# Patient Record
Sex: MALE | Race: WHITE | NOT HISPANIC OR LATINO | URBAN - METROPOLITAN AREA
[De-identification: names, ages, dates, MRNs, and addresses within clinical notes are randomized per-mention and may not be internally consistent; named-entity substitution may affect disease eponyms.]

---

## 2020-02-04 ENCOUNTER — INPATIENT (INPATIENT)
Facility: HOSPITAL | Age: 70
LOS: 6 days | Discharge: HOME | DRG: 445 | End: 2020-02-11
Attending: HOSPITALIST | Admitting: SURGERY
Payer: MEDICARE

## 2020-02-04 VITALS
SYSTOLIC BLOOD PRESSURE: 90 MMHG | DIASTOLIC BLOOD PRESSURE: 56 MMHG | HEART RATE: 84 BPM | OXYGEN SATURATION: 92 % | WEIGHT: 182.1 LBS | RESPIRATION RATE: 20 BRPM | TEMPERATURE: 98 F

## 2020-02-04 DIAGNOSIS — Z85.828 PERSONAL HISTORY OF OTHER MALIGNANT NEOPLASM OF SKIN: Chronic | ICD-10-CM

## 2020-02-04 LAB
ALBUMIN SERPL ELPH-MCNC: 3.4 G/DL — SIGNIFICANT CHANGE UP (ref 3.3–5)
ALP SERPL-CCNC: 82 U/L — SIGNIFICANT CHANGE UP (ref 40–120)
ALT FLD-CCNC: 31 U/L — SIGNIFICANT CHANGE UP (ref 10–45)
ANION GAP SERPL CALC-SCNC: 12 MMOL/L — SIGNIFICANT CHANGE UP (ref 5–17)
APPEARANCE UR: CLEAR — SIGNIFICANT CHANGE UP
APTT BLD: 60.2 SEC — HIGH (ref 27.5–36.3)
AST SERPL-CCNC: 42 U/L — HIGH (ref 10–40)
BACTERIA # UR AUTO: NEGATIVE — SIGNIFICANT CHANGE UP
BASE EXCESS BLDV CALC-SCNC: 5.4 MMOL/L — HIGH (ref -2–2)
BASOPHILS # BLD AUTO: 0.02 K/UL — SIGNIFICANT CHANGE UP (ref 0–0.2)
BASOPHILS NFR BLD AUTO: 0.3 % — SIGNIFICANT CHANGE UP (ref 0–2)
BILIRUB SERPL-MCNC: 1.1 MG/DL — SIGNIFICANT CHANGE UP (ref 0.2–1.2)
BILIRUB UR-MCNC: NEGATIVE — SIGNIFICANT CHANGE UP
BUN SERPL-MCNC: 46 MG/DL — HIGH (ref 7–23)
CA-I SERPL-SCNC: 1.09 MMOL/L — LOW (ref 1.12–1.3)
CALCIUM SERPL-MCNC: 9.1 MG/DL — SIGNIFICANT CHANGE UP (ref 8.4–10.5)
CHLORIDE BLDV-SCNC: 98 MMOL/L — SIGNIFICANT CHANGE UP (ref 96–108)
CHLORIDE SERPL-SCNC: 94 MMOL/L — LOW (ref 96–108)
CO2 BLDV-SCNC: 31 MMOL/L — HIGH (ref 22–30)
CO2 SERPL-SCNC: 28 MMOL/L — SIGNIFICANT CHANGE UP (ref 22–31)
COLOR SPEC: YELLOW — SIGNIFICANT CHANGE UP
CREAT SERPL-MCNC: 1.79 MG/DL — HIGH (ref 0.5–1.3)
DIFF PNL FLD: ABNORMAL
EOSINOPHIL # BLD AUTO: 0.07 K/UL — SIGNIFICANT CHANGE UP (ref 0–0.5)
EOSINOPHIL NFR BLD AUTO: 1 % — SIGNIFICANT CHANGE UP (ref 0–6)
EPI CELLS # UR: 0 /HPF — SIGNIFICANT CHANGE UP
GAS PNL BLDV: 131 MMOL/L — LOW (ref 135–145)
GAS PNL BLDV: SIGNIFICANT CHANGE UP
GAS PNL BLDV: SIGNIFICANT CHANGE UP
GLUCOSE BLDV-MCNC: 120 MG/DL — HIGH (ref 70–99)
GLUCOSE SERPL-MCNC: 119 MG/DL — HIGH (ref 70–99)
GLUCOSE UR QL: NEGATIVE — SIGNIFICANT CHANGE UP
HCO3 BLDV-SCNC: 30 MMOL/L — HIGH (ref 21–29)
HCT VFR BLD CALC: 21.8 % — LOW (ref 39–50)
HCT VFR BLDA CALC: 37 % — LOW (ref 39–50)
HGB BLD CALC-MCNC: 12.2 G/DL — LOW (ref 13–17)
HGB BLD-MCNC: 11.8 G/DL — LOW (ref 13–17)
HYALINE CASTS # UR AUTO: 2 /LPF — SIGNIFICANT CHANGE UP (ref 0–2)
IMM GRANULOCYTES NFR BLD AUTO: 0.4 % — SIGNIFICANT CHANGE UP (ref 0–1.5)
INR BLD: 1.49 RATIO — HIGH (ref 0.88–1.16)
KETONES UR-MCNC: NEGATIVE — SIGNIFICANT CHANGE UP
LACTATE BLDV-MCNC: 1.3 MMOL/L — SIGNIFICANT CHANGE UP (ref 0.7–2)
LEUKOCYTE ESTERASE UR-ACNC: NEGATIVE — SIGNIFICANT CHANGE UP
LIDOCAIN IGE QN: 42 U/L — SIGNIFICANT CHANGE UP (ref 7–60)
LYMPHOCYTES # BLD AUTO: 0.39 K/UL — LOW (ref 1–3.3)
LYMPHOCYTES # BLD AUTO: 5.8 % — LOW (ref 13–44)
MCHC RBC-ENTMCNC: 48.6 PG — HIGH (ref 27–34)
MCHC RBC-ENTMCNC: 54.1 GM/DL — HIGH (ref 32–36)
MCV RBC AUTO: 89.7 FL — SIGNIFICANT CHANGE UP (ref 80–100)
MONOCYTES # BLD AUTO: 0.53 K/UL — SIGNIFICANT CHANGE UP (ref 0–0.9)
MONOCYTES NFR BLD AUTO: 7.9 % — SIGNIFICANT CHANGE UP (ref 2–14)
NEUTROPHILS # BLD AUTO: 5.7 K/UL — SIGNIFICANT CHANGE UP (ref 1.8–7.4)
NEUTROPHILS NFR BLD AUTO: 84.6 % — HIGH (ref 43–77)
NITRITE UR-MCNC: NEGATIVE — SIGNIFICANT CHANGE UP
NRBC # BLD: 0 /100 WBCS — SIGNIFICANT CHANGE UP (ref 0–0)
PCO2 BLDV: 44 MMHG — SIGNIFICANT CHANGE UP (ref 35–50)
PH BLDV: 7.44 — SIGNIFICANT CHANGE UP (ref 7.35–7.45)
PH UR: 6 — SIGNIFICANT CHANGE UP (ref 5–8)
PLATELET # BLD AUTO: 65 K/UL — LOW (ref 150–400)
PO2 BLDV: 30 MMHG — SIGNIFICANT CHANGE UP (ref 25–45)
POTASSIUM BLDV-SCNC: 3.1 MMOL/L — LOW (ref 3.5–5.3)
POTASSIUM SERPL-MCNC: 3.4 MMOL/L — LOW (ref 3.5–5.3)
POTASSIUM SERPL-SCNC: 3.4 MMOL/L — LOW (ref 3.5–5.3)
PROT SERPL-MCNC: 6.8 G/DL — SIGNIFICANT CHANGE UP (ref 6–8.3)
PROT UR-MCNC: ABNORMAL
PROTHROM AB SERPL-ACNC: 17.4 SEC — HIGH (ref 10–12.9)
RBC # BLD: 2.43 M/UL — LOW (ref 4.2–5.8)
RBC # FLD: 14.6 % — HIGH (ref 10.3–14.5)
RBC CASTS # UR COMP ASSIST: 3 /HPF — SIGNIFICANT CHANGE UP (ref 0–4)
SAO2 % BLDV: 54 % — LOW (ref 67–88)
SODIUM SERPL-SCNC: 134 MMOL/L — LOW (ref 135–145)
SP GR SPEC: 1.01 — SIGNIFICANT CHANGE UP (ref 1.01–1.02)
UROBILINOGEN FLD QL: NEGATIVE — SIGNIFICANT CHANGE UP
WBC # BLD: 6.74 K/UL — SIGNIFICANT CHANGE UP (ref 3.8–10.5)
WBC # FLD AUTO: 6.74 K/UL — SIGNIFICANT CHANGE UP (ref 3.8–10.5)
WBC UR QL: 2 /HPF — SIGNIFICANT CHANGE UP (ref 0–5)

## 2020-02-04 PROCEDURE — 74176 CT ABD & PELVIS W/O CONTRAST: CPT | Mod: 26

## 2020-02-04 PROCEDURE — 76705 ECHO EXAM OF ABDOMEN: CPT | Mod: 26,RT

## 2020-02-04 PROCEDURE — 99285 EMERGENCY DEPT VISIT HI MDM: CPT | Mod: GC

## 2020-02-04 RX ORDER — ACETAMINOPHEN 500 MG
975 TABLET ORAL ONCE
Refills: 0 | Status: COMPLETED | OUTPATIENT
Start: 2020-02-04 | End: 2020-02-04

## 2020-02-04 RX ORDER — PIPERACILLIN AND TAZOBACTAM 4; .5 G/20ML; G/20ML
3.38 INJECTION, POWDER, LYOPHILIZED, FOR SOLUTION INTRAVENOUS ONCE
Refills: 0 | Status: COMPLETED | OUTPATIENT
Start: 2020-02-04 | End: 2020-02-04

## 2020-02-04 RX ORDER — SODIUM CHLORIDE 9 MG/ML
1000 INJECTION, SOLUTION INTRAVENOUS ONCE
Refills: 0 | Status: COMPLETED | OUTPATIENT
Start: 2020-02-04 | End: 2020-02-04

## 2020-02-04 RX ORDER — FAMOTIDINE 10 MG/ML
20 INJECTION INTRAVENOUS ONCE
Refills: 0 | Status: COMPLETED | OUTPATIENT
Start: 2020-02-04 | End: 2020-02-04

## 2020-02-04 RX ADMIN — PIPERACILLIN AND TAZOBACTAM 200 GRAM(S): 4; .5 INJECTION, POWDER, LYOPHILIZED, FOR SOLUTION INTRAVENOUS at 20:35

## 2020-02-04 RX ADMIN — SODIUM CHLORIDE 1000 MILLILITER(S): 9 INJECTION, SOLUTION INTRAVENOUS at 20:33

## 2020-02-04 RX ADMIN — FAMOTIDINE 20 MILLIGRAM(S): 10 INJECTION INTRAVENOUS at 20:35

## 2020-02-04 RX ADMIN — Medication 975 MILLIGRAM(S): at 20:35

## 2020-02-04 NOTE — ED ADULT NURSE NOTE - OBJECTIVE STATEMENT
69 year old male presents to the ED ambulatory through waiting room with wife complaining of RUQ abdominal pain, sent in s/p, outpatient ultrasound earlier today revealed acute cholecystitis.  PMH HTN, anxiety, recent MOHs surgery 1 week ago (L nostril), Pt. has had fever (Tmax 103 yesterday as per wife at bedside), nausea, vomiting, and loose stools since Friday. He has 3/10 RUQ pain, worse with movement and inspiration. On assessment, patient is febrile to 101.7 rectally, face appears flushed. Abdomen is soft, nondistended. Patient is not currently nauseous. Denies headache, CP, SOB, dysuria, hematuria, blood in stool or Last meal this AM.

## 2020-02-04 NOTE — ED ADULT NURSE NOTE - NSIMPLEMENTINTERV_GEN_ALL_ED
Implemented All Universal Safety Interventions:  [place] to call system. Call bell, personal items and telephone within reach. Instruct patient to call for assistance. Room bathroom lighting operational. Non-slip footwear when patient is off stretcher. Physically safe environment: no spills, clutter or unnecessary equipment. Stretcher in lowest position, wheels locked, appropriate side rails in place.

## 2020-02-04 NOTE — ED PROVIDER NOTE - CLINICAL SUMMARY MEDICAL DECISION MAKING FREE TEXT BOX
70 yo M with HTN, anxiety, and recent MOHs surgery presents to ED wit abdominal pain and outpt. ultrasound positive for acute cholecystitis. Hypotensive on arrival to ED, however resolving. + Carney's sign on exam. Plan: repeat abd U/S, labs, analgesia, Abx, surgery consult

## 2020-02-04 NOTE — CONSULT NOTE ADULT - SUBJECTIVE AND OBJECTIVE BOX
GENERAL SURGERY CONSULT NOTE    HPI:  69M with PMHx HTN, anxiety, presents to the ED with 3-4 days of fever, vomiting, and poor PO intake. His symptoms started with nausea and projectile vomiting on Friday associated with fevers as high as 102. Per the patient's wife who is at the bedside, he complained of lower abdominal pain when his symptoms started but this abdominal pain has since resolved. He endorses feeling week from being dehydrated and has felt unsteady on his feet. He denies diarrhea but states that lately his stools have been thinner than usual. His primary care doctor sent him for an outpatient RUQ ultrasound that was suspicious for acute cholecystitis and advised him to present to the ED. Of note, he had a Moh's procedure about 10 days ago after which he was prescribed Keflex for 7 days. He completed the course.   Patient notes that he has a history of thrombocytopenia and is followed closely by Dr. Spike Melendez who has recommended conservative monitoring. The patient also has been told that he has had an elevated creatinine in the past but he is unsure the baseline number. He denies any chest pain, shortness of breath, dysuria or sick contacts.     In the ED, he is HD normal. He is febrile without leukocytosis. He received Zosyn and 1L IV crystalloid bolus.    PMHx: Anxiety  Hypertension, unspecified type    PSHx: H/O Mohs micrographic surgery for skin cancer    Home meds: atenolol 50mg bid, Olmesartan 40mgd daily, diazepam 5mg daily PRN, vitamin D  Social Hx: Lives at home with his wife, nonsmoker  Family Hx: No pertinent past family history     Physical Exam  T(C): 38.7  HR: 82 (82 - 84)  BP: 127/74 (90/56 - 127/74)  RR: 18 (18 - 20)  SpO2: 97% (92% - 97%)  Tmax: T(C): , Max: 38.7 (02-04-20 @ 19:45)    General: well developed, well nourished, NAD  Neuro: alert and oriented x4  Respiratory: nonlabored breathing on RA  CVS: regular rate and rhythm  Abdomen: soft, nontender, negative Murpheys, nondistended  Extremities: no edema, sensation and movement grossly intact  Skin: warm, dry, appropriate color    Labs:                        11.8   7.49  )-----------( 65       ( 04 Feb 2020 20:32 )             33.8     PT/INR - ( 04 Feb 2020 20:32 )   PT: 17.4 sec;   INR: 1.49 ratio         PTT - ( 04 Feb 2020 20:32 )  PTT:60.2 sec  02-04    134<L>  |  94<L>  |  46<H>  ----------------------------<  119<H>  3.4<L>   |  28  |  1.79<H>    Ca    9.1      04 Feb 2020 20:32    TPro  6.8  /  Alb  3.4  /  TBili  1.1  /  DBili  x   /  AST  42<H>  /  ALT  31  /  AlkPhos  82  02-04      Imaging and other studies:  < from: US Abdomen Upper Quadrant Right (02.04.20 @ 21:17) >    FINDINGS:    Liver: Within normal limits.    Bile ducts: Normal caliber. Common bile duct measures 7 mm.     Gallbladder: Massively distended with stones and sludge. Mild wall thickening. Positive sonographic Carney sign.   Pancreas: Visualized portions are within normal limits.    Right kidney: 11.4 cm. No hydronephrosis. Right renal cyst measuring up to 4.7 x 4.1 x 3.2 cm.    Ascites: None.    IVC: Visualized portions are within normal limits.    Miscellaneous: Right pleural effusion is partially visualized.    IMPRESSION:     Distended gallbladder with stones, mild wall thickening and positive sonographic Carney sign. Findings consistent with acute cholecystitis.    Right pleural effusion.

## 2020-02-04 NOTE — ED PROVIDER NOTE - OBJECTIVE STATEMENT
70 yo M with PMHx HTN, anxiety, and recent MOHs surgery presents to ED with abdominal pain after outpatient ultrasound earlier today revealed acute cholecystitis. Pt. has had fever (Tmax 103.5), nausea, vomiting, and loose stools since Friday. He has very mild RUQ pain, worse with inspiration. Also complains of weakness with 2 falls -- no LOC, no head trauma. Went to [facility] today and were advised to get abdominal ultrasound, results consistent with acute cholecystitis. Pt. also endorses a few days of dark urine, now resolved. Denies headache, CP, SOB, difficulty urinating. Last meal this AM.

## 2020-02-04 NOTE — H&P ADULT - NSHPPHYSICALEXAM_GEN_ALL_CORE
Physical Exam  T(C): 38.7  HR: 82 (82 - 84)  BP: 127/74 (90/56 - 127/74)  RR: 18 (18 - 20)  SpO2: 97% (92% - 97%)  Tmax: T(C): , Max: 38.7 (02-04-20 @ 19:45)    General: well developed, well nourished, NAD  Neuro: alert and oriented x4  Respiratory: nonlabored breathing on RA  CVS: regular rate and rhythm  Abdomen: soft, nontender, negative Murpheys, nondistended  Extremities: no edema, sensation and movement grossly intact  Skin: warm, dry, appropriate color

## 2020-02-04 NOTE — H&P ADULT - ATTENDING COMMENTS
seen and examined 02-05-20 @ 2315    soft / NT / ND  no surgical scars on abdomen  no jaundice    WBC = 7  plats - clumped    INR = 1.5  aptt = 60    Cr = 1.8  LFTs - WNL    U/A - proteinuria, moderate blood    U/S abd - cholelithiasis in a distended gallbladder with mild wall thickening and positive sonographic Carney's sign. CBD = 7 mm.    CT abd/pelvis w/o contrast - cholelithiasis in a distended gallbladder with fat stranding between gallbladder and hepatic flexure (sagittal 100 - 112)      cholelithiasis with acute cholecystitis  -HIDA to confirm diagnosis given benign exam    elevated coags  -send mixing study  -consult hematology    platelet clumping  -repeat phlebotomy in citrate Vacutainer seen and examined 02-05-20 @ 2315    soft / NT / ND  no surgical scars on abdomen  no jaundice    WBC = 7  plats - clumped    INR = 1.5  aptt = 60    Cr = 1.8  LFTs - WNL    U/A - proteinuria, moderate blood    U/S abd - cholelithiasis in a distended gallbladder with mild wall thickening and positive sonographic Carney's sign. CBD = 7 mm. right pleural effusion.    CT abd/pelvis w/o contrast - cholelithiasis in a distended gallbladder with fat stranding between gallbladder and hepatic flexure (sagittal 100 - 112), small right pleural effusion.      cholelithiasis with acute cholecystitis  -HIDA to confirm diagnosis given benign exam    elevated coags  -send mixing study  -consult hematology    platelet clumping  -repeat phlebotomy in citrate Vacutainer

## 2020-02-04 NOTE — H&P ADULT - HISTORY OF PRESENT ILLNESS
GENERAL SURGERY CONSULT NOTE    HPI:  69M with PMHx HTN, anxiety, presents to the ED with 3-4 days of fever, vomiting, and poor PO intake. His symptoms started with nausea and projectile vomiting on Friday associated with fevers as high as 102. Per the patient's wife who is at the bedside, he complained of lower abdominal pain when his symptoms started but this abdominal pain has since resolved. He endorses feeling week from being dehydrated and has felt unsteady on his feet. He denies diarrhea but states that lately his stools have been thinner than usual. His primary care doctor sent him for an outpatient RUQ ultrasound that was suspicious for acute cholecystitis and advised him to present to the ED. Of note, he had a Moh's procedure about 10 days ago after which he was prescribed Keflex for 7 days. He completed the course.   Patient notes that he has a history of thrombocytopenia and is followed closely by Dr. Spike Melendez who has recommended conservative monitoring. The patient also has been told that he has had an elevated creatinine in the past but he is unsure the baseline number. He denies any chest pain, shortness of breath, dysuria or sick contacts.     In the ED, he is HD normal. He is febrile without leukocytosis. He received Zosyn and 1L IV crystalloid bolus.    PMHx: Anxiety  Hypertension, unspecified type    PSHx: H/O Mohs micrographic surgery for skin cancer    Home meds: atenolol 50mg bid, Olmesartan 40mgd daily, diazepam 5mg daily PRN, vitamin D  Social Hx: Lives at home with his wife, nonsmoker  Family Hx: No pertinent past family history

## 2020-02-04 NOTE — ED PROVIDER NOTE - ATTENDING CONTRIBUTION TO CARE
I performed a history and physical exam of the patient and discussed their management with the resident/ACP. I reviewed the resident/ACP's note and agree with the documented findings and plan of care.   69M w/ PMHx as above, states that for 3-4 days now he has had abdominal pain; states that when it first started he felt very nauseous and vomited 2-3 times, went to his PMD and was scheduled for an outpatient US. Patient states since that time he has not been eating much secondary to a lack of appetite. States that he has also been having fevers, Tmax 103.5 today, no vomiting since the initial times but he hasn't been eating. Patient states that his PMD called him after the US and told him to come to the ED. Patient's US report shows: "Solitray mobile gallstone, gallbladder sludge. There is gallbladder wall thickening and small amount of fluid surrounding the gallbladder." Also reports that he has been feeling weak and had 2 minor falls with no LOC and no head trauma, remembers everything. Denies any subsequent pain from those falls.    PHYSICAL EXAMINATION:  VITALS REVIEWED.  VS normal  GENERALIZED APPEARANCE:  Comfortable, no acute distress, lying in bed  SKIN:  Warm, dry, no cyanosis  HEAD:  No obvious scalp lesions  EYES:  Conjunctiva pink, no icterus  ENMT:  Mucus membranes moist, no stridor  NECK:  Supple, non-tender  CHEST AND RESPIRATORY:  Clear to auscultation B/L, good air entry B/L, equal chest expansion  HEART AND CARDIOVASCULAR:  Regular rate, no obvious murmur  ABDOMEN AND GI:  Soft, +RUQ tenderness, equivocal Carney's sign, non-distended.  No rebound, no guarding  EXTREMITIES:  No deformity, edema, or calf tenderness  NEURO: AAOx3, gross motor and sensory intact  PSYCH: Normal affect     Impression:  R/o acute cholecystitis but given fever and physical exam findings likely that; r/o electrolyte abnormalities; labs, imaging, antibiotics, pain management PRN, Surgery consult PRN, re-eval, will likely require admission. I performed a history and physical exam of the patient and discussed their management with the resident/ACP. I reviewed the resident/ACP's note and agree with the documented findings and plan of care.   69M w/ PMHx as above, states that for 3-4 days now he has had abdominal pain; states that when it first started he felt very nauseous and vomited 2-3 times, went to his PMD and was scheduled for an outpatient US. Patient states since that time he has not been eating much secondary to a lack of appetite. States that he has also been having fevers, Tmax 103.5 today, no vomiting since the initial times but he hasn't been eating. Patient states that his PMD called him after the US and told him to come to the ED. Patient's US report shows: "Solitray mobile gallstone, gallbladder sludge. There is gallbladder wall thickening and small amount of fluid surrounding the gallbladder." Also reports that he has been feeling weak and had 2 minor falls with no LOC and no head trauma, remembers everything. Denies any subsequent pain from those falls.    PHYSICAL EXAMINATION:  VITALS REVIEWED.  VS normal  GENERALIZED APPEARANCE:  Comfortable, no acute distress, lying in bed  SKIN:  Warm, dry, no cyanosis  HEAD:  No obvious scalp lesions  EYES:  Conjunctiva pink, no icterus  ENMT:  Mucus membranes moist, no stridor  NECK:  Supple, non-tender  CHEST AND RESPIRATORY:  Clear to auscultation B/L, good air entry B/L, equal chest expansion  HEART AND CARDIOVASCULAR:  Regular rate, no obvious murmur  ABDOMEN AND GI:  Soft, +RUQ tenderness, equivocal Carney's sign, non-distended.  No rebound, no guarding  EXTREMITIES:  No deformity, edema, or calf tenderness  NEURO: AAOx3, gross motor and sensory intact  PSYCH: Normal affect     Impression:  R/o acute cholecystitis but given fever and physical exam findings likely that; r/o electrolyte abnormalities; labs, imaging, antibiotics, pain management PRN, Surgery consult PRN, re-eval, will likely require admission to surgery

## 2020-02-04 NOTE — H&P ADULT - ASSESSMENT
ASSESSMENT  69M with PMHx HTN, anxiety, thrombocytopenia of unknown etiology, presents to the ED with 3-4 days of fever, vomiting, and poor PO intake. He is HD normal. He is febrile without leukocytosis.  US showing gallstones and sludge and GB wall thickening but he is nontender on exam.    PLAN  - Admit to [facility] under Dr. Jacobo  - NPO IVF  - Cont Zosyn  - f/u final CT read  - Likely HIDA in the morning   - Heme consult in the morning - Dr Melendez  - Patient seen and examined with and plan reviewed with Dr. Jacobo     Acute Care Surgery  [id number]

## 2020-02-04 NOTE — CONSULT NOTE ADULT - ASSESSMENT
ASSESSMENT  69M with PMHx HTN, anxiety, thrombocytopenia of unknown etiology, presents to the ED with 3-4 days of fever, vomiting, and poor PO intake. He is HD normal. He is febrile without leukocytosis.  US showing gallstones and sludge and GB wall thickening but he is nontender on exam.    PLAN  - The patient's clinical picture is not entirely consistent with acute cholecystitis  - Recommend obtaining a CT of the abdomen and pelvis to rule out any other source of fevers and vomiting  - Patient seen and examined with and plan reviewed with Dr. Jacobo     Acute Care Surgery  [id number]

## 2020-02-04 NOTE — ED PROVIDER NOTE - PROGRESS NOTE DETAILS
pt was seen and examined by me, per patient and wife at bedside reports fevers over the last 3 days, had outpatient US done which showed concern for acute errol, repeat bedside US with acute errol, given zosyn, IVF, platelets low and creatinine elevated to 1.7, pt said this is normal for him and follows with a hematologist but unknown reason why- takes meds for htn, anxiety, overall HD stable with ttp RUQ, surgery aware of patient, awaiting final recs  Margaret Perea, PGY-3 EM

## 2020-02-04 NOTE — ED PROVIDER NOTE - CARE PLAN
Patient denies any complaints related to anticoagulation therapy at this time. Patient reports no change in medication, diet or health. Reinforced with patient to call clinic with any medication changes as this can impact INR. Reinforced signs and symptoms bleeding/clotting with patient.  Patient aware to seek medical care if signs and symptoms develop. Advised that if patient falls and/or hits their head, they should seek medical attention. Verbalizes understanding. Clinic number provided [phone]    Dosing instructions given to patient verbally over the phone. Advised to call the clinic with any questions or concerns. Patient verbalizes understanding. Clinic number provided.    Anticoagulation Summary  As of 3/8/2018    INR goal:   2.0-3.0   TTR:   64.4 % (3.6 mo)   Today's INR:   1.7!   Maintenance plan:   1 mg (2 mg x 0.5) on W; 2 mg (2 mg x 1) all other days   Weekly total:   13 mg   Plan last modified:   Kiah Landaverde RN (3/8/2018)   Next INR check:   3/21/2018   Target end date:   Indefinite    Indications    Paroxysmal atrial fibrillation (CMS/HCC) [I48.0]  Acute CVA (cerebrovascular accident) (CMS/HCC) (Resolved) [I63.9]             Anticoagulation Episode Summary     INR check location:   Home Draw    Preferred lab:       Send INR reminders to:   [facility] (OPEN ENROLLMENT) [facility] CARD/EP    Comments:   New STAC ordered 11/9/17; 2mg tabs; [employer]; TALK IN TABS; patient gets confused easily      Anticoagulation Care Providers     Provider Role Specialty Phone number    Lewis Gannon NP Referring Nurse Practitioner - Adult Health [phone]    Dennis Talbert MD Responsible Internal Medicine- Interventional Cardiology [phone]             Principal Discharge DX:	Acute cholecystitis

## 2020-02-05 DIAGNOSIS — K81.0 ACUTE CHOLECYSTITIS: ICD-10-CM

## 2020-02-05 DIAGNOSIS — D59.1 OTHER AUTOIMMUNE HEMOLYTIC ANEMIAS: ICD-10-CM

## 2020-02-05 DIAGNOSIS — D69.3 IMMUNE THROMBOCYTOPENIC PURPURA: ICD-10-CM

## 2020-02-05 DIAGNOSIS — N17.9 ACUTE KIDNEY FAILURE, UNSPECIFIED: ICD-10-CM

## 2020-02-05 DIAGNOSIS — D68.4 ACQUIRED COAGULATION FACTOR DEFICIENCY: ICD-10-CM

## 2020-02-05 DIAGNOSIS — N18.2 CHRONIC KIDNEY DISEASE, STAGE 2 (MILD): ICD-10-CM

## 2020-02-05 DIAGNOSIS — E53.8 DEFICIENCY OF OTHER SPECIFIED B GROUP VITAMINS: ICD-10-CM

## 2020-02-05 DIAGNOSIS — D68.9 COAGULATION DEFECT, UNSPECIFIED: ICD-10-CM

## 2020-02-05 LAB
ALBUMIN SERPL ELPH-MCNC: 3.3 G/DL — SIGNIFICANT CHANGE UP (ref 3.3–5)
ALP SERPL-CCNC: 88 U/L — SIGNIFICANT CHANGE UP (ref 40–120)
ALT FLD-CCNC: 26 U/L — SIGNIFICANT CHANGE UP (ref 10–45)
ANION GAP SERPL CALC-SCNC: 13 MMOL/L — SIGNIFICANT CHANGE UP (ref 5–17)
APTT 50/50 2HOUR INCUB: 63.7 SEC — HIGH (ref 27.5–37.4)
APTT 50/50 MIX COMMENT: SIGNIFICANT CHANGE UP
APTT BLD: 55.9 SEC — HIGH (ref 27.5–37.4)
APTT BLD: 60.6 SEC — HIGH (ref 27.5–36.3)
APTT BLD: 64.4 SEC — HIGH (ref 27.5–36.3)
AST SERPL-CCNC: 33 U/L — SIGNIFICANT CHANGE UP (ref 10–40)
BILIRUB SERPL-MCNC: 1.2 MG/DL — SIGNIFICANT CHANGE UP (ref 0.2–1.2)
BLD GP AB SCN SERPL QL: POSITIVE — SIGNIFICANT CHANGE UP
BUN SERPL-MCNC: 42 MG/DL — HIGH (ref 7–23)
CALCIUM SERPL-MCNC: 9.1 MG/DL — SIGNIFICANT CHANGE UP (ref 8.4–10.5)
CHLORIDE SERPL-SCNC: 98 MMOL/L — SIGNIFICANT CHANGE UP (ref 96–108)
CO2 SERPL-SCNC: 29 MMOL/L — SIGNIFICANT CHANGE UP (ref 22–31)
CREAT SERPL-MCNC: 1.58 MG/DL — HIGH (ref 0.5–1.3)
FIBRINOGEN PPP-MCNC: 836 MG/DL — HIGH (ref 350–510)
FIBRINOGEN PPP-MCNC: 848 MG/DL — SIGNIFICANT CHANGE UP (ref 350–510)
GLUCOSE SERPL-MCNC: 111 MG/DL — HIGH (ref 70–99)
HCT VFR BLD CALC: 31.5 % — LOW (ref 39–50)
HCV AB S/CO SERPL IA: 0.32 S/CO — SIGNIFICANT CHANGE UP (ref 0–0.99)
HCV AB SERPL-IMP: SIGNIFICANT CHANGE UP
HGB BLD-MCNC: 11 G/DL — LOW (ref 13–17)
INR BLD: 1.41 RATIO — HIGH (ref 0.88–1.16)
INR BLD: 1.41 RATIO — HIGH (ref 0.88–1.16)
MAGNESIUM SERPL-MCNC: 2 MG/DL — SIGNIFICANT CHANGE UP (ref 1.6–2.6)
MCHC RBC-ENTMCNC: 30.6 PG — SIGNIFICANT CHANGE UP (ref 27–34)
MCHC RBC-ENTMCNC: 34.9 GM/DL — SIGNIFICANT CHANGE UP (ref 32–36)
MCV RBC AUTO: 87.7 FL — SIGNIFICANT CHANGE UP (ref 80–100)
NRBC # BLD: 0 /100 WBCS — SIGNIFICANT CHANGE UP (ref 0–0)
PAT CTL 2H: 67.7 SEC — HIGH (ref 27.5–37.4)
PHOSPHATE SERPL-MCNC: 2.9 MG/DL — SIGNIFICANT CHANGE UP (ref 2.5–4.5)
PLATELET # BLD AUTO: 72 K/UL — LOW (ref 150–400)
PLATELET # BLD AUTO: SIGNIFICANT CHANGE UP K/UL (ref 150–400)
POTASSIUM SERPL-MCNC: 3.4 MMOL/L — LOW (ref 3.5–5.3)
POTASSIUM SERPL-SCNC: 3.4 MMOL/L — LOW (ref 3.5–5.3)
PROT SERPL-MCNC: 6.4 G/DL — SIGNIFICANT CHANGE UP (ref 6–8.3)
PROTHROM AB SERPL-ACNC: 16.3 SEC — HIGH (ref 10–12.9)
PROTHROM AB SERPL-ACNC: 16.4 SEC — HIGH (ref 10–12.9)
PT 100%: 16.3 SEC — HIGH (ref 10–12.9)
PT 50/50: 13.2 SEC — SIGNIFICANT CHANGE UP (ref 9.7–15.2)
RAPID RVP RESULT: SIGNIFICANT CHANGE UP
RBC # BLD: 3.59 M/UL — LOW (ref 4.2–5.8)
RBC # FLD: 13.5 % — SIGNIFICANT CHANGE UP (ref 10.3–14.5)
RH IG SCN BLD-IMP: POSITIVE — SIGNIFICANT CHANGE UP
RH IG SCN BLD-IMP: POSITIVE — SIGNIFICANT CHANGE UP
SODIUM SERPL-SCNC: 140 MMOL/L — SIGNIFICANT CHANGE UP (ref 135–145)
THROMBIN TIME: 22 SEC — SIGNIFICANT CHANGE UP (ref 16–25)
THROMBIN TIME: 22.7 SEC — SIGNIFICANT CHANGE UP (ref 16–25)
WBC # BLD: 6.01 K/UL — SIGNIFICANT CHANGE UP (ref 3.8–10.5)
WBC # FLD AUTO: 6.01 K/UL — SIGNIFICANT CHANGE UP (ref 3.8–10.5)

## 2020-02-05 PROCEDURE — 78227 HEPATOBIL SYST IMAGE W/DRUG: CPT | Mod: 26

## 2020-02-05 PROCEDURE — 86077 PHYS BLOOD BANK SERV XMATCH: CPT

## 2020-02-05 RX ORDER — FLUVOXAMINE MALEATE 25 MG/1
100 TABLET ORAL
Refills: 0 | Status: DISCONTINUED | OUTPATIENT
Start: 2020-02-05 | End: 2020-02-11

## 2020-02-05 RX ORDER — ATENOLOL 25 MG/1
50 TABLET ORAL
Refills: 0 | Status: DISCONTINUED | OUTPATIENT
Start: 2020-02-05 | End: 2020-02-11

## 2020-02-05 RX ORDER — DIAZEPAM 5 MG
2.5 TABLET ORAL DAILY
Refills: 0 | Status: DISCONTINUED | OUTPATIENT
Start: 2020-02-05 | End: 2020-02-09

## 2020-02-05 RX ORDER — SODIUM CHLORIDE 9 MG/ML
1000 INJECTION, SOLUTION INTRAVENOUS
Refills: 0 | Status: DISCONTINUED | OUTPATIENT
Start: 2020-02-05 | End: 2020-02-05

## 2020-02-05 RX ORDER — DIAZEPAM 5 MG
5 TABLET ORAL
Qty: 0 | Refills: 0 | DISCHARGE

## 2020-02-05 RX ORDER — FOLIC ACID 0.8 MG
5 TABLET ORAL ONCE
Refills: 0 | Status: COMPLETED | OUTPATIENT
Start: 2020-02-05 | End: 2020-02-05

## 2020-02-05 RX ORDER — PHYTONADIONE (VIT K1) 5 MG
5 TABLET ORAL ONCE
Refills: 0 | Status: COMPLETED | OUTPATIENT
Start: 2020-02-05 | End: 2020-02-05

## 2020-02-05 RX ORDER — PIPERACILLIN AND TAZOBACTAM 4; .5 G/20ML; G/20ML
3.38 INJECTION, POWDER, LYOPHILIZED, FOR SOLUTION INTRAVENOUS EVERY 8 HOURS
Refills: 0 | Status: DISCONTINUED | OUTPATIENT
Start: 2020-02-05 | End: 2020-02-05

## 2020-02-05 RX ORDER — INFLUENZA VIRUS VACCINE 15; 15; 15; 15 UG/.5ML; UG/.5ML; UG/.5ML; UG/.5ML
0.5 SUSPENSION INTRAMUSCULAR ONCE
Refills: 0 | Status: DISCONTINUED | OUTPATIENT
Start: 2020-02-05 | End: 2020-02-11

## 2020-02-05 RX ORDER — MORPHINE SULFATE 50 MG/1
2 CAPSULE, EXTENDED RELEASE ORAL ONCE
Refills: 0 | Status: DISCONTINUED | OUTPATIENT
Start: 2020-02-05 | End: 2020-02-05

## 2020-02-05 RX ORDER — FLUVOXAMINE MALEATE 25 MG/1
200 TABLET ORAL ONCE
Refills: 0 | Status: COMPLETED | OUTPATIENT
Start: 2020-02-05 | End: 2020-02-05

## 2020-02-05 RX ORDER — FOLIC ACID 0.8 MG
1 TABLET ORAL DAILY
Refills: 0 | Status: DISCONTINUED | OUTPATIENT
Start: 2020-02-05 | End: 2020-02-11

## 2020-02-05 RX ORDER — PREGABALIN 225 MG/1
1000 CAPSULE ORAL DAILY
Refills: 0 | Status: COMPLETED | OUTPATIENT
Start: 2020-02-05 | End: 2020-02-10

## 2020-02-05 RX ORDER — POTASSIUM CHLORIDE 20 MEQ
20 PACKET (EA) ORAL ONCE
Refills: 0 | Status: COMPLETED | OUTPATIENT
Start: 2020-02-05 | End: 2020-02-05

## 2020-02-05 RX ADMIN — ATENOLOL 50 MILLIGRAM(S): 25 TABLET ORAL at 06:54

## 2020-02-05 RX ADMIN — Medication 5 MILLIGRAM(S): at 14:33

## 2020-02-05 RX ADMIN — Medication 20 MILLIEQUIVALENT(S): at 14:34

## 2020-02-05 RX ADMIN — Medication 2.5 MILLIGRAM(S): at 21:55

## 2020-02-05 RX ADMIN — FLUVOXAMINE MALEATE 100 MILLIGRAM(S): 25 TABLET ORAL at 17:54

## 2020-02-05 RX ADMIN — SODIUM CHLORIDE 100 MILLILITER(S): 9 INJECTION, SOLUTION INTRAVENOUS at 03:58

## 2020-02-05 RX ADMIN — PREGABALIN 1000 MICROGRAM(S): 225 CAPSULE ORAL at 14:33

## 2020-02-05 RX ADMIN — ATENOLOL 50 MILLIGRAM(S): 25 TABLET ORAL at 17:54

## 2020-02-05 RX ADMIN — PIPERACILLIN AND TAZOBACTAM 25 GRAM(S): 4; .5 INJECTION, POWDER, LYOPHILIZED, FOR SOLUTION INTRAVENOUS at 06:54

## 2020-02-05 NOTE — CONSULT NOTE ADULT - ASSESSMENT
INCOMPLETE NOTE.  Documentation in Progress  PT CHART REVIEWED, PT Unavailable for exam:  IN HIDA SCAN since AM, Per radiology to be in machine for next 3hrs  FULL/ADDITIONAL RECOMMENDATIONS TO FOLLOW   ***************************************************************    [ASSESSMENT and  PLAN]  68yo M with hx of ITP, baseline plts 100-120. On observation. Last Plt 120 in our office 12/19/20 outpt.   Admitted with fever, possible cholecystitis.   CBC with plt clumping.   Plt on blue top estimated 72    Abn coag panel, elevated PT and PTT likely due to incomplete filling of sample from ER.   non-contrast CT scan AP and Abd US showed no evidence of liver disease.   Ddx includes anticoagulant.   Doubt DIC, as WBC normal.   Pt at baseline WBC of ~6    DONNA with Cr 1.79.   Baseline Cr 1.2-1.4, mild CKD-2    hx low normal B12 levels.     RECOMMENDATIONS  Follow CBC  Plts adequate at current time.  No intervention recommended     Repeat Pt, PTT,   Check Fibrinogen  Await Blue top (citrate-tube) Plt count    B12 injections 1000mcg while hospitalized to replete levels, to minimize risk for further throbocytopenia from this cause.     Treat acute cholecystitis.   Mgmt per surgery.   Abx as warranted.   Diet as per surgery team.     IVF for DONNA. Follow Cr.     DVT Prophylaxis SCD, if plan for possible surgery.   Adequate for SQ heparin if high risk for DVT, eg minimal ambulation.   Final decision on AC, dependent on surgical plans.     Thank you for consulting us.     FULL/ADDITIONAL RECOMMENDATIONS TO FOLLOW [ASSESSMENT and  PLAN]  68yo M with hx of ITP, baseline plts 100-120. On observation. Last Plt 120 in our office 12/19/20 outpt.   Admitted with fever, possible cholecystitis. Sx since Sat 02/01/20  CBC with plt clumping.   Plt on blue top estimated 72. Close to pt baseline.   Possible mild decreased due to acute illness.     Abn coag panel, suspect factor inhibitor  elevated PT and PTT likely due to incomplete filling of sample from ER.   non-contrast CT scan AP and Abd US showed no evidence of liver disease.   Ddx includes anticoagulant. However with no hx of anticoagulant use.   Doubt DIC, as WBC normal. Fibrinogen adequate.   Pt at baseline WBC of ~6  Repeat PT and PTT similarly abnormal to prior test.   Mixing study c/w inhibitor to PTT, and possible weak inhibitor to PT.   Most common inhibitor would include acquired FVIII  [Factor VIII] inhibitor or LAC [Lupus Anticoagulant]  No BRANT on exam    KRANTHI+, with anit-C3, Cold Agglutin  Mild anemia. Hgb 11, but Hgb may be lower in setting on volume contraction, with DONNA.       DONNA with Cr 1.79.   Baseline Cr 1.2-1.4, mild CKD-2    hx low normal B12 levels.       RECOMMENDATIONS  Thrombocytopenia/ITP  ·	B12 injections 1000mcg while hospitalized to replete levels, to minimize risk for further throbocytopenia from this cause.   ·	Follow CBC, no intervention recommended for chronic thrombocytopenia.   ·	Plts adequate at current time.    Needs additional workup for factor inhibitor:  ·	Await FVIII levels, FIX levels, FX levels, FVII levels, FII levels  ·	Check LAC, MICHELLE.     KRANTHI+, mild anemia  ·	Check Tbili, Dbili  ·	Check haptoglogin, LDH.   ·	Check retic.   ·	S/p Folic acid IM.  ·	Start PO Folic acid daily.     Need eventual CT scan CAP with IV contrast, neck, Chest, Abd, Pelvis to eval for lymphadenopathy.   ·	Defer for now as DONNA with CKD.     Treatment of  acute cholecystitis.   ·	Mgmt per surgery.   ·	Abx as warranted.   ·	Diet as per surgery team.     Determinate of whether fever due to infectious causes, eg acute cholecystis or other source, eg lymphoproliferative disorder. .     IVF for DONNA.   Follow Cr.     DVT Prophylaxis SCD, if plan for possible surgery.   Adequate for SQ heparin if high risk for DVT, eg minimal ambulation.   Final decision on AC, dependent on surgical plans.     Thank you for consulting us.

## 2020-02-05 NOTE — CONSULT NOTE ADULT - ASSESSMENT
69 M with PMHx HTN, anxiety, presents to the ED with 3-4 days of fever, vomiting, lower abdominal pain and poor PO intake. His symptoms started with nausea and projectile vomiting on Friday associated with fevers as high as 102F after eating BBQ the day before.    Saw his PCP Dr. Calderon at [facility], did outpatient RUQ ultrasound that was suspicious for acute cholecystitis and advised him to present to the ED.     # Abdominal pain: r/o acute cholecystitis vs. food poisoning   - US RUQ and CT abd suggested acute cholecystitis. CBD normal, but distended gallbladder  - HIDA scan is negative however.   - symptoms has since resolved. No current plan for surgical intervention  - resumed regular diet diet per surgical team, eval appreciated     # Fever 102-103F  - unclear source  - GI findings as above  - no sick contact, no recent travel.  - will check RVP to r/o flu/viral, etc to complete work up  - recent UC strep throat neg per the wife   - UA is negative. CT abd lower lung cut, no PNA.   - monitor for fever curves and GI symptoms     # Coagulopathy with thrombocytopenia  - known history of thrombocytopenia  - being followed by Spike Gaines (hematology), last plts 126 on 12/19/19.  - no easy bleeding reported. recent Tyra surgery of the nose with some bleeding which has stopped spontaneously  - continue to monitor plts, continue to trend PT/INR in am, fibronogen per heme/onc team  - heme/onc eval appreciated.   (recently completed Keflex, though no reaction reported)  - holding Asa 81 mg for now (taking for general preventative reason)  - if numbers stable, can follow up with Dr. Aldridge outpt    # Nose basal cell Ca  - s/p recent Tyra's surgery  - completed 7 days of Keflex, no prior allergy   - outpt follow up with dermatology     # Anxiety  - c/w home meds. on PRN Valium 2.5-5 mg PRN qdaily  - c/w Fluvoxamine 100 mg BID     # HTN  - c/w Atenolol 50 mg BID    # Arthritis  - received PRN steroid injections in the past  - no recent issues     DVT ppx  venodynes     Thank you for the courtesy of this consult.  Medicine will follow.     - Dr. Willson ([facility])  - [phone] 69 M with PMHx HTN, anxiety, presents to the ED with 3-4 days of fever, vomiting, lower abdominal pain and poor PO intake. His symptoms started with nausea and projectile vomiting on Friday associated with fevers as high as 102F after eating BBQ the day before.    Saw his PCP Dr. Calderon at [facility], did outpatient RUQ ultrasound that was suspicious for acute cholecystitis and advised him to present to the ED.     # Abdominal pain: r/o acute cholecystitis vs. food poisoning   - US RUQ and CT abd suggested acute cholecystitis. CBD normal, but distended gallbladder  - HIDA scan is negative however.   - symptoms has since resolved. No current plan for surgical intervention  - resumed regular diet diet per surgical team, eval appreciated     # Fever 102-103F  - unclear source  - GI findings as above  - no sick contact, no recent travel.  - UA is negative. CT abd lower lung cut, no PNA  - will check RVP to r/o flu/viral, etc to complete work up  - recent UC strep throat neg per the wife   - monitor for fever curves and GI symptoms     # Coagulopathy with thrombocytopenia  - known history of thrombocytopenia  - being followed by Spike Gaines (hematology), last plts 126 on 12/19/19  - no easy bleeding reported. recent Tyra surgery of the nose with some bleeding which has stopped spontaneously  - continue to monitor plts, continue to trend PT/INR in am, fibronogen per heme/onc team  - heme/onc eval appreciated.   (recently completed Keflex, though no reaction reported)  - holding Asa 81 mg for now (taking for general preventative reason)  - if numbers stable, can follow up with Dr. Aldridge outpt    # Acute on chronic CKD (baseline Cr is 1.3 in 2019)   - Cr 1.79 on admission, likely pre-renal  - s/p IVF, Cr slowly improving  - continue to trend  - no post obstructive symptoms    # Nose basal cell Ca  - s/p recent Tyra's surgery  - completed 7 days of Keflex, no prior allergy   - outpt follow up with dermatology     # Anxiety  - c/w home meds. on PRN Valium 2.5-5 mg PRN qdaily  - c/w Fluvoxamine 100 mg BID     # HTN  - c/w Atenolol 50 mg BID    # Arthritis  - received PRN steroid injections in the past  - no recent issues 	    DVT ppx  venodynes     Thank you for the courtesy of this consult.  Medicine will follow.     - Dr. Willson ([facility])  - [phone] 69 M with PMHx HTN, anxiety, presents to the ED with 3-4 days of fever, vomiting, lower abdominal pain and poor PO intake. His symptoms started with nausea and projectile vomiting on Friday associated with fevers as high as 102F after eating BBQ the day before.    Saw his PCP Dr. Calderon at [facility], did outpatient RUQ ultrasound that was suspicious for acute cholecystitis and advised him to present to the ED.     # Abdominal pain: r/o acute cholecystitis vs. food poisoning   - US RUQ and CT abd suggested acute cholecystitis. CBD normal, but distended gallbladder  - HIDA scan is negative however.   - symptoms has since resolved. No current plan for surgical intervention  - resumed regular diet diet per surgical team, eval appreciated     # Fever 102-103F  - unclear source  - GI findings as above  - no sick contact, no recent travel.  - UA is negative. CT abd lower lung cut, no PNA  - will check RVP to r/o flu/viral, etc to complete work up  - recent UC strep throat neg per the wife   - monitor for fever curves and GI symptoms   - f/u blood cultures (sent) 	    # Coagulopathy with thrombocytopenia  - known history of thrombocytopenia  - being followed by Spike Gaines (hematology), last plts 126 on 12/19/19  - no easy bleeding reported. recent Tyra surgery of the nose with some bleeding which has stopped spontaneously  - continue to monitor plts, continue to trend PT/INR in am, fibronogen per heme/onc team  - heme/onc eval appreciated.   (recently completed Keflex, though no reaction reported)  - holding Asa 81 mg for now (taking for general preventative reason)  - if numbers stable, can follow up with Dr. Aldridge outpt    # Acute on chronic CKD (baseline Cr is 1.3 in 2019)   - Cr 1.79 on admission, likely pre-renal  - s/p IVF, Cr slowly improving  - continue to trend  - no post obstructive symptoms    # Nose basal cell Ca  - s/p recent Tyra's surgery  - completed 7 days of Keflex, no prior allergy   - outpt follow up with dermatology     # Anxiety  - c/w home meds. on PRN Valium 2.5-5 mg PRN qdaily  - c/w Fluvoxamine 100 mg BID     # HTN  - c/w Atenolol 50 mg BID    # Arthritis  - received PRN steroid injections in the past  - no recent issues 	    DVT ppx  venodynes     Thank you for the courtesy of this consult.  Medicine will follow.     - Dr. Willson ([facility])  - [phone]

## 2020-02-05 NOTE — PROGRESS NOTE ADULT - ASSESSMENT
69M with PMHx HTN, anxiety, thrombocytopenia of unknown etiology, presents to the ED with 3-4 days of fever, vomiting, and poor PO intake. He is HD normal. He is febrile without leukocytosis.  US showing gallstones and sludge and GB wall thickening but he is nontender on exam.    PLAN    - NPO IVF  - Cont Zosyn  - HIDA this AM  - Appreciate Heme recs  - Preop for OR      Acute Care Surgery  [id number] 69M with PMHx HTN, anxiety, thrombocytopenia of unknown etiology, presents to the ED with 3-4 days of fever, vomiting, and poor PO intake. He is HD normal. He is febrile without leukocytosis.  US showing gallstones and sludge and GB wall thickening but he is nontender on exam. CT showing acute cholecystitis.    PLAN    - NPO IVF  - Cont Zosyn  - HIDA this AM  - Appreciate [facility] recs  - Preop for OR      Acute Care Surgery  [id number]

## 2020-02-05 NOTE — PROGRESS NOTE ADULT - SUBJECTIVE AND OBJECTIVE BOX
Surgery Progress Note     Subjective/24hour Events:   Patient seen and examined. No acute events overnight. Pain controlled. Denies n/v.     Vital Signs:  Vital Signs Last 24 Hrs  T(C): 37.2 (05 Feb 2020 05:30), Max: 38.7 (04 Feb 2020 19:45)  T(F): 99 (05 Feb 2020 05:30), Max: 101.7 (04 Feb 2020 19:45)  HR: 80 (05 Feb 2020 05:30) (63 - 84)  BP: 129/70 (05 Feb 2020 05:30) (90/56 - 134/74)  BP(mean): --  RR: 16 (05 Feb 2020 05:30) (16 - 20)  SpO2: 94% (05 Feb 2020 05:30) (92% - 97%)    CAPILLARY BLOOD GLUCOSE          I&O's Detail    04 Feb 2020 07:01  -  05 Feb 2020 07:00  --------------------------------------------------------  IN:    IV PiggyBack: 100 mL    lactated ringers.: 600 mL  Total IN: 700 mL    OUT:    Voided: 400 mL  Total OUT: 400 mL    Total NET: 300 mL          MEDICATIONS  (STANDING):  ATENolol  Tablet 50 milliGRAM(s) Oral two times a day  fluvoxaMINE 200 milliGRAM(s) Oral once  influenza   Vaccine 0.5 milliLiter(s) IntraMuscular once  lactated ringers. 1000 milliLiter(s) (100 mL/Hr) IV Continuous <Continuous>  piperacillin/tazobactam IVPB.. 3.375 Gram(s) IV Intermittent every 8 hours    MEDICATIONS  (PRN):      Physical Exam:  Gen: NAD.  Lungs: Non labored breathing.   Ab: Soft, nontender, nondistended.   Ext: Moves all 4 spontaneously.     Labs:    02-05    140  |  98  |  42<H>  ----------------------------<  111<H>  3.4<L>   |  29  |  1.58<H>    Ca    9.1      05 Feb 2020 07:30  Phos  2.9     02-05  Mg     2.0     02-05    TPro  6.4  /  Alb  3.3  /  TBili  1.2  /  DBili  x   /  AST  33  /  ALT  26  /  AlkPhos  88  02-05    LIVER FUNCTIONS - ( 05 Feb 2020 07:30 )  Alb: 3.3 g/dL / Pro: 6.4 g/dL / ALK PHOS: 88 U/L / ALT: 26 U/L / AST: 33 U/L / GGT: x                                 x      x     )-----------( 72       ( 05 Feb 2020 07:32 )             x        PT/INR - ( 05 Feb 2020 07:30 )   PT: 16.4 sec;   INR: 1.41 ratio         PTT - ( 05 Feb 2020 07:30 )  PTT:60.6 sec    Imaging:

## 2020-02-05 NOTE — CONSULT NOTE ADULT - SUBJECTIVE AND OBJECTIVE BOX
INCOMPLETE NOTE.  Documentation in Progress  PT CHART REVIEWED, PT Unavailable for exam:  IN HIDA SCAN since AM, Per radiology to be in machine for next 3hrs  FULL/ADDITIONAL RECOMMENDATIONS TO FOLLOW   ***************************************************************      Patient is a 69y old  Male who presents with a chief complaint of       HPI:  69M with PMHx HTN, anxiety, presents to the ED with 3-4 days of fever, vomiting, and poor PO intake. His symptoms started with nausea and projectile vomiting on Friday associated with fevers as high as 102. Per the patient's wife who is at the bedside, he complained of lower abdominal pain when his symptoms started but this abdominal pain has since resolved. He endorses feeling week from being dehydrated and has felt unsteady on his feet. He denies diarrhea but states that lately his stools have been thinner than usual. His primary care doctor sent him for an outpatient RUQ ultrasound that was suspicious for acute cholecystitis and advised him to present to the ED. Of note, he had a Moh's procedure about 10 days ago after which he was prescribed Keflex for 7 days. He completed the course.   Patient notes that he has a history of thrombocytopenia and is followed closely by Dr. Spike Melendez who has recommended conservative monitoring. The patient also has been told that he has had an elevated creatinine in the past but he is unsure the baseline number. He denies any chest pain, shortness of breath, dysuria or sick contacts.     In the ED, he is HD normal. He is febrile without leukocytosis. He received Zosyn and 1L IV crystalloid bolus.    Heme asked to see for thrombocytopenia.   Hx per chart, and discussion with surgical MITCHELL Marino.   Pt at time of chart review 9M< in HIDA scan. Unavailable for physical exam.   Pt known to our office, followed by Dr Aldridge since .   PMD Dr Rusty Calderon.   Plt low due to presumed ITP, never required tx.   Baseline plts typically ~100-120.   No known hx of cirrhosis or liver disease. No hx coagulopathy but no office labs to corroborate normal coag panel.   On ASA outpt, not known to be on anticoagulants.   On depakote for unclear reasons, prior, but stopped in 2019  hx of high Glu  PMHx:   ·	Anxiety  ·	Hypertension, unspecified type  ·	Tonsillectomy at 10yo  ·	Mild CKD Cr baseline 1.2-1.4  ·	Elevated Glu  ·	Osteoarthritis,   PSHx: H/O Mohs micrographic surgery for skin cancer  Home meds:  atenolol 50mg bid, Olmesartan 40mgd daily, diazepam 5mg daily PRN, vitamin D  Social Hx: Lives at home with his wife, nonsmoker  Family Hx: No pertinent past family history (2020 23:45)      PAST MEDICAL & SURGICAL HISTORY:  Anxiety  Hypertension, unspecified type  H/O Mohs micrographic surgery for skin cancer      HEALTH ISSUES - PROBLEM Dx:  Acute cholecystitis (K81.0)  Anxiety (F41.9)  Hypertension, unspecified type (I10)  H/O Mohs micrographic surgery for skin cancer (Z85.828)      FAMILY HISTORY:  as above      [SOCIAL HISTORY: ]     smoking:  non-smoker     EtOH:  denies     illicit drugs:  denies     occupation:  retired     marital status:       Other:       [ALLERGIES/INTOLERANCES:]  Allergies      No Known Allergies  Intolerances          [MEDICATIONS]  MEDICATIONS  (STANDING):  ATENolol  Tablet 50 milliGRAM(s) Oral two times a day  fluvoxaMINE 200 milliGRAM(s) Oral once  influenza   Vaccine 0.5 milliLiter(s) IntraMuscular once  lactated ringers. 1000 milliLiter(s) (100 mL/Hr) IV Continuous <Continuous>  piperacillin/tazobactam IVPB.. 3.375 Gram(s) IV Intermittent every 8 hours    MEDICATIONS  (PRN):        [REVIEW OF SYSTEMS: ] per chart  CONSTITUTIONAL: normal, +fever, no shakes, no chills   EYES: No eye pain, no visual disturbances, no discharge  ENMT:  no discharge  NECK: No pain, no stiffness  BREASTS: No pain, no masses, no nipple discharge  RESPIRATORY: No cough, no wheezing, no chills, no hemoptysis; No shortness of breath  CARDIOVASCULAR: No chest pain, no palpitations, no dizziness, no leg swelling  GASTROINTESTINAL: +abdominal pain, +epigastric pain. No nausea, no vomiting, no hematemesis; No diarrhea , no constipation. No melena, no hematochezia.  GENITOURINARY: No dysuria, no frequency, no hematuria, no incontinence  NEUROLOGICAL: No headaches, no memory loss, no loss of strength, no numbness, no tremors  SKIN: No itching, no burning, no rashes, no lesions   LYMPH NODES: No enlarged glands  ENDOCRINE: No heat or cold intolerance; No hair loss  MUSCULOSKELETAL: No joint pain or swelling; No muscle, no back, no extremity pain  PSYCHIATRIC: No depression, no anxiety, no mood swings, no difficulty sleeping  HEME/LYMPH: No easy bruising, no bleeding gums      [VITALS SIGNS 24hrs]  Vital Signs Last 24 Hrs  T(C): 37.2 (2020 05:30), Max: 38.7 (2020 19:45)  T(F): 99 (2020 05:30), Max: 101.7 (2020 19:45)  HR: 80 (2020 05:30) (63 - 84)  BP: 129/70 (2020 05:30) (90/56 - 134/74)  BP(mean): --  RR: 16 (2020 05:30) (16 - 20)  SpO2: 94% (2020 05:30) (92% - 97%)      [PHYSICAL EXAM]  PENDING PT return from SCAN      [LABS:]                        x      x     )-----------( 72       ( 2020 07:32 )             x        CBC Full  -  ( 2020 07:32 )  WBC Count : x  RBC Count : x  Hemoglobin : x  Hematocrit : x  Platelet Count - Automated : 72 K/uL  Mean Cell Volume : x  Mean Cell Hemoglobin : x  Mean Cell Hemoglobin Concentration : x  Auto Neutrophil # : x  Auto Lymphocyte # : x  Auto Monocyte # : x  Auto Eosinophil # : x  Auto Basophil # : x  Auto Neutrophil % : x  Auto Lymphocyte % : x  Auto Monocyte % : x  Auto Eosinophil % : x  Auto Basophil % : x    02-05    140  |  98  |  42<H>  ----------------------------<  111<H>  3.4<L>   |  29  |  1.58<H>    Ca    9.1      2020 07:30  Phos  2.9     02-05  Mg     2.0     02-05    TPro  6.4  /  Alb  3.3  /  TBili  1.2  /  DBili  x   /  AST  33  /  ALT  26  /  AlkPhos  88  02-    PT/INR - ( 2020 07:30 )   PT: 16.4 sec;   INR: 1.41 ratio         PTT - ( 2020 07:30 )  PTT:60.6 sec  LIVER FUNCTIONS - ( 2020 07:30 )  Alb: 3.3 g/dL / Pro: 6.4 g/dL / ALK PHOS: 88 U/L / ALT: 26 U/L / AST: 33 U/L / GGT: x               Urinalysis Basic - ( 2020 22:53 )    Color: Yellow / Appearance: Clear / S.012 / pH: x  Gluc: x / Ketone: Negative  / Bili: Negative / Urobili: Negative   Blood: x / Protein: 30 mg/dL / Nitrite: Negative   Leuk Esterase: Negative / RBC: 3 /hpf / WBC 2 /HPF   Sq Epi: x / Non Sq Epi: 0 /hpf / Bacteria: Negative        WBC  TREND (5 Days)  WBC Count: 7.49 K/uL ( @ 20:32)    HGB  TREND (5 Days)  Hemoglobin: 11.8 g/dL ( @ 20:32)    HCT  TREND (5 Days)  Hematocrit: 33.8 % ( @ 20:32)    PLT  TREND (5 Days)  Platelet Count - Automated: 72 K/uL ( @ 07:32)  Platelet Count - Automated: Clumped ( @ 20:32)      [RADIOLOGY & ADDITIONAL STUDIES:]    < from: CT Abdomen and Pelvis No Cont (20 @ 23:52) >  EXAM:  CT ABDOMEN AND PELVIS                        PROCEDURE DATE:  2020    INTERPRETATION:  CLINICAL INFORMATION: Abdominal pain and fever.  COMPARISON: None.  PROCEDURE:   CT of the Abdomen and Pelvis was performed without intravenous contrast.   Intravenous contrast: None.  Oral contrast: None.  Sagittal and coronal reformats were performed.  FINDINGS:  ·	LOWER CHEST: Trace right pleural effusion with adjacent atelectasis.  ·	LIVER: Within normal limits.  ·	BILE DUCTS:Normal caliber.  ·	GALLBLADDER: Distended with gallstones.  Pericholecystic fat stranding.  ·	SPLEEN: Splenomegaly.  ·	PANCREAS: Within normal limits.  ·	ADRENALS: Within normal limits.  ·	KIDNEYS/URETERS: Right renal cyst. No hydronephrosis or renal calculus.  ·	BLADDER: Within normal limits.  ·	REPRODUCTIVE ORGANS: Prostate is enlarged, measuring 5.4 x 4.1 x 5.4 cm.  ·	BOWEL: No bowel obstruction. Appendix is normal.  Diverticulosis.  ·	PERITONEUM: No ascites.  ·	VESSELS: Atherosclerotic changes.  ·	RETROPERITONEUM/LYMPH NODES: No lymphadenopathy.    ·	ABDOMINAL WALL: Within normal limits.  ·	BONES: Within normal limits.  IMPRESSION:   ·	Acute cholecystitis.  No biliary dilatation.  No focal collection.  ·	Normal appendix.  ·	Diverticulosis.  ·	Trace right pleural effusion.  DEEPTHI LEVY M.D., RADIOLOGY RESIDENT This document has been electronically signed.  DEEPAK DELUCA M.D, ATTENDING RADIOLOGIST This document has been electronically signed. 2020  1:28AM  < end of copied text >          < from: US Abdomen Upper Quadrant Right (20 @ 21:17) >  EXAM:  US ABDOMEN RT UPR QUADRANT                        PROCEDURE DATE:  2020    INTERPRETATION:  CLINICAL INFORMATION: Right upper quadrant pain. Outside ultrasound study demonstrated acute cholecystitis.  COMPARISON: None available.  TECHNIQUE: Sonography of the right upper quadrant.   FINDINGS:  ·	Liver: Within normal limits.  ·	Bile ducts: Normal caliber. Common bile duct measures 7 mm.   ·	Gallbladder: Massively distended with stones and sludge. Mild wall thickening. Positive sonographic Carney sign.   ·	Pancreas: Visualized portions are within normal limits.  ·	Right kidney: 11.4 cm. No hydronephrosis. Right renal cyst measuring up to 4.7 x 4.1 x 3.2 cm.  ·	Ascites: None.  ·	IVC: Visualized portions are within normal limits.  ·	Miscellaneous: Right pleural effusion is partially visualized.  IMPRESSION:   ·	Distended gallbladder with stones, mild wall thickening and positive sonographic Carney sign. Findings consistent with acute cholecystitis.  ·	Right pleural effusion.  ·	Findings were discussed with Dr. Perea by Dr. Levy on 2020 at 9:24 PM with readback.  DEEPTHI LEVY M.D., RADIOLOGY RESIDENT This document has been electronically signed.  DEEPAK DELUCA M.D, ATTENDING RADIOLOGIST This document has been electronically signed. 2020  9:31PM  < end of copied text > INCOMPLETE NOTE.  Documentation in Progress  PT CHART REVIEWED, PT Unavailable for exam:  IN HIDA SCAN since AM, Per radiology to be in machine for next 3hrs  FULL/ADDITIONAL RECOMMENDATIONS TO FOLLOW   ***************************************************************      Patient is a 69y old  Male who presents with a chief complaint of       HPI:  69M with PMHx HTN, anxiety, presents to the ED with 3-4 days of fever, vomiting, and poor PO intake. His symptoms started with nausea and projectile vomiting on Friday associated with fevers as high as 102. Per the patient's wife who is at the bedside, he complained of lower abdominal pain when his symptoms started but this abdominal pain has since resolved. He endorses feeling week from being dehydrated and has felt unsteady on his feet. He denies diarrhea but states that lately his stools have been thinner than usual. His primary care doctor sent him for an outpatient RUQ ultrasound that was suspicious for acute cholecystitis and advised him to present to the ED. Of note, he had a Moh's procedure about 10 days ago after which he was prescribed Keflex for 7 days. He completed the course.   Patient notes that he has a history of thrombocytopenia and is followed closely by Dr. Spike Melendez who has recommended conservative monitoring. The patient also has been told that he has had an elevated creatinine in the past but he is unsure the baseline number. He denies any chest pain, shortness of breath, dysuria or sick contacts.     In the ED, he is HD normal. He is febrile without leukocytosis. He received Zosyn and 1L IV crystalloid bolus.    Heme asked to see for thrombocytopenia.   Hx per chart, and discussion with surgical MITCHELL Marino.   Pt at time of chart review 9M< in HIDA scan. Unavailable for physical exam.   Pt known to our office, followed by Dr Aldridge since .   PMD Dr Rusty Calderon.   Plt low due to presumed ITP, never required tx.   Baseline plts typically ~100-120.   No known hx of cirrhosis or liver disease. No hx coagulopathy but no office labs to corroborate normal coag panel.   On ASA outpt, not known to be on anticoagulants.   On depakote for unclear reasons, prior, but stopped in 2019  hx of high Glu  PMHx:   ·	Anxiety  ·	Hypertension, unspecified type  ·	Tonsillectomy at 12yo  ·	Mild CKD Cr baseline 1.2-1.4  ·	Elevated Glu  ·	Osteoarthritis,   PSHx: H/O Mohs micrographic surgery for skin cancer  Home meds:  atenolol 50mg bid, Olmesartan 40mgd daily, diazepam 5mg daily PRN, vitamin D  Social Hx: Lives at home with his wife, nonsmoker  Family Hx: No pertinent past family history (2020 23:45)      PAST MEDICAL & SURGICAL HISTORY:  Anxiety  Hypertension, unspecified type  H/O Mohs micrographic surgery for skin cancer      HEALTH ISSUES - PROBLEM Dx:  Acute cholecystitis (K81.0)  Anxiety (F41.9)  Hypertension, unspecified type (I10)  H/O Mohs micrographic surgery for skin cancer (Z85.828)      FAMILY HISTORY:  as above      [SOCIAL HISTORY: ]     smoking:  non-smoker     EtOH:  denies     illicit drugs:  denies     occupation:  retired     marital status:       Other:       [ALLERGIES/INTOLERANCES:]  Allergies      No Known Allergies  Intolerances          [MEDICATIONS]  MEDICATIONS  (STANDING):  ATENolol  Tablet 50 milliGRAM(s) Oral two times a day  fluvoxaMINE 200 milliGRAM(s) Oral once  influenza   Vaccine 0.5 milliLiter(s) IntraMuscular once  lactated ringers. 1000 milliLiter(s) (100 mL/Hr) IV Continuous <Continuous>  piperacillin/tazobactam IVPB.. 3.375 Gram(s) IV Intermittent every 8 hours    MEDICATIONS  (PRN):        [REVIEW OF SYSTEMS: ] per chart  CONSTITUTIONAL: normal, +fever, no shakes, no chills   EYES: No eye pain, no visual disturbances, no discharge  ENMT:  no discharge  NECK: No pain, no stiffness  BREASTS: No pain, no masses, no nipple discharge  RESPIRATORY: No cough, no wheezing, no chills, no hemoptysis; No shortness of breath  CARDIOVASCULAR: No chest pain, no palpitations, no dizziness, no leg swelling  GASTROINTESTINAL: +abdominal pain, +epigastric pain. No nausea, no vomiting, no hematemesis; No diarrhea , no constipation. No melena, no hematochezia.  GENITOURINARY: No dysuria, no frequency, no hematuria, no incontinence  NEUROLOGICAL: No headaches, no memory loss, no loss of strength, no numbness, no tremors  SKIN: No itching, no burning, no rashes, no lesions   LYMPH NODES: No enlarged glands  ENDOCRINE: No heat or cold intolerance; No hair loss  MUSCULOSKELETAL: No joint pain or swelling; No muscle, no back, no extremity pain  PSYCHIATRIC: No depression, no anxiety, no mood swings, no difficulty sleeping  HEME/LYMPH: No easy bruising, no bleeding gums      [VITALS SIGNS 24hrs]  Vital Signs Last 24 Hrs  T(C): 37.2 (2020 05:30), Max: 38.7 (2020 19:45)  T(F): 99 (2020 05:30), Max: 101.7 (2020 19:45)  HR: 80 (2020 05:30) (63 - 84)  BP: 129/70 (2020 05:30) (90/56 - 134/74)  BP(mean): --  RR: 16 (2020 05:30) (16 - 20)  SpO2: 94% (2020 05:30) (92% - 97%)      [PHYSICAL EXAM]  PENDING PT return from SCAN      [LABS:]                        x      x     )-----------( 72       ( 2020 07:32 )             x        CBC Full  -  ( 2020 07:32 )  WBC Count : x  RBC Count : x  Hemoglobin : x  Hematocrit : x  Platelet Count - Automated : 72 K/uL  Mean Cell Volume : x  Mean Cell Hemoglobin : x  Mean Cell Hemoglobin Concentration : x  Auto Neutrophil # : x  Auto Lymphocyte # : x  Auto Monocyte # : x  Auto Eosinophil # : x  Auto Basophil # : x  Auto Neutrophil % : x  Auto Lymphocyte % : x  Auto Monocyte % : x  Auto Eosinophil % : x  Auto Basophil % : x    02-05    140  |  98  |  42<H>  ----------------------------<  111<H>  3.4<L>   |  29  |  1.58<H>    Ca    9.1      2020 07:30  Phos  2.9     02-05  Mg     2.0     02-05    TPro  6.4  /  Alb  3.3  /  TBili  1.2  /  DBili  x   /  AST  33  /  ALT  26  /  AlkPhos  88  02-    PT/INR - ( 2020 07:30 )   PT: 16.4 sec;   INR: 1.41 ratio         PTT - ( 2020 07:30 )  PTT:60.6 sec  LIVER FUNCTIONS - ( 2020 07:30 )  Alb: 3.3 g/dL / Pro: 6.4 g/dL / ALK PHOS: 88 U/L / ALT: 26 U/L / AST: 33 U/L / GGT: x               Urinalysis Basic - ( 2020 22:53 )    Color: Yellow / Appearance: Clear / S.012 / pH: x  Gluc: x / Ketone: Negative  / Bili: Negative / Urobili: Negative   Blood: x / Protein: 30 mg/dL / Nitrite: Negative   Leuk Esterase: Negative / RBC: 3 /hpf / WBC 2 /HPF   Sq Epi: x / Non Sq Epi: 0 /hpf / Bacteria: Negative        WBC  TREND (5 Days)  WBC Count: 7.49 K/uL ( @ 20:32)    HGB  TREND (5 Days)  Hemoglobin: 11.8 g/dL ( @ 20:32)    HCT  TREND (5 Days)  Hematocrit: 33.8 % ( @ 20:32)    PLT  TREND (5 Days)  Platelet Count - Automated: 72 K/uL ( @ 07:32)  Platelet Count - Automated: Clumped ( @ 20:32)      [RADIOLOGY & ADDITIONAL STUDIES:]    < from: CT Abdomen and Pelvis No Cont (20 @ 23:52) >  EXAM:  CT ABDOMEN AND PELVIS                        PROCEDURE DATE:  2020    INTERPRETATION:  CLINICAL INFORMATION: Abdominal pain and fever.  COMPARISON: None.  PROCEDURE:   CT of the Abdomen and Pelvis was performed without intravenous contrast.   Intravenous contrast: None.  Oral contrast: None.  Sagittal and coronal reformats were performed.  FINDINGS:  ·	LOWER CHEST: Trace right pleural effusion with adjacent atelectasis.  ·	LIVER: Within normal limits.  ·	BILE DUCTS:Normal caliber.  ·	GALLBLADDER: Distended with gallstones.  Pericholecystic fat stranding.  ·	SPLEEN: Splenomegaly.  ·	PANCREAS: Within normal limits.  ·	ADRENALS: Within normal limits.  ·	KIDNEYS/URETERS: Right renal cyst. No hydronephrosis or renal calculus.  ·	BLADDER: Within normal limits.  ·	REPRODUCTIVE ORGANS: Prostate is enlarged, measuring 5.4 x 4.1 x 5.4 cm.  ·	BOWEL: No bowel obstruction. Appendix is normal.  Diverticulosis.  ·	PERITONEUM: No ascites.  ·	VESSELS: Atherosclerotic changes.  ·	RETROPERITONEUM/LYMPH NODES: No lymphadenopathy.    ·	ABDOMINAL WALL: Within normal limits.  ·	BONES: Within normal limits.  IMPRESSION:   ·	Acute cholecystitis.  No biliary dilatation.  No focal collection.  ·	Normal appendix.  ·	Diverticulosis.  ·	Trace right pleural effusion.  DEEPTHI LEVY M.D., RADIOLOGY RESIDENT This document has been electronically signed.  DEEPAK DELUCA M.D, ATTENDING RADIOLOGIST This document has been electronically signed. 2020  1:28AM  < end of copied text >          < from: US Abdomen Upper Quadrant Right (20 @ 21:17) >  EXAM:  US ABDOMEN RT UPR QUADRANT                        PROCEDURE DATE:  2020    INTERPRETATION:  CLINICAL INFORMATION: Right upper quadrant pain. Outside ultrasound study demonstrated acute cholecystitis.  COMPARISON: None available.  TECHNIQUE: Sonography of the right upper quadrant.   FINDINGS:  ·	Liver: Within normal limits.  ·	Bile ducts: Normal caliber. Common bile duct measures 7 mm.   ·	Gallbladder: Massively distended with stones and sludge. Mild wall thickening. Positive sonographic Carney sign.   ·	Pancreas: Visualized portions are within normal limits.  ·	Right kidney: 11.4 cm. No hydronephrosis. Right renal cyst measuring up to 4.7 x 4.1 x 3.2 cm.  ·	Ascites: None.  ·	IVC: Visualized portions are within normal limits.  ·	Miscellaneous: Right pleural effusion is partially visualized.  IMPRESSION:   ·	Distended gallbladder with stones, mild wall thickening and positive sonographic Carney sign. Findings consistent with acute cholecystitis.  ·	Right pleural effusion.  ·	Findings were discussed with Dr. Perea by Dr. Levy on 2020 at 9:24 PM with readback.  DEEPTHI LEVY M.D., RADIOLOGY RESIDENT This document has been electronically signed.  DEEPAK DELUCA M.D, ATTENDING RADIOLOGIST This document has been electronically signed. 2020  9:31PM  < end of copied text > Patient is a 69y old  Male who presents with a chief complaint of       HPI:  69M with PMHx HTN, anxiety, presents to the ED with 3-4 days of fever, vomiting, and poor PO intake. His symptoms started with nausea and projectile vomiting on Friday associated with fevers as high as 102. Per the patient's wife who is at the bedside, he complained of lower abdominal pain when his symptoms started but this abdominal pain has since resolved. He endorses feeling week from being dehydrated and has felt unsteady on his feet. He denies diarrhea but states that lately his stools have been thinner than usual. His primary care doctor sent him for an outpatient RUQ ultrasound that was suspicious for acute cholecystitis and advised him to present to the ED. Of note, he had a Moh's procedure about 10 days ago after which he was prescribed Keflex for 7 days. He completed the course.   Patient notes that he has a history of thrombocytopenia and is followed closely by Dr. Spike Melendez who has recommended conservative monitoring. The patient also has been told that he has had an elevated creatinine in the past but he is unsure the baseline number. He denies any chest pain, shortness of breath, dysuria or sick contacts.     In the ED, he is HD normal. He is febrile without leukocytosis. He received Zosyn and 1L IV crystalloid bolus.    Heme asked to see for thrombocytopenia.   Hx per chart, and discussion with surgical MITCHELL Marino.   Pt at time of chart review 9AM in HIDA scan. Unavailable for physical exam.   Pt known to our office, followed by Dr Aldridge since .   PMD Dr Rusty Calderon.   Plt low due to presumed ITP, never required tx.   Baseline plts typically ~100-120.   No known hx of cirrhosis or liver disease. No hx coagulopathy but no office labs to corroborate normal coag panel.   On ASA outpt, not known to be on anticoagulants.   On depakote for unclear reasons, prior, but stopped in 2019  hx of high Glu    PT CHART REVIEWED, PT Unavailable for exam:  IN HIDA SCAN since AM, Per radiology to be in machine for next 3hrs    Additional hx:   pt seen in PM.   pt denies hx of transfusion. No blood thinners.         PMHx:   ·	Anxiety  ·	Hypertension, unspecified type  ·	Tonsillectomy at 10yo  ·	Mild CKD Cr baseline 1.2-1.4  ·	Elevated Glu  ·	Osteoarthritis,   PSHx: H/O Mohs micrographic surgery for skin cancer  Home meds:  atenolol 50mg bid, Olmesartan 40mgd daily, diazepam 5mg daily PRN, vitamin D  Social Hx: Lives at home with his wife, nonsmoker  Family Hx: No pertinent past family history (2020 23:45)      PAST MEDICAL & SURGICAL HISTORY:  Anxiety  Hypertension, unspecified type  H/O Mohs micrographic surgery for skin cancer      HEALTH ISSUES - PROBLEM Dx:  Acute cholecystitis (K81.0)  Anxiety (F41.9)  Hypertension, unspecified type (I10)  H/O Mohs micrographic surgery for skin cancer (Z85.828)      FAMILY HISTORY:  as above      [SOCIAL HISTORY: ]     smoking:  non-smoker     EtOH:  denies     illicit drugs:  denies     occupation:  retired     marital status:       Other:       [ALLERGIES/INTOLERANCES:]  Allergies      No Known Allergies  Intolerances          [MEDICATIONS]  MEDICATIONS  (STANDING):  ATENolol  Tablet 50 milliGRAM(s) Oral two times a day  fluvoxaMINE 200 milliGRAM(s) Oral once  influenza   Vaccine 0.5 milliLiter(s) IntraMuscular once  lactated ringers. 1000 milliLiter(s) (100 mL/Hr) IV Continuous <Continuous>  piperacillin/tazobactam IVPB.. 3.375 Gram(s) IV Intermittent every 8 hours    MEDICATIONS  (PRN):        [REVIEW OF SYSTEMS: ] per chart  CONSTITUTIONAL: normal, +fever since Sat, no shakes, no chills   EYES: No eye pain, no visual disturbances, no discharge  ENMT:  no discharge  NECK: No pain, no stiffness  BREASTS: No pain, no masses, no nipple discharge  RESPIRATORY: No cough, no wheezing, no chills, no hemoptysis; No shortness of breath  CARDIOVASCULAR: No chest pain, no palpitations, no dizziness, no leg swelling  GASTROINTESTINAL: +abdominal pain, +epigastric pain. No nausea, no vomiting, no hematemesis; No diarrhea , no constipation. No melena, no hematochezia.  GENITOURINARY: No dysuria, no frequency, no hematuria, no incontinence  NEUROLOGICAL: No headaches, no memory loss, no loss of strength, no numbness, no tremors  SKIN: No itching, no burning, no rashes, no lesions   LYMPH NODES: No enlarged glands  ENDOCRINE: No heat or cold intolerance; No hair loss  MUSCULOSKELETAL: No joint pain or swelling; No muscle, no back, no extremity pain  PSYCHIATRIC: No depression, no anxiety, no mood swings, no difficulty sleeping  HEME/LYMPH: No easy bruising, no bleeding gums      [VITALS SIGNS 24hrs]  Vital Signs Last 24 Hrs  T(C): 37.2 (2020 05:30), Max: 38.7 (2020 19:45)  T(F): 99 (2020 05:30), Max: 101.7 (2020 19:45)  HR: 80 (2020 05:30) (63 - 84)  BP: 129/70 (2020 05:30) (90/56 - 134/74)  BP(mean): --  RR: 16 (2020 05:30) (16 - 20)  SpO2: 94% (2020 05:30) (92% - 97%)      [PHYSICAL EXAM]  WN WD NAD  anicteric  S1S2 RRR, no MRG  CTAB  soft abdn, NT, ND, no HSM appreciated  trace LE edema,   no significant BRANT palpated in SC, axilalry, ing regions. small shottly LN in neck.   Aox3.  Scattered seb keratosis on back, alopecia. L nares scar.         [LABS:]                        x      x     )-----------( 72       ( 2020 07:32 )             x        CBC Full  -  ( 2020 07:32 )  WBC Count : x  RBC Count : x  Hemoglobin : x  Hematocrit : x  Platelet Count - Automated : 72 K/uL  Mean Cell Volume : x  Mean Cell Hemoglobin : x  Mean Cell Hemoglobin Concentration : x  Auto Neutrophil # : x  Auto Lymphocyte # : x  Auto Monocyte # : x  Auto Eosinophil # : x  Auto Basophil # : x  Auto Neutrophil % : x  Auto Lymphocyte % : x  Auto Monocyte % : x  Auto Eosinophil % : x  Auto Basophil % : x    02-05    140  |  98  |  42<H>  ----------------------------<  111<H>  3.4<L>   |  29  |  1.58<H>    Ca    9.1      2020 07:30  Phos  2.9     02-05  Mg     2.0     02-05    TPro  6.4  /  Alb  3.3  /  TBili  1.2  /  DBili  x   /  AST  33  /  ALT  26  /  AlkPhos  88  -    PT/INR - ( 2020 07:30 )   PT: 16.4 sec;   INR: 1.41 ratio         PTT - ( 2020 07:30 )  PTT:60.6 sec  LIVER FUNCTIONS - ( 2020 07:30 )  Alb: 3.3 g/dL / Pro: 6.4 g/dL / ALK PHOS: 88 U/L / ALT: 26 U/L / AST: 33 U/L / GGT: x               Urinalysis Basic - ( 2020 22:53 )    Color: Yellow / Appearance: Clear / S.012 / pH: x  Gluc: x / Ketone: Negative  / Bili: Negative / Urobili: Negative   Blood: x / Protein: 30 mg/dL / Nitrite: Negative   Leuk Esterase: Negative / RBC: 3 /hpf / WBC 2 /HPF   Sq Epi: x / Non Sq Epi: 0 /hpf / Bacteria: Negative        WBC  TREND (5 Days)  WBC Count: 7.49 K/uL ( @ 20:32)    HGB  TREND (5 Days)  Hemoglobin: 11.8 g/dL ( @ 20:32)    HCT  TREND (5 Days)  Hematocrit: 33.8 % ( @ 20:32)    PLT  TREND (5 Days)  Platelet Count - Automated: 72 K/uL ( @ 07:32)  Platelet Count - Automated: Clumped ( @ 20:32)    20 8PM LABS  Prothrombin Time and INR, Plasma in AM (20 @ 20:32)    Prothrombin Time, Plasma: 17.4 sec    INR: 1.49 ratio  Activated Partial Thromboplastin Time in AM (20 @ 20:32)    Activated Partial Thromboplastin Time: 60.2sec      20 7AM LABS  Mixing Study - PT/APTT (20 @ 07:30)    INR: 1.41 ratio    Mixing Study - PT/APTT (20 @ 07:30)    Diluted Thrombin Time: 22.7 sec    Prothrombin Time, Plasma: 16.4 ratio    Activated Partial Thromboplastin Time in AM (20 @ 07:30)    Activated Partial Thromboplastin Time: 60.6 sec        20 1PM LABS  Mixing Study - PT/APTT (20 @ 13:33)    PAT CTL 2H: 67.7 sec    %: 16.3 sec    PT 50/50: 13.2 sec    Diluted Thrombin Time: 22.0 sec    APTT 100%: 64.4 sec    APTT 50/50: 55.9 sec    APTT 50/50 2Hour Incub: 63.7 sec    APTT 50/50 Mix Comment: See Note: Note- The presence of anticoag inhibitor drugs may cause false positive.    Prothrombin Time, Plasma: 16.3 sec     INR: 1.41 ratio          [RADIOLOGY & ADDITIONAL STUDIES:]    < from: CT Abdomen and Pelvis No Cont (20 @ 23:52) >  EXAM:  CT ABDOMEN AND PELVIS                        PROCEDURE DATE:  2020    INTERPRETATION:  CLINICAL INFORMATION: Abdominal pain and fever.  COMPARISON: None.  PROCEDURE:   CT of the Abdomen and Pelvis was performed without intravenous contrast.   Intravenous contrast: None.  Oral contrast: None.  Sagittal and coronal reformats were performed.  FINDINGS:  ·	LOWER CHEST: Trace right pleural effusion with adjacent atelectasis.  ·	LIVER: Within normal limits.  ·	BILE DUCTS:Normal caliber.  ·	GALLBLADDER: Distended with gallstones.  Pericholecystic fat stranding.  ·	SPLEEN: Splenomegaly.  ·	PANCREAS: Within normal limits.  ·	ADRENALS: Within normal limits.  ·	KIDNEYS/URETERS: Right renal cyst. No hydronephrosis or renal calculus.  ·	BLADDER: Within normal limits.  ·	REPRODUCTIVE ORGANS: Prostate is enlarged, measuring 5.4 x 4.1 x 5.4 cm.  ·	BOWEL: No bowel obstruction. Appendix is normal.  Diverticulosis.  ·	PERITONEUM: No ascites.  ·	VESSELS: Atherosclerotic changes.  ·	RETROPERITONEUM/LYMPH NODES: No lymphadenopathy.    ·	ABDOMINAL WALL: Within normal limits.  ·	BONES: Within normal limits.  IMPRESSION:   ·	Acute cholecystitis.  No biliary dilatation.  No focal collection.  ·	Normal appendix.  ·	Diverticulosis.  ·	Trace right pleural effusion.  DEEPTHI LEVY M.D., RADIOLOGY RESIDENT This document has been electronically signed.  DEEPAK DELUCA M.D, ATTENDING RADIOLOGIST Thi< from: NM Hepatobiliary Imaging w/ RX (20 @ 10:05) >  s document has been electronically signed. 2020  1:28AM  < end of copied text >          < from: US Abdomen Upper Quadrant Right (20 @ 21:17) >  EXAM:  US ABDOMEN RT UPR QUADRANT                        PROCEDURE DATE:  2020    INTERPRETATION:  CLINICAL INFORMATION: Right upper quadrant pain. Outside ultrasound study demonstrated acute cholecystitis.  COMPARISON: None available.  TECHNIQUE: Sonography of the right upper quadrant.   FINDINGS:  ·	Liver: Within normal limits.  ·	Bile ducts: Normal caliber. Common bile duct measures 7 mm.   ·	Gallbladder: Massively distended with stones and sludge. Mild wall thickening. Positive sonographic Carney sign.   ·	Pancreas: Visualized portions are within normal limits.  ·	Right kidney: 11.4 cm. No hydronephrosis. Right renal cyst measuring up to 4.7 x 4.1 x 3.2 cm.  ·	Ascites: None.  ·	IVC: Visualized portions are within normal limits.  ·	Miscellaneous: Right pleural effusion is partially visualized.  IMPRESSION:   ·	Distended gallbladder with stones, mild wall thickening and positive sonographic Carney sign. Findings consistent with acute cholecystitis.  ·	Right pleural effusion.  ·	Findings were discussed with Dr. Perea by Dr. Levy on 2020 at 9:24 PM with readback.  DEEPTHI LEVY M.D., RADIOLOGY RESIDENT This document has been electronically signed.  DEEPAK DELUCA M.D, ATTENDING RADIOLOGIST This document has been electronically signed. 2020  9:31PM  < end of copied text >          < from: NM Hepatobiliary Imaging w/ RX (20 @ 10:05) >  EXAM:  NM HEPATOBILIARY IMG W RX                        PROCEDURE DATE:  2020    INTERPRETATION:  CLINICAL STATEMENT: 69-year-old male with fever and abdominal discomfort.  RADIOPHARMACEUTICAL: 3.1 mCi and 2.9 mCi Tc-99m-Mebrofenin, I.V.; 2 doses  TECHNIQUE:  Dynamic images of the anterior abdomen were obtained for 1 hour following injection of radiotracer. Morphine 4 mg I.V. and a second dose of radiotracer were administered at 1 hour. Dynamic imaging was continued for 1 hour followed by static images of the abdomen in the right lateral and right anterior oblique views immediately thereafter.    FINDINGS: There is prompt, homogeneous uptake of radiotracer by the hepatocytes. Activity is first seen in the bowel at 15minutes. The gallbladder is not visualized in the first hour of imaging, but was seen 10 minutes after the administration of morphine. There is good clearance of activity from the liver at the end of the study.    IMPRESSION: Normal morphine-augmented hepatobiliary scan.  No evidence of acute cholecystitis.    SANDI VERA M.D., NUCLEAR MEDICINE ATTENDING  This document has been electronically signed. 2020 10:44AM  < end of copied text >

## 2020-02-05 NOTE — CONSULT NOTE ADULT - SUBJECTIVE AND OBJECTIVE BOX
Patient is a 69y old  Male who presents with a chief complaint of cholecystitis (2020 09:06)    HPI:  69 M with PMHx HTN, anxiety, presents to the ED with 3-4 days of fever, vomiting, and poor PO intake. His symptoms started with nausea and projectile vomiting on Friday associated with fevers as high as 102. Per the patient's wife who is at the bedside, he complained of lower abdominal pain when his symptoms started but this abdominal pain has since resolved. He endorses feeling week from being dehydrated and has felt unsteady on his feet. He denies diarrhea but states that lately his stools have been thinner than usual. His primary care doctor sent him for an outpatient RUQ ultrasound that was suspicious for acute cholecystitis and advised him to present to the ED. Of note, he had a Moh's procedure about 10 days ago after which he was prescribed Keflex for 7 days. He completed the course.   Patient notes that he has a history of thrombocytopenia and is followed closely by Dr. Spike Melendez who has recommended conservative monitoring. The patient also has been told that he has had an elevated creatinine in the past but he is unsure the baseline number. He denies any chest pain, shortness of breath, dysuria or sick contacts.     In the ED, he is HD normal. He is febrile without leukocytosis. He received Zosyn and 1L IV crystalloid bolus.    Pt seen and examined at bedside. Medicine being consulted for follow up given coags abnormalities and co-management of medical problems.   No overnight event. Feeling better. no cp, no sob, no n/v/d. no abd pain at this time.  report fever at home  as high as 103F but no fever here.  no sick contact, no recent travel.      PMHx: Anxiety  Hypertension, unspecified type    PSHx: H/O Mohs micrographic surgery for skin cancer    Home meds: atenolol 50mg bid, Olmesartan 40mgd daily, diazepam 5mg daily PRN, vitamin D  Social Hx: Lives at home with his wife, nonsmoker  Family Hx: No pertinent past family history (2020 23:45)      PAST MEDICAL & SURGICAL HISTORY:  Anxiety  Hypertension, unspecified type  H/O Mohs micrographic surgery for skin cancer      FAMILY HISTORY: noncontributary, no family hx of plt disorders      REVIEW OF SYSEMS:  General: no weakness, +fever, no chills, no weight loss/gain  Skin/Breast: no rash, no jaundice  Ophthalmologic: no vision changes, no dry eyes   Respiratory and Thorax: no cough, no wheezing, no hemoptysis, no dyspnea  Cardiovascular: no chest pain, no shortness of breath, no orthopnea  Gastrointestinal: +vomiting, no diarrhea, + abdominal pain, no dysphagia   Genitourinary: no dysuria, no frequency, no nocturia, no hematuria  Musculoskeletal: no trauma, no sprain/strain, no myalgias, no arthralgias, no fracture  Neurological: no HA, no dizziness, no weakness, no numbness  Psychiatric: no depression, no SI/HI  Hematology/Lymphatics: no easy bruising  Endocrine: no heat or cold intolerance. no weight gain or loss  Allergic/Immunologic: no allergy or recent reaction       Vital Signs Last 24 Hrs  T(C): 36.7 (2020 11:24), Max: 38.7 (2020 19:45)  T(F): 98 (2020 11:24), Max: 101.7 (2020 19:45)  HR: 74 (2020 11:24) (63 - 84)  BP: 141/71 (2020 11:24) (90/56 - 141/71)  BP(mean): --  RR: 18 (2020 11:24) (16 - 20)  SpO2: 95% (2020 11:24) (92% - 97%)  I&O's Summary    2020 07:  -  2020 07:00  --------------------------------------------------------  IN: 700 mL / OUT: 400 mL / NET: 300 mL    2020 07:  -  2020 13:59  --------------------------------------------------------  IN: 0 mL / OUT: 0 mL / NET: 0 mL        PHYSICAL EXAM:  GENERAL: NAD, Comfortable, lying in bed on room air  FACE: healed left sided recent Tyra's surgery, no bleeding/no infection   HEAD:  Atraumatic, Normocephalic  EYES: EOMI, PERRLA, conjunctiva and sclera clear  NECK: Supple, No JVD  CHEST/LUNG: Clear to auscultation bilaterally; No wheeze  HEART: Regular rate and rhythm; No murmurs, rubs, or gallops  ABDOMEN: Soft, Nontender, Nondistended; Bowel sounds present  Neuro: AAOx3, no focal deficit, 5/5 b/l extremities  EXTREMITIES:  2+ Peripheral Pulses, No clubbing, cyanosis, or edema  SKIN: No rashes or lesions      LABS:                        x      x     )-----------( 72       ( 2020 07:32 )             x        02-05    140  |  98  |  42<H>  ----------------------------<  111<H>  3.4<L>   |  29  |  1.58<H>    Ca    9.1      2020 07:30  Phos  2.9     02-05  Mg     2.0     02-05    TPro  6.4  /  Alb  3.3  /  TBili  1.2  /  DBili  x   /  AST  33  /  ALT  26  /  AlkPhos  88  02-05    PT/INR - ( 2020 07:30 )   PT: 16.4 sec;   INR: 1.41 ratio         PTT - ( 2020 07:30 )  PTT:60.6 sec  CAPILLARY BLOOD GLUCOSE            Urinalysis Basic - ( 2020 22:53 )    Color: Yellow / Appearance: Clear / S.012 / pH: x  Gluc: x / Ketone: Negative  / Bili: Negative / Urobili: Negative   Blood: x / Protein: 30 mg/dL / Nitrite: Negative   Leuk Esterase: Negative / RBC: 3 /hpf / WBC 2 /HPF   Sq Epi: x / Non Sq Epi: 0 /hpf / Bacteria: Negative        RADIOLOGY & ADDITIONAL TESTS:    Imaging Personally Reviewed:  [x] YES  [ ] NO    Consultant(s) Notes Reviewed:  [x] YES  [ ] NO      MEDICATIONS  (STANDING):  ATENolol  Tablet 50 milliGRAM(s) Oral two times a day  cyanocobalamin Injectable 1000 MICROGram(s) IntraMuscular daily  folic acid Injectable 5 milliGRAM(s) SubCutaneous once  influenza   Vaccine 0.5 milliLiter(s) IntraMuscular once  phytonadione   Solution 5 milliGRAM(s) Oral once  potassium chloride    Tablet ER 20 milliEquivalent(s) Oral once    MEDICATIONS  (PRN):      Care Discussed with Consultants/Other Providers [x] YES  [ ] NO    HEALTH ISSUES - PROBLEM Dx:  B12 deficiency: B12 deficiency  CKD (chronic kidney disease) stage 2, GFR 60-89 ml/min: CKD (chronic kidney disease) stage 2, GFR 60-89 ml/min  DONNA (acute kidney injury): DONNA (acute kidney injury)  Coagulopathy: Coagulopathy  Immune thrombocytopenia: Immune thrombocytopenia  Acute cholecystitis: Acute cholecystitis

## 2020-02-06 DIAGNOSIS — D69.6 THROMBOCYTOPENIA, UNSPECIFIED: ICD-10-CM

## 2020-02-06 DIAGNOSIS — R50.9 FEVER, UNSPECIFIED: ICD-10-CM

## 2020-02-06 DIAGNOSIS — I10 ESSENTIAL (PRIMARY) HYPERTENSION: ICD-10-CM

## 2020-02-06 LAB
ALBUMIN SERPL ELPH-MCNC: 3.5 G/DL — SIGNIFICANT CHANGE UP (ref 3.3–5)
ALP SERPL-CCNC: 223 U/L — HIGH (ref 40–120)
ALT FLD-CCNC: 59 U/L — HIGH (ref 10–45)
ANION GAP SERPL CALC-SCNC: 13 MMOL/L — SIGNIFICANT CHANGE UP (ref 5–17)
AST SERPL-CCNC: 66 U/L — HIGH (ref 10–40)
BILIRUB DIRECT SERPL-MCNC: 0.5 MG/DL — HIGH (ref 0–0.2)
BILIRUB INDIRECT FLD-MCNC: 0.6 MG/DL — SIGNIFICANT CHANGE UP (ref 0.2–1)
BILIRUB SERPL-MCNC: 1 MG/DL — SIGNIFICANT CHANGE UP (ref 0.2–1.2)
BILIRUB SERPL-MCNC: 1.1 MG/DL — SIGNIFICANT CHANGE UP (ref 0.2–1.2)
BUN SERPL-MCNC: 31 MG/DL — HIGH (ref 7–23)
CALCIUM SERPL-MCNC: 9.1 MG/DL — SIGNIFICANT CHANGE UP (ref 8.4–10.5)
CHLORIDE SERPL-SCNC: 100 MMOL/L — SIGNIFICANT CHANGE UP (ref 96–108)
CO2 SERPL-SCNC: 28 MMOL/L — SIGNIFICANT CHANGE UP (ref 22–31)
CREAT SERPL-MCNC: 1.38 MG/DL — HIGH (ref 0.5–1.3)
CULTURE RESULTS: SIGNIFICANT CHANGE UP
DRVVT SCREEN TO CONFIRM RATIO: ABNORMAL
FACT II INHIB PPP-ACNC: 71 % — LOW (ref 80–135)
FACT IX PPP CHRO-ACNC: SIGNIFICANT CHANGE UP (ref 80–165)
FACT VII ACT/NOR PPP: 53 % — SIGNIFICANT CHANGE UP (ref 50–165)
FACT VIII ACT/NOR PPP: SIGNIFICANT CHANGE UP (ref 60–125)
FACT X ACT/NOR PPP: 69 % — LOW (ref 70–170)
FACT XII ACT/NOR PPP: SIGNIFICANT CHANGE UP (ref 70–145)
FOLATE SERPL-MCNC: 18.7 NG/ML — SIGNIFICANT CHANGE UP
GLUCOSE SERPL-MCNC: 170 MG/DL — HIGH (ref 70–99)
HCT VFR BLD CALC: 31.5 % — LOW (ref 39–50)
HCT VFR BLD CALC: 33.7 % — LOW (ref 39–50)
HGB BLD-MCNC: 11.5 G/DL — LOW (ref 13–17)
LA NT DPL PPP QL: 104 SEC — SIGNIFICANT CHANGE UP
LDH SERPL L TO P-CCNC: 296 U/L — HIGH (ref 50–242)
MCHC RBC-ENTMCNC: 29.3 PG — SIGNIFICANT CHANGE UP (ref 27–34)
MCHC RBC-ENTMCNC: 34.1 GM/DL — SIGNIFICANT CHANGE UP (ref 32–36)
MCV RBC AUTO: 86 FL — SIGNIFICANT CHANGE UP (ref 80–100)
NORMALIZED SCT PPP-RTO: 3.14 RATIO — HIGH (ref 0–1.16)
NORMALIZED SCT PPP-RTO: ABNORMAL
NRBC # BLD: 0 /100 WBCS — SIGNIFICANT CHANGE UP (ref 0–0)
PLATELET # BLD AUTO: 84 K/UL — LOW (ref 150–400)
POTASSIUM SERPL-MCNC: 3.8 MMOL/L — SIGNIFICANT CHANGE UP (ref 3.5–5.3)
POTASSIUM SERPL-SCNC: 3.8 MMOL/L — SIGNIFICANT CHANGE UP (ref 3.5–5.3)
PROT SERPL-MCNC: 6.4 G/DL — SIGNIFICANT CHANGE UP (ref 6–8.3)
RBC # BLD: 3.92 M/UL — LOW (ref 4.2–5.8)
RBC # FLD: 13.8 % — SIGNIFICANT CHANGE UP (ref 10.3–14.5)
SODIUM SERPL-SCNC: 141 MMOL/L — SIGNIFICANT CHANGE UP (ref 135–145)
SPECIMEN SOURCE: SIGNIFICANT CHANGE UP
VIT B12 SERPL-MCNC: >2000 PG/ML — HIGH (ref 232–1245)
WBC # BLD: 6.2 K/UL — SIGNIFICANT CHANGE UP (ref 3.8–10.5)
WBC # FLD AUTO: 6.2 K/UL — SIGNIFICANT CHANGE UP (ref 3.8–10.5)

## 2020-02-06 RX ORDER — LOSARTAN POTASSIUM 100 MG/1
100 TABLET, FILM COATED ORAL DAILY
Refills: 0 | Status: DISCONTINUED | OUTPATIENT
Start: 2020-02-06 | End: 2020-02-11

## 2020-02-06 RX ADMIN — Medication 2.5 MILLIGRAM(S): at 17:49

## 2020-02-06 RX ADMIN — Medication 2.5 MILLIGRAM(S): at 23:26

## 2020-02-06 RX ADMIN — ATENOLOL 50 MILLIGRAM(S): 25 TABLET ORAL at 17:49

## 2020-02-06 RX ADMIN — LOSARTAN POTASSIUM 100 MILLIGRAM(S): 100 TABLET, FILM COATED ORAL at 22:09

## 2020-02-06 RX ADMIN — ATENOLOL 50 MILLIGRAM(S): 25 TABLET ORAL at 05:58

## 2020-02-06 RX ADMIN — FLUVOXAMINE MALEATE 100 MILLIGRAM(S): 25 TABLET ORAL at 17:49

## 2020-02-06 RX ADMIN — FLUVOXAMINE MALEATE 100 MILLIGRAM(S): 25 TABLET ORAL at 05:58

## 2020-02-06 RX ADMIN — PREGABALIN 1000 MICROGRAM(S): 225 CAPSULE ORAL at 13:16

## 2020-02-06 RX ADMIN — Medication 1 MILLIGRAM(S): at 13:17

## 2020-02-06 NOTE — PROGRESS NOTE ADULT - SUBJECTIVE AND OBJECTIVE BOX
Patient is a 69y old  Male who presents with a chief complaint of ruq pain (2020 11:37)      SUBJECTIVE / OVERNIGHT EVENTS:  Feeling better today.  No overnight event.  No complaints.  Chest pain free. no SOB, no N/V/D.  Denied HA/dizziness, abdominal pain.   fever improved.  platelets improved.       Vital Signs Last 24 Hrs  T(C): 36.9 (2020 17:27), Max: 37.2 (2020 21:16)  T(F): 98.4 (2020 17:27), Max: 98.9 (2020 21:16)  HR: 68 (2020 17:27) (68 - 80)  BP: 175/79 (2020 17:27) (155/68 - 175/79)  BP(mean): --  RR: 18 (2020 17:27) (18 - 18)  SpO2: 94% (2020 17:27) (91% - 96%)  I&O's Summary    2020 07:01  -  2020 07:00  --------------------------------------------------------  IN: 660 mL / OUT: 0 mL / NET: 660 mL    2020 07:01  -  2020 19:16  --------------------------------------------------------  IN: 480 mL / OUT: 0 mL / NET: 480 mL      PHYSICAL EXAM:  GENERAL: NAD, Comfortable, lying in bed on room air  FACE: healed left sided recent Tyra's surgery, no bleeding/no infection   HEAD:  Atraumatic, Normocephalic  EYES: EOMI, PERRLA, conjunctiva and sclera clear  NECK: Supple, No JVD  CHEST/LUNG: Clear to auscultation bilaterally; No wheeze  HEART: Regular rate and rhythm; No murmurs, rubs, or gallops  ABDOMEN: Soft, Nontender, Nondistended; Bowel sounds present  Neuro: AAOx3, no focal deficit, 5/5 b/l extremities  EXTREMITIES:  2+ Peripheral Pulses, No clubbing, cyanosis, or edema  SKIN: No rashes or lesions      LABS:                        x      x     )-----------( x        ( 2020 09:44 )             31.5     02-06    141  |  100  |  31<H>  ----------------------------<  170<H>  3.8   |  28  |  1.38<H>    Ca    9.1      2020 06:41  Phos  2.9     02-05  Mg     2.0     02-05    TPro  6.4  /  Alb  3.5  /  TBili  1.1  /  DBili  0.5<H>  /  AST  66<H>  /  ALT  59<H>  /  AlkPhos  223<H>  02-06    PT/INR - ( 2020 13:33 )   PT: 16.3 sec;   INR: 1.41 ratio         PTT - ( 2020 07:30 )  PTT:60.6 sec  CAPILLARY BLOOD GLUCOSE            Urinalysis Basic - ( 2020 22:53 )    Color: Yellow / Appearance: Clear / S.012 / pH: x  Gluc: x / Ketone: Negative  / Bili: Negative / Urobili: Negative   Blood: x / Protein: 30 mg/dL / Nitrite: Negative   Leuk Esterase: Negative / RBC: 3 /hpf / WBC 2 /HPF   Sq Epi: x / Non Sq Epi: 0 /hpf / Bacteria: Negative        RADIOLOGY & ADDITIONAL TESTS:    Imaging Personally Reviewed:  [x] YES  [ ] NO    Consultant(s) Notes Reviewed:  [x] YES  [ ] NO      MEDICATIONS  (STANDING):  ATENolol  Tablet 50 milliGRAM(s) Oral two times a day  cyanocobalamin Injectable 1000 MICROGram(s) IntraMuscular daily  fluvoxaMINE 100 milliGRAM(s) Oral two times a day  folic acid 1 milliGRAM(s) Oral daily  influenza   Vaccine 0.5 milliLiter(s) IntraMuscular once  losartan 100 milliGRAM(s) Oral daily    MEDICATIONS  (PRN):  diazepam    Tablet 2.5 milliGRAM(s) Oral daily PRN anxiety      Care Discussed with Consultants/Other Providers [x] YES  [ ] NO    HEALTH ISSUES - PROBLEM Dx:  Hypertension, unspecified type: Hypertension, unspecified type  Thrombocytopenia: Thrombocytopenia  Fever: Fever  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

## 2020-02-06 NOTE — PROGRESS NOTE ADULT - ASSESSMENT
69M with PMHx HTN, anxiety, thrombocytopenia (confirmed to have history of ITP), presents to the ED with 3-4 days of fever, vomiting, and poor PO intake. US and CT showed inflammation around gallbladder but HIDA was negative. Patient was again febrile to 101 yesterday pm but still without leukocytosis     - Gallbladder unlikely to be the etiology of the symptom and source of fever; no surgical intervention indicated   - Hx of ITP, currently seen by hem/oncology for further work up of coagulopathy 69M with PMHx HTN, anxiety, thrombocytopenia (confirmed to have history of ITP), presents to the ED with 3-4 days of fever, vomiting, and poor PO intake. US and CT showed inflammation around gallbladder but HIDA was negative. Patient was again febrile to 101 yesterday pm but still without leukocytosis     - Gallbladder unlikely to be the etiology of the symptom and source of fever; no surgical intervention indicated   - Hx of ITP, currently seen by hem/oncology for further work up of coagulopathy   - surgery will sign off, please call back with further questions to [phone]

## 2020-02-06 NOTE — PROGRESS NOTE ADULT - SUBJECTIVE AND OBJECTIVE BOX
Surgery ACS Team Daily Progress Note    SUBJECTIVE: Patient seen and examined during the morning round, no over night event, still endorsing some right upper quadrant discomfort at the costal margin. He is tolerating diet without worsening of his comfort    OBJECTIVE:   Vital Signs Last 24 Hrs  T(C): 36.4 (06 Feb 2020 09:18), Max: 38.3 (05 Feb 2020 14:16)  T(F): 97.6 (06 Feb 2020 09:18), Max: 101 (05 Feb 2020 14:16)  HR: 69 (06 Feb 2020 09:18) (69 - 85)  BP: 155/68 (06 Feb 2020 09:18) (141/71 - 165/80)  BP(mean): --  RR: 18 (06 Feb 2020 09:18) (18 - 18)  SpO2: 95% (06 Feb 2020 09:18) (91% - 98%)    PHYSICAL EXAM:  Constitutional: resting in bed with no acute distress  Respiratory:  unlabored breathing  Gastrointestinal: Abdomen soft, non distended, mildly tender to palpation over right costal margin    Extremities:  No edema, no calf tenderness    RADIOLOGY & ADDITIONAL STUDIES:   EXAM:  NM HEPATOBILIARY IMG W RX                        FINDINGS: There is prompt, homogeneous uptake of radiotracer by the hepatocytes. Activity is first seen in the bowel at 15minutes. The gallbladder is not visualized in the first hour of imaging, but was seen 10 minutes after the administration of morphine. There is good clearance of activity from the liver at the end of the study.  IMPRESSION: Normal morphine-augmented hepatobiliary scan.

## 2020-02-06 NOTE — PROGRESS NOTE ADULT - SUBJECTIVE AND OBJECTIVE BOX
Interval History:  no new complaints. no vomiting. afebrile since 2 PM yesterday.  wife present at bedside.  has been on at least 2 abx in last month keflex and ?sulfa  Chart reviewed and events noted;   Overnight events:    MEDICATIONS  (STANDING):  ATENolol  Tablet 50 milliGRAM(s) Oral two times a day  cyanocobalamin Injectable 1000 MICROGram(s) IntraMuscular daily  fluvoxaMINE 100 milliGRAM(s) Oral two times a day  folic acid 1 milliGRAM(s) Oral daily  influenza   Vaccine 0.5 milliLiter(s) IntraMuscular once    MEDICATIONS  (PRN):  diazepam    Tablet 2.5 milliGRAM(s) Oral daily PRN anxiety      Vital Signs Last 24 Hrs  T(C): 36.4 (06 Feb 2020 09:18), Max: 38.3 (05 Feb 2020 14:16)  T(F): 97.6 (06 Feb 2020 09:18), Max: 101 (05 Feb 2020 14:16)  HR: 69 (06 Feb 2020 09:18) (69 - 85)  BP: 155/68 (06 Feb 2020 09:18) (141/71 - 165/80)  BP(mean): --  RR: 18 (06 Feb 2020 09:18) (18 - 18)  SpO2: 95% (06 Feb 2020 09:18) (91% - 98%)    PHYSICAL EXAM  General: adult in NAD  HEENT: clear oropharynx, anicteric sclera, pink conjunctivae  Neck: supple  CV: normal S1S2 with no murmur rubs or gallops  Lungs: clear to auscultation, no wheezes, no rhales  Abdomen: soft non-tender non-distended, no hepato/splenomegaly  Ext: no clubbing cyanosis or edema  Skin: no rashes and no petichiae  Neuro: alert and oriented X3 no focal deficits      LABS:  CBC Full  -  ( 06 Feb 2020 06:41 )  WBC Count : 6.20 K/uL  RBC Count : 3.92 M/uL  Hemoglobin : 11.5 g/dL  Hematocrit : 33.7 %  Platelet Count - Automated : 84 K/uL  Mean Cell Volume : 86.0 fl  Mean Cell Hemoglobin : 29.3 pg  Mean Cell Hemoglobin Concentration : 34.1 gm/dL  Auto Neutrophil # : x  Auto Lymphocyte # : x  Auto Monocyte # : x  Auto Eosinophil # : x  Auto Basophil # : x  Auto Neutrophil % : x  Auto Lymphocyte % : x  Auto Monocyte % : x  Auto Eosinophil % : x  Auto Basophil % : x    02-06    141  |  100  |  31<H>  ----------------------------<  170<H>  3.8   |  28  |  1.38<H>    Ca    9.1      06 Feb 2020 06:41  Phos  2.9     02-05  Mg     2.0     02-05    TPro  6.4  /  Alb  3.5  /  TBili  1.1  /  DBili  0.5<H>  /  AST  66<H>  /  ALT  59<H>  /  AlkPhos  223<H>  02-06    PT/INR - ( 05 Feb 2020 13:33 )   PT: 16.3 sec;   INR: 1.41 ratio         PTT - ( 05 Feb 2020 07:30 )  PTT:60.6 sec    fe studies      WBC trend  6.20 K/uL (02-06-20 @ 06:41)  6.01 K/uL (02-05-20 @ 07:30)  7.49 K/uL (02-04-20 @ 20:32)      Hgb trend  11.5 g/dL (02-06-20 @ 06:41)  11.0 g/dL (02-05-20 @ 07:30)  11.8 g/dL (02-04-20 @ 20:32)      plt trend  84 K/uL (02-06-20 @ 06:41)  72 K/uL (02-05-20 @ 07:32)  clumped K/uL (02-05-20 @ 07:30)  Clumped (02-04-20 @ 20:32)        RADIOLOGY & ADDITIONAL STUDIES:

## 2020-02-06 NOTE — PROGRESS NOTE ADULT - ASSESSMENT
[ASSESSMENT and  PLAN]  70yo M with hx of ITP, baseline plts 100-120. On observation. Last Plt 120 in our office 12/19/20 outpt.   Admitted with fever, possible cholecystitis. Sx since Sat 02/01/20  CBC with plt clumping.   Plt on blue top estimated 72. Close to pt baseline.   Possible mild decreased due to acute illness.     Abn coag panel, suspect factor inhibitor  elevated PT and PTT likely due to incomplete filling of sample from ER.   non-contrast CT scan AP and Abd US showed no evidence of liver disease.   Ddx includes anticoagulant. However with no hx of anticoagulant use.   Doubt DIC, as WBC normal. Fibrinogen adequate.   Pt at baseline WBC of ~6  Repeat PT and PTT similarly abnormal to prior test.   Mixing study c/w inhibitor to PTT, and possible weak inhibitor to PT.   Most common inhibitor would include acquired FVIII  [Factor VIII] inhibitor or LAC [Lupus Anticoagulant]  No BRANT on exam    KRANTHI+, with anit-C3, Cold Agglutin  Mild anemia. Hgb 11, but Hgb may be lower in setting on volume contraction, with DONNA.       DONNA with Cr 1.79.   Baseline Cr 1.2-1.4, mild CKD-2    hx low normal B12 levels.     no evidence of hemolysis with minimally elevated LDH. stable hgb and minimally elevated direct bilirubin (nl indirect bilirubin)    possible drug effect with prior abx but will need to rule out additional underlying disease. if renal function adequate would recommend CT chest/abdomen/pelvis with IV dye to evaluate for adenopathy      RECOMMENDATIONS  Thrombocytopenia/ITP  ·	B12 injections 1000mcg while hospitalized to replete levels, to minimize risk for further throbocytopenia from this cause.   ·	Follow CBC, no intervention recommended for chronic thrombocytopenia.   ·	Plts adequate at current time.    Needs additional workup for factor inhibitor:  ·	Await FVIII levels, FIX levels, FX levels, FVII levels, FII levels  ·	Check LAC, MICHELLE.     KRANTHI+, mild anemia  ·	Check Tbili, Dbili  ·	Check haptoglogin, LDH.   ·	Check retic.   ·	S/p Folic acid IM.  ·	Start PO Folic acid daily.     Need eventual CT scan CAP with IV contrast, neck, Chest, Abd, Pelvis to eval for lymphadenopathy.   ·	Defer for now as DONNA with CKD.     Treatment of  acute cholecystitis. hida is negative    Determinate of whether fever due to infectious causes, eg acute cholecystis or other source, eg lymphoproliferative disorder. . possible drug reaction    IVF for DONNA.   Follow Cr.     DVT Prophylaxis SCD, if plan for possible surgery.   Adequate for SQ heparin if high risk for DVT, eg minimal ambulation.   Final decision on AC, dependent on surgical plans.     repeat PT/PTT

## 2020-02-06 NOTE — CONSULT NOTE ADULT - PROBLEM SELECTOR RECOMMENDATION 2
? infectious etiology  f/u blood cx  no pyuria  no s/s of pna  no diarrhea  exam without focal findings to indicate infection as etiology of fever

## 2020-02-06 NOTE — CONSULT NOTE ADULT - ASSESSMENT
69m with ITP, HTN, ?CKD admitted with fever ruq pain and vomiting  r/o acute cholecystitis  coagulopathy  transaminitis

## 2020-02-06 NOTE — PROGRESS NOTE ADULT - ASSESSMENT
69 M with PMHx HTN, anxiety, presents to the ED with 3-4 days of fever, vomiting, lower abdominal pain and poor PO intake. His symptoms started with nausea and projectile vomiting on Friday associated with fevers as high as 102F after eating BBQ the day before.    Saw his PCP Dr. Calderon at [facility], did outpatient RUQ ultrasound that was suspicious for acute cholecystitis and advised him to present to the ED.     # Abdominal pain: r/o acute cholecystitis vs. food poisoning   - US RUQ and CT abd suggested acute cholecystitis. CBD normal, though distended gallbladder  - HIDA scan is negative however. symptoms has since resolved. No current plan for surgical intervention  - restarted regular diet and tolerating    # Fever 102-103F (recent tmax 101F)  - unclear source  - GI findings as above  - no sick contact, no recent travel.  - UA is negative. CT abd lower lung cut, no PNA  - RVP is also negative  - recent UC strep throat neg per the wife   - monitor for fever curves and GI symptoms   - blood cultures and urine cultures all negative  - ?hematological etiology?    # Coagulopathy with thrombocytopenia  - known history of thrombocytopenia  - being followed by pSike Gaines (hematology), last plts 126 on 12/19/19  - no easy bleeding reported. recent Tyra surgery of the nose with some bleeding which has stopped spontaneously  - continue to monitor plts, continue to trend PT/INR in am, fibronogen per heme/onc team and inhibitor/ab work up.  - heme/onc eval appreciated.   (recently completed Keflex, though no reaction reported, also recently on Bactrim prior, ? drug induced.)  - holding Asa 81 mg for now (taking for general preventative reason)  - Dr. Aldridge on the case    # Acute on chronic CKD (baseline Cr is 1.3 in 2019)   - Cr 1.79 on admission, likely pre-renal  - s/p IVF, Cr slowly improving  - continue to trend  - no post obstructive symptoms    # Nose basal cell Ca  - s/p recent Tyra's surgery  - completed 7 days of Keflex, no prior allergy   - outpt follow up with dermatology     # Anxiety  - c/w home meds. on PRN Valium 2.5-5 mg PRN qdaily  - c/w Fluvoxamine 100 mg BID     # HTN  - c/w Atenolol 50 mg BID  - Cr is now back to baseline. BP running high. will restart home ARB Benicar  (therapeutic exchanged Losartan 100 mg qdaily)  - hold HCTZ    # Arthritis  - received PRN steroid injections in the past  - no recent issues 	    DVT ppx  venodynes     - Dr. Willson ([facility])  - [phone]

## 2020-02-06 NOTE — CONSULT NOTE ADULT - SUBJECTIVE AND OBJECTIVE BOX
[facility], Division of Infectious Diseases  SARIKA Briones A. Lee  [phone]  JUSTICE, JANICE  69y, Male  [id number]      HPI:  69M with PMHx HTN, anxiety, ckd, ITP, presents to the ED with 3-4 days of fever, vomiting, and poor PO intake.   His symptoms started with nausea and projectile vomiting on Friday associated with fevers as high as 102. P  No diarrhea, no cough, no headache, no sore throat, no sick contacts, no dyspnea  He endorses feeling week from being dehydrated and has felt unsteady on his feet. H  . His primary care doctor sent him for an outpatient RUQ ultrasound that was suspicious for acute cholecystitis and advised him to present to the ED.   Of note, he had a Moh's procedure about 10 days ago after which he was prescribed Keflex for 7 days. He completed the course.   about 1 month ago , pt had bactrim for sinus infection.  no recent travel, had a root canal done about 1 month ago  pt very agitated wants to go home.                    PMH/PSH--  Anxiety  Hypertension, unspecified type  H/O Mohs micrographic surgery for skin cancer      Allergies-- NKDA      Medications--  Antibiotics:   Immunologic: influenza   Vaccine 0.5 milliLiter(s) IntraMuscular once    Other: ATENolol  Tablet  cyanocobalamin Injectable  diazepam    Tablet PRN  fluvoxaMINE  folic acid      Social History--  EtOH: denies ***  Tobacco: denies ***  Drug Use: denies ***    Family/Marital History--  NC      Remainder not relevant to clinical concern.    Travel/Environmental/Occupational History:  retired worked in [employer]    Review of Systems:  A >=10-point review of systems was obtained.     Pertinent positives and negatives--  Constitutional: + fevers. No Chills. No Rigors.   Eyes: no blurry vision  ENMT: no sore throat  Cardiovascular: No chest pain. No palpitations.  Respiratory: No shortness of breath. No cough.  Gastrointestinal: No nausea + vomiting. No diarrhea or constipation.   Genitourinary: no dysuria  Musculoskeletal: No arthritis  Skin: no rash  Neurologic: no headache  Psychiatric: + anxiety  Review of systems otherwise negative except as previously noted.    Physical Exam--  Vital Signs: T(F): 97.6 (02-06-20 @ 09:18), Max: 101 (02-05-20 @ 14:16)  HR: 69 (02-06-20 @ 09:18)  BP: 155/68 (02-06-20 @ 09:18)  RR: 18 (02-06-20 @ 09:18)  SpO2: 95% (02-06-20 @ 09:18)  Wt(kg): --  General: Nontoxic-appearing Male in no acute distress.  HEENT: AT/NCI. + left nare wound clean. Dentition fair.  Neck: Not rigid. No sense of mass.  Nodes: None palpable.  Lungs: Clear bilaterally without rales, wheezing or rhonchi  Heart: Regular rate and rhythm. No Murmur.   Abdomen: Bowel sounds present and normoactive. Soft. Nondistended. Nontender. No sense of mass. No organomegaly.  Back: No spinal tenderness. No costovertebral angle tenderness.   Extremities: No cyanosis or clubbing. No edema.   Skin: Warm. Dry. Good turgor. No rash. No vasculitic stigmata.  Psychiatric: anxious          Laboratory & Imaging Data--  CBC                        11.5   6.20  )-----------( 84       ( 06 Feb 2020 06:41 )             33.7       Chemistries  02-06    141  |  100  |  31<H>  ----------------------------<  170<H>  3.8   |  28  |  1.38<H>    Ca    9.1      06 Feb 2020 06:41  Phos  2.9     02-05  Mg     2.0     02-05    TPro  6.4  /  Alb  3.5  /  TBili  1.1  /  DBili  0.5<H>  /  AST  66<H>  /  ALT  59<H>  /  AlkPhos  223<H>  02-06      Culture Data    Culture - Urine (collected 05 Feb 2020 01:06)  Source: .Urine Clean Catch (Midstream)  Final Report (06 Feb 2020 00:58):    <10,000 CFU/mL Normal Urogenital Dory    Culture - Blood (collected 04 Feb 2020 22:58)  Source: .Blood Blood  Preliminary Report (05 Feb 2020 23:02):    No growth to date.    Culture - Blood (collected 04 Feb 2020 22:58)  Source: .Blood Blood  Preliminary Report (05 Feb 2020 23:02):    No growth to date.    Urinalysis + Microscopic Examination (02.04.20 @ 22:53)    pH Urine: 6.0    Leukocyte Esterase Concentration: Negative    Nitrite: Negative    Ketone - Urine: Negative    Bilirubin: Negative    Color: Yellow    Glucose Qualitative, Urine: Negative    Blood, Urine: Moderate    Urine Appearance: Clear    Urobilinogen: Negative    Specific Gravity: 1.012    Protein, Urine: 30 mg/dL    Red Blood Cell - Urine: 3 /hpf    White Blood Cell - Urine: 2 /HPF    Epithelial Cells: 0 /hpf    Hyaline Casts: 2 /lpf    Bacteria: Negative    < from: NM Hepatobiliary Imaging w/ RX (02.05.20 @ 10:05) >    EXAM:  NM HEPATOBILIARY IMG W RX                                PROCEDURE DATE:  02/05/2020          INTERPRETATION:  CLINICAL STATEMENT: 69-year-old male with fever and abdominal discomfort.    RADIOPHARMACEUTICAL: 3.1 mCi and 2.9 mCi Tc-99m-Mebrofenin, I.V.; 2 doses    TECHNIQUE:  Dynamic images of the anterior abdomen were obtained for 1 hour following injection of radiotracer. Morphine 4 mg I.V. and a second dose of radiotracer were administered at 1 hour. Dynamic imaging was continued for 1 hour followed by static images of the abdomen in the right lateral and right anterior oblique views immediately thereafter.    FINDINGS: There is prompt, homogeneous uptake of radiotracer by the hepatocytes. Activity is first seen in the bowel at 15minutes. The gallbladder is not visualized in the first hour of imaging, but was seen 10 minutes after the administration of morphine. There is good clearance of activity from the liver at the end of the study.    IMPRESSION: Normal morphine-augmented hepatobiliary scan.    No evidence of acute cholecystitis.      < end of copied text >  < from: NM Hepatobiliary Imaging w/ RX (02.05.20 @ 10:05) >    EXAM:  NM HEPATOBILIARY IMG W RX                                PROCEDURE DATE:  02/05/2020          INTERPRETATION:  CLINICAL STATEMENT: 69-year-old male with fever and abdominal discomfort.    RADIOPHARMACEUTICAL: 3.1 mCi and 2.9 mCi Tc-99m-Mebrofenin, I.V.; 2 doses    TECHNIQUE:  Dynamic images of the anterior abdomen were obtained for 1 hour following injection of radiotracer. Morphine 4 mg I.V. and a second dose of radiotracer were administered at 1 hour. Dynamic imaging was continued for 1 hour followed by static images of the abdomen in the right lateral and right anterior oblique views immediately thereafter.    FINDINGS: There is prompt, homogeneous uptake of radiotracer by the hepatocytes. Activity is first seen in the bowel at 15minutes. The gallbladder is not visualized in the first hour of imaging, but was seen 10 minutes after the administration of morphine. There is good clearance of activity from the liver at the end of the study.    IMPRESSION: Normal morphine-augmented hepatobiliary scan.    No evidence of acute cholecystitis.      < end of copied text >        < from: CT Abdomen and Pelvis No Cont (02.04.20 @ 23:52) >    LOWER CHEST: Trace right pleural effusion with adjacent atelectasis.    LIVER: Within normal limits.  BILE DUCTS:Normal caliber.  GALLBLADDER: Distended with gallstones.  Pericholecystic fat stranding.  SPLEEN: Splenomegaly.  PANCREAS: Within normal limits.  ADRENALS: Within normal limits.  KIDNEYS/URETERS: Right renal cyst. No hydronephrosis or renal calculus.    BLADDER: Within normal limits.  REPRODUCTIVE ORGANS: Prostate is enlarged, measuring 5.4 x 4.1 x 5.4 cm.    BOWEL: No bowel obstruction. Appendix is normal.  Diverticulosis.  PERITONEUM: No ascites.  VESSELS: Atherosclerotic changes.  RETROPERITONEUM/LYMPH NODES: No lymphadenopathy.    ABDOMINAL WALL: Within normal limits.  BONES: Within normal limits.    IMPRESSION:     Acute cholecystitis.  No biliary dilatation.  No focal collection.  Normal appendix.  Diverticulosis.  Trace right pleural effusion.              < end of copied text >      < from: US Abdomen Upper Quadrant Right (02.04.20 @ 21:17) >  FINDINGS:    Liver: Within normal limits.    Bile ducts: Normal caliber. Common bile duct measures 7 mm.     Gallbladder: Massively distended with stones and sludge. Mild wall thickening. Positive sonographic Carney sign.   Pancreas: Visualized portions are within normal limits.    Right kidney: 11.4 cm. No hydronephrosis. Right renal cyst measuring up to 4.7 x 4.1 x 3.2 cm.    Ascites: None.    IVC: Visualized portions are within normal limits.    Miscellaneous: Right pleural effusion is partially visualized.    IMPRESSION:     Distended gallbladder with stones, mild wall thickening and positive sonographic Carney sign. Findings consistent with acute cholecystitis.    Right pleural effusion.    < end of copied text >

## 2020-02-07 LAB
ALBUMIN SERPL ELPH-MCNC: 3 G/DL — LOW (ref 3.3–5)
ALP SERPL-CCNC: 192 U/L — HIGH (ref 40–120)
ALT FLD-CCNC: 70 U/L — HIGH (ref 10–45)
ANION GAP SERPL CALC-SCNC: 14 MMOL/L — SIGNIFICANT CHANGE UP (ref 5–17)
APPEARANCE UR: CLEAR — SIGNIFICANT CHANGE UP
APTT BLD: 64.3 SEC — HIGH (ref 27.5–36.3)
AST SERPL-CCNC: 68 U/L — HIGH (ref 10–40)
BILIRUB SERPL-MCNC: 0.8 MG/DL — SIGNIFICANT CHANGE UP (ref 0.2–1.2)
BILIRUB UR-MCNC: NEGATIVE — SIGNIFICANT CHANGE UP
BUN SERPL-MCNC: 23 MG/DL — SIGNIFICANT CHANGE UP (ref 7–23)
CALCIUM SERPL-MCNC: 8.9 MG/DL — SIGNIFICANT CHANGE UP (ref 8.4–10.5)
CHLORIDE SERPL-SCNC: 102 MMOL/L — SIGNIFICANT CHANGE UP (ref 96–108)
CO2 SERPL-SCNC: 22 MMOL/L — SIGNIFICANT CHANGE UP (ref 22–31)
COLOR SPEC: SIGNIFICANT CHANGE UP
CREAT SERPL-MCNC: 1.22 MG/DL — SIGNIFICANT CHANGE UP (ref 0.5–1.3)
DIFF PNL FLD: ABNORMAL
FOLATE RBC-MCNC: 3092 NG/ML — HIGH (ref 499–1504)
GLUCOSE SERPL-MCNC: 141 MG/DL — HIGH (ref 70–99)
GLUCOSE UR QL: NEGATIVE — SIGNIFICANT CHANGE UP
HAPTOGLOB SERPL-MCNC: 102 MG/DL — SIGNIFICANT CHANGE UP (ref 34–200)
HCT VFR BLD CALC: 34.4 % — LOW (ref 39–50)
HGB BLD-MCNC: 11.8 G/DL — LOW (ref 13–17)
INR BLD: 1.38 RATIO — HIGH (ref 0.88–1.16)
KETONES UR-MCNC: NEGATIVE — SIGNIFICANT CHANGE UP
LEUKOCYTE ESTERASE UR-ACNC: NEGATIVE — SIGNIFICANT CHANGE UP
MCHC RBC-ENTMCNC: 28.7 PG — SIGNIFICANT CHANGE UP (ref 27–34)
MCHC RBC-ENTMCNC: 34.3 GM/DL — SIGNIFICANT CHANGE UP (ref 32–36)
MCV RBC AUTO: 83.7 FL — SIGNIFICANT CHANGE UP (ref 80–100)
NITRITE UR-MCNC: NEGATIVE — SIGNIFICANT CHANGE UP
NRBC # BLD: 0 /100 WBCS — SIGNIFICANT CHANGE UP (ref 0–0)
PH UR: 6 — SIGNIFICANT CHANGE UP (ref 5–8)
PLATELET # BLD AUTO: 104 K/UL — LOW (ref 150–400)
POTASSIUM SERPL-MCNC: 4.1 MMOL/L — SIGNIFICANT CHANGE UP (ref 3.5–5.3)
POTASSIUM SERPL-SCNC: 4.1 MMOL/L — SIGNIFICANT CHANGE UP (ref 3.5–5.3)
PROT SERPL-MCNC: 6.1 G/DL — SIGNIFICANT CHANGE UP (ref 6–8.3)
PROT UR-MCNC: ABNORMAL
PROTHROM AB SERPL-ACNC: 16 SEC — HIGH (ref 10–12.9)
RBC # BLD: 4.11 M/UL — LOW (ref 4.2–5.8)
RBC # FLD: 13.2 % — SIGNIFICANT CHANGE UP (ref 10.3–14.5)
SODIUM SERPL-SCNC: 138 MMOL/L — SIGNIFICANT CHANGE UP (ref 135–145)
SP GR SPEC: 1.01 — SIGNIFICANT CHANGE UP (ref 1.01–1.02)
UROBILINOGEN FLD QL: NEGATIVE — SIGNIFICANT CHANGE UP
WBC # BLD: 8.22 K/UL — SIGNIFICANT CHANGE UP (ref 3.8–10.5)
WBC # FLD AUTO: 8.22 K/UL — SIGNIFICANT CHANGE UP (ref 3.8–10.5)

## 2020-02-07 PROCEDURE — 93970 EXTREMITY STUDY: CPT | Mod: 26

## 2020-02-07 PROCEDURE — 71045 X-RAY EXAM CHEST 1 VIEW: CPT | Mod: 26

## 2020-02-07 RX ORDER — PHYTONADIONE (VIT K1) 5 MG
5 TABLET ORAL ONCE
Refills: 0 | Status: COMPLETED | OUTPATIENT
Start: 2020-02-07 | End: 2020-02-07

## 2020-02-07 RX ORDER — HYDROCHLOROTHIAZIDE 25 MG
12.5 TABLET ORAL DAILY
Refills: 0 | Status: DISCONTINUED | OUTPATIENT
Start: 2020-02-07 | End: 2020-02-09

## 2020-02-07 RX ORDER — ENOXAPARIN SODIUM 100 MG/ML
40 INJECTION SUBCUTANEOUS DAILY
Refills: 0 | Status: DISCONTINUED | OUTPATIENT
Start: 2020-02-07 | End: 2020-02-11

## 2020-02-07 RX ORDER — ACETAMINOPHEN 500 MG
1000 TABLET ORAL ONCE
Refills: 0 | Status: COMPLETED | OUTPATIENT
Start: 2020-02-07 | End: 2020-02-07

## 2020-02-07 RX ORDER — AMLODIPINE BESYLATE 2.5 MG/1
5 TABLET ORAL DAILY
Refills: 0 | Status: DISCONTINUED | OUTPATIENT
Start: 2020-02-07 | End: 2020-02-11

## 2020-02-07 RX ADMIN — Medication 12.5 MILLIGRAM(S): at 13:12

## 2020-02-07 RX ADMIN — Medication 1 MILLIGRAM(S): at 13:10

## 2020-02-07 RX ADMIN — ATENOLOL 50 MILLIGRAM(S): 25 TABLET ORAL at 18:37

## 2020-02-07 RX ADMIN — PREGABALIN 1000 MICROGRAM(S): 225 CAPSULE ORAL at 13:10

## 2020-02-07 RX ADMIN — ENOXAPARIN SODIUM 40 MILLIGRAM(S): 100 INJECTION SUBCUTANEOUS at 22:00

## 2020-02-07 RX ADMIN — Medication 5 MILLIGRAM(S): at 10:34

## 2020-02-07 RX ADMIN — Medication 100 MILLIGRAM(S): at 22:00

## 2020-02-07 RX ADMIN — AMLODIPINE BESYLATE 5 MILLIGRAM(S): 2.5 TABLET ORAL at 21:17

## 2020-02-07 RX ADMIN — Medication 100 MILLIGRAM(S): at 13:09

## 2020-02-07 RX ADMIN — FLUVOXAMINE MALEATE 100 MILLIGRAM(S): 25 TABLET ORAL at 18:37

## 2020-02-07 RX ADMIN — FLUVOXAMINE MALEATE 100 MILLIGRAM(S): 25 TABLET ORAL at 06:46

## 2020-02-07 RX ADMIN — ATENOLOL 50 MILLIGRAM(S): 25 TABLET ORAL at 06:46

## 2020-02-07 RX ADMIN — LOSARTAN POTASSIUM 100 MILLIGRAM(S): 100 TABLET, FILM COATED ORAL at 06:46

## 2020-02-07 RX ADMIN — Medication 5 MILLIGRAM(S): at 22:01

## 2020-02-07 RX ADMIN — Medication 400 MILLIGRAM(S): at 22:01

## 2020-02-07 RX ADMIN — Medication 2.5 MILLIGRAM(S): at 10:33

## 2020-02-07 NOTE — PROGRESS NOTE ADULT - SUBJECTIVE AND OBJECTIVE BOX
[facility], Division of Infectious Diseases  SARIKA Briones A. Lee  [phone]    Name: JANICE RENDON  Age: 69y  Gender: Male  MRN: [id number]    Interval History--  Notes reviewed. Complains of postnasal drip. Seems generally annoyed though without any other specific medical complaints.     Past Medical History--  Anxiety  Hypertension, unspecified type  H/O Mohs micrographic surgery for skin cancer      For details regarding the patient's social history, family history, and other miscellaneous elements, please refer the initial infectious diseases consultation and/or the admitting history and physical examination for this admission.    Allergies  No Known Allergies    Intolerances        Medications--  Antibiotics:    Immunologic:  influenza   Vaccine 0.5 milliLiter(s) IntraMuscular once    Other:  ATENolol  Tablet  cyanocobalamin Injectable  diazepam    Tablet PRN  fluvoxaMINE  folic acid  guaiFENesin   Syrup  (Sugar-Free) PRN  hydrochlorothiazide  losartan      Review of Systems--  A 10-point review of systems was obtained.   Review of systems otherwise negative except as previously noted.    Physical Examination--  Vital Signs: T(F): 98 (02-07-20 @ 08:40), Max: 98.8 (02-07-20 @ 06:19)  HR: 72 (02-07-20 @ 08:40)  BP: 183/58 (02-07-20 @ 08:40)  RR: 18 (02-07-20 @ 08:40)  SpO2: 94% (02-07-20 @ 08:40)  Wt(kg): --  General: Nontoxic-appearing Male in no acute distress.  HEENT: AT/NC. Anicteric. Conjunctiva pink and moist. Oropharynx clear.   Neck: Not rigid. No sense of mass.  Nodes: None palpable.  Lungs: Clear bilaterally without rales, wheezing or rhonchi  Heart: Regular rate and rhythm. No Murmur. No rub. No gallop. No palpable thrill.  Abdomen: Bowel sounds present and normoactive. Soft. Nondistended. Nontender. No sense of mass. No organomegaly.  Extremities: No cyanosis or clubbing. Trace LE edema.   Skin: Warm. Dry. Good turgor. No rash. No vasculitic stigmata.  Psychiatric: Appropriate affect and mood for situation.         Laboratory Studies--  CBC                        11.8   8.22  )-----------( 104      ( 07 Feb 2020 09:40 )             34.4       Chemistries  02-07    138  |  102  |  23  ----------------------------<  141<H>  4.1   |  22  |  1.22    Ca    8.9      07 Feb 2020 07:15    TPro  6.1  /  Alb  3.0<L>  /  TBili  0.8  /  DBili  x   /  AST  68<H>  /  ALT  70<H>  /  AlkPhos  192<H>  02-07      Culture Data    Culture - Urine (collected 05 Feb 2020 01:06)  Source: .Urine Clean Catch (Midstream)  Final Report (06 Feb 2020 00:58):    <10,000 CFU/mL Normal Urogenital Dory    Culture - Blood (collected 04 Feb 2020 22:58)  Source: .Blood Blood  Preliminary Report (05 Feb 2020 23:02):    No growth to date.    Culture - Blood (collected 04 Feb 2020 22:58)  Source: .Blood Blood  Preliminary Report (05 Feb 2020 23:02):    No growth to date.

## 2020-02-07 NOTE — PROGRESS NOTE ADULT - ASSESSMENT
[ASSESSMENT and  PLAN]  68yo M with hx of ITP, baseline plts 100-120. On observation. Last Plt 120 in our office 12/19/20 outpt.   Admitted with fever, possible cholecystitis. Sx since Sat 02/01/20.  CBC with plt clumping.   Plt on blue top estimated 72. Close to pt baseline.   Possible mild decreased due to acute illness.     Abn coag panel, suspect factor inhibitor. +LAC  Repeat PT and PTT similarly abnormal to prior test.   Mixing study c/w inhibitor to PTT, and possible weak inhibitor to PT.   non-contrast CT scan AP and Abd US showed no evidence of liver disease.   Ddx includes anticoagulant and concurrnet VitK def. However with no hx of anticoagulant use.   s/p VitK 5mg on 02/05/20 with mild improvement in PT/INR.   Doubt DIC, as WBC normal. Fibrinogen adequate. Pt at baseline WBC of ~6.  Testing with LAC [Lupus Anticoagulant].  FVIII, FIX, adequate.   FII FVII and FX low normal. Would dose again; PT mild impromved.   No BRANT on exam.      KRANTHI+, with anti-C3, Cold Agglutin.  Mild anemia. Hgb 11, but Hgb may be lower in setting on volume contraction, with DONNA.    unimpressive. Not likely significant hemolysis  Stable hgb and minimally elevated direct bilirubin (normal indirect bilirubin)      DONNA with Cr 1.79. ==> 1.22  Baseline Cr 1.2-1.4, mild CKD-2    hx low normal B12 levels.       possible drug effect with prior abx but will need to rule out additional underlying disease. if renal function adequate would recommend CT chest/abdomen/pelvis with IV dye to evaluate for adenopathy or thrombosis if continued abd pain and or fever without cause      RECOMMENDATIONS  Thrombocytopenia/ITP  ·	B12 injections 1000mcg while hospitalized to replete levels, to minimize risk for further throbocytopenia from this cause.   ·	Follow CBC, no intervention recommended for chronic thrombocytopenia.   ·	Plts adequate at current time.    Coaguloapthy +LAC, borderline FIX levels 46%  ·	repeat VitK dose  ·	repeat PT/PTT, determine if INR and PT improves.  ·	Consider LMWH for DVT prophylaxis.     KRANTHI+, mild anemia  ·	Await haptoglogin, LDH.   ·	Await retic.   ·	S/p Folic acid IM.  ·	Continue  PO Folic acid daily.     Recommend eventual CTA scan CAP with IV contrast, neck, Chest, Abd, Pelvis to eval for lymphadenopathy and other causes.   ·	Defer for now recent DONNA with mild chronic CKD.   ·	Check LE duplex for DVT  ·	if continue abd pain, consider Duplex portal veins and mesneteric vain duplex.     Abd Pain/ Cholecystitis  Treatment of  acute cholecystitis per medicine and surgery  HIDA is negative. No immediate plan for OR per surgery.   Further imagng as above    Determinate of whether fever due to infectious causes, eg acute cholecystis or other source, eg lymphoproliferative disorder.   Possible drug reaction for some lab abn, but unlikely for constellation of sx.     IVF for DONNA.   Follow Cr.     DVT Prophylaxis SCD, if plan for possible surgery.   Adequate for SQ heparin or LMWH as high risk for DVT, +LAC, minimal ambulation.   Final decision on AC, dependent on surgical plans. [ASSESSMENT and  PLAN]  70yo M with hx of ITP, baseline plts 100-120. On observation. Last Plt 120 in our office 12/19/20 outpt.   Admitted with fever, possible cholecystitis. Sx since Sat 02/01/20.  CBC with plt clumping.   Plt on blue top estimated 72. Close to pt baseline.   Possible mild decreased due to acute illness.     Abn coag panel, suspect factor inhibitor. +LAC  Repeat PT and PTT similarly abnormal to prior test.   Mixing study c/w inhibitor to PTT, and possible weak inhibitor to PT.   non-contrast CT scan AP and Abd US showed no evidence of liver disease.   Ddx includes anticoagulant and concurrnet VitK def. However with no hx of anticoagulant use.   s/p VitK 5mg on 02/05/20 with mild improvement in PT/INR.   Doubt DIC, as WBC normal. Fibrinogen adequate. Pt at baseline WBC of ~6.  Testing with LAC [Lupus Anticoagulant].  FVIII, FIX, adequate.   FII FVII and FX low normal. Would dose again; PT mild impromved.   No BRANT on exam.      KRANTHI+, with anti-C3, Cold Agglutin.  Mild anemia. Hgb 11, but Hgb may be lower in setting on volume contraction, with DONNA.    unimpressive. Not likely significant hemolysis  Stable hgb and minimally elevated direct bilirubin (normal indirect bilirubin)      DONNA with Cr 1.79. ==> 1.22  Baseline Cr 1.2-1.4, mild CKD-2    hx low normal B12 levels.       possible drug effect with prior abx but will need to rule out additional underlying disease. if renal function adequate would recommend CT chest/abdomen/pelvis with IV dye to evaluate for adenopathy or thrombosis if continued abd pain and or fever without cause      RECOMMENDATIONS  Thrombocytopenia/ITP  ·	B12 injections 1000mcg while hospitalized to replete levels, to minimize risk for further throbocytopenia from this cause.   ·	Follow CBC, no intervention recommended for chronic thrombocytopenia.   ·	Plts adequate at current time.    Coaguloapthy +LAC, borderline FIX levels 46%  ·	repeat VitK dose  ·	repeat PT/PTT, determine if INR and PT improves.  ·	Outpt eval for further testing for anticardiolipin and other APS abs.   ·	Consider LMWH for DVT prophylaxis.     KRANTHI+, mild anemia  ·	Await haptoglogin, unimpressive LDH.   ·	Await retic.   ·	S/p Folic acid IM.  ·	Continue  PO Folic acid daily.     Recommend eventual CTA scan CAP with IV contrast, neck, Chest, Abd, Pelvis to eval for lymphadenopathy and other causes.   ·	Defer for now recent DONNA with mild chronic CKD.   ·	Check LE duplex for DVT, for screening; low clinical suspicion  ·	if recurrent abd pain, consider Duplex portal veins and mesneteric vein duplex. Asymptomatic/better currently.     Abd Pain/ Cholecystitis  Treatment of  acute cholecystitis per medicine and surgery  HIDA is negative. No immediate plan for OR per surgery.   Further imaging as above    Determinate of whether fever due to infectious causes, eg acute cholecystis or other source, eg lymphoproliferative disorder.   Possible drug reaction for some lab abn, but unlikely for constellation of sx.     IVF for DONNA.   Follow Cr.     DVT Prophylaxis SCD, if plan for possible surgery.   Adequate for SQ heparin or LMWH as high risk for DVT, +LAC, minimal ambulation.   Final decision on AC, dependent on surgical plans.     Else if stable. DC planning.   Follow with Dinora Aldridge in office 2-3wk post DC  [phone]

## 2020-02-07 NOTE — PROGRESS NOTE ADULT - SUBJECTIVE AND OBJECTIVE BOX
[INTERVAL HX: ]  Patient seen and examined;  Chart reviewed and events noted;     Patient is a 69y Male with a known history of :  Acute cholecystitis (K81.0) [Active]  Anxiety (F41.9) [Active]  Hypertension, unspecified type (I10) [Active]  H/O Mohs micrographic surgery for skin cancer (Z85.828) [Active]      HPI:  69M with PMHx HTN, anxiety, presents to the ED with 3-4 days of fever, vomiting, and poor PO intake. His symptoms started with nausea and projectile vomiting on Friday associated with fevers as high as 102. Per the patient's wife who is at the bedside, he complained of lower abdominal pain when his symptoms started but this abdominal pain has since resolved. He endorses feeling week from being dehydrated and has felt unsteady on his feet. He denies diarrhea but states that lately his stools have been thinner than usual. His primary care doctor sent him for an outpatient RUQ ultrasound that was suspicious for acute cholecystitis and advised him to present to the ED. Of note, he had a Moh's procedure about 10 days ago after which he was prescribed Keflex for 7 days. He completed the course.   Patient notes that he has a history of thrombocytopenia and is followed closely by Dr. Spike Melendez who has recommended conservative monitoring. The patient also has been told that he has had an elevated creatinine in the past but he is unsure the baseline number. He denies any chest pain, shortness of breath, dysuria or sick contacts.     In the ED, he is HD normal. He is febrile without leukocytosis. He received Zosyn and 1L IV crystalloid bolus.    PMHx: Anxiety  Hypertension, unspecified type    PSHx: H/O Mohs micrographic surgery for skin cancer    Home meds: atenolol 50mg bid, Olmesartan 40mgd daily, diazepam 5mg daily PRN, vitamin D  Social Hx: Lives at home with his wife, nonsmoker  Family Hx: No pertinent past family history (04 Feb 2020 23:45)            MEDICATIONS  (STANDING):  ATENolol  Tablet 50 milliGRAM(s) Oral two times a day  cyanocobalamin Injectable 1000 MICROGram(s) IntraMuscular daily  fluvoxaMINE 100 milliGRAM(s) Oral two times a day  folic acid 1 milliGRAM(s) Oral daily  influenza   Vaccine 0.5 milliLiter(s) IntraMuscular once  losartan 100 milliGRAM(s) Oral daily  phytonadione   Solution 5 milliGRAM(s) Oral once    MEDICATIONS  (PRN):  diazepam    Tablet 2.5 milliGRAM(s) Oral daily PRN anxiety      Vital Signs Last 24 Hrs  T(C): 37.1 (07 Feb 2020 06:19), Max: 37.1 (07 Feb 2020 06:19)  T(F): 98.8 (07 Feb 2020 06:19), Max: 98.8 (07 Feb 2020 06:19)  HR: 72 (07 Feb 2020 06:19) (68 - 73)  BP: 179/82 (07 Feb 2020 06:55) (155/68 - 196/85)  BP(mean): --  RR: 18 (07 Feb 2020 06:19) (18 - 18)  SpO2: 94% (07 Feb 2020 06:19) (94% - 96%)      [PHYSICAL EXAM]  General: adult in NAD,  WN,  WD.  HEENT: clear oropharynx, anicteric sclera, pink conjunctivae.  Neck: supple, no masses.  CV: normal S1S2, no murmur, no rubs, no gallops.  Lungs: clear to auscultation, no wheezes, no rales, no rhonchi.  Abdomen: soft, non-tender, non-distended, no hepatosplenomegaly, normal BS, no guarding.  Ext: no clubbing, no cyanosis, trace edema.  Skin: no rashes,  no petechiae, no venous stasis changes.  Neuro: alert and oriented X3  , no focal motor deficits.  LN: no SC BRANT.      [LABS:]                        x      x     )-----------( x        ( 06 Feb 2020 09:44 )             31.5     02-07    138  |  102  |  23  ----------------------------<  141<H>  4.1   |  22  |  1.22    Ca    8.9      07 Feb 2020 07:15    TPro  6.1  /  Alb  3.0<L>  /  TBili  0.8  /  DBili  x   /  AST  68<H>  /  ALT  70<H>  /  AlkPhos  192<H>  02-07  PT/INR - ( 07 Feb 2020 07:22 )   PT: 16.0 sec;   INR: 1.38 ratio    PTT - ( 07 Feb 2020 07:22 )  PTT:64.3 sec      Factor VIII Assay (02.05.20 @ 17:51)    Factor VIII Assay: See Note: Factor level affected by interfering substance such as lupus inhibitor; activity appears increased.  Highest activity apparent with dilution is 168 %.  The presence of direct thrombin inhibitors (argatroban, refludan) may falsely decrease activity.    Factor IX Assay (02.05.20 @ 17:51)    Factor IX Assay: See Note: Factor level affected by interfering substance such as lupus inhibitor; activity appears normal.  Highest activity apparent with dilution is 111  %.  The presence of direct thrombin inhibitors (argatroban, refludan) may falsely decrease activity.    Factor XI Assay (02.05.20 @ 17:51)    Factor XI Assay: See Note: Factor level affected by interfering substance such as lupus inhibitor; activity appears decreased.  Highest activity apparent with dilution is 46  %.  The presence of direct thrombin inhibitors (argatroban, refludan) may falsely decrease activity.      Factor II Assay (02.05.20 @ 17:51)    Factor II Assay: 71: The presence of argatroban may falsely decrease activity. %    Factor VII Assay (02.05.20 @ 17:51)    Factor VII Assay: 53: The presence of argatroban may falsely decrease activity. %    Factor X Assay (02.05.20 @ 17:51)    Factor X Assay: 69: The presence of argatroban may falsely decrease activity. %      Lactate Dehydrogenase, Serum (02.06.20 @ 06:41)    Lactate Dehydrogenase, Serum: 296 U/L          [RADIOLOGY STUDIES:] [INTERVAL HX: ]  Patient seen and examined;  Chart reviewed and events noted;   Denies abd pain, Denies SOB. Mild LE edema, no change.   States Dr Troy, Rheum, had previous dx pt with presence of antiphospholipid but without known thrombosis    Patient is a 69y Male with a known history of :  Acute cholecystitis (K81.0) [Active]  Anxiety (F41.9) [Active]  Hypertension, unspecified type (I10) [Active]  H/O Mohs micrographic surgery for skin cancer (Z85.828) [Active]      HPI:  69M with PMHx HTN, anxiety, presents to the ED with 3-4 days of fever, vomiting, and poor PO intake. His symptoms started with nausea and projectile vomiting on Friday associated with fevers as high as 102. Per the patient's wife who is at the bedside, he complained of lower abdominal pain when his symptoms started but this abdominal pain has since resolved. He endorses feeling week from being dehydrated and has felt unsteady on his feet. He denies diarrhea but states that lately his stools have been thinner than usual. His primary care doctor sent him for an outpatient RUQ ultrasound that was suspicious for acute cholecystitis and advised him to present to the ED. Of note, he had a Moh's procedure about 10 days ago after which he was prescribed Keflex for 7 days. He completed the course.   Patient notes that he has a history of thrombocytopenia and is followed closely by Dr. Spike Melendez who has recommended conservative monitoring. The patient also has been told that he has had an elevated creatinine in the past but he is unsure the baseline number. He denies any chest pain, shortness of breath, dysuria or sick contacts.     In the ED, he is HD normal. He is febrile without leukocytosis. He received Zosyn and 1L IV crystalloid bolus.    PMHx: Anxiety  Hypertension, unspecified type    PSHx: H/O Mohs micrographic surgery for skin cancer    Home meds: atenolol 50mg bid, Olmesartan 40mgd daily, diazepam 5mg daily PRN, vitamin D  Social Hx: Lives at home with his wife, nonsmoker  Family Hx: No pertinent past family history (04 Feb 2020 23:45)            MEDICATIONS  (STANDING):  ATENolol  Tablet 50 milliGRAM(s) Oral two times a day  cyanocobalamin Injectable 1000 MICROGram(s) IntraMuscular daily  fluvoxaMINE 100 milliGRAM(s) Oral two times a day  folic acid 1 milliGRAM(s) Oral daily  influenza   Vaccine 0.5 milliLiter(s) IntraMuscular once  losartan 100 milliGRAM(s) Oral daily  phytonadione   Solution 5 milliGRAM(s) Oral once    MEDICATIONS  (PRN):  diazepam    Tablet 2.5 milliGRAM(s) Oral daily PRN anxiety      Vital Signs Last 24 Hrs  T(C): 37.1 (07 Feb 2020 06:19), Max: 37.1 (07 Feb 2020 06:19)  T(F): 98.8 (07 Feb 2020 06:19), Max: 98.8 (07 Feb 2020 06:19)  HR: 72 (07 Feb 2020 06:19) (68 - 73)  BP: 179/82 (07 Feb 2020 06:55) (155/68 - 196/85)  BP(mean): --  RR: 18 (07 Feb 2020 06:19) (18 - 18)  SpO2: 94% (07 Feb 2020 06:19) (94% - 96%)      [PHYSICAL EXAM]  General: adult in NAD,  WN,  WD.  HEENT: clear oropharynx, anicteric sclera, pink conjunctivae.  Neck: supple, no masses.  CV: normal S1S2, no murmur, no rubs, no gallops.  Lungs: clear to auscultation, no wheezes, no rales, no rhonchi.  Abdomen: soft, non-tender, non-distended, no hepatosplenomegaly, normal BS, no guarding.  Ext: no clubbing, no cyanosis, trace edema.  Skin: no rashes,  no petechiae, no venous stasis changes.  Neuro: alert and oriented X3  , no focal motor deficits.  LN: no SC BRANT.      [LABS:]                        x      x     )-----------( x        ( 06 Feb 2020 09:44 )             31.5     02-07    138  |  102  |  23  ----------------------------<  141<H>  4.1   |  22  |  1.22    Ca    8.9      07 Feb 2020 07:15    TPro  6.1  /  Alb  3.0<L>  /  TBili  0.8  /  DBili  x   /  AST  68<H>  /  ALT  70<H>  /  AlkPhos  192<H>  02-07  PT/INR - ( 07 Feb 2020 07:22 )   PT: 16.0 sec;   INR: 1.38 ratio    PTT - ( 07 Feb 2020 07:22 )  PTT:64.3 sec      Factor VIII Assay (02.05.20 @ 17:51)    Factor VIII Assay: See Note: Factor level affected by interfering substance such as lupus inhibitor; activity appears increased.  Highest activity apparent with dilution is 168 %.  The presence of direct thrombin inhibitors (argatroban, refludan) may falsely decrease activity.    Factor IX Assay (02.05.20 @ 17:51)    Factor IX Assay: See Note: Factor level affected by interfering substance such as lupus inhibitor; activity appears normal.  Highest activity apparent with dilution is 111  %.  The presence of direct thrombin inhibitors (argatroban, refludan) may falsely decrease activity.    Factor XI Assay (02.05.20 @ 17:51)    Factor XI Assay: See Note: Factor level affected by interfering substance such as lupus inhibitor; activity appears decreased.  Highest activity apparent with dilution is 46  %.  The presence of direct thrombin inhibitors (argatroban, refludan) may falsely decrease activity.      Factor II Assay (02.05.20 @ 17:51)    Factor II Assay: 71: The presence of argatroban may falsely decrease activity. %    Factor VII Assay (02.05.20 @ 17:51)    Factor VII Assay: 53: The presence of argatroban may falsely decrease activity. %    Factor X Assay (02.05.20 @ 17:51)    Factor X Assay: 69: The presence of argatroban may falsely decrease activity. %      Lactate Dehydrogenase, Serum (02.06.20 @ 06:41)    Lactate Dehydrogenase, Serum: 296 U/L          [RADIOLOGY STUDIES:]

## 2020-02-07 NOTE — PROGRESS NOTE ADULT - SUBJECTIVE AND OBJECTIVE BOX
Patient is a 69y old  Male who presents with a chief complaint of fever (07 Feb 2020 08:07)      SUBJECTIVE / OVERNIGHT EVENTS:  BP high  home HCTZ 12.5 mg added.   (at home also on Perindopril 4 mg (ACEI) on top of her ARB).  will not restart given recent recovery of Cr   will add norvasc 5 mg as needed.  offered to switched Atenolol but refused. states he has been on it for 30+ years.  no cp, no sob, no n/v/d. no abdominal pain.  no headache, no dizziness.   +mild dry cough  the wife at bedside.         Vital Signs Last 24 Hrs  T(C): 37.2 (07 Feb 2020 17:10), Max: 37.2 (07 Feb 2020 17:10)  T(F): 99 (07 Feb 2020 17:10), Max: 99 (07 Feb 2020 17:10)  HR: 79 (07 Feb 2020 17:10) (72 - 79)  BP: 171/84 (07 Feb 2020 17:10) (171/84 - 197/85)  BP(mean): --  RR: 18 (07 Feb 2020 17:10) (18 - 18)  SpO2: 95% (07 Feb 2020 17:10) (92% - 96%)  I&O's Summary    06 Feb 2020 07:01  -  07 Feb 2020 07:00  --------------------------------------------------------  IN: 900 mL / OUT: 0 mL / NET: 900 mL    07 Feb 2020 07:01  -  07 Feb 2020 19:05  --------------------------------------------------------  IN: 560 mL / OUT: 250 mL / NET: 310 mL      PHYSICAL EXAM:  GENERAL: NAD, Comfortable, lying in bed on room air  FACE: healed left sided recent Tyra's surgery, no bleeding/no infection   HEAD:  Atraumatic, Normocephalic  EYES: EOMI, PERRLA, conjunctiva and sclera clear  NECK: Supple, No JVD  CHEST/LUNG: Clear to auscultation bilaterally; No wheeze  HEART: Regular rate and rhythm; No murmurs, rubs, or gallops  ABDOMEN: Soft, Nontender, Nondistended; Bowel sounds present  Neuro: AAOx3, no focal deficit, 5/5 b/l extremities  EXTREMITIES:  2+ Peripheral Pulses, No clubbing, cyanosis, or edema  SKIN: No rashes or lesions    LABS:                        11.8   8.22  )-----------( 104      ( 07 Feb 2020 09:40 )             34.4     02-07    138  |  102  |  23  ----------------------------<  141<H>  4.1   |  22  |  1.22    Ca    8.9      07 Feb 2020 07:15    TPro  6.1  /  Alb  3.0<L>  /  TBili  0.8  /  DBili  x   /  AST  68<H>  /  ALT  70<H>  /  AlkPhos  192<H>  02-07    PT/INR - ( 07 Feb 2020 07:22 )   PT: 16.0 sec;   INR: 1.38 ratio         PTT - ( 07 Feb 2020 07:22 )  PTT:64.3 sec  CAPILLARY BLOOD GLUCOSE                RADIOLOGY & ADDITIONAL TESTS:    Imaging Personally Reviewed:  [x] YES  [ ] NO    Consultant(s) Notes Reviewed:  [x] YES  [ ] NO      MEDICATIONS  (STANDING):  amLODIPine   Tablet 5 milliGRAM(s) Oral daily  ATENolol  Tablet 50 milliGRAM(s) Oral two times a day  cyanocobalamin Injectable 1000 MICROGram(s) IntraMuscular daily  enoxaparin Injectable 40 milliGRAM(s) SubCutaneous daily  fluvoxaMINE 100 milliGRAM(s) Oral two times a day  folic acid 1 milliGRAM(s) Oral daily  hydrochlorothiazide 12.5 milliGRAM(s) Oral daily  influenza   Vaccine 0.5 milliLiter(s) IntraMuscular once  losartan 100 milliGRAM(s) Oral daily  phytonadione   Solution 5 milliGRAM(s) Oral once    MEDICATIONS  (PRN):  diazepam    Tablet 2.5 milliGRAM(s) Oral daily PRN anxiety  guaiFENesin   Syrup  (Sugar-Free) 100 milliGRAM(s) Oral every 6 hours PRN Cough      Care Discussed with Consultants/Other Providers [x] YES  [ ] NO    HEALTH ISSUES - PROBLEM Dx:  Hypertension, unspecified type: Hypertension, unspecified type  Thrombocytopenia: Thrombocytopenia  Fever: Fever  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

## 2020-02-07 NOTE — PROGRESS NOTE ADULT - ASSESSMENT
69 M with PMHx HTN, anxiety, presents to the ED with 3-4 days of fever, vomiting, lower abdominal pain and poor PO intake. His symptoms started with nausea and projectile vomiting on Friday associated with fevers as high as 102F after eating BBQ the day before.    Saw his PCP Dr. Calderon at [facility], did outpatient RUQ ultrasound that was suspicious for acute cholecystitis and advised him to present to the ED.     # Abdominal pain: r/o acute cholecystitis vs. food poisoning   - US RUQ and CT abd suggested acute cholecystitis. CBD normal, though distended gallbladder  - HIDA scan is negative however. symptoms has since resolved. No current plan for surgical intervention  - restarted regular diet and tolerating    # Fever 102-103F (recent tmax 101F)  - unclear source  - GI findings as above vs. hematological  - no sick contact, no recent travel.  - UA is negative. CT abd lower lung cut, no PNA  - RVP is also negative  - recent UC strep throat neg per the wife   - monitor for fever curves and GI symptoms   - blood cultures and urine cultures all negative  - ?hematological etiology?    # Coagulopathy with thrombocytopenia  - known history of thrombocytopenia  - being followed by Spike Gaines (hematology), last plts 126 on 12/19/19  - no easy bleeding reported. recent Tyra surgery of the nose with some bleeding which has stopped spontaneously  - continue to monitor plts, continue to trend PT/INR in am, fibronogen per heme/onc team and inhibitor/ab work up.  - heme/onc eval appreciated.   (recently completed Keflex, though no reaction reported, also recently on Bactrim prior, ? drug induced.)  - holding Asa 81 mg for now (taking for general preventative reason)  - Dr. Aldridge on the case  - also ? LA+, start Lovenox 40 mg sq for DVT ppx per heme/onc.    # Acute on chronic CKD (baseline Cr is 1.3 in 2019)   - Cr 1.79 on admission, likely pre-renal  - s/p IVF, Cr slowly improving  - continue to trend  - no post obstructive symptoms  - resume home HCTZ/ARB    # Nose basal cell Ca  - s/p recent Tyra's surgery  - completed 7 days of Keflex, no prior allergy   - outpt follow up with dermatology     # Anxiety  - c/w home meds. on PRN Valium 2.5-5 mg PRN qdaily  - c/w Fluvoxamine 100 mg BID     # HTN  - c/w Atenolol 50 mg BID  - Cr is now back to baseline. BP running high. will restart home ARB Benicar  (therapeutic exchanged Losartan 100 mg qdaily)  - BP still running high  - home HCTZ 12.5 mg added.   (at home also on Perindopril 4 mg (ACEI) on top of her ARB).  - will not restart given recent recovery of Cr   - will add norvasc 5 mg as needed.  - offered to switched Atenolol to other betablocker with more alpha blockade, but refused. states he has been on it for 30+ years.    # Arthritis  - received PRN steroid injections in the past  - no recent issues 	    DVT ppx  venodynes     - Dr. Willson ([facility])  - [phone]

## 2020-02-08 LAB
ALBUMIN SERPL ELPH-MCNC: 3.5 G/DL — SIGNIFICANT CHANGE UP (ref 3.3–5)
ALP SERPL-CCNC: 209 U/L — HIGH (ref 40–120)
ALT FLD-CCNC: 118 U/L — HIGH (ref 10–45)
ANION GAP SERPL CALC-SCNC: 13 MMOL/L — SIGNIFICANT CHANGE UP (ref 5–17)
APTT BLD: 77 SEC — HIGH (ref 27.5–36.3)
AST SERPL-CCNC: 109 U/L — HIGH (ref 10–40)
BILIRUB SERPL-MCNC: 1.1 MG/DL — SIGNIFICANT CHANGE UP (ref 0.2–1.2)
BUN SERPL-MCNC: 19 MG/DL — SIGNIFICANT CHANGE UP (ref 7–23)
CALCIUM SERPL-MCNC: 9.1 MG/DL — SIGNIFICANT CHANGE UP (ref 8.4–10.5)
CHLORIDE SERPL-SCNC: 97 MMOL/L — SIGNIFICANT CHANGE UP (ref 96–108)
CO2 SERPL-SCNC: 25 MMOL/L — SIGNIFICANT CHANGE UP (ref 22–31)
CREAT SERPL-MCNC: 1.27 MG/DL — SIGNIFICANT CHANGE UP (ref 0.5–1.3)
GLUCOSE SERPL-MCNC: 140 MG/DL — HIGH (ref 70–99)
HCT VFR BLD CALC: 33.5 % — LOW (ref 39–50)
HCT VFR BLD CALC: 35.4 % — LOW (ref 39–50)
HGB BLD-MCNC: 11.1 G/DL — LOW (ref 13–17)
HGB BLD-MCNC: 11.8 G/DL — LOW (ref 13–17)
INR BLD: 1.5 RATIO — HIGH (ref 0.88–1.16)
MCHC RBC-ENTMCNC: 28.4 PG — SIGNIFICANT CHANGE UP (ref 27–34)
MCHC RBC-ENTMCNC: 28.4 PG — SIGNIFICANT CHANGE UP (ref 27–34)
MCHC RBC-ENTMCNC: 33.1 GM/DL — SIGNIFICANT CHANGE UP (ref 32–36)
MCHC RBC-ENTMCNC: 33.3 GM/DL — SIGNIFICANT CHANGE UP (ref 32–36)
MCV RBC AUTO: 85.1 FL — SIGNIFICANT CHANGE UP (ref 80–100)
MCV RBC AUTO: 85.7 FL — SIGNIFICANT CHANGE UP (ref 80–100)
NRBC # BLD: 0 /100 WBCS — SIGNIFICANT CHANGE UP (ref 0–0)
NRBC # BLD: 0 /100 WBCS — SIGNIFICANT CHANGE UP (ref 0–0)
PLATELET # BLD AUTO: 79 K/UL — LOW (ref 150–400)
PLATELET # BLD AUTO: 94 K/UL — LOW (ref 150–400)
POTASSIUM SERPL-MCNC: 3.8 MMOL/L — SIGNIFICANT CHANGE UP (ref 3.5–5.3)
POTASSIUM SERPL-SCNC: 3.8 MMOL/L — SIGNIFICANT CHANGE UP (ref 3.5–5.3)
PROT SERPL-MCNC: 6.5 G/DL — SIGNIFICANT CHANGE UP (ref 6–8.3)
PROTHROM AB SERPL-ACNC: 17.3 SEC — HIGH (ref 10–12.9)
RBC # BLD: 3.91 M/UL — LOW (ref 4.2–5.8)
RBC # BLD: 4.16 M/UL — LOW (ref 4.2–5.8)
RBC # FLD: 13.4 % — SIGNIFICANT CHANGE UP (ref 10.3–14.5)
RBC # FLD: 13.7 % — SIGNIFICANT CHANGE UP (ref 10.3–14.5)
SODIUM SERPL-SCNC: 135 MMOL/L — SIGNIFICANT CHANGE UP (ref 135–145)
WBC # BLD: 6.5 K/UL — SIGNIFICANT CHANGE UP (ref 3.8–10.5)
WBC # BLD: 7.7 K/UL — SIGNIFICANT CHANGE UP (ref 3.8–10.5)
WBC # FLD AUTO: 6.5 K/UL — SIGNIFICANT CHANGE UP (ref 3.8–10.5)
WBC # FLD AUTO: 7.7 K/UL — SIGNIFICANT CHANGE UP (ref 3.8–10.5)

## 2020-02-08 PROCEDURE — 70450 CT HEAD/BRAIN W/O DYE: CPT | Mod: 26

## 2020-02-08 PROCEDURE — 71260 CT THORAX DX C+: CPT | Mod: 26

## 2020-02-08 PROCEDURE — 74177 CT ABD & PELVIS W/CONTRAST: CPT | Mod: 26

## 2020-02-08 RX ORDER — ACETAMINOPHEN 500 MG
1000 TABLET ORAL ONCE
Refills: 0 | Status: COMPLETED | OUTPATIENT
Start: 2020-02-08 | End: 2020-02-08

## 2020-02-08 RX ORDER — ACETAMINOPHEN 500 MG
650 TABLET ORAL EVERY 6 HOURS
Refills: 0 | Status: DISCONTINUED | OUTPATIENT
Start: 2020-02-08 | End: 2020-02-11

## 2020-02-08 RX ORDER — SODIUM CHLORIDE 9 MG/ML
1000 INJECTION INTRAMUSCULAR; INTRAVENOUS; SUBCUTANEOUS
Refills: 0 | Status: DISCONTINUED | OUTPATIENT
Start: 2020-02-08 | End: 2020-02-08

## 2020-02-08 RX ADMIN — Medication 2.5 MILLIGRAM(S): at 18:25

## 2020-02-08 RX ADMIN — Medication 400 MILLIGRAM(S): at 06:04

## 2020-02-08 RX ADMIN — LOSARTAN POTASSIUM 100 MILLIGRAM(S): 100 TABLET, FILM COATED ORAL at 06:05

## 2020-02-08 RX ADMIN — FLUVOXAMINE MALEATE 100 MILLIGRAM(S): 25 TABLET ORAL at 18:25

## 2020-02-08 RX ADMIN — Medication 650 MILLIGRAM(S): at 18:25

## 2020-02-08 RX ADMIN — SODIUM CHLORIDE 30 MILLILITER(S): 9 INJECTION INTRAMUSCULAR; INTRAVENOUS; SUBCUTANEOUS at 06:05

## 2020-02-08 RX ADMIN — ATENOLOL 50 MILLIGRAM(S): 25 TABLET ORAL at 18:25

## 2020-02-08 RX ADMIN — ATENOLOL 50 MILLIGRAM(S): 25 TABLET ORAL at 06:05

## 2020-02-08 RX ADMIN — PREGABALIN 1000 MICROGRAM(S): 225 CAPSULE ORAL at 14:56

## 2020-02-08 RX ADMIN — Medication 1 MILLIGRAM(S): at 14:55

## 2020-02-08 RX ADMIN — AMLODIPINE BESYLATE 5 MILLIGRAM(S): 2.5 TABLET ORAL at 22:16

## 2020-02-08 RX ADMIN — FLUVOXAMINE MALEATE 100 MILLIGRAM(S): 25 TABLET ORAL at 06:05

## 2020-02-08 RX ADMIN — Medication 100 MILLIGRAM(S): at 22:16

## 2020-02-08 RX ADMIN — ENOXAPARIN SODIUM 40 MILLIGRAM(S): 100 INJECTION SUBCUTANEOUS at 14:56

## 2020-02-08 RX ADMIN — Medication 12.5 MILLIGRAM(S): at 06:05

## 2020-02-08 NOTE — PROGRESS NOTE ADULT - SUBJECTIVE AND OBJECTIVE BOX
Patient is a 69y old  Male who presents with a chief complaint of chronic thromobocytopenia  APLA/Lupus AC (2020 10:26)      SUBJECTIVE / OVERNIGHT EVENTS:  febrile Tmax 103F  no cp, no sob, no n/v/d. no abdominal pain.  no headache, no dizziness.   mild cough  source unclear  will recheck RVP  check CT chest/abd/plevic with IV contrast to r/o lymphoma (Cr now baseline)        Vital Signs Last 24 Hrs  T(C): 38.1 (2020 17:34), Max: 39.4 (2020 05:39)  T(F): 100.5 (2020 17:34), Max: 103 (2020 05:39)  HR: 94 (2020 17:34) (80 - 107)  BP: 189/80 (2020 17:34) (130/62 - 195/87)  BP(mean): --  RR: 17 (2020 17:34) (17 - 22)  SpO2: 94% (2020 17:34) (89% - 96%)  I&O's Summary    2020 07:01  -  2020 07:00  --------------------------------------------------------  IN: 1260 mL / OUT: 550 mL / NET: 710 mL    2020 07:01  -  2020 19:29  --------------------------------------------------------  IN: 895 mL / OUT: 450 mL / NET: 445 mL        PHYSICAL EXAM:  GENERAL: NAD, Comfortable, lying in bed on room air, able to walk with walker with assistance  FACE: healed left sided recent Tyra's surgery, no bleeding/no infection   HEAD:  Atraumatic, Normocephalic  EYES: EOMI, PERRLA, conjunctiva and sclera clear  NECK: Supple, No JVD  CHEST/LUNG: Clear to auscultation bilaterally; No wheeze  HEART: Regular rate and rhythm; No murmurs, rubs, or gallops  ABDOMEN: Soft, Nontender, Nondistended; Bowel sounds present  Neuro: AAOx3, no focal deficit, 5/5 b/l extremities  EXTREMITIES:  2+ Peripheral Pulses, No clubbing, cyanosis, or edema  SKIN: No rashes or lesions          LABS:                        11.1   6.50  )-----------( 79       ( 2020 07:31 )             33.5     02-    135  |  97  |  19  ----------------------------<  140<H>  3.8   |  25  |  1.27    Ca    9.1      2020 07:31    TPro  6.5  /  Alb  3.5  /  TBili  1.1  /  DBili  x   /  AST  109<H>  /  ALT  118<H>  /  AlkPhos  209<H>  02-08    PT/INR - ( 2020 07:31 )   PT: 17.3 sec;   INR: 1.50 ratio         PTT - ( 2020 07:31 )  PTT:77.0 sec  CAPILLARY BLOOD GLUCOSE            Urinalysis Basic - ( 2020 22:44 )    Color: Light Yellow / Appearance: Clear / S.010 / pH: x  Gluc: x / Ketone: Negative  / Bili: Negative / Urobili: Negative   Blood: x / Protein: 30 mg/dL / Nitrite: Negative   Leuk Esterase: Negative / RBC: 3 /hpf / WBC 0 /HPF   Sq Epi: x / Non Sq Epi: 0 /hpf / Bacteria: Negative        RADIOLOGY & ADDITIONAL TESTS:    Imaging Personally Reviewed:  [x] YES  [ ] NO    Consultant(s) Notes Reviewed:  [x] YES  [ ] NO      MEDICATIONS  (STANDING):  amLODIPine   Tablet 5 milliGRAM(s) Oral daily  ATENolol  Tablet 50 milliGRAM(s) Oral two times a day  cyanocobalamin Injectable 1000 MICROGram(s) IntraMuscular daily  enoxaparin Injectable 40 milliGRAM(s) SubCutaneous daily  fluvoxaMINE 100 milliGRAM(s) Oral two times a day  folic acid 1 milliGRAM(s) Oral daily  hydrochlorothiazide 12.5 milliGRAM(s) Oral daily  influenza   Vaccine 0.5 milliLiter(s) IntraMuscular once  losartan 100 milliGRAM(s) Oral daily  sodium chloride 0.9%. 1000 milliLiter(s) (30 mL/Hr) IV Continuous <Continuous>    MEDICATIONS  (PRN):  acetaminophen   Tablet .. 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

## 2020-02-08 NOTE — PROGRESS NOTE ADULT - SUBJECTIVE AND OBJECTIVE BOX
Patient seen and examined;  Chart reviewed and events noted;   Antibiotics were stopped and overnight patient with temp 103; per wife has been having runny nose and cough ?viral syndrome      MEDICATIONS  (STANDING):  amLODIPine   Tablet 5 milliGRAM(s) Oral daily  ATENolol  Tablet 50 milliGRAM(s) Oral two times a day  cyanocobalamin Injectable 1000 MICROGram(s) IntraMuscular daily  enoxaparin Injectable 40 milliGRAM(s) SubCutaneous daily  fluvoxaMINE 100 milliGRAM(s) Oral two times a day  folic acid 1 milliGRAM(s) Oral daily  hydrochlorothiazide 12.5 milliGRAM(s) Oral daily  influenza   Vaccine 0.5 milliLiter(s) IntraMuscular once  losartan 100 milliGRAM(s) Oral daily  sodium chloride 0.9%. 1000 milliLiter(s) (30 mL/Hr) IV Continuous <Continuous>    MEDICATIONS  (PRN):  diazepam    Tablet 2.5 milliGRAM(s) Oral daily PRN anxiety  guaiFENesin   Syrup  (Sugar-Free) 100 milliGRAM(s) Oral every 6 hours PRN Cough      Vital Signs Last 24 Hrs  T(C): 37.8 (08 Feb 2020 06:50), Max: 39.4 (08 Feb 2020 05:39)  T(F): 100 (08 Feb 2020 06:50), Max: 103 (08 Feb 2020 05:39)  HR: 96 (08 Feb 2020 06:50) (79 - 107)  BP: 154/73 (08 Feb 2020 06:50) (152/75 - 197/85)  RR: 18 (08 Feb 2020 06:50) (18 - 22)  SpO2: 93% (08 Feb 2020 06:50) (89% - 96%)    PHYSICAL EXAM  General: adult male; resting comfortably  HEENT: clear oropharynx, anicteric sclera, pink conjunctivae  Neck: supple  CV: normal S1S2 with no murmur rubs or gallops  Lungs: reduced breath sounds at bases (suspect due to poor effort)  Abdomen: soft non-tender non-distended, no hepato/splenomegaly  Ext: no clubbing cyanosis or edema  Skin: no rashes and no petichiae  Neuro: alert and oriented X3 no focal deficits      LABS:                        11.1   6.50  )-----------( 79       ( 08 Feb 2020 07:31 )             33.5     02-08    135  |  97  |  19  ----------------------------<  140<H>  3.8   |  25  |  1.27    Ca    9.1      08 Feb 2020 07:31    TPro  6.5  /  Alb  3.5  /  TBili  1.1  /  DBili  x   /  AST  109<H>  /  ALT  118<H>  /  AlkPhos  209<H>  02-08    PT/INR - ( 08 Feb 2020 07:31 )   PT: 17.3 sec;   INR: 1.50 ratio     PTT - ( 08 Feb 2020 07:31 )  PTT:77.0 sec    Lactate Dehydrogenase, Serum: 296 U/L (02.06.20 @ 06:41)  + direct rei; (C3+, IgG-)  Haptoglobin, Serum: 102 mg/dL (02.06.20 @ 14:49)    Factor II, VII, X slightly depressed but not significant  Factor VII, IX, XI affected by presence of Lupus AC and Antiphospholipid antibodies    LE dopplers negative for thromboses      RADIOLOGY STUDIES:  CXR 2/7/20 - negative for pulmonary disease

## 2020-02-08 NOTE — PROGRESS NOTE ADULT - ASSESSMENT
69 M with PMHx HTN, anxiety, presents to the ED with 3-4 days of fever, vomiting, lower abdominal pain and poor PO intake. His symptoms started with nausea and projectile vomiting on Friday associated with fevers as high as 102F after eating BBQ the day before.    Saw his PCP Dr. Calderon at [facility], did outpatient RUQ ultrasound that was suspicious for acute cholecystitis and advised him to present to the ED.     # Abdominal pain: r/o acute cholecystitis vs. food poisoning   - US RUQ and CT abd suggested acute cholecystitis. CBD normal, though distended gallbladder  - HIDA scan is negative however. symptoms has since resolved. No current plan for surgical intervention  - restarted regular diet and tolerating    # Fever 102-103F, febrile again today  - unclear source  - GI findings as above vs. unlikely hematological per heme  - no sick contact, no recent travel.  - UA is negative. CT abd lower lung cut, no PNA  - RVP is also negative on admission  - recent UC strep throat neg per the wife   - monitor for fever curves and GI symptoms   - blood cultures and urine cultures all negative  - oncological etiology?  - check CT chest/abd/plevic with IV contrast to r/o lymphoma (Cr now baseline)  - will recheck RVP  - check CT head to r/o central cause    # Coagulopathy with thrombocytopenia  - known history of thrombocytopenia  - also with elevated coags with known factor inhibitor; documented + Lupus AC and reported history of antiphosopholipid antibody   (hence Lovenox SQ qdaily for DVT ppx here)   - being followed by Spike Gaines (hematology), last plts 126 on 12/19/19  - no easy bleeding reported. recent Tyra surgery of the nose with some bleeding which has stopped spontaneously  - continue to monitor plts, continue to trend PT/INR in am, fibronogen per heme/onc team and inhibitor/ab work up.  - heme/onc eval appreciated.   (recently completed Keflex, though no reaction reported, also recently on Bactrim prior, ? drug induced.)  - holding Asa 81 mg for now (taking for general preventative reason)  - Dr. Aldridge on the case  - also ? LA+, start Lovenox 40 mg sq for DVT ppx per heme/onc.    # Acute on chronic CKD (baseline Cr is 1.3 in 2019)   - Cr 1.79 on admission, likely pre-renal  - s/p IVF, Cr back to normal  - continue to trend  - no post obstructive symptoms  - resume home HCTZ/ARB for better BP control    # Nose basal cell Ca  - s/p recent Tyra's surgery  - completed 7 days of Keflex, no prior allergy   - outpt follow up with dermatology     # Anxiety  - c/w home meds. on PRN Valium 2.5-5 mg PRN qdaily  - c/w Fluvoxamine 100 mg BID     # HTN  - c/w Atenolol 50 mg BID  - Cr is now back to baseline. BP running high. Restarted home ARB Benicar  (therapeutic exchanged Losartan 100 mg qdaily)  - BP still running high. home HCTZ 12.5 mg added.   (at home also on Perindopril 4 mg (ACEI) on top of her ARB, but will not continue given recent DONNA).  - added norvasc 5 mg as needed.  - offered to switched Atenolol to other betablocker with more alpha blockade, but refused. states he has been on it for 30+ years.    # Arthritis  - received PRN steroid injections in the past  - no recent issues 	    DVT ppx  venodynes

## 2020-02-08 NOTE — PROGRESS NOTE ADULT - ASSESSMENT
[ASSESSMENT and  PLAN]  68yo M with hx of ITP, baseline plts 100-120. On observation. Last Plt 120 in our office 12/19/20 outpt; admitted on 2/5/20 with fever and initially thought to have cholecystitis; was on antibiotics and noted to have negative HIDA scan with resolving symptoms until 2/7/20 overnight when after discontinuation of antibiotics he developed fever up to 103.     - incidentally also with elevated coags with known factor inhibitor; documented + Lupus AC and reported history of antiphosopholipid antibody with his previous rheumatologist (Dr. Troy who is now retired)  - Areli C3+ but no other evidence of hemolysis with equivocal LDH and normal haptoglobin  - would continue folic acid for now    - would continue B12 supplementation for now  - renal function improved and stabilized  - when clinically stable as outpatient would consider CT C/A/P to evaluate for systemic lymphadenopathy to rule out underlying low grade lymphoma (which could explain mild thrombocytopenia as well)  - no evidence of LE DVTs; okay to continue prophylaxis with Lovenox while inpatient  - infectious work-up (urine culture/repeat blood cultures pending)  - Follow with Frances Aldridge ([phone])  in office 2-3wk post DC

## 2020-02-09 DIAGNOSIS — J10.1 INFLUENZA DUE TO OTHER IDENTIFIED INFLUENZA VIRUS WITH OTHER RESPIRATORY MANIFESTATIONS: ICD-10-CM

## 2020-02-09 LAB
ALBUMIN SERPL ELPH-MCNC: 3.3 G/DL — SIGNIFICANT CHANGE UP (ref 3.3–5)
ALP SERPL-CCNC: 186 U/L — HIGH (ref 40–120)
ALT FLD-CCNC: 106 U/L — HIGH (ref 10–45)
ANION GAP SERPL CALC-SCNC: 12 MMOL/L — SIGNIFICANT CHANGE UP (ref 5–17)
APTT BLD: 71.7 SEC — HIGH (ref 27.5–36.3)
AST SERPL-CCNC: 78 U/L — HIGH (ref 10–40)
BILIRUB SERPL-MCNC: 0.8 MG/DL — SIGNIFICANT CHANGE UP (ref 0.2–1.2)
BUN SERPL-MCNC: 23 MG/DL — SIGNIFICANT CHANGE UP (ref 7–23)
CALCIUM SERPL-MCNC: 9.1 MG/DL — SIGNIFICANT CHANGE UP (ref 8.4–10.5)
CHLORIDE SERPL-SCNC: 97 MMOL/L — SIGNIFICANT CHANGE UP (ref 96–108)
CO2 SERPL-SCNC: 27 MMOL/L — SIGNIFICANT CHANGE UP (ref 22–31)
CREAT SERPL-MCNC: 1.39 MG/DL — HIGH (ref 0.5–1.3)
CULTURE RESULTS: NO GROWTH — SIGNIFICANT CHANGE UP
CULTURE RESULTS: SIGNIFICANT CHANGE UP
CULTURE RESULTS: SIGNIFICANT CHANGE UP
FLUAV H3 RNA SPEC QL NAA+PROBE: DETECTED
GLUCOSE SERPL-MCNC: 110 MG/DL — HIGH (ref 70–99)
HCT VFR BLD CALC: 33.7 % — LOW (ref 39–50)
HGB BLD-MCNC: 11 G/DL — LOW (ref 13–17)
INR BLD: 1.42 RATIO — HIGH (ref 0.88–1.16)
MCHC RBC-ENTMCNC: 28.1 PG — SIGNIFICANT CHANGE UP (ref 27–34)
MCHC RBC-ENTMCNC: 32.6 GM/DL — SIGNIFICANT CHANGE UP (ref 32–36)
MCV RBC AUTO: 86.2 FL — SIGNIFICANT CHANGE UP (ref 80–100)
NRBC # BLD: 0 /100 WBCS — SIGNIFICANT CHANGE UP (ref 0–0)
PLATELET # BLD AUTO: 78 K/UL — LOW (ref 150–400)
POTASSIUM SERPL-MCNC: 4 MMOL/L — SIGNIFICANT CHANGE UP (ref 3.5–5.3)
POTASSIUM SERPL-SCNC: 4 MMOL/L — SIGNIFICANT CHANGE UP (ref 3.5–5.3)
PROT SERPL-MCNC: 6.5 G/DL — SIGNIFICANT CHANGE UP (ref 6–8.3)
PROTHROM AB SERPL-ACNC: 16.5 SEC — HIGH (ref 10–12.9)
RAPID RVP RESULT: DETECTED
RBC # BLD: 3.91 M/UL — LOW (ref 4.2–5.8)
RBC # FLD: 13.8 % — SIGNIFICANT CHANGE UP (ref 10.3–14.5)
SODIUM SERPL-SCNC: 136 MMOL/L — SIGNIFICANT CHANGE UP (ref 135–145)
SPECIMEN SOURCE: SIGNIFICANT CHANGE UP
WBC # BLD: 4.02 K/UL — SIGNIFICANT CHANGE UP (ref 3.8–10.5)
WBC # FLD AUTO: 4.02 K/UL — SIGNIFICANT CHANGE UP (ref 3.8–10.5)

## 2020-02-09 RX ORDER — DIAZEPAM 5 MG
2.5 TABLET ORAL
Refills: 0 | Status: DISCONTINUED | OUTPATIENT
Start: 2020-02-09 | End: 2020-02-11

## 2020-02-09 RX ORDER — SODIUM CHLORIDE 9 MG/ML
1000 INJECTION INTRAMUSCULAR; INTRAVENOUS; SUBCUTANEOUS
Refills: 0 | Status: DISCONTINUED | OUTPATIENT
Start: 2020-02-09 | End: 2020-02-11

## 2020-02-09 RX ADMIN — Medication 1 MILLIGRAM(S): at 12:25

## 2020-02-09 RX ADMIN — PREGABALIN 1000 MICROGRAM(S): 225 CAPSULE ORAL at 12:26

## 2020-02-09 RX ADMIN — LOSARTAN POTASSIUM 100 MILLIGRAM(S): 100 TABLET, FILM COATED ORAL at 06:13

## 2020-02-09 RX ADMIN — ATENOLOL 50 MILLIGRAM(S): 25 TABLET ORAL at 06:14

## 2020-02-09 RX ADMIN — Medication 75 MILLIGRAM(S): at 06:13

## 2020-02-09 RX ADMIN — Medication 75 MILLIGRAM(S): at 17:25

## 2020-02-09 RX ADMIN — ATENOLOL 50 MILLIGRAM(S): 25 TABLET ORAL at 17:25

## 2020-02-09 RX ADMIN — Medication 2.5 MILLIGRAM(S): at 21:05

## 2020-02-09 RX ADMIN — AMLODIPINE BESYLATE 5 MILLIGRAM(S): 2.5 TABLET ORAL at 21:04

## 2020-02-09 RX ADMIN — Medication 12.5 MILLIGRAM(S): at 06:14

## 2020-02-09 RX ADMIN — FLUVOXAMINE MALEATE 100 MILLIGRAM(S): 25 TABLET ORAL at 17:25

## 2020-02-09 RX ADMIN — Medication 100 MILLIGRAM(S): at 06:11

## 2020-02-09 RX ADMIN — SODIUM CHLORIDE 75 MILLILITER(S): 9 INJECTION INTRAMUSCULAR; INTRAVENOUS; SUBCUTANEOUS at 09:19

## 2020-02-09 RX ADMIN — Medication 650 MILLIGRAM(S): at 06:10

## 2020-02-09 RX ADMIN — FLUVOXAMINE MALEATE 100 MILLIGRAM(S): 25 TABLET ORAL at 06:12

## 2020-02-09 RX ADMIN — ENOXAPARIN SODIUM 40 MILLIGRAM(S): 100 INJECTION SUBCUTANEOUS at 12:25

## 2020-02-09 RX ADMIN — SODIUM CHLORIDE 75 MILLILITER(S): 9 INJECTION INTRAMUSCULAR; INTRAVENOUS; SUBCUTANEOUS at 21:05

## 2020-02-09 NOTE — PROGRESS NOTE ADULT - ASSESSMENT
69m with ITP, HTN, ?CKD admitted with fever ruq pain and vomiting initially  Abnormal LFT and imaging but HIDA negative  Subsequently defervesced, then fever again  Now RVP+ for influenza A, initial test here negative (and per patient OPD swab too)  Nosocomially acquired influenza seems likley diagnosis for his recurrent fever.  Patient appears depressed his perspective is quite negative

## 2020-02-09 NOTE — PROGRESS NOTE ADULT - PROBLEM SELECTOR PLAN 1
5-day course of tamiflu  Defer other antimicrobials  droplet precautions per protocol  trend fever curve  f/u cx data, otherwise unrevealing

## 2020-02-09 NOTE — PROGRESS NOTE ADULT - SUBJECTIVE AND OBJECTIVE BOX
Patient seen and examined;  Chart reviewed and events noted; tested + for influenza A and + RSV  fever continues but feeling better  CT c/a/p done    MEDICATIONS  (STANDING):  amLODIPine   Tablet 5 milliGRAM(s) Oral daily  ATENolol  Tablet 50 milliGRAM(s) Oral two times a day  cyanocobalamin Injectable 1000 MICROGram(s) IntraMuscular daily  enoxaparin Injectable 40 milliGRAM(s) SubCutaneous daily  fluvoxaMINE 100 milliGRAM(s) Oral two times a day  folic acid 1 milliGRAM(s) Oral daily  losartan 100 milliGRAM(s) Oral daily  oseltamivir 75 milliGRAM(s) Oral two times a day  sodium chloride 0.9%. 1000 milliLiter(s) (75 mL/Hr) IV Continuous <Continuous>    MEDICATIONS  (PRN):  acetaminophen   Tablet .. 650 milliGRAM(s) Oral every 6 hours PRN Temp greater or equal to 38C (100.4F)  diazepam    Tablet 2.5 milliGRAM(s) Oral two times a day PRN anxiety  guaiFENesin   Syrup  (Sugar-Free) 100 milliGRAM(s) Oral every 6 hours PRN Cough      Vital Signs Last 24 Hrs  T(C): 36.8 (09 Feb 2020 08:18), Max: 38.8 (09 Feb 2020 05:55)  T(F): 98.3 (09 Feb 2020 08:18), Max: 101.8 (09 Feb 2020 05:55)  HR: 94 (09 Feb 2020 08:18) (70 - 94)  BP: 114/63 (09 Feb 2020 08:18) (114/63 - 189/80)  RR: 18 (09 Feb 2020 08:18) (17 - 18)  SpO2: 98% (09 Feb 2020 08:18) (91% - 98%)    PHYSICAL EXAM  General: adult in NAD  HEENT: clear oropharynx, anicteric sclera, pink conjunctivae  Neck: supple  CV: normal S1S2 with no murmur rubs or gallops  Lungs: clear to auscultation, no wheezes, no rhales  Abdomen: soft non-tender non-distended, no hepato/splenomegaly  Ext: no clubbing cyanosis or edema  Skin: no rashes and no petichiae  Neuro: alert and oriented X3 no focal deficits      LABS:                        11.1   6.50  )-----------( 79       ( 08 Feb 2020 07:31 )             33.5     02-09    136  |  97  |  23  ----------------------------<  110<H>  4.0   |  27  |  1.39<H>    Ca    9.1      09 Feb 2020 07:20    TPro  6.5  /  Alb  3.3  /  TBili  0.8  /  DBili  x   /  AST  78<H>  /  ALT  106<H>  /  AlkPhos  186<H>  02-09    PT/INR - ( 08 Feb 2020 07:31 )   PT: 17.3 sec;   INR: 1.50 ratio    PTT - ( 08 Feb 2020 07:31 )  PTT:77.0 sec

## 2020-02-09 NOTE — CONSULT NOTE ADULT - ASSESSMENT
69M with PMHx HTN, anxiety, presents to the ED with 3-4 days of fever, vomiting, and poor PO intake. His symptoms started with nausea and projectile vomiting on Friday associated with fevers as high as 102. Admitted with flu. His renal function has been fluctuating.    A/P:  Acute on CKD:  Baseline Scr ~1.3  Scr peaked to 1.79  DONNA is pre-renal  Renal function improved after hydration and discontinuing ACEI/ARB/HCTZ  BP was uncontrolled so he was started back on ARB  Scr slightly increased today  Monitor renal function at present  Continue IVF as planned for a day  UA has minimal protein which could be function with some hematuria  Hematuria is persistent, will need renal/bladder US  Planned for possible CT chest/abd/pelvis with contrast  If renal function renal stable or gets better tomorrow can be planned with NS 75cc/hr 6 hrs before and after the contrast    HTN:  Controlled  Continue current meds    Proteinuria/hematuria:  Plan as above 69M with PMHx HTN, anxiety, presents to the ED with 3-4 days of fever, vomiting, and poor PO intake. His symptoms started with nausea and projectile vomiting on Friday associated with fevers as high as 102. Admitted with flu. His renal function has been fluctuating.    A/P:  Acute on CKD:  Baseline Scr ~1.3  Scr peaked to 1.79  DONNA is pre-renal  Renal function improved after hydration and discontinuing ACEI/ARB/HCTZ  BP was uncontrolled so he was started back on ARB  Scr slightly increased today  Monitor renal function at present  Continue IVF as planned for a day  UA has minimal protein which could be function with some hematuria  Hematuria is persistent, will need renal/bladder US  check CK level in AM, as dipstick positive but minimal microscopic RBC  Planned for possible CT chest/abd/pelvis with contrast  If renal function renal stable or gets better tomorrow can be planned with NS 75cc/hr 6 hrs before and after the contrast    HTN:  Controlled  Continue current meds    Proteinuria/hematuria:  Plan as above

## 2020-02-09 NOTE — PROGRESS NOTE ADULT - ASSESSMENT
[ASSESSMENT and  PLAN]  70yo M with hx of ITP, baseline plts 100-120. On observation. Last Plt 120 in our office 12/19/20 outpt; admitted on 2/5/20 with fever and initially thought to have cholecystitis; was on antibiotics and noted to have negative HIDA scan with resolving symptoms until 2/7/20 overnight when after discontinuation of antibiotics he developed fever up to 103 and 2/7/20 and found to test + for Influenza A (started on Tamiflu) and + for RSV    - would expect drop in platelet count with viral infections  - incidentally also with elevated coags with known factor inhibitor; documented + Lupus AC and reported history of antiphosopholipid antibody with his previous rheumatologist (Dr. Troy who is now retired)  - Areli C3+ but no other evidence of hemolysis with equivocal LDH and normal haptoglobin  - would continue folic acid for now    - would continue B12 supplementation for now  - renal function improved and stabilized  -  CT C/A/P done to evaluate for systemic lymphadenopathy to rule out underlying low grade lymphoma - results pending  - no evidence of LE DVTs; okay to continue prophylaxis with Lovenox while inpatient  - Follow with Frances Aldridge ([phone])  in office 2-3wk post DC

## 2020-02-09 NOTE — PROGRESS NOTE ADULT - ASSESSMENT
69 M with PMHx HTN, anxiety, presents to the ED with 3-4 days of fever, vomiting, lower abdominal pain and poor PO intake. His symptoms started with nausea and projectile vomiting on Friday associated with fevers as high as 102F after eating BBQ the day before.    Saw his PCP Dr. Calderon at [facility], did outpatient RUQ ultrasound that was suspicious for acute cholecystitis and advised him to present to the ED.     # Abdominal pain: r/o acute cholecystitis vs. food poisoning   - US RUQ and CT abd suggested acute cholecystitis. CBD normal, though distended gallbladder  - HIDA scan is negative however. symptoms has since resolved. No current plan for surgical intervention  - restarted regular diet and tolerating  - no abd pain.    # Fever 102-103F - unclear source initially but now noted flu+  - unlikely due to GI findings as above, unlikely hematological per heme  - no sick contact, no recent travel.  - UA is negative. CT abd lower lung cut, no PNA  - RVP is negative on admission, but now positive  - recent UC strep throat neg per the wife   - blood cultures and urine cultures all negative  - CT chest/abd/plevic with IV contrast to r/o lymphoma (when Cr was baseline)  - CT head negative for central cause  - RVP +for flu, likely the etiology of fevers    # Coagulopathy with thrombocytopenia  - known history of thrombocytopenia  - also with elevated coags with known factor inhibitor; documented + Lupus AC and reported history of antiphosopholipid antibody   (hence Lovenox SQ qdaily for DVT ppx here)   - being followed by Spike Gaines (hematology), last plts 126 on 12/19/19  - no easy bleeding reported. recent Tyra surgery of the nose with some bleeding which has stopped spontaneously  - continue to monitor plts, continue to trend PT/INR in am, fibronogen per heme/onc team and inhibitor/ab work up.  - heme/onc eval appreciated.   (recently completed Keflex, though no reaction reported, also recently on Bactrim prior, ? drug induced.)  - holding Asa 81 mg for now (taking for general preventative reason)  - Dr. Aldridge on the case    # Acute on chronic CKD (baseline Cr is 1.3 in 2019)   - Cr 1.79 on admission, likely pre-renal  - s/p IVF, Cr back to normal  - pt on both ACEI/ARB and HCTZ at home.  - continue to trend  - no post obstructive symptoms  - will continue Losartan, but will hold ACEI and HCTZ given Cr ~1.4  (renal eval for optimization)  -c/w Norvasc for BP control     # HTN  - c/w Atenolol 50 mg BID  - c/w therapeutic exchanged Losartan 100 mg qdaily  - hold HCTZ and ACEI (at home also on Perindopril 4 mg (ACEI) on top of her ARB, but will not continue given recent DONNA)  - c/w norvasc 5 mg as needed.  - offered to switched Atenolol to other betablocker with more alpha blockade, but refused. states he has been on it for 30+ years.    # Nose basal cell Ca  - s/p recent Tyra's surgery  - completed 7 days of Keflex, no prior allergy   - outpt follow up with dermatology     # Anxiety  - c/w home meds. on PRN Valium 2.5-5 mg PRN qdaily  - c/w Fluvoxamine 100 mg BID     # Arthritis  - received PRN steroid injections in the past  - no recent issues 	    DVT ppx  venodynes

## 2020-02-09 NOTE — PROGRESS NOTE ADULT - SUBJECTIVE AND OBJECTIVE BOX
Patient is a 69y old  Male who presents with a chief complaint of fever (2020 08:46)      SUBJECTIVE / OVERNIGHT EVENTS:  flu+  tmax 101F  feels tired  no cp, no sob, no n/v/d. no abdominal pain.  no headache, no dizziness.   occasional cough  runny nose      Vital Signs Last 24 Hrs  T(C): 36.4 (2020 13:25), Max: 38.8 (2020 05:55)  T(F): 97.5 (2020 13:25), Max: 101.8 (2020 05:55)  HR: 66 (2020 13:25) (66 - 94)  BP: 137/71 (2020 13:25) (114/63 - 189/80)  BP(mean): --  RR: 18 (2020 13:25) (17 - 18)  SpO2: 98% (2020 13:25) (91% - 98%)  I&O's Summary    2020 07:01  -  2020 07:00  --------------------------------------------------------  IN: 1455 mL / OUT: 2270 mL / NET: -815 mL    2020 07:01  -  2020 13:28  --------------------------------------------------------  IN: 300 mL / OUT: 350 mL / NET: -50 mL      PHYSICAL EXAM:  GENERAL: NAD, Comfortable, lying in bed on room air  FACE: healed left sided recent Tyra's surgery, no bleeding/no infection   HEAD:  Atraumatic, Normocephalic  EYES: EOMI, PERRLA, conjunctiva and sclera clear  NECK: Supple, No JVD  CHEST/LUNG: Clear to auscultation bilaterally; No wheeze  HEART: Regular rate and rhythm; No murmurs, rubs, or gallops  ABDOMEN: Soft, Nontender, Nondistended; Bowel sounds present  Neuro: AAOx3, no focal deficit, 5/5 b/l extremities  EXTREMITIES:  2+ Peripheral Pulses, No clubbing, cyanosis, or edema  SKIN: No rashes or lesions      LABS:                        11.0   4.02  )-----------( 78       ( 2020 07:20 )             33.7         136  |  97  |  23  ----------------------------<  110<H>  4.0   |  27  |  1.39<H>    Ca    9.1      2020 07:20    TPro  6.5  /  Alb  3.3  /  TBili  0.8  /  DBili  x   /  AST  78<H>  /  ALT  106<H>  /  AlkPhos  186<H>  02    PT/INR - ( 2020 07:20 )   PT: 16.5 sec;   INR: 1.42 ratio         PTT - ( 2020 07:20 )  PTT:71.7 sec  CAPILLARY BLOOD GLUCOSE            Urinalysis Basic - ( 2020 22:44 )    Color: Light Yellow / Appearance: Clear / S.010 / pH: x  Gluc: x / Ketone: Negative  / Bili: Negative / Urobili: Negative   Blood: x / Protein: 30 mg/dL / Nitrite: Negative   Leuk Esterase: Negative / RBC: 3 /hpf / WBC 0 /HPF   Sq Epi: x / Non Sq Epi: 0 /hpf / Bacteria: Negative        RADIOLOGY & ADDITIONAL TESTS:    Imaging Personally Reviewed:  [x] YES  [ ] NO    Consultant(s) Notes Reviewed:  [x] YES  [ ] NO      MEDICATIONS  (STANDING):  amLODIPine   Tablet 5 milliGRAM(s) Oral daily  ATENolol  Tablet 50 milliGRAM(s) Oral two times a day  cyanocobalamin Injectable 1000 MICROGram(s) IntraMuscular daily  enoxaparin Injectable 40 milliGRAM(s) SubCutaneous daily  fluvoxaMINE 100 milliGRAM(s) Oral two times a day  folic acid 1 milliGRAM(s) Oral daily  influenza   Vaccine 0.5 milliLiter(s) IntraMuscular once  losartan 100 milliGRAM(s) Oral daily  oseltamivir 75 milliGRAM(s) Oral two times a day  sodium chloride 0.9%. 1000 milliLiter(s) (75 mL/Hr) IV Continuous <Continuous>    MEDICATIONS  (PRN):  acetaminophen   Tablet .. 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

## 2020-02-09 NOTE — CONSULT NOTE ADULT - SUBJECTIVE AND OBJECTIVE BOX
Dr. Haskins  Office [phone]  Cell [phone]  Misti MEDRANO  Cell [phone]    HPI:  69M with PMHx HTN, anxiety, presents to the ED with 3-4 days of fever, vomiting, and poor PO intake. His symptoms started with nausea and projectile vomiting on Friday associated with fevers as high as 102. Admitted with flu. The patient also has been told that he has had an elevated creatinine in the past but he is unsure the baseline number. His renal function is fluctuating since admission. He denies any chest pain, shortness of breath, dysuria or sick contacts.       Allergies:  No Known Allergies      PAST MEDICAL & SURGICAL HISTORY:  Anxiety  Hypertension, unspecified type  H/O Mohs micrographic surgery for skin cancer      Home Medications Reviewed    Hospital Medications:   MEDICATIONS  (STANDING):  amLODIPine   Tablet 5 milliGRAM(s) Oral daily  ATENolol  Tablet 50 milliGRAM(s) Oral two times a day  cyanocobalamin Injectable 1000 MICROGram(s) IntraMuscular daily  enoxaparin Injectable 40 milliGRAM(s) SubCutaneous daily  fluvoxaMINE 100 milliGRAM(s) Oral two times a day  folic acid 1 milliGRAM(s) Oral daily  influenza   Vaccine 0.5 milliLiter(s) IntraMuscular once  losartan 100 milliGRAM(s) Oral daily  oseltamivir 75 milliGRAM(s) Oral two times a day  sodium chloride 0.9%. 1000 milliLiter(s) (75 mL/Hr) IV Continuous <Continuous>      SOCIAL HISTORY:  Denies ETOh, Smoking,     FAMILY HISTORY: not significant      REVIEW OF SYSTEMS:  CONSTITUTIONAL: as per HPI  EYES/ENT: No visual changes;  No vertigo or throat pain   NECK: No pain or stiffness  RESPIRATORY: No cough, wheezing, hemoptysis; No shortness of breath  CARDIOVASCULAR: No chest pain or palpitations.  GASTROINTESTINAL: as per HPI  GENITOURINARY: No dysuria, frequency, foamy urine, urinary urgency, incontinence or hematuria  NEUROLOGICAL: No numbness or weakness  SKIN: No itching, burning, rashes, or lesions   VASCULAR: No bilateral lower extremity edema.   All other review of systems is negative unless indicated above.    VITALS:  T(F): 97.5 (20 @ 13:25), Max: 101.8 (20 @ 05:55)  HR: 66 (20 @ 13:25)  BP: 137/71 (20 @ 13:25)  RR: 18 (20 @ 13:25)  SpO2: 98% (20 @ 13:25)  Wt(kg): --     @ 07:01  -   @ 07:00  --------------------------------------------------------  IN: 1455 mL / OUT: 2270 mL / NET: -815 mL     07:01  -   @ 15:05  --------------------------------------------------------  IN: 300 mL / OUT: 650 mL / NET: -350 mL          PHYSICAL EXAM:  Constitutional: NAD  HEENT: anicteric sclera, oropharynx clear, MMM  Neck: No JVD  Respiratory: CTAB, no wheezes, rales or rhonchi  Cardiovascular: S1, S2, RRR  Gastrointestinal: BS+, soft, NT/ND  Extremities: No cyanosis or clubbing. No peripheral edema  Neurological: A/O x 3, no focal deficits  Psychiatric: Normal mood, normal affect  : No CVA tenderness. No be.   Skin: No rashes       LABS:      136  |  97  |  23  ----------------------------<  110<H>  4.0   |  27  |  1.39<H>    Ca    9.1      2020 07:20    TPro  6.5  /  Alb  3.3  /  TBili  0.8  /  DBili      /  AST  78<H>  /  ALT  106<H>  /  AlkPhos  186<H>      Creatinine Trend: 1.39 <--, 1.27 <--, 1.22 <--, 1.38 <--, 1.58 <--, 1.79 <--                        11.0   4.02  )-----------( 78       ( 2020 07:20 )             33.7     Urine Studies:  Urinalysis Basic - ( 2020 22:44 )    Color: Light Yellow / Appearance: Clear / S.010 / pH:   Gluc:  / Ketone: Negative  / Bili: Negative / Urobili: Negative   Blood:  / Protein: 30 mg/dL / Nitrite: Negative   Leuk Esterase: Negative / RBC: 3 /hpf / WBC 0 /HPF   Sq Epi:  / Non Sq Epi: 0 /hpf / Bacteria: Negative          RADIOLOGY & ADDITIONAL STUDIES:

## 2020-02-09 NOTE — PROGRESS NOTE ADULT - SUBJECTIVE AND OBJECTIVE BOX
[facility], Division of Infectious Diseases  SARIKA Briones A. Lee  [phone]    Name: JANICE RENDON  Age: 69y  Gender: Male  MRN: [id number]    Interval History--  Notes reviewed. Dr. Willson reached out to me yesterday ~345pm that patient had fever to 103F.  I recommended repeating RVP given new high grade fever and prior unrevealing workup.  RVP + for influenza A (apparently NP called but neither myself not Dr. Willson were made aware).  Patient c/w worse cough than usual, fatigue, malaise.  Depressed mood and outlook.     Past Medical History--  Anxiety  Hypertension, unspecified type  H/O Mohs micrographic surgery for skin cancer      For details regarding the patient's social history, family history, and other miscellaneous elements, please refer the initial infectious diseases consultation and/or the admitting history and physical examination for this admission.    Allergies    No Known Allergies    Intolerances        Medications--  Antibiotics:  oseltamivir 75 milliGRAM(s) Oral two times a day    Immunologic:  influenza   Vaccine 0.5 milliLiter(s) IntraMuscular once    Other:  acetaminophen   Tablet .. PRN  amLODIPine   Tablet  ATENolol  Tablet  cyanocobalamin Injectable  diazepam    Tablet PRN  enoxaparin Injectable  fluvoxaMINE  folic acid  guaiFENesin   Syrup  (Sugar-Free) PRN  losartan  sodium chloride 0.9%.      Review of Systems--  A 10-point review of systems was obtained.   Review of systems otherwise negative except as previously noted.    Physical Examination--  Vital Signs: T(F): 98.3 (02-09-20 @ 08:18), Max: 101.8 (02-09-20 @ 05:55)  HR: 94 (02-09-20 @ 08:18)  BP: 114/63 (02-09-20 @ 08:18)  RR: 18 (02-09-20 @ 08:18)  SpO2: 98% (02-09-20 @ 08:18)  Wt(kg): --          Laboratory Studies--  CBC                        11.0   4.02  )-----------( 78       ( 09 Feb 2020 07:20 )             33.7       Chemistries  02-09    136  |  97  |  23  ----------------------------<  110<H>  4.0   |  27  |  1.39<H>    Ca    9.1      09 Feb 2020 07:20    TPro  6.5  /  Alb  3.3  /  TBili  0.8  /  DBili  x   /  AST  78<H>  /  ALT  106<H>  /  AlkPhos  186<H>  02-09    Rapid Respiratory Viral Panel (02.08.20 @ 23:38)    Rapid RVP Result: Detected: This Respiratory Panel uses polymerase chain reaction (PCR) to detect for  adenovirus; coronavirus (HKU1, NL63, 229E, OC43); human metapneumovirus  (hMPV); human enterovirus/rhinovirus (Entero/RV); influenza A; influenza  A/H1; influenza A/H3; influenza A/H1-2009; influenza B; parainfluenza  viruses 1, 2, 3, 4; respiratory syncytial virus; Mycoplasma pneumoniae;  and Chlamydophila pneumoniae.    Influenza AH1 2009 (RapRVP): Detected        Culture Data    Culture - Urine (collected 08 Feb 2020 09:42)  Source: .Urine Clean Catch (Midstream)  Final Report (09 Feb 2020 08:15):    No growth    Culture - Blood (collected 08 Feb 2020 01:52)  Source: .Blood Blood-Peripheral  Preliminary Report (09 Feb 2020 02:02):    No growth to date.    Culture - Blood (collected 08 Feb 2020 01:52)  Source: .Blood Blood-Peripheral  Preliminary Report (09 Feb 2020 02:02):    No growth to date.    Culture - Urine (collected 05 Feb 2020 01:06)  Source: .Urine Clean Catch (Midstream)  Final Report (06 Feb 2020 00:58):    <10,000 CFU/mL Normal Urogenital Dory    Culture - Blood (collected 04 Feb 2020 22:58)  Source: .Blood Blood  Preliminary Report (05 Feb 2020 23:02):    No growth to date.    Culture - Blood (collected 04 Feb 2020 22:58)  Source: .Blood Blood  Preliminary Report (05 Feb 2020 23:02):    No growth to date.

## 2020-02-10 ENCOUNTER — TRANSCRIPTION ENCOUNTER (OUTPATIENT)
Age: 70
End: 2020-02-10

## 2020-02-10 LAB
ANION GAP SERPL CALC-SCNC: 12 MMOL/L — SIGNIFICANT CHANGE UP (ref 5–17)
BUN SERPL-MCNC: 23 MG/DL — SIGNIFICANT CHANGE UP (ref 7–23)
CALCIUM SERPL-MCNC: 8.7 MG/DL — SIGNIFICANT CHANGE UP (ref 8.4–10.5)
CHLORIDE SERPL-SCNC: 99 MMOL/L — SIGNIFICANT CHANGE UP (ref 96–108)
CK SERPL-CCNC: 56 U/L — SIGNIFICANT CHANGE UP (ref 30–200)
CO2 SERPL-SCNC: 26 MMOL/L — SIGNIFICANT CHANGE UP (ref 22–31)
CREAT SERPL-MCNC: 1.32 MG/DL — HIGH (ref 0.5–1.3)
GLUCOSE SERPL-MCNC: 96 MG/DL — SIGNIFICANT CHANGE UP (ref 70–99)
HCT VFR BLD CALC: 32.8 % — LOW (ref 39–50)
HGB BLD-MCNC: 10.8 G/DL — LOW (ref 13–17)
INR BLD: 1.32 RATIO — HIGH (ref 0.88–1.16)
MCHC RBC-ENTMCNC: 28.2 PG — SIGNIFICANT CHANGE UP (ref 27–34)
MCHC RBC-ENTMCNC: 32.9 GM/DL — SIGNIFICANT CHANGE UP (ref 32–36)
MCV RBC AUTO: 85.6 FL — SIGNIFICANT CHANGE UP (ref 80–100)
NRBC # BLD: 0 /100 WBCS — SIGNIFICANT CHANGE UP (ref 0–0)
PLATELET # BLD AUTO: 88 K/UL — LOW (ref 150–400)
POTASSIUM SERPL-MCNC: 3.9 MMOL/L — SIGNIFICANT CHANGE UP (ref 3.5–5.3)
POTASSIUM SERPL-SCNC: 3.9 MMOL/L — SIGNIFICANT CHANGE UP (ref 3.5–5.3)
PROTHROM AB SERPL-ACNC: 15.2 SEC — HIGH (ref 10–12.9)
RBC # BLD: 3.83 M/UL — LOW (ref 4.2–5.8)
RBC # FLD: 13.7 % — SIGNIFICANT CHANGE UP (ref 10.3–14.5)
SODIUM SERPL-SCNC: 137 MMOL/L — SIGNIFICANT CHANGE UP (ref 135–145)
WBC # BLD: 4.11 K/UL — SIGNIFICANT CHANGE UP (ref 3.8–10.5)
WBC # FLD AUTO: 4.11 K/UL — SIGNIFICANT CHANGE UP (ref 3.8–10.5)

## 2020-02-10 RX ADMIN — PREGABALIN 1000 MICROGRAM(S): 225 CAPSULE ORAL at 17:26

## 2020-02-10 RX ADMIN — ENOXAPARIN SODIUM 40 MILLIGRAM(S): 100 INJECTION SUBCUTANEOUS at 12:13

## 2020-02-10 RX ADMIN — AMLODIPINE BESYLATE 5 MILLIGRAM(S): 2.5 TABLET ORAL at 21:25

## 2020-02-10 RX ADMIN — Medication 75 MILLIGRAM(S): at 06:52

## 2020-02-10 RX ADMIN — Medication 1 MILLIGRAM(S): at 12:13

## 2020-02-10 RX ADMIN — FLUVOXAMINE MALEATE 100 MILLIGRAM(S): 25 TABLET ORAL at 17:26

## 2020-02-10 RX ADMIN — ATENOLOL 50 MILLIGRAM(S): 25 TABLET ORAL at 17:26

## 2020-02-10 RX ADMIN — Medication 2.5 MILLIGRAM(S): at 23:43

## 2020-02-10 RX ADMIN — ATENOLOL 50 MILLIGRAM(S): 25 TABLET ORAL at 06:52

## 2020-02-10 RX ADMIN — FLUVOXAMINE MALEATE 100 MILLIGRAM(S): 25 TABLET ORAL at 06:51

## 2020-02-10 RX ADMIN — Medication 75 MILLIGRAM(S): at 17:26

## 2020-02-10 RX ADMIN — LOSARTAN POTASSIUM 100 MILLIGRAM(S): 100 TABLET, FILM COATED ORAL at 06:51

## 2020-02-10 NOTE — DISCHARGE NOTE PROVIDER - HOSPITAL COURSE
69 M with PMHx HTN, anxiety, presents to the ED with 3-4 days of fever, vomiting, lower abdominal pain and poor PO intake. His symptoms started with nausea and projectile vomiting on Friday associated with fevers as high as 102F after eating BBQ the day before.      Saw his PCP Dr. Calderon at [facility], did outpatient RUQ ultrasound that was suspicious for acute cholecystitis and advised him to present to the ED.         # Abdominal pain:      - US RUQ and CT abd suggested acute cholecystitis. CBD normal, though distended gallbladder    - HIDA scan is negative however. symptoms has since resolved. No current plan for surgical intervention    - tolerating regular diet    - no abd pain.        # Fever 102-103F - unclear source initially but now noted flu+    - unlikely due to GI findings as above, unlikely hematological per heme    - no sick contact, no recent travel.    - UA is negative. CT abd lower lung cut, no PNA    - RVP is negative on admission, but now positive    - recent UC strep throat neg per the wife     - blood cultures and urine cultures all negative    - CT chest/abd/plevic with IV contrast neg for lymphoma     - CT head negative for central cause    - RVP with flu, likely the etiology of fevers        # Coagulopathy with thrombocytopenia    - known history of thrombocytopenia    - outpt follow up with Dr. Aldridge         # Acute on chronic CKD (baseline Cr is 1.3 in 2019)     - Cr 1.79 on admission, likely pre-renal    - s/p IVF, Cr back to normal    - pt on both ACEI/ARB and HCTZ at home.    - continue to trend    - no post obstructive symptoms    - will continue Losartan, but will hold ACEI and HCTZ given Cr ~1.4    (renal eval for optimization)    -c/w Norvasc for BP control, stable        # HTN    - c/w Atenolol 50 mg BID    - c/w therapeutic exchanged Losartan 100 mg qdaily    - hold HCTZ and ACEI (at home also on Perindopril 4 mg (ACEI) on top of her ARB, but will not continue given recent DONNA)    - c/w norvasc 5 mg as needed.    - offered to switched Atenolol to other betablocker with more alpha blockade, but refused. states he has been on it for 30+ years.        # Nose basal cell Ca    - s/p recent Tyra's surgery    - completed 7 days of Keflex, no prior allergy     - outpt follow up with dermatology         # Anxiety    - c/w home meds. on PRN Valium 2.5-5 mg PRN qdaily    - c/w Fluvoxamine 100 mg BID         # Arthritis    - received PRN steroid injections in the past    - no recent issues

## 2020-02-10 NOTE — DISCHARGE NOTE PROVIDER - NSDCMRMEDTOKEN_GEN_ALL_CORE_FT
Aceon: 40  orally once a day  Benicar HCT 40 mg-12.5 mg oral tablet: 1 tab(s) orally once a day  diazePAM 5 mg oral tablet: 5 milligram(s) orally once a day, As Needed  fluvoxaMINE: 200 milligram(s) orally once a day  Tenormin 50 mg oral tablet: 50 milligram(s) orally 2 times a day amLODIPine 5 mg oral tablet: 1 tab(s) orally once a day  atenolol 50 mg oral tablet: 1 tab(s) orally 2 times a day  diazePAM 5 mg oral tablet: 5 milligram(s) orally once a day, As Needed  fluvoxaMINE 100 mg oral tablet: 1 tab(s) orally 2 times a day  folic acid 1 mg oral tablet: 1 tab(s) orally once a day  guaiFENesin 100 mg/5 mL oral liquid: 5 milliliter(s) orally every 6 hours, As needed, Cough  losartan 100 mg oral tablet: 1 tab(s) orally once a day  oseltamivir 75 mg oral capsule: 1 cap(s) orally 2 times a day. Please supply 5 more doses to complete 5 day treatment.

## 2020-02-10 NOTE — DIETITIAN INITIAL EVALUATION ADULT. - ADD RECOMMEND
Low salt diet, Monitor diet tolerance, po intake, GI tolerance, weight trends, labs, and skin integrity, suggest Vit C for immune function

## 2020-02-10 NOTE — PROGRESS NOTE ADULT - SUBJECTIVE AND OBJECTIVE BOX
[facility] [address], Division of Infectious Diseases  SARIKA Briones A. Lee  [phone]  Name: JANICE RENDON  Age: 69y  Gender: Male  MRN: [id number]    Interval History--  Notes reviewed  cough is there.  no fevers  tired    Past Medical History--  Anxiety  Hypertension, unspecified type  H/O Mohs micrographic surgery for skin cancer      For details regarding the patient's social history, family history, and other miscellaneous elements, please refer the initial infectious diseases consultation and/or the admitting history and physical examination for this admission.    Allergies    No Known Allergies    Intolerances        Medications--  Antibiotics:  oseltamivir 75 milliGRAM(s) Oral two times a day    Immunologic:  influenza   Vaccine 0.5 milliLiter(s) IntraMuscular once    Other:  acetaminophen   Tablet .. PRN  amLODIPine   Tablet  ATENolol  Tablet  cyanocobalamin Injectable  diazepam    Tablet PRN  enoxaparin Injectable  fluvoxaMINE  folic acid  guaiFENesin   Syrup  (Sugar-Free) PRN  losartan  sodium chloride 0.9%.      Review of Systems--  A 10-point review of systems was obtained.     Pertinent positives and negatives--  Constitutional: No fevers. No Chills. No Rigors.   Cardiovascular: No chest pain. No palpitations.  Respiratory: No shortness of breath. + cough.  Gastrointestinal: No nausea or vomiting. No diarrhea or constipation.   Psychiatric: Pleasant. Appropriate affect.    Review of systems otherwise negative except as previously noted.    Physical Examination--  Vital Signs: T(F): 98.4 (02-10-20 @ 17:00), Max: 100 (02-10-20 @ 01:03)  HR: 68 (02-10-20 @ 17:00)  BP: 137/83 (02-10-20 @ 17:00)  RR: 18 (02-10-20 @ 17:00)  SpO2: 92% (02-10-20 @ 17:00)  Wt(kg): --  General: Nontoxic-appearing Male in no acute distress.  HEENT: AT/NC.  Anicteric. Conjunctiva pink and moist. Oropharynx clear. Dentition fair.  Neck: Not rigid. No sense of mass.  Nodes: None palpable.  Lungs: Clear bilaterally without rales, wheezing or rhonchi  Heart: Regular rate and rhythm. No Murmur. No rub.   Abdomen: Bowel sounds present and normoactive. Soft. Nondistended. Nontender.   Back: No spinal tenderness. No costovertebral angle tenderness.   Extremities: No cyanosis or clubbing. No edema.   Skin: Warm. Dry. Good turgor. No rash. No vasculitic stigmata.  Psychiatric: Appropriate affect and mood for situation.         Laboratory Studies--  CBC                        10.8   4.11  )-----------( 88       ( 10 Feb 2020 07:21 )             32.8       Chemistries  02-10    137  |  99  |  23  ----------------------------<  96  3.9   |  26  |  1.32<H>    Ca    8.7      10 Feb 2020 07:21    TPro  6.5  /  Alb  3.3  /  TBili  0.8  /  DBili  x   /  AST  78<H>  /  ALT  106<H>  /  AlkPhos  186<H>  02-09      Culture Data    Culture - Urine (collected 08 Feb 2020 09:42)  Source: .Urine Clean Catch (Midstream)  Final Report (09 Feb 2020 08:15):    No growth    Culture - Blood (collected 08 Feb 2020 01:52)  Source: .Blood Blood-Peripheral  Preliminary Report (09 Feb 2020 02:02):    No growth to date.    Culture - Blood (collected 08 Feb 2020 01:52)  Source: .Blood Blood-Peripheral  Preliminary Report (09 Feb 2020 02:02):    No growth to date.    Culture - Urine (collected 05 Feb 2020 01:06)  Source: .Urine Clean Catch (Midstream)  Final Report (06 Feb 2020 00:58):    <10,000 CFU/mL Normal Urogenital Dory    Culture - Blood (collected 04 Feb 2020 22:58)  Source: .Blood Blood  Final Report (09 Feb 2020 23:01):    No growth at 5 days.    Culture - Blood (collected 04 Feb 2020 22:58)  Source: .Blood Blood  Final Report (09 Feb 2020 23:01):    No growth at 5 days.          Rapid Respiratory Viral Panel (02.08.20 @ 23:38)    Rapid RVP Result: Detected: This Respiratory Panel uses polymerase chain reaction (PCR) to detect for  adenovirus; coronavirus (HKU1, NL63, 229E, OC43); human metapneumovirus  (hMPV); human enterovirus/rhinovirus (Entero/RV); influenza A; influenza  A/H1; influenza A/H3; influenza A/H1-2009; influenza B; parainfluenza  viruses 1, 2, 3, 4; respiratory syncytial virus; Mycoplasma pneumoniae;  and Chlamydophila pneumoniae.    Influenza AH1 2009 (RapRVP): Detected      < from: CT Abdomen and Pelvis w/ IV Cont (02.08.20 @ 22:03) >  BONES: Degenerative changes of the spine.    IMPRESSION:     CT CHEST:    1.  Bilateral atelectasis.  2.  Trace bilateral pleural effusions.    CT ABDOMEN AND PELVIS:    1.  The findings are suspicious for acute cholecystitis, a finding that is unchanged since 2/4/2020.  2.  An enhancing focus measuring 0.8 cm in the anterior aspect of the left psoas muscle is indeterminate. A follow-up is recommended in 4-6 weeks with contrastto evaluate for resolution/change.  3.  Splenomegaly.      < end of copied text >

## 2020-02-10 NOTE — DISCHARGE NOTE PROVIDER - NSDCCPCAREPLAN_GEN_ALL_CORE_FT
PRINCIPAL DISCHARGE DIAGNOSIS  Diagnosis: Acute cholecystitis  Assessment and Plan of Treatment: Stable, no surgical interventions. Follow up with PMD as an outapteint.      SECONDARY DISCHARGE DIAGNOSES  Diagnosis: Hypertension, unspecified type  Assessment and Plan of Treatment: Low salt diet  Activity as tolerated.  Take all medication as prescribed.  Follow up with your medical doctor for routine blood pressure monitoring at your next visit.  Notify your doctor if you have any of the following symptoms:   Dizziness, Lightheadedness, Blurry vision, Headache, Chest pain, Shortness of breath      Diagnosis: Thrombocytopenia  Assessment and Plan of Treatment: Follow up with Dr. Aldridge as an outpatient.    Diagnosis: Influenza A  Assessment and Plan of Treatment: Continue with Tamiflu as ordered.

## 2020-02-10 NOTE — DISCHARGE NOTE PROVIDER - NSDCFUADDINST_GEN_ALL_CORE_FT
CT ABDOMEN AND PELVIS:    1.  The findings are suspicious for acute cholecystitis, a finding that is unchanged since 2/4/2020.  2.  An enhancing focus measuring 0.8 cm in the anterior aspect of the left psoas muscle is indeterminate. A follow-up is recommended in 4-6 weeks with contrast to evaluate for resolution/change.  3.  Splenomegaly.

## 2020-02-10 NOTE — DIETITIAN INITIAL EVALUATION ADULT. - OTHER INFO
Patient seen for routine length of stay assessment.  Patient found resting in bed, noted with a very negative outlook on his situation.  Patient reports that he acquired the Flu in the hospital and wants to know if he can raquel the hospital?  Presented with N/V and abdominal pain and now resolved.  PTA, Patient followed a regular diet but did not pay much attention to low salt diet.  Frustrated that he cannot have cheese in hospital and was not aware that cheese had a lot of salt.  Discussed foods that are highest in salt and encouraged moderation.  Appetite and intake good.  Weight has been stable.  Well nourished.  Supplemented with B Complex and Vit D at home.  Suggested maybe adding Vit C to help with immune system.  Patient fairly receptive.  Patient noted that he read that for every day you are hospitalized you lose 1 year on your life.  Patient very negative and could not understand why this RDN was so positive.

## 2020-02-10 NOTE — DIETITIAN INITIAL EVALUATION ADULT. - PERTINENT LABORATORY DATA
Na 137, K+ 3.9, BUN 23, Cr 1.32, BG 96, Phos --, Alk Phos --, AST --, ALT --, Mg --, Ca 8.7, HbA1c --

## 2020-02-10 NOTE — PROGRESS NOTE ADULT - ASSESSMENT
69m with ITP, HTN, ?CKD admitted with fever ruq pain and vomiting initially  Abnormal LFT and imaging but HIDA negative  Subsequently defervesced, then fever again  Now RVP+ for influenza A,

## 2020-02-10 NOTE — PROGRESS NOTE ADULT - ASSESSMENT
69M with PMHx HTN, anxiety, presents to the ED with 3-4 days of fever, vomiting, and poor PO intake. His symptoms started with nausea and projectile vomiting on Friday associated with fevers as high as 102. Admitted with flu. His renal function has been fluctuating.    A/P:  Acute on CKD:  Baseline Scr ~1.3  Scr peaked to 1.79 on ( 2/4)   DONNA is pre-renal  Renal function improved after hydration and discontinuing ACEI/ARB/HCTZ  BP was uncontrolled so he was started back on ARB  Scr slightly increased on ( 2/9) -- pending BMP today  s/p CT with contrast on ( 2/8) -- monitor for MEL  Monitor renal function at present  UA has minimal protein which could be function with some hematuria  Hematuria is persistent, will need renal/bladder US  check CK level in AM, as dipstick positive but minimal microscopic RBC      HTN:  Controlled  Continue current meds    Proteinuria/hematuria:  Plan as above  Follow up renal sonogram

## 2020-02-10 NOTE — DISCHARGE NOTE PROVIDER - NSDCFUADDAPPT_GEN_ALL_CORE_FT
Follow up with PMD and Hematologist as an outpatient.   Follow up with Dermatology for nose basal cell carcinoma.

## 2020-02-10 NOTE — PROGRESS NOTE ADULT - SUBJECTIVE AND OBJECTIVE BOX
Patient is a 69y old  Male who presents with a chief complaint of fever (10 Feb 2020 17:09)      SUBJECTIVE / OVERNIGHT EVENTS:  Pt sitting up out of bed.  feels well.  no chest pain, no shortness of breath.   comfortable. no n/v/d. no abd pain.  no HA/dizziness.         Vital Signs Last 24 Hrs  T(C): 36.9 (10 Feb 2020 17:00), Max: 37.8 (10 Feb 2020 01:03)  T(F): 98.4 (10 Feb 2020 17:00), Max: 100 (10 Feb 2020 01:03)  HR: 68 (10 Feb 2020 17:00) (68 - 77)  BP: 137/83 (10 Feb 2020 17:00) (122/66 - 159/80)  BP(mean): --  RR: 18 (10 Feb 2020 17:00) (17 - 18)  SpO2: 92% (10 Feb 2020 17:00) (91% - 93%)  I&O's Summary    09 Feb 2020 07:01  -  10 Feb 2020 07:00  --------------------------------------------------------  IN: 2255 mL / OUT: 1900 mL / NET: 355 mL    10 Feb 2020 07:01  -  10 Feb 2020 20:04  --------------------------------------------------------  IN: 1090 mL / OUT: 850 mL / NET: 240 mL        PHYSICAL EXAM:  GENERAL: NAD, Comfortable, lying in bed on room air  FACE: healed left sided recent Tyra's surgery, no bleeding/no infection   HEAD:  Atraumatic, Normocephalic  EYES: EOMI, PERRLA, conjunctiva and sclera clear  NECK: Supple, No JVD  CHEST/LUNG: Clear to auscultation bilaterally; No wheeze  HEART: Regular rate and rhythm; No murmurs, rubs, or gallops  ABDOMEN: Soft, Nontender, Nondistended; Bowel sounds present  Neuro: AAOx3, no focal deficit, 5/5 b/l extremities  EXTREMITIES:  2+ Peripheral Pulses, No clubbing, cyanosis, or edema  SKIN: No rashes or lesions    LABS:                        10.8   4.11  )-----------( 88       ( 10 Feb 2020 07:21 )             32.8     02-10    137  |  99  |  23  ----------------------------<  96  3.9   |  26  |  1.32<H>    Ca    8.7      10 Feb 2020 07:21    TPro  6.5  /  Alb  3.3  /  TBili  0.8  /  DBili  x   /  AST  78<H>  /  ALT  106<H>  /  AlkPhos  186<H>  02-09    PT/INR - ( 10 Feb 2020 07:23 )   PT: 15.2 sec;   INR: 1.32 ratio         PTT - ( 09 Feb 2020 07:20 )  PTT:71.7 sec  CAPILLARY BLOOD GLUCOSE        CARDIAC MARKERS ( 10 Feb 2020 07:21 )  x     / x     / 56 U/L / x     / x              RADIOLOGY & ADDITIONAL TESTS:    Imaging Personally Reviewed:  [x] YES  [ ] NO    Consultant(s) Notes Reviewed:  [x] YES  [ ] NO      MEDICATIONS  (STANDING):  amLODIPine   Tablet 5 milliGRAM(s) Oral daily  ATENolol  Tablet 50 milliGRAM(s) Oral two times a day  enoxaparin Injectable 40 milliGRAM(s) SubCutaneous daily  fluvoxaMINE 100 milliGRAM(s) Oral two times a day  folic acid 1 milliGRAM(s) Oral daily  influenza   Vaccine 0.5 milliLiter(s) IntraMuscular once  losartan 100 milliGRAM(s) Oral daily  oseltamivir 75 milliGRAM(s) Oral two times a day  sodium chloride 0.9%. 1000 milliLiter(s) (75 mL/Hr) IV Continuous <Continuous>    MEDICATIONS  (PRN):  acetaminophen   Tablet .. 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

## 2020-02-10 NOTE — PROGRESS NOTE ADULT - SUBJECTIVE AND OBJECTIVE BOX
[facility]   MD KINGS FAUST MD RUORU WONG, PA    TEL:  OFFICE: [phone]  DR RAYA CELL: [phone]  JOON PENA CELL: [phone]  DR. GARCIA CELL: [phone]  DR. BENNETT CELL: [phone]    FROM 5 PM - 7 AM PLEASE CALL ANSWERING SERVICE: [phone]    RENAL FOLLOW UP NOTE  --------------------------------------------------------------------------------  HPI:      Pt seen and examined at bedside.   Denies SOB, chest pain     PAST HISTORY  --------------------------------------------------------------------------------  No significant changes to PMH, PSH, FHx, SHx, unless otherwise noted    ALLERGIES & MEDICATIONS  --------------------------------------------------------------------------------  Allergies    No Known Allergies    Intolerances      Standing Inpatient Medications  amLODIPine   Tablet 5 milliGRAM(s) Oral daily  ATENolol  Tablet 50 milliGRAM(s) Oral two times a day  cyanocobalamin Injectable 1000 MICROGram(s) IntraMuscular daily  enoxaparin Injectable 40 milliGRAM(s) SubCutaneous daily  fluvoxaMINE 100 milliGRAM(s) Oral two times a day  folic acid 1 milliGRAM(s) Oral daily  influenza   Vaccine 0.5 milliLiter(s) IntraMuscular once  losartan 100 milliGRAM(s) Oral daily  oseltamivir 75 milliGRAM(s) Oral two times a day  sodium chloride 0.9%. 1000 milliLiter(s) IV Continuous <Continuous>    PRN Inpatient Medications  acetaminophen   Tablet .. 650 milliGRAM(s) Oral every 6 hours PRN  diazepam    Tablet 2.5 milliGRAM(s) Oral two times a day PRN  guaiFENesin   Syrup  (Sugar-Free) 100 milliGRAM(s) Oral every 6 hours PRN      REVIEW OF SYSTEMS  --------------------------------------------------------------------------------  General: no fever  CVS: no chest pain  RESP: no sob, no cough  ABD: no abdominal pain  : no dysuria,  MSK: no edema     VITALS/PHYSICAL EXAM  --------------------------------------------------------------------------------  T(C): 37.1 (02-10-20 @ 05:22), Max: 37.8 (02-10-20 @ 01:03)  HR: 70 (02-10-20 @ 05:22) (66 - 94)  BP: 136/75 (02-10-20 @ 05:22) (114/63 - 159/80)  RR: 18 (02-10-20 @ 05:22) (18 - 18)  SpO2: 92% (02-10-20 @ 05:22) (91% - 98%)  Wt(kg): --        02-09-20 @ 07:01  -  02-10-20 @ 07:00  --------------------------------------------------------  IN: 1730 mL / OUT: 1900 mL / NET: -170 mL      Physical Exam:  	Gen: NAD  	HEENT: MMM  	Pulm: CTA B/L  	CV: S1S2  	Abd: Soft, +BS  	Ext: No LE edema B/L                      Neuro: Awake alert  	Skin: Warm and Dry   	 no be    LABS/STUDIES  --------------------------------------------------------------------------------              11.0   4.02  >-----------<  78       [02-09-20 @ 07:20]              33.7     136  |  97  |  23  ----------------------------<  110      [02-09-20 @ 07:20]  4.0   |  27  |  1.39        Ca     9.1     [02-09-20 @ 07:20]    TPro  6.5  /  Alb  3.3  /  TBili  0.8  /  DBili  x   /  AST  78  /  ALT  106  /  AlkPhos  186  [02-09-20 @ 07:20]    PT/INR: PT 16.5 , INR 1.42       [02-09-20 @ 07:20]  PTT: 71.7       [02-09-20 @ 07:20]      Creatinine Trend:  SCr 1.39 [02-09 @ 07:20]  SCr 1.27 [02-08 @ 07:31]  SCr 1.22 [02-07 @ 07:15]  SCr 1.38 [02-06 @ 06:41]  SCr 1.58 [02-05 @ 07:30]    Urinalysis - [02-07-20 @ 22:44]      Color Light Yellow / Appearance Clear / SG 1.010 / pH 6.0      Gluc Negative / Ketone Negative  / Bili Negative / Urobili Negative       Blood Moderate / Protein 30 mg/dL / Leuk Est Negative / Nitrite Negative      RBC 3 / WBC 0 / Hyaline 0 / Gran  / Sq Epi  / Non Sq Epi 0 / Bacteria Negative        HCV 0.32, Nonreact      [02-05-20 @ 08:53]

## 2020-02-10 NOTE — PROGRESS NOTE ADULT - ATTENDING COMMENTS
- Dr. VIK Willson ([facility])  - [phone]
- Dr. VIK Willson ([facility])  - [phone]
- Dr. VIK Willson ([facility])  - [phone]
Left message for Dr. Willson.    Marvin Robles MD  [phone]
Recall PRN. Thank you for the courtesy of this referral.     Marvin Robles MD  [phone]

## 2020-02-10 NOTE — PROGRESS NOTE ADULT - NSHPATTENDINGPLANDISCUSS_GEN_ALL_CORE
pt and the wife at bedside.
pt and the wife at bedside. d/w HELENE Adler
pt
pt and ID Dr. Underwood
pt and the wife at bedside. d/w NP, d/w ID

## 2020-02-10 NOTE — DIETITIAN INITIAL EVALUATION ADULT. - PERTINENT MEDS FT
MEDICATIONS  (STANDING):  amLODIPine   Tablet 5 milliGRAM(s) Oral daily  ATENolol  Tablet 50 milliGRAM(s) Oral two times a day  cyanocobalamin Injectable 1000 MICROGram(s) IntraMuscular daily  enoxaparin Injectable 40 milliGRAM(s) SubCutaneous daily  fluvoxaMINE 100 milliGRAM(s) Oral two times a day  folic acid 1 milliGRAM(s) Oral daily  influenza   Vaccine 0.5 milliLiter(s) IntraMuscular once  losartan 100 milliGRAM(s) Oral daily  oseltamivir 75 milliGRAM(s) Oral two times a day  sodium chloride 0.9%. 1000 milliLiter(s) (75 mL/Hr) IV Continuous <Continuous>    MEDICATIONS  (PRN):  acetaminophen   Tablet .. 650 milliGRAM(s) Oral every 6 hours PRN Temp greater or equal to 38C (100.4F)  diazepam    Tablet 2.5 milliGRAM(s) Oral two times a day PRN anxiety  guaiFENesin   Syrup  (Sugar-Free) 100 milliGRAM(s) Oral every 6 hours PRN Cough

## 2020-02-10 NOTE — DIETITIAN INITIAL EVALUATION ADULT. - ENERGY NEEDS
HT 72 inches,  pounds, -185 pounds, BMI 24.7,  pounds  Dxd with RSV, Influenza A  Skin intact, no edema

## 2020-02-10 NOTE — DISCHARGE NOTE PROVIDER - CARE PROVIDER_API CALL
Rusty Calderon)  Internal Medicine  [address] [place] [id number]  Phone: [phone]  Fax: [phone]  Follow Up Time:     Spike Aldridge)  Hematology; Internal Medicine; Medical Oncology  [address] [facility], [place] [address]  Phone: [phone]  Fax: [phone]  Follow Up Time: 1 week

## 2020-02-11 ENCOUNTER — TRANSCRIPTION ENCOUNTER (OUTPATIENT)
Age: 70
End: 2020-02-11

## 2020-02-11 VITALS
SYSTOLIC BLOOD PRESSURE: 159 MMHG | DIASTOLIC BLOOD PRESSURE: 80 MMHG | OXYGEN SATURATION: 96 % | RESPIRATION RATE: 18 BRPM | TEMPERATURE: 97 F | HEART RATE: 63 BPM

## 2020-02-11 LAB
ANA PAT FLD IF-IMP: ABNORMAL
ANA TITR SER: ABNORMAL
ANION GAP SERPL CALC-SCNC: 13 MMOL/L — SIGNIFICANT CHANGE UP (ref 5–17)
BUN SERPL-MCNC: 20 MG/DL — SIGNIFICANT CHANGE UP (ref 7–23)
CALCIUM SERPL-MCNC: 9.2 MG/DL — SIGNIFICANT CHANGE UP (ref 8.4–10.5)
CHLORIDE SERPL-SCNC: 103 MMOL/L — SIGNIFICANT CHANGE UP (ref 96–108)
CO2 SERPL-SCNC: 25 MMOL/L — SIGNIFICANT CHANGE UP (ref 22–31)
CREAT SERPL-MCNC: 1.17 MG/DL — SIGNIFICANT CHANGE UP (ref 0.5–1.3)
GLUCOSE SERPL-MCNC: 115 MG/DL — HIGH (ref 70–99)
POTASSIUM SERPL-MCNC: 4 MMOL/L — SIGNIFICANT CHANGE UP (ref 3.5–5.3)
POTASSIUM SERPL-SCNC: 4 MMOL/L — SIGNIFICANT CHANGE UP (ref 3.5–5.3)
SODIUM SERPL-SCNC: 141 MMOL/L — SIGNIFICANT CHANGE UP (ref 135–145)

## 2020-02-11 PROCEDURE — 70450 CT HEAD/BRAIN W/O DYE: CPT

## 2020-02-11 PROCEDURE — 85027 COMPLETE CBC AUTOMATED: CPT

## 2020-02-11 PROCEDURE — 86850 RBC ANTIBODY SCREEN: CPT

## 2020-02-11 PROCEDURE — 85210 CLOT FACTOR II PROTHROM SPEC: CPT

## 2020-02-11 PROCEDURE — 85670 THROMBIN TIME PLASMA: CPT

## 2020-02-11 PROCEDURE — 85611 PROTHROMBIN TEST: CPT

## 2020-02-11 PROCEDURE — 86905 BLOOD TYPING RBC ANTIGENS: CPT

## 2020-02-11 PROCEDURE — 87040 BLOOD CULTURE FOR BACTERIA: CPT

## 2020-02-11 PROCEDURE — 96374 THER/PROPH/DIAG INJ IV PUSH: CPT

## 2020-02-11 PROCEDURE — 85260 CLOT FACTOR X STUART-POWER: CPT

## 2020-02-11 PROCEDURE — 82747 ASSAY OF FOLIC ACID RBC: CPT

## 2020-02-11 PROCEDURE — 96375 TX/PRO/DX INJ NEW DRUG ADDON: CPT

## 2020-02-11 PROCEDURE — 83690 ASSAY OF LIPASE: CPT

## 2020-02-11 PROCEDURE — 85049 AUTOMATED PLATELET COUNT: CPT

## 2020-02-11 PROCEDURE — 76770 US EXAM ABDO BACK WALL COMP: CPT

## 2020-02-11 PROCEDURE — 85230 CLOT FACTOR VII PROCONVERTIN: CPT

## 2020-02-11 PROCEDURE — 84100 ASSAY OF PHOSPHORUS: CPT

## 2020-02-11 PROCEDURE — 78227 HEPATOBIL SYST IMAGE W/DRUG: CPT

## 2020-02-11 PROCEDURE — 86901 BLOOD TYPING SEROLOGIC RH(D): CPT

## 2020-02-11 PROCEDURE — 84132 ASSAY OF SERUM POTASSIUM: CPT

## 2020-02-11 PROCEDURE — 74177 CT ABD & PELVIS W/CONTRAST: CPT

## 2020-02-11 PROCEDURE — 87086 URINE CULTURE/COLONY COUNT: CPT

## 2020-02-11 PROCEDURE — 81001 URINALYSIS AUTO W/SCOPE: CPT

## 2020-02-11 PROCEDURE — 87633 RESP VIRUS 12-25 TARGETS: CPT

## 2020-02-11 PROCEDURE — 82247 BILIRUBIN TOTAL: CPT

## 2020-02-11 PROCEDURE — 93970 EXTREMITY STUDY: CPT

## 2020-02-11 PROCEDURE — 86038 ANTINUCLEAR ANTIBODIES: CPT

## 2020-02-11 PROCEDURE — 85240 CLOT FACTOR VIII AHG 1 STAGE: CPT

## 2020-02-11 PROCEDURE — 87486 CHLMYD PNEUM DNA AMP PROBE: CPT

## 2020-02-11 PROCEDURE — 85014 HEMATOCRIT: CPT

## 2020-02-11 PROCEDURE — 82248 BILIRUBIN DIRECT: CPT

## 2020-02-11 PROCEDURE — 83615 LACTATE (LD) (LDH) ENZYME: CPT

## 2020-02-11 PROCEDURE — 83010 ASSAY OF HAPTOGLOBIN QUANT: CPT

## 2020-02-11 PROCEDURE — 85270 CLOT FACTOR XI PTA: CPT

## 2020-02-11 PROCEDURE — 86900 BLOOD TYPING SEROLOGIC ABO: CPT

## 2020-02-11 PROCEDURE — 87581 M.PNEUMON DNA AMP PROBE: CPT

## 2020-02-11 PROCEDURE — 86803 HEPATITIS C AB TEST: CPT

## 2020-02-11 PROCEDURE — 85250 CLOT FACTOR IX PTC/CHRSTMAS: CPT

## 2020-02-11 PROCEDURE — 80048 BASIC METABOLIC PNL TOTAL CA: CPT

## 2020-02-11 PROCEDURE — 84295 ASSAY OF SERUM SODIUM: CPT

## 2020-02-11 PROCEDURE — 82550 ASSAY OF CK (CPK): CPT

## 2020-02-11 PROCEDURE — 85610 PROTHROMBIN TIME: CPT

## 2020-02-11 PROCEDURE — 82330 ASSAY OF CALCIUM: CPT

## 2020-02-11 PROCEDURE — 83605 ASSAY OF LACTIC ACID: CPT

## 2020-02-11 PROCEDURE — 71260 CT THORAX DX C+: CPT

## 2020-02-11 PROCEDURE — 85730 THROMBOPLASTIN TIME PARTIAL: CPT

## 2020-02-11 PROCEDURE — 76770 US EXAM ABDO BACK WALL COMP: CPT | Mod: 26

## 2020-02-11 PROCEDURE — 86922 COMPATIBILITY TEST ANTIGLOB: CPT

## 2020-02-11 PROCEDURE — 74176 CT ABD & PELVIS W/O CONTRAST: CPT

## 2020-02-11 PROCEDURE — A9537: CPT

## 2020-02-11 PROCEDURE — 83735 ASSAY OF MAGNESIUM: CPT

## 2020-02-11 PROCEDURE — 82746 ASSAY OF FOLIC ACID SERUM: CPT

## 2020-02-11 PROCEDURE — 82803 BLOOD GASES ANY COMBINATION: CPT

## 2020-02-11 PROCEDURE — 99285 EMERGENCY DEPT VISIT HI MDM: CPT | Mod: 25

## 2020-02-11 PROCEDURE — 82607 VITAMIN B-12: CPT

## 2020-02-11 PROCEDURE — 80053 COMPREHEN METABOLIC PANEL: CPT

## 2020-02-11 PROCEDURE — 76705 ECHO EXAM OF ABDOMEN: CPT

## 2020-02-11 PROCEDURE — 86870 RBC ANTIBODY IDENTIFICATION: CPT

## 2020-02-11 PROCEDURE — 71045 X-RAY EXAM CHEST 1 VIEW: CPT

## 2020-02-11 PROCEDURE — 87798 DETECT AGENT NOS DNA AMP: CPT

## 2020-02-11 PROCEDURE — 82947 ASSAY GLUCOSE BLOOD QUANT: CPT

## 2020-02-11 PROCEDURE — 85384 FIBRINOGEN ACTIVITY: CPT

## 2020-02-11 PROCEDURE — 82435 ASSAY OF BLOOD CHLORIDE: CPT

## 2020-02-11 PROCEDURE — 85732 THROMBOPLASTIN TIME PARTIAL: CPT

## 2020-02-11 PROCEDURE — 99238 HOSP IP/OBS DSCHRG MGMT 30/<: CPT

## 2020-02-11 PROCEDURE — 86880 COOMBS TEST DIRECT: CPT

## 2020-02-11 RX ORDER — ATENOLOL 25 MG/1
50 TABLET ORAL
Qty: 0 | Refills: 0 | DISCHARGE

## 2020-02-11 RX ORDER — AMLODIPINE BESYLATE 2.5 MG/1
1 TABLET ORAL
Qty: 30 | Refills: 0
Start: 2020-02-11 | End: 2020-03-11

## 2020-02-11 RX ORDER — FLUVOXAMINE MALEATE 25 MG/1
200 TABLET ORAL
Qty: 0 | Refills: 0 | DISCHARGE

## 2020-02-11 RX ORDER — OLMESARTAN MEDOXOMIL-HYDROCHLOROTHIAZIDE 25; 40 MG/1; MG/1
1 TABLET, FILM COATED ORAL
Qty: 0 | Refills: 0 | DISCHARGE

## 2020-02-11 RX ORDER — FLUVOXAMINE MALEATE 25 MG/1
1 TABLET ORAL
Qty: 0 | Refills: 0 | DISCHARGE
Start: 2020-02-11

## 2020-02-11 RX ORDER — PERINDOPRIL ERBUMINE 8 MG/1
40 TABLET ORAL
Qty: 0 | Refills: 0 | DISCHARGE

## 2020-02-11 RX ORDER — LOSARTAN POTASSIUM 100 MG/1
1 TABLET, FILM COATED ORAL
Qty: 30 | Refills: 0
Start: 2020-02-11 | End: 2020-03-11

## 2020-02-11 RX ORDER — ATENOLOL 25 MG/1
1 TABLET ORAL
Qty: 0 | Refills: 0 | DISCHARGE
Start: 2020-02-11

## 2020-02-11 RX ORDER — FOLIC ACID 0.8 MG
1 TABLET ORAL
Qty: 30 | Refills: 0
Start: 2020-02-11 | End: 2020-03-11

## 2020-02-11 RX ADMIN — Medication 2.5 MILLIGRAM(S): at 12:42

## 2020-02-11 RX ADMIN — Medication 1 MILLIGRAM(S): at 12:42

## 2020-02-11 RX ADMIN — Medication 75 MILLIGRAM(S): at 06:26

## 2020-02-11 RX ADMIN — ATENOLOL 50 MILLIGRAM(S): 25 TABLET ORAL at 06:26

## 2020-02-11 RX ADMIN — LOSARTAN POTASSIUM 100 MILLIGRAM(S): 100 TABLET, FILM COATED ORAL at 06:26

## 2020-02-11 RX ADMIN — ENOXAPARIN SODIUM 40 MILLIGRAM(S): 100 INJECTION SUBCUTANEOUS at 12:42

## 2020-02-11 RX ADMIN — FLUVOXAMINE MALEATE 100 MILLIGRAM(S): 25 TABLET ORAL at 06:26

## 2020-02-11 NOTE — PROGRESS NOTE ADULT - SUBJECTIVE AND OBJECTIVE BOX
[facility] [place] [facility]   MD KINGS FAUST MD RUORU WONG, PA    TEL:  OFFICE: [phone]  DR RAYA CELL: [phone]  JOON PENA CELL: [phone]  DR. GARCIA CELL: [phone]  DR. BENNETT CELL: [phone]    FROM 5 PM - 7 AM PLEASE CALL ANSWERING SERVICE: [phone]    RENAL FOLLOW UP NOTE  --------------------------------------------------------------------------------  HPI:      Pt seen and examined at bedside.   Denies SOB, chest pain     PAST HISTORY  --------------------------------------------------------------------------------  No significant changes to PMH, PSH, FHx, SHx, unless otherwise noted    ALLERGIES & MEDICATIONS  --------------------------------------------------------------------------------  Allergies    No Known Allergies    Intolerances      Standing Inpatient Medications  amLODIPine   Tablet 5 milliGRAM(s) Oral daily  ATENolol  Tablet 50 milliGRAM(s) Oral two times a day  enoxaparin Injectable 40 milliGRAM(s) SubCutaneous daily  fluvoxaMINE 100 milliGRAM(s) Oral two times a day  folic acid 1 milliGRAM(s) Oral daily  influenza   Vaccine 0.5 milliLiter(s) IntraMuscular once  losartan 100 milliGRAM(s) Oral daily  oseltamivir 75 milliGRAM(s) Oral two times a day  sodium chloride 0.9%. 1000 milliLiter(s) IV Continuous <Continuous>    PRN Inpatient Medications  acetaminophen   Tablet .. 650 milliGRAM(s) Oral every 6 hours PRN  diazepam    Tablet 2.5 milliGRAM(s) Oral two times a day PRN  guaiFENesin   Syrup  (Sugar-Free) 100 milliGRAM(s) Oral every 6 hours PRN      REVIEW OF SYSTEMS  --------------------------------------------------------------------------------  General: no fever  CVS: no chest pain  RESP: no sob, no cough  ABD: no abdominal pain  : no dysuria,  MSK: no edema     VITALS/PHYSICAL EXAM  --------------------------------------------------------------------------------  T(C): 36.7 (02-11-20 @ 06:24), Max: 36.9 (02-10-20 @ 14:57)  HR: 68 (02-11-20 @ 06:24) (68 - 69)  BP: 151/82 (02-11-20 @ 06:24) (122/66 - 151/82)  RR: 17 (02-11-20 @ 06:24) (17 - 18)  SpO2: 93% (02-11-20 @ 06:24) (92% - 94%)  Wt(kg): --        02-10-20 @ 07:01  -  02-11-20 @ 07:00  --------------------------------------------------------  IN: 1670 mL / OUT: 2075 mL / NET: -405 mL    02-11-20 @ 07:01  -  02-11-20 @ 09:22  --------------------------------------------------------  IN: 0 mL / OUT: 400 mL / NET: -400 mL      Physical Exam:  	Gen: NAD  	HEENT: MMM  	Pulm: CTA B/L  	CV: S1S2  	Abd: Soft, +BS  	Ext: No LE edema B/L                      Neuro: Awake alert  	Skin: Warm and Dry   	 no [place]  LABS/STUDIES  --------------------------------------------------------------------------------              10.8   4.11  >-----------<  88       [02-10-20 @ 07:21]              32.8     141  |  103  |  20  ----------------------------<  115      [02-11-20 @ 07:03]  4.0   |  25  |  1.17        Ca     9.2     [02-11-20 @ 07:03]      PT/INR: PT 15.2 , INR 1.32       [02-10-20 @ 07:23]    CK 56      [02-10-20 @ 07:21]    Creatinine Trend:  SCr 1.17 [02-11 @ 07:03]  SCr 1.32 [02-10 @ 07:21]  SCr 1.39 [02-09 @ 07:20]  SCr 1.27 [02-08 @ 07:31]  SCr 1.22 [02-07 @ 07:15]    Urinalysis - [02-07-20 @ 22:44]      Color Light Yellow / Appearance Clear / SG 1.010 / pH 6.0      Gluc Negative / Ketone Negative  / Bili Negative / Urobili Negative       Blood Moderate / Protein 30 mg/dL / Leuk Est Negative / Nitrite Negative      RBC 3 / WBC 0 / Hyaline 0 / Gran  / Sq Epi  / Non Sq Epi 0 / Bacteria Negative        HCV 0.32, Nonreact      [02-05-20 @ 08:53]

## 2020-02-11 NOTE — PROGRESS NOTE ADULT - ASSESSMENT
69M with PMHx HTN, anxiety, presents to the ED with 3-4 days of fever, vomiting, and poor PO intake. His symptoms started with nausea and projectile vomiting on Friday associated with fevers as high as 102. Admitted with flu. His renal function has been fluctuating.    A/P:  Acute on CKD:  Baseline Scr ~1.3  Scr peaked to 1.79 on ( 2/4)   DONNA is pre-renal  Renal function improved after hydration and discontinuing ACEI/ARB/HCTZ  BP was uncontrolled so he was started back on ARB  Scr improving today-- continue losartan  s/p CT with contrast on ( 2/8) --  Monitor renal function at present  UA has minimal protein which could be function with some hematuria  Hematuria is persistent, pendinh  renal/bladder US  CK level WNL      HTN:  Controlled  Continue current meds    Proteinuria/hematuria:  Plan as above  Follow up renal sonogram

## 2020-02-11 NOTE — DISCHARGE NOTE NURSING/CASE MANAGEMENT/SOCIAL WORK - PATIENT PORTAL LINK FT
You can access the FollowMyHealth Patient Portal offered by [facility] by registering at the following website: [email or web address]. By joining [employer]’s FollowMyHealth portal, you will also be able to view your health information using other applications (apps) compatible with our system.

## 2020-02-13 LAB
CULTURE RESULTS: SIGNIFICANT CHANGE UP
CULTURE RESULTS: SIGNIFICANT CHANGE UP
SPECIMEN SOURCE: SIGNIFICANT CHANGE UP
SPECIMEN SOURCE: SIGNIFICANT CHANGE UP

## 2020-03-13 ENCOUNTER — TRANSCRIPTION ENCOUNTER (OUTPATIENT)
Age: 70
End: 2020-03-13

## 2020-03-14 ENCOUNTER — INPATIENT (INPATIENT)
Facility: HOSPITAL | Age: 70
LOS: 5 days | Discharge: HOME | DRG: 853 | End: 2020-03-20
Attending: SPECIALIST | Admitting: SPECIALIST
Payer: MEDICARE

## 2020-03-14 ENCOUNTER — RESULT REVIEW (OUTPATIENT)
Age: 70
End: 2020-03-14

## 2020-03-14 VITALS
RESPIRATION RATE: 18 BRPM | TEMPERATURE: 99 F | OXYGEN SATURATION: 94 % | DIASTOLIC BLOOD PRESSURE: 70 MMHG | SYSTOLIC BLOOD PRESSURE: 144 MMHG | HEART RATE: 102 BPM

## 2020-03-14 DIAGNOSIS — A41.9 SEPSIS, UNSPECIFIED ORGANISM: ICD-10-CM

## 2020-03-14 DIAGNOSIS — J35.1 HYPERTROPHY OF TONSILS: Chronic | ICD-10-CM

## 2020-03-14 DIAGNOSIS — Z85.828 PERSONAL HISTORY OF OTHER MALIGNANT NEOPLASM OF SKIN: Chronic | ICD-10-CM

## 2020-03-14 DIAGNOSIS — K81.0 ACUTE CHOLECYSTITIS: ICD-10-CM

## 2020-03-14 LAB
ABO RH CONFIRMATION: SIGNIFICANT CHANGE UP
ALBUMIN SERPL ELPH-MCNC: 3.2 G/DL — LOW (ref 3.3–5)
ALLERGY+IMMUNOLOGY DIAG STUDY NOTE: SIGNIFICANT CHANGE UP
ALP SERPL-CCNC: 100 U/L — SIGNIFICANT CHANGE UP (ref 40–120)
ALT FLD-CCNC: 54 U/L — SIGNIFICANT CHANGE UP (ref 12–78)
ANION GAP SERPL CALC-SCNC: 11 MMOL/L — SIGNIFICANT CHANGE UP (ref 5–17)
ANISOCYTOSIS BLD QL: SLIGHT — SIGNIFICANT CHANGE UP
APPEARANCE UR: CLEAR — SIGNIFICANT CHANGE UP
APTT BLD: 53.7 SEC — HIGH (ref 28.5–37)
APTT BLD: 57.8 SEC — HIGH (ref 28.5–37)
AST SERPL-CCNC: 56 U/L — HIGH (ref 15–37)
BASOPHILS # BLD AUTO: 0 K/UL — SIGNIFICANT CHANGE UP (ref 0–0.2)
BASOPHILS # BLD AUTO: 0.02 K/UL — SIGNIFICANT CHANGE UP (ref 0–0.2)
BASOPHILS NFR BLD AUTO: 0 % — SIGNIFICANT CHANGE UP (ref 0–2)
BASOPHILS NFR BLD AUTO: 0.2 % — SIGNIFICANT CHANGE UP (ref 0–2)
BILIRUB SERPL-MCNC: 1.8 MG/DL — HIGH (ref 0.2–1.2)
BILIRUB UR-MCNC: NEGATIVE — SIGNIFICANT CHANGE UP
BLD GP AB SCN SERPL QL: SIGNIFICANT CHANGE UP
BUN SERPL-MCNC: 15 MG/DL — SIGNIFICANT CHANGE UP (ref 7–23)
CALCIUM SERPL-MCNC: 9 MG/DL — SIGNIFICANT CHANGE UP (ref 8.5–10.1)
CHLORIDE SERPL-SCNC: 99 MMOL/L — SIGNIFICANT CHANGE UP (ref 96–108)
CO2 SERPL-SCNC: 26 MMOL/L — SIGNIFICANT CHANGE UP (ref 22–31)
COLOR SPEC: YELLOW — SIGNIFICANT CHANGE UP
CREAT SERPL-MCNC: 1.6 MG/DL — HIGH (ref 0.5–1.3)
DAT IGG-SP REAG RBC-IMP: SIGNIFICANT CHANGE UP
DIFF PNL FLD: ABNORMAL
DIR ANTIGLOB POLYSPECIFIC INTERPRETATION: ABNORMAL
EOSINOPHIL # BLD AUTO: 0 K/UL — SIGNIFICANT CHANGE UP (ref 0–0.5)
EOSINOPHIL # BLD AUTO: 0 K/UL — SIGNIFICANT CHANGE UP (ref 0–0.5)
EOSINOPHIL NFR BLD AUTO: 0 % — SIGNIFICANT CHANGE UP (ref 0–6)
EOSINOPHIL NFR BLD AUTO: 0 % — SIGNIFICANT CHANGE UP (ref 0–6)
FLU A RESULT: SIGNIFICANT CHANGE UP
FLU A RESULT: SIGNIFICANT CHANGE UP
FLUAV AG NPH QL: SIGNIFICANT CHANGE UP
FLUBV AG NPH QL: SIGNIFICANT CHANGE UP
GLUCOSE SERPL-MCNC: 180 MG/DL — HIGH (ref 70–99)
GLUCOSE UR QL: NEGATIVE — SIGNIFICANT CHANGE UP
HCT VFR BLD CALC: 26.3 % — LOW (ref 39–50)
HCT VFR BLD CALC: 31.9 % — LOW (ref 39–50)
HGB BLD-MCNC: 11.1 G/DL — LOW (ref 13–17)
HGB BLD-MCNC: 9 G/DL — LOW (ref 13–17)
IAT COMP-SP REAG SERPL QL: ABNORMAL
IMM GRANULOCYTES NFR BLD AUTO: 0.3 % — SIGNIFICANT CHANGE UP (ref 0–1.5)
INR BLD: 1.92 RATIO — HIGH (ref 0.88–1.16)
INR BLD: 2.15 RATIO — HIGH (ref 0.88–1.16)
KETONES UR-MCNC: NEGATIVE — SIGNIFICANT CHANGE UP
LACTATE SERPL-SCNC: 3.6 MMOL/L — HIGH (ref 0.7–2)
LACTATE SERPL-SCNC: 3.9 MMOL/L — HIGH (ref 0.7–2)
LEUKOCYTE ESTERASE UR-ACNC: ABNORMAL
LYMPHOCYTES # BLD AUTO: 0.21 K/UL — LOW (ref 1–3.3)
LYMPHOCYTES # BLD AUTO: 0.3 K/UL — LOW (ref 1–3.3)
LYMPHOCYTES # BLD AUTO: 2 % — LOW (ref 13–44)
LYMPHOCYTES # BLD AUTO: 3 % — LOW (ref 13–44)
MCHC RBC-ENTMCNC: 29 PG — SIGNIFICANT CHANGE UP (ref 27–34)
MCHC RBC-ENTMCNC: 29.4 PG — SIGNIFICANT CHANGE UP (ref 27–34)
MCHC RBC-ENTMCNC: 34.2 GM/DL — SIGNIFICANT CHANGE UP (ref 32–36)
MCHC RBC-ENTMCNC: 34.8 GM/DL — SIGNIFICANT CHANGE UP (ref 32–36)
MCV RBC AUTO: 84.6 FL — SIGNIFICANT CHANGE UP (ref 80–100)
MCV RBC AUTO: 84.8 FL — SIGNIFICANT CHANGE UP (ref 80–100)
MONOCYTES # BLD AUTO: 0.14 K/UL — SIGNIFICANT CHANGE UP (ref 0–0.9)
MONOCYTES # BLD AUTO: 0.31 K/UL — SIGNIFICANT CHANGE UP (ref 0–0.9)
MONOCYTES NFR BLD AUTO: 1.4 % — LOW (ref 2–14)
MONOCYTES NFR BLD AUTO: 3 % — SIGNIFICANT CHANGE UP (ref 2–14)
NEUTROPHILS # BLD AUTO: 9.43 K/UL — HIGH (ref 1.8–7.4)
NEUTROPHILS # BLD AUTO: 9.7 K/UL — HIGH (ref 1.8–7.4)
NEUTROPHILS NFR BLD AUTO: 79 % — HIGH (ref 43–77)
NEUTROPHILS NFR BLD AUTO: 95.1 % — HIGH (ref 43–77)
NEUTS BAND # BLD: 15 % — HIGH (ref 0–8)
NITRITE UR-MCNC: NEGATIVE — SIGNIFICANT CHANGE UP
NRBC # BLD: 0 /100 WBCS — SIGNIFICANT CHANGE UP (ref 0–0)
NRBC # BLD: 0 — SIGNIFICANT CHANGE UP
NRBC # BLD: SIGNIFICANT CHANGE UP /100 WBCS (ref 0–0)
PH UR: 5 — SIGNIFICANT CHANGE UP (ref 5–8)
PLAT MORPH BLD: NORMAL — SIGNIFICANT CHANGE UP
PLATELET # BLD AUTO: 80 K/UL — LOW (ref 150–400)
PLATELET # BLD AUTO: SIGNIFICANT CHANGE UP K/UL (ref 150–400)
POIKILOCYTOSIS BLD QL AUTO: SLIGHT — SIGNIFICANT CHANGE UP
POTASSIUM SERPL-MCNC: 3.5 MMOL/L — SIGNIFICANT CHANGE UP (ref 3.5–5.3)
POTASSIUM SERPL-SCNC: 3.5 MMOL/L — SIGNIFICANT CHANGE UP (ref 3.5–5.3)
PROT SERPL-MCNC: 7 G/DL — SIGNIFICANT CHANGE UP (ref 6–8.3)
PROT UR-MCNC: 500 MG/DL
PROTHROM AB SERPL-ACNC: 22 SEC — HIGH (ref 10–12.9)
PROTHROM AB SERPL-ACNC: 24.7 SEC — HIGH (ref 10–12.9)
RBC # BLD: 3.1 M/UL — LOW (ref 4.2–5.8)
RBC # BLD: 3.77 M/UL — LOW (ref 4.2–5.8)
RBC # FLD: 14.9 % — HIGH (ref 10.3–14.5)
RBC # FLD: 15.2 % — HIGH (ref 10.3–14.5)
RBC BLD AUTO: SIGNIFICANT CHANGE UP
RSV RESULT: SIGNIFICANT CHANGE UP
RSV RNA RESP QL NAA+PROBE: SIGNIFICANT CHANGE UP
SODIUM SERPL-SCNC: 136 MMOL/L — SIGNIFICANT CHANGE UP (ref 135–145)
SP GR SPEC: 1.01 — SIGNIFICANT CHANGE UP (ref 1.01–1.02)
UROBILINOGEN FLD QL: NEGATIVE — SIGNIFICANT CHANGE UP
VARIANT LYMPHS # BLD: 1 % — SIGNIFICANT CHANGE UP (ref 0–6)
WBC # BLD: 10.32 K/UL — SIGNIFICANT CHANGE UP (ref 3.8–10.5)
WBC # BLD: 9.92 K/UL — SIGNIFICANT CHANGE UP (ref 3.8–10.5)
WBC # FLD AUTO: 10.32 K/UL — SIGNIFICANT CHANGE UP (ref 3.8–10.5)
WBC # FLD AUTO: 9.92 K/UL — SIGNIFICANT CHANGE UP (ref 3.8–10.5)

## 2020-03-14 PROCEDURE — 74177 CT ABD & PELVIS W/CONTRAST: CPT | Mod: 26

## 2020-03-14 PROCEDURE — 71045 X-RAY EXAM CHEST 1 VIEW: CPT | Mod: 26

## 2020-03-14 PROCEDURE — 86077 PHYS BLOOD BANK SERV XMATCH: CPT

## 2020-03-14 PROCEDURE — 47562 LAPAROSCOPIC CHOLECYSTECTOMY: CPT | Mod: AS

## 2020-03-14 PROCEDURE — 71260 CT THORAX DX C+: CPT | Mod: 26

## 2020-03-14 PROCEDURE — 99285 EMERGENCY DEPT VISIT HI MDM: CPT

## 2020-03-14 PROCEDURE — 70450 CT HEAD/BRAIN W/O DYE: CPT | Mod: 26

## 2020-03-14 PROCEDURE — 93010 ELECTROCARDIOGRAM REPORT: CPT

## 2020-03-14 PROCEDURE — 88304 TISSUE EXAM BY PATHOLOGIST: CPT | Mod: 26

## 2020-03-14 PROCEDURE — ZZZZZ: CPT

## 2020-03-14 RX ORDER — PIPERACILLIN AND TAZOBACTAM 4; .5 G/20ML; G/20ML
3.38 INJECTION, POWDER, LYOPHILIZED, FOR SOLUTION INTRAVENOUS ONCE
Refills: 0 | Status: COMPLETED | OUTPATIENT
Start: 2020-03-14 | End: 2020-03-14

## 2020-03-14 RX ORDER — HYDROMORPHONE HYDROCHLORIDE 2 MG/ML
0.5 INJECTION INTRAMUSCULAR; INTRAVENOUS; SUBCUTANEOUS
Refills: 0 | Status: DISCONTINUED | OUTPATIENT
Start: 2020-03-14 | End: 2020-03-15

## 2020-03-14 RX ORDER — KETOROLAC TROMETHAMINE 30 MG/ML
15 SYRINGE (ML) INJECTION ONCE
Refills: 0 | Status: DISCONTINUED | OUTPATIENT
Start: 2020-03-14 | End: 2020-03-14

## 2020-03-14 RX ORDER — ACETAMINOPHEN 500 MG
650 TABLET ORAL ONCE
Refills: 0 | Status: COMPLETED | OUTPATIENT
Start: 2020-03-14 | End: 2020-03-14

## 2020-03-14 RX ORDER — SODIUM CHLORIDE 9 MG/ML
1000 INJECTION, SOLUTION INTRAVENOUS
Refills: 0 | Status: DISCONTINUED | OUTPATIENT
Start: 2020-03-14 | End: 2020-03-15

## 2020-03-14 RX ORDER — CEFAZOLIN SODIUM 1 G
2000 VIAL (EA) INJECTION ONCE
Refills: 0 | Status: COMPLETED | OUTPATIENT
Start: 2020-03-14 | End: 2020-03-14

## 2020-03-14 RX ORDER — ACETAMINOPHEN 500 MG
1000 TABLET ORAL ONCE
Refills: 0 | Status: COMPLETED | OUTPATIENT
Start: 2020-03-14 | End: 2020-03-14

## 2020-03-14 RX ORDER — ONDANSETRON 8 MG/1
4 TABLET, FILM COATED ORAL ONCE
Refills: 0 | Status: COMPLETED | OUTPATIENT
Start: 2020-03-14 | End: 2020-03-14

## 2020-03-14 RX ORDER — PHYTONADIONE (VIT K1) 5 MG
5 TABLET ORAL ONCE
Refills: 0 | Status: COMPLETED | OUTPATIENT
Start: 2020-03-14 | End: 2020-03-14

## 2020-03-14 RX ORDER — VANCOMYCIN HCL 1 G
1000 VIAL (EA) INTRAVENOUS ONCE
Refills: 0 | Status: COMPLETED | OUTPATIENT
Start: 2020-03-14 | End: 2020-03-14

## 2020-03-14 RX ORDER — SODIUM CHLORIDE 9 MG/ML
2000 INJECTION INTRAMUSCULAR; INTRAVENOUS; SUBCUTANEOUS ONCE
Refills: 0 | Status: COMPLETED | OUTPATIENT
Start: 2020-03-14 | End: 2020-03-14

## 2020-03-14 RX ORDER — SODIUM CHLORIDE 9 MG/ML
500 INJECTION INTRAMUSCULAR; INTRAVENOUS; SUBCUTANEOUS ONCE
Refills: 0 | Status: COMPLETED | OUTPATIENT
Start: 2020-03-14 | End: 2020-03-14

## 2020-03-14 RX ORDER — SODIUM CHLORIDE 9 MG/ML
1000 INJECTION INTRAMUSCULAR; INTRAVENOUS; SUBCUTANEOUS ONCE
Refills: 0 | Status: COMPLETED | OUTPATIENT
Start: 2020-03-14 | End: 2020-03-14

## 2020-03-14 RX ORDER — METRONIDAZOLE 500 MG
500 TABLET ORAL EVERY 8 HOURS
Refills: 0 | Status: DISCONTINUED | OUTPATIENT
Start: 2020-03-14 | End: 2020-03-14

## 2020-03-14 RX ORDER — ONDANSETRON 8 MG/1
4 TABLET, FILM COATED ORAL ONCE
Refills: 0 | Status: DISCONTINUED | OUTPATIENT
Start: 2020-03-14 | End: 2020-03-15

## 2020-03-14 RX ADMIN — PIPERACILLIN AND TAZOBACTAM 3.38 GRAM(S): 4; .5 INJECTION, POWDER, LYOPHILIZED, FOR SOLUTION INTRAVENOUS at 15:23

## 2020-03-14 RX ADMIN — HYDROMORPHONE HYDROCHLORIDE 0.5 MILLIGRAM(S): 2 INJECTION INTRAMUSCULAR; INTRAVENOUS; SUBCUTANEOUS at 23:13

## 2020-03-14 RX ADMIN — Medication 650 MILLIGRAM(S): at 14:45

## 2020-03-14 RX ADMIN — Medication 15 MILLIGRAM(S): at 16:34

## 2020-03-14 RX ADMIN — SODIUM CHLORIDE 100 MILLILITER(S): 9 INJECTION, SOLUTION INTRAVENOUS at 23:38

## 2020-03-14 RX ADMIN — SODIUM CHLORIDE 1000 MILLILITER(S): 9 INJECTION INTRAMUSCULAR; INTRAVENOUS; SUBCUTANEOUS at 18:44

## 2020-03-14 RX ADMIN — ONDANSETRON 4 MILLIGRAM(S): 8 TABLET, FILM COATED ORAL at 14:54

## 2020-03-14 RX ADMIN — Medication 101 MILLIGRAM(S): at 18:44

## 2020-03-14 RX ADMIN — Medication 400 MILLIGRAM(S): at 23:43

## 2020-03-14 RX ADMIN — Medication 650 MILLIGRAM(S): at 14:15

## 2020-03-14 RX ADMIN — Medication 15 MILLIGRAM(S): at 16:59

## 2020-03-14 RX ADMIN — SODIUM CHLORIDE 500 MILLILITER(S): 9 INJECTION INTRAMUSCULAR; INTRAVENOUS; SUBCUTANEOUS at 17:01

## 2020-03-14 RX ADMIN — SODIUM CHLORIDE 2000 MILLILITER(S): 9 INJECTION INTRAMUSCULAR; INTRAVENOUS; SUBCUTANEOUS at 16:30

## 2020-03-14 RX ADMIN — Medication 250 MILLIGRAM(S): at 16:28

## 2020-03-14 RX ADMIN — Medication 1000 MILLIGRAM(S): at 17:28

## 2020-03-14 RX ADMIN — HYDROMORPHONE HYDROCHLORIDE 0.5 MILLIGRAM(S): 2 INJECTION INTRAMUSCULAR; INTRAVENOUS; SUBCUTANEOUS at 23:39

## 2020-03-14 RX ADMIN — PIPERACILLIN AND TAZOBACTAM 200 GRAM(S): 4; .5 INJECTION, POWDER, LYOPHILIZED, FOR SOLUTION INTRAVENOUS at 14:53

## 2020-03-14 RX ADMIN — SODIUM CHLORIDE 1000 MILLILITER(S): 9 INJECTION INTRAMUSCULAR; INTRAVENOUS; SUBCUTANEOUS at 19:45

## 2020-03-14 RX ADMIN — SODIUM CHLORIDE 2000 MILLILITER(S): 9 INJECTION INTRAMUSCULAR; INTRAVENOUS; SUBCUTANEOUS at 14:53

## 2020-03-14 RX ADMIN — SODIUM CHLORIDE 500 MILLILITER(S): 9 INJECTION INTRAMUSCULAR; INTRAVENOUS; SUBCUTANEOUS at 17:30

## 2020-03-14 RX ADMIN — Medication 5 MILLIGRAM(S): at 19:15

## 2020-03-14 NOTE — CONSULT NOTE ADULT - ASSESSMENT
[ASSESSMENT and  PLAN]  70yo M with hx of ITP, baseline plts 100-120. On observation. Last Plt 120 in our office 12/19/20 outpt.   Admitted with fever, Influneza A and cholecystitis early Feb 2020..   CBC then with plt clumping. Plt on blue top estimated 72. Close to pt baseline.     Last admission workup with   +lupus anticoagulant [+LAC], as cause for elevated PTT  Also then had low FII and FX, felt due to VitK def then. INR was 1.5 last admission.   Prior non-contrast CT scan AP and Abd US showed no evidence of liver disease. +splenomegaly.   Mixing study c/w inhibitor to PTT, and possible weak inhibitor to PT.       Las admission dx with KRANTHI+, with anit-C3, Cold Agglutin  Baseline mild anemia. Hgb 11, but Hgb post last admisison Hgb was ~10    DONNA with Cr 1.79.   Baseline Cr 1.2-1.4, mild CKD-2    hx low normal B12 levels.     RECOMMENDATIONS  Thrombocytopenia/ITP  B12 injections 1000mcg while hospitalized to replete levels, to minimize risk for further throbocytopenia from this cause.   Follow CBC, no intervention recommended for chronic thrombocytopenia.   Plts adequate at current time.    Needs additional workup for factor inhibitor:  Await FVIII levels, FIX levels, FX levels, FVII levels, FII levels  Check LAC, MICHELLE.     KRANTHI+, mild anemia  Check Tbili, Dbili  Check haptoglogin, LDH.   Check retic.   S/p Folic acid IM.  Start PO Folic acid daily.     Need eventual CT scan CAP with IV contrast, neck, Chest, Abd, Pelvis to eval for lymphadenopathy.   Defer for now as DONNA with CKD.     Treatment of  acute cholecystitis.   Mgmt per surgery.   Abx as warranted.   Diet as per surgery team.     Determinate of whether fever due to infectious causes, eg acute cholecystis or other source, eg lymphoproliferative disorder. .     IVF for DONNA.   Follow Cr.     DVT Prophylaxis SCD, if plan for possible surgery.   Adequate for SQ heparin if high risk for DVT, eg minimal ambulation.   Final decision on AC, dependent on surgical plans.     Thank you for consulting us. [ASSESSMENT and  PLAN]  68yo M admitted with acute perforated cholecystitis.   s/p emergent surgery    with hx of ITP, baseline plts 100-120. On observation. Plt 120 in our office on 12/19/20   Admitted to [facility] early Feb 2020 with fever, Influenza A and cholecystitis.   CBC then with plt clumping. Plt on blue top estimated 72.     Last admission workup with   Mixing study c/w inhibitor to PTT, and possible weak inhibitor to PT.   +lupus anticoagulant [+LAC], as cause for elevated PTT  Also then had low FII and FX, felt due to VitK def then, possible cause for abn PT. INR was 1.5 last admission.   Finally also with borderline low FXI levels 46%. 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     This admission may have similar issues but in addition sepsis.   May be both at risk for bleeding and for clotting    Prior non-contrast CT scan AP and Abd US showed no evidence of liver disease. +splenomegaly.     Las admission dx with KRANTHI+, with anit-C3, Cold Agglutin  Baseline mild anemia. Hgb 11.  Post DC from hospital, Hgb has been ~ 10 in office.     DONNA with Cr 1.6  Baseline Cr 1.2-1.4, mild CKD-2    hx low normal B12 levels.       [RECOMMENDATIONS]  Perforated acute cholecystitis.   Mgmt per surgery.  S/p surgery  IV abx  Diet as per surgery team.     Thrombocytopenia/ITP  Plts adequate for planned surgery.   Follow CBC, no intervention recommended for chronic thrombocytopenia.     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 Some slight bleeding due to inflammation.   Give additional 1 unit FFP later in evening ~3AM.   Monitor for bleeding.     +LAC, DVT Prophylaxis SC.   high risk for DVT.  Encourage ambulation  Start SQ Heparin or LMWH for DVT prophylaxis post surgery tomorrow if no concerns for bleeding   SCD in meanwhile.     KRANTHI+, mild anemia  Continue Folic acid daily.   CT scan CAP this visit with IV contrast last visit negative for BRANT and HSM.  Last CT scan AP without contrast showed mild splenomegaly.   At risk for development of lymphoma.     DONNA  s/p 3L IVF for DONNA.   Follow Cr.   Thank you for consulting us.

## 2020-03-14 NOTE — CONSULT NOTE ADULT - SUBJECTIVE AND OBJECTIVE BOX
REASON FOR CONSULT: Post op Acute cholecystitis     CONSULT REQUESTED BY: Dr. Pfeiffer     Patient is a 69y old  Male who presents with a chief complaint of perforated acute errol with sepsis (14 Mar 2020 20:58)      BRIEF HOSPITAL COURSE:   Patient is a 69 year old male with a pmhx of  HTN, ITP, DONNA who presents with fever, weakness and rigors. Patient reports that he fell during one the rigor episodes and was not able to get up. Of note patient seen at [facility] several weeks ago, where he had gallstones on sono but normal HIDA scan. Upon presentation to the ED, patient had lactate of 3.9 and fever to 105.5f. STAT CT abdomen done showing perforated cholecystitis with adjacent hepatic abscess. He was then take emergently to the OR for laparoscopic cholecystomy. ICU called post op, patient had episode of hypotension prior to case, resolved with fluid. Patient now seen and examined post op, SBP 87/50, currently getting fluid bolus. Pt c/o mild abd pain, denies CP, SOB, N/V, HA and dizziness. Will admit to MICU for care overnight.       PAST MEDICAL & SURGICAL HISTORY:  History of antiphospholipid antibody syndrome  Idiopathic thrombocytopenic purpura (ITP)  Hypertension  Anxiety  Enlarged tonsils: s/p tonsillectomy    Allergies    sulfa drugs (Unknown)    Intolerances      FAMILY HISTORY:  Denies family hx    Review of Systems:  CONSTITUTIONAL: No fever, chills, + fatigue  EYES: No eye pain, visual disturbances, or discharge  ENMT:  No difficulty hearing, tinnitus, vertigo; No sinus or throat pain  NECK: No pain or stiffness  RESPIRATORY: No cough, wheezing, chills or hemoptysis; No shortness of breath  CARDIOVASCULAR: No chest pain, palpitations, dizziness, or leg swelling  GASTROINTESTINAL: No abdominal or epigastric pain. No nausea, vomiting, or hematemesis; No diarrhea or constipation. No melena or hematochezia.  GENITOURINARY: No dysuria, frequency, hematuria, or incontinence  NEUROLOGICAL: No headaches, memory loss, loss of strength, numbness, or tremors  SKIN: No itching, burning, rashes, or lesions   MUSCULOSKELETAL: No joint pain or swelling; No muscle, back, or extremity pain  PSYCHIATRIC: No depression, anxiety, mood swings, or difficulty sleeping      Medications:  HYDROmorphone  Injectable 0.5 milliGRAM(s) IV Push every 10 minutes PRN  ondansetron Injectable 4 milliGRAM(s) IV Push once PRN  lactated ringers. 1000 milliLiter(s) IV Continuous <Continuous>    ICU Vital Signs Last 24 Hrs  T(C): 36.5 (14 Mar 2020 22:52), Max: 40.8 (14 Mar 2020 14:09)  T(F): 97.7 (14 Mar 2020 22:52), Max: 105.5 (14 Mar 2020 14:09)  HR: 80 (14 Mar 2020 23:27) (79 - 114)  BP: 90/47 (14 Mar 2020 23:27) (86/46 - 144/70)  RR: 15 (14 Mar 2020 23:27) (12 - 29)  SpO2: 97% (14 Mar 2020 23:) (93% - 98%)      I&O's Detail    14 Mar 2020 07:01  -  14 Mar 2020 23:48  --------------------------------------------------------  IN:  Total IN: 0 mL    OUT:    Bulb: 50 mL  Total OUT: 50 mL    Total NET: -50 mL            LABS:                        9.0    10.32 )-----------( 80       ( 14 Mar 2020 18:34 )             26.3     03-14    136  |  99  |  15  ----------------------------<  180<H>  3.5   |  26  |  1.60<H>    Ca    9.0      14 Mar 2020 14:39    TPro  7.0  /  Alb  3.2<L>  /  TBili  1.8<H>  /  DBili  x   /  AST  56<H>  /  ALT  54  /  AlkPhos  100  03-14          CAPILLARY BLOOD GLUCOSE      POCT Blood Glucose.: 219 mg/dL (14 Mar 2020 14:26)    PT/INR - ( 14 Mar 2020 18:34 )   PT: 24.7 sec;   INR: 2.15 ratio         PTT - ( 14 Mar 2020 18:34 )  PTT:53.7 sec  Urinalysis Basic - ( 14 Mar 2020 14:42 )    Color: Yellow / Appearance: Clear / S.015 / pH: x  Gluc: x / Ketone: Negative  / Bili: Negative / Urobili: Negative   Blood: x / Protein: 500 mg/dL / Nitrite: Negative   Leuk Esterase: Trace / RBC: 6-10 /HPF / WBC 0-2   Sq Epi: x / Non Sq Epi: Few / Bacteria: Few      CULTURES:      Physical Examination:    General: No acute distress.  alert, still groggy post op     HEENT: Pupils equal, reactive to light.  Symmetric.    PULM: Clear to auscultation bilaterally, no significant sputum production    CVS: Regular rate and rhythm, no murmurs, rubs, or gallops    ABD: Soft, nondistended, mildly tender to Left flank, ERAN in place c/d/i,  normoactive bowel sounds, no masses    EXT: No edema, nontender    SKIN: Warm and well perfused, no rashes noted.    Neuro: alert and oriented x 3, moves all extr, follows commands    RADIOLOGY:  < from: CT Abdomen and Pelvis w/ IV Cont (20 @ 17:39) >  INTERPRETATION:  CT CHEST WITH IV CONTRAST, CT ABDOMEN AND PELVIS WITH IV CONTRAST    CLINICAL INFORMATION: sepsis      COMPARISON: None.    PROCEDURE:   CT of the Chest, Abdomen and Pelvis was performed with intravenous contrast.  Intravenous contrast: 95 cc Omnipaque 350  Oral contrast: None.  Sagittal and coronal reformats were performed.    FINDINGS:    CHEST:     LUNGS, AIRWAYS: The central airways are patent. The lungs are clear.  PLEURA: Small bilateral effusions.  VESSELS: Normal caliber aorta. Main pulmonary arteries are patent.  HEART: Normal heart size. No pericardial effusion.  MEDIASTINUM AND WESTON: No adenopathy.  CHEST WALL: No masses.    ABDOMEN AND PELVIS:    LIVER: As below  BILE DUCTS: Nondilated.  GALLBLADDER: Gallstones with diffuse wall thickening, luminal distention, and pericholecystic inflammatory change. Defect in the gallbladder wall with adjacent 3.5 cm hepatic collection.  SPLEEN: Normal.  PANCREAS: Normal.  ADRENALS: Normal.  KIDNEYS/URETERS: No calculi, hydronephrosis, or soft tissue attenuating mass.    BLADDER: Underdistended limiting evaluation.  REPRODUCTIVE ORGANS: Enlarged prostate.    BOWEL: No bowel-related abnormality. Specifically, no evidence of acute diverticulitis. Normal ileocecal region. No bowel obstruction or bowel inflammation.  PERITONEUM: Small ascites. No free air or collection.  VESSELS:  Normal caliber aorta.  RETROPERITONEUM/LYMPH NODES: No adenopathy.    ABDOMINAL WALL: Normal.  BONES: No acute bony abnormality.    IMPRESSION:     Perforated cholecystitis with adjacent 3.5 cm hepatic abscess.    No acute chest pathology. Clear lungs aside from small bilateral pleural effusions. REASON FOR CONSULT: Post op Acute cholecystitis     CONSULT REQUESTED BY: Dr. Pfeiffer     Patient is a 69y old  Male who presents with a chief complaint of perforated acute errol with sepsis (14 Mar 2020 20:58)      BRIEF HOSPITAL COURSE:   Patient is a 69 year old male with a pmhx of  HTN, ITP, DONNA who presents with fever, weakness and rigors. Patient reports that he fell during one the rigor episodes and was not able to get up. Of note patient seen at [facility] several weeks ago, where he had gallstones on sono but normal HIDA scan. Upon presentation to the ED, patient had lactate of 3.9 and fever to 105.5f. STAT CT abdomen done showing perforated cholecystitis with adjacent hepatic abscess. He was then take emergently to the OR for laparoscopic cholecystomy with drainage of pericholecystic abscess. ICU called post op, patient had episode of hypotension prior to case, resolved with fluid. Patient now seen and examined post op, SBP 87/50, currently getting fluid bolus. Pt c/o mild abd pain, denies CP, SOB, N/V, HA and dizziness. Will admit to MICU for care overnight.       PAST MEDICAL & SURGICAL HISTORY:  History of antiphospholipid antibody syndrome  Idiopathic thrombocytopenic purpura (ITP)  Hypertension  Anxiety  Enlarged tonsils: s/p tonsillectomy    Allergies    sulfa drugs (Unknown)    Intolerances      FAMILY HISTORY:  Denies family hx    Review of Systems:  CONSTITUTIONAL: No fever, chills, + fatigue  EYES: No eye pain, visual disturbances, or discharge  ENMT:  No difficulty hearing, tinnitus, vertigo; No sinus or throat pain  NECK: No pain or stiffness  RESPIRATORY: No cough, wheezing, chills or hemoptysis; No shortness of breath  CARDIOVASCULAR: No chest pain, palpitations, dizziness, or leg swelling  GASTROINTESTINAL: No abdominal or epigastric pain. No nausea, vomiting, or hematemesis; No diarrhea or constipation. No melena or hematochezia.  GENITOURINARY: No dysuria, frequency, hematuria, or incontinence  NEUROLOGICAL: No headaches, memory loss, loss of strength, numbness, or tremors  SKIN: No itching, burning, rashes, or lesions   MUSCULOSKELETAL: No joint pain or swelling; No muscle, back, or extremity pain  PSYCHIATRIC: No depression, anxiety, mood swings, or difficulty sleeping      Medications:  HYDROmorphone  Injectable 0.5 milliGRAM(s) IV Push every 10 minutes PRN  ondansetron Injectable 4 milliGRAM(s) IV Push once PRN  lactated ringers. 1000 milliLiter(s) IV Continuous <Continuous>    ICU Vital Signs Last 24 Hrs  T(C): 36.5 (14 Mar 2020 22:52), Max: 40.8 (14 Mar 2020 14:09)  T(F): 97.7 (14 Mar 2020 22:52), Max: 105.5 (14 Mar 2020 14:09)  HR: 80 (14 Mar 2020 23:27) (79 - 114)  BP: 90/47 (14 Mar 2020 23:27) (86/46 - 144/70)  RR: 15 (14 Mar 2020 23:27) (12 - 29)  SpO2: 97% (14 Mar 2020 23:27) (93% - 98%)      I&O's Detail    14 Mar 2020 07:01  -  14 Mar 2020 23:48  --------------------------------------------------------  IN:  Total IN: 0 mL    OUT:    Bulb: 50 mL  Total OUT: 50 mL    Total NET: -50 mL            LABS:                        9.0    10.32 )-----------( 80       ( 14 Mar 2020 18:34 )             26.3     03-14    136  |  99  |  15  ----------------------------<  180<H>  3.5   |  26  |  1.60<H>    Ca    9.0      14 Mar 2020 14:39    TPro  7.0  /  Alb  3.2<L>  /  TBili  1.8<H>  /  DBili  x   /  AST  56<H>  /  ALT  54  /  AlkPhos  100  03-14          CAPILLARY BLOOD GLUCOSE      POCT Blood Glucose.: 219 mg/dL (14 Mar 2020 14:26)    PT/INR - ( 14 Mar 2020 18:34 )   PT: 24.7 sec;   INR: 2.15 ratio         PTT - ( 14 Mar 2020 18:34 )  PTT:53.7 sec  Urinalysis Basic - ( 14 Mar 2020 14:42 )    Color: Yellow / Appearance: Clear / S.015 / pH: x  Gluc: x / Ketone: Negative  / Bili: Negative / Urobili: Negative   Blood: x / Protein: 500 mg/dL / Nitrite: Negative   Leuk Esterase: Trace / RBC: 6-10 /HPF / WBC 0-2   Sq Epi: x / Non Sq Epi: Few / Bacteria: Few      CULTURES:      Physical Examination:    General: No acute distress.  alert, still groggy post op     HEENT: Pupils equal, reactive to light.  Symmetric.    PULM: Clear to auscultation bilaterally, no significant sputum production    CVS: Regular rate and rhythm, no murmurs, rubs, or gallops    ABD: Soft, nondistended, mildly tender to Left flank, ERAN in place c/d/i,  normoactive bowel sounds, no masses    EXT: No edema, nontender    SKIN: Warm and well perfused, no rashes noted.    Neuro: alert and oriented x 3, moves all extr, follows commands    RADIOLOGY:  < from: CT Abdomen and Pelvis w/ IV Cont (20 @ 17:39) >  INTERPRETATION:  CT CHEST WITH IV CONTRAST, CT ABDOMEN AND PELVIS WITH IV CONTRAST    CLINICAL INFORMATION: sepsis      COMPARISON: None.    PROCEDURE:   CT of the Chest, Abdomen and Pelvis was performed with intravenous contrast.  Intravenous contrast: 95 cc Omnipaque 350  Oral contrast: None.  Sagittal and coronal reformats were performed.    FINDINGS:    CHEST:     LUNGS, AIRWAYS: The central airways are patent. The lungs are clear.  PLEURA: Small bilateral effusions.  VESSELS: Normal caliber aorta. Main pulmonary arteries are patent.  HEART: Normal heart size. No pericardial effusion.  MEDIASTINUM AND WESTON: No adenopathy.  CHEST WALL: No masses.    ABDOMEN AND PELVIS:    LIVER: As below  BILE DUCTS: Nondilated.  GALLBLADDER: Gallstones with diffuse wall thickening, luminal distention, and pericholecystic inflammatory change. Defect in the gallbladder wall with adjacent 3.5 cm hepatic collection.  SPLEEN: Normal.  PANCREAS: Normal.  ADRENALS: Normal.  KIDNEYS/URETERS: No calculi, hydronephrosis, or soft tissue attenuating mass.    BLADDER: Underdistended limiting evaluation.  REPRODUCTIVE ORGANS: Enlarged prostate.    BOWEL: No bowel-related abnormality. Specifically, no evidence of acute diverticulitis. Normal ileocecal region. No bowel obstruction or bowel inflammation.  PERITONEUM: Small ascites. No free air or collection.  VESSELS:  Normal caliber aorta.  RETROPERITONEUM/LYMPH NODES: No adenopathy.    ABDOMINAL WALL: Normal.  BONES: No acute bony abnormality.    IMPRESSION:     Perforated cholecystitis with adjacent 3.5 cm hepatic abscess.    No acute chest pathology. Clear lungs aside from small bilateral pleural effusions. REASON FOR CONSULT: Post op Acute cholecystitis     CONSULT REQUESTED BY: Dr. Pfeiffer     Patient is a 69y old  Male who presents with a chief complaint of perforated acute errol with sepsis (14 Mar 2020 20:58)      BRIEF HOSPITAL COURSE:   Patient is a 69 year old male with a pmhx of  HTN, ITP, DONNA who presents with fever, weakness and rigors. Patient reports that he fell during one the rigor episodes and was not able to get up. Of note patient seen at [facility] several weeks ago, where he had gallstones on sono but normal HIDA scan. Upon presentation to the ED, patient had lactate of 3.9 and fever to 105.5f. STAT CT abdomen done showing perforated cholecystitis with adjacent hepatic abscess. He was then take emergently to the OR for laparoscopic cholecystomy with drainage of pericholecystic abscess. ICU called post op, patient had episode of hypotension prior to case, resolved with fluid. Patient now seen and examined post op, SBP 87/50, currently getting fluid bolus. Pt c/o mild abd pain, denies CP, SOB, N/V, HA and dizziness. Will admit to MICU for care overnight.       PAST MEDICAL & SURGICAL HISTORY:  History of antiphospholipid antibody syndrome  Idiopathic thrombocytopenic purpura (ITP)  Hypertension  Anxiety  Enlarged tonsils: s/p tonsillectomy    Allergies    sulfa drugs (Unknown)    Intolerances      FAMILY HISTORY:  Denies family hx    Social hx:  Denies smoking and drinking history     Review of Systems:  CONSTITUTIONAL: No fever, chills, + fatigue  EYES: No eye pain, visual disturbances, or discharge  ENMT:  No difficulty hearing, tinnitus, vertigo; No sinus or throat pain  NECK: No pain or stiffness  RESPIRATORY: No cough, wheezing, chills or hemoptysis; No shortness of breath  CARDIOVASCULAR: No chest pain, palpitations, dizziness, or leg swelling  GASTROINTESTINAL: No abdominal or epigastric pain. No nausea, vomiting, or hematemesis; No diarrhea or constipation. No melena or hematochezia.  GENITOURINARY: No dysuria, frequency, hematuria, or incontinence  NEUROLOGICAL: No headaches, memory loss, loss of strength, numbness, or tremors  SKIN: No itching, burning, rashes, or lesions   MUSCULOSKELETAL: No joint pain or swelling; No muscle, back, or extremity pain  PSYCHIATRIC: No depression, anxiety, mood swings, or difficulty sleeping      Medications:  HYDROmorphone  Injectable 0.5 milliGRAM(s) IV Push every 10 minutes PRN  ondansetron Injectable 4 milliGRAM(s) IV Push once PRN  lactated ringers. 1000 milliLiter(s) IV Continuous <Continuous>    ICU Vital Signs Last 24 Hrs  T(C): 36.5 (14 Mar 2020 22:52), Max: 40.8 (14 Mar 2020 14:09)  T(F): 97.7 (14 Mar 2020 22:52), Max: 105.5 (14 Mar 2020 14:09)  HR: 80 (14 Mar 2020 23:27) (79 - 114)  BP: 90/47 (14 Mar 2020 23:27) (86/46 - 144/70)  RR: 15 (14 Mar 2020 23:27) (12 - 29)  SpO2: 97% (14 Mar 2020 23:27) (93% - 98%)      I&O's Detail    14 Mar 2020 07:01  -  14 Mar 2020 23:48  --------------------------------------------------------  IN:  Total IN: 0 mL    OUT:    Bulb: 50 mL  Total OUT: 50 mL    Total NET: -50 mL            LABS:                        9.0    10.32 )-----------( 80       ( 14 Mar 2020 18:34 )             26.3     03-14    136  |  99  |  15  ----------------------------<  180<H>  3.5   |  26  |  1.60<H>    Ca    9.0      14 Mar 2020 14:39    TPro  7.0  /  Alb  3.2<L>  /  TBili  1.8<H>  /  DBili  x   /  AST  56<H>  /  ALT  54  /  AlkPhos  100  03-14          CAPILLARY BLOOD GLUCOSE      POCT Blood Glucose.: 219 mg/dL (14 Mar 2020 14:26)    PT/INR - ( 14 Mar 2020 18:34 )   PT: 24.7 sec;   INR: 2.15 ratio         PTT - ( 14 Mar 2020 18:34 )  PTT:53.7 sec  Urinalysis Basic - ( 14 Mar 2020 14:42 )    Color: Yellow / Appearance: Clear / S.015 / pH: x  Gluc: x / Ketone: Negative  / Bili: Negative / Urobili: Negative   Blood: x / Protein: 500 mg/dL / Nitrite: Negative   Leuk Esterase: Trace / RBC: 6-10 /HPF / WBC 0-2   Sq Epi: x / Non Sq Epi: Few / Bacteria: Few      CULTURES:      Physical Examination:    General: No acute distress.  alert, still groggy post op     HEENT: Pupils equal, reactive to light.  Symmetric.    PULM: Clear to auscultation bilaterally, no significant sputum production    CVS: Regular rate and rhythm, no murmurs, rubs, or gallops    ABD: Soft, nondistended, mildly tender to Left flank, ERAN in place c/d/i,  normoactive bowel sounds, no masses    EXT: No edema, nontender    SKIN: Warm and well perfused, no rashes noted.    Neuro: alert and oriented x 3, moves all extr, follows commands    RADIOLOGY:  < from: CT Abdomen and Pelvis w/ IV Cont (20 @ 17:39) >  INTERPRETATION:  CT CHEST WITH IV CONTRAST, CT ABDOMEN AND PELVIS WITH IV CONTRAST    CLINICAL INFORMATION: sepsis      COMPARISON: None.    PROCEDURE:   CT of the Chest, Abdomen and Pelvis was performed with intravenous contrast.  Intravenous contrast: 95 cc Omnipaque 350  Oral contrast: None.  Sagittal and coronal reformats were performed.    FINDINGS:    CHEST:     LUNGS, AIRWAYS: The central airways are patent. The lungs are clear.  PLEURA: Small bilateral effusions.  VESSELS: Normal caliber aorta. Main pulmonary arteries are patent.  HEART: Normal heart size. No pericardial effusion.  MEDIASTINUM AND WESTON: No adenopathy.  CHEST WALL: No masses.    ABDOMEN AND PELVIS:    LIVER: As below  BILE DUCTS: Nondilated.  GALLBLADDER: Gallstones with diffuse wall thickening, luminal distention, and pericholecystic inflammatory change. Defect in the gallbladder wall with adjacent 3.5 cm hepatic collection.  SPLEEN: Normal.  PANCREAS: Normal.  ADRENALS: Normal.  KIDNEYS/URETERS: No calculi, hydronephrosis, or soft tissue attenuating mass.    BLADDER: Underdistended limiting evaluation.  REPRODUCTIVE ORGANS: Enlarged prostate.    BOWEL: No bowel-related abnormality. Specifically, no evidence of acute diverticulitis. Normal ileocecal region. No bowel obstruction or bowel inflammation.  PERITONEUM: Small ascites. No free air or collection.  VESSELS:  Normal caliber aorta.  RETROPERITONEUM/LYMPH NODES: No adenopathy.    ABDOMINAL WALL: Normal.  BONES: No acute bony abnormality.    IMPRESSION:     Perforated cholecystitis with adjacent 3.5 cm hepatic abscess.    No acute chest pathology. Clear lungs aside from small bilateral pleural effusions.

## 2020-03-14 NOTE — CONSULT NOTE ADULT - ASSESSMENT
Patient is a 69 year old male with a pmhx of  HTN, ITP, EUGENIO who presents with fever, weakness and rigors, found to have:    1. Acute cholecystitis with perforation   2. Severe sepsis  3. Eugenio vs CKD  4. Lactic acidosis   5. Thrombocytopenia     Plan:  Neuro: Pain control w/ Dilaudid prn. holding valium and fluvoxamine postop   Cardio: Maintain MAP above 65, currently hypotensive post-op BP 87/50. Currently receiving fluid challenge Will add additional fluid bolus. May need pressors if BP does not respond to fluid. Hold norvasc, atenolol and losartan  Pulm: Extubated post op, keep sats above 92%  GI: CTA showing perforated acute cholecystitis with perforation and hepatic abcess. now POD #0 lap errol. keep NPO. advance diet per surgery   Renal: EUGENIO on CKD? do not have baseline number. possible ischemic ATN from hypotension prior to case. Avoid nephrotoxic drugs, strict I/Os, renally dose abx  ID: Cont [email or web address]. Follow up blood cultures rigors concerning for bacteremia   Endo: No active issues,  Heme: Holding DVT ppx until cleared by surgery. Cont SCDs. 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 At risk for DVT and bleeding per heme/onc     Dispo: Admit to MICU, case d/w eICU Patient is a 69 year old male with a pmhx of  HTN, ITP, EUGENIO who presents with fever, weakness and rigors, found to have:    1. Acute cholecystitis with perforation   2. Severe sepsis  3. Eugenio vs CKD  4. Lactic acidosis   5. Thrombocytopenia   6. Factor XI deficiency     Plan:  Neuro: Pain control w/ Dilaudid prn. holding valium and fluvoxamine postop   Cardio: Maintain MAP above 65, currently hypotensive post-op BP 87/50. Currently receiving fluid challenge Will add additional fluid bolus. May need pressors if BP does not respond to fluid. Hold norvasc, atenolol and losartan  Pulm: Extubated post op, keep sats above 92%  GI: CTA showing perforated acute cholecystitis with perforation and hepatic abcess. now POD #0 lap errol  with drainage of pericholecystic abscess. keep NPO. advance diet per surgery   Renal: EUGENIO on CKD? do not have baseline levels. possible ischemic ATN from hypotension prior to case? Avoid nephrotoxic drugs, strict I/Os, renally dose abx  ID: Cont [email or web address]. Follow up blood cultures rigors concerning for bacteremia   Endo: No active issues,  Heme: Holding DVT ppx until cleared by surgery. Cont SCDs. 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 At risk for DVT and bleeding per heme/onc     Dispo: Admit to MICU, case d/w eICU

## 2020-03-14 NOTE — BRIEF OPERATIVE NOTE - NSICDXBRIEFPOSTOP_GEN_ALL_CORE_FT
POST-OP DIAGNOSIS:  Acute cholecystitis 14-Mar-2020 22:39:11 with perforation and abscess Kj Pfeiffer

## 2020-03-14 NOTE — ED ADULT NURSE NOTE - CHIEF COMPLAINT QUOTE
brought in by EMS from home for complaints of flu like symptoms - fever Tmax 104, body aches, chills, weakness, cough. patient was recently in [facility] for "a gallbladder issue and the flu". patient denies recent travel or contact with anyone who has been sick

## 2020-03-14 NOTE — H&P ADULT - PROBLEM SELECTOR PLAN 1
Lap or open cholecystectomy, possible lap or open partial cholecystectomy  Transfuse 1 unit FFP before and/or during surgery  The indication for, alternatives to and possible complications of the procedure were discussed with the patient, and all questions were answered

## 2020-03-14 NOTE — ED ADULT NURSE NOTE - NSIMPLEMENTINTERV_GEN_ALL_ED
Patient/Caregiver provided printed discharge information. Implemented All Fall with Harm Risk Interventions:  [place] to call system. Call bell, personal items and telephone within reach. Instruct patient to call for assistance. Room bathroom lighting operational. Non-slip footwear when patient is off stretcher. Physically safe environment: no spills, clutter or unnecessary equipment. Stretcher in lowest position, wheels locked, appropriate side rails in place. Provide visual cue, wrist band, yellow gown, etc. Monitor gait and stability. Monitor for mental status changes and reorient to person, place, and time. Review medications for side effects contributing to fall risk. Reinforce activity limits and safety measures with patient and family. Provide visual clues: red socks.

## 2020-03-14 NOTE — ED PROVIDER NOTE - CLINICAL SUMMARY MEDICAL DECISION MAKING FREE TEXT BOX
pt with fever and cough without sick contacts or exposure to covid-19. will do labs per sepsis protocol, ivf, abx, tylenol, ct chest to r/o pna, ct abd/pelvis to r/o other pathology, will re-eval pt with fever and cough without sick contacts or exposure to covid-19. will do labs per sepsis protocol, ivf, abx, tylenol, ct chest to r/o pna, ct abd/pelvis to r/o other pathology, will re-eval.  pt with perfed errol,  surg and icu consult, ffp, admit icu

## 2020-03-14 NOTE — H&P ADULT - NSHPLABSRESULTS_GEN_ALL_CORE
CT: perforated acute gallbladder with 3.5 cm abscess in adjacent liver  lactate  3.9.  repeat 3.6   platelets over 80K  WBC <10  Hb 11  INR 2.15  Discussed with Dr Pepper

## 2020-03-14 NOTE — ED PROVIDER NOTE - PROGRESS NOTE DETAILS
sepsis reassessment: pt mentating better feeling better hr now down to 103 sat 95%  awaiting results of ct prior to admission sepsis reassessment: pt mentating better feeling better hr now down to  sat 95%  104/64 awaiting results of ct prior to admission still febrile per rectal probe 104, will give toradol for fever tylenol given 2 h ago pt with ruptured colecystitis on ct scan, bp 100 systolic.  pt reexamined and abd non acute, icu consulted, surg consult, ivf, pt received abx, will repeat labs

## 2020-03-14 NOTE — CONSULT NOTE ADULT - SUBJECTIVE AND OBJECTIVE BOX
INCOMPLETE NOTE.  Documentation in Progress  PT SEEN AND EVALUATED.     FULL/ADDITIONAL RECOMMENDATIONS TO FOLLOW   ***************************************************************    SEE CHART# [id number]  Last Admitted to [facility] 2020    Patient is a 69y old  Male who presents with a chief complaint of perforated acute errol with sepsis (14 Mar 2020 20:00)      HPI:  Pt is a 69 year old man who had onset 2 days ago of fever, RUQ ruq pain and vomiting.  Had same pain 5 weeks ago, seen at [facility] where he had gallstones on sono but normal HIDA scan.  Sent home  Never jaundiced (14 Mar 2020 20:00)      Heme asked to see for thrombocytopenia, coagulopathy, hx of ITP, hx of KRANTHI+, compensated AIHA, splenomegaly.   Admitted with perforated GB.   Had episode oc cholecystitis in 2020, and Influneza A also at time.   Pt known to our office, followed by Dr Aldridge since .   PMD Dr Rusty Calderon.   Plt low due to presumed ITP, never required tx.   Baseline plts typically ~100-120.   No known hx of cirrhosis or liver disease. No hx coagulopathy but no office labs to corroborate normal coag panel.   dx prior with LAC+  On ASA outpt, not known to be on anticoagulants.   On depakote for unclear reasons, prior, but stopped in 2019  hx of high Glu        PAST MEDICAL & SURGICAL HISTORY:  History of antiphospholipid antibody syndrome  Idiopathic thrombocytopenic purpura (ITP)  Hypertension  Anxiety  Enlarged tonsils: s/p tonsillectomy    Anxiety  Hypertension, unspecified type  Tonsillectomy at 10yo  Mild CKD Cr baseline 1.2-1.4  Elevated Glu  Osteoarthritis,   PSHx: H/O Mohs micrographic surgery for skin cancer  Home meds:  atenolol 50mg bid, Olmesartan 40mgd daily, diazepam 5mg daily PRN, vitamin D        HEALTH ISSUES - PROBLEM Dx:  Sepsis, due to unspecified organism, unspecified whether acute organ dysfunction present: Sepsis, due to unspecified organism, unspecified whether acute organ dysfunction present  Acute cholecystitis: Acute cholecystitis          Acute cholecystitis (K81.0)  History of antiphospholipid antibody syndrome (Z86.2)  Idiopathic thrombocytopenic purpura (ITP) (D69.3)  Hypertension (I10)  Anxiety (F41.9)  Enlarged tonsils (J35.1)  No significant past surgical history ([id number])  Sepsis (A41.9)    FAMILY HISTORY:  as above      [SOCIAL HISTORY: ]     smoking:  non-smoker     EtOH:  denies     illicit drugs:  denies     occupation:  retired     marital status:       Other:       [ALLERGIES/INTOLERANCES:]  Allergies      No Known Allergies  Intolerances        [MEDICATIONS]  MEDICATIONS  (STANDING):  ceFAZolin   IVPB 2000 milliGRAM(s) IV Intermittent once  metroNIDAZOLE  IVPB 500 milliGRAM(s) IV Intermittent every 8 hours    MEDICATIONS  (PRN):        [REVIEW OF SYSTEMS: ]  CONSTITUTIONAL: normal, no fever, no shakes, no chills   EYES: No eye pain, no visual disturbances, no discharge  ENMT:  no discharge  NECK: No pain, no stiffness  BREASTS: No pain, no masses, no nipple discharge  RESPIRATORY: No cough, no wheezing, no chills, no hemoptysis; No shortness of breath  CARDIOVASCULAR: No chest pain, no palpitations, no dizziness, no leg swelling  GASTROINTESTINAL: No abdominal, no epigastric pain. No nausea, no vomiting, no hematemesis; No diarrhea , no constipation. No melena, no hematochezia.  GENITOURINARY: No dysuria, no frequency, no hematuria, no incontinence  NEUROLOGICAL: No headaches, no memory loss, no loss of strength, no numbness, no tremors  SKIN: No itching, no burning, no rashes, no lesions   LYMPH NODES: No enlarged glands  ENDOCRINE: No heat or cold intolerance; No hair loss  MUSCULOSKELETAL: No joint pain or swelling; No muscle, no back, no extremity pain  PSYCHIATRIC: No depression, no anxiety, no mood swings, no difficulty sleeping  HEME/LYMPH: No easy bruising, no bleeding gums      [VITALS SIGNS 24hrs]  Vital Signs Last 24 Hrs  T(C): 37.3 (14 Mar 2020 20:03), Max: 40.8 (14 Mar 2020 14:09)  T(F): 99.2 (14 Mar 2020 20:03), Max: 105.5 (14 Mar 2020 14:09)  HR: 88 (14 Mar 2020 20:03) (88 - 114)  BP: 111/62 (14 Mar 2020 20:03) (90/50 - 144/70)  BP(mean): --  RR: 20 (14 Mar 2020 20:03) (15 - 20)  SpO2: 95% (14 Mar 2020 20:03) (93% - 98%)    [PHYSICAL EXAM]  General: adult in NAD,  WN,  WD.  HEENT: clear oropharynx, anicteric sclera, pink conjunctivae.  Neck: supple, no masses.  CV: normal S1S2, no murmur, no rubs, no gallops.  Lungs: clear to auscultation, no wheezes, no rales, no rhonchi.  Abdomen: soft, non-tender, non-distended, no hepatosplenomegaly, normal BS, no guarding.  Ext: no clubbing, no cyanosis, no edema.  Skin: no rashes,  no petechiae, no venous stasis changes.  Neuro: alert and oriented X3, no focal motor deficits.  LN: no SC BRANT.      [LABS:]                        9.0    10.32 )-----------( 80       ( 14 Mar 2020 18:34 )             26.3     CBC Full  -  ( 14 Mar 2020 18:34 )  WBC Count : 10.32 K/uL  RBC Count : 3.10 M/uL  Hemoglobin : 9.0 g/dL  Hematocrit : 26.3 %  Platelet Count - Automated : 80 K/uL  Mean Cell Volume : 84.8 fl  Mean Cell Hemoglobin : 29.0 pg  Mean Cell Hemoglobin Concentration : 34.2 gm/dL  Auto Neutrophil # : 9.70 K/uL  Auto Lymphocyte # : 0.21 K/uL  Auto Monocyte # : 0.31 K/uL  Auto Eosinophil # : 0.00 K/uL  Auto Basophil # : 0.00 K/uL  Auto Neutrophil % : 79.0 %  Auto Lymphocyte % : 2.0 %  Auto Monocyte % : 3.0 %  Auto Eosinophil % : 0.0 %  Auto Basophil % : 0.0 %    -14    136  |  99  |  15  ----------------------------<  180<H>  3.5   |  26  |  1.60<H>    Ca    9.0      14 Mar 2020 14:39    TPro  7.0  /  Alb  3.2<L>  /  TBili  1.8<H>  /  DBili  x   /  AST  56<H>  /  ALT  54  /  AlkPhos  100  03-14    PT/INR - ( 14 Mar 2020 18:34 )   PT: 24.7 sec;   INR: 2.15 ratio         PTT - ( 14 Mar 2020 18:34 )  PTT:53.7 sec  LIVER FUNCTIONS - ( 14 Mar 2020 14:39 )  Alb: 3.2 g/dL / Pro: 7.0 g/dL / ALK PHOS: 100 U/L / ALT: 54 U/L / AST: 56 U/L / GGT: x               Urinalysis Basic - ( 14 Mar 2020 14:42 )    Color: Yellow / Appearance: Clear / S.015 / pH: x  Gluc: x / Ketone: Negative  / Bili: Negative / Urobili: Negative   Blood: x / Protein: 500 mg/dL / Nitrite: Negative   Leuk Esterase: Trace / RBC: 6-10 /HPF / WBC 0-2   Sq Epi: x / Non Sq Epi: Few / Bacteria: Few        WBC  TREND (5 Days)  WBC Count: 10.32 K/uL ( @ 18:34)  WBC Count: 9.92 K/uL ( @ 14:39)    HGB  TREND (5 Days)  Hemoglobin: 9.0 g/dL ( @ 18:34)  Hemoglobin: 11.1 g/dL ( @ 14:39)    HCT  TREND (5 Days)  Hematocrit: 26.3 % ( @ 18:34)  Hematocrit: 31.9 % ( @ 14:39)    PLT  TREND (5 Days)  Platelet Count - Automated: 80 K/uL ( @ 18:34)  Platelet Count - Automated: CLUMPED K/uL ( @ 14:39)               LABS UNDER N        140  |  98  |  42<H>  ----------------------------<  111<H>  3.4<L>   |  29  |  1.58<H>    Ca    9.1      2020 07:30  Phos  2.9     02-05  Mg     2.0     02-05    TPro  6.4  /  Alb  3.3  /  TBili  1.2  /  DBili  x   /  AST  33  /  ALT  26  /  AlkPhos  88  02-05    PT/INR - ( 2020 07:30 )   PT: 16.4 sec;   INR: 1.41 ratio         PTT - ( 2020 07:30 )  PTT:60.6 sec  LIVER FUNCTIONS - ( 2020 07:30 )  Alb: 3.3 g/dL / Pro: 6.4 g/dL / ALK PHOS: 88 U/L / ALT: 26 U/L / AST: 33 U/L / GGT: x               Urinalysis Basic - ( 2020 22:53 )    Color: Yellow / Appearance: Clear / S.012 / pH: x  Gluc: x / Ketone: Negative  / Bili: Negative / Urobili: Negative   Blood: x / Protein: 30 mg/dL / Nitrite: Negative   Leuk Esterase: Negative / RBC: 3 /hpf / WBC 2 /HPF   Sq Epi: x / Non Sq Epi: 0 /hpf / Bacteria: Negative        WBC  TREND (5 Days)  WBC Count: 7.49 K/uL ( @ 20:32)    HGB  TREND (5 Days)  Hemoglobin: 11.8 g/dL ( @ 20:32)    HCT  TREND (5 Days)  Hematocrit: 33.8 % ( @ 20:32)    PLT  TREND (5 Days)  Platelet Count - Automated: 72 K/uL ( @ 07:32)  Platelet Count - Automated: Clumped ( @ 20:32)    20 8PM LABS  Prothrombin Time and INR, Plasma in AM (20 @ 20:32)    Prothrombin Time, Plasma: 17.4 sec    INR: 1.49 ratio  Activated Partial Thromboplastin Time in AM (20 @ 20:32)    Activated Partial Thromboplastin Time: 60.2sec      20 7AM LABS  Mixing Study - PT/APTT (20 @ 07:30)    INR: 1.41 ratio    Mixing Study - PT/APTT (20 @ 07:30)    Diluted Thrombin Time: 22.7 sec    Prothrombin Time, Plasma: 16.4 ratio    Activated Partial Thromboplastin Time in AM (20 @ 07:30)    Activated Partial Thromboplastin Time: 60.6 sec        20 1PM LABS  Mixing Study - PT/APTT (20 @ 13:33)    PAT CTL 2H: 67.7 sec    %: 16.3 sec    PT 50/50: 13.2 sec    Diluted Thrombin Time: 22.0 sec    APTT 100%: 64.4 sec    APTT 50/50: 55.9 sec    APTT 50/50 2Hour Incub: 63.7 sec    APTT 50/50 Mix Comment: See Note: Note- The presence of anticoag inhibitor drugs may cause false positive.    Prothrombin Time, Plasma: 16.3 sec     INR: 1.41 ratio                    [RADIOLOGY & ADDITIONAL STUDIES:]          IMAGING UNDER MRN# ########      EXAM:  CT ABDOMEN AND PELVIS                        PROCEDURE DATE:  2020    INTERPRETATION:  CLINICAL INFORMATION: Abdominal pain and fever.  COMPARISON: None.  PROCEDURE:   CT of the Abdomen and Pelvis was performed without intravenous contrast.   Intravenous contrast: None.  Oral contrast: None.  Sagittal and coronal reformats were performed.  FINDINGS:  LOWER CHEST: Trace right pleural effusion with adjacent atelectasis.  LIVER: Within normal limits.  BILE DUCTS:Normal caliber.  GALLBLADDER: Distended with gallstones.  Pericholecystic fat stranding.  SPLEEN: Splenomegaly.  PANCREAS: Within normal limits.  ADRENALS: Within normal limits.  KIDNEYS/URETERS: Right renal cyst. No hydronephrosis or renal calculus.  BLADDER: Within normal limits.  REPRODUCTIVE ORGANS: Prostate is enlarged, measuring 5.4 x 4.1 x 5.4 cm.  BOWEL: No bowel obstruction. Appendix is normal.  Diverticulosis.  PERITONEUM: No ascites.  VESSELS: Atherosclerotic changes.  RETROPERITONEUM/LYMPH NODES: No lymphadenopathy.    ABDOMINAL WALL: Within normal limits.  BONES: Within normal limits.  IMPRESSION:   Acute cholecystitis.  No biliary dilatation.  No focal collection.  Normal appendix.  Diverticulosis.  Trace right pleural effusion.                EXAM:  US ABDOMEN RT UPR QUADRANT                        PROCEDURE DATE:  2020    INTERPRETATION:  CLINICAL INFORMATION: Right upper quadrant pain. Outside ultrasound study demonstrated acute cholecystitis.  COMPARISON: None available.  TECHNIQUE: Sonography of the right upper quadrant.   FINDINGS:  Liver: Within normal limits.  Bile ducts: Normal caliber. Common bile duct measures 7 mm.   Gallbladder: Massively distended with stones and sludge. Mild wall thickening. Positive sonographic Carney sign.   Pancreas: Visualized portions are within normal limits.  Right kidney: 11.4 cm. No hydronephrosis. Right renal cyst measuring up to 4.7 x 4.1 x 3.2 cm.  Ascites: None.  IVC: Visualized portions are within normal limits.  Miscellaneous: Right pleural effusion is partially visualized.  IMPRESSION:   Distended gallbladder with stones, mild wall thickening and positive sonographic Carney sign. Findings consistent with acute cholecystitis.  Right pleural effusion.  Findings were discussed with Dr. Perea by Dr. Levy on 2020 at 9:24 PM with readback.          EXAM:  NM HEPATOBILIARY IMG W RX                        PROCEDURE DATE:  2020    INTERPRETATION:  CLINICAL STATEMENT: 69-year-old male with fever and abdominal discomfort.  RADIOPHARMACEUTICAL: 3.1 mCi and 2.9 mCi Tc-99m-Mebrofenin, I.V.; 2 doses  TECHNIQUE:  Dynamic images of the anterior abdomen were obtained for 1 hour following injection of radiotracer. Morphine 4 mg I.V. and a second dose of radiotracer were administered at 1 hour. Dynamic imaging was continued for 1 hour followed by static images of the abdomen in the right lateral and right anterior oblique views immediately thereafter.    FINDINGS: There is prompt, homogeneous uptake of radiotracer by the hepatocytes. Activity is first seen in the bowel at 15minutes. The gallbladder is not visualized in the first hour of imaging, but was seen 10 minutes after the administration of morphine. There is good clearance of activity from the liver at the end of the study.    IMPRESSION: Normal morphine-augmented hepatobiliary scan.  No evidence of acute cholecystitis. SEE CHART# [id number]  Last Admitted to [facility] 2020    Patient is a 69y old  Male who presents with a chief complaint of perforated acute errol with sepsis (14 Mar 2020 20:00)      HPI:  Pt is a 69 year old man who had onset 2 days ago of fever, RUQ ruq pain and vomiting.  Had same pain 5 weeks ago, seen at [facility] where he had gallstones on sono but normal HIDA scan.  Sent home  Never jaundiced (14 Mar 2020 20:00)      Heme asked to see for thrombocytopenia, coagulopathy, hx of ITP, hx of KRANTHI+, low FXI, compensated AIHA, splenomegaly.   Admitted with perforated GB. Requires emergent surgery    Had episode oc cholecystitis in 2020, and Influneza A also at time.   Pt known to our office, followed by Dr Aldridge since . PMD Dr Rusty Caldreon.   Plt low due to presumed ITP, never required tx. Baseline plts typically ~100-120.   Last hosp admission with noted coagulopathy with no prior workup.   No known hx of cirrhosis or liver disease. No hx coagulopathy but no office labs to corroborate normal coag panel.   Prior saw Dr Troy, Rheum, had previous dx pt with presence of antiphospholipid but without known thrombosis.  Last month confirmed +LAC.   Prior on ASA outpt but stopped ~6mo ago. Not known to be on anticoagulants.   On depakote for unclear reasons, prior, but stopped in .   hx of high Glu          PAST MEDICAL & SURGICAL HISTORY:  History of antiphospholipid antibody syndrome  Idiopathic thrombocytopenic purpura (ITP)  Hypertension  Anxiety  Enlarged tonsils: s/p tonsillectomy    Anxiety  Hypertension, unspecified type  Tonsillectomy at 10yo  Mild CKD Cr baseline 1.2-1.4  Elevated Glu  Osteoarthritis,   PSHx: H/O Mohs micrographic surgery for skin cancer  Home meds:  atenolol 50mg bid, Olmesartan 40mgd daily, diazepam 5mg daily PRN, vitamin D        HEALTH ISSUES - PROBLEM Dx:  Sepsis, due to unspecified organism, unspecified whether acute organ dysfunction present: Sepsis, due to unspecified organism, unspecified whether acute organ dysfunction present  Acute cholecystitis: Acute cholecystitis          Acute cholecystitis (K81.0)  History of antiphospholipid antibody syndrome (Z86.2)  Idiopathic thrombocytopenic purpura (ITP) (D69.3)  Hypertension (I10)  Anxiety (F41.9)  Enlarged tonsils (J35.1)  No significant past surgical history ([id number])  Sepsis (A41.9)    FAMILY HISTORY:  as above      [SOCIAL HISTORY: ]     smoking:  non-smoker     EtOH:  denies     illicit drugs:  denies     occupation:  retired     marital status:       Other:       [ALLERGIES/INTOLERANCES:]  Allergies      No Known Allergies  Intolerances        [MEDICATIONS]  MEDICATIONS  (STANDING):  ceFAZolin   IVPB 2000 milliGRAM(s) IV Intermittent once  metroNIDAZOLE  IVPB 500 milliGRAM(s) IV Intermittent every 8 hours    MEDICATIONS  (PRN):        [REVIEW OF SYSTEMS: ]  CONSTITUTIONAL: normal, +fever, no shakes, no chills   EYES: No eye pain, no visual disturbances, no discharge  ENMT:  no discharge  NECK: No pain, no stiffness  BREASTS: No pain, no masses, no nipple discharge  RESPIRATORY: No cough, no wheezing, +chills, no hemoptysis; No shortness of breath  CARDIOVASCULAR: No chest pain, no palpitations, no dizziness, no leg swelling  GASTROINTESTINAL: +abdominal, +epigastric pain. No nausea, no vomiting, no hematemesis; +diarrhea , no constipation. No melena, no hematochezia.  GENITOURINARY: No dysuria, no frequency, no hematuria, no incontinence  NEUROLOGICAL: No headaches, no memory loss, no loss of strength, no numbness, no tremors  SKIN: No itching, no burning, no rashes, no lesions   LYMPH NODES: No enlarged glands  ENDOCRINE: No heat or cold intolerance; No hair loss  MUSCULOSKELETAL: No joint pain or swelling; No muscle, no back, no extremity pain  PSYCHIATRIC: No depression, no anxiety, no mood swings, no difficulty sleeping  HEME/LYMPH: No easy bruising, no bleeding gums      [VITALS SIGNS 24hrs]  Vital Signs Last 24 Hrs  T(C): 37.3 (14 Mar 2020 20:03), Max: 40.8 (14 Mar 2020 14:09)  T(F): 99.2 (14 Mar 2020 20:03), Max: 105.5 (14 Mar 2020 14:09)  HR: 88 (14 Mar 2020 20:03) (88 - 114)  BP: 111/62 (14 Mar 2020 20:03) (90/50 - 144/70)  BP(mean): --  RR: 20 (14 Mar 2020 20:03) (15 - 20)  SpO2: 95% (14 Mar 2020 20:03) (93% - 98%)    [PHYSICAL EXAM]  General: adult in NAD,  WN,  WD.  HEENT: clear oropharynx, anicteric sclera, pink conjunctivae.  Neck: supple, no masses.  CV: normal S1S2, no murmur, no rubs, no gallops.  Lungs: clear to auscultation, no wheezes, no rales, no rhonchi.  Abdomen: soft, mild-tender, +distended, no hepatosplenomegaly, normal BS, no guarding.  Ext: no clubbing, no cyanosis, no edema.  Skin: no rashes,  no petechiae, no venous stasis changes.  Neuro: alert and oriented X3, no focal motor deficits.  LN: no SC BRANT.      [LABS:]                        9.0    10.32 )-----------( 80       ( 14 Mar 2020 18:34 )             26.3     CBC Full  -  ( 14 Mar 2020 18:34 )  WBC Count : 10.32 K/uL  RBC Count : 3.10 M/uL  Hemoglobin : 9.0 g/dL  Hematocrit : 26.3 %  Platelet Count - Automated : 80 K/uL  Mean Cell Volume : 84.8 fl  Mean Cell Hemoglobin : 29.0 pg  Mean Cell Hemoglobin Concentration : 34.2 gm/dL  Auto Neutrophil # : 9.70 K/uL  Auto Lymphocyte # : 0.21 K/uL  Auto Monocyte # : 0.31 K/uL  Auto Eosinophil # : 0.00 K/uL  Auto Basophil # : 0.00 K/uL  Auto Neutrophil % : 79.0 %  Auto Lymphocyte % : 2.0 %  Auto Monocyte % : 3.0 %  Auto Eosinophil % : 0.0 %  Auto Basophil % : 0.0 %    0314    136  |  99  |  15  ----------------------------<  180<H>  3.5   |  26  |  1.60<H>  Ca    9.0      14 Mar 2020 14:39  TPro  7.0  /  Alb  3.2<L>  /  TBili  1.8<H>  /  DBili  x   /  AST  56<H>  /  ALT  54  /  AlkPhos  100  0314  PT/INR - ( 14 Mar 2020 18:34 )   PT: 24.7 sec;   INR: 2.15 ratio    PTT - ( 14 Mar 2020 18:34 )  PTT:53.7 sec  LIVER FUNCTIONS - ( 14 Mar 2020 14:39 )  Alb: 3.2 g/dL / Pro: 7.0 g/dL / ALK PHOS: 100 U/L / ALT: 54 U/L / AST: 56 U/L / GGT: x             Urinalysis Basic - ( 14 Mar 2020 14:42 )  Color: Yellow / Appearance: Clear / S.015 / pH: x  Gluc: x / Ketone: Negative  / Bili: Negative / Urobili: Negative   Blood: x / Protein: 500 mg/dL / Nitrite: Negative   Leuk Esterase: Trace / RBC: 6-10 /HPF / WBC 0-2   Sq Epi: x / Non Sq Epi: Few / Bacteria: Few      WBC  TREND (5 Days)  WBC Count: 10.32 K/uL ( @ 18:34)  WBC Count: 9.92 K/uL ( @ 14:39)    HGB  TREND (5 Days)  Hemoglobin: 9.0 g/dL ( @ 18:34)  Hemoglobin: 11.1 g/dL ( @ 14:39)    HCT  TREND (5 Days)  Hematocrit: 26.3 % ( @ 18:34)  Hematocrit: 31.9 % ( @ 14:39)    PLT  TREND (5 Days)  Platelet Count - Automated: 80 K/uL ( @ 18:34)  Platelet Count - Automated: CLUMPED K/uL ( @ 14:39)            LABS UNDER N [id number]    02-  140  |  98  |  42<H>  ----------------------------<  111<H>  3.4<L>   |  29  |  1.58<H>  Ca    9.1      2020 07:30  Phos  2.9     02-05  Mg     2.0     02-05  TPro  6.4  /  Alb  3.3  /  TBili  1.2  /  DBili  x   /  AST  33  /  ALT  26  /  AlkPhos  88  02-05  PT/INR - ( 2020 07:30 )   PT: 16.4 sec;   INR: 1.41 ratio    PTT - ( 2020 07:30 )  PTT:60.6 sec  LIVER FUNCTIONS - ( 2020 07:30 )  Alb: 3.3 g/dL / Pro: 6.4 g/dL / ALK PHOS: 88 U/L / ALT: 26 U/L / AST: 33 U/L / GGT: x             WBC  TREND (5 Days)  WBC Count: 7.49 K/uL ( @ 20:32)    HGB  TREND (5 Days)  Hemoglobin: 11.8 g/dL ( @ 20:32)    HCT  TREND (5 Days)  Hematocrit: 33.8 % ( @ 20:32)    PLT  TREND (5 Days)  Platelet Count - Automated: 72 K/uL ( 07:32)  Platelet Count - Automated: Clumped ( @ 20:32)    20 8PM LABS  Prothrombin Time and INR, Plasma in AM (20 @ 20:32)    Prothrombin Time, Plasma: 17.4 sec    INR: 1.49 ratio  Activated Partial Thromboplastin Time in AM (20 @ 20:32)    Activated Partial Thromboplastin Time: 60.2sec    20 7AM LABS  Mixing Study - PT/APTT (20 @ 07:30)    INR: 1.41 ratio  Mixing Study - PT/APTT (20 @ 07:30)    Diluted Thrombin Time: 22.7 sec    Prothrombin Time, Plasma: 16.4 ratio  Activated Partial Thromboplastin Time in AM (20 @ 07:30)    Activated Partial Thromboplastin Time: 60.6 sec    20 1PM LABS  Mixing Study - PT/APTT (20 @ 13:33)    PAT CTL 2H: 67.7 sec    %: 16.3 sec    PT 50/50: 13.2 sec    Diluted Thrombin Time: 22.0 sec    APTT 100%: 64.4 sec    APTT 50/50: 55.9 sec    APTT 50/50 2Hour Incub: 63.7 sec    APTT 50/50 Mix Comment: See Note: Note- The presence of anticoag inhibitor drugs may cause false positive.    Prothrombin Time, Plasma: 16.3 sec     INR: 1.41 ratio          Factor VIII Assay (20 @ 17:51)    Factor VIII Assay: See Note: Factor level affected by interfering substance such as lupus inhibitor; activity appears increased.  Highest activity apparent with dilution is 168 %.  The presence of direct thrombin inhibitors (argatroban, refludan) may falsely decrease activity.    Factor IX Assay (20 @ 17:51)    Factor IX Assay: See Note: Factor level affected by interfering substance such as lupus inhibitor; activity appears normal.  Highest activity apparent with dilution is 111  %.  The presence of direct thrombin inhibitors (argatroban, refludan) may falsely decrease activity.    Factor XI Assay (20 @ 17:51)    Factor XI Assay: See Note: Factor level affected by interfering substance such as lupus inhibitor; activity appears decreased.  Highest activity apparent with dilution is 46  %.  The presence of direct thrombin inhibitors (argatroban, refludan) may falsely decrease activity.      Factor II Assay (20 @ 17:51)    Factor II Assay: 71: The presence of argatroban may falsely decrease activity. %    Factor VII Assay (20 @ 17:51)    Factor VII Assay: 53: The presence of argatroban may falsely decrease activity. %    Factor X Assay (20 @ 17:51)    Factor X Assay: 69: The presence of argatroban may falsely decrease activity. %               [RADIOLOGY & ADDITIONAL STUDIES:]    EXAM:  CT ABDOMEN AND PELVIS IC                        EXAM:  CT CHEST IC                        PROCEDURE DATE:  2020    INTERPRETATION:  CT CHEST WITH IV CONTRAST, CT ABDOMEN AND PELVIS WITH IV CONTRAST  CLINICAL INFORMATION: sepsis  COMPARISON: None.  PROCEDURE:   CT of the Chest, Abdomen and Pelvis was performed with intravenous contrast.  Intravenous contrast: 95 cc Omnipaque 350  Oral contrast: None.  Sagittal and coronal reformats were performed.  FINDINGS:  CHEST:   ·	LUNGS, AIRWAYS: The central airways are patent. The lungs are clear.  ·	PLEURA: Small bilateral effusions.  ·	VESSELS: Normal caliber aorta. Main pulmonary arteries are patent.  ·	HEART: Normal heart size. No pericardial effusion.  ·	MEDIASTINUM AND WESTON: No adenopathy.  ·	CHEST WALL: No masses.  ABDOMEN AND PELVIS:  ·	LIVER: As below  ·	BILE DUCTS: Nondilated.  ·	GALLBLADDER: Gallstones with diffuse wall thickening, luminal distention, and pericholecystic inflammatory change. Defect in the gallbladder wall with adjacent 3.5 cm hepatic collection.  ·	SPLEEN: Normal.  ·	PANCREAS: Normal.  ·	ADRENALS: Normal.  ·	KIDNEYS/URETERS: No calculi, hydronephrosis, or soft tissue attenuating mass.  ·	BLADDER: Underdistended limiting evaluation.  ·	REPRODUCTIVE ORGANS: Enlarged prostate.  ·	BOWEL: No bowel-related abnormality. Specifically, no evidence of acute diverticulitis. Normal ileocecal region. No bowel obstruction or bowel inflammation.  ·	PERITONEUM: Small ascites. No free air or collection.  ·	VESSELS:  Normal caliber aorta.  ·	RETROPERITONEUM/LYMPH NODES: No adenopathy.    ·	ABDOMINAL WALL: Normal.  ·	BONES: No acute bony abnormality.  IMPRESSION:   Perforated cholecystitis with adjacent 3.5 cm hepatic abscess.  No acute chest pathology. Clear lungs aside from small bilateral pleural effusions.           EXAM:  CT BRAIN                        PROCEDURE DATE:  2020    INTERPRETATION:  Head CT without contrast   COMPARISON: None.  CLINICAL INFORMATION: High fever. Altered mental status..  TECHNIQUE: Contiguous axial 2.5 mm slice thickness images of the head were obtained without the use of intravenous contrast media.  This scan was performed using automatic exposure control (radiation dose reduction software) to obtain a diagnostic image quality scan with patient dose as low as reasonably achievable.   FINDINGS:  ·	There is mild the bilateral frontal cortical involutional symmetric changes. Ventricles normal size and midline in position.  ·	The posterior fossa structures and basal cisterns appear normal.  ·	There is no intracranial hemorrhage.   ·	There is no intracranial mass or midline shift.  ·	There is no sulcal effacement to suggest acute stroke.  ·	The basal ganglia and thalami appear normal in morphology and attenuation.  ·	The visualized portions of the optic globes and paranasal sinuses are normal.  ·	There are no skull fractures.  IMPRESSION:  No acute intracranial findings.  If acute stroke is of clinical concern, MRI with diffusion-weighted images would be helpful for further characterization.          IMAGING UNDER MRN#  [id number]    EXAM:  CT ABDOMEN AND PELVIS                        PROCEDURE DATE:  2020    INTERPRETATION:  CLINICAL INFORMATION: Abdominal pain and fever.  COMPARISON: None.  PROCEDURE:   CT of the Abdomen and Pelvis was performed without intravenous contrast.   Intravenous contrast: None.  Oral contrast: None.  Sagittal and coronal reformats were performed.  FINDINGS:  ·	LOWER CHEST: Trace right pleural effusion with adjacent atelectasis.  ·	LIVER: Within normal limits.  ·	BILE DUCTS:Normal caliber.  ·	GALLBLADDER: Distended with gallstones.  Pericholecystic fat stranding.  ·	SPLEEN: Splenomegaly.  ·	PANCREAS: Within normal limits.  ·	ADRENALS: Within normal limits.  ·	KIDNEYS/URETERS: Right renal cyst. No hydronephrosis or renal calculus.  ·	BLADDER: Within normal limits.  ·	REPRODUCTIVE ORGANS: Prostate is enlarged, measuring 5.4 x 4.1 x 5.4 cm.  ·	BOWEL: No bowel obstruction. Appendix is normal.  Diverticulosis.  ·	PERITONEUM: No ascites.  ·	VESSELS: Atherosclerotic changes.  ·	RETROPERITONEUM/LYMPH NODES: No lymphadenopathy.    ·	ABDOMINAL WALL: Within normal limits.  ·	BONES: Within normal limits.  IMPRESSION:   ·	Acute cholecystitis.  No biliary dilatation.  No focal collection.  ·	Normal appendix.  ·	Diverticulosis.  ·	Trace right pleural effusion.        EXAM:  US ABDOMEN RT UPR QUADRANT                        PROCEDURE DATE:  2020    INTERPRETATION:  CLINICAL INFORMATION: Right upper quadrant pain. Outside ultrasound study demonstrated acute cholecystitis.  COMPARISON: None available.  TECHNIQUE: Sonography of the right upper quadrant.   FINDINGS:  ·	Liver: Within normal limits.  ·	Bile ducts: Normal caliber. Common bile duct measures 7 mm.   ·	Gallbladder: Massively distended with stones and sludge. Mild wall thickening. Positive sonographic Carney sign.   ·	Pancreas: Visualized portions are within normal limits.  ·	Right kidney: 11.4 cm. No hydronephrosis. Right renal cyst measuring up to 4.7 x 4.1 x 3.2 cm.  ·	Ascites: None.  ·	IVC: Visualized portions are within normal limits.  ·	Miscellaneous: Right pleural effusion is partially visualized.  IMPRESSION:   ·	Distended gallbladder with stones, mild wall thickening and positive sonographic Carney sign. Findings consistent with acute cholecystitis.  ·	Right pleural effusion.  ·	Findings were discussed with Dr. Perea by Dr. Levy on 2020 at 9:24 PM with readback.      EXAM:  NM HEPATOBILIARY IMG W RX                        PROCEDURE DATE:  2020    INTERPRETATION:  CLINICAL STATEMENT: 69-year-old male with fever and abdominal discomfort.  RADIOPHARMACEUTICAL: 3.1 mCi and 2.9 mCi Tc-99m-Mebrofenin, I.V.; 2 doses  TECHNIQUE:  Dynamic images of the anterior abdomen were obtained for 1 hour following injection of radiotracer. Morphine 4 mg I.V. and a second dose of radiotracer were administered at 1 hour. Dynamic imaging was continued for 1 hour followed by static images of the abdomen in the right lateral and right anterior oblique views immediately thereafter.  FINDINGS: There is prompt, homogeneous uptake of radiotracer by the hepatocytes. Activity is first seen in the bowel at 15minutes. The gallbladder is not visualized in the first hour of imaging, but was seen 10 minutes after the administration of morphine. There is good clearance of activity from the liver at the end of the study.  IMPRESSION: Normal morphine-augmented hepatobiliary scan. No evidence of acute cholecystitis.

## 2020-03-14 NOTE — H&P ADULT - HISTORY OF PRESENT ILLNESS
Pt is a 69 year old man who had onset 2 days ago of fever, RUQ ruq pain and vomiting.  Had same pain 5 weeks ago, seen at [facility] where he had gallstones on sono but normal HIDA scan.  Sent home  Never jaundiced

## 2020-03-14 NOTE — ED ADULT TRIAGE NOTE - CHIEF COMPLAINT QUOTE
brought in by EMS from home for complaints of flu like symptoms - fever Tmax 104, body aches, chills, weakness, cough. patient was recently in [facility] for "a gallbladder issue and the flu". patient denies recent travel or contact with anyone who has been sick

## 2020-03-14 NOTE — PRE-OP CHECKLIST - AICD PRESENT
Interval Events: Reviewed  Patient seen and examined at bedside.    Patient is a 67y old  Female who presents with a chief complaint of Cervical hardware failure (16 Apr 2019 07:15)    she is about the same.  She is still coughing and voice is changed  PAST MEDICAL & SURGICAL HISTORY:  Anxiety  History of breast cancer: s/p right lumpectomy , RT  Hodgkin disease: 1981, underwent radiation therapy  HLD (hyperlipidemia)  HTN (hypertension)  Cervical disc herniation  Carpal tunnel syndrome: b/l  Cervical disc disease  History of carpal tunnel surgery: 10/2018 left hand  History of tonsillectomy  S/P cervical spinal fusion: 12/15/2018 C4-C6 ACDF  History of Mohs micrographic surgery for skin cancer  History of lumpectomy of right breast: 2013      MEDICATIONS:  Pulmonary:    Antimicrobials:    Anticoagulants:    Cardiac:      Allergies    penicillin (Rash)    Intolerances        Vital Signs Last 24 Hrs  T(C): 37.3 (16 Apr 2019 08:57), Max: 37.3 (16 Apr 2019 08:57)  T(F): 99.1 (16 Apr 2019 08:57), Max: 99.1 (16 Apr 2019 08:57)  HR: 85 (16 Apr 2019 09:37) (72 - 85)  BP: 122/68 (16 Apr 2019 09:37) (107/75 - 136/67)  BP(mean): --  RR: 18 (16 Apr 2019 09:37) (16 - 18)  SpO2: 93% (16 Apr 2019 09:37) (93% - 98%)    04-15 @ 07:01 - 04-16 @ 07:00  --------------------------------------------------------  IN: 2274.8 mL / OUT: 2900 mL / NET: -625.2 mL    04-16 @ 07:01  -  04-16 @ 10:26  --------------------------------------------------------  IN: 350 mL / OUT: 300 mL / NET: 50 mL          LABS:      CBC Full  -  ( 16 Apr 2019 05:39 )  WBC Count : 15.41 K/uL  RBC Count : 3.36 M/uL  Hemoglobin : 10.3 g/dL  Hematocrit : 31.7 %  Platelet Count - Automated : 323 K/uL  Mean Cell Volume : 94.3 fl  Mean Cell Hemoglobin : 30.7 pg  Mean Cell Hemoglobin Concentration : 32.5 gm/dL  Auto Neutrophil # : x  Auto Lymphocyte # : x  Auto Monocyte # : x  Auto Eosinophil # : x  Auto Basophil # : x  Auto Neutrophil % : x  Auto Lymphocyte % : x  Auto Monocyte % : x  Auto Eosinophil % : x  Auto Basophil % : x    04-16    141  |  103  |  4<L>  ----------------------------<  110<H>  3.4<L>   |  27  |  0.46<L>    Ca    8.9      16 Apr 2019 05:39  Phos  3.0     04-16  Mg     1.8     04-16      PT/INR - ( 15 Apr 2019 06:39 )   PT: 13.5 sec;   INR: 1.19                Urinalysis Basic - ( 14 Apr 2019 11:25 )    Color: Yellow / Appearance: Clear / SG: >=1.030 / pH: x  Gluc: x / Ketone: Trace mg/dL  / Bili: Negative / Urobili: 0.2 E.U./dL   Blood: x / Protein: Trace mg/dL / Nitrite: NEGATIVE   Leuk Esterase: Trace / RBC: < 5 /HPF / WBC > 10 /HPF   Sq Epi: x / Non Sq Epi: 5-10 /HPF / Bacteria: Present /HPF                  RADIOLOGY & ADDITIONAL STUDIES (The following images were personally reviewed):  Esposito:                                     No  Urine output:                       adequate  DVT prophylaxis:                 Yes  Flattus:                                  Yes  Bowel movement:              No no

## 2020-03-14 NOTE — ED PROVIDER NOTE - OBJECTIVE STATEMENT
pt is a 68yo male with pmhx of HTN, ITP, DONNA presents via ems with fever x today. pt wife reports pt with cough since yesterday and one episode  of vomiting. wife reports today pt had 104.0 fever without  treatment. pt wife reports pt dropped something on the floor and when he bent to get it he could not get up due to generalized weakness and rigors. wife denies recent travel or sick contacts. pt had the flu in feb 2020.     pmd: emiliana

## 2020-03-14 NOTE — H&P ADULT - NSICDXPASTMEDICALHX_GEN_ALL_CORE_FT
PAST MEDICAL HISTORY:  Anxiety     History of antiphospholipid antibody syndrome     Hypertension     Idiopathic thrombocytopenic purpura (ITP)

## 2020-03-14 NOTE — PRE-OP CHECKLIST - NS PREOP CHK MONITOR ANESTHESIA CONSENT
Bedside and Verbal shift change report given to Abi Henderson (oncoming nurse) by [facility] (offgoing nurse). Report included the following information SBAR, Kardex, MAR and Recent Results.
Bedside and Verbal shift change report given to Slim Dawn   (oncoming nurse) by Lucien Pereira RN   (offgoing nurse). Report included the following information SBAR, Kardex, Intake/Output and MAR.
Bedside and Verbal shift change report given to Virgen Goldman (oncoming nurse) by Arlin Lombard (offgoing nurse). Report included the following information SBAR, Kardex, MAR and Recent Results.
Discussed PICC insertion with patient. Patient anxious about having another PICC placed. Discussed PICC vs IV placement with patient. Patient prefers IV placement and no PICC insertion at this time. PIV x2 placed with use of US.
done

## 2020-03-14 NOTE — ED PROVIDER NOTE - ATTENDING CONTRIBUTION TO CARE
pt is a 68 yo male who was discharged from [facility] 1 mo ago for influenza initially thoght to be gb dz.  he was well and in usoh at his home with his wife until this am when he suddenly became weak and unable to stand up.  on exam:  acutely ill weak unable to open eyes follows simple commands appropriately including opens mouth etc, toxic appearing. unable to sit up without assistance.  heent op mm dry no mass no g f r sclera anicteric  neck is supple no menengismus  pupils are 4 mm  cor tachy no m  lungs good aeration with shallow resps  abd soft nt nd no hsm  ext generalized motor weakness  neuro weak keeps eyes closed  skin no rash  hot  plan pt is septic with high fever of sudden onset recent flu infection   ro urine ro viral ro bacterial infection, xray labs iv fluids cooling antipyretics broad spectrum antibiotics  ct of abd pel chst ro infetious etiology, ct head for am to exclude other flu rvp swabs admit to hospital pt is a 68 yo male who was discharged from [facility] 1 mo ago for influenza initially thoght to be gb dz.  he was well and in usoh at his home with his wife until this am when he suddenly became weak and unable to stand up.  on exam:  acutely ill weak unable to open eyes follows simple commands appropriately including opens mouth etc, toxic appearing. unable to sit up without assistance.  heent op mm dry no mass no g f r sclera anicteric  neck is supple no meningismus  pupils are 4 mm  cor tachy no m  lungs good aeration with shallow resps  abd soft nt nd no hsm  ext generalized motor weakness  neuro weak keeps eyes closed  skin no rash  hot  plan pt is septic with high fever of sudden onset recent flu infection   ro urine ro viral ro bacterial infection, xray labs iv fluids cooling antipyretics broad spectrum antibiotics  ct of abd pel chst ro infectious etiology, ct head for am to exclude other flu rvp swabs admit to hospital

## 2020-03-14 NOTE — BRIEF OPERATIVE NOTE - NSICDXBRIEFPROCEDURE_GEN_ALL_CORE_FT
PROCEDURES:  Cholecystectomy, laparoscopic 14-Mar-2020 22:37:06 with drainage of pericholecystic abscess Kj Pfeiffer

## 2020-03-14 NOTE — ED ADULT NURSE NOTE - OBJECTIVE STATEMENT
69 year old male brought in by EMS from home for flu like symptoms. Patient lethargic and disoriented on arrival to ED. As per EMS, call came through for fever and flu like symptoms. On arrival to house, temporal temperature 100.7. As per wife, patient was in [facility] one month ago for rule out cholecystitis which was negative but he developed Flu A. Patient was discharged home but continued to experiencing fevers/chills. Yesterday patient reported to his wife that he "did not feel well". Patient was able to tolerate by mouth foods and liquids. Today patient had breakfast but then experienced worsening cough, nausea and vomiting x 1. Wife reports that the patient collapsed to the floor due to weakness and was unable to get up. Patient also experienced chills and rigors. Wife checked temperature at home, 104 orally. Wife did not administer any medication. Wife denies patient complaining of shortness of breath. 69 year old male brought in by EMS from home for flu like symptoms. Patient lethargic and disoriented on arrival to ED. As per EMS, call came through for fever and flu like symptoms. On arrival to house, temporal temperature 100.7. As per wife, patient was in [facility] one month ago for rule out cholecystitis which was negative but he developed Flu A. Patient was discharged home but continued to experiencing fevers/chills. Yesterday patient reported to his wife that he "did not feel well". Patient was able to tolerate by mouth foods and liquids. Today patient had breakfast but then experienced worsening cough, nausea and vomiting x 1. Wife reports that the patient collapsed to the floor due to weakness and was unable to get up. Patient also experienced chills and rigors. Wife checked temperature at home, 104 orally. Wife did not administer any medication. Wife denies patient complaining of shortness of breath.  Rectal temp 105.5, sepsis workup completed, placed on cooling blanket with internal rectal probe for continuous temperature monitoring.

## 2020-03-15 LAB
ANION GAP SERPL CALC-SCNC: 5 MMOL/L — SIGNIFICANT CHANGE UP (ref 5–17)
APTT BLD: 65.3 SEC — HIGH (ref 28.5–37)
BUN SERPL-MCNC: 20 MG/DL — SIGNIFICANT CHANGE UP (ref 7–23)
CALCIUM SERPL-MCNC: 7.7 MG/DL — LOW (ref 8.5–10.1)
CHLORIDE SERPL-SCNC: 108 MMOL/L — SIGNIFICANT CHANGE UP (ref 96–108)
CO2 SERPL-SCNC: 27 MMOL/L — SIGNIFICANT CHANGE UP (ref 22–31)
CREAT SERPL-MCNC: 1.3 MG/DL — SIGNIFICANT CHANGE UP (ref 0.5–1.3)
CULTURE RESULTS: NO GROWTH — SIGNIFICANT CHANGE UP
E COLI DNA BLD POS QL NAA+NON-PROBE: SIGNIFICANT CHANGE UP
GLUCOSE SERPL-MCNC: 134 MG/DL — HIGH (ref 70–99)
GRAM STN FLD: SIGNIFICANT CHANGE UP
HCT VFR BLD CALC: 21.6 % — LOW (ref 39–50)
HCT VFR BLD CALC: 22.5 % — LOW (ref 39–50)
HCT VFR BLD CALC: 22.9 % — LOW (ref 39–50)
HCV AB S/CO SERPL IA: 0.25 S/CO — SIGNIFICANT CHANGE UP (ref 0–0.99)
HCV AB SERPL-IMP: SIGNIFICANT CHANGE UP
HGB BLD-MCNC: 7.4 G/DL — LOW (ref 13–17)
HGB BLD-MCNC: 7.7 G/DL — LOW (ref 13–17)
HGB BLD-MCNC: 7.7 G/DL — LOW (ref 13–17)
INR BLD: 1.9 RATIO — HIGH (ref 0.88–1.16)
LACTATE SERPL-SCNC: 2.4 MMOL/L — HIGH (ref 0.7–2)
LACTATE SERPL-SCNC: 3.1 MMOL/L — HIGH (ref 0.7–2)
MAGNESIUM SERPL-MCNC: 1.1 MG/DL — LOW (ref 1.6–2.6)
MCHC RBC-ENTMCNC: 28.6 PG — SIGNIFICANT CHANGE UP (ref 27–34)
MCHC RBC-ENTMCNC: 28.8 PG — SIGNIFICANT CHANGE UP (ref 27–34)
MCHC RBC-ENTMCNC: 29.2 PG — SIGNIFICANT CHANGE UP (ref 27–34)
MCHC RBC-ENTMCNC: 33.6 GM/DL — SIGNIFICANT CHANGE UP (ref 32–36)
MCHC RBC-ENTMCNC: 34.2 GM/DL — SIGNIFICANT CHANGE UP (ref 32–36)
MCHC RBC-ENTMCNC: 34.3 GM/DL — SIGNIFICANT CHANGE UP (ref 32–36)
MCV RBC AUTO: 84 FL — SIGNIFICANT CHANGE UP (ref 80–100)
MCV RBC AUTO: 85.1 FL — SIGNIFICANT CHANGE UP (ref 80–100)
MCV RBC AUTO: 85.2 FL — SIGNIFICANT CHANGE UP (ref 80–100)
METHOD TYPE: SIGNIFICANT CHANGE UP
NRBC # BLD: 0 /100 WBCS — SIGNIFICANT CHANGE UP (ref 0–0)
PHOSPHATE SERPL-MCNC: 4.3 MG/DL — SIGNIFICANT CHANGE UP (ref 2.5–4.5)
PLATELET # BLD AUTO: 48 K/UL — LOW (ref 150–400)
PLATELET # BLD AUTO: 62 K/UL — LOW (ref 150–400)
PLATELET # BLD AUTO: 64 K/UL — LOW (ref 150–400)
POTASSIUM SERPL-MCNC: 4.3 MMOL/L — SIGNIFICANT CHANGE UP (ref 3.5–5.3)
POTASSIUM SERPL-SCNC: 4.3 MMOL/L — SIGNIFICANT CHANGE UP (ref 3.5–5.3)
PROTHROM AB SERPL-ACNC: 21.7 SEC — HIGH (ref 10–12.9)
RBC # BLD: 2.57 M/UL — LOW (ref 4.2–5.8)
RBC # BLD: 2.64 M/UL — LOW (ref 4.2–5.8)
RBC # BLD: 2.69 M/UL — LOW (ref 4.2–5.8)
RBC # FLD: 15.7 % — HIGH (ref 10.3–14.5)
RBC # FLD: 15.7 % — HIGH (ref 10.3–14.5)
RBC # FLD: 16 % — HIGH (ref 10.3–14.5)
SODIUM SERPL-SCNC: 140 MMOL/L — SIGNIFICANT CHANGE UP (ref 135–145)
SPECIMEN SOURCE: SIGNIFICANT CHANGE UP
WBC # BLD: 13.36 K/UL — HIGH (ref 3.8–10.5)
WBC # BLD: 13.42 K/UL — HIGH (ref 3.8–10.5)
WBC # BLD: 7.78 K/UL — SIGNIFICANT CHANGE UP (ref 3.8–10.5)
WBC # FLD AUTO: 13.36 K/UL — HIGH (ref 3.8–10.5)
WBC # FLD AUTO: 13.42 K/UL — HIGH (ref 3.8–10.5)
WBC # FLD AUTO: 7.78 K/UL — SIGNIFICANT CHANGE UP (ref 3.8–10.5)

## 2020-03-15 PROCEDURE — 99232 SBSQ HOSP IP/OBS MODERATE 35: CPT

## 2020-03-15 PROCEDURE — 76604 US EXAM CHEST: CPT | Mod: 26

## 2020-03-15 RX ORDER — CHLORHEXIDINE GLUCONATE 213 G/1000ML
1 SOLUTION TOPICAL
Refills: 0 | Status: DISCONTINUED | OUTPATIENT
Start: 2020-03-15 | End: 2020-03-15

## 2020-03-15 RX ORDER — CHLORHEXIDINE GLUCONATE 213 G/1000ML
1 SOLUTION TOPICAL DAILY
Refills: 0 | Status: DISCONTINUED | OUTPATIENT
Start: 2020-03-15 | End: 2020-03-20

## 2020-03-15 RX ORDER — MAGNESIUM SULFATE 500 MG/ML
2 VIAL (ML) INJECTION
Refills: 0 | Status: COMPLETED | OUTPATIENT
Start: 2020-03-15 | End: 2020-03-15

## 2020-03-15 RX ORDER — FOLIC ACID 0.8 MG
1 TABLET ORAL DAILY
Refills: 0 | Status: DISCONTINUED | OUTPATIENT
Start: 2020-03-15 | End: 2020-03-20

## 2020-03-15 RX ORDER — HEPARIN SODIUM 5000 [USP'U]/ML
5000 INJECTION INTRAVENOUS; SUBCUTANEOUS EVERY 8 HOURS
Refills: 0 | Status: DISCONTINUED | OUTPATIENT
Start: 2020-03-15 | End: 2020-03-20

## 2020-03-15 RX ORDER — ACETAMINOPHEN 500 MG
650 TABLET ORAL EVERY 6 HOURS
Refills: 0 | Status: DISCONTINUED | OUTPATIENT
Start: 2020-03-15 | End: 2020-03-20

## 2020-03-15 RX ORDER — HYDROMORPHONE HYDROCHLORIDE 2 MG/ML
0.25 INJECTION INTRAMUSCULAR; INTRAVENOUS; SUBCUTANEOUS EVERY 6 HOURS
Refills: 0 | Status: DISCONTINUED | OUTPATIENT
Start: 2020-03-15 | End: 2020-03-15

## 2020-03-15 RX ORDER — PIPERACILLIN AND TAZOBACTAM 4; .5 G/20ML; G/20ML
3.38 INJECTION, POWDER, LYOPHILIZED, FOR SOLUTION INTRAVENOUS EVERY 8 HOURS
Refills: 0 | Status: DISCONTINUED | OUTPATIENT
Start: 2020-03-15 | End: 2020-03-17

## 2020-03-15 RX ORDER — HYDROMORPHONE HYDROCHLORIDE 2 MG/ML
0.5 INJECTION INTRAMUSCULAR; INTRAVENOUS; SUBCUTANEOUS EVERY 6 HOURS
Refills: 0 | Status: DISCONTINUED | OUTPATIENT
Start: 2020-03-15 | End: 2020-03-18

## 2020-03-15 RX ORDER — SODIUM CHLORIDE 9 MG/ML
1000 INJECTION, SOLUTION INTRAVENOUS
Refills: 0 | Status: DISCONTINUED | OUTPATIENT
Start: 2020-03-15 | End: 2020-03-16

## 2020-03-15 RX ORDER — DIAZEPAM 5 MG
7.5 TABLET ORAL
Refills: 0 | Status: DISCONTINUED | OUTPATIENT
Start: 2020-03-15 | End: 2020-03-15

## 2020-03-15 RX ORDER — SODIUM CHLORIDE 9 MG/ML
1000 INJECTION, SOLUTION INTRAVENOUS ONCE
Refills: 0 | Status: COMPLETED | OUTPATIENT
Start: 2020-03-15 | End: 2020-03-15

## 2020-03-15 RX ORDER — ONDANSETRON 8 MG/1
4 TABLET, FILM COATED ORAL EVERY 6 HOURS
Refills: 0 | Status: DISCONTINUED | OUTPATIENT
Start: 2020-03-15 | End: 2020-03-20

## 2020-03-15 RX ORDER — MAGNESIUM SULFATE 500 MG/ML
2 VIAL (ML) INJECTION ONCE
Refills: 0 | Status: DISCONTINUED | OUTPATIENT
Start: 2020-03-15 | End: 2020-03-15

## 2020-03-15 RX ORDER — SODIUM CHLORIDE 9 MG/ML
500 INJECTION, SOLUTION INTRAVENOUS ONCE
Refills: 0 | Status: DISCONTINUED | OUTPATIENT
Start: 2020-03-15 | End: 2020-03-15

## 2020-03-15 RX ORDER — LANOLIN ALCOHOL/MO/W.PET/CERES
3 CREAM (GRAM) TOPICAL AT BEDTIME
Refills: 0 | Status: DISCONTINUED | OUTPATIENT
Start: 2020-03-15 | End: 2020-03-20

## 2020-03-15 RX ORDER — HYDROMORPHONE HYDROCHLORIDE 2 MG/ML
1 INJECTION INTRAMUSCULAR; INTRAVENOUS; SUBCUTANEOUS EVERY 6 HOURS
Refills: 0 | Status: DISCONTINUED | OUTPATIENT
Start: 2020-03-15 | End: 2020-03-18

## 2020-03-15 RX ORDER — DIAZEPAM 5 MG
5 TABLET ORAL EVERY 12 HOURS
Refills: 0 | Status: DISCONTINUED | OUTPATIENT
Start: 2020-03-15 | End: 2020-03-20

## 2020-03-15 RX ADMIN — Medication 1000 MILLIGRAM(S): at 01:00

## 2020-03-15 RX ADMIN — PIPERACILLIN AND TAZOBACTAM 25 GRAM(S): 4; .5 INJECTION, POWDER, LYOPHILIZED, FOR SOLUTION INTRAVENOUS at 02:14

## 2020-03-15 RX ADMIN — Medication 5 MILLIGRAM(S): at 18:23

## 2020-03-15 RX ADMIN — Medication 50 GRAM(S): at 09:27

## 2020-03-15 RX ADMIN — PIPERACILLIN AND TAZOBACTAM 25 GRAM(S): 4; .5 INJECTION, POWDER, LYOPHILIZED, FOR SOLUTION INTRAVENOUS at 05:51

## 2020-03-15 RX ADMIN — Medication 50 GRAM(S): at 08:05

## 2020-03-15 RX ADMIN — HYDROMORPHONE HYDROCHLORIDE 0.25 MILLIGRAM(S): 2 INJECTION INTRAMUSCULAR; INTRAVENOUS; SUBCUTANEOUS at 14:24

## 2020-03-15 RX ADMIN — PIPERACILLIN AND TAZOBACTAM 25 GRAM(S): 4; .5 INJECTION, POWDER, LYOPHILIZED, FOR SOLUTION INTRAVENOUS at 14:43

## 2020-03-15 RX ADMIN — PIPERACILLIN AND TAZOBACTAM 25 GRAM(S): 4; .5 INJECTION, POWDER, LYOPHILIZED, FOR SOLUTION INTRAVENOUS at 21:15

## 2020-03-15 RX ADMIN — Medication 1 MILLIGRAM(S): at 11:43

## 2020-03-15 RX ADMIN — SODIUM CHLORIDE 2000 MILLILITER(S): 9 INJECTION, SOLUTION INTRAVENOUS at 00:45

## 2020-03-15 RX ADMIN — HEPARIN SODIUM 5000 UNIT(S): 5000 INJECTION INTRAVENOUS; SUBCUTANEOUS at 21:15

## 2020-03-15 RX ADMIN — HYDROMORPHONE HYDROCHLORIDE 0.25 MILLIGRAM(S): 2 INJECTION INTRAMUSCULAR; INTRAVENOUS; SUBCUTANEOUS at 15:00

## 2020-03-15 RX ADMIN — SODIUM CHLORIDE 100 MILLILITER(S): 9 INJECTION, SOLUTION INTRAVENOUS at 02:15

## 2020-03-15 RX ADMIN — CHLORHEXIDINE GLUCONATE 1 APPLICATION(S): 213 SOLUTION TOPICAL at 11:42

## 2020-03-15 NOTE — DIETITIAN INITIAL EVALUATION ADULT. - ADD RECOMMEND
1) When medically feasible recommend to advance diet to clear liquids then to DASH/TLC, 2) Nutrition education not appropriate at this time, recommend to follow up with nutrition education when feasible prn on low fat diet, 3) When diet is advanced encouraged adequate PO intake, 4) Recommend MVI/daily, 5) Monitor pt's PO intake, weight, skin, edema, GI distress

## 2020-03-15 NOTE — DIETITIAN INITIAL EVALUATION ADULT. - OTHER INFO
As per chart pt is a 69 year old male with a PMH of  HTN, ITP, EUGENIO who presents with fever, weakness and rigors, found to have Acute cholecystitis with perforation, Severe sepsis, Eugenio vs CKD, Lactic acidosis, Thrombocytopenia,  Factor XI deficiency. S/P (3/14) Lap cholecystectomy.     Pt sleeping at time of visit, no family members present at bedside. Per chart NKFA.     Pt is currently NPO but has orders available for activation of clear liquid, full liquid and regular diet. Pt's admission weight 195.9lbs. Pt visually appears well nourished, no visual signs of muscle or fat loss. No nausea/ vomiting/ diarrhea/ constipation noted at this time, no BM since admission.    Stage 1 sacrum pressure injury  no edema noted at this time

## 2020-03-15 NOTE — PHYSICAL THERAPY INITIAL EVALUATION ADULT - PERTINENT HX OF CURRENT PROBLEM, REHAB EVAL
68 y/o male adm 3/14 with RUQ pain and vomiting. + acute cholecystitis with sepsis. 3/14 s/p lap errol

## 2020-03-15 NOTE — PROVIDER CONTACT NOTE (EICU) - RECOMMENDATIONS
1. Zosyn, 2. Two (2) liters of LR.  3. NPO.  4. Repeat all labs.  5. One set of blood cx  Admit to ICU

## 2020-03-15 NOTE — PROGRESS NOTE ADULT - SUBJECTIVE AND OBJECTIVE BOX
[INTERVAL HX: ]  Patient seen and examined;  Chart reviewed and events noted;     slight pain post surgery.   No Bl, no flatus  ERAN drain with serous sanginous fluid    Patient is a 69y Male with a known history of :  Acute cholecystitis (K81.0) [Active]  History of antiphospholipid antibody syndrome (Z86.2) [Active]  Idiopathic thrombocytopenic purpura (ITP) (D69.3) [Active]  Hypertension (I10) [Active]  Anxiety (F41.9) [Active]  Enlarged tonsils (J35.1) [Active]  No significant past surgical history (205892007) [Inactive]    HPI:  Pt is a 69 year old man who had onset 2 days ago of fever, RUQ ruq pain and vomiting.  Had same pain 5 weeks ago, seen at [facility] where he had gallstones on sono but normal HIDA scan.  Sent home  Never jaundiced (14 Mar 2020 20:00)            MEDICATIONS  (STANDING):  chlorhexidine 2% Cloths 1 Application(s) Topical daily  diazepam    Tablet 5 milliGRAM(s) Oral every 12 hours  folic acid 1 milliGRAM(s) Oral daily  heparin  Injectable 5000 Unit(s) SubCutaneous every 8 hours  lactated ringers. 1000 milliLiter(s) (100 mL/Hr) IV Continuous <Continuous>  piperacillin/tazobactam IVPB.. 3.375 Gram(s) IV Intermittent every 8 hours    MEDICATIONS  (PRN):  HYDROmorphone  Injectable 1 milliGRAM(s) IV Push every 6 hours PRN Severe Pain (7 - 10)  HYDROmorphone  Injectable 0.5 milliGRAM(s) IV Push every 6 hours PRN Moderate Pain (4 - 6)  HYDROmorphone  Injectable 0.25 milliGRAM(s) IV Push every 6 hours PRN Mild Pain (1 - 3)  ondansetron Injectable 4 milliGRAM(s) IV Push every 6 hours PRN Nausea and/or Vomiting      Vital Signs Last 24 Hrs  T(C): 36.8 (15 Mar 2020 15:30), Max: 39.5 (14 Mar 2020 16:59)  T(F): 98.3 (15 Mar 2020 15:30), Max: 103.1 (14 Mar 2020 16:59)  HR: 75 (15 Mar 2020 15:00) (61 - 101)  BP: 108/57 (15 Mar 2020 15:00) (86/46 - 120/53)  BP(mean): 77 (15 Mar 2020 15:00) (65 - 86)  RR: 22 (15 Mar 2020 15:00) (11 - 29)  SpO2: 100% (15 Mar 2020 15:00) (88% - 100%)      [PHYSICAL EXAM]  General: adult in NAD,  WN,  WD.  HEENT: clear oropharynx, anicteric sclera, pink conjunctivae.  Neck: supple, no masses.  CV: normal S1S2, no murmur, no rubs, no gallops.  Lungs: clear to auscultation, no wheezes, no rales, no rhonchi.  Abdomen: soft, non-tender, +distended, no hepatosplenomegaly, hypoactive BS, no guarding.  Ext: no clubbing, no cyanosis, no edema.  Skin: no rashes,  no petechiae, no venous stasis changes.  Neuro: alert and oriented X 3 , no focal motor deficits.  LN: no SC BRANT.      [LABS:]                        7.7    13.36 )-----------( 62       ( 15 Mar 2020 05:44 )             22.9     03-15    140  |  108  |  20  ----------------------------<  134<H>  4.3   |  27  |  1.30    Ca    7.7<L>      15 Mar 2020 05:26  Phos  4.3     03-15  Mg     1.1     03-15    TPro  7.0  /  Alb  3.2<L>  /  TBili  1.8<H>  /  DBili  x   /  AST  56<H>  /  ALT  54  /  AlkPhos  100  03-14    PT/INR - ( 15 Mar 2020 05:44 )   PT: 21.7 sec;   INR: 1.90 ratio         PTT - ( 15 Mar 2020 05:44 )  PTT:65.3 sec      Culture - Blood (collected 03-14-20 @ 21:28)  Source: .Blood Blood-Peripheral  Gram Stain (03-15-20 @ 05:08):    Growth in anaerobic bottle: Gram Negative Rods    Growth in aerobic bottle: Gram Negative Rods  Preliminary Report (03-15-20 @ 05:08):    Growth in anaerobic bottle: Gram Negative Rods    Growth in aerobic bottle: Gram Negative Rods    Culture - Blood (collected 03-14-20 @ 21:28)  Source: .Blood Blood-Peripheral  Gram Stain (03-15-20 @ 05:39):    Growth in anaerobic bottle: Gram Negative Rods    Growth in aerobic bottle: Gram Negative Rods  Preliminary Report (03-15-20 @ 05:40):    Growth in anaerobic bottle: Gram Negative Rods    Growth in aerobic bottle: Gram Negative Rods    ***Blood Panel PCR results on this specimen are available    approximately 3 hours after the Gram stain result.***    Gram stain, PCR, and/or culture results may not always    correspond due to difference in methodologies.    ************************************************************    This PCR assay was performed using [employer].    The following targets are tested for: Enterococcus,    vancomycin resistant enterococci, Listeria monocytogenes,    coagulase negative staphylococci, S. aureus,    methicillin resistant S. aureus, Streptococcus agalactiae    (Group B), S. pneumoniae, S. pyogenes (Group A),    Acinetobacter baumannii, Enterobacter cloacae, E. coli,    Klebsiella oxytoca, K. pneumoniae, Proteus sp.,    Serratia marcescens, Haemophilus influenzae,    Neisseria meningitidis, Pseudomonas aeruginosa, Candida    albicans, C. glabrata, C krusei, C parapsilosis,    C. tropicalis and the KPC resistance gene.  Organism: Blood Culture PCR (03-15-20 @ 04:05)  Organism: Blood Culture PCR (03-15-20 @ 04:05)      -  Escherichia coli: Detec      Method Type: PCR              [RADIOLOGY STUDIES:]

## 2020-03-15 NOTE — PROGRESS NOTE ADULT - SUBJECTIVE AND OBJECTIVE BOX
Patient is a 69y old  Male who presents with a chief complaint of perforated acute errol with sepsis (15 Mar 2020 16:37)      BRIEF HOSPITAL COURSE:   Patient is a 69 year old male with a pmhx of  HTN, ITP, DONNA who presents with fever, weakness and rigors. Patient reports that he fell during one the rigor episodes and was not able to get up. Of note patient seen at [facility] several weeks ago, where he had gallstones on sono but normal HIDA scan. Upon presentation to the ED, patient had lactate of 3.9 and fever to 105.5f. STAT CT abdomen done showing perforated cholecystitis with adjacent hepatic abscess. He was then take emergently to the OR for laparoscopic cholecystomy with drainage of pericholecystic abscess. ICU called post op, patient had episode of hypotension prior to case, resolved with fluid. Patient now seen and examined post op, SBP 87/50, currently getting fluid bolus. Pt c/o mild abd pain, denies CP, SOB, N/V, HA and dizziness. Will admit to MICU for care overnight.       Events last 24 hours:   - advance diet to clear liquid  - holding lidia BP meds   - GNR bacteremia   - seen and examined  c/o of mild incisional abdominal pain       PAST MEDICAL & SURGICAL HISTORY:  History of antiphospholipid antibody syndrome  Idiopathic thrombocytopenic purpura (ITP)  Hypertension  Anxiety  Anxiety  Hypertension, unspecified type  Enlarged tonsils: s/p tonsillectomy  H/O Mohs micrographic surgery for skin cancer    Allergies    sulfa drugs (Unknown)    Intolerances      FAMILY HISTORY:  Denies family hx    Social hx:  Denies smoking and drinking history     Review of Systems:  CONSTITUTIONAL: No fever, chills,  fatigue  EYES: No eye pain, visual disturbances, or discharge  ENMT:  No difficulty hearing, tinnitus, vertigo; No sinus or throat pain  NECK: No pain or stiffness  RESPIRATORY: No cough, wheezing, chills or hemoptysis; No shortness of breath  CARDIOVASCULAR: No chest pain, palpitations, dizziness, or leg swelling  GASTROINTESTINAL: +abdominal pain no epigastric pain. No nausea, vomiting, or hematemesis; No diarrhea or constipation. No melena or hematochezia.  GENITOURINARY: No dysuria, frequency, hematuria, or incontinence  NEUROLOGICAL: No headaches, memory loss, loss of strength, numbness, or tremors  SKIN: No itching, burning, rashes, or lesions   MUSCULOSKELETAL: No joint pain or swelling; No muscle, back, or extremity pain  PSYCHIATRIC: No depression, anxiety, mood swings, or difficulty sleeping      Medications:  piperacillin/tazobactam IVPB.. 3.375 Gram(s) IV Intermittent every 8 hours  acetaminophen   Tablet .. 650 milliGRAM(s) Oral every 6 hours PRN  diazepam    Tablet 5 milliGRAM(s) Oral every 12 hours  HYDROmorphone  Injectable 0.5 milliGRAM(s) IV Push every 6 hours PRN  HYDROmorphone  Injectable 1 milliGRAM(s) IV Push every 6 hours PRN  ondansetron Injectable 4 milliGRAM(s) IV Push every 6 hours PRN  heparin  Injectable 5000 Unit(s) SubCutaneous every 8 hours  folic acid 1 milliGRAM(s) Oral daily  lactated ringers. 1000 milliLiter(s) IV Continuous <Continuous>  chlorhexidine 2% Cloths 1 Application(s) Topical daily      ICU Vital Signs Last 24 Hrs  T(C): 36.9 (15 Mar 2020 20:00), Max: 37.7 (15 Mar 2020 18:45)  T(F): 98.4 (15 Mar 2020 20:00), Max: 99.9 (15 Mar 2020 18:45)  HR: 96 (15 Mar 2020 20:00) (61 - 122)  BP: 103/52 (15 Mar 2020 20:00) (86/46 - 187/81)  BP(mean): 73 (15 Mar 2020 20:00) (65 - 117)  RR: 20 (15 Mar 2020 20:00) (11 - 32)  SpO2: 98% (15 Mar 2020 20:00) (86% - 100%)        I&O's Detail    14 Mar 2020 07:01  -  15 Mar 2020 07:00  --------------------------------------------------------  IN:    Frozen Plasma Cryoprecipitate Reduced: 320 mL    IV PiggyBack: 100 mL    Lactated Ringers IV Bolus: 1000 mL    Lactated Ringers IV Bolus: 500 mL    lactated ringers.: 600 mL    Solution: 200 mL  Total IN: 2720 mL    OUT:    Bulb: 50 mL    Bulb: 140 mL    Voided: 151 mL  Total OUT: 341 mL    Total NET: 2379 mL      15 Mar 2020 07:01  -  15 Mar 2020 21:20  --------------------------------------------------------  IN:    lactated ringers.: 1200 mL    Oral Fluid: 590 mL    Solution: 100 mL  Total IN: 1890 mL    OUT:    Bulb: 145 mL    Voided: 1550 mL  Total OUT: 1695 mL    Total NET: 195 mL            LABS:                        7.7    13.36 )-----------( 62       ( 15 Mar 2020 05:44 )             22.9     0315    140  |  108  |  20  ----------------------------<  134<H>  4.3   |  27  |  1.30    Ca    7.7<L>      15 Mar 2020 05:26  Phos  4.3     03-15  Mg     1.1     03-15    TPro  7.0  /  Alb  3.2<L>  /  TBili  1.8<H>  /  DBili  x   /  AST  56<H>  /  ALT  54  /  AlkPhos  100            CAPILLARY BLOOD GLUCOSE      POCT Blood Glucose.: 219 mg/dL (14 Mar 2020 14:26)    PT/INR - ( 15 Mar 2020 05:44 )   PT: 21.7 sec;   INR: 1.90 ratio         PTT - ( 15 Mar 2020 05:44 )  PTT:65.3 sec  Urinalysis Basic - ( 14 Mar 2020 14:42 )    Color: Yellow / Appearance: Clear / S.015 / pH: x  Gluc: x / Ketone: Negative  / Bili: Negative / Urobili: Negative   Blood: x / Protein: 500 mg/dL / Nitrite: Negative   Leuk Esterase: Trace / RBC: 6-10 /HPF / WBC 0-2   Sq Epi: x / Non Sq Epi: Few / Bacteria: Few      CULTURES:  Culture Results:   No growth ( @ 21:36)  Culture Results:   Growth in anaerobic bottle: Gram Negative Rods  Growth in aerobic bottle: Gram Negative Rods  ***Blood Panel PCR results on this specimen are available  approximately 3 hours after the Gram stain result.***  Gram stain, PCR, and/or culture results may not always  correspond due to difference in methodologies.  ************************************************************  This PCR assay was performed using [employer].  The following targets are tested for: Enterococcus,  vancomycin resistant enterococci, Listeria monocytogenes,  coagulase negative staphylococci, S. aureus,  methicillin resistant S. aureus, Streptococcus agalactiae  (Group B), S. pneumoniae, S. pyogenes (Group A),  Acinetobacter baumannii, Enterobacter cloacae, E. coli,  Klebsiella oxytoca, K. pneumoniae, Proteus sp.,  Serratia marcescens, Haemophilus influenzae,  Neisseria meningitidis, Pseudomonas aeruginosa, Candida  albicans, C. glabrata, C krusei, C parapsilosis,  C. tropicalis and the KPC resistance gene. ( @ 21:28)  Culture Results:   Growth in anaerobic bottle: Gram Negative Rods  Growth in aerobic bottle: Gram Negative Rods ( @ 21:28)      Physical Examination:    General: No acute distress.  alert, still groggy post op     HEENT: Pupils equal, reactive to light.  Symmetric.    PULM: Clear to auscultation bilaterally, no significant sputum production    CVS: Regular rate and rhythm, no murmurs, rubs, or gallops    ABD: Soft, nondistended, mildly tender to right flank, ERAN in place c/d/i,  normoactive bowel sounds, no masses    EXT: No edema, nontender    SKIN: Warm and well perfused, no rashes noted.    Neuro: alert and oriented x 3, moves all extr, follows commands    RADIOLOGY:  < from: CT Abdomen and Pelvis w/ IV Cont (20 @ 17:39) >  INTERPRETATION:  CT CHEST WITH IV CONTRAST, CT ABDOMEN AND PELVIS WITH IV CONTRAST    CLINICAL INFORMATION: sepsis      COMPARISON: None.    PROCEDURE:   CT of the Chest, Abdomen and Pelvis was performed with intravenous contrast.  Intravenous contrast: 95 cc Omnipaque 350  Oral contrast: None.  Sagittal and coronal reformats were performed.    FINDINGS:    CHEST:     LUNGS, AIRWAYS: The central airways are patent. The lungs are clear.  PLEURA: Small bilateral effusions.  VESSELS: Normal caliber aorta. Main pulmonary arteries are patent.  HEART: Normal heart size. No pericardial effusion.  MEDIASTINUM AND WESTON: No adenopathy.  CHEST WALL: No masses.    ABDOMEN AND PELVIS:    LIVER: As below  BILE DUCTS: Nondilated.  GALLBLADDER: Gallstones with diffuse wall thickening, luminal distention, and pericholecystic inflammatory change. Defect in the gallbladder wall with adjacent 3.5 cm hepatic collection.  SPLEEN: Normal.  PANCREAS: Normal.  ADRENALS: Normal.  KIDNEYS/URETERS: No calculi, hydronephrosis, or soft tissue attenuating mass.    BLADDER: Underdistended limiting evaluation.  REPRODUCTIVE ORGANS: Enlarged prostate.    BOWEL: No bowel-related abnormality. Specifically, no evidence of acute diverticulitis. Normal ileocecal region. No bowel obstruction or bowel inflammation.  PERITONEUM: Small ascites. No free air or collection.  VESSELS:  Normal caliber aorta.  RETROPERITONEUM/LYMPH NODES: No adenopathy.    ABDOMINAL WALL: Normal.  BONES: No acute bony abnormality.    IMPRESSION:     Perforated cholecystitis with adjacent 3.5 cm hepatic abscess.    No acute chest pathology. Clear lungs aside from small bilateral pleural effusions.

## 2020-03-15 NOTE — PROGRESS NOTE ADULT - ASSESSMENT
[ASSESSMENT and  PLAN]  70yo M admitted with acute perforated cholecystitis.   s/p emergent surgery  Gram negative sepsis.   coagulopathy due to several reasons below and early sepsis, better s/p surgery, abx and FFP.      with hx of ITP, baseline plts 100-120. On observation. Plt 120 in our office on 12/19/20   Admitted to [facility] early Feb 2020 with fever, Influenza A and cholecystitis.   CBC then with plt clumping. Plt on blue top estimated 72.     Last admission workup with   Mixing study c/w inhibitor to PTT, and possible weak inhibitor to PT.   +lupus anticoagulant [+LAC], as cause for elevated PTT  Low FII and FX, felt due to VitK def then, possible cause for abn PT. INR was 1.5 last admission.   Finally also with borderline low FXI levels 46%. 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     This admission may have similar issues but in addition sepsis.   May be both at risk for bleeding and for clotting    Prior non-contrast CT scan AP and Abd US, and not CT Scan CAP with contrast,  showed no evidence of liver disease. No BRANT. +splenomegaly.     Las admission dx with KRANTHI+, with anit-C3, Cold Agglutin  Baseline mild anemia. Hgb 11.  Post DC from hospital, Hgb has been ~ 10 in office.     DONNA with Cr 1.6  Baseline Cr 1.2-1.4, mild CKD-2    hx low normal B12 levels.       [RECOMMENDATIONS]  Perforated acute cholecystitis.   S/p surgery. Post op mgmt.   IV abx  Diet as per surgery team.     Thrombocytopenia/ITP  Plts adequate post surgery, slight lower.   Follow CBC, no intervention recommended for chronic thrombocytopenia.     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 Some slight bleeding due to inflammation.   s/p  additional 1 unit FFP post surgery.   Monitor for bleeding. Follow ERAN output.     +LAC, DVT Prophylaxis SC.  High risk for DVT.  Encourage ambulation when agle.   Start SQ Heparin for DVT prophylaxis.   SCD in meanwhile.     KRANTHI+, mild anemia  Continue Folic acid daily.   Follow CBC for now. RBC txfusion not needed.     DONNA  s/p 3L IVF for DONNA.   Follow Cr.   IVF as poor PO intake still.       Thank you for consulting us.

## 2020-03-15 NOTE — PROGRESS NOTE ADULT - ASSESSMENT
69M h/o ITP, CKD1-2, HTN, lupus anticoagulant a/w acute perforated cholecystitis w/ liver abscess POD1 s/p lap errol with washout, with course c/b GNR bacteremia DONNA and septic shock currently off pressors    Neuro: pain control with dilaudid, can add tylenol if tolerating PO  CV: septic shock 2/2 acute perf cholecystitis/liver abscess/GNR bacteremia/peritonitis BP improved s/p total of 5L IVF since presentation, now with improving BPs off pressor support  Pulm: no acute issues, wean off NC as able  GI: s/p lap errol and wash out, can start diet and advance to full as tolerates  Renal: sCr improved s/p resuscitation, continue to monitor UOP  Heme: h/o ITP and lupus AC, ? factor XI deficiency - received FFP pre- and post-OR will monitor INR as currently no signs of active bleeding; given h/o lupus AC pt with high risk clotting - will monitor for 24hr post-procedure for signs of bleeding, if not evident start HSQ dvt ppx 69M h/o ITP, CKD1-2, HTN, lupus anticoagulant a/w acute perforated cholecystitis w/ liver abscess POD1 s/p lap errol with washout, with course c/b GNR bacteremia DONNA and septic shock currently off pressors    Neuro: pain control with dilaudid, can add tylenol if tolerating PO  CV: septic shock 2/2 acute perf cholecystitis/liver abscess/GNR bacteremia/peritonitis BP improved s/p total of 5L IVF since presentation, now with improving BPs off pressor support  Pulm: no acute issues, wean off NC as able  GI: s/p lap errol and wash out, can start diet and advance to full as tolerates  Renal: sCr improved s/p resuscitation, continue to monitor UOP  Heme: h/o ITP and lupus AC, ? factor XI deficiency - received FFP pre- and post-OR will trend INR as currently no signs of active bleeding; given h/o lupus AC pt with high risk clotting - will monitor for 24hr post-procedure for signs of bleeding, if not evident start HSQ dvt ppx  Dispo: will monitor in ICU, if BP remains stable can transfer to floor in AM 69M h/o ITP, CKD1-2, HTN, lupus anticoagulant a/w acute perforated cholecystitis w/ liver abscess POD1 s/p lap errol with washout, with course c/b GNR bacteremia DONNA and septic shock currently off pressors    Neuro: pain control with dilaudid, can add tylenol if tolerating PO  CV: septic shock 2/2 acute perf cholecystitis/liver abscess/GNR bacteremia/peritonitis BP improved s/p total of 5L IVF since presentation, now with improving BPs off pressor support  Pulm: no acute issues, wean off NC as able  GI: s/p lap errol and wash out, can start diet and advance to full as tolerates, monitor ERAN output  Renal: sCr improved s/p resuscitation, continue to monitor UOP  Heme: h/o ITP and lupus AC, ? factor XI deficiency - received FFP pre- and post-OR will trend INR as currently no signs of active bleeding; given h/o lupus AC pt with high risk clotting - will monitor for 24hr post-procedure for signs of bleeding, if not evident start HSQ dvt ppx; monitor ERAN output for bleeding  ID: sepsis 2/2 perf errol with abscess and GNR bacteremia and peritonitis - c/w zosyn for now, repeat blood cultures tomorrow to monitor for clearance; f/u intra-op peritoneal cultures   - remains critically ill at this time, will monitor in ICU - if BP remains stable can transfer to floor in AM

## 2020-03-15 NOTE — PROVIDER CONTACT NOTE (CRITICAL VALUE NOTIFICATION) - ACTION/TREATMENT ORDERED:
NS IVF
Ns x 2 liters, IV antibiotics, then repeat lactate
Continue the antibiotics
IV LR infusing as ordered.

## 2020-03-15 NOTE — PATIENT PROFILE ADULT - STATED REASON FOR ADMISSION
C/O abdominal pain , nausea vomiting 2 days prior to admit C/O Flu like symptoms w/ fever 104 , body aches , chills , weakness and cough and right upper quadrant abdominal pain

## 2020-03-15 NOTE — PROGRESS NOTE ADULT - SUBJECTIVE AND OBJECTIVE BOX
Interval events: Admitted to ICU post-op s/p lap errol with wash out for perf cholecystitis with abscess. EBL ~80-100cc. Pt hypotensive to 80s but fluid responsive on arrival. In AM pt reports pain at incision sites. No other complaints at this time.    Review of Systems:  Constitutional: no fever, chills, fatigue  Neuro: no headache, numbness, weakness  Resp: no cough, wheezing, shortness of breath  CVS: no chest pain, palpitations, leg swelling  GI: abdominal pain; no nausea, vomiting, diarrhea   : no dysuria  Skin: no rashes, bruising  Msk: no joint pain or swelling      T(F): 97.5 (03-15-20 @ 12:00), Max: 105.5 (20 @ 14:09)  HR: 66 (03-15-20 @ 12:00) (61 - 114)  BP: 116/67 (03-15-20 @ 11:00) (86/46 - 144/70)  RR: 16 (03-15-20 @ 12:00) (11 - 29)  SpO2: 100% (03-15-20 @ 12:00) (88% - 100%)  Wt(kg): --        CAPILLARY BLOOD GLUCOSE      POCT Blood Glucose.: 219 mg/dL (14 Mar 2020 14:26)    I&O's Summary    14 Mar 2020 07:  -  15 Mar 2020 07:00  --------------------------------------------------------  IN: 2720 mL / OUT: 341 mL / NET: 2379 mL    15 Mar 2020 07:  -  15 Mar 2020 12:23  --------------------------------------------------------  IN: 300 mL / OUT: 750 mL / NET: -450 mL        Physical Exam:     Gen: awake and alert, nad  HEENT: dry MM  CV: rrr, no murmurs appreciated  Pulm: clear lungs b/l  GI: trochar sites cleanly dressed, ttp around incision sites, no rebound/guarding  Ext: no edema  Skin: no rash/petechia/bruising    Meds:  heparin  Injectable 5000 Unit(s) SubCutaneous every 8 hours  piperacillin/tazobactam IVPB.. 3.375 Gram(s) IV Intermittent every 8 hours  diazepam    Tablet 7.5 milliGRAM(s) Oral two times a day  HYDROmorphone  Injectable 1 milliGRAM(s) IV Push every 6 hours PRN  HYDROmorphone  Injectable 0.5 milliGRAM(s) IV Push every 6 hours PRN  HYDROmorphone  Injectable 0.25 milliGRAM(s) IV Push every 6 hours PRN  ondansetron Injectable 4 milliGRAM(s) IV Push every 6 hours PRN  folic acid 1 milliGRAM(s) Oral daily  lactated ringers. 1000 milliLiter(s) IV Continuous <Continuous>  chlorhexidine 2% Cloths 1 Application(s) Topical daily                                  7.7    13.36 )-----------( 62       ( 15 Mar 2020 05:44 )             22.9     Bands 15.0    03-15    140  |  108  |  20  ----------------------------<  134<H>  4.3   |  27  |  1.30    Ca    7.7<L>      15 Mar 2020 05:26  Phos  4.3     03-15  Mg     1.1     -15    TPro  7.0  /  Alb  3.2<L>  /  TBili  1.8<H>  /  DBili  x   /  AST  56<H>  /  ALT  54  /  AlkPhos  100  03-14    Lactate 2.4           -15 @ 05:44    Lactate 3.1           -15 @ 01:01    Lactate 3.6           03-14 @ 17:57    Lactate 3.9           03-14 @ 14:39          PT/INR - ( 15 Mar 2020 05:44 )   PT: 21.7 sec;   INR: 1.90 ratio         PTT - ( 15 Mar 2020 05:44 )  PTT:65.3 sec  Urinalysis Basic - ( 14 Mar 2020 14:42 )    Color: Yellow / Appearance: Clear / S.015 / pH: x  Gluc: x / Ketone: Negative  / Bili: Negative / Urobili: Negative   Blood: x / Protein: 500 mg/dL / Nitrite: Negative   Leuk Esterase: Trace / RBC: 6-10 /HPF / WBC 0-2   Sq Epi: x / Non Sq Epi: Few / Bacteria: Few      .Blood Blood-Peripheral   Growth in anaerobic bottle: Gram Negative Rods  Growth in aerobic bottle: Gram Negative Rods  ***Blood Panel PCR results on this specimen are available  approximately 3 hours after the Gram stain result.***  Gram stain, PCR, and/or culture results may not always  correspond due to difference in methodologies.  ************************************************************  This PCR assay was performed using [employer].  The following targets are tested for: Enterococcus,  vancomycin resistant enterococci, Listeria monocytogenes,  coagulase negative staphylococci, S. aureus,  methicillin resistant S. aureus, Streptococcus agalactiae  (Group B), S. pneumoniae, S. pyogenes (Group A),  Acinetobacter baumannii, Enterobacter cloacae, E. coli,  Klebsiella oxytoca, K. pneumoniae, Proteus sp.,  Serratia marcescens, Haemophilus influenzae,  Neisseria meningitidis, Pseudomonas aeruginosa, Candida  albicans, C. glabrata, C krusei, C parapsilosis,  C. tropicalis and the KPC resistance gene.   Growth in anaerobic bottle: Gram Negative Rods  Growth in aerobic bottle: Gram Negative Rods 0314 @ 21:28      Bedside Lung U/S: b-lines beginning to develop, trace pleural effusions b/l, unable to view IVC      CENTRAL LINE: N  STAFFORD: N  A-LINE: N    GLOBAL ISSUE/BEST PRACTICE:  Analgesia: n/a  Sedation: n/a  CAM-ICU: negative  HOB elevation: yes  Stress ulcer prophylaxis: n/a  VTE prophylaxis: hsq/SCDs  Glycemic control: n/a  Nutrition: liquids, advance as tolerated    CODE STATUS: full code

## 2020-03-15 NOTE — PATIENT PROFILE ADULT - CAREGIVER ADDRESS
[address] [place], [place] [id number] [facility] [id number]

## 2020-03-15 NOTE — CONSULT NOTE ADULT - SUBJECTIVE AND OBJECTIVE BOX
HPI:  Pt is a 69 year old man who had onset 2 days ago of fever, RUQ ruq pain and vomiting.  Had same pain 5 weeks ago, seen at [facility] where he had gallstones on sono but normal HIDA scan.  Sent home  Never jaundiced (14 Mar 2020 20:00)      PAST MEDICAL & SURGICAL HISTORY:  History of antiphospholipid antibody syndrome  Idiopathic thrombocytopenic purpura (ITP)  Hypertension  Anxiety  Enlarged tonsils: s/p tonsillectomy      REVIEW OF SYSTEMS:    CONSTITUTIONAL: No fever, no weight loss, no fatigue  EYES: No eye pain, no visual disturbances  ENMT:  No difficulty hearing, no tinnitus, no vertigo; No sinus or throat pain  NECK: No pain or stiffness  RESPIRATORY: No cough, no wheezing, no chills, no hemoptysis; No shortness of breath  CARDIOVASCULAR: No chest pain, palpitations, dizziness, or leg swelling  GASTROINTESTINAL: No abdominal pain. No nausea, no vomiting, no hematemesis; No diarrhea, no constipation. No melena, no hematochezia.  GENITOURINARY: No dysuria, no frequency, no hematuria, no incontinence  NEUROLOGICAL: No headaches, no memory loss, no loss of strength, no numbness, no tremors  SKIN: No itching, no burning, no rashes   LYMPH NODES: No enlarged glands  ENDOCRINE: No heat, no cold intolerance; No hair loss  MUSCULOSKELETAL: No joint pain, no swelling; No muscle pain, no back pain, no extremity pain  PSYCHIATRIC: No depression, no anxiety, no mood swings, no difficulty sleeping  HEME/LYMPH: No easy bruising, no bleeding gums  ALLERGY AND IMMUNOLOGIC: No hives, no eczema      MEDICATIONS  (STANDING):  chlorhexidine 2% Cloths 1 Application(s) Topical daily  diazepam    Tablet 7.5 milliGRAM(s) Oral two times a day  folic acid 1 milliGRAM(s) Oral daily  heparin  Injectable 5000 Unit(s) SubCutaneous every 8 hours  lactated ringers. 1000 milliLiter(s) (100 mL/Hr) IV Continuous <Continuous>  piperacillin/tazobactam IVPB.. 3.375 Gram(s) IV Intermittent every 8 hours    MEDICATIONS  (PRN):  HYDROmorphone  Injectable 1 milliGRAM(s) IV Push every 6 hours PRN Severe Pain (7 - 10)  HYDROmorphone  Injectable 0.5 milliGRAM(s) IV Push every 6 hours PRN Moderate Pain (4 - 6)  HYDROmorphone  Injectable 0.25 milliGRAM(s) IV Push every 6 hours PRN Mild Pain (1 - 3)  ondansetron Injectable 4 milliGRAM(s) IV Push every 6 hours PRN Nausea and/or Vomiting      Allergies    sulfa drugs (Unknown)    Intolerances        SOCIAL HISTORY:    FAMILY HISTORY:      Vital Signs Last 24 Hrs  T(C): 36.4 (15 Mar 2020 12:00), Max: 40.8 (14 Mar 2020 14:09)  T(F): 97.5 (15 Mar 2020 12:00), Max: 105.5 (14 Mar 2020 14:09)  HR: 66 (15 Mar 2020 12:00) (61 - 114)  BP: 110/61 (15 Mar 2020 12:00) (86/46 - 144/70)  BP(mean): 80 (15 Mar 2020 12:00) (65 - 86)  RR: 16 (15 Mar 2020 12:00) (11 - 29)  SpO2: 100% (15 Mar 2020 12:00) (88% - 100%)    PHYSICAL EXAM:    GENERAL: NAD  HEAD:  Atraumatic, Normocephalic  EYES: EOMI, PERRLA, conjunctiva and sclera clear  ENMT: No tonsillar erythema, exudates, or enlargement; Moist mucous membranes  NECK: Supple, No JVD, Normal thyroid  NERVOUS SYSTEM:  Alert & Oriented X3, Motor Strength 5/5 B/L upper and lower extremities;  CHEST/LUNG: Clear to percussion bilaterally; No rales, rhonchi, wheezing, or rubs  HEART: Regular rate and rhythm; No murmurs, rubs, or gallops  ABDOMEN: Soft, Nontender, Nondistended; Bowel sounds present  EXTREMITIES:  2+ Peripheral Pulses, No clubbing, cyanosis, or edema  LYMPH: No lymphadenopathy   SKIN: No rashes or lesions      LABS:                        7.7    13.36 )-----------( 62       ( 15 Mar 2020 05:44 )             22.9     03-15    140  |  108  |  20  ----------------------------<  134<H>  4.3   |  27  |  1.30    Ca    7.7<L>      15 Mar 2020 05:26  Phos  4.3     03-15  Mg     1.1     03-15    TPro  7.0  /  Alb  3.2<L>  /  TBili  1.8<H>  /  DBili  x   /  AST  56<H>  /  ALT  54  /  AlkPhos  100  03-14    PT/INR - ( 15 Mar 2020 05:44 )   PT: 21.7 sec;   INR: 1.90 ratio         PTT - ( 15 Mar 2020 05:44 )  PTT:65.3 sec  Urinalysis Basic - ( 14 Mar 2020 14:42 )    Color: Yellow / Appearance: Clear / S.015 / pH: x  Gluc: x / Ketone: Negative  / Bili: Negative / Urobili: Negative   Blood: x / Protein: 500 mg/dL / Nitrite: Negative   Leuk Esterase: Trace / RBC: 6-10 /HPF / WBC 0-2   Sq Epi: x / Non Sq Epi: Few / Bacteria: Few        RADIOLOGY & ADDITIONAL STUDIES:      Advanced care planning discussed with patient/family. Patient's health status was discussed. All appropriate changes have been made regarding patient's end-of-life care. Advanced care planning forms reviewed/discussed/completed.  20 minutes spent.

## 2020-03-15 NOTE — PHARMACOTHERAPY INTERVENTION NOTE - COMMENTS
Patient Name: Shawn HenleyBirth Date: 1950  Address: [id number] [place] [id number]: Male  Rx Written	Rx Dispensed	Drug	Quantity	Days Supply	Prescriber Name	Payment Method	Dispenser  2020/02/27	2020/03/08	diazepam 5 mg tablet	60	30	Ian Forde B	Medicare	[id number]    Discussed with ICU PA that patient takes 5mg BID as opposed to 7.5mg BID. Changed orders to reflect accurate dose.

## 2020-03-15 NOTE — PROVIDER CONTACT NOTE (EICU) - ASSESSMENT
1. Sepsis with perforated GB s/p lap errol.  2. Likely gram neg sepsis with DIC.  3. Lactic acidosis.  4. DONNA

## 2020-03-15 NOTE — CONSULT NOTE ADULT - ASSESSMENT
ACUTE PERFORATED CHOLECYSTITIS WITH ABSCESS  S/P lap errol with drainage of abscess -- POD #1  Continue post-op care  Surgery f/u    GRAM NEGATIVE SEPSIS  Continue zosyn  Follow-up culture data  Consider ID consult    ANXIETY  Continue diazepam and fluvoxamine    HTN  Hold BP meds for now

## 2020-03-15 NOTE — PROGRESS NOTE ADULT - SUBJECTIVE AND OBJECTIVE BOX
JUSTICE, JAN  MRN-[id number] 69y    GENERAL SURGERY/ DR. VALLEJO    MEDICATIONS  (STANDING):    chlorhexidine 2% Cloths 1 Application(s) Topical daily  diazepam    Tablet 7.5 milliGRAM(s) Oral two times a day  folic acid 1 milliGRAM(s) Oral daily  heparin  Injectable 5000 Unit(s) SubCutaneous every 8 hours  lactated ringers. 1000 milliLiter(s) (100 mL/Hr) IV Continuous <Continuous>  piperacillin/tazobactam IVPB.. 3.375 Gram(s) IV Intermittent every 8 hours    MEDICATIONS  (PRN):    HYDROmorphone  Injectable 1 milliGRAM(s) IV Push every 6 hours PRN Severe Pain (7 - 10)  HYDROmorphone  Injectable 0.5 milliGRAM(s) IV Push every 6 hours PRN Moderate Pain (4 - 6)  HYDROmorphone  Injectable 0.25 milliGRAM(s) IV Push every 6 hours PRN Mild Pain (1 - 3)  ondansetron Injectable 4 milliGRAM(s) IV Push every 6 hours PRN Nausea and/or Vomiting      ICU Vital Signs Last 24 Hrs  T(C): 36.4 (15 Mar 2020 12:00), Max: 39.5 (14 Mar 2020 16:59)  T(F): 97.5 (15 Mar 2020 12:00), Max: 103.1 (14 Mar 2020 16:59)  HR: 75 (15 Mar 2020 15:00) (61 - 101)  BP: 108/57 (15 Mar 2020 15:00) (86/46 - 120/53)  BP(mean): 77 (15 Mar 2020 15:00) (65 - 86)  ABP: --  ABP(mean): --  RR: 22 (15 Mar 2020 15:00) (11 - 29)  SpO2: 100% (15 Mar 2020 15:00) (88% - 100%)      03-14-20 @ 07:01  -  03-15-20 @ 07:00  --------------------------------------------------------  IN: 2720 mL / OUT: 341 mL / NET: 2379 mL    03-15-20 @ 07:01  -  03-15-20 @ 15:48  --------------------------------------------------------  IN: 1350 mL / OUT: 1575 mL / NET: -225 mL        POD # 1      LUNGS: CLEAR TO AUSCULTATION , NO W/R/R  ABDOMEN: ALL TROCAR SITES ARE DRY AND INTACT WITH PRESSURE DRESSING IN PLACE, RIGHT ERAN IN PLACE BLOODY OUT PUT, + BS,  SOFT, MILD DISTENTION ,   SOME INCISIONAL TENDERNESS   EXTREMITY: NO EDEMA, NO CALF TENDERNESS                            7.7    13.36 )-----------( 62       ( 15 Mar 2020 05:44 )             22.9      03-15    140  |  108  |  20  ----------------------------<  134<H>  4.3   |  27  |  1.30    Ca    7.7<L>      15 Mar 2020 05:26  Phos  4.3     03-15  Mg     1.1     03-15    TPro  7.0  /  Alb  3.2<L>  /  TBili  1.8<H>  /  DBili  x   /  AST  56<H>  /  ALT  54  /  AlkPhos  100  03-14    Culture - Blood (03.14.20 @ 21:28)    Gram Stain:   Growth in anaerobic bottle: Gram Negative Rods  Growth in aerobic bottle: Gram Negative Rods    Specimen Source: .Blood Blood-Peripheral    Culture Results:   Growth in anaerobic bottle: Gram Negative Rods  Growth in aerobic bottle: Gram Negative Rods                             Culture - Blood (03.14.20 @ 21:28)    -  Escherichia coli: Detec    Gram Stain:   Growth in anaerobic bottle: Gram Negative Rods  Growth in aerobic bottle: Gram Negative Rods    Specimen Source: .Blood Blood-Peripheral    Organism: Blood Culture PCR    Culture Results:   Growth in anaerobic bottle: Gram Negative Rods  Growth in aerobic bottle: Gram Negative Rods    ASSESSMENT &  PLAN:     POD # 1 S/P LAPAROSCOPIC CHOLECYSTECTOMY FOR PERFORATED GALLBLADDER WITH ABSCESS   SEPTIC SHOCK  SEPTICEMIA   ANEMIA , H/O ITP AND LUPUS ANTICOAGULANT      CLEAR LIQUID DIET ADVANCE AS TOLERATED   MAINTAIN ERAN, MONITOR OUT PUT   CONTINUE ZOSYN   FOLLOW UP INTRA OP CULTURE AND BLOOD C/S   ICU MANAGEMENT NOTED AND APPRECIATED JUSTICE, JAN  MRN-[id number] 69y    GENERAL SURGERY/ DR. VALLEJO    MEDICATIONS  (STANDING):    chlorhexidine 2% Cloths 1 Application(s) Topical daily  diazepam    Tablet 7.5 milliGRAM(s) Oral two times a day  folic acid 1 milliGRAM(s) Oral daily  heparin  Injectable 5000 Unit(s) SubCutaneous every 8 hours  lactated ringers. 1000 milliLiter(s) (100 mL/Hr) IV Continuous <Continuous>  piperacillin/tazobactam IVPB.. 3.375 Gram(s) IV Intermittent every 8 hours    MEDICATIONS  (PRN):    HYDROmorphone  Injectable 1 milliGRAM(s) IV Push every 6 hours PRN Severe Pain (7 - 10)  HYDROmorphone  Injectable 0.5 milliGRAM(s) IV Push every 6 hours PRN Moderate Pain (4 - 6)  HYDROmorphone  Injectable 0.25 milliGRAM(s) IV Push every 6 hours PRN Mild Pain (1 - 3)  ondansetron Injectable 4 milliGRAM(s) IV Push every 6 hours PRN Nausea and/or Vomiting      ICU Vital Signs Last 24 Hrs  T(C): 36.4 (15 Mar 2020 12:00), Max: 39.5 (14 Mar 2020 16:59)  T(F): 97.5 (15 Mar 2020 12:00), Max: 103.1 (14 Mar 2020 16:59)  HR: 75 (15 Mar 2020 15:00) (61 - 101)  BP: 108/57 (15 Mar 2020 15:00) (86/46 - 120/53)  BP(mean): 77 (15 Mar 2020 15:00) (65 - 86)  ABP: --  ABP(mean): --  RR: 22 (15 Mar 2020 15:00) (11 - 29)  SpO2: 100% (15 Mar 2020 15:00) (88% - 100%)      03-14-20 @ 07:01  -  03-15-20 @ 07:00  --------------------------------------------------------  IN: 2720 mL / OUT: 341 mL / NET: 2379 mL    03-15-20 @ 07:01  -  03-15-20 @ 15:48  --------------------------------------------------------  IN: 1350 mL / OUT: 1575 mL / NET: -225 mL    ERAN 25 ML  URINE OUT PUT 1550 ML       POD # 1      LUNGS: CLEAR TO AUSCULTATION , NO W/R/R  ABDOMEN: ALL TROCAR SITES ARE DRY AND INTACT WITH PRESSURE DRESSING IN PLACE, RIGHT ERAN IN PLACE BLOODY OUT PUT, + BS,  SOFT, MILD DISTENTION ,   SOME INCISIONAL TENDERNESS   EXTREMITY: NO EDEMA, NO CALF TENDERNESS                            7.7    13.36 )-----------( 62       ( 15 Mar 2020 05:44 )             22.9      03-15    140  |  108  |  20  ----------------------------<  134<H>  4.3   |  27  |  1.30    Ca    7.7<L>      15 Mar 2020 05:26  Phos  4.3     03-15  Mg     1.1     03-15    TPro  7.0  /  Alb  3.2<L>  /  TBili  1.8<H>  /  DBili  x   /  AST  56<H>  /  ALT  54  /  AlkPhos  100  03-14    Culture - Blood (03.14.20 @ 21:28)    Gram Stain:   Growth in anaerobic bottle: Gram Negative Rods  Growth in aerobic bottle: Gram Negative Rods    Specimen Source: .Blood Blood-Peripheral    Culture Results:   Growth in anaerobic bottle: Gram Negative Rods  Growth in aerobic bottle: Gram Negative Rods                             Culture - Blood (03.14.20 @ 21:28)    -  Escherichia coli: Detec    Gram Stain:   Growth in anaerobic bottle: Gram Negative Rods  Growth in aerobic bottle: Gram Negative Rods    Specimen Source: .Blood Blood-Peripheral    Organism: Blood Culture PCR    Culture Results:   Growth in anaerobic bottle: Gram Negative Rods  Growth in aerobic bottle: Gram Negative Rods    ASSESSMENT &  PLAN:     POD # 1 S/P LAPAROSCOPIC CHOLECYSTECTOMY FOR PERFORATED GALLBLADDER WITH ABSCESS   SEPTIC SHOCK  SEPTICEMIA   ANEMIA , H/O ITP AND LUPUS ANTICOAGULANT      CLEAR LIQUID DIET ADVANCE AS TOLERATED   MAINTAIN ERAN, MONITOR OUT PUT   CONTINUE ZOSYN   FOLLOW UP INTRA OP CULTURE AND BLOOD C/S   ICU MANAGEMENT NOTED AND APPRECIATED

## 2020-03-15 NOTE — PROGRESS NOTE ADULT - ASSESSMENT
Patient is a 69 year old male with a pmhx of  HTN, ITP, EUGENIO who presents with fever, weakness and rigors, found to have:    1. Acute cholecystitis with perforation   2. Severe sepsis  3. Eugenio vs CKD  4. Lactic acidosis   5. Thrombocytopenia   6. Factor XI deficiency   7. GNR bacteremia    Plan:  Neuro: Pain control w/ Dilaudid and tylenol prn. restart valium. holding fluvoxamine   Cardio: Maintain MAP above 65, was hypotensive post-op BP 87/50. BP improved with fluid bolus. cont 100cc/hr. Will cont to hold norvasc, atenolol and losartan as BP slowly improves   Pulm: Extubated post op, keep sats above 92%  GI: CTA showing perforated acute cholecystitis with perforation and hepatic abcess. now POD #0 lap errol  with drainage of pericholecystic abscess. advance diet as tolerated.  Renal: EUGENIO on CKD? do not have baseline levels. possible ischemic ATN from hypotension prior to case? Avoid nephrotoxic drugs, strict I/Os, renally dose abx  ID: Cont zosyn. Blood cultures showing GNR(E.coli). follow up on repeat blood cultures. f/u surgical cultures   Endo: No active issues,  Heme: Heparin for DVT ppx. Cont SCDs. s/p FFP post op for factor XI deficiency. At risk for DVT and bleeding per heme/onc. cont to monitor h/h and platelets       Dispo: remains in ICU, stable for transfer as BP improves

## 2020-03-15 NOTE — PROGRESS NOTE ADULT - SUBJECTIVE AND OBJECTIVE BOX
The patient was interviewed and evaluated  69y Male s/p lap GB with GA    T(C): 36.4 (03-15-20 @ 12:00), Max: 40.8 (03-14-20 @ 14:09)  HR: 66 (03-15-20 @ 12:00) (61 - 114)  BP: 110/61 (03-15-20 @ 12:00) (86/46 - 144/70)  RR: 16 (03-15-20 @ 12:00) (11 - 29)  SpO2: 100% (03-15-20 @ 12:00) (88% - 100%)  Wt(kg): --    Pt seen, doing well, no anesthesia complications or complaints noted or reported.   No Nausea    No additional recommendations.     Pain well controlled

## 2020-03-15 NOTE — PHYSICAL THERAPY INITIAL EVALUATION ADULT - ADDITIONAL COMMENTS
pt [place]. pt was independent prior. + driving

## 2020-03-15 NOTE — PROVIDER CONTACT NOTE (EICU) - BACKGROUND
69M with a history of HTN, ITP came in with fever to 104. 15% bandemia, DIC with low plt, elevated PTT and INR to 53.7/2.15. Cr 1.6, Lactate 3.9 CT showed gallstones with diffuse wall thickening, luminal distention, and pericholecystic inflammatory change. Defect in the gallbladder wall with adjacent 3.5 cm hepatic collection s/p lap errol

## 2020-03-16 PROBLEM — F41.9 ANXIETY DISORDER, UNSPECIFIED: Chronic | Status: ACTIVE | Noted: 2020-02-04

## 2020-03-16 PROBLEM — I10 ESSENTIAL (PRIMARY) HYPERTENSION: Chronic | Status: ACTIVE | Noted: 2020-02-04

## 2020-03-16 LAB
-  AMIKACIN: SIGNIFICANT CHANGE UP
-  AMPICILLIN/SULBACTAM: SIGNIFICANT CHANGE UP
-  AMPICILLIN: SIGNIFICANT CHANGE UP
-  AZTREONAM: SIGNIFICANT CHANGE UP
-  CEFAZOLIN: SIGNIFICANT CHANGE UP
-  CEFEPIME: SIGNIFICANT CHANGE UP
-  CEFOXITIN: SIGNIFICANT CHANGE UP
-  CEFTRIAXONE: SIGNIFICANT CHANGE UP
-  CIPROFLOXACIN: SIGNIFICANT CHANGE UP
-  ERTAPENEM: SIGNIFICANT CHANGE UP
-  GENTAMICIN: SIGNIFICANT CHANGE UP
-  IMIPENEM: SIGNIFICANT CHANGE UP
-  LEVOFLOXACIN: SIGNIFICANT CHANGE UP
-  MEROPENEM: SIGNIFICANT CHANGE UP
-  PIPERACILLIN/TAZOBACTAM: SIGNIFICANT CHANGE UP
-  TOBRAMYCIN: SIGNIFICANT CHANGE UP
-  TRIMETHOPRIM/SULFAMETHOXAZOLE: SIGNIFICANT CHANGE UP
ALBUMIN SERPL ELPH-MCNC: 2.3 G/DL — LOW (ref 3.3–5)
ALP SERPL-CCNC: 70 U/L — SIGNIFICANT CHANGE UP (ref 40–120)
ALT FLD-CCNC: 46 U/L — SIGNIFICANT CHANGE UP (ref 12–78)
ANION GAP SERPL CALC-SCNC: 6 MMOL/L — SIGNIFICANT CHANGE UP (ref 5–17)
ANTIBODY INTERPRETATION 2: SIGNIFICANT CHANGE UP
APTT BLD: 64.7 SEC — HIGH (ref 28.5–37)
AST SERPL-CCNC: 52 U/L — HIGH (ref 15–37)
BILIRUB SERPL-MCNC: 1 MG/DL — SIGNIFICANT CHANGE UP (ref 0.2–1.2)
BUN SERPL-MCNC: 21 MG/DL — SIGNIFICANT CHANGE UP (ref 7–23)
CALCIUM SERPL-MCNC: 8.3 MG/DL — LOW (ref 8.5–10.1)
CHLORIDE SERPL-SCNC: 108 MMOL/L — SIGNIFICANT CHANGE UP (ref 96–108)
CO2 SERPL-SCNC: 29 MMOL/L — SIGNIFICANT CHANGE UP (ref 22–31)
CREAT SERPL-MCNC: 1.4 MG/DL — HIGH (ref 0.5–1.3)
CULTURE RESULTS: SIGNIFICANT CHANGE UP
CULTURE RESULTS: SIGNIFICANT CHANGE UP
GLUCOSE SERPL-MCNC: 99 MG/DL — SIGNIFICANT CHANGE UP (ref 70–99)
INR BLD: 1.92 RATIO — HIGH (ref 0.88–1.16)
MAGNESIUM SERPL-MCNC: 2.2 MG/DL — SIGNIFICANT CHANGE UP (ref 1.6–2.6)
METHOD TYPE: SIGNIFICANT CHANGE UP
ORGANISM # SPEC MICROSCOPIC CNT: SIGNIFICANT CHANGE UP
PHOSPHATE SERPL-MCNC: 3.3 MG/DL — SIGNIFICANT CHANGE UP (ref 2.5–4.5)
POTASSIUM SERPL-MCNC: 4.5 MMOL/L — SIGNIFICANT CHANGE UP (ref 3.5–5.3)
POTASSIUM SERPL-SCNC: 4.5 MMOL/L — SIGNIFICANT CHANGE UP (ref 3.5–5.3)
PROT SERPL-MCNC: 5.6 G/DL — LOW (ref 6–8.3)
PROTHROM AB SERPL-ACNC: 21.9 SEC — HIGH (ref 10–12.9)
SODIUM SERPL-SCNC: 143 MMOL/L — SIGNIFICANT CHANGE UP (ref 135–145)
SPECIMEN SOURCE: SIGNIFICANT CHANGE UP
SPECIMEN SOURCE: SIGNIFICANT CHANGE UP

## 2020-03-16 PROCEDURE — 99233 SBSQ HOSP IP/OBS HIGH 50: CPT | Mod: GC

## 2020-03-16 RX ORDER — FLUVOXAMINE MALEATE 25 MG/1
100 TABLET ORAL
Refills: 0 | Status: DISCONTINUED | OUTPATIENT
Start: 2020-03-16 | End: 2020-03-20

## 2020-03-16 RX ADMIN — HEPARIN SODIUM 5000 UNIT(S): 5000 INJECTION INTRAVENOUS; SUBCUTANEOUS at 15:08

## 2020-03-16 RX ADMIN — HEPARIN SODIUM 5000 UNIT(S): 5000 INJECTION INTRAVENOUS; SUBCUTANEOUS at 21:12

## 2020-03-16 RX ADMIN — Medication 5 MILLIGRAM(S): at 17:54

## 2020-03-16 RX ADMIN — Medication 5 MILLIGRAM(S): at 05:37

## 2020-03-16 RX ADMIN — Medication 3 MILLIGRAM(S): at 21:12

## 2020-03-16 RX ADMIN — HEPARIN SODIUM 5000 UNIT(S): 5000 INJECTION INTRAVENOUS; SUBCUTANEOUS at 05:37

## 2020-03-16 RX ADMIN — Medication 1 MILLIGRAM(S): at 11:59

## 2020-03-16 RX ADMIN — PIPERACILLIN AND TAZOBACTAM 25 GRAM(S): 4; .5 INJECTION, POWDER, LYOPHILIZED, FOR SOLUTION INTRAVENOUS at 05:37

## 2020-03-16 RX ADMIN — HYDROMORPHONE HYDROCHLORIDE 1 MILLIGRAM(S): 2 INJECTION INTRAMUSCULAR; INTRAVENOUS; SUBCUTANEOUS at 02:50

## 2020-03-16 RX ADMIN — CHLORHEXIDINE GLUCONATE 1 APPLICATION(S): 213 SOLUTION TOPICAL at 12:00

## 2020-03-16 RX ADMIN — FLUVOXAMINE MALEATE 100 MILLIGRAM(S): 25 TABLET ORAL at 17:54

## 2020-03-16 RX ADMIN — HYDROMORPHONE HYDROCHLORIDE 1 MILLIGRAM(S): 2 INJECTION INTRAMUSCULAR; INTRAVENOUS; SUBCUTANEOUS at 02:25

## 2020-03-16 RX ADMIN — Medication 650 MILLIGRAM(S): at 15:08

## 2020-03-16 RX ADMIN — PIPERACILLIN AND TAZOBACTAM 25 GRAM(S): 4; .5 INJECTION, POWDER, LYOPHILIZED, FOR SOLUTION INTRAVENOUS at 15:08

## 2020-03-16 RX ADMIN — SODIUM CHLORIDE 100 MILLILITER(S): 9 INJECTION, SOLUTION INTRAVENOUS at 00:40

## 2020-03-16 RX ADMIN — SODIUM CHLORIDE 100 MILLILITER(S): 9 INJECTION, SOLUTION INTRAVENOUS at 02:29

## 2020-03-16 RX ADMIN — PIPERACILLIN AND TAZOBACTAM 25 GRAM(S): 4; .5 INJECTION, POWDER, LYOPHILIZED, FOR SOLUTION INTRAVENOUS at 21:12

## 2020-03-16 RX ADMIN — Medication 650 MILLIGRAM(S): at 15:46

## 2020-03-16 NOTE — PROGRESS NOTE ADULT - SUBJECTIVE AND OBJECTIVE BOX
Patient is a 69y old  Male who presents with a chief complaint of perforated acute errol with sepsis (16 Mar 2020 12:58)      INTERVAL HPI/OVERNIGHT EVENTS:  Patient seen and examined. NAD. No complaints.    Vital Signs Last 24 Hrs  T(C): 36.9 (16 Mar 2020 12:01), Max: 37.7 (15 Mar 2020 18:45)  T(F): 98.4 (16 Mar 2020 12:01), Max: 99.9 (15 Mar 2020 18:45)  HR: 92 (16 Mar 2020 12:00) (75 - 122)  BP: 129/69 (16 Mar 2020 12:00) (103/52 - 187/81)  BP(mean): 94 (16 Mar 2020 12:00) (73 - 117)  RR: 24 (16 Mar 2020 12:00) (12 - 32)  SpO2: 98% (16 Mar 2020 12:00) (86% - 100%)    03-16    143  |  108  |  21  ----------------------------<  99  4.5   |  29  |  1.40<H>    Ca    8.3<L>      16 Mar 2020 05:35  Phos  3.3     03-16  Mg     2.2     03-16    TPro  5.6<L>  /  Alb  2.3<L>  /  TBili  1.0  /  DBili  x   /  AST  52<H>  /  ALT  46  /  AlkPhos  70  03-16                          7.5    8.41  )-----------( 57       ( 16 Mar 2020 06:08 )             22.1     PT/INR - ( 16 Mar 2020 05:35 )   PT: 21.9 sec;   INR: 1.92 ratio         PTT - ( 16 Mar 2020 05:35 )  PTT:64.7 sec  CAPILLARY BLOOD GLUCOSE        Urinalysis Basic - ( 14 Mar 2020 14:42 )    Color: Yellow / Appearance: Clear / S.015 / pH: x  Gluc: x / Ketone: Negative  / Bili: Negative / Urobili: Negative   Blood: x / Protein: 500 mg/dL / Nitrite: Negative   Leuk Esterase: Trace / RBC: 6-10 /HPF / WBC 0-2   Sq Epi: x / Non Sq Epi: Few / Bacteria: Few              acetaminophen   Tablet .. 650 milliGRAM(s) Oral every 6 hours PRN  chlorhexidine 2% Cloths 1 Application(s) Topical daily  diazepam    Tablet 5 milliGRAM(s) Oral every 12 hours  fluvoxaMINE 100 milliGRAM(s) Oral two times a day  folic acid 1 milliGRAM(s) Oral daily  heparin  Injectable 5000 Unit(s) SubCutaneous every 8 hours  HYDROmorphone  Injectable 0.5 milliGRAM(s) IV Push every 6 hours PRN  HYDROmorphone  Injectable 1 milliGRAM(s) IV Push every 6 hours PRN  melatonin 3 milliGRAM(s) Oral at bedtime  ondansetron Injectable 4 milliGRAM(s) IV Push every 6 hours PRN  piperacillin/tazobactam IVPB.. 3.375 Gram(s) IV Intermittent every 8 hours              REVIEW OF SYSTEMS:  CONSTITUTIONAL: No fever, no weight loss, or no fatigue  NECK: No pain, no stiffness  RESPIRATORY: No cough, no wheezing, no chills, no hemoptysis, No shortness of breath  CARDIOVASCULAR: No chest pain, no palpitations, no dizziness, no leg swelling  GASTROINTESTINAL: No abdominal pain. No nausea, no vomiting, no hematemesis; No diarrhea, no constipation. No melena, no hematochezia.  GENITOURINARY: No dysuria, no frequency, no hematuria, no incontinence  NEUROLOGICAL: No headaches, no loss of strength, no numbness, no tremors  SKIN: No itching, no burning  MUSCULOSKELETAL: No joint pain, no swelling; No muscle, no back, no extremity pain  PSYCHIATRIC: No depression, no mood swings,   HEME/LYMPH: No easy bruising, no bleeding gums  ALLERY AND IMMUNOLOGIC: No hives       Consultant(s) Notes Reviewed:  [X] YES  [ ] NO    PHYSICAL EXAM:  GENERAL: NAD  HEAD:  Atraumatic, Normocephalic  EYES: EOMI, PERRLA, conjunctiva and sclera clear  ENMT: No tonsillar erythema, exudates, or enlargement; Moist mucous membranes  NECK: Supple, No JVD  NERVOUS SYSTEM:  Awake & alert  CHEST/LUNG: Clear to auscultation bilaterally; No rales, rhonchi, wheezing,  HEART: Regular rate and rhythm  ABDOMEN: Soft, Nontender, Nondistended; Bowel sounds present  EXTREMITIES:  No clubbing, cyanosis, or edema  LYMPH: No lymphadenopathy noted  SKIN: No rashes      Advanced care planning discussed with patient/family [X] YES   [ ] NO    Advanced care planning discussed with patient/family. Patient's health status was discussed. All appropriate changes have been made regarding patient's end-of-life care. Advanced care planning forms reviewed/discussed/completed.  20 minutes spent. Render Post-Care Instructions In Note?: no Duration Of Freeze Thaw-Cycle (Seconds): 6 Detail Level: Simple Total Number Of Aks Treated: 3 Number Of Freeze-Thaw Cycles: 1 freeze-thaw cycle Post-Care Instructions: I reviewed with the patient in detail post-care instructions. Patient is to wear sunprotection, and avoid picking at any of the treated lesions. Pt may apply Vaseline to crusted or scabbing areas. Consent: The patient's consent was obtained including but not limited to risks of crusting, scabbing, blistering, scarring, darker or lighter pigmentary change, recurrence, incomplete removal and infection.

## 2020-03-16 NOTE — PROGRESS NOTE ADULT - ASSESSMENT
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 No BRANT. +splenomegaly.   - continue post op care as per surgery  - continue IV antibiotics as per primary team  - blood indices continue to remain stable  - continue folic acid for compensated hemolysis but may need transfusions  - will follow with you  - discussed with ICU team (Dr. Moncho Everett)

## 2020-03-16 NOTE — PROGRESS NOTE ADULT - SUBJECTIVE AND OBJECTIVE BOX
CHARTING IN PROGRESS      Patient is a 69y old  Male who presents with a chief complaint of perforated acute errol with sepsis (16 Mar 2020 09:00)    24 hour events: ***    REVIEW OF SYSTEMS  Constitutional: No fever, chills, fatigue  Neuro: No headache, numbness, weakness  Resp: No cough, wheezing, shortness of breath  CVS: No chest pain, palpitations, leg swelling  GI: No abdominal pain, nausea, vomiting, diarrhea   : No dysuria, frequency, incontinence  Skin: No itching, burning, rashes, or lesions   Msk: No joint pain or swelling  Psych: No depression, anxiety, mood swings  Heme: No bleeding    T(F): 97.7 (20 @ 08:00), Max: 99.9 (03-15-20 @ 18:45)  HR: 88 (20 @ 09:00) (61 - 122)  BP: 126/60 (20 @ 09:00) (90/50 - 187/81)  RR: 22 (20 @ 09:00) (12 - 32)  SpO2: 100% (20 @ 09:00) (86% - 100%)  Wt(kg): --            I&O's Summary    03-15 @ 07:  -   @ 07:00  --------------------------------------------------------  IN: 3190 mL / OUT: 2765 mL / NET: 425 mL     @ 07:01  -   @ 09:21  --------------------------------------------------------  IN: 120 mL / OUT: 300 mL / NET: -180 mL      PHYSICAL EXAM  General:   CNS:   HEENT:   Resp:   CVS:   Abd:   Ext:   Skin:     MEDICATIONS  piperacillin/tazobactam IVPB.. IV Intermittent          acetaminophen   Tablet .. Oral PRN  diazepam    Tablet Oral  HYDROmorphone  Injectable IV Push PRN  HYDROmorphone  Injectable IV Push PRN  melatonin Oral  ondansetron Injectable IV Push PRN      heparin  Injectable SubCutaneous        folic acid Oral  lactated ringers. IV Continuous      chlorhexidine 2% Cloths Topical                            7.5    8.41  )-----------( 57       ( 16 Mar 2020 06:08 )             22.1       03-16    143  |  108  |  21  ----------------------------<  99  4.5   |  29  |  1.40<H>    Ca    8.3<L>      16 Mar 2020 05:35  Phos  3.3     03-16  Mg     2.2     -16    TPro  5.6<L>  /  Alb  2.3<L>  /  TBili  1.0  /  DBili  x   /  AST  52<H>  /  ALT  46  /  AlkPhos  70  03-16          PT/INR - ( 16 Mar 2020 05:35 )   PT: 21.9 sec;   INR: 1.92 ratio         PTT - ( 16 Mar 2020 05:35 )  PTT:64.7 sec  Urinalysis Basic - ( 14 Mar 2020 14:42 )    Color: Yellow / Appearance: Clear / S.015 / pH: x  Gluc: x / Ketone: Negative  / Bili: Negative / Urobili: Negative   Blood: x / Protein: 500 mg/dL / Nitrite: Negative   Leuk Esterase: Trace / RBC: 6-10 /HPF / WBC 0-2   Sq Epi: x / Non Sq Epi: Few / Bacteria: Few      .Urine Clean Catch (Midstream)   No growth --  @ 21:36  .Blood Blood-Peripheral   Growth in anaerobic bottle: Escherichia coli  Growth in aerobic bottle: Gram Negative Rods  ***Blood Panel PCR results on this specimen are available  approximately 3 hours after the Gram stain result.***  Gram stain, PCR, and/or culture results may not always  correspond due to difference in methodologies.  ************************************************************  This PCR assay was performed using [employer].  The following targets are tested for: Enterococcus,  vancomycin resistant enterococci, Listeria monocytogenes,  coagulase negative staphylococci, S. aureus,  methicillin resistant S. aureus, Streptococcus agalactiae  (Group B), S. pneumoniae, S. pyogenes (Group A),  Acinetobacter baumannii, Enterobacter cloacae, E. coli,  Klebsiella oxytoca, K. pneumoniae, Proteus sp.,  Serratia marcescens, Haemophilus influenzae,  Neisseria meningitidis, Pseudomonas aeruginosa, Candida  albicans, C. glabrata, C krusei, C parapsilosis,  C. tropicalis and the KPC resistance gene.   Growth in anaerobic bottle: Gram Negative Rods  Growth in aerobic bottle: Gram Negative Rods  @ 21:28        Radiology: ***  Bedside lung ultrasound: ***  Bedside ECHO: ***    CENTRAL LINE: Y/N          DATE INSERTED:              REMOVE: Y/N  STAFFORD: Y/N                        DATE INSERTED:              REMOVE: Y/N  A-LINE: Y/N                       DATE INSERTED:              REMOVE: Y/N    GLOBAL ISSUE/BEST PRACTICE  Analgesia:   Sedation:   CAM-ICU:   HOB elevation: yes  Stress ulcer prophylaxis:   VTE prophylaxis:   Glycemic control:   Nutrition:     CODE STATUS: ***  [facility] discussion: Y CHARTING IN PROGRESS      Patient is a 69y old  Male who presents with a chief complaint of perforated acute errol with sepsis (16 Mar 2020 09:00)    24 hour events: Patient seen and examined at bedside. No acute events overnight. Patient endorses mild abdominal discomfort and notes that he has not yet passed a BM or flatus. Otherwise denies fever, chills, rigors, headache, vision changes, chest pain, palpitations, SOB, or N/V.    REVIEW OF SYSTEMS  Constitutional: No fever, chills, fatigue  Neuro: No headache, numbness, weakness  Resp: No cough, wheezing, shortness of breath  CVS: No chest pain, palpitations, leg swelling  GI: Admits abdominal pain; denies nausea, vomiting, diarrhea   : No dysuria, frequency, incontinence  Skin: No itching, burning, rashes, or lesions   Msk: No joint pain or swelling  Psych: No depression, anxiety, mood swings  Heme: No bleeding    T(F): 97.7 (20 @ 08:00), Max: 99.9 (03-15-20 @ 18:45)  HR: 88 (20 @ 09:00) (61 - 122)  BP: 126/60 (20 @ 09:00) (90/50 - 187/81)  RR: 22 (20 @ 09:00) (12 - 32)  SpO2: 100% (20 @ 09:00) (86% - 100%)      I&O's Summary    03-15 @ 07:  -   @ 07:00  --------------------------------------------------------  IN: 3190 mL / OUT: 2765 mL / NET: 425 mL     @ 07:01  -   @ 09:21  --------------------------------------------------------  IN: 120 mL / OUT: 300 mL / NET: -180 mL      PHYSICAL EXAM  General: elderly male sitting upright in bed, in NAD  CNS: AAOx3, no focal deficit, no obvious sensory deficit  HEENT: NCAT, PERRL, EOMI, dry mucous membranes  Resp: CTA b/l, no w/r/r  CVS: +S1S2, RRR, no m/r/g  Abd: mildly firm, distended, diffuse TTP predominantly in RUQ, trochar dressings c/d/i  Ext: no c/c/e of b/l LE  Skin: color normal for race, warm, dry, well perfused      MEDICATIONS    piperacillin/tazobactam IVPB.. IV Intermittent  acetaminophen   Tablet .. Oral PRN  diazepam    Tablet Oral  HYDROmorphone  Injectable IV Push PRN  HYDROmorphone  Injectable IV Push PRN  melatonin Oral  ondansetron Injectable IV Push PRN  heparin  Injectable SubCutaneous  folic acid Oral  lactated ringers. IV Continuous  chlorhexidine 2% Cloths Topical                                7.5    8.41  )-----------( 57       ( 16 Mar 2020 06:08 )             22.1       03-16    143  |  108  |  21  ----------------------------<  99  4.5   |  29  |  1.40<H>    Ca    8.3<L>      16 Mar 2020 05:35  Phos  3.3     03-16  Mg     2.2     -16    TPro  5.6<L>  /  Alb  2.3<L>  /  TBili  1.0  /  DBili  x   /  AST  52<H>  /  ALT  46  /  AlkPhos  70  03-16    PT/INR - ( 16 Mar 2020 05:35 )   PT: 21.9 sec;   INR: 1.92 ratio    PTT - ( 16 Mar 2020 05:35 )  PTT:64.7 sec      Urinalysis Basic - ( 14 Mar 2020 14:42 )    Color: Yellow / Appearance: Clear / S.015 / pH: x  Gluc: x / Ketone: Negative  / Bili: Negative / Urobili: Negative   Blood: x / Protein: 500 mg/dL / Nitrite: Negative   Leuk Esterase: Trace / RBC: 6-10 /HPF / WBC 0-2   Sq Epi: x / Non Sq Epi: Few / Bacteria: Few      .Urine Clean Catch (Midstream)   No growth --  @ 21:36  .Blood Blood-Peripheral   Growth in anaerobic bottle: Escherichia coli  Growth in aerobic bottle: Gram Negative Rods  ***Blood Panel PCR results on this specimen are available  approximately 3 hours after the Gram stain result.***  Gram stain, PCR, and/or culture results may not always  correspond due to difference in methodologies.  ************************************************************  This PCR assay was performed using [employer].  The following targets are tested for: Enterococcus,  vancomycin resistant enterococci, Listeria monocytogenes,  coagulase negative staphylococci, S. aureus,  methicillin resistant S. aureus, Streptococcus agalactiae  (Group B), S. pneumoniae, S. pyogenes (Group A),  Acinetobacter baumannii, Enterobacter cloacae, E. coli,  Klebsiella oxytoca, K. pneumoniae, Proteus sp.,  Serratia marcescens, Haemophilus influenzae,  Neisseria meningitidis, Pseudomonas aeruginosa, Candida  albicans, C. glabrata, C krusei, C parapsilosis,  C. tropicalis and the KPC resistance gene.   Growth in anaerobic bottle: Gram Negative Rods  Growth in aerobic bottle: Gram Negative Rods 03 @ 21:28        Radiology: ***  Bedside lung ultrasound: ***  Bedside ECHO: ***    CENTRAL LINE: N  STAFFORD: N  A-LINE: N    GLOBAL ISSUE/BEST PRACTICE  Analgesia: y  Sedation: n  HOB elevation: y  Stress ulcer prophylaxis: y  VTE prophylaxis: y  Glycemic control: y  Nutrition: y    CODE STATUS: FULL CODE  [facility] discussion: Y CHARTING IN PROGRESS      Patient is a 69y old  Male who presents with a chief complaint of perforated acute errol with sepsis (16 Mar 2020 09:00)    24 hour events: Patient seen and examined at bedside. No acute events overnight. Patient endorses mild abdominal discomfort and notes that he has not yet passed a BM or flatus. Otherwise denies fever, chills, rigors, headache, vision changes, chest pain, palpitations, SOB, or N/V.    REVIEW OF SYSTEMS  Constitutional: No fever, chills, fatigue  Neuro: No headache, numbness, weakness  Resp: No cough, wheezing, shortness of breath  CVS: No chest pain, palpitations, leg swelling  GI: Admits abdominal pain; denies nausea, vomiting, diarrhea   : No dysuria, frequency, incontinence  Skin: No itching, burning, rashes, or lesions   Msk: No joint pain or swelling  Psych: No depression, anxiety, mood swings  Heme: No bleeding    T(F): 97.7 (20 @ 08:00), Max: 99.9 (03-15-20 @ 18:45)  HR: 88 (20 @ 09:00) (61 - 122)  BP: 126/60 (20 @ 09:00) (90/50 - 187/81)  RR: 22 (20 @ 09:00) (12 - 32)  SpO2: 100% (20 @ 09:00) (86% - 100%)      I&O's Summary    03-15 @ 07:  -   @ 07:00  --------------------------------------------------------  IN: 3190 mL / OUT: 2765 mL / NET: 425 mL     @ 07:01  -   @ 09:21  --------------------------------------------------------  IN: 120 mL / OUT: 300 mL / NET: -180 mL      PHYSICAL EXAM  General: elderly male sitting upright in bed, in NAD  CNS: AAOx3, no focal deficit, no obvious sensory deficit  HEENT: NCAT, PERRL, EOMI, dry mucous membranes  Resp: CTA b/l, no w/r/r  CVS: +S1S2, RRR, no m/r/g  Abd: mildly firm, distended, diffuse TTP predominantly in RUQ, trochar dressings c/d/i  Ext: no c/c/e of b/l LE  Skin: color normal for race, warm, dry, well perfused      MEDICATIONS    piperacillin/tazobactam IVPB.. IV Intermittent  acetaminophen   Tablet .. Oral PRN  diazepam    Tablet Oral  HYDROmorphone  Injectable IV Push PRN  HYDROmorphone  Injectable IV Push PRN  melatonin Oral  ondansetron Injectable IV Push PRN  heparin  Injectable SubCutaneous  folic acid Oral  lactated ringers. IV Continuous  chlorhexidine 2% Cloths Topical                                7.5    8.41  )-----------( 57       ( 16 Mar 2020 06:08 )             22.1       03-16    143  |  108  |  21  ----------------------------<  99  4.5   |  29  |  1.40<H>    Ca    8.3<L>      16 Mar 2020 05:35  Phos  3.3     03-16  Mg     2.2     -16    TPro  5.6<L>  /  Alb  2.3<L>  /  TBili  1.0  /  DBili  x   /  AST  52<H>  /  ALT  46  /  AlkPhos  70  03-16    PT/INR - ( 16 Mar 2020 05:35 )   PT: 21.9 sec;   INR: 1.92 ratio    PTT - ( 16 Mar 2020 05:35 )  PTT:64.7 sec      Urinalysis Basic - ( 14 Mar 2020 14:42 )    Color: Yellow / Appearance: Clear / S.015 / pH: x  Gluc: x / Ketone: Negative  / Bili: Negative / Urobili: Negative   Blood: x / Protein: 500 mg/dL / Nitrite: Negative   Leuk Esterase: Trace / RBC: 6-10 /HPF / WBC 0-2   Sq Epi: x / Non Sq Epi: Few / Bacteria: Few      .Urine Clean Catch (Midstream)   No growth --  @ 21:36  .Blood Blood-Peripheral   Growth in anaerobic bottle: Escherichia coli  Growth in aerobic bottle: Gram Negative Rods  ***Blood Panel PCR results on this specimen are available  approximately 3 hours after the Gram stain result.***  Gram stain, PCR, and/or culture results may not always  correspond due to difference in methodologies.  ************************************************************  This PCR assay was performed using [employer].  The following targets are tested for: Enterococcus,  vancomycin resistant enterococci, Listeria monocytogenes,  coagulase negative staphylococci, S. aureus,  methicillin resistant S. aureus, Streptococcus agalactiae  (Group B), S. pneumoniae, S. pyogenes (Group A),  Acinetobacter baumannii, Enterobacter cloacae, E. coli,  Klebsiella oxytoca, K. pneumoniae, Proteus sp.,  Serratia marcescens, Haemophilus influenzae,  Neisseria meningitidis, Pseudomonas aeruginosa, Candida  albicans, C. glabrata, C krusei, C parapsilosis,  C. tropicalis and the KPC resistance gene.   Growth in anaerobic bottle: Gram Negative Rods  Growth in aerobic bottle: Gram Negative Rods 0314 @ 21:28          CENTRAL LINE: N  STAFFORD: N  A-LINE: N    GLOBAL ISSUE/BEST PRACTICE  Analgesia: y  Sedation: n  HOB elevation: y  Stress ulcer prophylaxis: y  VTE prophylaxis: y  Glycemic control: y  Nutrition: y    CODE STATUS: FULL CODE  [facility] discussion: Y Patient is a 69y old  Male who presents with a chief complaint of perforated acute errol with sepsis (16 Mar 2020 09:00)    24 hour events: Patient seen and examined at bedside. No acute events overnight. Patient endorses mild abdominal discomfort and notes that he has not yet passed a BM or flatus. Otherwise denies fever, chills, rigors, headache, vision changes, chest pain, palpitations, SOB, or N/V.    REVIEW OF SYSTEMS  Constitutional: No fever, chills, fatigue  Neuro: No headache, numbness, weakness  Resp: No cough, wheezing, shortness of breath  CVS: No chest pain, palpitations, leg swelling  GI: Admits abdominal pain; denies nausea, vomiting, diarrhea   : No dysuria, frequency, incontinence  Skin: No itching, burning, rashes, or lesions   Msk: No joint pain or swelling  Psych: No depression, anxiety, mood swings  Heme: No bleeding    T(F): 97.7 (20 @ 08:00), Max: 99.9 (03-15-20 @ 18:45)  HR: 88 (20 @ 09:00) (61 - 122)  BP: 126/60 (20 @ 09:00) (90/50 - 187/81)  RR: 22 (20 @ 09:00) (12 - 32)  SpO2: 100% (20 @ 09:00) (86% - 100%)      I&O's Summary    03-15 @ :  -   @ 07:00  --------------------------------------------------------  IN: 3190 mL / OUT: 2765 mL / NET: 425 mL     @ 07:01  -   @ 09:21  --------------------------------------------------------  IN: 120 mL / OUT: 300 mL / NET: -180 mL      PHYSICAL EXAM  General: elderly male sitting upright in bed, in NAD  CNS: AAOx3, no focal deficit, no obvious sensory deficit  HEENT: NCAT, PERRL, EOMI, dry mucous membranes  Resp: CTA b/l, no w/r/r  CVS: +S1S2, RRR, no m/r/g  Abd: mildly firm, distended, diffuse TTP predominantly in RUQ, trochar dressings c/d/i  Ext: no c/c/e of b/l LE  Skin: color normal for race, warm, dry, well perfused      MEDICATIONS    piperacillin/tazobactam IVPB.. IV Intermittent  acetaminophen   Tablet .. Oral PRN  diazepam    Tablet Oral  HYDROmorphone  Injectable IV Push PRN  HYDROmorphone  Injectable IV Push PRN  melatonin Oral  ondansetron Injectable IV Push PRN  heparin  Injectable SubCutaneous  folic acid Oral  lactated ringers. IV Continuous  chlorhexidine 2% Cloths Topical                                7.5    8.41  )-----------( 57       ( 16 Mar 2020 06:08 )             22.1       03-16    143  |  108  |  21  ----------------------------<  99  4.5   |  29  |  1.40<H>    Ca    8.3<L>      16 Mar 2020 05:35  Phos  3.3     03-16  Mg     2.2     -16    TPro  5.6<L>  /  Alb  2.3<L>  /  TBili  1.0  /  DBili  x   /  AST  52<H>  /  ALT  46  /  AlkPhos  70  03-16    PT/INR - ( 16 Mar 2020 05:35 )   PT: 21.9 sec;   INR: 1.92 ratio    PTT - ( 16 Mar 2020 05:35 )  PTT:64.7 sec      Urinalysis Basic - ( 14 Mar 2020 14:42 )    Color: Yellow / Appearance: Clear / S.015 / pH: x  Gluc: x / Ketone: Negative  / Bili: Negative / Urobili: Negative   Blood: x / Protein: 500 mg/dL / Nitrite: Negative   Leuk Esterase: Trace / RBC: 6-10 /HPF / WBC 0-2   Sq Epi: x / Non Sq Epi: Few / Bacteria: Few      .Urine Clean Catch (Midstream)   No growth --  @ 21:36  .Blood Blood-Peripheral   Growth in anaerobic bottle: Escherichia coli  Growth in aerobic bottle: Gram Negative Rods  ***Blood Panel PCR results on this specimen are available  approximately 3 hours after the Gram stain result.***  Gram stain, PCR, and/or culture results may not always  correspond due to difference in methodologies.  ************************************************************  This PCR assay was performed using [employer].  The following targets are tested for: Enterococcus,  vancomycin resistant enterococci, Listeria monocytogenes,  coagulase negative staphylococci, S. aureus,  methicillin resistant S. aureus, Streptococcus agalactiae  (Group B), S. pneumoniae, S. pyogenes (Group A),  Acinetobacter baumannii, Enterobacter cloacae, E. coli,  Klebsiella oxytoca, K. pneumoniae, Proteus sp.,  Serratia marcescens, Haemophilus influenzae,  Neisseria meningitidis, Pseudomonas aeruginosa, Candida  albicans, C. glabrata, C krusei, C parapsilosis,  C. tropicalis and the KPC resistance gene.   Growth in anaerobic bottle: Gram Negative Rods  Growth in aerobic bottle: Gram Negative Rods  @ 21:28          CENTRAL LINE: N  STAFFORD: N  A-LINE: N    GLOBAL ISSUE/BEST PRACTICE  Analgesia: y  Sedation: n  HOB elevation: y  Stress ulcer prophylaxis: y  VTE prophylaxis: y  Glycemic control: y  Nutrition: y    CODE STATUS: FULL CODE  [facility] discussion: Y

## 2020-03-16 NOTE — PROGRESS NOTE ADULT - SUBJECTIVE AND OBJECTIVE BOX
Patient seen and examined;  Chart reviewed and events noted;   hemodynamically stable; afebrile  upset about food; wants to know what percentage of patients with his hematolgic abnormalities develop cholecystitis    MEDICATIONS  (STANDING):  chlorhexidine 2% Cloths 1 Application(s) Topical daily  diazepam    Tablet 5 milliGRAM(s) Oral every 12 hours  fluvoxaMINE 100 milliGRAM(s) Oral two times a day  folic acid 1 milliGRAM(s) Oral daily  heparin  Injectable 5000 Unit(s) SubCutaneous every 8 hours  melatonin 3 milliGRAM(s) Oral at bedtime  piperacillin/tazobactam IVPB.. 3.375 Gram(s) IV Intermittent every 8 hours    MEDICATIONS  (PRN):  acetaminophen   Tablet .. 650 milliGRAM(s) Oral every 6 hours PRN Mild Pain (1 - 3)  HYDROmorphone  Injectable 0.5 milliGRAM(s) IV Push every 6 hours PRN Moderate Pain (4 - 6)  HYDROmorphone  Injectable 1 milliGRAM(s) IV Push every 6 hours PRN Severe Pain (7 - 10)  ondansetron Injectable 4 milliGRAM(s) IV Push every 6 hours PRN Nausea and/or Vomiting      Vital Signs Last 24 Hrs  T(C): 36.9 (16 Mar 2020 12:01), Max: 37.7 (15 Mar 2020 18:45)  T(F): 98.4 (16 Mar 2020 12:01), Max: 99.9 (15 Mar 2020 18:45)  HR: 92 (16 Mar 2020 12:00) (68 - 122)  BP: 129/69 (16 Mar 2020 12:00) (103/52 - 187/81)  BP(mean): 94 (16 Mar 2020 12:00) (73 - 117)  RR: 24 (16 Mar 2020 12:00) (12 - 32)  SpO2: 98% (16 Mar 2020 12:00) (86% - 100%)    PHYSICAL EXAM  General: adult in NAD  HEENT: clear oropharynx, anicteric sclera, pink conjunctivae  Neck: supple  CV: normal S1S2 with no murmur rubs or gallops  Lungs: clear to auscultation, no wheezes, no rhales  Abdomen: + ERAN drain with serosanguinous drainage  Ext: no clubbing cyanosis or edema  Skin: no rashes and no petichiae  Neuro: alert and oriented X3 no focal deficits      LABS:                        7.5    8.41  )-----------( 57       ( 16 Mar 2020 06:08 )             22.1     Hemoglobin: 7.5 g/dL (03-16 @ 06:08)  Hemoglobin: 7.4 g/dL (03-15 @ 20:49)  Hemoglobin: 7.7 g/dL (03-15 @ 05:44)  Hemoglobin: 7.7 g/dL (03-15 @ 01:01)  Hemoglobin: 9.0 g/dL (03-14 @ 18:34)    Platelet Count - Automated: 57 K/uL (03-16 @ 06:08)  Platelet Count - Automated: 48 K/uL (03-15 @ 20:49)  Platelet Count - Automated: 62 K/uL (03-15 @ 05:44)  Platelet Count - Automated: 64 K/uL (03-15 @ 01:01)  Platelet Count - Automated: 80 K/uL (03-14 @ 18:34)    03-16    143  |  108  |  21  ----------------------------<  99  4.5   |  29  |  1.40<H>    Ca    8.3<L>      16 Mar 2020 05:35  Phos  3.3     03-16  Mg     2.2     03-16    TPro  5.6<L>  /  Alb  2.3<L>  /  TBili  1.0  /  DBili  x   /  AST  52<H>  /  ALT  46  /  AlkPhos  70  03-16    PT/INR - ( 16 Mar 2020 05:35 )   PT: 21.9 sec;   INR: 1.92 ratio   PTT - ( 16 Mar 2020 05:35 )  PTT:64.7 sec

## 2020-03-16 NOTE — PROGRESS NOTE ADULT - ASSESSMENT
70yo M, with PMH/o HTN, ITP, DONNA on ?CKD, lupus anticoagulant, p/w septic shock d/t acute perforated cholecystitis with hepatic abscess, s/p lap errol with washout, now E. coli bacteremia.      NEURO: Pain control with Tylenol and Dilaudid. Restart pt's home Fluvoxamine.    CV: P/w septic shock, now with improved BPs off pressors.    PULM: Stable, no acute issues at this time. Wean off NC.    GI: S/p lap errol with washout, now POD #2. Monitor ERAN output. Advance diet as tolerated. If pt tolerates PO, may d/c IV fluids.    : Cr generally downtrending, appears to be at baseline today. Questionable h/o CKD. Monitor daily renal function, lytes, and urine output.    HEME: Hb decreased from baseline pre-op though has been stable. No signs of active bleeding at this time. Monitor daily CBC. Continue with Heparin for DVT ppx as pt with h/o lupus anticoagulant, factor XI deficiency, and ITP.    ID: P/w septic shock d/t acute perforated cholecystitis with associated hepatic abscess, found to have E. coli bacteremia. c/w Zosyn, today day #3, de-escalate once sensitivities return. Follow up repeat BCx and intra-op peritoneal fluid cx.    ENDO: Stable, no acute issues at this time.    DISPO: Stable for transfer to [facility].

## 2020-03-16 NOTE — PROGRESS NOTE ADULT - SUBJECTIVE AND OBJECTIVE BOX
STATUS POST:  laparoscopic cholecystectomy with drainage of pericholecystic abscess    POST OPERATIVE DAY #: 2    SUBJECTIVE:  Patient seen and examined at bedside.  No overnight events.  Patient with no new complaints at this time, tolerating diet, admits to flatus and bowel movement.  Patient denies any fevers, chills, chest pain, shortness of breath, abdominal pain, nausea, vomiting or diarrhea.    Vital Signs Last 24 Hrs  T(C): 36.5 (16 Mar 2020 08:00), Max: 37.7 (15 Mar 2020 18:45)  T(F): 97.7 (16 Mar 2020 08:00), Max: 99.9 (15 Mar 2020 18:45)  HR: 93 (16 Mar 2020 07:00) (61 - 122)  BP: 127/60 (16 Mar 2020 07:) (90/50 - 187/81)  BP(mean): 86 (16 Mar 2020 07:) (65 - 117)  RR: 14 (16 Mar 2020 07:) (12 - 32)  SpO2: 100% (16 Mar 2020 07:00) (86% - 100%)    PHYSICAL EXAM:  GENERAL: No acute distress, well-developed  HEAD:  Atraumatic, Normocephalic  ABDOMEN: Soft, diffusely-tender, mildly-distended; bowel sounds+  NEUROLOGY: A&O x 3, no focal deficits    I&O's Summary    15 Mar 2020 07:  -  16 Mar 2020 07:00  --------------------------------------------------------  IN: 3190 mL / OUT: 2765 mL / NET: 425 mL      I&O's Detail    15 Mar 2020 07:  -  16 Mar 2020 07:00  --------------------------------------------------------  IN:    lactated ringers.: 2400 mL    Oral Fluid: 590 mL    Solution: 200 mL  Total IN: 3190 mL    OUT:    Bulb: 915 mL    Voided: 1850 mL  Total OUT: 2765 mL    Total NET: 425 mL        MEDICATIONS  (STANDING):  chlorhexidine 2% Cloths 1 Application(s) Topical daily  diazepam    Tablet 5 milliGRAM(s) Oral every 12 hours  folic acid 1 milliGRAM(s) Oral daily  heparin  Injectable 5000 Unit(s) SubCutaneous every 8 hours  lactated ringers. 1000 milliLiter(s) (100 mL/Hr) IV Continuous <Continuous>  melatonin 3 milliGRAM(s) Oral at bedtime  piperacillin/tazobactam IVPB.. 3.375 Gram(s) IV Intermittent every 8 hours    MEDICATIONS  (PRN):  acetaminophen   Tablet .. 650 milliGRAM(s) Oral every 6 hours PRN Mild Pain (1 - 3)  HYDROmorphone  Injectable 0.5 milliGRAM(s) IV Push every 6 hours PRN Moderate Pain (4 - 6)  HYDROmorphone  Injectable 1 milliGRAM(s) IV Push every 6 hours PRN Severe Pain (7 - 10)  ondansetron Injectable 4 milliGRAM(s) IV Push every 6 hours PRN Nausea and/or Vomiting    LABS:                        7.5    8.41  )-----------( 57       ( 16 Mar 2020 06:08 )             22.1     03-16    143  |  108  |  21  ----------------------------<  99  4.5   |  29  |  1.40<H>    Ca    8.3<L>      16 Mar 2020 05:35  Phos  3.3     03-16  Mg     2.2     03-16    TPro  5.6<L>  /  Alb  2.3<L>  /  TBili  1.0  /  DBili  x   /  AST  52<H>  /  ALT  46  /  AlkPhos  70  03-16    PT/INR - ( 16 Mar 2020 05:35 )   PT: 21.9 sec;   INR: 1.92 ratio         PTT - ( 16 Mar 2020 05:35 )  PTT:64.7 sec  Urinalysis Basic - ( 14 Mar 2020 14:42 )    Color: Yellow / Appearance: Clear / S.015 / pH: x  Gluc: x / Ketone: Negative  / Bili: Negative / Urobili: Negative   Blood: x / Protein: 500 mg/dL / Nitrite: Negative   Leuk Esterase: Trace / RBC: 6-10 /HPF / WBC 0-2   Sq Epi: x / Non Sq Epi: Few / Bacteria: Few      RADIOLOGY & ADDITIONAL STUDIES:    ASSESSMENT    69y Male POD 2 s/p laparoscopic cholecystectomy with drainage of pericholecystic abscess in the ICU for sepsis management, DONNA vs CKD.    PLAN  - Will discuss with Dr. Pfeiffer  - incentive spirometer  - pain control  - OOB  - NPO, IVF   - serial abdominal exams  - labs in am    Surgical Team Contact Information  Spectralink: Ext: [phone] or [phone]  Pager: [phone] STATUS POST:  laparoscopic cholecystectomy with drainage of pericholecystic abscess    POST OPERATIVE DAY #: 2    SUBJECTIVE:  Patient seen and examined at bedside.  No overnight events.  Patient with no new complaints at this time, tolerating full liquid diet, denies flatus and bowel movement. Admits to chills and some abdominal pain.  Patient denies any fevers, chest pain, shortness of breath,  nausea, vomiting or diarrhea.    Vital Signs Last 24 Hrs  T(C): 36.5 (16 Mar 2020 08:00), Max: 37.7 (15 Mar 2020 18:45)  T(F): 97.7 (16 Mar 2020 08:00), Max: 99.9 (15 Mar 2020 18:45)  HR: 93 (16 Mar 2020 07:00) (61 - 122)  BP: 127/60 (16 Mar 2020 07:) (90/50 - 187/81)  BP(mean): 86 (16 Mar 2020 07:) (65 - 117)  RR: 14 (16 Mar 2020 07:) (12 - 32)  SpO2: 100% (16 Mar 2020 07:) (86% - 100%)    PHYSICAL EXAM:  GENERAL: No acute distress, well-developed  HEAD:  Atraumatic, Normocephalic  ABDOMEN: Soft, diffusely-tender, mildly-distended; bowel sounds+. Incisions with dressings c/d/i. Drain with saturated serosanguinous dressing. changed. ERAN drain just emptied prior to exam. small amount of serosanguinous/bloody fluid in bulb.   NEUROLOGY: A&O x 3, no focal deficits    I&O's Summary    15 Mar 2020 07:  -  16 Mar 2020 07:  --------------------------------------------------------  IN: 3190 mL / OUT: 2765 mL / NET: 425 mL      I&O's Detail    15 Mar 2020 07:  -  16 Mar 2020 07:00  --------------------------------------------------------  IN:    lactated ringers.: 2400 mL    Oral Fluid: 590 mL    Solution: 200 mL  Total IN: 3190 mL    OUT:    Bulb: 915 mL    Voided: 1850 mL  Total OUT: 2765 mL    Total NET: 425 mL        MEDICATIONS  (STANDING):  chlorhexidine 2% Cloths 1 Application(s) Topical daily  diazepam    Tablet 5 milliGRAM(s) Oral every 12 hours  folic acid 1 milliGRAM(s) Oral daily  heparin  Injectable 5000 Unit(s) SubCutaneous every 8 hours  lactated ringers. 1000 milliLiter(s) (100 mL/Hr) IV Continuous <Continuous>  melatonin 3 milliGRAM(s) Oral at bedtime  piperacillin/tazobactam IVPB.. 3.375 Gram(s) IV Intermittent every 8 hours    MEDICATIONS  (PRN):  acetaminophen   Tablet .. 650 milliGRAM(s) Oral every 6 hours PRN Mild Pain (1 - 3)  HYDROmorphone  Injectable 0.5 milliGRAM(s) IV Push every 6 hours PRN Moderate Pain (4 - 6)  HYDROmorphone  Injectable 1 milliGRAM(s) IV Push every 6 hours PRN Severe Pain (7 - 10)  ondansetron Injectable 4 milliGRAM(s) IV Push every 6 hours PRN Nausea and/or Vomiting    LABS:                        7.5    8.41  )-----------( 57       ( 16 Mar 2020 06:08 )             22.1     03-16    143  |  108  |  21  ----------------------------<  99  4.5   |  29  |  1.40<H>    Ca    8.3<L>      16 Mar 2020 05:35  Phos  3.3     03-16  Mg     2.2     03-16    TPro  5.6<L>  /  Alb  2.3<L>  /  TBili  1.0  /  DBili  x   /  AST  52<H>  /  ALT  46  /  AlkPhos  70  03-16    PT/INR - ( 16 Mar 2020 05:35 )   PT: 21.9 sec;   INR: 1.92 ratio         PTT - ( 16 Mar 2020 05:35 )  PTT:64.7 sec  Urinalysis Basic - ( 14 Mar 2020 14:42 )    Color: Yellow / Appearance: Clear / S.015 / pH: x  Gluc: x / Ketone: Negative  / Bili: Negative / Urobili: Negative   Blood: x / Protein: 500 mg/dL / Nitrite: Negative   Leuk Esterase: Trace / RBC: 6-10 /HPF / WBC 0-2   Sq Epi: x / Non Sq Epi: Few / Bacteria: Few      RADIOLOGY & ADDITIONAL STUDIES:    ASSESSMENT    69y Male POD 2 s/p laparoscopic cholecystectomy with drainage of pericholecystic abscess in the ICU for sepsis management and DONNA vs CKD.    PLAN  - Will discuss with Dr. Pfeiffer  - incentive spirometer  - pain control  - advance diet as tolerated  - OOB as tolerated  - continue zosyn  - serial abdominal exams  - labs in am  - care per ICU team    Surgical Team Contact Information  Spectralink: Ext: [phone] or [phone]  Pager: [phone]

## 2020-03-16 NOTE — PROGRESS NOTE ADULT - ASSESSMENT
ACUTE PERFORATED CHOLECYSTITIS WITH ABSCESS  S/P lap errol with drainage of abscess -- POD #2  Continue post-op care  Surgery f/u    GRAM NEGATIVE SEPSIS  Continue zosyn  Follow-up culture data      ANXIETY  Continue diazepam and fluvoxamine    HTN  Hold BP meds for now

## 2020-03-17 LAB
-  AMIKACIN: SIGNIFICANT CHANGE UP
-  AMIKACIN: SIGNIFICANT CHANGE UP
-  AMOXICILLIN/CLAVULANIC ACID: SIGNIFICANT CHANGE UP
-  AMOXICILLIN/CLAVULANIC ACID: SIGNIFICANT CHANGE UP
-  AMPICILLIN/SULBACTAM: SIGNIFICANT CHANGE UP
-  AMPICILLIN/SULBACTAM: SIGNIFICANT CHANGE UP
-  AMPICILLIN: SIGNIFICANT CHANGE UP
-  AMPICILLIN: SIGNIFICANT CHANGE UP
-  AZTREONAM: SIGNIFICANT CHANGE UP
-  AZTREONAM: SIGNIFICANT CHANGE UP
-  CEFAZOLIN: SIGNIFICANT CHANGE UP
-  CEFAZOLIN: SIGNIFICANT CHANGE UP
-  CEFEPIME: SIGNIFICANT CHANGE UP
-  CEFEPIME: SIGNIFICANT CHANGE UP
-  CEFOXITIN: SIGNIFICANT CHANGE UP
-  CEFOXITIN: SIGNIFICANT CHANGE UP
-  CEFTRIAXONE: SIGNIFICANT CHANGE UP
-  CEFTRIAXONE: SIGNIFICANT CHANGE UP
-  CIPROFLOXACIN: SIGNIFICANT CHANGE UP
-  CIPROFLOXACIN: SIGNIFICANT CHANGE UP
-  ERTAPENEM: SIGNIFICANT CHANGE UP
-  ERTAPENEM: SIGNIFICANT CHANGE UP
-  GENTAMICIN: SIGNIFICANT CHANGE UP
-  GENTAMICIN: SIGNIFICANT CHANGE UP
-  IMIPENEM: SIGNIFICANT CHANGE UP
-  IMIPENEM: SIGNIFICANT CHANGE UP
-  LEVOFLOXACIN: SIGNIFICANT CHANGE UP
-  LEVOFLOXACIN: SIGNIFICANT CHANGE UP
-  MEROPENEM: SIGNIFICANT CHANGE UP
-  MEROPENEM: SIGNIFICANT CHANGE UP
-  PIPERACILLIN/TAZOBACTAM: SIGNIFICANT CHANGE UP
-  PIPERACILLIN/TAZOBACTAM: SIGNIFICANT CHANGE UP
-  TOBRAMYCIN: SIGNIFICANT CHANGE UP
-  TOBRAMYCIN: SIGNIFICANT CHANGE UP
-  TRIMETHOPRIM/SULFAMETHOXAZOLE: SIGNIFICANT CHANGE UP
-  TRIMETHOPRIM/SULFAMETHOXAZOLE: SIGNIFICANT CHANGE UP
ALBUMIN SERPL ELPH-MCNC: 2 G/DL — LOW (ref 3.3–5)
ALP SERPL-CCNC: 71 U/L — SIGNIFICANT CHANGE UP (ref 40–120)
ALT FLD-CCNC: 30 U/L — SIGNIFICANT CHANGE UP (ref 12–78)
ANION GAP SERPL CALC-SCNC: 4 MMOL/L — LOW (ref 5–17)
AST SERPL-CCNC: 24 U/L — SIGNIFICANT CHANGE UP (ref 15–37)
BASOPHILS # BLD AUTO: 0.01 K/UL — SIGNIFICANT CHANGE UP (ref 0–0.2)
BASOPHILS NFR BLD AUTO: 0.2 % — SIGNIFICANT CHANGE UP (ref 0–2)
BILIRUB SERPL-MCNC: 0.9 MG/DL — SIGNIFICANT CHANGE UP (ref 0.2–1.2)
BUN SERPL-MCNC: 18 MG/DL — SIGNIFICANT CHANGE UP (ref 7–23)
CALCIUM SERPL-MCNC: 8.4 MG/DL — LOW (ref 8.5–10.1)
CHLORIDE SERPL-SCNC: 109 MMOL/L — HIGH (ref 96–108)
CO2 SERPL-SCNC: 31 MMOL/L — SIGNIFICANT CHANGE UP (ref 22–31)
CREAT SERPL-MCNC: 1 MG/DL — SIGNIFICANT CHANGE UP (ref 0.5–1.3)
EOSINOPHIL # BLD AUTO: 0.19 K/UL — SIGNIFICANT CHANGE UP (ref 0–0.5)
EOSINOPHIL NFR BLD AUTO: 3.3 % — SIGNIFICANT CHANGE UP (ref 0–6)
GLUCOSE SERPL-MCNC: 113 MG/DL — HIGH (ref 70–99)
HCT VFR BLD CALC: 20.1 % — CRITICAL LOW (ref 39–50)
HCT VFR BLD CALC: 22.7 % — LOW (ref 39–50)
HGB BLD-MCNC: 6.8 G/DL — CRITICAL LOW (ref 13–17)
HGB BLD-MCNC: 7.9 G/DL — LOW (ref 13–17)
IMM GRANULOCYTES NFR BLD AUTO: 1.6 % — HIGH (ref 0–1.5)
LYMPHOCYTES # BLD AUTO: 0.67 K/UL — LOW (ref 1–3.3)
LYMPHOCYTES # BLD AUTO: 11.8 % — LOW (ref 13–44)
MAGNESIUM SERPL-MCNC: 1.7 MG/DL — SIGNIFICANT CHANGE UP (ref 1.6–2.6)
MCHC RBC-ENTMCNC: 28.6 PG — SIGNIFICANT CHANGE UP (ref 27–34)
MCHC RBC-ENTMCNC: 30.6 PG — SIGNIFICANT CHANGE UP (ref 27–34)
MCHC RBC-ENTMCNC: 33.8 GM/DL — SIGNIFICANT CHANGE UP (ref 32–36)
MCHC RBC-ENTMCNC: 34.8 GM/DL — SIGNIFICANT CHANGE UP (ref 32–36)
MCV RBC AUTO: 84.5 FL — SIGNIFICANT CHANGE UP (ref 80–100)
MCV RBC AUTO: 88 FL — SIGNIFICANT CHANGE UP (ref 80–100)
METHOD TYPE: SIGNIFICANT CHANGE UP
METHOD TYPE: SIGNIFICANT CHANGE UP
MONOCYTES # BLD AUTO: 0.31 K/UL — SIGNIFICANT CHANGE UP (ref 0–0.9)
MONOCYTES NFR BLD AUTO: 5.4 % — SIGNIFICANT CHANGE UP (ref 2–14)
NEUTROPHILS # BLD AUTO: 4.42 K/UL — SIGNIFICANT CHANGE UP (ref 1.8–7.4)
NEUTROPHILS NFR BLD AUTO: 77.7 % — HIGH (ref 43–77)
NRBC # BLD: 0 /100 WBCS — SIGNIFICANT CHANGE UP (ref 0–0)
NRBC # BLD: 0 /100 WBCS — SIGNIFICANT CHANGE UP (ref 0–0)
PHOSPHATE SERPL-MCNC: 1.9 MG/DL — LOW (ref 2.5–4.5)
PLATELET # BLD AUTO: 58 K/UL — LOW (ref 150–400)
PLATELET # BLD AUTO: 74 K/UL — LOW (ref 150–400)
POTASSIUM SERPL-MCNC: 3.7 MMOL/L — SIGNIFICANT CHANGE UP (ref 3.5–5.3)
POTASSIUM SERPL-SCNC: 3.7 MMOL/L — SIGNIFICANT CHANGE UP (ref 3.5–5.3)
PROT SERPL-MCNC: 5 G/DL — LOW (ref 6–8.3)
RBC # BLD: 2.38 M/UL — LOW (ref 4.2–5.8)
RBC # BLD: 2.58 M/UL — LOW (ref 4.2–5.8)
RBC # FLD: 15.3 % — HIGH (ref 10.3–14.5)
RBC # FLD: 15.6 % — HIGH (ref 10.3–14.5)
SODIUM SERPL-SCNC: 144 MMOL/L — SIGNIFICANT CHANGE UP (ref 135–145)
SURGICAL PATHOLOGY STUDY: SIGNIFICANT CHANGE UP
WBC # BLD: 5.69 K/UL — SIGNIFICANT CHANGE UP (ref 3.8–10.5)
WBC # BLD: 6.95 K/UL — SIGNIFICANT CHANGE UP (ref 3.8–10.5)
WBC # FLD AUTO: 5.69 K/UL — SIGNIFICANT CHANGE UP (ref 3.8–10.5)
WBC # FLD AUTO: 6.95 K/UL — SIGNIFICANT CHANGE UP (ref 3.8–10.5)

## 2020-03-17 PROCEDURE — 99233 SBSQ HOSP IP/OBS HIGH 50: CPT | Mod: GC

## 2020-03-17 RX ORDER — METRONIDAZOLE 500 MG
500 TABLET ORAL ONCE
Refills: 0 | Status: COMPLETED | OUTPATIENT
Start: 2020-03-17 | End: 2020-03-17

## 2020-03-17 RX ORDER — METRONIDAZOLE 500 MG
500 TABLET ORAL EVERY 8 HOURS
Refills: 0 | Status: DISCONTINUED | OUTPATIENT
Start: 2020-03-17 | End: 2020-03-17

## 2020-03-17 RX ORDER — POTASSIUM PHOSPHATE, MONOBASIC POTASSIUM PHOSPHATE, DIBASIC 236; 224 MG/ML; MG/ML
15 INJECTION, SOLUTION INTRAVENOUS ONCE
Refills: 0 | Status: COMPLETED | OUTPATIENT
Start: 2020-03-17 | End: 2020-03-17

## 2020-03-17 RX ORDER — ERTAPENEM SODIUM 1 G/1
1000 INJECTION, POWDER, LYOPHILIZED, FOR SOLUTION INTRAMUSCULAR; INTRAVENOUS EVERY 24 HOURS
Refills: 0 | Status: DISCONTINUED | OUTPATIENT
Start: 2020-03-17 | End: 2020-03-20

## 2020-03-17 RX ORDER — METRONIDAZOLE 500 MG
TABLET ORAL
Refills: 0 | Status: DISCONTINUED | OUTPATIENT
Start: 2020-03-17 | End: 2020-03-17

## 2020-03-17 RX ORDER — CEFTRIAXONE 500 MG/1
1000 INJECTION, POWDER, FOR SOLUTION INTRAMUSCULAR; INTRAVENOUS EVERY 24 HOURS
Refills: 0 | Status: DISCONTINUED | OUTPATIENT
Start: 2020-03-17 | End: 2020-03-17

## 2020-03-17 RX ADMIN — HEPARIN SODIUM 5000 UNIT(S): 5000 INJECTION INTRAVENOUS; SUBCUTANEOUS at 14:22

## 2020-03-17 RX ADMIN — HEPARIN SODIUM 5000 UNIT(S): 5000 INJECTION INTRAVENOUS; SUBCUTANEOUS at 21:34

## 2020-03-17 RX ADMIN — Medication 5 MILLIGRAM(S): at 21:34

## 2020-03-17 RX ADMIN — HYDROMORPHONE HYDROCHLORIDE 0.5 MILLIGRAM(S): 2 INJECTION INTRAMUSCULAR; INTRAVENOUS; SUBCUTANEOUS at 16:20

## 2020-03-17 RX ADMIN — CEFTRIAXONE 100 MILLIGRAM(S): 500 INJECTION, POWDER, FOR SOLUTION INTRAMUSCULAR; INTRAVENOUS at 10:20

## 2020-03-17 RX ADMIN — ERTAPENEM SODIUM 120 MILLIGRAM(S): 1 INJECTION, POWDER, LYOPHILIZED, FOR SOLUTION INTRAMUSCULAR; INTRAVENOUS at 12:13

## 2020-03-17 RX ADMIN — POTASSIUM PHOSPHATE, MONOBASIC POTASSIUM PHOSPHATE, DIBASIC 62.5 MILLIMOLE(S): 236; 224 INJECTION, SOLUTION INTRAVENOUS at 10:20

## 2020-03-17 RX ADMIN — Medication 650 MILLIGRAM(S): at 03:03

## 2020-03-17 RX ADMIN — Medication 5 MILLIGRAM(S): at 06:14

## 2020-03-17 RX ADMIN — ONDANSETRON 4 MILLIGRAM(S): 8 TABLET, FILM COATED ORAL at 15:52

## 2020-03-17 RX ADMIN — Medication 1 MILLIGRAM(S): at 12:14

## 2020-03-17 RX ADMIN — HEPARIN SODIUM 5000 UNIT(S): 5000 INJECTION INTRAVENOUS; SUBCUTANEOUS at 06:14

## 2020-03-17 RX ADMIN — Medication 650 MILLIGRAM(S): at 03:45

## 2020-03-17 RX ADMIN — FLUVOXAMINE MALEATE 100 MILLIGRAM(S): 25 TABLET ORAL at 17:11

## 2020-03-17 RX ADMIN — PIPERACILLIN AND TAZOBACTAM 25 GRAM(S): 4; .5 INJECTION, POWDER, LYOPHILIZED, FOR SOLUTION INTRAVENOUS at 06:14

## 2020-03-17 RX ADMIN — CHLORHEXIDINE GLUCONATE 1 APPLICATION(S): 213 SOLUTION TOPICAL at 12:14

## 2020-03-17 RX ADMIN — Medication 3 MILLIGRAM(S): at 21:34

## 2020-03-17 RX ADMIN — Medication 100 MILLIGRAM(S): at 10:20

## 2020-03-17 RX ADMIN — FLUVOXAMINE MALEATE 100 MILLIGRAM(S): 25 TABLET ORAL at 06:14

## 2020-03-17 RX ADMIN — HYDROMORPHONE HYDROCHLORIDE 0.5 MILLIGRAM(S): 2 INJECTION INTRAMUSCULAR; INTRAVENOUS; SUBCUTANEOUS at 15:53

## 2020-03-17 NOTE — PROGRESS NOTE ADULT - ASSESSMENT
68yo M, with PMH/o HTN, ITP, DONNA on ?CKD, lupus anticoagulant, p/w septic shock d/t acute perforated cholecystitis with hepatic abscess, s/p lap errol with washout, now E. coli bacteremia.      NEURO: Pain control with Tylenol and Dilaudid. Continue pt's home Fluvoxamine.    CV: septic shock resolving, now with improved BPs off pressors.    PULM: Stable, no acute issues at this time. Wean off NC.      GI/electrolytes: S/p lap errol with washout, now POD #3. Monitor ERAN output (20 cc overnight), f/u surgical recommendations. Tolerating diet, continue. Keep off fluids as now tolerating PO. Replete P 1.9 with K phosphate IV 15 x1.     : Cr improved, appears to be at baseline today. Questionable h/o CKD. Monitor daily renal function, lytes, and urine output.    HEME: Hb 6.8 this AM, pt admits to weakness and appears pale. Transfused with 1uPRBCs. F/u H/H 2 hours post transfusion. No signs of active bleeding at this time. Monitor daily CBC. Continue with Heparin for DVT ppx as pt with h/o lupus anticoagulant, factor XI deficiency, and ITP. Transfuse for Hgb <7 or <8 if with signs of acute blood loss.     ID: P/w septic shock d/t acute perforated cholecystitis with associated hepatic abscess, found to have pansensitive E. coli bacteremia and surgical culture now with ESBL E. coli. Repeat blood cultures from 3/16 TD. Start ertapenem IV 1g q24, stop zosyn. S/p 3 days zosyn. Consult Dr. Pepper ID for further abx reccs.     ENDO: Stable, no acute issues at this time.    DISPO: ambulate today, transfer to [facility] once Hgb controlled.

## 2020-03-17 NOTE — PROGRESS NOTE ADULT - SUBJECTIVE AND OBJECTIVE BOX
Patient seen and examined;  Chart reviewed and events noted;   hemodynamically stable; afebrile  upset about food; wants to know what percentage of patients with his hematolgic abnormalities develop cholecystitis    3/17 pt feeling weak and labs reviewed and inr was 1.92 and ptt was 64.7 and this apparently is a LAC. Pt stated he knew he had it.  MEDICATIONS  (STANDING):  chlorhexidine 2% Cloths 1 Application(s) Topical daily  diazepam    Tablet 5 milliGRAM(s) Oral every 12 hours  ertapenem  IVPB 1000 milliGRAM(s) IV Intermittent every 24 hours  fluvoxaMINE 100 milliGRAM(s) Oral two times a day  folic acid 1 milliGRAM(s) Oral daily  heparin  Injectable 5000 Unit(s) SubCutaneous every 8 hours  melatonin 3 milliGRAM(s) Oral at bedtime  ICU Vital Signs Last 24 Hrs  T(C): 36.4 (17 Mar 2020 11:49), Max: 36.9 (16 Mar 2020 15:05)  T(F): 97.6 (17 Mar 2020 11:49), Max: 98.5 (16 Mar 2020 15:05)  HR: 93 (17 Mar 2020 11:00) (77 - 93)  BP: 122/67 (17 Mar 2020 11:00) (121/56 - 169/73)  BP(mean): 90 (17 Mar 2020 11:00) (80 - 107)  ABP: --  ABP(mean): --  RR: 22 (17 Mar 2020 11:00) (11 - 28)  SpO2: 95% (17 Mar 2020 11:00) (94% - 100%)    PHYSICAL EXAM  General: adult in NAD  HEENT: clear oropharynx, anicteric sclera, pink conjunctivae  Neck: supple  CV: normal S1S2 with no murmur rubs or gallops  Lungs: clear to auscultation, no wheezes, no rhales  Abdomen: + ERAN drain with serosanguinous drainage  Ext: no clubbing cyanosis or edema  Skin: no rashes and no petechiae  Neuro: alert and oriented X3 no focal deficits      LABS:                        7.9    6.95  )-----------( 74       ( 17 Mar 2020 12:36 )             22.7     Hemoglobin: 7.5 g/dL (03-16 @ 06:08)  Hemoglobin: 7.4 g/dL (03-15 @ 20:49)  Hemoglobin: 7.7 g/dL (03-15 @ 05:44)  Hemoglobin: 7.7 g/dL (03-15 @ 01:01)  Hemoglobin: 9.0 g/dL (03-14 @ 18:34)    Platelet Count - Automated: 57 K/uL (03-16 @ 06:08)  Platelet Count - Automated: 48 K/uL (03-15 @ 20:49)  Platelet Count - Automated: 62 K/uL (03-15 @ 05:44)  Platelet Count - Automated: 64 K/uL (03-15 @ 01:01)  Platelet Count - Automated: 80 K/uL (03-14 @ 18:34)    03-16    143  |  108  |  21  ----------------------------<  99  4.5   |  29  |  1.40<H>    Ca    8.3<L>      16 Mar 2020 05:35  Phos  3.3     03-16  Mg     2.2     03-16    TPro  5.6<L>  /  Alb  2.3<L>  /  TBili  1.0  /  DBili  x   /  AST  52<H>  /  ALT  46  /  AlkPhos  70  03-16    PT/INR - ( 16 Mar 2020 05:35 )   PT: 21.9 sec;   INR: 1.92 ratio   PTT - ( 16 Mar 2020 05:35 )  PTT:64.7 sec

## 2020-03-17 NOTE — CONSULT NOTE ADULT - REASON FOR ADMISSION
perforated acute errol with sepsis

## 2020-03-17 NOTE — PROGRESS NOTE ADULT - ASSESSMENT
ACUTE PERFORATED CHOLECYSTITIS WITH ABSCESS  S/P lap errol with drainage of abscess -- POD #3  Continue post-op care  Surgery f/u    GRAM NEGATIVE SEPSIS  Continue zosyn  Follow-up culture data  ID consult    ANXIETY  Continue diazepam and fluvoxamine    HTN  Hold BP meds for now    ANEMIA  Transfuse 1 unit PRBC  Monitor h/h  Heme follow-up

## 2020-03-17 NOTE — PROGRESS NOTE ADULT - SUBJECTIVE AND OBJECTIVE BOX
Patient is a 69y old  Male who presents with a chief complaint of perforated acute errol with sepsis (17 Mar 2020 09:08)    24 hour events: Overnight 20 cc serosanguineous fluid collected in ERAN drain. Hgb 6.8 this AM, pt receiving 1 units pRBCs. Pt seen and examined at bedside, receiving 1 unit pRBCs. Pt states he feels weak, diffuse abdominal pain. No BM since after surgery, however is now passing flatus. Pt states he ate half a sandwich yesterday. Denies lightheadedness chest pain, palpitations SOB.     REVIEW OF SYSTEMS  Constitutional: admits to weakness, No fever, chills  Neuro: No headache, numbness, weakness  Resp: No cough, wheezing, shortness of breath, hemoptysis  CVS: No lightheadedness, dizziness, chest pain, palpitations, leg swelling  GI: admits to diffuse abdominal pain, no nausea, vomiting, diarrhea, melena, hematochezia, hematemesis   : No dysuria, frequency, incontinence  Skin: No itching, burning, rashes, or lesions   Msk: No joint pain or swelling  Psych: No depression, anxiety  Heme: No bleeding    T(F): 98 (03-17-20 @ 07:35), Max: 98.5 (03-16-20 @ 15:05)  HR: 93 (03-17-20 @ 11:00) (77 - 93)  BP: 122/67 (03-17-20 @ 11:00) (121/56 - 169/73)  RR: 22 (03-17-20 @ 11:00) (11 - 28)  SpO2: 95% (03-17-20 @ 11:00) (94% - 100%)  Wt(kg): --            I&O's Summary    03-16 @ 07:01  -  03-17 @ 07:00  --------------------------------------------------------  IN: 1622 mL / OUT: 2150 mL / NET: -528 mL    03-17 @ 07:01  -  03-17 @ 11:48  --------------------------------------------------------  IN: 580 mL / OUT: 350 mL / NET: 230 mL    Physical Exam:  General: pale and weak appearing, elderly male sitting upright in bed, No Acute Distress  HEENT: Normocephallic Atraumatic, PERRLA, EOMI bl, dry mucous membranes   Neck: Supple, nontender, no cervical lymphadenopathy  Neurology: A&Ox3,  no focal deficit, no obvious sensory deficit  Respiratory: Clear To Auscultation B/L, No Wheezes, rhonchi or rales  CV: Regular Rate and Rhythm, +S1/S2, no murmurs, rubs or gallops  Abdominal: diffuse abdominal TTP, ERAN drain clean dry intact no exudates Soft, Non-Distended +Bowel Soundsx4  Extremities: No Clubbing, cyanosis or edema, + peripheral pulses: cap refill <2 secs LE bilaterally  Skin: pale appearing, warm, dry    MEDICATIONS  ertapenem  IVPB IV Intermittent          acetaminophen   Tablet .. Oral PRN  diazepam    Tablet Oral  fluvoxaMINE Oral  HYDROmorphone  Injectable IV Push PRN  HYDROmorphone  Injectable IV Push PRN  melatonin Oral  ondansetron Injectable IV Push PRN      heparin  Injectable SubCutaneous        folic acid Oral      chlorhexidine 2% Cloths Topical                            6.8    5.69  )-----------( 58       ( 17 Mar 2020 05:28 )             20.1       03-17    144  |  109<H>  |  18  ----------------------------<  113<H>  3.7   |  31  |  1.00    Ca    8.4<L>      17 Mar 2020 05:28  Phos  1.9     03-17  Mg     1.7     03-17    TPro  5.0<L>  /  Alb  2.0<L>  /  TBili  0.9  /  DBili  x   /  AST  24  /  ALT  30  /  AlkPhos  71  03-17          PT/INR - ( 16 Mar 2020 05:35 )   PT: 21.9 sec;   INR: 1.92 ratio         PTT - ( 16 Mar 2020 05:35 )  PTT:64.7 sec    .Blood Blood   No growth to date. -- 03-16 @ 08:52  .Surgical Swab c/s from gallbladder   Moderate Escherichia coli  Moderate Streptococcus anginosus "Susceptibilities not performed" -- 03-15 @ 10:51  .Urine Clean Catch (Midstream)   No growth -- 03-14 @ 21:36  .Blood Blood-Peripheral   Growth in aerobic and anaerobic bottles: Escherichia coli  ***Blood Panel PCR results on this specimen are available  approximately 3 hours after the Gram stain result.***  Gram stain, PCR, and/or culture results may not always  correspond due to difference in methodologies.  ************************************************************  This PCR assay was performed using [facility].  The following targets are tested for: Enterococcus,  vancomycin resistant enterococci, Listeria monocytogenes,  coagulase negative staphylococci, S. aureus,  methicillin resistant S. aureus, Streptococcus agalactiae  (Group B), S. pneumoniae, S. pyogenes (Group A),  Acinetobacter baumannii, Enterobacter cloacae, E. coli,  Klebsiella oxytoca, K. pneumoniae, Proteus sp.,  Serratia marcescens, Haemophilus influenzae,  Neisseria meningitidis, Pseudomonas aeruginosa, Candida  albicans, C. glabrata, C krusei, C parapsilosis,  C. tropicalis and the KPC resistance gene.   Growth in anaerobic bottle: Gram Negative Rods  Growth in aerobic bottle: Gram Negative Rods 03-14 @ 21:28      CENTRAL LINE: N  STAFFORD: N  A-LINE: N    GLOBAL ISSUE/BEST PRACTICE  Analgesia: y  Sedation: n  HOB elevation: y  Stress ulcer prophylaxis: y  VTE prophylaxis: y  Glycemic control: y  Nutrition: y    CODE STATUS: FULL CODE  GOC discussion: Y

## 2020-03-17 NOTE — PROGRESS NOTE ADULT - ASSESSMENT
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 No BRANT. +splenomegaly.   - continue post op care as per surgery  - continue IV antibiotics as per primary team  - blood indices continue to remain stable  - continue folic acid for compensated hemolysis but may need transfusions  - will follow with you  - discussed with ICU team (Dr. Moncho Everett)  3/17 pt feeling weak and labs reviewed and inr was 1.92 and ptt was 64.7 and this apparently is a LAC. Pt stated he knew he had it.

## 2020-03-17 NOTE — PROGRESS NOTE ADULT - SUBJECTIVE AND OBJECTIVE BOX
Patient is a 69y old  Male who presents with a chief complaint of perforated acute errol with sepsis (17 Mar 2020 11:48)      INTERVAL HPI/OVERNIGHT EVENTS: Patient seen and examined. NAD. No complaints.    Vital Signs Last 24 Hrs  T(C): 36.4 (17 Mar 2020 11:49), Max: 36.9 (16 Mar 2020 15:05)  T(F): 97.6 (17 Mar 2020 11:49), Max: 98.5 (16 Mar 2020 15:05)  HR: 93 (17 Mar 2020 11:00) (77 - 93)  BP: 122/67 (17 Mar 2020 11:00) (121/56 - 169/73)  BP(mean): 90 (17 Mar 2020 11:00) (80 - 107)  RR: 22 (17 Mar 2020 11:00) (11 - 28)  SpO2: 95% (17 Mar 2020 11:00) (94% - 100%)    03-17    144  |  109<H>  |  18  ----------------------------<  113<H>  3.7   |  31  |  1.00    Ca    8.4<L>      17 Mar 2020 05:28  Phos  1.9     03-17  Mg     1.7     03-17    TPro  5.0<L>  /  Alb  2.0<L>  /  TBili  0.9  /  DBili  x   /  AST  24  /  ALT  30  /  AlkPhos  71  03-17                          6.8    5.69  )-----------( 58       ( 17 Mar 2020 05:28 )             20.1     PT/INR - ( 16 Mar 2020 05:35 )   PT: 21.9 sec;   INR: 1.92 ratio         PTT - ( 16 Mar 2020 05:35 )  PTT:64.7 sec  CAPILLARY BLOOD GLUCOSE                  acetaminophen   Tablet .. 650 milliGRAM(s) Oral every 6 hours PRN  chlorhexidine 2% Cloths 1 Application(s) Topical daily  diazepam    Tablet 5 milliGRAM(s) Oral every 12 hours  ertapenem  IVPB 1000 milliGRAM(s) IV Intermittent every 24 hours  fluvoxaMINE 100 milliGRAM(s) Oral two times a day  folic acid 1 milliGRAM(s) Oral daily  heparin  Injectable 5000 Unit(s) SubCutaneous every 8 hours  HYDROmorphone  Injectable 0.5 milliGRAM(s) IV Push every 6 hours PRN  HYDROmorphone  Injectable 1 milliGRAM(s) IV Push every 6 hours PRN  melatonin 3 milliGRAM(s) Oral at bedtime  ondansetron Injectable 4 milliGRAM(s) IV Push every 6 hours PRN              REVIEW OF SYSTEMS:  CONSTITUTIONAL: No fever, no weight loss, or no fatigue  NECK: No pain, no stiffness  RESPIRATORY: No cough, no wheezing, no chills, no hemoptysis, No shortness of breath  CARDIOVASCULAR: No chest pain, no palpitations, no dizziness, no leg swelling  GASTROINTESTINAL: No abdominal pain. No nausea, no vomiting, no hematemesis; No diarrhea, no constipation. No melena, no hematochezia.  GENITOURINARY: No dysuria, no frequency, no hematuria, no incontinence  NEUROLOGICAL: No headaches, no loss of strength, no numbness, no tremors  SKIN: No itching, no burning  MUSCULOSKELETAL: No joint pain, no swelling; No muscle, no back, no extremity pain  PSYCHIATRIC: No depression, no mood swings,   HEME/LYMPH: No easy bruising, no bleeding gums  ALLERY AND IMMUNOLOGIC: No hives       Consultant(s) Notes Reviewed:  [X] YES  [ ] NO    PHYSICAL EXAM:  GENERAL: NAD  HEAD:  Atraumatic, Normocephalic  EYES: EOMI, PERRLA, conjunctiva and sclera clear  ENMT: No tonsillar erythema, exudates, or enlargement; Moist mucous membranes  NECK: Supple, No JVD  NERVOUS SYSTEM:  Awake & alert  CHEST/LUNG: Clear to auscultation bilaterally; No rales, rhonchi, wheezing,  HEART: Regular rate and rhythm  ABDOMEN: Soft, Nontender, Nondistended; Bowel sounds present  EXTREMITIES:  No clubbing, cyanosis, or edema  LYMPH: No lymphadenopathy noted  SKIN: No rashes      Advanced care planning discussed with patient/family [X] YES   [ ] NO    Advanced care planning discussed with patient/family. Patient's health status was discussed. All appropriate changes have been made regarding patient's end-of-life care. Advanced care planning forms reviewed/discussed/completed.  20 minutes spent.

## 2020-03-17 NOTE — PROGRESS NOTE ADULT - SUBJECTIVE AND OBJECTIVE BOX
Postoperative Day #: 3    69y Male admitted with Acute cholecystitis  S/P Laparoscopic cholecystectomy with drainage of pericholecystic abscess drainage.      Pt seen at bedside.  Some mild abdominal discomfort.  States feels bloated.  1 unit PRBC running currently with follow up CBC at 1 pm.  Is tolerating diet.  States passed flatus.  Has not had a bowel movement yet.  Denies N/V, SOB, chest pain, back pain, lower leg/calf tenderness.      T(F): 98 (03-17-20 @ 07:35), Max: 98.5 (03-16-20 @ 15:05)  HR: 80 (03-17-20 @ 08:00) (77 - 96)  BP: 152/70 (03-17-20 @ 08:00) (118/57 - 169/73)  RR: 16 (03-17-20 @ 08:00) (11 - 28)  SpO2: 99% (03-17-20 @ 08:00) (94% - 100%)  Wt(kg): --  CAPILLARY BLOOD GLUCOSE          PHYSICAL EXAM:  General: NAD  Neuro:  Alert & oriented x 3  CV: +S1+S2 regular rate and rhythm  Lung: clear to ausculation bilaterally  Abdomen: BS+  Mildly distended.  Mild tenderness to palpation.  ERAN with scant sero sanguinous fluid in bulb.  ERAN dressing to be changed- serous fluid noted.    Extremities: no pedal edema or calf tenderness noted -Negative Homans.       LABS:                        6.8    5.69  )-----------( 58       ( 17 Mar 2020 05:28 )             20.1     03-17    144  |  109<H>  |  18  ----------------------------<  113<H>  3.7   |  31  |  1.00    Ca    8.4<L>      17 Mar 2020 05:28  Phos  1.9     03-17  Mg     1.7     03-17    TPro  5.0<L>  /  Alb  2.0<L>  /  TBili  0.9  /  DBili  x   /  AST  24  /  ALT  30  /  AlkPhos  71  03-17    PT/INR - ( 16 Mar 2020 05:35 )   PT: 21.9 sec;   INR: 1.92 ratio         PTT - ( 16 Mar 2020 05:35 )  PTT:64.7 sec  I&O's Detail    16 Mar 2020 07:01  -  17 Mar 2020 07:00  --------------------------------------------------------  IN:    lactated ringers.: 400 mL    Oral Fluid: 600 mL    Packed Red Blood Cells: 322 mL    Solution: 300 mL  Total IN: 1622 mL    OUT:    Bulb: 50 mL    Voided: 2100 mL  Total OUT: 2150 mL    Total NET: -528 mL          Impression: 69y Male admitted with Acute cholecystitis  S/P Laparoscopic cholecystectomy with pericholecystic abscess drainage.  Pt doing well and tolerating diet.   Blood culture: +Ecoli- sensitive to Zosyn      Plan:  -continue VTE prophylaxis-currently on SQH  -Increase activity with PT, OOB, Ambulate  -Patient instructed on and encouraged incentive spirometry use  -Currently receiving 1 unit of blood.  Will follow up CBC at 1PM.   -Labs noted- Decreased h/h so being given 1 unit.    Continue antibiotics.- currently on Zosyn which shows sensitivity to E COli  -Continue care per ICU.  -Continue currently monitoring of vitals'  -Discussed with Dr. Pfeiffer.

## 2020-03-17 NOTE — CONSULT NOTE ADULT - SUBJECTIVE AND OBJECTIVE BOX
HPI:  68yo M with PMH HTN, ITP, DONNA on ?CKD, lupus anticoagulant admitted with septic shock sec to acute perforated cholecystitis with hepatic abscess, s/p lap errol with washout 3/14/2020. Blood cultures with E. coli bacteremia. Surgical cultures polymicrobial including Ecoli ESBL.     Infectious Disease consult was called to evaluate pt and for antibiotic management.    Past Medical & Surgical Hx:  PAST MEDICAL & SURGICAL HISTORY:  History of antiphospholipid antibody syndrome  Idiopathic thrombocytopenic purpura (ITP)  Hypertension  Anxiety  Anxiety  Hypertension, unspecified type  Enlarged tonsils: s/p tonsillectomy  H/O Mohs micrographic surgery for skin cancer      Social History--  EtOH: denies   Tobacco: denies  Drug Use: denies    FAMILY HISTORY:  Noncontributory    Allergies  sulfa drugs (Unknown)    Intolerances  NONE      Home/ Out patient  Medications :    Current Inpatient Medications :    ANTIBIOTICS:   ertapenem  IVPB 1000 milliGRAM(s) IV Intermittent every 24 hours      OTHER RELEVANT MEDICATIONS :  acetaminophen   Tablet .. 650 milliGRAM(s) Oral every 6 hours PRN  chlorhexidine 2% Cloths 1 Application(s) Topical daily  diazepam    Tablet 5 milliGRAM(s) Oral every 12 hours  fluvoxaMINE 100 milliGRAM(s) Oral two times a day  folic acid 1 milliGRAM(s) Oral daily  heparin  Injectable 5000 Unit(s) SubCutaneous every 8 hours  HYDROmorphone  Injectable 0.5 milliGRAM(s) IV Push every 6 hours PRN  HYDROmorphone  Injectable 1 milliGRAM(s) IV Push every 6 hours PRN  melatonin 3 milliGRAM(s) Oral at bedtime  ondansetron Injectable 4 milliGRAM(s) IV Push every 6 hours PRN      ROS:  CONSTITUTIONAL:  Negative fever or chills  EYES:  Negative  blurry vision or double vision  CARDIOVASCULAR:  Negative for chest pain or palpitations  RESPIRATORY:  Negative for cough, wheezing, or SOB   GASTROINTESTINAL:  Negative for nausea, vomiting, diarrhea, constipation,+ abdominal pain  GENITOURINARY:  Negative frequency, urgency , dysuria or hematuria   NEUROLOGIC:  No headache, confusion, dizziness, lightheadedness  All other systems were reviewed and are negative      I&O's Detail    16 Mar 2020 07:01  -  17 Mar 2020 07:00  --------------------------------------------------------  IN:    lactated ringers.: 400 mL    Oral Fluid: 600 mL    Packed Red Blood Cells: 322 mL    Solution: 300 mL  Total IN: 1622 mL    OUT:    Bulb: 50 mL    Voided: 2100 mL  Total OUT: 2150 mL    Total NET: -528 mL      17 Mar 2020 07:01  -  17 Mar 2020 15:24  --------------------------------------------------------  IN:    Oral Fluid: 340 mL    Solution: 390 mL  Total IN: 730 mL    OUT:    Voided: 600 mL  Total OUT: 600 mL    Total NET: 130 mL    Physical Exam:  Vital Signs Last 24 Hrs  T(C): 36.4 (17 Mar 2020 11:49), Max: 36.7 (16 Mar 2020 20:33)  T(F): 97.6 (17 Mar 2020 11:49), Max: 98.1 (16 Mar 2020 20:33)  HR: 97 (17 Mar 2020 14:00) (77 - 97)  BP: 164/75 (17 Mar 2020 14:00) (121/56 - 169/73)  BP(mean): 108 (17 Mar 2020 14:00) (80 - 108)    General:  no acute distress  Eyes: sclera anicteric, pupils equal and reactive to light  ENMT: buccal mucosa moist, pharynx not injected  Neck: supple, trachea midline  Lungs: clear, no wheeze/rhonchi  Cardiovascular: regular rate and rhythm, S1 S2  Abdomen: soft, mild tender, + ERAN drain with serosang drainage hypoactive bowel sounds normal  Neurological:  alert and oriented x3, Cranial Nerves II-XII grossly intact  Skin: no increased ecchymosis/petechiae/purpura  Lymph Nodes: no palpable cervical/supraclavicular lymph nodes enlargements  Extremities: no cyanosis/clubbing/edema    Labs:                         7.9    6.95  )-----------( 74       ( 17 Mar 2020 12:36 )             22.7   03-17    144  |  109<H>  |  18  ----------------------------<  113<H>  3.7   |  31  |  1.00    Ca    8.4<L>      17 Mar 2020 05:28  Phos  1.9     03-17  Mg     1.7     03-17    TPro  5.0<L>  /  Alb  2.0<L>  /  TBili  0.9  /  DBili  x   /  AST  24  /  ALT  30  /  AlkPhos  71  03-17  RR: 30 (17 Mar 2020 14:00) (11 - 30)  SpO2: 93% (17 Mar 2020 14:00) (93% - 100%)      RECENT CULTURES:    Culture - Blood (collected 16 Mar 2020 08:52)  Source: .Blood Blood-Venous  Preliminary Report (17 Mar 2020 09:01):    No growth to date.    Culture - Blood (collected 16 Mar 2020 08:52)  Source: .Blood Blood  Preliminary Report (17 Mar 2020 09:01):    No growth to date.    Culture - Surgical Swab (collected 15 Mar 2020 10:51)  Source: .Surgical Swab c/s from gallbladder  Preliminary Report (17 Mar 2020 11:53):    Moderate Escherichia coli ESBL    Moderate Streptococcus anginosus "Susceptibilities not performed"    Few Enterococcus casseliflavus Susceptibility to follow.  Organism: Escherichia coli ESBL  Escherichia coli (17 Mar 2020 10:44)  Organism: Escherichia coli (17 Mar 2020 10:44)      -  Amikacin: S <=16      -  Amoxicillin/Clavulanic Acid: S <=8/4      -  Ampicillin: S <=8 These ampicillin results predict results for amoxicillin      -  Ampicillin/Sulbactam: S <=4/2 Enterobacter, Citrobacter, and Serratia may develop resistance during prolonged therapy (3-4 days)      -  Aztreonam: S <=4      -  Cefazolin: S <=2 Enterobacter, Citrobacter, and Serratia may develop resistance during prolonged therapy (3-4 days)      -  Cefepime: S <=2      -  Cefoxitin: S <=8      -  Ceftriaxone: S <=1 Enterobacter, Citrobacter, and Serratia may develop resistance during prolonged therapy      -  Ciprofloxacin: S <=1      -  Ertapenem: S <=0.5      -  Gentamicin: S <=2      -  Imipenem: S <=1      -  Levofloxacin: S <=2      -  Meropenem: S <=1      -  Piperacillin/Tazobactam: S <=8      -  Tobramycin: S <=2      -  Trimethoprim/Sulfamethoxazole: S <=2/38      Method Type: MEAGHAN  Organism: Escherichia coli ESBL (17 Mar 2020 10:34)      -  Amikacin: S <=16      -  Amoxicillin/Clavulanic Acid: S <=8/4      -  Ampicillin: R >16 These ampicillin results predict results for amoxicillin      -  Ampicillin/Sulbactam: R 16/8 Enterobacter, Citrobacter, and Serratia may develop resistance during prolonged therapy (3-4 days)      -  Aztreonam: R >16      -  Cefazolin: R >16 Enterobacter, Citrobacter, and Serratia may develop resistance during prolonged therapy (3-4 days)      -  Cefepime: R >16      -  Cefoxitin: S <=8      -  Ceftriaxone: R >32 Enterobacter, Citrobacter, and Serratia may develop resistance during prolonged therapy      -  Ciprofloxacin: S <=1      -  Ertapenem: S <=0.5      -  Gentamicin: S <=2      -  Imipenem: S <=1      -  Levofloxacin: S <=2      -  Meropenem: S <=1      -  Piperacillin/Tazobactam: R <=8      -  Tobramycin: S <=2      -  Trimethoprim/Sulfamethoxazole: R >2/38      Method Type: MEAGHAN    Culture - Urine (collected 14 Mar 2020 21:36)  Source: .Urine Clean Catch (Midstream)  Final Report (15 Mar 2020 17:02):    No growth    Culture - Blood (collected 14 Mar 2020 21:28)  Source: .Blood Blood-Peripheral  Gram Stain (15 Mar 2020 05:08):    Growth in anaerobic bottle: Gram Negative Rods    Growth in aerobic bottle: Gram Negative Rods  Final Report (16 Mar 2020 15:58):    Growth in aerobic and anaerobic bottles: Escherichia coli    See previous culture [id number]    Culture - Blood (collected 14 Mar 2020 21:28)  Source: .Blood Blood-Peripheral  Gram Stain (15 Mar 2020 05:39):    Growth in anaerobic bottle: Gram Negative Rods    Growth in aerobic bottle: Gram Negative Rods  Final Report (16 Mar 2020 15:57):    Growth in aerobic and anaerobic bottles: Escherichia coli    ***Blood Panel PCR results on this specimen are available    approximately 3 hours after the Gram stain result.***    Gram stain, PCR, and/or culture results may not always    correspond due to difference in methodologies.    ************************************************************    This PCR assay was performed using [employer].    The following targets are tested for: Enterococcus,    vancomycin resistant enterococci, Listeria monocytogenes,    coagulase negative staphylococci, S. aureus,    methicillin resistant S. aureus, Streptococcus agalactiae    (Group B), S. pneumoniae, S. pyogenes (Group A),    Acinetobacter baumannii, Enterobacter cloacae, E. coli,    Klebsiella oxytoca, K. pneumoniae, Proteus sp.,    Serratia marcescens, Haemophilus influenzae,    Neisseria meningitidis, Pseudomonas aeruginosa, Candida    albicans, C. glabrata, C krusei, C parapsilosis,    C. tropicalis and the KPC resistance gene.  Organism: Blood Culture PCR  Escherichia coli (16 Mar 2020 15:57)  Organism: Escherichia coli (16 Mar 2020 15:57)      -  Amikacin: S <=16      -  Ampicillin: S <=8 These ampicillin results predict results for amoxicillin      -  Ampicillin/Sulbactam: S <=4/2 Enterobacter, Citrobacter, and Serratia may develop resistance during prolonged therapy (3-4 days)      -  Aztreonam: S <=4      -  Cefazolin: S <=2 Enterobacter, Citrobacter, and Serratia may develop resistance during prolonged therapy (3-4 days)      -  Cefepime: S <=2      -  Cefoxitin: S <=8      -  Ceftriaxone: S <=1 Enterobacter, Citrobacter, and Serratia may develop resistance during prolonged therapy      -  Ciprofloxacin: S <=1      -  Ertapenem: S <=0.5      -  Gentamicin: S <=2      -  Imipenem: S <=1      -  Levofloxacin: S <=2      -  Meropenem: S <=1      -  Piperacillin/Tazobactam: S <=8      -  Tobramycin: S <=2      -  Trimethoprim/Sulfamethoxazole: S <=2/38      Method Type: MEAGHAN  Organism: Blood Culture PCR (16 Mar 2020 15:57)      -  Escherichia coli: Detec      Method Type: PCR      RADIOLOGY & ADDITIONAL STUDIES:    EXAM:  CT ABDOMEN AND PELVIS IC                          EXAM:  CT CHEST IC                            PROCEDURE DATE:  03/14/2020          INTERPRETATION:  CT CHEST WITH IV CONTRAST, CT ABDOMEN AND PELVIS WITH IV CONTRAST    CLINICAL INFORMATION: sepsis      COMPARISON: None.    PROCEDURE:   CT of the Chest, Abdomen and Pelvis was performed with intravenous contrast.  Intravenous contrast: 95 cc Omnipaque 350  Oral contrast: None.  Sagittal and coronal reformats were performed.    FINDINGS:    CHEST:     LUNGS, AIRWAYS: The central airways are patent. The lungs are clear.  PLEURA: Small bilateral effusions.  VESSELS: Normal caliber aorta. Main pulmonary arteries are patent.  HEART: Normal heart size. No pericardial effusion.  MEDIASTINUM AND WESTON: No adenopathy.  CHEST WALL: No masses.    ABDOMEN AND PELVIS:    LIVER: As below  BILE DUCTS: Nondilated.  GALLBLADDER: Gallstones with diffuse wall thickening, luminal distention, and pericholecystic inflammatory change. Defect in the gallbladder wall with adjacent 3.5 cm hepatic collection.  SPLEEN: Normal.  PANCREAS: Normal.  ADRENALS: Normal.  KIDNEYS/URETERS: No calculi, hydronephrosis, or soft tissue attenuating mass.    BLADDER: Underdistended limiting evaluation.  REPRODUCTIVE ORGANS: Enlarged prostate.    BOWEL: No bowel-related abnormality. Specifically, no evidence of acute diverticulitis. Normal ileocecal region. No bowel obstruction or bowel inflammation.  PERITONEUM: Small ascites. No free air or collection.  VESSELS:  Normal caliber aorta.  RETROPERITONEUM/LYMPH NODES: No adenopathy.    ABDOMINAL WALL: Normal.  BONES: No acute bony abnormality.    IMPRESSION:     Perforated cholecystitis with adjacent 3.5 cm hepatic abscess.    No acute chest pathology. Clear lungs aside from small bilateral pleural effusions.       Assessment :   68yo M with PMH HTN, ITP, DONNA on ?CKD, lupus anticoagulant admitted with septic shock sec to acute perforated cholecystitis with hepatic abscess, s/p lap errol with washout 3/14/2020. Blood cultures with E. coli bacteremia. Surgical cultures polymicrobial including Ecoli ESBL. Overall stable.    Plan :   Invanz 1 gram dailly x 10 days  Trend temps and cbc  Fu repeat blood cultures  Serial abd exams    D/w Dr BRENNEN Everett    Continue with present regime .  Approptiate use of antibiotics and adverse effects reviewed.      I have discussed the above plan of care with patient/family in detail. They expressed understanding of the treatment plan . Risks, benefits and alternatives discussed in detail. I have asked if they have any questions or concerns and appropriately addressed them to the best of my ability .      > 45 minutes spent in direct patient care reviewing  the notes, lab data/ imaging , discussion with multidisciplinary team. All questions were addressed and answered to the best of my capacity .    Thank you for allowing me to participate in the care of your patient .      Arthur Pepper MD  Infectious Disease  [phone]

## 2020-03-18 LAB
-  AMPICILLIN: SIGNIFICANT CHANGE UP
-  TETRACYCLINE: SIGNIFICANT CHANGE UP
-  VANCOMYCIN: SIGNIFICANT CHANGE UP
ALBUMIN SERPL ELPH-MCNC: 2.1 G/DL — LOW (ref 3.3–5)
ALP SERPL-CCNC: 100 U/L — SIGNIFICANT CHANGE UP (ref 40–120)
ALT FLD-CCNC: 28 U/L — SIGNIFICANT CHANGE UP (ref 12–78)
ANION GAP SERPL CALC-SCNC: 5 MMOL/L — SIGNIFICANT CHANGE UP (ref 5–17)
APTT BLD: 57 SEC — HIGH (ref 28.5–37)
AST SERPL-CCNC: 26 U/L — SIGNIFICANT CHANGE UP (ref 15–37)
BASOPHILS # BLD AUTO: 0.02 K/UL — SIGNIFICANT CHANGE UP (ref 0–0.2)
BASOPHILS # BLD AUTO: 0.02 K/UL — SIGNIFICANT CHANGE UP (ref 0–0.2)
BASOPHILS NFR BLD AUTO: 0.3 % — SIGNIFICANT CHANGE UP (ref 0–2)
BASOPHILS NFR BLD AUTO: 0.3 % — SIGNIFICANT CHANGE UP (ref 0–2)
BILIRUB SERPL-MCNC: 0.9 MG/DL — SIGNIFICANT CHANGE UP (ref 0.2–1.2)
BUN SERPL-MCNC: 14 MG/DL — SIGNIFICANT CHANGE UP (ref 7–23)
CALCIUM SERPL-MCNC: 8.2 MG/DL — LOW (ref 8.5–10.1)
CHLORIDE SERPL-SCNC: 107 MMOL/L — SIGNIFICANT CHANGE UP (ref 96–108)
CO2 SERPL-SCNC: 32 MMOL/L — HIGH (ref 22–31)
CREAT SERPL-MCNC: 1 MG/DL — SIGNIFICANT CHANGE UP (ref 0.5–1.3)
EOSINOPHIL # BLD AUTO: 0.29 K/UL — SIGNIFICANT CHANGE UP (ref 0–0.5)
EOSINOPHIL # BLD AUTO: 0.34 K/UL — SIGNIFICANT CHANGE UP (ref 0–0.5)
EOSINOPHIL NFR BLD AUTO: 5 % — SIGNIFICANT CHANGE UP (ref 0–6)
EOSINOPHIL NFR BLD AUTO: 5.3 % — SIGNIFICANT CHANGE UP (ref 0–6)
GLUCOSE SERPL-MCNC: 102 MG/DL — HIGH (ref 70–99)
HCT VFR BLD CALC: 21.3 % — LOW (ref 39–50)
HCT VFR BLD CALC: 21.8 % — LOW (ref 39–50)
HCT VFR BLD CALC: 26.4 % — LOW (ref 39–50)
HGB BLD-MCNC: 7.1 G/DL — LOW (ref 13–17)
HGB BLD-MCNC: 7.6 G/DL — LOW (ref 13–17)
HGB BLD-MCNC: 8.7 G/DL — LOW (ref 13–17)
IMM GRANULOCYTES NFR BLD AUTO: 1.1 % — SIGNIFICANT CHANGE UP (ref 0–1.5)
IMM GRANULOCYTES NFR BLD AUTO: 1.4 % — SIGNIFICANT CHANGE UP (ref 0–1.5)
INR BLD: 1.41 RATIO — HIGH (ref 0.88–1.16)
LYMPHOCYTES # BLD AUTO: 0.73 K/UL — LOW (ref 1–3.3)
LYMPHOCYTES # BLD AUTO: 0.94 K/UL — LOW (ref 1–3.3)
LYMPHOCYTES # BLD AUTO: 12.6 % — LOW (ref 13–44)
LYMPHOCYTES # BLD AUTO: 14.6 % — SIGNIFICANT CHANGE UP (ref 13–44)
MAGNESIUM SERPL-MCNC: 1.5 MG/DL — LOW (ref 1.6–2.6)
MAGNESIUM SERPL-MCNC: 1.9 MG/DL — SIGNIFICANT CHANGE UP (ref 1.6–2.6)
MAGNESIUM SERPL-MCNC: 2 MG/DL — SIGNIFICANT CHANGE UP (ref 1.6–2.6)
MCHC RBC-ENTMCNC: 28.1 PG — SIGNIFICANT CHANGE UP (ref 27–34)
MCHC RBC-ENTMCNC: 28.3 PG — SIGNIFICANT CHANGE UP (ref 27–34)
MCHC RBC-ENTMCNC: 30.9 PG — SIGNIFICANT CHANGE UP (ref 27–34)
MCHC RBC-ENTMCNC: 33 GM/DL — SIGNIFICANT CHANGE UP (ref 32–36)
MCHC RBC-ENTMCNC: 33.3 GM/DL — SIGNIFICANT CHANGE UP (ref 32–36)
MCHC RBC-ENTMCNC: 34.9 GM/DL — SIGNIFICANT CHANGE UP (ref 32–36)
MCV RBC AUTO: 84.9 FL — SIGNIFICANT CHANGE UP (ref 80–100)
MCV RBC AUTO: 85.2 FL — SIGNIFICANT CHANGE UP (ref 80–100)
MCV RBC AUTO: 88.6 FL — SIGNIFICANT CHANGE UP (ref 80–100)
METHOD TYPE: SIGNIFICANT CHANGE UP
MONOCYTES # BLD AUTO: 0.59 K/UL — SIGNIFICANT CHANGE UP (ref 0–0.9)
MONOCYTES # BLD AUTO: 0.61 K/UL — SIGNIFICANT CHANGE UP (ref 0–0.9)
MONOCYTES NFR BLD AUTO: 10.5 % — SIGNIFICANT CHANGE UP (ref 2–14)
MONOCYTES NFR BLD AUTO: 9.1 % — SIGNIFICANT CHANGE UP (ref 2–14)
NEUTROPHILS # BLD AUTO: 4.06 K/UL — SIGNIFICANT CHANGE UP (ref 1.8–7.4)
NEUTROPHILS # BLD AUTO: 4.49 K/UL — SIGNIFICANT CHANGE UP (ref 1.8–7.4)
NEUTROPHILS NFR BLD AUTO: 69.6 % — SIGNIFICANT CHANGE UP (ref 43–77)
NEUTROPHILS NFR BLD AUTO: 70.2 % — SIGNIFICANT CHANGE UP (ref 43–77)
NRBC # BLD: 0 /100 WBCS — SIGNIFICANT CHANGE UP (ref 0–0)
PHOSPHATE SERPL-MCNC: 3.1 MG/DL — SIGNIFICANT CHANGE UP (ref 2.5–4.5)
PLATELET # BLD AUTO: 78 K/UL — LOW (ref 150–400)
PLATELET # BLD AUTO: 89 K/UL — LOW (ref 150–400)
PLATELET # BLD AUTO: 91 K/UL — LOW (ref 150–400)
POTASSIUM SERPL-MCNC: 3.8 MMOL/L — SIGNIFICANT CHANGE UP (ref 3.5–5.3)
POTASSIUM SERPL-SCNC: 3.8 MMOL/L — SIGNIFICANT CHANGE UP (ref 3.5–5.3)
PROT SERPL-MCNC: 5.2 G/DL — LOW (ref 6–8.3)
PROTHROM AB SERPL-ACNC: 16 SEC — HIGH (ref 10–12.9)
RBC # BLD: 2.46 M/UL — LOW (ref 4.2–5.8)
RBC # BLD: 2.51 M/UL — LOW (ref 4.2–5.8)
RBC # BLD: 3.1 M/UL — LOW (ref 4.2–5.8)
RBC # FLD: 15 % — HIGH (ref 10.3–14.5)
RBC # FLD: 15.1 % — HIGH (ref 10.3–14.5)
RBC # FLD: 15.1 % — HIGH (ref 10.3–14.5)
SODIUM SERPL-SCNC: 144 MMOL/L — SIGNIFICANT CHANGE UP (ref 135–145)
WBC # BLD: 5.79 K/UL — SIGNIFICANT CHANGE UP (ref 3.8–10.5)
WBC # BLD: 6.15 K/UL — SIGNIFICANT CHANGE UP (ref 3.8–10.5)
WBC # BLD: 6.45 K/UL — SIGNIFICANT CHANGE UP (ref 3.8–10.5)
WBC # FLD AUTO: 5.79 K/UL — SIGNIFICANT CHANGE UP (ref 3.8–10.5)
WBC # FLD AUTO: 6.15 K/UL — SIGNIFICANT CHANGE UP (ref 3.8–10.5)
WBC # FLD AUTO: 6.45 K/UL — SIGNIFICANT CHANGE UP (ref 3.8–10.5)

## 2020-03-18 PROCEDURE — 99233 SBSQ HOSP IP/OBS HIGH 50: CPT | Mod: GC

## 2020-03-18 RX ORDER — ATENOLOL 25 MG/1
50 TABLET ORAL DAILY
Refills: 0 | Status: DISCONTINUED | OUTPATIENT
Start: 2020-03-18 | End: 2020-03-20

## 2020-03-18 RX ORDER — LOSARTAN POTASSIUM 100 MG/1
100 TABLET, FILM COATED ORAL DAILY
Refills: 0 | Status: DISCONTINUED | OUTPATIENT
Start: 2020-03-18 | End: 2020-03-20

## 2020-03-18 RX ORDER — MAGNESIUM SULFATE 500 MG/ML
2 VIAL (ML) INJECTION EVERY 6 HOURS
Refills: 0 | Status: DISCONTINUED | OUTPATIENT
Start: 2020-03-18 | End: 2020-03-18

## 2020-03-18 RX ORDER — MAGNESIUM SULFATE 500 MG/ML
2 VIAL (ML) INJECTION ONCE
Refills: 0 | Status: DISCONTINUED | OUTPATIENT
Start: 2020-03-18 | End: 2020-03-18

## 2020-03-18 RX ADMIN — HEPARIN SODIUM 5000 UNIT(S): 5000 INJECTION INTRAVENOUS; SUBCUTANEOUS at 05:42

## 2020-03-18 RX ADMIN — FLUVOXAMINE MALEATE 100 MILLIGRAM(S): 25 TABLET ORAL at 05:42

## 2020-03-18 RX ADMIN — LOSARTAN POTASSIUM 100 MILLIGRAM(S): 100 TABLET, FILM COATED ORAL at 17:28

## 2020-03-18 RX ADMIN — Medication 3 MILLIGRAM(S): at 21:02

## 2020-03-18 RX ADMIN — ERTAPENEM SODIUM 120 MILLIGRAM(S): 1 INJECTION, POWDER, LYOPHILIZED, FOR SOLUTION INTRAMUSCULAR; INTRAVENOUS at 12:29

## 2020-03-18 RX ADMIN — Medication 650 MILLIGRAM(S): at 02:42

## 2020-03-18 RX ADMIN — Medication 650 MILLIGRAM(S): at 02:12

## 2020-03-18 RX ADMIN — CHLORHEXIDINE GLUCONATE 1 APPLICATION(S): 213 SOLUTION TOPICAL at 11:44

## 2020-03-18 RX ADMIN — Medication 1 MILLIGRAM(S): at 11:44

## 2020-03-18 RX ADMIN — Medication 5 MILLIGRAM(S): at 17:28

## 2020-03-18 RX ADMIN — Medication 50 GRAM(S): at 09:38

## 2020-03-18 RX ADMIN — Medication 650 MILLIGRAM(S): at 17:28

## 2020-03-18 RX ADMIN — HEPARIN SODIUM 5000 UNIT(S): 5000 INJECTION INTRAVENOUS; SUBCUTANEOUS at 17:28

## 2020-03-18 RX ADMIN — HEPARIN SODIUM 5000 UNIT(S): 5000 INJECTION INTRAVENOUS; SUBCUTANEOUS at 21:02

## 2020-03-18 RX ADMIN — Medication 5 MILLIGRAM(S): at 05:41

## 2020-03-18 NOTE — PROGRESS NOTE ADULT - SUBJECTIVE AND OBJECTIVE BOX
JANICE RENDON is a 69yMale , patient examined and chart reviewed.     INTERVAL HPI/ OVERNIGHT EVENTS:   c/o abd pain. Afebrile.  No events.    PAST MEDICAL & SURGICAL HISTORY:  History of antiphospholipid antibody syndrome  Idiopathic thrombocytopenic purpura (ITP)  Hypertension  Anxiety  Anxiety  Hypertension, unspecified type  Enlarged tonsils: s/p tonsillectomy  H/O Mohs micrographic surgery for skin cancer    For details regarding the patient's social history, family history, and other miscellaneous elements, please refer the initial infectious diseases consultation and/or the admitting history and physical examination for this admission.      ROS:  CONSTITUTIONAL:  Negative fever or chills  EYES:  Negative  blurry vision or double vision  CARDIOVASCULAR:  Negative for chest pain or palpitations  RESPIRATORY:  Negative for cough, wheezing, or SOB   GASTROINTESTINAL:  Negative for nausea, vomiting, diarrhea, constipation, or abdominal pain  GENITOURINARY:  Negative frequency, urgency or dysuria  NEUROLOGIC:  No headache, confusion, dizziness, lightheadedness  All other systems were reviewed and are negative     ALLERGIES:  sulfa drugs (Unknown)      Current inpatient medications :    ANTIBIOTICS/RELEVANT:  ertapenem  IVPB 1000 milliGRAM(s) IV Intermittent every 24 hours      acetaminophen   Tablet .. 650 milliGRAM(s) Oral every 6 hours PRN  ATENolol  Tablet 50 milliGRAM(s) Oral daily  chlorhexidine 2% Cloths 1 Application(s) Topical daily  diazepam    Tablet 5 milliGRAM(s) Oral every 12 hours  fluvoxaMINE 100 milliGRAM(s) Oral two times a day  folic acid 1 milliGRAM(s) Oral daily  heparin  Injectable 5000 Unit(s) SubCutaneous every 8 hours  losartan 100 milliGRAM(s) Oral daily  melatonin 3 milliGRAM(s) Oral at bedtime  ondansetron Injectable 4 milliGRAM(s) IV Push every 6 hours PRN      Objective:    03-17 @ 07:01 - 03-18 @ 07:00  --------------------------------------------------------  IN: 970 mL / OUT: 1350 mL / NET: -380 mL    03-18 @ 07:01 - 03-18 @ 15:46  --------------------------------------------------------  IN: 290 mL / OUT: 0 mL / NET: 290 mL      T(C): 37.4 (03-18-20 @ 12:20), Max: 37.4 (03-18-20 @ 12:20)  HR: 91 (03-18-20 @ 12:20) (75 - 107)  BP: 156/79 (03-18-20 @ 12:20) (134/63 - 173/82)  RR: 20 (03-18-20 @ 12:20) (13 - 32)  SpO2: 97% (03-18-20 @ 12:20) (93% - 99%)    Physical Exam:  General: NAD  Eyes: sclera anicteric, pupils equal and reactive to light  ENMT: buccal mucosa moist, pharynx not injected  Neck: supple, trachea midline  Lungs: clear, no wheeze/rhonchi  Cardiovascular: regular rate and rhythm, S1 S2  Abdomen: soft, mild tender, distended + ERAN drain bowel sounds normal  Neurological: alert and oriented x3, Cranial Nerves II-XII grossly intact  Skin: no increased ecchymosis/petechiae/purpura  Lymph Nodes: no palpable cervical/supraclavicular lymph nodes enlargements  Extremities: no cyanosis/clubbing/edema      LABS:                        8.7    6.15  )-----------( 91       ( 18 Mar 2020 12:39 )             26.4       03-18    144  |  107  |  14  ----------------------------<  102<H>  3.8   |  32<H>  |  1.00    Ca    8.2<L>      18 Mar 2020 05:57  Phos  3.1     03-18  Mg     2.0     03-18    TPro  5.2<L>  /  Alb  2.1<L>  /  TBili  0.9  /  DBili  x   /  AST  26  /  ALT  28  /  AlkPhos  100  03-18      PT/INR - ( 18 Mar 2020 05:57 )   PT: 16.0 sec;   INR: 1.41 ratio         PTT - ( 18 Mar 2020 05:57 )  PTT:57.0 sec      MICROBIOLOGY:    Culture - Blood (collected 16 Mar 2020 08:52)  Source: .Blood Blood-Venous  Preliminary Report (17 Mar 2020 09:01):    No growth to date.    Culture - Blood (collected 16 Mar 2020 08:52)  Source: .Blood Blood  Preliminary Report (17 Mar 2020 09:01):    No growth to date.    Culture - Surgical Swab (collected 15 Mar 2020 10:51)  Source: .Surgical Swab c/s from gallbladder  Preliminary Report (17 Mar 2020 11:53):    Moderate Escherichia coli ESBL    Moderate Streptococcus anginosus "Susceptibilities not performed"    Few Enterococcus casseliflavus Susceptibility to follow.  Organism: Escherichia coli ESBL  Escherichia coli (17 Mar 2020 10:44)  Organism: Escherichia coli (17 Mar 2020 10:44)      -  Amikacin: S <=16      -  Amoxicillin/Clavulanic Acid: S <=8/4      -  Ampicillin: S <=8 These ampicillin results predict results for amoxicillin      -  Ampicillin/Sulbactam: S <=4/2 Enterobacter, Citrobacter, and Serratia may develop resistance during prolonged therapy (3-4 days)      -  Aztreonam: S <=4      -  Cefazolin: S <=2 Enterobacter, Citrobacter, and Serratia may develop resistance during prolonged therapy (3-4 days)      -  Cefepime: S <=2      -  Cefoxitin: S <=8      -  Ceftriaxone: S <=1 Enterobacter, Citrobacter, and Serratia may develop resistance during prolonged therapy      -  Ciprofloxacin: S <=1      -  Ertapenem: S <=0.5      -  Gentamicin: S <=2      -  Imipenem: S <=1      -  Levofloxacin: S <=2      -  Meropenem: S <=1      -  Piperacillin/Tazobactam: S <=8      -  Tobramycin: S <=2      -  Trimethoprim/Sulfamethoxazole: S <=2/38      Method Type: MEAGHAN  Organism: Escherichia coli ESBL (17 Mar 2020 10:34)      -  Amikacin: S <=16      -  Amoxicillin/Clavulanic Acid: S <=8/4      -  Ampicillin: R >16 These ampicillin results predict results for amoxicillin      -  Ampicillin/Sulbactam: R 16/8 Enterobacter, Citrobacter, and Serratia may develop resistance during prolonged therapy (3-4 days)      -  Aztreonam: R >16      -  Cefazolin: R >16 Enterobacter, Citrobacter, and Serratia may develop resistance during prolonged therapy (3-4 days)      -  Cefepime: R >16      -  Cefoxitin: S <=8      -  Ceftriaxone: R >32 Enterobacter, Citrobacter, and Serratia may develop resistance during prolonged therapy      -  Ciprofloxacin: S <=1      -  Ertapenem: S <=0.5      -  Gentamicin: S <=2      -  Imipenem: S <=1      -  Levofloxacin: S <=2      -  Meropenem: S <=1      -  Piperacillin/Tazobactam: R <=8      -  Tobramycin: S <=2      -  Trimethoprim/Sulfamethoxazole: R >2/38      Method Type: MEAGHAN    Culture - Urine (collected 14 Mar 2020 21:36)  Source: .Urine Clean Catch (Midstream)  Final Report (15 Mar 2020 17:02):    No growth    Culture - Blood (collected 14 Mar 2020 21:28)  Source: .Blood Blood-Peripheral  Gram Stain (15 Mar 2020 05:08):    Growth in anaerobic bottle: Gram Negative Rods    Growth in aerobic bottle: Gram Negative Rods  Final Report (16 Mar 2020 15:58):    Growth in aerobic and anaerobic bottles: Escherichia coli    See previous culture [id number]    Culture - Blood (collected 14 Mar 2020 21:28)  Source: .Blood Blood-Peripheral  Gram Stain (15 Mar 2020 05:39):    Growth in anaerobic bottle: Gram Negative Rods    Growth in aerobic bottle: Gram Negative Rods  Final Report (16 Mar 2020 15:57):    Growth in aerobic and anaerobic bottles: Escherichia coli    ***Blood Panel PCR results on this specimen are available    approximately 3 hours after the Gram stain result.***    Gram stain, PCR, and/or culture results may not always    correspond due to difference in methodologies.    ************************************************************    This PCR assay was performed using [employer].    The following targets are tested for: Enterococcus,    vancomycin resistant enterococci, Listeria monocytogenes,    coagulase negative staphylococci, S. aureus,    methicillin resistant S. aureus, Streptococcus agalactiae    (Group B), S. pneumoniae, S. pyogenes (Group A),    Acinetobacter baumannii, Enterobacter cloacae, E. coli,    Klebsiella oxytoca, K. pneumoniae, Proteus sp.,    Serratia marcescens, Haemophilus influenzae,    Neisseria meningitidis, Pseudomonas aeruginosa, Candida    albicans, C. glabrata, C krusei, C parapsilosis,    C. tropicalis and the KPC resistance gene.  Organism: Blood Culture PCR  Escherichia coli (16 Mar 2020 15:57)  Organism: Escherichia coli (16 Mar 2020 15:57)      -  Amikacin: S <=16      -  Ampicillin: S <=8 These ampicillin results predict results for amoxicillin      -  Ampicillin/Sulbactam: S <=4/2 Enterobacter, Citrobacter, and Serratia may develop resistance during prolonged therapy (3-4 days)      -  Aztreonam: S <=4      -  Cefazolin: S <=2 Enterobacter, Citrobacter, and Serratia may develop resistance during prolonged therapy (3-4 days)      -  Cefepime: S <=2      -  Cefoxitin: S <=8      -  Ceftriaxone: S <=1 Enterobacter, Citrobacter, and Serratia may develop resistance during prolonged therapy      -  Ciprofloxacin: S <=1      -  Ertapenem: S <=0.5      -  Gentamicin: S <=2      -  Imipenem: S <=1      -  Levofloxacin: S <=2      -  Meropenem: S <=1      -  Piperacillin/Tazobactam: S <=8      -  Tobramycin: S <=2      -  Trimethoprim/Sulfamethoxazole: S <=2/38      Method Type: MEAGHAN  Organism: Blood Culture PCR (16 Mar 2020 15:57)      -  Escherichia coli: Detec      Method Type: PCR      RADIOLOGY & ADDITIONAL STUDIES:    EXAM:  CT ABDOMEN AND PELVIS IC                          EXAM:  CT CHEST IC                            PROCEDURE DATE:  03/14/2020          INTERPRETATION:  CT CHEST WITH IV CONTRAST, CT ABDOMEN AND PELVIS WITH IV CONTRAST    CLINICAL INFORMATION: sepsis      COMPARISON: None.    PROCEDURE:   CT of the Chest, Abdomen and Pelvis was performed with intravenous contrast.  Intravenous contrast: 95 cc Omnipaque 350  Oral contrast: None.  Sagittal and coronal reformats were performed.    FINDINGS:    CHEST:     LUNGS, AIRWAYS: The central airways are patent. The lungs are clear.  PLEURA: Small bilateral effusions.  VESSELS: Normal caliber aorta. Main pulmonary arteries are patent.  HEART: Normal heart size. No pericardial effusion.  MEDIASTINUM AND WESTON: No adenopathy.  CHEST WALL: No masses.    ABDOMEN AND PELVIS:    LIVER: As below  BILE DUCTS: Nondilated.  GALLBLADDER: Gallstones with diffuse wall thickening, luminal distention, and pericholecystic inflammatory change. Defect in the gallbladder wall with adjacent 3.5 cm hepatic collection.  SPLEEN: Normal.  PANCREAS: Normal.  ADRENALS: Normal.  KIDNEYS/URETERS: No calculi, hydronephrosis, or soft tissue attenuating mass.    BLADDER: Underdistended limiting evaluation.  REPRODUCTIVE ORGANS: Enlarged prostate.    BOWEL: No bowel-related abnormality. Specifically, no evidence of acute diverticulitis. Normal ileocecal region. No bowel obstruction or bowel inflammation.  PERITONEUM: Small ascites. No free air or collection.  VESSELS:  Normal caliber aorta.  RETROPERITONEUM/LYMPH NODES: No adenopathy.    ABDOMINAL WALL: Normal.  BONES: No acute bony abnormality.    IMPRESSION:     Perforated cholecystitis with adjacent 3.5 cm hepatic abscess.    No acute chest pathology. Clear lungs aside from small bilateral pleural effusions.       Assessment :   68yo M with PMH HTN, ITP, DONNA on ?CKD, lupus anticoagulant admitted with septic shock sec to acute perforated cholecystitis with hepatic abscess, s/p lap errol with washout 3/14/2020. Blood cultures with E. coli bacteremia. Surgical cultures polymicrobial including Ecoli ESBL. Overall stable.    Plan :   Invanz 1 gram dailly x 10 days  Trend temps and cbc  Fu repeat blood cultures  Serial abd exams  ERAN drain per surgery  Increase activity      Continue with present regiment.  Appropriate use of antibiotics and adverse effects reviewed.      I have discussed the above plan of care with patient/ family in detail. They expressed understanding of the  treatment plan . Risks, benefits and alternatives discussed in detail. I have asked if they have any questions or concerns and appropriately addressed them to the best of my ability .    > 35 minutes were spent in direct patient care reviewing notes, medications ,labs data/ imaging , discussion with multidisciplinary team.    Thank you for allowing me to participate in care of your patient .    Arthur Pepper MD  Infectious Disease  [phone]

## 2020-03-18 NOTE — PROGRESS NOTE ADULT - SUBJECTIVE AND OBJECTIVE BOX
Patient is a 69y old  Male who presents with a chief complaint of perforated acute errol with sepsis (18 Mar 2020 11:39)      INTERVAL HPI/OVERNIGHT EVENTS: Patient seen and examined. NAD. No complaints.    Vital Signs Last 24 Hrs  T(C): 36.4 (18 Mar 2020 07:35), Max: 36.9 (17 Mar 2020 15:36)  T(F): 97.5 (18 Mar 2020 07:35), Max: 98.5 (17 Mar 2020 15:36)  HR: 92 (18 Mar 2020 11:00) (75 - 107)  BP: 163/74 (18 Mar 2020 11:00) (134/63 - 173/82)  BP(mean): 107 (18 Mar 2020 11:00) (91 - 118)  RR: 24 (18 Mar 2020 11:00) (13 - 32)  SpO2: 94% (18 Mar 2020 11:00) (93% - 99%)    03-18    144  |  107  |  14  ----------------------------<  102<H>  3.8   |  32<H>  |  1.00    Ca    8.2<L>      18 Mar 2020 05:57  Phos  3.1     03-18  Mg     1.9     03-18    TPro  5.2<L>  /  Alb  2.1<L>  /  TBili  0.9  /  DBili  x   /  AST  26  /  ALT  28  /  AlkPhos  100  03-18                          7.6    5.79  )-----------( 89       ( 18 Mar 2020 11:47 )             21.8     PT/INR - ( 18 Mar 2020 05:57 )   PT: 16.0 sec;   INR: 1.41 ratio         PTT - ( 18 Mar 2020 05:57 )  PTT:57.0 sec  CAPILLARY BLOOD GLUCOSE                  acetaminophen   Tablet .. 650 milliGRAM(s) Oral every 6 hours PRN  chlorhexidine 2% Cloths 1 Application(s) Topical daily  diazepam    Tablet 5 milliGRAM(s) Oral every 12 hours  ertapenem  IVPB 1000 milliGRAM(s) IV Intermittent every 24 hours  fluvoxaMINE 100 milliGRAM(s) Oral two times a day  folic acid 1 milliGRAM(s) Oral daily  heparin  Injectable 5000 Unit(s) SubCutaneous every 8 hours  HYDROmorphone  Injectable 0.5 milliGRAM(s) IV Push every 6 hours PRN  HYDROmorphone  Injectable 1 milliGRAM(s) IV Push every 6 hours PRN  melatonin 3 milliGRAM(s) Oral at bedtime  ondansetron Injectable 4 milliGRAM(s) IV Push every 6 hours PRN              REVIEW OF SYSTEMS:  CONSTITUTIONAL: No fever, no weight loss, or no fatigue  NECK: No pain, no stiffness  RESPIRATORY: No cough, no wheezing, no chills, no hemoptysis, No shortness of breath  CARDIOVASCULAR: No chest pain, no palpitations, no dizziness, no leg swelling  GASTROINTESTINAL: No abdominal pain. No nausea, no vomiting, no hematemesis; No diarrhea, no constipation. No melena, no hematochezia.  GENITOURINARY: No dysuria, no frequency, no hematuria, no incontinence  NEUROLOGICAL: No headaches, no loss of strength, no numbness, no tremors  SKIN: No itching, no burning  MUSCULOSKELETAL: No joint pain, no swelling; No muscle, no back, no extremity pain  PSYCHIATRIC: No depression, no mood swings,   HEME/LYMPH: No easy bruising, no bleeding gums  ALLERY AND IMMUNOLOGIC: No hives       Consultant(s) Notes Reviewed:  [X] YES  [ ] NO    PHYSICAL EXAM:  GENERAL: NAD  HEAD:  Atraumatic, Normocephalic  EYES: EOMI, PERRLA, conjunctiva and sclera clear  ENMT: No tonsillar erythema, exudates, or enlargement; Moist mucous membranes  NECK: Supple, No JVD  NERVOUS SYSTEM:  Awake & alert  CHEST/LUNG: Clear to auscultation bilaterally; No rales, rhonchi, wheezing,  HEART: Regular rate and rhythm  ABDOMEN: Soft, Nontender, Nondistended; Bowel sounds present  EXTREMITIES:  No clubbing, cyanosis, or edema  LYMPH: No lymphadenopathy noted  SKIN: No rashes      Advanced care planning discussed with patient/family [X] YES   [ ] NO    Advanced care planning discussed with patient/family. Patient's health status was discussed. All appropriate changes have been made regarding patient's end-of-life care. Advanced care planning forms reviewed/discussed/completed.  20 minutes spent.

## 2020-03-18 NOTE — PROGRESS NOTE ADULT - SUBJECTIVE AND OBJECTIVE BOX
Patient is a 69y old  Male who presents with a chief complaint of perforated acute errol with sepsis (17 Mar 2020 15:23)    24 hour events:   Overnight  no acute vents. Pt seen adn examined at bedside. Pt states he is tolerating his diet, ate most of his breakfast. Pt states he had a BM yesterday, normal form, feels as if he may have another, is passing flatus. Pt states abdominal pain 4/10, improved. Admits to mild weakness, non productive cough. Denies lightheadedness dizziness, diarrhea, melena, hematochezia, hematemesis, epistaxis, chest pain, palpitations SOB.     REVIEW OF SYSTEMS  Constitutional: admits to weakness, No fever, chills  Neuro: No headache, numbness, weakness  Resp: No cough, wheezing, shortness of breath, hemoptysis  CVS: No lightheadedness, dizziness, chest pain, palpitations, leg swelling  GI: admits to diffuse abdominal pain, no nausea, vomiting, diarrhea, melena, hematochezia, hematemesis   : No dysuria, frequency, incontinence  Skin: No itching, burning, rashes, or lesions   Msk: No joint pain or swelling  Psych: No depression, anxiety  Heme: No bleeding      T(F): 97.5 (03-18-20 @ 07:35), Max: 98.5 (03-17-20 @ 15:36)  HR: 94 (03-18-20 @ 10:00) (75 - 107)  BP: 171/77 (03-18-20 @ 10:00) (122/67 - 173/82)  RR: 24 (03-18-20 @ 10:00) (13 - 32)  SpO2: 93% (03-18-20 @ 10:00) (93% - 99%)  Wt(kg): --            I&O's Summary    03-17 @ 07:01  -  03-18 @ 07:00  --------------------------------------------------------  IN: 970 mL / OUT: 1350 mL / NET: -380 mL      Physical Exam:  General: pale appearing, elderly male sitting upright in bedside chair eating breakfast, no Acute Distress  HEENT: Normocephallic Atraumatic, PERRLA, EOMI bl, dry mucous membranes   Neck: Supple, nontender, no cervical lymphadenopathy  Neurology: A&Ox3, no focal deficit, no obvious sensory deficit  Respiratory: Clear To Auscultation B/L, No Wheezes, rhonchi or rales  CV: Regular Rate and Rhythm, +S1/S2, no murmurs, rubs or gallops  Abdominal: diffuse abdominal TTP, dressings in RLQ clean dry intact, no exudates or edema, soft, Non-Distended +Bowel Soundsx4  Extremities: No Clubbing, cyanosis or edema, + peripheral pulses: cap refill <2 secs LE bilaterally  Skin: pale appearing, warm, dry      MEDICATIONS  ertapenem  IVPB IV Intermittent          acetaminophen   Tablet .. Oral PRN  diazepam    Tablet Oral  fluvoxaMINE Oral  HYDROmorphone  Injectable IV Push PRN  HYDROmorphone  Injectable IV Push PRN  melatonin Oral  ondansetron Injectable IV Push PRN      heparin  Injectable SubCutaneous        folic acid Oral      chlorhexidine 2% Cloths Topical                            7.1    6.45  )-----------( 78       ( 18 Mar 2020 05:57 )             21.3       03-18    144  |  107  |  14  ----------------------------<  102<H>  3.8   |  32<H>  |  1.00    Ca    8.2<L>      18 Mar 2020 05:57  Phos  3.1     03-18  Mg     1.5     03-18    TPro  5.2<L>  /  Alb  2.1<L>  /  TBili  0.9  /  DBili  x   /  AST  26  /  ALT  28  /  AlkPhos  100  03-18          PT/INR - ( 18 Mar 2020 05:57 )   PT: 16.0 sec;   INR: 1.41 ratio         PTT - ( 18 Mar 2020 05:57 )  PTT:57.0 sec    .Blood Blood   No growth to date. -- 03-16 @ 08:52  .Surgical Swab c/s from gallbladder   Moderate Escherichia coli ESBL  Moderate Streptococcus anginosus "Susceptibilities not performed"  Few Enterococcus casseliflavus Susceptibility to follow. -- 03-15 @ 10:51  .Urine Clean Catch (Midstream)   No growth -- 03-14 @ 21:36  .Blood Blood-Peripheral   Growth in aerobic and anaerobic bottles: Escherichia coli  ***Blood Panel PCR results on this specimen are available  approximately 3 hours after the Gram stain result.***  Gram stain, PCR, and/or culture results may not always  correspond due to difference in methodologies.  ************************************************************  This PCR assay was performed using [employer].  The following targets are tested for: Enterococcus,  vancomycin resistant enterococci, Listeria monocytogenes,  coagulase negative staphylococci, S. aureus,  methicillin resistant S. aureus, Streptococcus agalactiae  (Group B), S. pneumoniae, S. pyogenes (Group A),  Acinetobacter baumannii, Enterobacter cloacae, E. coli,  Klebsiella oxytoca, K. pneumoniae, Proteus sp.,  Serratia marcescens, Haemophilus influenzae,  Neisseria meningitidis, Pseudomonas aeruginosa, Candida  albicans, C. glabrata, C krusei, C parapsilosis,  C. tropicalis and the KPC resistance gene.   Growth in anaerobic bottle: Gram Negative Rods  Growth in aerobic bottle: Gram Negative Rods 03-14 @ 21:28    CENTRAL LINE: N  STAFFORD: N  A-LINE: N    GLOBAL ISSUE/BEST PRACTICE  Analgesia: y  Sedation: n  HOB elevation: y  Stress ulcer prophylaxis: y  VTE prophylaxis: y  Glycemic control: y  Nutrition: y    CODE STATUS: FULL CODE  [facility] discussion: Y

## 2020-03-18 NOTE — PROGRESS NOTE ADULT - ASSESSMENT
70yo M, with PMH/o HTN, ITP, DONNA on ?CKD, lupus anticoagulant, p/w septic shock d/t acute perforated cholecystitis with hepatic abscess, s/p lap errol with washout, now ESBL positive surgical cultures and E. coli bacteremia.      NEURO: Pain control with Tylenol and Dilaudid. Continue pt's home Fluvoxamine.    CV: septic shock resolved, now with improved BPs off pressors.    PULM: Stable, no acute issues at this time. Successfully weaned off NC.    GI/electrolytes: S/p lap errol with washout, now POD #3. Monitor ERAN output (20 cc overnight), f/u surgical recommendations. Tolerating diet, continue. Keep off fluids as now tolerating PO. Replete P 1.9 with K phosphate IV 15 x1.     : Cr improved, appears to be at baseline today. Questionable h/o CKD. Monitor daily renal function, lytes, and urine output.    HEME: Hb 6.8 this AM, pt admits to weakness and appears pale. Transfused with 1uPRBCs. F/u H/H 2 hours post transfusion. No signs of active bleeding at this time. Monitor daily CBC. Continue with Heparin for DVT ppx as pt with h/o lupus anticoagulant, factor XI deficiency, and ITP. Transfuse for Hgb <7 or <8 if with signs of acute blood loss.     ID: P/w septic shock d/t acute perforated cholecystitis with associated hepatic abscess, found to have pansensitive E. coli bacteremia and surgical culture now with ESBL E. coli. Repeat blood cultures from 3/16 [facility]. Start ertapenem IV 1g q24, stop zosyn. S/p 3 days zosyn. Consult Dr. Pepper ID for further abx reccs.     ENDO: Stable, no acute issues at this time.    DISPO: ambulate today, transfer to [facility] once Hgb controlled. 68yo M, with PMH/o HTN, ITP, DONNA on ?CKD, lupus anticoagulant, p/w septic shock d/t acute perforated cholecystitis with hepatic abscess, s/p lap errol with washout, now ESBL positive surgical cultures and E. coli bacteremia.    NEURO: Pain control with Tylenol and Dilaudid. Continue pt's home Fluvoxamine.    CV: septic shock resolved, now with improved BPs off pressors.    PULM: Stable, no acute issues at this time. Successfully weaned off NC.    GI/electrolytes: S/p lap errol with washout, now POD #4. F/u surgical recommendations. Tolerating diet, continue. Keep off fluids as now tolerating PO. Replete Mg 2g IV x 1, f/u mg at 12:00.      : Cr improved, appears to be at baseline today. Questionable h/o CKD. Monitor daily renal function, lytes, and urine output.    HEME: Hb 7.1 this AM, pt appears pale. F/u cbc at 12:00. F/u H/H 2 hours post transfusion. No signs of active bleeding at this time. Monitor daily CBC. Continue with Heparin for DVT ppx as pt with h/o lupus anticoagulant, factor XI deficiency, and ITP. Transfuse for Hgb <7 or <8 if with signs of acute blood loss.     ID: P/w septic shock d/t acute perforated cholecystitis with associated hepatic abscess, found to have pansensitive E. coli bacteremia and surgical culture now with ESBL E. coli. Repeat blood cultures from 3/16 [facility]. On day 2 of 10 of ertapenem IV 1g q24. Speak with social work about possibility of arranging ertapenem to be given outpatient.      ENDO: Stable, no acute issues at this time.    DISPO: stable to transfer to [facility]

## 2020-03-18 NOTE — PROGRESS NOTE ADULT - SUBJECTIVE AND OBJECTIVE BOX
STATUS POST:   laparoscopic cholecystectomy with drainage of pericholecystic abscess    POST OPERATIVE DAY #: 4    SUBJECTIVE:  Patient seen and examined at bedside.  s/p 2u PRBC yesterday.  Patient with no new complaints at this time, tolerating reg diet, admits to flatus and bowel movement yesterday.  Patient denies any fevers, chills, chest pain, shortness of breath, abdominal pain, nausea, vomiting or diarrhea.    Vital Signs Last 24 Hrs  T(C): 36.4 (18 Mar 2020 07:35), Max: 36.9 (17 Mar 2020 15:36)  T(F): 97.5 (18 Mar 2020 07:35), Max: 98.5 (17 Mar 2020 15:36)  HR: 92 (18 Mar 2020 11:00) (75 - 107)  BP: 163/74 (18 Mar 2020 11:00) (134/63 - 173/82)  BP(mean): 107 (18 Mar 2020 11:00) (91 - 118)  RR: 24 (18 Mar 2020 11:00) (13 - 32)  SpO2: 94% (18 Mar 2020 11:00) (93% - 99%)    PHYSICAL EXAM:  GENERAL: No acute distress, well-developed  HEAD:  Atraumatic, Normocephalic  ABDOMEN: Soft, tender RUQ, mildly-distended; bowel sounds+ incision clean dry and intact with ERAN site with clean 4x4 dressing.  NEUROLOGY: A&O x 3, no focal deficits    I&O's Summary    17 Mar 2020 07:01  -  18 Mar 2020 07:00  --------------------------------------------------------  IN: 970 mL / OUT: 1350 mL / NET: -380 mL    18 Mar 2020 07:01  -  18 Mar 2020 11:39  --------------------------------------------------------  IN: 290 mL / OUT: 0 mL / NET: 290 mL      I&O's Detail    17 Mar 2020 07:01  -  18 Mar 2020 07:00  --------------------------------------------------------  IN:    Oral Fluid: 580 mL    Solution: 390 mL  Total IN: 970 mL    OUT:    Voided: 1350 mL  Total OUT: 1350 mL    Total NET: -380 mL      18 Mar 2020 07:01  -  18 Mar 2020 11:39  --------------------------------------------------------  IN:    Oral Fluid: 240 mL    Solution: 50 mL  Total IN: 290 mL    OUT:  Total OUT: 0 mL    Total NET: 290 mL    MEDICATIONS  (STANDING):  chlorhexidine 2% Cloths 1 Application(s) Topical daily  diazepam    Tablet 5 milliGRAM(s) Oral every 12 hours  ertapenem  IVPB 1000 milliGRAM(s) IV Intermittent every 24 hours  fluvoxaMINE 100 milliGRAM(s) Oral two times a day  folic acid 1 milliGRAM(s) Oral daily  heparin  Injectable 5000 Unit(s) SubCutaneous every 8 hours  melatonin 3 milliGRAM(s) Oral at bedtime    MEDICATIONS  (PRN):  acetaminophen   Tablet .. 650 milliGRAM(s) Oral every 6 hours PRN Mild Pain (1 - 3)  HYDROmorphone  Injectable 0.5 milliGRAM(s) IV Push every 6 hours PRN Moderate Pain (4 - 6)  HYDROmorphone  Injectable 1 milliGRAM(s) IV Push every 6 hours PRN Severe Pain (7 - 10)  ondansetron Injectable 4 milliGRAM(s) IV Push every 6 hours PRN Nausea and/or Vomiting    LABS:                        7.1    6.45  )-----------( 78       ( 18 Mar 2020 05:57 )             21.3     03-18    144  |  107  |  14  ----------------------------<  102<H>  3.8   |  32<H>  |  1.00    Ca    8.2<L>      18 Mar 2020 05:57  Phos  3.1     03-18  Mg     1.5     03-18    TPro  5.2<L>  /  Alb  2.1<L>  /  TBili  0.9  /  DBili  x   /  AST  26  /  ALT  28  /  AlkPhos  100  03-18    PT/INR - ( 18 Mar 2020 05:57 )   PT: 16.0 sec;   INR: 1.41 ratio         PTT - ( 18 Mar 2020 05:57 )  PTT:57.0 sec    RADIOLOGY & ADDITIONAL STUDIES:    ASSESSMENT    69y Male POD 4 s/p laparoscopic cholecystectomy with drainage of pericholecystic abscess and 2u PRBC yesterday awaiting d/c to floor pending a bed.    PLAN  -continue VTE prophylaxis-currently on SQH  -Increase activity with PT, OOB, Ambulate  -Patient instructed on and encouraged incentive spirometry use  -will continue to monitor H/H, heme following, will continue to review notes as to suspected source. Currently heme suggesting possible hemolysis.  -Continue antibiotics.- currently Invanz x 10 days per ID  -Continue care per ICU, to floor when bed available  -Discussed with Dr. Pfeiffer.     Surgical Team Contact Information  Spectralink: Ext: [phone] or [phone]  Pager: [phone]

## 2020-03-18 NOTE — PROGRESS NOTE ADULT - ASSESSMENT
ACUTE PERFORATED CHOLECYSTITIS WITH ABSCESS  S/P lap errol with drainage of abscess -- POD #4  Continue post-op care  Surgery f/u    GRAM NEGATIVE SEPSIS  Needs IV Invanz x 10 days  Repeat culture data -- NTD  ID consult noted    ANXIETY  Continue diazepam and fluvoxamine    HTN  Restart atenolol 50mg qd and losartan 100mg qd    ANEMIA  S/P 2 units PRBC  Monitor h/h  Heme follow-up

## 2020-03-18 NOTE — PROGRESS NOTE ADULT - ATTENDING COMMENTS
69M PMH ITP, CKD, HTN, lupus anticoagulant, depression, admitted with acute perforated cholecystitis, liver abscess and peritonitis with sepsis, found to have E. coli bacteremia. He underwent laparoscopic cholecystectomy and drainage of abscess on 3/14.     Recs:   PRN Dilaudid for pain which is minimal   Continue diazepam, fluvoxamine  HD status is stable   Resp status stable   Tolerating diet, +flatus, no BM yet  ERAN output serous, minimal   BCx with sensitive E. coli, surgical Cx with 2 strains of E. coli one of which is ESBL, will change Zosyn to ertapenem for now and consult ID - not sure if he needs ESBL coverage given he improved with Zosyn and underwent cholecystectomy   BCx from 3/16 neg so far   Worsening anemia - s/p 1 PRBC today, no active bleeding   Plt improving  Replete hypophosphatemia   SC heparin for DVT ppx   Ambulate     Stable for transfer to medicine
69M PMH ITP, CKD, HTN, lupus anticoagulant, depression, admitted with acute perforated cholecystitis, liver abscess and peritonitis with sepsis, found to have E. coli bacteremia. He underwent laparoscopic cholecystectomy and drainage of abscess on 3/14.     Recs:   Denies pain - d/c Dilaudid   Continue diazepam for anxiety, fluvoxamine for depression   BP elevated, home losartan and atenolol restarted   Resp status stable   Tolerating diet, had a BM  ERAN removed by surgery    Ertapenem x 10 days per ID, BCx from 3/16 neg   Hb 7.1 on am CBC, improved to 8.7 on repeat - no need for transfusion    Plt improving  SC heparin for DVT ppx   Ambulating    Stable for transfer to medicine
69M PMH ITP, CKD, HTN, lupus anticoagulant, depression, admitted with acute perforated cholecystitis, liver abscess and peritonitis with sepsis, found to have E. coli bacteremia. He underwent laparoscopic cholecystectomy and drainage of abscess on 3/14.     Improving, not on pressors or resp support     Recs:   PRN Dilaudid for pain which is minimal   Continue diazepam for anxiety, resume fluvoxamine  HD status is stable   Resp status stable   Advance diet as tolerated   D/c IVF  Continue Zosyn, will de-escalate once sensitivities resulted  F/u repeat BCx   SC heparin for DVT ppx   H/H low but stable, no active bleeding   Plt stable   OOB    Stable for transfer to medicine

## 2020-03-18 NOTE — PROGRESS NOTE ADULT - SUBJECTIVE AND OBJECTIVE BOX
Patient seen and examined;  Chart reviewed and events noted;   hemodynamically stable; afebrile  upset about food; wants to know what percentage of patients with his hematolgic abnormalities develop cholecystitis    3/17 pt feeling weak and labs reviewed and inr was 1.92 and ptt was 64.7 and this apparently is a LAC. Pt stated he knew he had it. 3/18 stable and looks better and counts showed platelet count of [id number] and inr was 1.41.  MEDICATIONS  (STANDING):  chlorhexidine 2% Cloths 1 Application(s) Topical daily  diazepam    Tablet 5 milliGRAM(s) Oral every 12 hours  ertapenem  IVPB 1000 milliGRAM(s) IV Intermittent every 24 hours  fluvoxaMINE 100 milliGRAM(s) Oral two times a day  folic acid 1 milliGRAM(s) Oral daily  heparin  Injectable 5000 Unit(s) SubCutaneous every 8 hours  melatonin 3 milliGRAM(s) Oral at bedtime  ICU Vital Signs Last 24 Hrs  T(C): 36.4 (18 Mar 2020 07:35), Max: 36.9 (17 Mar 2020 15:36)  T(F): 97.5 (18 Mar 2020 07:35), Max: 98.5 (17 Mar 2020 15:36)  HR: 92 (18 Mar 2020 11:00) (75 - 107)  BP: 163/74 (18 Mar 2020 11:00) (134/63 - 173/82)  BP(mean): 107 (18 Mar 2020 11:00) (91 - 118)  ABP: --  ABP(mean): --  RR: 24 (18 Mar 2020 11:00) (13 - 32)  SpO2: 94% (18 Mar 2020 11:00) (93% - 99%)    PHYSICAL EXAM  General: adult in NAD  HEENT: clear oropharynx, anicteric sclera, pink conjunctivae  Neck: supple  CV: normal S1S2 with no murmur rubs or gallops  Lungs: clear to auscultation, no wheezes, no rhales  Abdomen: + ERAN drain with serosanguinous drainage  Ext: no clubbing cyanosis or edema  Skin: no rashes and no petechiae  Neuro: alert and oriented X3 no focal deficits      LABS:                         7.1    6.45  )-----------( 78       ( 18 Mar 2020 05:57 )             21.3     Hemoglobin: 7.5 g/dL (03-16 @ 06:08)  Hemoglobin: 7.4 g/dL (03-15 @ 20:49)  Hemoglobin: 7.7 g/dL (03-15 @ 05:44)  Hemoglobin: 7.7 g/dL (03-15 @ 01:01)  Hemoglobin: 9.0 g/dL (03-14 @ 18:34)    Platelet Count - Automated: 57 K/uL (03-16 @ 06:08)  Platelet Count - Automated: 48 K/uL (03-15 @ 20:49)  Platelet Count - Automated: 62 K/uL (03-15 @ 05:44)  Platelet Count - Automated: 64 K/uL (03-15 @ 01:01)  Platelet Count - Automated: 80 K/uL (03-14 @ 18:34)    03-16    143  |  108  |  21  ----------------------------<  99  4.5   |  29  |  1.40<H>    Ca    8.3<L>      16 Mar 2020 05:35  Phos  3.3     03-16  Mg     2.2     03-16    TPro  5.6<L>  /  Alb  2.3<L>  /  TBili  1.0  /  DBili  x   /  AST  52<H>  /  ALT  46  /  AlkPhos  70  03-16    PT/INR - ( 16 Mar 2020 05:35 )   PT: 21.9 sec;   INR: 1.92 ratio   PTT - ( 16 Mar 2020 05:35 )  PTT:64.7 sec

## 2020-03-18 NOTE — PROGRESS NOTE ADULT - ASSESSMENT
[ASSESSMENT and  PLAN]  68yo M admitted with acute perforated cholecystitis requiring emergent surgery and has gram negative bacteremia; patient well known to us with hx of low grade ITP (platelet count -  s/p emergent surgery) , also with elevated coags due to lupus AC and Vitamin K deficiency (found while admitted at 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 No BRANT. +splenomegaly.   - continue post op care as per surgery  - continue IV antibiotics as per primary team  - blood indices continue to remain stable  - continue folic acid for compensated hemolysis but may need transfusions  - will follow with you  - discussed with ICU team (Dr. Moncho Everett)  3/17 pt feeling weak and labs reviewed and inr was 1.92 and ptt was 64.7 and this apparently is a LAC. Pt stated he knew he had it.   3/18 stable and looks better and counts showed platelet count of 18761 and inr was 1.41.

## 2020-03-18 NOTE — CHART NOTE - NSCHARTNOTEFT_GEN_A_CORE
Assessment: patient seen for follow up.. breakfast tray at bedside. RN states patient with good PO yesterday breakfast 100% and dinner 50% fruit taken only at lunch yesterday. patient with perforated acute errol with sepsis. patient willing to try ensure enlive supplement. 3/17 BM . Mg 1.5 Mg sulfate ordered.     Factors impacting intake: [x ] none [ ] nausea  [ ] vomiting [ ] diarrhea [ ] constipation  [ ]chewing problems [ ] swallowing issues  [ ] other:     Diet Presciption: Diet, Regular (03-16-20 @ 10:12)    Intake: %    Current Weight: Weight 3/16 201.7# no edema noted  Pertinent Medications: MEDICATIONS  (STANDING):  chlorhexidine 2% Cloths 1 Application(s) Topical daily  diazepam    Tablet 5 milliGRAM(s) Oral every 12 hours  ertapenem  IVPB 1000 milliGRAM(s) IV Intermittent every 24 hours  fluvoxaMINE 100 milliGRAM(s) Oral two times a day  folic acid 1 milliGRAM(s) Oral daily  heparin  Injectable 5000 Unit(s) SubCutaneous every 8 hours  magnesium sulfate  IVPB 2 Gram(s) IV Intermittent every 6 hours  melatonin 3 milliGRAM(s) Oral at bedtime    MEDICATIONS  (PRN):  acetaminophen   Tablet .. 650 milliGRAM(s) Oral every 6 hours PRN Mild Pain (1 - 3)  HYDROmorphone  Injectable 0.5 milliGRAM(s) IV Push every 6 hours PRN Moderate Pain (4 - 6)  HYDROmorphone  Injectable 1 milliGRAM(s) IV Push every 6 hours PRN Severe Pain (7 - 10)  ondansetron Injectable 4 milliGRAM(s) IV Push every 6 hours PRN Nausea and/or Vomiting    Pertinent Labs: 03-18 Na144 mmol/L Glu 102 mg/dL<H> K+ 3.8 mmol/L Cr  1.00 mg/dL BUN 14 mg/dL 03-18 Phos 3.1 mg/dL 03-18 Alb 2.1 g/dL<L>        Skin: sacrum 1 jessi 19    Estimated Needs:   [x ] no change since previous assessment  [ ] recalculated:     Previous Nutrition Diagnosis:   [ ] Inadequate Energy Intake [ ]Inadequate Oral Intake [ ] Excessive Energy Intake   [ ] Underweight [ ] Increased Nutrient Needs [ ] Overweight/Obesity   [x ] Altered GI Function [ ] Unintended Weight Loss [ ] Food & Nutrition Related Knowledge Deficit [ ] Malnutrition     Nutrition Diagnosis is [x ] ongoing but resolving tolerating diet [ ] resolved [ ] not applicable     New Nutrition Diagnosis: [x ] not applicable       Interventions:   Recommend  [ ] Change Diet To:  [x ] Nutrition Supplement suggest add ensure enlive BID spoke with RN  [ ] Nutrition Support  [ ] Other:     Monitoring and Evaluation:   [x ] PO intake [ x ] Tolerance to diet prescription [ x ] weights [ x ] labs[ x ] follow up per protocol  [ ] other:

## 2020-03-19 LAB
HCT VFR BLD CALC: 22.5 % — LOW (ref 39–50)
HGB BLD-MCNC: 7.6 G/DL — LOW (ref 13–17)
MCHC RBC-ENTMCNC: 28.7 PG — SIGNIFICANT CHANGE UP (ref 27–34)
MCHC RBC-ENTMCNC: 33.8 GM/DL — SIGNIFICANT CHANGE UP (ref 32–36)
MCV RBC AUTO: 84.9 FL — SIGNIFICANT CHANGE UP (ref 80–100)
NRBC # BLD: 0 /100 WBCS — SIGNIFICANT CHANGE UP (ref 0–0)
PLATELET # BLD AUTO: 105 K/UL — LOW (ref 150–400)
RBC # BLD: 2.65 M/UL — LOW (ref 4.2–5.8)
RBC # FLD: 15.2 % — HIGH (ref 10.3–14.5)
WBC # BLD: 6.21 K/UL — SIGNIFICANT CHANGE UP (ref 3.8–10.5)
WBC # FLD AUTO: 6.21 K/UL — SIGNIFICANT CHANGE UP (ref 3.8–10.5)

## 2020-03-19 PROCEDURE — 76937 US GUIDE VASCULAR ACCESS: CPT | Mod: 26

## 2020-03-19 PROCEDURE — 36410 VNPNXR 3YR/> PHY/QHP DX/THER: CPT

## 2020-03-19 PROCEDURE — 36573 INSJ PICC RS&I 5 YR+: CPT | Mod: 53

## 2020-03-19 RX ADMIN — ERTAPENEM SODIUM 120 MILLIGRAM(S): 1 INJECTION, POWDER, LYOPHILIZED, FOR SOLUTION INTRAMUSCULAR; INTRAVENOUS at 12:21

## 2020-03-19 RX ADMIN — ATENOLOL 50 MILLIGRAM(S): 25 TABLET ORAL at 06:06

## 2020-03-19 RX ADMIN — FLUVOXAMINE MALEATE 100 MILLIGRAM(S): 25 TABLET ORAL at 18:18

## 2020-03-19 RX ADMIN — CHLORHEXIDINE GLUCONATE 1 APPLICATION(S): 213 SOLUTION TOPICAL at 12:16

## 2020-03-19 RX ADMIN — Medication 5 MILLIGRAM(S): at 18:20

## 2020-03-19 RX ADMIN — Medication 650 MILLIGRAM(S): at 13:10

## 2020-03-19 RX ADMIN — Medication 5 MILLIGRAM(S): at 06:06

## 2020-03-19 RX ADMIN — Medication 650 MILLIGRAM(S): at 12:10

## 2020-03-19 RX ADMIN — Medication 1 MILLIGRAM(S): at 12:21

## 2020-03-19 RX ADMIN — HEPARIN SODIUM 5000 UNIT(S): 5000 INJECTION INTRAVENOUS; SUBCUTANEOUS at 18:17

## 2020-03-19 RX ADMIN — Medication 650 MILLIGRAM(S): at 02:54

## 2020-03-19 RX ADMIN — Medication 650 MILLIGRAM(S): at 22:50

## 2020-03-19 RX ADMIN — Medication 3 MILLIGRAM(S): at 21:48

## 2020-03-19 RX ADMIN — Medication 650 MILLIGRAM(S): at 03:54

## 2020-03-19 RX ADMIN — HEPARIN SODIUM 5000 UNIT(S): 5000 INJECTION INTRAVENOUS; SUBCUTANEOUS at 06:05

## 2020-03-19 RX ADMIN — Medication 650 MILLIGRAM(S): at 21:54

## 2020-03-19 RX ADMIN — FLUVOXAMINE MALEATE 100 MILLIGRAM(S): 25 TABLET ORAL at 06:05

## 2020-03-19 RX ADMIN — LOSARTAN POTASSIUM 100 MILLIGRAM(S): 100 TABLET, FILM COATED ORAL at 06:05

## 2020-03-19 NOTE — PROGRESS NOTE ADULT - ASSESSMENT
ACUTE PERFORATED CHOLECYSTITIS WITH ABSCESS  S/P lap errol with drainage of abscess -- POD #5  Continue post-op care  Surgery f/u    GRAM NEGATIVE SEPSIS  Needs IV Invanz x 10 days  Repeat culture data -- NTD  ID consult noted    ANXIETY  Continue diazepam and fluvoxamine    HTN  Continue atenolol 50mg qd and losartan 100mg qd  Restart norvasc 5mg qd    ANEMIA  S/P 2 units PRBC  Monitor h/h  Heme follow-up

## 2020-03-19 NOTE — PROGRESS NOTE ADULT - SUBJECTIVE AND OBJECTIVE BOX
Patient is a 69y old  Male who presents with a chief complaint of perforated acute errol with sepsis (19 Mar 2020 07:54)      INTERVAL HPI/OVERNIGHT EVENTS:  Patient seen and examined. NAD. No complaints.     Vital Signs Last 24 Hrs  T(C): 36.8 (19 Mar 2020 07:35), Max: 36.9 (18 Mar 2020 23:00)  T(F): 98.2 (19 Mar 2020 07:35), Max: 98.4 (18 Mar 2020 23:00)  HR: 72 (19 Mar 2020 07:35) (72 - 95)  BP: 157/88 (19 Mar 2020 07:35) (136/76 - 166/79)  BP(mean): --  RR: 18 (19 Mar 2020 07:35) (18 - 20)  SpO2: 91% (19 Mar 2020 07:35) (91% - 95%)    03-18    144  |  107  |  14  ----------------------------<  102<H>  3.8   |  32<H>  |  1.00    Ca    8.2<L>      18 Mar 2020 05:57  Phos  3.1     03-18  Mg     2.0     03-18    TPro  5.2<L>  /  Alb  2.1<L>  /  TBili  0.9  /  DBili  x   /  AST  26  /  ALT  28  /  AlkPhos  100  03-18                          7.6    6.21  )-----------( 105      ( 19 Mar 2020 05:50 )             22.5     PT/INR - ( 18 Mar 2020 05:57 )   PT: 16.0 sec;   INR: 1.41 ratio         PTT - ( 18 Mar 2020 05:57 )  PTT:57.0 sec  CAPILLARY BLOOD GLUCOSE                  acetaminophen   Tablet .. 650 milliGRAM(s) Oral every 6 hours PRN  ATENolol  Tablet 50 milliGRAM(s) Oral daily  chlorhexidine 2% Cloths 1 Application(s) Topical daily  diazepam    Tablet 5 milliGRAM(s) Oral every 12 hours  ertapenem  IVPB 1000 milliGRAM(s) IV Intermittent every 24 hours  fluvoxaMINE 100 milliGRAM(s) Oral two times a day  folic acid 1 milliGRAM(s) Oral daily  heparin  Injectable 5000 Unit(s) SubCutaneous every 8 hours  losartan 100 milliGRAM(s) Oral daily  melatonin 3 milliGRAM(s) Oral at bedtime  ondansetron Injectable 4 milliGRAM(s) IV Push every 6 hours PRN              REVIEW OF SYSTEMS:  CONSTITUTIONAL: No fever, no weight loss, or no fatigue  NECK: No pain, no stiffness  RESPIRATORY: No cough, no wheezing, no chills, no hemoptysis, No shortness of breath  CARDIOVASCULAR: No chest pain, no palpitations, no dizziness, no leg swelling  GASTROINTESTINAL: No abdominal pain. No nausea, no vomiting, no hematemesis; No diarrhea, no constipation. No melena, no hematochezia.  GENITOURINARY: No dysuria, no frequency, no hematuria, no incontinence  NEUROLOGICAL: No headaches, no loss of strength, no numbness, no tremors  SKIN: No itching, no burning  MUSCULOSKELETAL: No joint pain, no swelling; No muscle, no back, no extremity pain  PSYCHIATRIC: No depression, no mood swings,   HEME/LYMPH: No easy bruising, no bleeding gums  ALLERY AND IMMUNOLOGIC: No hives       Consultant(s) Notes Reviewed:  [X] YES  [ ] NO    PHYSICAL EXAM:  GENERAL: NAD  HEAD:  Atraumatic, Normocephalic  EYES: EOMI, PERRLA, conjunctiva and sclera clear  ENMT: No tonsillar erythema, exudates, or enlargement; Moist mucous membranes  NECK: Supple, No JVD  NERVOUS SYSTEM:  Awake & alert  CHEST/LUNG: Clear to auscultation bilaterally; No rales, rhonchi, wheezing,  HEART: Regular rate and rhythm  ABDOMEN: Soft, Nontender, Nondistended; Bowel sounds present  EXTREMITIES:  No clubbing, cyanosis, or edema  LYMPH: No lymphadenopathy noted  SKIN: No rashes      Advanced care planning discussed with patient/family [X] YES   [ ] NO    Advanced care planning discussed with patient/family. Patient's health status was discussed. All appropriate changes have been made regarding patient's end-of-life care. Advanced care planning forms reviewed/discussed/completed.  20 minutes spent.

## 2020-03-19 NOTE — PROGRESS NOTE ADULT - ASSESSMENT
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 No BRANT. +splenomegaly.   - continue post op care as per surgery  - continue IV antibiotics as per primary team  - blood indices continue to remain stable  -is now out of ICU with minimal decrease in hgb and improvement in platelet coung  continue folic acid and hold transfusion  CBC in am

## 2020-03-19 NOTE — PROGRESS NOTE ADULT - SUBJECTIVE AND OBJECTIVE BOX
JUSTICE JANICE is a 69yMale , patient examined and chart reviewed.     INTERVAL HPI/ OVERNIGHT EVENTS:   c/o abd pain. Afebrile.  No events. C/o abd pain.    PAST MEDICAL & SURGICAL HISTORY:  History of antiphospholipid antibody syndrome  Idiopathic thrombocytopenic purpura (ITP)  Hypertension  Anxiety  Anxiety  Hypertension, unspecified type  Enlarged tonsils: s/p tonsillectomy  H/O Mohs micrographic surgery for skin cancer    For details regarding the patient's social history, family history, and other miscellaneous elements, please refer the initial infectious diseases consultation and/or the admitting history and physical examination for this admission.      ROS:  CONSTITUTIONAL:  Negative fever or chills  EYES:  Negative  blurry vision or double vision  CARDIOVASCULAR:  Negative for chest pain or palpitations  RESPIRATORY:  Negative for cough, wheezing, or SOB   GASTROINTESTINAL:  Negative for nausea, vomiting, diarrhea, constipation, or abdominal pain  GENITOURINARY:  Negative frequency, urgency or dysuria  NEUROLOGIC:  No headache, confusion, dizziness, lightheadedness  All other systems were reviewed and are negative     ALLERGIES:  sulfa drugs (Unknown)      Current inpatient medications :    ANTIBIOTICS/RELEVANT:  ertapenem  IVPB 1000 milliGRAM(s) IV Intermittent every 24 hours      MEDICATIONS  (STANDING):  ATENolol  Tablet 50 milliGRAM(s) Oral daily  chlorhexidine 2% Cloths 1 Application(s) Topical daily  diazepam    Tablet 5 milliGRAM(s) Oral every 12 hours  fluvoxaMINE 100 milliGRAM(s) Oral two times a day  folic acid 1 milliGRAM(s) Oral daily  heparin  Injectable 5000 Unit(s) SubCutaneous every 8 hours  losartan 100 milliGRAM(s) Oral daily  melatonin 3 milliGRAM(s) Oral at bedtime    MEDICATIONS  (PRN):  acetaminophen   Tablet .. 650 milliGRAM(s) Oral every 6 hours PRN Mild Pain (1 - 3)  ondansetron Injectable 4 milliGRAM(s) IV Push every 6 hours PRN Nausea and/or Vomiting        Objective:  Vital Signs Last 24 Hrs  T(C): 36.8 (19 Mar 2020 07:35), Max: 36.9 (18 Mar 2020 23:00)  T(F): 98.2 (19 Mar 2020 07:35), Max: 98.4 (18 Mar 2020 23:00)  HR: 72 (19 Mar 2020 07:35) (72 - 95)  BP: 157/88 (19 Mar 2020 07:35) (136/76 - 166/79)  RR: 18 (19 Mar 2020 07:35) (18 - 20)  SpO2: 91% (19 Mar 2020 07:35) (91% - 95%)    Physical Exam:  General: NAD  Eyes: sclera anicteric, pupils equal and reactive to light  ENMT: buccal mucosa moist, pharynx not injected  Neck: supple, trachea midline  Lungs: clear, no wheeze/rhonchi  Cardiovascular: regular rate and rhythm, S1 S2  Abdomen: soft, mild tender, distended + ERAN drain bowel sounds normal  Neurological: alert and oriented x3, Cranial Nerves II-XII grossly intact  Skin: no increased ecchymosis/petechiae/purpura  Lymph Nodes: no palpable cervical/supraclavicular lymph nodes enlargements  Extremities: no cyanosis/clubbing/edema      LABS:                                7.6    6.21  )-----------( 105      ( 19 Mar 2020 05:50 )             22.5   03-18    144  |  107  |  14  ----------------------------<  102<H>  3.8   |  32<H>  |  1.00    Ca    8.2<L>      18 Mar 2020 05:57  Phos  3.1     03-18  Mg     2.0     03-18    TPro  5.2<L>  /  Alb  2.1<L>  /  TBili  0.9  /  DBili  x   /  AST  26  /  ALT  28  /  AlkPhos  100  03-18      MICROBIOLOGY:    Culture - Blood (collected 16 Mar 2020 08:52)  Source: .Blood Blood-Venous  Preliminary Report (17 Mar 2020 09:01):    No growth to date.    Culture - Blood (collected 16 Mar 2020 08:52)  Source: .Blood Blood  Preliminary Report (17 Mar 2020 09:01):    No growth to date.    Culture - Surgical Swab (collected 15 Mar 2020 10:51)  Source: .Surgical Swab c/s from gallbladder  Preliminary Report (17 Mar 2020 11:53):    Moderate Escherichia coli ESBL    Moderate Streptococcus anginosus "Susceptibilities not performed"    Few Enterococcus casseliflavus Susceptibility to follow.  Organism: Escherichia coli ESBL  Escherichia coli (17 Mar 2020 10:44)  Organism: Escherichia coli (17 Mar 2020 10:44)      -  Amikacin: S <=16      -  Amoxicillin/Clavulanic Acid: S <=8/4      -  Ampicillin: S <=8 These ampicillin results predict results for amoxicillin      -  Ampicillin/Sulbactam: S <=4/2 Enterobacter, Citrobacter, and Serratia may develop resistance during prolonged therapy (3-4 days)      -  Aztreonam: S <=4      -  Cefazolin: S <=2 Enterobacter, Citrobacter, and Serratia may develop resistance during prolonged therapy (3-4 days)      -  Cefepime: S <=2      -  Cefoxitin: S <=8      -  Ceftriaxone: S <=1 Enterobacter, Citrobacter, and Serratia may develop resistance during prolonged therapy      -  Ciprofloxacin: S <=1      -  Ertapenem: S <=0.5      -  Gentamicin: S <=2      -  Imipenem: S <=1      -  Levofloxacin: S <=2      -  Meropenem: S <=1      -  Piperacillin/Tazobactam: S <=8      -  Tobramycin: S <=2      -  Trimethoprim/Sulfamethoxazole: S <=2/38      Method Type: MEAGHAN  Organism: Escherichia coli ESBL (17 Mar 2020 10:34)      -  Amikacin: S <=16      -  Amoxicillin/Clavulanic Acid: S <=8/4      -  Ampicillin: R >16 These ampicillin results predict results for amoxicillin      -  Ampicillin/Sulbactam: R 16/8 Enterobacter, Citrobacter, and Serratia may develop resistance during prolonged therapy (3-4 days)      -  Aztreonam: R >16      -  Cefazolin: R >16 Enterobacter, Citrobacter, and Serratia may develop resistance during prolonged therapy (3-4 days)      -  Cefepime: R >16      -  Cefoxitin: S <=8      -  Ceftriaxone: R >32 Enterobacter, Citrobacter, and Serratia may develop resistance during prolonged therapy      -  Ciprofloxacin: S <=1      -  Ertapenem: S <=0.5      -  Gentamicin: S <=2      -  Imipenem: S <=1      -  Levofloxacin: S <=2      -  Meropenem: S <=1      -  Piperacillin/Tazobactam: R <=8      -  Tobramycin: S <=2      -  Trimethoprim/Sulfamethoxazole: R >2/38      Method Type: MEAGHAN    Culture - Urine (collected 14 Mar 2020 21:36)  Source: .Urine Clean Catch (Midstream)  Final Report (15 Mar 2020 17:02):    No growth    Culture - Blood (collected 14 Mar 2020 21:28)  Source: .Blood Blood-Peripheral  Gram Stain (15 Mar 2020 05:08):    Growth in anaerobic bottle: Gram Negative Rods    Growth in aerobic bottle: Gram Negative Rods  Final Report (16 Mar 2020 15:58):    Growth in aerobic and anaerobic bottles: Escherichia coli    See previous culture [id number]    Culture - Blood (collected 14 Mar 2020 21:28)  Source: .Blood Blood-Peripheral  Gram Stain (15 Mar 2020 05:39):    Growth in anaerobic bottle: Gram Negative Rods    Growth in aerobic bottle: Gram Negative Rods  Final Report (16 Mar 2020 15:57):    Growth in aerobic and anaerobic bottles: Escherichia coli    ***Blood Panel PCR results on this specimen are available    approximately 3 hours after the Gram stain result.***    Gram stain, PCR, and/or culture results may not always    correspond due to difference in methodologies.    ************************************************************    This PCR assay was performed using [employer].    The following targets are tested for: Enterococcus,    vancomycin resistant enterococci, Listeria monocytogenes,    coagulase negative staphylococci, S. aureus,    methicillin resistant S. aureus, Streptococcus agalactiae    (Group B), S. pneumoniae, S. pyogenes (Group A),    Acinetobacter baumannii, Enterobacter cloacae, E. coli,    Klebsiella oxytoca, K. pneumoniae, Proteus sp.,    Serratia marcescens, Haemophilus influenzae,    Neisseria meningitidis, Pseudomonas aeruginosa, Candida    albicans, C. glabrata, C krusei, C parapsilosis,    C. tropicalis and the KPC resistance gene.  Organism: Blood Culture PCR  Escherichia coli (16 Mar 2020 15:57)  Organism: Escherichia coli (16 Mar 2020 15:57)      -  Amikacin: S <=16      -  Ampicillin: S <=8 These ampicillin results predict results for amoxicillin      -  Ampicillin/Sulbactam: S <=4/2 Enterobacter, Citrobacter, and Serratia may develop resistance during prolonged therapy (3-4 days)      -  Aztreonam: S <=4      -  Cefazolin: S <=2 Enterobacter, Citrobacter, and Serratia may develop resistance during prolonged therapy (3-4 days)      -  Cefepime: S <=2      -  Cefoxitin: S <=8      -  Ceftriaxone: S <=1 Enterobacter, Citrobacter, and Serratia may develop resistance during prolonged therapy      -  Ciprofloxacin: S <=1      -  Ertapenem: S <=0.5      -  Gentamicin: S <=2      -  Imipenem: S <=1      -  Levofloxacin: S <=2      -  Meropenem: S <=1      -  Piperacillin/Tazobactam: S <=8      -  Tobramycin: S <=2      -  Trimethoprim/Sulfamethoxazole: S <=2/38      Method Type: MEAGHAN  Organism: Blood Culture PCR (16 Mar 2020 15:57)      -  Escherichia coli: Detec      Method Type: PCR      RADIOLOGY & ADDITIONAL STUDIES:    EXAM:  CT ABDOMEN AND PELVIS IC                          EXAM:  CT CHEST IC                            PROCEDURE DATE:  03/14/2020          INTERPRETATION:  CT CHEST WITH IV CONTRAST, CT ABDOMEN AND PELVIS WITH IV CONTRAST    CLINICAL INFORMATION: sepsis      COMPARISON: None.    PROCEDURE:   CT of the Chest, Abdomen and Pelvis was performed with intravenous contrast.  Intravenous contrast: 95 cc Omnipaque 350  Oral contrast: None.  Sagittal and coronal reformats were performed.    FINDINGS:    CHEST:     LUNGS, AIRWAYS: The central airways are patent. The lungs are clear.  PLEURA: Small bilateral effusions.  VESSELS: Normal caliber aorta. Main pulmonary arteries are patent.  HEART: Normal heart size. No pericardial effusion.  MEDIASTINUM AND WESTON: No adenopathy.  CHEST WALL: No masses.    ABDOMEN AND PELVIS:    LIVER: As below  BILE DUCTS: Nondilated.  GALLBLADDER: Gallstones with diffuse wall thickening, luminal distention, and pericholecystic inflammatory change. Defect in the gallbladder wall with adjacent 3.5 cm hepatic collection.  SPLEEN: Normal.  PANCREAS: Normal.  ADRENALS: Normal.  KIDNEYS/URETERS: No calculi, hydronephrosis, or soft tissue attenuating mass.    BLADDER: Underdistended limiting evaluation.  REPRODUCTIVE ORGANS: Enlarged prostate.    BOWEL: No bowel-related abnormality. Specifically, no evidence of acute diverticulitis. Normal ileocecal region. No bowel obstruction or bowel inflammation.  PERITONEUM: Small ascites. No free air or collection.  VESSELS:  Normal caliber aorta.  RETROPERITONEUM/LYMPH NODES: No adenopathy.    ABDOMINAL WALL: Normal.  BONES: No acute bony abnormality.    IMPRESSION:     Perforated cholecystitis with adjacent 3.5 cm hepatic abscess.    No acute chest pathology. Clear lungs aside from small bilateral pleural effusions.       Assessment :   70yo M with PMH HTN, ITP, DONNA on ?CKD, lupus anticoagulant admitted with septic shock sec to acute perforated cholecystitis with hepatic abscess, s/p lap errol with washout 3/14/2020. Blood cultures with E. coli bacteremia. Surgical cultures polymicrobial including Ecoli ESBL. D/w Kentrell no hepatic abscess seen in OR. Repeat blood cultures ngtd.  Overall stable.    Plan :   Invanz 1 gram dailly x 10 days  Trend temps and cbc  Serial abd exams  ERAN drain per surgery  Increase activity    D/w Dr Butterfield      Continue with present regiment.  Appropriate use of antibiotics and adverse effects reviewed.      I have discussed the above plan of care with patient/ family in detail. They expressed understanding of the  treatment plan . Risks, benefits and alternatives discussed in detail. I have asked if they have any questions or concerns and appropriately addressed them to the best of my ability .    > 35 minutes were spent in direct patient care reviewing notes, medications ,labs data/ imaging , discussion with multidisciplinary team.    Thank you for allowing me to participate in care of your patient .    Arthur Pepper MD  Infectious Disease  [phone]

## 2020-03-19 NOTE — PROGRESS NOTE ADULT - SUBJECTIVE AND OBJECTIVE BOX
POD # 5  s/p lap errol,    SUBJECTIVE:  70 y/o M seen and examined at bedside. PT s/p transfusion of 2 uprbc yesterday. Pt currently has no complaints in NAD. PT admits to tolerating reg diet denies N/V, however unhappy with the food selection. Pt denies any chest pain, SOB, palpitations, dizziness, melena, hematochezia, fevers, chills. Pt admits to flatus and bowel movement yesterday.    Vital Signs Last 24 Hrs  T(C): 36.8 (19 Mar 2020 07:35), Max: 37.4 (18 Mar 2020 12:20)  T(F): 98.2 (19 Mar 2020 07:35), Max: 99.3 (18 Mar 2020 12:20)  HR: 72 (19 Mar 2020 07:35) (72 - 95)  BP: 157/88 (19 Mar 2020 07:35) (136/76 - 173/82)  BP(mean): 107 (18 Mar 2020 11:00) (106 - 118)  RR: 18 (19 Mar 2020 07:35) (13 - 24)  SpO2: 91% (19 Mar 2020 07:35) (91% - 98%)    PHYSICAL EXAM:  GENERAL: No acute distress, well-developed  CHEST/LUNG: CTABL, no ronchi, rales or wheezing  HEART: RRR, no MRG  ABDOMEN: soft, mild RUQ ttp, mildly distended, +bs x 4 quadrants, incisional sites c/d/i  NEUROLOGY: A&O x 3, no focal deficits    I&O's Summary    18 Mar 2020 07:01  -  19 Mar 2020 07:00  --------------------------------------------------------  IN: 290 mL / OUT: 0 mL / NET: 290 mL      I&O's Detail    18 Mar 2020 07:01  -  19 Mar 2020 07:00  --------------------------------------------------------  IN:    Oral Fluid: 240 mL    Solution: 50 mL  Total IN: 290 mL    OUT:  Total OUT: 0 mL    Total NET: 290 mL        MEDICATIONS  (STANDING):  ATENolol  Tablet 50 milliGRAM(s) Oral daily  chlorhexidine 2% Cloths 1 Application(s) Topical daily  diazepam    Tablet 5 milliGRAM(s) Oral every 12 hours  ertapenem  IVPB 1000 milliGRAM(s) IV Intermittent every 24 hours  fluvoxaMINE 100 milliGRAM(s) Oral two times a day  folic acid 1 milliGRAM(s) Oral daily  heparin  Injectable 5000 Unit(s) SubCutaneous every 8 hours  losartan 100 milliGRAM(s) Oral daily  melatonin 3 milliGRAM(s) Oral at bedtime    MEDICATIONS  (PRN):  acetaminophen   Tablet .. 650 milliGRAM(s) Oral every 6 hours PRN Mild Pain (1 - 3)  ondansetron Injectable 4 milliGRAM(s) IV Push every 6 hours PRN Nausea and/or Vomiting    LABS:                        7.6    6.21  )-----------( 105      ( 19 Mar 2020 05:50 )             22.5     03-18    144  |  107  |  14  ----------------------------<  102<H>  3.8   |  32<H>  |  1.00    Ca    8.2<L>      18 Mar 2020 05:57  Phos  3.1     03-18  Mg     2.0     03-18    TPro  5.2<L>  /  Alb  2.1<L>  /  TBili  0.9  /  DBili  x   /  AST  26  /  ALT  28  /  AlkPhos  100  03-18    PT/INR - ( 18 Mar 2020 05:57 )   PT: 16.0 sec;   INR: 1.41 ratio         PTT - ( 18 Mar 2020 05:57 )  PTT:57.0 sec    ASSESSMENT  70 y/o M POD # 5 s/p lap errol, with transfusion 2 uprbc yesterday, currently with H/H of 7.1/22.5, however asymptomatic,     PLAN  - f/u heme recs, poss transfusion prn??  - cont iv abx  - cont reg diet  - medicine recs appreciated   - pain control, supportive care  - OOB, ambulation with assistance as tolerated  - labs in am  - to be discussed with Dr. Pfeiffer    Surgical Team Contact Information  Spectralink: Ext: [phone] or [phone]  Pager: [phone]

## 2020-03-19 NOTE — PROGRESS NOTE ADULT - SUBJECTIVE AND OBJECTIVE BOX
Interval History:  feeling better, walked the cabrera  Chart reviewed and events noted;   Overnight events:    MEDICATIONS  (STANDING):  ATENolol  Tablet 50 milliGRAM(s) Oral daily  chlorhexidine 2% Cloths 1 Application(s) Topical daily  diazepam    Tablet 5 milliGRAM(s) Oral every 12 hours  ertapenem  IVPB 1000 milliGRAM(s) IV Intermittent every 24 hours  fluvoxaMINE 100 milliGRAM(s) Oral two times a day  folic acid 1 milliGRAM(s) Oral daily  heparin  Injectable 5000 Unit(s) SubCutaneous every 8 hours  losartan 100 milliGRAM(s) Oral daily  melatonin 3 milliGRAM(s) Oral at bedtime    MEDICATIONS  (PRN):  acetaminophen   Tablet .. 650 milliGRAM(s) Oral every 6 hours PRN Mild Pain (1 - 3)  ondansetron Injectable 4 milliGRAM(s) IV Push every 6 hours PRN Nausea and/or Vomiting      Vital Signs Last 24 Hrs  T(C): 36.8 (19 Mar 2020 07:35), Max: 36.9 (18 Mar 2020 23:00)  T(F): 98.2 (19 Mar 2020 07:35), Max: 98.4 (18 Mar 2020 23:00)  HR: 72 (19 Mar 2020 07:35) (72 - 95)  BP: 157/88 (19 Mar 2020 07:35) (136/76 - 166/79)  BP(mean): --  RR: 18 (19 Mar 2020 07:35) (18 - 20)  SpO2: 91% (19 Mar 2020 07:35) (91% - 95%)    PHYSICAL EXAM  General: adult in NAD  HEENT: clear oropharynx, anicteric sclera, pink conjunctivae  Neck: supple  CV: normal S1S2 with no murmur rubs or gallops  Lungs: clear to auscultation, no wheezes, no rhales  Abdomen: soft non-tender non-distended, no hepato/splenomegaly  Ext: no clubbing cyanosis or edema  Skin: no rashes and no petichiae  Neuro: alert and oriented X3 no focal deficits      LABS:  CBC Full  -  ( 19 Mar 2020 05:50 )  WBC Count : 6.21 K/uL  RBC Count : 2.65 M/uL  Hemoglobin : 7.6 g/dL  Hematocrit : 22.5 %  Platelet Count - Automated : 105 K/uL  Mean Cell Volume : 84.9 fl  Mean Cell Hemoglobin : 28.7 pg  Mean Cell Hemoglobin Concentration : 33.8 gm/dL  Auto Neutrophil # : x  Auto Lymphocyte # : x  Auto Monocyte # : x  Auto Eosinophil # : x  Auto Basophil # : x  Auto Neutrophil % : x  Auto Lymphocyte % : x  Auto Monocyte % : x  Auto Eosinophil % : x  Auto Basophil % : x    03-18    144  |  107  |  14  ----------------------------<  102<H>  3.8   |  32<H>  |  1.00    Ca    8.2<L>      18 Mar 2020 05:57  Phos  3.1     03-18  Mg     2.0     03-18    TPro  5.2<L>  /  Alb  2.1<L>  /  TBili  0.9  /  DBili  x   /  AST  26  /  ALT  28  /  AlkPhos  100  03-18    PT/INR - ( 18 Mar 2020 05:57 )   PT: 16.0 sec;   INR: 1.41 ratio         PTT - ( 18 Mar 2020 05:57 )  PTT:57.0 sec    fe studies      WBC trend  6.21 K/uL (03-19-20 @ 05:50)  6.15 K/uL (03-18-20 @ 12:39)  5.79 K/uL (03-18-20 @ 11:47)  6.45 K/uL (03-18-20 @ 05:57)  6.95 K/uL (03-17-20 @ 12:36)  5.69 K/uL (03-17-20 @ 05:28)      Hgb trend  7.6 g/dL (03-19-20 @ 05:50)  8.7 g/dL (03-18-20 @ 12:39)  7.6 g/dL (03-18-20 @ 11:47)  7.1 g/dL (03-18-20 @ 05:57)  7.9 g/dL (03-17-20 @ 12:36)  6.8 g/dL (03-17-20 @ 05:28)      plt trend  105 K/uL (03-19-20 @ 05:50)  91 K/uL (03-18-20 @ 12:39)  89 K/uL (03-18-20 @ 11:47)  78 K/uL (03-18-20 @ 05:57)  74 K/uL (03-17-20 @ 12:36)  58 K/uL (03-17-20 @ 05:28)        RADIOLOGY & ADDITIONAL STUDIES:

## 2020-03-20 ENCOUNTER — TRANSCRIPTION ENCOUNTER (OUTPATIENT)
Age: 70
End: 2020-03-20

## 2020-03-20 VITALS
SYSTOLIC BLOOD PRESSURE: 155 MMHG | HEART RATE: 74 BPM | DIASTOLIC BLOOD PRESSURE: 82 MMHG | RESPIRATION RATE: 18 BRPM | TEMPERATURE: 98 F | OXYGEN SATURATION: 92 %

## 2020-03-20 LAB
ALBUMIN SERPL ELPH-MCNC: 2.4 G/DL — LOW (ref 3.3–5)
ALP SERPL-CCNC: 128 U/L — HIGH (ref 40–120)
ALT FLD-CCNC: 49 U/L — SIGNIFICANT CHANGE UP (ref 12–78)
ANION GAP SERPL CALC-SCNC: 5 MMOL/L — SIGNIFICANT CHANGE UP (ref 5–17)
AST SERPL-CCNC: 45 U/L — HIGH (ref 15–37)
BILIRUB SERPL-MCNC: 0.8 MG/DL — SIGNIFICANT CHANGE UP (ref 0.2–1.2)
BUN SERPL-MCNC: 16 MG/DL — SIGNIFICANT CHANGE UP (ref 7–23)
CALCIUM SERPL-MCNC: 9.2 MG/DL — SIGNIFICANT CHANGE UP (ref 8.5–10.1)
CHLORIDE SERPL-SCNC: 104 MMOL/L — SIGNIFICANT CHANGE UP (ref 96–108)
CO2 SERPL-SCNC: 33 MMOL/L — HIGH (ref 22–31)
CREAT SERPL-MCNC: 1.2 MG/DL — SIGNIFICANT CHANGE UP (ref 0.5–1.3)
CULTURE RESULTS: SIGNIFICANT CHANGE UP
GLUCOSE SERPL-MCNC: 95 MG/DL — SIGNIFICANT CHANGE UP (ref 70–99)
HCT VFR BLD CALC: 23.6 % — LOW (ref 39–50)
HGB BLD-MCNC: 7.9 G/DL — LOW (ref 13–17)
MCHC RBC-ENTMCNC: 28.9 PG — SIGNIFICANT CHANGE UP (ref 27–34)
MCHC RBC-ENTMCNC: 33.5 GM/DL — SIGNIFICANT CHANGE UP (ref 32–36)
MCV RBC AUTO: 86.4 FL — SIGNIFICANT CHANGE UP (ref 80–100)
NRBC # BLD: 0 /100 WBCS — SIGNIFICANT CHANGE UP (ref 0–0)
ORGANISM # SPEC MICROSCOPIC CNT: SIGNIFICANT CHANGE UP
PLATELET # BLD AUTO: 104 K/UL — LOW (ref 150–400)
POTASSIUM SERPL-MCNC: 3.8 MMOL/L — SIGNIFICANT CHANGE UP (ref 3.5–5.3)
POTASSIUM SERPL-SCNC: 3.8 MMOL/L — SIGNIFICANT CHANGE UP (ref 3.5–5.3)
PROT SERPL-MCNC: 6 G/DL — SIGNIFICANT CHANGE UP (ref 6–8.3)
RBC # BLD: 2.73 M/UL — LOW (ref 4.2–5.8)
RBC # FLD: 15.7 % — HIGH (ref 10.3–14.5)
SODIUM SERPL-SCNC: 142 MMOL/L — SIGNIFICANT CHANGE UP (ref 135–145)
SPECIMEN SOURCE: SIGNIFICANT CHANGE UP
WBC # BLD: 6.16 K/UL — SIGNIFICANT CHANGE UP (ref 3.8–10.5)
WBC # FLD AUTO: 6.16 K/UL — SIGNIFICANT CHANGE UP (ref 3.8–10.5)

## 2020-03-20 PROCEDURE — 97110 THERAPEUTIC EXERCISES: CPT

## 2020-03-20 PROCEDURE — 80053 COMPREHEN METABOLIC PANEL: CPT

## 2020-03-20 PROCEDURE — 86803 HEPATITIS C AB TEST: CPT

## 2020-03-20 PROCEDURE — 96367 TX/PROPH/DG ADDL SEQ IV INF: CPT

## 2020-03-20 PROCEDURE — 76937 US GUIDE VASCULAR ACCESS: CPT

## 2020-03-20 PROCEDURE — 83605 ASSAY OF LACTIC ACID: CPT

## 2020-03-20 PROCEDURE — P9059: CPT

## 2020-03-20 PROCEDURE — 97161 PT EVAL LOW COMPLEX 20 MIN: CPT

## 2020-03-20 PROCEDURE — 87631 RESP VIRUS 3-5 TARGETS: CPT

## 2020-03-20 PROCEDURE — 84100 ASSAY OF PHOSPHORUS: CPT

## 2020-03-20 PROCEDURE — 96375 TX/PRO/DX INJ NEW DRUG ADDON: CPT

## 2020-03-20 PROCEDURE — 83735 ASSAY OF MAGNESIUM: CPT

## 2020-03-20 PROCEDURE — 70450 CT HEAD/BRAIN W/O DYE: CPT

## 2020-03-20 PROCEDURE — 77001 FLUOROGUIDE FOR VEIN DEVICE: CPT

## 2020-03-20 PROCEDURE — 86901 BLOOD TYPING SEROLOGIC RH(D): CPT

## 2020-03-20 PROCEDURE — 36573 INSJ PICC RS&I 5 YR+: CPT

## 2020-03-20 PROCEDURE — 87070 CULTURE OTHR SPECIMN AEROBIC: CPT

## 2020-03-20 PROCEDURE — 86922 COMPATIBILITY TEST ANTIGLOB: CPT

## 2020-03-20 PROCEDURE — 82962 GLUCOSE BLOOD TEST: CPT

## 2020-03-20 PROCEDURE — 86905 BLOOD TYPING RBC ANTIGENS: CPT

## 2020-03-20 PROCEDURE — 87040 BLOOD CULTURE FOR BACTERIA: CPT

## 2020-03-20 PROCEDURE — 85730 THROMBOPLASTIN TIME PARTIAL: CPT

## 2020-03-20 PROCEDURE — 99285 EMERGENCY DEPT VISIT HI MDM: CPT

## 2020-03-20 PROCEDURE — C1889: CPT

## 2020-03-20 PROCEDURE — 81001 URINALYSIS AUTO W/SCOPE: CPT

## 2020-03-20 PROCEDURE — 87186 SC STD MICRODIL/AGAR DIL: CPT

## 2020-03-20 PROCEDURE — 97116 GAIT TRAINING THERAPY: CPT

## 2020-03-20 PROCEDURE — 85610 PROTHROMBIN TIME: CPT

## 2020-03-20 PROCEDURE — 87150 DNA/RNA AMPLIFIED PROBE: CPT

## 2020-03-20 PROCEDURE — C1751: CPT

## 2020-03-20 PROCEDURE — 71045 X-RAY EXAM CHEST 1 VIEW: CPT

## 2020-03-20 PROCEDURE — 96361 HYDRATE IV INFUSION ADD-ON: CPT

## 2020-03-20 PROCEDURE — 86870 RBC ANTIBODY IDENTIFICATION: CPT

## 2020-03-20 PROCEDURE — 36415 COLL VENOUS BLD VENIPUNCTURE: CPT

## 2020-03-20 PROCEDURE — 85027 COMPLETE CBC AUTOMATED: CPT

## 2020-03-20 PROCEDURE — 88304 TISSUE EXAM BY PATHOLOGIST: CPT

## 2020-03-20 PROCEDURE — 80048 BASIC METABOLIC PNL TOTAL CA: CPT

## 2020-03-20 PROCEDURE — 86900 BLOOD TYPING SEROLOGIC ABO: CPT

## 2020-03-20 PROCEDURE — 86880 COOMBS TEST DIRECT: CPT

## 2020-03-20 PROCEDURE — P9016: CPT

## 2020-03-20 PROCEDURE — 86850 RBC ANTIBODY SCREEN: CPT

## 2020-03-20 PROCEDURE — 93005 ELECTROCARDIOGRAM TRACING: CPT

## 2020-03-20 PROCEDURE — 97530 THERAPEUTIC ACTIVITIES: CPT

## 2020-03-20 PROCEDURE — 96365 THER/PROPH/DIAG IV INF INIT: CPT

## 2020-03-20 PROCEDURE — 71260 CT THORAX DX C+: CPT

## 2020-03-20 PROCEDURE — 74177 CT ABD & PELVIS W/CONTRAST: CPT

## 2020-03-20 PROCEDURE — 36430 TRANSFUSION BLD/BLD COMPNT: CPT

## 2020-03-20 PROCEDURE — 87086 URINE CULTURE/COLONY COUNT: CPT

## 2020-03-20 RX ORDER — FOLIC ACID 0.8 MG
1 TABLET ORAL
Qty: 30 | Refills: 0
Start: 2020-03-20 | End: 2020-04-18

## 2020-03-20 RX ADMIN — ONDANSETRON 4 MILLIGRAM(S): 8 TABLET, FILM COATED ORAL at 08:00

## 2020-03-20 RX ADMIN — LOSARTAN POTASSIUM 100 MILLIGRAM(S): 100 TABLET, FILM COATED ORAL at 05:35

## 2020-03-20 RX ADMIN — FLUVOXAMINE MALEATE 100 MILLIGRAM(S): 25 TABLET ORAL at 05:35

## 2020-03-20 RX ADMIN — HEPARIN SODIUM 5000 UNIT(S): 5000 INJECTION INTRAVENOUS; SUBCUTANEOUS at 13:09

## 2020-03-20 RX ADMIN — FLUVOXAMINE MALEATE 100 MILLIGRAM(S): 25 TABLET ORAL at 13:07

## 2020-03-20 RX ADMIN — HEPARIN SODIUM 5000 UNIT(S): 5000 INJECTION INTRAVENOUS; SUBCUTANEOUS at 05:35

## 2020-03-20 RX ADMIN — ERTAPENEM SODIUM 120 MILLIGRAM(S): 1 INJECTION, POWDER, LYOPHILIZED, FOR SOLUTION INTRAMUSCULAR; INTRAVENOUS at 13:07

## 2020-03-20 RX ADMIN — CHLORHEXIDINE GLUCONATE 1 APPLICATION(S): 213 SOLUTION TOPICAL at 13:19

## 2020-03-20 RX ADMIN — Medication 5 MILLIGRAM(S): at 05:35

## 2020-03-20 RX ADMIN — ATENOLOL 50 MILLIGRAM(S): 25 TABLET ORAL at 05:35

## 2020-03-20 RX ADMIN — Medication 1 MILLIGRAM(S): at 13:07

## 2020-03-20 NOTE — PROGRESS NOTE ADULT - ASSESSMENT
ACUTE PERFORATED CHOLECYSTITIS WITH ABSCESS  S/P lap errol with drainage of abscess -- POD #6  Continue post-op care  Surgery f/u    GRAM NEGATIVE SEPSIS  Continue IV Invanz x 10 days total  Repeat culture data -- NTD  ID consult noted    ANXIETY  Continue diazepam and fluvoxamine    HTN  Continue atenolol 50mg qd and losartan 100mg qd  Continue  norvasc 5mg qd    ANEMIA  S/P 2 units PRBC  H/H stable    Medically stable for discharge home

## 2020-03-20 NOTE — DISCHARGE NOTE PROVIDER - NSDCCPCAREPLAN_GEN_ALL_CORE_FT
PRINCIPAL DISCHARGE DIAGNOSIS  Diagnosis: Acute cholecystitis  Assessment and Plan of Treatment:       SECONDARY DISCHARGE DIAGNOSES  Diagnosis: Sepsis  Assessment and Plan of Treatment:

## 2020-03-20 NOTE — DISCHARGE NOTE PROVIDER - CARE PROVIDER_API CALL
Kj Pfeiffer)  [employer] [facility]  [address], [address]  [place] [address]  Phone: [phone]  Fax: [phone]  Follow Up Time:

## 2020-03-20 NOTE — DISCHARGE NOTE PROVIDER - NSDCHHNEEDSERVICE_GEN_ALL_CORE
Medication teaching and assessment Medication teaching and assessment/Observation and assessment/Teaching and training/Central venous access care

## 2020-03-20 NOTE — DISCHARGE NOTE NURSING/CASE MANAGEMENT/SOCIAL WORK - PATIENT PORTAL LINK FT
You can access the FollowMyHealth Patient Portal offered by [facility] by registering at the following website: [email or web address]. By joining [facility]’s FollowMyHealth portal, you will also be able to view your health information using other applications (apps) compatible with our system.

## 2020-03-20 NOTE — CHART NOTE - NSCHARTNOTEFT_GEN_A_CORE
Called by  to assess lesion on pts forearm prior to discharge. Pt seen and examined at bedside c/o of a 1 cm annular erythematous raised lesion on forearm. Pt worried it may be "ebola". Pt denies any pain over lesion and states it has been present for a few days. PT denies any discharge from lesion, any chest pain, SOB, N/V, fevers, chills, palpations.       REVIEW OF SYSTEMS:    CONSTITUTIONAL: No weakness, fevers or chills  EYES/ENT: No visual changes;  No vertigo or throat pain   NECK: No pain or stiffness  RESPIRATORY: No cough, wheezing, hemoptysis; No shortness of breath  CARDIOVASCULAR: No chest pain or palpitations  GASTROINTESTINAL: No abdominal or epigastric pain. No nausea, vomiting, or hematemesis; No diarrhea or constipation. No melena or hematochezia.  GENITOURINARY: No dysuria, frequency or hematuria  NEUROLOGICAL: No numbness or weakness  SKIN: SEE HPI  All other review of systems is negative unless indicated above.      ICU Vital Signs Last 24 Hrs  T(C): 36.6 (20 Mar 2020 07:25), Max: 37.6 (19 Mar 2020 16:00)  T(F): 97.9 (20 Mar 2020 07:25), Max: 99.6 (19 Mar 2020 16:00)  HR: 74 (20 Mar 2020 07:25) (74 - 94)  BP: 155/82 (20 Mar 2020 07:25) (153/80 - 156/76)  BP(mean): --  ABP: --  ABP(mean): --  RR: 18 (20 Mar 2020 07:25) (18 - 18)  SpO2: 92% (20 Mar 2020 07:25) (91% - 93%)      PHYSICAL EXAM:  GENERAL: NAD, well-developed  HEAD:  Atraumatic, Normocephalic  CHEST/LUNG: Ctabl, no ronchi, rales or wheezing   HEART: RRR, no MRG  PSYCH: AAOx3  SKIN: 1 cm annual erythematous raised lesion without discharge on right forearm, non ttp,    Assessment:  70 y/o M ready for discharge home on IV invanz, c/o of 1 cm annular raised lesion on right forearm,    Plan:  - pt surgically cleared for d/c home  - recommended warm compresses   - f/u with dermatologist as needed

## 2020-03-20 NOTE — PROGRESS NOTE ADULT - ASSESSMENT
[ASSESSMENT and  PLAN]  70yo M admitted with acute perforated cholecystitis requiring emergent surgery and has gram negative bacteremia; patient well known to us with hx of low grade ITP (platelet count -  s/p emergent surgery) , also with elevated coags due to lupus AC and Vitamin K deficiency (found while admitted at 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 No BRANT. +splenomegaly.   - continue post op care as per surgery  - continue IV antibiotics as per primary team  - blood indices continue to remain stable  -is now out of ICU with minimal decrease in hgb and improvement in platelet count.     RECOMMEND:   continue folic acid and hold transfusion.   Follow CBC in AM.     no significant bleeding. Hgb low at 7.6.   May need transfusion prior to DC.     DC planning.

## 2020-03-20 NOTE — PROGRESS NOTE ADULT - REASON FOR ADMISSION
perforated acute errol with sepsis

## 2020-03-20 NOTE — DISCHARGE NOTE PROVIDER - HOSPITAL COURSE
70 y/o M presented to [facility] on 3/14 c/o ruq abd pain with n/v and fevers for 2 days. Pt had presented to [facility] 5 weeks prior and was found to have gallstones with a negative HIDA. Pt was taken to the OR for a lap errol and found to have a perforated gallbladder. Pt tolerated procedure well and was transferred to the     floor for post op monitoring after an uneventful pacu recovery. On POD # 3 pt transfused 1 uprbc. On POD # 4 pt advanced to regular diet and ERAN drain was pulled. On POD # 5 pt had PICC line placed in anticipation of continuing IV abx therapy after discharge to complete 10 day dose of IV invanz per ID. On POD #5 pt surgically cleared for d/c home with discharge instructions provided. 70 y/o M presented to [place] ED on 3/14 c/o ruq abd pain with n/v and fevers for 2 days. Pt had presented to [facility] 5 weeks prior and was found to have gallstones with a negative HIDA. Pt was taken to the OR for a lap errol and found to have a perforated gallbladder. Pt tolerated procedure well and was transferred to the floor for post op monitoring after an uneventful pacu recovery. On POD # 3 pt transfused 1 uprbc. On POD # 4 pt advanced to regular diet and ERAN drain was pulled. On POD # 5 pt had PICC line placed in anticipation of continuing IV abx therapy after discharge to complete 10 day dose of IV invanz per ID. On POD #5 pt surgically cleared for d/c home with discharge instructions provided.

## 2020-03-20 NOTE — PROGRESS NOTE ADULT - SUBJECTIVE AND OBJECTIVE BOX
Patient is a 69y old  Male who presents with a chief complaint of perforated acute errol with sepsis (20 Mar 2020 08:15)      INTERVAL HPI/OVERNIGHT EVENTS:  Patient seen and examined. NAD. No complaints.    Vital Signs Last 24 Hrs  T(C): 36.6 (20 Mar 2020 07:25), Max: 37.6 (19 Mar 2020 16:00)  T(F): 97.9 (20 Mar 2020 07:25), Max: 99.6 (19 Mar 2020 16:00)  HR: 74 (20 Mar 2020 07:25) (74 - 94)  BP: 155/82 (20 Mar 2020 07:25) (153/80 - 156/76)  BP(mean): --  RR: 18 (20 Mar 2020 07:25) (18 - 18)  SpO2: 92% (20 Mar 2020 07:25) (91% - 93%)    03-20    142  |  104  |  16  ----------------------------<  95  3.8   |  33<H>  |  1.20    Ca    9.2      20 Mar 2020 07:33  Mg     2.0     03-18    TPro  6.0  /  Alb  2.4<L>  /  TBili  0.8  /  DBili  x   /  AST  45<H>  /  ALT  49  /  AlkPhos  128<H>  03-20                          7.9    6.16  )-----------( 104      ( 20 Mar 2020 07:33 )             23.6       CAPILLARY BLOOD GLUCOSE                  acetaminophen   Tablet .. 650 milliGRAM(s) Oral every 6 hours PRN  ATENolol  Tablet 50 milliGRAM(s) Oral daily  chlorhexidine 2% Cloths 1 Application(s) Topical daily  diazepam    Tablet 5 milliGRAM(s) Oral every 12 hours  ertapenem  IVPB 1000 milliGRAM(s) IV Intermittent every 24 hours  fluvoxaMINE 100 milliGRAM(s) Oral two times a day  folic acid 1 milliGRAM(s) Oral daily  heparin  Injectable 5000 Unit(s) SubCutaneous every 8 hours  losartan 100 milliGRAM(s) Oral daily  melatonin 3 milliGRAM(s) Oral at bedtime  ondansetron Injectable 4 milliGRAM(s) IV Push every 6 hours PRN              REVIEW OF SYSTEMS:  CONSTITUTIONAL: No fever, no weight loss, or no fatigue  NECK: No pain, no stiffness  RESPIRATORY: No cough, no wheezing, no chills, no hemoptysis, No shortness of breath  CARDIOVASCULAR: No chest pain, no palpitations, no dizziness, no leg swelling  GASTROINTESTINAL: No abdominal pain. No nausea, no vomiting, no hematemesis; No diarrhea, no constipation. No melena, no hematochezia.  GENITOURINARY: No dysuria, no frequency, no hematuria, no incontinence  NEUROLOGICAL: No headaches, no loss of strength, no numbness, no tremors  SKIN: No itching, no burning  MUSCULOSKELETAL: No joint pain, no swelling; No muscle, no back, no extremity pain  PSYCHIATRIC: No depression, no mood swings,   HEME/LYMPH: No easy bruising, no bleeding gums  ALLERY AND IMMUNOLOGIC: No hives       Consultant(s) Notes Reviewed:  [X] YES  [ ] NO    PHYSICAL EXAM:  GENERAL: NAD  HEAD:  Atraumatic, Normocephalic  EYES: EOMI, PERRLA, conjunctiva and sclera clear  ENMT: No tonsillar erythema, exudates, or enlargement; Moist mucous membranes  NECK: Supple, No JVD  NERVOUS SYSTEM:  Awake & alert  CHEST/LUNG: Clear to auscultation bilaterally; No rales, rhonchi, wheezing,  HEART: Regular rate and rhythm  ABDOMEN: Soft, Nontender, Nondistended; Bowel sounds present  EXTREMITIES:  No clubbing, cyanosis, or edema  LYMPH: No lymphadenopathy noted  SKIN: No rashes      Advanced care planning discussed with patient/family [X] YES   [ ] NO    Advanced care planning discussed with patient/family. Patient's health status was discussed. All appropriate changes have been made regarding patient's end-of-life care. Advanced care planning forms reviewed/discussed/completed.  20 minutes spent.

## 2020-03-20 NOTE — DISCHARGE NOTE PROVIDER - NSDCCPTREATMENT_GEN_ALL_CORE_FT
PRINCIPAL PROCEDURE  Procedure: Cholecystectomy, laparoscopic  Findings and Treatment: with drainage of pericholecystic abscess

## 2020-03-20 NOTE — PROGRESS NOTE ADULT - SUBJECTIVE AND OBJECTIVE BOX
[INTERVAL HX: ]  Patient seen and examined;  Chart reviewed and events noted;     Patient is a 69y Male with a known history of :  Acute cholecystitis (K81.0) [Active]  History of antiphospholipid antibody syndrome (Z86.2) [Active]  Idiopathic thrombocytopenic purpura (ITP) (D69.3) [Active]  Hypertension (I10) [Active]  Anxiety (F41.9) [Active]  Anxiety (F41.9) [Active]  Hypertension, unspecified type (I10) [Active]  Enlarged tonsils (J35.1) [Active]  No significant past surgical history ([id number]) [Inactive]  H/O Mohs micrographic surgery for skin cancer (Z85.828) [Active]    HPI:  Pt is a 69 year old man who had onset 2 days ago of fever, RUQ ruq pain and vomiting.  Had same pain 5 weeks ago, seen at [facility] where he had gallstones on sono but normal HIDA scan.  Sent home  Never jaundiced (14 Mar 2020 20:00)    no CP, no SOB, no GUERRERO.   Walking with walker.   Drain removed.           MEDICATIONS  (STANDING):  ATENolol  Tablet 50 milliGRAM(s) Oral daily  chlorhexidine 2% Cloths 1 Application(s) Topical daily  diazepam    Tablet 5 milliGRAM(s) Oral every 12 hours  ertapenem  IVPB 1000 milliGRAM(s) IV Intermittent every 24 hours  fluvoxaMINE 100 milliGRAM(s) Oral two times a day  folic acid 1 milliGRAM(s) Oral daily  heparin  Injectable 5000 Unit(s) SubCutaneous every 8 hours  losartan 100 milliGRAM(s) Oral daily  melatonin 3 milliGRAM(s) Oral at bedtime    MEDICATIONS  (PRN):  acetaminophen   Tablet .. 650 milliGRAM(s) Oral every 6 hours PRN Mild Pain (1 - 3)  ondansetron Injectable 4 milliGRAM(s) IV Push every 6 hours PRN Nausea and/or Vomiting      Vital Signs Last 24 Hrs  T(C): 36.6 (20 Mar 2020 07:25), Max: 37.6 (19 Mar 2020 16:00)  T(F): 97.9 (20 Mar 2020 07:25), Max: 99.6 (19 Mar 2020 16:00)  HR: 74 (20 Mar 2020 07:25) (74 - 94)  BP: 155/82 (20 Mar 2020 07:25) (153/80 - 156/76)  BP(mean): --  RR: 18 (20 Mar 2020 07:25) (18 - 18)  SpO2: 92% (20 Mar 2020 07:25) (91% - 93%)      [PHYSICAL EXAM]  General: adult in NAD,  WN,  WD.  HEENT: clear oropharynx, anicteric sclera, pink conjunctivae.  Neck: supple, no masses.  CV: normal S1S2, no murmur, no rubs, no gallops.  Lungs: clear to auscultation, no wheezes, no rales, no rhonchi.  Abdomen: soft, non-tender, non-distended, no hepatosplenomegaly, normal BS, no guarding.  Ext: no clubbing, no cyanosis, no edema.  Skin: no rashes,  no petechiae, no venous stasis changes.  Neuro: alert and oriented X3  , no focal motor deficits.  LN: no SC BRANT.      [LABS:]                        7.9    6.16  )-----------( 104      ( 20 Mar 2020 07:33 )             23.6     03-20    142  |  104  |  16  ----------------------------<  95  3.8   |  33<H>  |  1.20    Ca    9.2      20 Mar 2020 07:33    TPro  6.0  /  Alb  2.4<L>  /  TBili  0.8  /  DBili  x   /  AST  45<H>  /  ALT  49  /  AlkPhos  128<H>  03-20        [RADIOLOGY STUDIES:]

## 2020-03-20 NOTE — DISCHARGE NOTE PROVIDER - NSDCFUADDINST_GEN_ALL_CORE_FT
No heavy lifting, no strenuous activity, may shower allowing water to run over incisional sites however do not rub just pat dry.

## 2020-03-20 NOTE — PROGRESS NOTE ADULT - SUBJECTIVE AND OBJECTIVE BOX
POD # 6  s/p sudha errol    SUBJECTIVE:  68 y/o M seen and examined at bedside offering no complaints at this time. PT had PICC line placed with IR yesterday. Pt tolerating reg diet admits to flatus and bowel movement. Pt denies any chest pain, SOB, palpitations, fevers, chills, abdominal pain, nausea, vomiting or diarrhea.    Vital Signs Last 24 Hrs  T(C): 36.6 (20 Mar 2020 07:25), Max: 37.6 (19 Mar 2020 16:00)  T(F): 97.9 (20 Mar 2020 07:25), Max: 99.6 (19 Mar 2020 16:00)  HR: 74 (20 Mar 2020 07:25) (74 - 94)  BP: 155/82 (20 Mar 2020 07:25) (153/80 - 156/76)  BP(mean): --  RR: 18 (20 Mar 2020 07:25) (18 - 18)  SpO2: 92% (20 Mar 2020 07:25) (91% - 93%)    PHYSICAL EXAM:  GENERAL: No acute distress, well-developed  CHEST/LUNG: CTABL, no ronchi, rales or wheezing  HEART: RRR, no MRG  ABDOMEN: Soft, mild incisional ttp, nondistended, +bs x4 quadrants, dressings c/d/i  NEUROLOGY: A&O x 3, no focal deficits    I&O's Summary    19 Mar 2020 07:01  -  20 Mar 2020 07:00  --------------------------------------------------------  IN: 120 mL / OUT: 400 mL / NET: -280 mL      I&O's Detail    19 Mar 2020 07:01  -  20 Mar 2020 07:00  --------------------------------------------------------  IN:    Oral Fluid: 120 mL  Total IN: 120 mL    OUT:    Voided: 400 mL  Total OUT: 400 mL    Total NET: -280 mL        MEDICATIONS  (STANDING):  ATENolol  Tablet 50 milliGRAM(s) Oral daily  chlorhexidine 2% Cloths 1 Application(s) Topical daily  diazepam    Tablet 5 milliGRAM(s) Oral every 12 hours  ertapenem  IVPB 1000 milliGRAM(s) IV Intermittent every 24 hours  fluvoxaMINE 100 milliGRAM(s) Oral two times a day  folic acid 1 milliGRAM(s) Oral daily  heparin  Injectable 5000 Unit(s) SubCutaneous every 8 hours  losartan 100 milliGRAM(s) Oral daily  melatonin 3 milliGRAM(s) Oral at bedtime    MEDICATIONS  (PRN):  acetaminophen   Tablet .. 650 milliGRAM(s) Oral every 6 hours PRN Mild Pain (1 - 3)  ondansetron Injectable 4 milliGRAM(s) IV Push every 6 hours PRN Nausea and/or Vomiting    LABS:                        7.9    6.16  )-----------( 104      ( 20 Mar 2020 07:33 )             23.6     03-20    142  |  104  |  16  ----------------------------<  95  3.8   |  33<H>  |  1.20    Ca    9.2      20 Mar 2020 07:33  Mg     2.0     03-18    TPro  6.0  /  Alb  2.4<L>  /  TBili  0.8  /  DBili  x   /  AST  45<H>  /  ALT  49  /  AlkPhos  128<H>  03-20  ASSESSMENT  68 y/o M POD # 6 s/p lap errol, with stable H/H, asymptomatic, now with PICC line,     PLAN  - cont IV abx  - d/c planning on with IV abx per ID??  - pain control, supportive care  - OOB, ambulation as tolerated  - serial abdominal exams  - to be discussed with Dr. Pfeiffer    Surgical Team Contact Information  Spectralink: Ext: [phone] or [phone]  Pager: [phone] POD # 6  s/p sudha errol    SUBJECTIVE:  70 y/o M seen and examined at bedside offering no complaints at this time. PT had PICC line placed with IR yesterday. Pt tolerating reg diet admits to flatus and bowel movement. Pt denies any chest pain, SOB, palpitations, fevers, chills, abdominal pain, nausea, vomiting or diarrhea.    Vital Signs Last 24 Hrs  T(C): 36.6 (20 Mar 2020 07:25), Max: 37.6 (19 Mar 2020 16:00)  T(F): 97.9 (20 Mar 2020 07:25), Max: 99.6 (19 Mar 2020 16:00)  HR: 74 (20 Mar 2020 07:25) (74 - 94)  BP: 155/82 (20 Mar 2020 07:25) (153/80 - 156/76)  BP(mean): --  RR: 18 (20 Mar 2020 07:25) (18 - 18)  SpO2: 92% (20 Mar 2020 07:25) (91% - 93%)    PHYSICAL EXAM:  GENERAL: No acute distress, well-developed  CHEST/LUNG: CTABL, no ronchi, rales or wheezing  HEART: RRR, no MRG  ABDOMEN: Soft, mild incisional ttp, nondistended, +bs x4 quadrants, dressings c/d/i  NEUROLOGY: A&O x 3, no focal deficits    I&O's Summary    19 Mar 2020 07:01  -  20 Mar 2020 07:00  --------------------------------------------------------  IN: 120 mL / OUT: 400 mL / NET: -280 mL      I&O's Detail    19 Mar 2020 07:01  -  20 Mar 2020 07:00  --------------------------------------------------------  IN:    Oral Fluid: 120 mL  Total IN: 120 mL    OUT:    Voided: 400 mL  Total OUT: 400 mL    Total NET: -280 mL        MEDICATIONS  (STANDING):  ATENolol  Tablet 50 milliGRAM(s) Oral daily  chlorhexidine 2% Cloths 1 Application(s) Topical daily  diazepam    Tablet 5 milliGRAM(s) Oral every 12 hours  ertapenem  IVPB 1000 milliGRAM(s) IV Intermittent every 24 hours  fluvoxaMINE 100 milliGRAM(s) Oral two times a day  folic acid 1 milliGRAM(s) Oral daily  heparin  Injectable 5000 Unit(s) SubCutaneous every 8 hours  losartan 100 milliGRAM(s) Oral daily  melatonin 3 milliGRAM(s) Oral at bedtime    MEDICATIONS  (PRN):  acetaminophen   Tablet .. 650 milliGRAM(s) Oral every 6 hours PRN Mild Pain (1 - 3)  ondansetron Injectable 4 milliGRAM(s) IV Push every 6 hours PRN Nausea and/or Vomiting    LABS:                        7.9    6.16  )-----------( 104      ( 20 Mar 2020 07:33 )             23.6     03-20    142  |  104  |  16  ----------------------------<  95  3.8   |  33<H>  |  1.20    Ca    9.2      20 Mar 2020 07:33  Mg     2.0     03-18    TPro  6.0  /  Alb  2.4<L>  /  TBili  0.8  /  DBili  x   /  AST  45<H>  /  ALT  49  /  AlkPhos  128<H>  03-20  ASSESSMENT  70 y/o M POD # 6 s/p lap errol, with stable H/H, asymptomatic, now with PICC line,     PLAN  - cont IV abx  - d/c planning on with IV abx per ID  - surgically cleared for d/c home  - pain control, supportive care  - OOB, ambulation as tolerated  - serial abdominal exams  - discussed with Dr. Pfeiffer    Surgical Team Contact Information  Spectralink: Ext: [phone] or [phone]  Pager: [phone]

## 2020-03-20 NOTE — DISCHARGE NOTE NURSING/CASE MANAGEMENT/SOCIAL WORK - NSSCTYPOFSERV_GEN_ALL_CORE
Will make initial home visit day after discharge to instruct home IV ABX therapy and deliver IV equipment and antibiotics

## 2020-03-20 NOTE — DISCHARGE NOTE PROVIDER - NSDCMRMEDTOKEN_GEN_ALL_CORE_FT
amLODIPine 5 mg oral tablet: 1 tab(s) orally once a day  amLODIPine 5 mg oral tablet: 1 tab(s) orally once a day  atenolol 50 mg oral tablet: 1 tab(s) orally once a day  atenolol 50 mg oral tablet: 1 tab(s) orally 2 times a day  diazePAM 5 mg oral tablet: 5 milligram(s) orally once a day, As Needed  fluvoxaMINE 100 mg oral tablet: 1 tab(s) orally 2 times a day  fluvoxaMINE 100 mg oral tablet: 1 tab(s) orally 2 times a day  folic acid 1 mg oral tablet: 1 tab(s) orally once a day  guaiFENesin 100 mg/5 mL oral liquid: 5 milliliter(s) orally every 6 hours, As needed, Cough  losartan 100 mg oral tablet: 1 tab(s) orally once a day  losartan 100 mg oral tablet: 1 tab(s) orally once a day  oseltamivir 75 mg oral capsule: 1 cap(s) orally 2 times a day. Please supply 5 more doses to complete 5 day treatment.   Viraj Iqbal: Viraj Iqbal  ICD 10 M62.81  s/p lap errol for perf gallbladder  Valium 5 mg oral tablet: 1.5 tab(s) orally 2 times a day amLODIPine 5 mg oral tablet: 1 tab(s) orally once a day  amLODIPine 5 mg oral tablet: 1 tab(s) orally once a day  atenolol 50 mg oral tablet: 1 tab(s) orally once a day  atenolol 50 mg oral tablet: 1 tab(s) orally 2 times a day  diazePAM 5 mg oral tablet: 5 milligram(s) orally once a day, As Needed  fluvoxaMINE 100 mg oral tablet: 1 tab(s) orally 2 times a day  fluvoxaMINE 100 mg oral tablet: 1 tab(s) orally 2 times a day  folic acid 1 mg oral tablet: 1 tab(s) orally once a day  guaiFENesin 100 mg/5 mL oral liquid: 5 milliliter(s) orally every 6 hours, As needed, Cough  losartan 100 mg oral tablet: 1 tab(s) orally once a day  losartan 100 mg oral tablet: 1 tab(s) orally once a day  oseltamivir 75 mg oral capsule: 1 cap(s) orally 2 times a day. Please supply 5 more doses to complete 5 day treatment.   Viraj Iqbal: Viraj Ibqal  ICD 10 M62.81  s/p lap errol for perf gallbladder  Valium 5 mg oral tablet: 1.5 tab(s) orally 2 times a day amLODIPine 5 mg oral tablet: 1 tab(s) orally once a day  amLODIPine 5 mg oral tablet: 1 tab(s) orally once a day  atenolol 50 mg oral tablet: 1 tab(s) orally once a day  atenolol 50 mg oral tablet: 1 tab(s) orally 2 times a day  diazePAM 5 mg oral tablet: 5 milligram(s) orally once a day, As Needed  ertapenem 1 g injection: 1 gram(s) IV once a day  fluvoxaMINE 100 mg oral tablet: 1 tab(s) orally 2 times a day  fluvoxaMINE 100 mg oral tablet: 1 tab(s) orally 2 times a day  folic acid 1 mg oral tablet: 1 tab(s) orally once a day  guaiFENesin 100 mg/5 mL oral liquid: 5 milliliter(s) orally every 6 hours, As needed, Cough  losartan 100 mg oral tablet: 1 tab(s) orally once a day  losartan 100 mg oral tablet: 1 tab(s) orally once a day  oseltamivir 75 mg oral capsule: 1 cap(s) orally 2 times a day. Please supply 5 more doses to complete 5 day treatment.   Viraj Iqbal: Viraj Iqbal  ICD 10 M62.81  s/p lap errol for perf gallbladder  Valium 5 mg oral tablet: 1.5 tab(s) orally 2 times a day

## 2021-02-12 NOTE — PROVIDER CONTACT NOTE (CRITICAL VALUE NOTIFICATION) - NS PROVIDER READ BACK TO LAB
Palliative Medicine Consult  Mario: [phone] ([phone])    Patient Name: Anirudh Klein  YOB: 1988    Date of Initial Consult: 2/10/21  Reason for Consult: Overwhelming sx  Requesting Provider: Gregory Almazan  Primary Care Physician: JESSICA Newsome     SUMMARY:   Anirudh Klein is a 27 y. o. without significant pmhx who was admitted on 2/4/2021 from home w/ epistaxis requiring hx of packing in ED and recurrent headaches. MRI brain 2/5/21 showing 4.2x4cm posterior nasopharyngeal mass, b/l cervical adenopathy. 2/6/21 bx performed- path still pending. If nasopharyngeal carcinoma plan would be for radiation and chemotherapy. 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 Port placed yest.     Current medical issues leading to Palliative Medicine involvement include: overwhelming sx as recommended by Onc. On Topamax for migraine sx, has Fioricet prn and Dilaudid 0.5mg IV every 2h prn yesterday and now every 4h prn - used total of 4mg in past 24h. Social: Pt  to [place]. They have an 5 yo dtr and 5 yo son. He uses [facility] for medical care. Speaks some English but does require translation services w/ [employer]. PALLIATIVE DIAGNOSES:   1. Headache due to nasopharyngeal mass- R sided   2. Constipation due to pain medications   3. Debility due to pain   4. Advanced care planning        PLAN:   1. Headache pain report sounds migrainous in nature but no hx of migraines- related to nasopharyngeal mass. 2. Headache:On Topamax 50mg bid and Zomig prn for migraine type sx. 3. Sx improved w/ Morphine 15mg every 4h prn however our pharmacy out of it- thus changing to Oxycodone 15mg po every 4h- prescribed for 14 days. 4. May need long acting medication- sees the [facility]  and follows closely twice a month. May be able to get financial assistance for [employer], etc from [facility] over clinic. 5. Tapered Dilaudid- agree with 0.2mg IV every 6h prn. Do not think he necessarily has to be off completely before discharge. 6. Constipation due to pain medication: Miralax and Senna daily- last BM 2/11.  7. Tell him that he needs to take stool softeners at home. 8. Advanced care planning: AMD copies left w/ pt in both [address] [place] [address] and [place]. Introduced ACP but pt in too much pain right now to consider completion. 9. Always use . Oncology to follow today as he has many questions about cancer- asks if the tumor is benign or malignant. Tell him that it is malignant, but we need more tests and close follow up for more information about plan of care, but assure him that we all will work closely and know that his goal is to get as good as possible for his wife and 2 young children. Oncology team to call pt's room soon w/  to talk more. 10. Will also see my team in clinic for sx management. 11. Communicated plan of care with: Palliative Kristen SANT[facility] Team incl Tracie Wilson RN, Susan Wade     GOALS OF CARE / TREATMENT PREFERENCES:     GOALS OF CARE:  Patient/Health Care Proxy Stated Goals: Cure    TREATMENT PREFERENCES:   Code Status: Full Code    Advance Care Planning:  [x] [facility] Interdisciplinary Team has updated the ACP Navigator with Health Care Decision Maker and Patient Capacity      No flowsheet data found. Medical Interventions: Full interventions             Other:    As far as possible, the palliative care team has discussed with patient / health care proxy about goals of care / treatment preferences for patient. HISTORY:     History obtained from: chart, staff, family     CHIEF COMPLAINT: Headache    HPI/SUBJECTIVE:    The patient is:   [x] Verbal and participatory  [] Non-participatory due to: Improved HA pain somewhat- feels that it is tolerable to go home. Friend at bedside. Eating okay.      Clinical Pain Assessment (nonverbal scale for severity on nonverbal patients):   Clinical Pain Assessment  Severity: 3  Location: R side of head and behind both eyes  Character: Aching, pounding  Duration: Weeks  Effect: Cannot move, function, eat  Factors: Better w/ medication  Frequency: Constant          Duration: for how long has pt been experiencing pain (e.g., 2 days, 1 month, years)  Frequency: how often pain is an issue (e.g., several times per day, once every few days, constant)     FUNCTIONAL ASSESSMENT:     Palliative Performance Scale (PPS):  PPS: 70       PSYCHOSOCIAL/SPIRITUAL SCREENING:     Palliative IDT has assessed this patient for cultural preferences / practices and a referral made as appropriate to needs (Cultural Services, Patient Advocacy, Ethics, etc.)    Any spiritual / Yazdanism concerns:  [] Yes /  [x] No    Caregiver Burnout:  [] Yes /  [x] No /  [] No Caregiver Present      Anticipatory grief assessment:   [x] Normal  / [] Maladaptive       ESAS Anxiety: Anxiety: 2    ESAS Depression: Depression: 0        REVIEW OF SYSTEMS:     Positive and pertinent negative findings in ROS are noted above in HPI. The following systems were [x] reviewed / [] unable to be reviewed as noted in HPI  Other findings are noted below. Systems: constitutional, ears/nose/mouth/throat, respiratory, gastrointestinal, genitourinary, musculoskeletal, integumentary, neurologic, psychiatric, endocrine. Positive findings noted below. Modified ESAS Completed by: provider   Fatigue: 3 Drowsiness: 0   Depression: 0 Pain: 3   Anxiety: 2 Nausea: 0   Anorexia: 3 Dyspnea: 0     Constipation: Yes              PHYSICAL EXAM:     From RN flowsheet:  Wt Readings from Last 3 Encounters:   02/05/21 192 lb 8 oz (87.3 kg)   01/27/21 200 lb (90.7 kg)   01/12/21 220 lb (99.8 kg)     Blood pressure (!) 145/98, pulse 82, temperature 97.6 °F (36.4 °C), resp. rate 16, height 5' 7.01\" (1.702 m), SpO2 97 %.     Pain Scale 1: Numeric (0 - 10)  Pain Intensity 1: 10  Pain Onset 1: acute  Pain Location 1: Head  Pain Orientation 1: Anterior  Pain Description 1: Aching  Pain Intervention(s) 1: Medication (see MAR)      Constitutional:awake, alert, speech clear, appears in mild distress holding head   Eyes: pupils equal, anicteric  ENMT: no nasal discharge, moist mucous membranes  Cardiovascular: regular rhythm  Respiratory: breathing not labored, symmetric  Gastrointestinal: soft non-tender, +bowel sounds  Musculoskeletal: no deformity, no tenderness to palpation  Skin: warm, dry  Neurologic: following commands, moving all extremities  Psychiatric: calm        HISTORY:     Principal Problem:    Epistaxis (2/4/2021)      History reviewed. No pertinent past medical history. Past Surgical History:   Procedure Laterality Date    IR INSERT TUNL CVC W PORT OVER 5 YEARS  2/11/2021         IR INSERT TUNL CVC W PORT OVER 5 YEARS  2/11/2021      No family history on file. History reviewed, no pertinent family history.   Social History     Tobacco Use    Smoking status: Never Smoker    Smokeless tobacco: Never Used   Substance Use Topics    Alcohol use: Not Currently     Allergies   Allergen Reactions    Iodinated Contrast Media Itching      Current Facility-Administered Medications   Medication Dose Route Frequency    HYDROmorphone (PF) (DILAUDID) injection 0.2 mg  0.2 mg IntraVENous Q6H PRN    oxyCODONE IR (ROXICODONE) tablet 15 mg  15 mg Oral Q4H PRN    bisacodyL (DULCOLAX) suppository 10 mg  10 mg Rectal DAILY PRN    magnesium hydroxide (MILK OF MAGNESIA) 400 mg/5 mL oral suspension 30 mL  30 mL Oral DAILY PRN    polyethylene glycol (MIRALAX) packet 17 g  17 g Oral DAILY    senna (SENOKOT) tablet 17.2 mg  2 Tab Oral DAILY    butalbital-acetaminophen-caffeine (FIORICET, ESGIC) -40 mg per tablet 1 Tab  1 Tab Oral Q4H PRN    oxymetazoline (AFRIN) 0.05 % nasal spray 2 Spray  2 Spray Both Nostrils BID PRN    topiramate (TOPAMAX) tablet 50 mg  50 mg Oral BID    verapamiL (CALAN) tablet 80 mg  80 yes mg Oral TID    ZOLMitriptan (ZOMIG) tablet 2.5 mg  2.5 mg Oral PRN    sodium chloride (NS) flush 5-40 mL  5-40 mL IntraVENous Q8H    sodium chloride (NS) flush 5-40 mL  5-40 mL IntraVENous PRN    acetaminophen (TYLENOL) tablet 650 mg  650 mg Oral Q6H PRN    Or    acetaminophen (TYLENOL) suppository 650 mg  650 mg Rectal Q6H PRN    promethazine (PHENERGAN) tablet 12.5 mg  12.5 mg Oral Q6H PRN    Or    ondansetron (ZOFRAN) injection 4 mg  4 mg IntraVENous Q6H PRN     Facility-Administered Medications Ordered in Other Encounters   Medication Dose Route Frequency    heparin (porcine) pf 300 Units  300 Units InterCATHeter PRN          LAB AND IMAGING FINDINGS:     Lab Results   Component Value Date/Time    WBC 11.9 (H) 02/12/2021 01:57 AM    HGB 13.1 02/12/2021 01:57 AM    PLATELET 711 77/16/6672 01:57 AM     Lab Results   Component Value Date/Time    Sodium 134 (L) 02/12/2021 01:57 AM    Potassium 4.0 02/12/2021 01:57 AM    Chloride 102 02/12/2021 01:57 AM    CO2 23 02/12/2021 01:57 AM    BUN 22 (H) 02/12/2021 01:57 AM    Creatinine 0.84 02/12/2021 01:57 AM    Calcium 9.5 02/12/2021 01:57 AM    Magnesium 2.4 02/05/2021 03:50 AM    Phosphorus 4.4 02/09/2021 02:31 AM      Lab Results   Component Value Date/Time    Alk.  phosphatase 102 02/12/2021 01:57 AM    Protein, total 7.6 02/12/2021 01:57 AM    Albumin 3.4 (L) 02/12/2021 01:57 AM    Globulin 4.2 (H) 02/12/2021 01:57 AM     Lab Results   Component Value Date/Time    INR 1.1 02/04/2021 09:01 PM    Prothrombin time 11.0 02/04/2021 09:01 PM    aPTT 30.8 02/04/2021 09:01 PM      No results found for: IRON, FE, TIBC, IBCT, PSAT, FERR   No results found for: PH, PCO2, PO2  No components found for: GLPOC   No results found for: CPK, CKMB             Total time:   Counseling / coordination time, spent as noted above:   > 50% counseling / coordination?:     Prolonged service was provided for  []30 min   []75 min in face to face time in the presence of the patient, spent as noted above. Time Start:   Time End:   Note: this can only be billed with 79056 (initial) or 03922 (follow up). If multiple start / stop times, list each separately.

## 2022-08-29 ENCOUNTER — TRANSCRIPTION ENCOUNTER (OUTPATIENT)
Age: 72
End: 2022-08-29

## 2022-08-29 ENCOUNTER — EMERGENCY (EMERGENCY)
Facility: HOSPITAL | Age: 72
LOS: 1 days | Discharge: HOME | End: 2022-08-29
Attending: EMERGENCY MEDICINE | Admitting: EMERGENCY MEDICINE
Payer: MEDICARE

## 2022-08-29 VITALS
WEIGHT: 175.05 LBS | DIASTOLIC BLOOD PRESSURE: 69 MMHG | OXYGEN SATURATION: 96 % | TEMPERATURE: 98 F | HEIGHT: 72 IN | SYSTOLIC BLOOD PRESSURE: 128 MMHG | RESPIRATION RATE: 18 BRPM | HEART RATE: 66 BPM

## 2022-08-29 VITALS
RESPIRATION RATE: 17 BRPM | DIASTOLIC BLOOD PRESSURE: 74 MMHG | SYSTOLIC BLOOD PRESSURE: 145 MMHG | HEART RATE: 75 BPM | TEMPERATURE: 99 F | OXYGEN SATURATION: 95 %

## 2022-08-29 DIAGNOSIS — J35.1 HYPERTROPHY OF TONSILS: Chronic | ICD-10-CM

## 2022-08-29 DIAGNOSIS — Z85.828 PERSONAL HISTORY OF OTHER MALIGNANT NEOPLASM OF SKIN: Chronic | ICD-10-CM

## 2022-08-29 PROBLEM — Z86.2 PERSONAL HISTORY OF DISEASES OF THE BLOOD AND BLOOD-FORMING ORGANS AND CERTAIN DISORDERS INVOLVING THE IMMUNE MECHANISM: Chronic | Status: ACTIVE | Noted: 2020-03-14

## 2022-08-29 PROBLEM — F41.9 ANXIETY DISORDER, UNSPECIFIED: Chronic | Status: ACTIVE | Noted: 2020-03-14

## 2022-08-29 PROBLEM — D69.3 IMMUNE THROMBOCYTOPENIC PURPURA: Chronic | Status: ACTIVE | Noted: 2020-03-14

## 2022-08-29 PROBLEM — I10 ESSENTIAL (PRIMARY) HYPERTENSION: Chronic | Status: ACTIVE | Noted: 2020-03-14

## 2022-08-29 LAB
ALBUMIN SERPL ELPH-MCNC: 3.8 G/DL — SIGNIFICANT CHANGE UP (ref 3.3–5)
ALP SERPL-CCNC: 65 U/L — SIGNIFICANT CHANGE UP (ref 40–120)
ALT FLD-CCNC: 20 U/L — SIGNIFICANT CHANGE UP (ref 12–78)
ANION GAP SERPL CALC-SCNC: 5 MMOL/L — SIGNIFICANT CHANGE UP (ref 5–17)
AST SERPL-CCNC: 20 U/L — SIGNIFICANT CHANGE UP (ref 15–37)
BASOPHILS # BLD AUTO: 0 K/UL — SIGNIFICANT CHANGE UP (ref 0–0.2)
BASOPHILS NFR BLD AUTO: 0 % — SIGNIFICANT CHANGE UP (ref 0–2)
BILIRUB SERPL-MCNC: 1.1 MG/DL — SIGNIFICANT CHANGE UP (ref 0.2–1.2)
BUN SERPL-MCNC: 19 MG/DL — SIGNIFICANT CHANGE UP (ref 7–23)
CALCIUM SERPL-MCNC: 9.2 MG/DL — SIGNIFICANT CHANGE UP (ref 8.5–10.1)
CHLORIDE SERPL-SCNC: 102 MMOL/L — SIGNIFICANT CHANGE UP (ref 96–108)
CK MB CFR SERPL CALC: <1 NG/ML — SIGNIFICANT CHANGE UP (ref 0–3.6)
CO2 SERPL-SCNC: 32 MMOL/L — HIGH (ref 22–31)
CREAT SERPL-MCNC: 1.6 MG/DL — HIGH (ref 0.5–1.3)
EGFR: 45 ML/MIN/1.73M2 — LOW
EOSINOPHIL # BLD AUTO: 0 K/UL — SIGNIFICANT CHANGE UP (ref 0–0.5)
EOSINOPHIL NFR BLD AUTO: 0 % — SIGNIFICANT CHANGE UP (ref 0–6)
GLUCOSE SERPL-MCNC: 107 MG/DL — HIGH (ref 70–99)
HCT VFR BLD CALC: 33.6 % — LOW (ref 39–50)
HGB BLD-MCNC: 12 G/DL — LOW (ref 13–17)
LYMPHOCYTES # BLD AUTO: 0.22 K/UL — LOW (ref 1–3.3)
LYMPHOCYTES # BLD AUTO: 3 % — LOW (ref 13–44)
MCHC RBC-ENTMCNC: 29.7 PG — SIGNIFICANT CHANGE UP (ref 27–34)
MCHC RBC-ENTMCNC: 35.7 GM/DL — SIGNIFICANT CHANGE UP (ref 32–36)
MCV RBC AUTO: 83.2 FL — SIGNIFICANT CHANGE UP (ref 80–100)
MONOCYTES # BLD AUTO: 0.36 K/UL — SIGNIFICANT CHANGE UP (ref 0–0.9)
MONOCYTES NFR BLD AUTO: 5 % — SIGNIFICANT CHANGE UP (ref 2–14)
NEUTROPHILS # BLD AUTO: 6.61 K/UL — SIGNIFICANT CHANGE UP (ref 1.8–7.4)
NEUTROPHILS NFR BLD AUTO: 92 % — HIGH (ref 43–77)
NRBC # BLD: SIGNIFICANT CHANGE UP /100 WBCS (ref 0–0)
PLATELET # BLD AUTO: 45 K/UL — LOW (ref 150–400)
POTASSIUM SERPL-MCNC: 4.2 MMOL/L — SIGNIFICANT CHANGE UP (ref 3.5–5.3)
POTASSIUM SERPL-SCNC: 4.2 MMOL/L — SIGNIFICANT CHANGE UP (ref 3.5–5.3)
PROT SERPL-MCNC: 7.1 G/DL — SIGNIFICANT CHANGE UP (ref 6–8.3)
RBC # BLD: 4.04 M/UL — LOW (ref 4.2–5.8)
RBC # FLD: 12.3 % — SIGNIFICANT CHANGE UP (ref 10.3–14.5)
SARS-COV-2 RNA SPEC QL NAA+PROBE: DETECTED
SODIUM SERPL-SCNC: 139 MMOL/L — SIGNIFICANT CHANGE UP (ref 135–145)
TROPONIN I, HIGH SENSITIVITY RESULT: 6.4 NG/L — SIGNIFICANT CHANGE UP
WBC # BLD: 7.19 K/UL — SIGNIFICANT CHANGE UP (ref 3.8–10.5)
WBC # FLD AUTO: 7.19 K/UL — SIGNIFICANT CHANGE UP (ref 3.8–10.5)

## 2022-08-29 PROCEDURE — 70450 CT HEAD/BRAIN W/O DYE: CPT | Mod: 26,MA

## 2022-08-29 PROCEDURE — 93005 ELECTROCARDIOGRAM TRACING: CPT

## 2022-08-29 PROCEDURE — 96360 HYDRATION IV INFUSION INIT: CPT

## 2022-08-29 PROCEDURE — 99285 EMERGENCY DEPT VISIT HI MDM: CPT | Mod: 25

## 2022-08-29 PROCEDURE — 80053 COMPREHEN METABOLIC PANEL: CPT

## 2022-08-29 PROCEDURE — 87635 SARS-COV-2 COVID-19 AMP PRB: CPT

## 2022-08-29 PROCEDURE — 85025 COMPLETE CBC W/AUTO DIFF WBC: CPT

## 2022-08-29 PROCEDURE — 84484 ASSAY OF TROPONIN QUANT: CPT

## 2022-08-29 PROCEDURE — 71045 X-RAY EXAM CHEST 1 VIEW: CPT

## 2022-08-29 PROCEDURE — 99285 EMERGENCY DEPT VISIT HI MDM: CPT | Mod: FS,CS

## 2022-08-29 PROCEDURE — 93010 ELECTROCARDIOGRAM REPORT: CPT

## 2022-08-29 PROCEDURE — 70450 CT HEAD/BRAIN W/O DYE: CPT | Mod: MA

## 2022-08-29 PROCEDURE — 82553 CREATINE MB FRACTION: CPT

## 2022-08-29 PROCEDURE — 36415 COLL VENOUS BLD VENIPUNCTURE: CPT

## 2022-08-29 PROCEDURE — 71045 X-RAY EXAM CHEST 1 VIEW: CPT | Mod: 26

## 2022-08-29 RX ORDER — SODIUM CHLORIDE 9 MG/ML
1000 INJECTION INTRAMUSCULAR; INTRAVENOUS; SUBCUTANEOUS ONCE
Refills: 0 | Status: COMPLETED | OUTPATIENT
Start: 2022-08-29 | End: 2022-08-29

## 2022-08-29 RX ADMIN — SODIUM CHLORIDE 1000 MILLILITER(S): 9 INJECTION INTRAMUSCULAR; INTRAVENOUS; SUBCUTANEOUS at 12:36

## 2022-08-29 RX ADMIN — SODIUM CHLORIDE 1000 MILLILITER(S): 9 INJECTION INTRAMUSCULAR; INTRAVENOUS; SUBCUTANEOUS at 13:53

## 2022-08-29 NOTE — ED PROVIDER NOTE - NSICDXPASTMEDICALHX_GEN_ALL_CORE_FT
PAST MEDICAL HISTORY:  Anxiety     Anxiety     History of antiphospholipid antibody syndrome     Hypertension     Hypertension, unspecified type     Idiopathic thrombocytopenic purpura (ITP)

## 2022-08-29 NOTE — ED ADULT NURSE NOTE - OBJECTIVE STATEMENT
Pt received in bed alert and oriented with the c/o syncope while at urgent care center. pt was dx with covid19 at Center then passed-out. As per Md's orders IV bebeto placed blood specimen obtained and sent to the lab. Pt stable and nursing care ongoing and safety maintained.

## 2022-08-29 NOTE — ED PROVIDER NOTE - NSFOLLOWUPINSTRUCTIONS_ED_ALL_ED_FT
Follow up with pcp  you will receive call to schedule monoclonal antibody transfusion  return to er for any worsening symptoms        COVID-19 (CORONAVIRUS DISEASE 2019) - Discharge Care           COVID-19 (Coronavirus Disease 2019)    WHAT YOU NEED TO KNOW:    COVID-19 is the disease caused by a coronavirus first discovered in December 2019. Coronaviruses generally cause upper respiratory (nose, throat, and lung) infections, such as a cold. The 2019 virus spreads quickly and easily. It can be spread starting 2 to 3 days before symptoms even begin.    DISCHARGE INSTRUCTIONS:    Call your local emergency number (911 in the ) if:   •You have trouble breathing or shortness of breath at rest.      •You have chest pain or pressure that lasts longer than 5 minutes.      •You become confused or hard to wake.      •Your lips or face are blue.      Seek care immediately if:   •You have a fever of 104°F (40°C) or higher.          Call your doctor if:   •You have symptoms of COVID-19.      •You have questions or concerns about your condition or care.      Medicines: You may need any of the following:  •Decongestants help reduce nasal congestion and help you breathe more easily. If you take decongestant pills, they may make you feel restless or cause problems with your sleep. Do not use decongestant sprays for more than a few days.      •Cough suppressants help reduce coughing. Ask your healthcare provider which type of cough medicine is best for you.      •Acetaminophen decreases pain and fever. It is available without a doctor's order. Ask how much to take and how often to take it. Follow directions. Read the labels of all other medicines you are using to see if they also contain acetaminophen, or ask your doctor or pharmacist. Acetaminophen can cause liver damage if not taken correctly.      •NSAIDs, such as ibuprofen, help decrease swelling, pain, and fever. This medicine is available with or without a doctor's order. NSAIDs can cause stomach bleeding or kidney problems in certain people. If you take blood thinner medicine, always ask your healthcare provider if NSAIDs are safe for you. Always read the medicine label and follow directions.      •Blood thinners help prevent blood clots. Clots can cause strokes, heart attacks, and death. The following are general safety guidelines to follow while you are taking a blood thinner:?Watch for bleeding and bruising while you take blood thinners. Watch for bleeding from your gums or nose. Watch for blood in your urine and bowel movements. Use a soft washcloth on your skin, and a soft toothbrush to brush your teeth. This can keep your skin and gums from bleeding. If you shave, use an electric shaver. Do not play contact sports.       ?Tell your dentist and other healthcare providers that you take a blood thinner. Wear a bracelet or necklace that says you take this medicine.       ?Do not start or stop any other medicines unless your healthcare provider tells you to. Many medicines cannot be used with blood thinners.      ?Take your blood thinner exactly as prescribed by your healthcare provider. Do not skip does or take less than prescribed. Tell your provider right away if you forget to take your blood thinner, or if you take too much.      ?Warfarin is a blood thinner that you may need to take. The following are things you should be aware of if you take warfarin: ?Foods and medicines can affect the amount of warfarin in your blood. Do not make major changes to your diet while you take warfarin. Warfarin works best when you eat about the same amount of vitamin K every day. Vitamin K is found in green leafy vegetables and certain other foods. Ask for more information about what to eat when you are taking warfarin.      ?You will need to see your healthcare provider for follow-up visits when you are on warfarin. You will need regular blood tests. These tests are used to decide how much medicine you need.         •Take your medicine as directed. Contact your healthcare provider if you think your medicine is not helping or if you have side effects. Tell him or her if you are allergic to any medicine. Keep a list of the medicines, vitamins, and herbs you take. Include the amounts, and when and why you take them. Bring the list or the pill bottles to follow-up visits. Carry your medicine list with you in case of an emergency.      What you need to know about variants: The virus has changed into several new forms (called variants) since it was discovered. The variants may be more contagious (easily spread) than the original form. Some may also cause more severe illness than others.    What you need to know about COVID-19 vaccines: Healthcare providers recommend a COVID-19 vaccine, even if you have already had COVID-19. You are considered fully vaccinated against COVID-19 two weeks after the final dose of any COVID-19 vaccine. Let your healthcare provider know when you have received the final dose of the vaccine. Make a copy of your vaccination card. Keep the original with you in case you need to show it. Keep the copy in a safe place.  •Get a COVID-19 vaccine as directed. Vaccination is recommended for everyone 6 months or older. COVID-19 vaccines are given as a shot in 1 to 3 doses as a primary series. This depends on the vaccine brand and the age of the person who receives it. A booster dose is recommended for everyone 5 years or older after the primary series is complete. A second booster is recommended for all adults 50 or older and for immunocompromised adolescents. The second booster is also recommended for anyone who got the 1-dose brand of vaccine for the first dose and a booster. Your provider can give you more information on boosters. He or she can help you schedule all needed doses.  Recommended COVID-19 Immunization Schedule           •Continue social distancing and other measures. You can become infected even after you get the vaccine. You may also be able to pass the virus to others without knowing you are infected.      •After you get the vaccine, check local, national, and international travel rules. You may need to be tested before you travel. Some countries require proof of a negative test before you travel. You may also need to quarantine after you return.      •Medicine may be given to prevent infection. The medicine can be given if you are at high risk for infection and cannot get the vaccine. It can also be given if your immune system does not respond well to the vaccine.      How the 2019 coronavirus spreads:   •Droplets are the main way all coronaviruses spread. The virus travels in droplets that form when a person talks, sings, coughs, or sneezes. The droplets can also float in the air for minutes or hours. Infection happens when you breathe in the droplets or get them in your eyes or nose. Close personal contact with an infected person increases your risk for infection. This means being within 6 feet (2 meters) of the person for at least 15 minutes over 24 hours.      •Person-to-person contact can spread the virus. For example, a person with the virus on his or her hands can spread it by shaking hands with someone.      •The virus can stay on objects and surfaces for up to 3 days. You may become infected by touching the object or surface and then touching your eyes or mouth.      Help lower the risk for COVID-19:   •Wash your hands often throughout the day. Use soap and water. Rub your soapy hands together, lacing your fingers, for at least 20 seconds. Rinse with warm, running water. Dry your hands with a clean towel or paper towel. Use hand  that contains alcohol if soap and water are not available. Teach children how to wash their hands and use hand .  Handwashing           •Cover sneezes and coughs. Turn your face away and cover your mouth and nose with a tissue. Throw the tissue away. Use the bend of your arm if a tissue is not available. Then wash your hands well with soap and water or use hand . Teach children how to cover a cough or sneeze.      •Wear a face covering (mask) when needed. Use a cloth covering with at least 2 layers. You can also create layers by putting a cloth covering over a disposable non-medical mask. Cover your mouth and your nose.  How to Wear a Face Covering (Mask)           •Follow worldwide, national, and local social distancing guidelines. Keep at least 6 feet (2 meters) between you and others.      •Try not to touch your face. If you get the virus on your hands, you can transfer it to your eyes, nose, or mouth and become infected. You can also transfer it to objects, surfaces, or people.      •Clean and disinfect high-touch surfaces and objects often. Use disinfecting wipes, or make a solution of 4 teaspoons of bleach in 1 quart (4 cups) of water.      •Ask about other vaccines you may need. Get the influenza (flu) vaccine as soon as recommended each year, usually starting in September or October. Get the pneumonia vaccine if recommended. Your healthcare provider can tell you if you should also get other vaccines, and when to get them.    Prevent COVID-19 Infection         Follow social distancing guidelines: National and local social distancing rules vary. Rules and restrictions may change over time as restrictions are lifted. The following are general guidelines:  •Stay home if you are sick or think you may have COVID-19. It is important to stay home if you are waiting for a testing appointment or for test results.      •Avoid close physical contact with anyone who does not live in your home. Do not shake hands with, hug, or kiss a person as a greeting. If you must use public transportation (such as a bus or subway), try to sit or stand away from others. Wear your face covering.      •Avoid in-person gatherings and crowds. Attend virtually if possible.      Follow up with your doctor as directed: Write down your questions so you remember to ask them during your visits.    For more information:   •Centers for Disease Control and Prevention  [address] [place] [address]  Phone: 1-242.589.2631  Web Address: http://www.cdc.gov           © Copyright [employer] 2022           back to top                          © Copyright [employer] 2022

## 2022-08-29 NOTE — ED PROVIDER NOTE - CLINICAL SUMMARY MEDICAL DECISION MAKING FREE TEXT BOX
72-year-old male with history of hypertension, ITP, anxiety, antiphospholipid, skin cancer presents with status post syncope today.  Patient had some diarrhea and upset stomach 4 days ago.  Those symptoms have improved, however has been having an itchy throat and low-grade temps over the past 2 days and therefore went to urgent care today.  Patient was found to be COVID-positive.  Patient stood up at the urgent care, and felt lightheaded and then passed out for a few seconds.  Patient states he has a history of vasovagal syncope, and it is common for him to have this kind of episode.  Patient denies head injury.  No neck or back pain.  No acute chest pain or shortness of breath.  Patient states he is feeling weakness, but otherwise is feeling okay at this time.  No acute abdominal pain.  No recent travel or immobilization.  No recent dyspnea on exertion or easy fatigue.  No other acute complaints at this time.  Patient is previously vaccinated for COVID.  Exam: Nontoxic, well-appearing.  No external signs of head trauma.  No spinal tenderness (cervical, thoracic, lumbar).  Abdomen soft, nontender, nondistended.  No C/C/E.  Normal nonfocal detailed neuro exam.  No other acute findings on exam.  No signs of truncal trauma.  Syncope today after period of lightheadedness, currently with URI symptoms, testing positive for COVID at urgent care.  Check labs, CT, x-ray, IV fluids, repeat COVID, reeval.

## 2022-08-29 NOTE — ED PROVIDER NOTE - NSICDXPASTSURGICALHX_GEN_ALL_CORE_FT
PAST SURGICAL HISTORY:  Enlarged tonsils s/p tonsillectomy    H/O Mohs micrographic surgery for skin cancer

## 2022-08-29 NOTE — ED PROVIDER NOTE - OBJECTIVE STATEMENT
Patient is a 72-year-old male past medical history anxiety hypertension ITP follows for skin cancer antiphospholipid antibody syndrome brought in by EMS status post syncope.  Patient states been having stomach bug symptoms since August 25 low-grade temperatures itchy throat and went to urgent care today to be tested for COVID which was positive.  Patient states he stood up started to feel lightheaded and passed out hitting the back of his head patient denies any blood thinner use denies any chest pain shortness of breath patient states has passed out in the past and was diagnosed with vasovagal syncope.

## 2022-08-29 NOTE — ED PROVIDER NOTE - PATIENT PORTAL LINK FT
You can access the FollowMyHealth Patient Portal offered by [facility] by registering at the following website: [email or web address]. By joining [employer]’s FollowMyHealth portal, you will also be able to view your health information using other applications (apps) compatible with our system.

## 2022-08-30 ENCOUNTER — OUTPATIENT (OUTPATIENT)
Dept: OUTPATIENT SERVICES | Facility: HOSPITAL | Age: 72
LOS: 1 days | End: 2022-08-30

## 2022-08-30 ENCOUNTER — APPOINTMENT (OUTPATIENT)
Dept: DISASTER EMERGENCY | Facility: HOSPITAL | Age: 72
End: 2022-08-30

## 2022-08-30 VITALS
OXYGEN SATURATION: 98 % | TEMPERATURE: 98 F | DIASTOLIC BLOOD PRESSURE: 76 MMHG | SYSTOLIC BLOOD PRESSURE: 177 MMHG | HEART RATE: 76 BPM | WEIGHT: 175.05 LBS | RESPIRATION RATE: 18 BRPM | HEIGHT: 72 IN

## 2022-08-30 VITALS
DIASTOLIC BLOOD PRESSURE: 71 MMHG | RESPIRATION RATE: 18 BRPM | HEART RATE: 68 BPM | SYSTOLIC BLOOD PRESSURE: 155 MMHG | TEMPERATURE: 99 F | OXYGEN SATURATION: 97 %

## 2022-08-30 DIAGNOSIS — U07.1 COVID-19: ICD-10-CM

## 2022-08-30 DIAGNOSIS — J35.1 HYPERTROPHY OF TONSILS: Chronic | ICD-10-CM

## 2022-08-30 DIAGNOSIS — Z85.828 PERSONAL HISTORY OF OTHER MALIGNANT NEOPLASM OF SKIN: Chronic | ICD-10-CM

## 2022-08-30 RX ORDER — BEBTELOVIMAB 87.5 MG/ML
175 INJECTION, SOLUTION INTRAVENOUS ONCE
Refills: 0 | Status: COMPLETED | OUTPATIENT
Start: 2022-08-30 | End: 2022-08-30

## 2022-08-30 RX ADMIN — BEBTELOVIMAB 175 MILLIGRAM(S): 87.5 INJECTION, SOLUTION INTRAVENOUS at 08:00

## 2022-10-17 ENCOUNTER — HOSPITAL ENCOUNTER (OUTPATIENT)
Dept: HOSPITAL 90 - SHCH | Age: 72
Discharge: HOME | End: 2022-10-17
Attending: INTERNAL MEDICINE
Payer: COMMERCIAL

## 2022-10-17 DIAGNOSIS — R06.00: Primary | ICD-10-CM

## 2022-10-17 PROCEDURE — 93306 TTE W/DOPPLER COMPLETE: CPT

## 2022-10-20 ENCOUNTER — HOSPITAL ENCOUNTER (OUTPATIENT)
Dept: HOSPITAL 90 - RAH | Age: 72
Discharge: HOME | End: 2022-10-20
Attending: INTERNAL MEDICINE
Payer: COMMERCIAL

## 2022-10-20 DIAGNOSIS — Z13.6: Primary | ICD-10-CM

## 2022-10-20 DIAGNOSIS — I51.5: ICD-10-CM

## 2022-10-20 PROCEDURE — 75571 CT HRT W/O DYE W/CA TEST: CPT

## 2022-11-29 ENCOUNTER — HOSPITAL ENCOUNTER (OUTPATIENT)
Dept: HOSPITAL 90 - SHCH | Age: 72
Discharge: HOME | End: 2022-11-29
Attending: INTERNAL MEDICINE
Payer: COMMERCIAL

## 2022-11-29 DIAGNOSIS — R53.83: ICD-10-CM

## 2022-11-29 DIAGNOSIS — I45.10: Primary | ICD-10-CM

## 2022-11-29 DIAGNOSIS — R06.02: ICD-10-CM

## 2022-11-29 DIAGNOSIS — I25.83: ICD-10-CM

## 2022-11-29 DIAGNOSIS — F17.200: ICD-10-CM

## 2022-11-29 DIAGNOSIS — R06.00: ICD-10-CM

## 2022-11-29 PROCEDURE — 96374 THER/PROPH/DIAG INJ IV PUSH: CPT

## 2022-11-29 PROCEDURE — 93017 CV STRESS TEST TRACING ONLY: CPT

## 2022-11-29 PROCEDURE — 78452 HT MUSCLE IMAGE SPECT MULT: CPT

## 2022-11-29 RX ADMIN — REGADENOSON SCH MG: 0.08 INJECTION, SOLUTION INTRAVENOUS at 12:48

## 2023-01-09 NOTE — ED PROVIDER NOTE - NOTES
LOV: 12/19/2022    RTC: 3 months     F/U: 3/20/2023    GIANNA Deras-- orders pending, approve if appropriate
requests prep for or, vit k, repeat labs, med/icu consult

## 2023-01-26 NOTE — H&P ADULT - NSHPLABSRESULTS_GEN_ALL_CORE
Secondary to medullary stroke  PT/OT   Labs:                        11.8   7.49  )-----------( 65       ( 04 Feb 2020 20:32 )             33.8     PT/INR - ( 04 Feb 2020 20:32 )   PT: 17.4 sec;   INR: 1.49 ratio         PTT - ( 04 Feb 2020 20:32 )  PTT:60.2 sec  02-04    134<L>  |  94<L>  |  46<H>  ----------------------------<  119<H>  3.4<L>   |  28  |  1.79<H>    Ca    9.1      04 Feb 2020 20:32    TPro  6.8  /  Alb  3.4  /  TBili  1.1  /  DBili  x   /  AST  42<H>  /  ALT  31  /  AlkPhos  82  02-04      Imaging and other studies:  < from: US Abdomen Upper Quadrant Right (02.04.20 @ 21:17) >    FINDINGS:    Liver: Within normal limits.    Bile ducts: Normal caliber. Common bile duct measures 7 mm.     Gallbladder: Massively distended with stones and sludge. Mild wall thickening. Positive sonographic Carney sign.   Pancreas: Visualized portions are within normal limits.    Right kidney: 11.4 cm. No hydronephrosis. Right renal cyst measuring up to 4.7 x 4.1 x 3.2 cm.    Ascites: None.    IVC: Visualized portions are within normal limits.    Miscellaneous: Right pleural effusion is partially visualized.    IMPRESSION:     Distended gallbladder with stones, mild wall thickening and positive sonographic Carney sign. Findings consistent with acute cholecystitis.    Right pleural effusion.

## 2023-06-10 NOTE — DIETITIAN INITIAL EVALUATION ADULT. - WEIGHT IN KG
Pt arrived at 1115 and placed in outpatient surgery room 1. VS obtained BP 93/61, HR 83, RR 18, SPO2 95%, Temp 98.2. Left upper arm midline flushed. 1 gram of Ertapenum in 100ml NS 09.% started at 1130 and ended at 1200. Midline flushed with 10 NS and 500 units of Heparin. Pt DC.   
88.8

## 2023-06-28 PROBLEM — Z00.00 ENCOUNTER FOR PREVENTIVE HEALTH EXAMINATION: Status: ACTIVE | Noted: 2023-06-28

## 2023-07-21 ENCOUNTER — NON-APPOINTMENT (OUTPATIENT)
Age: 73
End: 2023-07-21

## 2023-07-21 ENCOUNTER — APPOINTMENT (OUTPATIENT)
Dept: SURGICAL ONCOLOGY | Facility: CLINIC | Age: 73
End: 2023-07-21
Payer: MEDICARE

## 2023-07-21 VITALS
SYSTOLIC BLOOD PRESSURE: 173 MMHG | DIASTOLIC BLOOD PRESSURE: 82 MMHG | BODY MASS INDEX: 23.7 KG/M2 | RESPIRATION RATE: 16 BRPM | HEIGHT: 72 IN | WEIGHT: 175 LBS | HEART RATE: 55 BPM | OXYGEN SATURATION: 98 %

## 2023-07-21 PROCEDURE — 99205 OFFICE O/P NEW HI 60 MIN: CPT

## 2023-07-21 NOTE — PHYSICAL EXAM
[Normal] : supple, no neck mass and thyroid not enlarged [Normal Neck Lymph Nodes] : normal neck lymph nodes  [Normal Supraclavicular Lymph Nodes] : normal supraclavicular lymph nodes [Normal Axillary Lymph Nodes] : normal axillary lymph nodes [Normal] : full range of motion and no deformities appreciated [de-identified] : Groins not examined [de-identified] : Below

## 2023-07-21 NOTE — ASSESSMENT
It was discussed with the patient the importance of good control of their blood sugar, blood pressure, cholesterol, diet, exercise and weight under the guidance of their diabetic doctor to prevent/halt diabetic retinopathy. 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

## 2023-07-21 NOTE — HISTORY OF PRESENT ILLNESS
[de-identified] : 73-year-old man.\par \par Referred by dermatology (Dr. Lang UMANA).\par \par Newly diagnosed Merkel cell cancer of the RIGHT SCALP/FOREHEAD (satellitosis).\par \par ~Early May 2023 they noticed some pink nodules on the right parietal scalp.\par Because these arose in the site of a recent resection (Mohs) and reconstruction for squamous cell carcinoma (SCCA), it was thought to possibly represent an infection and he was treated with a 2-week course of antibiotics.\par The nodules are new in number and size therefore biopsy was performed, providing the above diagnosis.\par \par No prior personal history of Merkel cell cancer.\par \par + Several nonmelanoma skin cancers, all treated by dermatology.\par \par + Immune thrombocytopenic purpura (ITP).\par Diagnosed ~2010 when he was found to have a low platelet count on routine labs.\par Hematology: Dr. Spike CASTRO.\par He has not required treatment.\par His platelet count is usually ~60,000.\par \par No other personal history of malignancy.\par \par \par No relatives with skin cancer.\par \par + Family history of malignancy:\par Mother: Lymphoma.\par Father: Prostate cancer\par \par \par Derm: Dr. Lang UMANA.\par \par \par PMD: Dr. Rusty JONES.\par \par NKDA.\par \par No pacemaker or defibrillator.\par No anticoagulants.\par \par + ITP (above).\par Not requiring treatment.\par Being monitored regularly.\par \par + Hypertension.\par Treated with  atenolol, and losartan.\par He does not see a cardiologist\par \par + Anxiety.\par Takes Valium as needed.\par + Depression.\par He takes Luvox and Seroquel.\par Psychiatry: Dr. Ian DONOVAN\par \par History of arthritis.\par Has received steroid injections ~2015.\par They do not recall any further details.\par \par March 2020:\par Laparoscopic cholecystectomy at [facility].\par \par \par Last colonoscopy was in his early 70s.\par He is asymptomatic.\par No previous personal or family history of colorectal disease

## 2023-07-21 NOTE — REVIEW OF SYSTEMS
[Negative] : Heme/Lymph [FreeTextEntry5] : Hypertension [FreeTextEntry8] : History of cholecystectomy [de-identified] : Arthritis [de-identified] : Merkel cell cancer

## 2023-07-21 NOTE — REASON FOR VISIT
[Initial Consultation] : an initial consultation for [Other: _____] : [unfilled] [FreeTextEntry2] : Recently diagnosed Merkel cell cancer of the right scalp/forehead (satellitosis)

## 2023-07-26 ENCOUNTER — RESULT REVIEW (OUTPATIENT)
Age: 73
End: 2023-07-26

## 2023-07-26 ENCOUNTER — OUTPATIENT (OUTPATIENT)
Dept: OUTPATIENT SERVICES | Facility: HOSPITAL | Age: 73
LOS: 1 days | End: 2023-07-26
Payer: MEDICARE

## 2023-07-26 DIAGNOSIS — Z85.828 PERSONAL HISTORY OF OTHER MALIGNANT NEOPLASM OF SKIN: Chronic | ICD-10-CM

## 2023-07-26 DIAGNOSIS — J35.1 HYPERTROPHY OF TONSILS: Chronic | ICD-10-CM

## 2023-07-26 DIAGNOSIS — C80.1 MALIGNANT (PRIMARY) NEOPLASM, UNSPECIFIED: ICD-10-CM

## 2023-07-26 PROCEDURE — 88321 CONSLTJ&REPRT SLD PREP ELSWR: CPT

## 2023-07-27 LAB — SURGICAL PATHOLOGY STUDY: SIGNIFICANT CHANGE UP

## 2023-08-04 ENCOUNTER — OUTPATIENT (OUTPATIENT)
Dept: OUTPATIENT SERVICES | Facility: HOSPITAL | Age: 73
LOS: 1 days | Discharge: HOME | End: 2023-08-04

## 2023-08-04 DIAGNOSIS — C4A.9 MERKEL CELL CARCINOMA, UNSPECIFIED: ICD-10-CM

## 2023-08-04 DIAGNOSIS — Z85.828 PERSONAL HISTORY OF OTHER MALIGNANT NEOPLASM OF SKIN: Chronic | ICD-10-CM

## 2023-08-05 PROBLEM — Z00.00 ENCOUNTER FOR PREVENTIVE HEALTH EXAMINATION: Noted: 2023-08-05

## 2023-08-06 ENCOUNTER — OUTPATIENT (OUTPATIENT)
Dept: OUTPATIENT SERVICES | Facility: HOSPITAL | Age: 73
LOS: 1 days | End: 2023-08-06
Payer: MEDICARE

## 2023-08-06 ENCOUNTER — APPOINTMENT (OUTPATIENT)
Age: 73
End: 2023-08-06
Payer: MEDICARE

## 2023-08-06 DIAGNOSIS — C4A.39 MERKEL CELL CARCINOMA OF OTHER PARTS OF FACE: ICD-10-CM

## 2023-08-06 DIAGNOSIS — Z85.828 PERSONAL HISTORY OF OTHER MALIGNANT NEOPLASM OF SKIN: Chronic | ICD-10-CM

## 2023-08-06 DIAGNOSIS — J35.1 HYPERTROPHY OF TONSILS: Chronic | ICD-10-CM

## 2023-08-06 PROCEDURE — 78816 PET IMAGE W/CT FULL BODY: CPT | Mod: 26,PI,MH

## 2023-08-06 PROCEDURE — 78816 PET IMAGE W/CT FULL BODY: CPT

## 2023-08-06 PROCEDURE — A9552: CPT

## 2023-08-08 ENCOUNTER — RESULT REVIEW (OUTPATIENT)
Age: 73
End: 2023-08-08

## 2023-08-08 ENCOUNTER — APPOINTMENT (OUTPATIENT)
Dept: HEMATOLOGY ONCOLOGY | Facility: CLINIC | Age: 73
End: 2023-08-08

## 2023-08-08 ENCOUNTER — APPOINTMENT (OUTPATIENT)
Dept: HEMATOLOGY ONCOLOGY | Facility: CLINIC | Age: 73
End: 2023-08-08
Payer: MEDICARE

## 2023-08-08 VITALS
OXYGEN SATURATION: 97 % | RESPIRATION RATE: 16 BRPM | WEIGHT: 171.96 LBS | BODY MASS INDEX: 23.29 KG/M2 | HEIGHT: 72 IN | HEART RATE: 62 BPM | SYSTOLIC BLOOD PRESSURE: 190 MMHG | TEMPERATURE: 97.9 F | DIASTOLIC BLOOD PRESSURE: 93 MMHG

## 2023-08-08 DIAGNOSIS — R55 SYNCOPE AND COLLAPSE: ICD-10-CM

## 2023-08-08 DIAGNOSIS — D69.3 IMMUNE THROMBOCYTOPENIC PURPURA: ICD-10-CM

## 2023-08-08 DIAGNOSIS — I10 ESSENTIAL (PRIMARY) HYPERTENSION: ICD-10-CM

## 2023-08-08 DIAGNOSIS — D68.62 LUPUS ANTICOAGULANT SYNDROME: ICD-10-CM

## 2023-08-08 LAB
BASOPHILS # BLD AUTO: 0.05 K/UL — SIGNIFICANT CHANGE UP (ref 0–0.2)
BASOPHILS NFR BLD AUTO: 0.7 % — SIGNIFICANT CHANGE UP (ref 0–2)
EOSINOPHIL # BLD AUTO: 0.22 K/UL — SIGNIFICANT CHANGE UP (ref 0–0.5)
EOSINOPHIL NFR BLD AUTO: 3 % — SIGNIFICANT CHANGE UP (ref 0–6)
HCT VFR BLD CALC: SIGNIFICANT CHANGE UP (ref 39–50)
HGB BLD-MCNC: 12.6 G/DL — LOW (ref 13–17)
IMM GRANULOCYTES NFR BLD AUTO: 0.3 % — SIGNIFICANT CHANGE UP (ref 0–0.9)
LYMPHOCYTES # BLD AUTO: 1.22 K/UL — SIGNIFICANT CHANGE UP (ref 1–3.3)
LYMPHOCYTES # BLD AUTO: 16.7 % — SIGNIFICANT CHANGE UP (ref 13–44)
MCHC RBC-ENTMCNC: SIGNIFICANT CHANGE UP (ref 27–34)
MCHC RBC-ENTMCNC: SIGNIFICANT CHANGE UP (ref 32–36)
MCV RBC AUTO: SIGNIFICANT CHANGE UP (ref 80–100)
MONOCYTES # BLD AUTO: 0.46 K/UL — SIGNIFICANT CHANGE UP (ref 0–0.9)
MONOCYTES NFR BLD AUTO: 6.3 % — SIGNIFICANT CHANGE UP (ref 2–14)
NEUTROPHILS # BLD AUTO: 5.33 K/UL — SIGNIFICANT CHANGE UP (ref 1.8–7.4)
NEUTROPHILS NFR BLD AUTO: 73 % — SIGNIFICANT CHANGE UP (ref 43–77)
NRBC # BLD: 0 /100 WBCS — SIGNIFICANT CHANGE UP (ref 0–0)
PLATELET # BLD AUTO: 140 K/UL — LOW (ref 150–400)
RBC # BLD: 3.65 M/UL — LOW (ref 4.2–5.8)
RBC # FLD: SIGNIFICANT CHANGE UP (ref 10.3–14.5)
WBC # BLD: 7.3 K/UL — SIGNIFICANT CHANGE UP (ref 3.8–10.5)
WBC # FLD AUTO: 7.3 K/UL — SIGNIFICANT CHANGE UP (ref 3.8–10.5)

## 2023-08-08 PROCEDURE — G2212 PROLONG OUTPT/OFFICE VIS: CPT

## 2023-08-08 PROCEDURE — 99205 OFFICE O/P NEW HI 60 MIN: CPT

## 2023-08-08 RX ORDER — FLUVOXAMINE MALEATE 100 MG/1
100 TABLET, FILM COATED ORAL
Refills: 0 | Status: ACTIVE | COMMUNITY
Start: 2023-08-08

## 2023-08-08 RX ORDER — DIAZEPAM 5 MG/1
5 TABLET ORAL TWICE DAILY
Refills: 0 | Status: ACTIVE | COMMUNITY
Start: 2023-08-08

## 2023-08-08 RX ORDER — ATENOLOL 50 MG/1
50 TABLET ORAL DAILY
Qty: 30 | Refills: 0 | Status: ACTIVE | COMMUNITY
Start: 2023-08-08

## 2023-08-08 RX ORDER — QUETIAPINE 25 MG/1
25 TABLET, FILM COATED ORAL TWICE DAILY
Refills: 0 | Status: ACTIVE | COMMUNITY
Start: 2023-08-08

## 2023-08-08 RX ORDER — LOSARTAN POTASSIUM 100 MG/1
100 TABLET, FILM COATED ORAL DAILY
Refills: 0 | Status: ACTIVE | COMMUNITY
Start: 2023-08-08

## 2023-08-08 NOTE — CONSULT LETTER
[Dear  ___] : Dear  [unfilled], [Consult Letter:] : I had the pleasure of evaluating your patient, [unfilled]. [Please see my note below.] : Please see my note below. [Consult Closing:] : Thank you very much for allowing me to participate in the care of this patient.  If you have any questions, please do not hesitate to contact me. [Sincerely,] : Sincerely, [DrZo  ___] : Dr. DASILVA

## 2023-08-09 ENCOUNTER — NON-APPOINTMENT (OUTPATIENT)
Age: 73
End: 2023-08-09

## 2023-08-09 PROBLEM — D68.62 LUPUS ANTICOAGULANT DISORDER: Status: ACTIVE | Noted: 2023-08-09

## 2023-08-09 LAB
ALBUMIN SERPL ELPH-MCNC: 4.2 G/DL
ALP BLD-CCNC: 80 U/L
ALT SERPL-CCNC: 17 U/L
ANION GAP SERPL CALC-SCNC: 13 MMOL/L
APTT BLD: 88.2 SEC
AST SERPL-CCNC: 23 U/L
BILIRUB SERPL-MCNC: 0.6 MG/DL
BUN SERPL-MCNC: 29 MG/DL
CALCIUM SERPL-MCNC: 9.2 MG/DL
CEA SERPL-MCNC: 3.4 NG/ML
CHLORIDE SERPL-SCNC: 101 MMOL/L
CK SERPL-CCNC: 66 U/L
CO2 SERPL-SCNC: 27 MMOL/L
CREAT SERPL-MCNC: 1.42 MG/DL
CRP SERPL-MCNC: 8 MG/L
DEPRECATED D DIMER PPP IA-ACNC: 967 NG/ML DDU
EGFR: 52 ML/MIN/1.73M2
GLUCOSE SERPL-MCNC: 81 MG/DL
HBV SURFACE AB SER QL: NONREACTIVE
HBV SURFACE AG SER QL: NONREACTIVE
HCV AB SER QL: NONREACTIVE
HCV S/CO RATIO: 0.18 S/CO
HIV1+2 AB SPEC QL IA.RAPID: NONREACTIVE
INR PPP: 1.34 RATIO
LDH SERPL-CCNC: 291 U/L
POTASSIUM SERPL-SCNC: 4.5 MMOL/L
PROT SERPL-MCNC: 7.1 G/DL
PT BLD: 15.2 SEC
SODIUM SERPL-SCNC: 141 MMOL/L
TSH SERPL-ACNC: 2 UIU/ML

## 2023-08-11 LAB
ANA PAT FLD IF-IMP: ABNORMAL
ANA SER IF-ACNC: ABNORMAL
APTT 2H P 1:4 NP PPP: 71.5 SEC
APTT 2H P INC PPP: 69.8 SEC
APTT IMM NP/PRE NP PPP: 69.8 SEC
APTT INV RATIO PPP: 108 SEC
B2 GLYCOPROT1 IGG SER-ACNC: 131.3 SGU
B2 GLYCOPROT1 IGM SER-ACNC: >150 SMU
CARDIOLIPIN IGM SER-MCNC: >150 GPL
CARDIOLIPIN IGM SER-MCNC: >150 MPL
NPP NORMAL POOLED PLASMA: 34.1 SECS

## 2023-08-11 NOTE — ASSESSMENT
[FreeTextEntry1] : Patient is a 73 year old female with past medical history of ITP presenting to the office for an initial consultation of Merkel cell CA.  He is presenting to the office for a newly diagnosed Merkel cell of the right scalp/forehead. Lesion was first noticed ~ 05/2023 with pink nodules on the R parietal scalp.  PET/CT on 08/06/2023 Multiple hypermetabolic nodules overlying the RIGHT scalp extending to the approximate level of the ear overlying the temporal region. Findings are consistent with reported Merkel cell carcinoma. LUNGS: Left LOWER lobe consolidation (4.7 x 6.5 cm, image 145), with diffuse mild uptake without suspicious focal findings. Right LOWER lobe  is unremarkable. PLEURA/PERICARDIUM: Posterior RIGHT UPPER lobe pulmonary nodule (1.7 x 0.4 cm, image 105), and lateral RIGHT MIDDLE lobe micronodule (image 121), are below resolution of PET without abnormal activity. There is mild parenchymal change at the anteromedial RIGHT MIDDLE lobe without associated uptake.  I reviewed pathology slides and report with dermatopathology. Slide consult: Right temple, ([id number], Part A, 14 Slides) - Superficial portion of invasive Merkel cell carcinoma, superficially transected, see comment Comment: The tumor cells are CK20 positive with a perinuclear dot pattern. The biopsy specimen is too shallow to assess any of the parameters in the Merkel cell carcinoma synoptic.  Patient sent to me by surgeon due to excessive local satellite lesions, which is unresectable. We reviewed the mechanism of action and side effects of PD1i. Will use Keytruda 200 mg q 3 weeks. Consent signed.  Will work-up prior low platelets and obtain records from Dr Aldridge.

## 2023-08-11 NOTE — REASON FOR VISIT
[Initial Consultation] : an initial consultation [Spouse] : spouse [FreeTextEntry2] : merkel cell CA

## 2023-08-11 NOTE — PHYSICAL EXAM
[Fully active, able to carry on all pre-disease performance without restriction] : Status 0 - Fully active, able to carry on all pre-disease performance without restriction [de-identified] : diffuse satellite lesions over right temple.

## 2023-08-11 NOTE — HISTORY OF PRESENT ILLNESS
[de-identified] : Mr. Henley has past medical history of ITP / APLS presenting to the office for an initial consultation of merkel cell CA.  He is presenting to the office for a newly diagnosed merkel cell of the right scalp/forehead. Lesion was first noticed ~ 05/2023 with pink nodules on the R parietal scalp. Because these arose in the site of a recent resection (Mohs) and reconstruction for squamous cell carcinoma (SCCA), it was thought to possibly represent an infection and he was treated with a 2-week course of antibiotics. The nodules are new in number and size therefore biopsy was performed, providing the above diagnosis.  PET/CT on 08/06/2023 Multiple hypermetabolic nodules overlying the RIGHT scalp extending to the approximate level of the ear overlying the temporal region. Findings are consistent with reported Merkel cell carcinoma. LUNGS: Left LOWER lobe consolidation (4.7 x 6.5 cm, image 145), with diffuse mild uptake without suspicious focal findings. Right LOWER lobe  is unremarkable. PLEURA/PERICARDIUM: Posterior RIGHT UPPER lobe pulmonary nodule (1.7 x 0.4 cm, image 105), and lateral RIGHT MIDDLE lobe micronodule (image 121), are below resolution of PET without abnormal activity. There is mild parenchymal change at the anteromedial RIGHT MIDDLE lobe without associated uptake.  8/8/23: - pt had a Mohs surgery on 3/29/23 for a SCC, was on keflex after the surgery and wound healed well; then in mid May ~5-6 weeks afterwards noticed a few pimples on the wound, was being watched at the time with dermatology, wound healed; so then June 12 had a biopsy, June 20 biopsy showed Merkel cell carcinoma, referred to Dr. Riley,  - has had 7 total Mohs surgeries, all on face and head, all squamous cell cancers; all of them healed - on losartan and atenolol, was taken off amlodipine as BP was recently too low - hx of anxiety with symptoms of burning/stinging, muscle tightening - had COVID last August 2022, had monoclonal Ab treatment - occasionally wakes up with cough and phlegm  Slide consult: Right temple, ([id number], Part A, 14 Slides) - Superficial portion of invasive Merkel cell carcinoma, superficially transected, see comment Comment: The tumor cells are CK20 positive with a perinuclear dot pattern. The biopsy specimen is too shallow to assess any of the parameters in the Merkel cell carcinoma synoptic. [de-identified] : Surgical Oncology: Hitesh Riley Dermatology: Lang Leon ([facility]) Hematology: Spike Aldridge   [facility] [phone] [place] wife - cell [phone]

## 2023-08-12 LAB
GLYCOPROTEIN IV ANTIBODY: NEGATIVE
HLA CLASS 1 AB: NEGATIVE
IA/IIA AB: NORMAL
IB/IX AB: NEGATIVE
IIB/IIIA AB: POSITIVE
M TB IFN-G BLD-IMP: ABNORMAL
QUANTIFERON TB PLUS MITOGEN MINUS NIL: 0.23 IU/ML
QUANTIFERON TB PLUS NIL: 0.03 IU/ML
QUANTIFERON TB PLUS TB1 MINUS NIL: -0.01 IU/ML
QUANTIFERON TB PLUS TB2 MINUS NIL: 0.08 IU/ML

## 2023-08-14 ENCOUNTER — NON-APPOINTMENT (OUTPATIENT)
Age: 73
End: 2023-08-14

## 2023-08-14 ENCOUNTER — RESULT REVIEW (OUTPATIENT)
Age: 73
End: 2023-08-14

## 2023-08-14 ENCOUNTER — APPOINTMENT (OUTPATIENT)
Dept: INFUSION THERAPY | Facility: HOSPITAL | Age: 73
End: 2023-08-14

## 2023-08-14 LAB
ALBUMIN SERPL ELPH-MCNC: 3.9 G/DL — SIGNIFICANT CHANGE UP (ref 3.3–5)
ALP SERPL-CCNC: 68 U/L — SIGNIFICANT CHANGE UP (ref 40–120)
ALT FLD-CCNC: 15 U/L — SIGNIFICANT CHANGE UP (ref 10–45)
ANION GAP SERPL CALC-SCNC: 8 MMOL/L — SIGNIFICANT CHANGE UP (ref 5–17)
AST SERPL-CCNC: 29 U/L — SIGNIFICANT CHANGE UP (ref 10–40)
BASOPHILS # BLD AUTO: 0.04 K/UL — SIGNIFICANT CHANGE UP (ref 0–0.2)
BASOPHILS NFR BLD AUTO: 0.6 % — SIGNIFICANT CHANGE UP (ref 0–2)
BILIRUB SERPL-MCNC: 0.6 MG/DL — SIGNIFICANT CHANGE UP (ref 0.2–1.2)
BUN SERPL-MCNC: 27 MG/DL — HIGH (ref 7–23)
CALCIUM SERPL-MCNC: 9.1 MG/DL — SIGNIFICANT CHANGE UP (ref 8.4–10.5)
CHLORIDE SERPL-SCNC: 102 MMOL/L — SIGNIFICANT CHANGE UP (ref 96–108)
CO2 SERPL-SCNC: 29 MMOL/L — SIGNIFICANT CHANGE UP (ref 22–31)
CREAT SERPL-MCNC: 1.28 MG/DL — SIGNIFICANT CHANGE UP (ref 0.5–1.3)
EGFR: 58 ML/MIN/1.73M2 — LOW
EOSINOPHIL # BLD AUTO: 0.17 K/UL — SIGNIFICANT CHANGE UP (ref 0–0.5)
EOSINOPHIL NFR BLD AUTO: 2.7 % — SIGNIFICANT CHANGE UP (ref 0–6)
GLUCOSE SERPL-MCNC: 103 MG/DL — HIGH (ref 70–99)
HCT VFR BLD CALC: SIGNIFICANT CHANGE UP (ref 39–50)
HGB BLD-MCNC: 11.5 G/DL — LOW (ref 13–17)
IMM GRANULOCYTES NFR BLD AUTO: 0.3 % — SIGNIFICANT CHANGE UP (ref 0–0.9)
LDH SERPL L TO P-CCNC: 244 U/L — HIGH (ref 50–242)
LYMPHOCYTES # BLD AUTO: 0.91 K/UL — LOW (ref 1–3.3)
LYMPHOCYTES # BLD AUTO: 14.5 % — SIGNIFICANT CHANGE UP (ref 13–44)
MCHC RBC-ENTMCNC: SIGNIFICANT CHANGE UP (ref 27–34)
MCHC RBC-ENTMCNC: SIGNIFICANT CHANGE UP (ref 32–36)
MCV RBC AUTO: SIGNIFICANT CHANGE UP (ref 80–100)
MONOCYTES # BLD AUTO: 0.53 K/UL — SIGNIFICANT CHANGE UP (ref 0–0.9)
MONOCYTES NFR BLD AUTO: 8.5 % — SIGNIFICANT CHANGE UP (ref 2–14)
NEUTROPHILS # BLD AUTO: 4.59 K/UL — SIGNIFICANT CHANGE UP (ref 1.8–7.4)
NEUTROPHILS NFR BLD AUTO: 73.4 % — SIGNIFICANT CHANGE UP (ref 43–77)
NRBC # BLD: 0 /100 WBCS — SIGNIFICANT CHANGE UP (ref 0–0)
PLATELET # BLD AUTO: 89 K/UL — LOW (ref 150–400)
POTASSIUM SERPL-MCNC: 4.3 MMOL/L — SIGNIFICANT CHANGE UP (ref 3.5–5.3)
POTASSIUM SERPL-SCNC: 4.3 MMOL/L — SIGNIFICANT CHANGE UP (ref 3.5–5.3)
PROT SERPL-MCNC: 6.5 G/DL — SIGNIFICANT CHANGE UP (ref 6–8.3)
RBC # BLD: 3.42 M/UL — LOW (ref 4.2–5.8)
RBC # FLD: SIGNIFICANT CHANGE UP (ref 10.3–14.5)
SODIUM SERPL-SCNC: 138 MMOL/L — SIGNIFICANT CHANGE UP (ref 135–145)
WBC # BLD: 6.26 K/UL — SIGNIFICANT CHANGE UP (ref 3.8–10.5)
WBC # FLD AUTO: 6.26 K/UL — SIGNIFICANT CHANGE UP (ref 3.8–10.5)

## 2023-08-14 RX ORDER — AMLODIPINE BESYLATE 2.5 MG/1
1 TABLET ORAL
Qty: 0 | Refills: 0 | DISCHARGE

## 2023-08-14 RX ORDER — ATENOLOL 25 MG/1
1 TABLET ORAL
Qty: 0 | Refills: 0 | DISCHARGE

## 2023-08-14 RX ORDER — ERTAPENEM SODIUM 1 G/1
1 INJECTION, POWDER, LYOPHILIZED, FOR SOLUTION INTRAMUSCULAR; INTRAVENOUS
Qty: 0 | Refills: 0 | DISCHARGE

## 2023-08-14 RX ORDER — LOSARTAN POTASSIUM 100 MG/1
1 TABLET, FILM COATED ORAL
Qty: 0 | Refills: 0 | DISCHARGE

## 2023-08-15 ENCOUNTER — APPOINTMENT (OUTPATIENT)
Dept: HEMATOLOGY ONCOLOGY | Facility: CLINIC | Age: 73
End: 2023-08-15

## 2023-08-15 ENCOUNTER — NON-APPOINTMENT (OUTPATIENT)
Age: 73
End: 2023-08-15

## 2023-08-15 DIAGNOSIS — Z51.11 ENCOUNTER FOR ANTINEOPLASTIC CHEMOTHERAPY: ICD-10-CM

## 2023-08-16 LAB
GAMMA INTERFERON BACKGROUND BLD IA-ACNC: 0.02 IU/ML — SIGNIFICANT CHANGE UP
M TB IFN-G BLD-IMP: NEGATIVE — SIGNIFICANT CHANGE UP
M TB IFN-G CD4+ BCKGRND COR BLD-ACNC: 0 IU/ML — SIGNIFICANT CHANGE UP
M TB IFN-G CD4+CD8+ BCKGRND COR BLD-ACNC: 0 IU/ML — SIGNIFICANT CHANGE UP
QUANT TB PLUS MITOGEN MINUS NIL: 0.52 IU/ML — SIGNIFICANT CHANGE UP

## 2023-09-07 ENCOUNTER — RESULT REVIEW (OUTPATIENT)
Age: 73
End: 2023-09-07

## 2023-09-07 ENCOUNTER — APPOINTMENT (OUTPATIENT)
Dept: HEMATOLOGY ONCOLOGY | Facility: CLINIC | Age: 73
End: 2023-09-07
Payer: MEDICARE

## 2023-09-07 ENCOUNTER — APPOINTMENT (OUTPATIENT)
Dept: INFUSION THERAPY | Facility: HOSPITAL | Age: 73
End: 2023-09-07

## 2023-09-07 VITALS
SYSTOLIC BLOOD PRESSURE: 191 MMHG | OXYGEN SATURATION: 97 % | HEART RATE: 62 BPM | RESPIRATION RATE: 16 BRPM | TEMPERATURE: 96.4 F | DIASTOLIC BLOOD PRESSURE: 82 MMHG

## 2023-09-07 LAB
ALBUMIN SERPL ELPH-MCNC: 4.2 G/DL — SIGNIFICANT CHANGE UP (ref 3.3–5)
ALP SERPL-CCNC: 70 U/L — SIGNIFICANT CHANGE UP (ref 40–120)
ALT FLD-CCNC: 14 U/L — SIGNIFICANT CHANGE UP (ref 10–45)
ANION GAP SERPL CALC-SCNC: 9 MMOL/L — SIGNIFICANT CHANGE UP (ref 5–17)
AST SERPL-CCNC: 30 U/L — SIGNIFICANT CHANGE UP (ref 10–40)
BASOPHILS # BLD AUTO: 0.03 K/UL — SIGNIFICANT CHANGE UP (ref 0–0.2)
BASOPHILS NFR BLD AUTO: 0.5 % — SIGNIFICANT CHANGE UP (ref 0–2)
BILIRUB SERPL-MCNC: 0.8 MG/DL — SIGNIFICANT CHANGE UP (ref 0.2–1.2)
BUN SERPL-MCNC: 19 MG/DL — SIGNIFICANT CHANGE UP (ref 7–23)
CALCIUM SERPL-MCNC: 9.2 MG/DL — SIGNIFICANT CHANGE UP (ref 8.4–10.5)
CHLORIDE SERPL-SCNC: 102 MMOL/L — SIGNIFICANT CHANGE UP (ref 96–108)
CK SERPL-CCNC: 40 U/L — SIGNIFICANT CHANGE UP (ref 30–200)
CO2 SERPL-SCNC: 29 MMOL/L — SIGNIFICANT CHANGE UP (ref 22–31)
CREAT SERPL-MCNC: 1.26 MG/DL — SIGNIFICANT CHANGE UP (ref 0.5–1.3)
CRP SERPL-MCNC: 4 MG/L — SIGNIFICANT CHANGE UP
EGFR: 59 ML/MIN/1.73M2 — LOW
EOSINOPHIL # BLD AUTO: 0.12 K/UL — SIGNIFICANT CHANGE UP (ref 0–0.5)
EOSINOPHIL NFR BLD AUTO: 1.8 % — SIGNIFICANT CHANGE UP (ref 0–6)
GLUCOSE SERPL-MCNC: 98 MG/DL — SIGNIFICANT CHANGE UP (ref 70–99)
HCT VFR BLD CALC: 31.1 % — LOW (ref 39–50)
HGB BLD-MCNC: 11.6 G/DL — LOW (ref 13–17)
IMM GRANULOCYTES NFR BLD AUTO: 0.5 % — SIGNIFICANT CHANGE UP (ref 0–0.9)
LDH SERPL L TO P-CCNC: 290 U/L — HIGH (ref 50–242)
LYMPHOCYTES # BLD AUTO: 1 K/UL — SIGNIFICANT CHANGE UP (ref 1–3.3)
LYMPHOCYTES # BLD AUTO: 15.2 % — SIGNIFICANT CHANGE UP (ref 13–44)
MCHC RBC-ENTMCNC: 32 PG — SIGNIFICANT CHANGE UP (ref 27–34)
MCHC RBC-ENTMCNC: 37.3 G/DL — HIGH (ref 32–36)
MCV RBC AUTO: 85.9 FL — SIGNIFICANT CHANGE UP (ref 80–100)
MONOCYTES # BLD AUTO: 0.52 K/UL — SIGNIFICANT CHANGE UP (ref 0–0.9)
MONOCYTES NFR BLD AUTO: 7.9 % — SIGNIFICANT CHANGE UP (ref 2–14)
NEUTROPHILS # BLD AUTO: 4.88 K/UL — SIGNIFICANT CHANGE UP (ref 1.8–7.4)
NEUTROPHILS NFR BLD AUTO: 74.1 % — SIGNIFICANT CHANGE UP (ref 43–77)
NRBC # BLD: 0 /100 WBCS — SIGNIFICANT CHANGE UP (ref 0–0)
PLATELET # BLD AUTO: 93 K/UL — LOW (ref 150–400)
POTASSIUM SERPL-MCNC: 4.4 MMOL/L — SIGNIFICANT CHANGE UP (ref 3.5–5.3)
POTASSIUM SERPL-SCNC: 4.4 MMOL/L — SIGNIFICANT CHANGE UP (ref 3.5–5.3)
PROT SERPL-MCNC: 6.8 G/DL — SIGNIFICANT CHANGE UP (ref 6–8.3)
RBC # BLD: 3.62 M/UL — LOW (ref 4.2–5.8)
RBC # FLD: 13.3 % — SIGNIFICANT CHANGE UP (ref 10.3–14.5)
SODIUM SERPL-SCNC: 140 MMOL/L — SIGNIFICANT CHANGE UP (ref 135–145)
T3 SERPL-MCNC: 96 NG/DL — SIGNIFICANT CHANGE UP (ref 80–200)
T4 FREE SERPL-MCNC: 1 NG/DL — SIGNIFICANT CHANGE UP (ref 0.9–1.8)
TSH SERPL-MCNC: 1.83 UIU/ML — SIGNIFICANT CHANGE UP (ref 0.27–4.2)
WBC # BLD: 6.58 K/UL — SIGNIFICANT CHANGE UP (ref 3.8–10.5)
WBC # FLD AUTO: 6.58 K/UL — SIGNIFICANT CHANGE UP (ref 3.8–10.5)

## 2023-09-07 PROCEDURE — 99214 OFFICE O/P EST MOD 30 MIN: CPT

## 2023-09-07 NOTE — PHYSICAL EXAM
[Fully active, able to carry on all pre-disease performance without restriction] : Status 0 - Fully active, able to carry on all pre-disease performance without restriction [de-identified] : diffuse satellite lesions over right temple.

## 2023-09-07 NOTE — HISTORY OF PRESENT ILLNESS
[de-identified] : Mr. Henley has past medical history of ITP / APLS presenting to the office for an initial consultation of merkel cell CA.  He is presenting to the office for a newly diagnosed merkel cell of the right scalp/forehead. Lesion was first noticed ~ 05/2023 with pink nodules on the R parietal scalp. Because these arose in the site of a recent resection (Mohs) and reconstruction for squamous cell carcinoma (SCCA), it was thought to possibly represent an infection and he was treated with a 2-week course of antibiotics. The nodules are new in number and size therefore biopsy was performed, providing the above diagnosis.  PET/CT on 08/06/2023 Multiple hypermetabolic nodules overlying the RIGHT scalp extending to the approximate level of the ear overlying the temporal region. Findings are consistent with reported Merkel cell carcinoma. LUNGS: Left LOWER lobe consolidation (4.7 x 6.5 cm, image 145), with diffuse mild uptake without suspicious focal findings. Right LOWER lobe  is unremarkable. PLEURA/PERICARDIUM: Posterior RIGHT UPPER lobe pulmonary nodule (1.7 x 0.4 cm, image 105), and lateral RIGHT MIDDLE lobe micronodule (image 121), are below resolution of PET without abnormal activity. There is mild parenchymal change at the anteromedial RIGHT MIDDLE lobe without associated uptake.  8/8/23: - pt had a Mohs surgery on 3/29/23 for a SCC, was on keflex after the surgery and wound healed well; then in mid May ~5-6 weeks afterwards noticed a few pimples on the wound, was being watched at the time with dermatology, wound healed; so then June 12 had a biopsy, June 20 biopsy showed Merkel cell carcinoma, referred to Dr. Riley,  - has had 7 total Mohs surgeries, all on face and head, all squamous cell cancers; all of them healed - on losartan and atenolol, was taken off amlodipine as BP was recently too low - hx of anxiety with symptoms of burning/stinging, muscle tightening - had COVID last August 2022, had monoclonal Ab treatment - occasionally wakes up with cough and phlegm  Slide consult: Right temple, ([id number], Part A, 14 Slides) - Superficial portion of invasive Merkel cell carcinoma, superficially transected, see comment Comment: The tumor cells are CK20 positive with a perinuclear dot pattern. The biopsy specimen is too shallow to assess any of the parameters in the Merkel cell carcinoma synoptic.  09/07/2023 C1 Pembrolizumab 08/17/2023 C2 today. Patient having a well response from treatment. The lesion on his right forehead is shrinking in size and above his right eye brow. No major irAEs. Patient admits to severe anxiety especially on days of treatment which increases his BP. He is concerned about his BP being elevated and not being treated. He took his Atenolol and Losartan in the AM. He is also on Fluvoxamine, Seroquel and Valium BID. [de-identified] : Surgical Oncology: Hitesh Riley Dermatology: Lang Leon ([facility]) Hematology: Spike Aldridge  Psychiatry: Ian Forde ([place])  [facility] [phone] [facility] - cell [phone]

## 2023-09-07 NOTE — ASSESSMENT
[FreeTextEntry1] : Patient is a 73 year old female with past medical history of ITP presenting to the office for an initial consultation of Merkel cell CA.  He is presenting to the office for a newly diagnosed Merkel cell of the right scalp/forehead. Lesion was first noticed ~ 05/2023 with pink nodules on the R parietal scalp.  PET/CT on 08/06/2023 Multiple hypermetabolic nodules overlying the RIGHT scalp extending to the approximate level of the ear overlying the temporal region. Findings are consistent with reported Merkel cell carcinoma. LUNGS: Left LOWER lobe consolidation (4.7 x 6.5 cm, image 145), with diffuse mild uptake without suspicious focal findings. Right LOWER lobe  is unremarkable. PLEURA/PERICARDIUM: Posterior RIGHT UPPER lobe pulmonary nodule (1.7 x 0.4 cm, image 105), and lateral RIGHT MIDDLE lobe micronodule (image 121), are below resolution of PET without abnormal activity. There is mild parenchymal change at the anteromedial RIGHT MIDDLE lobe without associated uptake.  I reviewed pathology slides and report with dermatopathology. Slide consult: Right temple, ([id number], Part A, 14 Slides) - Superficial portion of invasive Merkel cell carcinoma, superficially transected, see comment Comment: The tumor cells are CK20 positive with a perinuclear dot pattern. The biopsy specimen is too shallow to assess any of the parameters in the Merkel cell carcinoma synoptic.  Patient sent to me by surgeon due to excessive local satellite lesions, which is unresectable. We reviewed the mechanism of action and side effects of PD1i. Will use Keytruda 200 mg q 3 weeks.   C1 Pembrolizumab 08/17/2023 C2 today.  Patient having a well response from treatment. The lesion on his right forehead is shrinking in size and above his right eyebrow.  No major irAEs. Mild rash noted on arms but subsided without any acute intervention.  General Anxiety disorder: Patient admits to severe anxiety especially on days of treatment which increases his BP. He is concerned about his BP being elevated and not being treated. He took his Atenolol and Losartan in the AM. He is also on Fluvoxamine, Seroquel and Valium BID. Will refer patient to psychology (email sent to expedite).  f/u q cycle.  Dr. Eng agrees with above plan.

## 2023-09-26 ENCOUNTER — APPOINTMENT (OUTPATIENT)
Dept: HEMATOLOGY ONCOLOGY | Facility: CLINIC | Age: 73
End: 2023-09-26

## 2023-09-26 ENCOUNTER — RESULT REVIEW (OUTPATIENT)
Age: 73
End: 2023-09-26

## 2023-09-26 ENCOUNTER — APPOINTMENT (OUTPATIENT)
Dept: INFUSION THERAPY | Facility: HOSPITAL | Age: 73
End: 2023-09-26

## 2023-09-26 ENCOUNTER — APPOINTMENT (OUTPATIENT)
Dept: HEMATOLOGY ONCOLOGY | Facility: CLINIC | Age: 73
End: 2023-09-26
Payer: MEDICARE

## 2023-09-26 VITALS
TEMPERATURE: 97.1 F | BODY MASS INDEX: 23.02 KG/M2 | DIASTOLIC BLOOD PRESSURE: 82 MMHG | WEIGHT: 169.75 LBS | SYSTOLIC BLOOD PRESSURE: 144 MMHG | RESPIRATION RATE: 16 BRPM | OXYGEN SATURATION: 96 % | HEART RATE: 61 BPM

## 2023-09-26 DIAGNOSIS — R68.2 DRY MOUTH, UNSPECIFIED: ICD-10-CM

## 2023-09-26 LAB
ALBUMIN SERPL ELPH-MCNC: 3.9 G/DL — SIGNIFICANT CHANGE UP (ref 3.3–5)
ALP SERPL-CCNC: 67 U/L — SIGNIFICANT CHANGE UP (ref 40–120)
ALT FLD-CCNC: 15 U/L — SIGNIFICANT CHANGE UP (ref 10–45)
ANION GAP SERPL CALC-SCNC: 10 MMOL/L — SIGNIFICANT CHANGE UP (ref 5–17)
AST SERPL-CCNC: 45 U/L — HIGH (ref 10–40)
BASOPHILS # BLD AUTO: 0.05 K/UL — SIGNIFICANT CHANGE UP (ref 0–0.2)
BASOPHILS NFR BLD AUTO: 1 % — SIGNIFICANT CHANGE UP (ref 0–2)
BILIRUB SERPL-MCNC: 0.7 MG/DL — SIGNIFICANT CHANGE UP (ref 0.2–1.2)
BUN SERPL-MCNC: 19 MG/DL — SIGNIFICANT CHANGE UP (ref 7–23)
CALCIUM SERPL-MCNC: 9.6 MG/DL — SIGNIFICANT CHANGE UP (ref 8.4–10.5)
CHLORIDE SERPL-SCNC: 102 MMOL/L — SIGNIFICANT CHANGE UP (ref 96–108)
CK SERPL-CCNC: 39 U/L — SIGNIFICANT CHANGE UP (ref 30–200)
CO2 SERPL-SCNC: 26 MMOL/L — SIGNIFICANT CHANGE UP (ref 22–31)
CREAT SERPL-MCNC: 1.1 MG/DL — SIGNIFICANT CHANGE UP (ref 0.5–1.3)
CRP SERPL-MCNC: 5 MG/L — HIGH
EGFR: 70 ML/MIN/1.73M2 — SIGNIFICANT CHANGE UP
EOSINOPHIL # BLD AUTO: 0.11 K/UL — SIGNIFICANT CHANGE UP (ref 0–0.5)
EOSINOPHIL NFR BLD AUTO: 2.1 % — SIGNIFICANT CHANGE UP (ref 0–6)
GLUCOSE SERPL-MCNC: 66 MG/DL — LOW (ref 70–99)
HCT VFR BLD CALC: 30.1 % — LOW (ref 39–50)
HGB BLD-MCNC: 11.4 G/DL — LOW (ref 13–17)
IMM GRANULOCYTES NFR BLD AUTO: 0.4 % — SIGNIFICANT CHANGE UP (ref 0–0.9)
LDH SERPL L TO P-CCNC: 693 U/L — HIGH (ref 50–242)
LYMPHOCYTES # BLD AUTO: 0.97 K/UL — LOW (ref 1–3.3)
LYMPHOCYTES # BLD AUTO: 18.9 % — SIGNIFICANT CHANGE UP (ref 13–44)
MCHC RBC-ENTMCNC: 33.2 PG — SIGNIFICANT CHANGE UP (ref 27–34)
MCHC RBC-ENTMCNC: 37.9 G/DL — HIGH (ref 32–36)
MCV RBC AUTO: 87.8 FL — SIGNIFICANT CHANGE UP (ref 80–100)
MONOCYTES # BLD AUTO: 0.51 K/UL — SIGNIFICANT CHANGE UP (ref 0–0.9)
MONOCYTES NFR BLD AUTO: 9.9 % — SIGNIFICANT CHANGE UP (ref 2–14)
NEUTROPHILS # BLD AUTO: 3.48 K/UL — SIGNIFICANT CHANGE UP (ref 1.8–7.4)
NEUTROPHILS NFR BLD AUTO: 67.7 % — SIGNIFICANT CHANGE UP (ref 43–77)
NRBC # BLD: 0 /100 WBCS — SIGNIFICANT CHANGE UP (ref 0–0)
PLATELET # BLD AUTO: 69 K/UL — LOW (ref 150–400)
POTASSIUM SERPL-MCNC: 5.2 MMOL/L — SIGNIFICANT CHANGE UP (ref 3.5–5.3)
POTASSIUM SERPL-SCNC: 5.2 MMOL/L — SIGNIFICANT CHANGE UP (ref 3.5–5.3)
PROT SERPL-MCNC: 6.8 G/DL — SIGNIFICANT CHANGE UP (ref 6–8.3)
RBC # BLD: 3.43 M/UL — LOW (ref 4.2–5.8)
RBC # FLD: 14.3 % — SIGNIFICANT CHANGE UP (ref 10.3–14.5)
RHEUMATOID FACT SERPL-ACNC: 26 IU/ML — HIGH (ref 0–13)
SODIUM SERPL-SCNC: 137 MMOL/L — SIGNIFICANT CHANGE UP (ref 135–145)
WBC # BLD: 5.14 K/UL — SIGNIFICANT CHANGE UP (ref 3.8–10.5)
WBC # FLD AUTO: 5.14 K/UL — SIGNIFICANT CHANGE UP (ref 3.8–10.5)

## 2023-09-26 PROCEDURE — 99214 OFFICE O/P EST MOD 30 MIN: CPT

## 2023-09-27 LAB
ANA PAT FLD IF-IMP: ABNORMAL
ANA TITR SER: ABNORMAL
DSDNA AB FLD-ACNC: <0.2 AI — SIGNIFICANT CHANGE UP
ENA SS-A AB FLD IA-ACNC: 0.8 AI — SIGNIFICANT CHANGE UP
T3 SERPL-MCNC: 103 NG/DL — SIGNIFICANT CHANGE UP (ref 80–200)
T4 FREE SERPL-MCNC: 1.1 NG/DL — SIGNIFICANT CHANGE UP (ref 0.9–1.8)
TSH SERPL-MCNC: 2.16 UIU/ML — SIGNIFICANT CHANGE UP (ref 0.27–4.2)

## 2023-09-28 ENCOUNTER — APPOINTMENT (OUTPATIENT)
Dept: HEMATOLOGY ONCOLOGY | Facility: CLINIC | Age: 73
End: 2023-09-28
Payer: MEDICARE

## 2023-09-28 LAB
SSA-52 (RO52) (ENA) ANTIBODY, IGG: 61 AU/ML — HIGH (ref 0–40)
SSA-60 (RO60) (ENA) ANTIBODY, IGG: 4 AU/ML — SIGNIFICANT CHANGE UP (ref 0–40)

## 2023-09-28 PROCEDURE — 90791 PSYCH DIAGNOSTIC EVALUATION: CPT | Mod: 95

## 2023-09-30 LAB — DSDNA AB SER QL CLIF: NEGATIVE — SIGNIFICANT CHANGE UP

## 2023-10-11 ENCOUNTER — NON-APPOINTMENT (OUTPATIENT)
Age: 73
End: 2023-10-11

## 2023-10-12 ENCOUNTER — APPOINTMENT (OUTPATIENT)
Dept: HEMATOLOGY ONCOLOGY | Facility: CLINIC | Age: 73
End: 2023-10-12
Payer: MEDICARE

## 2023-10-12 DIAGNOSIS — F41.9 ANXIETY DISORDER, UNSPECIFIED: ICD-10-CM

## 2023-10-12 PROCEDURE — 90847 FAMILY PSYTX W/PT 50 MIN: CPT | Mod: 95

## 2023-10-17 ENCOUNTER — RESULT REVIEW (OUTPATIENT)
Age: 73
End: 2023-10-17

## 2023-10-17 ENCOUNTER — APPOINTMENT (OUTPATIENT)
Dept: INFUSION THERAPY | Facility: HOSPITAL | Age: 73
End: 2023-10-17

## 2023-10-17 ENCOUNTER — APPOINTMENT (OUTPATIENT)
Dept: HEMATOLOGY ONCOLOGY | Facility: CLINIC | Age: 73
End: 2023-10-17

## 2023-10-17 ENCOUNTER — OUTPATIENT (OUTPATIENT)
Dept: OUTPATIENT SERVICES | Facility: HOSPITAL | Age: 73
LOS: 1 days | Discharge: HOME | End: 2023-10-17

## 2023-10-17 ENCOUNTER — APPOINTMENT (OUTPATIENT)
Dept: HEMATOLOGY ONCOLOGY | Facility: CLINIC | Age: 73
End: 2023-10-17
Payer: MEDICARE

## 2023-10-17 DIAGNOSIS — C4A.9 MERKEL CELL CARCINOMA, UNSPECIFIED: ICD-10-CM

## 2023-10-17 DIAGNOSIS — C4A.39: ICD-10-CM

## 2023-10-17 DIAGNOSIS — Z85.828 PERSONAL HISTORY OF OTHER MALIGNANT NEOPLASM OF SKIN: Chronic | ICD-10-CM

## 2023-10-17 DIAGNOSIS — J35.1 HYPERTROPHY OF TONSILS: Chronic | ICD-10-CM

## 2023-10-17 LAB
AGGLUTINATION: PRESENT — SIGNIFICANT CHANGE UP
AGGLUTINATION: PRESENT — SIGNIFICANT CHANGE UP
ALBUMIN SERPL ELPH-MCNC: 4.1 G/DL — SIGNIFICANT CHANGE UP (ref 3.3–5)
ALBUMIN SERPL ELPH-MCNC: 4.1 G/DL — SIGNIFICANT CHANGE UP (ref 3.3–5)
ALP SERPL-CCNC: 68 U/L — SIGNIFICANT CHANGE UP (ref 40–120)
ALP SERPL-CCNC: 68 U/L — SIGNIFICANT CHANGE UP (ref 40–120)
ALT FLD-CCNC: 23 U/L — SIGNIFICANT CHANGE UP (ref 10–45)
ALT FLD-CCNC: 23 U/L — SIGNIFICANT CHANGE UP (ref 10–45)
ANION GAP SERPL CALC-SCNC: 9 MMOL/L — SIGNIFICANT CHANGE UP (ref 5–17)
ANION GAP SERPL CALC-SCNC: 9 MMOL/L — SIGNIFICANT CHANGE UP (ref 5–17)
AST SERPL-CCNC: 31 U/L — SIGNIFICANT CHANGE UP (ref 10–40)
AST SERPL-CCNC: 31 U/L — SIGNIFICANT CHANGE UP (ref 10–40)
BASOPHILS # BLD AUTO: 0.03 K/UL — SIGNIFICANT CHANGE UP (ref 0–0.2)
BASOPHILS # BLD AUTO: 0.03 K/UL — SIGNIFICANT CHANGE UP (ref 0–0.2)
BASOPHILS NFR BLD AUTO: 0.4 % — SIGNIFICANT CHANGE UP (ref 0–2)
BASOPHILS NFR BLD AUTO: 0.4 % — SIGNIFICANT CHANGE UP (ref 0–2)
BILIRUB SERPL-MCNC: 0.9 MG/DL — SIGNIFICANT CHANGE UP (ref 0.2–1.2)
BILIRUB SERPL-MCNC: 0.9 MG/DL — SIGNIFICANT CHANGE UP (ref 0.2–1.2)
BUN SERPL-MCNC: 17 MG/DL — SIGNIFICANT CHANGE UP (ref 7–23)
BUN SERPL-MCNC: 17 MG/DL — SIGNIFICANT CHANGE UP (ref 7–23)
CALCIUM SERPL-MCNC: 9.5 MG/DL — SIGNIFICANT CHANGE UP (ref 8.4–10.5)
CALCIUM SERPL-MCNC: 9.5 MG/DL — SIGNIFICANT CHANGE UP (ref 8.4–10.5)
CHLORIDE SERPL-SCNC: 101 MMOL/L — SIGNIFICANT CHANGE UP (ref 96–108)
CHLORIDE SERPL-SCNC: 101 MMOL/L — SIGNIFICANT CHANGE UP (ref 96–108)
CK SERPL-CCNC: 44 U/L — SIGNIFICANT CHANGE UP (ref 30–200)
CK SERPL-CCNC: 44 U/L — SIGNIFICANT CHANGE UP (ref 30–200)
CO2 SERPL-SCNC: 29 MMOL/L — SIGNIFICANT CHANGE UP (ref 22–31)
CO2 SERPL-SCNC: 29 MMOL/L — SIGNIFICANT CHANGE UP (ref 22–31)
CREAT SERPL-MCNC: 1.08 MG/DL — SIGNIFICANT CHANGE UP (ref 0.5–1.3)
CREAT SERPL-MCNC: 1.08 MG/DL — SIGNIFICANT CHANGE UP (ref 0.5–1.3)
CRP SERPL-MCNC: 3 MG/L — SIGNIFICANT CHANGE UP
CRP SERPL-MCNC: 3 MG/L — SIGNIFICANT CHANGE UP
EGFR: 72 ML/MIN/1.73M2 — SIGNIFICANT CHANGE UP
EGFR: 72 ML/MIN/1.73M2 — SIGNIFICANT CHANGE UP
EOSINOPHIL # BLD AUTO: 0.12 K/UL — SIGNIFICANT CHANGE UP (ref 0–0.5)
EOSINOPHIL # BLD AUTO: 0.12 K/UL — SIGNIFICANT CHANGE UP (ref 0–0.5)
EOSINOPHIL NFR BLD AUTO: 1.7 % — SIGNIFICANT CHANGE UP (ref 0–6)
EOSINOPHIL NFR BLD AUTO: 1.7 % — SIGNIFICANT CHANGE UP (ref 0–6)
GLUCOSE SERPL-MCNC: 127 MG/DL — HIGH (ref 70–99)
GLUCOSE SERPL-MCNC: 127 MG/DL — HIGH (ref 70–99)
HCT VFR BLD CALC: SIGNIFICANT CHANGE UP (ref 39–50)
HCT VFR BLD CALC: SIGNIFICANT CHANGE UP (ref 39–50)
HGB BLD-MCNC: 13.1 G/DL — SIGNIFICANT CHANGE UP (ref 13–17)
HGB BLD-MCNC: 13.1 G/DL — SIGNIFICANT CHANGE UP (ref 13–17)
IMM GRANULOCYTES NFR BLD AUTO: 0.4 % — SIGNIFICANT CHANGE UP (ref 0–0.9)
IMM GRANULOCYTES NFR BLD AUTO: 0.4 % — SIGNIFICANT CHANGE UP (ref 0–0.9)
LDH SERPL L TO P-CCNC: 276 U/L — HIGH (ref 50–242)
LDH SERPL L TO P-CCNC: 276 U/L — HIGH (ref 50–242)
LYMPHOCYTES # BLD AUTO: 0.97 K/UL — LOW (ref 1–3.3)
LYMPHOCYTES # BLD AUTO: 0.97 K/UL — LOW (ref 1–3.3)
LYMPHOCYTES # BLD AUTO: 14 % — SIGNIFICANT CHANGE UP (ref 13–44)
LYMPHOCYTES # BLD AUTO: 14 % — SIGNIFICANT CHANGE UP (ref 13–44)
MCHC RBC-ENTMCNC: SIGNIFICANT CHANGE UP (ref 27–34)
MCHC RBC-ENTMCNC: SIGNIFICANT CHANGE UP (ref 27–34)
MCHC RBC-ENTMCNC: SIGNIFICANT CHANGE UP (ref 32–36)
MCHC RBC-ENTMCNC: SIGNIFICANT CHANGE UP (ref 32–36)
MCV RBC AUTO: SIGNIFICANT CHANGE UP (ref 80–100)
MCV RBC AUTO: SIGNIFICANT CHANGE UP (ref 80–100)
MONOCYTES # BLD AUTO: 0.67 K/UL — SIGNIFICANT CHANGE UP (ref 0–0.9)
MONOCYTES # BLD AUTO: 0.67 K/UL — SIGNIFICANT CHANGE UP (ref 0–0.9)
MONOCYTES NFR BLD AUTO: 9.6 % — SIGNIFICANT CHANGE UP (ref 2–14)
MONOCYTES NFR BLD AUTO: 9.6 % — SIGNIFICANT CHANGE UP (ref 2–14)
NEUTROPHILS # BLD AUTO: 5.13 K/UL — SIGNIFICANT CHANGE UP (ref 1.8–7.4)
NEUTROPHILS # BLD AUTO: 5.13 K/UL — SIGNIFICANT CHANGE UP (ref 1.8–7.4)
NEUTROPHILS NFR BLD AUTO: 73.9 % — SIGNIFICANT CHANGE UP (ref 43–77)
NEUTROPHILS NFR BLD AUTO: 73.9 % — SIGNIFICANT CHANGE UP (ref 43–77)
NRBC # BLD: 0 /100 WBCS — SIGNIFICANT CHANGE UP (ref 0–0)
NRBC # BLD: 0 /100 WBCS — SIGNIFICANT CHANGE UP (ref 0–0)
PLAT MORPH BLD: NORMAL — SIGNIFICANT CHANGE UP
PLAT MORPH BLD: NORMAL — SIGNIFICANT CHANGE UP
PLATELET # BLD AUTO: 78 K/UL — LOW (ref 150–400)
PLATELET # BLD AUTO: 78 K/UL — LOW (ref 150–400)
POTASSIUM SERPL-MCNC: 3.8 MMOL/L — SIGNIFICANT CHANGE UP (ref 3.5–5.3)
POTASSIUM SERPL-MCNC: 3.8 MMOL/L — SIGNIFICANT CHANGE UP (ref 3.5–5.3)
POTASSIUM SERPL-SCNC: 3.8 MMOL/L — SIGNIFICANT CHANGE UP (ref 3.5–5.3)
POTASSIUM SERPL-SCNC: 3.8 MMOL/L — SIGNIFICANT CHANGE UP (ref 3.5–5.3)
PROT SERPL-MCNC: 6.9 G/DL — SIGNIFICANT CHANGE UP (ref 6–8.3)
PROT SERPL-MCNC: 6.9 G/DL — SIGNIFICANT CHANGE UP (ref 6–8.3)
RBC # BLD: 1.61 M/UL — LOW (ref 4.2–5.8)
RBC # BLD: 1.61 M/UL — LOW (ref 4.2–5.8)
RBC # FLD: SIGNIFICANT CHANGE UP (ref 10.3–14.5)
RBC # FLD: SIGNIFICANT CHANGE UP (ref 10.3–14.5)
RBC BLD AUTO: ABNORMAL
RBC BLD AUTO: ABNORMAL
SODIUM SERPL-SCNC: 139 MMOL/L — SIGNIFICANT CHANGE UP (ref 135–145)
SODIUM SERPL-SCNC: 139 MMOL/L — SIGNIFICANT CHANGE UP (ref 135–145)
WBC # BLD: 6.95 K/UL — SIGNIFICANT CHANGE UP (ref 3.8–10.5)
WBC # BLD: 6.95 K/UL — SIGNIFICANT CHANGE UP (ref 3.8–10.5)
WBC # FLD AUTO: 6.95 K/UL — SIGNIFICANT CHANGE UP (ref 3.8–10.5)
WBC # FLD AUTO: 6.95 K/UL — SIGNIFICANT CHANGE UP (ref 3.8–10.5)

## 2023-10-17 PROCEDURE — 99214 OFFICE O/P EST MOD 30 MIN: CPT

## 2023-10-18 DIAGNOSIS — Z51.11 ENCOUNTER FOR ANTINEOPLASTIC CHEMOTHERAPY: ICD-10-CM

## 2023-10-18 LAB
T3 SERPL-MCNC: 96 NG/DL — SIGNIFICANT CHANGE UP (ref 80–200)
T3 SERPL-MCNC: 96 NG/DL — SIGNIFICANT CHANGE UP (ref 80–200)
T4 FREE SERPL-MCNC: 1.3 NG/DL — SIGNIFICANT CHANGE UP (ref 0.9–1.8)
T4 FREE SERPL-MCNC: 1.3 NG/DL — SIGNIFICANT CHANGE UP (ref 0.9–1.8)
TSH SERPL-MCNC: 1.26 UIU/ML — SIGNIFICANT CHANGE UP (ref 0.27–4.2)
TSH SERPL-MCNC: 1.26 UIU/ML — SIGNIFICANT CHANGE UP (ref 0.27–4.2)

## 2023-10-23 ENCOUNTER — INPATIENT (INPATIENT)
Facility: HOSPITAL | Age: 73
LOS: 63 days | Discharge: NURSING, REHAB OR LONG-TERM CARE | DRG: 896 | End: 2023-12-26
Attending: INTERNAL MEDICINE | Admitting: INTERNAL MEDICINE
Payer: MEDICARE

## 2023-10-23 ENCOUNTER — NON-APPOINTMENT (OUTPATIENT)
Age: 73
End: 2023-10-23

## 2023-10-23 VITALS
RESPIRATION RATE: 16 BRPM | SYSTOLIC BLOOD PRESSURE: 116 MMHG | TEMPERATURE: 98 F | OXYGEN SATURATION: 97 % | DIASTOLIC BLOOD PRESSURE: 66 MMHG | WEIGHT: 149.91 LBS | HEART RATE: 70 BPM

## 2023-10-23 DIAGNOSIS — F41.9 ANXIETY DISORDER, UNSPECIFIED: ICD-10-CM

## 2023-10-23 DIAGNOSIS — J35.1 HYPERTROPHY OF TONSILS: Chronic | ICD-10-CM

## 2023-10-23 DIAGNOSIS — Z85.828 PERSONAL HISTORY OF OTHER MALIGNANT NEOPLASM OF SKIN: Chronic | ICD-10-CM

## 2023-10-23 LAB
ALBUMIN SERPL ELPH-MCNC: 3.1 G/DL — LOW (ref 3.3–5)
ALBUMIN SERPL ELPH-MCNC: 3.1 G/DL — LOW (ref 3.3–5)
ALP SERPL-CCNC: 72 U/L — SIGNIFICANT CHANGE UP (ref 40–120)
ALP SERPL-CCNC: 72 U/L — SIGNIFICANT CHANGE UP (ref 40–120)
ALT FLD-CCNC: 25 U/L — SIGNIFICANT CHANGE UP (ref 12–78)
ALT FLD-CCNC: 25 U/L — SIGNIFICANT CHANGE UP (ref 12–78)
ANION GAP SERPL CALC-SCNC: 6 MMOL/L — SIGNIFICANT CHANGE UP (ref 5–17)
ANION GAP SERPL CALC-SCNC: 6 MMOL/L — SIGNIFICANT CHANGE UP (ref 5–17)
APPEARANCE UR: CLEAR — SIGNIFICANT CHANGE UP
APPEARANCE UR: CLEAR — SIGNIFICANT CHANGE UP
AST SERPL-CCNC: 24 U/L — SIGNIFICANT CHANGE UP (ref 15–37)
AST SERPL-CCNC: 24 U/L — SIGNIFICANT CHANGE UP (ref 15–37)
BASOPHILS # BLD AUTO: 0.03 K/UL — SIGNIFICANT CHANGE UP (ref 0–0.2)
BASOPHILS # BLD AUTO: 0.03 K/UL — SIGNIFICANT CHANGE UP (ref 0–0.2)
BASOPHILS NFR BLD AUTO: 0.5 % — SIGNIFICANT CHANGE UP (ref 0–2)
BASOPHILS NFR BLD AUTO: 0.5 % — SIGNIFICANT CHANGE UP (ref 0–2)
BILIRUB SERPL-MCNC: 0.8 MG/DL — SIGNIFICANT CHANGE UP (ref 0.2–1.2)
BILIRUB SERPL-MCNC: 0.8 MG/DL — SIGNIFICANT CHANGE UP (ref 0.2–1.2)
BILIRUB UR-MCNC: NEGATIVE — SIGNIFICANT CHANGE UP
BILIRUB UR-MCNC: NEGATIVE — SIGNIFICANT CHANGE UP
BUN SERPL-MCNC: 17 MG/DL — SIGNIFICANT CHANGE UP (ref 7–23)
BUN SERPL-MCNC: 17 MG/DL — SIGNIFICANT CHANGE UP (ref 7–23)
CALCIUM SERPL-MCNC: 9 MG/DL — SIGNIFICANT CHANGE UP (ref 8.5–10.1)
CALCIUM SERPL-MCNC: 9 MG/DL — SIGNIFICANT CHANGE UP (ref 8.5–10.1)
CHLORIDE SERPL-SCNC: 105 MMOL/L — SIGNIFICANT CHANGE UP (ref 96–108)
CHLORIDE SERPL-SCNC: 105 MMOL/L — SIGNIFICANT CHANGE UP (ref 96–108)
CO2 SERPL-SCNC: 31 MMOL/L — SIGNIFICANT CHANGE UP (ref 22–31)
CO2 SERPL-SCNC: 31 MMOL/L — SIGNIFICANT CHANGE UP (ref 22–31)
COLOR SPEC: YELLOW — SIGNIFICANT CHANGE UP
COLOR SPEC: YELLOW — SIGNIFICANT CHANGE UP
CREAT SERPL-MCNC: 1.1 MG/DL — SIGNIFICANT CHANGE UP (ref 0.5–1.3)
CREAT SERPL-MCNC: 1.1 MG/DL — SIGNIFICANT CHANGE UP (ref 0.5–1.3)
DIFF PNL FLD: NEGATIVE — SIGNIFICANT CHANGE UP
DIFF PNL FLD: NEGATIVE — SIGNIFICANT CHANGE UP
EGFR: 70 ML/MIN/1.73M2 — SIGNIFICANT CHANGE UP
EGFR: 70 ML/MIN/1.73M2 — SIGNIFICANT CHANGE UP
EOSINOPHIL # BLD AUTO: 0.13 K/UL — SIGNIFICANT CHANGE UP (ref 0–0.5)
EOSINOPHIL # BLD AUTO: 0.13 K/UL — SIGNIFICANT CHANGE UP (ref 0–0.5)
EOSINOPHIL NFR BLD AUTO: 2.2 % — SIGNIFICANT CHANGE UP (ref 0–6)
EOSINOPHIL NFR BLD AUTO: 2.2 % — SIGNIFICANT CHANGE UP (ref 0–6)
ETHANOL SERPL-MCNC: <10 MG/DL — SIGNIFICANT CHANGE UP (ref 0–10)
ETHANOL SERPL-MCNC: <10 MG/DL — SIGNIFICANT CHANGE UP (ref 0–10)
GLUCOSE SERPL-MCNC: 95 MG/DL — SIGNIFICANT CHANGE UP (ref 70–99)
GLUCOSE SERPL-MCNC: 95 MG/DL — SIGNIFICANT CHANGE UP (ref 70–99)
GLUCOSE UR QL: NEGATIVE MG/DL — SIGNIFICANT CHANGE UP
GLUCOSE UR QL: NEGATIVE MG/DL — SIGNIFICANT CHANGE UP
HCT VFR BLD CALC: 31.9 % — LOW (ref 39–50)
HCT VFR BLD CALC: 31.9 % — LOW (ref 39–50)
HGB BLD-MCNC: 11.6 G/DL — LOW (ref 13–17)
HGB BLD-MCNC: 11.6 G/DL — LOW (ref 13–17)
IMM GRANULOCYTES NFR BLD AUTO: 0.3 % — SIGNIFICANT CHANGE UP (ref 0–0.9)
IMM GRANULOCYTES NFR BLD AUTO: 0.3 % — SIGNIFICANT CHANGE UP (ref 0–0.9)
KETONES UR-MCNC: NEGATIVE MG/DL — SIGNIFICANT CHANGE UP
KETONES UR-MCNC: NEGATIVE MG/DL — SIGNIFICANT CHANGE UP
LEUKOCYTE ESTERASE UR-ACNC: ABNORMAL
LEUKOCYTE ESTERASE UR-ACNC: ABNORMAL
LYMPHOCYTES # BLD AUTO: 1.09 K/UL — SIGNIFICANT CHANGE UP (ref 1–3.3)
LYMPHOCYTES # BLD AUTO: 1.09 K/UL — SIGNIFICANT CHANGE UP (ref 1–3.3)
LYMPHOCYTES # BLD AUTO: 18.1 % — SIGNIFICANT CHANGE UP (ref 13–44)
LYMPHOCYTES # BLD AUTO: 18.1 % — SIGNIFICANT CHANGE UP (ref 13–44)
MAGNESIUM SERPL-MCNC: 2.1 MG/DL — SIGNIFICANT CHANGE UP (ref 1.6–2.6)
MAGNESIUM SERPL-MCNC: 2.1 MG/DL — SIGNIFICANT CHANGE UP (ref 1.6–2.6)
MCHC RBC-ENTMCNC: 30.6 PG — SIGNIFICANT CHANGE UP (ref 27–34)
MCHC RBC-ENTMCNC: 30.6 PG — SIGNIFICANT CHANGE UP (ref 27–34)
MCHC RBC-ENTMCNC: 36.4 GM/DL — HIGH (ref 32–36)
MCHC RBC-ENTMCNC: 36.4 GM/DL — HIGH (ref 32–36)
MCV RBC AUTO: 84.2 FL — SIGNIFICANT CHANGE UP (ref 80–100)
MCV RBC AUTO: 84.2 FL — SIGNIFICANT CHANGE UP (ref 80–100)
MONOCYTES # BLD AUTO: 0.61 K/UL — SIGNIFICANT CHANGE UP (ref 0–0.9)
MONOCYTES # BLD AUTO: 0.61 K/UL — SIGNIFICANT CHANGE UP (ref 0–0.9)
MONOCYTES NFR BLD AUTO: 10.1 % — SIGNIFICANT CHANGE UP (ref 2–14)
MONOCYTES NFR BLD AUTO: 10.1 % — SIGNIFICANT CHANGE UP (ref 2–14)
NEUTROPHILS # BLD AUTO: 4.14 K/UL — SIGNIFICANT CHANGE UP (ref 1.8–7.4)
NEUTROPHILS # BLD AUTO: 4.14 K/UL — SIGNIFICANT CHANGE UP (ref 1.8–7.4)
NEUTROPHILS NFR BLD AUTO: 68.8 % — SIGNIFICANT CHANGE UP (ref 43–77)
NEUTROPHILS NFR BLD AUTO: 68.8 % — SIGNIFICANT CHANGE UP (ref 43–77)
NITRITE UR-MCNC: NEGATIVE — SIGNIFICANT CHANGE UP
NITRITE UR-MCNC: NEGATIVE — SIGNIFICANT CHANGE UP
NRBC # BLD: 0 /100 WBCS — SIGNIFICANT CHANGE UP (ref 0–0)
NRBC # BLD: 0 /100 WBCS — SIGNIFICANT CHANGE UP (ref 0–0)
PH UR: 6.5 — SIGNIFICANT CHANGE UP (ref 5–8)
PH UR: 6.5 — SIGNIFICANT CHANGE UP (ref 5–8)
PLATELET # BLD AUTO: 89 K/UL — LOW (ref 150–400)
PLATELET # BLD AUTO: 89 K/UL — LOW (ref 150–400)
POTASSIUM SERPL-MCNC: 4.1 MMOL/L — SIGNIFICANT CHANGE UP (ref 3.5–5.3)
POTASSIUM SERPL-MCNC: 4.1 MMOL/L — SIGNIFICANT CHANGE UP (ref 3.5–5.3)
POTASSIUM SERPL-SCNC: 4.1 MMOL/L — SIGNIFICANT CHANGE UP (ref 3.5–5.3)
POTASSIUM SERPL-SCNC: 4.1 MMOL/L — SIGNIFICANT CHANGE UP (ref 3.5–5.3)
PROT SERPL-MCNC: 6.4 G/DL — SIGNIFICANT CHANGE UP (ref 6–8.3)
PROT SERPL-MCNC: 6.4 G/DL — SIGNIFICANT CHANGE UP (ref 6–8.3)
PROT UR-MCNC: NEGATIVE MG/DL — SIGNIFICANT CHANGE UP
PROT UR-MCNC: NEGATIVE MG/DL — SIGNIFICANT CHANGE UP
RBC # BLD: 3.79 M/UL — LOW (ref 4.2–5.8)
RBC # BLD: 3.79 M/UL — LOW (ref 4.2–5.8)
RBC # FLD: 12.8 % — SIGNIFICANT CHANGE UP (ref 10.3–14.5)
RBC # FLD: 12.8 % — SIGNIFICANT CHANGE UP (ref 10.3–14.5)
SODIUM SERPL-SCNC: 142 MMOL/L — SIGNIFICANT CHANGE UP (ref 135–145)
SODIUM SERPL-SCNC: 142 MMOL/L — SIGNIFICANT CHANGE UP (ref 135–145)
SP GR SPEC: 1.01 — SIGNIFICANT CHANGE UP (ref 1–1.03)
SP GR SPEC: 1.01 — SIGNIFICANT CHANGE UP (ref 1–1.03)
UROBILINOGEN FLD QL: 0.2 MG/DL — SIGNIFICANT CHANGE UP (ref 0.2–1)
UROBILINOGEN FLD QL: 0.2 MG/DL — SIGNIFICANT CHANGE UP (ref 0.2–1)
WBC # BLD: 6.22 K/UL — SIGNIFICANT CHANGE UP (ref 3.8–10.5)
WBC # BLD: 6.22 K/UL — SIGNIFICANT CHANGE UP (ref 3.8–10.5)
WBC # FLD AUTO: 6.22 K/UL — SIGNIFICANT CHANGE UP (ref 3.8–10.5)
WBC # FLD AUTO: 6.22 K/UL — SIGNIFICANT CHANGE UP (ref 3.8–10.5)

## 2023-10-23 PROCEDURE — 99285 EMERGENCY DEPT VISIT HI MDM: CPT

## 2023-10-23 PROCEDURE — 70450 CT HEAD/BRAIN W/O DYE: CPT | Mod: 26,MA

## 2023-10-23 RX ORDER — OLANZAPINE 15 MG/1
2.5 TABLET, FILM COATED ORAL ONCE
Refills: 0 | Status: COMPLETED | OUTPATIENT
Start: 2023-10-23 | End: 2023-10-23

## 2023-10-23 RX ORDER — METOPROLOL TARTRATE 50 MG
5 TABLET ORAL ONCE
Refills: 0 | Status: COMPLETED | OUTPATIENT
Start: 2023-10-23 | End: 2023-10-23

## 2023-10-23 RX ORDER — ACETAMINOPHEN 500 MG
1000 TABLET ORAL ONCE
Refills: 0 | Status: COMPLETED | OUTPATIENT
Start: 2023-10-23 | End: 2023-10-23

## 2023-10-23 RX ORDER — QUETIAPINE FUMARATE 200 MG/1
25 TABLET, FILM COATED ORAL ONCE
Refills: 0 | Status: COMPLETED | OUTPATIENT
Start: 2023-10-23 | End: 2023-10-23

## 2023-10-23 RX ORDER — DIAZEPAM 5 MG
5 TABLET ORAL ONCE
Refills: 0 | Status: DISCONTINUED | OUTPATIENT
Start: 2023-10-23 | End: 2023-10-23

## 2023-10-23 RX ORDER — METOCLOPRAMIDE HCL 10 MG
10 TABLET ORAL ONCE
Refills: 0 | Status: COMPLETED | OUTPATIENT
Start: 2023-10-23 | End: 2023-10-23

## 2023-10-23 RX ORDER — SODIUM CHLORIDE 9 MG/ML
1000 INJECTION INTRAMUSCULAR; INTRAVENOUS; SUBCUTANEOUS ONCE
Refills: 0 | Status: COMPLETED | OUTPATIENT
Start: 2023-10-23 | End: 2023-10-23

## 2023-10-23 RX ADMIN — Medication 5 MILLIGRAM(S): at 20:27

## 2023-10-23 RX ADMIN — SODIUM CHLORIDE 1000 MILLILITER(S): 9 INJECTION INTRAMUSCULAR; INTRAVENOUS; SUBCUTANEOUS at 11:52

## 2023-10-23 RX ADMIN — QUETIAPINE FUMARATE 25 MILLIGRAM(S): 200 TABLET, FILM COATED ORAL at 21:40

## 2023-10-23 RX ADMIN — Medication 10 MILLIGRAM(S): at 13:37

## 2023-10-23 RX ADMIN — Medication 5 MILLIGRAM(S): at 20:59

## 2023-10-23 RX ADMIN — Medication 5 MILLIGRAM(S): at 22:47

## 2023-10-23 RX ADMIN — Medication 400 MILLIGRAM(S): at 14:31

## 2023-10-23 NOTE — ED ADULT NURSE NOTE - NSFALLHARMRISKINTERV_ED_ALL_ED

## 2023-10-23 NOTE — ED BEHAVIORAL HEALTH ASSESSMENT NOTE - DIFFERENTIAL
psychosis 2/2 medical condition or side effect from immunotherapy (Keytruda) or withdrawal from medication (unclear use of benzos, recently discontinued seroquel and restarted), depression, anxiety

## 2023-10-23 NOTE — ED BEHAVIORAL HEALTH ASSESSMENT NOTE - SUMMARY
The patient is a 73-year-old male; domiciled with wife; PPHx of anxiety, OCD, no prior admissions, no hx of SI/SA or violence; PMHx of HTN, Merkel Cell, hx of squamous cell cancer s/p Mohs surgeries; denies substance use; BIB EMS activated by wife; psychiatry consulted for evaluation.  Unclear etiology of new-onset ?delusions about things being in his body, unlikely a new primary psychotic illness presenting in someone of this age with other acute/comorbid medical conditions, would need to rule out neuro/onc issues first.  Recommend medical admission for further work up with psychiatry CL team following. The patient is a 73-year-old male; domiciled with wife; PPHx of anxiety, OCD, no prior admissions, no hx of SI/SA or violence; PMHx of HTN, Merkel Cell, hx of squamous cell cancer s/p Mohs surgeries; denies substance use; BIB EMS activated by wife; psychiatry consulted for evaluation.  Unclear etiology of new-onset ?delusions about things being in his body, unlikely a new primary psychotic illness presenting in someone of this age with other acute/comorbid medical conditions, would need to rule out neuro/onc issues first.  Recommend medical admission for further work up with psychiatry CL team following.    If transferred to [facility], recommend primary team at [facility] place consult for psychiatry CL team there as needed.

## 2023-10-23 NOTE — ED BEHAVIORAL HEALTH NOTE - BEHAVIORAL HEALTH NOTE
ISTOP Reference #: [id number]    Practitioner Count: 1  Pharmacy Count: 1  Current Opioid Prescriptions: 0  Current Benzodiazepine Prescriptions: 1  Current Stimulant Prescriptions: 0    Patient Demographic Information (PDI)       PDI	First Name	Last Name	Birth Date	Gender	Street Address	[facility] Code  TAM Escobar	Henley	1950	Male	[address] [facility]	[id number]    Prescription Information      PDI Filter:    PDI	My Rx	Current Rx	Drug Type	Rx Written	Rx Dispensed	Drug	Quantity	Days Supply	Prescriber Name	Prescriber CASIMIRO #	Payment Method	Dispenser  A	N	Y	B	10/03/2023	10/04/2023	diazepam 2 mg tablet	60	30	Ian Forde B	[id number]	Medicare	[id number]  A	N	N	B	09/05/2023	09/22/2023	diazepam 5 mg tablet	60	30	Ian Forde B	[id number]	Medicare	[id number]  A	N	N	B	08/15/2023	08/23/2023	diazepam 5 mg tablet	60	30	Ian Forde B	[id number]	Medicare	[id number]  A	N	N	B	06/26/2023	07/22/2023	diazepam 5 mg tablet	60	30	Ian Forde B	[id number]	Medicare	[id number]  A	N	N	B	06/19/2023	06/21/2023	diazepam 5 mg tablet	60	30	Ian Forde B	[id number]	Medicare	[id number]  A	N	N	B	05/22/2023	05/23/2023	diazepam 5 mg tablet	60	30	Ian Forde B	[id number]	Medicare	[id number]  A	N	N	B	04/12/2023	04/20/2023	diazepam 5 mg tablet	60	30	Ian Forde B	[id number]	Medicare	[id number]  A	N	N	B	03/13/2023	03/17/2023	diazepam 5 mg tablet	60	30	Ian Forde B	[id number]	Medicare	[id number]  A	N	N	B	02/01/2023	02/15/2023	diazepam 5 mg tablet	60	30	Ian Forde B	[id number]	Medicare	[id number]  A	N	N	B	01/18/2023	01/21/2023	diazepam 2 mg tablet	60	30	Ian Forde B	[id number]	Medicare	[id number]  A	N	N	B	12/28/2022	01/17/2023	diazepam 5 mg tablet	60	30	Ian Forde B	[id number]	Medicare	[id number]  A	N	N	B	12/02/2022	12/09/2022	diazepam 5 mg tablet	60	30	Ian Forde B	[id number]	Medicare	[id number]  A	N	N	B	12/02/2022	12/09/2022	diazepam 2 mg tablet	60	30	Ian Forde B	[id number]	Medicare	[id number]  A	N	N	B	10/19/2022	11/08/2022	diazepam 2 mg tablet	60	30	Ian Forde B	[id number]	Medicare	[id number]

## 2023-10-23 NOTE — ED BEHAVIORAL HEALTH ASSESSMENT NOTE - HPI (INCLUDE ILLNESS QUALITY, SEVERITY, DURATION, TIMING, CONTEXT, MODIFYING FACTORS, ASSOCIATED SIGNS AND SYMPTOMS)
The patient is a 73-year-old male; domiciled with wife; PPHx of anxiety, OCD, no prior admissions, no hx of SI/SA or violence; PMHx of HTN, Merkel Cell, hx of squamous cell cancer s/p Mohs surgeries; denies substance use; BIB EMS activated by wife; psychiatry consulted for evaluation.  Pt states that he came in because he has not been eating much due to difficulty swallowing, also reports decreased appetite.  He states that his other main problem now is "severe anxiety attacks."  When asked to describe them, he says, "you're not girma believe it..." but he believes he has objects inside of him (chains, round discs, razor blades).  He states that they are moving around inside his body, up and down.  At one point pt appeared to be speaking with teeth together and when asked about it, pt states that if he opens his mouth things will fly out (beads, metal rods, long white poles).  Pt reports feeling depressed because he can't get rid of the anxiety.  He denies SI/HI/AVH/PI, noting he has a "grave fear of death."  Pt states that he was off seroquel for a period of time but went back on it 3-4 days ago.  He feels he is in a "real panic state" and does not want to answer many more questions, refers writer to speak with his wife.      Writer spoke to pt’s psychiatrist, also on speaker with his barbara/helper.  He states that he did not refer pt to the ED today but was informed by the ED provider that pt made a comment that sounded psychotic in nature, about something inside his head doing something, refers writer to clarify with ED provider.  He states that he has known pt for many years, tx pt for MORENO and OCD, on low-dose seroquel for anxiety that was not well-controlled with use of benzos.  He states that he had a telehealth visit with pt last week and pt mentioned something about something in his body pushing up and down, also having issues swallowing so collateral thought he was referring to that, now uncertain whether pt using poetic license or psychotic thinking at that time.  Pt has no hx of psychosis, no hx of schizophrenia/schizoaffective disorder/bipolar disorder, no hx of SI/HI/violence.  He is aware that pt is getting infusions for cancer but he is uncertain of the details.  He states that if pt is truly psychotic he doesn't think he can manage that outpatient and feels pt might require PHP to get symptoms under control.     Writer received call from pt's oncologist (Dr. Eng, [phone]).  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  He states that pt has had difficulty swallowing and they had planned to send pt for a speech and swallow study, also not eating, becoming more confused.  There has been recent concern for psychosis and psychiatric decline as well.  He states that there is always concerns with immunotherapy of a neurological event and there is risk of encephalopathy with tx though not typical for psychosis as side effect.  He states that if pt requires medical admission for further eval by neuro/onc/ENT, he would prefer pt be transferred to [facility] and followed by psychiatry CL team there.          Writer spoke to pt's wife.  She states that she was concerned today because pt fell twice (described as slumping to the floor, pt having difficulty taking meds saying he can't swallow).  She states that pt has always had anxiety, has been on luvox and valium for years.  Pt had covid last year and was "foggy and weird" after receiving an infusion for that (not paxlovid), had syncopal episode and increased anxiety (possibly PTSD-like reaction to prior hospital experience), so seroquel was started.  Pt became more anxious due to multiple Mohs surgeries between March and May.  Pt seemed calmer and happier after those were completed but then he developed an aggressive, pink Merkel Cell cancer on his head afterwards.  After each infusion for Merkel Cell, pt reported that his throat felt tighter and tighter, felt it was killing him after the 3rd infusion.  They were concerned that this symptom was related to quetiapine (?TD) so it was ultimately discontinued but things worsened and pt started talking about bolts in his head, daggers, always saying "you're not girma believe me but..."  Quetiapine was then restarted.  They are awaiting a swallow study but collateral called in a private nurse to evaluate him and they reportedly felt nothing was wrong.  Pt has no hx of SI/HI, collateral stating "just the opposite" that he is afraid he is going to die.  She prefers pt be admitted and receive further testing at [place] if possible as it is easier for her to get to but is open to transfer if needed.        Covid Screen - Patient  denies positive test in past 3 months  denies recent exposures

## 2023-10-23 NOTE — ED BEHAVIORAL HEALTH ASSESSMENT NOTE - DETAILS
d/w pt's wife deferred d/w ED attending denies pt states that his father had a fear of death and wouldn't travel on subways

## 2023-10-23 NOTE — ED PROVIDER NOTE - CLINICAL SUMMARY MEDICAL DECISION MAKING FREE TEXT BOX
pt with generalized weakness, on chemo pt with generalized weakness, on chemo, awaiting telepsych consult, signed out to next ED doc

## 2023-10-23 NOTE — ED ADULT NURSE NOTE - OBJECTIVE STATEMENT
Pt presents to the Ed via ambulance from home s./p weakness. Pt presents to the Ed via ambulance from home s./p weakness. Pt states" there is a drilling in my head"

## 2023-10-23 NOTE — ED PROVIDER NOTE - PROGRESS NOTE DETAILS
spoke w Dr. Forde  (pt's psychiatrist) who think pt has si/sx of psychosis, requesting telepsych eval paged Dr. Eng [phone], awaiting call back spoke with Tele psych Dr. Xiong, case discussed, would recommend patient to be admitted to medicine for further work up, spoke with oncologist Elliot Donahue, also agrees spoke with Dr. Eng, case discussed, ok with transfer to [facility] for further work up and managament called transfer center, currently no medicine bed at [facility] available right now, will continue to monitor for bed availabilty, patient anxious, was given his normal bed time medications, seroquel and valium spoke with Dr. Eng, case discussed, ok with transfer to [facility] for further work up and management called transfer center, currently no medicine bed at [facility] available right now, will continue to monitor for bed availability, patient anxious, was given his normal bed time medications, seroquel and valium

## 2023-10-23 NOTE — CARE COORDINATION ASSESSMENT. - NSCAREPROVIDERS_GEN_ALL_CORE_FT
CARE PROVIDERS:  Access Services: [facility]  Admitting: Doctor, Unknown  Attending: Doctor, Unknown  Consultant: Mari Xiong  ED Attending: Vanita Mckoy ED Nurse: Guadalupe Macias  Oncology: Harlan Franco  Ordered: ADM, User  Outpatient Provider: Jolene Edwards  Primary Team: Vladislav Steinberg  Respiratory Therapy: Jose L Escamilla  : Sahara Moran

## 2023-10-23 NOTE — CARE COORDINATION ASSESSMENT. - NSPASTMEDSURGHISTORY_GEN_ALL_CORE_FT
PAST MEDICAL & SURGICAL HISTORY:  Anxiety      Hypertension, unspecified type      H/O Mohs micrographic surgery for skin cancer      History of antiphospholipid antibody syndrome      Idiopathic thrombocytopenic purpura (ITP)      Hypertension      Anxiety      Enlarged tonsils  s/p tonsillectomy

## 2023-10-23 NOTE — ED ADULT TRIAGE NOTE - ESI TRIAGE ACUITY LEVEL, MLM
you have a corneal abrasion to the left eye.  For pain use the Acular eyedrops as needed.  For the antibiotic use tobramycin as directed.  If no improvement in the next 2 to 3 days then call Dr. Espinoza for follow-up.  Her information is on this chart.  You have to bring with you your vaccine card and be sure to get a referral from your family physician if you have an HMO and not read other insurance-- sudden severe pain or loss of vision which would be unlikely you should go to the emergency room   3

## 2023-10-23 NOTE — ED BEHAVIORAL HEALTH ASSESSMENT NOTE - RISK ASSESSMENT
risk factors: male, medical problems    protective factors: supportive family, domiciled, denies SI/HI, no prior admissions, denies substance use

## 2023-10-23 NOTE — ED PROVIDER NOTE - OBJECTIVE STATEMENT
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 pt denies any injuries, denies any LOC, denies any n/v/d, denies and chills. 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 pt denies any injuries, denies any LOC, denies any n/v/d, denies and chills. wife also states that pt has been "talking crazy" over the last few weeks, talking about various people in his body, some with belts and clamps and others with a drill cause havoc.

## 2023-10-24 ENCOUNTER — NON-APPOINTMENT (OUTPATIENT)
Age: 73
End: 2023-10-24

## 2023-10-24 DIAGNOSIS — Z84.1 FAMILY HISTORY OF DISORDERS OF KIDNEY AND URETER: ICD-10-CM

## 2023-10-24 DIAGNOSIS — F03.90 UNSPECIFIED DEMENTIA WITHOUT BEHAVIORAL DISTURBANCE: ICD-10-CM

## 2023-10-24 RX ORDER — METOPROLOL TARTRATE 50 MG
50 TABLET ORAL ONCE
Refills: 0 | Status: COMPLETED | OUTPATIENT
Start: 2023-10-24 | End: 2023-10-24

## 2023-10-24 RX ORDER — DIAZEPAM 5 MG
5 TABLET ORAL ONCE
Refills: 0 | Status: DISCONTINUED | OUTPATIENT
Start: 2023-10-24 | End: 2023-10-24

## 2023-10-24 RX ORDER — FLUVOXAMINE MALEATE 25 MG/1
100 TABLET ORAL ONCE
Refills: 0 | Status: COMPLETED | OUTPATIENT
Start: 2023-10-24 | End: 2023-10-24

## 2023-10-24 RX ORDER — SODIUM CHLORIDE 9 MG/ML
1000 INJECTION, SOLUTION INTRAVENOUS
Refills: 0 | Status: DISCONTINUED | OUTPATIENT
Start: 2023-10-24 | End: 2023-10-30

## 2023-10-24 RX ORDER — DIAZEPAM 5 MG
1.5 TABLET ORAL
Qty: 0 | Refills: 0 | DISCHARGE

## 2023-10-24 RX ORDER — QUETIAPINE FUMARATE 200 MG/1
12.5 TABLET, FILM COATED ORAL ONCE
Refills: 0 | Status: COMPLETED | OUTPATIENT
Start: 2023-10-24 | End: 2023-10-24

## 2023-10-24 RX ORDER — SENNA PLUS 8.6 MG/1
2 TABLET ORAL AT BEDTIME
Refills: 0 | Status: DISCONTINUED | OUTPATIENT
Start: 2023-10-24 | End: 2023-12-19

## 2023-10-24 RX ORDER — DIAZEPAM 5 MG
5 TABLET ORAL
Refills: 0 | Status: DISCONTINUED | OUTPATIENT
Start: 2023-10-24 | End: 2023-10-31

## 2023-10-24 RX ORDER — LACTULOSE 10 G/15ML
15 SOLUTION ORAL
Refills: 0 | Status: DISCONTINUED | OUTPATIENT
Start: 2023-10-24 | End: 2023-12-19

## 2023-10-24 RX ORDER — QUETIAPINE FUMARATE 200 MG/1
12.5 TABLET, FILM COATED ORAL
Refills: 0 | Status: DISCONTINUED | OUTPATIENT
Start: 2023-10-24 | End: 2023-10-27

## 2023-10-24 RX ORDER — ATENOLOL 25 MG/1
50 TABLET ORAL
Refills: 0 | Status: DISCONTINUED | OUTPATIENT
Start: 2023-10-24 | End: 2023-11-06

## 2023-10-24 RX ORDER — QUETIAPINE FUMARATE 200 MG/1
25 TABLET, FILM COATED ORAL AT BEDTIME
Refills: 0 | Status: DISCONTINUED | OUTPATIENT
Start: 2023-10-24 | End: 2023-10-27

## 2023-10-24 RX ORDER — LOSARTAN POTASSIUM 100 MG/1
100 TABLET, FILM COATED ORAL DAILY
Refills: 0 | Status: DISCONTINUED | OUTPATIENT
Start: 2023-10-24 | End: 2023-11-03

## 2023-10-24 RX ORDER — CHOLECALCIFEROL (VITAMIN D3) 125 MCG
2000 CAPSULE ORAL DAILY
Refills: 0 | Status: DISCONTINUED | OUTPATIENT
Start: 2023-10-24 | End: 2023-12-19

## 2023-10-24 RX ORDER — ACETAMINOPHEN 500 MG
650 TABLET ORAL EVERY 6 HOURS
Refills: 0 | Status: DISCONTINUED | OUTPATIENT
Start: 2023-10-24 | End: 2023-12-19

## 2023-10-24 RX ORDER — FLUVOXAMINE MALEATE 25 MG/1
100 TABLET ORAL
Refills: 0 | Status: DISCONTINUED | OUTPATIENT
Start: 2023-10-24 | End: 2023-11-08

## 2023-10-24 RX ADMIN — SENNA PLUS 2 TABLET(S): 8.6 TABLET ORAL at 21:34

## 2023-10-24 RX ADMIN — QUETIAPINE FUMARATE 12.5 MILLIGRAM(S): 200 TABLET, FILM COATED ORAL at 09:51

## 2023-10-24 RX ADMIN — SODIUM CHLORIDE 75 MILLILITER(S): 9 INJECTION, SOLUTION INTRAVENOUS at 21:34

## 2023-10-24 RX ADMIN — LOSARTAN POTASSIUM 100 MILLIGRAM(S): 100 TABLET, FILM COATED ORAL at 09:13

## 2023-10-24 RX ADMIN — Medication 50 MILLIGRAM(S): at 09:12

## 2023-10-24 RX ADMIN — FLUVOXAMINE MALEATE 100 MILLIGRAM(S): 25 TABLET ORAL at 17:54

## 2023-10-24 RX ADMIN — ATENOLOL 50 MILLIGRAM(S): 25 TABLET ORAL at 17:54

## 2023-10-24 RX ADMIN — Medication 5 MILLIGRAM(S): at 09:51

## 2023-10-24 RX ADMIN — Medication 2000 UNIT(S): at 17:55

## 2023-10-24 RX ADMIN — FLUVOXAMINE MALEATE 100 MILLIGRAM(S): 25 TABLET ORAL at 09:51

## 2023-10-24 RX ADMIN — QUETIAPINE FUMARATE 25 MILLIGRAM(S): 200 TABLET, FILM COATED ORAL at 21:34

## 2023-10-24 RX ADMIN — QUETIAPINE FUMARATE 12.5 MILLIGRAM(S): 200 TABLET, FILM COATED ORAL at 17:54

## 2023-10-24 RX ADMIN — Medication 5 MILLIGRAM(S): at 18:05

## 2023-10-24 NOTE — H&P ADULT - NSHPPHYSICALEXAM_GEN_ALL_CORE
Vitals last 24 hrs  T(C): 36.5 (10-24-23 @ 08:05), Max: 36.5 (10-24-23 @ 08:05)  HR: 70 (10-24-23 @ 08:05) (64 - 71)  BP: 168/72 (10-24-23 @ 12:58) (146/80 - 212/87)  RR: 17 (10-24-23 @ 08:05) (16 - 17)  SpO2: 97% (10-24-23 @ 08:05) (95% - 97%)    PHYSICAL EXAM:  GENERAL: NAD, well-groomed, well-developed  HEAD:  Atraumatic, Normocephalic  EYES: EOMI, PERRLA, conjunctiva and sclera clear  ENMT: No tonsillar erythema, exudates, or enlargement; Moist mucous membranes  NECK: Supple, No JVD, Normal thyroid  HEART: Regular rate and rhythm; No murmurs, rubs, or gallops  RESPIRATORY: CTA B/L, No W/R/R  ABDOMEN: Soft, Nontender, Nondistended; Bowel sounds present  NEUROLOGY: A&Ox3, nonfocal, moving all extremities  EXTREMITIES:  2+ Peripheral Pulses, No clubbing, cyanosis, or edema  SKIN: warm, dry, normal color, no rash or abnormal lesions

## 2023-10-24 NOTE — H&P ADULT - HISTORY OF PRESENT ILLNESS
hx obtained from wife  73 M pmh of HTN, ITP, APL syndrome, generalized anxiety disorder, OCD, newly dx aggressive Merkel Cell cancer of forehead- pt's oncologist  Dr. Eng, at [facility] on Keytruda infusions with good response per wife  was brought o ED for worsening psychosis for the past few days, pt believes there are sharp things inside his body trying to come out of his mouth  pt lately with difficulty swallowing, decreased po intake  pt was evaluated by psych in ED hx obtained from wife  73 M pmh of HTN, ITP, APL syndrome, generalized anxiety disorder, OCD, newly dx aggressive Merkel Cell cancer of forehead- pt's oncologist  Dr. Eng, at [facility] on Keytruda infusions with good response per wife  was brought o ED for worsening psychosis for the past few days, pt believes there are sharp things inside his body trying to come out of his mouth, stuck in throat  pt lately with difficulty swallowing, decreased po intake  pt was evaluated by psych in ED

## 2023-10-24 NOTE — PATIENT PROFILE ADULT - FALL HARM RISK - HARM RISK INTERVENTIONS

## 2023-10-24 NOTE — CONSULT NOTE ADULT - SUBJECTIVE AND OBJECTIVE BOX
All records reviewed.  pt seen w wife in ER    HPI:    pt w ITP, Lupus Anticoagulant, HTN, anxiety on Seraquel, OCD, Merkel cell ca of right orehead, under care Dr Elliot Eng, post 4 doses q3wks IV Keytruda, last dose 10/17/23.  Pt brought in w c/o feeling "claws, daggers" inside him, since C#3 Keytruda, started w various parts of face now also progressed down to sides of body to rectum(does not see actual daggers or claws,)  Wife reports after first cycle started w jaw tightness and progressive worsened w each cycle, to point of hard to swallow/can't swallow  Had tired taken off Seroquel to see if adverse effect but no improvement, now back on x 5-6days  Merkel lesion has essentially resolved        PAST MEDICAL & SURGICAL HISTORY:  Hypertension, unspecified type      Anxiety      Anxiety      Hypertension      Idiopathic thrombocytopenic purpura (ITP)      History of antiphospholipid antibody syndrome      H/O Mohs micrographic surgery for skin cancer      Enlarged tonsils  s/p tonsillectomy          Review of System:  see above    MEDICATIONS  (STANDING):  atenolol  Tablet 50 milliGRAM(s) Oral two times a day  cholecalciferol 2000 Unit(s) Oral daily  dextrose 5% + sodium chloride 0.9%. 1000 milliLiter(s) (75 mL/Hr) IV Continuous <Continuous>  diazepam    Tablet 5 milliGRAM(s) Oral two times a day  fluvoxaMINE 100 milliGRAM(s) Oral two times a day  losartan 100 milliGRAM(s) Oral daily  QUEtiapine 25 milliGRAM(s) Oral at bedtime  QUEtiapine 12.5 milliGRAM(s) Oral two times a day  senna 2 Tablet(s) Oral at bedtime    MEDICATIONS  (PRN):  acetaminophen     Tablet .. 650 milliGRAM(s) Oral every 6 hours PRN Temp greater or equal to 38.5C (101.3F), Moderate Pain (4 - 6)  lactulose Syrup 15 Gram(s) Oral two times a day PRN constipation      Allergies    No Known Allergies    Intolerances        SOCIAL HISTORY:  no active Etoh or tobacco    FAMILY HISTORY:      Vital Signs Last 24 Hrs  T(C): 36.9 (24 Oct 2023 15:29), Max: 36.9 (24 Oct 2023 15:29)  T(F): 98.4 (24 Oct 2023 15:29), Max: 98.4 (24 Oct 2023 15:29)  HR: 74 (24 Oct 2023 15:29) (64 - 74)  BP: 167/81 (24 Oct 2023 15:29) (146/80 - 212/87)  BP(mean): --  RR: 18 (24 Oct 2023 15:29) (16 - 18)  SpO2: 95% (24 Oct 2023 15:29) (95% - 97%)    Parameters below as of 24 Oct 2023 15:29  Patient On (Oxygen Delivery Method): room air        PHYSICAL EXAM:      General:on ER stretcher in no acute distress  Head:minimal scabbing right forehead 1/2 dollar size in diameter  Eyes:sclera anicteric, pupils equal and reactive to light  ENMT:buccal mucosa moist, nose midline  Neck:supple, trachea midline  Lungs:clear, no wheeze/rhonchi  Cardiovascular:regular rate and rhythm, S1 S2  Abdomen:soft, nontender, no organomegaly present, bowel sounds normal  Neurological:alert and responsive, Cranial Nerves II-XII grossly intact  Skin:no increased ecchymosis/petechiae/purpura  Lymph Nodes:no palpable cervical/supraclavicular lymph nodes enlargements  Extremities:no cyanosis/clubbing/edema        LABS:                        11.6   6.22  )-----------( 89       ( 23 Oct 2023 11:30 )             31.9     10-23 @ 11:30  WBC6.22  RBC3.79 Hgb11.6 Hct31.9  MCV84.2  Plts89  Auto Ebhpqi88.8 Band-- Auto Lymph18.1 Atypical Lymph-- Reactive Lymph-- Auto Mono10.1 Auto Eos2.2 Auto Baso0.5          10-23    142  |  105  |  17  ----------------------------<  95  4.1   |  31  |  1.10    Ca    9.0      23 Oct 2023 11:30  Mg     2.1     10-23    TPro  6.4  /  Alb  3.1<L>  /  TBili  0.8  /  DBili  x   /  AST  24  /  ALT  25  /  AlkPhos  72  10-23      Urinalysis Basic - ( 23 Oct 2023 13:20 )    Color: Yellow / Appearance: Clear / S.007 / pH: x  Gluc: x / Ketone: Negative mg/dL  / Bili: Negative / Urobili: 0.2 mg/dL   Blood: x / Protein: Negative mg/dL / Nitrite: Negative   Leuk Esterase: Trace / RBC: None Seen /HPF / WBC None Seen /HPF   Sq Epi: x / Non Sq Epi: x / Bacteria: Occasional /HPF        PERIPHERAL BLOOD SMEAR REVIEW:      RADIOLOGY & ADDITIONAL STUDIES:  < from: CT Head No Cont (10.23.23 @ 16:16) >    ACC: [id number] EXAM:  CT BRAIN   ORDERED BY: CRYS HUA     PROCEDURE DATE:  10/23/2023          INTERPRETATION:  Noncontrast CT of the brain.    CLINICAL INDICATION:  Altered mental status    TECHNIQUE : Axial CT scanning of the brain was obtained from the skull   base to the vertex without the administration of intravenous contrast.   Sagittal and coronal reformats were provided.    COMPARISON: CT brain 2022    FINDINGS:    Similar bifrontal low density subdural collections again measuring up to   1.6 cm in greatest depth on the right and 1.3 cm on the left.    No hydrocephalus, mass effect, midline shift, acute intracranial   hemorrhage, or brain edema.    Visualized paranasal sinuses and mastoid air cells are clear.    IMPRESSION:    No hydrocephalus, acute intracranial hemorrhage, mass effect, or brain   edema.    Similar bifrontal low density subdural collections.    --- End of Report ---    < end of copied text >  < from: NM PET/CT Oncology FDG WB, Inital (23 @ 14:44) >    EXAM: [id number] - PETCT WB ONC FDG INIT  - ORDERED BY: TIMBO HOLT      PROCEDURE DATE:  2023           INTERPRETATION:  PROCEDURE: PET WHOLE BODY ONC FDG INIT    CLINICAL INFORMATION: 73 year-old male with stage III Merkel cell cancer   of the forehead, newly diagnosed.    TREATMENT STRATEGY EVALUATION: Initial  FASTING BLOOD SUGAR: 94 mg/dl  RADIOPHARMACEUTICAL:  12.97 mCi F-18, FDG, I.V.  UPTAKE PERIOD: 55 minutes  SCANNER: Siemens Biograph mCT 64  ORAL CONTRAST: Patient drank OMNIPAQUE contrast during the uptake period.    TECHNIQUE: Following intravenous injection of radiopharmaceutical and   above uptake period, PET/CT was obtained from vertex of skull to feet. CT   protocol was optimized for PET attenuation correction and anatomic   localization and was not designed to produce and cannot replace   state-of-the-art diagnostic CT images with specific imaging protocols for   different body parts and indications. Images were reconstructed and   reviewed in axial, coronal and sagittal views and three-dimensional MIP.    Standardized uptake values ("SUV") are normalized to patient body weight   and indicate the highest activity concentration (SUVmax) in a given   disease site. All image numbers refer to axial image numbers.    COMPARISON: No prior FDG PET/CT available for review.    FINDINGS:    HEAD/NECK: Multiple hypermetabolic cutaneous nodules extending from the   anteromedial RIGHT scalp further inferiorly to the level of the ear,   overlying the temporal region. Superior most nodule is seen on image 17,   and the inferior most on image 47. The largest cluster of lesions extends   approximately 4.0 cm along the skin surface, with findings suggestive of   an ulcerated area at its center (SUV 8.7, image 25). Many of these   lesions show some extension into the subjacent subcutaneous fat. Inferior   most lesion is a protuberant nodule overlying the RIGHT temporalis muscle   region (1.0 x 0.8 cm, SUV 5.8, image 47). However, this does not appear   to extend pastthe subcutaneous fat and the temporalis muscle appears   largely unremarkable. There is minimal uptake seen overlying the   temporalis muscle at this region, which is likely artifactual. At the   same level, there is an additional area of nodular thickening seen   further posteriorly with mild uptake. Corresponding cutaneous and   subcutaneous regions on the LEFT are unremarkable.    There is increased uptake seen at the posterior RIGHT tongue base (SUV   4.4, image 76), which is nonspecific and appears slightly misregistered   on the corresponding PET.    CHEST: No abnormal FDG activity. No lymphadenopathy.    LUNGS: Left LOWER lobe consolidation (4.7 x 6.5 cm, image 145), with   diffuse mild uptake without suspicious focal findings. Right LOWER lobe   is unremarkable.    PLEURA/PERICARDIUM: Posterior RIGHT UPPER lobe pulmonary nodule (1.7 x   0.4 cm, image 105), and lateral RIGHT MIDDLE lobe micronodule (image   121), are below resolution of PET without abnormal activity. There is   mild parenchymal change at the anteromedial RIGHT MIDDLE lobe without   associated uptake.    ESOPHAGUS/STOMACH:    HEPATOBILIARY/PANCREAS: For reference, liver SUV mean is .    SPLEEN: Physiologic FDG activity. Normal size.    ADRENAL GLANDS: No abnormal FDG activity.    KIDNEYS/URINARY BLADDER: Physiologic excreted FDG activity.    REPRODUCTIVE ORGANS: No abnormal FDG activity.    ABDOMINOPELVIC NODES: No enlarged or FDG-avid lymph node.    BOWEL/PERITONEUM/MESENTERY: No abnormal FDG activity. Inspissated   contrast in multiple colon diverticula, without CT evidence for   diverticulitis.    ABDOMINAL WALL: No abnormal FDG activity.    BONES/SOFT TISSUES: Increased uptake overlying the LEFT lateral eighth   rib (SUV 5.3, image 165). The musculature this region appears somewhat   prominent which also contains mild uptake.    VESSELS: Atherosclerotic calcification of nonaneurysmal abdominal aorta   including visceral and/or runoff branches.    IMPRESSION:  1.  Multiple hypermetabolic nodules overlying the RIGHT scalp extending   to the approximate level of the ear overlying the temporal region.   Findings are consistent with reported Merkel cell carcinoma.  2.  Focally increased uptake overlying the lateral LEFT eighth rib, as   well as mild uptake overlying adjacent musculature. This may be   posttraumatic. Malignancy is not excluded but not as strongly favored.   Correlate clinically.  3.  Activity at the posterior RIGHT tongue base is slightly misregistered   and is nonspecific. Consider further evaluation to exclude additional   pathology.  4.  Left LOWER lobe consolidation without abnormal uptake.  5.  Small RIGHT pulmonary nodules below resolution of PET without   abnormal activity.    --- End of Report ---        < end of copied text >

## 2023-10-24 NOTE — H&P ADULT - ASSESSMENT
73 M pmh of HTN, ITP, APL syndrome, generalized anxiety disorder, OCD, newly dx aggressive Merkel Cell cancer of forehead- pt's oncologist  Dr. Eng, at [facility] on Keytruda infusions with good response per wife  was brought to ED for worsening psychosis for the past few days, pt lately with difficulty swallowing, decreased po intake      #Psychosis- ?neuropsych  sideeffect of keytruda  monitor  psych cs  ?imaging brain    #Merkel cell ca  onc c/s  r/o paraneoplastic causes    #Anxiety- resume home benzos and seroquel  psych cs fu    #Dysphagia/FTT- likely psych related  swallow eval    #HTN- resume home meds    #DVT ppx- start hep sq    d/w wife- hcp at bedside at length plan of care  goc/adv directives discussed    [employer]   [phone]   73 M pmh of HTN, ITP, APL syndrome, generalized anxiety disorder, OCD, newly dx aggressive Merkel Cell cancer of forehead- pt's oncologist  Dr. Eng, at [facility] on Keytruda infusions with good response per wife  was brought to ED for worsening psychosis for the past few days, pt lately with difficulty swallowing, decreased po intake      #? Psychosis- ?neuropsych  sideeffect of keytruda  monitor  psych cs  ?imaging brain    #Abnormal throat sensation- sharp object sensation  ?neuralgia  fu neuro  swallow eval    #Merkel cell ca  onc c/s  r/o paraneoplastic causes    #Anxiety- resume home benzos and seroquel  psych cs fu      #HTN- resume home meds    #DVT ppx- start hep sq    d/w wife- hcp at bedside at length plan of care  goc/adv directives discussed    [employer]   [phone]

## 2023-10-24 NOTE — ED ADULT NURSE REASSESSMENT NOTE - NS ED NURSE REASSESS COMMENT FT1
Pt report taken from JULIETTE ramírez patient is noticeably confused . t. patient denies chest pain, or shortness of breath.  safety precautions maintained.  will continue to assess and monitor for safety. awaiting transportation to [place]
pt waiting for transfer. Pt noted to have a manual BP of 200/90. pt denies N, V, headache, blurred vision. Pt appears anxious. Pt updated on plan of care. Pt in no acute distress.

## 2023-10-24 NOTE — CONSULT NOTE ADULT - ASSESSMENT
pt w ITP, Lupus Anticoagulant, HTN, anxiety on Seraquel, OCD, Merkel cell ca of right orehead, under care Dr Elliot Eng, post 4 doses q3wks IV Keytruda, last dose 10/17/23.  Pt brought in w c/o feeling "claws, daggers" inside him, since C#3 Keytruda, started w various parts of face now also progressed down to sides of body to rectum(does not see actual daggers or claws,)  Wife reports after first cycle started w jaw tightness and progressive worsened w each cycle, to point of hard to swallow/can't swallow  Had tired taken off Seroquel to see if adverse effect but no improvement, now back on x 5-6days  Merkel lesion has essentially resolved    CT head no acute findings  see by Psych in ER, ?psychosis    -sensation described by pt ?psychosis vs neuropathy  -psychosis not noted to be associated w Keytruda. Neuro tox of Keytruda described includes cerebral hemorrhage, confusion, myasthenia gravis, reversible posterior leukoencephalopahty syndrome, syed neuropathy, Guillain [place], aweptic meningitis, encephalitis, transverse myelitis    -discussed w Medicine, recommend Neuro and Psych eval, consider MRI head    thank you, will folow w you    - pt w ITP, Lupus Anticoagulant, HTN, anxiety on Seraquel, OCD, Merkel cell ca of right orehead, under care Dr Elliot Eng, post 4 doses q3wks IV Keytruda, last dose 10/17/23.  Pt brought in w c/o feeling "claws, daggers" inside him, since C#3 Keytruda, started w various parts of face now also progressed down to sides of body to rectum(does not see actual daggers or claws,)  Wife reports after first cycle started w jaw tightness and progressive worsened w each cycle, to point of hard to swallow/can't swallow  Had tired taken off Seroquel to see if adverse effect but no improvement, now back on x 5-6days  Merkel lesion has essentially resolved    CT head no acute findings  see by Psych in ER, ?psychosis    -sensation described by pt ?psychosis vs neuropathy  -psychosis not noted to be associated w Keytruda. Neuro tox of Keytruda described includes cerebral hemorrhage, confusion, myasthenia gravis, reversible posterior leukoencephalopahty syndrome, syed neuropathy, Guillain [place], aweptic meningitis, encephalitis, transverse myelitis    -will check thyroid function test and AM cortisol level to see if contriutes to sx    -discussed w Medicine, recommend Neuro and Psych eval, consider MRI head    thank you, will folow w you    -

## 2023-10-24 NOTE — ED ADULT NURSE REASSESSMENT NOTE - NSFALLRISKFACTORS_ED_ALL_ED
Bone Condition: Including osteoporosis, prolonged steroid use or metastatic bone disease/cancer/Coagulation: Bleeding disorder either through use of anticoagulants or underlying clinical condition(s)
No indicators present

## 2023-10-24 NOTE — H&P ADULT - NSHPLABSRESULTS_GEN_ALL_CORE
LABS:                        11.6   6.22  )-----------( 89       ( 23 Oct 2023 11:30 )             31.9     10-    142  |  105  |  17  ----------------------------<  95  4.1   |  31  |  1.10    Ca    9.0      23 Oct 2023 11:30  Mg     2.1     10-23    TPro  6.4  /  Alb  3.1<L>  /  TBili  0.8  /  DBili  x   /  AST  24  /  ALT  25  /  AlkPhos  72  10      CAPILLARY BLOOD GLUCOSE            Urinalysis Basic - ( 23 Oct 2023 13:20 )    Color: Yellow / Appearance: Clear / S.007 / pH: x  Gluc: x / Ketone: Negative mg/dL  / Bili: Negative / Urobili: 0.2 mg/dL   Blood: x / Protein: Negative mg/dL / Nitrite: Negative   Leuk Esterase: Trace / RBC: None Seen /HPF / WBC None Seen /HPF   Sq Epi: x / Non Sq Epi: x / Bacteria: Occasional /HPF        RADIOLOGY & ADDITIONAL TESTS:

## 2023-10-25 LAB
ANION GAP SERPL CALC-SCNC: 6 MMOL/L — SIGNIFICANT CHANGE UP (ref 5–17)
ANION GAP SERPL CALC-SCNC: 6 MMOL/L — SIGNIFICANT CHANGE UP (ref 5–17)
BUN SERPL-MCNC: 12 MG/DL — SIGNIFICANT CHANGE UP (ref 7–23)
BUN SERPL-MCNC: 12 MG/DL — SIGNIFICANT CHANGE UP (ref 7–23)
CALCIUM SERPL-MCNC: 8.6 MG/DL — SIGNIFICANT CHANGE UP (ref 8.5–10.1)
CALCIUM SERPL-MCNC: 8.6 MG/DL — SIGNIFICANT CHANGE UP (ref 8.5–10.1)
CHLORIDE SERPL-SCNC: 110 MMOL/L — HIGH (ref 96–108)
CHLORIDE SERPL-SCNC: 110 MMOL/L — HIGH (ref 96–108)
CO2 SERPL-SCNC: 30 MMOL/L — SIGNIFICANT CHANGE UP (ref 22–31)
CO2 SERPL-SCNC: 30 MMOL/L — SIGNIFICANT CHANGE UP (ref 22–31)
CORTIS AM PEAK SERPL-MCNC: 5.8 UG/DL — LOW (ref 6–18.4)
CORTIS AM PEAK SERPL-MCNC: 5.8 UG/DL — LOW (ref 6–18.4)
CREAT SERPL-MCNC: 1.1 MG/DL — SIGNIFICANT CHANGE UP (ref 0.5–1.3)
CREAT SERPL-MCNC: 1.1 MG/DL — SIGNIFICANT CHANGE UP (ref 0.5–1.3)
EGFR: 70 ML/MIN/1.73M2 — SIGNIFICANT CHANGE UP
EGFR: 70 ML/MIN/1.73M2 — SIGNIFICANT CHANGE UP
GLUCOSE SERPL-MCNC: 106 MG/DL — HIGH (ref 70–99)
GLUCOSE SERPL-MCNC: 106 MG/DL — HIGH (ref 70–99)
HCT VFR BLD CALC: 30.9 % — LOW (ref 39–50)
HCT VFR BLD CALC: 30.9 % — LOW (ref 39–50)
HGB BLD-MCNC: 10.8 G/DL — LOW (ref 13–17)
HGB BLD-MCNC: 10.8 G/DL — LOW (ref 13–17)
MCHC RBC-ENTMCNC: 29.7 PG — SIGNIFICANT CHANGE UP (ref 27–34)
MCHC RBC-ENTMCNC: 29.7 PG — SIGNIFICANT CHANGE UP (ref 27–34)
MCHC RBC-ENTMCNC: 35 GM/DL — SIGNIFICANT CHANGE UP (ref 32–36)
MCHC RBC-ENTMCNC: 35 GM/DL — SIGNIFICANT CHANGE UP (ref 32–36)
MCV RBC AUTO: 84.9 FL — SIGNIFICANT CHANGE UP (ref 80–100)
MCV RBC AUTO: 84.9 FL — SIGNIFICANT CHANGE UP (ref 80–100)
NRBC # BLD: 0 /100 WBCS — SIGNIFICANT CHANGE UP (ref 0–0)
NRBC # BLD: 0 /100 WBCS — SIGNIFICANT CHANGE UP (ref 0–0)
PLATELET # BLD AUTO: 93 K/UL — LOW (ref 150–400)
PLATELET # BLD AUTO: 93 K/UL — LOW (ref 150–400)
POTASSIUM SERPL-MCNC: 3.9 MMOL/L — SIGNIFICANT CHANGE UP (ref 3.5–5.3)
POTASSIUM SERPL-MCNC: 3.9 MMOL/L — SIGNIFICANT CHANGE UP (ref 3.5–5.3)
POTASSIUM SERPL-SCNC: 3.9 MMOL/L — SIGNIFICANT CHANGE UP (ref 3.5–5.3)
POTASSIUM SERPL-SCNC: 3.9 MMOL/L — SIGNIFICANT CHANGE UP (ref 3.5–5.3)
RBC # BLD: 3.64 M/UL — LOW (ref 4.2–5.8)
RBC # BLD: 3.64 M/UL — LOW (ref 4.2–5.8)
RBC # FLD: 12.7 % — SIGNIFICANT CHANGE UP (ref 10.3–14.5)
RBC # FLD: 12.7 % — SIGNIFICANT CHANGE UP (ref 10.3–14.5)
SODIUM SERPL-SCNC: 146 MMOL/L — HIGH (ref 135–145)
SODIUM SERPL-SCNC: 146 MMOL/L — HIGH (ref 135–145)
T3/T3 UPTAKE INDEX SERPL-RTO: 38 % — SIGNIFICANT CHANGE UP (ref 28–41)
T3/T3 UPTAKE INDEX SERPL-RTO: 38 % — SIGNIFICANT CHANGE UP (ref 28–41)
T4 FREE SERPL-MCNC: 1.2 NG/DL — SIGNIFICANT CHANGE UP (ref 0.9–1.8)
T4 FREE SERPL-MCNC: 1.2 NG/DL — SIGNIFICANT CHANGE UP (ref 0.9–1.8)
T4/T3 UPTAKE INDEX SERPL: 0.9 TBI — SIGNIFICANT CHANGE UP (ref 0.8–1.3)
T4/T3 UPTAKE INDEX SERPL: 0.9 TBI — SIGNIFICANT CHANGE UP (ref 0.8–1.3)
TSH SERPL-MCNC: 0.78 UIU/ML — SIGNIFICANT CHANGE UP (ref 0.36–3.74)
TSH SERPL-MCNC: 0.78 UIU/ML — SIGNIFICANT CHANGE UP (ref 0.36–3.74)
WBC # BLD: 5.7 K/UL — SIGNIFICANT CHANGE UP (ref 3.8–10.5)
WBC # BLD: 5.7 K/UL — SIGNIFICANT CHANGE UP (ref 3.8–10.5)
WBC # FLD AUTO: 5.7 K/UL — SIGNIFICANT CHANGE UP (ref 3.8–10.5)
WBC # FLD AUTO: 5.7 K/UL — SIGNIFICANT CHANGE UP (ref 3.8–10.5)

## 2023-10-25 PROCEDURE — 70553 MRI BRAIN STEM W/O & W/DYE: CPT | Mod: 26

## 2023-10-25 RX ORDER — HEPARIN SODIUM 5000 [USP'U]/ML
5000 INJECTION INTRAVENOUS; SUBCUTANEOUS EVERY 12 HOURS
Refills: 0 | Status: DISCONTINUED | OUTPATIENT
Start: 2023-10-25 | End: 2023-10-26

## 2023-10-25 RX ADMIN — FLUVOXAMINE MALEATE 100 MILLIGRAM(S): 25 TABLET ORAL at 05:59

## 2023-10-25 RX ADMIN — Medication 5 MILLIGRAM(S): at 17:14

## 2023-10-25 RX ADMIN — SODIUM CHLORIDE 75 MILLILITER(S): 9 INJECTION, SOLUTION INTRAVENOUS at 21:21

## 2023-10-25 RX ADMIN — LOSARTAN POTASSIUM 100 MILLIGRAM(S): 100 TABLET, FILM COATED ORAL at 05:59

## 2023-10-25 RX ADMIN — QUETIAPINE FUMARATE 25 MILLIGRAM(S): 200 TABLET, FILM COATED ORAL at 21:22

## 2023-10-25 RX ADMIN — SENNA PLUS 2 TABLET(S): 8.6 TABLET ORAL at 21:22

## 2023-10-25 RX ADMIN — Medication 5 MILLIGRAM(S): at 05:58

## 2023-10-25 RX ADMIN — HEPARIN SODIUM 5000 UNIT(S): 5000 INJECTION INTRAVENOUS; SUBCUTANEOUS at 17:15

## 2023-10-25 RX ADMIN — ATENOLOL 50 MILLIGRAM(S): 25 TABLET ORAL at 05:59

## 2023-10-25 RX ADMIN — QUETIAPINE FUMARATE 12.5 MILLIGRAM(S): 200 TABLET, FILM COATED ORAL at 05:59

## 2023-10-25 RX ADMIN — ATENOLOL 50 MILLIGRAM(S): 25 TABLET ORAL at 17:14

## 2023-10-25 RX ADMIN — QUETIAPINE FUMARATE 12.5 MILLIGRAM(S): 200 TABLET, FILM COATED ORAL at 17:14

## 2023-10-25 RX ADMIN — Medication 2000 UNIT(S): at 11:53

## 2023-10-25 RX ADMIN — FLUVOXAMINE MALEATE 100 MILLIGRAM(S): 25 TABLET ORAL at 17:14

## 2023-10-25 NOTE — DIETITIAN INITIAL EVALUATION ADULT - PERTINENT MEDS FT
MEDICATIONS  (STANDING):  atenolol  Tablet 50 milliGRAM(s) Oral two times a day  cholecalciferol 2000 Unit(s) Oral daily  dextrose 5% + sodium chloride 0.9%. 1000 milliLiter(s) (75 mL/Hr) IV Continuous <Continuous>  diazepam    Tablet 5 milliGRAM(s) Oral two times a day  fluvoxaMINE 100 milliGRAM(s) Oral two times a day  losartan 100 milliGRAM(s) Oral daily  QUEtiapine 25 milliGRAM(s) Oral at bedtime  QUEtiapine 12.5 milliGRAM(s) Oral two times a day  senna 2 Tablet(s) Oral at bedtime    MEDICATIONS  (PRN):  acetaminophen     Tablet .. 650 milliGRAM(s) Oral every 6 hours PRN Temp greater or equal to 38.5C (101.3F), Moderate Pain (4 - 6)  lactulose Syrup 15 Gram(s) Oral two times a day PRN constipation

## 2023-10-25 NOTE — DIETITIAN INITIAL EVALUATION ADULT - ORAL INTAKE PTA/DIET HISTORY
Per wife, pt with decreased po intake past 3 weeks.  Complaint involving difficult swallowing has progressed over past 3 months with each additional Keytruda dose.  Wife has been giving pt soft solid foods at home lately and 1-2 Ensure shakes a day. Has been providing protein rich foods as best as possible. UBW low 170's.  Wt at MD office last week 161#.   Dysphagia likely rooted in pt's increasing psychosis. Complains of metal objects trying to get out of him.

## 2023-10-25 NOTE — PROGRESS NOTE ADULT - SUBJECTIVE AND OBJECTIVE BOX
Patient is a 73y old  Male who presents with a chief complaint of malaise (25 Oct 2023 18:17)        INTERVAL HPI/OVERNIGHT EVENTS:   no complaints  pt seen and examined         Vital Signs Last 24 Hrs  T(C): 36.6 (25 Oct 2023 21:07), Max: 36.6 (25 Oct 2023 21:07)  T(F): 97.9 (25 Oct 2023 21:07), Max: 97.9 (25 Oct 2023 21:07)  HR: 77 (25 Oct 2023 21:07) (64 - 77)  BP: 102/63 (25 Oct 2023 21:07) (102/63 - 156/74)  BP(mean): --  RR: 18 (25 Oct 2023 21:07) (18 - 18)  SpO2: 93% (25 Oct 2023 21:07) (93% - 96%)    Parameters below as of 25 Oct 2023 21:07  Patient On (Oxygen Delivery Method): room air        acetaminophen     Tablet .. 650 milliGRAM(s) Oral every 6 hours PRN  atenolol  Tablet 50 milliGRAM(s) Oral two times a day  cholecalciferol 2000 Unit(s) Oral daily  dextrose 5% + sodium chloride 0.9%. 1000 milliLiter(s) IV Continuous <Continuous>  diazepam    Tablet 5 milliGRAM(s) Oral two times a day  fluvoxaMINE 100 milliGRAM(s) Oral two times a day  heparin   Injectable 5000 Unit(s) SubCutaneous every 12 hours  lactulose Syrup 15 Gram(s) Oral two times a day PRN  losartan 100 milliGRAM(s) Oral daily  QUEtiapine 12.5 milliGRAM(s) Oral two times a day  QUEtiapine 25 milliGRAM(s) Oral at bedtime  senna 2 Tablet(s) Oral at bedtime      PHYSICAL EXAM:  GENERAL: NAD   EYES: conjunctiva and sclera clear  ENMT: Moist mucous membranes  NECK: Supple, No JVD, Normal thyroid  CHEST/LUNG: non labored, cta b/l  HEART: Regular rate and rhythm; No murmurs, rubs, or gallops  ABDOMEN: Soft, Nontender, Nondistended; Bowel sounds present  EXTREMITIES:  2+ Peripheral Pulses, No clubbing, no cyanosis, no edema  LYMPH: No lymphadenopathy noted  SKIN: No rashes or lesions  NEURO: no focal deficits    Consultant(s) Notes Reviewed:  [x ] YES  [ ] NO  Care Discussed with Consultants/Other Providers [ x] YES  [ ] NO    LABS:                        10.8   5.70  )-----------( 93       ( 25 Oct 2023 07:22 )             30.9     10-25    146<H>  |  110<H>  |  12  ----------------------------<  106<H>  3.9   |  30  |  1.10    Ca    8.6      25 Oct 2023 07:22        Urinalysis Basic - ( 25 Oct 2023 07:22 )    Color: x / Appearance: x / SG: x / pH: x  Gluc: 106 mg/dL / Ketone: x  / Bili: x / Urobili: x   Blood: x / Protein: x / Nitrite: x   Leuk Esterase: x / RBC: x / WBC x   Sq Epi: x / Non Sq Epi: x / Bacteria: x      CAPILLARY BLOOD GLUCOSE            Urinalysis Basic - ( 25 Oct 2023 07:22 )    Color: x / Appearance: x / SG: x / pH: x  Gluc: 106 mg/dL / Ketone: x  / Bili: x / Urobili: x   Blood: x / Protein: x / Nitrite: x   Leuk Esterase: x / RBC: x / WBC x   Sq Epi: x / Non Sq Epi: x / Bacteria: x        Culture - Urine (collected 23 Oct 2023 13:20)  Source: Clean Catch Clean Catch (Midstream)  Final Report (24 Oct 2023 16:08):    <10,000 CFU/mL Normal Urogenital Dory        RADIOLOGY & ADDITIONAL TESTS:    Imaging Personally Reviewed  Reviewed consultants input

## 2023-10-25 NOTE — DIETITIAN INITIAL EVALUATION ADULT - FACTORS AFF FOOD INTAKE
"Pt was in the milieu.  Was pleasant upon approach.  She did attend a group.  Pt denies SI/SIB.  Did confirm anx/dep6 of 10.  \"I'm a little worried about starting day tx.\"  Pt plans on d/cing towards the end of the week to her parents home + then will start day tx at the hospital.  \"I am supposed to meet with a day treatment staff to talk about their program today, I thought.\"  No one did meet with day treatment today.  \"I am looking forward to returning home.  " change in mental status

## 2023-10-25 NOTE — DIETITIAN INITIAL EVALUATION ADULT - PERTINENT LABORATORY DATA
10-25    146<H>  |  110<H>  |  12  ----------------------------<  106<H>  3.9   |  30  |  1.10    Ca    8.6      25 Oct 2023 07:22  Mg     2.1     10-23    TPro  6.4  /  Alb  3.1<L>  /  TBili  0.8  /  DBili  x   /  AST  24  /  ALT  25  /  AlkPhos  72  10-23

## 2023-10-25 NOTE — SWALLOW BEDSIDE ASSESSMENT ADULT - COMMENTS
Per charting, "pt w ITP, Lupus Anticoagulant, HTN, anxiety on Seraquel, OCD, Merkel cell ca of right orehead, under care Dr Elliot Eng, post 4 doses q3wks IV Keytruda, last dose 10/17/23. Pt brought in w c/o feeling "claws, daggers" inside him, since C#3 Keytruda, started w various parts of face now also progressed down to sides of body to rectum(does not see actual daggers or claws,) Wife reports after first cycle started w jaw tightness and progressive worsened w each cycle, to point of hard to swallow/can't swallow. Had tired taken off Seroquel to see if adverse effect but no improvement, now back on x 5-6days  Merkel lesion has essentially resolved"     CTH (-). No chest imaging to date.    Patient was received awake and alert yet perseverating on having a headache, which spouse attributes to pt's psychosis. RN aware. According to spouse, pt has complaining of odynophagia/sharp daggers in throat and a "stuck" sensation, which have progressively worsened with each cycle of Keytruda, subsequently resulting in reduced PO intake and weight loss. More recently, oral intake has consisted primarily of softer items, including limited amounts of scrambled eggs and cottage cheese. Wife states pt is scheduled for an outpatient MBS at [facility] on 11/1/23. Pt did not offer complaints of pain at the time of today's evaluation. Pt being followed by RD. Recommendations communicated to Dr. Nava via TEAMS. Delayed complaints of globus sensation

## 2023-10-25 NOTE — PROGRESS NOTE ADULT - ASSESSMENT
73 M pmh of HTN, ITP, APL syndrome, generalized anxiety disorder, OCD, newly dx aggressive Merkel Cell cancer of forehead- pt's oncologist  Dr. Eng, at [facility] on Keytruda infusions with good response per wife  was brought to ED for worsening psychosis for the past few days, pt lately with difficulty swallowing, decreased po intake    Psychosis  monitor  psych cs  MRI pending  neuro following    #Abnormal throat sensation- sharp object sensation  ?neuralgia  swallow eval    #Merkel cell ca  onc c/s  r/o paraneoplastic causes    #Anxiety- resume home benzos and seroquel  psych cs fu      #HTN- resume home meds    #DVT ppx- start hep sq      [facility]   [phone]

## 2023-10-25 NOTE — SOCIAL WORK PROGRESS NOTE - NSSWPROGRESSNOTE_GEN_ALL_CORE
SW consult fo rpositive depression screen . Pt being followed by psychiatry for same. SW to remain available.

## 2023-10-25 NOTE — PROGRESS NOTE ADULT - SUBJECTIVE AND OBJECTIVE BOX
[INTERVAL HX: ]  Patient seen and examined;  Chart reviewed and events noted;     [MEDICATIONS]  MEDICATIONS  (STANDING):  atenolol  Tablet 50 milliGRAM(s) Oral two times a day  cholecalciferol 2000 Unit(s) Oral daily  dextrose 5% + sodium chloride 0.9%. 1000 milliLiter(s) (75 mL/Hr) IV Continuous <Continuous>  diazepam    Tablet 5 milliGRAM(s) Oral two times a day  fluvoxaMINE 100 milliGRAM(s) Oral two times a day  heparin   Injectable 5000 Unit(s) SubCutaneous every 12 hours  losartan 100 milliGRAM(s) Oral daily  QUEtiapine 12.5 milliGRAM(s) Oral two times a day  QUEtiapine 25 milliGRAM(s) Oral at bedtime  senna 2 Tablet(s) Oral at bedtime    MEDICATIONS  (PRN):  acetaminophen     Tablet .. 650 milliGRAM(s) Oral every 6 hours PRN Temp greater or equal to 38.5C (101.3F), Moderate Pain (4 - 6)  lactulose Syrup 15 Gram(s) Oral two times a day PRN constipation      [VITALS]  Vital Signs Last 24 Hrs  T(C): 36.4 (25 Oct 2023 11:45), Max: 36.5 (24 Oct 2023 20:30)  T(F): 97.5 (25 Oct 2023 11:45), Max: 97.7 (24 Oct 2023 20:30)  HR: 64 (25 Oct 2023 11:45) (64 - 77)  BP: 151/78 (25 Oct 2023 17:18) (134/74 - 190/85)  BP(mean): --  RR: 18 (25 Oct 2023 11:45) (16 - 18)  SpO2: 94% (25 Oct 2023 11:45) (94% - 96%)    Parameters below as of 25 Oct 2023 11:45  Patient On (Oxygen Delivery Method): room air      [WT/HT]  Daily     Daily Weight in k.1 (25 Oct 2023 05:33)  [VENT]      [PHYSICAL EXAM]  GEN: NAD  HEENT: normocephalic and atraumatic. EOMI. PERRL.    NECK: Supple.  No lymphadenopathy   LUNGS: Clear to auscultation.  HEART: Regular rate and rhythm,  no MRG  ABDOMEN: Soft, nontender, and nondistended.  Positive bowel sounds.    : No CVA tenderness  EXTREMITIES: Without edema.  NEUROLOGIC: grossly intact.  PSYCHIATRIC: Appropriate affect .  SKIN: No rash     [LABS:]                        10.8   5.70  )-----------( 93       ( 25 Oct 2023 07:22 )             30.9     10-25    146<H>  |  110<H>  |  12  ----------------------------<  106<H>  3.9   |  30  |  1.10    Ca    8.6      25 Oct 2023 07:22              Urinalysis Basic - ( 25 Oct 2023 07:22 )    Color: x / Appearance: x / SG: x / pH: x  Gluc: 106 mg/dL / Ketone: x  / Bili: x / Urobili: x   Blood: x / Protein: x / Nitrite: x   Leuk Esterase: x / RBC: x / WBC x   Sq Epi: x / Non Sq Epi: x / Bacteria: x        Culture - Urine (collected 23 Oct 2023 13:20)  Source: Clean Catch Clean Catch (Midstream)  Final Report (24 Oct 2023 16:08):    <10,000 CFU/mL Normal Urogenital Dory            Culture - Urine (collected 23 Oct 2023 13:20)  Source: Clean Catch Clean Catch (Midstream)  Final Report (24 Oct 2023 16:08):    <10,000 CFU/mL Normal Urogenital Dory        [RADIOLOGY STUDIES:]

## 2023-10-25 NOTE — PROGRESS NOTE ADULT - ASSESSMENT
IMPRESSION  pt w ITP, Lupus Anticoagulant, HTN, anxiety on Seraquel, OCD, Merkel cell ca of right Forehead, under care Dr Elliot Eng, post 4 doses q3wks IV Keytruda, last dose 10/17/23.  Pt brought in w c/o feeling "claws, daggers" inside him, since C#3 Keytruda, with sensation initially started w various parts of face now also progressed down to sides of body to rectum(does not see actual daggers or claws,)    Wife reports after first cycle started w jaw tightness and progressive worsened w each cycle, to point of hard to swallow/can't swallow  Had tired taken off Seroquel to see if adverse effect but no improvement, now back on x 5-6days  Merkel lesion has essentially resolved    CT head no acute findings  see by Psych in ER, ?psychosis  -sensation described by pt ?psychosis vs neuropathy  -psychosis not noted to be associated w Keytruda. Neuro tox of Keytruda described includes cerebral hemorrhage, confusion, myasthenia gravis, reversible posterior leukoencephalopathy syndrome, syed neuropathy, Guillain [place], aseptic meningitis, encephalitis, transverse myelitis    Attempted MRI head but per pt unable to tolerate.    TFTS unremarkable.   AM cortisol 5.8. Slight low. Unclear significance     RECOMMEND  Await MRI Head  cause of sx remains unclear.   Psychiatry following  s/p neurology eval.     thank you, will follow w you    -

## 2023-10-26 ENCOUNTER — APPOINTMENT (OUTPATIENT)
Dept: HEMATOLOGY ONCOLOGY | Facility: CLINIC | Age: 73
End: 2023-10-26

## 2023-10-26 PROCEDURE — 74230 X-RAY XM SWLNG FUNCJ C+: CPT | Mod: 26

## 2023-10-26 RX ADMIN — ATENOLOL 50 MILLIGRAM(S): 25 TABLET ORAL at 05:35

## 2023-10-26 RX ADMIN — QUETIAPINE FUMARATE 25 MILLIGRAM(S): 200 TABLET, FILM COATED ORAL at 21:04

## 2023-10-26 RX ADMIN — Medication 5 MILLIGRAM(S): at 05:37

## 2023-10-26 RX ADMIN — QUETIAPINE FUMARATE 12.5 MILLIGRAM(S): 200 TABLET, FILM COATED ORAL at 18:34

## 2023-10-26 RX ADMIN — Medication 5 MILLIGRAM(S): at 18:34

## 2023-10-26 RX ADMIN — FLUVOXAMINE MALEATE 100 MILLIGRAM(S): 25 TABLET ORAL at 18:34

## 2023-10-26 RX ADMIN — HEPARIN SODIUM 5000 UNIT(S): 5000 INJECTION INTRAVENOUS; SUBCUTANEOUS at 05:35

## 2023-10-26 RX ADMIN — QUETIAPINE FUMARATE 12.5 MILLIGRAM(S): 200 TABLET, FILM COATED ORAL at 05:35

## 2023-10-26 RX ADMIN — LOSARTAN POTASSIUM 100 MILLIGRAM(S): 100 TABLET, FILM COATED ORAL at 05:35

## 2023-10-26 RX ADMIN — Medication 2000 UNIT(S): at 11:57

## 2023-10-26 RX ADMIN — SENNA PLUS 2 TABLET(S): 8.6 TABLET ORAL at 21:04

## 2023-10-26 RX ADMIN — ATENOLOL 50 MILLIGRAM(S): 25 TABLET ORAL at 18:34

## 2023-10-26 RX ADMIN — FLUVOXAMINE MALEATE 100 MILLIGRAM(S): 25 TABLET ORAL at 05:35

## 2023-10-26 NOTE — SWALLOW VFSS/MBS ASSESSMENT ADULT - COMMENTS
H&P Adult 10/24: "73 M pmh of HTN, ITP, APL syndrome, generalized anxiety disorder, OCD, newly dx aggressive Merkel Cell cancer of forehead- pt's oncologist  Dr. Eng, at [facility] on Keytruda infusions with good response per wife  was brought to ED for worsening psychosis for the past few days, pt lately with difficulty swallowing, decreased po intake."     MR Head 10/25: "IMPRESSION: No acute infarct, hemorrhage, or mass effect. No abnormal intracranial enhancement."    Of note, patient seen for a clinical swallow evaluation on 10/25 with recommendations of Soft and Bite Size Solids with Thin Liquids and a Cinesophagram. (see report for details)     Patient received in Radiology Suite this AM for a Cinesophagram. Patient transferred to a specialized seating unit with lateral view projection.

## 2023-10-26 NOTE — PROGRESS NOTE ADULT - ASSESSMENT
IMPRESSION  pt w ITP, Lupus Anticoagulant, HTN, anxiety on Seraquel, OCD, Merkel cell ca of right Forehead, under care Dr Elliot Eng, post 4 doses q3wks IV Keytruda, last dose 10/17/23.  Pt brought in w c/o feeling "claws, daggers" inside him, since C#3 Keytruda, with sensation initially started w various parts of face now also progressed down to sides of body to rectum(does not see actual daggers or claws,)    Wife reports after first cycle started w jaw tightness and progressive worsened w each cycle, to point of hard to swallow/can't swallow  Had tired taken off Seroquel to see if adverse effect but no improvement, now back on x 5-6days  Merkel lesion has essentially resolved    CT head no acute findings  see by Psych in ER, ?psychosis  -sensation described by pt ?psychosis vs neuropathy  -psychosis not noted to be associated w Keytruda. Neuro tox of Keytruda described includes cerebral hemorrhage, confusion, myasthenia gravis, reversible posterior leukoencephalopathy syndrome, syed neuropathy, Guillain [place], aseptic meningitis, encephalitis, transverse myelitis    .    TFTS unremarkable.   AM cortisol 5.8. Slight low. Unclear significance     RECOMMEND  cause of sx remains unclear.   Psychiatry following  s/p neurology eval.   await additional neuor psych evaluation  discussed with Dr. Gaitan.  feels symptoms maybe more from medication than secondary to keytruda.  ?if no improvement over next 24 hours ?trial of steroids  ?need of LP    -

## 2023-10-26 NOTE — PROGRESS NOTE ADULT - SUBJECTIVE AND OBJECTIVE BOX
Patient is a 73y old  Male who presents with a chief complaint of malaise (25 Oct 2023 18:17)        INTERVAL HPI/OVERNIGHT EVENTS:   no complaints  pt seen and examined         Vital Signs Last 24 Hrs  T(C): 37.3 (26 Oct 2023 11:49), Max: 37.3 (26 Oct 2023 11:49)  T(F): 99.1 (26 Oct 2023 11:49), Max: 99.1 (26 Oct 2023 11:49)  HR: 70 (26 Oct 2023 11:49) (70 - 77)  BP: 167/77 (26 Oct 2023 18:28) (102/63 - 184/81)  BP(mean): --  RR: 18 (26 Oct 2023 11:49) (18 - 18)  SpO2: 92% (26 Oct 2023 11:49) (92% - 94%)    Parameters below as of 26 Oct 2023 11:49  Patient On (Oxygen Delivery Method): room air        acetaminophen     Tablet .. 650 milliGRAM(s) Oral every 6 hours PRN  atenolol  Tablet 50 milliGRAM(s) Oral two times a day  cholecalciferol 2000 Unit(s) Oral daily  dextrose 5% + sodium chloride 0.9%. 1000 milliLiter(s) IV Continuous <Continuous>  diazepam    Tablet 5 milliGRAM(s) Oral two times a day  fluvoxaMINE 100 milliGRAM(s) Oral two times a day  lactulose Syrup 15 Gram(s) Oral two times a day PRN  losartan 100 milliGRAM(s) Oral daily  QUEtiapine 12.5 milliGRAM(s) Oral two times a day  QUEtiapine 25 milliGRAM(s) Oral at bedtime  senna 2 Tablet(s) Oral at bedtime      PHYSICAL EXAM:  GENERAL: NAD   EYES: conjunctiva and sclera clear  ENMT: Moist mucous membranes  NECK: Supple, No JVD, Normal thyroid  CHEST/LUNG: non labored, cta b/l  HEART: Regular rate and rhythm; No murmurs, rubs, or gallops  ABDOMEN: Soft, Nontender, Nondistended; Bowel sounds present  EXTREMITIES:  2+ Peripheral Pulses, No clubbing, no cyanosis, no edema  LYMPH: No lymphadenopathy noted  SKIN: No rashes or lesions  NEURO: no focal deficits    Consultant(s) Notes Reviewed:  [x ] YES  [ ] NO  Care Discussed with Consultants/Other Providers [ x] YES  [ ] NO    LABS:                        10.8   5.70  )-----------( 93       ( 25 Oct 2023 07:22 )             30.9     10-25    146<H>  |  110<H>  |  12  ----------------------------<  106<H>  3.9   |  30  |  1.10    Ca    8.6      25 Oct 2023 07:22        Urinalysis Basic - ( 25 Oct 2023 07:22 )    Color: x / Appearance: x / SG: x / pH: x  Gluc: 106 mg/dL / Ketone: x  / Bili: x / Urobili: x   Blood: x / Protein: x / Nitrite: x   Leuk Esterase: x / RBC: x / WBC x   Sq Epi: x / Non Sq Epi: x / Bacteria: x      CAPILLARY BLOOD GLUCOSE            Urinalysis Basic - ( 25 Oct 2023 07:22 )    Color: x / Appearance: x / SG: x / pH: x  Gluc: 106 mg/dL / Ketone: x  / Bili: x / Urobili: x   Blood: x / Protein: x / Nitrite: x   Leuk Esterase: x / RBC: x / WBC x   Sq Epi: x / Non Sq Epi: x / Bacteria: x          RADIOLOGY & ADDITIONAL TESTS:    Imaging Personally Reviewed  Reviewed consultants input

## 2023-10-26 NOTE — SWALLOW VFSS/MBS ASSESSMENT ADULT - ORAL PHASE
Premature spillage to the hypoharynx (vallecular) Premature spillage to the hypoharynx (vallecular/pyriforms) Slow Bolus Manipulation; Slow Anterior to Posterior Transfer

## 2023-10-26 NOTE — SWALLOW VFSS/MBS ASSESSMENT ADULT - ADDITIONAL RECOMMENDATIONS
This department to follow up as schedule permits for diet tolerance. Medical team further advised to reconsult if there is a change in medical status and/or observed change in patient's tolerance of recommended PO diet.

## 2023-10-26 NOTE — PROGRESS NOTE ADULT - SUBJECTIVE AND OBJECTIVE BOX
Neurology Follow up note    JANICE [id number]    HPI:  hx obtained from wife  73 M pmh of HTN, ITP, APL syndrome, generalized anxiety disorder, OCD, newly dx aggressive Merkel Cell cancer of forehead- pt's oncologist  Dr. Eng, at [facility] on Keytruda infusions with good response per wife  was brought o ED for worsening psychosis for the past few days, pt believes there are sharp things inside his body trying to come out of his mouth, stuck in throat  pt lately with difficulty swallowing, decreased po intake  pt was evaluated by psych in ED (24 Oct 2023 13:19)      Interval History -no new events    Patient is seen, chart was reviewed and case was discussed with the treatment team.  Pt is not in any distress.   Lying on bed comfortably.   .    Vital Signs Last 24 Hrs  T(C): 37.3 (26 Oct 2023 11:49), Max: 37.3 (26 Oct 2023 11:49)  T(F): 99.1 (26 Oct 2023 11:49), Max: 99.1 (26 Oct 2023 11:49)  HR: 70 (26 Oct 2023 11:49) (70 - 77)  BP: 167/77 (26 Oct 2023 18:28) (102/63 - 184/81)  BP(mean): --  RR: 18 (26 Oct 2023 11:49) (18 - 18)  SpO2: 92% (26 Oct 2023 11:49) (92% - 94%)    Parameters below as of 26 Oct 2023 11:49  Patient On (Oxygen Delivery Method): room air            REVIEW OF SYSTEMS:    Constitutional: No fever,  Eyes: No eye pain, visual disturbances, or discharge  ENT:  No difficulty hearing, tinnitus, vertigo; No sinus or throat pain  Neck: No pain or stiffness  Respiratory: No cough, wheezing, chills or hemoptysis  Cardiovascular: No chest pain, palpitations, shortness of breath, dizziness or leg swelling  Gastrointestinal: No abdominal or epigastric pain. No nausea, vomiting or hematemesis;   Genitourinary: No dysuria, frequency,   Neurological: No headaches, memory loss, loss of strength,  Psychiatric: No depression, anxiety, mood swings or difficulty sleeping  Musculoskeletal: No joint pain or swelling;   Skin: No itching, burning, rashes or lesions   Lymph Nodes: No enlarged glands  Endocrine: No heat or cold intolerance; No hair loss,  Allergy and Immunologic: No hives or eczema    On Neurological Examination:    Mental Status - Pt is alert, awake, oriented X2   Follows commands well and able to answer questions appropriately.Mood and affect  normal    Speech -  Pt has dysarthria.    Cranial Nerves - Pupils 3 mm equal and reactive to light, extraocular eye movements intact. Pt has no visual field deficit.  Pt has no  facial asymmetry. Facial sensation is intact.Tongue - is in midline.    Muscle tone - is normal      Motor Exam - 4/5 all over, No drift. No shaking or tremors.    Sensory Exam - . Pt withdraws all extremities equally on stimulation. No asymmetry seen. No complaints of tingling, numbness.           coordination:    Finger to nose: normal      Deep tendon Reflexes - 2 plus all over.          Neck Supple -  Yes.     MEDICATIONS    acetaminophen     Tablet .. 650 milliGRAM(s) Oral every 6 hours PRN  atenolol  Tablet 50 milliGRAM(s) Oral two times a day  cholecalciferol 2000 Unit(s) Oral daily  dextrose 5% + sodium chloride 0.9%. 1000 milliLiter(s) IV Continuous <Continuous>  diazepam    Tablet 5 milliGRAM(s) Oral two times a day  fluvoxaMINE 100 milliGRAM(s) Oral two times a day  heparin   Injectable 5000 Unit(s) SubCutaneous every 12 hours  lactulose Syrup 15 Gram(s) Oral two times a day PRN  losartan 100 milliGRAM(s) Oral daily  QUEtiapine 12.5 milliGRAM(s) Oral two times a day  QUEtiapine 25 milliGRAM(s) Oral at bedtime  senna 2 Tablet(s) Oral at bedtime      Allergies    No Known Allergies    Intolerances        LABS:  CBC Full  -  ( 25 Oct 2023 07:22 )  WBC Count : 5.70 K/uL  RBC Count : 3.64 M/uL  Hemoglobin : 10.8 g/dL  Hematocrit : 30.9 %  Platelet Count - Automated : 93 K/uL  Mean Cell Volume : 84.9 fl  Mean Cell Hemoglobin : 29.7 pg  Mean Cell Hemoglobin Concentration : 35.0 gm/dL  Auto Neutrophil # : x  Auto Lymphocyte # : x  Auto Monocyte # : x  Auto Eosinophil # : x  Auto Basophil # : x  Auto Neutrophil % : x   : x    Urinalysis Basic - ( 25 Oct 2023 07:22 )    Color: x / Appearance: x / SG: x / pH: x  Gluc: 106 mg/dL / Ketone: x  / Bili: x / Urobili: x   Blood: x / Protein: x / Nitrite: x   Leuk Esterase: x / RBC: x / WBC x   Sq Epi: x / Non Sq Epi: x / Bacteria: x      10-25    146<H>  |  110<H>  |  12  ----------------------------<  106<H>  3.9   |  30  |  1.10    Ca    8.6      25 Oct 2023 07:22      Hemoglobin A1C:     Vitamin B12     RADIOLOGY    ASSESSMENT AND PLAN:      seen for ams  doubt paraneoplastic   brain mri- no acute cva/mets    management  dw Dr HARPER ; Trial of steroid  ?needs  for LP  Physical therapy evaluation.  Pain is accessed and addressed.  Plan of care was discussed with family. Questions answered.  Would continue to follow.

## 2023-10-26 NOTE — PROGRESS NOTE ADULT - ASSESSMENT
73 M pmh of HTN, ITP, APL syndrome, generalized anxiety disorder, OCD, newly dx aggressive Merkel Cell cancer of forehead- pt's oncologist  Dr. Eng, at [facility] on Keytruda infusions with good response per wife  was brought to ED for worsening psychosis for the past few days, pt lately with difficulty swallowing, decreased po intake    Psychosis  monitor  psych cs  MRI noted  neuro following  LP    #Abnormal throat sensation- sharp object sensation  ?neuralgia  swallow eval    #Merkel cell ca  onc c/s  r/o paraneoplastic causes    #Anxiety- resume home benzos and seroquel  psych cs fu      #HTN- resume home meds    #DVT ppx- start hep sq      [facility]   [phone]

## 2023-10-26 NOTE — PROGRESS NOTE ADULT - SUBJECTIVE AND OBJECTIVE BOX
Interval History:  continues with weakness and confusion    Chart reviewed and events noted;   Overnight events:    MEDICATIONS  (STANDING):  atenolol  Tablet 50 milliGRAM(s) Oral two times a day  cholecalciferol 2000 Unit(s) Oral daily  dextrose 5% + sodium chloride 0.9%. 1000 milliLiter(s) (75 mL/Hr) IV Continuous <Continuous>  diazepam    Tablet 5 milliGRAM(s) Oral two times a day  fluvoxaMINE 100 milliGRAM(s) Oral two times a day  heparin   Injectable 5000 Unit(s) SubCutaneous every 12 hours  losartan 100 milliGRAM(s) Oral daily  QUEtiapine 25 milliGRAM(s) Oral at bedtime  QUEtiapine 12.5 milliGRAM(s) Oral two times a day  senna 2 Tablet(s) Oral at bedtime    MEDICATIONS  (PRN):  acetaminophen     Tablet .. 650 milliGRAM(s) Oral every 6 hours PRN Temp greater or equal to 38.5C (101.3F), Moderate Pain (4 - 6)  lactulose Syrup 15 Gram(s) Oral two times a day PRN constipation      Vital Signs Last 24 Hrs  T(C): 37.3 (26 Oct 2023 11:49), Max: 37.3 (26 Oct 2023 11:49)  T(F): 99.1 (26 Oct 2023 11:49), Max: 99.1 (26 Oct 2023 11:49)  HR: 70 (26 Oct 2023 11:49) (70 - 77)  BP: 150/74 (26 Oct 2023 11:49) (102/63 - 184/81)  BP(mean): --  RR: 18 (26 Oct 2023 11:49) (18 - 18)  SpO2: 92% (26 Oct 2023 11:49) (92% - 94%)    Parameters below as of 26 Oct 2023 11:49  Patient On (Oxygen Delivery Method): room air        PHYSICAL EXAM  General: adult in NAD, chronically ill appearing  HEENT: clear oropharynx, anicteric sclera, pink conjunctivae  Neck: supple  CV: normal S1S2 with no murmur rubs or gallops  Lungs: clear to auscultation, no wheezes, no rhales  Abdomen: soft non-tender non-distended, no hepato/splenomegaly  Ext: no clubbing cyanosis or edema  Skin: no rashes and no petichiae  Neuro: lethargic      LABS:  CBC Full  -  ( 25 Oct 2023 07:22 )  WBC Count : 5.70 K/uL  RBC Count : 3.64 M/uL  Hemoglobin : 10.8 g/dL  Hematocrit : 30.9 %  Platelet Count - Automated : 93 K/uL  Mean Cell Volume : 84.9 fl  Mean Cell Hemoglobin : 29.7 pg  Mean Cell Hemoglobin Concentration : 35.0 gm/dL  Auto Neutrophil # : x  Auto Lymphocyte # : x  Auto Monocyte # : x  Auto Eosinophil # : x  Auto Basophil # : x  Auto Neutrophil % : x  Auto Lymphocyte % : x  Auto Monocyte % : x  Auto Eosinophil % : x  Auto Basophil % : x    10-25    146<H>  |  110<H>  |  12  ----------------------------<  106<H>  3.9   |  30  |  1.10    Ca    8.6      25 Oct 2023 07:22          fe studies      WBC trend  5.70 K/uL (10-25-23 @ 07:22)      Hgb trend  10.8 g/dL (10-25-23 @ 07:22)      plt trend  93 K/uL (10-25-23 @ 07:22)        RADIOLOGY & ADDITIONAL STUDIES:    < from: MR Head w/wo IV Cont (10.25.23 @ 19:33) >  ACC: [id number] EXAM:  MR BRAIN WAW IC   ORDERED BY: DEONTE VALLE     PROCEDURE DATE:  10/25/2023          INTERPRETATION:  CLINICAL INFORMATION: Psychosis.    TECHNIQUE: Multiplanar, multisequence brain MRI was performed following   the administration of 7 ml Gadavist intravenous contrast with 0.5 ml   discarded.    COMPARISON: CT head dated 10/23/2023.    FINDINGS:  There is no evidence of acute infarct. There are no foci of   susceptibility artifact to suggest hemorrhage. The ventricles are normal   in size for patient's age. No contrast enhancing lesion seen.    Flow voids of the major intracranial vessels at the skull base follow   expected course and contour.    The paranasal sinuses demonstrate no signal abnormality. The mastoids   demonstrate no signal abnormality. Small polypoid lesion in the right   nasal cavity. Bilateral orbits are within normal limits.      IMPRESSION:  No acute infarct, hemorrhage, or mass effect. No abnormal intracranial   enhancement.    --- End of Report ---          [facility] BABARIA DO; Resident Radiologist  This document has been electronically signed.  NICOLE HOLLOWAY MD; Attending Radiologist  This document has been electronically signed. Oct 26 2023 11:13AM    < end of copied text >

## 2023-10-27 LAB
ANION GAP SERPL CALC-SCNC: 2 MMOL/L — LOW (ref 5–17)
ANION GAP SERPL CALC-SCNC: 2 MMOL/L — LOW (ref 5–17)
BUN SERPL-MCNC: 9 MG/DL — SIGNIFICANT CHANGE UP (ref 7–23)
BUN SERPL-MCNC: 9 MG/DL — SIGNIFICANT CHANGE UP (ref 7–23)
CALCIUM SERPL-MCNC: 8.4 MG/DL — LOW (ref 8.5–10.1)
CALCIUM SERPL-MCNC: 8.4 MG/DL — LOW (ref 8.5–10.1)
CHLORIDE SERPL-SCNC: 110 MMOL/L — HIGH (ref 96–108)
CHLORIDE SERPL-SCNC: 110 MMOL/L — HIGH (ref 96–108)
CK SERPL-CCNC: 61 U/L — SIGNIFICANT CHANGE UP (ref 26–308)
CK SERPL-CCNC: 61 U/L — SIGNIFICANT CHANGE UP (ref 26–308)
CO2 SERPL-SCNC: 31 MMOL/L — SIGNIFICANT CHANGE UP (ref 22–31)
CO2 SERPL-SCNC: 31 MMOL/L — SIGNIFICANT CHANGE UP (ref 22–31)
CREAT SERPL-MCNC: 0.9 MG/DL — SIGNIFICANT CHANGE UP (ref 0.5–1.3)
CREAT SERPL-MCNC: 0.9 MG/DL — SIGNIFICANT CHANGE UP (ref 0.5–1.3)
EGFR: 89 ML/MIN/1.73M2 — SIGNIFICANT CHANGE UP
EGFR: 89 ML/MIN/1.73M2 — SIGNIFICANT CHANGE UP
ERYTHROCYTE [SEDIMENTATION RATE] IN BLOOD: 57 MM/HR — HIGH (ref 0–20)
ERYTHROCYTE [SEDIMENTATION RATE] IN BLOOD: 57 MM/HR — HIGH (ref 0–20)
GLUCOSE SERPL-MCNC: 118 MG/DL — HIGH (ref 70–99)
GLUCOSE SERPL-MCNC: 118 MG/DL — HIGH (ref 70–99)
HCT VFR BLD CALC: 26.5 % — LOW (ref 39–50)
HCT VFR BLD CALC: 26.5 % — LOW (ref 39–50)
HCT VFR BLD CALC: 27.6 % — LOW (ref 39–50)
HCT VFR BLD CALC: 27.6 % — LOW (ref 39–50)
HGB BLD-MCNC: 10.5 G/DL — LOW (ref 13–17)
HGB BLD-MCNC: 10.5 G/DL — LOW (ref 13–17)
HGB BLD-MCNC: 9.9 G/DL — LOW (ref 13–17)
HGB BLD-MCNC: 9.9 G/DL — LOW (ref 13–17)
MCHC RBC-ENTMCNC: 31.3 PG — SIGNIFICANT CHANGE UP (ref 27–34)
MCHC RBC-ENTMCNC: 31.3 PG — SIGNIFICANT CHANGE UP (ref 27–34)
MCHC RBC-ENTMCNC: 31.6 PG — SIGNIFICANT CHANGE UP (ref 27–34)
MCHC RBC-ENTMCNC: 31.6 PG — SIGNIFICANT CHANGE UP (ref 27–34)
MCHC RBC-ENTMCNC: 37.4 GM/DL — HIGH (ref 32–36)
MCHC RBC-ENTMCNC: 37.4 GM/DL — HIGH (ref 32–36)
MCHC RBC-ENTMCNC: 38 GM/DL — HIGH (ref 32–36)
MCHC RBC-ENTMCNC: 38 GM/DL — HIGH (ref 32–36)
MCV RBC AUTO: 83.1 FL — SIGNIFICANT CHANGE UP (ref 80–100)
MCV RBC AUTO: 83.1 FL — SIGNIFICANT CHANGE UP (ref 80–100)
MCV RBC AUTO: 83.9 FL — SIGNIFICANT CHANGE UP (ref 80–100)
MCV RBC AUTO: 83.9 FL — SIGNIFICANT CHANGE UP (ref 80–100)
NRBC # BLD: 0 /100 WBCS — SIGNIFICANT CHANGE UP (ref 0–0)
NRBC # BLD: 0 /100 WBCS — SIGNIFICANT CHANGE UP (ref 0–0)
NRBC # BLD: SIGNIFICANT CHANGE UP /100 WBCS (ref 0–0)
NRBC # BLD: SIGNIFICANT CHANGE UP /100 WBCS (ref 0–0)
PLATELET # BLD AUTO: 103 K/UL — LOW (ref 150–400)
PLATELET # BLD AUTO: 103 K/UL — LOW (ref 150–400)
PLATELET # BLD AUTO: 111 K/UL — LOW (ref 150–400)
PLATELET # BLD AUTO: 111 K/UL — LOW (ref 150–400)
POTASSIUM SERPL-MCNC: 3.5 MMOL/L — SIGNIFICANT CHANGE UP (ref 3.5–5.3)
POTASSIUM SERPL-MCNC: 3.5 MMOL/L — SIGNIFICANT CHANGE UP (ref 3.5–5.3)
POTASSIUM SERPL-SCNC: 3.5 MMOL/L — SIGNIFICANT CHANGE UP (ref 3.5–5.3)
POTASSIUM SERPL-SCNC: 3.5 MMOL/L — SIGNIFICANT CHANGE UP (ref 3.5–5.3)
RBC # BLD: 3.16 M/UL — LOW (ref 4.2–5.8)
RBC # BLD: 3.16 M/UL — LOW (ref 4.2–5.8)
RBC # BLD: 3.32 M/UL — LOW (ref 4.2–5.8)
RBC # BLD: 3.32 M/UL — LOW (ref 4.2–5.8)
RBC # FLD: 12.2 % — SIGNIFICANT CHANGE UP (ref 10.3–14.5)
RBC # FLD: 12.2 % — SIGNIFICANT CHANGE UP (ref 10.3–14.5)
RBC # FLD: 12.4 % — SIGNIFICANT CHANGE UP (ref 10.3–14.5)
RBC # FLD: 12.4 % — SIGNIFICANT CHANGE UP (ref 10.3–14.5)
SODIUM SERPL-SCNC: 143 MMOL/L — SIGNIFICANT CHANGE UP (ref 135–145)
SODIUM SERPL-SCNC: 143 MMOL/L — SIGNIFICANT CHANGE UP (ref 135–145)
WBC # BLD: 5.15 K/UL — SIGNIFICANT CHANGE UP (ref 3.8–10.5)
WBC # BLD: 5.15 K/UL — SIGNIFICANT CHANGE UP (ref 3.8–10.5)
WBC # BLD: 5.71 K/UL — SIGNIFICANT CHANGE UP (ref 3.8–10.5)
WBC # BLD: 5.71 K/UL — SIGNIFICANT CHANGE UP (ref 3.8–10.5)
WBC # FLD AUTO: 5.15 K/UL — SIGNIFICANT CHANGE UP (ref 3.8–10.5)
WBC # FLD AUTO: 5.15 K/UL — SIGNIFICANT CHANGE UP (ref 3.8–10.5)
WBC # FLD AUTO: 5.71 K/UL — SIGNIFICANT CHANGE UP (ref 3.8–10.5)
WBC # FLD AUTO: 5.71 K/UL — SIGNIFICANT CHANGE UP (ref 3.8–10.5)

## 2023-10-27 RX ORDER — QUETIAPINE FUMARATE 200 MG/1
25 TABLET, FILM COATED ORAL THREE TIMES A DAY
Refills: 0 | Status: DISCONTINUED | OUTPATIENT
Start: 2023-10-27 | End: 2023-10-29

## 2023-10-27 RX ORDER — HEPARIN SODIUM 5000 [USP'U]/ML
5000 INJECTION INTRAVENOUS; SUBCUTANEOUS EVERY 12 HOURS
Refills: 0 | Status: DISCONTINUED | OUTPATIENT
Start: 2023-10-27 | End: 2023-12-26

## 2023-10-27 RX ADMIN — Medication 650 MILLIGRAM(S): at 15:29

## 2023-10-27 RX ADMIN — FLUVOXAMINE MALEATE 100 MILLIGRAM(S): 25 TABLET ORAL at 05:47

## 2023-10-27 RX ADMIN — Medication 5 MILLIGRAM(S): at 18:59

## 2023-10-27 RX ADMIN — LOSARTAN POTASSIUM 100 MILLIGRAM(S): 100 TABLET, FILM COATED ORAL at 05:47

## 2023-10-27 RX ADMIN — QUETIAPINE FUMARATE 25 MILLIGRAM(S): 200 TABLET, FILM COATED ORAL at 15:30

## 2023-10-27 RX ADMIN — HEPARIN SODIUM 5000 UNIT(S): 5000 INJECTION INTRAVENOUS; SUBCUTANEOUS at 19:00

## 2023-10-27 RX ADMIN — Medication 5 MILLIGRAM(S): at 05:47

## 2023-10-27 RX ADMIN — ATENOLOL 50 MILLIGRAM(S): 25 TABLET ORAL at 05:47

## 2023-10-27 RX ADMIN — QUETIAPINE FUMARATE 25 MILLIGRAM(S): 200 TABLET, FILM COATED ORAL at 21:10

## 2023-10-27 RX ADMIN — Medication 2000 UNIT(S): at 11:40

## 2023-10-27 RX ADMIN — QUETIAPINE FUMARATE 12.5 MILLIGRAM(S): 200 TABLET, FILM COATED ORAL at 05:46

## 2023-10-27 RX ADMIN — ATENOLOL 50 MILLIGRAM(S): 25 TABLET ORAL at 18:59

## 2023-10-27 RX ADMIN — FLUVOXAMINE MALEATE 100 MILLIGRAM(S): 25 TABLET ORAL at 18:59

## 2023-10-27 RX ADMIN — SENNA PLUS 2 TABLET(S): 8.6 TABLET ORAL at 21:11

## 2023-10-27 NOTE — PROGRESS NOTE ADULT - SUBJECTIVE AND OBJECTIVE BOX
[INTERVAL HX: ]  Patient seen and examined;  Chart reviewed and events noted;     seen by neurology.   MRI negative.     Plan to change/modify anti-psychotics and re-eval.     rheumatological studies abn      [MEDICATIONS]  MEDICATIONS  (STANDING):  atenolol  Tablet 50 milliGRAM(s) Oral two times a day  cholecalciferol 2000 Unit(s) Oral daily  dextrose 5% + sodium chloride 0.9%. 1000 milliLiter(s) (75 mL/Hr) IV Continuous <Continuous>  diazepam    Tablet 5 milliGRAM(s) Oral two times a day  fluvoxaMINE 100 milliGRAM(s) Oral two times a day  heparin   Injectable 5000 Unit(s) SubCutaneous every 12 hours  losartan 100 milliGRAM(s) Oral daily  QUEtiapine 25 milliGRAM(s) Oral three times a day  senna 2 Tablet(s) Oral at bedtime    MEDICATIONS  (PRN):  acetaminophen     Tablet .. 650 milliGRAM(s) Oral every 6 hours PRN Temp greater or equal to 38.5C (101.3F), Moderate Pain (4 - 6)  lactulose Syrup 15 Gram(s) Oral two times a day PRN constipation      [VITALS]  Vital Signs Last 24 Hrs  T(C): 36.7 (27 Oct 2023 11:43), Max: 37.1 (27 Oct 2023 04:48)  T(F): 98 (27 Oct 2023 11:43), Max: 98.7 (27 Oct 2023 04:48)  HR: 65 (27 Oct 2023 18:31) (65 - 68)  BP: 166/75 (27 Oct 2023 18:31) (133/77 - 179/71)  BP(mean): --  RR: 17 (27 Oct 2023 11:43) (17 - 17)  SpO2: 92% (27 Oct 2023 04:48) (92% - 92%)    Parameters below as of 27 Oct 2023 11:43  Patient On (Oxygen Delivery Method): room air      [WT/HT]  Daily     Daily   [VENT]      [PHYSICAL EXAM]  GEN: NAD, chronically ill looking M  HEENT: normocephalic and atraumatic. EOMI. .    NECK: Supple.  No lymphadenopathy   LUNGS: Clear to auscultation.  HEART: Regular rate and rhythm,  no MRG  ABDOMEN: Soft, nontender, and nondistended.  Positive bowel sounds.    : No CVA tenderness  EXTREMITIES: Without edema.  NEUROLOGIC: grossly intact.  PSYCHIATRIC: odd affect .  SKIN: No rash     [LABS:]                        9.9    5.15  )-----------( 111      ( 27 Oct 2023 12:33 )             26.5     10-27    143  |  110<H>  |  9   ----------------------------<  118<H>  3.5   |  31  |  0.90    Ca    8.4<L>      27 Oct 2023 07:20              Urinalysis Basic - ( 27 Oct 2023 07:20 )    Color: x / Appearance: x / SG: x / pH: x  Gluc: 118 mg/dL / Ketone: x  / Bili: x / Urobili: x   Blood: x / Protein: x / Nitrite: x   Leuk Esterase: x / RBC: x / WBC x   Sq Epi: x / Non Sq Epi: x / Bacteria: x                [RADIOLOGY STUDIES:]

## 2023-10-27 NOTE — PROGRESS NOTE ADULT - SUBJECTIVE AND OBJECTIVE BOX
Neurology Follow up note    JANICE [id number]    HPI:  hx obtained from wife  73 M pmh of HTN, ITP, APL syndrome, generalized anxiety disorder, OCD, newly dx aggressive Merkel Cell cancer of forehead- pt's oncologist  Dr. Eng, at [facility] on Keytruda infusions with good response per wife  was brought o ED for worsening psychosis for the past few days, pt believes there are sharp things inside his body trying to come out of his mouth, stuck in throat  pt lately with difficulty swallowing, decreased po intake  pt was evaluated by psych in ED (24 Oct 2023 13:19)      Interval History -calmer now    Patient is seen, chart was reviewed and case was discussed with the treatment team.  Pt is not in any distress.   Lying on bed comfortably.   .    Vital Signs Last 24 Hrs  T(C): 36.7 (27 Oct 2023 11:43), Max: 37.1 (27 Oct 2023 04:48)  T(F): 98 (27 Oct 2023 11:43), Max: 98.7 (27 Oct 2023 04:48)  HR: 68 (27 Oct 2023 04:48) (68 - 68)  BP: 133/77 (27 Oct 2023 11:43) (133/77 - 179/71)  BP(mean): --  RR: 17 (27 Oct 2023 11:43) (17 - 17)  SpO2: 92% (27 Oct 2023 04:48) (92% - 92%)    Parameters below as of 27 Oct 2023 11:43  Patient On (Oxygen Delivery Method): room air                REVIEW OF SYSTEMS:    Constitutional: No fever,  Eyes: No eye pain, visual disturbances, or discharge  ENT:  No difficulty hearing, tinnitus, vertigo; No sinus or throat pain  Neck: No pain or stiffness  Respiratory: No cough, wheezing, chills or hemoptysis  Cardiovascular: No chest pain, palpitations, shortness of breath, dizziness or leg swelling  Gastrointestinal: No abdominal or epigastric pain. No nausea, vomiting or hematemesis;   Genitourinary: No dysuria, frequency,   Neurological: No headaches, memory loss, loss of strength,  Psychiatric: No depression, anxiety, mood swings or difficulty sleeping  Musculoskeletal: No joint pain or swelling;   Skin: No itching, burning, rashes or lesions   Lymph Nodes: No enlarged glands  Endocrine: No heat or cold intolerance; No hair loss,  Allergy and Immunologic: No hives or eczema    On Neurological Examination:    Mental Status - Pt is alert, awake, oriented X2   Follows commands well and able to answer questions appropriately.Mood and affect  normal    Speech -  Pt has dysarthria.    Cranial Nerves - Pupils 3 mm equal and reactive to light, extraocular eye movements intact. Pt has no visual field deficit.  Pt has no  facial asymmetry. Facial sensation is intact.Tongue - is in midline.    Muscle tone - is normal      Motor Exam - 4/5 all over, No drift. No shaking or tremors.    Sensory Exam - . Pt withdraws all extremities equally on stimulation. No asymmetry seen. No complaints of tingling, numbness.           coordination:    Finger to nose: normal      Deep tendon Reflexes - 2 plus all over.          Neck Supple -  Yes.     MEDICATIONS    acetaminophen     Tablet .. 650 milliGRAM(s) Oral every 6 hours PRN  atenolol  Tablet 50 milliGRAM(s) Oral two times a day  cholecalciferol 2000 Unit(s) Oral daily  dextrose 5% + sodium chloride 0.9%. 1000 milliLiter(s) IV Continuous <Continuous>  diazepam    Tablet 5 milliGRAM(s) Oral two times a day  fluvoxaMINE 100 milliGRAM(s) Oral two times a day  heparin   Injectable 5000 Unit(s) SubCutaneous every 12 hours  lactulose Syrup 15 Gram(s) Oral two times a day PRN  losartan 100 milliGRAM(s) Oral daily  QUEtiapine 12.5 milliGRAM(s) Oral two times a day  QUEtiapine 25 milliGRAM(s) Oral at bedtime  senna 2 Tablet(s) Oral at bedtime      Allergies    No Known Allergies    Intolerances                          9.9    5.15  )-----------( 111      ( 27 Oct 2023 12:33 )             26.5       Hemoglobin A1C:     Vitamin B12     RADIOLOGY    ASSESSMENT AND PLAN:      seen for ams  doubt paraneoplastic   brain mri- no acute cva/mets      adjust seroquel for hallucination  Would hold lp  Physical therapy evaluation.  Pain is accessed and addressed.  Plan of care was discussed with family. Questions answered.  Would continue to follow.

## 2023-10-27 NOTE — PROGRESS NOTE ADULT - ASSESSMENT
IMPRESSION    pt w ITP, Lupus Anticoagulant, HTN, anxiety on Seraquel, OCD, Merkel cell ca of right Forehead, under care Dr Elliot Eng, post 4 doses q3wks IV Keytruda, last dose 10/17/23.  Pt brought in w c/o feeling "claws, daggers" inside him, since C#3 Keytruda, with sensation initially started w various parts of face now also progressed down to sides of body to rectum(does not see actual daggers or claws,)    Wife reports after first cycle started w jaw tightness and progressive worsened w each cycle, to point of hard to swallow/can't swallow  Had tired taken off Seroquel to see if adverse effect but no improvement, now back on x 5-6days  Merkel lesion has essentially resolved    CT head no acute findings  see by Psych in ER, ?psychosis  -sensation described by pt ?psychosis vs neuropathy  -psychosis not noted to be associated w Keytruda. Neuro tox of Keytruda described includes cerebral hemorrhage, confusion, myasthenia gravis, reversible posterior leukoencephalopathy syndrome, syed neuropathy, Guillain Milton, aseptic meningitis, encephalitis, transverse myelitis    TFTS unremarkable.   AM cortisol 5.8. Slight low. Unclear significance     RECOMMEND  cause of sx remains unclear.   Psychiatry following    s/p neurology eval.     await additional neuor psych evaluation    discussed with Dr. Gaitan.  feels symptoms maybe more from medication,  than secondary to keytruda.  await course.     Consideration for steroids if autoimmune process, but not clear cause.   Plts adequate at 111, no indication for steroids for plts.     Scleroderma studies abn.   +MICHELLE and +Ro52  would recommend outpt rheum eval.   -

## 2023-10-27 NOTE — PROGRESS NOTE ADULT - SUBJECTIVE AND OBJECTIVE BOX
Patient is a 73y old  Male who presents with a chief complaint of malaise (25 Oct 2023 18:17)        INTERVAL HPI/OVERNIGHT EVENTS:   no complaints  pt seen and examined         Vital Signs Last 24 Hrs  T(C): 36.7 (27 Oct 2023 11:43), Max: 37.1 (27 Oct 2023 04:48)  T(F): 98 (27 Oct 2023 11:43), Max: 98.7 (27 Oct 2023 04:48)  HR: 68 (27 Oct 2023 04:48) (68 - 68)  BP: 133/77 (27 Oct 2023 11:43) (133/77 - 179/71)  BP(mean): --  RR: 17 (27 Oct 2023 11:43) (17 - 17)  SpO2: 92% (27 Oct 2023 04:48) (92% - 92%)    Parameters below as of 27 Oct 2023 11:43  Patient On (Oxygen Delivery Method): room air        acetaminophen     Tablet .. 650 milliGRAM(s) Oral every 6 hours PRN  atenolol  Tablet 50 milliGRAM(s) Oral two times a day  cholecalciferol 2000 Unit(s) Oral daily  dextrose 5% + sodium chloride 0.9%. 1000 milliLiter(s) IV Continuous <Continuous>  diazepam    Tablet 5 milliGRAM(s) Oral two times a day  fluvoxaMINE 100 milliGRAM(s) Oral two times a day  heparin   Injectable 5000 Unit(s) SubCutaneous every 12 hours  lactulose Syrup 15 Gram(s) Oral two times a day PRN  losartan 100 milliGRAM(s) Oral daily  QUEtiapine 25 milliGRAM(s) Oral three times a day  senna 2 Tablet(s) Oral at bedtime      PHYSICAL EXAM:  GENERAL: NAD   EYES: conjunctiva and sclera clear  ENMT: Moist mucous membranes  NECK: Supple, No JVD, Normal thyroid  CHEST/LUNG: non labored, cta b/l  HEART: Regular rate and rhythm; No murmurs, rubs, or gallops  ABDOMEN: Soft, Nontender, Nondistended; Bowel sounds present  EXTREMITIES:  2+ Peripheral Pulses, No clubbing, no cyanosis, no edema  LYMPH: No lymphadenopathy noted  SKIN: No rashes or lesions  NEURO: no focal deficits    Consultant(s) Notes Reviewed:  [x ] YES  [ ] NO  Care Discussed with Consultants/Other Providers [ x] YES  [ ] NO    LABS:                        9.9    5.15  )-----------( 111      ( 27 Oct 2023 12:33 )             26.5     10-27    143  |  110<H>  |  9   ----------------------------<  118<H>  3.5   |  31  |  0.90    Ca    8.4<L>      27 Oct 2023 07:20        Urinalysis Basic - ( 27 Oct 2023 07:20 )    Color: x / Appearance: x / SG: x / pH: x  Gluc: 118 mg/dL / Ketone: x  / Bili: x / Urobili: x   Blood: x / Protein: x / Nitrite: x   Leuk Esterase: x / RBC: x / WBC x   Sq Epi: x / Non Sq Epi: x / Bacteria: x      CAPILLARY BLOOD GLUCOSE            Urinalysis Basic - ( 27 Oct 2023 07:20 )    Color: x / Appearance: x / SG: x / pH: x  Gluc: 118 mg/dL / Ketone: x  / Bili: x / Urobili: x   Blood: x / Protein: x / Nitrite: x   Leuk Esterase: x / RBC: x / WBC x   Sq Epi: x / Non Sq Epi: x / Bacteria: x          RADIOLOGY & ADDITIONAL TESTS:    Imaging Personally Reviewed  Reviewed consultants input

## 2023-10-27 NOTE — PROGRESS NOTE ADULT - ASSESSMENT
73 M pmh of HTN, ITP, APL syndrome, generalized anxiety disorder, OCD, newly dx aggressive Merkel Cell cancer of forehead- pt's oncologist  Dr. Eng, at [facility] on Keytruda infusions with good response per wife  was brought to ED for worsening psychosis for the past few days, pt lately with difficulty swallowing, decreased po intake    Psychosis  monitor  psych cs  MRI noted  neuro following  LP    #Abnormal throat sensation- sharp object sensation  ?neuralgia  swallow eval appreciated    #Merkel cell ca  onc c/s  r/o paraneoplastic causes    #Anxiety  psych cs fu  continue valium  increased seroquel    #HTN- resume home meds    #DVT ppx- start hep sq      [facility]   [phone]

## 2023-10-27 NOTE — CASE MANAGEMENT PROGRESS NOTE - NSCMPROGRESSNOTE_GEN_ALL_CORE
Discussed on rounds this am.  Neuro following, additional testing in progress.  PT evaluation ordered this am.  A CM will continue to follow through hospitalization.

## 2023-10-28 LAB
CRP SERPL-MCNC: 20 MG/L — HIGH
CRP SERPL-MCNC: 20 MG/L — HIGH
ENA JO1 AB SER-ACNC: <0.2 AI — SIGNIFICANT CHANGE UP
ENA JO1 AB SER-ACNC: <0.2 AI — SIGNIFICANT CHANGE UP
LDH SERPL L TO P-CCNC: 282 U/L — HIGH (ref 50–242)
LDH SERPL L TO P-CCNC: 282 U/L — HIGH (ref 50–242)

## 2023-10-28 PROCEDURE — 93970 EXTREMITY STUDY: CPT | Mod: 26

## 2023-10-28 RX ORDER — ACETAMINOPHEN WITH CODEINE 300MG-30MG
2.5 TABLET ORAL ONCE
Refills: 0 | Status: DISCONTINUED | OUTPATIENT
Start: 2023-10-28 | End: 2023-10-28

## 2023-10-28 RX ORDER — ACETAMINOPHEN WITH CODEINE 300MG-30MG
1 TABLET ORAL ONCE
Refills: 0 | Status: DISCONTINUED | OUTPATIENT
Start: 2023-10-28 | End: 2023-10-28

## 2023-10-28 RX ADMIN — QUETIAPINE FUMARATE 25 MILLIGRAM(S): 200 TABLET, FILM COATED ORAL at 14:13

## 2023-10-28 RX ADMIN — ATENOLOL 50 MILLIGRAM(S): 25 TABLET ORAL at 17:08

## 2023-10-28 RX ADMIN — SODIUM CHLORIDE 75 MILLILITER(S): 9 INJECTION, SOLUTION INTRAVENOUS at 05:07

## 2023-10-28 RX ADMIN — FLUVOXAMINE MALEATE 100 MILLIGRAM(S): 25 TABLET ORAL at 17:07

## 2023-10-28 RX ADMIN — FLUVOXAMINE MALEATE 100 MILLIGRAM(S): 25 TABLET ORAL at 05:07

## 2023-10-28 RX ADMIN — QUETIAPINE FUMARATE 25 MILLIGRAM(S): 200 TABLET, FILM COATED ORAL at 05:08

## 2023-10-28 RX ADMIN — QUETIAPINE FUMARATE 25 MILLIGRAM(S): 200 TABLET, FILM COATED ORAL at 21:50

## 2023-10-28 RX ADMIN — Medication 5 MILLIGRAM(S): at 17:08

## 2023-10-28 RX ADMIN — HEPARIN SODIUM 5000 UNIT(S): 5000 INJECTION INTRAVENOUS; SUBCUTANEOUS at 05:08

## 2023-10-28 RX ADMIN — Medication 1 TABLET(S): at 15:02

## 2023-10-28 RX ADMIN — HEPARIN SODIUM 5000 UNIT(S): 5000 INJECTION INTRAVENOUS; SUBCUTANEOUS at 17:08

## 2023-10-28 RX ADMIN — SENNA PLUS 2 TABLET(S): 8.6 TABLET ORAL at 21:49

## 2023-10-28 RX ADMIN — SODIUM CHLORIDE 75 MILLILITER(S): 9 INJECTION, SOLUTION INTRAVENOUS at 21:49

## 2023-10-28 RX ADMIN — Medication 2000 UNIT(S): at 14:14

## 2023-10-28 RX ADMIN — ATENOLOL 50 MILLIGRAM(S): 25 TABLET ORAL at 05:07

## 2023-10-28 RX ADMIN — LOSARTAN POTASSIUM 100 MILLIGRAM(S): 100 TABLET, FILM COATED ORAL at 05:08

## 2023-10-28 RX ADMIN — Medication 5 MILLIGRAM(S): at 05:08

## 2023-10-28 RX ADMIN — Medication 1 TABLET(S): at 14:14

## 2023-10-28 NOTE — PROGRESS NOTE ADULT - SUBJECTIVE AND OBJECTIVE BOX
Patient is a 73y old  Male who presents with a chief complaint of abn sensation , swallowing (28 Oct 2023 15:29)        INTERVAL HPI/OVERNIGHT EVENTS:   no complaints  pt seen and examined         Vital Signs Last 24 Hrs  T(C): 36.7 (28 Oct 2023 12:51), Max: 36.8 (27 Oct 2023 20:46)  T(F): 98 (28 Oct 2023 12:51), Max: 98.3 (27 Oct 2023 20:46)  HR: 71 (28 Oct 2023 12:51) (65 - 74)  BP: 138/72 (28 Oct 2023 12:51) (138/72 - 176/77)  BP(mean): --  RR: 17 (28 Oct 2023 12:51) (17 - 17)  SpO2: 94% (28 Oct 2023 12:51) (94% - 95%)    Parameters below as of 28 Oct 2023 12:51  Patient On (Oxygen Delivery Method): room air        acetaminophen     Tablet .. 650 milliGRAM(s) Oral every 6 hours PRN  atenolol  Tablet 50 milliGRAM(s) Oral two times a day  cholecalciferol 2000 Unit(s) Oral daily  dextrose 5% + sodium chloride 0.9%. 1000 milliLiter(s) IV Continuous <Continuous>  diazepam    Tablet 5 milliGRAM(s) Oral two times a day  fluvoxaMINE 100 milliGRAM(s) Oral two times a day  heparin   Injectable 5000 Unit(s) SubCutaneous every 12 hours  lactulose Syrup 15 Gram(s) Oral two times a day PRN  losartan 100 milliGRAM(s) Oral daily  QUEtiapine 25 milliGRAM(s) Oral three times a day  senna 2 Tablet(s) Oral at bedtime      PHYSICAL EXAM:  GENERAL: NAD   EYES: conjunctiva and sclera clear  ENMT: Moist mucous membranes  NECK: Supple, No JVD, Normal thyroid  CHEST/LUNG: non labored, cta b/l  HEART: Regular rate and rhythm; No murmurs, rubs, or gallops  ABDOMEN: Soft, Nontender, Nondistended; Bowel sounds present  EXTREMITIES:  2+ Peripheral Pulses, No clubbing, no cyanosis, no edema  LYMPH: No lymphadenopathy noted  SKIN: No rashes or lesions  NEURO: no focal deficits    Consultant(s) Notes Reviewed:  [x ] YES  [ ] NO  Care Discussed with Consultants/Other Providers [ x] YES  [ ] NO    LABS:                        9.9    5.15  )-----------( 111      ( 27 Oct 2023 12:33 )             26.5     10-27    143  |  110<H>  |  9   ----------------------------<  118<H>  3.5   |  31  |  0.90    Ca    8.4<L>      27 Oct 2023 07:20        Urinalysis Basic - ( 27 Oct 2023 07:20 )    Color: x / Appearance: x / SG: x / pH: x  Gluc: 118 mg/dL / Ketone: x  / Bili: x / Urobili: x   Blood: x / Protein: x / Nitrite: x   Leuk Esterase: x / RBC: x / WBC x   Sq Epi: x / Non Sq Epi: x / Bacteria: x      CAPILLARY BLOOD GLUCOSE            Urinalysis Basic - ( 27 Oct 2023 07:20 )    Color: x / Appearance: x / SG: x / pH: x  Gluc: 118 mg/dL / Ketone: x  / Bili: x / Urobili: x   Blood: x / Protein: x / Nitrite: x   Leuk Esterase: x / RBC: x / WBC x   Sq Epi: x / Non Sq Epi: x / Bacteria: x          RADIOLOGY & ADDITIONAL TESTS:    Imaging Personally Reviewed  Reviewed consultants input

## 2023-10-28 NOTE — PROGRESS NOTE ADULT - ASSESSMENT
73 M pmh of HTN, ITP, APL syndrome, generalized anxiety disorder, OCD, newly dx aggressive Merkel Cell cancer of forehead- pt's oncologist  Dr. Eng, at [facility] on Keytruda infusions with good response per wife  was brought to ED for worsening psychosis for the past few days, pt lately with difficulty swallowing, decreased po intake    Psychosis  monitor  psych cs  MRI noted  neuro following  discussed with family, pt, and Dr Pepper  may need LP    #Abnormal throat sensation- sharp object sensation  ?neuralgia  swallow eval appreciated    #Merkel cell ca  onc c/s  r/o paraneoplastic causes    #Anxiety  psych cs fu  continue valium  increased seroquel    #HTN- resume home meds    #DVT ppx- start hep sq      [facility]

## 2023-10-28 NOTE — PROGRESS NOTE ADULT - ASSESSMENT
IMPRESSION    pt w ITP, Lupus Anticoagulant, HTN, anxiety on Seraquel, OCD, Merkel cell ca of right Forehead, under care Dr Elliot Eng, post 4 doses q3wks IV Keytruda, last dose 10/17/23.  Pt brought in w c/o feeling "claws, daggers" inside him, since C#3 Keytruda, with sensation initially started w various parts of face now also progressed down to sides of body to rectum(does not see actual daggers or claws,)    Wife reports after first cycle started w jaw tightness and progressive worsened w each cycle, to point of hard to swallow/can't swallow  Had tired taken off Seroquel to see if adverse effect but no improvement, now back on x 5-6days  Merkel lesion has essentially resolved    CT head no acute findings  see by Psych in ER, ?psychosis  -sensation described by pt ?psychosis vs neuropathy  -psychosis not noted to be associated w Keytruda. Neuro tox of Keytruda described includes cerebral hemorrhage, confusion, myasthenia gravis, reversible posterior leukoencephalopathy syndrome, syed neuropathy, Guillain [place], aseptic meningitis, encephalitis, transverse myelitis    TFTS unremarkable.   AM cortisol 5.8. Slight low. Unclear significance     RECOMMEND  cause of sx remains unclear.   on Seroquel    s/p neurology eval.   possible LP is sx not better  However pt refusing today; discussed at length.   Family at bedside    await additional neuro / psych evaluation    Discussed with Dr. Magdaleno.  Sx seem less likely secondary to Keytruda appears more psychiatric sx  await course.     Consideration for steroids if autoimmune process, but no clear cause.   Plts adequate at 111, no indication for steroids for plts.     Scleroderma studies abn.   +MICHELLE and +Ro52  would recommend outpt rheum eval.   Discussed with pt and family.     Ad-Hoc family meeting had with pt wife and son at length.

## 2023-10-28 NOTE — CHART NOTE - NSCHARTNOTEFT_GEN_A_CORE
Assessment:   Pt seen for nutrition follow up.  Chart reviewed, hospital course noted.     Brief History:   "73 M pmh of HTN, ITP, APL syndrome, generalized anxiety disorder, OCD, newly dx aggressive Merkel Cell cancer of forehead- pt's oncologist  Dr. Eng, at [facility] on Keytruda infusions with good response per wife   was brought to ED for worsening psychosis for the past few days, pt lately with difficulty swallowing, decreased po intake"    Pt seen at bedside with wife present.  Wife visibily exhausted, states pt calling her all hours therefore she did not sleep.  Pt not eating, asking me to make his panic attack go away.  Pt visibly clenching jaw, clutching at throat, c/o can't swallow.  Wife attempted to give pt water to which he states it wont go down.   SLP performed bedside swallow eval 10/25 and MBS 10/26 --> puree diet mildly thick liquids recommended.    Fecal incontinence/BM noted 10/27.   Wife expresses frustration at lack of improvement in pt's condition and perception of lack of communication between doctors.   Is clearly distressed pt is not eating.  Requests Ensure shakes be reordered as these are mainly what pt is taking if at all.     Factors impacting intake: [ ] none [ ] nausea  [ ] vomiting [ ] diarrhea [ ] constipation  [ ]chewing problems [x] swallowing issues  [x] other: psychosis     Diet Prescription: Diet, Pureed:   Mildly Thick Liquids (MILDTHICKLIQS) (10-26-23 @ 13:04)    Intake: 0-25%    Current Weight: 10/25 174.3# ( this is inconsistent with admit wt of 149#)      Pertinent Medications: MEDICATIONS  (STANDING):  atenolol  Tablet 50 milliGRAM(s) Oral two times a day  cholecalciferol 2000 Unit(s) Oral daily  dextrose 5% + sodium chloride 0.9%. 1000 milliLiter(s) (75 mL/Hr) IV Continuous <Continuous>  diazepam    Tablet 5 milliGRAM(s) Oral two times a day  fluvoxaMINE 100 milliGRAM(s) Oral two times a day  heparin   Injectable 5000 Unit(s) SubCutaneous every 12 hours  losartan 100 milliGRAM(s) Oral daily  QUEtiapine 25 milliGRAM(s) Oral three times a day  senna 2 Tablet(s) Oral at bedtime    MEDICATIONS  (PRN):  acetaminophen     Tablet .. 650 milliGRAM(s) Oral every 6 hours PRN Temp greater or equal to 38.5C (101.3F), Moderate Pain (4 - 6)  lactulose Syrup 15 Gram(s) Oral two times a day PRN constipation    Pertinent Labs: 10-27 Na143 mmol/L Glu 118 mg/dL<H> K+ 3.5 mmol/L Cr  0.90 mg/dL BUN 9 mg/dL 10-23 Alb 3.1 g/dL<L>      Skin: heels red  Edema: none    Estimated Needs:   [x] no change since previous assessment on: 10/25 based on #  [ ] recalculated: based on   kcal/day: 3656-3420  gms protein/day:     Previous Nutrition Diagnosis:   [x]  Malnutrition (chronic severe)    Nutrition Diagnosis is [x] ongoing  [ ] resolved [ ] not applicable     New Nutrition Diagnosis: [ x] Swallowing Difficulty related to motor causes, psychosis evidenced by MBS study 10/27      Interventions:   Recommend  [x] Continue current diet  [ ] Change Diet To:  [x] Nutrition Supplement- reorder vanilla Ensure tid  [ ] Nutrition Support  [x] Other: encourage and assist with po intake.  May need to consider alternate nutrition support if inadequate oral intake persists.     Monitoring and Evaluation:   [x] PO intake [ x ] Tolerance to diet prescription [ x ] weights [ x ] labs [ x ] follow up per protocol  [x] other: s/s GI distress, bowel function, skin integrity/ edema

## 2023-10-28 NOTE — PROGRESS NOTE ADULT - SUBJECTIVE AND OBJECTIVE BOX
[INTERVAL HX: ]  Patient seen and examined;  Chart reviewed and events noted;       [MEDICATIONS]  MEDICATIONS  (STANDING):  atenolol  Tablet 50 milliGRAM(s) Oral two times a day  cholecalciferol 2000 Unit(s) Oral daily  dextrose 5% + sodium chloride 0.9%. 1000 milliLiter(s) (75 mL/Hr) IV Continuous <Continuous>  diazepam    Tablet 5 milliGRAM(s) Oral two times a day  fluvoxaMINE 100 milliGRAM(s) Oral two times a day  heparin   Injectable 5000 Unit(s) SubCutaneous every 12 hours  losartan 100 milliGRAM(s) Oral daily  QUEtiapine 25 milliGRAM(s) Oral three times a day  senna 2 Tablet(s) Oral at bedtime      MEDICATIONS  (PRN):  acetaminophen     Tablet .. 650 milliGRAM(s) Oral every 6 hours PRN Temp greater or equal to 38.5C (101.3F), Moderate Pain (4 - 6)  lactulose Syrup 15 Gram(s) Oral two times a day PRN constipation      [VITALS]  Vital Signs Last 24 Hrs  T(C): 36.7 (28 Oct 2023 12:51), Max: 36.8 (27 Oct 2023 20:46)  T(F): 98 (28 Oct 2023 12:51), Max: 98.3 (27 Oct 2023 20:46)  HR: 71 (28 Oct 2023 12:51) (65 - 74)  BP: 138/72 (28 Oct 2023 12:51) (138/72 - 176/77)  BP(mean): --  RR: 17 (28 Oct 2023 12:51) (17 - 17)  SpO2: 94% (28 Oct 2023 12:51) (94% - 95%)    Parameters below as of 28 Oct 2023 12:51  Patient On (Oxygen Delivery Method): room air      [WT/HT]  Daily     Daily   [VENT]      [PHYSICAL EXAM]  GEN: NAD  HEENT: normocephalic and atraumatic. EOMI. PERRL.    NECK: Supple.  No lymphadenopathy   LUNGS: Clear to auscultation.  HEART: Regular rate and rhythm,  no MRG  ABDOMEN: Soft, nontender, and nondistended.  Positive bowel sounds.    : No CVA tenderness  EXTREMITIES: Without edema.  NEUROLOGIC: grossly intact.  PSYCHIATRIC: Appropriate affect .  SKIN: No rash       [LABS:]                        9.9    5.15  )-----------( 111      ( 27 Oct 2023 12:33 )             26.5     10-27    143  |  110<H>  |  9   ----------------------------<  118<H>  3.5   |  31  |  0.90    Ca    8.4<L>      27 Oct 2023 07:20    Sedimentation Rate, Erythrocyte: 57 mm/hr *H* [0 - 20] (10-27-23 @ 18:30)    Urinalysis Basic - ( 27 Oct 2023 07:20 )  Color: x / Appearance: x / SG: x / pH: x  Gluc: 118 mg/dL / Ketone: x  / Bili: x / Urobili: x   Blood: x / Protein: x / Nitrite: x   Leuk Esterase: x / RBC: x / WBC x   Sq Epi: x / Non Sq Epi: x / Bacteria: x      [RADIOLOGY STUDIES:]

## 2023-10-29 LAB
BILIRUB DIRECT SERPL-MCNC: 0.3 MG/DL — SIGNIFICANT CHANGE UP (ref 0–0.3)
BILIRUB DIRECT SERPL-MCNC: 0.3 MG/DL — SIGNIFICANT CHANGE UP (ref 0–0.3)
BILIRUB INDIRECT FLD-MCNC: 0.6 MG/DL — SIGNIFICANT CHANGE UP (ref 0.2–1)
BILIRUB INDIRECT FLD-MCNC: 0.6 MG/DL — SIGNIFICANT CHANGE UP (ref 0.2–1)
BILIRUB SERPL-MCNC: 0.9 MG/DL — SIGNIFICANT CHANGE UP (ref 0.2–1.2)
BILIRUB SERPL-MCNC: 0.9 MG/DL — SIGNIFICANT CHANGE UP (ref 0.2–1.2)
FOLATE SERPL-MCNC: >20 NG/ML — SIGNIFICANT CHANGE UP
FOLATE SERPL-MCNC: >20 NG/ML — SIGNIFICANT CHANGE UP
VIT B12 SERPL-MCNC: 1109 PG/ML — SIGNIFICANT CHANGE UP (ref 232–1245)
VIT B12 SERPL-MCNC: 1109 PG/ML — SIGNIFICANT CHANGE UP (ref 232–1245)

## 2023-10-29 PROCEDURE — 70491 CT SOFT TISSUE NECK W/DYE: CPT | Mod: 26

## 2023-10-29 PROCEDURE — 71260 CT THORAX DX C+: CPT | Mod: 26

## 2023-10-29 RX ORDER — ACETAMINOPHEN WITH CODEINE 300MG-30MG
1 TABLET ORAL EVERY 6 HOURS
Refills: 0 | Status: DISCONTINUED | OUTPATIENT
Start: 2023-10-29 | End: 2023-10-30

## 2023-10-29 RX ORDER — QUETIAPINE FUMARATE 200 MG/1
25 TABLET, FILM COATED ORAL ONCE
Refills: 0 | Status: COMPLETED | OUTPATIENT
Start: 2023-10-29 | End: 2023-10-31

## 2023-10-29 RX ORDER — QUETIAPINE FUMARATE 200 MG/1
50 TABLET, FILM COATED ORAL
Refills: 0 | Status: DISCONTINUED | OUTPATIENT
Start: 2023-10-29 | End: 2023-10-31

## 2023-10-29 RX ADMIN — LOSARTAN POTASSIUM 100 MILLIGRAM(S): 100 TABLET, FILM COATED ORAL at 05:11

## 2023-10-29 RX ADMIN — ATENOLOL 50 MILLIGRAM(S): 25 TABLET ORAL at 05:11

## 2023-10-29 RX ADMIN — Medication 650 MILLIGRAM(S): at 05:11

## 2023-10-29 RX ADMIN — SODIUM CHLORIDE 75 MILLILITER(S): 9 INJECTION, SOLUTION INTRAVENOUS at 10:36

## 2023-10-29 RX ADMIN — Medication 2000 UNIT(S): at 12:46

## 2023-10-29 RX ADMIN — Medication 650 MILLIGRAM(S): at 06:10

## 2023-10-29 RX ADMIN — QUETIAPINE FUMARATE 25 MILLIGRAM(S): 200 TABLET, FILM COATED ORAL at 05:11

## 2023-10-29 RX ADMIN — Medication 5 MILLIGRAM(S): at 17:05

## 2023-10-29 RX ADMIN — HEPARIN SODIUM 5000 UNIT(S): 5000 INJECTION INTRAVENOUS; SUBCUTANEOUS at 17:05

## 2023-10-29 RX ADMIN — Medication 1 TABLET(S): at 18:05

## 2023-10-29 RX ADMIN — FLUVOXAMINE MALEATE 100 MILLIGRAM(S): 25 TABLET ORAL at 17:04

## 2023-10-29 RX ADMIN — FLUVOXAMINE MALEATE 100 MILLIGRAM(S): 25 TABLET ORAL at 05:11

## 2023-10-29 RX ADMIN — QUETIAPINE FUMARATE 50 MILLIGRAM(S): 200 TABLET, FILM COATED ORAL at 17:04

## 2023-10-29 RX ADMIN — SENNA PLUS 2 TABLET(S): 8.6 TABLET ORAL at 22:55

## 2023-10-29 RX ADMIN — HEPARIN SODIUM 5000 UNIT(S): 5000 INJECTION INTRAVENOUS; SUBCUTANEOUS at 05:12

## 2023-10-29 RX ADMIN — Medication 1 TABLET(S): at 17:05

## 2023-10-29 RX ADMIN — Medication 5 MILLIGRAM(S): at 05:11

## 2023-10-29 RX ADMIN — ATENOLOL 50 MILLIGRAM(S): 25 TABLET ORAL at 17:05

## 2023-10-29 NOTE — PROGRESS NOTE ADULT - ASSESSMENT
73 M pmh of HTN, ITP, APL syndrome, generalized anxiety disorder, OCD, newly dx aggressive Merkel Cell cancer of forehead- pt's oncologist  Dr. Eng, at [facility] on Keytruda infusions with good response per wife  was brought to ED for worsening psychosis for the past few days, pt lately with difficulty swallowing, decreased po intake    Psychosis  monitor  psych cs  MRI noted  neuro following  may need LP  increase seroquel    #Abnormal throat sensation- sharp object sensation  ?neuralgia  swallow eval appreciated  family feels pt did improve with codeine  will try ATC dosing for a few days and monitor    #Merkel cell ca  onc c/s  r/o paraneoplastic causes    #Anxiety  psych cs fu  continue valium  increase seroquel    #HTN- resume home meds    #DVT ppx- start hep sq      [facility]   [phone]

## 2023-10-29 NOTE — PROGRESS NOTE ADULT - SUBJECTIVE AND OBJECTIVE BOX
Patient is a 73y old  Male who presents with a chief complaint of sensation of dysphagia (29 Oct 2023 16:38)        INTERVAL HPI/OVERNIGHT EVENTS:   no complaints  pt seen and examined         Vital Signs Last 24 Hrs  T(C): 36.8 (29 Oct 2023 13:36), Max: 37.2 (29 Oct 2023 05:06)  T(F): 98.2 (29 Oct 2023 13:36), Max: 98.9 (29 Oct 2023 05:06)  HR: 71 (29 Oct 2023 13:36) (68 - 75)  BP: 175/79 (29 Oct 2023 16:30) (138/71 - 196/83)  BP(mean): --  RR: 17 (29 Oct 2023 13:36) (17 - 17)  SpO2: 93% (29 Oct 2023 13:36) (93% - 95%)    Parameters below as of 29 Oct 2023 13:36  Patient On (Oxygen Delivery Method): room air        acetaminophen     Tablet .. 650 milliGRAM(s) Oral every 6 hours PRN  acetaminophen  300 mG/codeine 30 mG 1 Tablet(s) Oral every 6 hours  atenolol  Tablet 50 milliGRAM(s) Oral two times a day  cholecalciferol 2000 Unit(s) Oral daily  dextrose 5% + sodium chloride 0.9%. 1000 milliLiter(s) IV Continuous <Continuous>  diazepam    Tablet 5 milliGRAM(s) Oral two times a day  fluvoxaMINE 100 milliGRAM(s) Oral two times a day  heparin   Injectable 5000 Unit(s) SubCutaneous every 12 hours  lactulose Syrup 15 Gram(s) Oral two times a day PRN  losartan 100 milliGRAM(s) Oral daily  QUEtiapine 25 milliGRAM(s) Oral once  QUEtiapine 50 milliGRAM(s) Oral two times a day  senna 2 Tablet(s) Oral at bedtime      PHYSICAL EXAM:  GENERAL: NAD   EYES: conjunctiva and sclera clear  ENMT: Moist mucous membranes  NECK: Supple, No JVD, Normal thyroid  CHEST/LUNG: non labored, cta b/l  HEART: Regular rate and rhythm; No murmurs, rubs, or gallops  ABDOMEN: Soft, Nontender, Nondistended; Bowel sounds present  EXTREMITIES:  2+ Peripheral Pulses, No clubbing, no cyanosis, no edema  LYMPH: No lymphadenopathy noted  SKIN: No rashes or lesions  NEURO: no focal deficits    Consultant(s) Notes Reviewed:  [x ] YES  [ ] NO  Care Discussed with Consultants/Other Providers [ x] YES  [ ] NO    LABS:              CAPILLARY BLOOD GLUCOSE                  RADIOLOGY & ADDITIONAL TESTS:    Imaging Personally Reviewed  Reviewed consultants input

## 2023-10-29 NOTE — PROGRESS NOTE ADULT - SUBJECTIVE AND OBJECTIVE BOX
[INTERVAL HX: ]  Patient seen and examined;  Chart reviewed and events noted;       [MEDICATIONS]  MEDICATIONS  (STANDING):  acetaminophen  300 mG/codeine 30 mG 1 Tablet(s) Oral every 6 hours  atenolol  Tablet 50 milliGRAM(s) Oral two times a day  cholecalciferol 2000 Unit(s) Oral daily  dextrose 5% + sodium chloride 0.9%. 1000 milliLiter(s) (75 mL/Hr) IV Continuous <Continuous>  diazepam    Tablet 5 milliGRAM(s) Oral two times a day  fluvoxaMINE 100 milliGRAM(s) Oral two times a day  heparin   Injectable 5000 Unit(s) SubCutaneous every 12 hours  losartan 100 milliGRAM(s) Oral daily  QUEtiapine 50 milliGRAM(s) Oral two times a day  QUEtiapine 25 milliGRAM(s) Oral once  senna 2 Tablet(s) Oral at bedtime      MEDICATIONS  (PRN):  acetaminophen     Tablet .. 650 milliGRAM(s) Oral every 6 hours PRN Temp greater or equal to 38.5C (101.3F), Moderate Pain (4 - 6)  lactulose Syrup 15 Gram(s) Oral two times a day PRN constipation      [VITALS]  Vital Signs Last 24 Hrs  T(C): 36.8 (29 Oct 2023 13:36), Max: 37.2 (29 Oct 2023 05:06)  T(F): 98.2 (29 Oct 2023 13:36), Max: 98.9 (29 Oct 2023 05:06)  HR: 71 (29 Oct 2023 13:36) (68 - 75)  BP: 175/79 (29 Oct 2023 16:30) (138/71 - 196/83)  BP(mean): --  RR: 17 (29 Oct 2023 13:36) (17 - 17)  SpO2: 93% (29 Oct 2023 13:36) (93% - 95%)    Parameters below as of 29 Oct 2023 13:36  Patient On (Oxygen Delivery Method): room air      [WT/HT]  Daily     Daily   [VENT]      [PHYSICAL EXAM]  GEN: NAD  HEENT: normocephalic and atraumatic. EOMI. PERRL.    NECK: Supple.  No lymphadenopathy   LUNGS: Clear to auscultation.  HEART: Regular rate and rhythm,  no MRG  ABDOMEN: Soft, nontender, and nondistended.  Positive bowel sounds.    : No CVA tenderness  EXTREMITIES: Without edema.  NEUROLOGIC: grossly intact.  PSYCHIATRIC: Appropriate affect .  SKIN: No rash       [LABS:]    Sedimentation Rate, Erythrocyte: 57 mm/hr *H* [0 - 20] (10-27-23 @ 18:30)      [RADIOLOGY STUDIES:]     [INTERVAL HX: ]  Patient seen and examined;  Chart reviewed and events noted;     s/p valium.     reviewed labs.     ? sx slight better with pain meds, per family.     [MEDICATIONS]  MEDICATIONS  (STANDING):  acetaminophen  300 mG/codeine 30 mG 1 Tablet(s) Oral every 6 hours  atenolol  Tablet 50 milliGRAM(s) Oral two times a day  cholecalciferol 2000 Unit(s) Oral daily  dextrose 5% + sodium chloride 0.9%. 1000 milliLiter(s) (75 mL/Hr) IV Continuous <Continuous>  diazepam    Tablet 5 milliGRAM(s) Oral two times a day  fluvoxaMINE 100 milliGRAM(s) Oral two times a day  heparin   Injectable 5000 Unit(s) SubCutaneous every 12 hours  losartan 100 milliGRAM(s) Oral daily  QUEtiapine 50 milliGRAM(s) Oral two times a day  QUEtiapine 25 milliGRAM(s) Oral once  senna 2 Tablet(s) Oral at bedtime      MEDICATIONS  (PRN):  acetaminophen     Tablet .. 650 milliGRAM(s) Oral every 6 hours PRN Temp greater or equal to 38.5C (101.3F), Moderate Pain (4 - 6)  lactulose Syrup 15 Gram(s) Oral two times a day PRN constipation      [VITALS]  Vital Signs Last 24 Hrs  T(C): 36.8 (29 Oct 2023 13:36), Max: 37.2 (29 Oct 2023 05:06)  T(F): 98.2 (29 Oct 2023 13:36), Max: 98.9 (29 Oct 2023 05:06)  HR: 71 (29 Oct 2023 13:36) (68 - 75)  BP: 175/79 (29 Oct 2023 16:30) (138/71 - 196/83)  BP(mean): --  RR: 17 (29 Oct 2023 13:36) (17 - 17)  SpO2: 93% (29 Oct 2023 13:36) (93% - 95%)    Parameters below as of 29 Oct 2023 13:36  Patient On (Oxygen Delivery Method): room air      [WT/HT]  Daily     Daily   [VENT]      [PHYSICAL EXAM]  GEN: NAD  HEENT: normocephalic and atraumatic. EOMI. PERRL.    NECK: Supple.  No lymphadenopathy   LUNGS: Clear to auscultation.  HEART: Regular rate and rhythm,  no MRG  ABDOMEN: Soft, nontender, and nondistended.  Positive bowel sounds.    : No CVA tenderness  EXTREMITIES: Without edema.  NEUROLOGIC: grossly intact.  PSYCHIATRIC: Appropriate affect .  SKIN: No rash       [LABS:]    Sedimentation Rate, Erythrocyte: 57 mm/hr *H* [0 - 20] (10-27-23 @ 18:30)      [RADIOLOGY STUDIES:]

## 2023-10-29 NOTE — PROGRESS NOTE ADULT - ASSESSMENT
IMPRESSION    pt w ITP, Lupus Anticoagulant, HTN, anxiety on Seraquel, OCD, Merkel cell ca of right Forehead, under care Dr Elliot Eng, post 4 doses q3wks IV Keytruda, last dose 10/17/23.  Pt brought in w c/o feeling "claws, daggers" inside him, since C#3 Keytruda, with sensation initially started w various parts of face now also progressed down to sides of body to rectum(does not see actual daggers or claws,)    Wife reports after first cycle started w jaw tightness and progressive worsened w each cycle, to point of hard to swallow/can't swallow  Had tired taken off Seroquel to see if adverse effect but no improvement, now back on x 5-6days  Merkel lesion has essentially resolved    CT head no acute findings  see by Psych in ER, ?psychosis  -sensation described by pt ?psychosis vs neuropathy  -psychosis not noted to be associated w Keytruda. Neuro tox of Keytruda described includes cerebral hemorrhage, confusion, myasthenia gravis, reversible posterior leukoencephalopathy syndrome, syed neuropathy, Guillain [place], aseptic meningitis, encephalitis, transverse myelitis    TFTS unremarkable.   AM cortisol 5.8. Slight low. Unclear significance       RECOMMEND  cause of sx remains unclear.   on Seroquel    s/p neurology eval.   possible LP is sx not better  However pt refusing today; discussed at length.   Family at bedside    await additional neuro / psych evaluation    Discussed with Dr. Magdaleno.  Sx seem less likely secondary to Keytruda appears more psychiatric sx  await course.     Consideration for steroids if autoimmune process, but no clear cause.   Plts adequate at 111, no indication for steroids for plts.     Sjorgen /Scleroderma studies abn.      +MICHELLE and +[id number]     would recommend outpt rheum eval.      Discussed with pt and family.     Plan to attempt trial of pain meds, eg Codeine 30mg PRN for sx.   IVF for now.     Check urrine and plasma PBG   Check Bilirubins, B12, Folate  Check CT scan chest    Ad-Hoc family meeting had with pt wife and son at length, Sat.     Further recommendations per pt course

## 2023-10-30 LAB
ALDOLASE SERPL-CCNC: 4.8 U/L — SIGNIFICANT CHANGE UP (ref 3.3–10.3)
ALDOLASE SERPL-CCNC: 4.8 U/L — SIGNIFICANT CHANGE UP (ref 3.3–10.3)
ANION GAP SERPL CALC-SCNC: 3 MMOL/L — LOW (ref 5–17)
ANION GAP SERPL CALC-SCNC: 3 MMOL/L — LOW (ref 5–17)
BUN SERPL-MCNC: 8 MG/DL — SIGNIFICANT CHANGE UP (ref 7–23)
BUN SERPL-MCNC: 8 MG/DL — SIGNIFICANT CHANGE UP (ref 7–23)
CALCIUM SERPL-MCNC: 8.2 MG/DL — LOW (ref 8.5–10.1)
CALCIUM SERPL-MCNC: 8.2 MG/DL — LOW (ref 8.5–10.1)
CHLORIDE SERPL-SCNC: 111 MMOL/L — HIGH (ref 96–108)
CHLORIDE SERPL-SCNC: 111 MMOL/L — HIGH (ref 96–108)
CO2 SERPL-SCNC: 31 MMOL/L — SIGNIFICANT CHANGE UP (ref 22–31)
CO2 SERPL-SCNC: 31 MMOL/L — SIGNIFICANT CHANGE UP (ref 22–31)
CREAT SERPL-MCNC: 0.9 MG/DL — SIGNIFICANT CHANGE UP (ref 0.5–1.3)
CREAT SERPL-MCNC: 0.9 MG/DL — SIGNIFICANT CHANGE UP (ref 0.5–1.3)
EGFR: 89 ML/MIN/1.73M2 — SIGNIFICANT CHANGE UP
EGFR: 89 ML/MIN/1.73M2 — SIGNIFICANT CHANGE UP
GLUCOSE SERPL-MCNC: 107 MG/DL — HIGH (ref 70–99)
GLUCOSE SERPL-MCNC: 107 MG/DL — HIGH (ref 70–99)
HCT VFR BLD CALC: 28.1 % — LOW (ref 39–50)
HCT VFR BLD CALC: 28.1 % — LOW (ref 39–50)
HGB BLD-MCNC: 10.2 G/DL — LOW (ref 13–17)
HGB BLD-MCNC: 10.2 G/DL — LOW (ref 13–17)
MCHC RBC-ENTMCNC: 31 PG — SIGNIFICANT CHANGE UP (ref 27–34)
MCHC RBC-ENTMCNC: 31 PG — SIGNIFICANT CHANGE UP (ref 27–34)
MCHC RBC-ENTMCNC: 36.3 GM/DL — HIGH (ref 32–36)
MCHC RBC-ENTMCNC: 36.3 GM/DL — HIGH (ref 32–36)
MCV RBC AUTO: 85.4 FL — SIGNIFICANT CHANGE UP (ref 80–100)
MCV RBC AUTO: 85.4 FL — SIGNIFICANT CHANGE UP (ref 80–100)
NRBC # BLD: 0 /100 WBCS — SIGNIFICANT CHANGE UP (ref 0–0)
NRBC # BLD: 0 /100 WBCS — SIGNIFICANT CHANGE UP (ref 0–0)
PLATELET # BLD AUTO: 87 K/UL — LOW (ref 150–400)
PLATELET # BLD AUTO: 87 K/UL — LOW (ref 150–400)
POTASSIUM SERPL-MCNC: 3.6 MMOL/L — SIGNIFICANT CHANGE UP (ref 3.5–5.3)
POTASSIUM SERPL-MCNC: 3.6 MMOL/L — SIGNIFICANT CHANGE UP (ref 3.5–5.3)
POTASSIUM SERPL-SCNC: 3.6 MMOL/L — SIGNIFICANT CHANGE UP (ref 3.5–5.3)
POTASSIUM SERPL-SCNC: 3.6 MMOL/L — SIGNIFICANT CHANGE UP (ref 3.5–5.3)
RBC # BLD: 3.29 M/UL — LOW (ref 4.2–5.8)
RBC # BLD: 3.29 M/UL — LOW (ref 4.2–5.8)
RBC # FLD: 12.7 % — SIGNIFICANT CHANGE UP (ref 10.3–14.5)
RBC # FLD: 12.7 % — SIGNIFICANT CHANGE UP (ref 10.3–14.5)
SODIUM SERPL-SCNC: 145 MMOL/L — SIGNIFICANT CHANGE UP (ref 135–145)
SODIUM SERPL-SCNC: 145 MMOL/L — SIGNIFICANT CHANGE UP (ref 135–145)
WBC # BLD: 4.31 K/UL — SIGNIFICANT CHANGE UP (ref 3.8–10.5)
WBC # BLD: 4.31 K/UL — SIGNIFICANT CHANGE UP (ref 3.8–10.5)
WBC # FLD AUTO: 4.31 K/UL — SIGNIFICANT CHANGE UP (ref 3.8–10.5)
WBC # FLD AUTO: 4.31 K/UL — SIGNIFICANT CHANGE UP (ref 3.8–10.5)

## 2023-10-30 RX ORDER — NIFEDIPINE 30 MG
30 TABLET, EXTENDED RELEASE 24 HR ORAL ONCE
Refills: 0 | Status: COMPLETED | OUTPATIENT
Start: 2023-10-30 | End: 2023-10-30

## 2023-10-30 RX ORDER — ACETAMINOPHEN WITH CODEINE 300MG-30MG
1 TABLET ORAL EVERY 6 HOURS
Refills: 0 | Status: DISCONTINUED | OUTPATIENT
Start: 2023-10-30 | End: 2023-11-06

## 2023-10-30 RX ADMIN — ATENOLOL 50 MILLIGRAM(S): 25 TABLET ORAL at 06:00

## 2023-10-30 RX ADMIN — Medication 2000 UNIT(S): at 15:00

## 2023-10-30 RX ADMIN — SODIUM CHLORIDE 75 MILLILITER(S): 9 INJECTION, SOLUTION INTRAVENOUS at 00:16

## 2023-10-30 RX ADMIN — Medication 1 TABLET(S): at 06:51

## 2023-10-30 RX ADMIN — FLUVOXAMINE MALEATE 100 MILLIGRAM(S): 25 TABLET ORAL at 06:00

## 2023-10-30 RX ADMIN — HEPARIN SODIUM 5000 UNIT(S): 5000 INJECTION INTRAVENOUS; SUBCUTANEOUS at 06:00

## 2023-10-30 RX ADMIN — QUETIAPINE FUMARATE 50 MILLIGRAM(S): 200 TABLET, FILM COATED ORAL at 17:46

## 2023-10-30 RX ADMIN — QUETIAPINE FUMARATE 50 MILLIGRAM(S): 200 TABLET, FILM COATED ORAL at 06:00

## 2023-10-30 RX ADMIN — Medication 5 MILLIGRAM(S): at 06:00

## 2023-10-30 RX ADMIN — LOSARTAN POTASSIUM 100 MILLIGRAM(S): 100 TABLET, FILM COATED ORAL at 06:00

## 2023-10-30 RX ADMIN — Medication 5 MILLIGRAM(S): at 17:48

## 2023-10-30 RX ADMIN — HEPARIN SODIUM 5000 UNIT(S): 5000 INJECTION INTRAVENOUS; SUBCUTANEOUS at 17:46

## 2023-10-30 RX ADMIN — Medication 1 TABLET(S): at 05:59

## 2023-10-30 RX ADMIN — Medication 1 TABLET(S): at 00:11

## 2023-10-30 RX ADMIN — Medication 1 TABLET(S): at 01:10

## 2023-10-30 RX ADMIN — FLUVOXAMINE MALEATE 100 MILLIGRAM(S): 25 TABLET ORAL at 17:46

## 2023-10-30 RX ADMIN — ATENOLOL 50 MILLIGRAM(S): 25 TABLET ORAL at 17:46

## 2023-10-30 RX ADMIN — Medication 30 MILLIGRAM(S): at 20:19

## 2023-10-30 NOTE — PROGRESS NOTE ADULT - SUBJECTIVE AND OBJECTIVE BOX
Neurology Follow up note    JANICE [id number]    HPI:  hx obtained from wife  73 M pmh of HTN, ITP, APL syndrome, generalized anxiety disorder, OCD, newly dx aggressive Merkel Cell cancer of forehead- pt's oncologist  Dr. Eng, at [facility] on Keytruda infusions with good response per wife  was brought o ED for worsening psychosis for the past few days, pt believes there are sharp things inside his body trying to come out of his mouth, stuck in throat  pt lately with difficulty swallowing, decreased po intake  pt was evaluated by psych in ED (24 Oct 2023 13:19)      Interval History -hallucination improving    Patient is seen, chart was reviewed and case was discussed with the treatment team.  Pt is not in any distress.   Lying on bed comfortably.   .  Vital Signs Last 24 Hrs  T(C): 36.6 (30 Oct 2023 12:21), Max: 36.7 (30 Oct 2023 05:25)  T(F): 97.8 (30 Oct 2023 12:21), Max: 98.1 (30 Oct 2023 05:25)  HR: 78 (30 Oct 2023 12:21) (60 - 78)  BP: 172/78 (30 Oct 2023 12:21) (154/80 - 194/75)  BP(mean): --  RR: 17 (30 Oct 2023 12:21) (17 - 17)  SpO2: 95% (30 Oct 2023 12:21) (93% - 95%)    Parameters below as of 30 Oct 2023 12:21  Patient On (Oxygen Delivery Method): room air                    REVIEW OF SYSTEMS:    Constitutional: No fever,  Eyes: No eye pain, visual disturbances, or discharge  ENT:  No difficulty hearing, tinnitus, vertigo; No sinus or throat pain  Neck: No pain or stiffness  Respiratory: No cough, wheezing, chills or hemoptysis  Cardiovascular: No chest pain, palpitations, shortness of breath, dizziness or leg swelling  Gastrointestinal: No abdominal or epigastric pain. No nausea, vomiting or hematemesis;   Genitourinary: No dysuria, frequency,   Neurological: No headaches, memory loss, loss of strength,  Psychiatric: No depression, anxiety, mood swings or difficulty sleeping  Musculoskeletal: No joint pain or swelling;   Skin: No itching, burning, rashes or lesions   Lymph Nodes: No enlarged glands  Endocrine: No heat or cold intolerance; No hair loss,  Allergy and Immunologic: No hives or eczema    On Neurological Examination:    Mental Status - Pt is alert, awake, oriented X2   Follows commands well and able to answer questions appropriately.Mood and affect  normal    Speech -  Pt has dysarthria.    Cranial Nerves - Pupils 3 mm equal and reactive to light, extraocular eye movements intact. Pt has no visual field deficit.  Pt has no  facial asymmetry. Facial sensation is intact.Tongue - is in midline.    Muscle tone - is normal      Motor Exam - 4/5 all over, No drift. No shaking or tremors.    Sensory Exam - . Pt withdraws all extremities equally on stimulation. No asymmetry seen. No complaints of tingling, numbness.           coordination:    Finger to nose: normal      Deep tendon Reflexes - 2 plus all over.          Neck Supple -  Yes.    MEDICATIONS  (STANDING):  atenolol  Tablet 50 milliGRAM(s) Oral two times a day  cholecalciferol 2000 Unit(s) Oral daily  dextrose 5% + sodium chloride 0.9%. 1000 milliLiter(s) (75 mL/Hr) IV Continuous <Continuous>  diazepam    Tablet 5 milliGRAM(s) Oral two times a day  fluvoxaMINE 100 milliGRAM(s) Oral two times a day  heparin   Injectable 5000 Unit(s) SubCutaneous every 12 hours  losartan 100 milliGRAM(s) Oral daily  QUEtiapine 50 milliGRAM(s) Oral two times a day  QUEtiapine 25 milliGRAM(s) Oral once  senna 2 Tablet(s) Oral at bedtime    MEDICATIONS  (PRN):  acetaminophen     Tablet .. 650 milliGRAM(s) Oral every 6 hours PRN Temp greater or equal to 38.5C (101.3F), Moderate Pain (4 - 6)  acetaminophen  300 mG/codeine 30 mG 1 Tablet(s) Oral every 6 hours PRN Severe Pain (7 - 10)  lactulose Syrup 15 Gram(s) Oral two times a day PRN constipation      Allergies    No Known Allergies    Intolerances                          10.2   4.31  )-----------( 87       ( 30 Oct 2023 07:55 )             28.1       Hemoglobin A1C:     Vitamin B12     RADIOLOGY    ASSESSMENT AND PLAN:      seen for ams  doubt paraneoplastic   brain mri- no acute cva/mets      Agree with increasing  seroquel for psychosis  patient and wife LP  Physical therapy evaluation.  Pain is accessed and addressed.  Plan of care was discussed with family. Questions answered.  Would continue to follow.

## 2023-10-30 NOTE — PROGRESS NOTE ADULT - SUBJECTIVE AND OBJECTIVE BOX
[INTERVAL HX: ]  Patient seen and examined;  Chart reviewed and events noted;     wife at bedside,.   Feels pt better  has not complained of sharp sensation as much with IVF, seroquel and pain meds.       [MEDICATIONS]  MEDICATIONS  (STANDING):  atenolol  Tablet 50 milliGRAM(s) Oral two times a day  cholecalciferol 2000 Unit(s) Oral daily  dextrose 5% + sodium chloride 0.9%. 1000 milliLiter(s) (75 mL/Hr) IV Continuous <Continuous>  diazepam    Tablet 5 milliGRAM(s) Oral two times a day  fluvoxaMINE 100 milliGRAM(s) Oral two times a day  heparin   Injectable 5000 Unit(s) SubCutaneous every 12 hours  losartan 100 milliGRAM(s) Oral daily  QUEtiapine 25 milliGRAM(s) Oral once  QUEtiapine 50 milliGRAM(s) Oral two times a day  senna 2 Tablet(s) Oral at bedtime    MEDICATIONS  (PRN):  acetaminophen     Tablet .. 650 milliGRAM(s) Oral every 6 hours PRN Temp greater or equal to 38.5C (101.3F), Moderate Pain (4 - 6)  acetaminophen  300 mG/codeine 30 mG 1 Tablet(s) Oral every 6 hours PRN Severe Pain (7 - 10)  lactulose Syrup 15 Gram(s) Oral two times a day PRN constipation      [VITALS]  Vital Signs Last 24 Hrs  T(C): 36.6 (30 Oct 2023 12:21), Max: 36.7 (30 Oct 2023 05:25)  T(F): 97.8 (30 Oct 2023 12:21), Max: 98.1 (30 Oct 2023 05:25)  HR: 78 (30 Oct 2023 12:21) (60 - 78)  BP: 172/78 (30 Oct 2023 12:21) (154/80 - 194/75)  BP(mean): --  RR: 17 (30 Oct 2023 12:21) (17 - 17)  SpO2: 95% (30 Oct 2023 12:21) (93% - 95%)    Parameters below as of 30 Oct 2023 12:21  Patient On (Oxygen Delivery Method): room air      [WT/HT]  Daily     Daily   [VENT]      [PHYSICAL EXAM]  GEN: NAD  HEENT: normocephalic and atraumatic. EOMI. PERRL.    NECK: Supple.  No lymphadenopathy   LUNGS: Clear to auscultation.  HEART: Regular rate and rhythm,  no MRG  ABDOMEN: Soft, nontender, and nondistended.  Positive bowel sounds.    : No CVA tenderness  EXTREMITIES: Without edema.  NEUROLOGIC: grossly intact.  PSYCHIATRIC: Appropriate affect .  SKIN: No rash     [LABS:]                        10.2   4.31  )-----------( 87       ( 30 Oct 2023 07:55 )             28.1     10-30    145  |  111<H>  |  8   ----------------------------<  107<H>  3.6   |  31  |  0.90    Ca    8.2<L>      30 Oct 2023 07:55    TPro  x   /  Alb  x   /  TBili  0.9  /  DBili  0.3  /  AST  x   /  ALT  x   /  AlkPhos  x   10-29        Vitamin B12, Serum: 1109 pg/mL [232 - 1245] (10-29-23 @ 18:30)    Folate, Serum: >20.0 ng/mL (10-29-23 @ 18:30)    Sedimentation Rate, Erythrocyte: 57 mm/hr *H* [0 - 20] (10-27-23 @ 18:30)      Urinalysis Basic - ( 30 Oct 2023 07:55 )    Color: x / Appearance: x / SG: x / pH: x  Gluc: 107 mg/dL / Ketone: x  / Bili: x / Urobili: x   Blood: x / Protein: x / Nitrite: x   Leuk Esterase: x / RBC: x / WBC x   Sq Epi: x / Non Sq Epi: x / Bacteria: x                [RADIOLOGY STUDIES:]

## 2023-10-30 NOTE — PROGRESS NOTE ADULT - SUBJECTIVE AND OBJECTIVE BOX
Patient is a 73y old  Male who presents with a chief complaint of anxiety  subjective sensation of "needles inside me" (30 Oct 2023 14:53)        INTERVAL HPI/OVERNIGHT EVENTS:   no complaints  pt seen and examined         Vital Signs Last 24 Hrs  T(C): 36.6 (30 Oct 2023 12:21), Max: 36.7 (30 Oct 2023 05:25)  T(F): 97.8 (30 Oct 2023 12:21), Max: 98.1 (30 Oct 2023 05:25)  HR: 76 (30 Oct 2023 18:41) (60 - 80)  BP: 190/76 (30 Oct 2023 18:41) (154/80 - 194/75)  BP(mean): --  RR: 17 (30 Oct 2023 12:21) (17 - 17)  SpO2: 95% (30 Oct 2023 12:21) (93% - 95%)    Parameters below as of 30 Oct 2023 12:21  Patient On (Oxygen Delivery Method): room air        acetaminophen     Tablet .. 650 milliGRAM(s) Oral every 6 hours PRN  acetaminophen  300 mG/codeine 30 mG 1 Tablet(s) Oral every 6 hours PRN  atenolol  Tablet 50 milliGRAM(s) Oral two times a day  cholecalciferol 2000 Unit(s) Oral daily  diazepam    Tablet 5 milliGRAM(s) Oral two times a day  fluvoxaMINE 100 milliGRAM(s) Oral two times a day  heparin   Injectable 5000 Unit(s) SubCutaneous every 12 hours  lactulose Syrup 15 Gram(s) Oral two times a day PRN  losartan 100 milliGRAM(s) Oral daily  NIFEdipine XL 30 milliGRAM(s) Oral once  QUEtiapine 50 milliGRAM(s) Oral two times a day  QUEtiapine 25 milliGRAM(s) Oral once  senna 2 Tablet(s) Oral at bedtime      PHYSICAL EXAM:  GENERAL: NAD   EYES: conjunctiva and sclera clear  ENMT: Moist mucous membranes  NECK: Supple, No JVD, Normal thyroid  CHEST/LUNG: non labored, cta b/l  HEART: Regular rate and rhythm; No murmurs, rubs, or gallops  ABDOMEN: Soft, Nontender, Nondistended; Bowel sounds present  EXTREMITIES:  2+ Peripheral Pulses, No clubbing, no cyanosis, no edema  LYMPH: No lymphadenopathy noted  SKIN: No rashes or lesions  NEURO: no focal deficits    Consultant(s) Notes Reviewed:  [x ] YES  [ ] NO  Care Discussed with Consultants/Other Providers [ x] YES  [ ] NO    LABS:                        10.2   4.31  )-----------( 87       ( 30 Oct 2023 07:55 )             28.1     10-30    145  |  111<H>  |  8   ----------------------------<  107<H>  3.6   |  31  |  0.90    Ca    8.2<L>      30 Oct 2023 07:55    TPro  x   /  Alb  x   /  TBili  0.9  /  DBili  0.3  /  AST  x   /  ALT  x   /  AlkPhos  x   10-29      Urinalysis Basic - ( 30 Oct 2023 07:55 )    Color: x / Appearance: x / SG: x / pH: x  Gluc: 107 mg/dL / Ketone: x  / Bili: x / Urobili: x   Blood: x / Protein: x / Nitrite: x   Leuk Esterase: x / RBC: x / WBC x   Sq Epi: x / Non Sq Epi: x / Bacteria: x      CAPILLARY BLOOD GLUCOSE            Urinalysis Basic - ( 30 Oct 2023 07:55 )    Color: x / Appearance: x / SG: x / pH: x  Gluc: 107 mg/dL / Ketone: x  / Bili: x / Urobili: x   Blood: x / Protein: x / Nitrite: x   Leuk Esterase: x / RBC: x / WBC x   Sq Epi: x / Non Sq Epi: x / Bacteria: x          RADIOLOGY & ADDITIONAL TESTS:    Imaging Personally Reviewed  Reviewed consultants input

## 2023-10-30 NOTE — PROGRESS NOTE ADULT - ASSESSMENT
IMPRESSION    pt w ITP, Lupus Anticoagulant, HTN, anxiety on Seraquel, OCD, Merkel cell ca of right Forehead, under care Dr Elliot Eng, post 4 doses q3wks IV Keytruda, last dose 10/17/23.  Pt brought in w c/o feeling "claws, daggers" inside him, since C#3 Keytruda, with sensation initially started w various parts of face now also progressed down to sides of body to rectum(does not see actual daggers or claws,)    Wife reports after first cycle started w jaw tightness and progressive worsened w each cycle, to point of hard to swallow/can't swallow  Had tired taken off Seroquel to see if adverse effect but no improvement, now back on x 5-6days  Merkel lesion has essentially resolved    CT head no acute findings  see by Psych in ER, ?psychosis  -sensation described by pt ?psychosis vs neuropathy  -psychosis not noted to be associated w Keytruda. Neuro tox of Keytruda described includes cerebral hemorrhage, confusion, myasthenia gravis, reversible posterior leukoencephalopathy syndrome, syed neuropathy, Guillain [place], aseptic meningitis, encephalitis, transverse myelitis    TFTS unremarkable.   AM cortisol 5.8. Slight low. Unclear significance       RECOMMEND  cause of sx remains unclear.   on Seroquel. dose being titrated    s/p neurology eval.   possible LP is sx not better  However pt refusing today; discussed at length.   Family at bedside; aware of prior refusal    await additional neuro / psych evaluation    Discussed with Dr. Magdaleno.  Sx seem less likely secondary to Keytruda appears more psychiatric sx  await course.     Consideration for steroids if autoimmune process, but no clear cause.   Plts adequate at 111, no indication for steroids for plts.     Sjorgen /Scleroderma studies abn.      +MICHELLE and +Ro52     would recommend outpt rheum eval.      Discussed with pt and family.     Plan to attempt trial of pain meds, eg Codeine 30mg PRN for sx.   Seems to have some benefit    Continue Codeine PRN    IVF for now.   encourage PO hydration    Follow urine and plasma PBG   Check Bilirubins, B12, Folate  Check CT scan chest    Ad-Hoc family meeting had with pt wife and son at length, Sat.     Further recommendations per pt course

## 2023-10-30 NOTE — CONSULT NOTE ADULT - ASSESSMENT
73 M pmh of HTN, ITP, APL syndrome, generalized anxiety disorder, OCD, newly dx aggressive Merkel Cell cancer of forehead- pt's oncologist  Dr. Eng, at [facility] on Keytruda (PD-1 blocker) infusions with good response per wife  was brought to ED for worsening psychosis for the past few days, pt believes there are sharp things inside his body trying to come out of his mouth, stuck in throat, pt lately with difficulty swallowing, decreased po intake, pt was evaluated by psych in ED. Wife reports sudden cognitive decline and behavioral changes immediately after COVID infection.    RECOMMENDATIONS    -sounds like there is no obvious acute infectious issue. no fever, no leukocytosis, no obvious localization but concerning history of things developing post COVID-19 infection so raises concerns for a neurological PASC picture  -will order some testing to try to clarify but for now recommend observation off antimicrobial Rx    Thank you for consulting us and involving us in the management of this most interesting and challenging case.  We will follow along in the care of this patient. Please call us at [phone] or text me directly on my cell# at [phone] with any concerns.

## 2023-10-30 NOTE — CONSULT NOTE ADULT - SUBJECTIVE AND OBJECTIVE BOX
[facility] DIVISION of INFECTIOUS DISEASE  Eric Reyes MD PhD, Milena Underwood MD, Jillian Mckoy MD, Enid Everett MD, Jay Krishna MD  and providing coverage with Arthur Pepper MD  Providing Infectious Disease Consultations at [facility], [facility], [place], [place], [facility], [facility]'s    Office# [phone] to schedule follow up appointments  Answering Service for urgent calls or New Consults [phone]  Cell# to text for urgent issues Eric Reyes [phone]     HPI:  73 M pmh of HTN, ITP, APL syndrome, generalized anxiety disorder, OCD, newly dx aggressive Merkel Cell cancer of forehead- pt's oncologist  Dr. Eng, at [facility] on Keytruda infusions with good response per wife  was brought to ED for worsening psychosis for the past few days, pt believes there are sharp things inside his body trying to come out of his mouth, stuck in throat, pt lately with difficulty swallowing, decreased po intake, pt was evaluated by psych in ED. Wife reports sudden cognitive decline and behavioral changes immediately after COVID infection.      PAST MEDICAL & SURGICAL HISTORY:  Hypertension, unspecified type  Anxiety  Hypertension  Idiopathic thrombocytopenic purpura (ITP)  History of antiphospholipid antibody syndrome  H/O Mohs micrographic surgery for skin cancer x 7  Enlarged tonsils -s/p tonsillectomy          Antimicrobials      Immunological      Other  acetaminophen     Tablet .. 650 milliGRAM(s) Oral every 6 hours PRN  acetaminophen  300 mG/codeine 30 mG 1 Tablet(s) Oral every 6 hours PRN  atenolol  Tablet 50 milliGRAM(s) Oral two times a day  cholecalciferol 2000 Unit(s) Oral daily  dextrose 5% + sodium chloride 0.9%. 1000 milliLiter(s) IV Continuous <Continuous>  diazepam    Tablet 5 milliGRAM(s) Oral two times a day  fluvoxaMINE 100 milliGRAM(s) Oral two times a day  heparin   Injectable 5000 Unit(s) SubCutaneous every 12 hours  lactulose Syrup 15 Gram(s) Oral two times a day PRN  losartan 100 milliGRAM(s) Oral daily  QUEtiapine 50 milliGRAM(s) Oral two times a day  QUEtiapine 25 milliGRAM(s) Oral once  senna 2 Tablet(s) Oral at bedtime      Allergies    No Known Allergies    Intolerances        SOCIAL HISTORY:  no toxic habits reported      FAMILY HISTORY: noncontrib      ROS:    EYES:  Negative  blurry vision or double vision  GASTROINTESTINAL:  Negative for nausea, vomiting, diarrhea  -otherwise negative except for subjective    Vital Signs Last 24 Hrs  T(C): 36.6 (30 Oct 2023 12:21), Max: 36.7 (30 Oct 2023 05:25)  T(F): 97.8 (30 Oct 2023 12:21), Max: 98.1 (30 Oct 2023 05:25)  HR: 78 (30 Oct 2023 12:21) (60 - 78)  BP: 172/78 (30 Oct 2023 12:21) (154/80 - 194/75)  BP(mean): --  RR: 17 (30 Oct 2023 12:21) (17 - 17)  SpO2: 95% (30 Oct 2023 12:21) (93% - 95%)    Parameters below as of 30 Oct 2023 12:21  Patient On (Oxygen Delivery Method): room air        PE:  In no distress  HEENT:  NC, PERRL, sclerae anicteric, conjunctivae clear, EOMI.  Sinuses nontender, no nasal exudate.  No buccal or pharyngeal lesions, erythema or exudate  Neck:  Supple, no adenopathy  Lungs:  No accessory muscle use, bilaterally clear to auscultation  Cor:  distant  Abd:  Symmetric, normoactive BS.  Soft, nontender, no masses, guarding or rebound.  Liver and spleen not enlarged  Extrem:  No cyanosis or edema  Skin:  No rashes.  Neuro: grossly intact  Musc: moving all limbs freely, no focal deficits        LABS:                        10.2   4.31  )-----------( 87       ( 30 Oct 2023 07:55 )             28.1       WBC Count: 4.31 K/uL (10-30-23 @ 07:55)  WBC Count: 5.15 K/uL (10-27-23 @ 12:33)  WBC Count: 5.71 K/uL (10-27-23 @ 07:20)  WBC Count: 5.70 K/uL (10-25-23 @ 07:22)      10-30    145  |  111<H>  |  8   ----------------------------<  107<H>  3.6   |  31  |  0.90    Ca    8.2<L>      30 Oct 2023 07:55    TPro  x   /  Alb  x   /  TBili  0.9  /  DBili  0.3  /  AST  x   /  ALT  x   /  AlkPhos  x   10-29      Creatinine: 0.90 mg/dL (10-30-23 @ 07:55)  Creatinine: 0.90 mg/dL (10-27-23 @ 07:20)  Creatinine: 1.10 mg/dL (10-25-23 @ 07:22)      Urinalysis Basic - ( 30 Oct 2023 07:55 )    Color: x / Appearance: x / SG: x / pH: x  Gluc: 107 mg/dL / Ketone: x  / Bili: x / Urobili: x   Blood: x / Protein: x / Nitrite: x   Leuk Esterase: x / RBC: x / WBC x   Sq Epi: x / Non Sq Epi: x / Bacteria: x      MICROBIOLOGY:      RADIOLOGY & ADDITIONAL STUDIES:    --< from: CT Chest w/ IV Cont (10.29.23 @ 19:05) >    ACC: [id number] EXAM:  CT CHEST IC   ORDERED BY: DEONTE VALLE     PROCEDURE DATE:  10/29/2023          INTERPRETATION:  CLINICAL INFORMATION: Sensation of foreign object,   patient feels object stuck in throat and chest    COMPARISON: Chest CT 3/14/2020    CONTRAST/COMPLICATIONS:  IV Contrast: Omnipaque 350  90 cc administered   10 cc discarded  Oral Contrast: NONE  Complications: None reported at time of study completion    PROCEDURE:  CT of the Chest was performed.  Sagittal and coronal reformats were performed.      FINDINGS:    AIRWAYS, LUNGS and PLEURA: Patent central airways. No endobronchial   lesion. Small bilateral pleural effusions and mild bibasilar   atelectasis.. No pleural effusion.    MEDIASTINUM AND WESTON: No lymphadenopathy. The esophagus is poorly   evaluated without oral contrast. However is unremarkable.    HEART AND VESSELS: Heart size is normal. No pericardial effusion.   Thoracic aorta and pulmonary artery normal in diameter. Mild coronary   calcification.    VISUALIZED UPPER ABDOMEN: Splenomegaly is unchanged. Cholecystectomy.   Bilateral renal cysts.    CHEST WALL AND BONES: No aggressive osseous lesion.    LOWER NECK: Within normal limits.    IMPRESSION:    The esophagus is poorly evaluated without oral contrast. However is   unremarkable.

## 2023-10-30 NOTE — PROGRESS NOTE ADULT - ASSESSMENT
73 M pmh of HTN, ITP, APL syndrome, generalized anxiety disorder, OCD, newly dx aggressive Merkel Cell cancer of forehead- pt's oncologist  Dr. Eng, at [facility] on Keytruda infusions with good response per wife  was brought to ED for worsening psychosis for the past few days, pt lately with difficulty swallowing, decreased po intake    Psychosis  monitor  psych fu  MRI noted  neuro following  may need LP  increase seroquel    #Abnormal throat sensation- sharp object sensation  ?neuralgia  swallow eval appreciated  family feels pt did improve with codeine  will try ATC dosing for a few days and monitor    #Merkel cell ca  onc c/s  r/o paraneoplastic causes    #Anxiety  psych cs fu  continue valium  increase seroquel    #HTN- resume home meds    #DVT ppx- start hep sq      [facility]   [phone]

## 2023-10-31 LAB
CLINICAL BIOCHEMIST REVIEW: SIGNIFICANT CHANGE UP
CLINICAL BIOCHEMIST REVIEW: SIGNIFICANT CHANGE UP
CORTIS AM PEAK SERPL-MCNC: 4.3 UG/DL — LOW (ref 6–18.4)
CORTIS AM PEAK SERPL-MCNC: 4.3 UG/DL — LOW (ref 6–18.4)
PORPHYRINS RBC-MCNC: 69 MCG/DL — SIGNIFICANT CHANGE UP
PORPHYRINS RBC-MCNC: 69 MCG/DL — SIGNIFICANT CHANGE UP

## 2023-10-31 PROCEDURE — 99221 1ST HOSP IP/OBS SF/LOW 40: CPT

## 2023-10-31 RX ORDER — QUETIAPINE FUMARATE 200 MG/1
100 TABLET, FILM COATED ORAL AT BEDTIME
Refills: 0 | Status: DISCONTINUED | OUTPATIENT
Start: 2023-10-31 | End: 2023-11-03

## 2023-10-31 RX ORDER — MIRTAZAPINE 45 MG/1
7.5 TABLET, ORALLY DISINTEGRATING ORAL AT BEDTIME
Refills: 0 | Status: DISCONTINUED | OUTPATIENT
Start: 2023-10-31 | End: 2023-11-03

## 2023-10-31 RX ORDER — DIAZEPAM 5 MG
5 TABLET ORAL
Refills: 0 | Status: DISCONTINUED | OUTPATIENT
Start: 2023-11-01 | End: 2023-11-08

## 2023-10-31 RX ADMIN — Medication 5 MILLIGRAM(S): at 19:24

## 2023-10-31 RX ADMIN — Medication 2000 UNIT(S): at 13:06

## 2023-10-31 RX ADMIN — Medication 5 MILLIGRAM(S): at 06:26

## 2023-10-31 RX ADMIN — MIRTAZAPINE 7.5 MILLIGRAM(S): 45 TABLET, ORALLY DISINTEGRATING ORAL at 21:12

## 2023-10-31 RX ADMIN — FLUVOXAMINE MALEATE 100 MILLIGRAM(S): 25 TABLET ORAL at 06:22

## 2023-10-31 RX ADMIN — LOSARTAN POTASSIUM 100 MILLIGRAM(S): 100 TABLET, FILM COATED ORAL at 06:23

## 2023-10-31 RX ADMIN — FLUVOXAMINE MALEATE 100 MILLIGRAM(S): 25 TABLET ORAL at 19:25

## 2023-10-31 RX ADMIN — ATENOLOL 50 MILLIGRAM(S): 25 TABLET ORAL at 06:22

## 2023-10-31 RX ADMIN — HEPARIN SODIUM 5000 UNIT(S): 5000 INJECTION INTRAVENOUS; SUBCUTANEOUS at 06:22

## 2023-10-31 RX ADMIN — QUETIAPINE FUMARATE 100 MILLIGRAM(S): 200 TABLET, FILM COATED ORAL at 21:13

## 2023-10-31 RX ADMIN — QUETIAPINE FUMARATE 50 MILLIGRAM(S): 200 TABLET, FILM COATED ORAL at 06:22

## 2023-10-31 RX ADMIN — HEPARIN SODIUM 5000 UNIT(S): 5000 INJECTION INTRAVENOUS; SUBCUTANEOUS at 19:24

## 2023-10-31 RX ADMIN — ATENOLOL 50 MILLIGRAM(S): 25 TABLET ORAL at 19:24

## 2023-10-31 RX ADMIN — SENNA PLUS 2 TABLET(S): 8.6 TABLET ORAL at 21:12

## 2023-10-31 RX ADMIN — QUETIAPINE FUMARATE 25 MILLIGRAM(S): 200 TABLET, FILM COATED ORAL at 13:06

## 2023-10-31 NOTE — BH CONSULTATION LIAISON ASSESSMENT NOTE - HPI (INCLUDE ILLNESS QUALITY, SEVERITY, DURATION, TIMING, CONTEXT, MODIFYING FACTORS, ASSOCIATED SIGNS AND SYMPTOMS)
Patient seen, evaluated and chart reviewed. The patient is a 73-year-old male; domiciled with wife; PPHx of anxiety, OCD, no prior admissions, no hx of SI/SA or violence; PMHx of HTN, Merkel Cell, hx of squamous cell cancer s/p Mohs surgeries; denies substance use; BIB EMS activated by wife. Pt states that he came in because he has not been eating much due to difficulty swallowing, also reports decreased appetite.  He states that his other main problem now is "severe anxiety attacks."  When asked to describe them, he says, "you're not girma believe it..." but he believes he has objects inside of him (chains, round discs, razor blades).  He states that they are moving around inside his body, up and down.  At one point pt appeared to be speaking with teeth together and when asked about it, pt states that if he opens his mouth things will fly out (beads, metal rods, long white poles).  Pt reports feeling depressed because he can't get rid of the anxiety.  He denies SI/HI/AVH/PI, noting he has a "grave fear of death."  Pt states that he was off seroquel for a period of time but went back on it 3-4 days ago.  Patient at this time appears to be in discomfort, engages in a limited way.

## 2023-10-31 NOTE — PROGRESS NOTE ADULT - SUBJECTIVE AND OBJECTIVE BOX
Neurology Follow up note    JANICE [id number]    HPI:  hx obtained from wife  73 M pmh of HTN, ITP, APL syndrome, generalized anxiety disorder, OCD, newly dx aggressive Merkel Cell cancer of forehead- pt's oncologist  Dr. Eng, at [facility] on Keytruda infusions with good response per wife  was brought o ED for worsening psychosis for the past few days, pt believes there are sharp things inside his body trying to come out of his mouth, stuck in throat  pt lately with difficulty swallowing, decreased po intake  pt was evaluated by psych in ED (24 Oct 2023 13:19)      Interval History - calmer    Patient is seen, chart was reviewed and case was discussed with the treatment team.  Pt is not in any distress.   Lying on bed comfortably.   .  Vital Signs Last 24 Hrs  T(C): 36.9 (31 Oct 2023 11:49), Max: 37.3 (31 Oct 2023 06:18)  T(F): 98.4 (31 Oct 2023 11:49), Max: 99.1 (31 Oct 2023 06:18)  HR: 66 (31 Oct 2023 11:49) (66 - 80)  BP: 128/70 (31 Oct 2023 11:49) (128/70 - 190/76)  BP(mean): --  RR: 18 (31 Oct 2023 11:49) (17 - 18)  SpO2: 94% (31 Oct 2023 11:49) (91% - 94%)    Parameters below as of 31 Oct 2023 11:49  Patient On (Oxygen Delivery Method): room air                  REVIEW OF SYSTEMS:    Constitutional: No fever,  Eyes: No eye pain, visual disturbances, or discharge  ENT:  No difficulty hearing, tinnitus, vertigo; No sinus or throat pain  Neck: No pain or stiffness  Respiratory: No cough, wheezing, chills or hemoptysis  Cardiovascular: No chest pain, palpitations, shortness of breath, dizziness or leg swelling  Gastrointestinal: No abdominal or epigastric pain. No nausea, vomiting or hematemesis;   Genitourinary: No dysuria, frequency,   Neurological: No headaches, memory loss, loss of strength,  Psychiatric: No depression, anxiety, mood swings or difficulty sleeping  Musculoskeletal: No joint pain or swelling;   Skin: No itching, burning, rashes or lesions   Lymph Nodes: No enlarged glands  Endocrine: No heat or cold intolerance; No hair loss,  Allergy and Immunologic: No hives or eczema    On Neurological Examination:    Mental Status - Pt is alert, awake, oriented X3   Follows commands well and able to answer questions appropriately.Mood and affect  normal    Speech -  Pt has dysarthria.    Cranial Nerves - Pupils 3 mm equal and reactive to light, extraocular eye movements intact. Pt has no visual field deficit.  Pt has no  facial asymmetry. Facial sensation is intact.Tongue - is in midline.    Muscle tone - is normal      Motor Exam - 4/5 all over, No drift. No shaking or tremors.    Sensory Exam - . Pt withdraws all extremities equally on stimulation. No asymmetry seen. No complaints of tingling, numbness.           coordination:    Finger to nose: normal      Deep tendon Reflexes - 2 plus all over.          Neck Supple -  Yes.    MEDICATIONS  (STANDING):  atenolol  Tablet 50 milliGRAM(s) Oral two times a day  cholecalciferol 2000 Unit(s) Oral daily  diazepam    Tablet 5 milliGRAM(s) Oral two times a day  fluvoxaMINE 100 milliGRAM(s) Oral two times a day  heparin   Injectable 5000 Unit(s) SubCutaneous every 12 hours  losartan 100 milliGRAM(s) Oral daily  mirtazapine 7.5 milliGRAM(s) Oral at bedtime  QUEtiapine 100 milliGRAM(s) Oral at bedtime  senna 2 Tablet(s) Oral at bedtime    MEDICATIONS  (PRN):  acetaminophen     Tablet .. 650 milliGRAM(s) Oral every 6 hours PRN Temp greater or equal to 38.5C (101.3F), Moderate Pain (4 - 6)  acetaminophen  300 mG/codeine 30 mG 1 Tablet(s) Oral every 6 hours PRN Severe Pain (7 - 10)  lactulose Syrup 15 Gram(s) Oral two times a day PRN constipation        Allergies    No Known Allergies    Intolerances    10-30    145  |  111<H>  |  8   ----------------------------<  107<H>  3.6   |  31  |  0.90    Ca    8.2<L>      30 Oct 2023 07:55    TPro  x   /  Alb  x   /  TBili  0.9  /  DBili  0.3  /  AST  x   /  ALT  x   /  AlkPhos  x   10-29      Hemoglobin A1C:     Vitamin B12     RADIOLOGY    ASSESSMENT AND PLAN:      seen for ams  doubt paraneoplastic   brain mri- no acute cva/mets      on  seroquel 100 mg hs for psychosis  patient and wife LP  Physical therapy evaluation.  Pain is accessed and addressed.  Plan of care was discussed with family. Questions answered.  Would continue to follow.

## 2023-10-31 NOTE — PROGRESS NOTE ADULT - ASSESSMENT
73 M pmh of HTN, ITP, APL syndrome, generalized anxiety disorder, OCD, newly dx aggressive Merkel Cell cancer of forehead- pt's oncologist  Dr. Eng, at [facility] on Keytruda infusions with good response per wife  was brought to ED for worsening psychosis for the past few days, pt lately with difficulty swallowing, decreased po intake    Psychosis  monitor  psych fu  MRI noted  neuro following  may need LP  increase seroquel    #Abnormal throat sensation- sharp object sensation  ?neuralgia  swallow eval appreciated  family feels pt did improve with codeine  will try ATC dosing for a few days and monitor    #Merkel cell ca  onc c/s  r/o paraneoplastic causes    #Anxiety  psych cs appreciated  continue valium  titrating seroquel    #HTN- resume home meds    #DVT ppx- start hep sq      [facility]   [phone]

## 2023-10-31 NOTE — BH CONSULTATION LIAISON ASSESSMENT NOTE - SUMMARY
The patient is a 73-year-old male; domiciled with wife; PPHx of anxiety, OCD, no prior admissions, no hx of SI/SA or violence; PMHx of HTN, Merkel Cell, hx of squamous cell cancer s/p Mohs surgeries; denies substance use; BIB EMS activated by wife; psychiatry consulted for evaluation.  Unclear etiology of new-onset ?delusions about things being in his body, unlikely a new primary psychotic illness presenting in someone of this age with other acute/comorbid medical conditions, would need to rule out neuro/onc issues first.

## 2023-10-31 NOTE — BH CONSULTATION LIAISON ASSESSMENT NOTE - CURRENT MEDICATION
MEDICATIONS  (STANDING):  atenolol  Tablet 50 milliGRAM(s) Oral two times a day  cholecalciferol 2000 Unit(s) Oral daily  diazepam    Tablet 5 milliGRAM(s) Oral two times a day  fluvoxaMINE 100 milliGRAM(s) Oral two times a day  heparin   Injectable 5000 Unit(s) SubCutaneous every 12 hours  losartan 100 milliGRAM(s) Oral daily  mirtazapine 7.5 milliGRAM(s) Oral at bedtime  QUEtiapine 100 milliGRAM(s) Oral at bedtime  senna 2 Tablet(s) Oral at bedtime    MEDICATIONS  (PRN):  acetaminophen     Tablet .. 650 milliGRAM(s) Oral every 6 hours PRN Temp greater or equal to 38.5C (101.3F), Moderate Pain (4 - 6)  acetaminophen  300 mG/codeine 30 mG 1 Tablet(s) Oral every 6 hours PRN Severe Pain (7 - 10)  lactulose Syrup 15 Gram(s) Oral two times a day PRN constipation

## 2023-10-31 NOTE — PROGRESS NOTE ADULT - SUBJECTIVE AND OBJECTIVE BOX
[facility] DIVISION of INFECTIOUS DISEASE  Eric Reyes MD PhD, Milena Underwood MD, Jillian Mckoy MD, Enid Everett MD, Jay Krishna MD  and providing coverage with Arthur Pepper MD  Providing Infectious Disease Consultations at [facility], [facility], [facility]'s    Office# [phone] to schedule follow up appointments  Answering Service for urgent calls or New Consults [phone]  Cell# to text for urgent issues Eric Reyes [phone]     infectious diseases progress note:    JANICE RENDON is a 73y y. o. Male patient    Overnight and events of the last 24hrs reviewed    Allergies    No Known Allergies    Intolerances        ANTIBIOTICS/RELEVANT:  antimicrobials    immunologic:    OTHER:  acetaminophen     Tablet .. 650 milliGRAM(s) Oral every 6 hours PRN  acetaminophen  300 mG/codeine 30 mG 1 Tablet(s) Oral every 6 hours PRN  atenolol  Tablet 50 milliGRAM(s) Oral two times a day  cholecalciferol 2000 Unit(s) Oral daily  diazepam    Tablet 5 milliGRAM(s) Oral two times a day  fluvoxaMINE 100 milliGRAM(s) Oral two times a day  heparin   Injectable 5000 Unit(s) SubCutaneous every 12 hours  lactulose Syrup 15 Gram(s) Oral two times a day PRN  losartan 100 milliGRAM(s) Oral daily  QUEtiapine 25 milliGRAM(s) Oral once  QUEtiapine 50 milliGRAM(s) Oral two times a day  senna 2 Tablet(s) Oral at bedtime      Objective:  Vital Signs Last 24 Hrs  T(C): 37.3 (31 Oct 2023 06:18), Max: 37.3 (31 Oct 2023 06:18)  T(F): 99.1 (31 Oct 2023 06:18), Max: 99.1 (31 Oct 2023 06:18)  HR: 78 (31 Oct 2023 06:18) (73 - 80)  BP: 164/72 (31 Oct 2023 06:18) (164/72 - 190/76)  BP(mean): --  RR: 18 (31 Oct 2023 06:18) (17 - 18)  SpO2: 94% (31 Oct 2023 06:18) (91% - 95%)    Parameters below as of 31 Oct 2023 06:18  Patient On (Oxygen Delivery Method): room air        T(C): 37.3 (10-31-23 @ 06:18), Max: 37.3 (10-31-23 @ 06:18)  T(C): 37.3 (10-31-23 @ 06:18), Max: 37.3 (10-31-23 @ 06:18)  T(C): 37.3 (10-31-23 @ 06:18), Max: 37.3 (10-31-23 @ 06:18)    PHYSICAL EXAM:  HEENT: NC atraumatic  Neck: supple  Respiratory: no accessory muscle use, breathing comfortably  Cardiovascular: distant  Gastrointestinal: normal appearing, nondistended  Extremities: no clubbing, no cyanosis,  Neuro: pt more confused this am      LABS:                          10.2   4.31  )-----------( 87       ( 30 Oct 2023 07:55 )             28.1       WBC  4.31 10-30 @ 07:55  5.15 10-27 @ 12:33  5.71 10-27 @ 07:20  5.70 10-25 @ 07:22      10-30    145  |  111<H>  |  8   ----------------------------<  107<H>  3.6   |  31  |  0.90    Ca    8.2<L>      30 Oct 2023 07:55    TPro  x   /  Alb  x   /  TBili  0.9  /  DBili  0.3  /  AST  x   /  ALT  x   /  AlkPhos  x   10-29      Creatinine: 0.90 mg/dL (10-30-23 @ 07:55)  Creatinine: 0.90 mg/dL (10-27-23 @ 07:20)  Creatinine: 1.10 mg/dL (10-25-23 @ 07:22)        Urinalysis Basic - ( 30 Oct 2023 07:55 )    Color: x / Appearance: x / SG: x / pH: x  Gluc: 107 mg/dL / Ketone: x  / Bili: x / Urobili: x   Blood: x / Protein: x / Nitrite: x   Leuk Esterase: x / RBC: x / WBC x   Sq Epi: x / Non Sq Epi: x / Bacteria: x            INFLAMMATORY MARKERS      MICROBIOLOGY:              RADIOLOGY & ADDITIONAL STUDIES:

## 2023-10-31 NOTE — BH CONSULTATION LIAISON ASSESSMENT NOTE - NSBHCHARTREVIEWVS_PSY_A_CORE FT
Vital Signs Last 24 Hrs  T(C): 36.9 (31 Oct 2023 11:49), Max: 37.3 (31 Oct 2023 06:18)  T(F): 98.4 (31 Oct 2023 11:49), Max: 99.1 (31 Oct 2023 06:18)  HR: 66 (31 Oct 2023 11:49) (66 - 80)  BP: 128/70 (31 Oct 2023 11:49) (128/70 - 190/76)  BP(mean): --  RR: 18 (31 Oct 2023 11:49) (17 - 18)  SpO2: 94% (31 Oct 2023 11:49) (91% - 94%)    Parameters below as of 31 Oct 2023 11:49  Patient On (Oxygen Delivery Method): room air

## 2023-10-31 NOTE — PROGRESS NOTE ADULT - ASSESSMENT
73 M pmh of HTN, ITP, APL syndrome, generalized anxiety disorder, OCD, newly dx aggressive Merkel Cell cancer of forehead- pt's oncologist  Dr. Eng, at [facility] on Keytruda (PD-1 blocker) infusions with good response per wife  was brought to ED for worsening psychosis for the past few days, pt believes there are sharp things inside his body trying to come out of his mouth, stuck in throat, pt lately with difficulty swallowing, decreased po intake, pt was evaluated by psych in ED. Wife reports sudden cognitive decline and behavioral changes immediately after COVID infection.    RECOMMENDATIONS    -sounds like there is no obvious acute infectious issue. no fever, no leukocytosis, no obvious localization but concerning history of things developing post COVID-19 infection so raises concerns for a neurological PASC picture  -ordered some testing to try to clarify but for now recommend observation off antimicrobial Rx    Thank you for consulting us and involving us in the management of this most interesting and challenging case.  We will follow along in the care of this patient. Please call us at [phone] or text me directly on my cell# at [phone] with any concerns.

## 2023-10-31 NOTE — PROGRESS NOTE ADULT - SUBJECTIVE AND OBJECTIVE BOX
Patient is a 73y old  Male who presents with a chief complaint of anxiety  subjective sensation of "needles inside me" (30 Oct 2023 14:53)        INTERVAL HPI/OVERNIGHT EVENTS:   no complaints  pt seen and examined         Vital Signs Last 24 Hrs  T(C): 36.9 (31 Oct 2023 11:49), Max: 37.3 (31 Oct 2023 06:18)  T(F): 98.4 (31 Oct 2023 11:49), Max: 99.1 (31 Oct 2023 06:18)  HR: 66 (31 Oct 2023 11:49) (66 - 80)  BP: 128/70 (31 Oct 2023 11:49) (128/70 - 190/76)  BP(mean): --  RR: 18 (31 Oct 2023 11:49) (17 - 18)  SpO2: 94% (31 Oct 2023 11:49) (91% - 94%)    Parameters below as of 31 Oct 2023 11:49  Patient On (Oxygen Delivery Method): room air        acetaminophen     Tablet .. 650 milliGRAM(s) Oral every 6 hours PRN  acetaminophen  300 mG/codeine 30 mG 1 Tablet(s) Oral every 6 hours PRN  atenolol  Tablet 50 milliGRAM(s) Oral two times a day  cholecalciferol 2000 Unit(s) Oral daily  diazepam    Tablet 5 milliGRAM(s) Oral two times a day  fluvoxaMINE 100 milliGRAM(s) Oral two times a day  heparin   Injectable 5000 Unit(s) SubCutaneous every 12 hours  lactulose Syrup 15 Gram(s) Oral two times a day PRN  losartan 100 milliGRAM(s) Oral daily  mirtazapine 7.5 milliGRAM(s) Oral at bedtime  QUEtiapine 100 milliGRAM(s) Oral at bedtime  senna 2 Tablet(s) Oral at bedtime      PHYSICAL EXAM:  GENERAL: NAD   EYES: conjunctiva and sclera clear  ENMT: Moist mucous membranes  NECK: Supple, No JVD, Normal thyroid  CHEST/LUNG: non labored, cta b/l  HEART: Regular rate and rhythm; No murmurs, rubs, or gallops  ABDOMEN: Soft, Nontender, Nondistended; Bowel sounds present  EXTREMITIES:  2+ Peripheral Pulses, No clubbing, no cyanosis, no edema  LYMPH: No lymphadenopathy noted  SKIN: No rashes or lesions  NEURO: no focal deficits    Consultant(s) Notes Reviewed:  [x ] YES  [ ] NO  Care Discussed with Consultants/Other Providers [ x] YES  [ ] NO    LABS:                        10.2   4.31  )-----------( 87       ( 30 Oct 2023 07:55 )             28.1     10-30    145  |  111<H>  |  8   ----------------------------<  107<H>  3.6   |  31  |  0.90    Ca    8.2<L>      30 Oct 2023 07:55    TPro  x   /  Alb  x   /  TBili  0.9  /  DBili  0.3  /  AST  x   /  ALT  x   /  AlkPhos  x   10-29      Urinalysis Basic - ( 30 Oct 2023 07:55 )    Color: x / Appearance: x / SG: x / pH: x  Gluc: 107 mg/dL / Ketone: x  / Bili: x / Urobili: x   Blood: x / Protein: x / Nitrite: x   Leuk Esterase: x / RBC: x / WBC x   Sq Epi: x / Non Sq Epi: x / Bacteria: x      CAPILLARY BLOOD GLUCOSE            Urinalysis Basic - ( 30 Oct 2023 07:55 )    Color: x / Appearance: x / SG: x / pH: x  Gluc: 107 mg/dL / Ketone: x  / Bili: x / Urobili: x   Blood: x / Protein: x / Nitrite: x   Leuk Esterase: x / RBC: x / WBC x   Sq Epi: x / Non Sq Epi: x / Bacteria: x          RADIOLOGY & ADDITIONAL TESTS:    Imaging Personally Reviewed  Reviewed consultants input

## 2023-10-31 NOTE — PROGRESS NOTE ADULT - SUBJECTIVE AND OBJECTIVE BOX
All interim records and events noted.    reports when took medication this AM, felt pills stuck lower esphagus and threw up followed by sig pain entire back   no SOB    MEDICATIONS  (STANDING):  atenolol  Tablet 50 milliGRAM(s) Oral two times a day  cholecalciferol 2000 Unit(s) Oral daily  diazepam    Tablet 5 milliGRAM(s) Oral two times a day  fluvoxaMINE 100 milliGRAM(s) Oral two times a day  heparin   Injectable 5000 Unit(s) SubCutaneous every 12 hours  losartan 100 milliGRAM(s) Oral daily  QUEtiapine 50 milliGRAM(s) Oral two times a day  QUEtiapine 25 milliGRAM(s) Oral once  senna 2 Tablet(s) Oral at bedtime    MEDICATIONS  (PRN):  acetaminophen     Tablet .. 650 milliGRAM(s) Oral every 6 hours PRN Temp greater or equal to 38.5C (101.3F), Moderate Pain (4 - 6)  acetaminophen  300 mG/codeine 30 mG 1 Tablet(s) Oral every 6 hours PRN Severe Pain (7 - 10)  lactulose Syrup 15 Gram(s) Oral two times a day PRN constipation      Vital Signs Last 24 Hrs  T(C): 37.3 (31 Oct 2023 06:18), Max: 37.3 (31 Oct 2023 06:18)  T(F): 99.1 (31 Oct 2023 06:18), Max: 99.1 (31 Oct 2023 06:18)  HR: 78 (31 Oct 2023 06:18) (73 - 80)  BP: 164/72 (31 Oct 2023 06:18) (164/72 - 190/76)  BP(mean): --  RR: 18 (31 Oct 2023 06:18) (17 - 18)  SpO2: 94% (31 Oct 2023 06:18) (91% - 95%)    Parameters below as of 31 Oct 2023 06:18  Patient On (Oxygen Delivery Method): room air        PHYSICAL EXAM  General: frail man in bed in no acute distress  Head: atraumatic, normocephalic  ENT: sclera anicteric, buccal mucosa moist  Neck: supple, trachea midline  CV: S1 S2, regular rate and rhythm  Lungs: clear to auscultation, no wheezes/rhonchi  Abdomen: soft, nontender, bowel sounds present, no palpable masses  Extrem: no clubbing/cyanosis/edema  Skin: no significant increased ecchymosis/petechiae  Neuro: alert and oriented X3,  no focal deficits      LABS:             10.2   4.31  )-----------( 87       ( 10-30 @ 07:55 )             28.1       10-30    145  |  111<H>  |  8   ----------------------------<  107<H>  3.6   |  31  |  0.90    Ca    8.2<L>      30 Oct 2023 07:55    TPro  x   /  Alb  x   /  TBili  0.9  /  DBili  0.3  /  AST  x   /  ALT  x   /  AlkPhos  x   10-29        RADIOLOGY & ADDITIONAL STUDIES:    IMPRESSION/RECOMMENDATIONS:

## 2023-10-31 NOTE — PROGRESS NOTE ADULT - ASSESSMENT
IMPRESSION    pt w ITP, Lupus Anticoagulant, HTN, anxiety on Seraquel, OCD, Merkel cell ca of right Forehead, under care Dr Elliot Eng, post 4 doses q3wks IV Keytruda, last dose 10/17/23.  Pt brought in w c/o feeling "claws, daggers" inside him, since C#3 Keytruda, with sensation initially started w various parts of face now also progressed down to sides of body to rectum(does not see actual daggers or claws,)    Wife reports after first cycle started w jaw tightness and progressive worsened w each cycle, to point of hard to swallow/can't swallow  Had tired taken off Seroquel to see if adverse effect but no improvement, now back on x 5-6days  Merkel lesion has essentially resolved    CT head/ neck,/chest, Duplex, MRI head all negative   Seen by ID(wife had subseq reported sx started after COVID infection), Psych, Neuro  TFT, B12, folate adequate. AM Cortisol sl low  MICHELLE, [id number] positive but pt w known Lupus Anticoag so maybe autoimmune assoc  Started on Codeine prn pain but pt reports no sig effect  Refused LP      -?neuropathy, ?encephalitis, ?psych. Atypical for Keytruda assoc adverse effects  -all w/u negative thus far  -on higher dose Seroquel  -long COVID eval in progress  -Porphyria w/u in progress

## 2023-11-01 ENCOUNTER — APPOINTMENT (OUTPATIENT)
Dept: OTOLARYNGOLOGY | Facility: CLINIC | Age: 73
End: 2023-11-01

## 2023-11-01 RX ADMIN — Medication 5 MILLIGRAM(S): at 05:56

## 2023-11-01 RX ADMIN — FLUVOXAMINE MALEATE 100 MILLIGRAM(S): 25 TABLET ORAL at 17:29

## 2023-11-01 RX ADMIN — FLUVOXAMINE MALEATE 100 MILLIGRAM(S): 25 TABLET ORAL at 05:55

## 2023-11-01 RX ADMIN — LOSARTAN POTASSIUM 100 MILLIGRAM(S): 100 TABLET, FILM COATED ORAL at 05:57

## 2023-11-01 RX ADMIN — HEPARIN SODIUM 5000 UNIT(S): 5000 INJECTION INTRAVENOUS; SUBCUTANEOUS at 17:29

## 2023-11-01 RX ADMIN — SENNA PLUS 2 TABLET(S): 8.6 TABLET ORAL at 22:13

## 2023-11-01 RX ADMIN — ATENOLOL 50 MILLIGRAM(S): 25 TABLET ORAL at 05:56

## 2023-11-01 RX ADMIN — QUETIAPINE FUMARATE 100 MILLIGRAM(S): 200 TABLET, FILM COATED ORAL at 22:12

## 2023-11-01 RX ADMIN — MIRTAZAPINE 7.5 MILLIGRAM(S): 45 TABLET, ORALLY DISINTEGRATING ORAL at 22:12

## 2023-11-01 RX ADMIN — HEPARIN SODIUM 5000 UNIT(S): 5000 INJECTION INTRAVENOUS; SUBCUTANEOUS at 05:55

## 2023-11-01 RX ADMIN — ATENOLOL 50 MILLIGRAM(S): 25 TABLET ORAL at 17:29

## 2023-11-01 RX ADMIN — Medication 2000 UNIT(S): at 12:48

## 2023-11-01 RX ADMIN — Medication 5 MILLIGRAM(S): at 17:29

## 2023-11-01 NOTE — CHART NOTE - NSCHARTNOTEFT_GEN_A_CORE
Assessment:   Pt seen for nutrition follow up.  Chart reviewed, hospital course noted.     Brief History: Patient is a 73y old  Male who presents with a chief complaint of anxiety  subjective sensation of "needles inside me" (30 Oct 2023 14:53)  pmh of HTN, ITP, APL syndrome, generalized anxiety disorder, OCD, newly dx aggressive Merkel Cell cancer of forehead- pt's oncologist  Dr. Eng, at [facility] on Keytruda infusions with good response per wife  was brought to ED for worsening psychosis for the past few days, pt lately with difficulty swallowing, decreased po intake    Seen pt at bedside with wife present; pt states he "feels like he's dying". Wife appeared frustrated. Per conversation with wife, pt has been drinking Ensure regularly. Unsure if pt has been receiving Ensure BID or TID; still a pending order verification from 10/28 and will make MD aware. Last BM documented 10/27 per flowsheets. Has not been eating any chicken or other protein sources besides for Ensure. Also states he has eaten squash, orzo, mashed potatoes and applesauce, though pt is denying eating anything. Pt is severely anxious and kept repeating "nobody understands my illness" multiple times. Pt stated he is "not going to make it home because he is going to choke". Wife requested yogurt and Magic Cup fortified ice cream. Will recommend Magic Cup to be given to pt BID to provide additional kcal and protein. RD to follow up.      Factors impacting intake: [ ] none [ ] nausea  [ ] vomiting [ ] diarrhea [ ] constipation  [ ]chewing problems [ x ] swallowing issues  [ ] other: dementia with psychosis, severe anxiety    Diet Prescription: Diet, Pureed:   Mildly Thick Liquids (MILDTHICKLIQS) (10-26-23 @ 13:04)    Intake: 25-50%    Current Weight:   (11/1) 152.1#  (10/25) 174.3#  (10/23) 149.9#  Will continue to monitor weight trends, wt from 10/25 likely inaccurate      Pertinent Medications: MEDICATIONS  (STANDING):  atenolol  Tablet 50 milliGRAM(s) Oral two times a day  cholecalciferol 2000 Unit(s) Oral daily  diazepam    Tablet 5 milliGRAM(s) Oral two times a day  fluvoxaMINE 100 milliGRAM(s) Oral two times a day  heparin   Injectable 5000 Unit(s) SubCutaneous every 12 hours  losartan 100 milliGRAM(s) Oral daily  mirtazapine 7.5 milliGRAM(s) Oral at bedtime  QUEtiapine 100 milliGRAM(s) Oral at bedtime  senna 2 Tablet(s) Oral at bedtime    MEDICATIONS  (PRN):  acetaminophen     Tablet .. 650 milliGRAM(s) Oral every 6 hours PRN Temp greater or equal to 38.5C (101.3F), Moderate Pain (4 - 6)  acetaminophen  300 mG/codeine 30 mG 1 Tablet(s) Oral every 6 hours PRN Severe Pain (7 - 10)  lactulose Syrup 15 Gram(s) Oral two times a day PRN constipation    Pertinent Labs: 10-30 Na145 mmol/L Glu 107 mg/dL<H> K+ 3.6 mmol/L Cr  0.90 mg/dL BUN 8 mg/dL     CAPILLARY BLOOD GLUCOSE    Skin: Lesion R head, IAD    Edema: None     Estimated Needs:   [ x ] no change since previous assessment on: 10/25 based on #  [ ] recalculated: based on   kcal/day: 2651-8390  gms protein/day: 89-105g    Previous Nutrition Diagnosis:   [ x ] Swallowing Difficulty   [ x ]  Malnutrition     Nutrition Diagnosis is [ x ] ongoing  [ ] resolved [ ] not applicable     New Nutrition Diagnosis: [ x ] not applicable     Interventions:   Recommend  [ x ] Continue current diet  [ ] Change Diet To:  [ x ] Nutrition Supplement: Reorder Ensure TID- unsure if pt is receiving  [ ] Nutrition Support  [ x ] Other: Encourage PO intake, continue to obtain food preferences from pt's wife     Monitoring and Evaluation:   [ x ] PO intake [ x ] Tolerance to diet prescription [ x ] weights [ x ] labs [ x ] follow up per protocol  [ x ] other: s/s GI distress, bowel function, skin integrity/ edema, nutrition related lab values Assessment:   Pt seen for nutrition follow up.  Chart reviewed, hospital course noted.     Brief History: Patient is a 73y old  Male who presents with a chief complaint of anxiety  subjective sensation of "needles inside me" (30 Oct 2023 14:53)  pmh of HTN, ITP, APL syndrome, generalized anxiety disorder, OCD, newly dx aggressive Merkel Cell cancer of forehead- pt's oncologist  Dr. Eng, at [facility] on Keytruda infusions with good response per wife  was brought to ED for worsening psychosis for the past few days, pt lately with difficulty swallowing, decreased po intake    Seen pt at bedside with wife present; pt states he "feels like he's dying". Wife appeared frustrated. Per conversation with wife, pt has been drinking Ensure regularly. Unsure if pt has been receiving Ensure BID or TID; still a pending order verification from 10/28 and will make MD aware. Last BM documented 10/27 per flowsheets. Has not been eating any chicken or other protein sources besides for Ensure. Also states he has eaten squash, orzo, mashed potatoes and applesauce, though pt is denying eating anything. Pt is severely anxious and kept repeating "nobody understands my illness" multiple times. Pt stated he is "not going to make it home because he is going to choke". Wife requested yogurt and Magic Cup fortified ice cream. Will recommend Magic Cup to be given to pt BID to provide additional kcal and protein. RD to follow up.      Factors impacting intake: [ ] none [ ] nausea  [ ] vomiting [ ] diarrhea [ ] constipation  [ ]chewing problems [ x ] swallowing issues  [ ] other: dementia with psychosis, severe anxiety    Diet Prescription: Diet, Pureed:   Mildly Thick Liquids (MILDTHICKLIQS) (10-26-23 @ 13:04)    Intake: 25-50%    Current Weight:   (11/1) 152.1#  (10/25) 174.3#  (10/23) 149.9#  Will continue to monitor weight trends, wt from 10/25 likely inaccurate      Pertinent Medications: MEDICATIONS  (STANDING):  atenolol  Tablet 50 milliGRAM(s) Oral two times a day  cholecalciferol 2000 Unit(s) Oral daily  diazepam    Tablet 5 milliGRAM(s) Oral two times a day  fluvoxaMINE 100 milliGRAM(s) Oral two times a day  heparin   Injectable 5000 Unit(s) SubCutaneous every 12 hours  losartan 100 milliGRAM(s) Oral daily  mirtazapine 7.5 milliGRAM(s) Oral at bedtime  QUEtiapine 100 milliGRAM(s) Oral at bedtime  senna 2 Tablet(s) Oral at bedtime    MEDICATIONS  (PRN):  acetaminophen     Tablet .. 650 milliGRAM(s) Oral every 6 hours PRN Temp greater or equal to 38.5C (101.3F), Moderate Pain (4 - 6)  acetaminophen  300 mG/codeine 30 mG 1 Tablet(s) Oral every 6 hours PRN Severe Pain (7 - 10)  lactulose Syrup 15 Gram(s) Oral two times a day PRN constipation    Pertinent Labs: 10-30 Na145 mmol/L Glu 107 mg/dL<H> K+ 3.6 mmol/L Cr  0.90 mg/dL BUN 8 mg/dL     CAPILLARY BLOOD GLUCOSE    Skin: Lesion R head, IAD    Edema: None     Estimated Needs:   [ x ] no change since previous assessment on: 10/25 based on #  [ ] recalculated: based on   kcal/day: 0715-4405  gms protein/day: 89-105g    Previous Nutrition Diagnosis:   [ x ] Swallowing Difficulty   [ x ]  Malnutrition     Nutrition Diagnosis is [ x ] ongoing  [ ] resolved [ ] not applicable     New Nutrition Diagnosis: [ x ] not applicable     Interventions:   Recommend  [ x ] Continue current diet  [ ] Change Diet To:  [ x ] Nutrition Supplement: Reorder Ensure TID- unsure if pt is receiving (message sent via TEAMS to sign off again)  [ ] Nutrition Support  [ x ] Other: Encourage PO intake, continue to obtain food preferences from pt's wife;  consider GOC discussion regarding enteral nutrition should pt stay this course of inadequate intake.  Monitoring and Evaluation:   [ x ] PO intake [ x ] Tolerance to diet prescription [ x ] weights [ x ] labs [ x ] follow up per protocol  [ x ] other: s/s GI distress, bowel function, skin integrity/ edema, nutrition related lab values

## 2023-11-01 NOTE — PROGRESS NOTE ADULT - SUBJECTIVE AND OBJECTIVE BOX
[INTERVAL HX: ]  Patient seen and examined;  Chart reviewed and events noted;     c/o more anxiety today  "c/o pain all over in 20-30 different spots"  wife at bedside.       [MEDICATIONS]  MEDICATIONS  (STANDING):  atenolol  Tablet 50 milliGRAM(s) Oral two times a day  cholecalciferol 2000 Unit(s) Oral daily  diazepam    Tablet 5 milliGRAM(s) Oral two times a day  fluvoxaMINE 100 milliGRAM(s) Oral two times a day  heparin   Injectable 5000 Unit(s) SubCutaneous every 12 hours  losartan 100 milliGRAM(s) Oral daily  mirtazapine 7.5 milliGRAM(s) Oral at bedtime  QUEtiapine 100 milliGRAM(s) Oral at bedtime  senna 2 Tablet(s) Oral at bedtime    MEDICATIONS  (PRN):  acetaminophen     Tablet .. 650 milliGRAM(s) Oral every 6 hours PRN Temp greater or equal to 38.5C (101.3F), Moderate Pain (4 - 6)  acetaminophen  300 mG/codeine 30 mG 1 Tablet(s) Oral every 6 hours PRN Severe Pain (7 - 10)  lactulose Syrup 15 Gram(s) Oral two times a day PRN constipation      [VITALS]  Vital Signs Last 24 Hrs  T(C): 36.8 (2023 11:49), Max: 36.9 (2023 05:21)  T(F): 98.2 (2023 11:49), Max: 98.4 (2023 05:21)  HR: 76 (2023 11:49) (75 - 76)  BP: 166/78 (2023 11:49) (165/70 - 166/78)  BP(mean): --  RR: 18 (2023 11:49) (18 - 18)  SpO2: 93% (2023 11:49) (93% - 94%)    Parameters below as of 2023 11:49  Patient On (Oxygen Delivery Method): room air      [WT/HT]  Daily     Daily Weight in k (2023 05:21)  [VENT]      [PHYSICAL EXAM]  GEN: NAD  HEENT: normocephalic and atraumatic. EOMI. PERRL.    NECK: Supple.  No lymphadenopathy   LUNGS: Clear to auscultation.  HEART: Regular rate and rhythm,  no MRG  ABDOMEN: Soft, nontender, and nondistended.  Positive bowel sounds.    : No CVA tenderness  EXTREMITIES: Without edema.  NEUROLOGIC: grossly intact.  PSYCHIATRIC: Appropriate affect .  SKIN: No rash       [LABS:]  Vitamin B12, Serum: 1109 pg/mL [232 - 1245] (10-29-23 @ 18:30)  Folate, Serum: >20.0 ng/mL (10-29-23 @ 18:30)  Sedimentation Rate, Erythrocyte: 57 mm/hr *H* [0 - 20] (10-27-23 @ 18:30)      [RADIOLOGY STUDIES:]

## 2023-11-01 NOTE — PROGRESS NOTE ADULT - ASSESSMENT
73 M pmh of HTN, ITP, APL syndrome, generalized anxiety disorder, OCD, newly dx aggressive Merkel Cell cancer of forehead- pt's oncologist  Dr. Eng, at [facility] on Keytruda infusions with good response per wife  was brought to ED for worsening psychosis for the past few days, pt lately with difficulty swallowing, decreased po intake    Psychosis  monitor  psych fu  MRI noted  neuro following  may need LP  increase seroquel    #Abnormal throat sensation- sharp object sensation  ?neuralgia  swallow eval appreciated  family feels pt did improve with codeine  will try ATC dosing for a few days and monitor    #Merkel cell ca  onc c/s  r/o paraneoplastic causes    #Anxiety  psych cs appreciated  continue valium  titrating seroquel    #HTN- resume home meds    #DVT ppx- start hep sq      [facility]   [phone]

## 2023-11-01 NOTE — PROGRESS NOTE ADULT - ASSESSMENT
IMPRESSION    pt w ITP, Lupus Anticoagulant, HTN, anxiety on Seraquel, OCD, Merkel cell ca of right Forehead, under care Dr Elliot Eng, post 4 doses q3wks IV Keytruda, last dose 10/17/23.  Pt brought in w c/o feeling "claws, daggers" inside him, since C#3 Keytruda, with sensation initially started w various parts of face now also progressed down to sides of body to rectum(does not see actual daggers or claws,)    Wife reports after first cycle started w jaw tightness and progressive worsened w each cycle, to point of hard to swallow/can't swallow  Had tired taken off Seroquel to see if adverse effect but no improvement, now back on x 5-6days  Merkel lesion has essentially resolved    CT head no acute findings  see by Psych in ER, ?psychosis  -sensation described by pt ?psychosis vs neuropathy  -psychosis not noted to be associated w Keytruda. Neuro tox of Keytruda described includes cerebral hemorrhage, confusion, myasthenia gravis, reversible posterior leukoencephalopathy syndrome, syed neuropathy, Guillain Rowley, aseptic meningitis, encephalitis, transverse myelitis    TFTS unremarkable.   AM cortisol 5.8. Slight low. Unclear significance     Negative plasma PBG   Bilirubin neg  Urobilinogen normal    Sjorgen /Scleroderma studies abn.      +MICHELLE and +Ro52      RECOMMEND    PSYCH  cause of sx remains unclear.   s/p psych evaluation  Seroquel dose being titrated up.   Remeron being added    NEURO  s/p neurology eval.   possible LP is sx not better  However pt refusing today; discussed at length.   Family at bedside; aware of prior refusal    s/p additional neuro   Discussed with Dr. Magdaleno.  Sx seem less likely secondary to Keytruda appears more psychiatric sx  await course.     Consideration for steroids if autoimmune process, but no clear cause.   Plts adequate at 111, no indication for steroids for plts.     RHEUM  Sjorgen /Scleroderma studies abn.      +MICHELLE and +Ro52     would recommend outpt rheum eval.      Discussed with pt and family.     RENAL  IVF for now.   encourage PO hydration  Avoid dehydration which could make sicca sx worse.    GI  Negative CT neck and chest  Plan to attempt trial of pain meds, eg Codeine 30mg PRN for sx.   Seems to have some benefit  Continue Codeine PRN      HEME  Follow urine PBG  B12 normal  Folate adequate    [facility]Hoc family meeting had with pt wife and son at length, Sat.     Further recommendations per pt course

## 2023-11-01 NOTE — CASE MANAGEMENT PROGRESS NOTE - NSCMPROGRESSNOTE_GEN_ALL_CORE
Still adjusting medications not medically ready for transition home. Spoke to the wife in great length about home care services and insurance provisions. She verbalized understanding. The wife chose [facility] [place]. 485 started but not sent out.  She may privately hire an aide to help in the home, and private hire list was provided. CM team to continue to follow for post acute needs. Pt is still with be catheter.

## 2023-11-01 NOTE — PROGRESS NOTE ADULT - SUBJECTIVE AND OBJECTIVE BOX
[facility] DIVISION of INFECTIOUS DISEASE  Eric Reyes MD PhD, Milena Underwood MD, Jillian Mckoy MD, Enid Everett MD, Jay Krishna MD  and providing coverage with Arthur Pepper MD  Providing Infectious Disease Consultations at [facility], [facility], [facility]'s    Office# [phone] to schedule follow up appointments  Answering Service for urgent calls or New Consults [phone]  Cell# to text for urgent issues Eric Reyes [phone]     infectious diseases progress note:    JANICE RENDON is a 73y y. o. Male patient    Overnight and events of the last 24hrs reviewed    Allergies    No Known Allergies    Intolerances        ANTIBIOTICS/RELEVANT:  antimicrobials    immunologic:    OTHER:  acetaminophen     Tablet .. 650 milliGRAM(s) Oral every 6 hours PRN  acetaminophen  300 mG/codeine 30 mG 1 Tablet(s) Oral every 6 hours PRN  atenolol  Tablet 50 milliGRAM(s) Oral two times a day  cholecalciferol 2000 Unit(s) Oral daily  diazepam    Tablet 5 milliGRAM(s) Oral two times a day  fluvoxaMINE 100 milliGRAM(s) Oral two times a day  heparin   Injectable 5000 Unit(s) SubCutaneous every 12 hours  lactulose Syrup 15 Gram(s) Oral two times a day PRN  losartan 100 milliGRAM(s) Oral daily  mirtazapine 7.5 milliGRAM(s) Oral at bedtime  QUEtiapine 100 milliGRAM(s) Oral at bedtime  senna 2 Tablet(s) Oral at bedtime      Objective:  Vital Signs Last 24 Hrs  T(C): 36.8 (01 Nov 2023 11:49), Max: 36.9 (01 Nov 2023 05:21)  T(F): 98.2 (01 Nov 2023 11:49), Max: 98.4 (01 Nov 2023 05:21)  HR: 76 (01 Nov 2023 11:49) (75 - 76)  BP: 166/78 (01 Nov 2023 11:49) (165/70 - 166/78)  BP(mean): --  RR: 18 (01 Nov 2023 11:49) (18 - 18)  SpO2: 93% (01 Nov 2023 11:49) (93% - 94%)    Parameters below as of 01 Nov 2023 11:49  Patient On (Oxygen Delivery Method): room air        T(C): 36.8 (11-01-23 @ 11:49), Max: 37.3 (10-31-23 @ 06:18)  T(C): 36.8 (11-01-23 @ 11:49), Max: 37.3 (10-31-23 @ 06:18)  T(C): 36.8 (11-01-23 @ 11:49), Max: 37.3 (10-31-23 @ 06:18)    PHYSICAL EXAM:  HEENT: NC atraumatic  Neck: supple  Respiratory: no accessory muscle use, breathing comfortably  Cardiovascular: distant  Gastrointestinal: normal appearing, nondistended  Extremities: no clubbing, no cyanosis,        LABS:        WBC  4.31 10-30 @ 07:55  5.15 10-27 @ 12:33  5.71 10-27 @ 07:20              Creatinine: 0.90 mg/dL (10-30-23 @ 07:55)  Creatinine: 0.90 mg/dL (10-27-23 @ 07:20)                INFLAMMATORY MARKERS      MICROBIOLOGY:              RADIOLOGY & ADDITIONAL STUDIES:

## 2023-11-01 NOTE — PROGRESS NOTE ADULT - ASSESSMENT
73 M pmh of HTN, ITP, APL syndrome, generalized anxiety disorder, OCD, newly dx aggressive Merkel Cell cancer of forehead- pt's oncologist  Dr. Eng, at [facility] on Keytruda (PD-1 blocker) infusions with good response per wife  was brought to ED for worsening psychosis for the past few days, pt believes there are sharp things inside his body trying to come out of his mouth, stuck in throat, pt lately with difficulty swallowing, decreased po intake, pt was evaluated by psych in ED. Wife reports sudden cognitive decline and behavioral changes immediately after COVID infection.    RECOMMENDATIONS    -sounds like there is no obvious acute infectious issue. no fever, no leukocytosis, no obvious localization but concerning history of things developing post COVID-19 infection so raises concerns for a neurological PASC picture  -RE-ordered some testing to try to clarify but for now recommend observation off antimicrobial Rx    Thank you for consulting us and involving us in the management of this most interesting and challenging case.  We will follow along in the care of this patient. Please call us at [phone] or text me directly on my cell# at [phone] with any concerns.

## 2023-11-01 NOTE — PROGRESS NOTE ADULT - SUBJECTIVE AND OBJECTIVE BOX
Neurology Follow up note    JANICE [id number]    HPI:  hx obtained from wife  73 M pmh of HTN, ITP, APL syndrome, generalized anxiety disorder, OCD, newly dx aggressive Merkel Cell cancer of forehead- pt's oncologist  Dr. Eng, at [facility] on Keytruda infusions with good response per wife  was brought o ED for worsening psychosis for the past few days, pt believes there are sharp things inside his body trying to come out of his mouth, stuck in throat  pt lately with difficulty swallowing, decreased po intake  pt was evaluated by psych in ED (24 Oct 2023 13:19)      Interval History - very anxious/flat affect    Patient is seen, chart was reviewed and case was discussed with the treatment team.  Pt is not in any distress.   Lying on bed comfortably.   .  Vital Signs Last 24 Hrs  T(C): 36.8 (01 Nov 2023 11:49), Max: 36.9 (01 Nov 2023 05:21)  T(F): 98.2 (01 Nov 2023 11:49), Max: 98.4 (01 Nov 2023 05:21)  HR: 76 (01 Nov 2023 11:49) (75 - 76)  BP: 166/78 (01 Nov 2023 11:49) (165/70 - 166/78)  BP(mean): --  RR: 18 (01 Nov 2023 11:49) (18 - 18)  SpO2: 93% (01 Nov 2023 11:49) (93% - 94%)    Parameters below as of 01 Nov 2023 11:49  Patient On (Oxygen Delivery Method): room air                      REVIEW OF SYSTEMS:    Constitutional: No fever,  Eyes: No eye pain, visual disturbances, or discharge  ENT:  No difficulty hearing, tinnitus, vertigo; No sinus or throat pain  Neck: No pain or stiffness  Respiratory: No cough, wheezing, chills or hemoptysis  Cardiovascular: No chest pain, palpitations, shortness of breath, dizziness or leg swelling  Gastrointestinal: No abdominal or epigastric pain. No nausea, vomiting or hematemesis;   Genitourinary: No dysuria, frequency,   Neurological: No headaches, memory loss, loss of strength,  Psychiatric: No depression, anxiety, mood swings or difficulty sleeping  Musculoskeletal: No joint pain or swelling;   Skin: No itching, burning, rashes or lesions   Lymph Nodes: No enlarged glands  Endocrine: No heat or cold intolerance; No hair loss,  Allergy and Immunologic: No hives or eczema    On Neurological Examination:    Mental Status - Pt is alert, awake, oriented X3   Follows commands well and able to answer questions appropriately.Mood and affect  normal    Speech -  Pt has dysarthria.    Cranial Nerves - Pupils 3 mm equal and reactive to light, extraocular eye movements intact. Pt has no visual field deficit.  Pt has no  facial asymmetry. Facial sensation is intact.Tongue - is in midline.    Muscle tone - is normal      Motor Exam - 4/5 all over, No drift. No shaking or tremors.    Sensory Exam - . Pt withdraws all extremities equally on stimulation. No asymmetry seen. No complaints of tingling, numbness.           coordination:    Finger to nose: normal      Deep tendon Reflexes - 2 plus all over.          Neck Supple -  Yes.    MEDICATIONS  (STANDING):  atenolol  Tablet 50 milliGRAM(s) Oral two times a day  cholecalciferol 2000 Unit(s) Oral daily  diazepam    Tablet 5 milliGRAM(s) Oral two times a day  fluvoxaMINE 100 milliGRAM(s) Oral two times a day  heparin   Injectable 5000 Unit(s) SubCutaneous every 12 hours  losartan 100 milliGRAM(s) Oral daily  mirtazapine 7.5 milliGRAM(s) Oral at bedtime  QUEtiapine 100 milliGRAM(s) Oral at bedtime  senna 2 Tablet(s) Oral at bedtime    MEDICATIONS  (PRN):  acetaminophen     Tablet .. 650 milliGRAM(s) Oral every 6 hours PRN Temp greater or equal to 38.5C (101.3F), Moderate Pain (4 - 6)  acetaminophen  300 mG/codeine 30 mG 1 Tablet(s) Oral every 6 hours PRN Severe Pain (7 - 10)  lactulose Syrup 15 Gram(s) Oral two times a day PRN constipation        Allergies    No Known Allergies    Intolerances      Hemoglobin A1C:     Vitamin B12     RADIOLOGY    ASSESSMENT AND PLAN:      seen for ams  doubt paraneoplastic   brain mri- no acute cva/mets  poor APPETITE    Started on remeron  on  seroquel 100 mg hs for psychosis  patient and wife LP  Physical therapy evaluation.  Pain is accessed and addressed.  Plan of care was discussed with family. Questions answered.  Would continue to follow.

## 2023-11-01 NOTE — PATIENT CHOICE NOTE. - NSPTCHOICESTATE_GEN_ALL_CORE

## 2023-11-01 NOTE — PROGRESS NOTE ADULT - SUBJECTIVE AND OBJECTIVE BOX
Patient is a 73y old  Male who presents with a chief complaint of anxietty (01 Nov 2023 13:08)      INTERVAL HPI/OVERNIGHT EVENTS: noted  pt seen and examined this am   events noted  today worsening sensations      Vital Signs Last 24 Hrs  T(C): 36.9 (01 Nov 2023 17:28), Max: 36.9 (01 Nov 2023 05:21)  T(F): 98.4 (01 Nov 2023 17:28), Max: 98.4 (01 Nov 2023 05:21)  HR: 71 (01 Nov 2023 17:28) (71 - 76)  BP: 136/75 (01 Nov 2023 17:28) (136/75 - 166/78)  BP(mean): --  RR: 16 (01 Nov 2023 17:28) (16 - 18)  SpO2: 93% (01 Nov 2023 17:28) (93% - 94%)    Parameters below as of 01 Nov 2023 17:28  Patient On (Oxygen Delivery Method): room air        acetaminophen     Tablet .. 650 milliGRAM(s) Oral every 6 hours PRN  acetaminophen  300 mG/codeine 30 mG 1 Tablet(s) Oral every 6 hours PRN  atenolol  Tablet 50 milliGRAM(s) Oral two times a day  cholecalciferol 2000 Unit(s) Oral daily  diazepam    Tablet 5 milliGRAM(s) Oral two times a day  fluvoxaMINE 100 milliGRAM(s) Oral two times a day  heparin   Injectable 5000 Unit(s) SubCutaneous every 12 hours  lactulose Syrup 15 Gram(s) Oral two times a day PRN  losartan 100 milliGRAM(s) Oral daily  mirtazapine 7.5 milliGRAM(s) Oral at bedtime  QUEtiapine 100 milliGRAM(s) Oral at bedtime  senna 2 Tablet(s) Oral at bedtime      PHYSICAL EXAM:  GENERAL: NAD,   EYES: conjunctiva and sclera clear  ENMT: Moist mucous membranes  NECK: Supple, No JVD, Normal thyroid  CHEST/LUNG: non labored, cta b/l  HEART: Regular rate and rhythm; No murmurs, rubs, or gallops  ABDOMEN: Soft, Nontender, Nondistended; Bowel sounds present  EXTREMITIES:  2+ Peripheral Pulses, No clubbing, cyanosis, or edema  LYMPH: No lymphadenopathy noted  SKIN: No rashes or lesions    Consultant(s) Notes Reviewed:  [x ] YES  [ ] NO  Care Discussed with Consultants/Other Providers [ x] YES  [ ] NO    LABS:              CAPILLARY BLOOD GLUCOSE                  RADIOLOGY & ADDITIONAL TESTS:    Imaging Personally Reviewed:  [x ] YES  [ ] NO

## 2023-11-02 LAB
ANION GAP SERPL CALC-SCNC: 7 MMOL/L — SIGNIFICANT CHANGE UP (ref 5–17)
ANION GAP SERPL CALC-SCNC: 7 MMOL/L — SIGNIFICANT CHANGE UP (ref 5–17)
BUN SERPL-MCNC: 19 MG/DL — SIGNIFICANT CHANGE UP (ref 7–23)
BUN SERPL-MCNC: 19 MG/DL — SIGNIFICANT CHANGE UP (ref 7–23)
CALCIUM SERPL-MCNC: 9 MG/DL — SIGNIFICANT CHANGE UP (ref 8.5–10.1)
CALCIUM SERPL-MCNC: 9 MG/DL — SIGNIFICANT CHANGE UP (ref 8.5–10.1)
CHLORIDE SERPL-SCNC: 106 MMOL/L — SIGNIFICANT CHANGE UP (ref 96–108)
CHLORIDE SERPL-SCNC: 106 MMOL/L — SIGNIFICANT CHANGE UP (ref 96–108)
CMV IGG FLD QL: <0.2 U/ML — SIGNIFICANT CHANGE UP
CMV IGG FLD QL: <0.2 U/ML — SIGNIFICANT CHANGE UP
CMV IGG SERPL-IMP: NEGATIVE — SIGNIFICANT CHANGE UP
CMV IGG SERPL-IMP: NEGATIVE — SIGNIFICANT CHANGE UP
CO2 SERPL-SCNC: 30 MMOL/L — SIGNIFICANT CHANGE UP (ref 22–31)
CO2 SERPL-SCNC: 30 MMOL/L — SIGNIFICANT CHANGE UP (ref 22–31)
CREAT SERPL-MCNC: 1.1 MG/DL — SIGNIFICANT CHANGE UP (ref 0.5–1.3)
CREAT SERPL-MCNC: 1.1 MG/DL — SIGNIFICANT CHANGE UP (ref 0.5–1.3)
EBV EA AB SER IA-ACNC: >150 U/ML — HIGH
EBV EA AB SER IA-ACNC: >150 U/ML — HIGH
EBV EA AB TITR SER IF: POSITIVE
EBV EA AB TITR SER IF: POSITIVE
EBV EA IGG SER-ACNC: POSITIVE
EBV EA IGG SER-ACNC: POSITIVE
EBV NA IGG SER IA-ACNC: 27.6 U/ML — HIGH
EBV NA IGG SER IA-ACNC: 27.6 U/ML — HIGH
EBV PATRN SPEC IB-IMP: SIGNIFICANT CHANGE UP
EBV PATRN SPEC IB-IMP: SIGNIFICANT CHANGE UP
EBV VCA IGG AVIDITY SER QL IA: POSITIVE
EBV VCA IGG AVIDITY SER QL IA: POSITIVE
EBV VCA IGM SER IA-ACNC: 449 U/ML — HIGH
EBV VCA IGM SER IA-ACNC: 449 U/ML — HIGH
EBV VCA IGM SER IA-ACNC: 56.2 U/ML — HIGH
EBV VCA IGM SER IA-ACNC: 56.2 U/ML — HIGH
EBV VCA IGM TITR FLD: POSITIVE
EBV VCA IGM TITR FLD: POSITIVE
EGFR: 70 ML/MIN/1.73M2 — SIGNIFICANT CHANGE UP
EGFR: 70 ML/MIN/1.73M2 — SIGNIFICANT CHANGE UP
GLUCOSE SERPL-MCNC: 109 MG/DL — HIGH (ref 70–99)
GLUCOSE SERPL-MCNC: 109 MG/DL — HIGH (ref 70–99)
HCT VFR BLD CALC: 31.4 % — LOW (ref 39–50)
HCT VFR BLD CALC: 31.4 % — LOW (ref 39–50)
HGB BLD-MCNC: 11.7 G/DL — LOW (ref 13–17)
HGB BLD-MCNC: 11.7 G/DL — LOW (ref 13–17)
MCHC RBC-ENTMCNC: 33.1 PG — SIGNIFICANT CHANGE UP (ref 27–34)
MCHC RBC-ENTMCNC: 33.1 PG — SIGNIFICANT CHANGE UP (ref 27–34)
MCHC RBC-ENTMCNC: 37.3 GM/DL — HIGH (ref 32–36)
MCHC RBC-ENTMCNC: 37.3 GM/DL — HIGH (ref 32–36)
MCV RBC AUTO: 89 FL — SIGNIFICANT CHANGE UP (ref 80–100)
MCV RBC AUTO: 89 FL — SIGNIFICANT CHANGE UP (ref 80–100)
NRBC # BLD: 0 /100 WBCS — SIGNIFICANT CHANGE UP (ref 0–0)
NRBC # BLD: 0 /100 WBCS — SIGNIFICANT CHANGE UP (ref 0–0)
PLATELET # BLD AUTO: 95 K/UL — LOW (ref 150–400)
PLATELET # BLD AUTO: 95 K/UL — LOW (ref 150–400)
POTASSIUM SERPL-MCNC: 3.8 MMOL/L — SIGNIFICANT CHANGE UP (ref 3.5–5.3)
POTASSIUM SERPL-MCNC: 3.8 MMOL/L — SIGNIFICANT CHANGE UP (ref 3.5–5.3)
POTASSIUM SERPL-SCNC: 3.8 MMOL/L — SIGNIFICANT CHANGE UP (ref 3.5–5.3)
POTASSIUM SERPL-SCNC: 3.8 MMOL/L — SIGNIFICANT CHANGE UP (ref 3.5–5.3)
RBC # BLD: 3.53 M/UL — LOW (ref 4.2–5.8)
RBC # BLD: 3.53 M/UL — LOW (ref 4.2–5.8)
RBC # FLD: 13.9 % — SIGNIFICANT CHANGE UP (ref 10.3–14.5)
RBC # FLD: 13.9 % — SIGNIFICANT CHANGE UP (ref 10.3–14.5)
SODIUM SERPL-SCNC: 143 MMOL/L — SIGNIFICANT CHANGE UP (ref 135–145)
SODIUM SERPL-SCNC: 143 MMOL/L — SIGNIFICANT CHANGE UP (ref 135–145)
WBC # BLD: 10.32 K/UL — SIGNIFICANT CHANGE UP (ref 3.8–10.5)
WBC # BLD: 10.32 K/UL — SIGNIFICANT CHANGE UP (ref 3.8–10.5)
WBC # FLD AUTO: 10.32 K/UL — SIGNIFICANT CHANGE UP (ref 3.8–10.5)
WBC # FLD AUTO: 10.32 K/UL — SIGNIFICANT CHANGE UP (ref 3.8–10.5)

## 2023-11-02 RX ORDER — SODIUM CHLORIDE 9 MG/ML
500 INJECTION INTRAMUSCULAR; INTRAVENOUS; SUBCUTANEOUS ONCE
Refills: 0 | Status: COMPLETED | OUTPATIENT
Start: 2023-11-02 | End: 2023-11-02

## 2023-11-02 RX ADMIN — LOSARTAN POTASSIUM 100 MILLIGRAM(S): 100 TABLET, FILM COATED ORAL at 06:54

## 2023-11-02 RX ADMIN — Medication 5 MILLIGRAM(S): at 17:02

## 2023-11-02 RX ADMIN — QUETIAPINE FUMARATE 100 MILLIGRAM(S): 200 TABLET, FILM COATED ORAL at 22:00

## 2023-11-02 RX ADMIN — Medication 650 MILLIGRAM(S): at 16:58

## 2023-11-02 RX ADMIN — SENNA PLUS 2 TABLET(S): 8.6 TABLET ORAL at 22:00

## 2023-11-02 RX ADMIN — ATENOLOL 50 MILLIGRAM(S): 25 TABLET ORAL at 17:02

## 2023-11-02 RX ADMIN — HEPARIN SODIUM 5000 UNIT(S): 5000 INJECTION INTRAVENOUS; SUBCUTANEOUS at 06:57

## 2023-11-02 RX ADMIN — Medication 5 MILLIGRAM(S): at 06:53

## 2023-11-02 RX ADMIN — SODIUM CHLORIDE 500 MILLILITER(S): 9 INJECTION INTRAMUSCULAR; INTRAVENOUS; SUBCUTANEOUS at 11:24

## 2023-11-02 RX ADMIN — HEPARIN SODIUM 5000 UNIT(S): 5000 INJECTION INTRAVENOUS; SUBCUTANEOUS at 17:03

## 2023-11-02 RX ADMIN — ATENOLOL 50 MILLIGRAM(S): 25 TABLET ORAL at 06:54

## 2023-11-02 RX ADMIN — FLUVOXAMINE MALEATE 100 MILLIGRAM(S): 25 TABLET ORAL at 06:53

## 2023-11-02 RX ADMIN — MIRTAZAPINE 7.5 MILLIGRAM(S): 45 TABLET, ORALLY DISINTEGRATING ORAL at 21:59

## 2023-11-02 RX ADMIN — Medication 2000 UNIT(S): at 16:57

## 2023-11-02 RX ADMIN — Medication 650 MILLIGRAM(S): at 11:24

## 2023-11-02 RX ADMIN — FLUVOXAMINE MALEATE 100 MILLIGRAM(S): 25 TABLET ORAL at 17:03

## 2023-11-02 NOTE — PROGRESS NOTE ADULT - SUBJECTIVE AND OBJECTIVE BOX
[facility] DIVISION of INFECTIOUS DISEASE  Eric Reyes MD PhD, Milena Underwood MD, Jillian Mckoy MD, Enid Everett MD, Jay Krishna MD  and providing coverage with Arthur Pepper MD  Providing Infectious Disease Consultations at [facility], [facility], [facility]'s    Office# [phone] to schedule follow up appointments  Answering Service for urgent calls or New Consults [phone]  Cell# to text for urgent issues Eric Reyes [phone]     infectious diseases progress note:    JANICE RENDON is a 73y y. o. Male patient    Overnight and events of the last 24hrs reviewed    Allergies    No Known Allergies    Intolerances        ANTIBIOTICS/RELEVANT:  antimicrobials    immunologic:    OTHER:  acetaminophen     Tablet .. 650 milliGRAM(s) Oral every 6 hours PRN  acetaminophen  300 mG/codeine 30 mG 1 Tablet(s) Oral every 6 hours PRN  atenolol  Tablet 50 milliGRAM(s) Oral two times a day  cholecalciferol 2000 Unit(s) Oral daily  diazepam    Tablet 5 milliGRAM(s) Oral two times a day  fluvoxaMINE 100 milliGRAM(s) Oral two times a day  heparin   Injectable 5000 Unit(s) SubCutaneous every 12 hours  lactulose Syrup 15 Gram(s) Oral two times a day PRN  losartan 100 milliGRAM(s) Oral daily  mirtazapine 7.5 milliGRAM(s) Oral at bedtime  QUEtiapine 100 milliGRAM(s) Oral at bedtime  senna 2 Tablet(s) Oral at bedtime      Objective:  Vital Signs Last 24 Hrs  T(C): 36.9 (02 Nov 2023 05:46), Max: 37 (01 Nov 2023 21:56)  T(F): 98.4 (02 Nov 2023 05:46), Max: 98.6 (01 Nov 2023 21:56)  HR: 103 (02 Nov 2023 05:46) (71 - 103)  BP: 174/84 (02 Nov 2023 05:46) (136/75 - 174/84)  BP(mean): --  RR: 18 (02 Nov 2023 05:46) (16 - 18)  SpO2: 93% (02 Nov 2023 05:46) (93% - 95%)    Parameters below as of 02 Nov 2023 05:46  Patient On (Oxygen Delivery Method): room air        T(C): 36.9 (11-02-23 @ 05:46), Max: 37 (11-01-23 @ 21:56)  T(C): 36.9 (11-02-23 @ 05:46), Max: 37.3 (10-31-23 @ 06:18)  T(C): 36.9 (11-02-23 @ 05:46), Max: 37.3 (10-31-23 @ 06:18)    PHYSICAL EXAM:  HEENT: NC atraumatic  Neck: supple  Respiratory: no accessory muscle use, breathing comfortably  Cardiovascular: distant  Gastrointestinal: normal appearing, nondistended  Extremities: no clubbing, no cyanosis,        LABS:                          11.7   10.32 )-----------( 95       ( 02 Nov 2023 08:25 )             31.4       WBC  10.32 11-02 @ 08:25  4.31 10-30 @ 07:55  5.15 10-27 @ 12:33  5.71 10-27 @ 07:20      11-02    143  |  106  |  19  ----------------------------<  109<H>  3.8   |  30  |  1.10    Ca    9.0      02 Nov 2023 08:25        Creatinine: 1.10 mg/dL (11-02-23 @ 08:25)  Creatinine: 0.90 mg/dL (10-30-23 @ 07:55)  Creatinine: 0.90 mg/dL (10-27-23 @ 07:20)        Urinalysis Basic - ( 02 Nov 2023 08:25 )    Color: x / Appearance: x / SG: x / pH: x  Gluc: 109 mg/dL / Ketone: x  / Bili: x / Urobili: x   Blood: x / Protein: x / Nitrite: x   Leuk Esterase: x / RBC: x / WBC x   Sq Epi: x / Non Sq Epi: x / Bacteria: x            INFLAMMATORY MARKERS      MICROBIOLOGY:              RADIOLOGY & ADDITIONAL STUDIES:

## 2023-11-02 NOTE — CHART NOTE - NSCHARTNOTEFT_GEN_A_CORE
Rapid response was called at 11:05 for hypotension    Events leading up to Rapid Response:  Patient reported dizziness upon standing from chair. Pt also reports headache and weakness. Pt endorses hx of vasovagal syncope.   Patient was seen and examined at the bedside by the rapid response team. Dr. Pinto / ICU PA at bedside.    Rapid Response Vital Signs:    BP: 78/50 -> 86/51  HR: 83  RR: 19  SpO2: 87% on RA  Temp: 97.6  FS: 200      Physical Exam:  Gen: well appearing, NAD  HEENT: NCAT, PEERLA b/l, EOMI b/l  Cardio: regular rate and rhythm, +s1s2, no murmurs, rubs, or gallops  Pulm: CTA b/l, no wheezes, rales or rhonchi  Abdomen: soft, nontender, nondistended, +BS x4 quadrants, no guarding  Extremities: no cyanosis or edema, +2 pedal pulses  Neuro: AAOx3, CNII-XII intact, 5/5 strength all extremities, sensation intact  Skin: warm and dry      Assessment/Plan:   - stat 500cc NS bolus  - tylenol PRN for headache  - f/u orthostatic vitals  -Discussed with Dr. Pinto, agrees with above plan  -Family updated by JULIETTE Enriquez  -RN to call if any changes      Addendum:     RAPID RESPONSE FOLLOW UP NOTE:    ASSESSMENT/ PLAN:        - RN to call with any changes.     DISPOSITION:  - Maintain current level of care. Rapid response was called at 11:05 for hypotension    Events leading up to Rapid Response:  Patient reported dizziness upon standing from chair. Nurse was assisting patient from chair to get back in bed though noticed some weakness so called fow help. They eventually got him to the bed and did vitals where she noticed the BP was 78/50. Pt also reports headache. He is alert and oriented to person place and time. Pt endorses hx of vasovagal syncope. Patient was seen and examined at the bedside by the rapid response team. Dr. Pinto / ICU PA at bedside.    Rapid Response Vital Signs:    BP: 78/50 -> 86/51  HR: 83  RR: 19  SpO2: 87% on RA  Temp: 97.6  FS: 200    Physical Exam:  Gen: frail and ill appearing, NAD  HEENT: NCAT, PEERLA b/l, EOMI b/l  Cardio: regular rate and rhythm, +s1s2, no murmurs, rubs, or gallops  Pulm: CTA b/l, no wheezes, rales or rhonchi  Abdomen: soft, nontender, nondistended, +BS x4 quadrants, no guarding  Extremities: no cyanosis or edema, +2 pedal pulses  Neuro: AAOx3, CNII-XII intact, diffuse weakness in all extremities, sensation intact  Skin: warm and dry    Assessment/Plan:   - stat 500cc NS bolus  - tylenol PRN for headache  - f/u orthostatic vitals  - Discussed with Dr. Edwards, agrees with above plan  - Family updated by JULIETTE Enriquez  - JULIETTE to call if any changes Rapid response was called at 11:05 for hypotension    Events leading up to Rapid Response:  Patient reported dizziness upon standing from chair. Nurse was assisting patient from chair to get back in bed though noticed some weakness so called fow help. They eventually got him to the bed and did vitals where she noticed the BP was 78/50. Pt also reports headache. He is alert and oriented to person place and time. Pt endorses hx of vasovagal syncope. Patient was seen and examined at the bedside by the rapid response team. Dr. Pinto / ICU PA at bedside.    Rapid Response Vital Signs:    BP: 78/50 -> 86/51  HR: 83  RR: 19  SpO2: 87% on RA  Temp: 97.6  FS: 200    Physical Exam:  Gen: frail and ill appearing, NAD  HEENT: NCAT, PEERLA b/l, EOMI b/l  Cardio: regular rate and rhythm, +s1s2, no murmurs, rubs, or gallops  Pulm: CTA b/l, no wheezes, rales or rhonchi  Abdomen: soft, nontender, nondistended, +BS x4 quadrants, no guarding  Extremities: no cyanosis or edema, +2 pedal pulses  Neuro: AAOx3, CNII-XII intact, diffuse weakness in all extremities, sensation intact  Skin: warm and dry    Assessment/Plan:   - stat 500cc NS bolus  - tylenol PRN for headache  - f/u orthostatic vitals  - Discussed with Dr. Edwards, agrees with above plan  - Family updated by JULIETTE Enriquez  - RN to call if any changes    Addendum   Patient seen and examined at bedside. Laying in bed comfortably and sleeping. Will continue to monitor.

## 2023-11-02 NOTE — PROGRESS NOTE ADULT - ASSESSMENT
IMPRESSION    pt w ITP, Lupus Anticoagulant, HTN, anxiety on Seraquel, OCD, Merkel cell ca of right Forehead, under care Dr Elliot Eng, post 4 doses q3wks IV Keytruda, last dose 10/17/23.  Pt brought in w c/o feeling "claws, daggers" inside him, since C#3 Keytruda, with sensation initially started w various parts of face now also progressed down to sides of body to rectum(does not see actual daggers or claws,)    Wife reports after first cycle started w jaw tightness and progressive worsened w each cycle, to point of hard to swallow/can't swallow  Had tired taken off Seroquel to see if adverse effect but no improvement, now back on x 5-6days  Merkel lesion has essentially resolved    CT head no acute findings  see by Psych in ER, ?psychosis  -sensation described by pt ?psychosis vs neuropathy  -psychosis not noted to be associated w Keytruda. Neuro tox of Keytruda described includes cerebral hemorrhage, confusion, myasthenia gravis, reversible posterior leukoencephalopathy syndrome, syed neuropathy, Guillain [place], aseptic meningitis, encephalitis, transverse myelitis    TFTS unremarkable.   AM cortisol 5.8. Slight low. Unclear significance     Negative plasma PBG   Bilirubin neg  Urobilinogen normal    Sjorgen /Scleroderma studies abn.      +MICHELLE and +Ro52      RECOMMEND    PSYCH  cause of sx remains unclear.   s/p psych evaluation  Seroquel dose being titrated up.   Remeron being added    NEURO  s/p neurology eval.   possible LP is sx not better  However pt refusing today; discussed at length.   Family at bedside; aware of prior refusal    s/p additional neuro   Discussed with Dr. Magdaleno.  Sx seem less likely secondary to Keytruda appears more psychiatric sx  await course.     Consideration for steroids if autoimmune process, but no clear cause.   Plts adequate at 111, no indication for steroids for plts.     RHEUM  Sjorgen /Scleroderma studies abn.      +MICHELLE and +Ro52     would recommend outpt rheum eval.      Discussed with pt and family.     RENAL  IVF for now.   encourage PO hydration  Avoid dehydration which could make sicca sx worse.    GI  Negative CT neck and chest  Plan to attempt trial of pain meds, eg Codeine 30mg PRN for sx.   Seems to have some benefit  Continue Codeine PRN      HEME  Follow urine PBG  B12 normal  Folate adequate    discussed with patient's wife.     Further recommendations per pt course

## 2023-11-02 NOTE — PROGRESS NOTE ADULT - ASSESSMENT
73 M pmh of HTN, ITP, APL syndrome, generalized anxiety disorder, OCD, newly dx aggressive Merkel Cell cancer of forehead- pt's oncologist  Dr. Eng, at [facility] on Keytruda infusions with good response per wife  was brought to ED for worsening psychosis for the past few days, pt lately with difficulty swallowing, decreased po intake    Psychosis  monitor  psych fu  MRI noted  neuro following  may need LP  increase seroquel    #Abnormal throat sensation- sharp object sensation  ?neuralgia  swallow eval appreciated  family feels pt did improve with codeine  will try ATC dosing for a few days and monitor    #Merkel cell ca  onc c/s  r/o paraneoplastic causes    #Anxiety  psych cs appreciated  continue valium  titrating seroquel    #HTN- resume home meds  given hypotension check orthostasis, caution with meds    #DVT ppx- start hep sq      [facility]   [phone]

## 2023-11-02 NOTE — PROGRESS NOTE ADULT - SUBJECTIVE AND OBJECTIVE BOX
Patient is a 73y old  Male who presents with a chief complaint of anxietty (01 Nov 2023 13:08)      INTERVAL HPI/OVERNIGHT EVENTS: noted  pt seen and examined this am   events noted  rapid response      Vital Signs Last 24 Hrs  T(C): 37.2 (02 Nov 2023 13:52), Max: 37.2 (02 Nov 2023 13:14)  T(F): 99 (02 Nov 2023 13:52), Max: 99 (02 Nov 2023 13:14)  HR: 79 (02 Nov 2023 13:52) (72 - 103)  BP: 124/76 (02 Nov 2023 17:15) (104/60 - 174/84)  BP(mean): --  RR: 18 (02 Nov 2023 13:52) (18 - 18)  SpO2: 93% (02 Nov 2023 13:52) (92% - 95%)    Parameters below as of 02 Nov 2023 13:52  Patient On (Oxygen Delivery Method): room air        acetaminophen     Tablet .. 650 milliGRAM(s) Oral every 6 hours PRN  acetaminophen  300 mG/codeine 30 mG 1 Tablet(s) Oral every 6 hours PRN  atenolol  Tablet 50 milliGRAM(s) Oral two times a day  cholecalciferol 2000 Unit(s) Oral daily  diazepam    Tablet 5 milliGRAM(s) Oral two times a day  fluvoxaMINE 100 milliGRAM(s) Oral two times a day  heparin   Injectable 5000 Unit(s) SubCutaneous every 12 hours  lactulose Syrup 15 Gram(s) Oral two times a day PRN  losartan 100 milliGRAM(s) Oral daily  mirtazapine 7.5 milliGRAM(s) Oral at bedtime  QUEtiapine 100 milliGRAM(s) Oral at bedtime  senna 2 Tablet(s) Oral at bedtime      PHYSICAL EXAM:  GENERAL: NAD,   EYES: conjunctiva and sclera clear  ENMT: Moist mucous membranes  NECK: Supple, No JVD, Normal thyroid  CHEST/LUNG: non labored, cta b/l  HEART: Regular rate and rhythm; No murmurs, rubs, or gallops  ABDOMEN: Soft, Nontender, Nondistended; Bowel sounds present  EXTREMITIES:  2+ Peripheral Pulses, No clubbing, cyanosis, or edema  LYMPH: No lymphadenopathy noted  SKIN: No rashes or lesions    Consultant(s) Notes Reviewed:  [x ] YES  [ ] NO  Care Discussed with Consultants/Other Providers [ x] YES  [ ] NO    LABS:                        11.7   10.32 )-----------( 95       ( 02 Nov 2023 08:25 )             31.4     11-02    143  |  106  |  19  ----------------------------<  109<H>  3.8   |  30  |  1.10    Ca    9.0      02 Nov 2023 08:25        Urinalysis Basic - ( 02 Nov 2023 08:25 )    Color: x / Appearance: x / SG: x / pH: x  Gluc: 109 mg/dL / Ketone: x  / Bili: x / Urobili: x   Blood: x / Protein: x / Nitrite: x   Leuk Esterase: x / RBC: x / WBC x   Sq Epi: x / Non Sq Epi: x / Bacteria: x      CAPILLARY BLOOD GLUCOSE      POCT Blood Glucose.: 200 mg/dL (02 Nov 2023 11:06)        Urinalysis Basic - ( 02 Nov 2023 08:25 )    Color: x / Appearance: x / SG: x / pH: x  Gluc: 109 mg/dL / Ketone: x  / Bili: x / Urobili: x   Blood: x / Protein: x / Nitrite: x   Leuk Esterase: x / RBC: x / WBC x   Sq Epi: x / Non Sq Epi: x / Bacteria: x          RADIOLOGY & ADDITIONAL TESTS:    Imaging Personally Reviewed:  [x ] YES  [ ] NO

## 2023-11-02 NOTE — PROGRESS NOTE ADULT - ASSESSMENT
73 M pmh of HTN, ITP, APL syndrome, generalized anxiety disorder, OCD, newly dx aggressive Merkel Cell cancer of forehead- pt's oncologist  Dr. Eng, at [facility] on Keytruda (PD-1 blocker) infusions with good response per wife  was brought to ED for worsening psychosis for the past few days, pt believes there are sharp things inside his body trying to come out of his mouth, stuck in throat, pt lately with difficulty swallowing, decreased po intake, pt was evaluated by psych in ED. Wife reports sudden cognitive decline and behavioral changes immediately after COVID infection.    RECOMMENDATIONS  1-?PASC  -sounds like there is no obvious acute infectious issue. no fever, no leukocytosis, no obvious localization but concerning history of things developing post COVID-19 infection so raises concerns for a neurological PASC picture  -RE-ordered some testing to try to clarify but for now recommend observation off antimicrobial Rx    2-Orthostatic hypotension RRT called 11/2-am, concerns about hypovolemia, wife reports this is a chronic issue, IV fluid resusitation    Thank you for consulting us and involving us in the management of this most interesting and challenging case.  We will follow along in the care of this patient. Please call us at [phone] or text me directly on my cell# at [phone] with any concerns.

## 2023-11-02 NOTE — PROGRESS NOTE ADULT - SUBJECTIVE AND OBJECTIVE BOX
Interval History:  lethargic.  wife at bedside  Chart reviewed and events noted;   Overnight events:    MEDICATIONS  (STANDING):  atenolol  Tablet 50 milliGRAM(s) Oral two times a day  cholecalciferol 2000 Unit(s) Oral daily  diazepam    Tablet 5 milliGRAM(s) Oral two times a day  fluvoxaMINE 100 milliGRAM(s) Oral two times a day  heparin   Injectable 5000 Unit(s) SubCutaneous every 12 hours  losartan 100 milliGRAM(s) Oral daily  mirtazapine 7.5 milliGRAM(s) Oral at bedtime  QUEtiapine 100 milliGRAM(s) Oral at bedtime  senna 2 Tablet(s) Oral at bedtime    MEDICATIONS  (PRN):  acetaminophen     Tablet .. 650 milliGRAM(s) Oral every 6 hours PRN Temp greater or equal to 38.5C (101.3F), Moderate Pain (4 - 6)  acetaminophen  300 mG/codeine 30 mG 1 Tablet(s) Oral every 6 hours PRN Severe Pain (7 - 10)  lactulose Syrup 15 Gram(s) Oral two times a day PRN constipation      Vital Signs Last 24 Hrs  T(C): 36.9 (02 Nov 2023 19:48), Max: 37.2 (02 Nov 2023 13:14)  T(F): 98.5 (02 Nov 2023 19:48), Max: 99 (02 Nov 2023 13:14)  HR: 75 (02 Nov 2023 19:48) (75 - 103)  BP: 121/70 (02 Nov 2023 19:48) (104/60 - 174/84)  BP(mean): --  RR: 17 (02 Nov 2023 19:48) (17 - 18)  SpO2: 94% (02 Nov 2023 19:48) (92% - 94%)    Parameters below as of 02 Nov 2023 19:48  Patient On (Oxygen Delivery Method): room air        PHYSICAL EXAM  General: adult in NAD, chronically ill appearing  HEENT: clear oropharynx, anicteric sclera, pink conjunctivae  Neck: supple  CV: normal S1S2 with no murmur rubs or gallops  Lungs: clear to auscultation, no wheezes, no rhales  Abdomen: soft non-tender non-distended, no hepato/splenomegaly  Ext: no clubbing cyanosis or edema  Skin: no rashes and no petichiae  Neuro: lethargic      LABS:  CBC Full  -  ( 02 Nov 2023 08:25 )  WBC Count : 10.32 K/uL  RBC Count : 3.53 M/uL  Hemoglobin : 11.7 g/dL  Hematocrit : 31.4 %  Platelet Count - Automated : 95 K/uL  Mean Cell Volume : 89.0 fl  Mean Cell Hemoglobin : 33.1 pg  Mean Cell Hemoglobin Concentration : 37.3 gm/dL  Auto Neutrophil # : x  Auto Lymphocyte # : x  Auto Monocyte # : x  Auto Eosinophil # : x  Auto Basophil # : x  Auto Neutrophil % : x  Auto Lymphocyte % : x  Auto Monocyte % : x  Auto Eosinophil % : x  Auto Basophil % : x    11-02    143  |  106  |  19  ----------------------------<  109<H>  3.8   |  30  |  1.10    Ca    9.0      02 Nov 2023 08:25          fe studies      WBC trend  10.32 K/uL (11-02-23 @ 08:25)      Hgb trend  11.7 g/dL (11-02-23 @ 08:25)      plt trend  95 K/uL (11-02-23 @ 08:25)        RADIOLOGY & ADDITIONAL STUDIES:

## 2023-11-03 LAB
ANION GAP SERPL CALC-SCNC: 7 MMOL/L — SIGNIFICANT CHANGE UP (ref 5–17)
ANION GAP SERPL CALC-SCNC: 7 MMOL/L — SIGNIFICANT CHANGE UP (ref 5–17)
BUN SERPL-MCNC: 31 MG/DL — HIGH (ref 7–23)
BUN SERPL-MCNC: 31 MG/DL — HIGH (ref 7–23)
CALCIUM SERPL-MCNC: 8.9 MG/DL — SIGNIFICANT CHANGE UP (ref 8.5–10.1)
CALCIUM SERPL-MCNC: 8.9 MG/DL — SIGNIFICANT CHANGE UP (ref 8.5–10.1)
CHLORIDE SERPL-SCNC: 106 MMOL/L — SIGNIFICANT CHANGE UP (ref 96–108)
CHLORIDE SERPL-SCNC: 106 MMOL/L — SIGNIFICANT CHANGE UP (ref 96–108)
CO2 SERPL-SCNC: 31 MMOL/L — SIGNIFICANT CHANGE UP (ref 22–31)
CO2 SERPL-SCNC: 31 MMOL/L — SIGNIFICANT CHANGE UP (ref 22–31)
CREAT SERPL-MCNC: 1.6 MG/DL — HIGH (ref 0.5–1.3)
CREAT SERPL-MCNC: 1.6 MG/DL — HIGH (ref 0.5–1.3)
EGFR: 45 ML/MIN/1.73M2 — LOW
EGFR: 45 ML/MIN/1.73M2 — LOW
GLUCOSE SERPL-MCNC: 110 MG/DL — HIGH (ref 70–99)
GLUCOSE SERPL-MCNC: 110 MG/DL — HIGH (ref 70–99)
HCT VFR BLD CALC: 32.3 % — LOW (ref 39–50)
HCT VFR BLD CALC: 32.3 % — LOW (ref 39–50)
HGB BLD-MCNC: 11.1 G/DL — LOW (ref 13–17)
HGB BLD-MCNC: 11.1 G/DL — LOW (ref 13–17)
MCHC RBC-ENTMCNC: 30.2 PG — SIGNIFICANT CHANGE UP (ref 27–34)
MCHC RBC-ENTMCNC: 30.2 PG — SIGNIFICANT CHANGE UP (ref 27–34)
MCHC RBC-ENTMCNC: 34.4 GM/DL — SIGNIFICANT CHANGE UP (ref 32–36)
MCHC RBC-ENTMCNC: 34.4 GM/DL — SIGNIFICANT CHANGE UP (ref 32–36)
MCV RBC AUTO: 88 FL — SIGNIFICANT CHANGE UP (ref 80–100)
MCV RBC AUTO: 88 FL — SIGNIFICANT CHANGE UP (ref 80–100)
NRBC # BLD: 0 /100 WBCS — SIGNIFICANT CHANGE UP (ref 0–0)
NRBC # BLD: 0 /100 WBCS — SIGNIFICANT CHANGE UP (ref 0–0)
PLATELET # BLD AUTO: 100 K/UL — LOW (ref 150–400)
PLATELET # BLD AUTO: 100 K/UL — LOW (ref 150–400)
POTASSIUM SERPL-MCNC: 4.2 MMOL/L — SIGNIFICANT CHANGE UP (ref 3.5–5.3)
POTASSIUM SERPL-MCNC: 4.2 MMOL/L — SIGNIFICANT CHANGE UP (ref 3.5–5.3)
POTASSIUM SERPL-SCNC: 4.2 MMOL/L — SIGNIFICANT CHANGE UP (ref 3.5–5.3)
POTASSIUM SERPL-SCNC: 4.2 MMOL/L — SIGNIFICANT CHANGE UP (ref 3.5–5.3)
RBC # BLD: 3.67 M/UL — LOW (ref 4.2–5.8)
RBC # BLD: 3.67 M/UL — LOW (ref 4.2–5.8)
RBC # FLD: 13.2 % — SIGNIFICANT CHANGE UP (ref 10.3–14.5)
RBC # FLD: 13.2 % — SIGNIFICANT CHANGE UP (ref 10.3–14.5)
SODIUM SERPL-SCNC: 144 MMOL/L — SIGNIFICANT CHANGE UP (ref 135–145)
SODIUM SERPL-SCNC: 144 MMOL/L — SIGNIFICANT CHANGE UP (ref 135–145)
WBC # BLD: 8.32 K/UL — SIGNIFICANT CHANGE UP (ref 3.8–10.5)
WBC # BLD: 8.32 K/UL — SIGNIFICANT CHANGE UP (ref 3.8–10.5)
WBC # FLD AUTO: 8.32 K/UL — SIGNIFICANT CHANGE UP (ref 3.8–10.5)
WBC # FLD AUTO: 8.32 K/UL — SIGNIFICANT CHANGE UP (ref 3.8–10.5)

## 2023-11-03 PROCEDURE — 99231 SBSQ HOSP IP/OBS SF/LOW 25: CPT

## 2023-11-03 RX ORDER — MIRTAZAPINE 45 MG/1
15 TABLET, ORALLY DISINTEGRATING ORAL AT BEDTIME
Refills: 0 | Status: DISCONTINUED | OUTPATIENT
Start: 2023-11-03 | End: 2023-11-06

## 2023-11-03 RX ORDER — QUETIAPINE FUMARATE 200 MG/1
150 TABLET, FILM COATED ORAL AT BEDTIME
Refills: 0 | Status: DISCONTINUED | OUTPATIENT
Start: 2023-11-03 | End: 2023-11-14

## 2023-11-03 RX ADMIN — Medication 2000 UNIT(S): at 11:20

## 2023-11-03 RX ADMIN — Medication 5 MILLIGRAM(S): at 06:35

## 2023-11-03 RX ADMIN — ATENOLOL 50 MILLIGRAM(S): 25 TABLET ORAL at 06:35

## 2023-11-03 RX ADMIN — FLUVOXAMINE MALEATE 100 MILLIGRAM(S): 25 TABLET ORAL at 19:29

## 2023-11-03 RX ADMIN — MIRTAZAPINE 15 MILLIGRAM(S): 45 TABLET, ORALLY DISINTEGRATING ORAL at 21:37

## 2023-11-03 RX ADMIN — QUETIAPINE FUMARATE 150 MILLIGRAM(S): 200 TABLET, FILM COATED ORAL at 21:33

## 2023-11-03 RX ADMIN — FLUVOXAMINE MALEATE 100 MILLIGRAM(S): 25 TABLET ORAL at 06:35

## 2023-11-03 RX ADMIN — SENNA PLUS 2 TABLET(S): 8.6 TABLET ORAL at 21:37

## 2023-11-03 RX ADMIN — LOSARTAN POTASSIUM 100 MILLIGRAM(S): 100 TABLET, FILM COATED ORAL at 06:35

## 2023-11-03 RX ADMIN — HEPARIN SODIUM 5000 UNIT(S): 5000 INJECTION INTRAVENOUS; SUBCUTANEOUS at 18:52

## 2023-11-03 RX ADMIN — ATENOLOL 50 MILLIGRAM(S): 25 TABLET ORAL at 19:29

## 2023-11-03 RX ADMIN — Medication 5 MILLIGRAM(S): at 19:32

## 2023-11-03 NOTE — PROGRESS NOTE ADULT - ASSESSMENT
73 M pmh of HTN, ITP, APL syndrome, generalized anxiety disorder, OCD, newly dx aggressive Merkel Cell cancer of forehead- pt's oncologist  Dr. Eng, at [facility] on Keytruda infusions with good response per wife  was brought to ED for worsening psychosis for the past few days, pt lately with difficulty swallowing, decreased po intake    Psychosis  monitor  psych fu  MRI noted  neuro following  may need LP  increase seroquel to 150  remeron started increased to 15    #Anxiety  psych cs appreciated  continue valium  titrating seroquel    #Abnormal throat sensation- sharp object sensation  ?neuralgia  swallow eval appreciated      #Merkel cell ca  onc c/s  r/o paraneoplastic causes    #HTN- +orthostasis  #Orthostatic hypotension- hold losartan  cont atenolol w parameters    #DVT ppx- start hep sq      [facility]   [phone]

## 2023-11-03 NOTE — PROGRESS NOTE ADULT - SUBJECTIVE AND OBJECTIVE BOX
Neurology Follow up note    JANICE [id number]    HPI:  hx obtained from wife  73 M pmh of HTN, ITP, APL syndrome, generalized anxiety disorder, OCD, newly dx aggressive Merkel Cell cancer of forehead- pt's oncologist  Dr. Eng, at [facility] on Keytruda infusions with good response per wife  was brought o ED for worsening psychosis for the past few days, pt believes there are sharp things inside his body trying to come out of his mouth, stuck in throat  pt lately with difficulty swallowing, decreased po intake  pt was evaluated by psych in ED (24 Oct 2023 13:19)      Interval History - no new events    Patient is seen, chart was reviewed and case was discussed with the treatment team.  Pt is not in any distress.   Lying on bed comfortably.   .  Vital Signs Last 24 Hrs  T(C): 36.7 (03 Nov 2023 12:34), Max: 36.9 (02 Nov 2023 19:48)  T(F): 98.1 (03 Nov 2023 12:34), Max: 98.5 (02 Nov 2023 19:48)  HR: 80 (03 Nov 2023 12:34) (75 - 87)  BP: 106/62 (03 Nov 2023 12:34) (104/63 - 126/72)  BP(mean): --  RR: 18 (03 Nov 2023 12:34) (17 - 18)  SpO2: 92% (03 Nov 2023 12:34) (92% - 94%)    Parameters below as of 03 Nov 2023 12:34  Patient On (Oxygen Delivery Method): room air    r                      REVIEW OF SYSTEMS:    Constitutional: No fever,  Eyes: No eye pain, visual disturbances, or discharge  ENT:  No difficulty hearing, tinnitus, vertigo; No sinus or throat pain  Neck: No pain or stiffness  Respiratory: No cough, wheezing, chills or hemoptysis  Cardiovascular: No chest pain, palpitations, shortness of breath, dizziness or leg swelling  Gastrointestinal: No abdominal or epigastric pain. No nausea, vomiting or hematemesis;   Genitourinary: No dysuria, frequency,   Neurological: No headaches, memory loss, loss of strength,  Psychiatric: No depression, anxiety, mood swings or difficulty sleeping  Musculoskeletal: No joint pain or swelling;   Skin: No itching, burning, rashes or lesions   Lymph Nodes: No enlarged glands  Endocrine: No heat or cold intolerance; No hair loss,  Allergy and Immunologic: No hives or eczema    On Neurological Examination:    Mental Status - Pt is alert, awake, oriented X3   Follows commands well and able to answer questions appropriately.Mood and affect  normal    Speech -  Pt has dysarthria.    Cranial Nerves - Pupils 3 mm equal and reactive to light, extraocular eye movements intact. Pt has no visual field deficit.  Pt has no  facial asymmetry. Facial sensation is intact.Tongue - is in midline.    Muscle tone - is normal      Motor Exam - 4/5 all over, No drift. No shaking or tremors.    Sensory Exam - . Pt withdraws all extremities equally on stimulation. No asymmetry seen. No complaints of tingling, numbness.           coordination:    Finger to nose: normal      Deep tendon Reflexes - 2 plus all over.          Neck Supple -  Yes.    MEDICATIONS  (STANDING):  atenolol  Tablet 50 milliGRAM(s) Oral two times a day  cholecalciferol 2000 Unit(s) Oral daily  diazepam    Tablet 5 milliGRAM(s) Oral two times a day  fluvoxaMINE 100 milliGRAM(s) Oral two times a day  heparin   Injectable 5000 Unit(s) SubCutaneous every 12 hours  mirtazapine 15 milliGRAM(s) Oral at bedtime  QUEtiapine 150 milliGRAM(s) Oral at bedtime  senna 2 Tablet(s) Oral at bedtime    MEDICATIONS  (PRN):  acetaminophen     Tablet .. 650 milliGRAM(s) Oral every 6 hours PRN Temp greater or equal to 38.5C (101.3F), Moderate Pain (4 - 6)  acetaminophen  300 mG/codeine 30 mG 1 Tablet(s) Oral every 6 hours PRN Severe Pain (7 - 10)  lactulose Syrup 15 Gram(s) Oral two times a day PRN constipation      Allergies    No Known Allergies    Intolerances      Hemoglobin A1C:     Vitamin B12     RADIOLOGY    ASSESSMENT AND PLAN:      seen for ams  doubt paraneoplastic   brain mri- no acute cva/mets  poor APPETITE    continue remeron  increased seroquel 150  mg hs for psychosis  patient and wife LP  Physical therapy evaluation.  Pain is accessed and addressed.  Plan of care was discussed with family. Questions answered.  Would continue to follow.

## 2023-11-03 NOTE — PROGRESS NOTE ADULT - SUBJECTIVE AND OBJECTIVE BOX
Interval History:  remains lethargic  wife at bedside  seen by Dr. Henry today with meds adjusted    Chart reviewed and events noted;   Overnight events:    MEDICATIONS  (STANDING):  atenolol  Tablet 50 milliGRAM(s) Oral two times a day  cholecalciferol 2000 Unit(s) Oral daily  diazepam    Tablet 5 milliGRAM(s) Oral two times a day  fluvoxaMINE 100 milliGRAM(s) Oral two times a day  heparin   Injectable 5000 Unit(s) SubCutaneous every 12 hours  mirtazapine 15 milliGRAM(s) Oral at bedtime  QUEtiapine 150 milliGRAM(s) Oral at bedtime  senna 2 Tablet(s) Oral at bedtime    MEDICATIONS  (PRN):  acetaminophen     Tablet .. 650 milliGRAM(s) Oral every 6 hours PRN Temp greater or equal to 38.5C (101.3F), Moderate Pain (4 - 6)  acetaminophen  300 mG/codeine 30 mG 1 Tablet(s) Oral every 6 hours PRN Severe Pain (7 - 10)  lactulose Syrup 15 Gram(s) Oral two times a day PRN constipation      Vital Signs Last 24 Hrs  T(C): 36.7 (03 Nov 2023 12:34), Max: 36.9 (02 Nov 2023 19:48)  T(F): 98.1 (03 Nov 2023 12:34), Max: 98.5 (02 Nov 2023 19:48)  HR: 80 (03 Nov 2023 12:34) (75 - 87)  BP: 106/62 (03 Nov 2023 12:34) (104/63 - 126/72)  BP(mean): --  RR: 18 (03 Nov 2023 12:34) (17 - 18)  SpO2: 92% (03 Nov 2023 12:34) (92% - 94%)    Parameters below as of 03 Nov 2023 12:34  Patient On (Oxygen Delivery Method): room air        PHYSICAL EXAM  General: adult in NAD, chronically ill appearing  HEENT: clear oropharynx, anicteric sclera, pink conjunctivae  Neck: supple  CV: normal S1S2 with no murmur rubs or gallops  Lungs: clear to auscultation, no wheezes, no rhales  Abdomen: soft non-tender non-distended, no hepato/splenomegaly  Ext: no clubbing cyanosis or edema  Skin: no rashes and no petichiae  Neuro: lethargic      LABS:  CBC Full  -  ( 03 Nov 2023 07:18 )  WBC Count : 8.32 K/uL  RBC Count : 3.67 M/uL  Hemoglobin : 11.1 g/dL  Hematocrit : 32.3 %  Platelet Count - Automated : 100 K/uL  Mean Cell Volume : 88.0 fl  Mean Cell Hemoglobin : 30.2 pg  Mean Cell Hemoglobin Concentration : 34.4 gm/dL  Auto Neutrophil # : x  Auto Lymphocyte # : x  Auto Monocyte # : x  Auto Eosinophil # : x  Auto Basophil # : x  Auto Neutrophil % : x  Auto Lymphocyte % : x  Auto Monocyte % : x  Auto Eosinophil % : x  Auto Basophil % : x    11-03    144  |  106  |  31<H>  ----------------------------<  110<H>  4.2   |  31  |  1.60<H>    Ca    8.9      03 Nov 2023 07:18          fe studies      WBC trend  8.32 K/uL (11-03-23 @ 07:18)  10.32 K/uL (11-02-23 @ 08:25)      Hgb trend  11.1 g/dL (11-03-23 @ 07:18)  11.7 g/dL (11-02-23 @ 08:25)      plt trend  100 K/uL (11-03-23 @ 07:18)  95 K/uL (11-02-23 @ 08:25)        RADIOLOGY & ADDITIONAL STUDIES:

## 2023-11-03 NOTE — PROGRESS NOTE ADULT - SUBJECTIVE AND OBJECTIVE BOX
Patient is a 73y old  Male who presents with a chief complaint of anxietty (01 Nov 2023 13:08)      INTERVAL HPI/OVERNIGHT EVENTS: noted  pt seen and examined this am   events noted  improved po per wife      Vital Signs Last 24 Hrs  T(C): 36.7 (03 Nov 2023 12:34), Max: 36.9 (02 Nov 2023 19:48)  T(F): 98.1 (03 Nov 2023 12:34), Max: 98.5 (02 Nov 2023 19:48)  HR: 80 (03 Nov 2023 12:34) (75 - 87)  BP: 106/62 (03 Nov 2023 12:34) (104/63 - 126/72)  BP(mean): --  RR: 18 (03 Nov 2023 12:34) (17 - 18)  SpO2: 92% (03 Nov 2023 12:34) (92% - 94%)    Parameters below as of 03 Nov 2023 12:34  Patient On (Oxygen Delivery Method): room air        acetaminophen     Tablet .. 650 milliGRAM(s) Oral every 6 hours PRN  acetaminophen  300 mG/codeine 30 mG 1 Tablet(s) Oral every 6 hours PRN  atenolol  Tablet 50 milliGRAM(s) Oral two times a day  cholecalciferol 2000 Unit(s) Oral daily  diazepam    Tablet 5 milliGRAM(s) Oral two times a day  fluvoxaMINE 100 milliGRAM(s) Oral two times a day  heparin   Injectable 5000 Unit(s) SubCutaneous every 12 hours  lactulose Syrup 15 Gram(s) Oral two times a day PRN  losartan 100 milliGRAM(s) Oral daily  mirtazapine 15 milliGRAM(s) Oral at bedtime  QUEtiapine 150 milliGRAM(s) Oral at bedtime  senna 2 Tablet(s) Oral at bedtime      PHYSICAL EXAM:  GENERAL: NAD,   EYES: conjunctiva and sclera clear  ENMT: Moist mucous membranes  NECK: Supple, No JVD, Normal thyroid  CHEST/LUNG: non labored, cta b/l  HEART: Regular rate and rhythm; No murmurs, rubs, or gallops  ABDOMEN: Soft, Nontender, Nondistended; Bowel sounds present  EXTREMITIES:  2+ Peripheral Pulses, No clubbing, cyanosis, or edema  LYMPH: No lymphadenopathy noted  SKIN: No rashes or lesions    Consultant(s) Notes Reviewed:  [x ] YES  [ ] NO  Care Discussed with Consultants/Other Providers [ x] YES  [ ] NO    LABS:                        11.1   8.32  )-----------( 100      ( 03 Nov 2023 07:18 )             32.3     11-03    144  |  106  |  31<H>  ----------------------------<  110<H>  4.2   |  31  |  1.60<H>    Ca    8.9      03 Nov 2023 07:18        Urinalysis Basic - ( 03 Nov 2023 07:18 )    Color: x / Appearance: x / SG: x / pH: x  Gluc: 110 mg/dL / Ketone: x  / Bili: x / Urobili: x   Blood: x / Protein: x / Nitrite: x   Leuk Esterase: x / RBC: x / WBC x   Sq Epi: x / Non Sq Epi: x / Bacteria: x      CAPILLARY BLOOD GLUCOSE            Urinalysis Basic - ( 03 Nov 2023 07:18 )    Color: x / Appearance: x / SG: x / pH: x  Gluc: 110 mg/dL / Ketone: x  / Bili: x / Urobili: x   Blood: x / Protein: x / Nitrite: x   Leuk Esterase: x / RBC: x / WBC x   Sq Epi: x / Non Sq Epi: x / Bacteria: x          RADIOLOGY & ADDITIONAL TESTS:    Imaging Personally Reviewed:  [x ] YES  [ ] NO

## 2023-11-03 NOTE — PROGRESS NOTE ADULT - ASSESSMENT
IMPRESSION    pt w ITP, Lupus Anticoagulant, HTN, anxiety on Seraquel, OCD, Merkel cell ca of right Forehead, under care Dr Elliot Eng, post 4 doses q3wks IV Keytruda, last dose 10/17/23.  Pt brought in w c/o feeling "claws, daggers" inside him, since C#3 Keytruda, with sensation initially started w various parts of face now also progressed down to sides of body to rectum(does not see actual daggers or claws,)    Wife reports after first cycle started w jaw tightness and progressive worsened w each cycle, to point of hard to swallow/can't swallow  Had tired taken off Seroquel to see if adverse effect but no improvement, now back on x 5-6days  Merkel lesion has essentially resolved    CT head no acute findings  see by Psych in ER, ?psychosis  -sensation described by pt ?psychosis vs neuropathy  -psychosis not noted to be associated w Keytruda. Neuro tox of Keytruda described includes cerebral hemorrhage, confusion, myasthenia gravis, reversible posterior leukoencephalopathy syndrome, syed neuropathy, Guillain [place], aseptic meningitis, encephalitis, transverse myelitis    TFTS unremarkable.   AM cortisol 5.8. Slight low. Unclear significance     Negative plasma PBG   Bilirubin neg  Urobilinogen normal    Sjorgen /Scleroderma studies abn.      +MICHELLE and +Ro52    no significant change.  seen by psych with meds adjusted    RECOMMEND    PSYCH  cause of sx remains unclear.   s/p psych evaluation  Seroquel dose being titrated up.   Remeron being added    NEURO  s/p neurology eval.   possible LP is sx not better  However pt refusing today; discussed at length.   Family at bedside; aware of prior refusal    s/p additional neuro   Discussed with Dr. Magdaleno.  Sx seem less likely secondary to Keytruda appears more psychiatric sx  await course.     Consideration for steroids if autoimmune process, but no clear cause.   Plts adequate at 111, no indication for steroids for plts.     RHEUM  Sjorgen /Scleroderma studies abn.      +MICHELLE and +Ro52     would recommend outpt rheum eval.      Discussed with pt and family.     RENAL  IVF for now.   encourage PO hydration  Avoid dehydration which could make sicca sx worse.    GI  Negative CT neck and chest  Plan to attempt trial of pain meds, eg Codeine 30mg PRN for sx.   Seems to have some benefit  Continue Codeine PRN      HEME  Follow urine PBG  B12 normal  Folate adequate    discussed with patient's wife.     Further recommendations per pt course

## 2023-11-03 NOTE — PROGRESS NOTE ADULT - SUBJECTIVE AND OBJECTIVE BOX
[facility] DIV[facility] of INFECTIOUS DISEASE  Eric Reyes MD PhD, Milena Underwood MD, Jillian Mckoy MD, Enid Everett MD, Jay Krishna MD  and providing coverage with Arthur Pepper MD  Providing Infectious Disease Consultations at [facility], [facility], [facility]'s    Office# [phone] to schedule follow up appointments  Answering Service for urgent calls or New Consults [phone]  Cell# to text for urgent issues Eric Reyes [phone]     infectious diseases progress note:    JANICE RENDON is a 73y y. o. Male patient    Overnight and events of the last 24hrs reviewed    Allergies    No Known Allergies    Intolerances        ANTIBIOTICS/RELEVANT:  antimicrobials    immunologic:    OTHER:  acetaminophen     Tablet .. 650 milliGRAM(s) Oral every 6 hours PRN  acetaminophen  300 mG/codeine 30 mG 1 Tablet(s) Oral every 6 hours PRN  atenolol  Tablet 50 milliGRAM(s) Oral two times a day  cholecalciferol 2000 Unit(s) Oral daily  diazepam    Tablet 5 milliGRAM(s) Oral two times a day  fluvoxaMINE 100 milliGRAM(s) Oral two times a day  heparin   Injectable 5000 Unit(s) SubCutaneous every 12 hours  lactulose Syrup 15 Gram(s) Oral two times a day PRN  losartan 100 milliGRAM(s) Oral daily  mirtazapine 7.5 milliGRAM(s) Oral at bedtime  QUEtiapine 100 milliGRAM(s) Oral at bedtime  senna 2 Tablet(s) Oral at bedtime      Objective:  Vital Signs Last 24 Hrs  T(C): 36.7 (03 Nov 2023 10:12), Max: 37.2 (02 Nov 2023 13:14)  T(F): 98.1 (03 Nov 2023 10:12), Max: 99 (02 Nov 2023 13:14)  HR: 85 (03 Nov 2023 10:12) (75 - 87)  BP: 104/63 (03 Nov 2023 10:12) (104/60 - 126/72)  BP(mean): --  RR: 18 (03 Nov 2023 10:12) (17 - 18)  SpO2: 93% (03 Nov 2023 10:12) (92% - 94%)    Parameters below as of 03 Nov 2023 10:12  Patient On (Oxygen Delivery Method): room air        T(C): 36.7 (11-03-23 @ 10:12), Max: 37.2 (11-02-23 @ 13:14)  T(C): 36.7 (11-03-23 @ 10:12), Max: 37.2 (11-02-23 @ 13:14)  T(C): 36.7 (11-03-23 @ 10:12), Max: 37.3 (10-31-23 @ 06:18)    PHYSICAL EXAM:  HEENT: NC atraumatic  Neck: supple  Respiratory: no accessory muscle use, breathing comfortably  Cardiovascular: distant  Gastrointestinal: normal appearing, nondistended  Extremities: no clubbing, no cyanosis,        LABS:                          11.1   8.32  )-----------( 100      ( 03 Nov 2023 07:18 )             32.3       WBC  8.32 11-03 @ 07:18  10.32 11-02 @ 08:25  4.31 10-30 @ 07:55  5.15 10-27 @ 12:33      11-03    144  |  106  |  31<H>  ----------------------------<  110<H>  4.2   |  31  |  1.60<H>    Ca    8.9      03 Nov 2023 07:18        Creatinine: 1.60 mg/dL (11-03-23 @ 07:18)  Creatinine: 1.10 mg/dL (11-02-23 @ 08:25)  Creatinine: 0.90 mg/dL (10-30-23 @ 07:55)        Urinalysis Basic - ( 03 Nov 2023 07:18 )    Color: x / Appearance: x / SG: x / pH: x  Gluc: 110 mg/dL / Ketone: x  / Bili: x / Urobili: x   Blood: x / Protein: x / Nitrite: x   Leuk Esterase: x / RBC: x / WBC x   Sq Epi: x / Non Sq Epi: x / Bacteria: x            INFLAMMATORY MARKERS      MICROBIOLOGY:    Ranjan-Barr Virus Serologic Test (11.02.23 @ 08:25)    Ranjan-Barr Virus Capsid Antigen IgG: Positive: Ranjan-Barr Virus VCA IgG Antibody  Method: LiaBetable Chemiluminescent Immunoassay  Reference Range: (Expressed in U/mL)       Negative        < 18.0 U/mL       Equivocal      18.0 - 21.9 U/mL       Positive         >= 22.0 U/mL   Ranjan-Barr Nuclear Antigen: Positive: Ranjan-Barr Virus NA IgG Antibody  Method: Liaison Chemiluminescent Immunoassay  Reference Range: (Expressed in U/mL)       Negative        < 18.0 U/mL       Equivocal      18.0 - 21.9 U/mL       Positive         >= 22.0 U/mL   Ranjan Barr Virus IgM Antibody: Positive: Ranjan-Barr Virus VCA IgM Antibody  Method: Liaison Chemiluminescent Immunoassay  Reference Range: (Expressed in U/mL)       Negative     < 36.0 U/mL       Equivocal    36.0 - 43.9 U/mL       Positive       >= 44.0 U/mL   Ranjan-Barr Virus Early Antigen: Positive: Ranjan-Barr Virus EA IgG Antibody  Method: Liaison Chemiluminescent Immunoassay  Reference Range: (Expressed in U/mL)       Negative        < 9.0 U/mL       Equivocal       9.0 - 10.9 U/mL       Positive          >= 11.0 U/mL   EBV Interpretation: See Note: Interpretation of EBV-Specific Antibody Results  No previous infection  EBV VCA IgM = Negative  EBV VCA IgG  = Negative  EBV EA IgG     = Negative  EBV NA IgG    = Negative  Acute/primary infection  EBV VCA IgM = Positive  EBV VCA IgG  = Positive  EBV EA IgG     = Positive/Negative  EBV NA IgG    = Negative  Recent infection  EBV VCA IgM = Positive/Negative  EBV VCA IgG  = Positive  EBV EA IgG     = Positive/Negative  EBV NA IgG    = Positive/Negative  Past infection  EBV VCA IgM = Negative  EBV VCA IgG  = Positive  EBV EA IgG     = Negative  EBV NA IgG    = Positive  Reactivation  EBV VCA IgM = Positive/Negative  EBV VCA IgG  = Positive  EBV EA IgG     = Positive  EBV NA IgG    = Positive  VCA = Viral capsid antigen  EA = Early antigen  NA =Nuclear antigen   EBV VCA IgG EIA: 449.0 U/mL   EBNA IgG EIA: 27.6 U/mL   EBV VCA IgM EIA: 56.2 U/mL   EBV EA Ab EIA: >150.0 U/mL        RADIOLOGY & ADDITIONAL STUDIES:

## 2023-11-03 NOTE — PROGRESS NOTE ADULT - ASSESSMENT
73 M pmh of HTN, ITP, APL syndrome, generalized anxiety disorder, OCD, newly dx aggressive Merkel Cell cancer of forehead- pt's oncologist  Dr. Eng, at [facility] on Keytruda (PD-1 blocker) infusions with good response per wife  was brought to ED for worsening psychosis for the past few days, pt believes there are sharp things inside his body trying to come out of his mouth, stuck in throat, pt lately with difficulty swallowing, decreased po intake, pt was evaluated by psych in ED. Wife reports sudden cognitive decline and behavioral changes immediately after COVID infection.    RECOMMENDATIONS  1-?PASC  -sounds like there is no obvious acute infectious issue. no fever, no leukocytosis, no obvious localization but concerning history of things developing post COVID-19 infection so raises concerns for a neurological PASC picture  noted serology for EBV consistent with what is described post PASC  EBV VCA IgM = Positive  EBV VCA IgG  = Positive  EBV EA IgG     = Positive  EBV NA IgG    = Positive  -serotonin pending, will add EBV DNA and 24 hour urine cortisol  recommend observation off antimicrobial Rx    2-Orthostatic hypotension RRT called 11/2-am, concerns about hypovolemia, wife reports this is a chronic issue, IV fluid resuscitation    Thank you for consulting us and involving us in the management of this most interesting and challenging case.  We will follow along in the care of this patient. Please call us at [phone] or text me directly on my cell# at [phone] with any concerns.

## 2023-11-04 LAB
A1C WITH ESTIMATED AVERAGE GLUCOSE RESULT: 4.9 % — SIGNIFICANT CHANGE UP (ref 4–5.6)
A1C WITH ESTIMATED AVERAGE GLUCOSE RESULT: 4.9 % — SIGNIFICANT CHANGE UP (ref 4–5.6)
ANION GAP SERPL CALC-SCNC: 7 MMOL/L — SIGNIFICANT CHANGE UP (ref 5–17)
ANION GAP SERPL CALC-SCNC: 7 MMOL/L — SIGNIFICANT CHANGE UP (ref 5–17)
BUN SERPL-MCNC: 34 MG/DL — HIGH (ref 7–23)
BUN SERPL-MCNC: 34 MG/DL — HIGH (ref 7–23)
CALCIUM SERPL-MCNC: 8.7 MG/DL — SIGNIFICANT CHANGE UP (ref 8.5–10.1)
CALCIUM SERPL-MCNC: 8.7 MG/DL — SIGNIFICANT CHANGE UP (ref 8.5–10.1)
CHLORIDE SERPL-SCNC: 107 MMOL/L — SIGNIFICANT CHANGE UP (ref 96–108)
CHLORIDE SERPL-SCNC: 107 MMOL/L — SIGNIFICANT CHANGE UP (ref 96–108)
CO2 SERPL-SCNC: 31 MMOL/L — SIGNIFICANT CHANGE UP (ref 22–31)
CO2 SERPL-SCNC: 31 MMOL/L — SIGNIFICANT CHANGE UP (ref 22–31)
CREAT SERPL-MCNC: 1.7 MG/DL — HIGH (ref 0.5–1.3)
CREAT SERPL-MCNC: 1.7 MG/DL — HIGH (ref 0.5–1.3)
EGFR: 42 ML/MIN/1.73M2 — LOW
EGFR: 42 ML/MIN/1.73M2 — LOW
ESTIMATED AVERAGE GLUCOSE: 94 MG/DL — SIGNIFICANT CHANGE UP (ref 68–114)
ESTIMATED AVERAGE GLUCOSE: 94 MG/DL — SIGNIFICANT CHANGE UP (ref 68–114)
GLUCOSE SERPL-MCNC: 142 MG/DL — HIGH (ref 70–99)
GLUCOSE SERPL-MCNC: 142 MG/DL — HIGH (ref 70–99)
HCT VFR BLD CALC: 30.1 % — LOW (ref 39–50)
HCT VFR BLD CALC: 30.1 % — LOW (ref 39–50)
HGB BLD-MCNC: 10.3 G/DL — LOW (ref 13–17)
HGB BLD-MCNC: 10.3 G/DL — LOW (ref 13–17)
MCHC RBC-ENTMCNC: 30.2 PG — SIGNIFICANT CHANGE UP (ref 27–34)
MCHC RBC-ENTMCNC: 30.2 PG — SIGNIFICANT CHANGE UP (ref 27–34)
MCHC RBC-ENTMCNC: 34.2 GM/DL — SIGNIFICANT CHANGE UP (ref 32–36)
MCHC RBC-ENTMCNC: 34.2 GM/DL — SIGNIFICANT CHANGE UP (ref 32–36)
MCV RBC AUTO: 88.3 FL — SIGNIFICANT CHANGE UP (ref 80–100)
MCV RBC AUTO: 88.3 FL — SIGNIFICANT CHANGE UP (ref 80–100)
NRBC # BLD: 0 /100 WBCS — SIGNIFICANT CHANGE UP (ref 0–0)
NRBC # BLD: 0 /100 WBCS — SIGNIFICANT CHANGE UP (ref 0–0)
PLATELET # BLD AUTO: 62 K/UL — LOW (ref 150–400)
PLATELET # BLD AUTO: 62 K/UL — LOW (ref 150–400)
POTASSIUM SERPL-MCNC: 4.3 MMOL/L — SIGNIFICANT CHANGE UP (ref 3.5–5.3)
POTASSIUM SERPL-MCNC: 4.3 MMOL/L — SIGNIFICANT CHANGE UP (ref 3.5–5.3)
POTASSIUM SERPL-SCNC: 4.3 MMOL/L — SIGNIFICANT CHANGE UP (ref 3.5–5.3)
POTASSIUM SERPL-SCNC: 4.3 MMOL/L — SIGNIFICANT CHANGE UP (ref 3.5–5.3)
RBC # BLD: 3.41 M/UL — LOW (ref 4.2–5.8)
RBC # BLD: 3.41 M/UL — LOW (ref 4.2–5.8)
RBC # FLD: 13.2 % — SIGNIFICANT CHANGE UP (ref 10.3–14.5)
RBC # FLD: 13.2 % — SIGNIFICANT CHANGE UP (ref 10.3–14.5)
SODIUM SERPL-SCNC: 145 MMOL/L — SIGNIFICANT CHANGE UP (ref 135–145)
SODIUM SERPL-SCNC: 145 MMOL/L — SIGNIFICANT CHANGE UP (ref 135–145)
WBC # BLD: 11.81 K/UL — HIGH (ref 3.8–10.5)
WBC # BLD: 11.81 K/UL — HIGH (ref 3.8–10.5)
WBC # FLD AUTO: 11.81 K/UL — HIGH (ref 3.8–10.5)
WBC # FLD AUTO: 11.81 K/UL — HIGH (ref 3.8–10.5)

## 2023-11-04 RX ORDER — SODIUM CHLORIDE 9 MG/ML
1000 INJECTION INTRAMUSCULAR; INTRAVENOUS; SUBCUTANEOUS
Refills: 0 | Status: DISCONTINUED | OUTPATIENT
Start: 2023-11-04 | End: 2023-11-05

## 2023-11-04 RX ADMIN — FLUVOXAMINE MALEATE 100 MILLIGRAM(S): 25 TABLET ORAL at 18:01

## 2023-11-04 RX ADMIN — SENNA PLUS 2 TABLET(S): 8.6 TABLET ORAL at 21:08

## 2023-11-04 RX ADMIN — QUETIAPINE FUMARATE 150 MILLIGRAM(S): 200 TABLET, FILM COATED ORAL at 21:08

## 2023-11-04 RX ADMIN — SODIUM CHLORIDE 100 MILLILITER(S): 9 INJECTION INTRAMUSCULAR; INTRAVENOUS; SUBCUTANEOUS at 21:09

## 2023-11-04 RX ADMIN — ATENOLOL 50 MILLIGRAM(S): 25 TABLET ORAL at 05:37

## 2023-11-04 RX ADMIN — HEPARIN SODIUM 5000 UNIT(S): 5000 INJECTION INTRAVENOUS; SUBCUTANEOUS at 05:37

## 2023-11-04 RX ADMIN — FLUVOXAMINE MALEATE 100 MILLIGRAM(S): 25 TABLET ORAL at 05:36

## 2023-11-04 RX ADMIN — Medication 2000 UNIT(S): at 12:26

## 2023-11-04 RX ADMIN — Medication 5 MILLIGRAM(S): at 18:01

## 2023-11-04 RX ADMIN — HEPARIN SODIUM 5000 UNIT(S): 5000 INJECTION INTRAVENOUS; SUBCUTANEOUS at 18:01

## 2023-11-04 RX ADMIN — Medication 5 MILLIGRAM(S): at 05:37

## 2023-11-04 RX ADMIN — MIRTAZAPINE 15 MILLIGRAM(S): 45 TABLET, ORALLY DISINTEGRATING ORAL at 21:08

## 2023-11-04 NOTE — PROGRESS NOTE ADULT - ASSESSMENT
73 M pmh of HTN, ITP, APL syndrome, generalized anxiety disorder, OCD, newly dx aggressive Merkel Cell cancer of forehead- pt's oncologist  Dr. Eng, at [facility] on Keytruda infusions with good response per wife  was brought to ED for worsening psychosis for the past few days, pt lately with difficulty swallowing, decreased po intake    Psychosis  monitor  psych fu  MRI noted  neuro following  may need LP  increase seroquel to 150  remeron started increased to 15    #Anxiety  psych cs appreciated  continue valium  titrating seroquel    #Abnormal throat sensation- sharp object sensation  ?neuralgia  swallow eval appreciated      #Merkel cell ca  onc c/s  r/o paraneoplastic causes    #HTN- +orthostasis  #Orthostatic hypotension- hold losartan  cont atenolol w parameters  ? psych med induced  cotisol low-? sec adrenal insufficiency-?need stress steroids  endo cs    #DVT ppx- start hep sq      [facility]   [phone] 73 M pmh of HTN, ITP, APL syndrome, generalized anxiety disorder, OCD, newly dx aggressive Merkel Cell cancer of forehead- pt's oncologist  Dr. Eng, at [facility] on Keytruda infusions with good response per wife  was brought to ED for worsening psychosis for the past few days, pt lately with difficulty swallowing, decreased po intake    Psychosis  monitor  psych fu  MRI noted  neuro following  may need LP  increase seroquel to 150  remeron started increased to 15    #HTN- +orthostasis  #Orthostatic hypotension- hold losartan, inc IVF NS 150cc/hr  cont atenolol w parameters  ? psych med induced  cotisol low-? sec adrenal insufficiency-?need stress steroids  endo cs      #Anxiety  psych cs appreciated  continue valium  titrating seroquel    #Abnormal throat sensation- sharp object sensation  ?neuralgia  swallow eval appreciated      #Merkel cell ca  onc c/s  r/o paraneoplastic causes      #DVT ppx- start hep sq      [facility]   [phone]

## 2023-11-04 NOTE — CHART NOTE - NSCHARTNOTEFT_GEN_A_CORE
Assessment:   Brief History: Patient is a 73y old  Male who presents with a chief complaint of anxiety  subjective sensation of "needles inside me" (30 Oct 2023 14:53)  pmh of HTN, ITP, APL syndrome, generalized anxiety disorder, OCD, newly dx aggressive Merkel Cell cancer of forehead- pt's oncologist  Dr. Eng, at [facility] on Keytruda infusions with good response per wife  was brought to ED for worsening psychosis for the past few days, pt lately with difficulty swallowing, decreased po intake    Pt seen at bedside with wife present, she reports his po intake has improved slightly over the past few days but continues to refuse to eat saying he feels like he has knives in his throat. Pt seen by psych with new order to begin Remeron 15mg daily known to have appetite stimulating effect, will monitor for effect. This RD discussed alternative methods of nutrition if pt continues to not meet nutritional needs, questions answered and discussed, wife and pt defers enteral feeding at this time. Enjoys ice cream, wife asking about regular ice cream, pt may benefit from swallow eval to re-eval swallow function of liquids. Per NSG, wife has been feeding pt every day encouraging po intake of food and fluids. Pt receiving magic cup at meals TID and ensure plus high protein TID consuming well with encouragement. No c/o N/V/D/C at this time. Trend weights for accuracy. RD to f/u.     Factors impacting intake: [ ] none [ ] nausea  [ ] vomiting [ ] diarrhea [ ] constipation  [ ]chewing problems [ x ] swallowing issues  [ ] other: dementia with psychosis, severe anxiety    Diet Presciption: Diet, Pureed:   Mildly Thick Liquids (MILDTHICKLIQS)  Supplement Feeding Modality:  Oral  Ensure Enlive Cans or Servings Per Day:  1       Frequency:  Three Times a day (10-28-23 @ 09:41)    Intake: 25%-50% of food and supplements, mostly 25%    Current Weight:   (11/1) 152.1#  (10/25) 174.3#  (10/23) 149.9#  Will continue to monitor weight trends, wt from 10/25 likely inaccurate       Pertinent Medications: MEDICATIONS  (STANDING):  atenolol  Tablet 50 milliGRAM(s) Oral two times a day  cholecalciferol 2000 Unit(s) Oral daily  diazepam    Tablet 5 milliGRAM(s) Oral two times a day  fluvoxaMINE 100 milliGRAM(s) Oral two times a day  heparin   Injectable 5000 Unit(s) SubCutaneous every 12 hours  mirtazapine 15 milliGRAM(s) Oral at bedtime  QUEtiapine 150 milliGRAM(s) Oral at bedtime  senna 2 Tablet(s) Oral at bedtime  sodium chloride 0.9%. 1000 milliLiter(s) (100 mL/Hr) IV Continuous <Continuous>    MEDICATIONS  (PRN):  acetaminophen     Tablet .. 650 milliGRAM(s) Oral every 6 hours PRN Temp greater or equal to 38.5C (101.3F), Moderate Pain (4 - 6)  acetaminophen  300 mG/codeine 30 mG 1 Tablet(s) Oral every 6 hours PRN Severe Pain (7 - 10)  lactulose Syrup 15 Gram(s) Oral two times a day PRN constipation    Pertinent Labs: Renal labs elevated, encourage adequate fluids  Skin: NO pui present     x ] no change since previous assessment on: 10/25 based on #  [ ] recalculated: based on   kcal/day: 2017-2421  gms protein/day: 89-105g      Previous Nutrition Diagnosis:   [ ] Inadequate Energy Intake [ ]Inadequate Oral Intake [ ] Excessive Energy Intake   [ ] Underweight [ ] Increased Nutrient Needs [ ] Overweight/Obesity   [ ] Altered GI Function [ ] Unintended Weight Loss [x ] Swallowing difficulty [x] Malnutrition     Nutrition Diagnosis is [x ] ongoing  [ ] resolved [ ] not applicable     New Nutrition Diagnosis: [ ] not applicable       Interventions:   Recommend  [ ] Change Diet To:  [ ] Nutrition Supplement  [ ] Nutrition Support  [ ] Other:     Monitoring and Evaluation:   [ ] PO intake [ x ] Tolerance to diet prescription [ x ] weights [ x ] labs[ x ] follow up per protocol  [ ] other: Assessment:   Brief History: Patient is a 73y old  Male who presents with a chief complaint of anxiety  subjective sensation of "needles inside me" (30 Oct 2023 14:53)  pmh of HTN, ITP, APL syndrome, generalized anxiety disorder, OCD, newly dx aggressive Merkel Cell cancer of forehead- pt's oncologist  Dr. Eng, at [facility] on Keytruda infusions with good response per wife  was brought to ED for worsening psychosis for the past few days, pt lately with difficulty swallowing, decreased po intake    Pt seen at bedside with wife present, she reports his po intake has improved slightly over the past few days but continues to refuse to eat saying he feels like he has knives in his throat. Pt seen by psych with new order to begin Remeron 15mg daily known to have appetite stimulating effect, will monitor for effect. This RD discussed alternative methods of nutrition if pt continues to not meet nutritional needs, questions answered and discussed, wife and pt defers enteral feeding at this time. Enjoys ice cream, wife asking about regular ice cream, pt may benefit from swallow eval to re-eval swallow function of liquids. Per NSG, wife has been feeding pt every day encouraging po intake of food and fluids. Pt receiving magic cup at meals TID and ensure plus high protein TID consuming well with encouragement. No c/o N/V/D/C at this time. Last BM 11/3-fecal incontinence. Trend weights for accuracy. RD to f/u.     Factors impacting intake: [ ] none [ ] nausea  [ ] vomiting [ ] diarrhea [ ] constipation  [ ]chewing problems [ x ] swallowing issues  [ ] other: dementia with psychosis, severe anxiety    Diet Presciption: Diet, Pureed:   Mildly Thick Liquids (MILDTHICKLIQS)  Supplement Feeding Modality:  Oral  Ensure Enlive Cans or Servings Per Day:  1       Frequency:  Three Times a day (10-28-23 @ 09:41)    Intake: 25%-50% of food and supplements, mostly 25%    Current Weight:   (11/1) 152.1#  (10/25) 174.3#  (10/23) 149.9#  Will continue to monitor weight trends, wt from 10/25 likely inaccurate       Pertinent Medications: MEDICATIONS  (STANDING):  atenolol  Tablet 50 milliGRAM(s) Oral two times a day  cholecalciferol 2000 Unit(s) Oral daily  diazepam    Tablet 5 milliGRAM(s) Oral two times a day  fluvoxaMINE 100 milliGRAM(s) Oral two times a day  heparin   Injectable 5000 Unit(s) SubCutaneous every 12 hours  mirtazapine 15 milliGRAM(s) Oral at bedtime  QUEtiapine 150 milliGRAM(s) Oral at bedtime  senna 2 Tablet(s) Oral at bedtime  sodium chloride 0.9%. 1000 milliLiter(s) (100 mL/Hr) IV Continuous <Continuous>    MEDICATIONS  (PRN):  acetaminophen     Tablet .. 650 milliGRAM(s) Oral every 6 hours PRN Temp greater or equal to 38.5C (101.3F), Moderate Pain (4 - 6)  acetaminophen  300 mG/codeine 30 mG 1 Tablet(s) Oral every 6 hours PRN Severe Pain (7 - 10)  lactulose Syrup 15 Gram(s) Oral two times a day PRN constipation    Pertinent Labs: Renal labs elevated, encourage adequate fluids  Skin: NO pui present     x ] no change since previous assessment on: 10/25 based on #  [ ] recalculated: based on   kcal/day: 7313-4170  gms protein/day: 89-105g      Previous Nutrition Diagnosis:   [ ] Inadequate Energy Intake [ ]Inadequate Oral Intake [ ] Excessive Energy Intake   [ ] Underweight [ ] Increased Nutrient Needs [ ] Overweight/Obesity   [ ] Altered GI Function [ ] Unintended Weight Loss [x ] Swallowing difficulty [x] Malnutrition     Nutrition Diagnosis is [x ] ongoing  [ ] resolved [ ] not applicable     New Nutrition Diagnosis: [x ] not applicable       Interventions:   Recommend  [x ] Continue diet as ordered  [x ] Nutrition Supplement Ensure Plus HP TID and MAgic Cup TID  [ ] Nutrition Support  [x ] Other: Rec SLP eval to reassess swallow function    Monitoring and Evaluation:   [ x] PO intake [ x ] Tolerance to diet prescription [ x ] weights [ x ] labs[ x ] follow up per protocol  [x ] other: labs, BM, s/sx GI distress

## 2023-11-04 NOTE — PROGRESS NOTE ADULT - SUBJECTIVE AND OBJECTIVE BOX
Neurology Follow up note    JANICE [id number]    HPI:  hx obtained from wife  73 M pmh of HTN, ITP, APL syndrome, generalized anxiety disorder, OCD, newly dx aggressive Merkel Cell cancer of forehead- pt's oncologist  Dr. Eng, at [facility] on Keytruda infusions with good response per wife  was brought o ED for worsening psychosis for the past few days, pt believes there are sharp things inside his body trying to come out of his mouth, stuck in throat  pt lately with difficulty swallowing, decreased po intake  pt was evaluated by psych in ED (24 Oct 2023 13:19)      Interval History - slept well last night    Patient is seen, chart was reviewed and case was discussed with the treatment team.  Pt is not in any distress.   Lying on bed comfortably.   .  Vital Signs Last 24 Hrs  T(C): 36.4 (04 Nov 2023 13:02), Max: 37.3 (03 Nov 2023 20:41)  T(F): 97.6 (04 Nov 2023 13:02), Max: 99.1 (03 Nov 2023 20:41)  HR: 86 (04 Nov 2023 13:02) (86 - 96)  BP: 103/61 (04 Nov 2023 13:02) (103/61 - 136/72)  BP(mean): --  RR: 17 (04 Nov 2023 13:02) (17 - 18)  SpO2: 92% (04 Nov 2023 13:02) (91% - 92%)    Parameters below as of 04 Nov 2023 13:02  Patient On (Oxygen Delivery Method): room air              REVIEW OF SYSTEMS:    Constitutional: No fever,  Eyes: No eye pain, visual disturbances, or discharge  ENT:  No difficulty hearing, tinnitus, vertigo; No sinus or throat pain  Neck: No pain or stiffness  Respiratory: No cough, wheezing, chills or hemoptysis  Cardiovascular: No chest pain, palpitations, shortness of breath, dizziness or leg swelling  Gastrointestinal: No abdominal or epigastric pain. No nausea, vomiting or hematemesis;   Genitourinary: No dysuria, frequency,   Neurological: No headaches, memory loss, loss of strength,  Psychiatric: No depression, anxiety, mood swings or difficulty sleeping  Musculoskeletal: No joint pain or swelling;   Skin: No itching, burning, rashes or lesions   Lymph Nodes: No enlarged glands  Endocrine: No heat or cold intolerance; No hair loss,  Allergy and Immunologic: No hives or eczema    On Neurological Examination:    Mental Status - Pt is alert, awake, oriented X3   Follows commands well and able to answer questions appropriately.Mood and affect  normal    Speech -  Pt has dysarthria.    Cranial Nerves - Pupils 3 mm equal and reactive to light, extraocular eye movements intact. Pt has no visual field deficit.  Pt has no  facial asymmetry. Facial sensation is intact.Tongue - is in midline.    Muscle tone - is normal      Motor Exam - 4/5 all over, No drift. No shaking or tremors.    Sensory Exam - . Pt withdraws all extremities equally on stimulation. No asymmetry seen. No complaints of tingling, numbness.           coordination:    Finger to nose: normal      Deep tendon Reflexes - 2 plus all over.          Neck Supple -  Yes.    MEDICATIONS  (STANDING):  atenolol  Tablet 50 milliGRAM(s) Oral two times a day  cholecalciferol 2000 Unit(s) Oral daily  diazepam    Tablet 5 milliGRAM(s) Oral two times a day  fluvoxaMINE 100 milliGRAM(s) Oral two times a day  heparin   Injectable 5000 Unit(s) SubCutaneous every 12 hours  mirtazapine 15 milliGRAM(s) Oral at bedtime  QUEtiapine 150 milliGRAM(s) Oral at bedtime  senna 2 Tablet(s) Oral at bedtime  sodium chloride 0.9%. 1000 milliLiter(s) (100 mL/Hr) IV Continuous <Continuous>    MEDICATIONS  (PRN):  acetaminophen     Tablet .. 650 milliGRAM(s) Oral every 6 hours PRN Temp greater or equal to 38.5C (101.3F), Moderate Pain (4 - 6)  acetaminophen  300 mG/codeine 30 mG 1 Tablet(s) Oral every 6 hours PRN Severe Pain (7 - 10)  lactulose Syrup 15 Gram(s) Oral two times a day PRN constipation        Allergies    No Known Allergies    Intolerances    11-04    145  |  107  |  34<H>  ----------------------------<  142<H>  4.3   |  31  |  1.70<H>    Ca    8.7      04 Nov 2023 08:50      Hemoglobin A1C:     Vitamin B12     RADIOLOGY    ASSESSMENT AND PLAN:      seen for ams  doubt paraneoplastic   brain mri- no acute cva/mets  poor APPETITE    continue remeron  increased seroquel 150  mg hs for psychosis  patient and wife LP  Physical therapy evaluation.  Pain is accessed and addressed.  Plan of care was discussed with family(wife) Questions answered.  Would continue to follow.

## 2023-11-04 NOTE — PROGRESS NOTE ADULT - SUBJECTIVE AND OBJECTIVE BOX
Interval History:  more awake today and not agitated    Chart reviewed and events noted;   Overnight events:    MEDICATIONS  (STANDING):  atenolol  Tablet 50 milliGRAM(s) Oral two times a day  cholecalciferol 2000 Unit(s) Oral daily  diazepam    Tablet 5 milliGRAM(s) Oral two times a day  fluvoxaMINE 100 milliGRAM(s) Oral two times a day  heparin   Injectable 5000 Unit(s) SubCutaneous every 12 hours  mirtazapine 15 milliGRAM(s) Oral at bedtime  QUEtiapine 150 milliGRAM(s) Oral at bedtime  senna 2 Tablet(s) Oral at bedtime  sodium chloride 0.9%. 1000 milliLiter(s) (100 mL/Hr) IV Continuous <Continuous>    MEDICATIONS  (PRN):  acetaminophen     Tablet .. 650 milliGRAM(s) Oral every 6 hours PRN Temp greater or equal to 38.5C (101.3F), Moderate Pain (4 - 6)  acetaminophen  300 mG/codeine 30 mG 1 Tablet(s) Oral every 6 hours PRN Severe Pain (7 - 10)  lactulose Syrup 15 Gram(s) Oral two times a day PRN constipation      Vital Signs Last 24 Hrs  T(C): 36.4 (04 Nov 2023 13:02), Max: 37.3 (03 Nov 2023 20:41)  T(F): 97.6 (04 Nov 2023 13:02), Max: 99.1 (03 Nov 2023 20:41)  HR: 86 (04 Nov 2023 13:02) (86 - 96)  BP: 108/65 (04 Nov 2023 18:09) (103/61 - 132/73)  BP(mean): --  RR: 17 (04 Nov 2023 13:02) (17 - 18)  SpO2: 92% (04 Nov 2023 13:02) (91% - 92%)    Parameters below as of 04 Nov 2023 13:02  Patient On (Oxygen Delivery Method): room air        PHYSICAL EXAM  General: adult in NAD  HEENT: clear oropharynx, anicteric sclera, pink conjunctivae  Neck: supple  CV: normal S1S2 with no murmur rubs or gallops  Lungs: clear to auscultation, no wheezes, no rhales  Abdomen: soft non-tender non-distended, no hepato/splenomegaly  Ext: no clubbing cyanosis or edema  Skin: no rashes and no petichiae  Neuro: alert and oriented X3 no focal deficits      LABS:  CBC Full  -  ( 04 Nov 2023 08:50 )  WBC Count : 11.81 K/uL  RBC Count : 3.41 M/uL  Hemoglobin : 10.3 g/dL  Hematocrit : 30.1 %  Platelet Count - Automated : 62 K/uL  Mean Cell Volume : 88.3 fl  Mean Cell Hemoglobin : 30.2 pg  Mean Cell Hemoglobin Concentration : 34.2 gm/dL  Auto Neutrophil # : x  Auto Lymphocyte # : x  Auto Monocyte # : x  Auto Eosinophil # : x  Auto Basophil # : x  Auto Neutrophil % : x  Auto Lymphocyte % : x  Auto Monocyte % : x  Auto Eosinophil % : x  Auto Basophil % : x    11-04    145  |  107  |  34<H>  ----------------------------<  142<H>  4.3   |  31  |  1.70<H>    Ca    8.7      04 Nov 2023 08:50          fe studies      WBC trend  11.81 K/uL (11-04-23 @ 08:50)  8.32 K/uL (11-03-23 @ 07:18)  10.32 K/uL (11-02-23 @ 08:25)      Hgb trend  10.3 g/dL (11-04-23 @ 08:50)  11.1 g/dL (11-03-23 @ 07:18)  11.7 g/dL (11-02-23 @ 08:25)      plt trend  62 K/uL (11-04-23 @ 08:50)  100 K/uL (11-03-23 @ 07:18)  95 K/uL (11-02-23 @ 08:25)        RADIOLOGY & ADDITIONAL STUDIES:

## 2023-11-04 NOTE — PROGRESS NOTE ADULT - ASSESSMENT
IMPRESSION    pt w ITP, Lupus Anticoagulant, HTN, anxiety on Seraquel, OCD, Merkel cell ca of right Forehead, under care Dr Elliot Eng, post 4 doses q3wks IV Keytruda, last dose 10/17/23.  Pt brought in w c/o feeling "claws, daggers" inside him, since C#3 Keytruda, with sensation initially started w various parts of face now also progressed down to sides of body to rectum(does not see actual daggers or claws,)    Wife reports after first cycle started w jaw tightness and progressive worsened w each cycle, to point of hard to swallow/can't swallow  Had tired taken off Seroquel to see if adverse effect but no improvement, now back on x 5-6days  Merkel lesion has essentially resolved    CT head no acute findings  see by Psych in ER, ?psychosis  -sensation described by pt ?psychosis vs neuropathy  -psychosis not noted to be associated w Keytruda. Neuro tox of Keytruda described includes cerebral hemorrhage, confusion, myasthenia gravis, reversible posterior leukoencephalopathy syndrome, syed neuropathy, Guillain [place], aseptic meningitis, encephalitis, transverse myelitis    TFTS unremarkable.   AM cortisol 5.8. Slight low. Unclear significance     Negative plasma PBG   Bilirubin neg  Urobilinogen normal    Sjorgen /Scleroderma studies abn.      +MICHELLE and +Ro52    no significant change.  seen by psych with meds adjusted and now starting to show some improvement    RECOMMEND    PSYCH  cause of sx remains unclear.   s/p psych evaluation  Seroquel dose being titrated up.   Remeron being added  symptomatically improved today    NEURO  s/p neurology eval.   possible LP is sx not better  However pt refusing today; discussed at length.   Family at bedside; aware of prior refusal    s/p additional neuro   Discussed with Dr. Magdaleno.  Sx seem less likely secondary to Keytruda appears more psychiatric sx  await course.     platelet count 62K will observed  discussed with patient's wife and sone

## 2023-11-05 LAB
ANION GAP SERPL CALC-SCNC: 5 MMOL/L — SIGNIFICANT CHANGE UP (ref 5–17)
ANION GAP SERPL CALC-SCNC: 5 MMOL/L — SIGNIFICANT CHANGE UP (ref 5–17)
BUN SERPL-MCNC: 46 MG/DL — HIGH (ref 7–23)
BUN SERPL-MCNC: 46 MG/DL — HIGH (ref 7–23)
CALCIUM SERPL-MCNC: 8.2 MG/DL — LOW (ref 8.5–10.1)
CALCIUM SERPL-MCNC: 8.2 MG/DL — LOW (ref 8.5–10.1)
CHLORIDE SERPL-SCNC: 115 MMOL/L — HIGH (ref 96–108)
CHLORIDE SERPL-SCNC: 115 MMOL/L — HIGH (ref 96–108)
CO2 SERPL-SCNC: 29 MMOL/L — SIGNIFICANT CHANGE UP (ref 22–31)
CO2 SERPL-SCNC: 29 MMOL/L — SIGNIFICANT CHANGE UP (ref 22–31)
CREAT SERPL-MCNC: 1.4 MG/DL — HIGH (ref 0.5–1.3)
CREAT SERPL-MCNC: 1.4 MG/DL — HIGH (ref 0.5–1.3)
EGFR: 52 ML/MIN/1.73M2 — LOW
EGFR: 52 ML/MIN/1.73M2 — LOW
GLUCOSE SERPL-MCNC: 105 MG/DL — HIGH (ref 70–99)
GLUCOSE SERPL-MCNC: 105 MG/DL — HIGH (ref 70–99)
HCT VFR BLD CALC: 26.5 % — LOW (ref 39–50)
HCT VFR BLD CALC: 26.5 % — LOW (ref 39–50)
HGB BLD-MCNC: 8.9 G/DL — LOW (ref 13–17)
HGB BLD-MCNC: 8.9 G/DL — LOW (ref 13–17)
MCHC RBC-ENTMCNC: 29.7 PG — SIGNIFICANT CHANGE UP (ref 27–34)
MCHC RBC-ENTMCNC: 29.7 PG — SIGNIFICANT CHANGE UP (ref 27–34)
MCHC RBC-ENTMCNC: 33.6 GM/DL — SIGNIFICANT CHANGE UP (ref 32–36)
MCHC RBC-ENTMCNC: 33.6 GM/DL — SIGNIFICANT CHANGE UP (ref 32–36)
MCV RBC AUTO: 88.3 FL — SIGNIFICANT CHANGE UP (ref 80–100)
MCV RBC AUTO: 88.3 FL — SIGNIFICANT CHANGE UP (ref 80–100)
NRBC # BLD: 0 /100 WBCS — SIGNIFICANT CHANGE UP (ref 0–0)
NRBC # BLD: 0 /100 WBCS — SIGNIFICANT CHANGE UP (ref 0–0)
PLATELET # BLD AUTO: 79 K/UL — LOW (ref 150–400)
PLATELET # BLD AUTO: 79 K/UL — LOW (ref 150–400)
POTASSIUM SERPL-MCNC: 4.2 MMOL/L — SIGNIFICANT CHANGE UP (ref 3.5–5.3)
POTASSIUM SERPL-MCNC: 4.2 MMOL/L — SIGNIFICANT CHANGE UP (ref 3.5–5.3)
POTASSIUM SERPL-SCNC: 4.2 MMOL/L — SIGNIFICANT CHANGE UP (ref 3.5–5.3)
POTASSIUM SERPL-SCNC: 4.2 MMOL/L — SIGNIFICANT CHANGE UP (ref 3.5–5.3)
RBC # BLD: 3 M/UL — LOW (ref 4.2–5.8)
RBC # BLD: 3 M/UL — LOW (ref 4.2–5.8)
RBC # FLD: 13.2 % — SIGNIFICANT CHANGE UP (ref 10.3–14.5)
RBC # FLD: 13.2 % — SIGNIFICANT CHANGE UP (ref 10.3–14.5)
SODIUM SERPL-SCNC: 149 MMOL/L — HIGH (ref 135–145)
SODIUM SERPL-SCNC: 149 MMOL/L — HIGH (ref 135–145)
WBC # BLD: 7.01 K/UL — SIGNIFICANT CHANGE UP (ref 3.8–10.5)
WBC # BLD: 7.01 K/UL — SIGNIFICANT CHANGE UP (ref 3.8–10.5)
WBC # FLD AUTO: 7.01 K/UL — SIGNIFICANT CHANGE UP (ref 3.8–10.5)
WBC # FLD AUTO: 7.01 K/UL — SIGNIFICANT CHANGE UP (ref 3.8–10.5)

## 2023-11-05 RX ORDER — SODIUM CHLORIDE 9 MG/ML
1000 INJECTION, SOLUTION INTRAVENOUS
Refills: 0 | Status: DISCONTINUED | OUTPATIENT
Start: 2023-11-05 | End: 2023-11-29

## 2023-11-05 RX ADMIN — Medication 2000 UNIT(S): at 12:03

## 2023-11-05 RX ADMIN — Medication 5 MILLIGRAM(S): at 05:11

## 2023-11-05 RX ADMIN — QUETIAPINE FUMARATE 150 MILLIGRAM(S): 200 TABLET, FILM COATED ORAL at 21:39

## 2023-11-05 RX ADMIN — MIRTAZAPINE 15 MILLIGRAM(S): 45 TABLET, ORALLY DISINTEGRATING ORAL at 21:39

## 2023-11-05 RX ADMIN — SENNA PLUS 2 TABLET(S): 8.6 TABLET ORAL at 21:45

## 2023-11-05 RX ADMIN — Medication 5 MILLIGRAM(S): at 17:20

## 2023-11-05 RX ADMIN — ATENOLOL 50 MILLIGRAM(S): 25 TABLET ORAL at 05:26

## 2023-11-05 RX ADMIN — SODIUM CHLORIDE 75 MILLILITER(S): 9 INJECTION, SOLUTION INTRAVENOUS at 14:58

## 2023-11-05 RX ADMIN — FLUVOXAMINE MALEATE 100 MILLIGRAM(S): 25 TABLET ORAL at 17:20

## 2023-11-05 RX ADMIN — HEPARIN SODIUM 5000 UNIT(S): 5000 INJECTION INTRAVENOUS; SUBCUTANEOUS at 17:20

## 2023-11-05 RX ADMIN — ATENOLOL 50 MILLIGRAM(S): 25 TABLET ORAL at 17:20

## 2023-11-05 RX ADMIN — FLUVOXAMINE MALEATE 100 MILLIGRAM(S): 25 TABLET ORAL at 05:11

## 2023-11-05 RX ADMIN — Medication 650 MILLIGRAM(S): at 12:32

## 2023-11-05 RX ADMIN — Medication 650 MILLIGRAM(S): at 13:32

## 2023-11-05 RX ADMIN — HEPARIN SODIUM 5000 UNIT(S): 5000 INJECTION INTRAVENOUS; SUBCUTANEOUS at 05:11

## 2023-11-05 NOTE — PROGRESS NOTE ADULT - ASSESSMENT
73 M pmh of HTN, ITP, APL syndrome, generalized anxiety disorder, OCD, newly dx aggressive Merkel Cell cancer of forehead- pt's oncologist  Dr. Eng, at [facility] on Keytruda infusions with good response per wife  was brought to ED for worsening psychosis for the past few days, pt lately with difficulty swallowing, decreased po intake    Psychosis  monitor  psych fu  MRI noted  neuro following  may need LP  increase seroquel to 150  remeron started increased to 15    #HTN- +orthostasis  #Orthostatic hypotension- hold losartan, inc IVF NS 150cc/hr  cont atenolol w parameters  ? psych med induced  cotisol low-? sec adrenal insufficiency-?need stress steroids  endo cs      #Anxiety  psych cs appreciated  continue valium  titrating seroquel    #Abnormal throat sensation- sharp object sensation  ?neuralgia  swallow eval appreciated      #Merkel cell ca  onc c/s  r/o paraneoplastic causes      #DVT ppx- start hep sq      [facility]   [phone]

## 2023-11-05 NOTE — PROGRESS NOTE ADULT - ASSESSMENT
IMPRESSION    pt w ITP, Lupus Anticoagulant, HTN, anxiety on Seraquel, OCD, Merkel cell ca of right Forehead, under care Dr Elliot Eng, post 4 doses q3wks IV Keytruda, last dose 10/17/23.  Pt brought in w c/o feeling "claws, daggers" inside him, since C#3 Keytruda, with sensation initially started w various parts of face now also progressed down to sides of body to rectum(does not see actual daggers or claws,)    Wife reports after first cycle started w jaw tightness and progressive worsened w each cycle, to point of hard to swallow/can't swallow  Had tired taken off Seroquel to see if adverse effect but no improvement, now back on x 5-6days  Merkel lesion has essentially resolved    CT head no acute findings  see by Psych in ER, ?psychosis  -sensation described by pt ?psychosis vs neuropathy  -psychosis not noted to be associated w Keytruda. Neuro tox of Keytruda described includes cerebral hemorrhage, confusion, myasthenia gravis, reversible posterior leukoencephalopathy syndrome, syed neuropathy, Guillain [place], aseptic meningitis, encephalitis, transverse myelitis    TFTS unremarkable.   AM cortisol 5.8. Slight low. Unclear significance     Negative plasma PBG   Bilirubin neg  Urobilinogen normal    Sjorgen /Scleroderma studies abn.      +MICHELLE and +Ro52    no significant change.  seen by psych with meds adjusted and now starting to show some improvement    RECOMMEND    PSYCH  cause of sx remains unclear.   s/p psych evaluation  Seroquel dose being titrated up.   Remeron being added  symptomatically improved today    NEURO  s/p neurology eval.   possible LP is sx not better  However pt refusing today; discussed at length.   Family at bedside; aware of prior refusal    s/p additional neuro   Discussed with Dr. Magdaleno.  Sx seem less likely secondary to Keytruda appears more psychiatric sx  await course.   Tylenol for pain    platelet count 79K will observed  discussed with patient's wife

## 2023-11-05 NOTE — PROGRESS NOTE ADULT - SUBJECTIVE AND OBJECTIVE BOX
Neurology Follow up note    JANICE [id number]    HPI:  hx obtained from wife  73 M pmh of HTN, ITP, APL syndrome, generalized anxiety disorder, OCD, newly dx aggressive Merkel Cell cancer of forehead- pt's oncologist  Dr. Eng, at [facility] on Keytruda infusions with good response per wife  was brought o ED for worsening psychosis for the past few days, pt believes there are sharp things inside his body trying to come out of his mouth, stuck in throat  pt lately with difficulty swallowing, decreased po intake  pt was evaluated by psych in ED (24 Oct 2023 13:19)      Interval History - I have PD    Patient is seen, chart was reviewed and case was discussed with the treatment team.  Pt is not in any distress.   Lying on bed comfortably.   .  Vital Signs Last 24 Hrs  T(C): 36.5 (05 Nov 2023 05:20), Max: 36.5 (05 Nov 2023 05:20)  T(F): 97.7 (05 Nov 2023 05:20), Max: 97.7 (05 Nov 2023 05:20)  HR: 75 (05 Nov 2023 05:20) (75 - 86)  BP: 106/60 (05 Nov 2023 05:20) (103/61 - 114/71)  BP(mean): --  RR: 17 (05 Nov 2023 05:20) (17 - 17)  SpO2: 94% (05 Nov 2023 05:20) (92% - 94%)    Parameters below as of 05 Nov 2023 05:20  Patient On (Oxygen Delivery Method): room air                  REVIEW OF SYSTEMS:    Constitutional: No fever,  Eyes: No eye pain, visual disturbances, or discharge  ENT:  No difficulty hearing, tinnitus, vertigo; No sinus or throat pain  Neck: No pain or stiffness  Respiratory: No cough, wheezing, chills or hemoptysis  Cardiovascular: No chest pain, palpitations, shortness of breath, dizziness or leg swelling  Gastrointestinal: No abdominal or epigastric pain. No nausea, vomiting or hematemesis;   Genitourinary: No dysuria, frequency,   Neurological: No headaches, memory loss, loss of strength,  Psychiatric: No depression, anxiety, mood swings or difficulty sleeping  Musculoskeletal: No joint pain or swelling;   Skin: No itching, burning, rashes or lesions   Lymph Nodes: No enlarged glands  Endocrine: No heat or cold intolerance; No hair loss,  Allergy and Immunologic: No hives or eczema    On Neurological Examination:    Mental Status - Pt is alert, awake, oriented X3   Follows commands well and able to answer questions appropriately.Mood and affect  normal    Speech -  Pt has dysarthria.    Cranial Nerves - Pupils 3 mm equal and reactive to light, extraocular eye movements intact. Pt has no visual field deficit.  Pt has no  facial asymmetry. Facial sensation is intact.Tongue - is in midline.    Muscle tone - is normal      Motor Exam - 4/5 all over, No drift. No shaking or tremors.    Sensory Exam - . Pt withdraws all extremities equally on stimulation. No asymmetry seen. No complaints of tingling, numbness.           coordination:    Finger to nose: normal      Deep tendon Reflexes - 2 plus all over.          Neck Supple -  Yes.      MEDICATIONS  (STANDING):  atenolol  Tablet 50 milliGRAM(s) Oral two times a day  cholecalciferol 2000 Unit(s) Oral daily  diazepam    Tablet 5 milliGRAM(s) Oral two times a day  fluvoxaMINE 100 milliGRAM(s) Oral two times a day  heparin   Injectable 5000 Unit(s) SubCutaneous every 12 hours  mirtazapine 15 milliGRAM(s) Oral at bedtime  QUEtiapine 150 milliGRAM(s) Oral at bedtime  senna 2 Tablet(s) Oral at bedtime  sodium chloride 0.9%. 1000 milliLiter(s) (100 mL/Hr) IV Continuous <Continuous>    MEDICATIONS  (PRN):  acetaminophen     Tablet .. 650 milliGRAM(s) Oral every 6 hours PRN Temp greater or equal to 38.5C (101.3F), Moderate Pain (4 - 6)  acetaminophen  300 mG/codeine 30 mG 1 Tablet(s) Oral every 6 hours PRN Severe Pain (7 - 10)  lactulose Syrup 15 Gram(s) Oral two times a day PRN constipation          Allergies    No Known Allergies    Intolerances                          10.3   11.81 )-----------( 62       ( 04 Nov 2023 08:50 )             30.1     Hemoglobin A1C:     Vitamin B12     RADIOLOGY    ASSESSMENT AND PLAN:      seen for ams  Depression with psychosis  doubt paraneoplastic   brain mri- no acute cva/mets    continue remeron  increased seroquel 150  mg hs for psychosis  psych follow up  Physical therapy evaluation.  Pain is accessed and addressed.  Plan of care was discussed with family(wife) Questions answered.  Would continue to follow.

## 2023-11-05 NOTE — PROGRESS NOTE ADULT - SUBJECTIVE AND OBJECTIVE BOX
Patient is a 73y old  Male who presents with a chief complaint of anxietty (01 Nov 2023 13:08)      INTERVAL HPI/OVERNIGHT EVENTS: noted  pt seen and examined this am   events noted  remains symptomatic      Vital Signs Last 24 Hrs  T(C): 36.6 (05 Nov 2023 11:52), Max: 36.6 (05 Nov 2023 11:52)  T(F): 97.8 (05 Nov 2023 11:52), Max: 97.8 (05 Nov 2023 11:52)  HR: 77 (05 Nov 2023 11:52) (75 - 86)  BP: 125/73 (05 Nov 2023 11:52) (106/60 - 125/73)  BP(mean): --  RR: 17 (05 Nov 2023 11:52) (17 - 17)  SpO2: 95% (05 Nov 2023 11:52) (92% - 95%)    Parameters below as of 05 Nov 2023 11:52  Patient On (Oxygen Delivery Method): room air        acetaminophen     Tablet .. 650 milliGRAM(s) Oral every 6 hours PRN  acetaminophen  300 mG/codeine 30 mG 1 Tablet(s) Oral every 6 hours PRN  atenolol  Tablet 50 milliGRAM(s) Oral two times a day  cholecalciferol 2000 Unit(s) Oral daily  diazepam    Tablet 5 milliGRAM(s) Oral two times a day  fluvoxaMINE 100 milliGRAM(s) Oral two times a day  heparin   Injectable 5000 Unit(s) SubCutaneous every 12 hours  lactulose Syrup 15 Gram(s) Oral two times a day PRN  mirtazapine 15 milliGRAM(s) Oral at bedtime  QUEtiapine 150 milliGRAM(s) Oral at bedtime  senna 2 Tablet(s) Oral at bedtime  sodium chloride 0.45%. 1000 milliLiter(s) IV Continuous <Continuous>      PHYSICAL EXAM:  GENERAL: NAD,   EYES: conjunctiva and sclera clear  ENMT: Moist mucous membranes  NECK: Supple, No JVD, Normal thyroid  CHEST/LUNG: non labored, cta b/l  HEART: Regular rate and rhythm; No murmurs, rubs, or gallops  ABDOMEN: Soft, Nontender, Nondistended; Bowel sounds present  EXTREMITIES:  2+ Peripheral Pulses, No clubbing, cyanosis, or edema  LYMPH: No lymphadenopathy noted  SKIN: No rashes or lesions    Consultant(s) Notes Reviewed:  [x ] YES  [ ] NO  Care Discussed with Consultants/Other Providers [ x] YES  [ ] NO    LABS:                        8.9    7.01  )-----------( 79       ( 05 Nov 2023 11:20 )             26.5     11-05    149<H>  |  115<H>  |  46<H>  ----------------------------<  105<H>  4.2   |  29  |  1.40<H>    Ca    8.2<L>      05 Nov 2023 11:20        Urinalysis Basic - ( 05 Nov 2023 11:20 )    Color: x / Appearance: x / SG: x / pH: x  Gluc: 105 mg/dL / Ketone: x  / Bili: x / Urobili: x   Blood: x / Protein: x / Nitrite: x   Leuk Esterase: x / RBC: x / WBC x   Sq Epi: x / Non Sq Epi: x / Bacteria: x      CAPILLARY BLOOD GLUCOSE            Urinalysis Basic - ( 05 Nov 2023 11:20 )    Color: x / Appearance: x / SG: x / pH: x  Gluc: 105 mg/dL / Ketone: x  / Bili: x / Urobili: x   Blood: x / Protein: x / Nitrite: x   Leuk Esterase: x / RBC: x / WBC x   Sq Epi: x / Non Sq Epi: x / Bacteria: x          RADIOLOGY & ADDITIONAL TESTS:    Imaging Personally Reviewed:  [x ] YES  [ ] NO

## 2023-11-05 NOTE — PROGRESS NOTE ADULT - SUBJECTIVE AND OBJECTIVE BOX
Interval History:  remains weak.  complains of left face pain  Chart reviewed and events noted;   Overnight events:    MEDICATIONS  (STANDING):  atenolol  Tablet 50 milliGRAM(s) Oral two times a day  cholecalciferol 2000 Unit(s) Oral daily  diazepam    Tablet 5 milliGRAM(s) Oral two times a day  fluvoxaMINE 100 milliGRAM(s) Oral two times a day  heparin   Injectable 5000 Unit(s) SubCutaneous every 12 hours  mirtazapine 15 milliGRAM(s) Oral at bedtime  QUEtiapine 150 milliGRAM(s) Oral at bedtime  senna 2 Tablet(s) Oral at bedtime  sodium chloride 0.9%. 1000 milliLiter(s) (100 mL/Hr) IV Continuous <Continuous>    MEDICATIONS  (PRN):  acetaminophen     Tablet .. 650 milliGRAM(s) Oral every 6 hours PRN Temp greater or equal to 38.5C (101.3F), Moderate Pain (4 - 6)  acetaminophen  300 mG/codeine 30 mG 1 Tablet(s) Oral every 6 hours PRN Severe Pain (7 - 10)  lactulose Syrup 15 Gram(s) Oral two times a day PRN constipation      Vital Signs Last 24 Hrs  T(C): 36.6 (05 Nov 2023 11:52), Max: 36.6 (05 Nov 2023 11:52)  T(F): 97.8 (05 Nov 2023 11:52), Max: 97.8 (05 Nov 2023 11:52)  HR: 77 (05 Nov 2023 11:52) (75 - 86)  BP: 125/73 (05 Nov 2023 11:52) (103/61 - 125/73)  BP(mean): --  RR: 17 (05 Nov 2023 11:52) (17 - 17)  SpO2: 95% (05 Nov 2023 11:52) (92% - 95%)    Parameters below as of 05 Nov 2023 11:52  Patient On (Oxygen Delivery Method): room air        PHYSICAL EXAM  General: adult in NAD, chronically ill appearing  HEENT: clear oropharynx, anicteric sclera, pink conjunctivae  Neck: supple  CV: normal S1S2 with no murmur rubs or gallops  Lungs: clear to auscultation, no wheezes, no rhales  Abdomen: soft non-tender non-distended, no hepato/splenomegaly  Ext: no clubbing cyanosis or edema  Skin: no rashes and no petichiae  Neuro: alert and oriented X3 no focal deficits      LABS:  CBC Full  -  ( 05 Nov 2023 11:20 )  WBC Count : 7.01 K/uL  RBC Count : 3.00 M/uL  Hemoglobin : 8.9 g/dL  Hematocrit : 26.5 %  Platelet Count - Automated : 79 K/uL  Mean Cell Volume : 88.3 fl  Mean Cell Hemoglobin : 29.7 pg  Mean Cell Hemoglobin Concentration : 33.6 gm/dL  Auto Neutrophil # : x  Auto Lymphocyte # : x  Auto Monocyte # : x  Auto Eosinophil # : x  Auto Basophil # : x  Auto Neutrophil % : x  Auto Lymphocyte % : x  Auto Monocyte % : x  Auto Eosinophil % : x  Auto Basophil % : x    11-05    149<H>  |  115<H>  |  46<H>  ----------------------------<  105<H>  4.2   |  29  |  1.40<H>    Ca    8.2<L>      05 Nov 2023 11:20          fe studies      WBC trend  7.01 K/uL (11-05-23 @ 11:20)  11.81 K/uL (11-04-23 @ 08:50)  8.32 K/uL (11-03-23 @ 07:18)      Hgb trend  8.9 g/dL (11-05-23 @ 11:20)  10.3 g/dL (11-04-23 @ 08:50)  11.1 g/dL (11-03-23 @ 07:18)      plt trend  79 K/uL (11-05-23 @ 11:20)  62 K/uL (11-04-23 @ 08:50)  100 K/uL (11-03-23 @ 07:18)        RADIOLOGY & ADDITIONAL STUDIES:

## 2023-11-06 LAB
ANION GAP SERPL CALC-SCNC: 6 MMOL/L — SIGNIFICANT CHANGE UP (ref 5–17)
ANION GAP SERPL CALC-SCNC: 6 MMOL/L — SIGNIFICANT CHANGE UP (ref 5–17)
BUN SERPL-MCNC: 34 MG/DL — HIGH (ref 7–23)
BUN SERPL-MCNC: 34 MG/DL — HIGH (ref 7–23)
CALCIUM SERPL-MCNC: 8.5 MG/DL — SIGNIFICANT CHANGE UP (ref 8.5–10.1)
CALCIUM SERPL-MCNC: 8.5 MG/DL — SIGNIFICANT CHANGE UP (ref 8.5–10.1)
CHLORIDE SERPL-SCNC: 113 MMOL/L — HIGH (ref 96–108)
CHLORIDE SERPL-SCNC: 113 MMOL/L — HIGH (ref 96–108)
CO2 SERPL-SCNC: 29 MMOL/L — SIGNIFICANT CHANGE UP (ref 22–31)
CO2 SERPL-SCNC: 29 MMOL/L — SIGNIFICANT CHANGE UP (ref 22–31)
CORTIS AM PEAK SERPL-MCNC: 11.6 UG/DL — SIGNIFICANT CHANGE UP (ref 6–18.4)
CORTIS AM PEAK SERPL-MCNC: 11.6 UG/DL — SIGNIFICANT CHANGE UP (ref 6–18.4)
CREAT SERPL-MCNC: 1.1 MG/DL — SIGNIFICANT CHANGE UP (ref 0.5–1.3)
CREAT SERPL-MCNC: 1.1 MG/DL — SIGNIFICANT CHANGE UP (ref 0.5–1.3)
EBV DNA SERPL NAA+PROBE-ACNC: SIGNIFICANT CHANGE UP IU/ML
EBV DNA SERPL NAA+PROBE-ACNC: SIGNIFICANT CHANGE UP IU/ML
EBVPCR LOG: SIGNIFICANT CHANGE UP LOG10IU/ML
EBVPCR LOG: SIGNIFICANT CHANGE UP LOG10IU/ML
EGFR: 70 ML/MIN/1.73M2 — SIGNIFICANT CHANGE UP
EGFR: 70 ML/MIN/1.73M2 — SIGNIFICANT CHANGE UP
GLUCOSE SERPL-MCNC: 112 MG/DL — HIGH (ref 70–99)
GLUCOSE SERPL-MCNC: 112 MG/DL — HIGH (ref 70–99)
HCT VFR BLD CALC: 28.1 % — LOW (ref 39–50)
HCT VFR BLD CALC: 28.1 % — LOW (ref 39–50)
HGB BLD-MCNC: 9.3 G/DL — LOW (ref 13–17)
HGB BLD-MCNC: 9.3 G/DL — LOW (ref 13–17)
MCHC RBC-ENTMCNC: 29.6 PG — SIGNIFICANT CHANGE UP (ref 27–34)
MCHC RBC-ENTMCNC: 29.6 PG — SIGNIFICANT CHANGE UP (ref 27–34)
MCHC RBC-ENTMCNC: 33.1 GM/DL — SIGNIFICANT CHANGE UP (ref 32–36)
MCHC RBC-ENTMCNC: 33.1 GM/DL — SIGNIFICANT CHANGE UP (ref 32–36)
MCV RBC AUTO: 89.5 FL — SIGNIFICANT CHANGE UP (ref 80–100)
MCV RBC AUTO: 89.5 FL — SIGNIFICANT CHANGE UP (ref 80–100)
NRBC # BLD: 0 /100 WBCS — SIGNIFICANT CHANGE UP (ref 0–0)
NRBC # BLD: 0 /100 WBCS — SIGNIFICANT CHANGE UP (ref 0–0)
PLATELET # BLD AUTO: 96 K/UL — LOW (ref 150–400)
PLATELET # BLD AUTO: 96 K/UL — LOW (ref 150–400)
POTASSIUM SERPL-MCNC: 3.9 MMOL/L — SIGNIFICANT CHANGE UP (ref 3.5–5.3)
POTASSIUM SERPL-MCNC: 3.9 MMOL/L — SIGNIFICANT CHANGE UP (ref 3.5–5.3)
POTASSIUM SERPL-SCNC: 3.9 MMOL/L — SIGNIFICANT CHANGE UP (ref 3.5–5.3)
POTASSIUM SERPL-SCNC: 3.9 MMOL/L — SIGNIFICANT CHANGE UP (ref 3.5–5.3)
RBC # BLD: 3.14 M/UL — LOW (ref 4.2–5.8)
RBC # BLD: 3.14 M/UL — LOW (ref 4.2–5.8)
RBC # FLD: 13.1 % — SIGNIFICANT CHANGE UP (ref 10.3–14.5)
RBC # FLD: 13.1 % — SIGNIFICANT CHANGE UP (ref 10.3–14.5)
SODIUM SERPL-SCNC: 148 MMOL/L — HIGH (ref 135–145)
SODIUM SERPL-SCNC: 148 MMOL/L — HIGH (ref 135–145)
WBC # BLD: 6.29 K/UL — SIGNIFICANT CHANGE UP (ref 3.8–10.5)
WBC # BLD: 6.29 K/UL — SIGNIFICANT CHANGE UP (ref 3.8–10.5)
WBC # FLD AUTO: 6.29 K/UL — SIGNIFICANT CHANGE UP (ref 3.8–10.5)
WBC # FLD AUTO: 6.29 K/UL — SIGNIFICANT CHANGE UP (ref 3.8–10.5)

## 2023-11-06 PROCEDURE — 99231 SBSQ HOSP IP/OBS SF/LOW 25: CPT

## 2023-11-06 RX ORDER — LANOLIN ALCOHOL/MO/W.PET/CERES
3 CREAM (GRAM) TOPICAL AT BEDTIME
Refills: 0 | Status: DISCONTINUED | OUTPATIENT
Start: 2023-11-06 | End: 2023-12-19

## 2023-11-06 RX ORDER — ATENOLOL 25 MG/1
50 TABLET ORAL DAILY
Refills: 0 | Status: DISCONTINUED | OUTPATIENT
Start: 2023-11-07 | End: 2023-11-11

## 2023-11-06 RX ORDER — HYDROCORTISONE 20 MG
50 TABLET ORAL EVERY 6 HOURS
Refills: 0 | Status: DISCONTINUED | OUTPATIENT
Start: 2023-11-06 | End: 2023-11-07

## 2023-11-06 RX ORDER — MIRTAZAPINE 45 MG/1
30 TABLET, ORALLY DISINTEGRATING ORAL AT BEDTIME
Refills: 0 | Status: DISCONTINUED | OUTPATIENT
Start: 2023-11-06 | End: 2023-11-14

## 2023-11-06 RX ADMIN — ATENOLOL 50 MILLIGRAM(S): 25 TABLET ORAL at 05:15

## 2023-11-06 RX ADMIN — Medication 50 MILLIGRAM(S): at 18:57

## 2023-11-06 RX ADMIN — SENNA PLUS 2 TABLET(S): 8.6 TABLET ORAL at 21:15

## 2023-11-06 RX ADMIN — Medication 1 TABLET(S): at 13:00

## 2023-11-06 RX ADMIN — SODIUM CHLORIDE 75 MILLILITER(S): 9 INJECTION, SOLUTION INTRAVENOUS at 21:14

## 2023-11-06 RX ADMIN — Medication 5 MILLIGRAM(S): at 18:57

## 2023-11-06 RX ADMIN — Medication 3 MILLIGRAM(S): at 21:14

## 2023-11-06 RX ADMIN — MIRTAZAPINE 30 MILLIGRAM(S): 45 TABLET, ORALLY DISINTEGRATING ORAL at 21:15

## 2023-11-06 RX ADMIN — Medication 5 MILLIGRAM(S): at 05:14

## 2023-11-06 RX ADMIN — FLUVOXAMINE MALEATE 100 MILLIGRAM(S): 25 TABLET ORAL at 05:14

## 2023-11-06 RX ADMIN — HEPARIN SODIUM 5000 UNIT(S): 5000 INJECTION INTRAVENOUS; SUBCUTANEOUS at 18:57

## 2023-11-06 RX ADMIN — SODIUM CHLORIDE 75 MILLILITER(S): 9 INJECTION, SOLUTION INTRAVENOUS at 18:57

## 2023-11-06 RX ADMIN — FLUVOXAMINE MALEATE 100 MILLIGRAM(S): 25 TABLET ORAL at 18:57

## 2023-11-06 RX ADMIN — Medication 50 MILLIGRAM(S): at 23:29

## 2023-11-06 RX ADMIN — Medication 2000 UNIT(S): at 12:22

## 2023-11-06 RX ADMIN — HEPARIN SODIUM 5000 UNIT(S): 5000 INJECTION INTRAVENOUS; SUBCUTANEOUS at 05:14

## 2023-11-06 RX ADMIN — QUETIAPINE FUMARATE 150 MILLIGRAM(S): 200 TABLET, FILM COATED ORAL at 21:14

## 2023-11-06 RX ADMIN — Medication 1 TABLET(S): at 12:22

## 2023-11-06 NOTE — PROGRESS NOTE ADULT - ASSESSMENT
73 M pmh of HTN, ITP, APL syndrome, generalized anxiety disorder, OCD, newly dx aggressive Merkel Cell cancer of forehead- pt's oncologist  Dr. Eng, at [facility] on Keytruda infusions with good response per wife  was brought to ED for worsening psychosis for the past few days, pt lately with difficulty swallowing, decreased po intake    Psychosis  monitor  psych fu  MRI noted  neuro following  increase seroquel to 150  remeron started increasing to 30    #HTN- +orthostasis  #Orthostatic hypotension- hold losartan, inc IVF NS 150cc/hr  cont atenolol w parameters  ? psych med induced  cotisol low-? sec adrenal insufficiency-started hydrocortisone  endo cs fu      #Anxiety  psych cs appreciated  continue valium  uptitrating seroquel    #Abnormal throat sensation- sharp object sensation  ?neuralgia  swallow eval appreciated      #Merkel cell ca  onc c/s  r/o paraneoplastic causes      #DVT ppx- start hep sq      [facility]   [phone]

## 2023-11-06 NOTE — CONSULT NOTE ADULT - SUBJECTIVE AND OBJECTIVE BOX
History of Present Illness:    Past Medical/Surgical History:    Medications:    Family History: Non-contributory family history of premature cardiovascular atherosclerotic disease    Social History: No tobacco, alcohol or drug use    Review of Systems:  General: No fevers, chills, weight gain  Skin: No rashes, color changes  Cardiovascular: No chest pain, orthopnea  Respiratory: No shortness of breath, cough  Gastrointestinal: No nausea, abdominal pain  Genitourinary: No incontinence, pain with urination  Musculoskeletal: No pain, swelling, decreased range of motion  Neurological: No headache, weakness  Psychiatric: No depression, anxiety  Endocrine: No weight gain, increased thirst  All other systems are comprehensively negative.    Physical Exam:  Vitals:        Vital Signs Last 24 Hrs  T(C): 37.3 (06 Nov 2023 04:49), Max: 37.3 (06 Nov 2023 04:49)  T(F): 99.2 (06 Nov 2023 04:49), Max: 99.2 (06 Nov 2023 04:49)  HR: 82 (06 Nov 2023 04:49) (73 - 82)  BP: 157/78 (06 Nov 2023 04:49) (117/67 - 157/78)  BP(mean): --  RR: 17 (06 Nov 2023 04:49) (17 - 17)  SpO2: 94% (06 Nov 2023 04:49) (94% - 95%)  Parameters below as of 06 Nov 2023 04:49  Patient On (Oxygen Delivery Method): room air  General: NAD  HEENT: MMM  Neck: No JVD, no carotid bruit  Lungs: CTAB  CV: RRR, nl S1/S2, no M/R/G  Abdomen: S/NT/ND, +BS  Extremities: No LE edema, no cyanosis  Neuro: AAOx3, non-focal  Skin: No rash    Labs:                        9.3    6.29  )-----------( 96       ( 06 Nov 2023 07:35 )             28.1     11-06    148<H>  |  113<H>  |  34<H>  ----------------------------<  112<H>  3.9   |  29  |  1.10    Ca    8.5      06 Nov 2023 07:35              ECG/Telemetry: NSR, normal axis, no ST abnormality     History of Present Illness: The patient is a 73 year old male with a history of HTN, ITP, APLS, anxiety, OCD, Merkel cell cancer who presented initially with psychosis. The patient is a poor historian. As per wife, the patient had been having psychosis related to pain in his throat thinking that metal objects were stuck there. He has had lightheaded when standing and was noted to be orthostatic.    Past Medical/Surgical History:  HTN, ITP, APLS, anxiety, OCD, Merkel cell cancer     Medications:  Home Medications:  atenolol 50 mg oral tablet: 1 tab(s) orally 2 times a day (24 Oct 2023 12:23)  cholecalciferol 50 mcg (2000 intl units) oral tablet: 1 tab(s) orally once a day (24 Oct 2023 12:23)  diazePAM 5 mg oral tablet: 5 milligram(s) orally 2 times a day (24 Oct 2023 12:23)  fluvoxaMINE 100 mg oral tablet: 1 tab(s) orally 2 times a day (24 Oct 2023 12:23)  QUEtiapine 25 mg oral tablet: 0.5 tab(s) orally in the evening and 1 tab(s) orally at bedtime; spaced at least 6 hours apart (24 Oct 2023 12:23)      Family History: Non-contributory family history of premature cardiovascular atherosclerotic disease    Social History: No tobacco, alcohol or drug use    Review of Systems:  General: No fevers, chills, weight gain  Skin: No rashes, color changes  Cardiovascular: No chest pain, orthopnea  Respiratory: No shortness of breath, cough  Gastrointestinal: No nausea, abdominal pain  Genitourinary: No incontinence, pain with urination  Musculoskeletal: No pain, swelling, decreased range of motion  Neurological: No headache, weakness  Psychiatric: No depression, anxiety  Endocrine: No weight gain, increased thirst  All other systems are comprehensively negative.    Physical Exam:  Vitals:        Vital Signs Last 24 Hrs  T(C): 37.3 (06 Nov 2023 04:49), Max: 37.3 (06 Nov 2023 04:49)  T(F): 99.2 (06 Nov 2023 04:49), Max: 99.2 (06 Nov 2023 04:49)  HR: 82 (06 Nov 2023 04:49) (73 - 82)  BP: 157/78 (06 Nov 2023 04:49) (117/67 - 157/78)  BP(mean): --  RR: 17 (06 Nov 2023 04:49) (17 - 17)  SpO2: 94% (06 Nov 2023 04:49) (94% - 95%)  Parameters below as of 06 Nov 2023 04:49  Patient On (Oxygen Delivery Method): room air  General: NAD  HEENT: MMM  Neck: No JVD, no carotid bruit  Lungs: CTAB  CV: RRR, nl S1/S2, no M/R/G  Abdomen: S/NT/ND, +BS  Extremities: No LE edema, no cyanosis  Neuro: AAOx3, non-focal  Skin: No rash    Labs:                        9.3    6.29  )-----------( 96       ( 06 Nov 2023 07:35 )             28.1     11-06    148<H>  |  113<H>  |  34<H>  ----------------------------<  112<H>  3.9   |  29  |  1.10    Ca    8.5      06 Nov 2023 07:35              ECG/Telemetry: NSR, normal axis, no ST abnormality

## 2023-11-06 NOTE — PROGRESS NOTE ADULT - SUBJECTIVE AND OBJECTIVE BOX
Patient is a 73y old  Male who presents with a chief complaint of dagger like sensation to face and throat (06 Nov 2023 14:53)      INTERVAL HPI/OVERNIGHT EVENTS: noted  pt seen and examined this am   events noted  feels sensations  weakness when trying to stand up with PT    Vital Signs Last 24 Hrs  T(C): 36.4 (06 Nov 2023 12:10), Max: 37.3 (06 Nov 2023 04:49)  T(F): 97.5 (06 Nov 2023 12:10), Max: 99.2 (06 Nov 2023 04:49)  HR: 77 (06 Nov 2023 12:10) (77 - 82)  BP: 125/70 (06 Nov 2023 12:10) (125/70 - 157/78)  BP(mean): --  RR: 17 (06 Nov 2023 12:10) (17 - 17)  SpO2: 93% (06 Nov 2023 12:10) (93% - 94%)    Parameters below as of 06 Nov 2023 12:10  Patient On (Oxygen Delivery Method): room air        acetaminophen     Tablet .. 650 milliGRAM(s) Oral every 6 hours PRN  acetaminophen  300 mG/codeine 30 mG 1 Tablet(s) Oral every 6 hours PRN  cholecalciferol 2000 Unit(s) Oral daily  diazepam    Tablet 5 milliGRAM(s) Oral two times a day  fluvoxaMINE 100 milliGRAM(s) Oral two times a day  heparin   Injectable 5000 Unit(s) SubCutaneous every 12 hours  hydrocortisone sodium succinate Injectable 50 milliGRAM(s) IV Push every 6 hours  lactulose Syrup 15 Gram(s) Oral two times a day PRN  melatonin 3 milliGRAM(s) Oral at bedtime  mirtazapine 30 milliGRAM(s) Oral at bedtime  QUEtiapine 150 milliGRAM(s) Oral at bedtime  senna 2 Tablet(s) Oral at bedtime  sodium chloride 0.45%. 1000 milliLiter(s) IV Continuous <Continuous>      PHYSICAL EXAM:  GENERAL: NAD,   EYES: conjunctiva and sclera clear  ENMT: Moist mucous membranes  NECK: Supple, No JVD, Normal thyroid  CHEST/LUNG: non labored, cta b/l  HEART: Regular rate and rhythm; No murmurs, rubs, or gallops  ABDOMEN: Soft, Nontender, Nondistended; Bowel sounds present  EXTREMITIES:  2+ Peripheral Pulses, No clubbing, cyanosis, or edema  LYMPH: No lymphadenopathy noted  SKIN: No rashes or lesions    Consultant(s) Notes Reviewed:  [x ] YES  [ ] NO  Care Discussed with Consultants/Other Providers [ x] YES  [ ] NO    LABS:                        9.3    6.29  )-----------( 96       ( 06 Nov 2023 07:35 )             28.1     11-06    148<H>  |  113<H>  |  34<H>  ----------------------------<  112<H>  3.9   |  29  |  1.10    Ca    8.5      06 Nov 2023 07:35        Urinalysis Basic - ( 06 Nov 2023 07:35 )    Color: x / Appearance: x / SG: x / pH: x  Gluc: 112 mg/dL / Ketone: x  / Bili: x / Urobili: x   Blood: x / Protein: x / Nitrite: x   Leuk Esterase: x / RBC: x / WBC x   Sq Epi: x / Non Sq Epi: x / Bacteria: x      CAPILLARY BLOOD GLUCOSE            Urinalysis Basic - ( 06 Nov 2023 07:35 )    Color: x / Appearance: x / SG: x / pH: x  Gluc: 112 mg/dL / Ketone: x  / Bili: x / Urobili: x   Blood: x / Protein: x / Nitrite: x   Leuk Esterase: x / RBC: x / WBC x   Sq Epi: x / Non Sq Epi: x / Bacteria: x          RADIOLOGY & ADDITIONAL TESTS:    Imaging Personally Reviewed:  [x ] YES  [ ] NO

## 2023-11-06 NOTE — PROGRESS NOTE ADULT - ASSESSMENT
73 M pmh of HTN, ITP, APL syndrome, generalized anxiety disorder, OCD, newly dx aggressive Merkel Cell cancer of forehead- pt's oncologist  Dr. Eng, at [facility] on Keytruda (PD-1 blocker) infusions with good response per wife  was brought to ED for worsening psychosis for the past few days, pt believes there are sharp things inside his body trying to come out of his mouth, stuck in throat, pt lately with difficulty swallowing, decreased po intake, pt was evaluated by psych in ED. Wife reports sudden cognitive decline and behavioral changes immediately after COVID infection.    RECOMMENDATIONS  1-?PASC  -sounds like there is no obvious acute infectious issue. no fever, no leukocytosis, no obvious localization but concerning history of things developing post COVID-19 infection so raises concerns for a neurological PASC picture  noted serology for EBV consistent with what is described post PASC  EBV VCA IgM = Positive  EBV VCA IgG  = Positive  EBV EA IgG     = Positive  EBV NA IgG    = Positive  -serotonin pending,   EBV DNA-neg  and am cortisol-nl  recommend observation off antimicrobial Rx  -added melatonin 3mg PO QHS  -in outpt setting consideration of bifidobacterium probiotics    2-Orthostatic hypotension RRT called 11/2-am, concerns about hypovolemia, wife reports this is a chronic issue, IV fluid resuscitation    Thank you for consulting us and involving us in the management of this most interesting and challenging case.  We will follow along in the care of this patient. Please call us at [phone] or text me directly on my cell# at [phone] with any concerns.   Diarrhea

## 2023-11-06 NOTE — PROGRESS NOTE ADULT - SUBJECTIVE AND OBJECTIVE BOX
[INTERVAL HX: ]  Patient seen and examined;  Chart reviewed and events noted;     [MEDICATIONS]  MEDICATIONS  (STANDING):  cholecalciferol 2000 Unit(s) Oral daily  diazepam    Tablet 5 milliGRAM(s) Oral two times a day  fluvoxaMINE 100 milliGRAM(s) Oral two times a day  heparin   Injectable 5000 Unit(s) SubCutaneous every 12 hours  hydrocortisone sodium succinate Injectable 50 milliGRAM(s) IV Push every 6 hours  melatonin 3 milliGRAM(s) Oral at bedtime  mirtazapine 30 milliGRAM(s) Oral at bedtime  QUEtiapine 150 milliGRAM(s) Oral at bedtime  senna 2 Tablet(s) Oral at bedtime  sodium chloride 0.45%. 1000 milliLiter(s) (75 mL/Hr) IV Continuous <Continuous>    MEDICATIONS  (PRN):  acetaminophen     Tablet .. 650 milliGRAM(s) Oral every 6 hours PRN Temp greater or equal to 38.5C (101.3F), Moderate Pain (4 - 6)  acetaminophen  300 mG/codeine 30 mG 1 Tablet(s) Oral every 6 hours PRN Severe Pain (7 - 10)  lactulose Syrup 15 Gram(s) Oral two times a day PRN constipation      [VITALS]  Vital Signs Last 24 Hrs  T(C): 36.4 (06 Nov 2023 12:10), Max: 37.3 (06 Nov 2023 04:49)  T(F): 97.5 (06 Nov 2023 12:10), Max: 99.2 (06 Nov 2023 04:49)  HR: 77 (06 Nov 2023 12:10) (73 - 82)  BP: 125/70 (06 Nov 2023 12:10) (117/67 - 157/78)  BP(mean): --  RR: 17 (06 Nov 2023 12:10) (17 - 17)  SpO2: 93% (06 Nov 2023 12:10) (93% - 94%)    Parameters below as of 06 Nov 2023 12:10  Patient On (Oxygen Delivery Method): room air      [WT/HT]  Daily     Daily   [VENT]      [PHYSICAL EXAM]  GEN: NAD  HEENT: normocephalic and atraumatic. EOMI. PERRL.    NECK: Supple.  No lymphadenopathy   LUNGS: Clear to auscultation.  HEART: Regular rate and rhythm,  no MRG  ABDOMEN: Soft, nontender, and nondistended.  Positive bowel sounds.    : No CVA tenderness  EXTREMITIES: Without edema.  NEUROLOGIC: grossly intact.  PSYCHIATRIC: Appropriate affect .  SKIN: No rash     [LABS:]                        9.3    6.29  )-----------( 96       ( 06 Nov 2023 07:35 )             28.1     11-06    148<H>  |  113<H>  |  34<H>  ----------------------------<  112<H>  3.9   |  29  |  1.10    Ca    8.5      06 Nov 2023 07:35            Vitamin B12, Serum: 1109 pg/mL [232 - 1245] (10-29-23 @ 18:30)    Folate, Serum: >20.0 ng/mL (10-29-23 @ 18:30)    Sedimentation Rate, Erythrocyte: 57 mm/hr *H* [0 - 20] (10-27-23 @ 18:30)      Urinalysis Basic - ( 06 Nov 2023 07:35 )    Color: x / Appearance: x / SG: x / pH: x  Gluc: 112 mg/dL / Ketone: x  / Bili: x / Urobili: x   Blood: x / Protein: x / Nitrite: x   Leuk Esterase: x / RBC: x / WBC x   Sq Epi: x / Non Sq Epi: x / Bacteria: x                [RADIOLOGY STUDIES:]

## 2023-11-06 NOTE — PROGRESS NOTE ADULT - ASSESSMENT
IMPRESSION    pt w ITP, Lupus Anticoagulant, HTN, anxiety on Seraquel, OCD, Merkel cell ca of right Forehead, under care Dr Elliot Eng, post 4 doses q3wks IV Keytruda, last dose 10/17/23.  Pt brought in w c/o feeling "claws, daggers" inside him, since C#3 Keytruda, with sensation initially started w various parts of face now also progressed down to sides of body to rectum(does not see actual daggers or claws,)    Wife reports after first cycle started w jaw tightness and progressive worsened w each cycle, to point of hard to swallow/can't swallow  Had tired taken off Seroquel to see if adverse effect but no improvement, now back on x 5-6days  Merkel lesion has essentially resolved    CT head no acute findings  see by Psych in ER, ?psychosis  -sensation described by pt ?psychosis vs neuropathy  -psychosis not noted to be associated w Keytruda. Neuro tox of Keytruda described includes cerebral hemorrhage, confusion, myasthenia gravis, reversible posterior leukoencephalopathy syndrome, syed neuropathy, Guillain [place], aseptic meningitis, encephalitis, transverse myelitis    TFTS unremarkable.   AM cortisol 5.8. Slight low. Unclear significance   Repaet AM Cortisol low x2. <6 on two tests  3rd test w cortisol 11 on 11/06/23  To start Hydrocortisone 11/06/23    Negative plasma PBG   Bilirubin neg  Urobilinogen normal    Sjorgen /Scleroderma studies abn.      +MICHELLE and +Ro52    no significant change.    seen by psych with meds adjusted and now starting to show some improvement    RECOMMEND    PSYCH  cause of sx remains unclear.   s/p psych evaluation  Seroquel dose being adjusted.   On Remeron.   symptomatically stable today    NEURO  s/p neurology eval.   Was offered LP if sx not better  However pt refused LP last week; discussed at length with pt and wife.     s/p additional neuro   Discussed with Dr. Magdaleno.  Sx seem less likely secondary to Keytruda appears more psychiatric sx  await course, mgmt with psychiatry  Tylenol for pain  Low cortisol to be treated with hydrocortisone  Endocrinology to eval    pain  discussed with patient's wife today.   PRN Tylenol#3. one tabs if mild sx, consider escalation to two tabs if "dagger like sx / pain worse.     ITP  platelet count 96 K/uL;  will observed.     Endocrine  Prior cortisol levels low borderline.   Repeat today normal.   To start hydrocortisone today

## 2023-11-06 NOTE — PROGRESS NOTE ADULT - SUBJECTIVE AND OBJECTIVE BOX
[facility] DIVISION of INFECTIOUS DISEASE  Eric Reyes MD PhD, Milena Underwood MD, Jillian Mckoy MD, Enid Everett MD, Jay Krishna MD  and providing coverage with Arthur Pepper MD  Providing Infectious Disease Consultations at [facility], [facility], [place], [facility], [facility]'s    Office# [phone] to schedule follow up appointments  Answering Service for urgent calls or New Consults [phone]  Cell# to text for urgent issues Eric Reyes [phone]     infectious diseases progress note:    JANICE RENDON is a 73y y. o. Male patient    Overnight and events of the last 24hrs reviewed    Allergies    No Known Allergies    Intolerances        ANTIBIOTICS/RELEVANT:  antimicrobials    immunologic:    OTHER:  acetaminophen     Tablet .. 650 milliGRAM(s) Oral every 6 hours PRN  acetaminophen  300 mG/codeine 30 mG 1 Tablet(s) Oral every 6 hours PRN  cholecalciferol 2000 Unit(s) Oral daily  diazepam    Tablet 5 milliGRAM(s) Oral two times a day  fluvoxaMINE 100 milliGRAM(s) Oral two times a day  heparin   Injectable 5000 Unit(s) SubCutaneous every 12 hours  lactulose Syrup 15 Gram(s) Oral two times a day PRN  mirtazapine 30 milliGRAM(s) Oral at bedtime  QUEtiapine 150 milliGRAM(s) Oral at bedtime  senna 2 Tablet(s) Oral at bedtime  sodium chloride 0.45%. 1000 milliLiter(s) IV Continuous <Continuous>      Objective:  Vital Signs Last 24 Hrs  T(C): 36.4 (06 Nov 2023 12:10), Max: 37.3 (06 Nov 2023 04:49)  T(F): 97.5 (06 Nov 2023 12:10), Max: 99.2 (06 Nov 2023 04:49)  HR: 77 (06 Nov 2023 12:10) (73 - 82)  BP: 125/70 (06 Nov 2023 12:10) (117/67 - 157/78)  BP(mean): --  RR: 17 (06 Nov 2023 12:10) (17 - 17)  SpO2: 93% (06 Nov 2023 12:10) (93% - 94%)    Parameters below as of 06 Nov 2023 12:10  Patient On (Oxygen Delivery Method): room air        T(C): 36.4 (11-06-23 @ 12:10), Max: 37.3 (11-06-23 @ 04:49)  T(C): 36.4 (11-06-23 @ 12:10), Max: 37.3 (11-03-23 @ 20:41)  T(C): 36.4 (11-06-23 @ 12:10), Max: 37.3 (11-03-23 @ 20:41)    PHYSICAL EXAM:  HEENT: NC atraumatic  Neck: supple  Respiratory: no accessory muscle use, breathing comfortably  Cardiovascular: distant  Gastrointestinal: normal appearing, nondistended  Extremities: no clubbing, no cyanosis,        LABS:                          9.3    6.29  )-----------( 96       ( 06 Nov 2023 07:35 )             28.1       WBC  6.29 11-06 @ 07:35  7.01 11-05 @ 11:20  11.81 11-04 @ 08:50  8.32 11-03 @ 07:18  10.32 11-02 @ 08:25      11-06    148<H>  |  113<H>  |  34<H>  ----------------------------<  112<H>  3.9   |  29  |  1.10    Ca    8.5      06 Nov 2023 07:35        Creatinine: 1.10 mg/dL (11-06-23 @ 07:35)  Creatinine: 1.40 mg/dL (11-05-23 @ 11:20)  Creatinine: 1.70 mg/dL (11-04-23 @ 08:50)  Creatinine: 1.60 mg/dL (11-03-23 @ 07:18)  Creatinine: 1.10 mg/dL (11-02-23 @ 08:25)        Urinalysis Basic - ( 06 Nov 2023 07:35 )    Color: x / Appearance: x / SG: x / pH: x  Gluc: 112 mg/dL / Ketone: x  / Bili: x / Urobili: x   Blood: x / Protein: x / Nitrite: x   Leuk Esterase: x / RBC: x / WBC x   Sq Epi: x / Non Sq Epi: x / Bacteria: x            INFLAMMATORY MARKERS      MICROBIOLOGY:              RADIOLOGY & ADDITIONAL STUDIES:

## 2023-11-07 LAB
ANION GAP SERPL CALC-SCNC: 6 MMOL/L — SIGNIFICANT CHANGE UP (ref 5–17)
ANION GAP SERPL CALC-SCNC: 6 MMOL/L — SIGNIFICANT CHANGE UP (ref 5–17)
BUN SERPL-MCNC: 30 MG/DL — HIGH (ref 7–23)
BUN SERPL-MCNC: 30 MG/DL — HIGH (ref 7–23)
CALCIUM SERPL-MCNC: 8.2 MG/DL — LOW (ref 8.5–10.1)
CALCIUM SERPL-MCNC: 8.2 MG/DL — LOW (ref 8.5–10.1)
CHLORIDE SERPL-SCNC: 110 MMOL/L — HIGH (ref 96–108)
CHLORIDE SERPL-SCNC: 110 MMOL/L — HIGH (ref 96–108)
CO2 SERPL-SCNC: 28 MMOL/L — SIGNIFICANT CHANGE UP (ref 22–31)
CO2 SERPL-SCNC: 28 MMOL/L — SIGNIFICANT CHANGE UP (ref 22–31)
CREAT SERPL-MCNC: 0.97 MG/DL — SIGNIFICANT CHANGE UP (ref 0.5–1.3)
CREAT SERPL-MCNC: 0.97 MG/DL — SIGNIFICANT CHANGE UP (ref 0.5–1.3)
EGFR: 81 ML/MIN/1.73M2 — SIGNIFICANT CHANGE UP
EGFR: 81 ML/MIN/1.73M2 — SIGNIFICANT CHANGE UP
GLUCOSE SERPL-MCNC: 171 MG/DL — HIGH (ref 70–99)
GLUCOSE SERPL-MCNC: 171 MG/DL — HIGH (ref 70–99)
HCT VFR BLD CALC: 25.8 % — LOW (ref 39–50)
HCT VFR BLD CALC: 25.8 % — LOW (ref 39–50)
HGB BLD-MCNC: 8.7 G/DL — LOW (ref 13–17)
HGB BLD-MCNC: 8.7 G/DL — LOW (ref 13–17)
MCHC RBC-ENTMCNC: 29.2 PG — SIGNIFICANT CHANGE UP (ref 27–34)
MCHC RBC-ENTMCNC: 29.2 PG — SIGNIFICANT CHANGE UP (ref 27–34)
MCHC RBC-ENTMCNC: 33.7 GM/DL — SIGNIFICANT CHANGE UP (ref 32–36)
MCHC RBC-ENTMCNC: 33.7 GM/DL — SIGNIFICANT CHANGE UP (ref 32–36)
MCV RBC AUTO: 86.6 FL — SIGNIFICANT CHANGE UP (ref 80–100)
MCV RBC AUTO: 86.6 FL — SIGNIFICANT CHANGE UP (ref 80–100)
NRBC # BLD: 0 /100 WBCS — SIGNIFICANT CHANGE UP (ref 0–0)
NRBC # BLD: 0 /100 WBCS — SIGNIFICANT CHANGE UP (ref 0–0)
PLATELET # BLD AUTO: 106 K/UL — LOW (ref 150–400)
PLATELET # BLD AUTO: 106 K/UL — LOW (ref 150–400)
POTASSIUM SERPL-MCNC: 4.8 MMOL/L — SIGNIFICANT CHANGE UP (ref 3.5–5.3)
POTASSIUM SERPL-MCNC: 4.8 MMOL/L — SIGNIFICANT CHANGE UP (ref 3.5–5.3)
POTASSIUM SERPL-SCNC: 4.8 MMOL/L — SIGNIFICANT CHANGE UP (ref 3.5–5.3)
POTASSIUM SERPL-SCNC: 4.8 MMOL/L — SIGNIFICANT CHANGE UP (ref 3.5–5.3)
RBC # BLD: 2.98 M/UL — LOW (ref 4.2–5.8)
RBC # BLD: 2.98 M/UL — LOW (ref 4.2–5.8)
RBC # FLD: 12.8 % — SIGNIFICANT CHANGE UP (ref 10.3–14.5)
RBC # FLD: 12.8 % — SIGNIFICANT CHANGE UP (ref 10.3–14.5)
SEROTONIN SER-MCNC: <30 NG/ML — SIGNIFICANT CHANGE UP
SEROTONIN SER-MCNC: <30 NG/ML — SIGNIFICANT CHANGE UP
SODIUM SERPL-SCNC: 144 MMOL/L — SIGNIFICANT CHANGE UP (ref 135–145)
SODIUM SERPL-SCNC: 144 MMOL/L — SIGNIFICANT CHANGE UP (ref 135–145)
WBC # BLD: 4.1 K/UL — SIGNIFICANT CHANGE UP (ref 3.8–10.5)
WBC # BLD: 4.1 K/UL — SIGNIFICANT CHANGE UP (ref 3.8–10.5)
WBC # FLD AUTO: 4.1 K/UL — SIGNIFICANT CHANGE UP (ref 3.8–10.5)
WBC # FLD AUTO: 4.1 K/UL — SIGNIFICANT CHANGE UP (ref 3.8–10.5)

## 2023-11-07 RX ADMIN — FLUVOXAMINE MALEATE 100 MILLIGRAM(S): 25 TABLET ORAL at 05:32

## 2023-11-07 RX ADMIN — Medication 50 MILLIGRAM(S): at 06:11

## 2023-11-07 RX ADMIN — Medication 650 MILLIGRAM(S): at 06:04

## 2023-11-07 RX ADMIN — Medication 650 MILLIGRAM(S): at 05:34

## 2023-11-07 RX ADMIN — Medication 5 MILLIGRAM(S): at 05:31

## 2023-11-07 RX ADMIN — Medication 2000 UNIT(S): at 12:47

## 2023-11-07 RX ADMIN — Medication 3 MILLIGRAM(S): at 21:07

## 2023-11-07 RX ADMIN — ATENOLOL 50 MILLIGRAM(S): 25 TABLET ORAL at 05:32

## 2023-11-07 RX ADMIN — HEPARIN SODIUM 5000 UNIT(S): 5000 INJECTION INTRAVENOUS; SUBCUTANEOUS at 05:31

## 2023-11-07 RX ADMIN — SENNA PLUS 2 TABLET(S): 8.6 TABLET ORAL at 21:07

## 2023-11-07 RX ADMIN — HEPARIN SODIUM 5000 UNIT(S): 5000 INJECTION INTRAVENOUS; SUBCUTANEOUS at 18:19

## 2023-11-07 RX ADMIN — FLUVOXAMINE MALEATE 100 MILLIGRAM(S): 25 TABLET ORAL at 18:19

## 2023-11-07 RX ADMIN — QUETIAPINE FUMARATE 150 MILLIGRAM(S): 200 TABLET, FILM COATED ORAL at 21:08

## 2023-11-07 RX ADMIN — Medication 5 MILLIGRAM(S): at 18:19

## 2023-11-07 RX ADMIN — MIRTAZAPINE 30 MILLIGRAM(S): 45 TABLET, ORALLY DISINTEGRATING ORAL at 21:07

## 2023-11-07 NOTE — PROGRESS NOTE ADULT - SUBJECTIVE AND OBJECTIVE BOX
All interim records and events noted.    c/o does not want to stay in hospital, same problem swallowing  wife present, report w the change in Seroquel and remeron, pt appear better yesterday but again agitated today      MEDICATIONS  (STANDING):  atenolol  Tablet 50 milliGRAM(s) Oral daily  cholecalciferol 2000 Unit(s) Oral daily  diazepam    Tablet 5 milliGRAM(s) Oral two times a day  fluvoxaMINE 100 milliGRAM(s) Oral two times a day  heparin   Injectable 5000 Unit(s) SubCutaneous every 12 hours  melatonin 3 milliGRAM(s) Oral at bedtime  mirtazapine 30 milliGRAM(s) Oral at bedtime  QUEtiapine 150 milliGRAM(s) Oral at bedtime  senna 2 Tablet(s) Oral at bedtime  sodium chloride 0.45%. 1000 milliLiter(s) (75 mL/Hr) IV Continuous <Continuous>    MEDICATIONS  (PRN):  acetaminophen     Tablet .. 650 milliGRAM(s) Oral every 6 hours PRN Temp greater or equal to 38.5C (101.3F), Moderate Pain (4 - 6)  lactulose Syrup 15 Gram(s) Oral two times a day PRN constipation      Vital Signs Last 24 Hrs  T(C): 36.3 (07 Nov 2023 05:00), Max: 36.8 (06 Nov 2023 20:48)  T(F): 97.4 (07 Nov 2023 05:00), Max: 98.3 (06 Nov 2023 20:48)  HR: 70 (07 Nov 2023 05:00) (70 - 77)  BP: 133/72 (07 Nov 2023 05:00) (125/70 - 134/66)  BP(mean): --  RR: 17 (07 Nov 2023 05:00) (17 - 17)  SpO2: 95% (07 Nov 2023 05:00) (93% - 95%)    Parameters below as of 07 Nov 2023 05:00  Patient On (Oxygen Delivery Method): room air        PHYSICAL EXAM  General: sitting up in bed, in no acute distress  Head: atraumatic, normocephalic  ENT: sclera anicteric, buccal mucosa moist  Neck: supple, trachea midline  CV: S1 S2, regular rate and rhythm  Lungs: clear to auscultation, no wheezes/rhonchi  Abdomen: soft, nontender, bowel sounds present, no palpable masses  Extrem: no clubbing/cyanosis/edema  Skin: no significant increased ecchymosis/petechiae  Neuro: alert and responsive,  no focal deficits      LABS:             8.7    4.10  )-----------( 106      ( 11-07 @ 06:33 )             25.8                9.3    6.29  )-----------( 96       ( 11-06 @ 07:35 )             28.1                8.9    7.01  )-----------( 79       ( 11-05 @ 11:20 )             26.5       11-07    144  |  110<H>  |  30<H>  ----------------------------<  171<H>  4.8   |  28  |  0.97    Ca    8.2<L>      07 Nov 2023 06:33          RADIOLOGY & ADDITIONAL STUDIES:    IMPRESSION/RECOMMENDATIONS:

## 2023-11-07 NOTE — PROGRESS NOTE ADULT - ASSESSMENT
73 M pmh of HTN, ITP, APL syndrome, generalized anxiety disorder, OCD, newly dx aggressive Merkel Cell cancer of forehead- pt's oncologist  Dr. Eng, at [facility] on Keytruda (PD-1 blocker) infusions with good response per wife  was brought to ED for worsening psychosis for the past few days, pt believes there are sharp things inside his body trying to come out of his mouth, stuck in throat, pt lately with difficulty swallowing, decreased po intake, pt was evaluated by psych in ED. Wife reports sudden cognitive decline and behavioral changes immediately after COVID infection.    RECOMMENDATIONS  1-?PASC  -sounds like there is no obvious acute infectious issue. no fever, no leukocytosis, no obvious localization but concerning history of things developing post COVID-19 infection so raises concerns for a neurological PASC picture  noted serology for EBV consistent with what is described post PASC  EBV VCA IgM = Positive  EBV VCA IgG  = Positive  EBV EA IgG     = Positive  EBV NA IgG    = Positive  -serotonin pending,   EBV DNA-neg  and am cortisol-nl  recommend observation off antimicrobial Rx  -added melatonin 3mg PO QHS  -in outpt setting consideration of bifidobacterium probiotics    2-Orthostatic hypotension RRT called 11/2-am, concerns about hypovolemia, wife reports this is a chronic issue, IV fluid resuscitation    Thank you for consulting us and involving us in the management of this most interesting and challenging case.  We will follow along in the care of this patient. Please call us at [phone] or text me directly on my cell# at [phone] with any concerns.

## 2023-11-07 NOTE — PROGRESS NOTE ADULT - SUBJECTIVE AND OBJECTIVE BOX
Chief Complaint: Psychosis    Interval Events: No events overnight.    Review of Systems:  General: No fevers, chills, weight gain  Skin: No rashes, color changes  Cardiovascular: No chest pain, orthopnea  Respiratory: No shortness of breath, cough  Gastrointestinal: No nausea, abdominal pain  Genitourinary: No incontinence, pain with urination  Musculoskeletal: No pain, swelling, decreased range of motion  Neurological: No headache, weakness  Psychiatric: No depression, anxiety  Endocrine: No weight gain, increased thirst  All other systems are comprehensively negative.    Physical Exam:  Vitals:        Vital Signs Last 24 Hrs  T(C): 36.3 (07 Nov 2023 05:00), Max: 36.8 (06 Nov 2023 20:48)  T(F): 97.4 (07 Nov 2023 05:00), Max: 98.3 (06 Nov 2023 20:48)  HR: 70 (07 Nov 2023 05:00) (70 - 77)  BP: 133/72 (07 Nov 2023 05:00) (125/70 - 134/66)  BP(mean): --  RR: 17 (07 Nov 2023 05:00) (17 - 17)  SpO2: 95% (07 Nov 2023 05:00) (93% - 95%)  Parameters below as of 07 Nov 2023 05:00  Patient On (Oxygen Delivery Method): room air  General: NAD  HEENT: MMM  Neck: No JVD, no carotid bruit  Lungs: CTAB  CV: RRR, nl S1/S2, no M/R/G  Abdomen: S/NT/ND, +BS  Extremities: No LE edema, no cyanosis  Neuro: AAOx3, non-focal  Skin: No rash    Labs:                        8.7    4.10  )-----------( 106      ( 07 Nov 2023 06:33 )             25.8     11-07    144  |  110<H>  |  30<H>  ----------------------------<  171<H>  4.8   |  28  |  0.97    Ca    8.2<L>      07 Nov 2023 06:33

## 2023-11-07 NOTE — PROGRESS NOTE ADULT - ASSESSMENT
73 M pmh of HTN, ITP, APL syndrome, generalized anxiety disorder, OCD, newly dx aggressive Merkel Cell cancer of forehead- pt's oncologist  Dr. Eng, at [facility] on Keytruda infusions with good response per wife  was brought to ED for worsening psychosis for the past few days, pt lately with difficulty swallowing, decreased po intake    Psychosis  monitor  psych fu  MRI noted  neuro following  increased seroquel to 150  remeron started increasing to 30    #Abnormal throat sensation- sharp object sensation  swallow eval appreciated    #HTN- +orthostasis  #Orthostatic hypotension- hold losartan, inc IVF NS 150cc/hr  cont atenolol w parameters  ? psych med induced  cotisol low-? hydrocortisone 50 IV -got 3 doses- will dc given pt's worsening psych state  endo cs fu  d/w cards - if remains orthostatic to start midodrine      #Anxiety  psych cs appreciated  continue valium  uptitrating seroquel    #Merkel cell ca  onc c/s  r/o paraneoplastic causes- unlikely      #DVT ppx- start hep sq      [facility]   [phone]

## 2023-11-07 NOTE — PROGRESS NOTE ADULT - SUBJECTIVE AND OBJECTIVE BOX
Patient is a 73y old  Male who presents with a chief complaint of dagger like sensation to face and throat (06 Nov 2023 14:53)      INTERVAL HPI/OVERNIGHT EVENTS: noted  pt seen and examined this am   events noted  was agitated and screaming, wife thinks this behaviour is new      Vital Signs Last 24 Hrs  T(C): 36.4 (07 Nov 2023 11:56), Max: 36.8 (06 Nov 2023 20:48)  T(F): 97.5 (07 Nov 2023 11:56), Max: 98.3 (06 Nov 2023 20:48)  HR: 56 (07 Nov 2023 11:56) (56 - 74)  BP: 135/71 (07 Nov 2023 11:56) (133/72 - 135/71)  BP(mean): --  RR: 18 (07 Nov 2023 11:56) (17 - 18)  SpO2: 94% (07 Nov 2023 11:56) (94% - 95%)    Parameters below as of 07 Nov 2023 11:56  Patient On (Oxygen Delivery Method): room air        acetaminophen     Tablet .. 650 milliGRAM(s) Oral every 6 hours PRN  atenolol  Tablet 50 milliGRAM(s) Oral daily  cholecalciferol 2000 Unit(s) Oral daily  diazepam    Tablet 5 milliGRAM(s) Oral two times a day  fluvoxaMINE 100 milliGRAM(s) Oral two times a day  heparin   Injectable 5000 Unit(s) SubCutaneous every 12 hours  lactulose Syrup 15 Gram(s) Oral two times a day PRN  melatonin 3 milliGRAM(s) Oral at bedtime  mirtazapine 30 milliGRAM(s) Oral at bedtime  QUEtiapine 150 milliGRAM(s) Oral at bedtime  senna 2 Tablet(s) Oral at bedtime  sodium chloride 0.45%. 1000 milliLiter(s) IV Continuous <Continuous>      PHYSICAL EXAM:  GENERAL: NAD,   EYES: conjunctiva and sclera clear  ENMT: Moist mucous membranes  NECK: Supple, No JVD, Normal thyroid  CHEST/LUNG: non labored, cta b/l  HEART: Regular rate and rhythm; No murmurs, rubs, or gallops  ABDOMEN: Soft, Nontender, Nondistended; Bowel sounds present  EXTREMITIES:  2+ Peripheral Pulses, No clubbing, cyanosis, or edema  LYMPH: No lymphadenopathy noted  SKIN: No rashes or lesions    Consultant(s) Notes Reviewed:  [x ] YES  [ ] NO  Care Discussed with Consultants/Other Providers [ x] YES  [ ] NO    LABS:                        8.7    4.10  )-----------( 106      ( 07 Nov 2023 06:33 )             25.8     11-07    144  |  110<H>  |  30<H>  ----------------------------<  171<H>  4.8   |  28  |  0.97    Ca    8.2<L>      07 Nov 2023 06:33        Urinalysis Basic - ( 07 Nov 2023 06:33 )    Color: x / Appearance: x / SG: x / pH: x  Gluc: 171 mg/dL / Ketone: x  / Bili: x / Urobili: x   Blood: x / Protein: x / Nitrite: x   Leuk Esterase: x / RBC: x / WBC x   Sq Epi: x / Non Sq Epi: x / Bacteria: x      CAPILLARY BLOOD GLUCOSE            Urinalysis Basic - ( 07 Nov 2023 06:33 )    Color: x / Appearance: x / SG: x / pH: x  Gluc: 171 mg/dL / Ketone: x  / Bili: x / Urobili: x   Blood: x / Protein: x / Nitrite: x   Leuk Esterase: x / RBC: x / WBC x   Sq Epi: x / Non Sq Epi: x / Bacteria: x          RADIOLOGY & ADDITIONAL TESTS:    Imaging Personally Reviewed:  [x ] YES  [ ] NO

## 2023-11-07 NOTE — PROGRESS NOTE ADULT - SUBJECTIVE AND OBJECTIVE BOX
[facility] DIVISION of INFECTIOUS DISEASE  Eric Reyes MD PhD, Milena Underwood MD, Jillian Mckoy MD, Enid Everett MD, Jay Krishna MD  and providing coverage with Arthur Pepper MD  Providing Infectious Disease Consultations at [facility], [facility], [facility]'s    Office# [phone] to schedule follow up appointments  Answering Service for urgent calls or New Consults [phone]  Cell# to text for urgent issues Eric Reyes [phone]     infectious diseases progress note:    JANICE RENDON is a 73y y. o. Male patient    Overnight and events of the last 24hrs reviewed    Allergies    No Known Allergies    Intolerances        ANTIBIOTICS/RELEVANT:  antimicrobials    immunologic:    OTHER:  acetaminophen     Tablet .. 650 milliGRAM(s) Oral every 6 hours PRN  atenolol  Tablet 50 milliGRAM(s) Oral daily  cholecalciferol 2000 Unit(s) Oral daily  diazepam    Tablet 5 milliGRAM(s) Oral two times a day  fluvoxaMINE 100 milliGRAM(s) Oral two times a day  heparin   Injectable 5000 Unit(s) SubCutaneous every 12 hours  hydrocortisone sodium succinate Injectable 50 milliGRAM(s) IV Push every 6 hours  lactulose Syrup 15 Gram(s) Oral two times a day PRN  melatonin 3 milliGRAM(s) Oral at bedtime  mirtazapine 30 milliGRAM(s) Oral at bedtime  QUEtiapine 150 milliGRAM(s) Oral at bedtime  senna 2 Tablet(s) Oral at bedtime  sodium chloride 0.45%. 1000 milliLiter(s) IV Continuous <Continuous>      Objective:  Vital Signs Last 24 Hrs  T(C): 36.3 (07 Nov 2023 05:00), Max: 36.8 (06 Nov 2023 20:48)  T(F): 97.4 (07 Nov 2023 05:00), Max: 98.3 (06 Nov 2023 20:48)  HR: 70 (07 Nov 2023 05:00) (70 - 77)  BP: 133/72 (07 Nov 2023 05:00) (125/70 - 134/66)  BP(mean): --  RR: 17 (07 Nov 2023 05:00) (17 - 17)  SpO2: 95% (07 Nov 2023 05:00) (93% - 95%)    Parameters below as of 07 Nov 2023 05:00  Patient On (Oxygen Delivery Method): room air        T(C): 36.3 (11-07-23 @ 05:00), Max: 37.3 (11-06-23 @ 04:49)  T(C): 36.3 (11-07-23 @ 05:00), Max: 37.3 (11-06-23 @ 04:49)  T(C): 36.3 (11-07-23 @ 05:00), Max: 37.3 (11-03-23 @ 20:41)    PHYSICAL EXAM:  HEENT: NC atraumatic  Neck: supple  Respiratory: no accessory muscle use, breathing comfortably  Cardiovascular: distant  Gastrointestinal: normal appearing, nondistended  Extremities: no clubbing, no cyanosis,        LABS:                          8.7    4.10  )-----------( 106      ( 07 Nov 2023 06:33 )             25.8       WBC  4.10 11-07 @ 06:33  6.29 11-06 @ 07:35  7.01 11-05 @ 11:20  11.81 11-04 @ 08:50  8.32 11-03 @ 07:18  10.32 11-02 @ 08:25      11-07    144  |  110<H>  |  30<H>  ----------------------------<  171<H>  4.8   |  28  |  0.97    Ca    8.2<L>      07 Nov 2023 06:33        Creatinine: 0.97 mg/dL (11-07-23 @ 06:33)  Creatinine: 1.10 mg/dL (11-06-23 @ 07:35)  Creatinine: 1.40 mg/dL (11-05-23 @ 11:20)  Creatinine: 1.70 mg/dL (11-04-23 @ 08:50)  Creatinine: 1.60 mg/dL (11-03-23 @ 07:18)  Creatinine: 1.10 mg/dL (11-02-23 @ 08:25)        Urinalysis Basic - ( 07 Nov 2023 06:33 )    Color: x / Appearance: x / SG: x / pH: x  Gluc: 171 mg/dL / Ketone: x  / Bili: x / Urobili: x   Blood: x / Protein: x / Nitrite: x   Leuk Esterase: x / RBC: x / WBC x   Sq Epi: x / Non Sq Epi: x / Bacteria: x            INFLAMMATORY MARKERS      MICROBIOLOGY:              RADIOLOGY & ADDITIONAL STUDIES:

## 2023-11-07 NOTE — PROGRESS NOTE ADULT - ASSESSMENT
The patient is a 73 year old male with a history of HTN, ITP, APLS, anxiety, OCD, Merkel cell cancer who presented with psychosis.    Plan:  - ECG with no evidence of ischemia or infarction  - Orthostatic hypotension may be multifactorial - dehydration, medication related (antipsychotics), and low cortisol levels  - Losartan on hold  - Continue atenolol 50 mg daily  - Endocrine eval - plan for steroids  - Continue IV fluids as needed  - If orthostatics remain positive despite above, will consider starting midodrine

## 2023-11-07 NOTE — PROGRESS NOTE ADULT - ASSESSMENT
IMPRESSION    72 yo man w ITP, Lupus Anticoagulant, HTN, anxiety on Seraquel, OCD, Merkel cell ca of right Forehead, under care Dr Elliot Eng, post 4 doses q3wks IV Keytruda, last dose 10/17/23 w good response  Pt brought in w c/o feeling "claws, daggers" inside him, since C#3 Keytruda, with sensation initially started w various parts of face now also progressed down to sides of body to rectum(does not see actual daggers or claws,)  Wife reports after first cycle started w jaw tightness and progressive worsened w each cycle, to point of hard to swallow/can't swallow  Had tired taken off Seroquel to see if adverse effect but no improvement, now back on x 5-6days  CT and MRI head no acute findings    -sensation described by pt ?psychosis vs neuropathy  -psychosis not noted to be associated w Keytruda. Neuro tox of Keytruda described includes cerebral hemorrhage, confusion, myasthenia gravis, reversible posterior leukoencephalopathy syndrome, syed neuropathy, Guillain [place], aseptic meningitis, encephalitis, transverse myelitis  -seen by Psych and Neuro  -psy meds being adjusted(Seraquel and Remeron)  -pt refused LP\  -AM cortisol <6 x2 and 11 on 11/6, post Hydrocortison on 11/6  -clinically no much diff  -continue acute care

## 2023-11-08 DIAGNOSIS — E27.49 OTHER ADRENOCORTICAL INSUFFICIENCY: ICD-10-CM

## 2023-11-08 PROCEDURE — 99231 SBSQ HOSP IP/OBS SF/LOW 25: CPT

## 2023-11-08 RX ORDER — DIAZEPAM 5 MG
5 TABLET ORAL
Refills: 0 | Status: DISCONTINUED | OUTPATIENT
Start: 2023-11-09 | End: 2023-11-16

## 2023-11-08 RX ORDER — HYDROMORPHONE HYDROCHLORIDE 2 MG/ML
0.5 INJECTION INTRAMUSCULAR; INTRAVENOUS; SUBCUTANEOUS ONCE
Refills: 0 | Status: DISCONTINUED | OUTPATIENT
Start: 2023-11-08 | End: 2023-11-08

## 2023-11-08 RX ORDER — FLUVOXAMINE MALEATE 25 MG/1
150 TABLET ORAL
Refills: 0 | Status: DISCONTINUED | OUTPATIENT
Start: 2023-11-08 | End: 2023-11-14

## 2023-11-08 RX ADMIN — Medication 2000 UNIT(S): at 11:01

## 2023-11-08 RX ADMIN — HYDROMORPHONE HYDROCHLORIDE 0.5 MILLIGRAM(S): 2 INJECTION INTRAMUSCULAR; INTRAVENOUS; SUBCUTANEOUS at 05:58

## 2023-11-08 RX ADMIN — HYDROMORPHONE HYDROCHLORIDE 0.5 MILLIGRAM(S): 2 INJECTION INTRAMUSCULAR; INTRAVENOUS; SUBCUTANEOUS at 06:28

## 2023-11-08 RX ADMIN — SENNA PLUS 2 TABLET(S): 8.6 TABLET ORAL at 21:28

## 2023-11-08 RX ADMIN — ATENOLOL 50 MILLIGRAM(S): 25 TABLET ORAL at 05:58

## 2023-11-08 RX ADMIN — MIRTAZAPINE 30 MILLIGRAM(S): 45 TABLET, ORALLY DISINTEGRATING ORAL at 21:28

## 2023-11-08 RX ADMIN — FLUVOXAMINE MALEATE 100 MILLIGRAM(S): 25 TABLET ORAL at 05:58

## 2023-11-08 RX ADMIN — Medication 3 MILLIGRAM(S): at 21:28

## 2023-11-08 RX ADMIN — FLUVOXAMINE MALEATE 150 MILLIGRAM(S): 25 TABLET ORAL at 17:30

## 2023-11-08 RX ADMIN — Medication 5 MILLIGRAM(S): at 17:30

## 2023-11-08 RX ADMIN — HEPARIN SODIUM 5000 UNIT(S): 5000 INJECTION INTRAVENOUS; SUBCUTANEOUS at 05:59

## 2023-11-08 RX ADMIN — QUETIAPINE FUMARATE 150 MILLIGRAM(S): 200 TABLET, FILM COATED ORAL at 21:28

## 2023-11-08 RX ADMIN — HEPARIN SODIUM 5000 UNIT(S): 5000 INJECTION INTRAVENOUS; SUBCUTANEOUS at 17:30

## 2023-11-08 RX ADMIN — Medication 5 MILLIGRAM(S): at 05:58

## 2023-11-08 NOTE — PROGRESS NOTE ADULT - ASSESSMENT
73 M pmh of HTN, ITP, APL syndrome, generalized anxiety disorder, OCD, newly dx aggressive Merkel Cell cancer of forehead- pt's oncologist  Dr. Eng, at [facility] on Keytruda (PD-1 blocker) infusions with good response per wife  was brought to ED for worsening psychosis for the past few days, pt believes there are sharp things inside his body trying to come out of his mouth, stuck in throat, pt lately with difficulty swallowing, decreased po intake, pt was evaluated by psych in ED. Wife reports sudden cognitive decline and behavioral changes immediately after COVID infection.    RECOMMENDATIONS  1-?PASC  -sounds like there is no obvious acute infectious issue. no fever, no leukocytosis, no obvious localization but concerning history of things developing post COVID-19 infection so raises concerns for a neurological PASC picture  noted serology for EBV consistent with what is described post PASC  EBV VCA IgM = Positive  EBV VCA IgG  = Positive  EBV EA IgG     = Positive  EBV NA IgG    = Positive  Serotonin, Blood: <30ng/mL   EBV DNA-neg  and am cortisol-low x 2 then nl  recommend observation off antimicrobial Rx  -added melatonin 3mg PO QHS  -will add low dose am prednisone 5mg PO Qam  -in outpt setting consideration of bifidobacterium probiotics  -discussed with Dr Henry potential to try non serotonin based agents in this context    2-Orthostatic hypotension RRT called 11/2-am, concerns about hypovolemia, wife reports this is a chronic issue, IV fluid resuscitation    Thank you for consulting us and involving us in the management of this most interesting and challenging case.  We will follow along in the care of this patient. Please call us at [phone] or text me directly on my cell# at [phone] with any concerns.

## 2023-11-08 NOTE — PROGRESS NOTE ADULT - SUBJECTIVE AND OBJECTIVE BOX
[facility] DIV[facility] of INFECTIOUS DISEASE  Eric Reyse MD PhD, Milena Underwood MD, Jillian Mckoy MD, Enid Everett MD, Jay Krishna MD  and providing coverage with Arthur Pepper MD  Providing Infectious Disease Consultations at [facility], [facility], [facility]'s    Office# [phone] to schedule follow up appointments  Answering Service for urgent calls or New Consults [phone]  Cell# to text for urgent issues Eric Reyes [phone]     infectious diseases progress note:    JANICE RENDON is a 73y y. o. Male patient    Overnight and events of the last 24hrs reviewed    Allergies    No Known Allergies    Intolerances        ANTIBIOTICS/RELEVANT:  antimicrobials    immunologic:    OTHER:  acetaminophen     Tablet .. 650 milliGRAM(s) Oral every 6 hours PRN  atenolol  Tablet 50 milliGRAM(s) Oral daily  cholecalciferol 2000 Unit(s) Oral daily  diazepam    Tablet 5 milliGRAM(s) Oral two times a day  fluvoxaMINE 100 milliGRAM(s) Oral two times a day  heparin   Injectable 5000 Unit(s) SubCutaneous every 12 hours  lactulose Syrup 15 Gram(s) Oral two times a day PRN  melatonin 3 milliGRAM(s) Oral at bedtime  mirtazapine 30 milliGRAM(s) Oral at bedtime  QUEtiapine 150 milliGRAM(s) Oral at bedtime  senna 2 Tablet(s) Oral at bedtime  sodium chloride 0.45%. 1000 milliLiter(s) IV Continuous <Continuous>      Objective:  Vital Signs Last 24 Hrs  T(C): 36.7 (08 Nov 2023 12:01), Max: 36.8 (07 Nov 2023 21:56)  T(F): 98 (08 Nov 2023 12:01), Max: 98.3 (07 Nov 2023 21:56)  HR: 72 (08 Nov 2023 12:01) (67 - 72)  BP: 162/70 (08 Nov 2023 12:01) (144/76 - 162/70)  BP(mean): --  RR: 18 (08 Nov 2023 12:01) (17 - 18)  SpO2: 92% (08 Nov 2023 12:01) (92% - 95%)    Parameters below as of 08 Nov 2023 12:01  Patient On (Oxygen Delivery Method): room air        T(C): 36.7 (11-08-23 @ 12:01), Max: 36.8 (11-06-23 @ 20:48)  T(C): 36.7 (11-08-23 @ 12:01), Max: 37.3 (11-06-23 @ 04:49)  T(C): 36.7 (11-08-23 @ 12:01), Max: 37.3 (11-06-23 @ 04:49)    PHYSICAL EXAM:  HEENT: NC atraumatic  Neck: supple  Respiratory: no accessory muscle use, breathing comfortably  Cardiovascular: distant  Gastrointestinal: normal appearing, nondistended  Extremities: no clubbing, no cyanosis,        LABS:                          8.7    4.10  )-----------( 106      ( 07 Nov 2023 06:33 )             25.8       WBC  4.10 11-07 @ 06:33  6.29 11-06 @ 07:35  7.01 11-05 @ 11:20  11.81 11-04 @ 08:50  8.32 11-03 @ 07:18  10.32 11-02 @ 08:25      11-07    144  |  110<H>  |  30<H>  ----------------------------<  171<H>  4.8   |  28  |  0.97    Ca    8.2<L>      07 Nov 2023 06:33        Creatinine: 0.97 mg/dL (11-07-23 @ 06:33)  Creatinine: 1.10 mg/dL (11-06-23 @ 07:35)  Creatinine: 1.40 mg/dL (11-05-23 @ 11:20)  Creatinine: 1.70 mg/dL (11-04-23 @ 08:50)  Creatinine: 1.60 mg/dL (11-03-23 @ 07:18)  Creatinine: 1.10 mg/dL (11-02-23 @ 08:25)        Urinalysis Basic - ( 07 Nov 2023 06:33 )    Color: x / Appearance: x / SG: x / pH: x  Gluc: 171 mg/dL / Ketone: x  / Bili: x / Urobili: x   Blood: x / Protein: x / Nitrite: x   Leuk Esterase: x / RBC: x / WBC x   Sq Epi: x / Non Sq Epi: x / Bacteria: x    Cortisol AM, Serum . (11.06.23 @ 08:55)    Cortisol AM, Serum: 11.6 ug/dL    Serotonin, Blood: <30ng/mL (11.02.23 @ 08:25)        INFLAMMATORY MARKERS      MICROBIOLOGY:              RADIOLOGY & ADDITIONAL STUDIES:

## 2023-11-08 NOTE — PROGRESS NOTE ADULT - SUBJECTIVE AND OBJECTIVE BOX
Patient is a 73y old  Male who presents with a chief complaint of dagger like sensation to face and throat (06 Nov 2023 14:53)        INTERVAL HPI/OVERNIGHT EVENTS:   no change  pt seen and examined         Vital Signs Last 24 Hrs  T(C): 36.7 (08 Nov 2023 12:01), Max: 36.8 (07 Nov 2023 21:56)  T(F): 98 (08 Nov 2023 12:01), Max: 98.3 (07 Nov 2023 21:56)  HR: 72 (08 Nov 2023 12:01) (67 - 72)  BP: 162/70 (08 Nov 2023 12:01) (144/76 - 162/70)  BP(mean): --  RR: 18 (08 Nov 2023 12:01) (17 - 18)  SpO2: 92% (08 Nov 2023 12:01) (92% - 95%)    Parameters below as of 08 Nov 2023 12:01  Patient On (Oxygen Delivery Method): room air        acetaminophen     Tablet .. 650 milliGRAM(s) Oral every 6 hours PRN  atenolol  Tablet 50 milliGRAM(s) Oral daily  cholecalciferol 2000 Unit(s) Oral daily  fluvoxaMINE 150 milliGRAM(s) Oral two times a day  heparin   Injectable 5000 Unit(s) SubCutaneous every 12 hours  lactulose Syrup 15 Gram(s) Oral two times a day PRN  melatonin 3 milliGRAM(s) Oral at bedtime  mirtazapine 30 milliGRAM(s) Oral at bedtime  QUEtiapine 150 milliGRAM(s) Oral at bedtime  senna 2 Tablet(s) Oral at bedtime  sodium chloride 0.45%. 1000 milliLiter(s) IV Continuous <Continuous>      PHYSICAL EXAM:  GENERAL: NAD   EYES: conjunctiva and sclera clear  ENMT: Moist mucous membranes  NECK: Supple, No JVD, Normal thyroid  CHEST/LUNG: non labored, cta b/l  HEART: Regular rate and rhythm; No murmurs, rubs, or gallops  ABDOMEN: Soft, Nontender, Nondistended; Bowel sounds present  EXTREMITIES:  2+ Peripheral Pulses, No clubbing, no cyanosis, no edema  LYMPH: No lymphadenopathy noted  SKIN: No rashes or lesions  NEURO: no focal deficits    Consultant(s) Notes Reviewed:  [x ] YES  [ ] NO  Care Discussed with Consultants/Other Providers [ x] YES  [ ] NO    LABS:                        8.7    4.10  )-----------( 106      ( 07 Nov 2023 06:33 )             25.8     11-07    144  |  110<H>  |  30<H>  ----------------------------<  171<H>  4.8   |  28  |  0.97    Ca    8.2<L>      07 Nov 2023 06:33        Urinalysis Basic - ( 07 Nov 2023 06:33 )    Color: x / Appearance: x / SG: x / pH: x  Gluc: 171 mg/dL / Ketone: x  / Bili: x / Urobili: x   Blood: x / Protein: x / Nitrite: x   Leuk Esterase: x / RBC: x / WBC x   Sq Epi: x / Non Sq Epi: x / Bacteria: x      CAPILLARY BLOOD GLUCOSE            Urinalysis Basic - ( 07 Nov 2023 06:33 )    Color: x / Appearance: x / SG: x / pH: x  Gluc: 171 mg/dL / Ketone: x  / Bili: x / Urobili: x   Blood: x / Protein: x / Nitrite: x   Leuk Esterase: x / RBC: x / WBC x   Sq Epi: x / Non Sq Epi: x / Bacteria: x          RADIOLOGY & ADDITIONAL TESTS:    Imaging Personally Reviewed  Reviewed consultants input

## 2023-11-08 NOTE — CONSULT NOTE ADULT - SUBJECTIVE AND OBJECTIVE BOX
Patient is a 73y old  Male who presents with a chief complaint of dagger like sensation to face and throat (06 Nov 2023 14:53)      Reason For Consult: dm2 uncontrolled,     HPI:  hx obtained from wife  73 M pmh of HTN, ITP, APL syndrome, generalized anxiety disorder, OCD, newly dx aggressive Merkel Cell cancer of forehead- pt's oncologist  Dr. Eng, at [facility] on Keytruda infusions with good response per wife  was brought o ED for worsening psychosis for the past few days, pt believes there are sharp things inside his body trying to come out of his mouth, stuck in throat  pt lately with difficulty swallowing, decreased po intake  pt was evaluated by psych in ED (24 Oct 2023 13:19)      PAST MEDICAL & SURGICAL HISTORY:  Hypertension, unspecified type      Anxiety      Anxiety      Hypertension      Idiopathic thrombocytopenic purpura (ITP)      History of antiphospholipid antibody syndrome      H/O Mohs micrographic surgery for skin cancer      Enlarged tonsils  s/p tonsillectomy          FAMILY HISTORY:        Social History:    MEDICATIONS  (STANDING):  atenolol  Tablet 50 milliGRAM(s) Oral daily  cholecalciferol 2000 Unit(s) Oral daily  diazepam    Tablet 5 milliGRAM(s) Oral two times a day  fluvoxaMINE 100 milliGRAM(s) Oral two times a day  heparin   Injectable 5000 Unit(s) SubCutaneous every 12 hours  melatonin 3 milliGRAM(s) Oral at bedtime  mirtazapine 30 milliGRAM(s) Oral at bedtime  QUEtiapine 150 milliGRAM(s) Oral at bedtime  senna 2 Tablet(s) Oral at bedtime  sodium chloride 0.45%. 1000 milliLiter(s) (75 mL/Hr) IV Continuous <Continuous>    MEDICATIONS  (PRN):  acetaminophen     Tablet .. 650 milliGRAM(s) Oral every 6 hours PRN Temp greater or equal to 38.5C (101.3F), Moderate Pain (4 - 6)  lactulose Syrup 15 Gram(s) Oral two times a day PRN constipation        T(C): 36.7 (11-08-23 @ 12:01), Max: 36.8 (11-07-23 @ 21:56)  HR: 72 (11-08-23 @ 12:01) (67 - 72)  BP: 162/70 (11-08-23 @ 12:01) (144/76 - 162/70)  RR: 18 (11-08-23 @ 12:01) (17 - 18)  SpO2: 92% (11-08-23 @ 12:01) (92% - 95%)  Wt(kg): --    PHYSICAL EXAM:  GENERAL: NAD, well-groomed, well-developed  HEAD:  Atraumatic, Normocephalic  NECK: Supple, No JVD, Normal thyroid  CHEST/LUNG: Clear to percussion bilaterally; No rales, rhonchi, wheezing, or rubs  HEART: Regular rate and rhythm; No murmurs, rubs, or gallops  ABDOMEN: Soft, Nontender, Nondistended; Bowel sounds present  EXTREMITIES:  2+ Peripheral Pulses, No clubbing, cyanosis, or edema  SKIN: No rashes or lesions    CAPILLARY BLOOD GLUCOSE                                8.7    4.10  )-----------( 106      ( 07 Nov 2023 06:33 )             25.8       CMP:  11-07 @ 06:33  SGPT --  Albumin --   Alk Phos --   Anion Gap 6   SGOT --   Total Bili --   BUN 30   Calcium Total 8.2   CO2 28   Chloride 110   Creatinine 0.97   eGFR if AA --   eGFR if non AA --   Glucose 171   Potassium 4.8   Protein --   Sodium 144      Thyroid Function Tests:      Diabetes Tests:       Radiology:                  Patient is a 73y old  Male who presents with a chief complaint of dagger like sensation to face and throat (06 Nov 2023 14:53)      Reason For Consult: hypocortisolism    HPI:  hx obtained from wife  73 M pmh of HTN, ITP, APL syndrome, generalized anxiety disorder, OCD, newly dx aggressive Merkel Cell cancer of forehead- pt's oncologist  Dr. Eng, at [facility] on Keytruda infusions with good response per wife  was brought o ED for worsening psychosis for the past few days, pt believes there are sharp things inside his body trying to come out of his mouth, stuck in throat  pt lately with difficulty swallowing, decreased po intake  pt was evaluated by psych in ED (24 Oct 2023 13:19)      PAST MEDICAL & SURGICAL HISTORY:  Hypertension, unspecified type      Anxiety      Anxiety      Hypertension      Idiopathic thrombocytopenic purpura (ITP)      History of antiphospholipid antibody syndrome      H/O Mohs micrographic surgery for skin cancer      Enlarged tonsils  s/p tonsillectomy          FAMILY HISTORY:        Social History:    MEDICATIONS  (STANDING):  atenolol  Tablet 50 milliGRAM(s) Oral daily  cholecalciferol 2000 Unit(s) Oral daily  diazepam    Tablet 5 milliGRAM(s) Oral two times a day  fluvoxaMINE 100 milliGRAM(s) Oral two times a day  heparin   Injectable 5000 Unit(s) SubCutaneous every 12 hours  melatonin 3 milliGRAM(s) Oral at bedtime  mirtazapine 30 milliGRAM(s) Oral at bedtime  QUEtiapine 150 milliGRAM(s) Oral at bedtime  senna 2 Tablet(s) Oral at bedtime  sodium chloride 0.45%. 1000 milliLiter(s) (75 mL/Hr) IV Continuous <Continuous>    MEDICATIONS  (PRN):  acetaminophen     Tablet .. 650 milliGRAM(s) Oral every 6 hours PRN Temp greater or equal to 38.5C (101.3F), Moderate Pain (4 - 6)  lactulose Syrup 15 Gram(s) Oral two times a day PRN constipation        T(C): 36.7 (11-08-23 @ 12:01), Max: 36.8 (11-07-23 @ 21:56)  HR: 72 (11-08-23 @ 12:01) (67 - 72)  BP: 162/70 (11-08-23 @ 12:01) (144/76 - 162/70)  RR: 18 (11-08-23 @ 12:01) (17 - 18)  SpO2: 92% (11-08-23 @ 12:01) (92% - 95%)  Wt(kg): --    PHYSICAL EXAM:  GENERAL: NAD, well-groomed, well-developed  HEAD:  Atraumatic, Normocephalic  NECK: Supple, No JVD, Normal thyroid  CHEST/LUNG: Clear to percussion bilaterally; No rales, rhonchi, wheezing, or rubs  HEART: Regular rate and rhythm; No murmurs, rubs, or gallops  ABDOMEN: Soft, Nontender, Nondistended; Bowel sounds present  EXTREMITIES:  2+ Peripheral Pulses, No clubbing, cyanosis, or edema  SKIN: No rashes or lesions    CAPILLARY BLOOD GLUCOSE                                8.7    4.10  )-----------( 106      ( 07 Nov 2023 06:33 )             25.8       CMP:  11-07 @ 06:33  SGPT --  Albumin --   Alk Phos --   Anion Gap 6   SGOT --   Total Bili --   BUN 30   Calcium Total 8.2   CO2 28   Chloride 110   Creatinine 0.97   eGFR if AA --   eGFR if non AA --   Glucose 171   Potassium 4.8   Protein --   Sodium 144      Thyroid Function Tests:      Diabetes Tests:       Radiology:

## 2023-11-08 NOTE — CHART NOTE - NSCHARTNOTEFT_GEN_A_CORE
Assessment: Pt seen for nutrition follow-up. Chart reviewed, hospital course noted.    Brief hx: Pt is a "73 M pmh of HTN, ITP, APL syndrome, generalized anxiety disorder, OCD, newly dx aggressive Merkel Cell cancer of forehead- pt's oncologist  Dr. Eng, at [facility] on Keytruda infusions with good response per wife  was brought to ED for worsening psychosis for the past few days, pt lately with difficulty swallowing, decreased po intake."    Spoke with pt's spouse outside of room this morning. Spouse reports pt refusing to eat. She attempted to feed him yogurt and magic cup ice cream this am- pt refused. Continues on puree diet with mildly thick liquids and ensure plus HP TID. PO intakes 0-25% per nursing documentation. No N/V per chart review. +BM 11/8. Wife reports pt was doing better the past few days until he received steroids which she believes has set patient back. Pt very agitated, combative today per spouse. Pt was seen by Psych and Neuro. psy meds were adjusted; increased seroquel to 150, remeron started increasing to 30. Alternative methods of nutrition previous discussed, wife and pt deferred enteral feeding at that time. Will continue to honor food preferences as able to optimize po. Recommend continued assistance/encouragement at meal times. RD remains available and will continue to follow-up.     Factors impacting intake: [ ] none [ ] nausea  [ ] vomiting [ ] diarrhea [ ] constipation  [ ]chewing problems [X] swallowing issues  [X] other: psychosis; abnormal throat sensation    Diet Prescription: Diet, Pureed:   Mildly Thick Liquids (MILDTHICKLIQS)  Supplement Feeding Modality:  Oral  Ensure Enlive Cans or Servings Per Day:  1       Frequency:  Three Times a day (10-28-23 @ 09:41)    Intake: poor    Current Weight: 10/25 174.3#, 11/1 152.1#, 11/8 148.8# (1+ R arm edema noted)  Weight loss ongoing  % Weight Change    Pertinent Medications: MEDICATIONS  (STANDING):  atenolol  Tablet 50 milliGRAM(s) Oral daily  cholecalciferol 2000 Unit(s) Oral daily  diazepam    Tablet 5 milliGRAM(s) Oral two times a day  fluvoxaMINE 100 milliGRAM(s) Oral two times a day  heparin   Injectable 5000 Unit(s) SubCutaneous every 12 hours  melatonin 3 milliGRAM(s) Oral at bedtime  mirtazapine 30 milliGRAM(s) Oral at bedtime  QUEtiapine 150 milliGRAM(s) Oral at bedtime  senna 2 Tablet(s) Oral at bedtime  sodium chloride 0.45%. 1000 milliLiter(s) (75 mL/Hr) IV Continuous <Continuous>    MEDICATIONS  (PRN):  acetaminophen     Tablet .. 650 milliGRAM(s) Oral every 6 hours PRN Temp greater or equal to 38.5C (101.3F), Moderate Pain (4 - 6)  lactulose Syrup 15 Gram(s) Oral two times a day PRN constipation    Pertinent Labs: 11-07 Na144 mmol/L Glu 171 mg/dL<H> K+ 4.8 mmol/L Cr  0.97 mg/dL BUN 30 mg/dL<H>    Skin: ecchymotic, no pressure ulcers noted    Estimated Needs:   [X] no change since previous [id number]/25 based on #  kcal/day: 0107-8162  gms protein/day: 89-105g  [ ] recalculated:     Previous Nutrition Diagnosis:   [ ] Inadequate Energy Intake [ ]Inadequate Oral Intake [ ] Excessive Energy Intake   [ ] Underweight [ ] Increased Nutrient Needs [ ] Overweight/Obesity [X] Swallowing Difficulty  [ ] Altered GI Function [ ] Unintended Weight Loss [ ] Food & Nutrition Related Knowledge Deficit [X] Malnutrition     Nutrition Diagnosis is [X] ongoing  [ ] resolved [ ] not applicable     New Nutrition Diagnosis: [X] not applicable     Interventions:   Recommend  [ ] Change Diet To:  [ ] Nutrition Supplement  [ ] Nutrition Support  [X] Other: Continue current diet as tolerated; assistance/encouragement at meal times  Honor food preferences as able to optimize po intake/tolerance    Monitoring and Evaluation:   [X] PO intake [ x ] Tolerance to diet prescription [ x ] weights [ x ] labs[ x ] follow up per protocol  [X] other: s/s GI distress, bowel function, skin integrity/ edema

## 2023-11-08 NOTE — PROGRESS NOTE ADULT - SUBJECTIVE AND OBJECTIVE BOX
Chief Complaint: Psychosis    Interval Events: No events overnight.    Review of Systems:  General: No fevers, chills, weight gain  Skin: No rashes, color changes  Cardiovascular: No chest pain, orthopnea  Respiratory: No shortness of breath, cough  Gastrointestinal: No nausea, abdominal pain  Genitourinary: No incontinence, pain with urination  Musculoskeletal: No pain, swelling, decreased range of motion  Neurological: No headache, weakness  Psychiatric: No depression, anxiety  Endocrine: No weight gain, increased thirst  All other systems are comprehensively negative.    Physical Exam:  Vital Signs Last 24 Hrs  T(C): 36.6 (08 Nov 2023 05:01), Max: 36.8 (07 Nov 2023 21:56)  T(F): 97.9 (08 Nov 2023 05:01), Max: 98.3 (07 Nov 2023 21:56)  HR: 67 (08 Nov 2023 05:01) (56 - 72)  BP: 144/76 (08 Nov 2023 05:01) (135/71 - 145/83)  BP(mean): --  RR: 17 (08 Nov 2023 05:01) (17 - 18)  SpO2: 95% (08 Nov 2023 05:01) (92% - 95%)  Parameters below as of 08 Nov 2023 05:01  Patient On (Oxygen Delivery Method): room air  General: NAD  HEENT: MMM  Neck: No JVD, no carotid bruit  Lungs: CTAB  CV: RRR, nl S1/S2, no M/R/G  Abdomen: S/NT/ND, +BS  Extremities: No LE edema, no cyanosis  Neuro: AAOx3, non-focal  Skin: No rash    Labs:             11-07    144  |  110<H>  |  30<H>  ----------------------------<  171<H>  4.8   |  28  |  0.97    Ca    8.2<L>      07 Nov 2023 06:33                          8.7    4.10  )-----------( 106      ( 07 Nov 2023 06:33 )             25.8

## 2023-11-08 NOTE — PROGRESS NOTE ADULT - ASSESSMENT
IMPRESSION    pt w ITP, Lupus Anticoagulant, HTN, anxiety on Seraquel, OCD, Merkel cell ca of right Forehead, under care Dr Elliot Eng, post 4 doses q3wks IV Keytruda, last dose 10/17/23.  Pt brought in w c/o feeling "claws, daggers" inside him, since C#3 Keytruda, with sensation initially started w various parts of face now also progressed down to sides of body to rectum(does not see actual daggers or claws,)    Wife reports after first cycle started w jaw tightness and progressive worsened w each cycle, to point of hard to swallow/can't swallow  Had tired taken off Seroquel to see if adverse effect but no improvement, now back on x 5-6days  Merkel lesion has essentially resolved    CT head no acute findings  see by Psych in ER, ?psychosis  -sensation described by pt ?psychosis vs neuropathy  -psychosis not noted to be associated w Keytruda. Neuro tox of Keytruda described includes cerebral hemorrhage, confusion, myasthenia gravis, reversible posterior leukoencephalopathy syndrome, syed neuropathy, Guillain Bouse, aseptic meningitis, encephalitis, transverse myelitis    TFTS unremarkable.   AM cortisol 5.8. Slight low. Unclear significance   Repeat AM Cortisol low x2. <6 on two tests  3rd test w cortisol 11 on 11/06/23  To start Hydrocortisone 11/06/23    Negative plasma PBG   Bilirubin neg  Urobilinogen normal    Sjorgen /Scleroderma studies abn.      +MICHELLE and +Ro52    no significant change.    seen by psych with meds adjusted and perhaps was starting to show some improvement  Tue not better  Wed appears un changed    RECOMMEND  continue supportive measures      PSYCH  cause of sx remains unclear.   s/p psych evaluation  Seroquel dose being adjusted.   On Remeron.   symptomatically stable today    NEURO  s/p neurology eval.   Was offered LP if sx not better  However pt refused LP last week; discussed at length with pt and wife.     s/p additional neuro   Discussed with Dr. Magdaleno.  Sx seem less likely secondary to Keytruda appears more psychiatric sx  await course, mgmt with psychiatry  Tylenol for pain  Low cortisol to be treated with hydrocortisone  Endocrinology to eval    pain  discussed with patient's wife today.   PRN Tylenol#3. one tabs if mild sx, consider escalation to two tabs if "dagger like sx / pain worse.   Rcvd dose Dilaudid 0.5mg this AM    ITP  platelet count 96 K/uL==>106;    will observed.     Endocrine  Prior cortisol levels low borderline.   Repeat today normal.   Was to started hydrocortisone 11/06/32    Wife reports pt with increased irritability on hydrocortisone  HOLD hydrocortisone.

## 2023-11-08 NOTE — CONSULT NOTE ADULT - PROBLEM SELECTOR RECOMMENDATION 9
cortisol rpt level improved;  electrolytes wnr  if bp labile, to order cosyntropin stim test ?lymphocytic hypophysitis due to keytruda  prednisone 5mg daily started  to hold off on cosyntropin stim testing

## 2023-11-08 NOTE — PROGRESS NOTE ADULT - SUBJECTIVE AND OBJECTIVE BOX
[INTERVAL HX: ]  Patient seen and examined;  Chart reviewed and events noted;   wife reports pt ore argumentative since started on hydrocortisone.     c/o pain.   Offered pain meds but refuses to take, stating it wont help.       [MEDICATIONS]  MEDICATIONS  (STANDING):  atenolol  Tablet 50 milliGRAM(s) Oral daily  cholecalciferol 2000 Unit(s) Oral daily  diazepam    Tablet 5 milliGRAM(s) Oral two times a day  fluvoxaMINE 100 milliGRAM(s) Oral two times a day  heparin   Injectable 5000 Unit(s) SubCutaneous every 12 hours  melatonin 3 milliGRAM(s) Oral at bedtime  mirtazapine 30 milliGRAM(s) Oral at bedtime  QUEtiapine 150 milliGRAM(s) Oral at bedtime  senna 2 Tablet(s) Oral at bedtime  sodium chloride 0.45%. 1000 milliLiter(s) (75 mL/Hr) IV Continuous <Continuous>    MEDICATIONS  (PRN):  acetaminophen     Tablet .. 650 milliGRAM(s) Oral every 6 hours PRN Temp greater or equal to 38.5C (101.3F), Moderate Pain (4 - 6)  lactulose Syrup 15 Gram(s) Oral two times a day PRN constipation      [VITALS]  Vital Signs Last 24 Hrs  T(C): 36.7 (2023 12:01), Max: 36.8 (2023 21:56)  T(F): 98 (2023 12:01), Max: 98.3 (2023 21:56)  HR: 72 (2023 12:) (67 - 72)  BP: 162/70 (2023 12:01) (144/76 - 162/70)  BP(mean): --  RR: 18 (2023 12:01) (17 - 18)  SpO2: 92% (2023 12:01) (92% - 95%)    Parameters below as of 2023 12:01  Patient On (Oxygen Delivery Method): room air      [WT/HT]  Daily     Daily Weight in k.5 (2023 05:01)  [VENT]      [PHYSICAL EXAM]  GEN: NAD, chronically ill looking  HEENT: normocephalic and atraumatic. EOMI. .    NECK: Supple.  No lymphadenopathy   LUNGS: Clear to auscultation.  HEART: Regular rate and rhythm,  no MRG  ABDOMEN: Soft, nontender, and nondistended.  Positive bowel sounds.    : No CVA tenderness  EXTREMITIES: Without edema.  NEUROLOGIC: grossly intact.  PSYCHIATRIC: Appropriate affect .  SKIN: No rash     [LABS:]                        8.7    4.10  )-----------( 106      ( 2023 06:33 )             25.8     11-07    144  |  110<H>  |  30<H>  ----------------------------<  171<H>  4.8   |  28  |  0.97    Ca    8.2<L>      2023 06:33      Vitamin B12, Serum: 1109 pg/mL [232 - 1245] (10-29-23 @ 18:30)  Folate, Serum: >20.0 ng/mL (10-29-23 @ 18:30)  Sedimentation Rate, Erythrocyte: 57 mm/hr *H* [0 - 20] (10-27-23 @ 18:30)    Urinalysis Basic - ( 2023 06:33 )  Color: x / Appearance: x / SG: x / pH: x  Gluc: 171 mg/dL / Ketone: x  / Bili: x / Urobili: x   Blood: x / Protein: x / Nitrite: x   Leuk Esterase: x / RBC: x / WBC x   Sq Epi: x / Non Sq Epi: x / Bacteria: x        [RADIOLOGY STUDIES:]

## 2023-11-08 NOTE — PROGRESS NOTE ADULT - ASSESSMENT
73 M pmh of HTN, ITP, APL syndrome, generalized anxiety disorder, OCD, newly dx aggressive Merkel Cell cancer of forehead- pt's oncologist  Dr. Eng, at [facility] on Keytruda infusions with good response per wife  was brought to ED for worsening psychosis for the past few days, pt lately with difficulty swallowing, decreased po intake    Psychosis  monitor  psych fu  MRI noted  neuro following  increased seroquel to 150  remeron started increasing to 30    #Abnormal throat sensation- sharp object sensation  swallow eval appreciated    #HTN- +orthostasis  #Orthostatic hypotension- hold losartan,  IVF   cont atenolol w parameters  ? psych med induced  cotisol low on prednisone now  endo following  possible hypophysitis  d/w cards - if remains orthostatic to start midodrine      #Anxiety  psych cs appreciated  continue valium  uptitrating seroquel and luvox  continue remeron    #Merkel cell ca  onc c/s  r/o paraneoplastic causes- unlikely      #DVT ppx-  hep sq      [facility]   [phone]

## 2023-11-08 NOTE — PROGRESS NOTE ADULT - ASSESSMENT
The patient is a 73 year old male with a history of HTN, ITP, APLS, anxiety, OCD, Merkel cell cancer who presented with psychosis.    Plan:  - ECG with no evidence of ischemia or infarction  - Orthostatic hypotension may be multifactorial - dehydration, medication related (antipsychotics), and low cortisol levels  - Losartan on hold  - Continue atenolol 50 mg daily  - Endocrine eval - may need steroids  - If orthostatics remain positive despite above, will consider starting midodrine

## 2023-11-09 ENCOUNTER — APPOINTMENT (OUTPATIENT)
Dept: HEMATOLOGY ONCOLOGY | Facility: CLINIC | Age: 73
End: 2023-11-09

## 2023-11-09 PROCEDURE — 99231 SBSQ HOSP IP/OBS SF/LOW 25: CPT

## 2023-11-09 RX ADMIN — Medication 5 MILLIGRAM(S): at 17:57

## 2023-11-09 RX ADMIN — HEPARIN SODIUM 5000 UNIT(S): 5000 INJECTION INTRAVENOUS; SUBCUTANEOUS at 17:57

## 2023-11-09 RX ADMIN — HEPARIN SODIUM 5000 UNIT(S): 5000 INJECTION INTRAVENOUS; SUBCUTANEOUS at 05:09

## 2023-11-09 RX ADMIN — QUETIAPINE FUMARATE 150 MILLIGRAM(S): 200 TABLET, FILM COATED ORAL at 21:11

## 2023-11-09 RX ADMIN — FLUVOXAMINE MALEATE 150 MILLIGRAM(S): 25 TABLET ORAL at 17:57

## 2023-11-09 RX ADMIN — SENNA PLUS 2 TABLET(S): 8.6 TABLET ORAL at 21:10

## 2023-11-09 RX ADMIN — SODIUM CHLORIDE 75 MILLILITER(S): 9 INJECTION, SOLUTION INTRAVENOUS at 21:11

## 2023-11-09 RX ADMIN — Medication 5 MILLIGRAM(S): at 05:09

## 2023-11-09 RX ADMIN — Medication 2000 UNIT(S): at 11:00

## 2023-11-09 RX ADMIN — Medication 3 MILLIGRAM(S): at 21:11

## 2023-11-09 RX ADMIN — ATENOLOL 50 MILLIGRAM(S): 25 TABLET ORAL at 05:09

## 2023-11-09 RX ADMIN — FLUVOXAMINE MALEATE 150 MILLIGRAM(S): 25 TABLET ORAL at 05:10

## 2023-11-09 RX ADMIN — MIRTAZAPINE 30 MILLIGRAM(S): 45 TABLET, ORALLY DISINTEGRATING ORAL at 21:11

## 2023-11-09 NOTE — PROGRESS NOTE ADULT - SUBJECTIVE AND OBJECTIVE BOX
[facility] DIVISION of INFECTIOUS DISEASE  Eric Reyes MD PhD, Milena Underwood MD, Jillian Mckoy MD, Enid Everett MD, Jay Krishna MD  and providing coverage with Arthur Pepper MD  Providing Infectious Disease Consultations at [facility], [facility], [facility], [facility]'s    Office# [phone] to schedule follow up appointments  Answering Service for urgent calls or New Consults [phone]  Cell# to text for urgent issues Eric Reyes [phone]     infectious diseases progress note:    JANICE RENDON is a 73y y. o. Male patient    Overnight and events of the last 24hrs reviewed    Allergies    No Known Allergies    Intolerances        ANTIBIOTICS/RELEVANT:  antimicrobials    immunologic:    OTHER:  acetaminophen     Tablet .. 650 milliGRAM(s) Oral every 6 hours PRN  atenolol  Tablet 50 milliGRAM(s) Oral daily  cholecalciferol 2000 Unit(s) Oral daily  diazepam    Tablet 5 milliGRAM(s) Oral two times a day  fluvoxaMINE 150 milliGRAM(s) Oral two times a day  heparin   Injectable 5000 Unit(s) SubCutaneous every 12 hours  lactulose Syrup 15 Gram(s) Oral two times a day PRN  melatonin 3 milliGRAM(s) Oral at bedtime  mirtazapine 30 milliGRAM(s) Oral at bedtime  predniSONE   Tablet 5 milliGRAM(s) Oral daily  QUEtiapine 150 milliGRAM(s) Oral at bedtime  senna 2 Tablet(s) Oral at bedtime  sodium chloride 0.45%. 1000 milliLiter(s) IV Continuous <Continuous>      Objective:  Vital Signs Last 24 Hrs  T(C): 36.5 (09 Nov 2023 04:59), Max: 37.1 (08 Nov 2023 20:38)  T(F): 97.7 (09 Nov 2023 04:59), Max: 98.7 (08 Nov 2023 20:38)  HR: 78 (09 Nov 2023 04:59) (59 - 78)  BP: 165/80 (09 Nov 2023 04:59) (162/70 - 169/76)  BP(mean): --  RR: 17 (09 Nov 2023 04:59) (17 - 18)  SpO2: 94% (09 Nov 2023 04:59) (91% - 94%)    Parameters below as of 09 Nov 2023 04:59  Patient On (Oxygen Delivery Method): room air        T(C): 36.5 (11-09-23 @ 04:59), Max: 37.1 (11-08-23 @ 20:38)  T(C): 36.5 (11-09-23 @ 04:59), Max: 37.1 (11-08-23 @ 20:38)  T(C): 36.5 (11-09-23 @ 04:59), Max: 37.3 (11-06-23 @ 04:49)    PHYSICAL EXAM:  HEENT: NC atraumatic  Neck: supple  Respiratory: no accessory muscle use, breathing comfortably  Cardiovascular: distant  Gastrointestinal: normal appearing, nondistended  Extremities: no clubbing, no cyanosis,        LABS:        WBC  4.10 11-07 @ 06:33  6.29 11-06 @ 07:35  7.01 11-05 @ 11:20  11.81 11-04 @ 08:50  8.32 11-03 @ 07:18              Creatinine: 0.97 mg/dL (11-07-23 @ 06:33)  Creatinine: 1.10 mg/dL (11-06-23 @ 07:35)  Creatinine: 1.40 mg/dL (11-05-23 @ 11:20)  Creatinine: 1.70 mg/dL (11-04-23 @ 08:50)  Creatinine: 1.60 mg/dL (11-03-23 @ 07:18)                INFLAMMATORY MARKERS      MICROBIOLOGY:              RADIOLOGY & ADDITIONAL STUDIES:

## 2023-11-09 NOTE — PROGRESS NOTE ADULT - SUBJECTIVE AND OBJECTIVE BOX
Interval History:  continues on low dose prednisone 5 mg p daily with some improvement as per wife  less agitated today  Chart reviewed and events noted;   Overnight events:    MEDICATIONS  (STANDING):  atenolol  Tablet 50 milliGRAM(s) Oral daily  cholecalciferol 2000 Unit(s) Oral daily  diazepam    Tablet 5 milliGRAM(s) Oral two times a day  fluvoxaMINE 150 milliGRAM(s) Oral two times a day  heparin   Injectable 5000 Unit(s) SubCutaneous every 12 hours  melatonin 3 milliGRAM(s) Oral at bedtime  mirtazapine 30 milliGRAM(s) Oral at bedtime  predniSONE   Tablet 5 milliGRAM(s) Oral daily  QUEtiapine 150 milliGRAM(s) Oral at bedtime  senna 2 Tablet(s) Oral at bedtime  sodium chloride 0.45%. 1000 milliLiter(s) (75 mL/Hr) IV Continuous <Continuous>    MEDICATIONS  (PRN):  acetaminophen     Tablet .. 650 milliGRAM(s) Oral every 6 hours PRN Temp greater or equal to 38.5C (101.3F), Moderate Pain (4 - 6)  lactulose Syrup 15 Gram(s) Oral two times a day PRN constipation      Vital Signs Last 24 Hrs  T(C): 36.6 (09 Nov 2023 20:39), Max: 36.7 (09 Nov 2023 12:24)  T(F): 97.9 (09 Nov 2023 20:39), Max: 98 (09 Nov 2023 12:24)  HR: 90 (09 Nov 2023 20:39) (75 - 90)  BP: 162/70 (09 Nov 2023 20:39) (162/70 - 167/82)  BP(mean): --  RR: 18 (09 Nov 2023 20:39) (17 - 18)  SpO2: 94% (09 Nov 2023 20:39) (93% - 94%)    Parameters below as of 09 Nov 2023 20:39  Patient On (Oxygen Delivery Method): room air        PHYSICAL EXAM  General: adult in NAD, chronically ill appearing  HEENT: clear oropharynx, anicteric sclera, pink conjunctivae  Neck: supple  CV: normal S1S2 with no murmur rubs or gallops  Lungs: clear to auscultation, no wheezes, no rhales  Abdomen: soft non-tender non-distended, no hepato/splenomegaly  Ext: no clubbing cyanosis or edema  Skin: no rashes and no petichiae  Neuro: alert and oriented X3 no focal deficits      LABS:              fe studies      WBC trend  4.10 K/uL (11-07-23 @ 06:33)      Hgb trend  8.7 g/dL (11-07-23 @ 06:33)      plt trend  106 K/uL (11-07-23 @ 06:33)        RADIOLOGY & ADDITIONAL STUDIES:

## 2023-11-09 NOTE — PROGRESS NOTE ADULT - ASSESSMENT
The patient is a 73 year old male with a history of HTN, ITP, APLS, anxiety, OCD, Merkel cell cancer who presented with psychosis.    Plan:  - ECG with no evidence of ischemia or infarction  - Orthostatic hypotension may be multifactorial - dehydration, medication related (antipsychotics), and low cortisol levels  - Losartan on hold  - Continue atenolol 50 mg daily  - Endocrine follow-up  - On oral steroids  - If orthostatics remain positive despite above, will consider starting midodrine

## 2023-11-09 NOTE — PROGRESS NOTE ADULT - SUBJECTIVE AND OBJECTIVE BOX
Chief Complaint: Psychosis    Interval Events: No events overnight.    Review of Systems:  General: No fevers, chills, weight gain  Skin: No rashes, color changes  Cardiovascular: No chest pain, orthopnea  Respiratory: No shortness of breath, cough  Gastrointestinal: No nausea, abdominal pain  Genitourinary: No incontinence, pain with urination  Musculoskeletal: No pain, swelling, decreased range of motion  Neurological: No headache, weakness  Psychiatric: No depression, anxiety  Endocrine: No weight gain, increased thirst  All other systems are comprehensively negative.    Physical Exam:  Vital Signs Last 24 Hrs  T(C): 36.5 (09 Nov 2023 04:59), Max: 37.1 (08 Nov 2023 20:38)  T(F): 97.7 (09 Nov 2023 04:59), Max: 98.7 (08 Nov 2023 20:38)  HR: 78 (09 Nov 2023 04:59) (59 - 78)  BP: 165/80 (09 Nov 2023 04:59) (162/70 - 169/76)  BP(mean): --  RR: 17 (09 Nov 2023 04:59) (17 - 18)  SpO2: 94% (09 Nov 2023 04:59) (91% - 94%)  Parameters below as of 09 Nov 2023 04:59  Patient On (Oxygen Delivery Method): room air  General: NAD  HEENT: MMM  Neck: No JVD, no carotid bruit  Lungs: CTAB  CV: RRR, nl S1/S2, no M/R/G  Abdomen: S/NT/ND, +BS  Extremities: No LE edema, no cyanosis  Neuro: AAOx3, non-focal  Skin: No rash    Labs:

## 2023-11-09 NOTE — PROGRESS NOTE ADULT - SUBJECTIVE AND OBJECTIVE BOX
Patient is a 73y old  Male who presents with a chief complaint of AMS (09 Nov 2023 10:39)        INTERVAL HPI/OVERNIGHT EVENTS:   no complaints  pt seen and examined         Vital Signs Last 24 Hrs  T(C): 36.7 (09 Nov 2023 12:24), Max: 37.1 (08 Nov 2023 20:38)  T(F): 98 (09 Nov 2023 12:24), Max: 98.7 (08 Nov 2023 20:38)  HR: 75 (09 Nov 2023 12:24) (59 - 78)  BP: 167/82 (09 Nov 2023 12:24) (165/80 - 169/76)  BP(mean): --  RR: 17 (09 Nov 2023 12:24) (17 - 18)  SpO2: 93% (09 Nov 2023 12:24) (91% - 94%)    Parameters below as of 09 Nov 2023 12:24  Patient On (Oxygen Delivery Method): room air        acetaminophen     Tablet .. 650 milliGRAM(s) Oral every 6 hours PRN  atenolol  Tablet 50 milliGRAM(s) Oral daily  cholecalciferol 2000 Unit(s) Oral daily  diazepam    Tablet 5 milliGRAM(s) Oral two times a day  fluvoxaMINE 150 milliGRAM(s) Oral two times a day  heparin   Injectable 5000 Unit(s) SubCutaneous every 12 hours  lactulose Syrup 15 Gram(s) Oral two times a day PRN  melatonin 3 milliGRAM(s) Oral at bedtime  mirtazapine 30 milliGRAM(s) Oral at bedtime  predniSONE   Tablet 5 milliGRAM(s) Oral daily  QUEtiapine 150 milliGRAM(s) Oral at bedtime  senna 2 Tablet(s) Oral at bedtime  sodium chloride 0.45%. 1000 milliLiter(s) IV Continuous <Continuous>      PHYSICAL EXAM:  GENERAL: NAD   EYES: conjunctiva and sclera clear  ENMT: Moist mucous membranes  NECK: Supple, No JVD, Normal thyroid  CHEST/LUNG: non labored, cta b/l  HEART: Regular rate and rhythm; No murmurs, rubs, or gallops  ABDOMEN: Soft, Nontender, Nondistended; Bowel sounds present  EXTREMITIES:  2+ Peripheral Pulses, No clubbing, no cyanosis, no edema  LYMPH: No lymphadenopathy noted  SKIN: No rashes or lesions  NEURO: no focal deficits    Consultant(s) Notes Reviewed:  [x ] YES  [ ] NO  Care Discussed with Consultants/Other Providers [ x] YES  [ ] NO    RADIOLOGY & ADDITIONAL TESTS:    Imaging Personally Reviewed  Reviewed consultants input

## 2023-11-09 NOTE — PROGRESS NOTE ADULT - ASSESSMENT
IMPRESSION    pt w ITP, Lupus Anticoagulant, HTN, anxiety on Seraquel, OCD, Merkel cell ca of right Forehead, under care Dr Elliot Eng, post 4 doses q3wks IV Keytruda, last dose 10/17/23.  Pt brought in w c/o feeling "claws, daggers" inside him, since C#3 Keytruda, with sensation initially started w various parts of face now also progressed down to sides of body to rectum(does not see actual daggers or claws,)    Wife reports after first cycle started w jaw tightness and progressive worsened w each cycle, to point of hard to swallow/can't swallow  Had tired taken off Seroquel to see if adverse effect but no improvement, now back on x 5-6days  Merkel lesion has essentially resolved    CT head no acute findings  see by Psych in ER, ?psychosis  -sensation described by pt ?psychosis vs neuropathy  -psychosis not noted to be associated w Keytruda. Neuro tox of Keytruda described includes cerebral hemorrhage, confusion, myasthenia gravis, reversible posterior leukoencephalopathy syndrome, syed neuropathy, Guillain [place], aseptic meningitis, encephalitis, transverse myelitis    TFTS unremarkable.   AM cortisol 5.8. Slight low. Unclear significance   Repeat AM Cortisol low x2. <6 on two tests  3rd test w cortisol 11 on 11/06/23  To start Hydrocortisone 11/06/23    Negative plasma PBG   Bilirubin neg  Urobilinogen normal    Sjorgen /Scleroderma studies abn.      +MICHELLE and +Ro52    no significant change.    seen by psych with meds adjusted and perhaps was starting to show some improvement  is now on prednisone 5 mg po daily with some improvement in mental status according to wife    RECOMMEND  continue supportive measures      PSYCH  cause of sx remains unclear.   s/p psych evaluation  Seroquel dose being adjusted.   On Remeron.   symptomatically improved today    NEURO  s/p neurology eval.   Was offered LP if sx not better  However pt refused LP last week; discussed at length with pt and wife.     s/p additional neuro   Discussed with Dr. Magdaleno.  Sx seem less likely secondary to Keytruda appears more psychiatric sx  await course, mgmt with psychiatry  Tylenol for pain  Low cortisol to be treated with hydrocortisone and now prednisone  Endocrinology to eval    pain  discussed with patient's wife today.   PRN Tylenol#3. one tabs if mild sx, consider escalation to two tabs if "dagger like sx / pain worse.   Rcvd dose Dilaudid 0.5mg this AM    ITP  platelet count 96 K/uL==>106;    will observed.     Endocrine  Prior cortisol levels low borderline.   Repeat today normal.   Was to started hydrocortisone 11/06/32 and now prednisone 5 mg po daily    appears better with prednisone

## 2023-11-09 NOTE — PROGRESS NOTE ADULT - ASSESSMENT
73 M pmh of HTN, ITP, APL syndrome, generalized anxiety disorder, OCD, newly dx aggressive Merkel Cell cancer of forehead- pt's oncologist  Dr. Eng, at [facility] on Keytruda (PD-1 blocker) infusions with good response per wife  was brought to ED for worsening psychosis for the past few days, pt believes there are sharp things inside his body trying to come out of his mouth, stuck in throat, pt lately with difficulty swallowing, decreased po intake, pt was evaluated by psych in ED. Wife reports sudden cognitive decline and behavioral changes immediately after COVID infection.    RECOMMENDATIONS  1-?PASC  -sounds like there is no obvious acute infectious issue. no fever, no leukocytosis, no obvious localization but concerning history of things developing post COVID-19 infection so raises concerns for a neurological PASC picture  noted serology for EBV consistent with what is described post PASC  Serotonin, Blood: <30ng/mL   am cortisol-low x 2 then nl  recommend observation off antimicrobial Rx  -melatonin 3mg PO QHS  -prednisone 5mg PO Qam  -fluvoxamine 150mg PO BID  -in outpt setting consideration of bifidobacterium probiotics  11/9-noting improvement so seems to be responding to changes but will follow    Thank you for consulting us and involving us in the management of this most interesting and challenging case.  We will follow along in the care of this patient. Please call us at [phone] or text me directly on my cell# at [phone] with any concerns.

## 2023-11-09 NOTE — PROGRESS NOTE ADULT - ASSESSMENT
73 M pmh of HTN, ITP, APL syndrome, generalized anxiety disorder, OCD, newly dx aggressive Merkel Cell cancer of forehead- pt's oncologist  Dr. Eng, at [facility] on Keytruda infusions with good response per wife  was brought to ED for worsening psychosis for the past few days, pt lately with difficulty swallowing, decreased po intake    Psychosis  monitor  psych fu  MRI noted  neuro following  increased seroquel to 150  remeron started increasing to 30    #Abnormal throat sensation- sharp object sensation  swallow eval appreciated    #HTN- +orthostasis  #Orthostatic hypotension- hold losartan,  IVF   cont atenolol w parameters  ? psych med induced  cotisol low on prednisone now  endo following  possible hypophysitis  d/w cards - if remains orthostatic to start midodrine      #Anxiety  psych cs appreciated  continue valium  uptitrating seroquel and luvox  continue remeron    #Merkel cell ca  onc c/s  r/o paraneoplastic causes- unlikely      #DVT ppx-  hep sq      [facility]   [phone]

## 2023-11-10 LAB
ANION GAP SERPL CALC-SCNC: 3 MMOL/L — LOW (ref 5–17)
ANION GAP SERPL CALC-SCNC: 3 MMOL/L — LOW (ref 5–17)
BUN SERPL-MCNC: 14 MG/DL — SIGNIFICANT CHANGE UP (ref 7–23)
BUN SERPL-MCNC: 14 MG/DL — SIGNIFICANT CHANGE UP (ref 7–23)
CALCIUM SERPL-MCNC: 8.5 MG/DL — SIGNIFICANT CHANGE UP (ref 8.5–10.1)
CALCIUM SERPL-MCNC: 8.5 MG/DL — SIGNIFICANT CHANGE UP (ref 8.5–10.1)
CHLORIDE SERPL-SCNC: 110 MMOL/L — HIGH (ref 96–108)
CHLORIDE SERPL-SCNC: 110 MMOL/L — HIGH (ref 96–108)
CO2 SERPL-SCNC: 33 MMOL/L — HIGH (ref 22–31)
CO2 SERPL-SCNC: 33 MMOL/L — HIGH (ref 22–31)
CREAT SERPL-MCNC: 0.92 MG/DL — SIGNIFICANT CHANGE UP (ref 0.5–1.3)
CREAT SERPL-MCNC: 0.92 MG/DL — SIGNIFICANT CHANGE UP (ref 0.5–1.3)
EGFR: 87 ML/MIN/1.73M2 — SIGNIFICANT CHANGE UP
EGFR: 87 ML/MIN/1.73M2 — SIGNIFICANT CHANGE UP
GLUCOSE SERPL-MCNC: 125 MG/DL — HIGH (ref 70–99)
GLUCOSE SERPL-MCNC: 125 MG/DL — HIGH (ref 70–99)
HCT VFR BLD CALC: 27 % — LOW (ref 39–50)
HCT VFR BLD CALC: 27 % — LOW (ref 39–50)
HGB BLD-MCNC: 9.7 G/DL — LOW (ref 13–17)
HGB BLD-MCNC: 9.7 G/DL — LOW (ref 13–17)
MCHC RBC-ENTMCNC: 30.7 PG — SIGNIFICANT CHANGE UP (ref 27–34)
MCHC RBC-ENTMCNC: 30.7 PG — SIGNIFICANT CHANGE UP (ref 27–34)
MCHC RBC-ENTMCNC: 35.9 GM/DL — SIGNIFICANT CHANGE UP (ref 32–36)
MCHC RBC-ENTMCNC: 35.9 GM/DL — SIGNIFICANT CHANGE UP (ref 32–36)
MCV RBC AUTO: 85.4 FL — SIGNIFICANT CHANGE UP (ref 80–100)
MCV RBC AUTO: 85.4 FL — SIGNIFICANT CHANGE UP (ref 80–100)
NRBC # BLD: 0 /100 WBCS — SIGNIFICANT CHANGE UP (ref 0–0)
NRBC # BLD: 0 /100 WBCS — SIGNIFICANT CHANGE UP (ref 0–0)
PLATELET # BLD AUTO: 158 K/UL — SIGNIFICANT CHANGE UP (ref 150–400)
PLATELET # BLD AUTO: 158 K/UL — SIGNIFICANT CHANGE UP (ref 150–400)
POTASSIUM SERPL-MCNC: 4 MMOL/L — SIGNIFICANT CHANGE UP (ref 3.5–5.3)
POTASSIUM SERPL-MCNC: 4 MMOL/L — SIGNIFICANT CHANGE UP (ref 3.5–5.3)
POTASSIUM SERPL-SCNC: 4 MMOL/L — SIGNIFICANT CHANGE UP (ref 3.5–5.3)
POTASSIUM SERPL-SCNC: 4 MMOL/L — SIGNIFICANT CHANGE UP (ref 3.5–5.3)
RBC # BLD: 3.16 M/UL — LOW (ref 4.2–5.8)
RBC # BLD: 3.16 M/UL — LOW (ref 4.2–5.8)
RBC # FLD: 12.3 % — SIGNIFICANT CHANGE UP (ref 10.3–14.5)
RBC # FLD: 12.3 % — SIGNIFICANT CHANGE UP (ref 10.3–14.5)
SODIUM SERPL-SCNC: 146 MMOL/L — HIGH (ref 135–145)
SODIUM SERPL-SCNC: 146 MMOL/L — HIGH (ref 135–145)
WBC # BLD: 5.04 K/UL — SIGNIFICANT CHANGE UP (ref 3.8–10.5)
WBC # BLD: 5.04 K/UL — SIGNIFICANT CHANGE UP (ref 3.8–10.5)
WBC # FLD AUTO: 5.04 K/UL — SIGNIFICANT CHANGE UP (ref 3.8–10.5)
WBC # FLD AUTO: 5.04 K/UL — SIGNIFICANT CHANGE UP (ref 3.8–10.5)

## 2023-11-10 PROCEDURE — 99231 SBSQ HOSP IP/OBS SF/LOW 25: CPT

## 2023-11-10 RX ORDER — DRONABINOL 2.5 MG
5 CAPSULE ORAL
Refills: 0 | Status: DISCONTINUED | OUTPATIENT
Start: 2023-11-10 | End: 2023-11-13

## 2023-11-10 RX ADMIN — HEPARIN SODIUM 5000 UNIT(S): 5000 INJECTION INTRAVENOUS; SUBCUTANEOUS at 05:20

## 2023-11-10 RX ADMIN — ATENOLOL 50 MILLIGRAM(S): 25 TABLET ORAL at 05:21

## 2023-11-10 RX ADMIN — FLUVOXAMINE MALEATE 150 MILLIGRAM(S): 25 TABLET ORAL at 18:42

## 2023-11-10 RX ADMIN — Medication 5 MILLIGRAM(S): at 05:20

## 2023-11-10 RX ADMIN — Medication 5 MILLIGRAM(S): at 18:42

## 2023-11-10 RX ADMIN — HEPARIN SODIUM 5000 UNIT(S): 5000 INJECTION INTRAVENOUS; SUBCUTANEOUS at 18:46

## 2023-11-10 RX ADMIN — FLUVOXAMINE MALEATE 150 MILLIGRAM(S): 25 TABLET ORAL at 05:20

## 2023-11-10 RX ADMIN — QUETIAPINE FUMARATE 150 MILLIGRAM(S): 200 TABLET, FILM COATED ORAL at 22:28

## 2023-11-10 RX ADMIN — SENNA PLUS 2 TABLET(S): 8.6 TABLET ORAL at 22:25

## 2023-11-10 RX ADMIN — Medication 2000 UNIT(S): at 11:25

## 2023-11-10 RX ADMIN — MIRTAZAPINE 30 MILLIGRAM(S): 45 TABLET, ORALLY DISINTEGRATING ORAL at 22:29

## 2023-11-10 RX ADMIN — Medication 5 MILLIGRAM(S): at 18:41

## 2023-11-10 RX ADMIN — Medication 3 MILLIGRAM(S): at 22:26

## 2023-11-10 RX ADMIN — Medication 5 MILLIGRAM(S): at 05:21

## 2023-11-10 NOTE — CASE MANAGEMENT PROGRESS NOTE - NSCMPROGRESSNOTE_GEN_ALL_CORE
Discussed on rounds this am.  Pt is having meds adjusted, SW/ CM following for post acute transition planning.

## 2023-11-10 NOTE — PROGRESS NOTE ADULT - SUBJECTIVE AND OBJECTIVE BOX
Chief Complaint: Psychosis    Interval Events: No events overnight.    Review of Systems:  General: No fevers, chills, weight gain  Skin: No rashes, color changes  Cardiovascular: No chest pain, orthopnea  Respiratory: No shortness of breath, cough  Gastrointestinal: No nausea, abdominal pain  Genitourinary: No incontinence, pain with urination  Musculoskeletal: No pain, swelling, decreased range of motion  Neurological: No headache, weakness  Psychiatric: No depression, anxiety  Endocrine: No weight gain, increased thirst  All other systems are comprehensively negative.    Physical Exam:  Vital Signs Last 24 Hrs  T(C): 36.8 (10 Nov 2023 04:54), Max: 36.8 (10 Nov 2023 04:54)  T(F): 98.3 (10 Nov 2023 04:54), Max: 98.3 (10 Nov 2023 04:54)  HR: 79 (10 Nov 2023 04:54) (75 - 90)  BP: 172/81 (10 Nov 2023 04:54) (162/70 - 172/81)  BP(mean): --  RR: 17 (10 Nov 2023 04:54) (17 - 18)  SpO2: 92% (10 Nov 2023 04:54) (92% - 94%)  Parameters below as of 10 Nov 2023 04:54  Patient On (Oxygen Delivery Method): room air  General: NAD  HEENT: MMM  Neck: No JVD, no carotid bruit  Lungs: CTAB  CV: RRR, nl S1/S2, no M/R/G  Abdomen: S/NT/ND, +BS  Extremities: No LE edema, no cyanosis  Neuro: AAOx3, non-focal  Skin: No rash    Labs:

## 2023-11-10 NOTE — BH CONSULTATION LIAISON PROGRESS NOTE - NSBHCHARTREVIEWINVESTIGATE_PSY_A_CORE FT
< from: 12 Lead ECG (10.23.23 @ 11:46) >    Ventricular Rate 64 BPM    Atrial Rate 64 BPM    P-R Interval 162 ms    QRS Duration 98 ms    Q-T Interval 402 ms    QTC Calculation(Bazett) 414 ms    P Axis 19 degrees    R Axis 19 degrees    T Axis 21 degrees    Diagnosis Line Normal sinus rhythm  Normal ECG  When compared with ECG of 29-AUG-2022 12:16,  No significant change was found  Confirmed by SMITA CHERY ([id number]) on 10/23/2023 6:07:49 PM    < end of copied text >    

## 2023-11-10 NOTE — PROGRESS NOTE ADULT - SUBJECTIVE AND OBJECTIVE BOX
Patient is a 73y old  Male who presents with a chief complaint of AMS (09 Nov 2023 10:39)        INTERVAL HPI/OVERNIGHT EVENTS:   no complaints  pt seen and examined         Vital Signs Last 24 Hrs  T(C): 36.7 (10 Nov 2023 12:33), Max: 36.8 (10 Nov 2023 04:54)  T(F): 98.1 (10 Nov 2023 12:33), Max: 98.3 (10 Nov 2023 04:54)  HR: 70 (10 Nov 2023 12:33) (70 - 90)  BP: 176/80 (10 Nov 2023 12:33) (162/70 - 176/80)  BP(mean): --  RR: 17 (10 Nov 2023 12:33) (17 - 18)  SpO2: 95% (10 Nov 2023 12:33) (92% - 95%)    Parameters below as of 10 Nov 2023 12:33  Patient On (Oxygen Delivery Method): room air        acetaminophen     Tablet .. 650 milliGRAM(s) Oral every 6 hours PRN  atenolol  Tablet 50 milliGRAM(s) Oral daily  cholecalciferol 2000 Unit(s) Oral daily  diazepam    Tablet 5 milliGRAM(s) Oral two times a day  dronabinol 5 milliGRAM(s) Oral three times a day before meals  fluvoxaMINE 150 milliGRAM(s) Oral two times a day  heparin   Injectable 5000 Unit(s) SubCutaneous every 12 hours  lactulose Syrup 15 Gram(s) Oral two times a day PRN  melatonin 3 milliGRAM(s) Oral at bedtime  mirtazapine 30 milliGRAM(s) Oral at bedtime  predniSONE   Tablet 5 milliGRAM(s) Oral daily  QUEtiapine 150 milliGRAM(s) Oral at bedtime  senna 2 Tablet(s) Oral at bedtime  sodium chloride 0.45%. 1000 milliLiter(s) IV Continuous <Continuous>      PHYSICAL EXAM:  GENERAL: NAD   EYES: conjunctiva and sclera clear  ENMT: Moist mucous membranes  NECK: Supple, No JVD, Normal thyroid  CHEST/LUNG: non labored, cta b/l  HEART: Regular rate and rhythm; No murmurs, rubs, or gallops  ABDOMEN: Soft, Nontender, Nondistended; Bowel sounds present  EXTREMITIES:  2+ Peripheral Pulses, No clubbing, no cyanosis, no edema  LYMPH: No lymphadenopathy noted  SKIN: No rashes or lesions  NEURO: no focal deficits    Consultant(s) Notes Reviewed:  [x ] YES  [ ] NO  Care Discussed with Consultants/Other Providers [ x] YES  [ ] NO    LABS:                        9.7    5.04  )-----------( 158      ( 10 Nov 2023 09:52 )             27.0     11-10    146<H>  |  110<H>  |  14  ----------------------------<  125<H>  4.0   |  33<H>  |  0.92    Ca    8.5      10 Nov 2023 09:52        Urinalysis Basic - ( 10 Nov 2023 09:52 )    Color: x / Appearance: x / SG: x / pH: x  Gluc: 125 mg/dL / Ketone: x  / Bili: x / Urobili: x   Blood: x / Protein: x / Nitrite: x   Leuk Esterase: x / RBC: x / WBC x   Sq Epi: x / Non Sq Epi: x / Bacteria: x      CAPILLARY BLOOD GLUCOSE            Urinalysis Basic - ( 10 Nov 2023 09:52 )    Color: x / Appearance: x / SG: x / pH: x  Gluc: 125 mg/dL / Ketone: x  / Bili: x / Urobili: x   Blood: x / Protein: x / Nitrite: x   Leuk Esterase: x / RBC: x / WBC x   Sq Epi: x / Non Sq Epi: x / Bacteria: x          RADIOLOGY & ADDITIONAL TESTS:    Imaging Personally Reviewed  Reviewed consultants input

## 2023-11-10 NOTE — PROGRESS NOTE ADULT - SUBJECTIVE AND OBJECTIVE BOX
All interim records and events noted.    wife present, reports pt no as good and more agitated/less calm this AM  pt in usual poor mood complaining of getting blood draws      MEDICATIONS  (STANDING):  atenolol  Tablet 50 milliGRAM(s) Oral daily  cholecalciferol 2000 Unit(s) Oral daily  diazepam    Tablet 5 milliGRAM(s) Oral two times a day  dronabinol 5 milliGRAM(s) Oral three times a day before meals  fluvoxaMINE 150 milliGRAM(s) Oral two times a day  heparin   Injectable 5000 Unit(s) SubCutaneous every 12 hours  melatonin 3 milliGRAM(s) Oral at bedtime  mirtazapine 30 milliGRAM(s) Oral at bedtime  predniSONE   Tablet 5 milliGRAM(s) Oral daily  QUEtiapine 150 milliGRAM(s) Oral at bedtime  senna 2 Tablet(s) Oral at bedtime  sodium chloride 0.45%. 1000 milliLiter(s) (75 mL/Hr) IV Continuous <Continuous>    MEDICATIONS  (PRN):  acetaminophen     Tablet .. 650 milliGRAM(s) Oral every 6 hours PRN Temp greater or equal to 38.5C (101.3F), Moderate Pain (4 - 6)  lactulose Syrup 15 Gram(s) Oral two times a day PRN constipation      Vital Signs Last 24 Hrs  T(C): 36.8 (10 Nov 2023 04:54), Max: 36.8 (10 Nov 2023 04:54)  T(F): 98.3 (10 Nov 2023 04:54), Max: 98.3 (10 Nov 2023 04:54)  HR: 78 (10 Nov 2023 10:04) (75 - 90)  BP: 163/80 (10 Nov 2023 10:04) (162/70 - 172/81)  BP(mean): --  RR: 17 (10 Nov 2023 04:54) (17 - 18)  SpO2: 92% (10 Nov 2023 04:54) (92% - 94%)    Parameters below as of 10 Nov 2023 04:54  Patient On (Oxygen Delivery Method): room air        PHYSICAL EXAM  General: up in bed in no acute distress  Head: atraumatic, normocephalic  ENT: sclera anicteric, buccal mucosa moist  Neck: supple, trachea midline  CV: S1 S2, regular rate and rhythm  Lungs: clear to auscultation, no wheezes/rhonchi  Abdomen: soft, nontender, bowel sounds present, no palpable masses  Extrem: no clubbing/cyanosis/edema  Skin: no significant increased ecchymosis/petechiae  Neuro: alert and oriented X3,  no focal deficits      LABS:             9.7    5.04  )-----------( x        ( 11-10 @ 09:52 )             27.0       11-10    146<H>  |  110<H>  |  14  ----------------------------<  125<H>  4.0   |  33<H>  |  0.92    Ca    8.5      10 Nov 2023 09:52          RADIOLOGY & ADDITIONAL STUDIES:    IMPRESSION/RECOMMENDATIONS:

## 2023-11-10 NOTE — PROGRESS NOTE ADULT - SUBJECTIVE AND OBJECTIVE BOX
Neurology Follow up note    JANICE [id number]    HPI:  hx obtained from wife  73 M pmh of HTN, ITP, APL syndrome, generalized anxiety disorder, OCD, newly dx aggressive Merkel Cell cancer of forehead- pt's oncologist  Dr. Eng, at [facility] on Keytruda infusions with good response per wife  was brought o ED for worsening psychosis for the past few days, pt believes there are sharp things inside his body trying to come out of his mouth, stuck in throat  pt lately with difficulty swallowing, decreased po intake  pt was evaluated by psych in ED (24 Oct 2023 13:19)      Interval History - very anxious    Patient is seen, chart was reviewed and case was discussed with the treatment team.  Pt is not in any distress.   Lying on bed comfortably.   .  Vital Signs Last 24 Hrs  T(C): 36.7 (10 Nov 2023 12:33), Max: 36.8 (10 Nov 2023 04:54)  T(F): 98.1 (10 Nov 2023 12:33), Max: 98.3 (10 Nov 2023 04:54)  HR: 70 (10 Nov 2023 12:33) (70 - 90)  BP: 176/80 (10 Nov 2023 12:33) (162/70 - 176/80)  BP(mean): --  RR: 17 (10 Nov 2023 12:33) (17 - 18)  SpO2: 95% (10 Nov 2023 12:33) (92% - 95%)    Parameters below as of 10 Nov 2023 12:33  Patient On (Oxygen Delivery Method): room air                      REVIEW OF SYSTEMS:    Constitutional: No fever,  Eyes: No eye pain, visual disturbances, or discharge  ENT:  No difficulty hearing, tinnitus, vertigo; No sinus or throat pain  Neck: No pain or stiffness  Respiratory: No cough, wheezing, chills or hemoptysis  Cardiovascular: No chest pain, palpitations, shortness of breath, dizziness or leg swelling  Gastrointestinal: No abdominal or epigastric pain. No nausea, vomiting or hematemesis;   Genitourinary: No dysuria, frequency,   Neurological: No headaches, memory loss, loss of strength,  Psychiatric: No depression, anxiety, mood swings or difficulty sleeping  Musculoskeletal: No joint pain or swelling;   Skin: No itching, burning, rashes or lesions   Lymph Nodes: No enlarged glands  Endocrine: No heat or cold intolerance; No hair loss,  Allergy and Immunologic: No hives or eczema    On Neurological Examination:    Mental Status - Pt is alert, awake, oriented X3   Follows commands well and able to answer questions appropriately.Mood and affect  normal    Speech -  Pt has dysarthria.    Cranial Nerves - Pupils 3 mm equal and reactive to light, extraocular eye movements intact. Pt has no visual field deficit.  Pt has no  facial asymmetry. Facial sensation is intact.Tongue - is in midline.    Muscle tone - is normal      Motor Exam - 4/5 all over, No drift. No shaking or tremors.    Sensory Exam - . Pt withdraws all extremities equally on stimulation. No asymmetry seen. No complaints of tingling, numbness.           coordination:    Finger to nose: normal      Deep tendon Reflexes - 2 plus all over.          Neck Supple -  Yes.      MEDICATIONS  (STANDING):  atenolol  Tablet 50 milliGRAM(s) Oral daily  cholecalciferol 2000 Unit(s) Oral daily  diazepam    Tablet 5 milliGRAM(s) Oral two times a day  dronabinol 5 milliGRAM(s) Oral three times a day before meals  fluvoxaMINE 150 milliGRAM(s) Oral two times a day  heparin   Injectable 5000 Unit(s) SubCutaneous every 12 hours  melatonin 3 milliGRAM(s) Oral at bedtime  mirtazapine 30 milliGRAM(s) Oral at bedtime  predniSONE   Tablet 5 milliGRAM(s) Oral daily  QUEtiapine 150 milliGRAM(s) Oral at bedtime  senna 2 Tablet(s) Oral at bedtime  sodium chloride 0.45%. 1000 milliLiter(s) (75 mL/Hr) IV Continuous <Continuous>    MEDICATIONS  (PRN):  acetaminophen     Tablet .. 650 milliGRAM(s) Oral every 6 hours PRN Temp greater or equal to 38.5C (101.3F), Moderate Pain (4 - 6)  lactulose Syrup 15 Gram(s) Oral two times a day PRN constipation            Allergies    No Known Allergies    Intolerances             11-10    146<H>  |  110<H>  |  14  ----------------------------<  125<H>  4.0   |  33<H>  |  0.92    Ca    8.5      10 Nov 2023 09:52      Hemoglobin A1C:     Vitamin B12     RADIOLOGY    ASSESSMENT AND PLAN:      seen for ams  Depression with psychosis  doubt paraneoplastic   brain mri- no acute cva/mets     remeron is increased to 30   increased seroquel 150  mg hs for psychosis  psych follow up  Physical therapy evaluation.  Pain is accessed and addressed.  Plan of care was discussed with family(wife) Questions answered.  neuro  follow up prn

## 2023-11-10 NOTE — BH CONSULTATION LIAISON PROGRESS NOTE - NSBHCONSULTRECOMMENDOTHER_PSY_A_CORE FT
Increase Luvox to 150 mg po bid
Increase Luvox to 150 mg po bid
Increase Remeron to 30 mg po at HS
Increase Luvox to 150 mg po bid

## 2023-11-10 NOTE — PROGRESS NOTE ADULT - ASSESSMENT
73 M pmh of HTN, ITP, APL syndrome, generalized anxiety disorder, OCD, newly dx aggressive Merkel Cell cancer of forehead- pt's oncologist  Dr. Eng, at [facility] on Keytruda (PD-1 blocker) infusions with good response per wife  was brought to ED for worsening psychosis for the past few days, pt believes there are sharp things inside his body trying to come out of his mouth, stuck in throat, pt lately with difficulty swallowing, decreased po intake, pt was evaluated by psych in ED. Wife reports sudden cognitive decline and behavioral changes immediately after COVID infection.    RECOMMENDATIONS  1-?PASC  -sounds like there is no obvious acute infectious issue. no fever, no leukocytosis, no obvious localization but concerning history of things developing post COVID-19 infection so raises concerns for a neurological PASC picture  noted serology for EBV consistent with reactivation as described post PASC  Serotonin, Blood: <30ng/mL   am cortisol-low x 2 then nl  ---  -melatonin 3mg PO QHS  -prednisone 5mg PO Qam  -fluvoxamine 150mg PO BID  -Marinol 5 mg PO TID before meals added 11/10 for appetite stimulation and PASC  -in outpt setting consideration of bifidobacterium probiotics    Thank you for consulting us and involving us in the management of this most interesting and challenging case.  We will follow along in the care of this patient. Please call us at [phone] or text me directly on my cell# at [phone] with any concerns.

## 2023-11-10 NOTE — PROGRESS NOTE ADULT - SUBJECTIVE AND OBJECTIVE BOX
[facility] DIVISION of INFECTIOUS DISEASE  Eric Reyes MD PhD, Milena Underwood MD, Jillian Mckoy MD, Enid Everett MD, Jay Krishna MD  and providing coverage with Arthur Pepper MD  Providing Infectious Disease Consultations at [facility], [facility], [facility], [facility]    Office# [phone] to schedule follow up appointments  Answering Service for urgent calls or New Consults [phone]  Cell# to text for urgent issues Eric Reyes [phone]     infectious diseases progress note:    JANICE RENDON is a 73y y. o. Male patient    Overnight and events of the last 24hrs reviewed    Allergies    No Known Allergies    Intolerances        ANTIBIOTICS/RELEVANT:  antimicrobials    immunologic:    OTHER:  acetaminophen     Tablet .. 650 milliGRAM(s) Oral every 6 hours PRN  atenolol  Tablet 50 milliGRAM(s) Oral daily  cholecalciferol 2000 Unit(s) Oral daily  diazepam    Tablet 5 milliGRAM(s) Oral two times a day  dronabinol 5 milliGRAM(s) Oral three times a day before meals  fluvoxaMINE 150 milliGRAM(s) Oral two times a day  heparin   Injectable 5000 Unit(s) SubCutaneous every 12 hours  lactulose Syrup 15 Gram(s) Oral two times a day PRN  melatonin 3 milliGRAM(s) Oral at bedtime  mirtazapine 30 milliGRAM(s) Oral at bedtime  predniSONE   Tablet 5 milliGRAM(s) Oral daily  QUEtiapine 150 milliGRAM(s) Oral at bedtime  senna 2 Tablet(s) Oral at bedtime  sodium chloride 0.45%. 1000 milliLiter(s) IV Continuous <Continuous>      Objective:  Vital Signs Last 24 Hrs  T(C): 36.8 (10 Nov 2023 04:54), Max: 36.8 (10 Nov 2023 04:54)  T(F): 98.3 (10 Nov 2023 04:54), Max: 98.3 (10 Nov 2023 04:54)  HR: 79 (10 Nov 2023 04:54) (75 - 90)  BP: 172/81 (10 Nov 2023 04:54) (162/70 - 172/81)  BP(mean): --  RR: 17 (10 Nov 2023 04:54) (17 - 18)  SpO2: 92% (10 Nov 2023 04:54) (92% - 94%)    Parameters below as of 10 Nov 2023 04:54  Patient On (Oxygen Delivery Method): room air        T(C): 36.8 (11-10-23 @ 04:54), Max: 37.1 (11-08-23 @ 20:38)  T(C): 36.8 (11-10-23 @ 04:54), Max: 37.1 (11-08-23 @ 20:38)  T(C): 36.8 (11-10-23 @ 04:54), Max: 37.1 (11-08-23 @ 20:38)    PHYSICAL EXAM:  HEENT: NC atraumatic  Neck: supple  Respiratory: no accessory muscle use, breathing comfortably  Cardiovascular: distant  Gastrointestinal: normal appearing, nondistended  Extremities: no clubbing, no cyanosis,        LABS:        WBC  4.10 11-07 @ 06:33  6.29 11-06 @ 07:35  7.01 11-05 @ 11:20  11.81 11-04 @ 08:50              Creatinine: 0.97 mg/dL (11-07-23 @ 06:33)  Creatinine: 1.10 mg/dL (11-06-23 @ 07:35)  Creatinine: 1.40 mg/dL (11-05-23 @ 11:20)  Creatinine: 1.70 mg/dL (11-04-23 @ 08:50)                INFLAMMATORY MARKERS      MICROBIOLOGY:              RADIOLOGY & ADDITIONAL STUDIES:

## 2023-11-10 NOTE — PROGRESS NOTE ADULT - ASSESSMENT
72 yo man w ITP, Lupus Anticoagulant, HTN, anxiety on Seroquel OCD, Merkel cell ca of right Forehead, under care Dr Elliot Eng, post 4 doses q3wks IV Keytruda, last dose 10/17/23 w good response  Pt brought in w c/o feeling "claws, daggers" inside him, since C#3 Keytruda, with sensation initially started w various parts of face now also progressed down to sides of body to rectum(does not see actual daggers or claws,)  Wife reports after first cycle started w jaw tightness and progressive worsened w each cycle, to point of hard to swallow/can't swallow  Had tired taken off Seroquel to see if adverse effect but no improvement, now back on x 5-6days  CT and MRI head no acute findings    -sensation described by pt ?psychosis vs neuropathy  -psychosis not noted to be associated w Keytruda. Neuro tox of Keytruda described includes cerebral hemorrhage, confusion, myasthenia gravis, reversible posterior leukoencephalopathy syndrome, syed neuropathy, Guillain [place], aseptic meningitis, encephalitis, transverse myelitis  -seen by Psych and Neuro  -psych meds being Seroquel and Remeron)  -pt refused LP  -AM cortisol <6 x2 and 11 on 11/6, post Hydrocortisone on 11/6, changed to prednisone 5mg for better tolerance, Endocrine following  -clinically sl improvement  -CBC w stable anemia and thrombocytopenia, adequate at present levels and no need for acute intervention  -continue acute care

## 2023-11-10 NOTE — PROGRESS NOTE ADULT - ASSESSMENT
The patient is a 73 year old male with a history of HTN, ITP, APLS, anxiety, OCD, Merkel cell cancer who presented with psychosis.    Plan:  - ECG with no evidence of ischemia or infarction  - Orthostatic hypotension may be multifactorial - dehydration, medication related (antipsychotics), and low cortisol levels  - Losartan on hold  - Continue atenolol 50 mg daily  - BP has since trended up - allow supine hypertension. When able, can repeat orthostatics.  - Endocrine follow-up  - On oral steroids

## 2023-11-10 NOTE — PROGRESS NOTE ADULT - ASSESSMENT
73 M pmh of HTN, ITP, APL syndrome, generalized anxiety disorder, OCD, newly dx aggressive Merkel Cell cancer of forehead- pt's oncologist  Dr. Eng, at [facility] on Keytruda infusions with good response per wife  was brought to ED for worsening psychosis for the past few days, pt lately with difficulty swallowing, decreased po intake    Psychosis  monitor  psych fu  MRI noted  neuro following  continue seroquel, luvox and remeron    #Abnormal throat sensation- sharp object sensation  swallow eval appreciated    #HTN- +orthostasis  #Orthostatic hypotension- hold losartan,  IVF   cont atenolol w parameters  ? psych med induced  cotisol low on prednisone now  endo following  possible hypophysitis  d/w cards - if remains orthostatic to start midodrine      #Anxiety  psych cs appreciated  continue valium  uptitrating seroquel and luvox  continue remeron    #Merkel cell ca  onc c/s  r/o paraneoplastic causes- unlikely      #DVT ppx-  hep sq      [facility]   [phone]

## 2023-11-11 RX ORDER — ATENOLOL 25 MG/1
50 TABLET ORAL
Refills: 0 | Status: DISCONTINUED | OUTPATIENT
Start: 2023-11-11 | End: 2023-12-11

## 2023-11-11 RX ORDER — BENZOCAINE 10 %
1 GEL (GRAM) MUCOUS MEMBRANE
Refills: 0 | Status: DISCONTINUED | OUTPATIENT
Start: 2023-11-11 | End: 2023-12-20

## 2023-11-11 RX ADMIN — Medication 5 MILLIGRAM(S): at 17:43

## 2023-11-11 RX ADMIN — SODIUM CHLORIDE 75 MILLILITER(S): 9 INJECTION, SOLUTION INTRAVENOUS at 06:02

## 2023-11-11 RX ADMIN — Medication 1 SPRAY(S): at 17:44

## 2023-11-11 RX ADMIN — SODIUM CHLORIDE 75 MILLILITER(S): 9 INJECTION, SOLUTION INTRAVENOUS at 21:02

## 2023-11-11 RX ADMIN — Medication 3 MILLIGRAM(S): at 21:02

## 2023-11-11 RX ADMIN — Medication 2000 UNIT(S): at 11:33

## 2023-11-11 RX ADMIN — HEPARIN SODIUM 5000 UNIT(S): 5000 INJECTION INTRAVENOUS; SUBCUTANEOUS at 17:43

## 2023-11-11 RX ADMIN — ATENOLOL 50 MILLIGRAM(S): 25 TABLET ORAL at 17:43

## 2023-11-11 RX ADMIN — MIRTAZAPINE 30 MILLIGRAM(S): 45 TABLET, ORALLY DISINTEGRATING ORAL at 21:02

## 2023-11-11 RX ADMIN — QUETIAPINE FUMARATE 150 MILLIGRAM(S): 200 TABLET, FILM COATED ORAL at 21:02

## 2023-11-11 RX ADMIN — FLUVOXAMINE MALEATE 150 MILLIGRAM(S): 25 TABLET ORAL at 17:43

## 2023-11-11 NOTE — PROGRESS NOTE ADULT - ASSESSMENT
72 yo man w ITP, Lupus Anticoagulant, HTN, anxiety on Seroquel OCD, Merkel cell ca of right Forehead, under care Dr Elliot Eng, post 4 doses q3wks IV Keytruda, last dose 10/17/23 w good response  Pt brought in w c/o feeling "claws, daggers" inside him, since C#3 Keytruda, with sensation initially started w various parts of face now also progressed down to sides of body to rectum(does not see actual daggers or claws,)  Wife reports after first cycle started w jaw tightness and progressive worsened w each cycle, to point of hard to swallow/can't swallow  Had tired taken off Seroquel to see if adverse effect but no improvement, now back on x 5-6days  CT and MRI head no acute findings  -sensation described by pt ?psychosis vs neuropathy  -psychosis not noted to be associated w Keytruda. Neuro tox of Keytruda described includes cerebral hemorrhage, confusion, myasthenia gravis, reversible posterior leukoencephalopathy syndrome, syed neuropathy, Guillain [place], aseptic meningitis, encephalitis, transverse myelitis  -seen by Psych and Neuro  -psych meds being Seroquel and Remeron)  -pt refused LP    -AM cortisol <6 x2 and 11 on 11/6, post Hydrocortisone on 11/6, changed to prednisone 5mg for better tolerance, Endocrine following  -clinically sl improvement  -CBC w anemia and thrombocytopenia-both improved as of yesterday. Adequate at present levels  -continue acute care

## 2023-11-11 NOTE — PROGRESS NOTE ADULT - ASSESSMENT
73 M pmh of HTN, ITP, APL syndrome, generalized anxiety disorder, OCD, newly dx aggressive Merkel Cell cancer of forehead- pt's oncologist  Dr. Eng, at [facility] on Keytruda infusions with good response per wife  was brought to ED for worsening psychosis for the past few days, pt lately with difficulty swallowing, decreased po intake    Psychosis  monitor  psych fu  MRI noted  neuro following  continue seroquel, luvox and remeron    #Abnormal throat sensation- sharp object sensation  swallow eval appreciated    #HTN- +orthostasis  #Orthostatic hypotension- hold losartan,  IVF   cont atenolol w parameters  ? psych med induced  cortisol low on prednisone now  endo following  possible hypophysitis  d/w cards - if remains orthostatic to start midodrine      #Anxiety  psych cs appreciated  continue valium  uptitrating seroquel and luvox  continue remeron    #Merkel cell ca  onc c/s  r/o paraneoplastic causes- unlikely      #DVT ppx-  hep sq      [facility]   [phone]

## 2023-11-11 NOTE — PROGRESS NOTE ADULT - SUBJECTIVE AND OBJECTIVE BOX
Chief Complaint: Psychosis    Interval Events: No events overnight.    Review of Systems:  General: No fevers, chills, weight gain  Skin: No rashes, color changes  Cardiovascular: No chest pain, orthopnea  Respiratory: No shortness of breath, cough  Gastrointestinal: No nausea, abdominal pain  Genitourinary: No incontinence, pain with urination  Musculoskeletal: No pain, swelling, decreased range of motion  Neurological: No headache, weakness  Psychiatric: No depression, anxiety  Endocrine: No weight gain, increased thirst  All other systems are comprehensively negative.    Physical Exam:  Vital Signs Last 24 Hrs  T(C): 36.7 (11 Nov 2023 04:36), Max: 36.7 (10 Nov 2023 12:33)  T(F): 98 (11 Nov 2023 04:36), Max: 98.1 (10 Nov 2023 12:33)  HR: 74 (11 Nov 2023 04:36) (70 - 78)  BP: 177/75 (11 Nov 2023 04:36) (163/80 - 182/91)  BP(mean): --  RR: 16 (11 Nov 2023 04:36) (16 - 17)  SpO2: 94% (11 Nov 2023 04:36) (92% - 95%)  Parameters below as of 11 Nov 2023 04:36  Patient On (Oxygen Delivery Method): room air  General: NAD  HEENT: MMM  Neck: No JVD, no carotid bruit  Lungs: CTAB  CV: RRR, nl S1/S2, no M/R/G  Abdomen: S/NT/ND, +BS  Extremities: No LE edema, no cyanosis  Neuro: AAOx3, non-focal  Skin: No rash    Labs:             11-10    146<H>  |  110<H>  |  14  ----------------------------<  125<H>  4.0   |  33<H>  |  0.92    Ca    8.5      10 Nov 2023 09:52                          9.7    5.04  )-----------( 158      ( 10 Nov 2023 09:52 )             27.0

## 2023-11-11 NOTE — PROGRESS NOTE ADULT - ASSESSMENT
The patient is a 73 year old male with a history of HTN, ITP, APLS, anxiety, OCD, Merkel cell cancer who presented with psychosis.    Plan:  - ECG with no evidence of ischemia or infarction  - Orthostatic hypotension may be multifactorial - dehydration, medication related (antipsychotics), and low cortisol levels  - Losartan on hold  - Increase back to atenolol 50 mg bid  - BP has since trended up - allow supine hypertension  - Repeat orthostatics - if negative, will resume losartan  - Endocrine follow-up  - On oral steroids

## 2023-11-11 NOTE — PROGRESS NOTE ADULT - SUBJECTIVE AND OBJECTIVE BOX
All interim records and events noted.    asleep, appear comfortable      MEDICATIONS  (STANDING):  atenolol  Tablet 50 milliGRAM(s) Oral two times a day  cholecalciferol 2000 Unit(s) Oral daily  diazepam    Tablet 5 milliGRAM(s) Oral two times a day  dronabinol 5 milliGRAM(s) Oral three times a day before meals  fluvoxaMINE 150 milliGRAM(s) Oral two times a day  heparin   Injectable 5000 Unit(s) SubCutaneous every 12 hours  melatonin 3 milliGRAM(s) Oral at bedtime  mirtazapine 30 milliGRAM(s) Oral at bedtime  predniSONE   Tablet 5 milliGRAM(s) Oral daily  QUEtiapine 150 milliGRAM(s) Oral at bedtime  senna 2 Tablet(s) Oral at bedtime  sodium chloride 0.45%. 1000 milliLiter(s) (75 mL/Hr) IV Continuous <Continuous>    MEDICATIONS  (PRN):  acetaminophen     Tablet .. 650 milliGRAM(s) Oral every 6 hours PRN Temp greater or equal to 38.5C (101.3F), Moderate Pain (4 - 6)  lactulose Syrup 15 Gram(s) Oral two times a day PRN constipation      Vital Signs Last 24 Hrs  T(C): 36.7 (11 Nov 2023 04:36), Max: 36.7 (10 Nov 2023 12:33)  T(F): 98 (11 Nov 2023 04:36), Max: 98.1 (10 Nov 2023 12:33)  HR: 74 (11 Nov 2023 04:36) (70 - 78)  BP: 177/75 (11 Nov 2023 04:36) (176/80 - 182/91)  BP(mean): --  RR: 16 (11 Nov 2023 04:36) (16 - 17)  SpO2: 94% (11 Nov 2023 04:36) (92% - 95%)    Parameters below as of 11 Nov 2023 04:36  Patient On (Oxygen Delivery Method): room air        PHYSICAL EXAM  General:  in no acute distress  Head: atraumatic, normocephalic  ENT: nose midline  Neck: supple, trachea midline  CV: S1 S2, regular rate and rhythm  Lungs: clear to auscultation, no wheezes/rhonchi  Abdomen: soft, bowel sounds present. Pouchy  Skin: no significant increased ecchymosis/petechiae        LABS:             9.7    5.04  )-----------( 158      ( 11-10 @ 09:52 )             27.0       11-10    146<H>  |  110<H>  |  14  ----------------------------<  125<H>  4.0   |  33<H>  |  0.92    Ca    8.5      10 Nov 2023 09:52          RADIOLOGY & ADDITIONAL STUDIES:    IMPRESSION/RECOMMENDATIONS:

## 2023-11-11 NOTE — PROGRESS NOTE ADULT - SUBJECTIVE AND OBJECTIVE BOX
Patient is a 73y old  Male who presents with a chief complaint of AMS (09 Nov 2023 10:39)        INTERVAL HPI/OVERNIGHT EVENTS:   no complaints  pt seen and examined         Vital Signs Last 24 Hrs  T(C): 36.4 (11 Nov 2023 11:49), Max: 36.7 (11 Nov 2023 04:36)  T(F): 97.6 (11 Nov 2023 11:49), Max: 98 (11 Nov 2023 04:36)  HR: 82 (11 Nov 2023 11:49) (74 - 82)  BP: 152/77 (11 Nov 2023 11:49) (152/77 - 182/91)  BP(mean): --  RR: 18 (11 Nov 2023 11:49) (16 - 18)  SpO2: 94% (11 Nov 2023 11:49) (92% - 94%)    Parameters below as of 11 Nov 2023 11:49  Patient On (Oxygen Delivery Method): room air        acetaminophen     Tablet .. 650 milliGRAM(s) Oral every 6 hours PRN  atenolol  Tablet 50 milliGRAM(s) Oral two times a day  benzocaine 20% Spray 1 Spray(s) Topical four times a day  cholecalciferol 2000 Unit(s) Oral daily  diazepam    Tablet 5 milliGRAM(s) Oral two times a day  dronabinol 5 milliGRAM(s) Oral three times a day before meals  fluvoxaMINE 150 milliGRAM(s) Oral two times a day  heparin   Injectable 5000 Unit(s) SubCutaneous every 12 hours  lactulose Syrup 15 Gram(s) Oral two times a day PRN  melatonin 3 milliGRAM(s) Oral at bedtime  mirtazapine 30 milliGRAM(s) Oral at bedtime  predniSONE   Tablet 5 milliGRAM(s) Oral daily  QUEtiapine 150 milliGRAM(s) Oral at bedtime  senna 2 Tablet(s) Oral at bedtime  sodium chloride 0.45%. 1000 milliLiter(s) IV Continuous <Continuous>      PHYSICAL EXAM:  GENERAL: NAD   EYES: conjunctiva and sclera clear  ENMT: Moist mucous membranes  NECK: Supple, No JVD, Normal thyroid  CHEST/LUNG: non labored, cta b/l  HEART: Regular rate and rhythm; No murmurs, rubs, or gallops  ABDOMEN: Soft, Nontender, Nondistended; Bowel sounds present  EXTREMITIES:  2+ Peripheral Pulses, No clubbing, no cyanosis, no edema  LYMPH: No lymphadenopathy noted  SKIN: No rashes or lesions  NEURO: no focal deficits    Consultant(s) Notes Reviewed:  [x ] YES  [ ] NO  Care Discussed with Consultants/Other Providers [ x] YES  [ ] NO    LABS:                        9.7    5.04  )-----------( 158      ( 10 Nov 2023 09:52 )             27.0     11-10    146<H>  |  110<H>  |  14  ----------------------------<  125<H>  4.0   |  33<H>  |  0.92    Ca    8.5      10 Nov 2023 09:52        Urinalysis Basic - ( 10 Nov 2023 09:52 )    Color: x / Appearance: x / SG: x / pH: x  Gluc: 125 mg/dL / Ketone: x  / Bili: x / Urobili: x   Blood: x / Protein: x / Nitrite: x   Leuk Esterase: x / RBC: x / WBC x   Sq Epi: x / Non Sq Epi: x / Bacteria: x      CAPILLARY BLOOD GLUCOSE            Urinalysis Basic - ( 10 Nov 2023 09:52 )    Color: x / Appearance: x / SG: x / pH: x  Gluc: 125 mg/dL / Ketone: x  / Bili: x / Urobili: x   Blood: x / Protein: x / Nitrite: x   Leuk Esterase: x / RBC: x / WBC x   Sq Epi: x / Non Sq Epi: x / Bacteria: x          RADIOLOGY & ADDITIONAL TESTS:    Imaging Personally Reviewed  Reviewed consultants input

## 2023-11-12 RX ADMIN — HEPARIN SODIUM 5000 UNIT(S): 5000 INJECTION INTRAVENOUS; SUBCUTANEOUS at 18:36

## 2023-11-12 RX ADMIN — MIRTAZAPINE 30 MILLIGRAM(S): 45 TABLET, ORALLY DISINTEGRATING ORAL at 21:05

## 2023-11-12 RX ADMIN — SENNA PLUS 2 TABLET(S): 8.6 TABLET ORAL at 21:04

## 2023-11-12 RX ADMIN — HEPARIN SODIUM 5000 UNIT(S): 5000 INJECTION INTRAVENOUS; SUBCUTANEOUS at 05:25

## 2023-11-12 RX ADMIN — QUETIAPINE FUMARATE 150 MILLIGRAM(S): 200 TABLET, FILM COATED ORAL at 21:05

## 2023-11-12 RX ADMIN — Medication 2000 UNIT(S): at 12:37

## 2023-11-12 RX ADMIN — Medication 5 MILLIGRAM(S): at 05:25

## 2023-11-12 RX ADMIN — ATENOLOL 50 MILLIGRAM(S): 25 TABLET ORAL at 18:35

## 2023-11-12 RX ADMIN — FLUVOXAMINE MALEATE 150 MILLIGRAM(S): 25 TABLET ORAL at 18:35

## 2023-11-12 RX ADMIN — Medication 5 MILLIGRAM(S): at 06:00

## 2023-11-12 RX ADMIN — SODIUM CHLORIDE 75 MILLILITER(S): 9 INJECTION, SOLUTION INTRAVENOUS at 05:25

## 2023-11-12 RX ADMIN — Medication 3 MILLIGRAM(S): at 21:04

## 2023-11-12 RX ADMIN — ATENOLOL 50 MILLIGRAM(S): 25 TABLET ORAL at 05:26

## 2023-11-12 RX ADMIN — FLUVOXAMINE MALEATE 150 MILLIGRAM(S): 25 TABLET ORAL at 05:25

## 2023-11-12 RX ADMIN — Medication 1 SPRAY(S): at 12:36

## 2023-11-12 RX ADMIN — Medication 5 MILLIGRAM(S): at 18:35

## 2023-11-12 NOTE — PROGRESS NOTE ADULT - SUBJECTIVE AND OBJECTIVE BOX
Patient is a 73y old  Male who presents with a chief complaint of AMS (09 Nov 2023 10:39)        INTERVAL HPI/OVERNIGHT EVENTS:   no complaints  pt seen and examined         Vital Signs Last 24 Hrs  T(C): 36.3 (12 Nov 2023 12:28), Max: 37 (12 Nov 2023 06:33)  T(F): 97.3 (12 Nov 2023 12:28), Max: 98.6 (12 Nov 2023 06:33)  HR: 74 (12 Nov 2023 12:28) (72 - 86)  BP: 146/68 (12 Nov 2023 12:28) (146/68 - 178/71)  BP(mean): --  RR: 18 (12 Nov 2023 12:28) (18 - 20)  SpO2: 93% (12 Nov 2023 12:28) (91% - 94%)    Parameters below as of 12 Nov 2023 12:28  Patient On (Oxygen Delivery Method): room air        acetaminophen     Tablet .. 650 milliGRAM(s) Oral every 6 hours PRN  atenolol  Tablet 50 milliGRAM(s) Oral two times a day  benzocaine 20% Spray 1 Spray(s) Topical four times a day  cholecalciferol 2000 Unit(s) Oral daily  diazepam    Tablet 5 milliGRAM(s) Oral two times a day  dronabinol 5 milliGRAM(s) Oral three times a day before meals  fluvoxaMINE 150 milliGRAM(s) Oral two times a day  heparin   Injectable 5000 Unit(s) SubCutaneous every 12 hours  lactulose Syrup 15 Gram(s) Oral two times a day PRN  melatonin 3 milliGRAM(s) Oral at bedtime  mirtazapine 30 milliGRAM(s) Oral at bedtime  predniSONE   Tablet 5 milliGRAM(s) Oral daily  QUEtiapine 150 milliGRAM(s) Oral at bedtime  senna 2 Tablet(s) Oral at bedtime  sodium chloride 0.45%. 1000 milliLiter(s) IV Continuous <Continuous>      PHYSICAL EXAM:  GENERAL: NAD   EYES: conjunctiva and sclera clear  ENMT: Moist mucous membranes  NECK: Supple, No JVD, Normal thyroid  CHEST/LUNG: non labored, cta b/l  HEART: Regular rate and rhythm; No murmurs, rubs, or gallops  ABDOMEN: Soft, Nontender, Nondistended; Bowel sounds present  EXTREMITIES:  2+ Peripheral Pulses, No clubbing, no cyanosis, no edema  LYMPH: No lymphadenopathy noted  SKIN: No rashes or lesions  NEURO: no focal deficits    Consultant(s) Notes Reviewed:  [x ] YES  [ ] NO  Care Discussed with Consultants/Other Providers [ x] YES  [ ] NO    LABS:              CAPILLARY BLOOD GLUCOSE                  RADIOLOGY & ADDITIONAL TESTS:    Imaging Personally Reviewed  Reviewed consultants input

## 2023-11-12 NOTE — PROGRESS NOTE ADULT - SUBJECTIVE AND OBJECTIVE BOX
Chief Complaint: Psychosis    Interval Events: No events overnight.    Review of Systems:  General: No fevers, chills, weight gain  Skin: No rashes, color changes  Cardiovascular: No chest pain, orthopnea  Respiratory: No shortness of breath, cough  Gastrointestinal: No nausea, abdominal pain  Genitourinary: No incontinence, pain with urination  Musculoskeletal: No pain, swelling, decreased range of motion  Neurological: No headache, weakness  Psychiatric: No depression, anxiety  Endocrine: No weight gain, increased thirst  All other systems are comprehensively negative.    Physical Exam:  Vital Signs Last 24 Hrs  T(C): 37 (12 Nov 2023 06:33), Max: 37 (12 Nov 2023 06:33)  T(F): 98.6 (12 Nov 2023 06:33), Max: 98.6 (12 Nov 2023 06:33)  HR: 72 (12 Nov 2023 06:33) (72 - 86)  BP: 167/68 (12 Nov 2023 06:33) (152/77 - 178/71)  BP(mean): --  RR: 20 (12 Nov 2023 06:33) (18 - 20)  SpO2: 91% (12 Nov 2023 06:33) (91% - 94%)  Parameters below as of 12 Nov 2023 06:33  Patient On (Oxygen Delivery Method): room air  General: NAD  HEENT: MMM  Neck: No JVD, no carotid bruit  Lungs: CTAB  CV: RRR, nl S1/S2, no M/R/G  Abdomen: S/NT/ND, +BS  Extremities: No LE edema, no cyanosis  Neuro: AAOx3, non-focal  Skin: No rash    Labs:             11-10    146<H>  |  110<H>  |  14  ----------------------------<  125<H>  4.0   |  33<H>  |  0.92    Ca    8.5      10 Nov 2023 09:52                          9.7    5.04  )-----------( 158      ( 10 Nov 2023 09:52 )             27.0

## 2023-11-12 NOTE — PROGRESS NOTE ADULT - SUBJECTIVE AND OBJECTIVE BOX
All interim records and events noted.    awake alert, complains of head pain and total body discomfort, but not specific  wife present      MEDICATIONS  (STANDING):  atenolol  Tablet 50 milliGRAM(s) Oral two times a day  benzocaine 20% Spray 1 Spray(s) Topical four times a day  cholecalciferol 2000 Unit(s) Oral daily  diazepam    Tablet 5 milliGRAM(s) Oral two times a day  dronabinol 5 milliGRAM(s) Oral three times a day before meals  fluvoxaMINE 150 milliGRAM(s) Oral two times a day  heparin   Injectable 5000 Unit(s) SubCutaneous every 12 hours  melatonin 3 milliGRAM(s) Oral at bedtime  mirtazapine 30 milliGRAM(s) Oral at bedtime  predniSONE   Tablet 5 milliGRAM(s) Oral daily  QUEtiapine 150 milliGRAM(s) Oral at bedtime  senna 2 Tablet(s) Oral at bedtime  sodium chloride 0.45%. 1000 milliLiter(s) (75 mL/Hr) IV Continuous <Continuous>    MEDICATIONS  (PRN):  acetaminophen     Tablet .. 650 milliGRAM(s) Oral every 6 hours PRN Temp greater or equal to 38.5C (101.3F), Moderate Pain (4 - 6)  lactulose Syrup 15 Gram(s) Oral two times a day PRN constipation      Vital Signs Last 24 Hrs  T(C): 37 (12 Nov 2023 06:33), Max: 37 (12 Nov 2023 06:33)  T(F): 98.6 (12 Nov 2023 06:33), Max: 98.6 (12 Nov 2023 06:33)  HR: 72 (12 Nov 2023 06:33) (72 - 86)  BP: 167/68 (12 Nov 2023 06:33) (152/77 - 178/71)  BP(mean): --  RR: 20 (12 Nov 2023 06:33) (18 - 20)  SpO2: 91% (12 Nov 2023 06:33) (91% - 94%)    Parameters below as of 12 Nov 2023 06:33  Patient On (Oxygen Delivery Method): room air        PHYSICAL EXAM  General: in bed, in no acute distress  Head: atraumatic, normocephalic  ENT: sclera anicteric, buccal mucosa moist  Neck: supple, trachea midline  CV: S1 S2, regular rate and rhythm  Lungs: clear to auscultation, no wheezes/rhonchi  Abdomen: soft, nontender, bowel sounds present, no palpable masses  Extrem: no clubbing/cyanosis/edema  Skin: no significant increased ecchymosis/petechiae  Neuro: alert and responsive,  no focal deficits      LABS:             9.7    5.04  )-----------( 158      ( 11-10 @ 09:52 )             27.0                 RADIOLOGY & ADDITIONAL STUDIES:    IMPRESSION/RECOMMENDATIONS:

## 2023-11-12 NOTE — PROGRESS NOTE ADULT - ASSESSMENT
72 yo man w ITP, Lupus Anticoagulant, HTN, anxiety on Seroquel OCD, Merkel cell ca of right Forehead, under care Dr Elliot Eng, post 4 doses q3wks IV Keytruda, last dose 10/17/23 w good response  Pt brought in w c/o feeling "claws, daggers" inside him, since C#3 Keytruda, with sensation initially started w various parts of face now also progressed down to sides of body to rectum(does not see actual daggers or claws,)  Wife reports after first cycle started w jaw tightness and progressive worsened w each cycle, to point of hard to swallow/can't swallow  Had tired taken off Seroquel to see if adverse effect but no improvement, now back on x 5-6days  CT and MRI head no acute findings  -sensation described by pt ?psychosis vs neuropathy  -psychosis not noted to be associated w Keytruda. Neuro tox of Keytruda described includes cerebral hemorrhage, confusion, myasthenia gravis, reversible posterior leukoencephalopathy syndrome, syed neuropathy, Guillain [place], aseptic meningitis, encephalitis, transverse myelitis  -seen by Psych and Neuro  -psych meds being Seroquel and Remeron)  -pt refused LP  -AM cortisol <6 x2 and 11 on 11/6, post Hydrocortisone on 11/6, changed to prednisone 5mg for better tolerance, Endocrine following    -clinically +/- improvement  -last CBC w improved  anemia and thrombocytopenia-adequate at present levels  -continue acute care

## 2023-11-12 NOTE — PROGRESS NOTE ADULT - ASSESSMENT
The patient is a 73 year old male with a history of HTN, ITP, APLS, anxiety, OCD, Merkel cell cancer who presented with psychosis.    Plan:  - ECG with no evidence of ischemia or infarction  - Orthostatic hypotension may be multifactorial - dehydration, medication related (antipsychotics), and low cortisol levels  - Losartan on hold  - Continue atenolol 50 mg bid  - BP has since trended up - allow supine hypertension  - Repeat orthostatics if able - if negative, will resume losartan  - Endocrine follow-up  - On oral steroids

## 2023-11-12 NOTE — PROGRESS NOTE ADULT - ASSESSMENT
73 M pmh of HTN, ITP, APL syndrome, generalized anxiety disorder, OCD, newly dx aggressive Merkel Cell cancer of forehead- pt's oncologist  Dr. Eng, at [facility] on Keytruda infusions with good response per wife  was brought to ED for worsening psychosis for the past few days, pt lately with difficulty swallowing, decreased po intake    Psychosis  monitor  psych fu  MRI noted  neuro following  continue seroquel, luvox and remeron    #Abnormal throat sensation- sharp object sensation  swallow eval appreciated    #HTN- +orthostasis  #Orthostatic hypotension- hold losartan,  IVF   cont atenolol w parameters  ? psych med induced  cortisol low on prednisone now  endo following  possible hypophysitis  d/w cards - if remains orthostatic to start midodrine      #Anxiety  psych cs appreciated  continue valium  uptitrating seroquel and luvox  continue remeron    #Merkel cell ca  onc c/s  r/o paraneoplastic causes- unlikely      #DVT ppx-  hep sq      [facility]   [phone]

## 2023-11-13 RX ORDER — FLUDROCORTISONE ACETATE 0.1 MG/1
0.1 TABLET ORAL DAILY
Refills: 0 | Status: DISCONTINUED | OUTPATIENT
Start: 2023-11-13 | End: 2023-11-29

## 2023-11-13 RX ADMIN — Medication 5 MILLIGRAM(S): at 18:30

## 2023-11-13 RX ADMIN — HEPARIN SODIUM 5000 UNIT(S): 5000 INJECTION INTRAVENOUS; SUBCUTANEOUS at 18:30

## 2023-11-13 RX ADMIN — FLUVOXAMINE MALEATE 150 MILLIGRAM(S): 25 TABLET ORAL at 05:10

## 2023-11-13 RX ADMIN — FLUVOXAMINE MALEATE 150 MILLIGRAM(S): 25 TABLET ORAL at 18:30

## 2023-11-13 RX ADMIN — SODIUM CHLORIDE 75 MILLILITER(S): 9 INJECTION, SOLUTION INTRAVENOUS at 08:54

## 2023-11-13 RX ADMIN — SENNA PLUS 2 TABLET(S): 8.6 TABLET ORAL at 22:02

## 2023-11-13 RX ADMIN — MIRTAZAPINE 30 MILLIGRAM(S): 45 TABLET, ORALLY DISINTEGRATING ORAL at 22:02

## 2023-11-13 RX ADMIN — Medication 3 MILLIGRAM(S): at 22:03

## 2023-11-13 RX ADMIN — QUETIAPINE FUMARATE 150 MILLIGRAM(S): 200 TABLET, FILM COATED ORAL at 22:02

## 2023-11-13 RX ADMIN — FLUDROCORTISONE ACETATE 0.1 MILLIGRAM(S): 0.1 TABLET ORAL at 12:58

## 2023-11-13 RX ADMIN — ATENOLOL 50 MILLIGRAM(S): 25 TABLET ORAL at 18:30

## 2023-11-13 RX ADMIN — ATENOLOL 50 MILLIGRAM(S): 25 TABLET ORAL at 05:11

## 2023-11-13 RX ADMIN — Medication 2000 UNIT(S): at 11:19

## 2023-11-13 RX ADMIN — Medication 5 MILLIGRAM(S): at 05:11

## 2023-11-13 RX ADMIN — HEPARIN SODIUM 5000 UNIT(S): 5000 INJECTION INTRAVENOUS; SUBCUTANEOUS at 05:10

## 2023-11-13 NOTE — PROGRESS NOTE ADULT - ASSESSMENT
73 M pmh of HTN, ITP, APL syndrome, generalized anxiety disorder, OCD, newly dx aggressive Merkel Cell cancer of forehead- pt's oncologist  Dr. Eng, at [facility] on Keytruda (PD-1 blocker) infusions with good response per wife  was brought to ED for worsening psychosis for the past few days, pt believes there are sharp things inside his body trying to come out of his mouth, stuck in throat, pt lately with difficulty swallowing, decreased po intake, pt was evaluated by psych in ED. Wife reports sudden cognitive decline and behavioral changes immediately after COVID infection.    RECOMMENDATIONS  1-?PASC  -sounds like there is no obvious acute infectious issue. no fever, no leukocytosis, no obvious localization but concerning history of things developing post COVID-19 infection so raises concerns for a neurological PASC picture  noted serology for EBV consistent with reactivation as described post PASC  Serotonin, Blood: <30ng/mL   am cortisol-low x 2 then nl  ---  -melatonin 3mg PO QHS  -prednisone 5mg PO Qam  -fluvoxamine 150mg PO BID but discussed w Dr Nava change to Wellbutrin  -in outpt setting consideration of bifidobacterium probiotics  -pt not taking PO so not getting marinol    Thank you for consulting us and involving us in the management of this most interesting and challenging case.  We will follow along in the care of this patient. Please call us at [phone] or text me directly on my cell# at [phone] with any concerns.

## 2023-11-13 NOTE — PROGRESS NOTE ADULT - SUBJECTIVE AND OBJECTIVE BOX
Patient is a 73y old  Male who presents with a chief complaint of PASC (13 Nov 2023 14:00)        INTERVAL HPI/OVERNIGHT EVENTS:   no complaints  pt seen and examined         Vital Signs Last 24 Hrs  T(C): 36.3 (13 Nov 2023 12:18), Max: 36.9 (12 Nov 2023 20:28)  T(F): 97.3 (13 Nov 2023 12:18), Max: 98.5 (12 Nov 2023 20:28)  HR: 72 (13 Nov 2023 12:18) (70 - 72)  BP: 167/78 (13 Nov 2023 12:18) (158/87 - 178/74)  BP(mean): --  RR: 18 (13 Nov 2023 12:18) (17 - 18)  SpO2: 93% (13 Nov 2023 12:18) (92% - 94%)    Parameters below as of 13 Nov 2023 12:18  Patient On (Oxygen Delivery Method): room air        acetaminophen     Tablet .. 650 milliGRAM(s) Oral every 6 hours PRN  atenolol  Tablet 50 milliGRAM(s) Oral two times a day  benzocaine 20% Spray 1 Spray(s) Topical four times a day  cholecalciferol 2000 Unit(s) Oral daily  diazepam    Tablet 5 milliGRAM(s) Oral two times a day  fludroCORTISONE 0.1 milliGRAM(s) Oral daily  fluvoxaMINE 150 milliGRAM(s) Oral two times a day  heparin   Injectable 5000 Unit(s) SubCutaneous every 12 hours  lactulose Syrup 15 Gram(s) Oral two times a day PRN  melatonin 3 milliGRAM(s) Oral at bedtime  mirtazapine 30 milliGRAM(s) Oral at bedtime  predniSONE   Tablet 5 milliGRAM(s) Oral daily  QUEtiapine 150 milliGRAM(s) Oral at bedtime  senna 2 Tablet(s) Oral at bedtime  sodium chloride 0.45%. 1000 milliLiter(s) IV Continuous <Continuous>      PHYSICAL EXAM:  GENERAL: NAD   EYES: conjunctiva and sclera clear  ENMT: Moist mucous membranes  NECK: Supple, No JVD, Normal thyroid  CHEST/LUNG: non labored, cta b/l  HEART: Regular rate and rhythm; No murmurs, rubs, or gallops  ABDOMEN: Soft, Nontender, Nondistended; Bowel sounds present  EXTREMITIES:  2+ Peripheral Pulses, No clubbing, no cyanosis, no edema  LYMPH: No lymphadenopathy noted  SKIN: No rashes or lesions  NEURO: no focal deficits    Consultant(s) Notes Reviewed:  [x ] YES  [ ] NO  Care Discussed with Consultants/Other Providers [ x] YES  [ ] NO    LABS:              CAPILLARY BLOOD GLUCOSE                  RADIOLOGY & ADDITIONAL TESTS:    Imaging Personally Reviewed  Reviewed consultants input

## 2023-11-13 NOTE — PROGRESS NOTE ADULT - SUBJECTIVE AND OBJECTIVE BOX
[INTERVAL HX: ]  Patient seen and examined;  Chart reviewed and events noted;     c/o same sx.   remains depressed.     [MEDICATIONS]  MEDICATIONS  (STANDING):  atenolol  Tablet 50 milliGRAM(s) Oral two times a day  benzocaine 20% Spray 1 Spray(s) Topical four times a day  cholecalciferol 2000 Unit(s) Oral daily  diazepam    Tablet 5 milliGRAM(s) Oral two times a day  fludroCORTISONE 0.1 milliGRAM(s) Oral daily  fluvoxaMINE 150 milliGRAM(s) Oral two times a day  heparin   Injectable 5000 Unit(s) SubCutaneous every 12 hours  melatonin 3 milliGRAM(s) Oral at bedtime  mirtazapine 30 milliGRAM(s) Oral at bedtime  predniSONE   Tablet 5 milliGRAM(s) Oral daily  QUEtiapine 150 milliGRAM(s) Oral at bedtime  senna 2 Tablet(s) Oral at bedtime  sodium chloride 0.45%. 1000 milliLiter(s) (75 mL/Hr) IV Continuous <Continuous>    MEDICATIONS  (PRN):  acetaminophen     Tablet .. 650 milliGRAM(s) Oral every 6 hours PRN Temp greater or equal to 38.5C (101.3F), Moderate Pain (4 - 6)  lactulose Syrup 15 Gram(s) Oral two times a day PRN constipation      [VITALS]  Vital Signs Last 24 Hrs  T(C): 36.7 (13 Nov 2023 20:28), Max: 36.7 (13 Nov 2023 04:27)  T(F): 98 (13 Nov 2023 20:28), Max: 98.1 (13 Nov 2023 04:27)  HR: 66 (13 Nov 2023 20:28) (66 - 72)  BP: 188/83 (13 Nov 2023 20:28) (157/68 - 188/83)  BP(mean): --  RR: 18 (13 Nov 2023 20:28) (18 - 18)  SpO2: 93% (13 Nov 2023 20:28) (93% - 94%)    Parameters below as of 13 Nov 2023 20:28  Patient On (Oxygen Delivery Method): room air      [WT/HT]  Daily     Daily   [VENT]      [PHYSICAL EXAM]  GEN: NAD, cchetic, bitemporal wasting  HEENT: normocephalic and atraumatic. EOMI. PERRL.    NECK: Supple.  No lymphadenopathy   LUNGS: Clear to auscultation.  HEART: Regular rate and rhythm,  no MRG  ABDOMEN: Soft, nontender, and nondistended.  Positive bowel sounds.    : No CVA tenderness  EXTREMITIES: Without edema.  NEUROLOGIC: grossly intact.  PSYCHIATRIC: Appropriate affect .  SKIN: No rash     [LABS:]                Vitamin B12, Serum: 1109 pg/mL [232 - 1245] (10-29-23 @ 18:30)    Folate, Serum: >20.0 ng/mL (10-29-23 @ 18:30)    Sedimentation Rate, Erythrocyte: 57 mm/hr *H* [0 - 20] (10-27-23 @ 18:30)                  [RADIOLOGY STUDIES:]

## 2023-11-13 NOTE — PROGRESS NOTE ADULT - ASSESSMENT
IMPRESSION    pt w ITP, Lupus Anticoagulant, HTN, anxiety on Seraquel, OCD, Merkel cell ca of right Forehead, under care Dr Elliot Eng, post 4 doses q3wks IV Keytruda, last dose 10/17/23.  Pt brought in w c/o feeling "claws, daggers" inside him, since C#3 Keytruda, with sensation initially started w various parts of face now also progressed down to sides of body to rectum(does not see actual daggers or claws,)    Wife reports after first cycle started w jaw tightness and progressive worsened w each cycle, to point of hard to swallow/can't swallow  Had tired taken off Seroquel to see if adverse effect but no improvement, now back on x 5-6days  Merkel lesion has essentially resolved    CT head no acute findings  see by Psych in ER, ?psychosis  -sensation described by pt ?psychosis vs neuropathy  -psychosis not noted to be associated w Keytruda. Neuro tox of Keytruda described includes cerebral hemorrhage, confusion, myasthenia gravis, reversible posterior leukoencephalopathy syndrome, syed neuropathy, Guillain [place], aseptic meningitis, encephalitis, transverse myelitis    TFTS unremarkable.   AM cortisol 5.8. Slight low. Unclear significance   Repeat AM Cortisol low x2. <6 on two tests  3rd test w cortisol 11 on 11/06/23  To start Hydrocortisone 11/06/23    Negative plasma PBG   Bilirubin neg  Urobilinogen normal    Sjorgen /Scleroderma studies abn.      +MICHELLE and +Ro52    no significant change.    seen by psych with meds adjusted and perhaps was starting to show some improvement  is now on prednisone 5 mg po daily with some improvement in mental status according to wife    RECOMMEND  continue supportive measures      PSYCH  cause of sx remains unclear.   s/p psych evaluation  Seroquel dose being adjusted 10mg QHS.   On Fluvoxamine 150mg BID  On Remeron.   symptomatically improved today    NEURO  s/p neurology eval.   Was offered LP if sx not better  However pt refused LP at inital workup, and appears less likely sx due to meningitis or encephaliti. .  Diagnostic workup was prior discussed at length with pt and wife.     s/p additional neuro   Discussed with Dr. Magdaleno.  Sx seem less likely secondary to Keytruda appears more psychiatric sx  await course, mgmt with psychiatry  Tylenol for pain  Low cortisol to be treated with hydrocortisone and now prednisone  Endocrinology to eval    pain  PRN Tylenol#3. one tabs if mild sx, consider escalation to two tabs if "dagger like sx / pain worse.   Rcvd dose Dilaudid 0.5mg    ITP  platelet count 96 K/uL==>106;    will observed.     Endocrine  Prior cortisol levels low borderline.   Repeat today normal.   Was to started hydrocortisone 11/06/32 and now prednisone 5 mg po daily    Sx appears no change since last week.

## 2023-11-13 NOTE — PROGRESS NOTE ADULT - SUBJECTIVE AND OBJECTIVE BOX
[facility] DIVISION of INFECTIOUS DISEASE  Eric Reyes MD PhD, Milena Underwood MD, Jillian Mckoy MD, Enid Everett MD, Jay Krishna MD  and providing coverage with Arthur Pepper MD  Providing Infectious Disease Consultations at [facility], [facility], [facility]'s    Office# [phone] to schedule follow up appointments  Answering Service for urgent calls or New Consults [phone]  Cell# to text for urgent issues Eric Reyes [phone]     infectious diseases progress note:    JANICE RENDON is a 73y y. o. Male patient    Overnight and events of the last 24hrs reviewed    Allergies    No Known Allergies    Intolerances        ANTIBIOTICS/RELEVANT:  antimicrobials    immunologic:    OTHER:  acetaminophen     Tablet .. 650 milliGRAM(s) Oral every 6 hours PRN  atenolol  Tablet 50 milliGRAM(s) Oral two times a day  benzocaine 20% Spray 1 Spray(s) Topical four times a day  cholecalciferol 2000 Unit(s) Oral daily  diazepam    Tablet 5 milliGRAM(s) Oral two times a day  fludroCORTISONE 0.1 milliGRAM(s) Oral daily  fluvoxaMINE 150 milliGRAM(s) Oral two times a day  heparin   Injectable 5000 Unit(s) SubCutaneous every 12 hours  lactulose Syrup 15 Gram(s) Oral two times a day PRN  melatonin 3 milliGRAM(s) Oral at bedtime  mirtazapine 30 milliGRAM(s) Oral at bedtime  predniSONE   Tablet 5 milliGRAM(s) Oral daily  QUEtiapine 150 milliGRAM(s) Oral at bedtime  senna 2 Tablet(s) Oral at bedtime  sodium chloride 0.45%. 1000 milliLiter(s) IV Continuous <Continuous>      Objective:  Vital Signs Last 24 Hrs  T(C): 36.3 (13 Nov 2023 12:18), Max: 36.9 (12 Nov 2023 20:28)  T(F): 97.3 (13 Nov 2023 12:18), Max: 98.5 (12 Nov 2023 20:28)  HR: 72 (13 Nov 2023 12:18) (70 - 72)  BP: 167/78 (13 Nov 2023 12:18) (158/87 - 178/74)  BP(mean): --  RR: 18 (13 Nov 2023 12:18) (17 - 18)  SpO2: 93% (13 Nov 2023 12:18) (92% - 94%)    Parameters below as of 13 Nov 2023 12:18  Patient On (Oxygen Delivery Method): room air        T(C): 36.3 (11-13-23 @ 12:18), Max: 37 (11-12-23 @ 06:33)  T(C): 36.3 (11-13-23 @ 12:18), Max: 37 (11-12-23 @ 06:33)  T(C): 36.3 (11-13-23 @ 12:18), Max: 37 (11-12-23 @ 06:33)    PHYSICAL EXAM:  HEENT: NC atraumatic  Neck: supple  Respiratory: no accessory muscle use, breathing comfortably  Cardiovascular: distant  Gastrointestinal: normal appearing, nondistended  Extremities: no clubbing, no cyanosis,        LABS:        WBC  5.04 11-10 @ 09:52  4.10 11-07 @ 06:33              Creatinine: 0.92 mg/dL (11-10-23 @ 09:52)  Creatinine: 0.97 mg/dL (11-07-23 @ 06:33)                INFLAMMATORY MARKERS      MICROBIOLOGY:              RADIOLOGY & ADDITIONAL STUDIES:

## 2023-11-13 NOTE — PROGRESS NOTE ADULT - ASSESSMENT
73 M pmh of HTN, ITP, APL syndrome, generalized anxiety disorder, OCD, newly dx aggressive Merkel Cell cancer of forehead- pt's oncologist  Dr. Eng, at [facility] on Keytruda infusions with good response per wife  was brought to ED for worsening psychosis for the past few days, pt lately with difficulty swallowing, decreased po intake    Psychosis  monitor  psych fu  MRI noted  neuro following  continue seroquel, luvox and remeron    #Abnormal throat sensation- sharp object sensation  swallow eval appreciated    #HTN- +orthostasis  #Orthostatic hypotension- hold losartan,  IVF   cont atenolol w parameters  ? psych med induced  adrenal insuffiency  continue prednisone  will start florinef  endo following  possible hypophysitis      #Anxiety  psych cs appreciated  continue valium  uptitrating seroquel and luvox  continue remeron    #Merkel cell ca  onc c/s  r/o paraneoplastic causes- unlikely      #DVT ppx-  hep sq      [facility]   [phone]

## 2023-11-13 NOTE — PROGRESS NOTE ADULT - SUBJECTIVE AND OBJECTIVE BOX
Chief Complaint: Psychosis    Interval Events: No events overnight.    Review of Systems:  General: No fevers, chills, weight gain  Skin: No rashes, color changes  Cardiovascular: No chest pain, orthopnea  Respiratory: No shortness of breath, cough  Gastrointestinal: No nausea, abdominal pain  Genitourinary: No incontinence, pain with urination  Musculoskeletal: No pain, swelling, decreased range of motion  Neurological: No headache, weakness  Psychiatric: No depression, anxiety  Endocrine: No weight gain, increased thirst  All other systems are comprehensively negative.    Physical Exam:  Vital Signs Last 24 Hrs  T(C): 36.7 (13 Nov 2023 04:27), Max: 36.9 (12 Nov 2023 20:28)  T(F): 98.1 (13 Nov 2023 04:27), Max: 98.5 (12 Nov 2023 20:28)  HR: 72 (13 Nov 2023 04:27) (70 - 74)  BP: 158/87 (13 Nov 2023 04:27) (146/68 - 178/74)  BP(mean): --  RR: 18 (13 Nov 2023 04:27) (17 - 18)  SpO2: 93% (13 Nov 2023 04:27) (92% - 93%)  Parameters below as of 13 Nov 2023 04:27  Patient On (Oxygen Delivery Method): room air  General: NAD  HEENT: MMM  Neck: No JVD, no carotid bruit  Lungs: CTAB  CV: RRR, nl S1/S2, no M/R/G  Abdomen: S/NT/ND, +BS  Extremities: No LE edema, no cyanosis  Neuro: AAOx3, non-focal  Skin: No rash    Labs:

## 2023-11-14 DIAGNOSIS — F41.1 GENERALIZED ANXIETY DISORDER: ICD-10-CM

## 2023-11-14 DIAGNOSIS — F45.21 HYPOCHONDRIASIS: ICD-10-CM

## 2023-11-14 PROCEDURE — 99233 SBSQ HOSP IP/OBS HIGH 50: CPT | Mod: 95

## 2023-11-14 RX ORDER — HALOPERIDOL DECANOATE 100 MG/ML
0.5 INJECTION INTRAMUSCULAR AT BEDTIME
Refills: 0 | Status: DISCONTINUED | OUTPATIENT
Start: 2023-11-14 | End: 2023-11-17

## 2023-11-14 RX ORDER — THIAMINE MONONITRATE (VIT B1) 100 MG
500 TABLET ORAL EVERY 8 HOURS
Refills: 0 | Status: COMPLETED | OUTPATIENT
Start: 2023-11-14 | End: 2023-11-21

## 2023-11-14 RX ORDER — QUETIAPINE FUMARATE 200 MG/1
50 TABLET, FILM COATED ORAL AT BEDTIME
Refills: 0 | Status: DISCONTINUED | OUTPATIENT
Start: 2023-11-14 | End: 2023-11-17

## 2023-11-14 RX ORDER — MIRTAZAPINE 45 MG/1
15 TABLET, ORALLY DISINTEGRATING ORAL AT BEDTIME
Refills: 0 | Status: DISCONTINUED | OUTPATIENT
Start: 2023-11-14 | End: 2023-12-19

## 2023-11-14 RX ORDER — HALOPERIDOL DECANOATE 100 MG/ML
0.5 INJECTION INTRAMUSCULAR AT BEDTIME
Refills: 0 | Status: DISCONTINUED | OUTPATIENT
Start: 2023-11-14 | End: 2023-11-15

## 2023-11-14 RX ADMIN — Medication 105 MILLIGRAM(S): at 21:29

## 2023-11-14 RX ADMIN — HEPARIN SODIUM 5000 UNIT(S): 5000 INJECTION INTRAVENOUS; SUBCUTANEOUS at 18:01

## 2023-11-14 RX ADMIN — HALOPERIDOL DECANOATE 0.5 MILLIGRAM(S): 100 INJECTION INTRAMUSCULAR at 21:28

## 2023-11-14 RX ADMIN — ATENOLOL 50 MILLIGRAM(S): 25 TABLET ORAL at 18:02

## 2023-11-14 RX ADMIN — Medication 5 MILLIGRAM(S): at 06:23

## 2023-11-14 RX ADMIN — Medication 2000 UNIT(S): at 12:18

## 2023-11-14 RX ADMIN — Medication 5 MILLIGRAM(S): at 18:01

## 2023-11-14 RX ADMIN — QUETIAPINE FUMARATE 50 MILLIGRAM(S): 200 TABLET, FILM COATED ORAL at 21:29

## 2023-11-14 RX ADMIN — FLUDROCORTISONE ACETATE 0.1 MILLIGRAM(S): 0.1 TABLET ORAL at 12:29

## 2023-11-14 RX ADMIN — Medication 3 MILLIGRAM(S): at 21:29

## 2023-11-14 RX ADMIN — ATENOLOL 50 MILLIGRAM(S): 25 TABLET ORAL at 06:23

## 2023-11-14 RX ADMIN — MIRTAZAPINE 15 MILLIGRAM(S): 45 TABLET, ORALLY DISINTEGRATING ORAL at 21:28

## 2023-11-14 NOTE — PROGRESS NOTE ADULT - SUBJECTIVE AND OBJECTIVE BOX
Patient is a 73y old  Male who presents with a chief complaint of pain (13 Nov 2023 22:24)        INTERVAL HPI/OVERNIGHT EVENTS:   no complaints  pt seen and examined         Vital Signs Last 24 Hrs  T(C): 36.3 (14 Nov 2023 11:27), Max: 36.8 (14 Nov 2023 04:36)  T(F): 97.4 (14 Nov 2023 11:27), Max: 98.2 (14 Nov 2023 04:36)  HR: 77 (14 Nov 2023 11:27) (66 - 77)  BP: 114/64 (14 Nov 2023 11:27) (114/64 - 188/83)  BP(mean): --  RR: 18 (14 Nov 2023 11:27) (18 - 18)  SpO2: 94% (14 Nov 2023 11:27) (93% - 94%)    Parameters below as of 14 Nov 2023 11:27  Patient On (Oxygen Delivery Method): room air        acetaminophen     Tablet .. 650 milliGRAM(s) Oral every 6 hours PRN  atenolol  Tablet 50 milliGRAM(s) Oral two times a day  benzocaine 20% Spray 1 Spray(s) Topical four times a day  cholecalciferol 2000 Unit(s) Oral daily  diazepam    Tablet 5 milliGRAM(s) Oral two times a day  fludroCORTISONE 0.1 milliGRAM(s) Oral daily  fluvoxaMINE 150 milliGRAM(s) Oral two times a day  heparin   Injectable 5000 Unit(s) SubCutaneous every 12 hours  lactulose Syrup 15 Gram(s) Oral two times a day PRN  melatonin 3 milliGRAM(s) Oral at bedtime  mirtazapine 30 milliGRAM(s) Oral at bedtime  predniSONE   Tablet 5 milliGRAM(s) Oral daily  QUEtiapine 150 milliGRAM(s) Oral at bedtime  senna 2 Tablet(s) Oral at bedtime  sodium chloride 0.45%. 1000 milliLiter(s) IV Continuous <Continuous>      PHYSICAL EXAM:  GENERAL: NAD   EYES: conjunctiva and sclera clear  ENMT: Moist mucous membranes  NECK: Supple, No JVD, Normal thyroid  CHEST/LUNG: non labored, cta b/l  HEART: Regular rate and rhythm; No murmurs, rubs, or gallops  ABDOMEN: Soft, Nontender, Nondistended; Bowel sounds present  EXTREMITIES:  2+ Peripheral Pulses, No clubbing, no cyanosis, no edema  LYMPH: No lymphadenopathy noted  SKIN: No rashes or lesions  NEURO: no focal deficits    Consultant(s) Notes Reviewed:  [x ] YES  [ ] NO  Care Discussed with Consultants/Other Providers [ x] YES  [ ] NO    LABS:              CAPILLARY BLOOD GLUCOSE                  RADIOLOGY & ADDITIONAL TESTS:    Imaging Personally Reviewed  Reviewed consultants input

## 2023-11-14 NOTE — CHART NOTE - NSCHARTNOTEFT_GEN_A_CORE
Assessment: patient seen for malnutrition follow up: Pt is a "73 M pmh of HTN, ITP, APL syndrome, generalized anxiety disorder, OCD, newly dx aggressive Merkel Cell cancer of forehead- pt's oncologist  Dr. Eng, at [facility] on Keytruda infusions with good response per wife  was brought to ED for worsening psychosis for the past few days, pt lately with difficulty swallowing, decreased po intake."  patient seen with wife in room. patient apparently took ensure this AM and has taken scrambled eggs recently. prefers yellow/orange vegetables  no beef no chicken per family  11/13 fecal incontinence       Factors impacting intake: [ ] none [ ] nausea  [ ] vomiting [ ] diarrhea [ ] constipation  [ ]chewing problems [x ] swallowing issues  [x ] other: psychosis abnormal throat sensation      Diet Prescription: Diet, Pureed:   Mildly Thick Liquids (MILDTHICKLIQS)  Supplement Feeding Modality:  Oral  Ensure Enlive Cans or Servings Per Day:  1       Frequency:  Three Times a day (10-28-23 @ 09:41)    Intake: 0-25% of meals taken     Current Weight: wts fluctuate this admit 11/12 156.9# 11/8 148.8#      Pertinent Medications: MEDICATIONS  (STANDING):  atenolol  Tablet 50 milliGRAM(s) Oral two times a day  benzocaine 20% Spray 1 Spray(s) Topical four times a day  cholecalciferol 2000 Unit(s) Oral daily  diazepam    Tablet 5 milliGRAM(s) Oral two times a day  fludroCORTISONE 0.1 milliGRAM(s) Oral daily  fluvoxaMINE 150 milliGRAM(s) Oral two times a day  heparin   Injectable 5000 Unit(s) SubCutaneous every 12 hours  melatonin 3 milliGRAM(s) Oral at bedtime  mirtazapine 30 milliGRAM(s) Oral at bedtime  predniSONE   Tablet 5 milliGRAM(s) Oral daily  QUEtiapine 150 milliGRAM(s) Oral at bedtime  senna 2 Tablet(s) Oral at bedtime  sodium chloride 0.45%. 1000 milliLiter(s) (75 mL/Hr) IV Continuous <Continuous>    MEDICATIONS  (PRN):  acetaminophen     Tablet .. 650 milliGRAM(s) Oral every 6 hours PRN Temp greater or equal to 38.5C (101.3F), Moderate Pain (4 - 6)  lactulose Syrup 15 Gram(s) Oral two times a day PRN constipation    Pertinent Labs: Na 146 on IV fluids   Skin: no pressure injury     Estimated Needs:   [x ] no change since previous assessment based in # [id number] and  gms protein  [ ] recalculated:     Previous Nutrition Diagnosis:   [ ] Inadequate Energy Intake [ ]Inadequate Oral Intake [ ] Excessive Energy Intake   [ ] Underweight [ ] Increased Nutrient Needs [ ] Overweight/Obesity   [ ] Altered GI Function [ ] Unintended Weight Loss [ ] Food & Nutrition Related Knowledge Deficit [x ] Malnutrition   (X) swallow difficulty   Nutrition Diagnosis is [x ] ongoing  [ ] resolved [ ] not applicable     New Nutrition Diagnosis: [x ] not applicable       Interventions:   Recommend  [ ] Change Diet To:  [ ] Nutrition Supplement  [ ] Nutrition Support  [x ] Other: continue diet as ordered with supplement, provide food preferences per family     Monitoring and Evaluation:   [ x] PO intake [ x ] Tolerance to diet prescription [ x ] weights [ x ] labs[ x ] follow up per protocol  [ ] other:

## 2023-11-14 NOTE — PROGRESS NOTE ADULT - SUBJECTIVE AND OBJECTIVE BOX
Chief Complaint: Psychosis    Interval Events: No events overnight.    Review of Systems:  General: No fevers, chills, weight gain  Skin: No rashes, color changes  Cardiovascular: No chest pain, orthopnea  Respiratory: No shortness of breath, cough  Gastrointestinal: No nausea, abdominal pain  Genitourinary: No incontinence, pain with urination  Musculoskeletal: No pain, swelling, decreased range of motion  Neurological: No headache, weakness  Psychiatric: No depression, anxiety  Endocrine: No weight gain, increased thirst  All other systems are comprehensively negative.    Physical Exam:  Vital Signs Last 24 Hrs  T(C): 36.8 (14 Nov 2023 04:36), Max: 36.8 (14 Nov 2023 04:36)  T(F): 98.2 (14 Nov 2023 04:36), Max: 98.2 (14 Nov 2023 04:36)  HR: 69 (14 Nov 2023 04:36) (66 - 72)  BP: 176/82 (14 Nov 2023 04:36) (157/68 - 188/83)  BP(mean): --  RR: 18 (14 Nov 2023 04:36) (18 - 18)  SpO2: 93% (14 Nov 2023 04:36) (93% - 94%)  Parameters below as of 14 Nov 2023 04:36  Patient On (Oxygen Delivery Method): room air  General: NAD  HEENT: MMM  Neck: No JVD, no carotid bruit  Lungs: CTAB  CV: RRR, nl S1/S2, no M/R/G  Abdomen: S/NT/ND, +BS  Extremities: No LE edema, no cyanosis  Neuro: AAOx3, non-focal  Skin: No rash    Labs:

## 2023-11-14 NOTE — BH CONSULTATION LIAISON PROGRESS NOTE - NSBHASSESSMENTFT_PSY_ALL_CORE
Pt is a 74 y/o m with HTN, h/o severe anxiety around medical illness and constantly on the look for illnesses brought in by wife after he started to become more and more psychotic with each round of Keytruda for his Merkel Cell Ca. Pt is delusional thinking he has metals, objects in his throat till his anus and penis, unable to tolerate PO. There are case reports of rare psychosis with monoclonal antibodies. Keytruda seems like the possible suspect of psychosis, combined with wife's reports of timeline, although can't be sure, time will show. Steroids are probably not helping psychiatrically either.     RECOMMENDATIONS:  1. Thiamine 500 mg IV q8h for 2 days.  2. Continue Luvox 150 mg po bid and diazepam 5 mg po bid for anxiety. He has been on them > at least 12 yrs.  3. Decrease Seroquel from 150 mg to 50 mg po qhs - tapering dose to avoid two antipsychotics use at the same time. For Seroquel to have antipsychotic properties, the dose should be increased above 600-800 mg which writer is not willing to do.   4. Start Haldol 0.5 mg po qhs for psychosis. Black box warning and sudden death risk discussed with wife. Please crush and give it in apple sauce for easy swallowing. If pt unable to take PO, pls administer it IV. Make sure to hold it if QTc>500. If tolerates well, please increase the dose to address psychosis.   5. No benefit from mirtazapine so far. Decrease Mirtazapine from 30 mg to 15 mg with the intention to taper off or observe for benefit for sleep and appetite at lower doses.

## 2023-11-14 NOTE — PROGRESS NOTE ADULT - ASSESSMENT
73 M pmh of HTN, ITP, APL syndrome, generalized anxiety disorder, OCD, newly dx aggressive Merkel Cell cancer of forehead- pt's oncologist  Dr. Eng, at [facility] on Keytruda (PD-1 blocker) infusions with good response per wife  was brought to ED for worsening psychosis for the past few days, pt believes there are sharp things inside his body trying to come out of his mouth, stuck in throat, pt lately with difficulty swallowing, decreased po intake, pt was evaluated by psych in ED. Wife reports sudden cognitive decline and behavioral changes immediately after COVID infection.    RECOMMENDATIONS  1-?PASC  -sounds like there is no obvious acute infectious issue. no fever, no leukocytosis, no obvious localization but concerning history of things developing post COVID-19 infection so raises concerns for a neurological PASC picture  noted serology for EBV consistent with reactivation as described post PASC  Serotonin, Blood: <30ng/mL   am cortisol-low x 2 then nl  ---  -melatonin 3mg PO QHS  -prednisone 5mg PO Qam  -fluvoxamine 150mg PO BID but discussed w Dr Nava input by telepsych for possible change  -in outpt setting consideration of bifidobacterium probiotics  -pt not taking PO so not getting marinol    Thank you for consulting us and involving us in the management of this most interesting and challenging case.  We will follow along in the care of this patient. Please call us at [phone] or text me directly on my cell# at [phone] with any concerns.

## 2023-11-14 NOTE — PROGRESS NOTE ADULT - ASSESSMENT
74 yo man w ITP, Lupus Anticoagulant, HTN, anxiety on Seroquel OCD, Merkel cell ca of right Forehead, under care Dr Elliot Eng, post 4 doses q3wks IV Keytruda, last dose 10/17/23 w good response  Pt brought in w c/o feeling "claws, daggers" inside him, since C#3 Keytruda, with sensation initially started w various parts of face now also progressed down to sides of body to rectum(does not see actual daggers or claws,)  Wife reports after first cycle started w jaw tightness and progressive worsened w each cycle, to point of hard to swallow/can't swallow  Had tired taken off Seroquel to see if adverse effect but no improvement, now back on x 5-6days  CT and MRI head no acute findings  -sensation described by pt ?psychosis vs neuropathy  -psychosis not noted to be associated w Keytruda. Neuro tox of Keytruda described includes cerebral hemorrhage, confusion, myasthenia gravis, reversible posterior leukoencephalopathy syndrome, syed neuropathy, Guillain [place], aseptic meningitis, encephalitis, transverse myelitis  -seen by Psych and Neuro  -psych meds being Seroquel and Remeron)  -pt refused LP  -AM cortisol <6 x2 and 11 on 11/6, post Hydrocortisone on 11/6, changed to prednisone 5mg for better tolerance, Endocrine following  -clinically +/- improvement    -supportive care from Heme/Onc standpoint  -continue acute care

## 2023-11-14 NOTE — PROGRESS NOTE ADULT - SUBJECTIVE AND OBJECTIVE BOX
Neurology Follow up note    JANICE [id number]    HPI:  hx obtained from wife  73 M pmh of HTN, ITP, APL syndrome, generalized anxiety disorder, OCD, newly dx aggressive Merkel Cell cancer of forehead- pt's oncologist  Dr. Eng, at [facility] on Keytruda infusions with good response per wife  was brought o ED for worsening psychosis for the past few days, pt believes there are sharp things inside his body trying to come out of his mouth, stuck in throat  pt lately with difficulty swallowing, decreased po intake  pt was evaluated by psych in ED (24 Oct 2023 13:19)          Interval History - restless    Patient is seen, chart was reviewed and case was discussed with the treatment team.  Pt is not in any distress.   Lying on bed comfortably.   .    Vital Signs Last 24 Hrs  T(C): 36.3 (14 Nov 2023 11:27), Max: 36.8 (14 Nov 2023 04:36)  T(F): 97.4 (14 Nov 2023 11:27), Max: 98.2 (14 Nov 2023 04:36)  HR: 77 (14 Nov 2023 11:27) (66 - 77)  BP: 114/64 (14 Nov 2023 11:27) (114/64 - 188/83)  BP(mean): --  RR: 18 (14 Nov 2023 11:27) (18 - 18)  SpO2: 94% (14 Nov 2023 11:27) (93% - 94%)    Parameters below as of 14 Nov 2023 11:27  Patient On (Oxygen Delivery Method): room air                        REVIEW OF SYSTEMS:    Constitutional: No fever,  Eyes: No eye pain, visual disturbances, or discharge  ENT:  No difficulty hearing, tinnitus, vertigo; No sinus or throat pain  Neck: No pain or stiffness  Respiratory: No cough, wheezing, chills or hemoptysis  Cardiovascular: No chest pain, palpitations, shortness of breath, dizziness or leg swelling  Gastrointestinal: No abdominal or epigastric pain. No nausea, vomiting or hematemesis;   Genitourinary: No dysuria, frequency,   Neurological: No headaches, memory loss, loss of strength,  Psychiatric: No depression, anxiety, mood swings or difficulty sleeping  Musculoskeletal: No joint pain or swelling;   Skin: No itching, burning, rashes or lesions   Lymph Nodes: No enlarged glands  Endocrine: No heat or cold intolerance; No hair loss,  Allergy and Immunologic: No hives or eczema    On Neurological Examination:    Mental Status - Pt is alert, awake, oriented X3   Follows commands well and able to answer questions appropriately.Mood and affect  normal    Speech -  Pt has dysarthria.    Cranial Nerves - Pupils 3 mm equal and reactive to light, extraocular eye movements intact. Pt has no visual field deficit.  Pt has no  facial asymmetry. Facial sensation is intact.Tongue - is in midline.    Muscle tone - is normal      Motor Exam - 4/5 all over, No drift. No shaking or tremors.    Sensory Exam - . Pt withdraws all extremities equally on stimulation. No asymmetry seen. No complaints of tingling, numbness.           coordination:    Finger to nose: normal      Deep tendon Reflexes - 2 plus all over.          Neck Supple -  Yes.      MEDICATIONS  (STANDING):  atenolol  Tablet 50 milliGRAM(s) Oral two times a day  benzocaine 20% Spray 1 Spray(s) Topical four times a day  cholecalciferol 2000 Unit(s) Oral daily  diazepam    Tablet 5 milliGRAM(s) Oral two times a day  fludroCORTISONE 0.1 milliGRAM(s) Oral daily  haloperidol     Tablet 0.5 milliGRAM(s) Oral at bedtime  heparin   Injectable 5000 Unit(s) SubCutaneous every 12 hours  melatonin 3 milliGRAM(s) Oral at bedtime  mirtazapine 15 milliGRAM(s) Oral at bedtime  predniSONE   Tablet 5 milliGRAM(s) Oral daily  QUEtiapine 50 milliGRAM(s) Oral at bedtime  senna 2 Tablet(s) Oral at bedtime  sodium chloride 0.45%. 1000 milliLiter(s) (75 mL/Hr) IV Continuous <Continuous>  thiamine IVPB 500 milliGRAM(s) IV Intermittent every 8 hours    MEDICATIONS  (PRN):  acetaminophen     Tablet .. 650 milliGRAM(s) Oral every 6 hours PRN Temp greater or equal to 38.5C (101.3F), Moderate Pain (4 - 6)  haloperidol    Injectable 0.5 milliGRAM(s) IntraMuscular at bedtime PRN if not taking po  lactulose Syrup 15 Gram(s) Oral two times a day PRN constipation              Allergies    No Known Allergies    Intolerances      Hemoglobin A1C:     Vitamin B12     RADIOLOGY    ASSESSMENT AND PLAN:      seen for ams  Depression with psychosis  doubt paraneoplastic   brain mri- no acute cva/mets       psych follow up  Physical therapy evaluation.  Pain is accessed and addressed.  Plan of care was discussed with family(wife) Questions answered.  neuro  follow up prn

## 2023-11-14 NOTE — PROGRESS NOTE ADULT - ASSESSMENT
73 M pmh of HTN, ITP, APL syndrome, generalized anxiety disorder, OCD, newly dx aggressive Merkel Cell cancer of forehead- pt's oncologist  Dr. Eng, at [facility] on Keytruda infusions with good response per wife  was brought to ED for worsening psychosis for the past few days, pt lately with difficulty swallowing, decreased po intake    Psychosis  monitor  psych fu  MRI noted  neuro following  continue seroquel, luvox and remeron    #Abnormal throat sensation- sharp object sensation  swallow eval appreciated    #HTN- +orthostasis  #Orthostatic hypotension- hold losartan,  IVF   cont atenolol w parameters  ? psych med induced  adrenal insuffiency  continue prednisone  will start florinef  endo following  possible hypophysitis      #Anxiety  psych cs appreciated  continue valium  on remeron, seroquel and luvox  telepsych to evaluate    #Merkel cell ca  onc c/s  r/o paraneoplastic causes- unlikely      #DVT ppx-  hep sq      [facility]   [phone]

## 2023-11-14 NOTE — BH CONSULTATION LIAISON PROGRESS NOTE - OTHER
able to do months forward, refuses to do backwards saying he is in pain but talks about other things - most likely attention not so great superficially cooperative Unkempt elderly male looking distraught with anxiety and worry, unable to participate in interview much. Pointing to his jaw and throat.

## 2023-11-14 NOTE — PROGRESS NOTE ADULT - SUBJECTIVE AND OBJECTIVE BOX
All interim records and events noted.    asleep at this time, appear comfortable      MEDICATIONS  (STANDING):  atenolol  Tablet 50 milliGRAM(s) Oral two times a day  benzocaine 20% Spray 1 Spray(s) Topical four times a day  cholecalciferol 2000 Unit(s) Oral daily  diazepam    Tablet 5 milliGRAM(s) Oral two times a day  fludroCORTISONE 0.1 milliGRAM(s) Oral daily  fluvoxaMINE 150 milliGRAM(s) Oral two times a day  heparin   Injectable 5000 Unit(s) SubCutaneous every 12 hours  melatonin 3 milliGRAM(s) Oral at bedtime  mirtazapine 30 milliGRAM(s) Oral at bedtime  predniSONE   Tablet 5 milliGRAM(s) Oral daily  QUEtiapine 150 milliGRAM(s) Oral at bedtime  senna 2 Tablet(s) Oral at bedtime  sodium chloride 0.45%. 1000 milliLiter(s) (75 mL/Hr) IV Continuous <Continuous>    MEDICATIONS  (PRN):  acetaminophen     Tablet .. 650 milliGRAM(s) Oral every 6 hours PRN Temp greater or equal to 38.5C (101.3F), Moderate Pain (4 - 6)  lactulose Syrup 15 Gram(s) Oral two times a day PRN constipation      Vital Signs Last 24 Hrs  T(C): 36.8 (14 Nov 2023 04:36), Max: 36.8 (14 Nov 2023 04:36)  T(F): 98.2 (14 Nov 2023 04:36), Max: 98.2 (14 Nov 2023 04:36)  HR: 69 (14 Nov 2023 04:36) (66 - 72)  BP: 176/82 (14 Nov 2023 04:36) (157/68 - 188/83)  BP(mean): --  RR: 18 (14 Nov 2023 04:36) (18 - 18)  SpO2: 93% (14 Nov 2023 04:36) (93% - 93%)    Parameters below as of 14 Nov 2023 04:36  Patient On (Oxygen Delivery Method): room air        PHYSICAL EXAM  General: thin frail appearing man in no acute distress  Head: atraumatic, normocephalic  ENT: nose midline  Neck: supple, trachea midline  CV: S1 S2, regular rate and rhythm  Lungs: clear to auscultation, no wheezes/rhonchi  Abdomen: soft, nontender, bowel sounds present  Skin: no significant increased ecchymosis/petechiae        LABS:              RADIOLOGY & ADDITIONAL STUDIES:    IMPRESSION/RECOMMENDATIONS:

## 2023-11-14 NOTE — PROGRESS NOTE ADULT - ASSESSMENT
The patient is a 73 year old male with a history of HTN, ITP, APLS, anxiety, OCD, Merkel cell cancer who presented with psychosis.    Plan:  - ECG with no evidence of ischemia or infarction  - Orthostatic hypotension may be multifactorial - dehydration, medication related (antipsychotics), and low cortisol levels  - Losartan on hold  - Continue atenolol 50 mg bid  - BP has since trended up - allow supine hypertension  - Awaiting repeat orthostatics

## 2023-11-15 PROCEDURE — 99232 SBSQ HOSP IP/OBS MODERATE 35: CPT | Mod: 95

## 2023-11-15 RX ORDER — PANTOPRAZOLE SODIUM 20 MG/1
40 TABLET, DELAYED RELEASE ORAL EVERY 12 HOURS
Refills: 0 | Status: DISCONTINUED | OUTPATIENT
Start: 2023-11-15 | End: 2023-11-22

## 2023-11-15 RX ORDER — HALOPERIDOL DECANOATE 100 MG/ML
1 INJECTION INTRAMUSCULAR AT BEDTIME
Refills: 0 | Status: DISCONTINUED | OUTPATIENT
Start: 2023-11-15 | End: 2023-11-17

## 2023-11-15 RX ORDER — FLUVOXAMINE MALEATE 25 MG/1
150 TABLET ORAL
Refills: 0 | Status: DISCONTINUED | OUTPATIENT
Start: 2023-11-15 | End: 2023-12-19

## 2023-11-15 RX ORDER — HALOPERIDOL DECANOATE 100 MG/ML
1 INJECTION INTRAMUSCULAR AT BEDTIME
Refills: 0 | Status: DISCONTINUED | OUTPATIENT
Start: 2023-11-15 | End: 2023-11-15

## 2023-11-15 RX ORDER — HALOPERIDOL DECANOATE 100 MG/ML
0.5 INJECTION INTRAMUSCULAR DAILY
Refills: 0 | Status: DISCONTINUED | OUTPATIENT
Start: 2023-11-16 | End: 2023-11-17

## 2023-11-15 RX ADMIN — FLUDROCORTISONE ACETATE 0.1 MILLIGRAM(S): 0.1 TABLET ORAL at 06:38

## 2023-11-15 RX ADMIN — HEPARIN SODIUM 5000 UNIT(S): 5000 INJECTION INTRAVENOUS; SUBCUTANEOUS at 06:39

## 2023-11-15 RX ADMIN — Medication 3 MILLIGRAM(S): at 22:24

## 2023-11-15 RX ADMIN — QUETIAPINE FUMARATE 50 MILLIGRAM(S): 200 TABLET, FILM COATED ORAL at 22:25

## 2023-11-15 RX ADMIN — Medication 5 MILLIGRAM(S): at 06:40

## 2023-11-15 RX ADMIN — FLUVOXAMINE MALEATE 150 MILLIGRAM(S): 25 TABLET ORAL at 17:44

## 2023-11-15 RX ADMIN — SODIUM CHLORIDE 75 MILLILITER(S): 9 INJECTION, SOLUTION INTRAVENOUS at 15:21

## 2023-11-15 RX ADMIN — Medication 2000 UNIT(S): at 11:01

## 2023-11-15 RX ADMIN — MIRTAZAPINE 15 MILLIGRAM(S): 45 TABLET, ORALLY DISINTEGRATING ORAL at 22:25

## 2023-11-15 RX ADMIN — Medication 1 SPRAY(S): at 17:46

## 2023-11-15 RX ADMIN — ATENOLOL 50 MILLIGRAM(S): 25 TABLET ORAL at 06:38

## 2023-11-15 RX ADMIN — Medication 5 MILLIGRAM(S): at 06:38

## 2023-11-15 RX ADMIN — Medication 105 MILLIGRAM(S): at 06:38

## 2023-11-15 RX ADMIN — PANTOPRAZOLE SODIUM 40 MILLIGRAM(S): 20 TABLET, DELAYED RELEASE ORAL at 17:47

## 2023-11-15 RX ADMIN — Medication 105 MILLIGRAM(S): at 22:25

## 2023-11-15 RX ADMIN — HEPARIN SODIUM 5000 UNIT(S): 5000 INJECTION INTRAVENOUS; SUBCUTANEOUS at 17:47

## 2023-11-15 RX ADMIN — ATENOLOL 50 MILLIGRAM(S): 25 TABLET ORAL at 17:45

## 2023-11-15 RX ADMIN — Medication 1 SPRAY(S): at 11:01

## 2023-11-15 RX ADMIN — Medication 5 MILLIGRAM(S): at 17:45

## 2023-11-15 RX ADMIN — HALOPERIDOL DECANOATE 1 MILLIGRAM(S): 100 INJECTION INTRAMUSCULAR at 22:24

## 2023-11-15 RX ADMIN — Medication 105 MILLIGRAM(S): at 15:21

## 2023-11-15 NOTE — BH CONSULTATION LIAISON PROGRESS NOTE - NSBHASSESSMENTFT_PSY_ALL_CORE
Pt is a 72 y/o m with HTN, h/o severe anxiety around medical illness and constantly on the look for illnesses brought in by wife after he started to become more and more psychotic with each round of Keytruda for his Merkel Cell Ca. Pt is delusional thinking he has metals, objects in his throat till his anus and penis, unable to tolerate PO. There are case reports of rare psychosis with monoclonal antibodies. Keytruda seems like the possible suspect of psychosis, combined with wife's reports of timeline, although can't be sure, time will show. Steroids are probably not helping psychiatrically either.     11/15: No psych PRNs. VS notable for elevated BP. Pt continues to c/o throat pain/metal sensation in mouth/dysphagia. Although pt presenting w/ delusional thought content, would consider endoscopy for any strictures/esophagitis.     RECOMMENDATIONS:  1. Thiamine 500 mg IV q8h for 2 days.  2. Continue Luvox 150 mg po bid and diazepam 5 mg po bid for anxiety. He has been on them > at least 12 yrs.  3. Continue seroquel 50 mg po qhs; Can also use seroquel 50mg QHS PRN Insomnia  4. Increase Haldol regimen to 0.5 mg po qam & 1mg po QHS for psychosis. Black box warning and sudden death risk discussed with wife. Please crush and give it in apple sauce for easy swallowing. If pt unable to take PO, pls administer it IV. Make sure to hold it if QTc>500. If tolerates well, please increase the dose to address psychosis.   5. No benefit from mirtazapine so far. Decrease Mirtazapine from 30 mg to 15 mg with the intention to taper off or observe for benefit for sleep and appetite at lower doses.

## 2023-11-15 NOTE — CONSULT NOTE ADULT - ASSESSMENT
Odynophagia    No foreign body on CT neck/chest  ?esophagitis  Start IV PPI  Encourage PO intake  If not improving would consider esophagram  Will follow

## 2023-11-15 NOTE — PROGRESS NOTE ADULT - SUBJECTIVE AND OBJECTIVE BOX
[facility] DIVISION of INFECTIOUS DISEASE  Eric Reyes MD PhD, Milena Underwood MD, Jillian Mckoy MD, Enid Everett MD, Jay Krishna MD  and providing coverage with Arthur Pepper MD  Providing Infectious Disease Consultations at [facility], [facility], [place], [facility], [facility]'s    Office# [phone] to schedule follow up appointments  Answering Service for urgent calls or New Consults [phone]  Cell# to text for urgent issues Eric Reyes [phone]     infectious diseases progress note:    JANICE RENDON is a 73y y. o. Male patient    Overnight and events of the last 24hrs reviewed    Allergies    No Known Allergies    Intolerances        ANTIBIOTICS/RELEVANT:  antimicrobials    immunologic:    OTHER:  acetaminophen     Tablet .. 650 milliGRAM(s) Oral every 6 hours PRN  atenolol  Tablet 50 milliGRAM(s) Oral two times a day  benzocaine 20% Spray 1 Spray(s) Topical four times a day  cholecalciferol 2000 Unit(s) Oral daily  diazepam    Tablet 5 milliGRAM(s) Oral two times a day  fludroCORTISONE 0.1 milliGRAM(s) Oral daily  haloperidol     Tablet 0.5 milliGRAM(s) Oral at bedtime  haloperidol    Injectable 0.5 milliGRAM(s) IntraMuscular at bedtime PRN  heparin   Injectable 5000 Unit(s) SubCutaneous every 12 hours  lactulose Syrup 15 Gram(s) Oral two times a day PRN  melatonin 3 milliGRAM(s) Oral at bedtime  mirtazapine 15 milliGRAM(s) Oral at bedtime  predniSONE   Tablet 5 milliGRAM(s) Oral daily  QUEtiapine 50 milliGRAM(s) Oral at bedtime  senna 2 Tablet(s) Oral at bedtime  sodium chloride 0.45%. 1000 milliLiter(s) IV Continuous <Continuous>  thiamine IVPB 500 milliGRAM(s) IV Intermittent every 8 hours      Objective:  Vital Signs Last 24 Hrs  T(C): 36.6 (15 Nov 2023 12:13), Max: 36.6 (15 Nov 2023 12:13)  T(F): 97.9 (15 Nov 2023 12:13), Max: 97.9 (15 Nov 2023 12:13)  HR: 73 (15 Nov 2023 12:13) (68 - 73)  BP: 159/81 (15 Nov 2023 12:13) (159/81 - 171/80)  BP(mean): --  RR: 18 (15 Nov 2023 12:13) (18 - 18)  SpO2: 95% (15 Nov 2023 12:13) (92% - 95%)    Parameters below as of 15 Nov 2023 12:13  Patient On (Oxygen Delivery Method): room air        T(C): 36.6 (11-15-23 @ 12:13), Max: 36.8 (11-14-23 @ 04:36)  T(C): 36.6 (11-15-23 @ 12:13), Max: 36.9 (11-12-23 @ 20:28)  T(C): 36.6 (11-15-23 @ 12:13), Max: 37 (11-12-23 @ 06:33)    PHYSICAL EXAM:  HEENT: NC atraumatic  Neck: supple  Respiratory: no accessory muscle use, breathing comfortably  Cardiovascular: distant  Gastrointestinal: normal appearing, nondistended  Extremities: no clubbing, no cyanosis,        LABS:        WBC  5.04 11-10 @ 09:52              Creatinine: 0.92 mg/dL (11-10-23 @ 09:52)                INFLAMMATORY MARKERS      MICROBIOLOGY:              RADIOLOGY & ADDITIONAL STUDIES:

## 2023-11-15 NOTE — CONSULT NOTE ADULT - SUBJECTIVE AND OBJECTIVE BOX
[facility]  Walter Camarena PA-C  [address] [place] [id number] [phone]      Chief Complaint:  Patient is a 73y old  Male who presents with a chief complaint of pain (15 Nov 2023 11:36)      HPI:  73 M pmh of HTN, ITP, APL syndrome, generalized anxiety disorder, OCD, newly dx aggressive Merkel Cell cancer of forehead- pt's oncologist  Dr. Eng, at [facility] on Keytruda infusions with good response per wife  was brought o ED for worsening psychosis for the past few days, pt believes there are sharp things inside his body trying to come out of his mouth, stuck in throat  pt lately with difficulty swallowing, decreased po intake  pt was evaluated by psych in ED    INTERVAL HPI:  Pt s/e  Pt is a poor historian but discussed same hx as above  Still c/o odynophagia    Allergies:  No Known Allergies      Medications:  acetaminophen     Tablet .. 650 milliGRAM(s) Oral every 6 hours PRN  atenolol  Tablet 50 milliGRAM(s) Oral two times a day  benzocaine 20% Spray 1 Spray(s) Topical four times a day  cholecalciferol 2000 Unit(s) Oral daily  diazepam    Tablet 5 milliGRAM(s) Oral two times a day  fludroCORTISONE 0.1 milliGRAM(s) Oral daily  fluvoxaMINE 150 milliGRAM(s) Oral two times a day  haloperidol     Tablet 1 milliGRAM(s) Oral at bedtime  haloperidol    Injectable 0.5 milliGRAM(s) IntraMuscular at bedtime PRN  heparin   Injectable 5000 Unit(s) SubCutaneous every 12 hours  lactulose Syrup 15 Gram(s) Oral two times a day PRN  melatonin 3 milliGRAM(s) Oral at bedtime  mirtazapine 15 milliGRAM(s) Oral at bedtime  predniSONE   Tablet 5 milliGRAM(s) Oral daily  QUEtiapine 50 milliGRAM(s) Oral at bedtime  senna 2 Tablet(s) Oral at bedtime  sodium chloride 0.45%. 1000 milliLiter(s) IV Continuous <Continuous>  thiamine IVPB 500 milliGRAM(s) IV Intermittent every 8 hours      PMHX/PSHX:  Hypertension, unspecified type    Anxiety    Anxiety    Hypertension    Idiopathic thrombocytopenic purpura (ITP)    History of antiphospholipid antibody syndrome    H/O Mohs micrographic surgery for skin cancer    No significant past surgical history    Enlarged tonsils      ROS:   General:  No fevers, chills, night sweats, fatigue,   Eyes:  Good vision, no reported pain  ENT:  No sore throat, pain, runny nose, dysphagia  CV:  No pain, palpitations, hypo/hypertension  Resp:  No dyspnea, cough, tachypnea, wheezing  GI:  No pain, No nausea, No vomiting, No diarrhea, No constipation, No weight loss, No fever, No pruritis, No rectal bleeding, No tarry stools, +odynophagia  :  No pain, bleeding, incontinence, nocturia  Muscle:  No pain, weakness  Neuro:  No weakness, tingling, memory problems  Psych:  No fatigue, insomnia, mood problems, depression  Endocrine:  No polyuria, polydipsia, cold/heat intolerance  Heme:  No petechiae, ecchymosis, easy bruisability  Skin:  No rash, tattoos, scars, edema      PHYSICAL EXAM:   Vital Signs:  Vital Signs Last 24 Hrs  T(C): 36.6 (15 Nov 2023 12:13), Max: 36.6 (15 Nov 2023 12:13)  T(F): 97.9 (15 Nov 2023 12:13), Max: 97.9 (15 Nov 2023 12:13)  HR: 73 (15 Nov 2023 12:13) (68 - 73)  BP: 159/81 (15 Nov 2023 12:13) (159/81 - 171/80)  BP(mean): --  RR: 18 (15 Nov 2023 12:13) (18 - 18)  SpO2: 95% (15 Nov 2023 12:13) (92% - 95%)    Parameters below as of 15 Nov 2023 12:13  Patient On (Oxygen Delivery Method): room air    GENERAL:  Appears stated age,   HEENT:  NC/AT,    CHEST:  Full & symmetric excursion,   HEART:  Regular rhythm  ABDOMEN:  Soft, non-tender, non-distended,   EXTEREMITIES:  no cyanosis,clubbing or edema  SKIN:  No rash  NEURO:  Alert,    IMAGING:  < from: CT Neck Soft Tissue w/ IV Cont (10.29.23 @ 19:00) >    ACC: [id number] EXAM:  CT NECK SOFT TISSUE IC   ORDERED BY: DEONTE VALLE     PROCEDURE DATE:  10/29/2023          INTERPRETATION:  .    CLINICAL INFORMATION: Foreign body sensation.    TECHNIQUE: Axial sections were obtained from the skull base to the   sternum after the administration of intravenous contrast. 90 cc's of   Omnipaque 350  was administered and 10 cc's were discarded. Sagittal and   Coronal reformations were obtained from the source data.    COMPARISON: No prior dedicated neck CT examinations are available for   comparison. Comparison is made to a whole body PET/CT exam from 8/6/2023.    FINDINGS: There is streak and beam hardening artifact generated by dental   amalgam. This limits evaluation of the surrounding structures,   particularly the oral cavity. No abnormal radiopaque foreign bodies are   visualized throughout the aerodigestive tract.    There is no cervical lymphadenopathy.    The salivary and thyroid glands appear unremarkable.    The neck vessels are patent. Arterial vascular calcifications are seen.    The imaged intracranial and orbital contents appear unremarkable.    Minimal scattered mucosal thickening is seen throughout the paranasal   sinuses. The tympanomastoid cavities are clear.    The maxilla, mandible, and zygomatic arches are intact. The TMJ spaces   appear within normal limits.    The cervical spine appears unremarkable.    Please refer to the report for the concurrent dedicated chest CT for   findings regarding the thoracic structures.    IMPRESSION: No radiopaque foreign body identified.    --- End of Report ---    TOM HOLLOWAY MD; Attending Radiologist  This document has been electronically signed. Oct 29 2023  8:37PM    < end of copied text >      < from: CT Chest w/ IV Cont (10.29.23 @ 19:05) >    ACC: [id number] EXAM:  CT CHEST IC   ORDERED BY: DEONTE VALLE     PROCEDURE DATE:  10/29/2023          INTERPRETATION:  CLINICAL INFORMATION: Sensation of foreign object,   patient feels object stuck in throat and chest    COMPARISON: Chest CT 3/14/2020    CONTRAST/COMPLICATIONS:  IV Contrast: Omnipaque 350  90 cc administered   10 cc discarded  Oral Contrast: NONE  Complications: None reported at time of study completion    PROCEDURE:  CT of the Chest was performed.  Sagittal and coronal reformats were performed.      FINDINGS:    AIRWAYS, LUNGS and PLEURA: Patent central airways. No endobronchial   lesion. Small bilateral pleural effusions and mild bibasilar   atelectasis.. No pleural effusion.    MEDIASTINUM AND WETSON: No lymphadenopathy. The esophagus is poorly   evaluated without oral contrast. However is unremarkable.    HEART AND VESSELS: Heart size is normal. No pericardial effusion.   Thoracic aorta and pulmonary artery normal in diameter. Mild coronary   calcification.    VISUALIZED UPPER ABDOMEN: Splenomegaly is unchanged. Cholecystectomy.   Bilateral renal cysts.    CHEST WALL AND BONES: No aggressive osseous lesion.    LOWER NECK: Within normal limits.    IMPRESSION:    The esophagus is poorly evaluated without oral contrast. However is   unremarkable.    --- End of Report ---    LIA BRICE MD; Attending Radiologist  This document has been electronically signed. Oct 30 2023 10:54AM    < end of copied text >

## 2023-11-15 NOTE — PROGRESS NOTE ADULT - SUBJECTIVE AND OBJECTIVE BOX
Patient is a 73y old  Male who presents with a chief complaint of pain (15 Nov 2023 11:36)      INTERVAL HPI/OVERNIGHT EVENTS: noted  pt seen and examined this am   events noted  per wife-pt still remains the same      Vital Signs Last 24 Hrs  T(C): 36.6 (15 Nov 2023 12:13), Max: 36.6 (15 Nov 2023 12:13)  T(F): 97.9 (15 Nov 2023 12:13), Max: 97.9 (15 Nov 2023 12:13)  HR: 70 (15 Nov 2023 17:36) (68 - 73)  BP: 163/79 (15 Nov 2023 17:36) (159/81 - 171/80)  BP(mean): --  RR: 18 (15 Nov 2023 12:13) (18 - 18)  SpO2: 95% (15 Nov 2023 12:13) (94% - 95%)    Parameters below as of 15 Nov 2023 12:13  Patient On (Oxygen Delivery Method): room air        acetaminophen     Tablet .. 650 milliGRAM(s) Oral every 6 hours PRN  atenolol  Tablet 50 milliGRAM(s) Oral two times a day  benzocaine 20% Spray 1 Spray(s) Topical four times a day  cholecalciferol 2000 Unit(s) Oral daily  diazepam    Tablet 5 milliGRAM(s) Oral two times a day  fludroCORTISONE 0.1 milliGRAM(s) Oral daily  fluvoxaMINE 150 milliGRAM(s) Oral two times a day  haloperidol     Tablet 1 milliGRAM(s) Oral at bedtime  haloperidol    Injectable 0.5 milliGRAM(s) IntraMuscular at bedtime PRN  heparin   Injectable 5000 Unit(s) SubCutaneous every 12 hours  lactulose Syrup 15 Gram(s) Oral two times a day PRN  melatonin 3 milliGRAM(s) Oral at bedtime  mirtazapine 15 milliGRAM(s) Oral at bedtime  pantoprazole  Injectable 40 milliGRAM(s) IV Push every 12 hours  predniSONE   Tablet 5 milliGRAM(s) Oral daily  QUEtiapine 50 milliGRAM(s) Oral at bedtime  senna 2 Tablet(s) Oral at bedtime  sodium chloride 0.45%. 1000 milliLiter(s) IV Continuous <Continuous>  thiamine IVPB 500 milliGRAM(s) IV Intermittent every 8 hours      PHYSICAL EXAM:  GENERAL: NAD,   EYES: conjunctiva and sclera clear  ENMT: Moist mucous membranes  NECK: Supple, No JVD, Normal thyroid  CHEST/LUNG: non labored, cta b/l  HEART: Regular rate and rhythm; No murmurs, rubs, or gallops  ABDOMEN: Soft, Nontender, Nondistended; Bowel sounds present  EXTREMITIES:  2+ Peripheral Pulses, No clubbing, cyanosis, or edema  LYMPH: No lymphadenopathy noted  SKIN: No rashes or lesions    Consultant(s) Notes Reviewed:  [x ] YES  [ ] NO  Care Discussed with Consultants/Other Providers [ x] YES  [ ] NO    LABS:              CAPILLARY BLOOD GLUCOSE                  RADIOLOGY & ADDITIONAL TESTS:    Imaging Personally Reviewed:  [x ] YES  [ ] NO

## 2023-11-15 NOTE — PROGRESS NOTE ADULT - SUBJECTIVE AND OBJECTIVE BOX
[INTERVAL HX: ]  Patient seen and examined;  Chart reviewed and events noted;     psychiatric meds changed.   wife monitoring, to see if any improvement  at bedside    [MEDICATIONS]  MEDICATIONS  (STANDING):  atenolol  Tablet 50 milliGRAM(s) Oral two times a day  benzocaine 20% Spray 1 Spray(s) Topical four times a day  cholecalciferol 2000 Unit(s) Oral daily  diazepam    Tablet 5 milliGRAM(s) Oral two times a day  fludroCORTISONE 0.1 milliGRAM(s) Oral daily  haloperidol     Tablet 0.5 milliGRAM(s) Oral at bedtime  heparin   Injectable 5000 Unit(s) SubCutaneous every 12 hours  melatonin 3 milliGRAM(s) Oral at bedtime  mirtazapine 15 milliGRAM(s) Oral at bedtime  predniSONE   Tablet 5 milliGRAM(s) Oral daily  QUEtiapine 50 milliGRAM(s) Oral at bedtime  senna 2 Tablet(s) Oral at bedtime  sodium chloride 0.45%. 1000 milliLiter(s) (75 mL/Hr) IV Continuous <Continuous>  thiamine IVPB 500 milliGRAM(s) IV Intermittent every 8 hours    MEDICATIONS  (PRN):  acetaminophen     Tablet .. 650 milliGRAM(s) Oral every 6 hours PRN Temp greater or equal to 38.5C (101.3F), Moderate Pain (4 - 6)  haloperidol    Injectable 0.5 milliGRAM(s) IntraMuscular at bedtime PRN if not taking po  lactulose Syrup 15 Gram(s) Oral two times a day PRN constipation      [VITALS]  Vital Signs Last 24 Hrs  T(C): 36.4 (15 Nov 2023 05:07), Max: 36.4 (14 Nov 2023 17:55)  T(F): 97.6 (15 Nov 2023 05:07), Max: 97.6 (14 Nov 2023 17:55)  HR: 68 (15 Nov 2023 05:07) (68 - 70)  BP: 171/80 (15 Nov 2023 05:07) (162/81 - 171/80)  BP(mean): --  RR: 18 (15 Nov 2023 05:07) (18 - 18)  SpO2: 94% (15 Nov 2023 05:07) (92% - 94%)    Parameters below as of 15 Nov 2023 05:07  Patient On (Oxygen Delivery Method): room air      [WT/HT]  Daily     Daily   [VENT]      [PHYSICAL EXAM]  GEN: NAD  HEENT: normocephalic and atraumatic. EOMI. PERRL.    NECK: Supple.  No lymphadenopathy   LUNGS: Clear to auscultation.  HEART: Regular rate and rhythm,  no MRG  ABDOMEN: Soft, nontender, and nondistended.  Positive bowel sounds.    : No CVA tenderness  EXTREMITIES: Without edema.  NEUROLOGIC: grossly intact.  PSYCHIATRIC: Appropriate affect .  SKIN: No rash     [LABS:]                Vitamin B12, Serum: 1109 pg/mL [232 - 1245] (10-29-23 @ 18:30)    Folate, Serum: >20.0 ng/mL (10-29-23 @ 18:30)    Sedimentation Rate, Erythrocyte: 57 mm/hr *H* [0 - 20] (10-27-23 @ 18:30)                  [RADIOLOGY STUDIES:]

## 2023-11-15 NOTE — PROGRESS NOTE ADULT - ASSESSMENT
73 M pmh of HTN, ITP, APL syndrome, generalized anxiety disorder, OCD, newly dx aggressive Merkel Cell cancer of forehead- pt's oncologist  Dr. Eng, at [facility] on Keytruda infusions with good response per wife  was brought to ED for worsening psychosis for the past few days, pt lately with difficulty swallowing, decreased po intake    Psychosis  monitor  psych fu  MRI noted  neuro following  continue seroquel, luvox and remeron  psych- started haldol 1mg qhs and 0.5mg am    #Abnormal throat sensation- sharp object sensation  swallow eval appreciated  GI consult     #HTN- +orthostasis  #Orthostatic hypotension- hold losartan,  IVF   cont atenolol w parameters  ? psych med induced  adrenal insuffiency  continue prednisone  will start florinef  endo following  possible hypophysitis      #Anxiety  psych cs appreciated  continue valium  on remeron, seroquel and luvox  telepsych fu      #Merkel cell ca  onc c/s  r/o paraneoplastic causes- unlikely      #DVT ppx-  hep sq      [facility]   [phone]

## 2023-11-15 NOTE — BH CONSULTATION LIAISON PROGRESS NOTE - OTHER
Unkempt elderly male looking distraught with anxiety and worry, unable to participate in interview much. Pointing to his jaw and throat. superficially cooperative

## 2023-11-15 NOTE — PROGRESS NOTE ADULT - ASSESSMENT
IMPRESSION    pt w ITP, Lupus Anticoagulant, HTN, anxiety on Seraquel, OCD, Merkel cell ca of right Forehead, under care Dr Elliot Eng, post 4 doses q3wks IV Keytruda, last dose 10/17/23.  Pt brought in w c/o feeling "claws, daggers" inside him, since C#3 Keytruda, with sensation initially started w various parts of face now also progressed down to sides of body to rectum(does not see actual daggers or claws,)    Wife reports after first cycle started w jaw tightness and progressive worsened w each cycle, to point of hard to swallow/can't swallow  Had tired taken off Seroquel to see if adverse effect but no improvement, now back on x 5-6days  Merkel lesion has essentially resolved    CT head no acute findings  see by Psych in ER, ?psychosis  -sensation described by pt ?psychosis vs neuropathy  -psychosis not noted to be associated w Keytruda. Neuro tox of Keytruda described includes cerebral hemorrhage, confusion, myasthenia gravis, reversible posterior leukoencephalopathy syndrome, syed neuropathy, Guillain [place], aseptic meningitis, encephalitis, transverse myelitis    TFTS unremarkable.   AM cortisol 5.8. Slight low. Unclear significance   Repeat AM Cortisol low x2. <6 on two tests  3rd test w cortisol 11 on 11/06/23  To start Hydrocortisone 11/06/23    Negative plasma PBG   Bilirubin neg  Urobilinogen normal    Sjorgen /Scleroderma studies abn.      +MICHELLE and +Ro52    no significant change.    seen by psych with meds adjusted and perhaps was starting to show some improvement  is now on prednisone 5 mg po daily, appears same  Per wife maybe some improvement in mental status according to wife    RECOMMEND  continue supportive measures    PSYCH  cause of sx remains unclear.   s/p psych evaluation  Seroquel dose being adjusted   Fluvoxamine 150mg BID being adjusted  Remeron. being adjusted  To start Haldol  symptomatically improved no change yet    NEURO  s/p neurology eval.   Was offered LP if sx not better  However pt refused LP at inital workup, and appears less likely sx due to meningitis or encephaliti. .  Diagnostic workup was prior discussed at length with pt and wife.     s/p additional neuro   Discussed with Dr. Magdaleno.  Sx seem less likely secondary to Keytruda appears more psychiatric sx  await course, mgmt with psychiatry  Tylenol for pain  Low cortisol to be treated with hydrocortisone and now prednisone  Endocrinology to eval    pain  PRN Tylenol#3. one tabs if mild sx, consider escalation to two tabs if "dagger like sx / pain worse.   Rcvd dose Dilaudid 0.5mg  Benzocain spray PRN    ITP  platelet count 96 K/uL==>106;    will observed.     Endocrine  Prior cortisol levels low borderline.   Repeat today normal.   Was to started hydrocortisone 11/06/32   Placed on prednisone 5 mg po daily  Placed now on Fludrocortisone 0.1mg PO daily.     Sx appears no change since last week.

## 2023-11-15 NOTE — PROGRESS NOTE ADULT - SUBJECTIVE AND OBJECTIVE BOX
Chief Complaint: Psychosis    Interval Events: No events overnight.    Review of Systems:  General: No fevers, chills, weight gain  Skin: No rashes, color changes  Cardiovascular: No chest pain, orthopnea  Respiratory: No shortness of breath, cough  Gastrointestinal: No nausea, abdominal pain  Genitourinary: No incontinence, pain with urination  Musculoskeletal: No pain, swelling, decreased range of motion  Neurological: No headache, weakness  Psychiatric: No depression, anxiety  Endocrine: No weight gain, increased thirst  All other systems are comprehensively negative.    Physical Exam:  Vital Signs Last 24 Hrs  T(C): 36.4 (15 Nov 2023 05:07), Max: 36.4 (14 Nov 2023 17:55)  T(F): 97.6 (15 Nov 2023 05:07), Max: 97.6 (14 Nov 2023 17:55)  HR: 68 (15 Nov 2023 05:07) (68 - 77)  BP: 171/80 (15 Nov 2023 05:07) (114/64 - 171/80)  BP(mean): --  RR: 18 (15 Nov 2023 05:07) (18 - 18)  SpO2: 94% (15 Nov 2023 05:07) (92% - 94%)  Parameters below as of 15 Nov 2023 05:07  Patient On (Oxygen Delivery Method): room air  General: NAD  HEENT: MMM  Neck: No JVD, no carotid bruit  Lungs: CTAB  CV: RRR, nl S1/S2, no M/R/G  Abdomen: S/NT/ND, +BS  Extremities: No LE edema, no cyanosis  Neuro: AAOx3, non-focal  Skin: No rash    Labs:

## 2023-11-15 NOTE — PROGRESS NOTE ADULT - ASSESSMENT
73 M pmh of HTN, ITP, APL syndrome, generalized anxiety disorder, OCD, newly dx aggressive Merkel Cell cancer of forehead- pt's oncologist  Dr. Eng, at [facility] on Keytruda (PD-1 blocker) infusions with good response per wife  was brought to ED for worsening psychosis for the past few days, pt believes there are sharp things inside his body trying to come out of his mouth, stuck in throat, pt lately with difficulty swallowing, decreased po intake, pt was evaluated by psych in ED. Wife reports sudden cognitive decline and behavioral changes immediately after COVID infection.    RECOMMENDATIONS  1-?PASC  -sounds like there is no obvious acute infectious issue. no fever, no leukocytosis, no obvious localization but concerning history of things developing post COVID-19 infection so raises concerns for a neurological PASC picture  noted serology for EBV consistent with reactivation as described post PASC  Serotonin, Blood: <30ng/mL   am cortisol-low x 2 then nl  ---  -melatonin 3mg PO QHS  -prednisone 5mg PO Qam  -fluvoxamine 150mg PO BID   input by telepsych for possible change  -in outpt setting consideration of bifidobacterium probiotics  -pt not taking PO so not getting marinol    Thank you for consulting us and involving us in the management of this most interesting and challenging case.  We will follow along in the care of this patient. Please call us at [phone] or text me directly on my cell# at [phone] with any concerns.

## 2023-11-16 PROCEDURE — 99232 SBSQ HOSP IP/OBS MODERATE 35: CPT | Mod: 95

## 2023-11-16 RX ORDER — LOSARTAN POTASSIUM 100 MG/1
25 TABLET, FILM COATED ORAL DAILY
Refills: 0 | Status: DISCONTINUED | OUTPATIENT
Start: 2023-11-16 | End: 2023-12-11

## 2023-11-16 RX ADMIN — HEPARIN SODIUM 5000 UNIT(S): 5000 INJECTION INTRAVENOUS; SUBCUTANEOUS at 17:53

## 2023-11-16 RX ADMIN — FLUVOXAMINE MALEATE 150 MILLIGRAM(S): 25 TABLET ORAL at 17:53

## 2023-11-16 RX ADMIN — PANTOPRAZOLE SODIUM 40 MILLIGRAM(S): 20 TABLET, DELAYED RELEASE ORAL at 06:13

## 2023-11-16 RX ADMIN — Medication 5 MILLIGRAM(S): at 17:53

## 2023-11-16 RX ADMIN — Medication 105 MILLIGRAM(S): at 06:23

## 2023-11-16 RX ADMIN — HALOPERIDOL DECANOATE 0.5 MILLIGRAM(S): 100 INJECTION INTRAMUSCULAR at 11:56

## 2023-11-16 RX ADMIN — Medication 105 MILLIGRAM(S): at 22:39

## 2023-11-16 RX ADMIN — PANTOPRAZOLE SODIUM 40 MILLIGRAM(S): 20 TABLET, DELAYED RELEASE ORAL at 17:52

## 2023-11-16 RX ADMIN — HEPARIN SODIUM 5000 UNIT(S): 5000 INJECTION INTRAVENOUS; SUBCUTANEOUS at 06:14

## 2023-11-16 RX ADMIN — ATENOLOL 50 MILLIGRAM(S): 25 TABLET ORAL at 06:13

## 2023-11-16 RX ADMIN — ATENOLOL 50 MILLIGRAM(S): 25 TABLET ORAL at 17:53

## 2023-11-16 RX ADMIN — FLUVOXAMINE MALEATE 150 MILLIGRAM(S): 25 TABLET ORAL at 06:19

## 2023-11-16 RX ADMIN — Medication 105 MILLIGRAM(S): at 13:23

## 2023-11-16 RX ADMIN — Medication 5 MILLIGRAM(S): at 06:13

## 2023-11-16 RX ADMIN — Medication 5 MILLIGRAM(S): at 06:14

## 2023-11-16 RX ADMIN — SODIUM CHLORIDE 75 MILLILITER(S): 9 INJECTION, SOLUTION INTRAVENOUS at 13:23

## 2023-11-16 NOTE — PROGRESS NOTE ADULT - ASSESSMENT
73 M pmh of HTN, ITP, APL syndrome, generalized anxiety disorder, OCD, newly dx aggressive Merkel Cell cancer of forehead- pt's oncologist  Dr. Eng, at [facility] on Keytruda (PD-1 blocker) infusions with good response per wife  was brought to ED for worsening psychosis for the past few days, pt believes there are sharp things inside his body trying to come out of his mouth, stuck in throat, pt lately with difficulty swallowing, decreased po intake, pt was evaluated by psych in ED. Wife reports sudden cognitive decline and behavioral changes immediately after COVID infection.    RECOMMENDATIONS  1-?PASC  -sounds like there is no obvious acute infectious issue. no fever, no leukocytosis, no obvious localization but concerning history of things developing post COVID-19 infection so raises concerns for a neurological PASC picture  noted serology for EBV consistent with reactivation as described post PASC  Serotonin, Blood: <30ng/mL   am cortisol-low x 2 then nl  ---  -melatonin 3mg PO QHS  -prednisone 5mg PO Qam  -fludrocortisone 0.1mg PO Qam  -fluvoxamine 150mg PO BID   input by telepsych for possible changes with concerns for a balance with sedation and efficacy  -in outpt setting consideration of bifidobacterium probiotics    Thank you for consulting us and involving us in the management of this most interesting and challenging case.  We will follow along in the care of this patient. Please call us at [phone] or text me directly on my cell# at [phone] with any concerns.

## 2023-11-16 NOTE — PROGRESS NOTE ADULT - ASSESSMENT
73 M pmh of HTN, ITP, APL syndrome, generalized anxiety disorder, OCD, newly dx aggressive Merkel Cell cancer of forehead- pt's oncologist  Dr. Eng, at [facility] on Keytruda infusions with good response per wife  was brought to ED for worsening psychosis for the past few days, pt lately with difficulty swallowing, decreased po intake    Psychosis  monitor  psych fu  MRI noted  neuro following  cont luvox and remeron  lower seroquel dosing to off  psych- started haldol 1mg qhs and 0.5mg am    #Abnormal throat sensation- sharp object sensation  swallow eval appreciated  GI consult noted- r/o esophagitis- PPI started  if no improvemnt ?esophagram    #HTN- +orthostasis  #Orthostatic hypotension- hold losartan,  IVF   cont atenolol w parameters  ? psych med induced  adrenal insufficiency  continue prednisone  and  florinef  endo following  possible hypophysitis      #Anxiety  psych cs appreciated  continue valium  on remeron, seroquel and luvox  telepsych fu        #Merkel cell ca  onc c/s  r/o paraneoplastic causes- unlikely      #DVT ppx-  hep sq      [facility]   [phone]

## 2023-11-16 NOTE — BH CONSULTATION LIAISON PROGRESS NOTE - NSBHASSESSMENTFT_PSY_ALL_CORE
Pt is a 72 y/o m with HTN, h/o severe anxiety around medical illness and constantly on the look for illnesses brought in by wife after he started to become more and more psychotic with each round of Keytruda for his Merkel Cell Ca. Pt is delusional thinking he has metals, objects in his throat till his anus and penis, unable to tolerate PO. There are case reports of rare psychosis with monoclonal antibodies. Keytruda seems like the possible suspect of psychosis, combined with wife's reports of timeline, although can't be sure, time will show. Steroids are probably not helping psychiatrically either.     11/15-16: No psych PRNs. VS notable for elevated BP. Pt continues to c/o throat pain/metal sensation in mouth/dysphagia. Although pt presenting w/ delusional thought content, would consider endoscopy for any strictures/esophagitis.       RECOMMENDATIONS:  1. Continue Luvox 150 mg po bid and diazepam 5 mg po bid for anxiety. He has been on them > at least 12 yrs.  2.. Would cont seroquel taper and may d/c due to increased lethargy and for monotherapy of antipsychotic (haldol)  3.. cont Haldol regimen 0.5 mg po qam & 1mg po QHS for psychosis.   will increase to 1 mg po bid if tolerating before weekend  Black box warning and sudden death risk discussed with wife. Please crush and give it in apple sauce for easy swallowing. If pt unable to take PO, pls administer it IV. Make sure to hold it if QTc>500. If tolerates well, please increase the dose to address psychosis.   5. No benefit from mirtazapine so far. Decrease Mirtazapine from 30 mg to 15 mg with the intention to taper off or observe for benefit for sleep and appetite at lower doses.

## 2023-11-16 NOTE — PROGRESS NOTE ADULT - ASSESSMENT
The patient is a 73 year old male with a history of HTN, ITP, APLS, anxiety, OCD, Merkel cell cancer who presented with psychosis.    Plan:  - ECG with no evidence of ischemia or infarction  - Orthostatic hypotension may be multifactorial - dehydration, medication related (antipsychotics), and low cortisol levels  - Continue atenolol 50 mg bid  - BP has since trended up - allow supine hypertension  - Orthostatics have not been repeated  - Will restart losartan at 25 mg daily

## 2023-11-16 NOTE — PROGRESS NOTE ADULT - SUBJECTIVE AND OBJECTIVE BOX
[facility]  Walter Camarena PA-C  [address] [place] [address]  [phone]      INTERVAL HPI/OVERNIGHT EVENTS:  Pt s/e with wife at bedside  Still c/o odynophagia  Otherwise no new GI events    MEDICATIONS  (STANDING):  atenolol  Tablet 50 milliGRAM(s) Oral two times a day  benzocaine 20% Spray 1 Spray(s) Topical four times a day  cholecalciferol 2000 Unit(s) Oral daily  diazepam    Tablet 5 milliGRAM(s) Oral two times a day  fludroCORTISONE 0.1 milliGRAM(s) Oral daily  fluvoxaMINE 150 milliGRAM(s) Oral two times a day  haloperidol     Tablet 0.5 milliGRAM(s) Oral daily  haloperidol     Tablet 1 milliGRAM(s) Oral at bedtime  heparin   Injectable 5000 Unit(s) SubCutaneous every 12 hours  losartan 25 milliGRAM(s) Oral daily  melatonin 3 milliGRAM(s) Oral at bedtime  mirtazapine 15 milliGRAM(s) Oral at bedtime  pantoprazole  Injectable 40 milliGRAM(s) IV Push every 12 hours  predniSONE   Tablet 5 milliGRAM(s) Oral daily  QUEtiapine 50 milliGRAM(s) Oral at bedtime  senna 2 Tablet(s) Oral at bedtime  sodium chloride 0.45%. 1000 milliLiter(s) (75 mL/Hr) IV Continuous <Continuous>  thiamine IVPB 500 milliGRAM(s) IV Intermittent every 8 hours    MEDICATIONS  (PRN):  acetaminophen     Tablet .. 650 milliGRAM(s) Oral every 6 hours PRN Temp greater or equal to 38.5C (101.3F), Moderate Pain (4 - 6)  haloperidol    Injectable 0.5 milliGRAM(s) IntraMuscular at bedtime PRN if not taking po  lactulose Syrup 15 Gram(s) Oral two times a day PRN constipation      Allergies  No Known Allergies      PHYSICAL EXAM:   Vital Signs:  Vital Signs Last 24 Hrs  T(C): 36.6 (16 Nov 2023 12:32), Max: 36.7 (15 Nov 2023 20:39)  T(F): 97.9 (16 Nov 2023 12:32), Max: 98.1 (15 Nov 2023 20:39)  HR: 79 (16 Nov 2023 12:32) (69 - 79)  BP: 159/82 (16 Nov 2023 12:32) (150/72 - 188/78)  BP(mean): --  RR: 19 (16 Nov 2023 12:32) (18 - 19)  SpO2: 91% (16 Nov 2023 12:32) (91% - 95%)    Parameters below as of 16 Nov 2023 12:32  Patient On (Oxygen Delivery Method): room air    GENERAL:  Appears stated age  HEENT:  NC/AT  CHEST:  Full & symmetric excursion  HEART:  Regular rhythm  ABDOMEN:  Soft, non-tender, non-distended  EXTEREMITIES:  no cyanosis  SKIN:  No rash  NEURO:  Alert

## 2023-11-16 NOTE — PROGRESS NOTE ADULT - SUBJECTIVE AND OBJECTIVE BOX
Interval History:  remains weak  continues with psych evaluation  is not getting out of bed    Chart reviewed and events noted;   Overnight events:    MEDICATIONS  (STANDING):  atenolol  Tablet 50 milliGRAM(s) Oral two times a day  benzocaine 20% Spray 1 Spray(s) Topical four times a day  cholecalciferol 2000 Unit(s) Oral daily  diazepam    Tablet 5 milliGRAM(s) Oral two times a day  fludroCORTISONE 0.1 milliGRAM(s) Oral daily  fluvoxaMINE 150 milliGRAM(s) Oral two times a day  haloperidol     Tablet 0.5 milliGRAM(s) Oral daily  haloperidol     Tablet 1 milliGRAM(s) Oral at bedtime  heparin   Injectable 5000 Unit(s) SubCutaneous every 12 hours  losartan 25 milliGRAM(s) Oral daily  melatonin 3 milliGRAM(s) Oral at bedtime  mirtazapine 15 milliGRAM(s) Oral at bedtime  pantoprazole  Injectable 40 milliGRAM(s) IV Push every 12 hours  predniSONE   Tablet 5 milliGRAM(s) Oral daily  QUEtiapine 50 milliGRAM(s) Oral at bedtime  senna 2 Tablet(s) Oral at bedtime  sodium chloride 0.45%. 1000 milliLiter(s) (75 mL/Hr) IV Continuous <Continuous>  thiamine IVPB 500 milliGRAM(s) IV Intermittent every 8 hours    MEDICATIONS  (PRN):  acetaminophen     Tablet .. 650 milliGRAM(s) Oral every 6 hours PRN Temp greater or equal to 38.5C (101.3F), Moderate Pain (4 - 6)  haloperidol    Injectable 0.5 milliGRAM(s) IntraMuscular at bedtime PRN if not taking po  lactulose Syrup 15 Gram(s) Oral two times a day PRN constipation      Vital Signs Last 24 Hrs  T(C): 36.4 (16 Nov 2023 05:33), Max: 36.7 (15 Nov 2023 20:39)  T(F): 97.5 (16 Nov 2023 05:33), Max: 98.1 (15 Nov 2023 20:39)  HR: 76 (16 Nov 2023 05:33) (69 - 76)  BP: 188/78 (16 Nov 2023 05:33) (150/72 - 188/78)  BP(mean): --  RR: 18 (16 Nov 2023 05:33) (18 - 18)  SpO2: 95% (16 Nov 2023 05:33) (94% - 95%)    Parameters below as of 16 Nov 2023 05:33  Patient On (Oxygen Delivery Method): nasal cannula        PHYSICAL EXAM  General: adult in NAD, chronically ill appearing  HEENT: clear oropharynx, anicteric sclera, pink conjunctivae  Neck: supple  CV: normal S1S2 with no murmur rubs or gallops  Lungs: clear to auscultation, no wheezes, no rhales  Abdomen: soft non-tender non-distended, no hepato/splenomegaly  Ext: no clubbing cyanosis or edema  Skin: no rashes and no petichiae  Neuro: alert and oriented X3 no focal deficits      LABS:              fe studies      WBC trend      Hgb trend      plt trend        RADIOLOGY & ADDITIONAL STUDIES:

## 2023-11-16 NOTE — PROGRESS NOTE ADULT - ASSESSMENT
IMPRESSION    pt w ITP, Lupus Anticoagulant, HTN, anxiety on Seraquel, OCD, Merkel cell ca of right Forehead, under care Dr Elliot Eng, post 4 doses q3wks IV Keytruda, last dose 10/17/23.  Pt brought in w c/o feeling "claws, daggers" inside him, since C#3 Keytruda, with sensation initially started w various parts of face now also progressed down to sides of body to rectum(does not see actual daggers or claws,)    Wife reports after first cycle started w jaw tightness and progressive worsened w each cycle, to point of hard to swallow/can't swallow  Had tired taken off Seroquel to see if adverse effect but no improvement, now back on x 5-6days  Merkel lesion has essentially resolved    CT head no acute findings  see by Psych in ER, ?psychosis  -sensation described by pt ?psychosis vs neuropathy  -psychosis not noted to be associated w Keytruda. Neuro tox of Keytruda described includes cerebral hemorrhage, confusion, myasthenia gravis, reversible posterior leukoencephalopathy syndrome, syde neuropathy, Guillain [place], aseptic meningitis, encephalitis, transverse myelitis    TFTS unremarkable.   AM cortisol 5.8. Slight low. Unclear significance   Repeat AM Cortisol low x2. <6 on two tests  3rd test w cortisol 11 on 11/06/23  To start Hydrocortisone 11/06/23    Negative plasma PBG   Bilirubin neg  Urobilinogen normal    Sjorgen /Scleroderma studies abn.      +MICHELLE and +Ro52    no significant change.    seen by psych with meds adjusted and perhaps was starting to show some improvement  is now on prednisone 5 mg po daily, appears same  Per wife maybe some improvement in mental status according to wife  has not been able to have physical therapy ?refuses    RECOMMEND  continue supportive measures    PSYCH  cause of sx remains unclear.   s/p psych evaluation  Seroquel dose being adjusted   Fluvoxamine 150mg BID being adjusted  Remeron. being adjusted  To start Haldol  symptomatically improved no change yet    NEURO  s/p neurology eval.   Was offered LP if sx not better  However pt refused LP at inital workup, and appears less likely sx due to meningitis or encephaliti. .  Diagnostic workup was prior discussed at length with pt and wife.     s/p additional neuro   Discussed with Dr. Magdaleno.  Sx seem less likely secondary to Keytruda appears more psychiatric sx  await course, mgmt with psychiatry  Tylenol for pain  Low cortisol to be treated with hydrocortisone and now prednisone  Endocrinology to eval    pain  PRN Tylenol#3. one tabs if mild sx, consider escalation to two tabs if "dagger like sx / pain worse.   Rcvd dose Dilaudid 0.5mg  Benzocain spray PRN    ITP  platelet count 96 K/uL==>106;    will observed.     Endocrine  Prior cortisol levels low borderline.   Repeat today normal.   Was to started hydrocortisone 11/06/32   Placed on prednisone 5 mg po daily  Placed now on Fludrocortisone 0.1mg PO daily.     Sx appears no change since last week.   to have followup physical therapy evaluation

## 2023-11-16 NOTE — PROGRESS NOTE ADULT - ASSESSMENT
Odynophagia    No foreign body on CT neck/chest  ?esophagitis  Start PPI  Encourage PO intake  If not improving consider esophagram  Will follow  D/w wife

## 2023-11-16 NOTE — PROGRESS NOTE ADULT - SUBJECTIVE AND OBJECTIVE BOX
Chief Complaint: Psychosis    Interval Events: No events overnight.    Review of Systems:  General: No fevers, chills, weight gain  Skin: No rashes, color changes  Cardiovascular: No chest pain, orthopnea  Respiratory: No shortness of breath, cough  Gastrointestinal: No nausea, abdominal pain  Genitourinary: No incontinence, pain with urination  Musculoskeletal: No pain, swelling, decreased range of motion  Neurological: No headache, weakness  Psychiatric: No depression, anxiety  Endocrine: No weight gain, increased thirst  All other systems are comprehensively negative.    Physical Exam:  Vital Signs Last 24 Hrs  T(C): 36.4 (16 Nov 2023 05:33), Max: 36.7 (15 Nov 2023 20:39)  T(F): 97.5 (16 Nov 2023 05:33), Max: 98.1 (15 Nov 2023 20:39)  HR: 76 (16 Nov 2023 05:33) (69 - 76)  BP: 188/78 (16 Nov 2023 05:33) (150/72 - 188/78)  BP(mean): --  RR: 18 (16 Nov 2023 05:33) (18 - 18)  SpO2: 95% (16 Nov 2023 05:33) (94% - 95%)  Parameters below as of 16 Nov 2023 05:33  Patient On (Oxygen Delivery Method): nasal cannula  General: NAD  HEENT: MMM  Neck: No JVD, no carotid bruit  Lungs: CTAB  CV: RRR, nl S1/S2, no M/R/G  Abdomen: S/NT/ND, +BS  Extremities: No LE edema, no cyanosis  Neuro: AAOx3, non-focal  Skin: No rash    Labs:

## 2023-11-16 NOTE — PROGRESS NOTE ADULT - SUBJECTIVE AND OBJECTIVE BOX
[facility]  Eric Reyes MD PhD, Milena Underwood MD, Jillian Mckoy MD, Enid Everett MD, Jay Krishna MD  and providing coverage with Arthur Pepper MD  Providing Infectious Disease Consultations at [facility], [facility], [facility]'s    Office# [phone] to schedule follow up appointments  Answering Service for urgent calls or New Consults [phone]  Cell# to text for urgent issues Eric Reyes [phone]     infectious diseases progress note:    JANICE RENDON is a 73y y. o. Male patient    Overnight and events of the last 24hrs reviewed    Allergies    No Known Allergies    Intolerances        ANTIBIOTICS/RELEVANT:  antimicrobials    immunologic:    OTHER:  acetaminophen     Tablet .. 650 milliGRAM(s) Oral every 6 hours PRN  atenolol  Tablet 50 milliGRAM(s) Oral two times a day  benzocaine 20% Spray 1 Spray(s) Topical four times a day  cholecalciferol 2000 Unit(s) Oral daily  diazepam    Tablet 5 milliGRAM(s) Oral two times a day  fludroCORTISONE 0.1 milliGRAM(s) Oral daily  fluvoxaMINE 150 milliGRAM(s) Oral two times a day  haloperidol     Tablet 0.5 milliGRAM(s) Oral daily  haloperidol     Tablet 1 milliGRAM(s) Oral at bedtime  haloperidol    Injectable 0.5 milliGRAM(s) IntraMuscular at bedtime PRN  heparin   Injectable 5000 Unit(s) SubCutaneous every 12 hours  lactulose Syrup 15 Gram(s) Oral two times a day PRN  losartan 25 milliGRAM(s) Oral daily  melatonin 3 milliGRAM(s) Oral at bedtime  mirtazapine 15 milliGRAM(s) Oral at bedtime  pantoprazole  Injectable 40 milliGRAM(s) IV Push every 12 hours  predniSONE   Tablet 5 milliGRAM(s) Oral daily  QUEtiapine 50 milliGRAM(s) Oral at bedtime  senna 2 Tablet(s) Oral at bedtime  sodium chloride 0.45%. 1000 milliLiter(s) IV Continuous <Continuous>  thiamine IVPB 500 milliGRAM(s) IV Intermittent every 8 hours      Objective:  Vital Signs Last 24 Hrs  T(C): 36.4 (16 Nov 2023 05:33), Max: 36.7 (15 Nov 2023 20:39)  T(F): 97.5 (16 Nov 2023 05:33), Max: 98.1 (15 Nov 2023 20:39)  HR: 76 (16 Nov 2023 05:33) (69 - 76)  BP: 188/78 (16 Nov 2023 05:33) (150/72 - 188/78)  BP(mean): --  RR: 18 (16 Nov 2023 05:33) (18 - 18)  SpO2: 95% (16 Nov 2023 05:33) (94% - 95%)    Parameters below as of 16 Nov 2023 05:33  Patient On (Oxygen Delivery Method): nasal cannula        T(C): 36.4 (11-16-23 @ 05:33), Max: 36.7 (11-15-23 @ 20:39)  T(C): 36.4 (11-16-23 @ 05:33), Max: 36.8 (11-14-23 @ 04:36)  T(C): 36.4 (11-16-23 @ 05:33), Max: 36.9 (11-12-23 @ 20:28)    PHYSICAL EXAM:  HEENT: NC atraumatic  Neck: supple  Respiratory: no accessory muscle use, breathing comfortably  Cardiovascular: distant  Gastrointestinal: normal appearing, nondistended  Extremities: no clubbing, no cyanosis,        LABS:        WBC  5.04 11-10 @ 09:52              Creatinine: 0.92 mg/dL (11-10-23 @ 09:52)                INFLAMMATORY MARKERS      MICROBIOLOGY:              RADIOLOGY & ADDITIONAL STUDIES:

## 2023-11-16 NOTE — PROGRESS NOTE ADULT - SUBJECTIVE AND OBJECTIVE BOX
Patient is a 73y old  Male who presents with a chief complaint of pain (15 Nov 2023 11:36)      INTERVAL HPI/OVERNIGHT EVENTS: noted  pt seen and examined this am   events noted  remains same per wife at bedside        Vital Signs Last 24 Hrs  T(C): 36.6 (16 Nov 2023 12:32), Max: 36.7 (15 Nov 2023 20:39)  T(F): 97.9 (16 Nov 2023 12:32), Max: 98.1 (15 Nov 2023 20:39)  HR: 79 (16 Nov 2023 12:32) (69 - 79)  BP: 159/82 (16 Nov 2023 12:32) (150/72 - 188/78)  BP(mean): --  RR: 19 (16 Nov 2023 12:32) (18 - 19)  SpO2: 91% (16 Nov 2023 12:32) (91% - 95%)    Parameters below as of 16 Nov 2023 12:32  Patient On (Oxygen Delivery Method): room air        acetaminophen     Tablet .. 650 milliGRAM(s) Oral every 6 hours PRN  atenolol  Tablet 50 milliGRAM(s) Oral two times a day  benzocaine 20% Spray 1 Spray(s) Topical four times a day  cholecalciferol 2000 Unit(s) Oral daily  diazepam    Tablet 5 milliGRAM(s) Oral two times a day  fludroCORTISONE 0.1 milliGRAM(s) Oral daily  fluvoxaMINE 150 milliGRAM(s) Oral two times a day  haloperidol     Tablet 0.5 milliGRAM(s) Oral daily  haloperidol     Tablet 1 milliGRAM(s) Oral at bedtime  haloperidol    Injectable 0.5 milliGRAM(s) IntraMuscular at bedtime PRN  heparin   Injectable 5000 Unit(s) SubCutaneous every 12 hours  lactulose Syrup 15 Gram(s) Oral two times a day PRN  losartan 25 milliGRAM(s) Oral daily  melatonin 3 milliGRAM(s) Oral at bedtime  mirtazapine 15 milliGRAM(s) Oral at bedtime  pantoprazole  Injectable 40 milliGRAM(s) IV Push every 12 hours  predniSONE   Tablet 5 milliGRAM(s) Oral daily  QUEtiapine 50 milliGRAM(s) Oral at bedtime  senna 2 Tablet(s) Oral at bedtime  sodium chloride 0.45%. 1000 milliLiter(s) IV Continuous <Continuous>  thiamine IVPB 500 milliGRAM(s) IV Intermittent every 8 hours      PHYSICAL EXAM:  GENERAL: NAD,   EYES: conjunctiva and sclera clear  ENMT: Moist mucous membranes  NECK: Supple, No JVD, Normal thyroid  CHEST/LUNG: non labored, cta b/l  HEART: Regular rate and rhythm; No murmurs, rubs, or gallops  ABDOMEN: Soft, Nontender, Nondistended; Bowel sounds present  EXTREMITIES:  2+ Peripheral Pulses, No clubbing, cyanosis, or edema  LYMPH: No lymphadenopathy noted  SKIN: No rashes or lesions    Consultant(s) Notes Reviewed:  [x ] YES  [ ] NO  Care Discussed with Consultants/Other Providers [ x] YES  [ ] NO    LABS:              CAPILLARY BLOOD GLUCOSE                  RADIOLOGY & ADDITIONAL TESTS:    Imaging Personally Reviewed:  [x ] YES  [ ] NO

## 2023-11-17 LAB
ANION GAP SERPL CALC-SCNC: 5 MMOL/L — SIGNIFICANT CHANGE UP (ref 5–17)
ANION GAP SERPL CALC-SCNC: 5 MMOL/L — SIGNIFICANT CHANGE UP (ref 5–17)
BUN SERPL-MCNC: 11 MG/DL — SIGNIFICANT CHANGE UP (ref 7–23)
BUN SERPL-MCNC: 11 MG/DL — SIGNIFICANT CHANGE UP (ref 7–23)
CALCIUM SERPL-MCNC: 8.5 MG/DL — SIGNIFICANT CHANGE UP (ref 8.5–10.1)
CALCIUM SERPL-MCNC: 8.5 MG/DL — SIGNIFICANT CHANGE UP (ref 8.5–10.1)
CHLORIDE SERPL-SCNC: 107 MMOL/L — SIGNIFICANT CHANGE UP (ref 96–108)
CHLORIDE SERPL-SCNC: 107 MMOL/L — SIGNIFICANT CHANGE UP (ref 96–108)
CO2 SERPL-SCNC: 31 MMOL/L — SIGNIFICANT CHANGE UP (ref 22–31)
CO2 SERPL-SCNC: 31 MMOL/L — SIGNIFICANT CHANGE UP (ref 22–31)
CREAT SERPL-MCNC: 0.99 MG/DL — SIGNIFICANT CHANGE UP (ref 0.5–1.3)
CREAT SERPL-MCNC: 0.99 MG/DL — SIGNIFICANT CHANGE UP (ref 0.5–1.3)
EGFR: 79 ML/MIN/1.73M2 — SIGNIFICANT CHANGE UP
EGFR: 79 ML/MIN/1.73M2 — SIGNIFICANT CHANGE UP
GLUCOSE SERPL-MCNC: 106 MG/DL — HIGH (ref 70–99)
GLUCOSE SERPL-MCNC: 106 MG/DL — HIGH (ref 70–99)
HCT VFR BLD CALC: 29.3 % — LOW (ref 39–50)
HCT VFR BLD CALC: 29.3 % — LOW (ref 39–50)
HGB BLD-MCNC: 10.6 G/DL — LOW (ref 13–17)
HGB BLD-MCNC: 10.6 G/DL — LOW (ref 13–17)
MCHC RBC-ENTMCNC: 30.7 PG — SIGNIFICANT CHANGE UP (ref 27–34)
MCHC RBC-ENTMCNC: 30.7 PG — SIGNIFICANT CHANGE UP (ref 27–34)
MCHC RBC-ENTMCNC: 36.2 GM/DL — HIGH (ref 32–36)
MCHC RBC-ENTMCNC: 36.2 GM/DL — HIGH (ref 32–36)
MCV RBC AUTO: 84.9 FL — SIGNIFICANT CHANGE UP (ref 80–100)
MCV RBC AUTO: 84.9 FL — SIGNIFICANT CHANGE UP (ref 80–100)
NRBC # BLD: 0 /100 WBCS — SIGNIFICANT CHANGE UP (ref 0–0)
NRBC # BLD: 0 /100 WBCS — SIGNIFICANT CHANGE UP (ref 0–0)
PLATELET # BLD AUTO: 165 K/UL — SIGNIFICANT CHANGE UP (ref 150–400)
PLATELET # BLD AUTO: 165 K/UL — SIGNIFICANT CHANGE UP (ref 150–400)
POTASSIUM SERPL-MCNC: 4 MMOL/L — SIGNIFICANT CHANGE UP (ref 3.5–5.3)
POTASSIUM SERPL-MCNC: 4 MMOL/L — SIGNIFICANT CHANGE UP (ref 3.5–5.3)
POTASSIUM SERPL-SCNC: 4 MMOL/L — SIGNIFICANT CHANGE UP (ref 3.5–5.3)
POTASSIUM SERPL-SCNC: 4 MMOL/L — SIGNIFICANT CHANGE UP (ref 3.5–5.3)
RBC # BLD: 3.45 M/UL — LOW (ref 4.2–5.8)
RBC # BLD: 3.45 M/UL — LOW (ref 4.2–5.8)
RBC # FLD: 14.1 % — SIGNIFICANT CHANGE UP (ref 10.3–14.5)
RBC # FLD: 14.1 % — SIGNIFICANT CHANGE UP (ref 10.3–14.5)
SODIUM SERPL-SCNC: 143 MMOL/L — SIGNIFICANT CHANGE UP (ref 135–145)
SODIUM SERPL-SCNC: 143 MMOL/L — SIGNIFICANT CHANGE UP (ref 135–145)
WBC # BLD: 7.4 K/UL — SIGNIFICANT CHANGE UP (ref 3.8–10.5)
WBC # BLD: 7.4 K/UL — SIGNIFICANT CHANGE UP (ref 3.8–10.5)
WBC # FLD AUTO: 7.4 K/UL — SIGNIFICANT CHANGE UP (ref 3.8–10.5)
WBC # FLD AUTO: 7.4 K/UL — SIGNIFICANT CHANGE UP (ref 3.8–10.5)

## 2023-11-17 RX ORDER — DIAZEPAM 5 MG
5 TABLET ORAL EVERY 12 HOURS
Refills: 0 | Status: DISCONTINUED | OUTPATIENT
Start: 2023-11-17 | End: 2023-11-24

## 2023-11-17 RX ORDER — HALOPERIDOL DECANOATE 100 MG/ML
1 INJECTION INTRAMUSCULAR EVERY 12 HOURS
Refills: 0 | Status: DISCONTINUED | OUTPATIENT
Start: 2023-11-17 | End: 2023-12-05

## 2023-11-17 RX ORDER — HALOPERIDOL DECANOATE 100 MG/ML
1 INJECTION INTRAMUSCULAR EVERY 12 HOURS
Refills: 0 | Status: DISCONTINUED | OUTPATIENT
Start: 2023-11-17 | End: 2023-11-23

## 2023-11-17 RX ADMIN — HALOPERIDOL DECANOATE 1 MILLIGRAM(S): 100 INJECTION INTRAMUSCULAR at 17:51

## 2023-11-17 RX ADMIN — PANTOPRAZOLE SODIUM 40 MILLIGRAM(S): 20 TABLET, DELAYED RELEASE ORAL at 06:29

## 2023-11-17 RX ADMIN — HALOPERIDOL DECANOATE 0.5 MILLIGRAM(S): 100 INJECTION INTRAMUSCULAR at 11:26

## 2023-11-17 RX ADMIN — Medication 3 MILLIGRAM(S): at 22:29

## 2023-11-17 RX ADMIN — PANTOPRAZOLE SODIUM 40 MILLIGRAM(S): 20 TABLET, DELAYED RELEASE ORAL at 17:51

## 2023-11-17 RX ADMIN — Medication 105 MILLIGRAM(S): at 06:30

## 2023-11-17 RX ADMIN — FLUDROCORTISONE ACETATE 0.1 MILLIGRAM(S): 0.1 TABLET ORAL at 06:28

## 2023-11-17 RX ADMIN — HEPARIN SODIUM 5000 UNIT(S): 5000 INJECTION INTRAVENOUS; SUBCUTANEOUS at 06:28

## 2023-11-17 RX ADMIN — SENNA PLUS 2 TABLET(S): 8.6 TABLET ORAL at 22:29

## 2023-11-17 RX ADMIN — FLUVOXAMINE MALEATE 150 MILLIGRAM(S): 25 TABLET ORAL at 17:50

## 2023-11-17 RX ADMIN — Medication 5 MILLIGRAM(S): at 17:51

## 2023-11-17 RX ADMIN — ATENOLOL 50 MILLIGRAM(S): 25 TABLET ORAL at 17:51

## 2023-11-17 RX ADMIN — ATENOLOL 50 MILLIGRAM(S): 25 TABLET ORAL at 06:28

## 2023-11-17 RX ADMIN — FLUVOXAMINE MALEATE 150 MILLIGRAM(S): 25 TABLET ORAL at 06:43

## 2023-11-17 RX ADMIN — Medication 105 MILLIGRAM(S): at 14:34

## 2023-11-17 RX ADMIN — MIRTAZAPINE 15 MILLIGRAM(S): 45 TABLET, ORALLY DISINTEGRATING ORAL at 22:29

## 2023-11-17 RX ADMIN — Medication 2000 UNIT(S): at 11:25

## 2023-11-17 RX ADMIN — LOSARTAN POTASSIUM 25 MILLIGRAM(S): 100 TABLET, FILM COATED ORAL at 06:28

## 2023-11-17 RX ADMIN — Medication 5 MILLIGRAM(S): at 06:28

## 2023-11-17 RX ADMIN — SODIUM CHLORIDE 75 MILLILITER(S): 9 INJECTION, SOLUTION INTRAVENOUS at 22:31

## 2023-11-17 RX ADMIN — Medication 105 MILLIGRAM(S): at 22:29

## 2023-11-17 NOTE — PROGRESS NOTE ADULT - ASSESSMENT
IMPRESSION    pt w ITP, Lupus Anticoagulant, HTN, anxiety on Seraquel, OCD, Merkel cell ca of right Forehead, under care Dr Elliot Eng, post 4 doses q3wks IV Keytruda, last dose 10/17/23.  Pt brought in w c/o feeling "claws, daggers" inside him, since C#3 Keytruda, with sensation initially started w various parts of face now also progressed down to sides of body to rectum(does not see actual daggers or claws,)    Wife reports after first cycle started w jaw tightness and progressive worsened w each cycle, to point of hard to swallow/can't swallow  Had tired taken off Seroquel to see if adverse effect but no improvement, now back on x 5-6days  Merkel lesion has essentially resolved    CT head no acute findings  see by Psych in ER, ?psychosis  -sensation described by pt ?psychosis vs neuropathy  -psychosis not noted to be associated w Keytruda. Neuro tox of Keytruda described includes cerebral hemorrhage, confusion, myasthenia gravis, reversible posterior leukoencephalopathy syndrome, syed neuropathy, Guillain [place], aseptic meningitis, encephalitis, transverse myelitis    TFTS unremarkable.   AM cortisol 5.8. Slight low. Unclear significance   Repeat AM Cortisol low x2. <6 on two tests  3rd test w cortisol 11 on 11/06/23  To start Hydrocortisone 11/06/23      Sjorgen /Scleroderma studies abn.      +MICHELLE and +Ro52    no significant change.   CBC continues to improve   seen by psych with meds adjusted and perhaps was starting to show some improvement  is now on prednisone 5 mg po daily, appears same  Per wife maybe some improvement in mental status according to wife  has not been able to have physical therapy ?refuses    RECOMMEND  continue supportive measures    PSYCH  cause of sx remains unclear.   s/p psych evaluation  is on  Haldol  symptomatically improved no change yet    NEURO  s/p neurology eval.   Was offered LP if sx not better  However pt refused LP at inital workup, and appears less likely sx due to meningitis or encephaliti. .  Diagnostic workup was prior discussed at length with pt and wife.     s/p additional neuro   Discussed with Dr. Magdaleno.  Sx seem less likely secondary to Keytruda appears more psychiatric sx  await course, mgmt with psychiatry  Tylenol for pain  Low cortisol to be treated with hydrocortisone and now prednisone  Endocrinology to eval    pain  PRN Tylenol#3. one tabs if mild sx, consider escalation to two tabs if "dagger like sx / pain worse.   Rcvd dose Dilaudid 0.5mg  Benzocain spray PRN    ITP  platelet count 96 K/uL==>106;    will observed.     Endocrine  Prior cortisol levels low borderline.   Repeat today normal.   Was to started hydrocortisone 11/06/32   Placed on prednisone 5 mg po daily  Placed now on Fludrocortisone 0.1mg PO daily.     Sx appears no change since last week.   to have followup physical therapy evaluation

## 2023-11-17 NOTE — PROGRESS NOTE ADULT - SUBJECTIVE AND OBJECTIVE BOX
Patient is a 73y old  Male who presents with a chief complaint of pain (15 Nov 2023 11:36)      INTERVAL HPI/OVERNIGHT EVENTS: noted  pt seen and examined this am   events noted      Vital Signs Last 24 Hrs  T(C): 36.7 (17 Nov 2023 11:46), Max: 36.9 (16 Nov 2023 21:23)  T(F): 98 (17 Nov 2023 11:46), Max: 98.5 (16 Nov 2023 21:23)  HR: 71 (17 Nov 2023 17:50) (71 - 74)  BP: 150/76 (17 Nov 2023 17:50) (150/71 - 176/79)  BP(mean): --  RR: 18 (17 Nov 2023 11:46) (18 - 18)  SpO2: 93% (17 Nov 2023 11:46) (93% - 93%)    Parameters below as of 17 Nov 2023 11:46  Patient On (Oxygen Delivery Method): room air        acetaminophen     Tablet .. 650 milliGRAM(s) Oral every 6 hours PRN  atenolol  Tablet 50 milliGRAM(s) Oral two times a day  benzocaine 20% Spray 1 Spray(s) Topical four times a day  cholecalciferol 2000 Unit(s) Oral daily  diazepam    Tablet 5 milliGRAM(s) Oral every 12 hours  fludroCORTISONE 0.1 milliGRAM(s) Oral daily  fluvoxaMINE 150 milliGRAM(s) Oral two times a day  haloperidol     Tablet 1 milliGRAM(s) Oral every 12 hours  haloperidol    Injectable 1 milliGRAM(s) IntraMuscular every 12 hours PRN  heparin   Injectable 5000 Unit(s) SubCutaneous every 12 hours  lactulose Syrup 15 Gram(s) Oral two times a day PRN  losartan 25 milliGRAM(s) Oral daily  melatonin 3 milliGRAM(s) Oral at bedtime  mirtazapine 15 milliGRAM(s) Oral at bedtime  pantoprazole  Injectable 40 milliGRAM(s) IV Push every 12 hours  predniSONE   Tablet 5 milliGRAM(s) Oral daily  senna 2 Tablet(s) Oral at bedtime  sodium chloride 0.45%. 1000 milliLiter(s) IV Continuous <Continuous>  thiamine IVPB 500 milliGRAM(s) IV Intermittent every 8 hours      PHYSICAL EXAM:  GENERAL: NAD,   EYES: conjunctiva and sclera clear  ENMT: Moist mucous membranes  NECK: Supple, No JVD, Normal thyroid  CHEST/LUNG: non labored, cta b/l  HEART: Regular rate and rhythm; No murmurs, rubs, or gallops  ABDOMEN: Soft, Nontender, Nondistended; Bowel sounds present  EXTREMITIES:  2+ Peripheral Pulses, No clubbing, cyanosis, or edema  LYMPH: No lymphadenopathy noted  SKIN: No rashes or lesions    Consultant(s) Notes Reviewed:  [x ] YES  [ ] NO  Care Discussed with Consultants/Other Providers [ x] YES  [ ] NO    LABS:                        10.6   7.40  )-----------( 165      ( 17 Nov 2023 09:00 )             29.3     11-17    143  |  107  |  11  ----------------------------<  106<H>  4.0   |  31  |  0.99    Ca    8.5      17 Nov 2023 09:00        Urinalysis Basic - ( 17 Nov 2023 09:00 )    Color: x / Appearance: x / SG: x / pH: x  Gluc: 106 mg/dL / Ketone: x  / Bili: x / Urobili: x   Blood: x / Protein: x / Nitrite: x   Leuk Esterase: x / RBC: x / WBC x   Sq Epi: x / Non Sq Epi: x / Bacteria: x      CAPILLARY BLOOD GLUCOSE            Urinalysis Basic - ( 17 Nov 2023 09:00 )    Color: x / Appearance: x / SG: x / pH: x  Gluc: 106 mg/dL / Ketone: x  / Bili: x / Urobili: x   Blood: x / Protein: x / Nitrite: x   Leuk Esterase: x / RBC: x / WBC x   Sq Epi: x / Non Sq Epi: x / Bacteria: x          RADIOLOGY & ADDITIONAL TESTS:    Imaging Personally Reviewed:  [x ] YES  [ ] NO

## 2023-11-17 NOTE — BH CONSULTATION LIAISON PROGRESS NOTE - NSBHCONSULTPRIMARYDISCUSSYES_PSY_A_CORE FT
Increase Remeron to 15 mg po at HS, Increase Seroquel to 150 mg po at HS
Dr. Nava as above
Increase Remeron to 15 mg po at HS, Increase Seroquel to 150 mg po at HS
Continue Luvox 150 mg po bid. Remeron 30 mg po at HS. Seroquel 150 mg po at HS.
D/w NP
D/w NP
Increase Luvox to 150 mg po bid.
Increase Remeron to 15 mg po at HS, Increase Seroquel to 150 mg po at HS
D/w NP

## 2023-11-17 NOTE — PROGRESS NOTE ADULT - ASSESSMENT
73 M pmh of HTN, ITP, APL syndrome, generalized anxiety disorder, OCD, newly dx aggressive Merkel Cell cancer of forehead- pt's oncologist  Dr. Eng, at [facility] on Keytruda infusions with good response per wife  was brought to ED for worsening psychosis for the past few days, pt lately with difficulty swallowing, decreased po intake    Psychosis  monitor  psych fu  MRI noted  neuro following  cont luvox and remeron  lower seroquel dosing to off  psych- started haldol 1mg qhs and 0.5mg am    #Abnormal throat sensation- sharp object sensation  swallow eval appreciated  GI consult noted- r/o esophagitis- PPI started  if no improvemnt ?esophagram    #HTN- +orthostasis  #Orthostatic hypotension- hold losartan,  IVF   cont atenolol w parameters  ? psych med induced  adrenal insufficiency  continue prednisone  and  florinef  endo following  possible hypophysitis      #Anxiety  psych cs appreciated  continue valium  on remeron, seroquel and luvox  telepsych fu        #Merkel cell ca  onc c/s  r/o paraneoplastic causes- unlikely      #DVT ppx-  hep sq      [facility]   [phone]

## 2023-11-17 NOTE — PROGRESS NOTE ADULT - SUBJECTIVE AND OBJECTIVE BOX
[facility]  Walter Camarena PA-C  [address] [place] [address]  [phone]      INTERVAL HPI/OVERNIGHT EVENTS:  Pt s/e with wife at bedside  Pt still c/o odynophagia  Otherwise no GI complaints    MEDICATIONS  (STANDING):  atenolol  Tablet 50 milliGRAM(s) Oral two times a day  benzocaine 20% Spray 1 Spray(s) Topical four times a day  cholecalciferol 2000 Unit(s) Oral daily  fludroCORTISONE 0.1 milliGRAM(s) Oral daily  fluvoxaMINE 150 milliGRAM(s) Oral two times a day  haloperidol     Tablet 0.5 milliGRAM(s) Oral daily  haloperidol     Tablet 1 milliGRAM(s) Oral at bedtime  heparin   Injectable 5000 Unit(s) SubCutaneous every 12 hours  losartan 25 milliGRAM(s) Oral daily  melatonin 3 milliGRAM(s) Oral at bedtime  mirtazapine 15 milliGRAM(s) Oral at bedtime  pantoprazole  Injectable 40 milliGRAM(s) IV Push every 12 hours  predniSONE   Tablet 5 milliGRAM(s) Oral daily  QUEtiapine 50 milliGRAM(s) Oral at bedtime  senna 2 Tablet(s) Oral at bedtime  sodium chloride 0.45%. 1000 milliLiter(s) (75 mL/Hr) IV Continuous <Continuous>  thiamine IVPB 500 milliGRAM(s) IV Intermittent every 8 hours    MEDICATIONS  (PRN):  acetaminophen     Tablet .. 650 milliGRAM(s) Oral every 6 hours PRN Temp greater or equal to 38.5C (101.3F), Moderate Pain (4 - 6)  haloperidol    Injectable 0.5 milliGRAM(s) IntraMuscular at bedtime PRN if not taking po  lactulose Syrup 15 Gram(s) Oral two times a day PRN constipation      Allergies    No Known Allergies        PHYSICAL EXAM:   Vital Signs:  Vital Signs Last 24 Hrs  T(C): 36.7 (17 Nov 2023 05:17), Max: 36.9 (16 Nov 2023 21:23)  T(F): 98.1 (17 Nov 2023 05:17), Max: 98.5 (16 Nov 2023 21:23)  HR: 71 (17 Nov 2023 05:17) (69 - 79)  BP: 169/75 (17 Nov 2023 05:17) (159/82 - 176/79)  BP(mean): --  RR: 18 (17 Nov 2023 05:17) (18 - 19)  SpO2: 93% (17 Nov 2023 05:17) (91% - 93%)    Parameters below as of 17 Nov 2023 05:17  Patient On (Oxygen Delivery Method): room air      GENERAL:  Appears stated age  HEENT:  NC/AT  CHEST:  Full & symmetric excursion  HEART:  Regular rhythm  ABDOMEN:  Soft, non-tender, non-distended  EXTEREMITIES:  no cyanosis  SKIN:  No rash  NEURO:  Alert      LABS:                        10.6   7.40  )-----------( 165      ( 17 Nov 2023 09:00 )             29.3     11-17    143  |  107  |  11  ----------------------------<  106<H>  4.0   |  31  |  0.99    Ca    8.5      17 Nov 2023 09:00        Urinalysis Basic - ( 17 Nov 2023 09:00 )    Color: x / Appearance: x / SG: x / pH: x  Gluc: 106 mg/dL / Ketone: x  / Bili: x / Urobili: x   Blood: x / Protein: x / Nitrite: x   Leuk Esterase: x / RBC: x / WBC x   Sq Epi: x / Non Sq Epi: x / Bacteria: x

## 2023-11-17 NOTE — BH CONSULTATION LIAISON PROGRESS NOTE - NSBHASSESSMENTFT_PSY_ALL_CORE
Pt is a 74 y/o m with HTN, h/o severe anxiety around medical illness and constantly on the look for illnesses brought in by wife after he started to become more and more psychotic with each round of Keytruda for his Merkel Cell Ca. Pt is delusional thinking he has metals, objects in his throat till his anus and penis, unable to tolerate PO. There are case reports of rare psychosis with monoclonal antibodies. Keytruda seems like the possible suspect of psychosis, combined with wife's reports of timeline, although can't be sure, time will show. Steroids are probably not helping psychiatrically either.     11/15-16: No psych PRNs. VS notable for elevated BP. Pt continues to c/o throat pain/metal sensation in mouth/dysphagia. Although pt presenting w/ delusional thought content, would consider endoscopy for any strictures/esophagitis.   11.17 some improvement. better related, better engagement. not lehargic. slept well. wife notices he is improved.   denies s/e from haldol. remains somatically preoccupied.       RECOMMENDATIONS:  1. Continue Luvox 150 mg po bid and diazepam 5 mg po bid for anxiety. He has been on them > at least 12 yrs.  2.. Would cont seroquel taper - may d/c entirely now   3.. cont Haldol 1 mg po bid  Black box warning and sudden death risk discussed with wife. Please crush and give it in apple sauce for easy swallowing. If pt unable to take PO, pls administer it IV. Make sure to hold it if QTc>500. If tolerates well, please increase the dose to address psychosis.   5. No benefit from mirtazapine so far. Decrease Mirtazapine from 30 mg to 15 mg with the intention to taper off or observe for benefit for sleep and appetite at lower doses.

## 2023-11-17 NOTE — SOCIAL WORK PROGRESS NOTE - NSSWPROGRESSNOTE_GEN_ALL_CORE
Pt discussed in rounds, continues to be followed by psych, SW will continue to follow to ensure safe transitional planning upon DC.

## 2023-11-17 NOTE — PROGRESS NOTE ADULT - ASSESSMENT
Odynophagia    No foreign body on CT neck/chest  ?esophagitis  Cont PPI  Encourage PO intake  Will follow  D/w wife

## 2023-11-17 NOTE — PROGRESS NOTE ADULT - ASSESSMENT
73 M pmh of HTN, ITP, APL syndrome, generalized anxiety disorder, OCD, newly dx aggressive Merkel Cell cancer of forehead- pt's oncologist  Dr. Eng, at [facility] on Keytruda (PD-1 blocker) infusions with good response per wife  was brought to ED for worsening psychosis for the past few days, pt believes there are sharp things inside his body trying to come out of his mouth, stuck in throat, pt lately with difficulty swallowing, decreased po intake, pt was evaluated by psych in ED. Wife reports sudden cognitive decline and behavioral changes immediately after COVID infection.    RECOMMENDATIONS  1-?PASC  -sounds like there is no obvious acute infectious issue. no fever, no leukocytosis, no obvious localization but concerning history of things developing post COVID-19 infection so raises concerns for a neurological PASC picture  noted serology for EBV consistent with reactivation as described post PASC  Serotonin, Blood: <30ng/mL   am cortisol-low x 2 then nl  ---  -melatonin 3mg PO QHS  -prednisone 5mg PO Qam  -fludrocortisone 0.1mg PO Qam  -fluvoxamine 150mg PO BID     input by telepsych   -in outpt setting consideration of bifidobacterium probiotics    11/17 Jan is improving so will follow    Thank you for consulting us and involving us in the management of this most interesting and challenging case.  We will follow along in the care of this patient. Please call us at [phone] or text me directly on my cell# at [phone] with any concerns.

## 2023-11-17 NOTE — CASE MANAGEMENT PROGRESS NOTE - NSCMPROGRESSNOTE_GEN_ALL_CORE
Discussed patient in interdisciplinary rounds.  Patient being managed by psych.  RN reports poor PO intake and patient not participating with therapy services.  Will coordinate with SW for transition planning.  Will remain available.

## 2023-11-17 NOTE — PROGRESS NOTE ADULT - SUBJECTIVE AND OBJECTIVE BOX
Interval History:  more awake today  improved alittle according to wife    Chart reviewed and events noted;   Overnight events:    MEDICATIONS  (STANDING):  atenolol  Tablet 50 milliGRAM(s) Oral two times a day  benzocaine 20% Spray 1 Spray(s) Topical four times a day  cholecalciferol 2000 Unit(s) Oral daily  diazepam    Tablet 5 milliGRAM(s) Oral every 12 hours  fludroCORTISONE 0.1 milliGRAM(s) Oral daily  fluvoxaMINE 150 milliGRAM(s) Oral two times a day  haloperidol     Tablet 1 milliGRAM(s) Oral every 12 hours  heparin   Injectable 5000 Unit(s) SubCutaneous every 12 hours  losartan 25 milliGRAM(s) Oral daily  melatonin 3 milliGRAM(s) Oral at bedtime  mirtazapine 15 milliGRAM(s) Oral at bedtime  pantoprazole  Injectable 40 milliGRAM(s) IV Push every 12 hours  predniSONE   Tablet 5 milliGRAM(s) Oral daily  senna 2 Tablet(s) Oral at bedtime  sodium chloride 0.45%. 1000 milliLiter(s) (75 mL/Hr) IV Continuous <Continuous>  thiamine IVPB 500 milliGRAM(s) IV Intermittent every 8 hours    MEDICATIONS  (PRN):  acetaminophen     Tablet .. 650 milliGRAM(s) Oral every 6 hours PRN Temp greater or equal to 38.5C (101.3F), Moderate Pain (4 - 6)  haloperidol    Injectable 1 milliGRAM(s) IntraMuscular every 12 hours PRN Agitation  lactulose Syrup 15 Gram(s) Oral two times a day PRN constipation      Vital Signs Last 24 Hrs  T(C): 36.7 (17 Nov 2023 11:46), Max: 36.9 (16 Nov 2023 21:23)  T(F): 98 (17 Nov 2023 11:46), Max: 98.5 (16 Nov 2023 21:23)  HR: 74 (17 Nov 2023 11:46) (69 - 74)  BP: 150/71 (17 Nov 2023 11:46) (150/71 - 176/79)  BP(mean): --  RR: 18 (17 Nov 2023 11:46) (18 - 18)  SpO2: 93% (17 Nov 2023 11:46) (93% - 93%)    Parameters below as of 17 Nov 2023 11:46  Patient On (Oxygen Delivery Method): room air        PHYSICAL EXAM  General: adult in NAD, chronically ill appearing  HEENT: clear oropharynx, anicteric sclera, pink conjunctivae  Neck: supple  CV: normal S1S2 with no murmur rubs or gallops  Lungs: clear to auscultation, no wheezes, no rhales  Abdomen: soft non-tender non-distended, no hepato/splenomegaly  Ext: no clubbing cyanosis or edema  Skin: no rashes and no petichiae  Neuro: alert and oriented X3 still occasionally agitated      LABS:  CBC Full  -  ( 17 Nov 2023 09:00 )  WBC Count : 7.40 K/uL  RBC Count : 3.45 M/uL  Hemoglobin : 10.6 g/dL  Hematocrit : 29.3 %  Platelet Count - Automated : 165 K/uL  Mean Cell Volume : 84.9 fl  Mean Cell Hemoglobin : 30.7 pg  Mean Cell Hemoglobin Concentration : 36.2 gm/dL  Auto Neutrophil # : x  Auto Lymphocyte # : x  Auto Monocyte # : x  Auto Eosinophil # : x  Auto Basophil # : x  Auto Neutrophil % : x  Auto Lymphocyte % : x  Auto Monocyte % : x  Auto Eosinophil % : x  Auto Basophil % : x    11-17    143  |  107  |  11  ----------------------------<  106<H>  4.0   |  31  |  0.99    Ca    8.5      17 Nov 2023 09:00          fe studies      WBC trend  7.40 K/uL (11-17-23 @ 09:00)      Hgb trend  10.6 g/dL (11-17-23 @ 09:00)      plt trend  165 K/uL (11-17-23 @ 09:00)        RADIOLOGY & ADDITIONAL STUDIES:

## 2023-11-17 NOTE — PROGRESS NOTE ADULT - SUBJECTIVE AND OBJECTIVE BOX
[facility]FECTIOUS [facility]  Eric Reyes MD PhD, Milena Underwood MD, Jillian Mckoy MD, Enid Everett MD, Jay Krsihna MD  and providing coverage with Arthur Pepper MD  Providing Infectious Disease Consultations at [facility], [facility], [facility]    Office# [phone] to schedule follow up appointments  Answering Service for urgent calls or New Consults [phone]  Cell# to text for urgent issues Eric Reyes [phone]     infectious diseases progress note:    JANICE RENDON is a 73y y. o. Male patient    Overnight and events of the last 24hrs reviewed    Allergies    No Known Allergies    Intolerances        ANTIBIOTICS/RELEVANT:  antimicrobials    immunologic:    OTHER:  acetaminophen     Tablet .. 650 milliGRAM(s) Oral every 6 hours PRN  atenolol  Tablet 50 milliGRAM(s) Oral two times a day  benzocaine 20% Spray 1 Spray(s) Topical four times a day  cholecalciferol 2000 Unit(s) Oral daily  fludroCORTISONE 0.1 milliGRAM(s) Oral daily  fluvoxaMINE 150 milliGRAM(s) Oral two times a day  haloperidol     Tablet 0.5 milliGRAM(s) Oral daily  haloperidol     Tablet 1 milliGRAM(s) Oral at bedtime  haloperidol    Injectable 0.5 milliGRAM(s) IntraMuscular at bedtime PRN  heparin   Injectable 5000 Unit(s) SubCutaneous every 12 hours  lactulose Syrup 15 Gram(s) Oral two times a day PRN  losartan 25 milliGRAM(s) Oral daily  melatonin 3 milliGRAM(s) Oral at bedtime  mirtazapine 15 milliGRAM(s) Oral at bedtime  pantoprazole  Injectable 40 milliGRAM(s) IV Push every 12 hours  predniSONE   Tablet 5 milliGRAM(s) Oral daily  QUEtiapine 50 milliGRAM(s) Oral at bedtime  senna 2 Tablet(s) Oral at bedtime  sodium chloride 0.45%. 1000 milliLiter(s) IV Continuous <Continuous>  thiamine IVPB 500 milliGRAM(s) IV Intermittent every 8 hours      Objective:  Vital Signs Last 24 Hrs  T(C): 36.7 (17 Nov 2023 11:46), Max: 36.9 (16 Nov 2023 21:23)  T(F): 98 (17 Nov 2023 11:46), Max: 98.5 (16 Nov 2023 21:23)  HR: 74 (17 Nov 2023 11:46) (69 - 79)  BP: 150/71 (17 Nov 2023 11:46) (150/71 - 176/79)  BP(mean): --  RR: 18 (17 Nov 2023 11:46) (18 - 19)  SpO2: 93% (17 Nov 2023 11:46) (91% - 93%)    Parameters below as of 17 Nov 2023 11:46  Patient On (Oxygen Delivery Method): room air        T(C): 36.7 (11-17-23 @ 11:46), Max: 36.9 (11-16-23 @ 21:23)  T(C): 36.7 (11-17-23 @ 11:46), Max: 36.9 (11-16-23 @ 21:23)  T(C): 36.7 (11-17-23 @ 11:46), Max: 36.9 (11-16-23 @ 21:23)    PHYSICAL EXAM:  HEENT: NC atraumatic  Neck: supple  Respiratory: no accessory muscle use, breathing comfortably  Cardiovascular: distant  Gastrointestinal: normal appearing, nondistended  Extremities: no clubbing, no cyanosis,        LABS:                          10.6   7.40  )-----------( 165      ( 17 Nov 2023 09:00 )             29.3       WBC  7.40 11-17 @ 09:00      11-17    143  |  107  |  11  ----------------------------<  106<H>  4.0   |  31  |  0.99    Ca    8.5      17 Nov 2023 09:00        Creatinine: 0.99 mg/dL (11-17-23 @ 09:00)        Urinalysis Basic - ( 17 Nov 2023 09:00 )    Color: x / Appearance: x / SG: x / pH: x  Gluc: 106 mg/dL / Ketone: x  / Bili: x / Urobili: x   Blood: x / Protein: x / Nitrite: x   Leuk Esterase: x / RBC: x / WBC x   Sq Epi: x / Non Sq Epi: x / Bacteria: x            INFLAMMATORY MARKERS      MICROBIOLOGY:              RADIOLOGY & ADDITIONAL STUDIES:

## 2023-11-17 NOTE — PROGRESS NOTE ADULT - SUBJECTIVE AND OBJECTIVE BOX
Chief Complaint: Psychosis    Interval Events: No events overnight.    Review of Systems:  General: No fevers, chills, weight gain  Skin: No rashes, color changes  Cardiovascular: No chest pain, orthopnea  Respiratory: No shortness of breath, cough  Gastrointestinal: No nausea, abdominal pain  Genitourinary: No incontinence, pain with urination  Musculoskeletal: No pain, swelling, decreased range of motion  Neurological: No headache, weakness  Psychiatric: No depression, anxiety  Endocrine: No weight gain, increased thirst  All other systems are comprehensively negative.    Physical Exam:  Vital Signs Last 24 Hrs  T(C): 36.7 (17 Nov 2023 05:17), Max: 36.9 (16 Nov 2023 21:23)  T(F): 98.1 (17 Nov 2023 05:17), Max: 98.5 (16 Nov 2023 21:23)  HR: 71 (17 Nov 2023 05:17) (69 - 79)  BP: 169/75 (17 Nov 2023 05:17) (159/82 - 176/79)  BP(mean): --  RR: 18 (17 Nov 2023 05:17) (18 - 19)  SpO2: 93% (17 Nov 2023 05:17) (91% - 93%)  Parameters below as of 17 Nov 2023 05:17  Patient On (Oxygen Delivery Method): room air  General: NAD  HEENT: MMM  Neck: No JVD, no carotid bruit  Lungs: CTAB  CV: RRR, nl S1/S2, no M/R/G  Abdomen: S/NT/ND, +BS  Extremities: No LE edema, no cyanosis  Neuro: AAOx3, non-focal  Skin: No rash    Labs:

## 2023-11-17 NOTE — PROGRESS NOTE ADULT - ASSESSMENT
The patient is a 73 year old male with a history of HTN, ITP, APLS, anxiety, OCD, Merkel cell cancer who presented with psychosis.    Plan:  - ECG with no evidence of ischemia or infarction  - Orthostatic hypotension may be multifactorial - dehydration, medication related (antipsychotics), and low cortisol levels  - Orthostatics have not been repeated  - Continue atenolol 50 mg bid  - Continue losartan 25 mg daily

## 2023-11-18 LAB
24R-OH-CALCIDIOL SERPL-MCNC: 34.5 NG/ML — SIGNIFICANT CHANGE UP (ref 30–80)
24R-OH-CALCIDIOL SERPL-MCNC: 34.5 NG/ML — SIGNIFICANT CHANGE UP (ref 30–80)
ANION GAP SERPL CALC-SCNC: 6 MMOL/L — SIGNIFICANT CHANGE UP (ref 5–17)
ANION GAP SERPL CALC-SCNC: 6 MMOL/L — SIGNIFICANT CHANGE UP (ref 5–17)
BUN SERPL-MCNC: 11 MG/DL — SIGNIFICANT CHANGE UP (ref 7–23)
BUN SERPL-MCNC: 11 MG/DL — SIGNIFICANT CHANGE UP (ref 7–23)
CALCIUM SERPL-MCNC: 8.5 MG/DL — SIGNIFICANT CHANGE UP (ref 8.5–10.1)
CALCIUM SERPL-MCNC: 8.5 MG/DL — SIGNIFICANT CHANGE UP (ref 8.5–10.1)
CHLORIDE SERPL-SCNC: 109 MMOL/L — HIGH (ref 96–108)
CHLORIDE SERPL-SCNC: 109 MMOL/L — HIGH (ref 96–108)
CO2 SERPL-SCNC: 30 MMOL/L — SIGNIFICANT CHANGE UP (ref 22–31)
CO2 SERPL-SCNC: 30 MMOL/L — SIGNIFICANT CHANGE UP (ref 22–31)
CREAT SERPL-MCNC: 0.97 MG/DL — SIGNIFICANT CHANGE UP (ref 0.5–1.3)
CREAT SERPL-MCNC: 0.97 MG/DL — SIGNIFICANT CHANGE UP (ref 0.5–1.3)
EGFR: 81 ML/MIN/1.73M2 — SIGNIFICANT CHANGE UP
EGFR: 81 ML/MIN/1.73M2 — SIGNIFICANT CHANGE UP
GLUCOSE SERPL-MCNC: 119 MG/DL — HIGH (ref 70–99)
GLUCOSE SERPL-MCNC: 119 MG/DL — HIGH (ref 70–99)
HCT VFR BLD CALC: 29.1 % — LOW (ref 39–50)
HCT VFR BLD CALC: 29.1 % — LOW (ref 39–50)
HGB BLD-MCNC: 10.4 G/DL — LOW (ref 13–17)
HGB BLD-MCNC: 10.4 G/DL — LOW (ref 13–17)
MCHC RBC-ENTMCNC: 30.6 PG — SIGNIFICANT CHANGE UP (ref 27–34)
MCHC RBC-ENTMCNC: 30.6 PG — SIGNIFICANT CHANGE UP (ref 27–34)
MCHC RBC-ENTMCNC: 35.7 GM/DL — SIGNIFICANT CHANGE UP (ref 32–36)
MCHC RBC-ENTMCNC: 35.7 GM/DL — SIGNIFICANT CHANGE UP (ref 32–36)
MCV RBC AUTO: 85.6 FL — SIGNIFICANT CHANGE UP (ref 80–100)
MCV RBC AUTO: 85.6 FL — SIGNIFICANT CHANGE UP (ref 80–100)
NRBC # BLD: 0 /100 WBCS — SIGNIFICANT CHANGE UP (ref 0–0)
NRBC # BLD: 0 /100 WBCS — SIGNIFICANT CHANGE UP (ref 0–0)
PLATELET # BLD AUTO: 148 K/UL — LOW (ref 150–400)
PLATELET # BLD AUTO: 148 K/UL — LOW (ref 150–400)
POTASSIUM SERPL-MCNC: 3.6 MMOL/L — SIGNIFICANT CHANGE UP (ref 3.5–5.3)
POTASSIUM SERPL-MCNC: 3.6 MMOL/L — SIGNIFICANT CHANGE UP (ref 3.5–5.3)
POTASSIUM SERPL-SCNC: 3.6 MMOL/L — SIGNIFICANT CHANGE UP (ref 3.5–5.3)
POTASSIUM SERPL-SCNC: 3.6 MMOL/L — SIGNIFICANT CHANGE UP (ref 3.5–5.3)
RBC # BLD: 3.4 M/UL — LOW (ref 4.2–5.8)
RBC # BLD: 3.4 M/UL — LOW (ref 4.2–5.8)
RBC # FLD: 14.6 % — HIGH (ref 10.3–14.5)
RBC # FLD: 14.6 % — HIGH (ref 10.3–14.5)
SODIUM SERPL-SCNC: 145 MMOL/L — SIGNIFICANT CHANGE UP (ref 135–145)
SODIUM SERPL-SCNC: 145 MMOL/L — SIGNIFICANT CHANGE UP (ref 135–145)
WBC # BLD: 6.96 K/UL — SIGNIFICANT CHANGE UP (ref 3.8–10.5)
WBC # BLD: 6.96 K/UL — SIGNIFICANT CHANGE UP (ref 3.8–10.5)
WBC # FLD AUTO: 6.96 K/UL — SIGNIFICANT CHANGE UP (ref 3.8–10.5)
WBC # FLD AUTO: 6.96 K/UL — SIGNIFICANT CHANGE UP (ref 3.8–10.5)

## 2023-11-18 RX ADMIN — Medication 5 MILLIGRAM(S): at 20:29

## 2023-11-18 RX ADMIN — PANTOPRAZOLE SODIUM 40 MILLIGRAM(S): 20 TABLET, DELAYED RELEASE ORAL at 06:10

## 2023-11-18 RX ADMIN — HALOPERIDOL DECANOATE 1 MILLIGRAM(S): 100 INJECTION INTRAMUSCULAR at 06:10

## 2023-11-18 RX ADMIN — ATENOLOL 50 MILLIGRAM(S): 25 TABLET ORAL at 06:10

## 2023-11-18 RX ADMIN — FLUVOXAMINE MALEATE 150 MILLIGRAM(S): 25 TABLET ORAL at 06:14

## 2023-11-18 RX ADMIN — Medication 650 MILLIGRAM(S): at 07:02

## 2023-11-18 RX ADMIN — ATENOLOL 50 MILLIGRAM(S): 25 TABLET ORAL at 20:29

## 2023-11-18 RX ADMIN — FLUVOXAMINE MALEATE 150 MILLIGRAM(S): 25 TABLET ORAL at 20:30

## 2023-11-18 RX ADMIN — Medication 5 MILLIGRAM(S): at 06:10

## 2023-11-18 RX ADMIN — MIRTAZAPINE 15 MILLIGRAM(S): 45 TABLET, ORALLY DISINTEGRATING ORAL at 21:06

## 2023-11-18 RX ADMIN — SODIUM CHLORIDE 75 MILLILITER(S): 9 INJECTION, SOLUTION INTRAVENOUS at 12:41

## 2023-11-18 RX ADMIN — Medication 105 MILLIGRAM(S): at 21:06

## 2023-11-18 RX ADMIN — HALOPERIDOL DECANOATE 1 MILLIGRAM(S): 100 INJECTION INTRAMUSCULAR at 20:28

## 2023-11-18 RX ADMIN — Medication 105 MILLIGRAM(S): at 06:09

## 2023-11-18 RX ADMIN — Medication 650 MILLIGRAM(S): at 06:27

## 2023-11-18 RX ADMIN — Medication 105 MILLIGRAM(S): at 13:37

## 2023-11-18 RX ADMIN — HEPARIN SODIUM 5000 UNIT(S): 5000 INJECTION INTRAVENOUS; SUBCUTANEOUS at 06:10

## 2023-11-18 RX ADMIN — PANTOPRAZOLE SODIUM 40 MILLIGRAM(S): 20 TABLET, DELAYED RELEASE ORAL at 20:30

## 2023-11-18 RX ADMIN — Medication 5 MILLIGRAM(S): at 06:14

## 2023-11-18 RX ADMIN — Medication 2000 UNIT(S): at 12:41

## 2023-11-18 RX ADMIN — SENNA PLUS 2 TABLET(S): 8.6 TABLET ORAL at 21:06

## 2023-11-18 RX ADMIN — FLUDROCORTISONE ACETATE 0.1 MILLIGRAM(S): 0.1 TABLET ORAL at 06:10

## 2023-11-18 RX ADMIN — Medication 1 SPRAY(S): at 12:41

## 2023-11-18 RX ADMIN — LOSARTAN POTASSIUM 25 MILLIGRAM(S): 100 TABLET, FILM COATED ORAL at 06:14

## 2023-11-18 RX ADMIN — HEPARIN SODIUM 5000 UNIT(S): 5000 INJECTION INTRAVENOUS; SUBCUTANEOUS at 20:29

## 2023-11-18 RX ADMIN — Medication 3 MILLIGRAM(S): at 21:06

## 2023-11-18 NOTE — PROGRESS NOTE ADULT - SUBJECTIVE AND OBJECTIVE BOX
Interval History:  no new complaints  still uncomfortable   Chart reviewed and events noted;   Overnight events:    MEDICATIONS  (STANDING):  atenolol  Tablet 50 milliGRAM(s) Oral two times a day  benzocaine 20% Spray 1 Spray(s) Topical four times a day  cholecalciferol 2000 Unit(s) Oral daily  diazepam    Tablet 5 milliGRAM(s) Oral every 12 hours  fludroCORTISONE 0.1 milliGRAM(s) Oral daily  fluvoxaMINE 150 milliGRAM(s) Oral two times a day  haloperidol     Tablet 1 milliGRAM(s) Oral every 12 hours  heparin   Injectable 5000 Unit(s) SubCutaneous every 12 hours  losartan 25 milliGRAM(s) Oral daily  melatonin 3 milliGRAM(s) Oral at bedtime  mirtazapine 15 milliGRAM(s) Oral at bedtime  pantoprazole  Injectable 40 milliGRAM(s) IV Push every 12 hours  predniSONE   Tablet 5 milliGRAM(s) Oral daily  senna 2 Tablet(s) Oral at bedtime  sodium chloride 0.45%. 1000 milliLiter(s) (75 mL/Hr) IV Continuous <Continuous>  thiamine IVPB 500 milliGRAM(s) IV Intermittent every 8 hours    MEDICATIONS  (PRN):  acetaminophen     Tablet .. 650 milliGRAM(s) Oral every 6 hours PRN Temp greater or equal to 38.5C (101.3F), Moderate Pain (4 - 6)  haloperidol    Injectable 1 milliGRAM(s) IntraMuscular every 12 hours PRN Agitation  lactulose Syrup 15 Gram(s) Oral two times a day PRN constipation      Vital Signs Last 24 Hrs  T(C): 36.6 (18 Nov 2023 20:25), Max: 36.6 (18 Nov 2023 05:09)  T(F): 97.9 (18 Nov 2023 20:25), Max: 97.9 (18 Nov 2023 05:09)  HR: 72 (18 Nov 2023 20:25) (69 - 72)  BP: 168/84 (18 Nov 2023 20:25) (168/78 - 168/84)  BP(mean): --  RR: 17 (18 Nov 2023 20:25) (17 - 17)  SpO2: 94% (18 Nov 2023 20:25) (93% - 97%)    Parameters below as of 18 Nov 2023 20:25  Patient On (Oxygen Delivery Method): room air        PHYSICAL EXAM  General: adult in NAD, chronically ill appearing  HEENT: clear oropharynx, anicteric sclera, pink conjunctivae  Neck: supple  CV: normal S1S2 with no murmur rubs or gallops  Lungs: clear to auscultation, no wheezes, no rhales  Abdomen: soft non-tender non-distended, no hepato/splenomegaly  Ext: no clubbing cyanosis or edema  Skin: no rashes and no petichiae  Neuro: alert and oriented X3 no focal deficits occasionally agitated       LABS:  CBC Full  -  ( 18 Nov 2023 06:50 )  WBC Count : 6.96 K/uL  RBC Count : 3.40 M/uL  Hemoglobin : 10.4 g/dL  Hematocrit : 29.1 %  Platelet Count - Automated : 148 K/uL  Mean Cell Volume : 85.6 fl  Mean Cell Hemoglobin : 30.6 pg  Mean Cell Hemoglobin Concentration : 35.7 gm/dL  Auto Neutrophil # : x  Auto Lymphocyte # : x  Auto Monocyte # : x  Auto Eosinophil # : x  Auto Basophil # : x  Auto Neutrophil % : x  Auto Lymphocyte % : x  Auto Monocyte % : x  Auto Eosinophil % : x  Auto Basophil % : x    11-18    145  |  109<H>  |  11  ----------------------------<  119<H>  3.6   |  30  |  0.97    Ca    8.5      18 Nov 2023 06:50          fe studies      WBC trend  6.96 K/uL (11-18-23 @ 06:50)  7.40 K/uL (11-17-23 @ 09:00)      Hgb trend  10.4 g/dL (11-18-23 @ 06:50)  10.6 g/dL (11-17-23 @ 09:00)      plt trend  148 K/uL (11-18-23 @ 06:50)  165 K/uL (11-17-23 @ 09:00)        RADIOLOGY & ADDITIONAL STUDIES:

## 2023-11-18 NOTE — PROGRESS NOTE ADULT - ASSESSMENT
73 M pmh of HTN, ITP, APL syndrome, generalized anxiety disorder, OCD, newly dx aggressive Merkel Cell cancer of forehead- pt's oncologist  Dr. Eng, at [facility] on Keytruda infusions with good response per wife  was brought to ED for worsening psychosis for the past few days, pt lately with difficulty swallowing, decreased po intake    Psychosis  monitor  psych fu  MRI noted  neuro following  cont luvox and remeron  lower seroquel dosing to off  psych- started haldol 1mg qhs and inc to 1mg am    #Abnormal throat sensation- sharp object sensation  swallow eval appreciated  GI consult noted- r/o esophagitis- PPI started  if no improvemnt ?esophagram    #HTN- +orthostasis  #Orthostatic hypotension- hold losartan,  IVF   cont atenolol w parameters  ? psych med induced  adrenal insufficiency  continue prednisone  and  florinef  endo following  possible hypophysitis      #Anxiety  psych cs appreciated  continue valium  on remeron, seroquel and luvox, haldol  telepsych fu    #Merkel cell ca  onc c/s  r/o paraneoplastic causes- unlikely      #DVT ppx-  hep sq      [facility]   [phone]

## 2023-11-18 NOTE — PROGRESS NOTE ADULT - SUBJECTIVE AND OBJECTIVE BOX
[facility]  Walter Camarena PA-C  [address] [place] [address]  [phone]      INTERVAL HPI/OVERNIGHT EVENTS:  Pt s/e   Pt still c/o odynophagia  Otherwise no GI complaints        MEDICATIONS  (STANDING):  atenolol  Tablet 50 milliGRAM(s) Oral two times a day  benzocaine 20% Spray 1 Spray(s) Topical four times a day  cholecalciferol 2000 Unit(s) Oral daily  diazepam    Tablet 5 milliGRAM(s) Oral every 12 hours  fludroCORTISONE 0.1 milliGRAM(s) Oral daily  fluvoxaMINE 150 milliGRAM(s) Oral two times a day  haloperidol     Tablet 1 milliGRAM(s) Oral every 12 hours  heparin   Injectable 5000 Unit(s) SubCutaneous every 12 hours  losartan 25 milliGRAM(s) Oral daily  melatonin 3 milliGRAM(s) Oral at bedtime  mirtazapine 15 milliGRAM(s) Oral at bedtime  pantoprazole  Injectable 40 milliGRAM(s) IV Push every 12 hours  predniSONE   Tablet 5 milliGRAM(s) Oral daily  senna 2 Tablet(s) Oral at bedtime  sodium chloride 0.45%. 1000 milliLiter(s) (75 mL/Hr) IV Continuous <Continuous>  thiamine IVPB 500 milliGRAM(s) IV Intermittent every 8 hours    MEDICATIONS  (PRN):  acetaminophen     Tablet .. 650 milliGRAM(s) Oral every 6 hours PRN Temp greater or equal to 38.5C (101.3F), Moderate Pain (4 - 6)  haloperidol    Injectable 1 milliGRAM(s) IntraMuscular every 12 hours PRN Agitation  lactulose Syrup 15 Gram(s) Oral two times a day PRN constipation      Allergies    No Known Allergies    Intolerances          PHYSICAL EXAM  Vital Signs Last 24 Hrs  T(C): 36.6 (18 Nov 2023 05:09), Max: 37 (17 Nov 2023 20:23)  T(F): 97.9 (18 Nov 2023 05:09), Max: 98.6 (17 Nov 2023 20:23)  HR: 69 (18 Nov 2023 05:09) (67 - 74)  BP: 168/78 (18 Nov 2023 05:09) (150/71 - 168/78)  BP(mean): --  RR: 17 (18 Nov 2023 05:09) (17 - 18)  SpO2: 93% (18 Nov 2023 05:09) (93% - 93%)    Parameters below as of 18 Nov 2023 05:09  Patient On (Oxygen Delivery Method): room air          GENERAL:  Appears stated age  HEENT:  NC/AT  CHEST:  Full & symmetric excursion  HEART:  Regular rhythm  ABDOMEN:  Soft, non-tender, non-distended  EXTEREMITIES:  no cyanosis  SKIN:  No rash  NEURO:  Alert        LABS:                        10.4   6.96  )-----------( 148      ( 18 Nov 2023 06:50 )             29.1     11-18    145  |  109<H>  |  11  ----------------------------<  119<H>  3.6   |  30  |  0.97    Ca    8.5      18 Nov 2023 06:50            11-17-23 @ 07:01  -  11-18-23 @ 07:00  --------------------------------------------------------  IN: 0 mL / OUT: 2100 mL / NET: -2100 mL

## 2023-11-18 NOTE — PROGRESS NOTE ADULT - SUBJECTIVE AND OBJECTIVE BOX
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 Eng, at [facility] on Keytruda infusions with good response per wife  was brought o ED for worsening psychosis for the past few days, pt believes there are sharp things inside his body trying to come out of his mouth, stuck in throat  pt lately with difficulty swallowing, decreased po intake  pt was evaluated by psych in ED (24 Oct 2023 13:19)      REVIEW OF SYSTEMS:    CONSTITUTIONAL: No fever, weight loss, or fatigue  EYES: No eye pain, visual disturbances, or discharge  ENMT:  No difficulty hearing, tinnitus, vertigo; No sinus or throat pain  NECK: No pain or stiffness  BREASTS: No pain, masses, or nipple discharge  RESPIRATORY: No cough, wheezing, chills or hemoptysis; No shortness of breath  CARDIOVASCULAR: No chest pain, palpitations, dizziness, or leg swelling  GASTROINTESTINAL: No abdominal or epigastric pain. No nausea, vomiting, or hematemesis; No diarrhea or constipation. No melena or hematochezia.  GENITOURINARY: No dysuria, frequency, hematuria, or incontinence  NEUROLOGICAL: No headaches, memory loss, loss of strength, numbness, or tremors  SKIN: No itching, burning, rashes, or lesions   LYMPH NODES: No enlarged glands  ENDOCRINE: No heat or cold intolerance; No hair loss  MUSCULOSKELETAL: No joint pain or swelling; No muscle, back, or extremity pain  PSYCHIATRIC: No depression, anxiety, mood swings, or difficulty sleeping  HEME/LYMPH: No easy bruising, or bleeding gums  ALLERGY AND IMMUNOLOGIC: No hives or eczema    MEDICATIONS  (STANDING):  atenolol  Tablet 50 milliGRAM(s) Oral two times a day  benzocaine 20% Spray 1 Spray(s) Topical four times a day  cholecalciferol 2000 Unit(s) Oral daily  diazepam    Tablet 5 milliGRAM(s) Oral every 12 hours  fludroCORTISONE 0.1 milliGRAM(s) Oral daily  fluvoxaMINE 150 milliGRAM(s) Oral two times a day  haloperidol     Tablet 1 milliGRAM(s) Oral every 12 hours  heparin   Injectable 5000 Unit(s) SubCutaneous every 12 hours  losartan 25 milliGRAM(s) Oral daily  melatonin 3 milliGRAM(s) Oral at bedtime  mirtazapine 15 milliGRAM(s) Oral at bedtime  pantoprazole  Injectable 40 milliGRAM(s) IV Push every 12 hours  predniSONE   Tablet 5 milliGRAM(s) Oral daily  senna 2 Tablet(s) Oral at bedtime  sodium chloride 0.45%. 1000 milliLiter(s) (75 mL/Hr) IV Continuous <Continuous>  thiamine IVPB 500 milliGRAM(s) IV Intermittent every 8 hours    MEDICATIONS  (PRN):  acetaminophen     Tablet .. 650 milliGRAM(s) Oral every 6 hours PRN Temp greater or equal to 38.5C (101.3F), Moderate Pain (4 - 6)  haloperidol    Injectable 1 milliGRAM(s) IntraMuscular every 12 hours PRN Agitation  lactulose Syrup 15 Gram(s) Oral two times a day PRN constipation      ALLERGIES: No Known Allergies      FAMILY HISTORY:      Social history:  Alochol:   Smoking:   Drug Use:   Marital Status:     PHYSICAL EXAMINATION:  -----------------------------  T(C): 36.6 (11-18-23 @ 05:09), Max: 37 (11-17-23 @ 20:23)  HR: 69 (11-18-23 @ 05:09) (67 - 74)  BP: 168/78 (11-18-23 @ 05:09) (150/71 - 168/78)  RR: 17 (11-18-23 @ 05:09) (17 - 18)  SpO2: 93% (11-18-23 @ 05:09) (93% - 93%)  Wt(kg): --    11-17 @ 07:01  -  11-18 @ 07:00  --------------------------------------------------------  IN:  Total IN: 0 mL    OUT:    Incontinent per Condom Catheter (mL): 800 mL    Voided (mL): 1300 mL  Total OUT: 2100 mL    Total NET: -2100 mL            Constitutional: well developed, normal appearance, well groomed, well nourished, no deformities and no acute distress.   Eyes: the conjunctiva exhibited no abnormalities and the eyelids demonstrated no xanthelasmas.   HEENT: normal oral mucosa, no oral pallor and no oral cyanosis.   Neck: normal jugular venous A waves present, normal jugular venous V waves present and no jugular venous [place] A waves.   Pulmonary: no respiratory distress, normal respiratory rhythm and effort, no accessory muscle use and lungs were clear to auscultation bilaterally. Anteriorly  Cardiovascular: heart rate and rhythm were normal, normal S1 and S2 and no murmur, gallop, rub, heave or thrill are present.    Musculoskeletal: the gait could not be assessed.   Extremities: no clubbing of the fingernails, no localized cyanosis, no petechial hemorrhages and no ischemic changes.   Skin: normal skin color and pigmentation, no rash, no venous stasis, no skin lesions, no skin ulcer and no xanthoma was observed.       LABS:   --------  11-17    143  |  107  |  11  ----------------------------<  106<H>  4.0   |  31  |  0.99    Ca    8.5      17 Nov 2023 09:00                           10.6   7.40  )-----------( 165      ( 17 Nov 2023 09:00 )             29.3                   Radiology:

## 2023-11-18 NOTE — PROGRESS NOTE ADULT - ASSESSMENT
IMPRESSION    pt w ITP, Lupus Anticoagulant, HTN, anxiety on Seraquel, OCD, Merkel cell ca of right Forehead, under care Dr Elliot Eng, post 4 doses q3wks IV Keytruda, last dose 10/17/23.  Pt brought in w c/o feeling "claws, daggers" inside him, since C#3 Keytruda, with sensation initially started w various parts of face now also progressed down to sides of body to rectum(does not see actual daggers or claws,)    Wife reports after first cycle started w jaw tightness and progressive worsened w each cycle, to point of hard to swallow/can't swallow  Had tired taken off Seroquel to see if adverse effect but no improvement, now back on x 5-6days  Merkel lesion has essentially resolved    CT head no acute findings  see by Psych in ER, ?psychosis  -sensation described by pt ?psychosis vs neuropathy  -psychosis not noted to be associated w Keytruda. Neuro tox of Keytruda described includes cerebral hemorrhage, confusion, myasthenia gravis, reversible posterior leukoencephalopathy syndrome, syed neuropathy, Guillain [place], aseptic meningitis, encephalitis, transverse myelitis    TFTS unremarkable.   AM cortisol 5.8. Slight low. Unclear significance   Repeat AM Cortisol low x2. <6 on two tests  3rd test w cortisol 11 on 11/06/23  To start Hydrocortisone 11/06/23      Sjorgen /Scleroderma studies abn.      +MICHELLE and +Ro52    no significant change.   CBC continues to improve   seen by psych with meds adjusted and perhaps was starting to show some improvement  is now on prednisone 5 mg po daily, appears same  Per wife maybe some improvement in mental status according to wife  has not been able to have physical therapy ?refuses    RECOMMEND  continue supportive measures    PSYCH  cause of sx remains unclear.   s/p psych evaluation  is on  Haldol  symptomatically improved no change yet    NEURO  s/p neurology eval.   Was offered LP if sx not better  However pt refused LP at inital workup, and appears less likely sx due to meningitis or encephaliti. .  Diagnostic workup was prior discussed at length with pt and wife.     s/p additional neuro   Discussed with Dr. Magdaleno.  Sx seem less likely secondary to Keytruda appears more psychiatric sx  await course, mgmt with psychiatry  Tylenol for pain  Low cortisol to be treated with hydrocortisone and now prednisone  Endocrinology to eval    pain  PRN Tylenol#3. one tabs if mild sx, consider escalation to two tabs if "dagger like sx / pain worse.   Rcvd dose Dilaudid 0.5mg  Benzocain spray PRN    ITP  platelet count 96 K/uL==>106;    will observed.     Endocrine  Prior cortisol levels low borderline.   Repeat today normal.   Was to started hydrocortisone 11/06/32   Placed on prednisone 5 mg po daily  Placed now on Fludrocortisone 0.1mg PO daily.     Sx appears no change since last week.   to have followup physical therapy evaluation. to try to get up in chair  conitnue psych followup

## 2023-11-18 NOTE — PROGRESS NOTE ADULT - SUBJECTIVE AND OBJECTIVE BOX
Patient is a 73y old  Male who presents with a chief complaint of pain (15 Nov 2023 11:36)      INTERVAL HPI/OVERNIGHT EVENTS: noted  pt seen and examined this am   events noted  per wife pt more lethargic this am      Vital Signs Last 24 Hrs  T(C): 36.5 (18 Nov 2023 11:02), Max: 37 (17 Nov 2023 20:23)  T(F): 97.7 (18 Nov 2023 11:02), Max: 98.6 (17 Nov 2023 20:23)  HR: 71 (18 Nov 2023 11:02) (67 - 71)  BP: 168/78 (18 Nov 2023 11:02) (150/76 - 168/78)  BP(mean): --  RR: 17 (18 Nov 2023 11:02) (17 - 18)  SpO2: 97% (18 Nov 2023 11:02) (93% - 97%)    Parameters below as of 18 Nov 2023 11:02  Patient On (Oxygen Delivery Method): room air        acetaminophen     Tablet .. 650 milliGRAM(s) Oral every 6 hours PRN  atenolol  Tablet 50 milliGRAM(s) Oral two times a day  benzocaine 20% Spray 1 Spray(s) Topical four times a day  cholecalciferol 2000 Unit(s) Oral daily  diazepam    Tablet 5 milliGRAM(s) Oral every 12 hours  fludroCORTISONE 0.1 milliGRAM(s) Oral daily  fluvoxaMINE 150 milliGRAM(s) Oral two times a day  haloperidol     Tablet 1 milliGRAM(s) Oral every 12 hours  haloperidol    Injectable 1 milliGRAM(s) IntraMuscular every 12 hours PRN  heparin   Injectable 5000 Unit(s) SubCutaneous every 12 hours  lactulose Syrup 15 Gram(s) Oral two times a day PRN  losartan 25 milliGRAM(s) Oral daily  melatonin 3 milliGRAM(s) Oral at bedtime  mirtazapine 15 milliGRAM(s) Oral at bedtime  pantoprazole  Injectable 40 milliGRAM(s) IV Push every 12 hours  predniSONE   Tablet 5 milliGRAM(s) Oral daily  senna 2 Tablet(s) Oral at bedtime  sodium chloride 0.45%. 1000 milliLiter(s) IV Continuous <Continuous>  thiamine IVPB 500 milliGRAM(s) IV Intermittent every 8 hours      PHYSICAL EXAM:  GENERAL: NAD,   EYES: conjunctiva and sclera clear  ENMT: Moist mucous membranes  NECK: Supple, No JVD, Normal thyroid  CHEST/LUNG: non labored, cta b/l  HEART: Regular rate and rhythm; No murmurs, rubs, or gallops  ABDOMEN: Soft, Nontender, Nondistended; Bowel sounds present  EXTREMITIES:  2+ Peripheral Pulses, No clubbing, cyanosis, or edema  LYMPH: No lymphadenopathy noted  SKIN: No rashes or lesions    Consultant(s) Notes Reviewed:  [x ] YES  [ ] NO  Care Discussed with Consultants/Other Providers [ x] YES  [ ] NO    LABS:                        10.4   6.96  )-----------( 148      ( 18 Nov 2023 06:50 )             29.1     11-18    145  |  109<H>  |  11  ----------------------------<  119<H>  3.6   |  30  |  0.97    Ca    8.5      18 Nov 2023 06:50        Urinalysis Basic - ( 18 Nov 2023 06:50 )    Color: x / Appearance: x / SG: x / pH: x  Gluc: 119 mg/dL / Ketone: x  / Bili: x / Urobili: x   Blood: x / Protein: x / Nitrite: x   Leuk Esterase: x / RBC: x / WBC x   Sq Epi: x / Non Sq Epi: x / Bacteria: x      CAPILLARY BLOOD GLUCOSE            Urinalysis Basic - ( 18 Nov 2023 06:50 )    Color: x / Appearance: x / SG: x / pH: x  Gluc: 119 mg/dL / Ketone: x  / Bili: x / Urobili: x   Blood: x / Protein: x / Nitrite: x   Leuk Esterase: x / RBC: x / WBC x   Sq Epi: x / Non Sq Epi: x / Bacteria: x          RADIOLOGY & ADDITIONAL TESTS:    Imaging Personally Reviewed:  [x ] YES  [ ] NO

## 2023-11-18 NOTE — PROGRESS NOTE ADULT - ASSESSMENT
The patient is a 73 year old male with a history of HTN, ITP, APLS, anxiety, OCD, Merkel cell cancer who presented with psychosis.    11/18/23  Seen at [facility]  Lying flat, sleeping comfortably  Easily arousable    Plan:  - ECG with no evidence of ischemia or infarction  - Orthostatic hypotension may be multifactorial - dehydration, medication related (antipsychotics), and low cortisol levels  - Orthostatics have not been repeated  - Continue atenolol 50 mg bid  - Continue losartan 25 mg daily

## 2023-11-19 PROCEDURE — 93971 EXTREMITY STUDY: CPT | Mod: 26,LT

## 2023-11-19 RX ORDER — ACETAMINOPHEN 500 MG
1000 TABLET ORAL ONCE
Refills: 0 | Status: COMPLETED | OUTPATIENT
Start: 2023-11-19 | End: 2023-11-19

## 2023-11-19 RX ADMIN — HALOPERIDOL DECANOATE 1 MILLIGRAM(S): 100 INJECTION INTRAMUSCULAR at 06:09

## 2023-11-19 RX ADMIN — Medication 105 MILLIGRAM(S): at 13:17

## 2023-11-19 RX ADMIN — PANTOPRAZOLE SODIUM 40 MILLIGRAM(S): 20 TABLET, DELAYED RELEASE ORAL at 06:10

## 2023-11-19 RX ADMIN — ATENOLOL 50 MILLIGRAM(S): 25 TABLET ORAL at 17:53

## 2023-11-19 RX ADMIN — LOSARTAN POTASSIUM 25 MILLIGRAM(S): 100 TABLET, FILM COATED ORAL at 06:09

## 2023-11-19 RX ADMIN — SENNA PLUS 2 TABLET(S): 8.6 TABLET ORAL at 21:49

## 2023-11-19 RX ADMIN — Medication 105 MILLIGRAM(S): at 06:10

## 2023-11-19 RX ADMIN — Medication 3 MILLIGRAM(S): at 21:49

## 2023-11-19 RX ADMIN — Medication 5 MILLIGRAM(S): at 17:53

## 2023-11-19 RX ADMIN — FLUDROCORTISONE ACETATE 0.1 MILLIGRAM(S): 0.1 TABLET ORAL at 06:09

## 2023-11-19 RX ADMIN — MIRTAZAPINE 15 MILLIGRAM(S): 45 TABLET, ORALLY DISINTEGRATING ORAL at 21:49

## 2023-11-19 RX ADMIN — Medication 400 MILLIGRAM(S): at 20:13

## 2023-11-19 RX ADMIN — Medication 105 MILLIGRAM(S): at 21:48

## 2023-11-19 RX ADMIN — Medication 1000 MILLIGRAM(S): at 20:33

## 2023-11-19 RX ADMIN — Medication 2000 UNIT(S): at 11:32

## 2023-11-19 RX ADMIN — HEPARIN SODIUM 5000 UNIT(S): 5000 INJECTION INTRAVENOUS; SUBCUTANEOUS at 06:09

## 2023-11-19 RX ADMIN — HEPARIN SODIUM 5000 UNIT(S): 5000 INJECTION INTRAVENOUS; SUBCUTANEOUS at 17:56

## 2023-11-19 RX ADMIN — Medication 5 MILLIGRAM(S): at 06:09

## 2023-11-19 RX ADMIN — HALOPERIDOL DECANOATE 1 MILLIGRAM(S): 100 INJECTION INTRAMUSCULAR at 17:53

## 2023-11-19 RX ADMIN — PANTOPRAZOLE SODIUM 40 MILLIGRAM(S): 20 TABLET, DELAYED RELEASE ORAL at 18:01

## 2023-11-19 RX ADMIN — FLUVOXAMINE MALEATE 150 MILLIGRAM(S): 25 TABLET ORAL at 17:53

## 2023-11-19 RX ADMIN — FLUVOXAMINE MALEATE 150 MILLIGRAM(S): 25 TABLET ORAL at 06:10

## 2023-11-19 RX ADMIN — ATENOLOL 50 MILLIGRAM(S): 25 TABLET ORAL at 06:12

## 2023-11-19 NOTE — PROGRESS NOTE ADULT - SUBJECTIVE AND OBJECTIVE BOX
Interval History:  continues uncomfortable  Chart reviewed and events noted;   Overnight events:    MEDICATIONS  (STANDING):  atenolol  Tablet 50 milliGRAM(s) Oral two times a day  benzocaine 20% Spray 1 Spray(s) Topical four times a day  cholecalciferol 2000 Unit(s) Oral daily  diazepam    Tablet 5 milliGRAM(s) Oral every 12 hours  fludroCORTISONE 0.1 milliGRAM(s) Oral daily  fluvoxaMINE 150 milliGRAM(s) Oral two times a day  haloperidol     Tablet 1 milliGRAM(s) Oral every 12 hours  heparin   Injectable 5000 Unit(s) SubCutaneous every 12 hours  losartan 25 milliGRAM(s) Oral daily  melatonin 3 milliGRAM(s) Oral at bedtime  mirtazapine 15 milliGRAM(s) Oral at bedtime  pantoprazole  Injectable 40 milliGRAM(s) IV Push every 12 hours  predniSONE   Tablet 5 milliGRAM(s) Oral daily  senna 2 Tablet(s) Oral at bedtime  sodium chloride 0.45%. 1000 milliLiter(s) (75 mL/Hr) IV Continuous <Continuous>  thiamine IVPB 500 milliGRAM(s) IV Intermittent every 8 hours    MEDICATIONS  (PRN):  acetaminophen     Tablet .. 650 milliGRAM(s) Oral every 6 hours PRN Temp greater or equal to 38.5C (101.3F), Moderate Pain (4 - 6)  haloperidol    Injectable 1 milliGRAM(s) IntraMuscular every 12 hours PRN Agitation  lactulose Syrup 15 Gram(s) Oral two times a day PRN constipation      Vital Signs Last 24 Hrs  T(C): 36.7 (19 Nov 2023 11:56), Max: 36.7 (19 Nov 2023 11:56)  T(F): 98.1 (19 Nov 2023 11:56), Max: 98.1 (19 Nov 2023 11:56)  HR: 67 (19 Nov 2023 11:56) (67 - 78)  BP: 124/70 (19 Nov 2023 11:56) (124/70 - 168/84)  BP(mean): --  RR: 18 (19 Nov 2023 11:56) (17 - 18)  SpO2: 96% (19 Nov 2023 11:56) (94% - 96%)    Parameters below as of 19 Nov 2023 11:56  Patient On (Oxygen Delivery Method): room air        PHYSICAL EXAM  General: adult in NAD  HEENT: clear oropharynx, anicteric sclera, pink conjunctivae  Neck: supple  CV: normal S1S2 with no murmur rubs or gallops  Lungs: clear to auscultation, no wheezes, no rhales  Abdomen: soft non-tender non-distended, no hepato/splenomegaly  Ext: no clubbing cyanosis or edema  Skin: no rashes and no petichiae  Neuro: alert and oriented X3 no focal deficits      LABS:  CBC Full  -  ( 18 Nov 2023 06:50 )  WBC Count : 6.96 K/uL  RBC Count : 3.40 M/uL  Hemoglobin : 10.4 g/dL  Hematocrit : 29.1 %  Platelet Count - Automated : 148 K/uL  Mean Cell Volume : 85.6 fl  Mean Cell Hemoglobin : 30.6 pg  Mean Cell Hemoglobin Concentration : 35.7 gm/dL  Auto Neutrophil # : x  Auto Lymphocyte # : x  Auto Monocyte # : x  Auto Eosinophil # : x  Auto Basophil # : x  Auto Neutrophil % : x  Auto Lymphocyte % : x  Auto Monocyte % : x  Auto Eosinophil % : x  Auto Basophil % : x    11-18    145  |  109<H>  |  11  ----------------------------<  119<H>  3.6   |  30  |  0.97    Ca    8.5      18 Nov 2023 06:50          fe studies      WBC trend  6.96 K/uL (11-18-23 @ 06:50)  7.40 K/uL (11-17-23 @ 09:00)      Hgb trend  10.4 g/dL (11-18-23 @ 06:50)  10.6 g/dL (11-17-23 @ 09:00)      plt trend  148 K/uL (11-18-23 @ 06:50)  165 K/uL (11-17-23 @ 09:00)        RADIOLOGY & ADDITIONAL STUDIES:

## 2023-11-19 NOTE — PROGRESS NOTE ADULT - ASSESSMENT
The patient is a 73 year old male with a history of HTN, ITP, APLS, anxiety, OCD, Merkel cell cancer who presented with psychosis.    11/19/23  Seen at [facility]  Lying flat, comfortably  No cardiac complaints    Plan:  - ECG with no evidence of ischemia or infarction  - Orthostatic hypotension may be multifactorial - dehydration, medication related (antipsychotics), and low cortisol levels  - Orthostatics have not been repeated  - Continue atenolol 50 mg bid  - Continue losartan 25 mg daily

## 2023-11-19 NOTE — PROGRESS NOTE ADULT - ASSESSMENT
IMPRESSION    pt w ITP, Lupus Anticoagulant, HTN, anxiety on Seraquel, OCD, Merkel cell ca of right Forehead, under care Dr Elliot Eng, post 4 doses q3wks IV Keytruda, last dose 10/17/23.  Pt brought in w c/o feeling "claws, daggers" inside him, since C#3 Keytruda, with sensation initially started w various parts of face now also progressed down to sides of body to rectum(does not see actual daggers or claws,)    Wife reports after first cycle started w jaw tightness and progressive worsened w each cycle, to point of hard to swallow/can't swallow  Had tired taken off Seroquel to see if adverse effect but no improvement, now back on x 5-6days  Merkel lesion has essentially resolved    CT head no acute findings  see by Psych in ER, ?psychosis  -sensation described by pt ?psychosis vs neuropathy  -psychosis not noted to be associated w Keytruda. Neuro tox of Keytruda described includes cerebral hemorrhage, confusion, myasthenia gravis, reversible posterior leukoencephalopathy syndrome, syed neuropathy, Guillain [place], aseptic meningitis, encephalitis, transverse myelitis    TFTS unremarkable.   AM cortisol 5.8. Slight low. Unclear significance   Repeat AM Cortisol low x2. <6 on two tests  3rd test w cortisol 11 on 11/06/23  To start Hydrocortisone 11/06/23      Sjorgen /Scleroderma studies abn.      +MICHELLE and +Ro52    no significant change.   CBC continues to improve   seen by psych with meds adjusted and perhaps was starting to show some improvement  is now on prednisone 5 mg po daily, appears same  Per wife maybe some improvement in mental status according to wife  has not been able to have physical therapy ?refuses    RECOMMEND  continue supportive measures    PSYCH  cause of sx remains unclear.   s/p psych evaluation  is on  Haldol  symptomatically improved no change yet    NEURO  s/p neurology eval.   Was offered LP if sx not better  However pt refused LP at inital workup, and appears less likely sx due to meningitis or encephalitis  Diagnostic workup was prior discussed at length with pt and wife.     s/p additional neuro   Discussed with Dr. Magdaleno.  Sx seem less likely secondary to Keytruda appears more psychiatric sx  await course, mgmt with psychiatry  Tylenol for pain  Low cortisol to be treated with hydrocortisone and now prednisone  Endocrinology to eval    pain  PRN Tylenol#3. one tabs if mild sx, consider escalation to two tabs if "dagger like sx / pain worse.   Rcvd dose Dilaudid 0.5mg  Benzocain spray PRN    ITP  platelet count remains stable  will observed.     Endocrine  Prior cortisol levels low borderline.   Repeat today normal.   Was to started hydrocortisone 11/06/32   Placed on prednisone 5 mg po daily  Placed now on Fludrocortisone 0.1mg PO daily.     Sx appears no change since last week.   to have followup physical therapy evaluation. to try to get up in chair  continue psych followup

## 2023-11-19 NOTE — CHART NOTE - NSCHARTNOTEFT_GEN_A_CORE
Called by RN for concern of L arm rash/pain at site of IV that infiltrated 1-2 days prior  L antecubital fossa, poorly demarcated region of erythema approx 4in x 2in, tender to touch, indurated  Pt denies fever  Ultrasound ordered  Ofirmev Ordered  RN to notify if any changes Called by RN for concern of L arm rash/pain at site of IV that infiltrated 1-2 days prior  L antecubital fossa, poorly demarcated region of erythema approx 4in x 2in, tender to touch, indurated  Pt denies fever  Ultrasound ordered  Ofirmev Ordered  RN to notify if any changes      ADDENDUM:  - U/S resulted + for superficial thrombosis/thrombophlebitis of L basilic v.  - Continue supportive measures

## 2023-11-19 NOTE — PROGRESS NOTE ADULT - ASSESSMENT
73 M pmh of HTN, ITP, APL syndrome, generalized anxiety disorder, OCD, newly dx aggressive Merkel Cell cancer of forehead- pt's oncologist  Dr. Eng, at [facility] on Keytruda infusions with good response per wife  was brought to ED for worsening psychosis for the past few days, pt lately with difficulty swallowing, decreased po intake    Psychosis  monitor  psych fu  MRI noted  neuro following  cont luvox and remeron  lower seroquel dosing to off  psych- started haldol 1mg qhs and inc to 1mg am    #Abnormal throat sensation- sharp object sensation  swallow eval appreciated  GI consult noted- r/o esophagitis- PPI started  if no improvemnt ?esophagram    #HTN- +orthostasis  #Orthostatic hypotension- hold losartan,  IVF   cont atenolol w parameters  ? psych med induced  adrenal insufficiency  continue prednisone  and  florinef  endo following  possible hypophysitis      #Anxiety  psych cs appreciated  continue valium  on remeron, seroquel and luvox, haldol  telepsych fu    #Merkel cell ca  onc c/s  r/o paraneoplastic causes- unlikely      #DVT ppx-  hep sq      [facility]   [phone]

## 2023-11-19 NOTE — PROGRESS NOTE ADULT - SUBJECTIVE AND OBJECTIVE BOX
[facility]  Walter Camarena PA-C  [address] [place] [address]  [phone]      INTERVAL HPI/OVERNIGHT EVENTS:  Pt s/e   Pt still c/o odynophagia  reports no improvement in symptoms wife reports he had extreme pain and nausea after "throat spray" yesterday  Poor PO intake         MEDICATIONS  (STANDING):  atenolol  Tablet 50 milliGRAM(s) Oral two times a day  benzocaine 20% Spray 1 Spray(s) Topical four times a day  cholecalciferol 2000 Unit(s) Oral daily  diazepam    Tablet 5 milliGRAM(s) Oral every 12 hours  fludroCORTISONE 0.1 milliGRAM(s) Oral daily  fluvoxaMINE 150 milliGRAM(s) Oral two times a day  haloperidol     Tablet 1 milliGRAM(s) Oral every 12 hours  heparin   Injectable 5000 Unit(s) SubCutaneous every 12 hours  losartan 25 milliGRAM(s) Oral daily  melatonin 3 milliGRAM(s) Oral at bedtime  mirtazapine 15 milliGRAM(s) Oral at bedtime  pantoprazole  Injectable 40 milliGRAM(s) IV Push every 12 hours  predniSONE   Tablet 5 milliGRAM(s) Oral daily  senna 2 Tablet(s) Oral at bedtime  sodium chloride 0.45%. 1000 milliLiter(s) (75 mL/Hr) IV Continuous <Continuous>  thiamine IVPB 500 milliGRAM(s) IV Intermittent every 8 hours    MEDICATIONS  (PRN):  acetaminophen     Tablet .. 650 milliGRAM(s) Oral every 6 hours PRN Temp greater or equal to 38.5C (101.3F), Moderate Pain (4 - 6)  haloperidol    Injectable 1 milliGRAM(s) IntraMuscular every 12 hours PRN Agitation  lactulose Syrup 15 Gram(s) Oral two times a day PRN constipation      Allergies    No Known Allergies    Intolerances          PHYSICAL EXAM  Vital Signs Last 24 Hrs  T(C): 36.2 (19 Nov 2023 04:24), Max: 36.6 (18 Nov 2023 20:25)  T(F): 97.1 (19 Nov 2023 04:24), Max: 97.9 (18 Nov 2023 20:25)  HR: 78 (19 Nov 2023 04:24) (72 - 78)  BP: 162/81 (19 Nov 2023 04:24) (162/81 - 168/84)  BP(mean): --  RR: 18 (19 Nov 2023 04:24) (17 - 18)  SpO2: 96% (19 Nov 2023 04:24) (94% - 96%)    Parameters below as of 19 Nov 2023 04:24  Patient On (Oxygen Delivery Method): room air          Constitutional: NAD  HEENT: EOMI, throat clear  Neck: No LAD, supple  Respiratory: CTA and P  Cardiovascular: S1 and S2, RRR, no M  Gastrointestinal: BS+, soft, NT/ND, neg HSM,  Extremities: No peripheral edema, neg clubbing, cyanosis  Vascular: 2+ peripheral pulses  Neurological: A/O, no focal deficits  Psychiatric: Normal mood, normal affect  Skin: No rashes      LABS:                        10.4   6.96  )-----------( 148      ( 18 Nov 2023 06:50 )             29.1     11-18    145  |  109<H>  |  11  ----------------------------<  119<H>  3.6   |  30  |  0.97    Ca    8.5      18 Nov 2023 06:50            11-18-23 @ 07:01  -  11-19-23 @ 07:00  --------------------------------------------------------  IN: 0 mL / OUT: 1600 mL / NET: -1600 mL

## 2023-11-19 NOTE — PROGRESS NOTE ADULT - SUBJECTIVE AND OBJECTIVE BOX
Patient is a 73y old  Male who presents with a chief complaint of pain (15 Nov 2023 11:36)      INTERVAL HPI/OVERNIGHT EVENTS: noted  pt seen and examined this am   events noted  no new events      Vital Signs Last 24 Hrs  T(C): 36.7 (19 Nov 2023 11:56), Max: 36.7 (19 Nov 2023 11:56)  T(F): 98.1 (19 Nov 2023 11:56), Max: 98.1 (19 Nov 2023 11:56)  HR: 67 (19 Nov 2023 11:56) (67 - 78)  BP: 124/70 (19 Nov 2023 11:56) (124/70 - 168/84)  BP(mean): --  RR: 18 (19 Nov 2023 11:56) (17 - 18)  SpO2: 96% (19 Nov 2023 11:56) (94% - 96%)    Parameters below as of 19 Nov 2023 11:56  Patient On (Oxygen Delivery Method): room air        acetaminophen     Tablet .. 650 milliGRAM(s) Oral every 6 hours PRN  atenolol  Tablet 50 milliGRAM(s) Oral two times a day  benzocaine 20% Spray 1 Spray(s) Topical four times a day  cholecalciferol 2000 Unit(s) Oral daily  diazepam    Tablet 5 milliGRAM(s) Oral every 12 hours  fludroCORTISONE 0.1 milliGRAM(s) Oral daily  fluvoxaMINE 150 milliGRAM(s) Oral two times a day  haloperidol     Tablet 1 milliGRAM(s) Oral every 12 hours  haloperidol    Injectable 1 milliGRAM(s) IntraMuscular every 12 hours PRN  heparin   Injectable 5000 Unit(s) SubCutaneous every 12 hours  lactulose Syrup 15 Gram(s) Oral two times a day PRN  losartan 25 milliGRAM(s) Oral daily  melatonin 3 milliGRAM(s) Oral at bedtime  mirtazapine 15 milliGRAM(s) Oral at bedtime  pantoprazole  Injectable 40 milliGRAM(s) IV Push every 12 hours  predniSONE   Tablet 5 milliGRAM(s) Oral daily  senna 2 Tablet(s) Oral at bedtime  sodium chloride 0.45%. 1000 milliLiter(s) IV Continuous <Continuous>  thiamine IVPB 500 milliGRAM(s) IV Intermittent every 8 hours      PHYSICAL EXAM:  GENERAL: NAD,   EYES: conjunctiva and sclera clear  ENMT: Moist mucous membranes  NECK: Supple, No JVD, Normal thyroid  CHEST/LUNG: non labored, cta b/l  HEART: Regular rate and rhythm; No murmurs, rubs, or gallops  ABDOMEN: Soft, Nontender, Nondistended; Bowel sounds present  EXTREMITIES:  2+ Peripheral Pulses, No clubbing, cyanosis, or edema  LYMPH: No lymphadenopathy noted  SKIN: No rashes or lesions    Consultant(s) Notes Reviewed:  [x ] YES  [ ] NO  Care Discussed with Consultants/Other Providers [ x] YES  [ ] NO    LABS:                        10.4   6.96  )-----------( 148      ( 18 Nov 2023 06:50 )             29.1     11-18    145  |  109<H>  |  11  ----------------------------<  119<H>  3.6   |  30  |  0.97    Ca    8.5      18 Nov 2023 06:50        Urinalysis Basic - ( 18 Nov 2023 06:50 )    Color: x / Appearance: x / SG: x / pH: x  Gluc: 119 mg/dL / Ketone: x  / Bili: x / Urobili: x   Blood: x / Protein: x / Nitrite: x   Leuk Esterase: x / RBC: x / WBC x   Sq Epi: x / Non Sq Epi: x / Bacteria: x      CAPILLARY BLOOD GLUCOSE            Urinalysis Basic - ( 18 Nov 2023 06:50 )    Color: x / Appearance: x / SG: x / pH: x  Gluc: 119 mg/dL / Ketone: x  / Bili: x / Urobili: x   Blood: x / Protein: x / Nitrite: x   Leuk Esterase: x / RBC: x / WBC x   Sq Epi: x / Non Sq Epi: x / Bacteria: x          RADIOLOGY & ADDITIONAL TESTS:    Imaging Personally Reviewed:  [x ] YES  [ ] NO

## 2023-11-19 NOTE — PROGRESS NOTE ADULT - SUBJECTIVE AND OBJECTIVE BOX
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 Eng, at [facility] on Keytruda infusions with good response per wife  was brought o ED for worsening psychosis for the past few days, pt believes there are sharp things inside his body trying to come out of his mouth, stuck in throat  pt lately with difficulty swallowing, decreased po intake  pt was evaluated by psych in ED (24 Oct 2023 13:19)      REVIEW OF SYSTEMS:    CONSTITUTIONAL: No fever, weight loss, or fatigue  EYES: No eye pain, visual disturbances, or discharge  ENMT:  No difficulty hearing, tinnitus, vertigo; No sinus or throat pain  NECK: No pain or stiffness  BREASTS: No pain, masses, or nipple discharge  RESPIRATORY: No cough, wheezing, chills or hemoptysis; No shortness of breath  CARDIOVASCULAR: No chest pain, palpitations, dizziness, or leg swelling  GASTROINTESTINAL: No abdominal or epigastric pain. No nausea, vomiting, or hematemesis; No diarrhea or constipation. No melena or hematochezia.  GENITOURINARY: No dysuria, frequency, hematuria, or incontinence  NEUROLOGICAL: No headaches, memory loss, loss of strength, numbness, or tremors  SKIN: No itching, burning, rashes, or lesions   LYMPH NODES: No enlarged glands  ENDOCRINE: No heat or cold intolerance; No hair loss  MUSCULOSKELETAL: No joint pain or swelling; No muscle, back, or extremity pain  PSYCHIATRIC: No depression, anxiety, mood swings, or difficulty sleeping  HEME/LYMPH: No easy bruising, or bleeding gums  ALLERGY AND IMMUNOLOGIC: No hives or eczema    MEDICATIONS  (STANDING):  atenolol  Tablet 50 milliGRAM(s) Oral two times a day  benzocaine 20% Spray 1 Spray(s) Topical four times a day  cholecalciferol 2000 Unit(s) Oral daily  diazepam    Tablet 5 milliGRAM(s) Oral every 12 hours  fludroCORTISONE 0.1 milliGRAM(s) Oral daily  fluvoxaMINE 150 milliGRAM(s) Oral two times a day  haloperidol     Tablet 1 milliGRAM(s) Oral every 12 hours  heparin   Injectable 5000 Unit(s) SubCutaneous every 12 hours  losartan 25 milliGRAM(s) Oral daily  melatonin 3 milliGRAM(s) Oral at bedtime  mirtazapine 15 milliGRAM(s) Oral at bedtime  pantoprazole  Injectable 40 milliGRAM(s) IV Push every 12 hours  predniSONE   Tablet 5 milliGRAM(s) Oral daily  senna 2 Tablet(s) Oral at bedtime  sodium chloride 0.45%. 1000 milliLiter(s) (75 mL/Hr) IV Continuous <Continuous>  thiamine IVPB 500 milliGRAM(s) IV Intermittent every 8 hours    MEDICATIONS  (PRN):  acetaminophen     Tablet .. 650 milliGRAM(s) Oral every 6 hours PRN Temp greater or equal to 38.5C (101.3F), Moderate Pain (4 - 6)  haloperidol    Injectable 1 milliGRAM(s) IntraMuscular every 12 hours PRN Agitation  lactulose Syrup 15 Gram(s) Oral two times a day PRN constipation      ALLERGIES: No Known Allergies      FAMILY HISTORY:      Social history:  Alochol:   Smoking:   Drug Use:   Marital Status:     PHYSICAL EXAMINATION:  -----------------------------  T(C): 36.2 (11-19-23 @ 04:24), Max: 36.6 (11-18-23 @ 20:25)  HR: 78 (11-19-23 @ 04:24) (71 - 78)  BP: 162/81 (11-19-23 @ 04:24) (162/81 - 168/84)  RR: 18 (11-19-23 @ 04:24) (17 - 18)  SpO2: 96% (11-19-23 @ 04:24) (94% - 97%)  Wt(kg): --    11-18 @ 07:01  -  11-19 @ 07:00  --------------------------------------------------------  IN:  Total IN: 0 mL    OUT:    Incontinent per Condom Catheter (mL): 800 mL    Voided (mL): 800 mL  Total OUT: 1600 mL    Total NET: -1600 mL            Constitutional: well developed, normal appearance, well groomed, well nourished, no deformities and no acute distress.   Eyes: the conjunctiva exhibited no abnormalities and the eyelids demonstrated no xanthelasmas.   HEENT: normal oral mucosa, no oral pallor and no oral cyanosis.   Neck: normal jugular venous A waves present, normal jugular venous V waves present and no jugular venous [place] A waves.   Pulmonary: no respiratory distress, normal respiratory rhythm and effort, no accessory muscle use and lungs were clear to auscultation bilaterally. Anteriorly  Cardiovascular: heart rate and rhythm were normal, normal S1 and S2 and no murmur, gallop, rub, heave or thrill are present.   Musculoskeletal: the gait could not be assessed.   Extremities: no clubbing of the fingernails, no localized cyanosis, no petechial hemorrhages and no ischemic changes.   Skin: normal skin color and pigmentation, no rash, no venous stasis, no skin lesions, no skin ulcer and no xanthoma was observed.       LABS:   --------  11-18    145  |  109<H>  |  11  ----------------------------<  119<H>  3.6   |  30  |  0.97    Ca    8.5      18 Nov 2023 06:50                           10.4   6.96  )-----------( 148      ( 18 Nov 2023 06:50 )             29.1                   Radiology:

## 2023-11-19 NOTE — CHART NOTE - NSCHARTNOTEFT_GEN_A_CORE
Assessment: 72 y/o male adm with dementia, poor po intake. PMH anxiety, HTN, ITP, antiphospholipid antibody syndrome, HTN.   Pt visited at bedside this morning. Pt sleeping soundly in bed. Spoke with pt's wife at bedside. Pt drinking Ensure well. Food preferences obtained, menus adjusted. To provide pt with an extra Ensure mixed with ice cream to see pt's acceptance. MBS completed on 10/26 which rx pureed diet with mildly thickened liquids. As per pt's wife. His last wt pta was ~162#. Pt currently weighs 145.9#. Po intake is being encouraged. Pt's wife stated that they have tried appetite stimulants. ? if pt is afraid to eat, as per pt's wife. Last BM 11/16.    Factors impacting intake: [ ] none [ ] nausea  [ ] vomiting [ ] diarrhea [ ] constipation  [ ]chewing problems [x ] swallowing issues  [ x] other: poor appetite, ?afraid to eat?     Diet Presciption: Diet, Pureed:   Mildly Thick Liquids (MILDTHICKLIQS)  Supplement Feeding Modality:  Oral  Ensure Enlive Cans or Servings Per Day:  1       Frequency:  Three Times a day (10-28-23 @ 09:41)    Intake: 0-25%, pt's wife states that pt is drinking Ensure     Current Weight: 162# pta; currently pt weighing 145.9#.  % Weight Change    Pertinent Medications: MEDICATIONS  (STANDING):  atenolol  Tablet 50 milliGRAM(s) Oral two times a day  benzocaine 20% Spray 1 Spray(s) Topical four times a day  cholecalciferol 2000 Unit(s) Oral daily  diazepam    Tablet 5 milliGRAM(s) Oral every 12 hours  fludroCORTISONE 0.1 milliGRAM(s) Oral daily  fluvoxaMINE 150 milliGRAM(s) Oral two times a day  haloperidol     Tablet 1 milliGRAM(s) Oral every 12 hours  heparin   Injectable 5000 Unit(s) SubCutaneous every 12 hours  losartan 25 milliGRAM(s) Oral daily  melatonin 3 milliGRAM(s) Oral at bedtime  mirtazapine 15 milliGRAM(s) Oral at bedtime  pantoprazole  Injectable 40 milliGRAM(s) IV Push every 12 hours  predniSONE   Tablet 5 milliGRAM(s) Oral daily  senna 2 Tablet(s) Oral at bedtime  sodium chloride 0.45%. 1000 milliLiter(s) (75 mL/Hr) IV Continuous <Continuous>  thiamine IVPB 500 milliGRAM(s) IV Intermittent every 8 hours    MEDICATIONS  (PRN):  acetaminophen     Tablet .. 650 milliGRAM(s) Oral every 6 hours PRN Temp greater or equal to 38.5C (101.3F), Moderate Pain (4 - 6)  haloperidol    Injectable 1 milliGRAM(s) IntraMuscular every 12 hours PRN Agitation  lactulose Syrup 15 Gram(s) Oral two times a day PRN constipation    Pertinent Labs: 11-18 Na145 mmol/L Glu 119 mg/dL<H> K+ 3.6 mmol/L Cr  0.97 mg/dL BUN 11 mg/dL     CAPILLARY BLOOD GLUCOSE        Skin: no pressure ulcers     Estimated Needs:   [ x] no change since previous assessment  [ ] recalculated:     Previous Nutrition Diagnosis:   [ ] Inadequate Energy Intake [ ]Inadequate Oral Intake [ ] Excessive Energy Intake   [ ] Underweight [ ] Increased Nutrient Needs [ ] Overweight/Obesity   [ ] Altered GI Function [ ] Unintended Weight Loss [ ] Food & Nutrition Related Knowledge Deficit [x ] Malnutrition   [x] swallowing difficulty    Nutrition Diagnosis is [ x] ongoing  [ ] resolved [ ] not applicable     New Nutrition Diagnosis: [ x] not applicable       Interventions:   Recommend  [ ] Change Diet To:  [x ] Nutrition Supplement: continue Ensure TID; trialing additional Ensure mixed with ice cream   [ ] Nutrition Support  [x ] Other: honor pt's food preferences/tolerances.     Monitoring and Evaluation:   [x ] PO intake [ x ] Tolerance to diet prescription [ x ] weights [ x ] labs[ x ] follow up per protocol  [ ] other:

## 2023-11-20 ENCOUNTER — TRANSCRIPTION ENCOUNTER (OUTPATIENT)
Age: 73
End: 2023-11-20

## 2023-11-20 RX ADMIN — FLUDROCORTISONE ACETATE 0.1 MILLIGRAM(S): 0.1 TABLET ORAL at 05:18

## 2023-11-20 RX ADMIN — Medication 105 MILLIGRAM(S): at 14:08

## 2023-11-20 RX ADMIN — MIRTAZAPINE 15 MILLIGRAM(S): 45 TABLET, ORALLY DISINTEGRATING ORAL at 23:01

## 2023-11-20 RX ADMIN — ATENOLOL 50 MILLIGRAM(S): 25 TABLET ORAL at 05:18

## 2023-11-20 RX ADMIN — ATENOLOL 50 MILLIGRAM(S): 25 TABLET ORAL at 17:41

## 2023-11-20 RX ADMIN — HEPARIN SODIUM 5000 UNIT(S): 5000 INJECTION INTRAVENOUS; SUBCUTANEOUS at 05:19

## 2023-11-20 RX ADMIN — HALOPERIDOL DECANOATE 1 MILLIGRAM(S): 100 INJECTION INTRAMUSCULAR at 17:41

## 2023-11-20 RX ADMIN — FLUVOXAMINE MALEATE 150 MILLIGRAM(S): 25 TABLET ORAL at 17:41

## 2023-11-20 RX ADMIN — Medication 105 MILLIGRAM(S): at 23:00

## 2023-11-20 RX ADMIN — Medication 5 MILLIGRAM(S): at 17:41

## 2023-11-20 RX ADMIN — Medication 3 MILLIGRAM(S): at 23:01

## 2023-11-20 RX ADMIN — SODIUM CHLORIDE 75 MILLILITER(S): 9 INJECTION, SOLUTION INTRAVENOUS at 05:17

## 2023-11-20 RX ADMIN — Medication 5 MILLIGRAM(S): at 05:18

## 2023-11-20 RX ADMIN — HALOPERIDOL DECANOATE 1 MILLIGRAM(S): 100 INJECTION INTRAMUSCULAR at 05:19

## 2023-11-20 RX ADMIN — SODIUM CHLORIDE 75 MILLILITER(S): 9 INJECTION, SOLUTION INTRAVENOUS at 14:09

## 2023-11-20 RX ADMIN — Medication 2000 UNIT(S): at 14:09

## 2023-11-20 RX ADMIN — PANTOPRAZOLE SODIUM 40 MILLIGRAM(S): 20 TABLET, DELAYED RELEASE ORAL at 17:42

## 2023-11-20 RX ADMIN — Medication 105 MILLIGRAM(S): at 05:17

## 2023-11-20 RX ADMIN — HEPARIN SODIUM 5000 UNIT(S): 5000 INJECTION INTRAVENOUS; SUBCUTANEOUS at 17:42

## 2023-11-20 RX ADMIN — LOSARTAN POTASSIUM 25 MILLIGRAM(S): 100 TABLET, FILM COATED ORAL at 05:18

## 2023-11-20 RX ADMIN — FLUVOXAMINE MALEATE 150 MILLIGRAM(S): 25 TABLET ORAL at 05:17

## 2023-11-20 RX ADMIN — SENNA PLUS 2 TABLET(S): 8.6 TABLET ORAL at 23:01

## 2023-11-20 RX ADMIN — PANTOPRAZOLE SODIUM 40 MILLIGRAM(S): 20 TABLET, DELAYED RELEASE ORAL at 05:19

## 2023-11-20 NOTE — PROGRESS NOTE ADULT - SUBJECTIVE AND OBJECTIVE BOX
[facility] DIVISION of INFECTIOUS DISEASE  Eric Reyes MD PhD, Milena Underwood MD, Jillian Mckoy MD, Enid Everett MD, Jay Krishna MD  and providing coverage with Arthur Pepper MD  Providing Infectious Disease Consultations at [facility], [facility], [place], [facility], [facility]'s    Office# [phone] to schedule follow up appointments  Answering Service for urgent calls or New Consults [phone]  Cell# to text for urgent issues Eric Reyes [phone]     infectious diseases progress note:    JANICE RENDON is a 73y y. o. Male patient    Overnight and events of the last 24hrs reviewed    Allergies    No Known Allergies    Intolerances        ANTIBIOTICS/RELEVANT:  antimicrobials    immunologic:    OTHER:  acetaminophen     Tablet .. 650 milliGRAM(s) Oral every 6 hours PRN  atenolol  Tablet 50 milliGRAM(s) Oral two times a day  benzocaine 20% Spray 1 Spray(s) Topical four times a day  cholecalciferol 2000 Unit(s) Oral daily  diazepam    Tablet 5 milliGRAM(s) Oral every 12 hours  fludroCORTISONE 0.1 milliGRAM(s) Oral daily  fluvoxaMINE 150 milliGRAM(s) Oral two times a day  haloperidol     Tablet 1 milliGRAM(s) Oral every 12 hours  haloperidol    Injectable 1 milliGRAM(s) IntraMuscular every 12 hours PRN  heparin   Injectable 5000 Unit(s) SubCutaneous every 12 hours  lactulose Syrup 15 Gram(s) Oral two times a day PRN  losartan 25 milliGRAM(s) Oral daily  melatonin 3 milliGRAM(s) Oral at bedtime  mirtazapine 15 milliGRAM(s) Oral at bedtime  pantoprazole  Injectable 40 milliGRAM(s) IV Push every 12 hours  predniSONE   Tablet 5 milliGRAM(s) Oral daily  senna 2 Tablet(s) Oral at bedtime  sodium chloride 0.45%. 1000 milliLiter(s) IV Continuous <Continuous>  thiamine IVPB 500 milliGRAM(s) IV Intermittent every 8 hours      Objective:  Vital Signs Last 24 Hrs  T(C): 36.8 (20 Nov 2023 12:06), Max: 36.8 (19 Nov 2023 19:42)  T(F): 98.2 (20 Nov 2023 12:06), Max: 98.2 (19 Nov 2023 19:42)  HR: 75 (20 Nov 2023 12:06) (64 - 75)  BP: 127/74 (20 Nov 2023 12:06) (127/74 - 165/80)  BP(mean): --  RR: 17 (20 Nov 2023 12:06) (17 - 17)  SpO2: 93% (20 Nov 2023 12:06) (93% - 96%)    Parameters below as of 20 Nov 2023 12:06  Patient On (Oxygen Delivery Method): room air        T(C): 36.8 (11-20-23 @ 12:06), Max: 36.8 (11-19-23 @ 19:42)  T(C): 36.8 (11-20-23 @ 12:06), Max: 37 (11-17-23 @ 20:23)  T(C): 36.8 (11-20-23 @ 12:06), Max: 37 (11-17-23 @ 20:23)    PHYSICAL EXAM:  HEENT: NC atraumatic  Neck: supple  Respiratory: no accessory muscle use, breathing comfortably  Cardiovascular: distant  Gastrointestinal: normal appearing, nondistended  Extremities: no clubbing, no cyanosis,        LABS:        WBC  6.96 11-18 @ 06:50  7.40 11-17 @ 09:00              Creatinine: 0.97 mg/dL (11-18-23 @ 06:50)  Creatinine: 0.99 mg/dL (11-17-23 @ 09:00)                INFLAMMATORY MARKERS      MICROBIOLOGY:              RADIOLOGY & ADDITIONAL STUDIES:

## 2023-11-20 NOTE — PROGRESS NOTE ADULT - ASSESSMENT
73 M pmh of HTN, ITP, APL syndrome, generalized anxiety disorder, OCD, newly dx aggressive Merkel Cell cancer of forehead- pt's oncologist  Dr. Eng, at [facility] on Keytruda (PD-1 blocker) infusions with good response per wife  was brought to ED for worsening psychosis for the past few days, pt believes there are sharp things inside his body trying to come out of his mouth, stuck in throat, pt lately with difficulty swallowing, decreased po intake, pt was evaluated by psych in ED. Wife reports sudden cognitive decline and behavioral changes immediately after COVID infection.    RECOMMENDATIONS  1-?PASC  -sounds like there is no obvious acute infectious issue. no fever, no leukocytosis, no obvious localization but concerning history of things developing post COVID-19 infection so raises concerns for a neurological PASC picture  noted serology for EBV consistent with reactivation as described post PASC  Serotonin, Blood: <30ng/mL   am cortisol-low x 2 then nl  ---  -melatonin 3mg PO QHS  -prednisone 5mg PO Qam  -fludrocortisone 0.1mg PO Qam  -fluvoxamine 150mg PO BID     input by telepsych   -in outpt setting consideration of bifidobacterium probiotics     Shawn is improving and more conversant despite his insistence that nothing has changed, will continue to work with psych/internal medicine    Thank you for consulting us and involving us in the management of this most interesting and challenging case.  We will follow along in the care of this patient. Please call us at [phone] or text me directly on my cell# at [phone] with any concerns.

## 2023-11-20 NOTE — PROGRESS NOTE ADULT - ASSESSMENT
IMPRESSION    pt w ITP, Lupus Anticoagulant, HTN, anxiety on Seraquel, OCD, Merkel cell ca of right Forehead, under care Dr Elliot Eng, post 4 doses q3wks IV Keytruda, last dose 10/17/23.  Pt brought in w c/o feeling "claws, daggers" inside him, since C#3 Keytruda, with sensation initially started w various parts of face now also progressed down to sides of body to rectum(does not see actual daggers or claws,)    Wife reports after first cycle started w jaw tightness and progressive worsened w each cycle, to point of hard to swallow/can't swallow  Had tired taken off Seroquel to see if adverse effect but no improvement, now back on x 5-6days  Merkel lesion has essentially resolved    CT head no acute findings  see by Psych in ER, ?psychosis  -sensation described by pt ?psychosis vs neuropathy  -psychosis not noted to be associated w Keytruda. Neuro tox of Keytruda described includes cerebral hemorrhage, confusion, myasthenia gravis, reversible posterior leukoencephalopathy syndrome, syed neuropathy, Guillain [place], aseptic meningitis, encephalitis, transverse myelitis    TFTS unremarkable.   AM cortisol 5.8. Slight low. Unclear significance   Repeat AM Cortisol low x2. <6 on two tests  3rd test w cortisol 11 on 11/06/23  To start Hydrocortisone 11/06/23      Sjorgen /Scleroderma studies abn.      +MICHELLE and +Ro52    no significant change.   CBC continues to improve   seen by psych with meds adjusted and perhaps was starting to show some improvement  is now on prednisone 5 mg po daily, appears same  Per wife maybe some improvement in mental status according to wife  has not been able to have physical therapy ?refuses    RECOMMEND  continue supportive measures    PSYCH  cause of sx remains unclear.   s/p psych evaluation  is on  Haldol  symptomatically improved no change yet    NEURO  s/p neurology eval.   Was offered LP if sx not better.   However pt refused LP at inital workup, and appears less likely sx due to meningitis or encephalitis  Diagnostic workup was prior discussed at length with pt and wife.     s/p additional neuro   Discussed with Dr. Magdaleno.  Sx seem less likely secondary to Keytruda appears more psychiatric sx  await course, mgmt with psychiatry  Tylenol for pain  Low cortisol to be treated with hydrocortisone and now prednisone  s/p Endocrinology eval  on Fludrocortisone    pain  PRN Tylenol#3. one tabs if mild sx, consider escalation to two tabs if "dagger like sx / pain worse.   Rcvd dose Dilaudid 0.5mg  Benzocaine spray PRN    ITP  platelet count remains stable  will observed.     Endocrine  Prior cortisol levels low borderline.   Repeat today normal.   Was to started hydrocortisone 11/06/32   Placed on prednisone 5 mg po daily  Placed now on Fludrocortisone 0.1mg PO daily.     Sx appears no change since last week. Perhaps less distressed re sx.   to have followup physical therapy evaluation. to try to get up in chair  continue psych followup

## 2023-11-20 NOTE — PROGRESS NOTE ADULT - ASSESSMENT
The patient is a 73 year old male with a history of HTN, ITP, APLS, anxiety, OCD, Merkel cell cancer who presented with psychosis.    Plan:  - ECG with no evidence of ischemia or infarction  - Orthostatic hypotension may be multifactorial - dehydration, medication related (antipsychotics), and low cortisol levels  - Orthostatics have not been repeated  - Continue atenolol 50 mg bid  - Continue losartan 25 mg daily The patient is a 73 year old male with a history of HTN, ITP, APLS, anxiety, OCD, Merkel cell cancer who presented with psychosis.    Plan:  - ECG with no evidence of ischemia or infarction  - Orthostatic hypotension may be multifactorial - dehydration, medication related (antipsychotics), and low cortisol levels  - Orthostatics have not been repeated  - Continue atenolol 50 mg bid  - Continue losartan 25 mg daily  - No cardiac contraindication to EGD

## 2023-11-20 NOTE — PROGRESS NOTE ADULT - SUBJECTIVE AND OBJECTIVE BOX
[INTERVAL HX: ]  Patient seen and examined;  Chart reviewed and events noted;     c.o same complaints, though seems less distressed over.     [MEDICATIONS]  MEDICATIONS  (STANDING):  atenolol  Tablet 50 milliGRAM(s) Oral two times a day  benzocaine 20% Spray 1 Spray(s) Topical four times a day  cholecalciferol 2000 Unit(s) Oral daily  diazepam    Tablet 5 milliGRAM(s) Oral every 12 hours  fludroCORTISONE 0.1 milliGRAM(s) Oral daily  fluvoxaMINE 150 milliGRAM(s) Oral two times a day  haloperidol     Tablet 1 milliGRAM(s) Oral every 12 hours  heparin   Injectable 5000 Unit(s) SubCutaneous every 12 hours  losartan 25 milliGRAM(s) Oral daily  melatonin 3 milliGRAM(s) Oral at bedtime  mirtazapine 15 milliGRAM(s) Oral at bedtime  pantoprazole  Injectable 40 milliGRAM(s) IV Push every 12 hours  predniSONE   Tablet 5 milliGRAM(s) Oral daily  senna 2 Tablet(s) Oral at bedtime  sodium chloride 0.45%. 1000 milliLiter(s) (75 mL/Hr) IV Continuous <Continuous>  thiamine IVPB 500 milliGRAM(s) IV Intermittent every 8 hours    MEDICATIONS  (PRN):  acetaminophen     Tablet .. 650 milliGRAM(s) Oral every 6 hours PRN Temp greater or equal to 38.5C (101.3F), Moderate Pain (4 - 6)  haloperidol    Injectable 1 milliGRAM(s) IntraMuscular every 12 hours PRN Agitation  lactulose Syrup 15 Gram(s) Oral two times a day PRN constipation      [VITALS]  Vital Signs Last 24 Hrs  T(C): 36.8 (20 Nov 2023 12:06), Max: 36.8 (19 Nov 2023 19:42)  T(F): 98.2 (20 Nov 2023 12:06), Max: 98.2 (19 Nov 2023 19:42)  HR: 75 (20 Nov 2023 12:06) (64 - 75)  BP: 127/74 (20 Nov 2023 12:06) (127/74 - 165/80)  BP(mean): --  RR: 17 (20 Nov 2023 12:06) (17 - 17)  SpO2: 93% (20 Nov 2023 12:06) (93% - 96%)    Parameters below as of 20 Nov 2023 12:06  Patient On (Oxygen Delivery Method): room air      [WT/HT]  Daily     Daily   [VENT]      [PHYSICAL EXAM]  GEN: NAD  HEENT: normocephalic and atraumatic. EOMI. .    NECK: Supple.  No lymphadenopathy   LUNGS: Clear to auscultation.  HEART: Regular rate and rhythm,  no MRG  ABDOMEN: Soft, nontender, and nondistended.  Positive bowel sounds.    : No CVA tenderness  EXTREMITIES: Without edema.  NEUROLOGIC: grossly intact.  PSYCHIATRIC: Appropriate affect .  SKIN: No rash     [LABS:]      Vitamin B12, Serum: 1109 pg/mL [232 - 1245] (10-29-23 @ 18:30)    Folate, Serum: >20.0 ng/mL (10-29-23 @ 18:30)    Sedimentation Rate, Erythrocyte: 57 mm/hr *H* [0 - 20] (10-27-23 @ 18:30)            [RADIOLOGY STUDIES:]

## 2023-11-20 NOTE — PROGRESS NOTE ADULT - SUBJECTIVE AND OBJECTIVE BOX
Chief Complaint: Psychosis    Interval Events: No events overnight.    Review of Systems:  General: No fevers, chills, weight gain  Skin: No rashes, color changes  Cardiovascular: No chest pain, orthopnea  Respiratory: No shortness of breath, cough  Gastrointestinal: No nausea, abdominal pain  Genitourinary: No incontinence, pain with urination  Musculoskeletal: No pain, swelling, decreased range of motion  Neurological: No headache, weakness  Psychiatric: No depression, anxiety  Endocrine: No weight gain, increased thirst  All other systems are comprehensively negative.    Physical Exam:  Vital Signs Last 24 Hrs  T(C): 36.7 (20 Nov 2023 04:52), Max: 36.8 (19 Nov 2023 19:42)  T(F): 98.1 (20 Nov 2023 04:52), Max: 98.2 (19 Nov 2023 19:42)  HR: 64 (20 Nov 2023 04:52) (64 - 68)  BP: 148/76 (20 Nov 2023 04:52) (124/70 - 165/80)  BP(mean): --  RR: 17 (20 Nov 2023 04:52) (17 - 18)  SpO2: 95% (20 Nov 2023 04:52) (95% - 96%)  Parameters below as of 20 Nov 2023 04:52  Patient On (Oxygen Delivery Method): room air  General: NAD  HEENT: MMM  Neck: No JVD, no carotid bruit  Lungs: CTAB  CV: RRR, nl S1/S2, no M/R/G  Abdomen: S/NT/ND, +BS  Extremities: No LE edema, no cyanosis  Neuro: AAOx3, non-focal  Skin: No rash    Labs:

## 2023-11-20 NOTE — PROGRESS NOTE ADULT - ASSESSMENT
73 M pmh of HTN, ITP, APL syndrome, generalized anxiety disorder, OCD, newly dx aggressive Merkel Cell cancer of forehead- pt's oncologist  Dr. Eng, at [facility] on Keytruda infusions with good response per wife  was brought to ED for worsening psychosis for the past few days, pt lately with difficulty swallowing, decreased po intake    Psychosis  monitor  psych fu  MRI noted  neuro following  cont luvox and remeron  lower seroquel dosing to off  psych- started haldol 1mg qhs and inc to 1mg am    #Abnormal throat sensation- sharp object sensation  swallow eval appreciated  GI consult noted- r/o esophagitis- PPI started  if no improvemnt ?esophagram/egd    #HTN- +orthostasis  #Orthostatic hypotension- hold losartan,  IVF   cont atenolol w parameters  ? psych med induced  adrenal insufficiency  continue prednisone  and  florinef  endo following  possible hypophysitis      #Anxiety  psych cs appreciated  continue valium  on remeron, seroquel and luvox, haldol  telepsych fu    #Merkel cell ca  onc c/s noted  r/o paraneoplastic causes- unlikely      #DVT ppx-  hep sq      [facility]   [phone]

## 2023-11-20 NOTE — PROGRESS NOTE ADULT - SUBJECTIVE AND OBJECTIVE BOX
Patient is a 73y old  Male who presents with a chief complaint of pain (15 Nov 2023 11:36)      INTERVAL HPI/OVERNIGHT EVENTS: noted  pt seen and examined this am  events noted   c/o abnormal sensation,   drinks ensure-minimal po intake overall      Vital Signs Last 24 Hrs  T(C): 36.8 (20 Nov 2023 12:06), Max: 36.8 (19 Nov 2023 19:42)  T(F): 98.2 (20 Nov 2023 12:06), Max: 98.2 (19 Nov 2023 19:42)  HR: 75 (20 Nov 2023 12:06) (64 - 75)  BP: 127/74 (20 Nov 2023 12:06) (127/74 - 165/80)  BP(mean): --  RR: 17 (20 Nov 2023 12:06) (17 - 17)  SpO2: 93% (20 Nov 2023 12:06) (93% - 96%)    Parameters below as of 20 Nov 2023 12:06  Patient On (Oxygen Delivery Method): room air        acetaminophen     Tablet .. 650 milliGRAM(s) Oral every 6 hours PRN  atenolol  Tablet 50 milliGRAM(s) Oral two times a day  benzocaine 20% Spray 1 Spray(s) Topical four times a day  cholecalciferol 2000 Unit(s) Oral daily  diazepam    Tablet 5 milliGRAM(s) Oral every 12 hours  fludroCORTISONE 0.1 milliGRAM(s) Oral daily  fluvoxaMINE 150 milliGRAM(s) Oral two times a day  haloperidol     Tablet 1 milliGRAM(s) Oral every 12 hours  haloperidol    Injectable 1 milliGRAM(s) IntraMuscular every 12 hours PRN  heparin   Injectable 5000 Unit(s) SubCutaneous every 12 hours  lactulose Syrup 15 Gram(s) Oral two times a day PRN  losartan 25 milliGRAM(s) Oral daily  melatonin 3 milliGRAM(s) Oral at bedtime  mirtazapine 15 milliGRAM(s) Oral at bedtime  pantoprazole  Injectable 40 milliGRAM(s) IV Push every 12 hours  predniSONE   Tablet 5 milliGRAM(s) Oral daily  senna 2 Tablet(s) Oral at bedtime  sodium chloride 0.45%. 1000 milliLiter(s) IV Continuous <Continuous>  thiamine IVPB 500 milliGRAM(s) IV Intermittent every 8 hours      PHYSICAL EXAM:  GENERAL: NAD,   EYES: conjunctiva and sclera clear  ENMT: Moist mucous membranes  NECK: Supple, No JVD, Normal thyroid  CHEST/LUNG: non labored, cta b/l  HEART: Regular rate and rhythm; No murmurs, rubs, or gallops  ABDOMEN: Soft, Nontender, Nondistended; Bowel sounds present  EXTREMITIES:  2+ Peripheral Pulses, No clubbing, cyanosis, or edema  LYMPH: No lymphadenopathy noted  SKIN: No rashes or lesions    Consultant(s) Notes Reviewed:  [x ] YES  [ ] NO  Care Discussed with Consultants/Other Providers [ x] YES  [ ] NO    LABS:              CAPILLARY BLOOD GLUCOSE                  RADIOLOGY & ADDITIONAL TESTS:    Imaging Personally Reviewed:  [x ] YES  [ ] NO

## 2023-11-20 NOTE — PROGRESS NOTE ADULT - SUBJECTIVE AND OBJECTIVE BOX
[facility]  Walter Camarena PA-C  [address] [place] [address]  [phone]      INTERVAL HPI/OVERNIGHT EVENTS:  Pt s/e  Continued odynophagia     MEDICATIONS  (STANDING):  atenolol  Tablet 50 milliGRAM(s) Oral two times a day  benzocaine 20% Spray 1 Spray(s) Topical four times a day  cholecalciferol 2000 Unit(s) Oral daily  diazepam    Tablet 5 milliGRAM(s) Oral every 12 hours  fludroCORTISONE 0.1 milliGRAM(s) Oral daily  fluvoxaMINE 150 milliGRAM(s) Oral two times a day  haloperidol     Tablet 1 milliGRAM(s) Oral every 12 hours  heparin   Injectable 5000 Unit(s) SubCutaneous every 12 hours  losartan 25 milliGRAM(s) Oral daily  melatonin 3 milliGRAM(s) Oral at bedtime  mirtazapine 15 milliGRAM(s) Oral at bedtime  pantoprazole  Injectable 40 milliGRAM(s) IV Push every 12 hours  predniSONE   Tablet 5 milliGRAM(s) Oral daily  senna 2 Tablet(s) Oral at bedtime  sodium chloride 0.45%. 1000 milliLiter(s) (75 mL/Hr) IV Continuous <Continuous>  thiamine IVPB 500 milliGRAM(s) IV Intermittent every 8 hours    MEDICATIONS  (PRN):  acetaminophen     Tablet .. 650 milliGRAM(s) Oral every 6 hours PRN Temp greater or equal to 38.5C (101.3F), Moderate Pain (4 - 6)  haloperidol    Injectable 1 milliGRAM(s) IntraMuscular every 12 hours PRN Agitation  lactulose Syrup 15 Gram(s) Oral two times a day PRN constipation      Allergies    No Known Allergies        PHYSICAL EXAM:   Vital Signs:  Vital Signs Last 24 Hrs  T(C): 36.7 (20 Nov 2023 04:52), Max: 36.8 (19 Nov 2023 19:42)  T(F): 98.1 (20 Nov 2023 04:52), Max: 98.2 (19 Nov 2023 19:42)  HR: 64 (20 Nov 2023 04:52) (64 - 68)  BP: 148/76 (20 Nov 2023 04:52) (124/70 - 165/80)  BP(mean): --  RR: 17 (20 Nov 2023 04:52) (17 - 18)  SpO2: 95% (20 Nov 2023 04:52) (95% - 96%)    Parameters below as of 20 Nov 2023 04:52  Patient On (Oxygen Delivery Method): room air    GENERAL:  Appears stated age  HEENT:  NC/AT  CHEST:  Full & symmetric excursion  HEART:  Regular rhythm  ABDOMEN:  Soft, non-tender, non-distended  EXTEREMITIES:  no cyanosis  SKIN:  No rash  NEURO:  Alert

## 2023-11-21 LAB
ANION GAP SERPL CALC-SCNC: 4 MMOL/L — LOW (ref 5–17)
ANION GAP SERPL CALC-SCNC: 4 MMOL/L — LOW (ref 5–17)
BUN SERPL-MCNC: 13 MG/DL — SIGNIFICANT CHANGE UP (ref 7–23)
BUN SERPL-MCNC: 13 MG/DL — SIGNIFICANT CHANGE UP (ref 7–23)
CALCIUM SERPL-MCNC: 8.7 MG/DL — SIGNIFICANT CHANGE UP (ref 8.5–10.1)
CALCIUM SERPL-MCNC: 8.7 MG/DL — SIGNIFICANT CHANGE UP (ref 8.5–10.1)
CHLORIDE SERPL-SCNC: 104 MMOL/L — SIGNIFICANT CHANGE UP (ref 96–108)
CHLORIDE SERPL-SCNC: 104 MMOL/L — SIGNIFICANT CHANGE UP (ref 96–108)
CO2 SERPL-SCNC: 33 MMOL/L — HIGH (ref 22–31)
CO2 SERPL-SCNC: 33 MMOL/L — HIGH (ref 22–31)
CREAT SERPL-MCNC: 0.98 MG/DL — SIGNIFICANT CHANGE UP (ref 0.5–1.3)
CREAT SERPL-MCNC: 0.98 MG/DL — SIGNIFICANT CHANGE UP (ref 0.5–1.3)
EGFR: 80 ML/MIN/1.73M2 — SIGNIFICANT CHANGE UP
EGFR: 80 ML/MIN/1.73M2 — SIGNIFICANT CHANGE UP
GLUCOSE SERPL-MCNC: 131 MG/DL — HIGH (ref 70–99)
GLUCOSE SERPL-MCNC: 131 MG/DL — HIGH (ref 70–99)
HCT VFR BLD CALC: 30.7 % — LOW (ref 39–50)
HCT VFR BLD CALC: 30.7 % — LOW (ref 39–50)
HGB BLD-MCNC: 10.8 G/DL — LOW (ref 13–17)
HGB BLD-MCNC: 10.8 G/DL — LOW (ref 13–17)
MCHC RBC-ENTMCNC: 30.9 PG — SIGNIFICANT CHANGE UP (ref 27–34)
MCHC RBC-ENTMCNC: 30.9 PG — SIGNIFICANT CHANGE UP (ref 27–34)
MCHC RBC-ENTMCNC: 35.2 GM/DL — SIGNIFICANT CHANGE UP (ref 32–36)
MCHC RBC-ENTMCNC: 35.2 GM/DL — SIGNIFICANT CHANGE UP (ref 32–36)
MCV RBC AUTO: 87.7 FL — SIGNIFICANT CHANGE UP (ref 80–100)
MCV RBC AUTO: 87.7 FL — SIGNIFICANT CHANGE UP (ref 80–100)
NRBC # BLD: 0 /100 WBCS — SIGNIFICANT CHANGE UP (ref 0–0)
NRBC # BLD: 0 /100 WBCS — SIGNIFICANT CHANGE UP (ref 0–0)
PLATELET # BLD AUTO: 116 K/UL — LOW (ref 150–400)
PLATELET # BLD AUTO: 116 K/UL — LOW (ref 150–400)
POTASSIUM SERPL-MCNC: 4 MMOL/L — SIGNIFICANT CHANGE UP (ref 3.5–5.3)
POTASSIUM SERPL-MCNC: 4 MMOL/L — SIGNIFICANT CHANGE UP (ref 3.5–5.3)
POTASSIUM SERPL-SCNC: 4 MMOL/L — SIGNIFICANT CHANGE UP (ref 3.5–5.3)
POTASSIUM SERPL-SCNC: 4 MMOL/L — SIGNIFICANT CHANGE UP (ref 3.5–5.3)
RBC # BLD: 3.5 M/UL — LOW (ref 4.2–5.8)
RBC # BLD: 3.5 M/UL — LOW (ref 4.2–5.8)
RBC # FLD: 14.9 % — HIGH (ref 10.3–14.5)
RBC # FLD: 14.9 % — HIGH (ref 10.3–14.5)
SODIUM SERPL-SCNC: 141 MMOL/L — SIGNIFICANT CHANGE UP (ref 135–145)
SODIUM SERPL-SCNC: 141 MMOL/L — SIGNIFICANT CHANGE UP (ref 135–145)
WBC # BLD: 10.02 K/UL — SIGNIFICANT CHANGE UP (ref 3.8–10.5)
WBC # BLD: 10.02 K/UL — SIGNIFICANT CHANGE UP (ref 3.8–10.5)
WBC # FLD AUTO: 10.02 K/UL — SIGNIFICANT CHANGE UP (ref 3.8–10.5)
WBC # FLD AUTO: 10.02 K/UL — SIGNIFICANT CHANGE UP (ref 3.8–10.5)

## 2023-11-21 PROCEDURE — 88305 TISSUE EXAM BY PATHOLOGIST: CPT | Mod: 26

## 2023-11-21 PROCEDURE — 88313 SPECIAL STAINS GROUP 2: CPT | Mod: 26

## 2023-11-21 RX ORDER — ONDANSETRON 8 MG/1
4 TABLET, FILM COATED ORAL ONCE
Refills: 0 | Status: DISCONTINUED | OUTPATIENT
Start: 2023-11-21 | End: 2023-11-21

## 2023-11-21 RX ADMIN — Medication 5 MILLIGRAM(S): at 05:23

## 2023-11-21 RX ADMIN — Medication 650 MILLIGRAM(S): at 20:33

## 2023-11-21 RX ADMIN — FLUDROCORTISONE ACETATE 0.1 MILLIGRAM(S): 0.1 TABLET ORAL at 05:24

## 2023-11-21 RX ADMIN — Medication 105 MILLIGRAM(S): at 14:10

## 2023-11-21 RX ADMIN — FLUVOXAMINE MALEATE 150 MILLIGRAM(S): 25 TABLET ORAL at 17:46

## 2023-11-21 RX ADMIN — SENNA PLUS 2 TABLET(S): 8.6 TABLET ORAL at 21:04

## 2023-11-21 RX ADMIN — HALOPERIDOL DECANOATE 1 MILLIGRAM(S): 100 INJECTION INTRAMUSCULAR at 17:45

## 2023-11-21 RX ADMIN — PANTOPRAZOLE SODIUM 40 MILLIGRAM(S): 20 TABLET, DELAYED RELEASE ORAL at 17:38

## 2023-11-21 RX ADMIN — Medication 650 MILLIGRAM(S): at 20:03

## 2023-11-21 RX ADMIN — Medication 105 MILLIGRAM(S): at 05:27

## 2023-11-21 RX ADMIN — PANTOPRAZOLE SODIUM 40 MILLIGRAM(S): 20 TABLET, DELAYED RELEASE ORAL at 05:23

## 2023-11-21 RX ADMIN — ATENOLOL 50 MILLIGRAM(S): 25 TABLET ORAL at 17:46

## 2023-11-21 RX ADMIN — Medication 5 MILLIGRAM(S): at 05:24

## 2023-11-21 RX ADMIN — ATENOLOL 50 MILLIGRAM(S): 25 TABLET ORAL at 05:24

## 2023-11-21 RX ADMIN — MIRTAZAPINE 15 MILLIGRAM(S): 45 TABLET, ORALLY DISINTEGRATING ORAL at 21:04

## 2023-11-21 RX ADMIN — Medication 3 MILLIGRAM(S): at 21:09

## 2023-11-21 RX ADMIN — HEPARIN SODIUM 5000 UNIT(S): 5000 INJECTION INTRAVENOUS; SUBCUTANEOUS at 17:38

## 2023-11-21 RX ADMIN — FLUVOXAMINE MALEATE 150 MILLIGRAM(S): 25 TABLET ORAL at 05:40

## 2023-11-21 RX ADMIN — HALOPERIDOL DECANOATE 1 MILLIGRAM(S): 100 INJECTION INTRAMUSCULAR at 05:26

## 2023-11-21 RX ADMIN — LOSARTAN POTASSIUM 25 MILLIGRAM(S): 100 TABLET, FILM COATED ORAL at 05:24

## 2023-11-21 RX ADMIN — Medication 5 MILLIGRAM(S): at 17:45

## 2023-11-21 NOTE — PROGRESS NOTE ADULT - ASSESSMENT
73 M pmh of HTN, ITP, APL syndrome, generalized anxiety disorder, OCD, newly dx aggressive Merkel Cell cancer of forehead- pt's oncologist  Dr. Eng, at [facility] on Keytruda (PD-1 blocker) infusions with good response per wife  was brought to ED for worsening psychosis for the past few days, pt believes there are sharp things inside his body trying to come out of his mouth, stuck in throat, pt lately with difficulty swallowing, decreased po intake, pt was evaluated by psych in ED. Wife reports sudden cognitive decline and behavioral changes immediately after COVID infection.    RECOMMENDATIONS  1-?PASC  -sounds like there is no obvious acute infectious issue. no fever, no leukocytosis, no obvious localization but concerning history of things developing post COVID-19 infection so raises concerns for a neurological PASC picture  noted serology for EBV consistent with reactivation as described post PASC  Serotonin, Blood: <30ng/mL   am cortisol-low x 2 then nl  ---  -melatonin 3mg PO QHS  -prednisone 5mg PO Qam  -fludrocortisone 0.1mg PO Qam  -fluvoxamine 150mg PO BID     -in outpt setting consideration of bifidobacterium probiotics     Shawn has been improving and more conversant despite his insistence that nothing has changed, will continue to work with psych/internal medicine but today he seems a bit more agitated, plan for endoscopy    Thank you for consulting us and involving us in the management of this most interesting and challenging case.  We will follow along in the care of this patient. Please call us at [phone] or text me directly on my cell# at [phone] with any concerns.

## 2023-11-21 NOTE — PROGRESS NOTE ADULT - ASSESSMENT
73 M pmh of HTN, ITP, APL syndrome, generalized anxiety disorder, OCD, newly dx aggressive Merkel Cell cancer of forehead- pt's oncologist  Dr. Eng, at [facility] on Keytruda infusions with good response per wife  was brought to ED for worsening psychosis for the past few days, pt lately with difficulty swallowing, decreased po intake    Psychosis  monitor  psych fu  MRI noted  neuro following  cont luvox and remeron  lower seroquel dosing to off  psych- started haldol 1mg qhs and inc to 1mg am    #Abnormal throat sensation- sharp object sensation  swallow eval appreciated  GI fu noted- sp EGD today- normal -sp bx fu path    #HTN- +orthostasis  #Orthostatic hypotension- hold losartan,  IVF   cont atenolol w parameters  ? psych med induced  adrenal insufficiency  continue prednisone  and  florinef  endo following  possible hypophysitis      #Anxiety  psych cs appreciated  continue valium  on remeron, seroquel and luvox, haldol  telepsych fu    #Merkel cell ca  onc c/s noted  r/o paraneoplastic causes- unlikely      #DVT ppx-  hep sq    dc planning -pt medically cleared for dc  dc might be complicated given pt's behaviour and him not particpating in PT  SW eval, psych to assist      [facility]   [phone]

## 2023-11-21 NOTE — PROGRESS NOTE ADULT - ASSESSMENT
The patient is a 73 year old male with a history of HTN, ITP, APLS, anxiety, OCD, Merkel cell cancer who presented with psychosis.    Plan:  - ECG with no evidence of ischemia or infarction  - Orthostatic hypotension may be multifactorial - dehydration, medication related (antipsychotics), and low cortisol levels  - Orthostatics have not been repeated  - Continue atenolol 50 mg bid  - Continue losartan 25 mg daily  - No cardiac contraindication to EGD if that is the plan

## 2023-11-21 NOTE — PROGRESS NOTE ADULT - ASSESSMENT
pt w ITP, Lupus Anticoagulant, HTN, anxiety on Seraquel, OCD, Merkel cell ca of right Forehead, under care Dr Elliot Eng, post 4 doses q3wks IV Keytruda, last dose 10/17/23.  Pt brought in w c/o feeling "claws, daggers" inside him, since C#3 Keytruda, with sensation initially started w various parts of face now also progressed down to sides of body to rectum(does not see actual daggers or claws,)    Wife reports after first cycle started w jaw tightness and progressive worsened w each cycle, to point of hard to swallow/can't swallow  Had tired taken off Seroquel to see if adverse effect but no improvement, now back on x 5-6days  Merkel lesion has essentially resolved    CT head no acute findings  see by Psych in ER, ?psychosis  -sensation described by pt ?psychosis vs neuropathy  -psychosis not noted to be associated w Keytruda. Neuro tox of Keytruda described includes cerebral hemorrhage, confusion, myasthenia gravis, reversible posterior leukoencephalopathy syndrome, syed neuropathy, Guillain [place], aseptic meningitis, encephalitis, transverse myelitis    TFTS unremarkable.   AM cortisol 5.8. Slight low. Unclear significance   Repeat AM Cortisol low x2. <6 on two tests  3rd test w cortisol 11 on 11/06/2  Sjorgen /Scleroderma studies abn.      +MICHELLE and +Ro52  Seen by psych with meds adjusted and perhaps was starting to show some improvement  Now on prednisone 5 mg po daily, appears same  Per wife maybe some improvement in mental status according to wife    -on prednisone 5mg qD for low Cortisol level, could not tolerate hydrocortisone  -had endoscopy to eval c/o unable to swallow-spoke w GI no abnormality found  -mild anemia and [id number] as of the last CBC, no intervention at these levels

## 2023-11-21 NOTE — PROGRESS NOTE ADULT - SUBJECTIVE AND OBJECTIVE BOX
[facility] DIVISION of INFECTIOUS DISEASE  Eric Reyes MD PhD, Milena Underwood MD, Jillian Mckoy MD, Enid Everett MD, Jay Krishna MD  and providing coverage with Arthur Pepper MD  Providing Infectious Disease Consultations at [facility], [facility], [place], [facility], [facility]'s    Office# [phone] to schedule follow up appointments  Answering Service for urgent calls or New Consults [phone]  Cell# to text for urgent issues Eric Reyes [phone]     infectious diseases progress note:    JANICE RENDON is a 73y y. o. Male patient    Overnight and events of the last 24hrs reviewed    Allergies    No Known Allergies    Intolerances        ANTIBIOTICS/RELEVANT:  antimicrobials    immunologic:    OTHER:  acetaminophen     Tablet .. 650 milliGRAM(s) Oral every 6 hours PRN  atenolol  Tablet 50 milliGRAM(s) Oral two times a day  benzocaine 20% Spray 1 Spray(s) Topical four times a day  cholecalciferol 2000 Unit(s) Oral daily  diazepam    Tablet 5 milliGRAM(s) Oral every 12 hours  fludroCORTISONE 0.1 milliGRAM(s) Oral daily  fluvoxaMINE 150 milliGRAM(s) Oral two times a day  haloperidol     Tablet 1 milliGRAM(s) Oral every 12 hours  haloperidol    Injectable 1 milliGRAM(s) IntraMuscular every 12 hours PRN  heparin   Injectable 5000 Unit(s) SubCutaneous every 12 hours  lactulose Syrup 15 Gram(s) Oral two times a day PRN  losartan 25 milliGRAM(s) Oral daily  melatonin 3 milliGRAM(s) Oral at bedtime  mirtazapine 15 milliGRAM(s) Oral at bedtime  pantoprazole  Injectable 40 milliGRAM(s) IV Push every 12 hours  predniSONE   Tablet 5 milliGRAM(s) Oral daily  senna 2 Tablet(s) Oral at bedtime  sodium chloride 0.45%. 1000 milliLiter(s) IV Continuous <Continuous>  thiamine IVPB 500 milliGRAM(s) IV Intermittent every 8 hours      Objective:  Vital Signs Last 24 Hrs  T(C): 36.8 (21 Nov 2023 05:03), Max: 36.8 (20 Nov 2023 12:06)  T(F): 98.2 (21 Nov 2023 05:03), Max: 98.2 (20 Nov 2023 12:06)  HR: 88 (21 Nov 2023 07:35) (75 - 88)  BP: 186/78 (21 Nov 2023 07:35) (127/74 - 205/83)  BP(mean): --  RR: 18 (21 Nov 2023 05:03) (17 - 18)  SpO2: 92% (21 Nov 2023 05:03) (92% - 93%)    Parameters below as of 21 Nov 2023 05:03  Patient On (Oxygen Delivery Method): room air        T(C): 36.8 (11-21-23 @ 05:03), Max: 36.8 (11-19-23 @ 19:42)  T(C): 36.8 (11-21-23 @ 05:03), Max: 36.8 (11-19-23 @ 19:42)  T(C): 36.8 (11-21-23 @ 05:03), Max: 37 (11-17-23 @ 20:23)    PHYSICAL EXAM:  HEENT: NC atraumatic  Neck: supple  Respiratory: no accessory muscle use, breathing comfortably  Cardiovascular: distant  Gastrointestinal: normal appearing, nondistended  Extremities: no clubbing, no cyanosis,  Neuro: pt is more agitated today      LABS:                          10.8   10.02 )-----------( x        ( 21 Nov 2023 06:55 )             30.7       WBC  10.02 11-21 @ 06:55  6.96 11-18 @ 06:50  7.40 11-17 @ 09:00      11-21    141  |  104  |  13  ----------------------------<  131<H>  4.0   |  33<H>  |  0.98    Ca    8.7      21 Nov 2023 06:55        Creatinine: 0.98 mg/dL (11-21-23 @ 06:55)  Creatinine: 0.97 mg/dL (11-18-23 @ 06:50)  Creatinine: 0.99 mg/dL (11-17-23 @ 09:00)        Urinalysis Basic - ( 21 Nov 2023 06:55 )    Color: x / Appearance: x / SG: x / pH: x  Gluc: 131 mg/dL / Ketone: x  / Bili: x / Urobili: x   Blood: x / Protein: x / Nitrite: x   Leuk Esterase: x / RBC: x / WBC x   Sq Epi: x / Non Sq Epi: x / Bacteria: x            INFLAMMATORY MARKERS      MICROBIOLOGY:              RADIOLOGY & ADDITIONAL STUDIES:

## 2023-11-21 NOTE — PROGRESS NOTE ADULT - SUBJECTIVE AND OBJECTIVE BOX
Patient is a 73y old  Male who presents with a chief complaint of confusion (21 Nov 2023 10:42)      INTERVAL HPI/OVERNIGHT EVENTS: noted  pt seen and examined this am   events noted  unchanged  sp EGD today      Vital Signs Last 24 Hrs  T(C): 36.9 (21 Nov 2023 11:32), Max: 36.9 (21 Nov 2023 11:32)  T(F): 98.4 (21 Nov 2023 11:32), Max: 98.4 (21 Nov 2023 11:32)  HR: 79 (21 Nov 2023 11:32) (79 - 88)  BP: 196/88 (21 Nov 2023 11:32) (152/78 - 205/83)  BP(mean): --  RR: 18 (21 Nov 2023 11:32) (18 - 18)  SpO2: 94% (21 Nov 2023 11:32) (92% - 94%)    Parameters below as of 21 Nov 2023 11:32  Patient On (Oxygen Delivery Method): room air        acetaminophen     Tablet .. 650 milliGRAM(s) Oral every 6 hours PRN  atenolol  Tablet 50 milliGRAM(s) Oral two times a day  benzocaine 20% Spray 1 Spray(s) Topical four times a day  cholecalciferol 2000 Unit(s) Oral daily  diazepam    Tablet 5 milliGRAM(s) Oral every 12 hours  fludroCORTISONE 0.1 milliGRAM(s) Oral daily  fluvoxaMINE 150 milliGRAM(s) Oral two times a day  haloperidol     Tablet 1 milliGRAM(s) Oral every 12 hours  haloperidol    Injectable 1 milliGRAM(s) IntraMuscular every 12 hours PRN  heparin   Injectable 5000 Unit(s) SubCutaneous every 12 hours  lactulose Syrup 15 Gram(s) Oral two times a day PRN  losartan 25 milliGRAM(s) Oral daily  melatonin 3 milliGRAM(s) Oral at bedtime  mirtazapine 15 milliGRAM(s) Oral at bedtime  pantoprazole  Injectable 40 milliGRAM(s) IV Push every 12 hours  predniSONE   Tablet 5 milliGRAM(s) Oral daily  senna 2 Tablet(s) Oral at bedtime  sodium chloride 0.45%. 1000 milliLiter(s) IV Continuous <Continuous>      PHYSICAL EXAM:  GENERAL: NAD,   EYES: conjunctiva and sclera clear  ENMT: Moist mucous membranes  NECK: Supple, No JVD, Normal thyroid  CHEST/LUNG: non labored, cta b/l  HEART: Regular rate and rhythm; No murmurs, rubs, or gallops  ABDOMEN: Soft, Nontender, Nondistended; Bowel sounds present  EXTREMITIES:  2+ Peripheral Pulses, No clubbing, cyanosis, or edema  LYMPH: No lymphadenopathy noted  SKIN: No rashes or lesions    Consultant(s) Notes Reviewed:  [x ] YES  [ ] NO  Care Discussed with Consultants/Other Providers [ x] YES  [ ] NO    LABS:                        10.8   10.02 )-----------( 116      ( 21 Nov 2023 06:55 )             30.7     11-21    141  |  104  |  13  ----------------------------<  131<H>  4.0   |  33<H>  |  0.98    Ca    8.7      21 Nov 2023 06:55        Urinalysis Basic - ( 21 Nov 2023 06:55 )    Color: x / Appearance: x / SG: x / pH: x  Gluc: 131 mg/dL / Ketone: x  / Bili: x / Urobili: x   Blood: x / Protein: x / Nitrite: x   Leuk Esterase: x / RBC: x / WBC x   Sq Epi: x / Non Sq Epi: x / Bacteria: x      CAPILLARY BLOOD GLUCOSE            Urinalysis Basic - ( 21 Nov 2023 06:55 )    Color: x / Appearance: x / SG: x / pH: x  Gluc: 131 mg/dL / Ketone: x  / Bili: x / Urobili: x   Blood: x / Protein: x / Nitrite: x   Leuk Esterase: x / RBC: x / WBC x   Sq Epi: x / Non Sq Epi: x / Bacteria: x          RADIOLOGY & ADDITIONAL TESTS:    Imaging Personally Reviewed:  [x ] YES  [ ] NO

## 2023-11-21 NOTE — PROGRESS NOTE ADULT - SUBJECTIVE AND OBJECTIVE BOX
Chief Complaint: Psychosis    Interval Events: No events overnight.    Review of Systems:  General: No fevers, chills, weight gain  Skin: No rashes, color changes  Cardiovascular: No chest pain, orthopnea  Respiratory: No shortness of breath, cough  Gastrointestinal: No nausea, abdominal pain  Genitourinary: No incontinence, pain with urination  Musculoskeletal: No pain, swelling, decreased range of motion  Neurological: No headache, weakness  Psychiatric: No depression, anxiety  Endocrine: No weight gain, increased thirst  All other systems are comprehensively negative.    Physical Exam:  Vital Signs Last 24 Hrs  T(C): 36.8 (21 Nov 2023 05:03), Max: 36.8 (20 Nov 2023 12:06)  T(F): 98.2 (21 Nov 2023 05:03), Max: 98.2 (20 Nov 2023 12:06)  HR: 88 (21 Nov 2023 07:35) (75 - 88)  BP: 186/78 (21 Nov 2023 07:35) (127/74 - 205/83)  BP(mean): --  RR: 18 (21 Nov 2023 05:03) (17 - 18)  SpO2: 92% (21 Nov 2023 05:03) (92% - 93%)  Parameters below as of 21 Nov 2023 05:03  Patient On (Oxygen Delivery Method): room air  General: NAD  HEENT: MMM  Neck: No JVD, no carotid bruit  Lungs: CTAB  CV: RRR, nl S1/S2, no M/R/G  Abdomen: S/NT/ND, +BS  Extremities: No LE edema, no cyanosis  Neuro: AAOx3, non-focal  Skin: No rash    Labs:

## 2023-11-21 NOTE — CHART NOTE - NSCHARTNOTEFT_GEN_A_CORE
- Called by RN for routine BP check 200/74 electronic --> 188/87 manual  - Per RN pt denies HA, vision changes, hearing changes  - Per Cardio note, BP meds recently lowered/held dt orthostatic hypotension.  - Per Cardio, allow for some level of HTN at this time to avoid orthostatic hypotension, pending orthostatic vs check repeat  - Orthostatics have not been repeated at this time per chart review  - No s/s endo-organ ischemia, do not suspect hypertensive emergency at this time  - No treatment at this time as above  - RN to call if any changes

## 2023-11-21 NOTE — PROGRESS NOTE ADULT - SUBJECTIVE AND OBJECTIVE BOX
All interim records and events noted.    awake in bed appear comfortable, wife present      MEDICATIONS  (STANDING):  atenolol  Tablet 50 milliGRAM(s) Oral two times a day  benzocaine 20% Spray 1 Spray(s) Topical four times a day  cholecalciferol 2000 Unit(s) Oral daily  diazepam    Tablet 5 milliGRAM(s) Oral every 12 hours  fludroCORTISONE 0.1 milliGRAM(s) Oral daily  fluvoxaMINE 150 milliGRAM(s) Oral two times a day  haloperidol     Tablet 1 milliGRAM(s) Oral every 12 hours  heparin   Injectable 5000 Unit(s) SubCutaneous every 12 hours  losartan 25 milliGRAM(s) Oral daily  melatonin 3 milliGRAM(s) Oral at bedtime  mirtazapine 15 milliGRAM(s) Oral at bedtime  pantoprazole  Injectable 40 milliGRAM(s) IV Push every 12 hours  predniSONE   Tablet 5 milliGRAM(s) Oral daily  senna 2 Tablet(s) Oral at bedtime  sodium chloride 0.45%. 1000 milliLiter(s) (75 mL/Hr) IV Continuous <Continuous>  thiamine IVPB 500 milliGRAM(s) IV Intermittent every 8 hours    MEDICATIONS  (PRN):  acetaminophen     Tablet .. 650 milliGRAM(s) Oral every 6 hours PRN Temp greater or equal to 38.5C (101.3F), Moderate Pain (4 - 6)  haloperidol    Injectable 1 milliGRAM(s) IntraMuscular every 12 hours PRN Agitation  lactulose Syrup 15 Gram(s) Oral two times a day PRN constipation      Vital Signs Last 24 Hrs  T(C): 36.9 (21 Nov 2023 11:32), Max: 36.9 (21 Nov 2023 11:32)  T(F): 98.4 (21 Nov 2023 11:32), Max: 98.4 (21 Nov 2023 11:32)  HR: 79 (21 Nov 2023 11:32) (79 - 88)  BP: 196/88 (21 Nov 2023 11:32) (152/78 - 205/83)  BP(mean): --  RR: 18 (21 Nov 2023 11:32) (18 - 18)  SpO2: 94% (21 Nov 2023 11:32) (92% - 94%)    Parameters below as of 21 Nov 2023 11:32  Patient On (Oxygen Delivery Method): room air        PHYSICAL EXAM  General:, in no acute distress  Head: atraumatic, normocephalic  ENT: sclera anicteric, buccal mucosa moist  Neck: supple, trachea midline  CV: S1 S2, regular rate and rhythm  Lungs: clear to auscultation, no wheezes/rhonchi  Abdomen: soft, nontender, bowel sounds present, no palpable masses  Extrem: no clubbing/cyanosis/edema  Skin: no significant increased ecchymosis/petechiae  Neuro: alert and oriented,  no focal deficits      LABS:             10.8   10.02 )-----------( 116      ( 11-21 @ 06:55 )             30.7       11-21    141  |  104  |  13  ----------------------------<  131<H>  4.0   |  33<H>  |  0.98    Ca    8.7      21 Nov 2023 06:55          RADIOLOGY & ADDITIONAL STUDIES:    IMPRESSION/RECOMMENDATIONS:

## 2023-11-22 RX ORDER — PANTOPRAZOLE SODIUM 20 MG/1
40 TABLET, DELAYED RELEASE ORAL DAILY
Refills: 0 | Status: DISCONTINUED | OUTPATIENT
Start: 2023-11-22 | End: 2023-11-24

## 2023-11-22 RX ORDER — DIPHENHYDRAMINE HYDROCHLORIDE AND LIDOCAINE HYDROCHLORIDE AND ALUMINUM HYDROXIDE AND MAGNESIUM HYDRO
5 KIT THREE TIMES A DAY
Refills: 0 | Status: DISCONTINUED | OUTPATIENT
Start: 2023-11-22 | End: 2023-12-26

## 2023-11-22 RX ADMIN — FLUDROCORTISONE ACETATE 0.1 MILLIGRAM(S): 0.1 TABLET ORAL at 06:21

## 2023-11-22 RX ADMIN — DIPHENHYDRAMINE HYDROCHLORIDE AND LIDOCAINE HYDROCHLORIDE AND ALUMINUM HYDROXIDE AND MAGNESIUM HYDRO 5 MILLILITER(S): KIT at 22:27

## 2023-11-22 RX ADMIN — MIRTAZAPINE 15 MILLIGRAM(S): 45 TABLET, ORALLY DISINTEGRATING ORAL at 22:27

## 2023-11-22 RX ADMIN — Medication 5 MILLIGRAM(S): at 06:21

## 2023-11-22 RX ADMIN — LOSARTAN POTASSIUM 25 MILLIGRAM(S): 100 TABLET, FILM COATED ORAL at 06:21

## 2023-11-22 RX ADMIN — HEPARIN SODIUM 5000 UNIT(S): 5000 INJECTION INTRAVENOUS; SUBCUTANEOUS at 06:21

## 2023-11-22 RX ADMIN — HEPARIN SODIUM 5000 UNIT(S): 5000 INJECTION INTRAVENOUS; SUBCUTANEOUS at 17:12

## 2023-11-22 RX ADMIN — Medication 2000 UNIT(S): at 12:55

## 2023-11-22 RX ADMIN — FLUVOXAMINE MALEATE 150 MILLIGRAM(S): 25 TABLET ORAL at 17:17

## 2023-11-22 RX ADMIN — HALOPERIDOL DECANOATE 1 MILLIGRAM(S): 100 INJECTION INTRAMUSCULAR at 17:19

## 2023-11-22 RX ADMIN — Medication 5 MILLIGRAM(S): at 17:12

## 2023-11-22 RX ADMIN — DIPHENHYDRAMINE HYDROCHLORIDE AND LIDOCAINE HYDROCHLORIDE AND ALUMINUM HYDROXIDE AND MAGNESIUM HYDRO 5 MILLILITER(S): KIT at 17:11

## 2023-11-22 RX ADMIN — PANTOPRAZOLE SODIUM 40 MILLIGRAM(S): 20 TABLET, DELAYED RELEASE ORAL at 06:22

## 2023-11-22 RX ADMIN — ATENOLOL 50 MILLIGRAM(S): 25 TABLET ORAL at 17:12

## 2023-11-22 RX ADMIN — HALOPERIDOL DECANOATE 1 MILLIGRAM(S): 100 INJECTION INTRAMUSCULAR at 06:23

## 2023-11-22 RX ADMIN — ATENOLOL 50 MILLIGRAM(S): 25 TABLET ORAL at 06:21

## 2023-11-22 RX ADMIN — SENNA PLUS 2 TABLET(S): 8.6 TABLET ORAL at 22:27

## 2023-11-22 RX ADMIN — Medication 3 MILLIGRAM(S): at 22:33

## 2023-11-22 RX ADMIN — FLUVOXAMINE MALEATE 150 MILLIGRAM(S): 25 TABLET ORAL at 06:23

## 2023-11-22 NOTE — PROGRESS NOTE ADULT - SUBJECTIVE AND OBJECTIVE BOX
[INTERVAL HX: ]  Patient seen and examined;  Chart reviewed and events noted;     no CP, no SOB    c/p diff swallowing, something stuck in throat.  reports has two metal spoons stuck in throat.  Asks what should he do about it.    refused to try to eat with metal spoon, request plastic spoon.     Advised pt no metal spoon in mouth post attempted trial PO, and exam.   pt with some mild insight in dicordance between pt sx and physical findings    [MEDICATIONS]  MEDICATIONS  (STANDING):  atenolol  Tablet 50 milliGRAM(s) Oral two times a day  benzocaine 20% Spray 1 Spray(s) Topical four times a day  cholecalciferol 2000 Unit(s) Oral daily  diazepam    Tablet 5 milliGRAM(s) Oral every 12 hours  FIRST- Mouthwash  BLM 5 milliLiter(s) Swish and Spit three times a day  fludroCORTISONE 0.1 milliGRAM(s) Oral daily  fluvoxaMINE 150 milliGRAM(s) Oral two times a day  haloperidol     Tablet 1 milliGRAM(s) Oral every 12 hours  heparin   Injectable 5000 Unit(s) SubCutaneous every 12 hours  losartan 25 milliGRAM(s) Oral daily  melatonin 3 milliGRAM(s) Oral at bedtime  mirtazapine 15 milliGRAM(s) Oral at bedtime  pantoprazole  Injectable 40 milliGRAM(s) IV Push daily  predniSONE   Tablet 5 milliGRAM(s) Oral daily  senna 2 Tablet(s) Oral at bedtime  sodium chloride 0.45%. 1000 milliLiter(s) (75 mL/Hr) IV Continuous <Continuous>    MEDICATIONS  (PRN):  acetaminophen     Tablet .. 650 milliGRAM(s) Oral every 6 hours PRN Temp greater or equal to 38.5C (101.3F), Moderate Pain (4 - 6)  haloperidol    Injectable 1 milliGRAM(s) IntraMuscular every 12 hours PRN Agitation  lactulose Syrup 15 Gram(s) Oral two times a day PRN constipation      [VITALS]  Vital Signs Last 24 Hrs  T(C): 36.4 (2023 12:02), Max: 37.5 (2023 19:40)  T(F): 97.6 (2023 12:02), Max: 99.5 (2023 19:40)  HR: 87 (2023 12:02) (74 - 87)  BP: 172/80 (2023 16:42) (145/72 - 193/85)  BP(mean): --  RR: 17 (2023 12:02) (17 - 18)  SpO2: 94% (2023 12:02) (91% - 94%)    Parameters below as of 2023 12:02  Patient On (Oxygen Delivery Method): room air      [WT/HT]  Daily     Daily Weight in k.2 (2023 05:33)  [VENT]      [PHYSICAL EXAM]  GEN: NAD, cahcetic, bitemporal wasting  HEENT: normocephalic and atraumatic. EOMI. .    NECK: Supple.  No lymphadenopathy   LUNGS: Clear to auscultation.  HEART: Regular rate and rhythm,  no MRG  ABDOMEN: Soft, nontender, and nondistended.  Positive bowel sounds.    : No CVA tenderness  EXTREMITIES: Without edema.  NEUROLOGIC: grossly intact.  PSYCHIATRIC: Appropriate affect .  SKIN: No rash     [LABS:]                        10.8   10.02 )-----------( 116      ( 2023 06:55 )             30.7     11-    141  |  104  |  13  ----------------------------<  131<H>  4.0   |  33<H>  |  0.98    Ca    8.7      2023 06:55            Vitamin B12, Serum: 1109 pg/mL [232 - 1245] (10-29-23 @ 18:30)    Folate, Serum: >20.0 ng/mL (10-29-23 @ 18:30)    Sedimentation Rate, Erythrocyte: 57 mm/hr *H* [0 - 20] (10-27-23 @ 18:30)      Urinalysis Basic - ( 2023 06:55 )    Color: x / Appearance: x / SG: x / pH: x  Gluc: 131 mg/dL / Ketone: x  / Bili: x / Urobili: x   Blood: x / Protein: x / Nitrite: x   Leuk Esterase: x / RBC: x / WBC x   Sq Epi: x / Non Sq Epi: x / Bacteria: x                [RADIOLOGY STUDIES:]

## 2023-11-22 NOTE — PHYSICAL THERAPY INITIAL EVALUATION ADULT - ADDITIONAL COMMENTS
pt [place], [place]. pt was independent prior. Pt has own rolling walker at home.
pt lives with his spouse in an apt [place], no steps. pt was independent prior. Pt has own rolling walker at home.

## 2023-11-22 NOTE — PHYSICAL THERAPY INITIAL EVALUATION ADULT - GAIT TRAINING, PT EVAL
STG (3-5 sessions) Amb 10 feet with rolling walker min a.
(2 weeks) independent ambulation ~ 200 feet

## 2023-11-22 NOTE — PROGRESS NOTE ADULT - SUBJECTIVE AND OBJECTIVE BOX
Chief Complaint: Psychosis    Interval Events: No events overnight.    Review of Systems:  General: No fevers, chills, weight gain  Skin: No rashes, color changes  Cardiovascular: No chest pain, orthopnea  Respiratory: No shortness of breath, cough  Gastrointestinal: No nausea, abdominal pain  Genitourinary: No incontinence, pain with urination  Musculoskeletal: No pain, swelling, decreased range of motion  Neurological: No headache, weakness  Psychiatric: No depression, anxiety  Endocrine: No weight gain, increased thirst  All other systems are comprehensively negative.    Physical Exam:  Vital Signs Last 24 Hrs  T(C): 37 (22 Nov 2023 05:33), Max: 37.5 (21 Nov 2023 19:40)  T(F): 98.6 (22 Nov 2023 05:33), Max: 99.5 (21 Nov 2023 19:40)  HR: 74 (22 Nov 2023 05:33) (74 - 86)  BP: 163/73 (22 Nov 2023 05:33) (163/73 - 196/88)  BP(mean): --  RR: 17 (22 Nov 2023 05:33) (17 - 18)  SpO2: 91% (22 Nov 2023 05:33) (91% - 94%)  Parameters below as of 22 Nov 2023 05:33  Patient On (Oxygen Delivery Method): room air  General: NAD  HEENT: MMM  Neck: No JVD, no carotid bruit  Lungs: CTAB  CV: RRR, nl S1/S2, no M/R/G  Abdomen: S/NT/ND, +BS  Extremities: No LE edema, no cyanosis  Neuro: AAOx3, non-focal  Skin: No rash    Labs:    11-21    141  |  104  |  13  ----------------------------<  131<H>  4.0   |  33<H>  |  0.98    Ca    8.7      21 Nov 2023 06:55                          10.8   10.02 )-----------( 116      ( 21 Nov 2023 06:55 )             30.7

## 2023-11-22 NOTE — PHYSICAL THERAPY INITIAL EVALUATION ADULT - STRENGTHENING, PT EVAL
(2 weeks) Increase 1/2 grade for patient to safely negotiate  5  steps
Increase overall strength 1/2 grade in 3-5 sessions.

## 2023-11-22 NOTE — PROGRESS NOTE ADULT - SUBJECTIVE AND OBJECTIVE BOX
[facility]  Walter Camarena PA-C  [address] [place] [address]  [phone]      INTERVAL HPI/OVERNIGHT EVENTS:  Pt s/e with wife at bedside  Pt still c/o odynophagia  S/p egd; mild gastritis otherwise no abnormalities    MEDICATIONS  (STANDING):  atenolol  Tablet 50 milliGRAM(s) Oral two times a day  benzocaine 20% Spray 1 Spray(s) Topical four times a day  cholecalciferol 2000 Unit(s) Oral daily  diazepam    Tablet 5 milliGRAM(s) Oral every 12 hours  fludroCORTISONE 0.1 milliGRAM(s) Oral daily  fluvoxaMINE 150 milliGRAM(s) Oral two times a day  haloperidol     Tablet 1 milliGRAM(s) Oral every 12 hours  heparin   Injectable 5000 Unit(s) SubCutaneous every 12 hours  losartan 25 milliGRAM(s) Oral daily  melatonin 3 milliGRAM(s) Oral at bedtime  mirtazapine 15 milliGRAM(s) Oral at bedtime  pantoprazole  Injectable 40 milliGRAM(s) IV Push every 12 hours  predniSONE   Tablet 5 milliGRAM(s) Oral daily  senna 2 Tablet(s) Oral at bedtime  sodium chloride 0.45%. 1000 milliLiter(s) (75 mL/Hr) IV Continuous <Continuous>    MEDICATIONS  (PRN):  acetaminophen     Tablet .. 650 milliGRAM(s) Oral every 6 hours PRN Temp greater or equal to 38.5C (101.3F), Moderate Pain (4 - 6)  haloperidol    Injectable 1 milliGRAM(s) IntraMuscular every 12 hours PRN Agitation  lactulose Syrup 15 Gram(s) Oral two times a day PRN constipation      Allergies    No Known Allergies      PHYSICAL EXAM:   Vital Signs:  Vital Signs Last 24 Hrs  T(C): 36.4 (2023 12:02), Max: 37.5 (2023 19:40)  T(F): 97.6 (2023 12:02), Max: 99.5 (2023 19:40)  HR: 87 (2023 12:02) (74 - 87)  BP: 148/77 (2023 12:02) (148/77 - 193/85)  BP(mean): --  RR: 17 (2023 12:02) (17 - 18)  SpO2: 94% (2023 12:02) (91% - 94%)    Parameters below as of 2023 12:02  Patient On (Oxygen Delivery Method): room air      Daily     Daily Weight in k.2 (2023 05:33)    GENERAL:  Appears stated age  HEENT:  NC/AT  CHEST:  Full & symmetric excursion  HEART:  Regular rhythm  ABDOMEN:  Soft, non-tender, non-distended  EXTEREMITIES:  no cyanosis  SKIN:  No rash  NEURO:  Alert      LABS:                        10.8   10.02 )-----------( 116      ( 2023 06:55 )             30.7     11-21    141  |  104  |  13  ----------------------------<  131<H>  4.0   |  33<H>  |  0.98    Ca    8.7      2023 06:55        Urinalysis Basic - ( 2023 06:55 )    Color: x / Appearance: x / SG: x / pH: x  Gluc: 131 mg/dL / Ketone: x  / Bili: x / Urobili: x   Blood: x / Protein: x / Nitrite: x   Leuk Esterase: x / RBC: x / WBC x   Sq Epi: x / Non Sq Epi: x / Bacteria: x

## 2023-11-22 NOTE — PROGRESS NOTE ADULT - SUBJECTIVE AND OBJECTIVE BOX
[facility] of INFECTIOUS DISEASE  Eric Reyes MD PhD, Milena Underwood MD, Jillian Mckoy MD, Enid Everett MD, Jay Krishna MD  and providing coverage with Arthur Pepper MD  Providing Infectious Disease Consultations at [facility], [facility], [place], [facility], [facility]'s    Office# [phone] to schedule follow up appointments  Answering Service for urgent calls or New Consults [phone]  Cell# to text for urgent issues Eric Reyes [phone]     infectious diseases progress note:    JANICE RENDON is a 73y y. o. Male patient    Overnight and events of the last 24hrs reviewed    Allergies    No Known Allergies    Intolerances        ANTIBIOTICS/RELEVANT:  antimicrobials    immunologic:    OTHER:  acetaminophen     Tablet .. 650 milliGRAM(s) Oral every 6 hours PRN  atenolol  Tablet 50 milliGRAM(s) Oral two times a day  benzocaine 20% Spray 1 Spray(s) Topical four times a day  cholecalciferol 2000 Unit(s) Oral daily  diazepam    Tablet 5 milliGRAM(s) Oral every 12 hours  fludroCORTISONE 0.1 milliGRAM(s) Oral daily  fluvoxaMINE 150 milliGRAM(s) Oral two times a day  haloperidol     Tablet 1 milliGRAM(s) Oral every 12 hours  haloperidol    Injectable 1 milliGRAM(s) IntraMuscular every 12 hours PRN  heparin   Injectable 5000 Unit(s) SubCutaneous every 12 hours  lactulose Syrup 15 Gram(s) Oral two times a day PRN  losartan 25 milliGRAM(s) Oral daily  melatonin 3 milliGRAM(s) Oral at bedtime  mirtazapine 15 milliGRAM(s) Oral at bedtime  pantoprazole  Injectable 40 milliGRAM(s) IV Push daily  predniSONE   Tablet 5 milliGRAM(s) Oral daily  senna 2 Tablet(s) Oral at bedtime  sodium chloride 0.45%. 1000 milliLiter(s) IV Continuous <Continuous>      Objective:  Vital Signs Last 24 Hrs  T(C): 36.4 (22 Nov 2023 12:02), Max: 37.5 (21 Nov 2023 19:40)  T(F): 97.6 (22 Nov 2023 12:02), Max: 99.5 (21 Nov 2023 19:40)  HR: 87 (22 Nov 2023 12:02) (74 - 87)  BP: 148/77 (22 Nov 2023 12:02) (148/77 - 193/85)  BP(mean): --  RR: 17 (22 Nov 2023 12:02) (17 - 18)  SpO2: 94% (22 Nov 2023 12:02) (91% - 94%)    Parameters below as of 22 Nov 2023 12:02  Patient On (Oxygen Delivery Method): room air        T(C): 36.4 (11-22-23 @ 12:02), Max: 37.5 (11-21-23 @ 19:40)  T(C): 36.4 (11-22-23 @ 12:02), Max: 37.5 (11-21-23 @ 19:40)  T(C): 36.4 (11-22-23 @ 12:02), Max: 37.5 (11-21-23 @ 19:40)    PHYSICAL EXAM:  HEENT: NC atraumatic  Neck: supple  Respiratory: no accessory muscle use, breathing comfortably  Cardiovascular: distant  Gastrointestinal: normal appearing, nondistended  Extremities: no clubbing, no cyanosis,        LABS:                          10.8   10.02 )-----------( 116      ( 21 Nov 2023 06:55 )             30.7       WBC  10.02 11-21 @ 06:55  6.96 11-18 @ 06:50  7.40 11-17 @ 09:00      11-21    141  |  104  |  13  ----------------------------<  131<H>  4.0   |  33<H>  |  0.98    Ca    8.7      21 Nov 2023 06:55        Creatinine: 0.98 mg/dL (11-21-23 @ 06:55)  Creatinine: 0.97 mg/dL (11-18-23 @ 06:50)  Creatinine: 0.99 mg/dL (11-17-23 @ 09:00)        Urinalysis Basic - ( 21 Nov 2023 06:55 )    Color: x / Appearance: x / SG: x / pH: x  Gluc: 131 mg/dL / Ketone: x  / Bili: x / Urobili: x   Blood: x / Protein: x / Nitrite: x   Leuk Esterase: x / RBC: x / WBC x   Sq Epi: x / Non Sq Epi: x / Bacteria: x            INFLAMMATORY MARKERS      MICROBIOLOGY:              RADIOLOGY & ADDITIONAL STUDIES:

## 2023-11-22 NOTE — PROGRESS NOTE ADULT - ASSESSMENT
IMPRESSION    pt w ITP, Lupus Anticoagulant, HTN, anxiety on Seraquel, OCD, Merkel cell ca of right Forehead, under care Dr Elliot Eng, post 4 doses q3wks IV Keytruda, last dose 10/17/23.  Pt brought in w c/o feeling "claws, daggers" inside him, since C#3 Keytruda, with sensation initially started w various parts of face now also progressed down to sides of body to rectum(does not see actual daggers or claws,)    Wife reports after first cycle started w jaw tightness and progressive worsened w each cycle, to point of hard to swallow/can't swallow  Had tired taken off Seroquel to see if adverse effect but no improvement, now back on x 5-6days  Merkel lesion has essentially resolved    CT head no acute findings  see by Psych in ER, ?psychosis  -sensation described by pt ?psychosis vs neuropathy  -psychosis not noted to be associated w Keytruda. Neuro tox of Keytruda described includes cerebral hemorrhage, confusion, myasthenia gravis, reversible posterior leukoencephalopathy syndrome, syed neuropathy, Guillain [place], aseptic meningitis, encephalitis, transverse myelitis    TFTS unremarkable.   AM cortisol 5.8. Slight low. Unclear significance   Repeat AM Cortisol low x2. <6 on two tests  3rd test w cortisol 11 on 11/06/23  To start Hydrocortisone 11/06/23      Sjorgen /Scleroderma studies abn.      +MICHELLE and +Ro52    no significant change. maybe less agitation.   CBC continues to improve   seen by psych with meds adjusted and perhaps was starting to show some improvement  is now on prednisone 5 mg po daily, appears same  Per wife maybe some improvement in mental status according to wife  has not been able to have physical therapy ?refuses    RECOMMEND  continue supportive measures    PSYCH  cause of sx remains unclear.   s/p psych evaluation  is on  Haldol, Fluvoxamine, Mirtazipine  symptomatically improved no change yet  PRN benzocaine    NEURO  s/p neurology eval.   Was offered LP if sx not better.   However pt refused LP at inital workup, and appears less likely sx due to meningitis or encephalitis  Diagnostic workup was prior discussed at length with pt and wife.     s/p additional neuro   Discussed with Dr. Magdaleno.  Sx seem less likely secondary to Keytruda appears more psychiatric sx  await course, mgmt with psychiatry  Tylenol for pain  Low cortisol to be treated with hydrocortisone and now prednisone  s/p Endocrinology eval  on Fludrocortisone  on Prednisone 5mg    pain  PRN Tylenol#3. one tabs if mild sx, consider escalation to two tabs if "dagger like sx / pain worse.   Rcvd dose Dilaudid 0.5mg prior.   Can consider redose for pain.   Benzocaine spray PRN    ITP  platelet count remains stable  will observed.     Endocrine  Prior cortisol levels low borderline.   Repeat today normal.   Was to started hydrocortisone 11/06/32   Placed on prednisone 5 mg po daily  Placed now on Fludrocortisone 0.1mg PO daily.     Sx appears no change since last week. Perhaps less distressed re sx.   to have followup physical therapy evaluation. to try to get up in chair  continue psych followup

## 2023-11-22 NOTE — PROGRESS NOTE ADULT - ASSESSMENT
73 M pmh of HTN, ITP, APL syndrome, generalized anxiety disorder, OCD, newly dx aggressive Merkel Cell cancer of forehead- pt's oncologist  Dr. Eng, at [facility] on Keytruda (PD-1 blocker) infusions with good response per wife  was brought to ED for worsening psychosis for the past few days, pt believes there are sharp things inside his body trying to come out of his mouth, stuck in throat, pt lately with difficulty swallowing, decreased po intake, pt was evaluated by psych in ED. Wife reports sudden cognitive decline and behavioral changes immediately after COVID infection.    RECOMMENDATIONS  1-?PASC  -sounds like there is no obvious acute infectious issue. no fever, no leukocytosis, no obvious localization but concerning history of things developing post COVID-19 infection so raises concerns for a neurological PASC picture  noted serology for EBV consistent with reactivation as described post PASC  Serotonin, Blood: <30ng/mL   am cortisol-low x 2 then nl     Shawn has been improving and more conversant willing to entertain that there are no fungi or metal clips in mouth and this may be just in his head, continue to work with medicine and psych on adjustment of medications, 11/21-upper gastrointestinal endoscopy; normal esophagus, mid esophageal biopsy performed; mild gastritis  -will try magic mouth wash and follow response    Thank you for consulting us and involving us in the management of this most interesting and challenging case.  We will follow along in the care of this patient. Please call us at [phone] or text me directly on my cell# at [phone] with any concerns.    Starting tomorrow Dr Jay Krishna will be covering this patient so please contact him with further questions office [phone] or our answering service at [phone]

## 2023-11-22 NOTE — PROGRESS NOTE ADULT - ASSESSMENT
odynophagia    s/p  upper gastrointestinal endoscopy; normal esophagus, mid esophageal biopsy performed; mild gastritis  f/u path  diet as tolerated  ppi qd  no objection to d/c  d/w wife at bedside

## 2023-11-22 NOTE — PHYSICAL THERAPY INITIAL EVALUATION ADULT - BALANCE TRAINING, PT EVAL
STG (3-5 sessions) increase dynamic standing balance to fair(+).
(2 weeks) improve balance 1/2 grade for safe transfers and gait.

## 2023-11-22 NOTE — PHYSICAL THERAPY INITIAL EVALUATION ADULT - PERTINENT HX OF CURRENT PROBLEM, REHAB EVAL
73 M pmh of HTN, ITP, APL syndrome, generalized anxiety disorder, OCD, newly dx aggressive Merkel Cell cancer of forehead- pt's oncologist  Dr. Eng, at [facility] on Keytruda infusions with good response per wife  was brought to ED for worsening psychosis for the past few days, pt lately with difficulty swallowing, decreased po intake
73 M pmh of HTN, ITP, APL syndrome, generalized anxiety disorder, OCD, newly dx aggressive Merkel Cell cancer of forehead- pt's oncologist  Dr. Eng, at [facility] on Keytruda infusions with good response per wife  was brought to ED for worsening psychosis for the past few days, pt lately with difficulty swallowing, decreased po intake.

## 2023-11-22 NOTE — PROGRESS NOTE ADULT - ASSESSMENT
73 M pmh of HTN, ITP, APL syndrome, generalized anxiety disorder, OCD, newly dx aggressive Merkel Cell cancer of forehead- pt's oncologist  Dr. Eng, at [facility] on Keytruda infusions with good response per wife  was brought to ED for worsening psychosis for the past few days, pt lately with difficulty swallowing, decreased po intake    Psychosis  monitor  psych fu  MRI noted  neuro following  cont luvox and remeron  lower seroquel dosing to off  psych- started haldol 1mg qhs and inc to 1mg am    #Abnormal throat sensation- sharp object sensation  swallow eval appreciated  GI fu noted- sp EGD today- normal -sp bx fu path    #HTN- +orthostasis  #Orthostatic hypotension- hold losartan,  IVF   cont atenolol w parameters  ? psych med induced  adrenal insufficiency  continue prednisone  and  florinef  endo following  possible hypophysitis      #Anxiety  psych cs appreciated  continue valium  on remeron, seroquel and luvox, haldol  telepsych fu    #Merkel cell ca  onc c/s noted  r/o paraneoplastic causes- unlikely      #DVT ppx-  hep sq

## 2023-11-22 NOTE — SOCIAL WORK PROGRESS NOTE - NSSWPROGRESSNOTE_GEN_ALL_CORE
SW spoke with pts spouse at length to discuss [facility]/SNF placement pending if pt participates with PT at the time of DC. SW and spouse greatly encouraged pt to participate, spouse aware if for LTC there will also need to be a financial discussion with the SNF. SW to follow.

## 2023-11-23 PROCEDURE — 99231 SBSQ HOSP IP/OBS SF/LOW 25: CPT

## 2023-11-23 RX ORDER — HALOPERIDOL DECANOATE 100 MG/ML
2 INJECTION INTRAMUSCULAR AT BEDTIME
Refills: 0 | Status: DISCONTINUED | OUTPATIENT
Start: 2023-11-23 | End: 2023-11-28

## 2023-11-23 RX ORDER — HALOPERIDOL DECANOATE 100 MG/ML
1 INJECTION INTRAMUSCULAR DAILY
Refills: 0 | Status: DISCONTINUED | OUTPATIENT
Start: 2023-11-24 | End: 2023-11-25

## 2023-11-23 RX ADMIN — PANTOPRAZOLE SODIUM 40 MILLIGRAM(S): 20 TABLET, DELAYED RELEASE ORAL at 11:31

## 2023-11-23 RX ADMIN — DIPHENHYDRAMINE HYDROCHLORIDE AND LIDOCAINE HYDROCHLORIDE AND ALUMINUM HYDROXIDE AND MAGNESIUM HYDRO 5 MILLILITER(S): KIT at 06:10

## 2023-11-23 RX ADMIN — Medication 3 MILLIGRAM(S): at 21:28

## 2023-11-23 RX ADMIN — FLUVOXAMINE MALEATE 150 MILLIGRAM(S): 25 TABLET ORAL at 06:08

## 2023-11-23 RX ADMIN — Medication 5 MILLIGRAM(S): at 06:08

## 2023-11-23 RX ADMIN — Medication 650 MILLIGRAM(S): at 19:15

## 2023-11-23 RX ADMIN — FLUDROCORTISONE ACETATE 0.1 MILLIGRAM(S): 0.1 TABLET ORAL at 06:08

## 2023-11-23 RX ADMIN — HALOPERIDOL DECANOATE 2 MILLIGRAM(S): 100 INJECTION INTRAMUSCULAR at 21:29

## 2023-11-23 RX ADMIN — HALOPERIDOL DECANOATE 1 MILLIGRAM(S): 100 INJECTION INTRAMUSCULAR at 06:09

## 2023-11-23 RX ADMIN — Medication 2000 UNIT(S): at 11:30

## 2023-11-23 RX ADMIN — Medication 650 MILLIGRAM(S): at 20:14

## 2023-11-23 RX ADMIN — ATENOLOL 50 MILLIGRAM(S): 25 TABLET ORAL at 06:08

## 2023-11-23 RX ADMIN — DIPHENHYDRAMINE HYDROCHLORIDE AND LIDOCAINE HYDROCHLORIDE AND ALUMINUM HYDROXIDE AND MAGNESIUM HYDRO 5 MILLILITER(S): KIT at 14:34

## 2023-11-23 RX ADMIN — HEPARIN SODIUM 5000 UNIT(S): 5000 INJECTION INTRAVENOUS; SUBCUTANEOUS at 06:09

## 2023-11-23 RX ADMIN — FLUVOXAMINE MALEATE 150 MILLIGRAM(S): 25 TABLET ORAL at 17:29

## 2023-11-23 RX ADMIN — MIRTAZAPINE 15 MILLIGRAM(S): 45 TABLET, ORALLY DISINTEGRATING ORAL at 21:28

## 2023-11-23 RX ADMIN — LOSARTAN POTASSIUM 25 MILLIGRAM(S): 100 TABLET, FILM COATED ORAL at 06:11

## 2023-11-23 RX ADMIN — Medication 5 MILLIGRAM(S): at 17:29

## 2023-11-23 RX ADMIN — ATENOLOL 50 MILLIGRAM(S): 25 TABLET ORAL at 17:29

## 2023-11-23 RX ADMIN — HEPARIN SODIUM 5000 UNIT(S): 5000 INJECTION INTRAVENOUS; SUBCUTANEOUS at 17:31

## 2023-11-23 RX ADMIN — SODIUM CHLORIDE 75 MILLILITER(S): 9 INJECTION, SOLUTION INTRAVENOUS at 06:14

## 2023-11-23 NOTE — PROGRESS NOTE ADULT - ASSESSMENT
pt w ITP, Lupus Anticoagulant, HTN, anxiety on Seraquel, OCD, Merkel cell ca of right Forehead, under care Dr Elliot Eng, post 4 doses q3wks IV Keytruda, last dose 10/17/23.  Pt brought in w c/o feeling "claws, daggers" inside him, since C#3 Keytruda, with sensation initially started w various parts of face now also progressed down to sides of body to rectum(does not see actual daggers or claws,)    Wife reports after first cycle started w jaw tightness and progressive worsened w each cycle, to point of hard to swallow/can't swallow  Had tired taken off Seroquel to see if adverse effect but no improvement, now back on x 5-6days  Merkel lesion has essentially resolved  -sensation described by pt ?psychosis vs neuropathy  -psychosis not noted to be associated w Keytruda. Neuro tox of Keytruda described includes cerebral hemorrhage, confusion, myasthenia gravis, reversible posterior leukoencephalopathy syndrome, syed neuropathy, Guillain [place], aseptic meningitis, encephalitis, transverse myelitis    -CT head no sig findings  -AM cortisol 5.8. Slight low. Unclear significance   Repeat AM Cortisol low x2. <6 on two tests  3rd test w cortisol 11 on 11/06/2  -Sjorgen /Scleroderma studies abn.      +MICHELLE and +Ro52  Seen by Neuro and Psych post medication changes, pt refused LP  Seen by Endocrine-on prednisone 5mg qD for low Cortisol level, could not tolerate hydrocortisone  Seen by GI post endoscopy to eval c/o unable no abnormality found    clinically no sig change, same c/o unable to swallow, tight jaws  -mild anemia and [id number] stable as of the last CBC, no intervention at these levels   pt w ITP, Lupus Anticoagulant, HTN, anxiety on Seraquel, OCD, Merkel cell ca of right Forehead, under care Dr Elliot Eng, post 4 doses q3wks IV Keytruda, last dose 10/17/23.  Pt brought in w c/o feeling "claws, daggers" inside him, since C#3 Keytruda, with sensation initially started w various parts of face now also progressed down to sides of body to rectum(does not see actual daggers or claws,)    Wife reports after first cycle started w jaw tightness and progressive worsened w each cycle, to point of hard to swallow/can't swallow  Had tired taken off Seroquel to see if adverse effect but no improvement, now back on x 5-6days  Merkel lesion has essentially resolved  -sensation described by pt ?psychosis vs neuropathy  -psychosis not noted to be associated w Keytruda. Neuro tox of Keytruda described includes cerebral hemorrhage, confusion, myasthenia gravis, reversible posterior leukoencephalopathy syndrome, syed neuropathy, Guillain [place], aseptic meningitis, encephalitis, transverse myelitis    -CT and MRI head no sig findings  -AM cortisol 5.8. Slight low. Unclear significance   Repeat AM Cortisol low x2. <6 on two tests  3rd test w cortisol 11 on 11/06/2  -Sjorgen /Scleroderma studies abn.      +MICHELLE and +Ro52  Seen by Neuro and Psych post medication changes, pt refused LP  Seen by Endocrine-on prednisone 5mg qD for low Cortisol level, could not tolerate hydrocortisone  Seen by GI post endoscopy to eval c/o unable no abnormality found    clinically no sig change, same c/o unable to swallow, tight jaws  -mild anemia and [id number] stable as of the last CBC, no intervention at these levels

## 2023-11-23 NOTE — PROGRESS NOTE ADULT - ASSESSMENT
The patient is a 73 year old male with a history of HTN, ITP, APLS, anxiety, OCD, Merkel cell cancer who presented with psychosis.    Plan:  - ECG with no evidence of ischemia or infarction  - Orthostatic hypotension may be multifactorial - dehydration, medication related (antipsychotics), and low cortisol levels  - Orthostatics have not been repeated  - Continue atenolol 50 mg bid  - Continue losartan 25 mg daily  - EGD done with no significant findings  - Psych follow-up

## 2023-11-23 NOTE — PROGRESS NOTE ADULT - SUBJECTIVE AND OBJECTIVE BOX
Chief Complaint: Psychosis    Interval Events: No events overnight.    Review of Systems:  General: No fevers, chills, weight gain  Skin: No rashes, color changes  Cardiovascular: No chest pain, orthopnea  Respiratory: No shortness of breath, cough  Gastrointestinal: No nausea, abdominal pain  Genitourinary: No incontinence, pain with urination  Musculoskeletal: No pain, swelling, decreased range of motion  Neurological: No headache, weakness  Psychiatric: No depression, anxiety  Endocrine: No weight gain, increased thirst  All other systems are comprehensively negative.    Physical Exam:  Vital Signs Last 24 Hrs  T(C): 36.6 (23 Nov 2023 05:02), Max: 36.6 (23 Nov 2023 05:02)  T(F): 97.8 (23 Nov 2023 05:02), Max: 97.8 (23 Nov 2023 05:02)  HR: 70 (23 Nov 2023 05:02) (70 - 87)  BP: 163/85 (23 Nov 2023 05:02) (145/72 - 172/80)  BP(mean): --  RR: 17 (23 Nov 2023 05:02) (17 - 17)  SpO2: 94% (23 Nov 2023 05:02) (94% - 94%)  Parameters below as of 23 Nov 2023 05:02  Patient On (Oxygen Delivery Method): room air  General: NAD  HEENT: MMM  Neck: No JVD, no carotid bruit  Lungs: CTAB  CV: RRR, nl S1/S2, no M/R/G  Abdomen: S/NT/ND, +BS  Extremities: No LE edema, no cyanosis  Neuro: AAOx3, non-focal  Skin: No rash    Labs:

## 2023-11-23 NOTE — PROGRESS NOTE ADULT - ASSESSMENT
73 M pmh of HTN, ITP, APL syndrome, generalized anxiety disorder, OCD, newly dx aggressive Merkel Cell cancer of forehead- pt's oncologist  Dr. Eng, at [facility] on Keytruda infusions with good response per wife  was brought to ED for worsening psychosis for the past few days, pt lately with difficulty swallowing, decreased po intake    Psychosis  monitor  psych fu  MRI noted  neuro following  cont luvox and remeron  off seroquel  continue to titrate haldol    #Abnormal throat sensation- sharp object sensation  swallow eval appreciated  GI fu noted- sp EGD - normal -sp bx fu path    #HTN/Orthostatic hypotension  cont atenolol w parameters  ? psych med induced  adrenal insufficiency  continue prednisone and  florinef    #Anxiety  psych cs appreciated  continue valium  on remeron, seroquel and luvox, haldol  telepsych fu    #Merkel cell ca  onc c/s noted  r/o paraneoplastic causes- unlikely      #DVT ppx-  hep sq

## 2023-11-23 NOTE — CHART NOTE - NSCHARTNOTEFT_GEN_A_CORE
Called by RN for Pt c/o 10/10 mouth pain. Pt seen and examined at bedside.    Endorses sharp pain in mouth since waking up this AM. States "there are metal objects stabbing me in my mouth, trying to kill me." States this has never happened before. On chart review, several instances with similar complaints/claims.        T(C): 36.6 (11-23-23 @ 05:02), Max: 37 (11-22-23 @ 05:33)  HR: 70 (11-23-23 @ 05:02) (70 - 87)  BP: 163/85 (11-23-23 @ 05:02) (145/72 - 172/80)  RR: 17 (11-23-23 @ 05:02) (17 - 17)  SpO2: 94% (11-23-23 @ 05:02) (91% - 94%)  Wt(kg): --    Physical :    Exam limited d/t patient compliance    Gen- in distress d/t pain, ncat  ENMT - poor dentition/oral hygiene. No oral sores/lesions noted.  Cardio - s+1,s+2, rrr, no murmur  Lung - limited dt patient compliance with deep inspiration  Neuro- AAO x2. Difficulty with reorienting, states he is unable to follow commands/questions.    A/P  - Pt acutely psychotic  - Attempts to reorient and reassure patient to lack of metal objects in mouth unsuccessful  - Refusing medications at this time, including due Haldol PO  - RN will re-attempt to administer medications later on in the AM  - May require transfer to facility with inpatient psychiatry coverage

## 2023-11-23 NOTE — PROGRESS NOTE ADULT - SUBJECTIVE AND OBJECTIVE BOX
All interim records and events noted.    still c/o hard to swallow and move jaw  wife present      MEDICATIONS  (STANDING):  atenolol  Tablet 50 milliGRAM(s) Oral two times a day  benzocaine 20% Spray 1 Spray(s) Topical four times a day  cholecalciferol 2000 Unit(s) Oral daily  diazepam    Tablet 5 milliGRAM(s) Oral every 12 hours  FIRST- Mouthwash  BLM 5 milliLiter(s) Swish and Spit three times a day  fludroCORTISONE 0.1 milliGRAM(s) Oral daily  fluvoxaMINE 150 milliGRAM(s) Oral two times a day  haloperidol     Tablet 1 milliGRAM(s) Oral every 12 hours  heparin   Injectable 5000 Unit(s) SubCutaneous every 12 hours  losartan 25 milliGRAM(s) Oral daily  melatonin 3 milliGRAM(s) Oral at bedtime  mirtazapine 15 milliGRAM(s) Oral at bedtime  pantoprazole  Injectable 40 milliGRAM(s) IV Push daily  predniSONE   Tablet 5 milliGRAM(s) Oral daily  senna 2 Tablet(s) Oral at bedtime  sodium chloride 0.45%. 1000 milliLiter(s) (75 mL/Hr) IV Continuous <Continuous>    MEDICATIONS  (PRN):  acetaminophen     Tablet .. 650 milliGRAM(s) Oral every 6 hours PRN Temp greater or equal to 38.5C (101.3F), Moderate Pain (4 - 6)  haloperidol    Injectable 1 milliGRAM(s) IntraMuscular every 12 hours PRN Agitation  lactulose Syrup 15 Gram(s) Oral two times a day PRN constipation      Vital Signs Last 24 Hrs  T(C): 36.6 (23 Nov 2023 05:02), Max: 36.6 (23 Nov 2023 05:02)  T(F): 97.8 (23 Nov 2023 05:02), Max: 97.8 (23 Nov 2023 05:02)  HR: 70 (23 Nov 2023 05:02) (70 - 87)  BP: 163/85 (23 Nov 2023 05:02) (145/72 - 172/80)  BP(mean): --  RR: 17 (23 Nov 2023 05:02) (17 - 17)  SpO2: 94% (23 Nov 2023 05:02) (94% - 94%)    Parameters below as of 23 Nov 2023 05:02  Patient On (Oxygen Delivery Method): room air        PHYSICAL EXAM  General: thin chronically ill man propped up in bed, in no acute distress  Head: atraumatic, normocephalic  ENT: sclera anicteric, buccal mucosa moist. No palpabel jaw/TMJ masses or tenderness  Neck: supple, trachea midline  CV: S1 S2, regular rate and rhythm  Lungs: clear to auscultation, no wheezes/rhonchi  Abdomen: soft, nontender, bowel sounds present, no palpable masses  Extrem: no clubbing/cyanosis/edema  Skin: no significant increased ecchymosis/petechiae  Neuro: alert and responsive,  no focal deficits      LABS:             10.8   10.02 )-----------( 116      ( 11-21 @ 06:55 )             30.7                 RADIOLOGY & ADDITIONAL STUDIES:    IMPRESSION/RECOMMENDATIONS:

## 2023-11-23 NOTE — BH CONSULTATION LIAISON PROGRESS NOTE - NSBHASSESSMENTFT_PSY_ALL_CORE
Pt is a 74 y/o m with HTN, h/o severe anxiety around medical illness and constantly on the look for illnesses brought in by wife after he started to become more and more psychotic with each round of Keytruda for his Merkel Cell Ca. Pt is delusional thinking he has metals, objects in his throat till his anus and penis, unable to tolerate PO. There are case reports of rare psychosis with monoclonal antibodies. Keytruda seems like the possible suspect of psychosis, combined with wife's reports of timeline, although can't be sure, time will show. Steroids are probably not helping psychiatrically either.     11/15-16: No psych PRNs. VS notable for elevated BP. Pt continues to c/o throat pain/metal sensation in mouth/dysphagia. Although pt presenting w/ delusional thought content, would consider endoscopy for any strictures/esophagitis.   11.17 some improvement. better related, better engagement. not lehargic. slept well. wife notices he is improved.   denies s/e from haldol. remains somatically preoccupied.       RECOMMENDATIONS:  1. Continue Luvox 150 mg po bid and diazepam 5 mg po bid for anxiety. He has been on them > at least 12 yrs.  2. Increase Haldol to 1 mg po in AM and 2 mg po at HS

## 2023-11-23 NOTE — PROGRESS NOTE ADULT - SUBJECTIVE AND OBJECTIVE BOX
Patient is a 73y old  Male who presents with a chief complaint of delusions (22 Nov 2023 18:54)        INTERVAL HPI/OVERNIGHT EVENTS:   complaining of increased secretions  pt seen and examined         Vital Signs Last 24 Hrs  T(C): 36.8 (23 Nov 2023 12:15), Max: 36.8 (23 Nov 2023 12:15)  T(F): 98.3 (23 Nov 2023 12:15), Max: 98.3 (23 Nov 2023 12:15)  HR: 72 (23 Nov 2023 12:15) (70 - 77)  BP: 180/73 (23 Nov 2023 12:15) (145/72 - 180/73)  BP(mean): --  RR: 18 (23 Nov 2023 12:15) (17 - 18)  SpO2: 93% (23 Nov 2023 12:15) (93% - 94%)    Parameters below as of 23 Nov 2023 12:15  Patient On (Oxygen Delivery Method): room air        acetaminophen     Tablet .. 650 milliGRAM(s) Oral every 6 hours PRN  atenolol  Tablet 50 milliGRAM(s) Oral two times a day  benzocaine 20% Spray 1 Spray(s) Topical four times a day  cholecalciferol 2000 Unit(s) Oral daily  diazepam    Tablet 5 milliGRAM(s) Oral every 12 hours  FIRST- Mouthwash  BLM 5 milliLiter(s) Swish and Spit three times a day  fludroCORTISONE 0.1 milliGRAM(s) Oral daily  fluvoxaMINE 150 milliGRAM(s) Oral two times a day  haloperidol     Tablet 1 milliGRAM(s) Oral daily  haloperidol     Tablet 2 milliGRAM(s) Oral at bedtime  haloperidol    Injectable 1 milliGRAM(s) IntraMuscular every 12 hours PRN  heparin   Injectable 5000 Unit(s) SubCutaneous every 12 hours  lactulose Syrup 15 Gram(s) Oral two times a day PRN  losartan 25 milliGRAM(s) Oral daily  melatonin 3 milliGRAM(s) Oral at bedtime  mirtazapine 15 milliGRAM(s) Oral at bedtime  pantoprazole  Injectable 40 milliGRAM(s) IV Push daily  predniSONE   Tablet 5 milliGRAM(s) Oral daily  senna 2 Tablet(s) Oral at bedtime  sodium chloride 0.45%. 1000 milliLiter(s) IV Continuous <Continuous>      PHYSICAL EXAM:  GENERAL: NAD   EYES: conjunctiva and sclera clear  ENMT: Moist mucous membranes  NECK: Supple, No JVD, Normal thyroid  CHEST/LUNG: non labored, cta b/l  HEART: Regular rate and rhythm; No murmurs, rubs, or gallops  ABDOMEN: Soft, Nontender, Nondistended; Bowel sounds present  EXTREMITIES:  2+ Peripheral Pulses, No clubbing, no cyanosis, no edema  LYMPH: No lymphadenopathy noted  SKIN: No rashes or lesions  NEURO: no focal deficits    Consultant(s) Notes Reviewed:  [x ] YES  [ ] NO  Care Discussed with Consultants/Other Providers [ x] YES  [ ] NO    LABS:              CAPILLARY BLOOD GLUCOSE                  RADIOLOGY & ADDITIONAL TESTS:    Imaging Personally Reviewed  Reviewed consultants input

## 2023-11-24 LAB
RAPID RVP RESULT: SIGNIFICANT CHANGE UP
RAPID RVP RESULT: SIGNIFICANT CHANGE UP
SARS-COV-2 RNA SPEC QL NAA+PROBE: SIGNIFICANT CHANGE UP
SARS-COV-2 RNA SPEC QL NAA+PROBE: SIGNIFICANT CHANGE UP

## 2023-11-24 RX ORDER — HYDRALAZINE HCL 50 MG
10 TABLET ORAL ONCE
Refills: 0 | Status: COMPLETED | OUTPATIENT
Start: 2023-11-24 | End: 2023-11-24

## 2023-11-24 RX ADMIN — SODIUM CHLORIDE 75 MILLILITER(S): 9 INJECTION, SOLUTION INTRAVENOUS at 09:10

## 2023-11-24 RX ADMIN — HALOPERIDOL DECANOATE 2 MILLIGRAM(S): 100 INJECTION INTRAMUSCULAR at 21:29

## 2023-11-24 RX ADMIN — HALOPERIDOL DECANOATE 1 MILLIGRAM(S): 100 INJECTION INTRAMUSCULAR at 11:30

## 2023-11-24 RX ADMIN — HEPARIN SODIUM 5000 UNIT(S): 5000 INJECTION INTRAVENOUS; SUBCUTANEOUS at 05:36

## 2023-11-24 RX ADMIN — HEPARIN SODIUM 5000 UNIT(S): 5000 INJECTION INTRAVENOUS; SUBCUTANEOUS at 17:51

## 2023-11-24 RX ADMIN — Medication 2000 UNIT(S): at 11:30

## 2023-11-24 RX ADMIN — Medication 10 MILLIGRAM(S): at 21:29

## 2023-11-24 RX ADMIN — DIPHENHYDRAMINE HYDROCHLORIDE AND LIDOCAINE HYDROCHLORIDE AND ALUMINUM HYDROXIDE AND MAGNESIUM HYDRO 5 MILLILITER(S): KIT at 21:29

## 2023-11-24 RX ADMIN — ATENOLOL 50 MILLIGRAM(S): 25 TABLET ORAL at 17:51

## 2023-11-24 RX ADMIN — Medication 5 MILLIGRAM(S): at 17:50

## 2023-11-24 RX ADMIN — ATENOLOL 50 MILLIGRAM(S): 25 TABLET ORAL at 05:35

## 2023-11-24 RX ADMIN — LOSARTAN POTASSIUM 25 MILLIGRAM(S): 100 TABLET, FILM COATED ORAL at 05:36

## 2023-11-24 RX ADMIN — FLUVOXAMINE MALEATE 150 MILLIGRAM(S): 25 TABLET ORAL at 18:01

## 2023-11-24 RX ADMIN — MIRTAZAPINE 15 MILLIGRAM(S): 45 TABLET, ORALLY DISINTEGRATING ORAL at 21:29

## 2023-11-24 RX ADMIN — SENNA PLUS 2 TABLET(S): 8.6 TABLET ORAL at 21:29

## 2023-11-24 RX ADMIN — Medication 3 MILLIGRAM(S): at 21:29

## 2023-11-24 NOTE — CHART NOTE - NSCHARTNOTEFT_GEN_A_CORE
Called by RN for asymptomatic HTN to /83; manual BP similar  HR 83  Will order one-time PO hydralazine

## 2023-11-24 NOTE — PROGRESS NOTE ADULT - SUBJECTIVE AND OBJECTIVE BOX
Chief Complaint: Psychosis    Interval Events: No events overnight.    Review of Systems:  General: No fevers, chills, weight gain  Skin: No rashes, color changes  Cardiovascular: No chest pain, orthopnea  Respiratory: No shortness of breath, cough  Gastrointestinal: No nausea, abdominal pain  Genitourinary: No incontinence, pain with urination  Musculoskeletal: No pain, swelling, decreased range of motion  Neurological: No headache, weakness  Psychiatric: No depression, anxiety  Endocrine: No weight gain, increased thirst  All other systems are comprehensively negative.    Physical Exam:  Vital Signs Last 24 Hrs  T(C): 36.9 (24 Nov 2023 05:11), Max: 36.9 (24 Nov 2023 05:11)  T(F): 98.4 (24 Nov 2023 05:11), Max: 98.4 (24 Nov 2023 05:11)  HR: 76 (24 Nov 2023 05:11) (72 - 81)  BP: 175/77 (24 Nov 2023 05:11) (172/80 - 180/73)  BP(mean): --  RR: 17 (24 Nov 2023 05:11) (17 - 18)  SpO2: 94% (24 Nov 2023 05:11) (93% - 94%)  Parameters below as of 24 Nov 2023 05:11  Patient On (Oxygen Delivery Method): room air  General: NAD  HEENT: MMM  Neck: No JVD, no carotid bruit  Lungs: CTAB  CV: RRR, nl S1/S2, no M/R/G  Abdomen: S/NT/ND, +BS  Extremities: No LE edema, no cyanosis  Neuro: AAOx3, non-focal  Skin: No rash    Labs:

## 2023-11-24 NOTE — PROGRESS NOTE ADULT - ASSESSMENT
pt w ITP, Lupus Anticoagulant, HTN, anxiety on Seraquel, OCD, Merkel cell ca of right Forehead, under care Dr Elliot Eng, post 4 doses q3wks IV Keytruda, last dose 10/17/23.  Pt brought in w c/o feeling "claws, daggers" inside him, since C#3 Keytruda, with sensation initially started w various parts of face now also progressed down to sides of body to rectum(does not see actual daggers or claws,)    Wife reports after first cycle started w jaw tightness and progressive worsened w each cycle, to point of hard to swallow/can't swallow  Had tired taken off Seroquel to see if adverse effect but no improvement, now back on x 5-6days  Merkel lesion has essentially resolved  -sensation described by pt ?psychosis vs neuropathy  -psychosis not noted to be associated w Keytruda. Neuro tox of Keytruda described includes cerebral hemorrhage, confusion, myasthenia gravis, reversible posterior leukoencephalopathy syndrome, syed neuropathy, Guillain [place], aseptic meningitis, encephalitis, transverse myelitis    -CT and MRI head no sig findings  -AM cortisol 5.8. Slight low. Unclear significance   Repeat AM Cortisol low x2. <6 on two tests  3rd test w cortisol 11 on 11/06/2  -Sjorgen /Scleroderma studies abn.      +MICHELLE and +Ro52  Seen by Neuro and Psych post medication changes, pt refused LP  Seen by Endocrine-on prednisone 5mg qD for low Cortisol level, could not tolerate hydrocortisone  Seen by GI post endoscopy to eval c/o unable no abnormality found    -clinically remains no sig change, same c/o unable to swallow, tight jaws(since first dose Keytruda as per wife)  -mild anemia and [id number] as of the last CBC, no intervention at these levels  -supportive care from Heme/Onc standpoint, encouraged PT(pt did not want to participarte previously)

## 2023-11-24 NOTE — PROGRESS NOTE ADULT - SUBJECTIVE AND OBJECTIVE BOX
All interim records and events noted.    awake in bed, wife present who reports pt did more activity  still c/o pain when swallows      MEDICATIONS  (STANDING):  atenolol  Tablet 50 milliGRAM(s) Oral two times a day  benzocaine 20% Spray 1 Spray(s) Topical four times a day  cholecalciferol 2000 Unit(s) Oral daily  diazepam    Tablet 5 milliGRAM(s) Oral every 12 hours  FIRST- Mouthwash  BLM 5 milliLiter(s) Swish and Spit three times a day  fludroCORTISONE 0.1 milliGRAM(s) Oral daily  fluvoxaMINE 150 milliGRAM(s) Oral two times a day  haloperidol     Tablet 1 milliGRAM(s) Oral daily  haloperidol     Tablet 2 milliGRAM(s) Oral at bedtime  heparin   Injectable 5000 Unit(s) SubCutaneous every 12 hours  losartan 25 milliGRAM(s) Oral daily  melatonin 3 milliGRAM(s) Oral at bedtime  mirtazapine 15 milliGRAM(s) Oral at bedtime  predniSONE   Tablet 5 milliGRAM(s) Oral daily  senna 2 Tablet(s) Oral at bedtime  sodium chloride 0.45%. 1000 milliLiter(s) (75 mL/Hr) IV Continuous <Continuous>    MEDICATIONS  (PRN):  acetaminophen     Tablet .. 650 milliGRAM(s) Oral every 6 hours PRN Temp greater or equal to 38.5C (101.3F), Moderate Pain (4 - 6)  haloperidol    Injectable 1 milliGRAM(s) IntraMuscular every 12 hours PRN Agitation  lactulose Syrup 15 Gram(s) Oral two times a day PRN constipation      Vital Signs Last 24 Hrs  T(C): 36.9 (24 Nov 2023 05:11), Max: 36.9 (24 Nov 2023 05:11)  T(F): 98.4 (24 Nov 2023 05:11), Max: 98.4 (24 Nov 2023 05:11)  HR: 76 (24 Nov 2023 05:11) (72 - 81)  BP: 175/77 (24 Nov 2023 05:11) (172/80 - 180/73)  BP(mean): --  RR: 17 (24 Nov 2023 05:11) (17 - 18)  SpO2: 94% (24 Nov 2023 05:11) (93% - 94%)    Parameters below as of 24 Nov 2023 05:11  Patient On (Oxygen Delivery Method): room air        PHYSICAL EXAM  General: frail elderly man sitting up in bed in no acute distress  Head: atraumatic, normocephalic  ENT: sclera anicteric, buccal mucosa moist  Neck: supple, trachea midline  CV: S1 S2, regular rate and rhythm  Lungs: clear to auscultation, no wheezes/rhonchi  Abdomen: soft, nontender, bowel sounds present, no palpable massses  Extrem: no clubbing/cyanosis. Trace mario lower legs edema to 1/2 up  Skin: no significant increased ecchymosis/petechiae  Neuro: alert and responsive,  no focal deficits      LABS:              RADIOLOGY & ADDITIONAL STUDIES:    IMPRESSION/RECOMMENDATIONS:

## 2023-11-24 NOTE — PROGRESS NOTE ADULT - ASSESSMENT
73 M pmh of HTN, ITP, APL syndrome, generalized anxiety disorder, OCD, newly dx aggressive Merkel Cell cancer of forehead- pt's oncologist  Dr. Eng, at [facility] on Keytruda infusions with good response per wife  was brought to ED for worsening psychosis for the past few days, pt lately with difficulty swallowing, decreased po intake    Psychosis  monitor  psych fu  MRI noted  neuro following  cont luvox   continue to titrate haldol    #Abnormal throat sensation- sharp object sensation  swallow eval appreciated  GI fu noted- sp EGD - normal -sp bx fu path    #HTN/Orthostatic hypotension  cont atenolol w parameters  ? psych med induced  adrenal insufficiency  continue prednisone and  florinef    #Anxiety  psych cs appreciated  continue valium  on  luvox, haldol  telepsych fu    #Merkel cell ca  onc c/s noted  r/o paraneoplastic causes- unlikely      #DVT ppx-  hep sq

## 2023-11-24 NOTE — PROGRESS NOTE ADULT - SUBJECTIVE AND OBJECTIVE BOX
[facility]  Walter Camarena PA-C  [address] [place] [address]  [phone]      INTERVAL HPI/OVERNIGHT EVENTS:  Pt s/e with wife at bedside  Pt c/o "metal stabbing mouth and throat"    MEDICATIONS  (STANDING):  atenolol  Tablet 50 milliGRAM(s) Oral two times a day  benzocaine 20% Spray 1 Spray(s) Topical four times a day  cholecalciferol 2000 Unit(s) Oral daily  diazepam    Tablet 5 milliGRAM(s) Oral every 12 hours  FIRST- Mouthwash  BLM 5 milliLiter(s) Swish and Spit three times a day  fludroCORTISONE 0.1 milliGRAM(s) Oral daily  fluvoxaMINE 150 milliGRAM(s) Oral two times a day  haloperidol     Tablet 1 milliGRAM(s) Oral daily  haloperidol     Tablet 2 milliGRAM(s) Oral at bedtime  heparin   Injectable 5000 Unit(s) SubCutaneous every 12 hours  losartan 25 milliGRAM(s) Oral daily  melatonin 3 milliGRAM(s) Oral at bedtime  mirtazapine 15 milliGRAM(s) Oral at bedtime  predniSONE   Tablet 5 milliGRAM(s) Oral daily  senna 2 Tablet(s) Oral at bedtime  sodium chloride 0.45%. 1000 milliLiter(s) (75 mL/Hr) IV Continuous <Continuous>    MEDICATIONS  (PRN):  acetaminophen     Tablet .. 650 milliGRAM(s) Oral every 6 hours PRN Temp greater or equal to 38.5C (101.3F), Moderate Pain (4 - 6)  haloperidol    Injectable 1 milliGRAM(s) IntraMuscular every 12 hours PRN Agitation  lactulose Syrup 15 Gram(s) Oral two times a day PRN constipation      Allergies    No Known Allergies      PHYSICAL EXAM:   Vital Signs:  Vital Signs Last 24 Hrs  T(C): 36.9 (24 Nov 2023 05:11), Max: 36.9 (24 Nov 2023 05:11)  T(F): 98.4 (24 Nov 2023 05:11), Max: 98.4 (24 Nov 2023 05:11)  HR: 76 (24 Nov 2023 05:11) (72 - 81)  BP: 175/77 (24 Nov 2023 05:11) (172/80 - 180/73)  BP(mean): --  RR: 17 (24 Nov 2023 05:11) (17 - 18)  SpO2: 94% (24 Nov 2023 05:11) (93% - 94%)    Parameters below as of 24 Nov 2023 05:11  Patient On (Oxygen Delivery Method): room air      GENERAL:  Appears stated age  HEENT:  NC/AT  CHEST:  Full & symmetric excursion  HEART:  Regular rhythm  ABDOMEN:  Soft, non-tender, non-distended  EXTEREMITIES:  no cyanosis  SKIN:  No rash  NEURO:  Alert

## 2023-11-24 NOTE — PROGRESS NOTE ADULT - SUBJECTIVE AND OBJECTIVE BOX
Patient is a 73y old  Male who presents with a chief complaint of delusions (22 Nov 2023 18:54)        INTERVAL HPI/OVERNIGHT EVENTS:   no complaints  pt seen and examined         Vital Signs Last 24 Hrs  T(C): 36.9 (24 Nov 2023 05:11), Max: 36.9 (24 Nov 2023 05:11)  T(F): 98.4 (24 Nov 2023 05:11), Max: 98.4 (24 Nov 2023 05:11)  HR: 85 (24 Nov 2023 17:51) (76 - 85)  BP: 185/78 (24 Nov 2023 17:51) (172/80 - 185/78)  BP(mean): --  RR: 17 (24 Nov 2023 05:11) (17 - 18)  SpO2: 94% (24 Nov 2023 05:11) (93% - 94%)    Parameters below as of 24 Nov 2023 05:11  Patient On (Oxygen Delivery Method): room air        acetaminophen     Tablet .. 650 milliGRAM(s) Oral every 6 hours PRN  atenolol  Tablet 50 milliGRAM(s) Oral two times a day  benzocaine 20% Spray 1 Spray(s) Topical four times a day  cholecalciferol 2000 Unit(s) Oral daily  FIRST- Mouthwash  BLM 5 milliLiter(s) Swish and Spit three times a day  fludroCORTISONE 0.1 milliGRAM(s) Oral daily  fluvoxaMINE 150 milliGRAM(s) Oral two times a day  haloperidol     Tablet 2 milliGRAM(s) Oral at bedtime  haloperidol     Tablet 1 milliGRAM(s) Oral daily  haloperidol    Injectable 1 milliGRAM(s) IntraMuscular every 12 hours PRN  heparin   Injectable 5000 Unit(s) SubCutaneous every 12 hours  lactulose Syrup 15 Gram(s) Oral two times a day PRN  losartan 25 milliGRAM(s) Oral daily  melatonin 3 milliGRAM(s) Oral at bedtime  mirtazapine 15 milliGRAM(s) Oral at bedtime  predniSONE   Tablet 5 milliGRAM(s) Oral daily  senna 2 Tablet(s) Oral at bedtime  sodium chloride 0.45%. 1000 milliLiter(s) IV Continuous <Continuous>      PHYSICAL EXAM:  GENERAL: NAD   EYES: conjunctiva and sclera clear  ENMT: Moist mucous membranes  NECK: Supple, No JVD, Normal thyroid  CHEST/LUNG: non labored, cta b/l  HEART: Regular rate and rhythm; No murmurs, rubs, or gallops  ABDOMEN: Soft, Nontender, Nondistended; Bowel sounds present  EXTREMITIES:  2+ Peripheral Pulses, No clubbing, no cyanosis, no edema  LYMPH: No lymphadenopathy noted  SKIN: No rashes or lesions  NEURO: no focal deficits    Consultant(s) Notes Reviewed:  [x ] YES  [ ] NO  Care Discussed with Consultants/Other Providers [ x] YES  [ ] NO    LABS:              CAPILLARY BLOOD GLUCOSE                  RADIOLOGY & ADDITIONAL TESTS:    Imaging Personally Reviewed  Reviewed consultants input

## 2023-11-24 NOTE — PROGRESS NOTE ADULT - ASSESSMENT
odynophagia    s/p  upper gastrointestinal endoscopy; normal esophagus, mid esophageal biopsy performed; mild gastritis  path shows minimal chronic inflammation  diet as tolerated  holding off ppi per family request  d/w wife at bedside

## 2023-11-24 NOTE — PROGRESS NOTE ADULT - ASSESSMENT
73 M pmh of HTN, ITP, APL syndrome, generalized anxiety disorder, OCD, newly dx aggressive Merkel Cell cancer of forehead- pt's oncologist  Dr. Eng, at [facility] on Keytruda (PD-1 blocker) infusions with good response per wife  was brought to ED for worsening psychosis for the past few days, pt believes there are sharp things inside his body trying to come out of his mouth, stuck in throat, pt lately with difficulty swallowing, decreased po intake, pt was evaluated by psych in ED. Wife reports sudden cognitive decline and behavioral changes immediately after COVID infection.    RECOMMENDATIONS  1-?PASC  -sounds like there is no obvious acute infectious issue. no fever, no leukocytosis, no obvious localization but concerning history of things developing post COVID-19 infection so raises concerns for a neurological PASC picture  noted serology for EBV consistent with reactivation as described post PASC  Serotonin, Blood: <30ng/mL   am cortisol-low x 2 then nl    Shawn has been improving and more conversant willing to entertain that there are no fungi or metal clips in mouth and this may be just in his head, continue to work with medicine and psych on adjustment of medications, 11/21-upper gastrointestinal endoscopy; normal esophagus, mid esophageal biopsy performed; mild gastritis  -will try magic mouth wash and follow response    Over the weekend Dr. Krishna will be covering for our group.   If you have any questions, concerns or new micro data, please reach out to them at [phone].  Jillian Mckoy M.D.  [facility] Division of Infectious Diseases [phone]  For after 5 P.M. and weekends, please call [phone]

## 2023-11-24 NOTE — CHART NOTE - NSCHARTNOTEFT_GEN_A_CORE
Assessment:   Pt seen for nutrition follow up.  Chart reviewed, hospital course noted.     Brief History:   "73 M pmh of HTN, ITP, APL syndrome, generalized anxiety disorder, OCD, newly dx aggressive Merkel Cell cancer of forehead- pt's oncologist  Dr. Eng, at [facility] on Keytruda infusions with good response per wife  was brought to ED for worsening psychosis for the past few days, pt lately with difficulty swallowing, decreased po intake"    Pt seen with wife present. Wife appears exhausted, distraught and frustrated with pt's condition and how to manage him.  Pt continues to claim he can't swallow stating he doesn't know where (what little food he eats) it is going despite wife stating it is "going where it's supposed to go".  This RD explained to the pt the food is going through his digestive tract but pt states "I don't think so".  Pt not able to be reasoned with.  EGD done, normal save for mild gastritis.  Wife states pt has been consuming most of the Ensure shakes but takes little of other foods on tray.  Food preferences updated.  Wife states PEG not a likely option as thinks pt would not tolerate it and states she doesn't think she would want it for him.  States pt needs to be in an inpatient psyche facility but they wont accept him due to him not walking or eating well enough.   +BM 11/20  pt receiving ongoing psyche intervention    Factors impacting intake: [ ] none [ ] nausea  [ ] vomiting [ ] diarrhea [ ] constipation  [ ]chewing problems [ ] swallowing issues  [x] other: psychosis, thinks he cant swallow    Diet Prescription: Diet, Pureed:   Mildly Thick Liquids (MILDTHICKLIQS)  Supplement Feeding Modality:  Oral  Ensure Enlive Cans or Servings Per Day:  1       Frequency:  Three Times a day (10-28-23 @ 09:41)    Intake: poor.  Ate ~3/4 vanilla yogurt and most of Ensure shake this morning.  Does like the Ensure shake mixed with ice cream but pt only wants it at dinner. Pt continues to think he cannot swallow, all testing has proved there is no physical problem causing his supposed dysphagia.      Current Weight: 10/25 174.3#, 11/22 150.3#      Pertinent Medications: MEDICATIONS  (STANDING):  atenolol  Tablet 50 milliGRAM(s) Oral two times a day  benzocaine 20% Spray 1 Spray(s) Topical four times a day  cholecalciferol 2000 Unit(s) Oral daily  diazepam    Tablet 5 milliGRAM(s) Oral every 12 hours  FIRST- Mouthwash  BLM 5 milliLiter(s) Swish and Spit three times a day  fludroCORTISONE 0.1 milliGRAM(s) Oral daily  fluvoxaMINE 150 milliGRAM(s) Oral two times a day  haloperidol     Tablet 2 milliGRAM(s) Oral at bedtime  haloperidol     Tablet 1 milliGRAM(s) Oral daily  heparin   Injectable 5000 Unit(s) SubCutaneous every 12 hours  losartan 25 milliGRAM(s) Oral daily  melatonin 3 milliGRAM(s) Oral at bedtime  mirtazapine 15 milliGRAM(s) Oral at bedtime  predniSONE   Tablet 5 milliGRAM(s) Oral daily  senna 2 Tablet(s) Oral at bedtime  sodium chloride 0.45%. 1000 milliLiter(s) (75 mL/Hr) IV Continuous <Continuous>    MEDICATIONS  (PRN):  acetaminophen     Tablet .. 650 milliGRAM(s) Oral every 6 hours PRN Temp greater or equal to 38.5C (101.3F), Moderate Pain (4 - 6)  haloperidol    Injectable 1 milliGRAM(s) IntraMuscular every 12 hours PRN Agitation  lactulose Syrup 15 Gram(s) Oral two times a day PRN constipation    Pertinent Labs:       Skin:  no pressure injuries  Edema: 1+L arm    Estimated Needs:   [x] no change since previous assessment on: 10/25 based on #  kcal/day: 7973-2944  gms protein/day: 89-105g      Previous Nutrition Diagnosis:     [x] Swallowing Difficulty  [x]  Malnutrition     Nutrition Diagnosis is [x] ongoing  [ ] resolved [ ] not applicable     New Nutrition Diagnosis: [x] not applicable       Interventions:   Recommend  [x] Continue current diet  [ ] Change Diet To:  [x] Nutrition Supplement- continue Ensure tid, mix one with ice cream @ dinner  [ ] Nutrition Support  [x] Other: update and honor food preferences they continually change    Monitoring and Evaluation:   [x] PO intake [ x ] Tolerance to diet prescription [ x ] weights [ x ] labs [ x ] follow up per protocol  [x] other: s/s GI distress, bowel function, skin integrity/ edema

## 2023-11-24 NOTE — PROGRESS NOTE ADULT - SUBJECTIVE AND OBJECTIVE BOX
[facility], Division of Infectious Diseases  AMAN Ramos Y. Patel, S. Shah, G. [facility]  [phone]    Name: JANICE RENDON  Age: 73y  Gender: Male  MRN: [id number]    Interval History:  Patient seen and examined at bedside  No acute overnight events. Afebrile  No complaints, working w/ PT  Notes reviewed    Antibiotics:      Medications:  acetaminophen     Tablet .. 650 milliGRAM(s) Oral every 6 hours PRN  atenolol  Tablet 50 milliGRAM(s) Oral two times a day  benzocaine 20% Spray 1 Spray(s) Topical four times a day  cholecalciferol 2000 Unit(s) Oral daily  diazepam    Tablet 5 milliGRAM(s) Oral every 12 hours  FIRST- Mouthwash  BLM 5 milliLiter(s) Swish and Spit three times a day  fludroCORTISONE 0.1 milliGRAM(s) Oral daily  fluvoxaMINE 150 milliGRAM(s) Oral two times a day  haloperidol     Tablet 1 milliGRAM(s) Oral daily  haloperidol     Tablet 2 milliGRAM(s) Oral at bedtime  haloperidol    Injectable 1 milliGRAM(s) IntraMuscular every 12 hours PRN  heparin   Injectable 5000 Unit(s) SubCutaneous every 12 hours  lactulose Syrup 15 Gram(s) Oral two times a day PRN  losartan 25 milliGRAM(s) Oral daily  melatonin 3 milliGRAM(s) Oral at bedtime  mirtazapine 15 milliGRAM(s) Oral at bedtime  pantoprazole  Injectable 40 milliGRAM(s) IV Push daily  predniSONE   Tablet 5 milliGRAM(s) Oral daily  senna 2 Tablet(s) Oral at bedtime  sodium chloride 0.45%. 1000 milliLiter(s) IV Continuous <Continuous>      ROS unable to obtain    Allergies: No Known Allergies    For details regarding the patient's past medical history, social history, family history, and other miscellaneous elements, please refer the initial infectious diseases consultation and/or the admitting history and physical examination for this admission.    Objective:  Vitals:   T(C): 36.9 (11-24-23 @ 05:11), Max: 36.9 (11-24-23 @ 05:11)  HR: 76 (11-24-23 @ 05:11) (72 - 81)  BP: 175/77 (11-24-23 @ 05:11) (172/80 - 180/73)  RR: 17 (11-24-23 @ 05:11) (17 - 18)  SpO2: 94% (11-24-23 @ 05:11) (93% - 94%)    Physical Examination:  General: no acute distress  HEENT: NC/AT, EOMI,  Cardio: RRR  Resp: decreased b/l breath sounds  Abd: soft, NT, ND  Ext: no edema or cyanosis  Skin: warm, dry, no visible rash      Laboratory Studies:  CBC:   CMP:             Microbiology: reviewed        Radiology: reviewed

## 2023-11-24 NOTE — SOCIAL WORK PROGRESS NOTE - NSSWPROGRESSNOTE_GEN_ALL_CORE
As per MD, Dr Carl benson started Haldol 2 mg in the am and 1mg at night. Sw will continue to follow.

## 2023-11-25 LAB
APPEARANCE UR: ABNORMAL
APPEARANCE UR: ABNORMAL
BACTERIA # UR AUTO: ABNORMAL /HPF
BACTERIA # UR AUTO: ABNORMAL /HPF
BILIRUB UR-MCNC: NEGATIVE — SIGNIFICANT CHANGE UP
BILIRUB UR-MCNC: NEGATIVE — SIGNIFICANT CHANGE UP
COLOR SPEC: YELLOW — SIGNIFICANT CHANGE UP
COLOR SPEC: YELLOW — SIGNIFICANT CHANGE UP
DIFF PNL FLD: ABNORMAL
DIFF PNL FLD: ABNORMAL
EPI CELLS # UR: SIGNIFICANT CHANGE UP
EPI CELLS # UR: SIGNIFICANT CHANGE UP
GLUCOSE UR QL: NEGATIVE MG/DL — SIGNIFICANT CHANGE UP
GLUCOSE UR QL: NEGATIVE MG/DL — SIGNIFICANT CHANGE UP
KETONES UR-MCNC: 15 MG/DL
KETONES UR-MCNC: 15 MG/DL
LACTATE SERPL-SCNC: 0.8 MMOL/L — SIGNIFICANT CHANGE UP (ref 0.7–2)
LACTATE SERPL-SCNC: 0.8 MMOL/L — SIGNIFICANT CHANGE UP (ref 0.7–2)
LEUKOCYTE ESTERASE UR-ACNC: ABNORMAL
LEUKOCYTE ESTERASE UR-ACNC: ABNORMAL
NITRITE UR-MCNC: NEGATIVE — SIGNIFICANT CHANGE UP
NITRITE UR-MCNC: NEGATIVE — SIGNIFICANT CHANGE UP
PH UR: 5.5 — SIGNIFICANT CHANGE UP (ref 5–8)
PH UR: 5.5 — SIGNIFICANT CHANGE UP (ref 5–8)
PROCALCITONIN SERPL-MCNC: 0.44 NG/ML — HIGH
PROCALCITONIN SERPL-MCNC: 0.44 NG/ML — HIGH
PROT UR-MCNC: 100 MG/DL
PROT UR-MCNC: 100 MG/DL
RBC CASTS # UR COMP ASSIST: 4 /HPF — SIGNIFICANT CHANGE UP (ref 0–4)
RBC CASTS # UR COMP ASSIST: 4 /HPF — SIGNIFICANT CHANGE UP (ref 0–4)
SP GR SPEC: 1.01 — SIGNIFICANT CHANGE UP (ref 1–1.03)
SP GR SPEC: 1.01 — SIGNIFICANT CHANGE UP (ref 1–1.03)
UROBILINOGEN FLD QL: 1 MG/DL — SIGNIFICANT CHANGE UP (ref 0.2–1)
UROBILINOGEN FLD QL: 1 MG/DL — SIGNIFICANT CHANGE UP (ref 0.2–1)
WBC UR QL: ABNORMAL /HPF (ref 0–5)
WBC UR QL: ABNORMAL /HPF (ref 0–5)

## 2023-11-25 PROCEDURE — 71045 X-RAY EXAM CHEST 1 VIEW: CPT | Mod: 26

## 2023-11-25 RX ORDER — CEFTRIAXONE 500 MG/1
1000 INJECTION, POWDER, FOR SOLUTION INTRAMUSCULAR; INTRAVENOUS ONCE
Refills: 0 | Status: COMPLETED | OUTPATIENT
Start: 2023-11-25 | End: 2023-11-25

## 2023-11-25 RX ORDER — GABAPENTIN 400 MG/1
100 CAPSULE ORAL THREE TIMES A DAY
Refills: 0 | Status: DISCONTINUED | OUTPATIENT
Start: 2023-11-25 | End: 2023-12-02

## 2023-11-25 RX ORDER — SODIUM CHLORIDE 9 MG/ML
1000 INJECTION INTRAMUSCULAR; INTRAVENOUS; SUBCUTANEOUS ONCE
Refills: 0 | Status: COMPLETED | OUTPATIENT
Start: 2023-11-25 | End: 2023-11-25

## 2023-11-25 RX ORDER — HALOPERIDOL DECANOATE 100 MG/ML
1 INJECTION INTRAMUSCULAR
Refills: 0 | Status: DISCONTINUED | OUTPATIENT
Start: 2023-11-25 | End: 2023-11-28

## 2023-11-25 RX ORDER — CEFTRIAXONE 500 MG/1
INJECTION, POWDER, FOR SOLUTION INTRAMUSCULAR; INTRAVENOUS
Refills: 0 | Status: DISCONTINUED | OUTPATIENT
Start: 2023-11-25 | End: 2023-12-04

## 2023-11-25 RX ORDER — CEFTRIAXONE 500 MG/1
1000 INJECTION, POWDER, FOR SOLUTION INTRAMUSCULAR; INTRAVENOUS EVERY 24 HOURS
Refills: 0 | Status: DISCONTINUED | OUTPATIENT
Start: 2023-11-26 | End: 2023-12-04

## 2023-11-25 RX ORDER — ACETAMINOPHEN 500 MG
1000 TABLET ORAL ONCE
Refills: 0 | Status: COMPLETED | OUTPATIENT
Start: 2023-11-25 | End: 2023-11-25

## 2023-11-25 RX ADMIN — HALOPERIDOL DECANOATE 2 MILLIGRAM(S): 100 INJECTION INTRAMUSCULAR at 22:02

## 2023-11-25 RX ADMIN — GABAPENTIN 100 MILLIGRAM(S): 400 CAPSULE ORAL at 22:01

## 2023-11-25 RX ADMIN — CEFTRIAXONE 1000 MILLIGRAM(S): 500 INJECTION, POWDER, FOR SOLUTION INTRAMUSCULAR; INTRAVENOUS at 18:29

## 2023-11-25 RX ADMIN — ATENOLOL 50 MILLIGRAM(S): 25 TABLET ORAL at 18:29

## 2023-11-25 RX ADMIN — FLUDROCORTISONE ACETATE 0.1 MILLIGRAM(S): 0.1 TABLET ORAL at 05:41

## 2023-11-25 RX ADMIN — ATENOLOL 50 MILLIGRAM(S): 25 TABLET ORAL at 05:40

## 2023-11-25 RX ADMIN — Medication 5 MILLIGRAM(S): at 05:41

## 2023-11-25 RX ADMIN — FLUVOXAMINE MALEATE 150 MILLIGRAM(S): 25 TABLET ORAL at 05:41

## 2023-11-25 RX ADMIN — HEPARIN SODIUM 5000 UNIT(S): 5000 INJECTION INTRAVENOUS; SUBCUTANEOUS at 05:41

## 2023-11-25 RX ADMIN — SODIUM CHLORIDE 75 MILLILITER(S): 9 INJECTION, SOLUTION INTRAVENOUS at 11:04

## 2023-11-25 RX ADMIN — FLUVOXAMINE MALEATE 150 MILLIGRAM(S): 25 TABLET ORAL at 18:35

## 2023-11-25 RX ADMIN — HALOPERIDOL DECANOATE 1 MILLIGRAM(S): 100 INJECTION INTRAMUSCULAR at 22:01

## 2023-11-25 RX ADMIN — DIPHENHYDRAMINE HYDROCHLORIDE AND LIDOCAINE HYDROCHLORIDE AND ALUMINUM HYDROXIDE AND MAGNESIUM HYDRO 5 MILLILITER(S): KIT at 22:00

## 2023-11-25 RX ADMIN — SODIUM CHLORIDE 1000 MILLILITER(S): 9 INJECTION INTRAMUSCULAR; INTRAVENOUS; SUBCUTANEOUS at 18:30

## 2023-11-25 RX ADMIN — Medication 3 MILLIGRAM(S): at 22:02

## 2023-11-25 RX ADMIN — MIRTAZAPINE 15 MILLIGRAM(S): 45 TABLET, ORALLY DISINTEGRATING ORAL at 22:01

## 2023-11-25 RX ADMIN — Medication 400 MILLIGRAM(S): at 18:28

## 2023-11-25 RX ADMIN — Medication 2000 UNIT(S): at 14:08

## 2023-11-25 RX ADMIN — DIPHENHYDRAMINE HYDROCHLORIDE AND LIDOCAINE HYDROCHLORIDE AND ALUMINUM HYDROXIDE AND MAGNESIUM HYDRO 5 MILLILITER(S): KIT at 05:41

## 2023-11-25 RX ADMIN — GABAPENTIN 100 MILLIGRAM(S): 400 CAPSULE ORAL at 14:08

## 2023-11-25 RX ADMIN — HALOPERIDOL DECANOATE 1 MILLIGRAM(S): 100 INJECTION INTRAMUSCULAR at 14:08

## 2023-11-25 RX ADMIN — HEPARIN SODIUM 5000 UNIT(S): 5000 INJECTION INTRAVENOUS; SUBCUTANEOUS at 18:29

## 2023-11-25 RX ADMIN — LOSARTAN POTASSIUM 25 MILLIGRAM(S): 100 TABLET, FILM COATED ORAL at 05:40

## 2023-11-25 RX ADMIN — SENNA PLUS 2 TABLET(S): 8.6 TABLET ORAL at 22:01

## 2023-11-25 NOTE — PROGRESS NOTE ADULT - SUBJECTIVE AND OBJECTIVE BOX
Patient is a 73y old  Male who presents with a chief complaint of delusions (2023 18:54)        INTERVAL HPI/OVERNIGHT EVENTS:   chills, high fever  pt seen and examined         Vital Signs Last 24 Hrs  T(C): 40.6 (2023 17:32), Max: 40.6 (2023 17:32)  T(F): 105.1 (2023 17:32), Max: 105.1 (2023 17:32)  HR: 115 (:32) (76 - 115)  BP: 169/89 (2023 17:32) (107/65 - 196/83)  BP(mean): --  RR: 17 (2023 17:32) (17 - 17)  SpO2: 92% (:32) (92% - 93%)    Parameters below as of 2023 17:32  Patient On (Oxygen Delivery Method): room air        acetaminophen     Tablet .. 650 milliGRAM(s) Oral every 6 hours PRN  acetaminophen   IVPB .. 1000 milliGRAM(s) IV Intermittent once  atenolol  Tablet 50 milliGRAM(s) Oral two times a day  benzocaine 20% Spray 1 Spray(s) Topical four times a day  cefTRIAXone   IVPB 1000 milliGRAM(s) IV Intermittent once  cefTRIAXone   IVPB      cholecalciferol 2000 Unit(s) Oral daily  FIRST- Mouthwash  BLM 5 milliLiter(s) Swish and Spit three times a day  fludroCORTISONE 0.1 milliGRAM(s) Oral daily  fluvoxaMINE 150 milliGRAM(s) Oral two times a day  gabapentin 100 milliGRAM(s) Oral three times a day  haloperidol     Tablet 2 milliGRAM(s) Oral at bedtime  haloperidol     Tablet 1 milliGRAM(s) Oral <User Schedule>  haloperidol    Injectable 1 milliGRAM(s) IntraMuscular every 12 hours PRN  heparin   Injectable 5000 Unit(s) SubCutaneous every 12 hours  lactulose Syrup 15 Gram(s) Oral two times a day PRN  losartan 25 milliGRAM(s) Oral daily  melatonin 3 milliGRAM(s) Oral at bedtime  mirtazapine 15 milliGRAM(s) Oral at bedtime  predniSONE   Tablet 5 milliGRAM(s) Oral daily  senna 2 Tablet(s) Oral at bedtime  sodium chloride 0.45%. 1000 milliLiter(s) IV Continuous <Continuous>  sodium chloride 0.9% Bolus 1000 milliLiter(s) IV Bolus once      PHYSICAL EXAM:  GENERAL: NAD   EYES: conjunctiva and sclera clear  ENMT: Moist mucous membranes  NECK: Supple, No JVD, Normal thyroid  CHEST/LUNG: non labored, cta b/l  HEART: Regular rate and rhythm; No murmurs, rubs, or gallops  ABDOMEN: Soft, Nontender, Nondistended; Bowel sounds present  EXTREMITIES:  2+ Peripheral Pulses, No clubbing, no cyanosis, no edema  LYMPH: No lymphadenopathy noted  SKIN: No rashes or lesions  NEURO: no focal deficits    Consultant(s) Notes Reviewed:  [x ] YES  [ ] NO  Care Discussed with Consultants/Other Providers [ x] YES  [ ] NO    LABS:            Urinalysis Basic - ( 2023 14:00 )    Color: Yellow / Appearance: Turbid / S.008 / pH: x  Gluc: x / Ketone: 15 mg/dL  / Bili: Negative / Urobili: 1.0 mg/dL   Blood: x / Protein: 100 mg/dL / Nitrite: Negative   Leuk Esterase: Large / RBC: 4 /HPF / WBC Too Numerous to count /HPF   Sq Epi: x / Non Sq Epi: x / Bacteria: Many /HPF      CAPILLARY BLOOD GLUCOSE            Urinalysis Basic - ( 2023 14:00 )    Color: Yellow / Appearance: Turbid / S.008 / pH: x  Gluc: x / Ketone: 15 mg/dL  / Bili: Negative / Urobili: 1.0 mg/dL   Blood: x / Protein: 100 mg/dL / Nitrite: Negative   Leuk Esterase: Large / RBC: 4 /HPF / WBC Too Numerous to count /HPF   Sq Epi: x / Non Sq Epi: x / Bacteria: Many /HPF          RADIOLOGY & ADDITIONAL TESTS:    Imaging Personally Reviewed  Reviewed consultants input

## 2023-11-25 NOTE — PROGRESS NOTE ADULT - SUBJECTIVE AND OBJECTIVE BOX
All interim records and events noted.    wife present  pt continues to c/o "objects" in throat causing problem swallowing      MEDICATIONS  (STANDING):  atenolol  Tablet 50 milliGRAM(s) Oral two times a day  benzocaine 20% Spray 1 Spray(s) Topical four times a day  cholecalciferol 2000 Unit(s) Oral daily  FIRST- Mouthwash  BLM 5 milliLiter(s) Swish and Spit three times a day  fludroCORTISONE 0.1 milliGRAM(s) Oral daily  fluvoxaMINE 150 milliGRAM(s) Oral two times a day  haloperidol     Tablet 1 milliGRAM(s) Oral daily  haloperidol     Tablet 2 milliGRAM(s) Oral at bedtime  heparin   Injectable 5000 Unit(s) SubCutaneous every 12 hours  losartan 25 milliGRAM(s) Oral daily  melatonin 3 milliGRAM(s) Oral at bedtime  mirtazapine 15 milliGRAM(s) Oral at bedtime  predniSONE   Tablet 5 milliGRAM(s) Oral daily  senna 2 Tablet(s) Oral at bedtime  sodium chloride 0.45%. 1000 milliLiter(s) (75 mL/Hr) IV Continuous <Continuous>    MEDICATIONS  (PRN):  acetaminophen     Tablet .. 650 milliGRAM(s) Oral every 6 hours PRN Temp greater or equal to 38.5C (101.3F), Moderate Pain (4 - 6)  haloperidol    Injectable 1 milliGRAM(s) IntraMuscular every 12 hours PRN Agitation  lactulose Syrup 15 Gram(s) Oral two times a day PRN constipation      Vital Signs Last 24 Hrs  T(C): 36.8 (25 Nov 2023 04:52), Max: 36.8 (24 Nov 2023 20:29)  T(F): 98.3 (25 Nov 2023 04:52), Max: 98.3 (24 Nov 2023 20:29)  HR: 76 (25 Nov 2023 04:52) (76 - 85)  BP: 138/78 (25 Nov 2023 04:52) (107/65 - 196/83)  BP(mean): --  RR: 17 (25 Nov 2023 04:52) (17 - 17)  SpO2: 93% (25 Nov 2023 04:52) (92% - 93%)    Parameters below as of 25 Nov 2023 04:52  Patient On (Oxygen Delivery Method): room air        PHYSICAL EXAM  General: thinn chronically ill appearing man in bed in no acute distress  Head: atraumatic, normocephalic  ENT: sclera anicteric, buccal mucosa moist  Neck: supple, trachea midline  CV: S1 S2, regular rate and rhythm  Lungs: clear to auscultation, no wheezes/rhonchi  Abdomen: soft, nontender, bowel sounds present, no palpable masses  Skin: no significant increased ecchymosis/petechiae  Neuro: alert and responsive,  no focal deficits      LABS:              RADIOLOGY & ADDITIONAL STUDIES:    IMPRESSION/RECOMMENDATIONS:

## 2023-11-25 NOTE — PROGRESS NOTE ADULT - SUBJECTIVE AND OBJECTIVE BOX
[facility]  Walter Camarena PA-C  [address] [place] [address]  [phone]      INTERVAL HPI/OVERNIGHT EVENTS:  Pt s/e with family at bedside  Pt c/o oral pain    MEDICATIONS  (STANDING):  atenolol  Tablet 50 milliGRAM(s) Oral two times a day  benzocaine 20% Spray 1 Spray(s) Topical four times a day  cholecalciferol 2000 Unit(s) Oral daily  FIRST- Mouthwash  BLM 5 milliLiter(s) Swish and Spit three times a day  fludroCORTISONE 0.1 milliGRAM(s) Oral daily  fluvoxaMINE 150 milliGRAM(s) Oral two times a day  gabapentin 100 milliGRAM(s) Oral three times a day  haloperidol     Tablet 1 milliGRAM(s) Oral daily  haloperidol     Tablet 2 milliGRAM(s) Oral at bedtime  heparin   Injectable 5000 Unit(s) SubCutaneous every 12 hours  losartan 25 milliGRAM(s) Oral daily  melatonin 3 milliGRAM(s) Oral at bedtime  mirtazapine 15 milliGRAM(s) Oral at bedtime  predniSONE   Tablet 5 milliGRAM(s) Oral daily  senna 2 Tablet(s) Oral at bedtime  sodium chloride 0.45%. 1000 milliLiter(s) (75 mL/Hr) IV Continuous <Continuous>    MEDICATIONS  (PRN):  acetaminophen     Tablet .. 650 milliGRAM(s) Oral every 6 hours PRN Temp greater or equal to 38.5C (101.3F), Moderate Pain (4 - 6)  haloperidol    Injectable 1 milliGRAM(s) IntraMuscular every 12 hours PRN Agitation  lactulose Syrup 15 Gram(s) Oral two times a day PRN constipation          Allergies    No Known Allergies      PHYSICAL EXAM:   Vital Signs Last 24 Hrs  T(C): 37.7 (25 Nov 2023 11:46), Max: 37.7 (25 Nov 2023 11:46)  T(F): 99.9 (25 Nov 2023 11:46), Max: 99.9 (25 Nov 2023 11:46)  HR: 85 (25 Nov 2023 11:46) (76 - 85)  BP: 184/72 (25 Nov 2023 11:46) (107/65 - 196/83)  BP(mean): --  RR: 17 (25 Nov 2023 11:46) (17 - 17)  SpO2: 93% (25 Nov 2023 11:46) (92% - 93%)    Parameters below as of 25 Nov 2023 11:46  Patient On (Oxygen Delivery Method): room air        GENERAL:  Appears stated age  HEENT:  NC/AT  CHEST:  Full & symmetric excursion  HEART:  Regular rhythm  ABDOMEN:  Soft, non-tender, non-distended  EXTEREMITIES:  no cyanosis  SKIN:  No rash  NEURO:  Alert

## 2023-11-25 NOTE — PROGRESS NOTE ADULT - ASSESSMENT
pt w ITP, Lupus Anticoagulant, HTN, anxiety on Seraquel, OCD, Merkel cell ca of right Forehead, under care Dr Elliot Eng, post 4 doses q3wks IV Keytruda, last dose 10/17/23.  Pt brought in w c/o feeling "claws, daggers" inside him, since C#3 Keytruda, with sensation initially started w various parts of face now also progressed down to sides of body to rectum(does not see actual daggers or claws,)    Wife reports after first cycle started w jaw tightness and progressive worsened w each cycle, to point of hard to swallow/can't swallow  Had tired taken off Seroquel to see if adverse effect but no improvement, now back on x 5-6days  Merkel lesion has essentially resolved  -sensation described by pt ?psychosis vs neuropathy  -psychosis not noted to be associated w Keytruda. Neuro tox of Keytruda described includes cerebral hemorrhage, confusion, myasthenia gravis, reversible posterior leukoencephalopathy syndrome, syed neuropathy, Guillain [place], aseptic meningitis, encephalitis, transverse myelitis    -CT and MRI head no sig findings  -AM cortisol 5.8. Slight low. Unclear significance   Repeat AM Cortisol low x2. <6 on two tests  3rd test w cortisol 11 on 11/06/2  -Sjorgen /Scleroderma studies abn.      +MICHELLE and +Ro52  Seen by Neuro and Psych post medication changes, pt refused LP  Seen by Endocrine-on prednisone 5mg qD for low Cortisol level, could not tolerate hydrocortisone  Seen by GI post endoscopy no abnormality found    -clinically no sig change, continue c/o unable to swallow, objects in throat, tight jaws(since first dose Keytruda as per wife)  -mild anemia and [id number] as of last CBC, no intervention at these levels  -supportive care from Heme/Onc standpoint

## 2023-11-25 NOTE — PROGRESS NOTE ADULT - ASSESSMENT
73 M pmh of HTN, ITP, APL syndrome, generalized anxiety disorder, OCD, newly dx aggressive Merkel Cell cancer of forehead- pt's oncologist  Dr. Eng, at [facility] on Keytruda infusions with good response per wife  was brought to ED for worsening psychosis for the past few days, pt lately with difficulty swallowing, decreased po intake    Psychosis  monitor  psych fu  MRI noted  neuro following  cont luvox   continue to titrate haldol  GI fu noted- sp EGD - normal -sp bx fu path    #HTN/Orthostatic hypotension  cont atenolol w parameters  ? psych med induced  adrenal insufficiency  continue prednisone and  florinef    UTI  start ctx  f/u cx  ID follow up    #Merkel cell ca  onc c/s noted  r/o paraneoplastic causes- unlikely      #DVT ppx-  hep sq

## 2023-11-25 NOTE — PROGRESS NOTE ADULT - SUBJECTIVE AND OBJECTIVE BOX
Chief Complaint: Psychosis    Interval Events: No events overnight.    Review of Systems:  General: No fevers, chills, weight gain  Skin: No rashes, color changes  Cardiovascular: No chest pain, orthopnea  Respiratory: No shortness of breath, cough  Gastrointestinal: No nausea, abdominal pain  Genitourinary: No incontinence, pain with urination  Musculoskeletal: No pain, swelling, decreased range of motion  Neurological: No headache, weakness  Psychiatric: No depression, anxiety  Endocrine: No weight gain, increased thirst  All other systems are comprehensively negative.    Physical Exam:  Vital Signs Last 24 Hrs  T(C): 36.8 (25 Nov 2023 04:52), Max: 36.8 (24 Nov 2023 20:29)  T(F): 98.3 (25 Nov 2023 04:52), Max: 98.3 (24 Nov 2023 20:29)  HR: 76 (25 Nov 2023 04:52) (76 - 85)  BP: 138/78 (25 Nov 2023 04:52) (107/65 - 196/83)  BP(mean): --  RR: 17 (25 Nov 2023 04:52) (17 - 17)  SpO2: 93% (25 Nov 2023 04:52) (92% - 93%)  Parameters below as of 25 Nov 2023 04:52  Patient On (Oxygen Delivery Method): room air  General: NAD  HEENT: MMM  Neck: No JVD, no carotid bruit  Lungs: CTAB  CV: RRR, nl S1/S2, no M/R/G  Abdomen: S/NT/ND, +BS  Extremities: No LE edema, no cyanosis  Neuro: AAOx3, non-focal  Skin: No rash    Labs:

## 2023-11-25 NOTE — PROGRESS NOTE ADULT - ASSESSMENT
The patient is a 73 year old male with a history of HTN, ITP, APLS, anxiety, OCD, Merkel cell cancer who presented with psychosis.    Plan:  - ECG with no evidence of ischemia or infarction  - Orthostatic hypotension may be multifactorial - dehydration, medication related (antipsychotics), and low cortisol levels  - Allow for supine hypertension - BP labile due to orthostatic hypotension  - Continue atenolol 50 mg bid  - Continue losartan 25 mg daily  - EGD done with no significant findings  - Psych follow-up

## 2023-11-26 RX ORDER — HYDROMORPHONE HYDROCHLORIDE 2 MG/ML
0.2 INJECTION INTRAMUSCULAR; INTRAVENOUS; SUBCUTANEOUS ONCE
Refills: 0 | Status: DISCONTINUED | OUTPATIENT
Start: 2023-11-26 | End: 2023-11-26

## 2023-11-26 RX ADMIN — MIRTAZAPINE 15 MILLIGRAM(S): 45 TABLET, ORALLY DISINTEGRATING ORAL at 22:34

## 2023-11-26 RX ADMIN — GABAPENTIN 100 MILLIGRAM(S): 400 CAPSULE ORAL at 06:28

## 2023-11-26 RX ADMIN — HEPARIN SODIUM 5000 UNIT(S): 5000 INJECTION INTRAVENOUS; SUBCUTANEOUS at 17:19

## 2023-11-26 RX ADMIN — Medication 3 MILLIGRAM(S): at 22:34

## 2023-11-26 RX ADMIN — SODIUM CHLORIDE 75 MILLILITER(S): 9 INJECTION, SOLUTION INTRAVENOUS at 13:27

## 2023-11-26 RX ADMIN — LOSARTAN POTASSIUM 25 MILLIGRAM(S): 100 TABLET, FILM COATED ORAL at 06:27

## 2023-11-26 RX ADMIN — FLUVOXAMINE MALEATE 150 MILLIGRAM(S): 25 TABLET ORAL at 17:20

## 2023-11-26 RX ADMIN — HEPARIN SODIUM 5000 UNIT(S): 5000 INJECTION INTRAVENOUS; SUBCUTANEOUS at 06:28

## 2023-11-26 RX ADMIN — FLUVOXAMINE MALEATE 150 MILLIGRAM(S): 25 TABLET ORAL at 06:28

## 2023-11-26 RX ADMIN — DIPHENHYDRAMINE HYDROCHLORIDE AND LIDOCAINE HYDROCHLORIDE AND ALUMINUM HYDROXIDE AND MAGNESIUM HYDRO 5 MILLILITER(S): KIT at 22:34

## 2023-11-26 RX ADMIN — HYDROMORPHONE HYDROCHLORIDE 0.2 MILLIGRAM(S): 2 INJECTION INTRAMUSCULAR; INTRAVENOUS; SUBCUTANEOUS at 12:43

## 2023-11-26 RX ADMIN — Medication 5 MILLIGRAM(S): at 06:28

## 2023-11-26 RX ADMIN — ATENOLOL 50 MILLIGRAM(S): 25 TABLET ORAL at 06:28

## 2023-11-26 RX ADMIN — Medication 2000 UNIT(S): at 11:13

## 2023-11-26 RX ADMIN — GABAPENTIN 100 MILLIGRAM(S): 400 CAPSULE ORAL at 22:34

## 2023-11-26 RX ADMIN — HALOPERIDOL DECANOATE 1 MILLIGRAM(S): 100 INJECTION INTRAMUSCULAR at 08:19

## 2023-11-26 RX ADMIN — DIPHENHYDRAMINE HYDROCHLORIDE AND LIDOCAINE HYDROCHLORIDE AND ALUMINUM HYDROXIDE AND MAGNESIUM HYDRO 5 MILLILITER(S): KIT at 06:28

## 2023-11-26 RX ADMIN — FLUDROCORTISONE ACETATE 0.1 MILLIGRAM(S): 0.1 TABLET ORAL at 06:28

## 2023-11-26 RX ADMIN — ATENOLOL 50 MILLIGRAM(S): 25 TABLET ORAL at 17:20

## 2023-11-26 RX ADMIN — CEFTRIAXONE 100 MILLIGRAM(S): 500 INJECTION, POWDER, FOR SOLUTION INTRAMUSCULAR; INTRAVENOUS at 17:19

## 2023-11-26 RX ADMIN — GABAPENTIN 100 MILLIGRAM(S): 400 CAPSULE ORAL at 13:23

## 2023-11-26 RX ADMIN — HALOPERIDOL DECANOATE 2 MILLIGRAM(S): 100 INJECTION INTRAMUSCULAR at 22:33

## 2023-11-26 RX ADMIN — SENNA PLUS 2 TABLET(S): 8.6 TABLET ORAL at 22:34

## 2023-11-26 RX ADMIN — SODIUM CHLORIDE 75 MILLILITER(S): 9 INJECTION, SOLUTION INTRAVENOUS at 06:29

## 2023-11-26 RX ADMIN — HYDROMORPHONE HYDROCHLORIDE 0.2 MILLIGRAM(S): 2 INJECTION INTRAMUSCULAR; INTRAVENOUS; SUBCUTANEOUS at 11:43

## 2023-11-26 NOTE — PROGRESS NOTE ADULT - ASSESSMENT
73 M pmh of HTN, ITP, APL syndrome, generalized anxiety disorder, OCD, newly dx aggressive Merkel Cell cancer of forehead- pt's oncologist  Dr. Eng, at [facility] on Keytruda (PD-1 blocker) infusions with good response per wife  was brought to ED for worsening psychosis for the past few days, pt believes there are sharp things inside his body trying to come out of his mouth, stuck in throat, pt lately with difficulty swallowing, decreased po intake, pt was evaluated by psych in ED. Wife reports sudden cognitive decline and behavioral changes immediately after COVID infection.    1-?PASC  -sounds like there is no obvious acute infectious issue. no fever, no leukocytosis, no obvious localization but concerning history of things developing post COVID-19 infection so raises concerns for a neurological PASC picture  noted serology for EBV consistent with reactivation as described post PASC  Serotonin, Blood: <30ng/mL   am cortisol-low x 2 then nl    Shawn has been improving and more conversant willing to entertain that there are no fungi or metal clips in mouth and this may be just in his head, continue to work with medicine and psych on adjustment of medications, 11/21-upper gastrointestinal endoscopy; normal esophagus, mid esophageal biopsy performed; mild gastritis  -will try magic mouth wash and follow response    11/26  febrile yesterday afternoon which subsequently defervesced  UA positive, has suprapubic tenderness  likely urinary source    Recommendations  c/w ceftriaxone for now  f/u blood cultures  f/u urine culture    Jay Krishna M.D.  [facility], Division of Infectious Diseases  [phone]  After 5pm on weekdays and all day on weekends - please call [phone]

## 2023-11-26 NOTE — PROGRESS NOTE ADULT - SUBJECTIVE AND OBJECTIVE BOX
All interim records and events noted.    asleep, readily arousable  complains unable to get blood AM blood draw  wife present      MEDICATIONS  (STANDING):  atenolol  Tablet 50 milliGRAM(s) Oral two times a day  benzocaine 20% Spray 1 Spray(s) Topical four times a day  cefTRIAXone   IVPB      cefTRIAXone   IVPB 1000 milliGRAM(s) IV Intermittent every 24 hours  cholecalciferol 2000 Unit(s) Oral daily  FIRST- Mouthwash  BLM 5 milliLiter(s) Swish and Spit three times a day  fludroCORTISONE 0.1 milliGRAM(s) Oral daily  fluvoxaMINE 150 milliGRAM(s) Oral two times a day  gabapentin 100 milliGRAM(s) Oral three times a day  haloperidol     Tablet 2 milliGRAM(s) Oral at bedtime  haloperidol     Tablet 1 milliGRAM(s) Oral <User Schedule>  heparin   Injectable 5000 Unit(s) SubCutaneous every 12 hours  losartan 25 milliGRAM(s) Oral daily  melatonin 3 milliGRAM(s) Oral at bedtime  mirtazapine 15 milliGRAM(s) Oral at bedtime  predniSONE   Tablet 5 milliGRAM(s) Oral daily  senna 2 Tablet(s) Oral at bedtime  sodium chloride 0.45%. 1000 milliLiter(s) (75 mL/Hr) IV Continuous <Continuous>    MEDICATIONS  (PRN):  acetaminophen     Tablet .. 650 milliGRAM(s) Oral every 6 hours PRN Temp greater or equal to 38.5C (101.3F), Moderate Pain (4 - 6)  haloperidol    Injectable 1 milliGRAM(s) IntraMuscular every 12 hours PRN Agitation  lactulose Syrup 15 Gram(s) Oral two times a day PRN constipation      Vital Signs Last 24 Hrs  T(C): 36.7 (26 Nov 2023 05:48), Max: 40.6 (25 Nov 2023 17:32)  T(F): 98.1 (26 Nov 2023 05:48), Max: 105.1 (25 Nov 2023 17:32)  HR: 86 (26 Nov 2023 05:48) (85 - 115)  BP: 146/72 (26 Nov 2023 05:48) (115/67 - 184/72)  BP(mean): --  RR: 17 (26 Nov 2023 05:48) (17 - 17)  SpO2: 91% (26 Nov 2023 05:48) (91% - 93%)    Parameters below as of 26 Nov 2023 05:48  Patient On (Oxygen Delivery Method): room air        PHYSICAL EXAM  General: frail thin man in no acute distress  Head: atraumatic, normocephalic  ENT: sclera anicteric, buccal mucosa moist  Neck: supple, trachea midline  CV: S1 S2, regular rate and rhythm  Lungs: clear to auscultation, no wheezes/rhonchi  Abdomen: soft, nontender, bowel sounds present, no palpable masses  Extrem: no clubbing/cyanosis/edema  Skin: no significant increased ecchymosis/petechiae  Neuro: alert and responsive,  no focal deficits      LABS:              RADIOLOGY & ADDITIONAL STUDIES:    IMPRESSION/RECOMMENDATIONS:

## 2023-11-26 NOTE — PROGRESS NOTE ADULT - SUBJECTIVE AND OBJECTIVE BOX
Chief Complaint: Psychosis    Interval Events: No events overnight.    Review of Systems:  General: No fevers, chills, weight gain  Skin: No rashes, color changes  Cardiovascular: No chest pain, orthopnea  Respiratory: No shortness of breath, cough  Gastrointestinal: No nausea, abdominal pain  Genitourinary: No incontinence, pain with urination  Musculoskeletal: No pain, swelling, decreased range of motion  Neurological: No headache, weakness  Psychiatric: No depression, anxiety  Endocrine: No weight gain, increased thirst  All other systems are comprehensively negative.    Physical Exam:  Vital Signs Last 24 Hrs  T(C): 36.7 (26 Nov 2023 05:48), Max: 40.6 (25 Nov 2023 17:32)  T(F): 98.1 (26 Nov 2023 05:48), Max: 105.1 (25 Nov 2023 17:32)  HR: 86 (26 Nov 2023 05:48) (85 - 115)  BP: 146/72 (26 Nov 2023 05:48) (115/67 - 184/72)  BP(mean): --  RR: 17 (26 Nov 2023 05:48) (17 - 17)  SpO2: 91% (26 Nov 2023 05:48) (91% - 93%)  Parameters below as of 26 Nov 2023 05:48  Patient On (Oxygen Delivery Method): room air  General: NAD  HEENT: MMM  Neck: No JVD, no carotid bruit  Lungs: CTAB  CV: RRR, nl S1/S2, no M/R/G  Abdomen: S/NT/ND, +BS  Extremities: No LE edema, no cyanosis  Neuro: AAOx3, non-focal  Skin: No rash    Labs:

## 2023-11-26 NOTE — PROGRESS NOTE ADULT - SUBJECTIVE AND OBJECTIVE BOX
Patient is a 73y old  Male who presents with a chief complaint of delusions (2023 18:54)        INTERVAL HPI/OVERNIGHT EVENTS:   no complaints  pt seen and examined         Vital Signs Last 24 Hrs  T(C): 36.8 (2023 12:33), Max: 40.6 (2023 17:32)  T(F): 98.2 (2023 12:33), Max: 105.1 (2023 17:32)  HR: 76 (2023 12:33) (76 - 115)  BP: 159/72 (2023 12:33) (115/67 - 169/89)  BP(mean): --  RR: 17 (2023 12:33) (17 - 17)  SpO2: 92% (2023 12:33) (91% - 92%)    Parameters below as of 2023 12:33  Patient On (Oxygen Delivery Method): room air        acetaminophen     Tablet .. 650 milliGRAM(s) Oral every 6 hours PRN  atenolol  Tablet 50 milliGRAM(s) Oral two times a day  benzocaine 20% Spray 1 Spray(s) Topical four times a day  cefTRIAXone   IVPB      cefTRIAXone   IVPB 1000 milliGRAM(s) IV Intermittent every 24 hours  cholecalciferol 2000 Unit(s) Oral daily  FIRST- Mouthwash  BLM 5 milliLiter(s) Swish and Spit three times a day  fludroCORTISONE 0.1 milliGRAM(s) Oral daily  fluvoxaMINE 150 milliGRAM(s) Oral two times a day  gabapentin 100 milliGRAM(s) Oral three times a day  haloperidol     Tablet 1 milliGRAM(s) Oral <User Schedule>  haloperidol     Tablet 2 milliGRAM(s) Oral at bedtime  haloperidol    Injectable 1 milliGRAM(s) IntraMuscular every 12 hours PRN  heparin   Injectable 5000 Unit(s) SubCutaneous every 12 hours  lactulose Syrup 15 Gram(s) Oral two times a day PRN  losartan 25 milliGRAM(s) Oral daily  melatonin 3 milliGRAM(s) Oral at bedtime  mirtazapine 15 milliGRAM(s) Oral at bedtime  predniSONE   Tablet 5 milliGRAM(s) Oral daily  senna 2 Tablet(s) Oral at bedtime  sodium chloride 0.45%. 1000 milliLiter(s) IV Continuous <Continuous>      PHYSICAL EXAM:  GENERAL: NAD   EYES: conjunctiva and sclera clear  ENMT: Moist mucous membranes  NECK: Supple, No JVD, Normal thyroid  CHEST/LUNG: non labored, cta b/l  HEART: Regular rate and rhythm; No murmurs, rubs, or gallops  ABDOMEN: Soft, Nontender, Nondistended; Bowel sounds present  EXTREMITIES:  2+ Peripheral Pulses, No clubbing, no cyanosis, no edema  LYMPH: No lymphadenopathy noted  SKIN: No rashes or lesions  NEURO: no focal deficits    Consultant(s) Notes Reviewed:  [x ] YES  [ ] NO  Care Discussed with Consultants/Other Providers [ x] YES  [ ] NO    LABS:            Urinalysis Basic - ( 2023 14:00 )    Color: Yellow / Appearance: Turbid / S.008 / pH: x  Gluc: x / Ketone: 15 mg/dL  / Bili: Negative / Urobili: 1.0 mg/dL   Blood: x / Protein: 100 mg/dL / Nitrite: Negative   Leuk Esterase: Large / RBC: 4 /HPF / WBC Too Numerous to count /HPF   Sq Epi: x / Non Sq Epi: x / Bacteria: Many /HPF      CAPILLARY BLOOD GLUCOSE            Urinalysis Basic - ( 2023 14:00 )    Color: Yellow / Appearance: Turbid / S.008 / pH: x  Gluc: x / Ketone: 15 mg/dL  / Bili: Negative / Urobili: 1.0 mg/dL   Blood: x / Protein: 100 mg/dL / Nitrite: Negative   Leuk Esterase: Large / RBC: 4 /HPF / WBC Too Numerous to count /HPF   Sq Epi: x / Non Sq Epi: x / Bacteria: Many /HPF          RADIOLOGY & ADDITIONAL TESTS:    Imaging Personally Reviewed  Reviewed consultants input

## 2023-11-26 NOTE — PROGRESS NOTE ADULT - ASSESSMENT
73 M pmh of HTN, ITP, APL syndrome, generalized anxiety disorder, OCD, newly dx aggressive Merkel Cell cancer of forehead- pt's oncologist  Dr. Eng, at [facility] on Keytruda infusions with good response per wife  was brought to ED for worsening psychosis for the past few days, pt lately with difficulty swallowing, decreased po intake    Psychosis  monitor  psych fu  MRI noted  neuro following  cont luvox   continue to titrate haldol  GI fu noted- sp EGD - normal -sp bx fu path  trial of dilaudid without success     #HTN/Orthostatic hypotension  cont atenolol w parameters  ? psych med induced  adrenal insufficiency  continue prednisone and  florinef    UTI  start ctx  f/u cx  ID follow up    #Merkel cell ca  onc c/s noted  r/o paraneoplastic causes- unlikely      #DVT ppx-  hep sq

## 2023-11-26 NOTE — PROGRESS NOTE ADULT - ASSESSMENT
The patient is a 73 year old male with a history of HTN, ITP, APLS, anxiety, OCD, Merkel cell cancer who presented with psychosis.    Plan:  - ECG with no evidence of ischemia or infarction  - Orthostatic hypotension may be multifactorial - dehydration, medication related (antipsychotics), and low cortisol levels  - Allow for supine hypertension - BP labile due to orthostatic hypotension  - Continue atenolol 50 mg bid  - Continue losartan 25 mg daily  - EGD done with no significant findings  - Psych follow-up  - Fevers - on IV antibiotics

## 2023-11-26 NOTE — PROGRESS NOTE ADULT - SUBJECTIVE AND OBJECTIVE BOX
[facility]  Walter Camarena PA-C  [address] [place] [address]  [phone]      INTERVAL HPI/OVERNIGHT EVENTS:  Pt s/e wife at bedside  Pt c/o oral pain      MEDICATIONS  (STANDING):  atenolol  Tablet 50 milliGRAM(s) Oral two times a day  benzocaine 20% Spray 1 Spray(s) Topical four times a day  cefTRIAXone   IVPB 1000 milliGRAM(s) IV Intermittent every 24 hours  cefTRIAXone   IVPB      cholecalciferol 2000 Unit(s) Oral daily  FIRST- Mouthwash  BLM 5 milliLiter(s) Swish and Spit three times a day  fludroCORTISONE 0.1 milliGRAM(s) Oral daily  fluvoxaMINE 150 milliGRAM(s) Oral two times a day  gabapentin 100 milliGRAM(s) Oral three times a day  haloperidol     Tablet 1 milliGRAM(s) Oral <User Schedule>  haloperidol     Tablet 2 milliGRAM(s) Oral at bedtime  heparin   Injectable 5000 Unit(s) SubCutaneous every 12 hours  losartan 25 milliGRAM(s) Oral daily  melatonin 3 milliGRAM(s) Oral at bedtime  mirtazapine 15 milliGRAM(s) Oral at bedtime  predniSONE   Tablet 5 milliGRAM(s) Oral daily  senna 2 Tablet(s) Oral at bedtime  sodium chloride 0.45%. 1000 milliLiter(s) (75 mL/Hr) IV Continuous <Continuous>    MEDICATIONS  (PRN):  acetaminophen     Tablet .. 650 milliGRAM(s) Oral every 6 hours PRN Temp greater or equal to 38.5C (101.3F), Moderate Pain (4 - 6)  haloperidol    Injectable 1 milliGRAM(s) IntraMuscular every 12 hours PRN Agitation  lactulose Syrup 15 Gram(s) Oral two times a day PRN constipation      Allergies    No Known Allergies      PHYSICAL EXAM:   Vital Signs Last 24 Hrs  T(C): 36.8 (26 Nov 2023 12:33), Max: 40.6 (25 Nov 2023 17:32)  T(F): 98.2 (26 Nov 2023 12:33), Max: 105.1 (25 Nov 2023 17:32)  HR: 76 (26 Nov 2023 12:33) (76 - 115)  BP: 159/72 (26 Nov 2023 12:33) (115/67 - 169/89)  BP(mean): --  RR: 17 (26 Nov 2023 12:33) (17 - 17)  SpO2: 92% (26 Nov 2023 12:33) (91% - 92%)    Parameters below as of 26 Nov 2023 12:33  Patient On (Oxygen Delivery Method): room air    GENERAL:  Appears stated age  HEENT:  NC/AT  CHEST:  Full & symmetric excursion  HEART:  Regular rhythm  ABDOMEN:  Soft, non-tender, non-distended  EXTEREMITIES:  no cyanosis  SKIN:  No rash  NEURO:  Alert

## 2023-11-26 NOTE — PROGRESS NOTE ADULT - ASSESSMENT
pt w ITP, Lupus Anticoagulant, HTN, anxiety on Seraquel, OCD, Merkel cell ca of right Forehead, under care Dr Elliot Eng, post 4 doses q3wks IV Keytruda, last dose 10/17/23.  Pt brought in w c/o feeling "claws, daggers" inside him, since C#3 Keytruda, with sensation initially started w various parts of face now also progressed down to sides of body to rectum(does not see actual daggers or claws,)    Wife reports after first cycle started w jaw tightness and progressive worsened w each cycle, to point of hard to swallow/can't swallow  Had tired taken off Seroquel to see if adverse effect but no improvement, now back on x 5-6days  Merkel lesion has essentially resolved  -sensation described by pt ?psychosis vs neuropathy  -psychosis not noted to be associated w Keytruda. Neuro tox of Keytruda described includes cerebral hemorrhage, confusion, myasthenia gravis, reversible posterior leukoencephalopathy syndrome, syed neuropathy, Guillain Wagoner, aseptic meningitis, encephalitis, transverse myelitis    -CT and MRI head no sig findings  -AM cortisol 5.8. Slight low. Unclear significance   Repeat AM Cortisol low x2. <6 on two tests  3rd test w cortisol 11 on 11/06/2  -Sjorgen /Scleroderma studies abn.      +MICHELLE and +Ro52  Seen by Neuro and Psych post medication changes, pt refused LP  Seen by Endocrine-on prednisone 5mg qD for low Cortisol level, could not tolerate hydrocortisone  Seen by GI post endoscopy no abnormality found    -clinically same, continue c/o unable to swallow, objects in throat, tight jaws(since first dose Keytruda as per wife)  -mild anemia and thrombocytopenia-stable as of last CBC, no intervention at these levels  -cont supportive care from Heme/Onc standpoint    discussed w wife  support provided

## 2023-11-26 NOTE — PROGRESS NOTE ADULT - SUBJECTIVE AND OBJECTIVE BOX
[facility], Division of Infectious Diseases  AMAN Ramos Y. Patel, S. Shah, G. Tomi  [phone]  ([phone] - weekdays after 5pm and weekends)    Name: JANICE RENDON  Age/Gender: 73y Male  MRN: [id number]    Interval History:  Notes reviewed.   No concerning overnight events.  febrile yesterday afternoon  family at bedside    Allergies: No Known Allergies      Objective:  Vitals:   T(F): 98.2 (23 @ 12:33), Max: 105.1 (23 @ 17:32)  HR: 76 (23 @ 12:33) (76 - 115)  BP: 159/72 (23 @ 12:33) (115/67 - 169/89)  RR: 17 (23 @ 12:33) (17 - 17)  SpO2: 92% (23 @ 12:33) (91% - 92%)  Physical Examination:  General: no acute distress, lathargic  HEENT: anicteric  Cardio: S1, S2, normal rate  Resp: decreased breath sounds  Abd: soft, ND, suprapubic tenderness  Ext: no LE edema  Skin: warm, dry    Laboratory Studies:  CBC:   WBC Trend:  10.02 23 @ 06:55    CMP:             Urinalysis Basic - ( 2023 14:00 )    Color: Yellow / Appearance: Turbid / S.008 / pH: x  Gluc: x / Ketone: 15 mg/dL  / Bili: Negative / Urobili: 1.0 mg/dL   Blood: x / Protein: 100 mg/dL / Nitrite: Negative   Leuk Esterase: Large / RBC: 4 /HPF / WBC Too Numerous to count /HPF   Sq Epi: x / Non Sq Epi: x / Bacteria: Many /HPF      Microbiology: reviewed       Radiology: reviewed     Medications:  acetaminophen     Tablet .. 650 milliGRAM(s) Oral every 6 hours PRN  atenolol  Tablet 50 milliGRAM(s) Oral two times a day  benzocaine 20% Spray 1 Spray(s) Topical four times a day  cefTRIAXone   IVPB      cefTRIAXone   IVPB 1000 milliGRAM(s) IV Intermittent every 24 hours  cholecalciferol 2000 Unit(s) Oral daily  FIRST- Mouthwash  BLM 5 milliLiter(s) Swish and Spit three times a day  fludroCORTISONE 0.1 milliGRAM(s) Oral daily  fluvoxaMINE 150 milliGRAM(s) Oral two times a day  gabapentin 100 milliGRAM(s) Oral three times a day  haloperidol     Tablet 2 milliGRAM(s) Oral at bedtime  haloperidol     Tablet 1 milliGRAM(s) Oral <User Schedule>  haloperidol    Injectable 1 milliGRAM(s) IntraMuscular every 12 hours PRN  heparin   Injectable 5000 Unit(s) SubCutaneous every 12 hours  lactulose Syrup 15 Gram(s) Oral two times a day PRN  losartan 25 milliGRAM(s) Oral daily  melatonin 3 milliGRAM(s) Oral at bedtime  mirtazapine 15 milliGRAM(s) Oral at bedtime  predniSONE   Tablet 5 milliGRAM(s) Oral daily  senna 2 Tablet(s) Oral at bedtime  sodium chloride 0.45%. 1000 milliLiter(s) IV Continuous <Continuous>    Antimicrobials:  cefTRIAXone   IVPB 1000 milliGRAM(s) IV Intermittent every 24 hours  cefTRIAXone   IVPB

## 2023-11-27 PROCEDURE — 99232 SBSQ HOSP IP/OBS MODERATE 35: CPT

## 2023-11-27 RX ADMIN — HALOPERIDOL DECANOATE 1 MILLIGRAM(S): 100 INJECTION INTRAMUSCULAR at 08:58

## 2023-11-27 RX ADMIN — DIPHENHYDRAMINE HYDROCHLORIDE AND LIDOCAINE HYDROCHLORIDE AND ALUMINUM HYDROXIDE AND MAGNESIUM HYDRO 5 MILLILITER(S): KIT at 13:04

## 2023-11-27 RX ADMIN — CEFTRIAXONE 100 MILLIGRAM(S): 500 INJECTION, POWDER, FOR SOLUTION INTRAMUSCULAR; INTRAVENOUS at 18:12

## 2023-11-27 RX ADMIN — FLUVOXAMINE MALEATE 150 MILLIGRAM(S): 25 TABLET ORAL at 17:05

## 2023-11-27 RX ADMIN — FLUVOXAMINE MALEATE 150 MILLIGRAM(S): 25 TABLET ORAL at 06:03

## 2023-11-27 RX ADMIN — FLUDROCORTISONE ACETATE 0.1 MILLIGRAM(S): 0.1 TABLET ORAL at 06:02

## 2023-11-27 RX ADMIN — HEPARIN SODIUM 5000 UNIT(S): 5000 INJECTION INTRAVENOUS; SUBCUTANEOUS at 06:02

## 2023-11-27 RX ADMIN — Medication 5 MILLIGRAM(S): at 06:02

## 2023-11-27 RX ADMIN — GABAPENTIN 100 MILLIGRAM(S): 400 CAPSULE ORAL at 06:02

## 2023-11-27 RX ADMIN — DIPHENHYDRAMINE HYDROCHLORIDE AND LIDOCAINE HYDROCHLORIDE AND ALUMINUM HYDROXIDE AND MAGNESIUM HYDRO 5 MILLILITER(S): KIT at 06:02

## 2023-11-27 RX ADMIN — GABAPENTIN 100 MILLIGRAM(S): 400 CAPSULE ORAL at 21:27

## 2023-11-27 RX ADMIN — HALOPERIDOL DECANOATE 2 MILLIGRAM(S): 100 INJECTION INTRAMUSCULAR at 21:27

## 2023-11-27 RX ADMIN — LOSARTAN POTASSIUM 25 MILLIGRAM(S): 100 TABLET, FILM COATED ORAL at 06:01

## 2023-11-27 RX ADMIN — DIPHENHYDRAMINE HYDROCHLORIDE AND LIDOCAINE HYDROCHLORIDE AND ALUMINUM HYDROXIDE AND MAGNESIUM HYDRO 5 MILLILITER(S): KIT at 21:28

## 2023-11-27 RX ADMIN — ATENOLOL 50 MILLIGRAM(S): 25 TABLET ORAL at 06:01

## 2023-11-27 RX ADMIN — ATENOLOL 50 MILLIGRAM(S): 25 TABLET ORAL at 17:05

## 2023-11-27 RX ADMIN — GABAPENTIN 100 MILLIGRAM(S): 400 CAPSULE ORAL at 13:04

## 2023-11-27 RX ADMIN — SODIUM CHLORIDE 75 MILLILITER(S): 9 INJECTION, SOLUTION INTRAVENOUS at 18:10

## 2023-11-27 RX ADMIN — Medication 3 MILLIGRAM(S): at 21:27

## 2023-11-27 RX ADMIN — MIRTAZAPINE 15 MILLIGRAM(S): 45 TABLET, ORALLY DISINTEGRATING ORAL at 21:27

## 2023-11-27 RX ADMIN — HEPARIN SODIUM 5000 UNIT(S): 5000 INJECTION INTRAVENOUS; SUBCUTANEOUS at 17:05

## 2023-11-27 RX ADMIN — SENNA PLUS 2 TABLET(S): 8.6 TABLET ORAL at 21:27

## 2023-11-27 RX ADMIN — Medication 2000 UNIT(S): at 11:18

## 2023-11-27 NOTE — PROGRESS NOTE ADULT - SUBJECTIVE AND OBJECTIVE BOX
[INTERVAL HX: ]  Patient seen and examined;  Chart reviewed and events noted;     no CP, no SOB    +mouth pain.   Per wife drank some ensure      [MEDICATIONS]  MEDICATIONS  (STANDING):  atenolol  Tablet 50 milliGRAM(s) Oral two times a day  benzocaine 20% Spray 1 Spray(s) Topical four times a day  cefTRIAXone   IVPB 1000 milliGRAM(s) IV Intermittent every 24 hours  cefTRIAXone   IVPB      cholecalciferol 2000 Unit(s) Oral daily  FIRST- Mouthwash  BLM 5 milliLiter(s) Swish and Spit three times a day  fludroCORTISONE 0.1 milliGRAM(s) Oral daily  fluvoxaMINE 150 milliGRAM(s) Oral two times a day  gabapentin 100 milliGRAM(s) Oral three times a day  haloperidol     Tablet 2 milliGRAM(s) Oral at bedtime  haloperidol     Tablet 1 milliGRAM(s) Oral <User Schedule>  heparin   Injectable 5000 Unit(s) SubCutaneous every 12 hours  losartan 25 milliGRAM(s) Oral daily  melatonin 3 milliGRAM(s) Oral at bedtime  mirtazapine 15 milliGRAM(s) Oral at bedtime  predniSONE   Tablet 5 milliGRAM(s) Oral daily  senna 2 Tablet(s) Oral at bedtime  sodium chloride 0.45%. 1000 milliLiter(s) (75 mL/Hr) IV Continuous <Continuous>    MEDICATIONS  (PRN):  acetaminophen     Tablet .. 650 milliGRAM(s) Oral every 6 hours PRN Temp greater or equal to 38.5C (101.3F), Moderate Pain (4 - 6)  haloperidol    Injectable 1 milliGRAM(s) IntraMuscular every 12 hours PRN Agitation  lactulose Syrup 15 Gram(s) Oral two times a day PRN constipation      [VITALS]  Vital Signs Last 24 Hrs  T(C): 36.8 (27 Nov 2023 12:01), Max: 37.1 (26 Nov 2023 17:01)  T(F): 98.2 (27 Nov 2023 12:01), Max: 98.8 (27 Nov 2023 05:29)  HR: 81 (27 Nov 2023 05:29) (75 - 81)  BP: 113/71 (27 Nov 2023 12:30) (113/71 - 170/67)  BP(mean): --  RR: 18 (27 Nov 2023 12:01) (18 - 18)  SpO2: 92% (27 Nov 2023 12:30) (91% - 93%)    Parameters below as of 27 Nov 2023 12:30  Patient On (Oxygen Delivery Method): room air      [WT/HT]  Daily     Daily   [VENT]      [PHYSICAL EXAM]  GEN: NAD  HEENT: normocephalic and atraumatic. EOMI. PERRL.    NECK: Supple.  No lymphadenopathy   LUNGS: Clear to auscultation.  HEART: Regular rate and rhythm,  no MRG  ABDOMEN: Soft, nontender, and nondistended.  Positive bowel sounds.    : No CVA tenderness  EXTREMITIES: Without edema.  NEUROLOGIC: grossly intact.  PSYCHIATRIC: Appropriate affect .  SKIN: No rash       [LABS:]      Vitamin B12, Serum: 1109 pg/mL [232 - 1245] (10-29-23 @ 18:30)  Folate, Serum: >20.0 ng/mL (10-29-23 @ 18:30)  Sedimentation Rate, Erythrocyte: 57 mm/hr *H* [0 - 20] (10-27-23 @ 18:30)    Culture - Blood (collected 25 Nov 2023 20:00)  Source: .Blood Blood-Peripheral  Preliminary Report (27 Nov 2023 03:02):    No growth at 24 hours    Culture - Blood (collected 25 Nov 2023 19:30)  Source: .Blood Blood-Peripheral  Preliminary Report (27 Nov 2023 03:02):    No growth at 24 hours    Culture - Urine (collected 25 Nov 2023 14:00)  Source: Clean Catch Clean Catch (Midstream)  Preliminary Report (27 Nov 2023 14:30):    >100,000 CFU/ml Klebsiella variicola      SARS-CoV-2: NotDetec (24 Nov 2023 11:50)    Culture - Blood (collected 25 Nov 2023 20:00)  Source: .Blood Blood-Peripheral  Preliminary Report (27 Nov 2023 03:02):    No growth at 24 hours    Culture - Blood (collected 25 Nov 2023 19:30)  Source: .Blood Blood-Peripheral  Preliminary Report (27 Nov 2023 03:02):    No growth at 24 hours    Culture - Urine (collected 25 Nov 2023 14:00)  Source: Clean Catch Clean Catch (Midstream)  Preliminary Report (27 Nov 2023 14:30):    >100,000 CFU/ml Klebsiella variicola      [RADIOLOGY STUDIES:]

## 2023-11-27 NOTE — PROGRESS NOTE ADULT - ASSESSMENT
73 M pmh of HTN, ITP, APL syndrome, generalized anxiety disorder, OCD, newly dx aggressive Merkel Cell cancer of forehead- pt's oncologist  Dr. Eng, at [facility] on Keytruda infusions with good response per wife  was brought to ED for worsening psychosis for the past few days, pt lately with difficulty swallowing, decreased po intake    Psychosis  monitor  psych fu  MRI noted  neuro following  cont luvox   continue to titrate haldol  GI fu noted- sp EGD - normal -sp bx fu path  trial of dilaudid without success     #HTN/Orthostatic hypotension  cont atenolol w parameters  ? psych med induced  adrenal insufficiency  continue prednisone and  florinef    UTI  start ctx  f/u cx  ID follow up    #Merkel cell ca  onc c/s noted  r/o paraneoplastic causes- unlikely      #DVT ppx-  hep sq

## 2023-11-27 NOTE — PROGRESS NOTE ADULT - SUBJECTIVE AND OBJECTIVE BOX
[facility]  Walter Camarena PA-C  [address] [place] [address]  [phone]      INTERVAL HPI/OVERNIGHT EVENTS:  Pt s/e with wife at bedside  Still c/o oral pain  No new GI events    MEDICATIONS  (STANDING):  atenolol  Tablet 50 milliGRAM(s) Oral two times a day  benzocaine 20% Spray 1 Spray(s) Topical four times a day  cefTRIAXone   IVPB      cefTRIAXone   IVPB 1000 milliGRAM(s) IV Intermittent every 24 hours  cholecalciferol 2000 Unit(s) Oral daily  FIRST- Mouthwash  BLM 5 milliLiter(s) Swish and Spit three times a day  fludroCORTISONE 0.1 milliGRAM(s) Oral daily  fluvoxaMINE 150 milliGRAM(s) Oral two times a day  gabapentin 100 milliGRAM(s) Oral three times a day  haloperidol     Tablet 2 milliGRAM(s) Oral at bedtime  haloperidol     Tablet 1 milliGRAM(s) Oral <User Schedule>  heparin   Injectable 5000 Unit(s) SubCutaneous every 12 hours  losartan 25 milliGRAM(s) Oral daily  melatonin 3 milliGRAM(s) Oral at bedtime  mirtazapine 15 milliGRAM(s) Oral at bedtime  predniSONE   Tablet 5 milliGRAM(s) Oral daily  senna 2 Tablet(s) Oral at bedtime  sodium chloride 0.45%. 1000 milliLiter(s) (75 mL/Hr) IV Continuous <Continuous>    MEDICATIONS  (PRN):  acetaminophen     Tablet .. 650 milliGRAM(s) Oral every 6 hours PRN Temp greater or equal to 38.5C (101.3F), Moderate Pain (4 - 6)  haloperidol    Injectable 1 milliGRAM(s) IntraMuscular every 12 hours PRN Agitation  lactulose Syrup 15 Gram(s) Oral two times a day PRN constipation      Allergies  No Known Allergies      PHYSICAL EXAM:   Vital Signs:  Vital Signs Last 24 Hrs  T(C): 36.8 (2023 12:01), Max: 37.1 (2023 17:01)  T(F): 98.2 (2023 12:01), Max: 98.8 (2023 05:29)  HR: 81 (2023 05:29) (75 - 81)  BP: 165/84 (2023 05:29) (157/72 - 170/67)  BP(mean): --  RR: 18 (2023 12:01) (17 - 18)  SpO2: 92% (2023 12:01) (91% - 93%)    Parameters below as of 2023 12:01  Patient On (Oxygen Delivery Method): room air    GENERAL:  Appears stated age  HEENT:  NC/AT  CHEST:  Full & symmetric excursion  HEART:  Regular rhythm  ABDOMEN:  Soft, non-tender, non-distended  EXTEREMITIES:  no cyanosis  SKIN:  No rash  NEURO:  Alert        Urinalysis Basic - ( 2023 14:00 )    Color: Yellow / Appearance: Turbid / S.008 / pH: x  Gluc: x / Ketone: 15 mg/dL  / Bili: Negative / Urobili: 1.0 mg/dL   Blood: x / Protein: 100 mg/dL / Nitrite: Negative   Leuk Esterase: Large / RBC: 4 /HPF / WBC Too Numerous to count /HPF   Sq Epi: x / Non Sq Epi: x / Bacteria: Many /HPF

## 2023-11-27 NOTE — PROGRESS NOTE ADULT - ASSESSMENT
73 M pmh of HTN, ITP, APL syndrome, generalized anxiety disorder, OCD, newly dx aggressive Merkel Cell cancer of forehead- pt's oncologist  Dr. Eng, at [facility] on Keytruda (PD-1 blocker) infusions with good response per wife  was brought to ED for worsening psychosis for the past few days, pt believes there are sharp things inside his body trying to come out of his mouth, stuck in throat, pt lately with difficulty swallowing, decreased po intake, pt was evaluated by psych in ED. Wife reports sudden cognitive decline and behavioral changes immediately after COVID infection.    1-PASC  noted serology for EBV consistent with reactivation as described post PASC  Serotonin, Blood: <30ng/mL   am cortisol-low x 2 then nl    Shawn has been improving and more conversant willing to entertain that there are no fungi or metal clips in mouth and this may be just in his head, continue to work with medicine and psych on adjustment of medications, 11/21-upper gastrointestinal endoscopy; normal esophagus, mid esophageal biopsy performed; mild gastritis    Recommendations  PYELONEPHRITIS   -ceftriaxone (started 11/25)   with urinary symps, pyuria, fever and   urine culture w >100,000 CFU/ml Klebsiella variicola, sens pending  blood cultures-NGTD    Thank you for consulting us and involving us in the management of this most interesting and challenging case.  We will follow along in the care of this patient. Please call us at [phone] or text me directly on my cell# at [phone] with any concerns.

## 2023-11-27 NOTE — PROGRESS NOTE ADULT - SUBJECTIVE AND OBJECTIVE BOX
Patient is a 73y old  Male who presents with a chief complaint of pain (27 Nov 2023 14:41)        INTERVAL HPI/OVERNIGHT EVENTS:   no complaints  pt seen and examined         Vital Signs Last 24 Hrs  T(C): 36.8 (27 Nov 2023 12:01), Max: 37.1 (26 Nov 2023 20:57)  T(F): 98.2 (27 Nov 2023 12:01), Max: 98.8 (27 Nov 2023 05:29)  HR: 81 (27 Nov 2023 05:29) (78 - 81)  BP: 113/71 (27 Nov 2023 12:30) (113/71 - 170/67)  BP(mean): --  RR: 18 (27 Nov 2023 12:01) (18 - 18)  SpO2: 92% (27 Nov 2023 12:30) (91% - 92%)    Parameters below as of 27 Nov 2023 12:30  Patient On (Oxygen Delivery Method): room air        acetaminophen     Tablet .. 650 milliGRAM(s) Oral every 6 hours PRN  atenolol  Tablet 50 milliGRAM(s) Oral two times a day  benzocaine 20% Spray 1 Spray(s) Topical four times a day  cefTRIAXone   IVPB      cefTRIAXone   IVPB 1000 milliGRAM(s) IV Intermittent every 24 hours  cholecalciferol 2000 Unit(s) Oral daily  FIRST- Mouthwash  BLM 5 milliLiter(s) Swish and Spit three times a day  fludroCORTISONE 0.1 milliGRAM(s) Oral daily  fluvoxaMINE 150 milliGRAM(s) Oral two times a day  gabapentin 100 milliGRAM(s) Oral three times a day  haloperidol     Tablet 2 milliGRAM(s) Oral at bedtime  haloperidol     Tablet 1 milliGRAM(s) Oral <User Schedule>  haloperidol    Injectable 1 milliGRAM(s) IntraMuscular every 12 hours PRN  heparin   Injectable 5000 Unit(s) SubCutaneous every 12 hours  lactulose Syrup 15 Gram(s) Oral two times a day PRN  losartan 25 milliGRAM(s) Oral daily  melatonin 3 milliGRAM(s) Oral at bedtime  mirtazapine 15 milliGRAM(s) Oral at bedtime  predniSONE   Tablet 5 milliGRAM(s) Oral daily  senna 2 Tablet(s) Oral at bedtime  sodium chloride 0.45%. 1000 milliLiter(s) IV Continuous <Continuous>      PHYSICAL EXAM:  GENERAL: NAD   EYES: conjunctiva and sclera clear  ENMT: Moist mucous membranes  NECK: Supple, No JVD, Normal thyroid  CHEST/LUNG: non labored, cta b/l  HEART: Regular rate and rhythm; No murmurs, rubs, or gallops  ABDOMEN: Soft, Nontender, Nondistended; Bowel sounds present  EXTREMITIES:  2+ Peripheral Pulses, No clubbing, no cyanosis, no edema  LYMPH: No lymphadenopathy noted  SKIN: No rashes or lesions  NEURO: no focal deficits    Consultant(s) Notes Reviewed:  [x ] YES  [ ] NO  Care Discussed with Consultants/Other Providers [ x] YES  [ ] NO    LABS:              CAPILLARY BLOOD GLUCOSE                Culture - Blood (collected 25 Nov 2023 20:00)  Source: .Blood Blood-Peripheral  Preliminary Report (27 Nov 2023 03:02):    No growth at 24 hours    Culture - Blood (collected 25 Nov 2023 19:30)  Source: .Blood Blood-Peripheral  Preliminary Report (27 Nov 2023 03:02):    No growth at 24 hours    Culture - Urine (collected 25 Nov 2023 14:00)  Source: Clean Catch Clean Catch (Midstream)  Preliminary Report (27 Nov 2023 14:30):    >100,000 CFU/ml Klebsiella variicola        RADIOLOGY & ADDITIONAL TESTS:    Imaging Personally Reviewed  Reviewed consultants input

## 2023-11-27 NOTE — BH CONSULTATION LIAISON PROGRESS NOTE - NSBHASSESSMENTFT_PSY_ALL_CORE
Pt is a 72 y/o m with HTN, h/o severe anxiety around medical illness and constantly on the look for illnesses brought in by wife after he started to become more and more psychotic with each round of Keytruda for his Merkel Cell Ca. Pt is delusional thinking he has metals, objects in his throat till his anus and penis, unable to tolerate PO. There are case reports of rare psychosis with monoclonal antibodies. Keytruda seems like the possible suspect of psychosis, combined with wife's reports of timeline, although can't be sure, time will show. Steroids are probably not helping psychiatrically either.     11/15-16: No psych PRNs. VS notable for elevated BP. Pt continues to c/o throat pain/metal sensation in mouth/dysphagia. Although pt presenting w/ delusional thought content, would consider endoscopy for any strictures/esophagitis.   11.17 some improvement. better related, better engagement. not lehargic. slept well. wife notices he is improved.   denies s/e from haldol. remains somatically preoccupied.       RECOMMENDATIONS:  1. Continue Luvox 150 mg po bid and diazepam 5 mg po bid for anxiety. He has been on them > at least 12 yrs.  2. Increase Haldol to 1 mg po in AM and 2 mg po at HS

## 2023-11-27 NOTE — PROGRESS NOTE ADULT - ASSESSMENT
IMPRESSION:     pt w ITP, Lupus Anticoagulant, HTN, anxiety on Seraquel, OCD, Merkel cell ca of right Forehead, under care Dr Elliot Eng, post 4 doses q3wks IV Keytruda, last dose 10/17/23.  Pt brought in w c/o feeling "claws, daggers" inside him, since C#3 Keytruda, with sensation initially started w various parts of face now also progressed down to sides of body to rectum(does not see actual daggers or claws,)    Wife reports after first cycle started w jaw tightness and progressive worsened w each cycle, to point of hard to swallow/can't swallow  Had tired taken off Seroquel to see if adverse effect but no improvement, now back on x 5-6days  Merkel lesion has essentially resolved  -sensation described by pt ?psychosis vs neuropathy  -psychosis not noted to be associated w Keytruda. Neuro tox of Keytruda described includes cerebral hemorrhage, confusion, myasthenia gravis, reversible posterior leukoencephalopathy syndrome, syed neuropathy, Guillain [place], aseptic meningitis, encephalitis, transverse myelitis    -CT and MRI head no sig findings  -AM cortisol 5.8. Slight low. Unclear significance   Repeat AM Cortisol low x2. <6 on two tests  3rd test w cortisol 11 on 11/06/2  -Sjorgen /Scleroderma studies abn.      +MICHELLE and +Ro52  Seen by Neuro and Psych post medication changes, pt refused LP  Seen by Endocrine-on prednisone 5mg qD for low Cortisol level, could not tolerate hydrocortisone  Seen by GI post endoscopy no abnormality found    RECOMMEND:  -clinically same, continue c/o unable to swallow, objects in throat, tight jaws(since first dose Keytruda as per wife)  -mild anemia and thrombocytopenia-stable as of last CBC, no intervention at these levels  -cont supportive care from Heme/Onc standpoint  pain meds PRN     Declined morphine    discussed w wife  support provided

## 2023-11-27 NOTE — PROGRESS NOTE ADULT - SUBJECTIVE AND OBJECTIVE BOX
[facility] DIVISION of INFECTIOUS DISEASE  Eric Reyes MD PhD, Milena Underwood MD, Jillian Mckoy MD, Enid Everett MD, Jay Krishna MD  and providing coverage with Arthur Pepper MD  Providing Infectious Disease Consultations at [facility], [facility], [facility], [facility]'s    Office# [phone] to schedule follow up appointments  Answering Service for urgent calls or New Consults [phone]  Cell# to text for urgent issues Eric Reyes [phone]     infectious diseases progress note:    JANICE RENDON is a 73y y. o. Male patient    Overnight and events of the last 24hrs reviewed    Allergies    No Known Allergies    Intolerances        ANTIBIOTICS/RELEVANT:  antimicrobials  cefTRIAXone   IVPB      cefTRIAXone   IVPB 1000 milliGRAM(s) IV Intermittent every 24 hours    immunologic:    OTHER:  acetaminophen     Tablet .. 650 milliGRAM(s) Oral every 6 hours PRN  atenolol  Tablet 50 milliGRAM(s) Oral two times a day  benzocaine 20% Spray 1 Spray(s) Topical four times a day  cholecalciferol 2000 Unit(s) Oral daily  FIRST- Mouthwash  BLM 5 milliLiter(s) Swish and Spit three times a day  fludroCORTISONE 0.1 milliGRAM(s) Oral daily  fluvoxaMINE 150 milliGRAM(s) Oral two times a day  gabapentin 100 milliGRAM(s) Oral three times a day  haloperidol     Tablet 2 milliGRAM(s) Oral at bedtime  haloperidol     Tablet 1 milliGRAM(s) Oral <User Schedule>  haloperidol    Injectable 1 milliGRAM(s) IntraMuscular every 12 hours PRN  heparin   Injectable 5000 Unit(s) SubCutaneous every 12 hours  lactulose Syrup 15 Gram(s) Oral two times a day PRN  losartan 25 milliGRAM(s) Oral daily  melatonin 3 milliGRAM(s) Oral at bedtime  mirtazapine 15 milliGRAM(s) Oral at bedtime  predniSONE   Tablet 5 milliGRAM(s) Oral daily  senna 2 Tablet(s) Oral at bedtime  sodium chloride 0.45%. 1000 milliLiter(s) IV Continuous <Continuous>      Objective:  Vital Signs Last 24 Hrs  T(C): 36.8 (27 Nov 2023 12:01), Max: 37.1 (26 Nov 2023 17:01)  T(F): 98.2 (27 Nov 2023 12:01), Max: 98.8 (27 Nov 2023 05:29)  HR: 81 (27 Nov 2023 05:29) (75 - 81)  BP: 113/71 (27 Nov 2023 12:30) (113/71 - 170/67)  BP(mean): --  RR: 18 (27 Nov 2023 12:01) (18 - 18)  SpO2: 92% (27 Nov 2023 12:30) (91% - 93%)    Parameters below as of 27 Nov 2023 12:30  Patient On (Oxygen Delivery Method): room air        T(C): 36.8 (11-27-23 @ 12:01), Max: 40.6 (11-25-23 @ 17:32)  T(C): 36.8 (11-27-23 @ 12:01), Max: 40.6 (11-25-23 @ 17:32)  T(C): 36.8 (11-27-23 @ 12:01), Max: 40.6 (11-25-23 @ 17:32)    PHYSICAL EXAM:  HEENT: NC atraumatic  Neck: supple  Respiratory: no accessory muscle use, breathing comfortably  Cardiovascular: distant  Gastrointestinal: normal appearing, nondistended  Extremities: no clubbing, no cyanosis,        LABS:        WBC  10.02 11-21 @ 06:55              Creatinine: 0.98 mg/dL (11-21-23 @ 06:55)                INFLAMMATORY MARKERS      MICROBIOLOGY:    Culture - Urine (11.25.23 @ 14:00)    Specimen Source: Clean Catch Clean Catch (Midstream)   Culture Results:   >100,000 CFU/ml Klebsiella variicola        RADIOLOGY & ADDITIONAL STUDIES:

## 2023-11-27 NOTE — PROGRESS NOTE ADULT - SUBJECTIVE AND OBJECTIVE BOX
Chief Complaint: Psychosis    Interval Events: No events overnight.    Review of Systems:  General: No fevers, chills, weight gain  Skin: No rashes, color changes  Cardiovascular: No chest pain, orthopnea  Respiratory: No shortness of breath, cough  Gastrointestinal: No nausea, abdominal pain  Genitourinary: No incontinence, pain with urination  Musculoskeletal: No pain, swelling, decreased range of motion  Neurological: No headache, weakness  Psychiatric: No depression, anxiety  Endocrine: No weight gain, increased thirst  All other systems are comprehensively negative.    Physical Exam:  Vital Signs Last 24 Hrs  T(C): 37.1 (27 Nov 2023 05:29), Max: 37.1 (26 Nov 2023 17:01)  T(F): 98.8 (27 Nov 2023 05:29), Max: 98.8 (27 Nov 2023 05:29)  HR: 81 (27 Nov 2023 05:29) (75 - 81)  BP: 165/84 (27 Nov 2023 05:29) (157/72 - 170/67)  BP(mean): --  RR: 18 (27 Nov 2023 05:29) (17 - 18)  SpO2: 91% (27 Nov 2023 05:29) (91% - 93%)  Parameters below as of 27 Nov 2023 05:29  Patient On (Oxygen Delivery Method): room air  General: NAD  HEENT: MMM  Neck: No JVD, no carotid bruit  Lungs: CTAB  CV: RRR, nl S1/S2, no M/R/G  Abdomen: S/NT/ND, +BS  Extremities: No LE edema, no cyanosis  Neuro: AAOx3, non-focal  Skin: No rash    Labs:

## 2023-11-27 NOTE — CASE MANAGEMENT PROGRESS NOTE - NSCMPROGRESSNOTE_GEN_ALL_CORE
Discussed patient in interdisciplinary rounds.  Patient remains medically acute on IV rocephin for positive cultures.  Anticipate KSAHIF when medically cleared for D/C.  Will coordinate with SW and remain available

## 2023-11-28 LAB
-  AMOXICILLIN/CLAVULANIC ACID: SIGNIFICANT CHANGE UP
-  AMOXICILLIN/CLAVULANIC ACID: SIGNIFICANT CHANGE UP
-  AMPICILLIN/SULBACTAM: SIGNIFICANT CHANGE UP
-  AMPICILLIN/SULBACTAM: SIGNIFICANT CHANGE UP
-  AMPICILLIN: SIGNIFICANT CHANGE UP
-  AMPICILLIN: SIGNIFICANT CHANGE UP
-  AZTREONAM: SIGNIFICANT CHANGE UP
-  AZTREONAM: SIGNIFICANT CHANGE UP
-  CEFAZOLIN: SIGNIFICANT CHANGE UP
-  CEFAZOLIN: SIGNIFICANT CHANGE UP
-  CEFEPIME: SIGNIFICANT CHANGE UP
-  CEFEPIME: SIGNIFICANT CHANGE UP
-  CEFOXITIN: SIGNIFICANT CHANGE UP
-  CEFOXITIN: SIGNIFICANT CHANGE UP
-  CEFTRIAXONE: SIGNIFICANT CHANGE UP
-  CEFTRIAXONE: SIGNIFICANT CHANGE UP
-  CIPROFLOXACIN: SIGNIFICANT CHANGE UP
-  CIPROFLOXACIN: SIGNIFICANT CHANGE UP
-  ERTAPENEM: SIGNIFICANT CHANGE UP
-  ERTAPENEM: SIGNIFICANT CHANGE UP
-  GENTAMICIN: SIGNIFICANT CHANGE UP
-  GENTAMICIN: SIGNIFICANT CHANGE UP
-  IMIPENEM: SIGNIFICANT CHANGE UP
-  IMIPENEM: SIGNIFICANT CHANGE UP
-  LEVOFLOXACIN: SIGNIFICANT CHANGE UP
-  LEVOFLOXACIN: SIGNIFICANT CHANGE UP
-  MEROPENEM: SIGNIFICANT CHANGE UP
-  MEROPENEM: SIGNIFICANT CHANGE UP
-  NITROFURANTOIN: SIGNIFICANT CHANGE UP
-  NITROFURANTOIN: SIGNIFICANT CHANGE UP
-  PIPERACILLIN/TAZOBACTAM: SIGNIFICANT CHANGE UP
-  PIPERACILLIN/TAZOBACTAM: SIGNIFICANT CHANGE UP
-  TOBRAMYCIN: SIGNIFICANT CHANGE UP
-  TOBRAMYCIN: SIGNIFICANT CHANGE UP
-  TRIMETHOPRIM/SULFAMETHOXAZOLE: SIGNIFICANT CHANGE UP
-  TRIMETHOPRIM/SULFAMETHOXAZOLE: SIGNIFICANT CHANGE UP
CULTURE RESULTS: ABNORMAL
CULTURE RESULTS: ABNORMAL
METHOD TYPE: SIGNIFICANT CHANGE UP
METHOD TYPE: SIGNIFICANT CHANGE UP
ORGANISM # SPEC MICROSCOPIC CNT: ABNORMAL
ORGANISM # SPEC MICROSCOPIC CNT: ABNORMAL
ORGANISM # SPEC MICROSCOPIC CNT: SIGNIFICANT CHANGE UP
ORGANISM # SPEC MICROSCOPIC CNT: SIGNIFICANT CHANGE UP
SPECIMEN SOURCE: SIGNIFICANT CHANGE UP
SPECIMEN SOURCE: SIGNIFICANT CHANGE UP

## 2023-11-28 RX ORDER — CLOZAPINE 150 MG/1
25 TABLET, ORALLY DISINTEGRATING ORAL AT BEDTIME
Refills: 0 | Status: DISCONTINUED | OUTPATIENT
Start: 2023-11-28 | End: 2023-11-28

## 2023-11-28 RX ORDER — HALOPERIDOL DECANOATE 100 MG/ML
2 INJECTION INTRAMUSCULAR AT BEDTIME
Refills: 0 | Status: COMPLETED | OUTPATIENT
Start: 2023-11-28 | End: 2023-11-28

## 2023-11-28 RX ADMIN — GABAPENTIN 100 MILLIGRAM(S): 400 CAPSULE ORAL at 06:03

## 2023-11-28 RX ADMIN — GABAPENTIN 100 MILLIGRAM(S): 400 CAPSULE ORAL at 13:49

## 2023-11-28 RX ADMIN — GABAPENTIN 100 MILLIGRAM(S): 400 CAPSULE ORAL at 21:19

## 2023-11-28 RX ADMIN — ATENOLOL 50 MILLIGRAM(S): 25 TABLET ORAL at 17:35

## 2023-11-28 RX ADMIN — Medication 3 MILLIGRAM(S): at 21:19

## 2023-11-28 RX ADMIN — HALOPERIDOL DECANOATE 1 MILLIGRAM(S): 100 INJECTION INTRAMUSCULAR at 09:05

## 2023-11-28 RX ADMIN — DIPHENHYDRAMINE HYDROCHLORIDE AND LIDOCAINE HYDROCHLORIDE AND ALUMINUM HYDROXIDE AND MAGNESIUM HYDRO 5 MILLILITER(S): KIT at 06:04

## 2023-11-28 RX ADMIN — MIRTAZAPINE 15 MILLIGRAM(S): 45 TABLET, ORALLY DISINTEGRATING ORAL at 21:19

## 2023-11-28 RX ADMIN — CEFTRIAXONE 100 MILLIGRAM(S): 500 INJECTION, POWDER, FOR SOLUTION INTRAMUSCULAR; INTRAVENOUS at 17:35

## 2023-11-28 RX ADMIN — SENNA PLUS 2 TABLET(S): 8.6 TABLET ORAL at 21:19

## 2023-11-28 RX ADMIN — SODIUM CHLORIDE 75 MILLILITER(S): 9 INJECTION, SOLUTION INTRAVENOUS at 13:49

## 2023-11-28 RX ADMIN — HEPARIN SODIUM 5000 UNIT(S): 5000 INJECTION INTRAVENOUS; SUBCUTANEOUS at 06:04

## 2023-11-28 RX ADMIN — DIPHENHYDRAMINE HYDROCHLORIDE AND LIDOCAINE HYDROCHLORIDE AND ALUMINUM HYDROXIDE AND MAGNESIUM HYDRO 5 MILLILITER(S): KIT at 21:18

## 2023-11-28 RX ADMIN — HEPARIN SODIUM 5000 UNIT(S): 5000 INJECTION INTRAVENOUS; SUBCUTANEOUS at 17:35

## 2023-11-28 RX ADMIN — FLUDROCORTISONE ACETATE 0.1 MILLIGRAM(S): 0.1 TABLET ORAL at 06:04

## 2023-11-28 RX ADMIN — DIPHENHYDRAMINE HYDROCHLORIDE AND LIDOCAINE HYDROCHLORIDE AND ALUMINUM HYDROXIDE AND MAGNESIUM HYDRO 5 MILLILITER(S): KIT at 13:49

## 2023-11-28 RX ADMIN — ATENOLOL 50 MILLIGRAM(S): 25 TABLET ORAL at 06:03

## 2023-11-28 RX ADMIN — LOSARTAN POTASSIUM 25 MILLIGRAM(S): 100 TABLET, FILM COATED ORAL at 06:04

## 2023-11-28 RX ADMIN — SODIUM CHLORIDE 75 MILLILITER(S): 9 INJECTION, SOLUTION INTRAVENOUS at 06:03

## 2023-11-28 RX ADMIN — Medication 5 MILLIGRAM(S): at 06:04

## 2023-11-28 RX ADMIN — FLUVOXAMINE MALEATE 150 MILLIGRAM(S): 25 TABLET ORAL at 06:03

## 2023-11-28 RX ADMIN — HALOPERIDOL DECANOATE 2 MILLIGRAM(S): 100 INJECTION INTRAMUSCULAR at 21:19

## 2023-11-28 RX ADMIN — Medication 2000 UNIT(S): at 13:49

## 2023-11-28 RX ADMIN — FLUVOXAMINE MALEATE 150 MILLIGRAM(S): 25 TABLET ORAL at 17:36

## 2023-11-28 NOTE — PROGRESS NOTE ADULT - SUBJECTIVE AND OBJECTIVE BOX
Patient is a 73y old  Male who presents with a chief complaint of AMS (28 Nov 2023 11:44)        INTERVAL HPI/OVERNIGHT EVENTS:   no complaints  pt seen and examined         Vital Signs Last 24 Hrs  T(C): 36.4 (28 Nov 2023 12:23), Max: 37 (27 Nov 2023 20:14)  T(F): 97.6 (28 Nov 2023 12:23), Max: 98.6 (27 Nov 2023 20:14)  HR: 75 (28 Nov 2023 12:23) (75 - 84)  BP: 164/72 (28 Nov 2023 12:23) (156/78 - 174/80)  BP(mean): --  RR: 17 (28 Nov 2023 12:23) (17 - 17)  SpO2: 93% (28 Nov 2023 12:23) (91% - 93%)    Parameters below as of 28 Nov 2023 12:23  Patient On (Oxygen Delivery Method): room air        acetaminophen     Tablet .. 650 milliGRAM(s) Oral every 6 hours PRN  atenolol  Tablet 50 milliGRAM(s) Oral two times a day  benzocaine 20% Spray 1 Spray(s) Topical four times a day  cefTRIAXone   IVPB      cefTRIAXone   IVPB 1000 milliGRAM(s) IV Intermittent every 24 hours  cholecalciferol 2000 Unit(s) Oral daily  FIRST- Mouthwash  BLM 5 milliLiter(s) Swish and Spit three times a day  fludroCORTISONE 0.1 milliGRAM(s) Oral daily  fluvoxaMINE 150 milliGRAM(s) Oral two times a day  gabapentin 100 milliGRAM(s) Oral three times a day  haloperidol     Tablet 2 milliGRAM(s) Oral at bedtime  haloperidol    Injectable 1 milliGRAM(s) IntraMuscular every 12 hours PRN  heparin   Injectable 5000 Unit(s) SubCutaneous every 12 hours  lactulose Syrup 15 Gram(s) Oral two times a day PRN  losartan 25 milliGRAM(s) Oral daily  melatonin 3 milliGRAM(s) Oral at bedtime  mirtazapine 15 milliGRAM(s) Oral at bedtime  predniSONE   Tablet 5 milliGRAM(s) Oral daily  senna 2 Tablet(s) Oral at bedtime  sodium chloride 0.45%. 1000 milliLiter(s) IV Continuous <Continuous>      PHYSICAL EXAM:  GENERAL: NAD   EYES: conjunctiva and sclera clear  ENMT: Moist mucous membranes  NECK: Supple, No JVD, Normal thyroid  CHEST/LUNG: non labored, cta b/l  HEART: Regular rate and rhythm; No murmurs, rubs, or gallops  ABDOMEN: Soft, Nontender, Nondistended; Bowel sounds present  EXTREMITIES:  2+ Peripheral Pulses, No clubbing, no cyanosis, no edema  LYMPH: No lymphadenopathy noted  SKIN: No rashes or lesions  NEURO: no focal deficits    Consultant(s) Notes Reviewed:  [x ] YES  [ ] NO  Care Discussed with Consultants/Other Providers [ x] YES  [ ] NO    LABS:              CAPILLARY BLOOD GLUCOSE                Culture - Blood (collected 25 Nov 2023 20:00)  Source: .Blood Blood-Peripheral  Preliminary Report (28 Nov 2023 03:02):    No growth at 48 Hours    Culture - Blood (collected 25 Nov 2023 19:30)  Source: .Blood Blood-Peripheral  Preliminary Report (28 Nov 2023 03:02):    No growth at 48 Hours        RADIOLOGY & ADDITIONAL TESTS:    Imaging Personally Reviewed  Reviewed consultants input

## 2023-11-28 NOTE — PROGRESS NOTE ADULT - SUBJECTIVE AND OBJECTIVE BOX
All interim records and events noted.    awake in bed, wife present  observed to swallow medicine and given sip of Ensure, reports "clogged" but not on exam, speaking freely, no choking noted        MEDICATIONS  (STANDING):  atenolol  Tablet 50 milliGRAM(s) Oral two times a day  benzocaine 20% Spray 1 Spray(s) Topical four times a day  cefTRIAXone   IVPB      cefTRIAXone   IVPB 1000 milliGRAM(s) IV Intermittent every 24 hours  cholecalciferol 2000 Unit(s) Oral daily  FIRST- Mouthwash  BLM 5 milliLiter(s) Swish and Spit three times a day  fludroCORTISONE 0.1 milliGRAM(s) Oral daily  fluvoxaMINE 150 milliGRAM(s) Oral two times a day  gabapentin 100 milliGRAM(s) Oral three times a day  haloperidol     Tablet 2 milliGRAM(s) Oral at bedtime  haloperidol     Tablet 1 milliGRAM(s) Oral <User Schedule>  heparin   Injectable 5000 Unit(s) SubCutaneous every 12 hours  losartan 25 milliGRAM(s) Oral daily  melatonin 3 milliGRAM(s) Oral at bedtime  mirtazapine 15 milliGRAM(s) Oral at bedtime  predniSONE   Tablet 5 milliGRAM(s) Oral daily  senna 2 Tablet(s) Oral at bedtime  sodium chloride 0.45%. 1000 milliLiter(s) (75 mL/Hr) IV Continuous <Continuous>    MEDICATIONS  (PRN):  acetaminophen     Tablet .. 650 milliGRAM(s) Oral every 6 hours PRN Temp greater or equal to 38.5C (101.3F), Moderate Pain (4 - 6)  haloperidol    Injectable 1 milliGRAM(s) IntraMuscular every 12 hours PRN Agitation  lactulose Syrup 15 Gram(s) Oral two times a day PRN constipation      Vital Signs Last 24 Hrs  T(C): 36.7 (28 Nov 2023 05:08), Max: 37 (27 Nov 2023 20:14)  T(F): 98.1 (28 Nov 2023 05:08), Max: 98.6 (27 Nov 2023 20:14)  HR: 80 (28 Nov 2023 05:08) (80 - 84)  BP: 156/78 (28 Nov 2023 05:08) (113/71 - 174/80)  BP(mean): --  RR: 17 (28 Nov 2023 05:08) (17 - 18)  SpO2: 93% (28 Nov 2023 05:08) (91% - 95%)    Parameters below as of 28 Nov 2023 05:08  Patient On (Oxygen Delivery Method): room air        PHYSICAL EXAM  General: frail thin man in bed in no acute distress  Head: atraumatic, normocephalic  ENT: sclera anicteric, buccal mucosa moist  Neck: supple, trachea midline  CV: S1 S2, regular rate and rhythm  Lungs: clear to auscultation, no wheezes/rhonchi  Abdomen: soft, nontender, bowel sounds present, no palpable masses  Extrem: no clubbing/cyanosis  Skin: no significant increased ecchymosis/petechiae  Neuro: alert and responsive,  no focal deficits      LABS:              RADIOLOGY & ADDITIONAL STUDIES:    IMPRESSION/RECOMMENDATIONS:

## 2023-11-28 NOTE — PROGRESS NOTE ADULT - ASSESSMENT
73 M pmh of HTN, ITP, APL syndrome, generalized anxiety disorder, OCD, newly dx aggressive Merkel Cell cancer of forehead- pt's oncologist  Dr. Eng, at [facility] on Keytruda (PD-1 blocker) infusions with good response per wife  was brought to ED for worsening psychosis for the past few days, pt believes there are sharp things inside his body trying to come out of his mouth, stuck in throat, pt lately with difficulty swallowing, decreased po intake, pt was evaluated by psych in ED. Wife reports sudden cognitive decline and behavioral changes immediately after COVID infection.    1-PASC  noted serology for EBV consistent with reactivation as described post PASC  Serotonin, Blood: <30ng/mL   am cortisol-low x 2 then nl    Shawn has been improving and more conversant willing to entertain that there are no fungi or metal clips in mouth and this may be just in his head, continue to work with medicine and psych on adjustment of medications, 11/21-upper gastrointestinal endoscopy; normal esophagus, mid esophageal biopsy performed; mild gastritis  Wife has concerns about drug interaction with new med that Psych is considering    2-PYELONEPHRITIS   -ceftriaxone (started 11/25)   with urinary symps, pyuria, fever and   urine culture w >100,000 CFU/ml Klebsiella variicola, sens pending  blood cultures-NGTD    Thank you for consulting us and involving us in the management of this most interesting and challenging case.  We will follow along in the care of this patient. Please call us at [phone] or text me directly on my cell# at [phone] with any concerns.

## 2023-11-28 NOTE — PROGRESS NOTE ADULT - SUBJECTIVE AND OBJECTIVE BOX
Chief Complaint: Psychosis    Interval Events: No events overnight.    Review of Systems:  General: No fevers, chills, weight gain  Skin: No rashes, color changes  Cardiovascular: No chest pain, orthopnea  Respiratory: No shortness of breath, cough  Gastrointestinal: No nausea, abdominal pain  Genitourinary: No incontinence, pain with urination  Musculoskeletal: No pain, swelling, decreased range of motion  Neurological: No headache, weakness  Psychiatric: No depression, anxiety  Endocrine: No weight gain, increased thirst  All other systems are comprehensively negative.    Physical Exam:  Vital Signs Last 24 Hrs  T(C): 36.7 (28 Nov 2023 05:08), Max: 37 (27 Nov 2023 20:14)  T(F): 98.1 (28 Nov 2023 05:08), Max: 98.6 (27 Nov 2023 20:14)  HR: 80 (28 Nov 2023 05:08) (80 - 84)  BP: 156/78 (28 Nov 2023 05:08) (113/71 - 174/80)  BP(mean): --  RR: 17 (28 Nov 2023 05:08) (17 - 18)  SpO2: 93% (28 Nov 2023 05:08) (91% - 95%)  Parameters below as of 28 Nov 2023 05:08  Patient On (Oxygen Delivery Method): room air  General: NAD  HEENT: MMM  Neck: No JVD, no carotid bruit  Lungs: CTAB  CV: RRR, nl S1/S2, no M/R/G  Abdomen: S/NT/ND, +BS  Extremities: No LE edema, no cyanosis  Neuro: AAOx3, non-focal  Skin: No rash    Labs:

## 2023-11-28 NOTE — PROGRESS NOTE ADULT - ASSESSMENT
pt w ITP, Lupus Anticoagulant, HTN, anxiety on Seraquel, OCD, Merkel cell ca of right Forehead, under care Dr Elliot Eng, post 4 doses q3wks IV Keytruda, last dose 10/17/23.  Pt brought in w c/o feeling "claws, daggers" inside him, since C#3 Keytruda, with sensation initially started w various parts of face now also progressed down to sides of body to rectum(does not see actual daggers or claws,)    Wife reports after first cycle started w jaw tightness and progressive worsened w each cycle, to point of hard to swallow/can't swallow  Had tired taken off Seroquel to see if adverse effect but no improvement, now back on x 5-6days  Merkel lesion has essentially resolved  -sensation described by pt ?psychosis vs neuropathy  -psychosis not noted to be associated w Keytruda. Neuro tox of Keytruda described includes cerebral hemorrhage, confusion, myasthenia gravis, reversible posterior leukoencephalopathy syndrome, syed neuropathy, Guillain [place], aseptic meningitis, encephalitis, transverse myelitis    -CT and MRI head no sig findings  -AM cortisol 5.8. Slight low. Unclear significance   Repeat AM Cortisol low x2. <6 on two tests  3rd test w cortisol 11 on 11/06/2  -Sjorgen /Scleroderma studies abn.      +MICHELLE and +Ro52  Seen by Neuro and Psych post medication changes, pt refused LP  Seen by Endocrine-on prednisone 5mg qD for low Cortisol level, could not tolerate hydrocortisone  Seen by GI post endoscopy no abnormality found    -clinically no sig change, continues c/o unable to swallow, objects in throat, tight jaws(since first dose Keytruda as per wife)  -mild anemia and thrombocytopenia-stable as of last CBC, no intervention and adequate at these levels  -cont supportive care from Heme/Onc standpoint    discussed w wife  support provided

## 2023-11-28 NOTE — CHART NOTE - NSCHARTNOTEFT_GEN_A_CORE
Assessment: patient seen for malnutrition follow up   73y old  Male who presents with a chief complaint of AMS per GI still complain of oral pain, psychosis   EGD mild gastritis normal esophagus , per Psych haldol increased   patient seen sleeping in bed. per CNA took spoonfuls of yogurt and some ensure supplement  IV NS 75ml hr  11/28 fecal incontinence       Factors impacting intake: [ ] none [ ] nausea  [ ] vomiting [ ] diarrhea [ ] constipation  [ ]chewing problems [x ] swallowing issues  [x ] other: puree mild thick , psychosis trouble swallowing     Diet Prescription: puree mild thick liquids ensure plus HP TID  Intake: total feed poor PO 0-25% per flow sheet    Current Weight: 11/22 150.3# follow for new weight      Pertinent Medications: MEDICATIONS  (STANDING):  atenolol  Tablet 50 milliGRAM(s) Oral two times a day  benzocaine 20% Spray 1 Spray(s) Topical four times a day  cefTRIAXone   IVPB      cefTRIAXone   IVPB 1000 milliGRAM(s) IV Intermittent every 24 hours  cholecalciferol 2000 Unit(s) Oral daily  FIRST- Mouthwash  BLM 5 milliLiter(s) Swish and Spit three times a day  fludroCORTISONE 0.1 milliGRAM(s) Oral daily  fluvoxaMINE 150 milliGRAM(s) Oral two times a day  gabapentin 100 milliGRAM(s) Oral three times a day  haloperidol     Tablet 2 milliGRAM(s) Oral at bedtime  haloperidol     Tablet 1 milliGRAM(s) Oral <User Schedule>  heparin   Injectable 5000 Unit(s) SubCutaneous every 12 hours  losartan 25 milliGRAM(s) Oral daily  melatonin 3 milliGRAM(s) Oral at bedtime  mirtazapine 15 milliGRAM(s) Oral at bedtime  predniSONE   Tablet 5 milliGRAM(s) Oral daily  senna 2 Tablet(s) Oral at bedtime  sodium chloride 0.45%. 1000 milliLiter(s) (75 mL/Hr) IV Continuous <Continuous>    MEDICATIONS  (PRN):  acetaminophen     Tablet .. 650 milliGRAM(s) Oral every 6 hours PRN Temp greater or equal to 38.5C (101.3F), Moderate Pain (4 - 6)  haloperidol    Injectable 1 milliGRAM(s) IntraMuscular every 12 hours PRN Agitation  lactulose Syrup 15 Gram(s) Oral two times a day PRN constipation      Skin: no pressure injury    Estimated Needs:     [x] no change since previous assessment on: 10/25 based on #  kcal/day: 1070-7004  gms protein/day: 89-105g  [ ] recalculated:     Previous Nutrition Diagnosis:   [x ] swallow difficulty  [ x] Malnutrition     Nutrition Diagnosis is [x ] ongoing  [ ] resolved [ ] not applicable     New Nutrition Diagnosis: [x ] not applicable       Interventions:   Recommend  [ ] Change Diet To:  [ ] Nutrition Supplement  [ ] Nutrition Support  [x ] Other: continue ensure as ordered with one mixed with ice cream in PM per RD note previously. continue to provide food preferences as available , follow and remain available    recommend MVI     Monitoring and Evaluation:   [x ] PO intake [ x ] Tolerance to diet prescription [ x ] weights [ x ] labs[ x ] follow up per protocol  [ ] other:

## 2023-11-28 NOTE — PROGRESS NOTE ADULT - ASSESSMENT
73 M pmh of HTN, ITP, APL syndrome, generalized anxiety disorder, OCD, newly dx aggressive Merkel Cell cancer of forehead- pt's oncologist  Dr. Eng, at [facility] on Keytruda infusions with good response per wife  was brought to ED for worsening psychosis for the past few days, pt lately with difficulty swallowing, decreased po intake    Psychosis  monitor  psych fu  MRI noted  neuro following  cont luvox   continue to titrate haldol  GI fu noted- sp EGD - normal -sp bx fu path  trial of dilaudid without success     #HTN/Orthostatic hypotension  cont atenolol w parameters  ? psych med induced  adrenal insufficiency  continue prednisone and  florinef    UTI  start ctx  f/u cx  ID follow up    #Merkel cell ca  onc c/s noted  r/o paraneoplastic causes- unlikely      #DVT ppx-  hep sq

## 2023-11-28 NOTE — PROGRESS NOTE ADULT - ASSESSMENT
odynophagia    s/p  upper gastrointestinal endoscopy; normal esophagus, mid esophageal biopsy performed; mild gastritis  path shows minimal chronic inflammation  diet as tolerated  holding off ppi per family request  will follow

## 2023-11-28 NOTE — PROGRESS NOTE ADULT - ASSESSMENT
The patient is a 73 year old male with a history of HTN, ITP, APLS, anxiety, OCD, Merkel cell cancer who presented with psychosis.    Plan:  - ECG with no evidence of ischemia or infarction  - Orthostatic hypotension may be multifactorial - dehydration, medication related (antipsychotics), and low cortisol levels  - Allow for supine hypertension - BP labile due to orthostatic hypotension  - Continue atenolol 50 mg bid  - Continue losartan 25 mg daily  - EGD done with no significant findings  - Psych follow-up  - UTI - on IV antibiotics

## 2023-11-28 NOTE — PROGRESS NOTE ADULT - SUBJECTIVE AND OBJECTIVE BOX
[facility]  Walter Camarena PA-C  [address] [place] [address]  [phone]      INTERVAL HPI/OVERNIGHT EVENTS:  Pt s/e  Still c/o oral pain    MEDICATIONS  (STANDING):  atenolol  Tablet 50 milliGRAM(s) Oral two times a day  benzocaine 20% Spray 1 Spray(s) Topical four times a day  cefTRIAXone   IVPB 1000 milliGRAM(s) IV Intermittent every 24 hours  cefTRIAXone   IVPB      cholecalciferol 2000 Unit(s) Oral daily  FIRST- Mouthwash  BLM 5 milliLiter(s) Swish and Spit three times a day  fludroCORTISONE 0.1 milliGRAM(s) Oral daily  fluvoxaMINE 150 milliGRAM(s) Oral two times a day  gabapentin 100 milliGRAM(s) Oral three times a day  haloperidol     Tablet 2 milliGRAM(s) Oral at bedtime  haloperidol     Tablet 1 milliGRAM(s) Oral <User Schedule>  heparin   Injectable 5000 Unit(s) SubCutaneous every 12 hours  losartan 25 milliGRAM(s) Oral daily  melatonin 3 milliGRAM(s) Oral at bedtime  mirtazapine 15 milliGRAM(s) Oral at bedtime  predniSONE   Tablet 5 milliGRAM(s) Oral daily  senna 2 Tablet(s) Oral at bedtime  sodium chloride 0.45%. 1000 milliLiter(s) (75 mL/Hr) IV Continuous <Continuous>    MEDICATIONS  (PRN):  acetaminophen     Tablet .. 650 milliGRAM(s) Oral every 6 hours PRN Temp greater or equal to 38.5C (101.3F), Moderate Pain (4 - 6)  haloperidol    Injectable 1 milliGRAM(s) IntraMuscular every 12 hours PRN Agitation  lactulose Syrup 15 Gram(s) Oral two times a day PRN constipation      Allergies  No Known Allergies      PHYSICAL EXAM:   Vital Signs:  Vital Signs Last 24 Hrs  T(C): 36.4 (28 Nov 2023 12:23), Max: 37 (27 Nov 2023 20:14)  T(F): 97.6 (28 Nov 2023 12:23), Max: 98.6 (27 Nov 2023 20:14)  HR: 75 (28 Nov 2023 12:23) (75 - 84)  BP: 164/72 (28 Nov 2023 12:23) (156/78 - 174/80)  BP(mean): --  RR: 17 (28 Nov 2023 12:23) (17 - 17)  SpO2: 93% (28 Nov 2023 12:23) (91% - 95%)    Parameters below as of 28 Nov 2023 12:23  Patient On (Oxygen Delivery Method): room air    GENERAL:  Appears stated age  HEENT:  NC/AT  CHEST:  Full & symmetric excursion  HEART:  Regular rhythm  ABDOMEN:  Soft, non-tender, non-distended  EXTEREMITIES:  no cyanosis  SKIN:  No rash  NEURO:  Alert

## 2023-11-28 NOTE — PROGRESS NOTE ADULT - SUBJECTIVE AND OBJECTIVE BOX
[facility] DIV[facility] of INFECTIOUS DISEASE  Eric Reyes MD PhD, Milena Underwood MD, Jillian Mckoy MD, Enid Everett MD, Jay Krishna MD  and providing coverage with Arthur Pepper MD  Providing Infectious Disease Consultations at [facility], [facility], [facility]'s    Office# [phone] to schedule follow up appointments  Answering Service for urgent calls or New Consults [phone]  Cell# to text for urgent issues Eric Reyes [phone]     infectious diseases progress note:    JANICE RENDON is a 73y y. o. Male patient    Overnight and events of the last 24hrs reviewed    Allergies    No Known Allergies    Intolerances        ANTIBIOTICS/RELEVANT:  antimicrobials  cefTRIAXone   IVPB      cefTRIAXone   IVPB 1000 milliGRAM(s) IV Intermittent every 24 hours    immunologic:    OTHER:  acetaminophen     Tablet .. 650 milliGRAM(s) Oral every 6 hours PRN  atenolol  Tablet 50 milliGRAM(s) Oral two times a day  benzocaine 20% Spray 1 Spray(s) Topical four times a day  cholecalciferol 2000 Unit(s) Oral daily  FIRST- Mouthwash  BLM 5 milliLiter(s) Swish and Spit three times a day  fludroCORTISONE 0.1 milliGRAM(s) Oral daily  fluvoxaMINE 150 milliGRAM(s) Oral two times a day  gabapentin 100 milliGRAM(s) Oral three times a day  haloperidol     Tablet 2 milliGRAM(s) Oral at bedtime  haloperidol     Tablet 1 milliGRAM(s) Oral <User Schedule>  haloperidol    Injectable 1 milliGRAM(s) IntraMuscular every 12 hours PRN  heparin   Injectable 5000 Unit(s) SubCutaneous every 12 hours  lactulose Syrup 15 Gram(s) Oral two times a day PRN  losartan 25 milliGRAM(s) Oral daily  melatonin 3 milliGRAM(s) Oral at bedtime  mirtazapine 15 milliGRAM(s) Oral at bedtime  predniSONE   Tablet 5 milliGRAM(s) Oral daily  senna 2 Tablet(s) Oral at bedtime  sodium chloride 0.45%. 1000 milliLiter(s) IV Continuous <Continuous>      Objective:  Vital Signs Last 24 Hrs  T(C): 36.7 (28 Nov 2023 05:08), Max: 37 (27 Nov 2023 20:14)  T(F): 98.1 (28 Nov 2023 05:08), Max: 98.6 (27 Nov 2023 20:14)  HR: 80 (28 Nov 2023 05:08) (80 - 84)  BP: 156/78 (28 Nov 2023 05:08) (113/71 - 174/80)  BP(mean): --  RR: 17 (28 Nov 2023 05:08) (17 - 18)  SpO2: 93% (28 Nov 2023 05:08) (91% - 95%)    Parameters below as of 28 Nov 2023 05:08  Patient On (Oxygen Delivery Method): room air        T(C): 36.7 (11-28-23 @ 05:08), Max: 37.1 (11-26-23 @ 17:01)  T(C): 36.7 (11-28-23 @ 05:08), Max: 40.6 (11-25-23 @ 17:32)  T(C): 36.7 (11-28-23 @ 05:08), Max: 40.6 (11-25-23 @ 17:32)    PHYSICAL EXAM:  HEENT: NC atraumatic  Neck: supple  Respiratory: no accessory muscle use, breathing comfortably  Cardiovascular: distant  Gastrointestinal: normal appearing, nondistended  Extremities: no clubbing, no cyanosis,        LABS:        WBC                            INFLAMMATORY MARKERS      MICROBIOLOGY:    Culture - Urine (11.25.23 @ 14:00)    Specimen Source: Clean Catch Clean Catch (Midstream)   Culture Results:   >100,000 CFU/ml Klebsiella variicola        RADIOLOGY & ADDITIONAL STUDIES:

## 2023-11-29 LAB
ANION GAP SERPL CALC-SCNC: 7 MMOL/L — SIGNIFICANT CHANGE UP (ref 5–17)
ANION GAP SERPL CALC-SCNC: 7 MMOL/L — SIGNIFICANT CHANGE UP (ref 5–17)
BUN SERPL-MCNC: 10 MG/DL — SIGNIFICANT CHANGE UP (ref 7–23)
BUN SERPL-MCNC: 10 MG/DL — SIGNIFICANT CHANGE UP (ref 7–23)
CALCIUM SERPL-MCNC: 8.2 MG/DL — LOW (ref 8.5–10.1)
CALCIUM SERPL-MCNC: 8.2 MG/DL — LOW (ref 8.5–10.1)
CHLORIDE SERPL-SCNC: 106 MMOL/L — SIGNIFICANT CHANGE UP (ref 96–108)
CHLORIDE SERPL-SCNC: 106 MMOL/L — SIGNIFICANT CHANGE UP (ref 96–108)
CO2 SERPL-SCNC: 31 MMOL/L — SIGNIFICANT CHANGE UP (ref 22–31)
CO2 SERPL-SCNC: 31 MMOL/L — SIGNIFICANT CHANGE UP (ref 22–31)
CREAT SERPL-MCNC: 0.61 MG/DL — SIGNIFICANT CHANGE UP (ref 0.5–1.3)
CREAT SERPL-MCNC: 0.61 MG/DL — SIGNIFICANT CHANGE UP (ref 0.5–1.3)
EGFR: 100 ML/MIN/1.73M2 — SIGNIFICANT CHANGE UP
EGFR: 100 ML/MIN/1.73M2 — SIGNIFICANT CHANGE UP
GLUCOSE SERPL-MCNC: 85 MG/DL — SIGNIFICANT CHANGE UP (ref 70–99)
GLUCOSE SERPL-MCNC: 85 MG/DL — SIGNIFICANT CHANGE UP (ref 70–99)
HCT VFR BLD CALC: 25.3 % — LOW (ref 39–50)
HCT VFR BLD CALC: 25.3 % — LOW (ref 39–50)
HGB BLD-MCNC: 8.6 G/DL — LOW (ref 13–17)
HGB BLD-MCNC: 8.6 G/DL — LOW (ref 13–17)
MCHC RBC-ENTMCNC: 29.5 PG — SIGNIFICANT CHANGE UP (ref 27–34)
MCHC RBC-ENTMCNC: 29.5 PG — SIGNIFICANT CHANGE UP (ref 27–34)
MCHC RBC-ENTMCNC: 34 GM/DL — SIGNIFICANT CHANGE UP (ref 32–36)
MCHC RBC-ENTMCNC: 34 GM/DL — SIGNIFICANT CHANGE UP (ref 32–36)
MCV RBC AUTO: 86.6 FL — SIGNIFICANT CHANGE UP (ref 80–100)
MCV RBC AUTO: 86.6 FL — SIGNIFICANT CHANGE UP (ref 80–100)
NRBC # BLD: 0 /100 WBCS — SIGNIFICANT CHANGE UP (ref 0–0)
NRBC # BLD: 0 /100 WBCS — SIGNIFICANT CHANGE UP (ref 0–0)
PLATELET # BLD AUTO: 78 K/UL — LOW (ref 150–400)
PLATELET # BLD AUTO: 78 K/UL — LOW (ref 150–400)
POTASSIUM SERPL-MCNC: 3.6 MMOL/L — SIGNIFICANT CHANGE UP (ref 3.5–5.3)
POTASSIUM SERPL-MCNC: 3.6 MMOL/L — SIGNIFICANT CHANGE UP (ref 3.5–5.3)
POTASSIUM SERPL-SCNC: 3.6 MMOL/L — SIGNIFICANT CHANGE UP (ref 3.5–5.3)
POTASSIUM SERPL-SCNC: 3.6 MMOL/L — SIGNIFICANT CHANGE UP (ref 3.5–5.3)
RBC # BLD: 2.92 M/UL — LOW (ref 4.2–5.8)
RBC # BLD: 2.92 M/UL — LOW (ref 4.2–5.8)
RBC # FLD: 14.5 % — SIGNIFICANT CHANGE UP (ref 10.3–14.5)
RBC # FLD: 14.5 % — SIGNIFICANT CHANGE UP (ref 10.3–14.5)
SODIUM SERPL-SCNC: 144 MMOL/L — SIGNIFICANT CHANGE UP (ref 135–145)
SODIUM SERPL-SCNC: 144 MMOL/L — SIGNIFICANT CHANGE UP (ref 135–145)
WBC # BLD: 4.99 K/UL — SIGNIFICANT CHANGE UP (ref 3.8–10.5)
WBC # BLD: 4.99 K/UL — SIGNIFICANT CHANGE UP (ref 3.8–10.5)
WBC # FLD AUTO: 4.99 K/UL — SIGNIFICANT CHANGE UP (ref 3.8–10.5)
WBC # FLD AUTO: 4.99 K/UL — SIGNIFICANT CHANGE UP (ref 3.8–10.5)

## 2023-11-29 RX ORDER — OLANZAPINE 15 MG/1
10 TABLET, FILM COATED ORAL AT BEDTIME
Refills: 0 | Status: DISCONTINUED | OUTPATIENT
Start: 2023-11-29 | End: 2023-12-01

## 2023-11-29 RX ORDER — HYDRALAZINE HCL 50 MG
25 TABLET ORAL ONCE
Refills: 0 | Status: COMPLETED | OUTPATIENT
Start: 2023-11-29 | End: 2023-11-29

## 2023-11-29 RX ORDER — SCOPALAMINE 1 MG/3D
1 PATCH, EXTENDED RELEASE TRANSDERMAL
Refills: 0 | Status: DISCONTINUED | OUTPATIENT
Start: 2023-11-29 | End: 2023-11-29

## 2023-11-29 RX ORDER — HYDRALAZINE HCL 50 MG
10 TABLET ORAL ONCE
Refills: 0 | Status: DISCONTINUED | OUTPATIENT
Start: 2023-11-29 | End: 2023-11-29

## 2023-11-29 RX ADMIN — Medication 3 MILLIGRAM(S): at 21:22

## 2023-11-29 RX ADMIN — Medication 25 MILLIGRAM(S): at 20:06

## 2023-11-29 RX ADMIN — HEPARIN SODIUM 5000 UNIT(S): 5000 INJECTION INTRAVENOUS; SUBCUTANEOUS at 17:49

## 2023-11-29 RX ADMIN — Medication 5 MILLIGRAM(S): at 05:55

## 2023-11-29 RX ADMIN — FLUDROCORTISONE ACETATE 0.1 MILLIGRAM(S): 0.1 TABLET ORAL at 05:55

## 2023-11-29 RX ADMIN — HEPARIN SODIUM 5000 UNIT(S): 5000 INJECTION INTRAVENOUS; SUBCUTANEOUS at 05:56

## 2023-11-29 RX ADMIN — ATENOLOL 50 MILLIGRAM(S): 25 TABLET ORAL at 17:37

## 2023-11-29 RX ADMIN — FLUVOXAMINE MALEATE 150 MILLIGRAM(S): 25 TABLET ORAL at 17:49

## 2023-11-29 RX ADMIN — DIPHENHYDRAMINE HYDROCHLORIDE AND LIDOCAINE HYDROCHLORIDE AND ALUMINUM HYDROXIDE AND MAGNESIUM HYDRO 5 MILLILITER(S): KIT at 21:18

## 2023-11-29 RX ADMIN — LOSARTAN POTASSIUM 25 MILLIGRAM(S): 100 TABLET, FILM COATED ORAL at 05:54

## 2023-11-29 RX ADMIN — OLANZAPINE 10 MILLIGRAM(S): 15 TABLET, FILM COATED ORAL at 20:06

## 2023-11-29 RX ADMIN — ATENOLOL 50 MILLIGRAM(S): 25 TABLET ORAL at 05:55

## 2023-11-29 RX ADMIN — Medication 2000 UNIT(S): at 11:31

## 2023-11-29 RX ADMIN — GABAPENTIN 100 MILLIGRAM(S): 400 CAPSULE ORAL at 21:22

## 2023-11-29 RX ADMIN — SODIUM CHLORIDE 75 MILLILITER(S): 9 INJECTION, SOLUTION INTRAVENOUS at 05:55

## 2023-11-29 RX ADMIN — CEFTRIAXONE 100 MILLIGRAM(S): 500 INJECTION, POWDER, FOR SOLUTION INTRAMUSCULAR; INTRAVENOUS at 17:50

## 2023-11-29 RX ADMIN — GABAPENTIN 100 MILLIGRAM(S): 400 CAPSULE ORAL at 13:28

## 2023-11-29 RX ADMIN — MIRTAZAPINE 15 MILLIGRAM(S): 45 TABLET, ORALLY DISINTEGRATING ORAL at 21:22

## 2023-11-29 RX ADMIN — GABAPENTIN 100 MILLIGRAM(S): 400 CAPSULE ORAL at 05:54

## 2023-11-29 RX ADMIN — DIPHENHYDRAMINE HYDROCHLORIDE AND LIDOCAINE HYDROCHLORIDE AND ALUMINUM HYDROXIDE AND MAGNESIUM HYDRO 5 MILLILITER(S): KIT at 05:54

## 2023-11-29 RX ADMIN — FLUVOXAMINE MALEATE 150 MILLIGRAM(S): 25 TABLET ORAL at 05:55

## 2023-11-29 NOTE — PROGRESS NOTE ADULT - ASSESSMENT
73 M pmh of HTN, ITP, APL syndrome, generalized anxiety disorder, OCD, newly dx aggressive Merkel Cell cancer of forehead- pt's oncologist  Dr. Eng, at [facility] on Keytruda infusions with good response per wife  was brought to ED for worsening psychosis for the past few days, pt lately with difficulty swallowing, decreased po intake    Psychosis  monitor  psych fu  MRI noted  neuro following  cont luvox   continue to titrate haldol  ?clozapine starting per psych fu  GI fu noted- sp EGD - normal -sp bx fu path  trial of dilaudid without success     #HTN/Orthostatic hypotension  cont atenolol w parameters  ? psych med induced  adrenal insufficiency  continue prednisone and  florinef    UTI  sp abx    #Merkel cell ca  onc c/s noted  r/o paraneoplastic causes- unlikely      #DVT ppx-  hep sq    [facility]   [phone]

## 2023-11-29 NOTE — PROGRESS NOTE ADULT - ASSESSMENT
73 M pmh of HTN, ITP, APL syndrome, generalized anxiety disorder, OCD, newly dx aggressive Merkel Cell cancer of forehead- pt's oncologist  Dr. Eng, at [facility] on Keytruda (PD-1 blocker) infusions with good response per wife  was brought to ED for worsening psychosis for the past few days, pt believes there are sharp things inside his body trying to come out of his mouth, stuck in throat, pt lately with difficulty swallowing, decreased po intake, pt was evaluated by psych in ED. Wife reports sudden cognitive decline and behavioral changes immediately after COVID infection.    1-PASC  noted serology for EBV consistent with reactivation as described post PASC  Serotonin, Blood: <30ng/mL   am cortisol-low x 2 then nl    Shawn has been improving and more conversant willing to entertain that there are no fungi or metal clips in mouth and this may be just in his head, continue to work with medicine and psych on adjustment of medications, 11/21-upper gastrointestinal endoscopy; normal esophagus, mid esophageal biopsy performed; mild gastritis  Plan to trial zyprexa    2-PYELONEPHRITIS   -ceftriaxone (started 11/25) plan for 12/1 as last day   with urinary symps, pyuria, fever and   urine culture w >100,000 CFU/ml Klebsiella variicola,  -  Ceftriaxone: S <=1  blood cultures-NGTD    Thank you for consulting us and involving us in the management of this most interesting and challenging case.  We will follow along in the care of this patient. Please call us at [phone] or text me directly on my cell# at [phone] with any concerns.

## 2023-11-29 NOTE — CHART NOTE - NSCHARTNOTEFT_GEN_A_CORE
73 M pmh of HTN, ITP, APL syndrome, generalized anxiety disorder, OCD, newly dx aggressive Merkel Cell cancer of forehead- pt's oncologist  Dr. Eng, at [facility] on Keytruda infusions with good response per wife  was brought to ED for worsening psychosis for the past few days, pt lately with difficulty swallowing, decreased po intake.     Called by RN, patient with SBP >200, manually checked. Patient seen and examined, in NAD. Denies cp, sob, palpitations, HA, dizziness or lightheadedness. Currently with decreased po, and on IVF. Positive orthostatics during admission, cardiology following, per note allowing for supine hypertension. Plan below discussed with attending.       PLAN:     - pt asymptomatic  - d/c IVF  - d/c Florinef  - continue steroids, prednisone 5 mg daily   - give dose of atenolol 50 mg evening dose now  - recheck BP at 1830  - if continues elevated, RN advised to call house doctor ([phone]) for reccs  - Dr. Lau called, not able to connect  - Dr. Edwards aware

## 2023-11-29 NOTE — PROGRESS NOTE ADULT - SUBJECTIVE AND OBJECTIVE BOX
Chief Complaint: Psychosis    Interval Events: No events overnight.    Review of Systems:  General: No fevers, chills, weight gain  Skin: No rashes, color changes  Cardiovascular: No chest pain, orthopnea  Respiratory: No shortness of breath, cough  Gastrointestinal: No nausea, abdominal pain  Genitourinary: No incontinence, pain with urination  Musculoskeletal: No pain, swelling, decreased range of motion  Neurological: No headache, weakness  Psychiatric: No depression, anxiety  Endocrine: No weight gain, increased thirst  All other systems are comprehensively negative.    Physical Exam:  Vital Signs Last 24 Hrs  T(C): 36.6 (29 Nov 2023 05:06), Max: 36.6 (29 Nov 2023 05:06)  T(F): 97.8 (29 Nov 2023 05:06), Max: 97.8 (29 Nov 2023 05:06)  HR: 78 (29 Nov 2023 05:06) (75 - 80)  BP: 173/76 (29 Nov 2023 05:06) (164/72 - 175/70)  BP(mean): --  RR: 17 (29 Nov 2023 05:06) (17 - 18)  SpO2: 95% (29 Nov 2023 05:06) (93% - 95%)  Parameters below as of 29 Nov 2023 05:06  Patient On (Oxygen Delivery Method): room air  General: NAD  HEENT: MMM  Neck: No JVD, no carotid bruit  Lungs: CTAB  CV: RRR, nl S1/S2, no M/R/G  Abdomen: S/NT/ND, +BS  Extremities: No LE edema, no cyanosis  Neuro: AAOx3, non-focal  Skin: No rash    Labs:

## 2023-11-29 NOTE — CHART NOTE - NSCHARTNOTEFT_GEN_A_CORE
RN called as pt with /86 with HR 84.   Denies HA, dizziness, vision changes. He had received Atenolol 50 mg at 1730.  Pt with hx orthostatic hypotension, but refusing to stand up for orthostatic vitals.  Requested that the nurse sit the pt up for sitting BP to evaluate for orthostasis    Sitting /82 (electronic) with HR 85.   Will give hydralazine 25 mg PO stat  Recheck BP in 1 hour  RN to take only manual BP from now on for this pt  Cardiology following, FU recs  Continue to monitor RN called as pt with /86 with HR 84.   Denies HA, dizziness, vision changes. He had received Atenolol 50 mg at 1730.  Pt with hx orthostatic hypotension, but refusing to stand up for orthostatic vitals.  Requested that the nurse sit the pt up for sitting BP to evaluate for orthostasis    Sitting /82 (electronic) with HR 85.   Will give hydralazine 25 mg PO stat  Recheck BP in 1 hour  RN to take only manual BP from now on for this pt  Cardiology following, FU recs  Continue to monitor      Addendum 2120  Pt's BP improved to 180/88, HR 84  continue to monitor

## 2023-11-29 NOTE — PROGRESS NOTE ADULT - SUBJECTIVE AND OBJECTIVE BOX
[INTERVAL HX: ]  Patient seen and examined;  Chart reviewed and events noted;     eating some.   Less secretions today    Clozapine was being considered   On Fluvoxamine per wife    Scopalamine was also being considered.     On steroids.     [MEDICATIONS]  MEDICATIONS  (STANDING):  atenolol  Tablet 50 milliGRAM(s) Oral two times a day  benzocaine 20% Spray 1 Spray(s) Topical four times a day  cefTRIAXone   IVPB 1000 milliGRAM(s) IV Intermittent every 24 hours  cefTRIAXone   IVPB      cholecalciferol 2000 Unit(s) Oral daily  FIRST- Mouthwash  BLM 5 milliLiter(s) Swish and Spit three times a day  fludroCORTISONE 0.1 milliGRAM(s) Oral daily  fluvoxaMINE 150 milliGRAM(s) Oral two times a day  gabapentin 100 milliGRAM(s) Oral three times a day  heparin   Injectable 5000 Unit(s) SubCutaneous every 12 hours  losartan 25 milliGRAM(s) Oral daily  melatonin 3 milliGRAM(s) Oral at bedtime  mirtazapine 15 milliGRAM(s) Oral at bedtime  predniSONE   Tablet 5 milliGRAM(s) Oral daily  scopolamine 1 mG/72 Hr(s) Patch 1 Patch Transdermal every 72 hours  senna 2 Tablet(s) Oral at bedtime  sodium chloride 0.45%. 1000 milliLiter(s) (75 mL/Hr) IV Continuous <Continuous>    MEDICATIONS  (PRN):  acetaminophen     Tablet .. 650 milliGRAM(s) Oral every 6 hours PRN Temp greater or equal to 38.5C (101.3F), Moderate Pain (4 - 6)  haloperidol    Injectable 1 milliGRAM(s) IntraMuscular every 12 hours PRN Agitation  lactulose Syrup 15 Gram(s) Oral two times a day PRN constipation      [VITALS]  Vital Signs Last 24 Hrs  T(C): 36.6 (2023 11:50), Max: 36.6 (2023 05:06)  T(F): 97.9 (2023 11:50), Max: 97.9 (2023 11:50)  HR: 77 (2023 11:50) (77 - 80)  BP: 150/74 (2023 11:50) (150/74 - 175/70)  BP(mean): --  RR: 19 (2023 11:50) (17 - 19)  SpO2: 98% (2023 11:50) (93% - 98%)    Parameters below as of 2023 11:50  Patient On (Oxygen Delivery Method): room air      [WT/HT]  Daily     Daily Weight in k.8 (2023 05:06)  [VENT]      [PHYSICAL EXAM]  GEN: NAD, cachetic  HEENT: normocephalic and atraumatic. EOMI. .    NECK: Supple.  No lymphadenopathy   LUNGS: Clear to auscultation.  HEART: Regular rate and rhythm,  no MRG  ABDOMEN: Soft, nontender, and nondistended.  Positive bowel sounds.    : No CVA tenderness  EXTREMITIES: Without edema.  NEUROLOGIC: grossly intact.  PSYCHIATRIC: Appropriate affect .  SKIN: No rash     [LABS:]                        8.6    4.99  )-----------( 78       ( 2023 08:47 )             25.3         144  |  106  |  10  ----------------------------<  85  3.6   |  31  |  0.61    Ca    8.2<L>      2023 08:47            Vitamin B12, Serum: 1109 pg/mL [232 - 1245] (10-29-23 @ 18:30)    Folate, Serum: >20.0 ng/mL (10-29-23 @ 18:30)    Sedimentation Rate, Erythrocyte: 57 mm/hr *H* [0 - 20] (10-27-23 @ 18:30)      Urinalysis Basic - ( 2023 08:47 )    Color: x / Appearance: x / SG: x / pH: x  Gluc: 85 mg/dL / Ketone: x  / Bili: x / Urobili: x   Blood: x / Protein: x / Nitrite: x   Leuk Esterase: x / RBC: x / WBC x   Sq Epi: x / Non Sq Epi: x / Bacteria: x        SARS-CoV-2: NotDetec (2023 11:50)          [RADIOLOGY STUDIES:]     2017 12:35

## 2023-11-29 NOTE — PROGRESS NOTE ADULT - SUBJECTIVE AND OBJECTIVE BOX
[facility] DIVISION of INFECTIOUS DISEASE  Eric Reyes MD PhD, Milena Underwood MD, Jillian Mckoy MD, Enid Everett MD, Jay Krishna MD  and providing coverage with Arthur Pepper MD  Providing Infectious Disease Consultations at [facility], [facility], [facility], [facility]'s    Office# [phone] to schedule follow up appointments  Answering Service for urgent calls or New Consults [phone]  Cell# to text for urgent issues Eric Reyes [phone]     infectious diseases progress note:    JANICE RENDON is a 73y y. o. Male patient    Overnight and events of the last 24hrs reviewed    Allergies    No Known Allergies    Intolerances        ANTIBIOTICS/RELEVANT:  antimicrobials  cefTRIAXone   IVPB 1000 milliGRAM(s) IV Intermittent every 24 hours  cefTRIAXone   IVPB        immunologic:    OTHER:  acetaminophen     Tablet .. 650 milliGRAM(s) Oral every 6 hours PRN  atenolol  Tablet 50 milliGRAM(s) Oral two times a day  benzocaine 20% Spray 1 Spray(s) Topical four times a day  cholecalciferol 2000 Unit(s) Oral daily  FIRST- Mouthwash  BLM 5 milliLiter(s) Swish and Spit three times a day  fludroCORTISONE 0.1 milliGRAM(s) Oral daily  fluvoxaMINE 150 milliGRAM(s) Oral two times a day  gabapentin 100 milliGRAM(s) Oral three times a day  haloperidol    Injectable 1 milliGRAM(s) IntraMuscular every 12 hours PRN  heparin   Injectable 5000 Unit(s) SubCutaneous every 12 hours  lactulose Syrup 15 Gram(s) Oral two times a day PRN  losartan 25 milliGRAM(s) Oral daily  melatonin 3 milliGRAM(s) Oral at bedtime  mirtazapine 15 milliGRAM(s) Oral at bedtime  OLANZapine Disintegrating Tablet 10 milliGRAM(s) Oral at bedtime  predniSONE   Tablet 5 milliGRAM(s) Oral daily  senna 2 Tablet(s) Oral at bedtime  sodium chloride 0.45%. 1000 milliLiter(s) IV Continuous <Continuous>      Objective:  Vital Signs Last 24 Hrs  T(C): 36.6 (29 Nov 2023 11:50), Max: 36.6 (29 Nov 2023 05:06)  T(F): 97.9 (29 Nov 2023 11:50), Max: 97.9 (29 Nov 2023 11:50)  HR: 77 (29 Nov 2023 11:50) (77 - 80)  BP: 150/74 (29 Nov 2023 11:50) (150/74 - 175/70)  BP(mean): --  RR: 19 (29 Nov 2023 11:50) (17 - 19)  SpO2: 98% (29 Nov 2023 11:50) (93% - 98%)    Parameters below as of 29 Nov 2023 11:50  Patient On (Oxygen Delivery Method): room air        T(C): 36.6 (11-29-23 @ 11:50), Max: 37 (11-27-23 @ 20:14)  T(C): 36.6 (11-29-23 @ 11:50), Max: 37.1 (11-26-23 @ 17:01)  T(C): 36.6 (11-29-23 @ 11:50), Max: 40.6 (11-25-23 @ 17:32)    PHYSICAL EXAM:  HEENT: NC atraumatic  Neck: supple  Respiratory: no accessory muscle use, breathing comfortably  Cardiovascular: distant  Gastrointestinal: normal appearing, nondistended  Extremities: no clubbing, no cyanosis,        LABS:                          8.6    4.99  )-----------( 78       ( 29 Nov 2023 08:47 )             25.3       WBC  4.99 11-29 @ 08:47      11-29    144  |  106  |  10  ----------------------------<  85  3.6   |  31  |  0.61    Ca    8.2<L>      29 Nov 2023 08:47        Creatinine: 0.61 mg/dL (11-29-23 @ 08:47)        Urinalysis Basic - ( 29 Nov 2023 08:47 )    Color: x / Appearance: x / SG: x / pH: x  Gluc: 85 mg/dL / Ketone: x  / Bili: x / Urobili: x   Blood: x / Protein: x / Nitrite: x   Leuk Esterase: x / RBC: x / WBC x   Sq Epi: x / Non Sq Epi: x / Bacteria: x            INFLAMMATORY MARKERS      MICROBIOLOGY:    Culture - Urine (11.25.23 @ 14:00)    -  Levofloxacin: S <=0.5   -  Meropenem: S <=1   -  Nitrofurantoin: S <=32 Should not be used to treat pyelonephritis   -  Piperacillin/Tazobactam: S <=8   -  Tobramycin: S <=2   -  Trimethoprim/Sulfamethoxazole: S <=0.5/9.5   -  Amoxicillin/Clavulanic Acid: S <=8/4   -  Ampicillin: R <=8 These ampicillin results predict results for amoxicillin   -  Ampicillin/Sulbactam: S <=4/2   -  Aztreonam: S <=4   -  Cefazolin: S <=2   -  Cefepime: S <=2   -  Cefoxitin: S <=8   -  Ceftriaxone: S <=1   -  Ciprofloxacin: S <=0.25   -  Ertapenem: S <=0.5   -  Gentamicin: S <=2   -  Imipenem: S <=1   Specimen Source: Clean Catch Clean Catch (Midstream)   Culture Results:   >100,000 CFU/ml Klebsiella variicola   Organism Identification: Klebsiella variicola   Organism: Klebsiella variicola   Method Type: [facility]        RADIOLOGY & ADDITIONAL STUDIES:

## 2023-11-29 NOTE — PROGRESS NOTE ADULT - ASSESSMENT
IMPRESSION:     pt w ITP, Lupus Anticoagulant, HTN, anxiety on Seraquel, OCD, Merkel cell ca of right Forehead, under care Dr Elliot Eng, post 4 doses q3wks IV Keytruda, last dose 10/17/23.  Pt brought in w c/o feeling "claws, daggers" inside him, since C#3 Keytruda, with sensation initially started w various parts of face now also progressed down to sides of body to rectum(does not see actual daggers or claws,)    Wife reports after first cycle started w jaw tightness and progressive worsened w each cycle, to point of hard to swallow/can't swallow  Had tired taken off Seroquel to see if adverse effect but no improvement, now back on x 5-6days  Merkel lesion has essentially resolved  -sensation described by pt ?psychosis vs neuropathy  -psychosis not noted to be associated w Keytruda. Neuro tox of Keytruda described includes cerebral hemorrhage, confusion, myasthenia gravis, reversible posterior leukoencephalopathy syndrome, syed neuropathy, Guillain Jefferson, aseptic meningitis, encephalitis, transverse myelitis    -CT and MRI head no sig findings  -AM cortisol 5.8. Slight low. Unclear significance   Repeat AM Cortisol low x2. <6 on two tests  3rd test w cortisol 11 on 11/06/2  -Sjorgen /Scleroderma studies abn.      +MICHELLE and +Ro52  Seen by Neuro and Psych post medication changes, pt refused LP  Seen by Endocrine-on prednisone 5mg qD for low Cortisol level, could not tolerate hydrocortisone  Seen by GI post endoscopy no abnormality found    RECOMMEND:  -clinically same, continue c/o unable to swallow, objects in throat, tight jaws(since first dose Keytruda as per wife)  -mild anemia and thrombocytopenia-stable as of last CBC, no intervention at these levels  -cont supportive care from Heme/Onc standpoint  pain meds PRN     Declined morphine       Adjustment of psychiatric meds per psychiatry  mgmt steroids per endocrine.     discussed w wife  support provided

## 2023-11-29 NOTE — PROGRESS NOTE ADULT - SUBJECTIVE AND OBJECTIVE BOX
Patient is a 73y old  Male who presents with a chief complaint of psychosis (29 Nov 2023 12:36)      INTERVAL HPI/OVERNIGHT EVENTS: noted  pt seen and examined this am   events noted  sleepy, tolerating po well      Vital Signs Last 24 Hrs  T(C): 36.6 (29 Nov 2023 11:50), Max: 36.6 (29 Nov 2023 05:06)  T(F): 97.9 (29 Nov 2023 11:50), Max: 97.9 (29 Nov 2023 11:50)  HR: 77 (29 Nov 2023 11:50) (77 - 80)  BP: 150/74 (29 Nov 2023 11:50) (150/74 - 175/70)  BP(mean): --  RR: 19 (29 Nov 2023 11:50) (17 - 19)  SpO2: 98% (29 Nov 2023 11:50) (93% - 98%)    Parameters below as of 29 Nov 2023 11:50  Patient On (Oxygen Delivery Method): room air        acetaminophen     Tablet .. 650 milliGRAM(s) Oral every 6 hours PRN  atenolol  Tablet 50 milliGRAM(s) Oral two times a day  benzocaine 20% Spray 1 Spray(s) Topical four times a day  cefTRIAXone   IVPB      cefTRIAXone   IVPB 1000 milliGRAM(s) IV Intermittent every 24 hours  cholecalciferol 2000 Unit(s) Oral daily  FIRST- Mouthwash  BLM 5 milliLiter(s) Swish and Spit three times a day  fludroCORTISONE 0.1 milliGRAM(s) Oral daily  fluvoxaMINE 150 milliGRAM(s) Oral two times a day  gabapentin 100 milliGRAM(s) Oral three times a day  haloperidol    Injectable 1 milliGRAM(s) IntraMuscular every 12 hours PRN  heparin   Injectable 5000 Unit(s) SubCutaneous every 12 hours  lactulose Syrup 15 Gram(s) Oral two times a day PRN  losartan 25 milliGRAM(s) Oral daily  melatonin 3 milliGRAM(s) Oral at bedtime  mirtazapine 15 milliGRAM(s) Oral at bedtime  predniSONE   Tablet 5 milliGRAM(s) Oral daily  senna 2 Tablet(s) Oral at bedtime  sodium chloride 0.45%. 1000 milliLiter(s) IV Continuous <Continuous>      PHYSICAL EXAM:  GENERAL: NAD,   EYES: conjunctiva and sclera clear  ENMT: Moist mucous membranes  NECK: Supple, No JVD, Normal thyroid  CHEST/LUNG: non labored, cta b/l  HEART: Regular rate and rhythm; No murmurs, rubs, or gallops  ABDOMEN: Soft, Nontender, Nondistended; Bowel sounds present  EXTREMITIES:  2+ Peripheral Pulses, No clubbing, cyanosis, or edema  LYMPH: No lymphadenopathy noted  SKIN: No rashes or lesions    Consultant(s) Notes Reviewed:  [x ] YES  [ ] NO  Care Discussed with Consultants/Other Providers [ x] YES  [ ] NO    LABS:                        8.6    4.99  )-----------( 78       ( 29 Nov 2023 08:47 )             25.3     11-29    144  |  106  |  10  ----------------------------<  85  3.6   |  31  |  0.61    Ca    8.2<L>      29 Nov 2023 08:47        Urinalysis Basic - ( 29 Nov 2023 08:47 )    Color: x / Appearance: x / SG: x / pH: x  Gluc: 85 mg/dL / Ketone: x  / Bili: x / Urobili: x   Blood: x / Protein: x / Nitrite: x   Leuk Esterase: x / RBC: x / WBC x   Sq Epi: x / Non Sq Epi: x / Bacteria: x      CAPILLARY BLOOD GLUCOSE            Urinalysis Basic - ( 29 Nov 2023 08:47 )    Color: x / Appearance: x / SG: x / pH: x  Gluc: 85 mg/dL / Ketone: x  / Bili: x / Urobili: x   Blood: x / Protein: x / Nitrite: x   Leuk Esterase: x / RBC: x / WBC x   Sq Epi: x / Non Sq Epi: x / Bacteria: x          RADIOLOGY & ADDITIONAL TESTS:    Imaging Personally Reviewed:  [x ] YES  [ ] NO

## 2023-11-30 LAB
HCT VFR BLD CALC: 26.2 % — LOW (ref 39–50)
HCT VFR BLD CALC: 26.2 % — LOW (ref 39–50)
HGB BLD-MCNC: 9.2 G/DL — LOW (ref 13–17)
HGB BLD-MCNC: 9.2 G/DL — LOW (ref 13–17)
MCHC RBC-ENTMCNC: 29.8 PG — SIGNIFICANT CHANGE UP (ref 27–34)
MCHC RBC-ENTMCNC: 29.8 PG — SIGNIFICANT CHANGE UP (ref 27–34)
MCHC RBC-ENTMCNC: 35.1 GM/DL — SIGNIFICANT CHANGE UP (ref 32–36)
MCHC RBC-ENTMCNC: 35.1 GM/DL — SIGNIFICANT CHANGE UP (ref 32–36)
MCV RBC AUTO: 84.8 FL — SIGNIFICANT CHANGE UP (ref 80–100)
MCV RBC AUTO: 84.8 FL — SIGNIFICANT CHANGE UP (ref 80–100)
NRBC # BLD: 0 /100 WBCS — SIGNIFICANT CHANGE UP (ref 0–0)
NRBC # BLD: 0 /100 WBCS — SIGNIFICANT CHANGE UP (ref 0–0)
PLATELET # BLD AUTO: 113 K/UL — LOW (ref 150–400)
PLATELET # BLD AUTO: 113 K/UL — LOW (ref 150–400)
RBC # BLD: 3.09 M/UL — LOW (ref 4.2–5.8)
RBC # BLD: 3.09 M/UL — LOW (ref 4.2–5.8)
RBC # FLD: 14.3 % — SIGNIFICANT CHANGE UP (ref 10.3–14.5)
RBC # FLD: 14.3 % — SIGNIFICANT CHANGE UP (ref 10.3–14.5)
WBC # BLD: 4.89 K/UL — SIGNIFICANT CHANGE UP (ref 3.8–10.5)
WBC # BLD: 4.89 K/UL — SIGNIFICANT CHANGE UP (ref 3.8–10.5)
WBC # FLD AUTO: 4.89 K/UL — SIGNIFICANT CHANGE UP (ref 3.8–10.5)
WBC # FLD AUTO: 4.89 K/UL — SIGNIFICANT CHANGE UP (ref 3.8–10.5)

## 2023-11-30 PROCEDURE — 93010 ELECTROCARDIOGRAM REPORT: CPT

## 2023-11-30 RX ORDER — FLUDROCORTISONE ACETATE 0.1 MG/1
0.1 TABLET ORAL DAILY
Refills: 0 | Status: DISCONTINUED | OUTPATIENT
Start: 2023-11-30 | End: 2023-12-06

## 2023-11-30 RX ADMIN — FLUDROCORTISONE ACETATE 0.1 MILLIGRAM(S): 0.1 TABLET ORAL at 11:27

## 2023-11-30 RX ADMIN — OLANZAPINE 10 MILLIGRAM(S): 15 TABLET, FILM COATED ORAL at 21:47

## 2023-11-30 RX ADMIN — SENNA PLUS 2 TABLET(S): 8.6 TABLET ORAL at 21:47

## 2023-11-30 RX ADMIN — GABAPENTIN 100 MILLIGRAM(S): 400 CAPSULE ORAL at 05:18

## 2023-11-30 RX ADMIN — HEPARIN SODIUM 5000 UNIT(S): 5000 INJECTION INTRAVENOUS; SUBCUTANEOUS at 17:51

## 2023-11-30 RX ADMIN — CEFTRIAXONE 100 MILLIGRAM(S): 500 INJECTION, POWDER, FOR SOLUTION INTRAMUSCULAR; INTRAVENOUS at 17:51

## 2023-11-30 RX ADMIN — FLUVOXAMINE MALEATE 150 MILLIGRAM(S): 25 TABLET ORAL at 05:24

## 2023-11-30 RX ADMIN — MIRTAZAPINE 15 MILLIGRAM(S): 45 TABLET, ORALLY DISINTEGRATING ORAL at 21:46

## 2023-11-30 RX ADMIN — Medication 2000 UNIT(S): at 11:12

## 2023-11-30 RX ADMIN — LOSARTAN POTASSIUM 25 MILLIGRAM(S): 100 TABLET, FILM COATED ORAL at 05:29

## 2023-11-30 RX ADMIN — DIPHENHYDRAMINE HYDROCHLORIDE AND LIDOCAINE HYDROCHLORIDE AND ALUMINUM HYDROXIDE AND MAGNESIUM HYDRO 5 MILLILITER(S): KIT at 05:24

## 2023-11-30 RX ADMIN — Medication 3 MILLIGRAM(S): at 21:47

## 2023-11-30 RX ADMIN — GABAPENTIN 100 MILLIGRAM(S): 400 CAPSULE ORAL at 21:46

## 2023-11-30 RX ADMIN — Medication 5 MILLIGRAM(S): at 05:23

## 2023-11-30 RX ADMIN — ATENOLOL 50 MILLIGRAM(S): 25 TABLET ORAL at 05:23

## 2023-11-30 RX ADMIN — GABAPENTIN 100 MILLIGRAM(S): 400 CAPSULE ORAL at 14:24

## 2023-11-30 RX ADMIN — Medication 1 SPRAY(S): at 11:24

## 2023-11-30 RX ADMIN — DIPHENHYDRAMINE HYDROCHLORIDE AND LIDOCAINE HYDROCHLORIDE AND ALUMINUM HYDROXIDE AND MAGNESIUM HYDRO 5 MILLILITER(S): KIT at 21:47

## 2023-11-30 RX ADMIN — ATENOLOL 50 MILLIGRAM(S): 25 TABLET ORAL at 17:54

## 2023-11-30 RX ADMIN — FLUVOXAMINE MALEATE 150 MILLIGRAM(S): 25 TABLET ORAL at 17:51

## 2023-11-30 RX ADMIN — HEPARIN SODIUM 5000 UNIT(S): 5000 INJECTION INTRAVENOUS; SUBCUTANEOUS at 05:18

## 2023-11-30 NOTE — PROGRESS NOTE ADULT - SUBJECTIVE AND OBJECTIVE BOX
Interval History:  events of above noted.  had rapid response called.    Chart reviewed and events noted;   Overnight events:    MEDICATIONS  (STANDING):  atenolol  Tablet 50 milliGRAM(s) Oral two times a day  benzocaine 20% Spray 1 Spray(s) Topical four times a day  cefTRIAXone   IVPB 1000 milliGRAM(s) IV Intermittent every 24 hours  cefTRIAXone   IVPB      cholecalciferol 2000 Unit(s) Oral daily  FIRST- Mouthwash  BLM 5 milliLiter(s) Swish and Spit three times a day  fludroCORTISONE 0.1 milliGRAM(s) Oral daily  fluvoxaMINE 150 milliGRAM(s) Oral two times a day  gabapentin 100 milliGRAM(s) Oral three times a day  heparin   Injectable 5000 Unit(s) SubCutaneous every 12 hours  losartan 25 milliGRAM(s) Oral daily  melatonin 3 milliGRAM(s) Oral at bedtime  mirtazapine 15 milliGRAM(s) Oral at bedtime  OLANZapine Disintegrating Tablet 10 milliGRAM(s) Oral at bedtime  predniSONE   Tablet 5 milliGRAM(s) Oral daily  senna 2 Tablet(s) Oral at bedtime    MEDICATIONS  (PRN):  acetaminophen     Tablet .. 650 milliGRAM(s) Oral every 6 hours PRN Temp greater or equal to 38.5C (101.3F), Moderate Pain (4 - 6)  haloperidol    Injectable 1 milliGRAM(s) IntraMuscular every 12 hours PRN Agitation  lactulose Syrup 15 Gram(s) Oral two times a day PRN constipation      Vital Signs Last 24 Hrs  T(C): 36.8 (30 Nov 2023 05:14), Max: 36.8 (29 Nov 2023 23:01)  T(F): 98.2 (30 Nov 2023 05:14), Max: 98.2 (29 Nov 2023 23:01)  HR: 86 (30 Nov 2023 06:20) (80 - 86)  BP: 158/85 (30 Nov 2023 11:47) (122/70 - 208/82)  BP(mean): --  RR: 18 (30 Nov 2023 05:14) (18 - 20)  SpO2: 91% (30 Nov 2023 05:14) (91% - 95%)    Parameters below as of 30 Nov 2023 05:14  Patient On (Oxygen Delivery Method): room air    PHYSICAL EXAM  General: adult in NAD. remains weak  HEENT: clear oropharynx, anicteric sclera, pink conjunctivae  Neck: supple  CV: normal S1S2 with no murmur rubs or gallops  Lungs: clear to auscultation, no wheezes, no rhales  Abdomen: soft non-tender non-distended, no hepato/splenomegaly  Ext: no clubbing cyanosis or edema  Skin: no rashes and no petichiae  Neuro: alert and oriented X3 no focal deficits      LABS:  CBC Full  -  ( 30 Nov 2023 10:00 )  WBC Count : 4.89 K/uL  RBC Count : 3.09 M/uL  Hemoglobin : 9.2 g/dL  Hematocrit : 26.2 %  Platelet Count - Automated : 113 K/uL  Mean Cell Volume : 84.8 fl  Mean Cell Hemoglobin : 29.8 pg  Mean Cell Hemoglobin Concentration : 35.1 gm/dL  Auto Neutrophil # : x  Auto Lymphocyte # : x  Auto Monocyte # : x  Auto Eosinophil # : x  Auto Basophil # : x  Auto Neutrophil % : x  Auto Lymphocyte % : x  Auto Monocyte % : x  Auto Eosinophil % : x  Auto Basophil % : x    11-29    144  |  106  |  10  ----------------------------<  85  3.6   |  31  |  0.61    Ca    8.2<L>      29 Nov 2023 08:47          fe studies      WBC trend  4.89 K/uL (11-30-23 @ 10:00)  4.99 K/uL (11-29-23 @ 08:47)      Hgb trend  9.2 g/dL (11-30-23 @ 10:00)  8.6 g/dL (11-29-23 @ 08:47)      plt trend  113 K/uL (11-30-23 @ 10:00)  78 K/uL (11-29-23 @ 08:47)        RADIOLOGY & ADDITIONAL STUDIES:

## 2023-11-30 NOTE — PROGRESS NOTE ADULT - ASSESSMENT
IMPRESSION:     pt w ITP, Lupus Anticoagulant, HTN, anxiety on Seraquel, OCD, Merkel cell ca of right Forehead, under care Dr Elliot Eng, post 4 doses q3wks IV Keytruda, last dose 10/17/23.  Pt brought in w c/o feeling "claws, daggers" inside him, since C#3 Keytruda, with sensation initially started w various parts of face now also progressed down to sides of body to rectum(does not see actual daggers or claws,)    Wife reports after first cycle started w jaw tightness and progressive worsened w each cycle, to point of hard to swallow/can't swallow  Had tired taken off Seroquel to see if adverse effect but no improvement, now back on x 5-6days  Merkel lesion has essentially resolved  -sensation described by pt ?psychosis vs neuropathy  -psychosis not noted to be associated w Keytruda. Neuro tox of Keytruda described includes cerebral hemorrhage, confusion, myasthenia gravis, reversible posterior leukoencephalopathy syndrome, syed neuropathy, Guillain [place], aseptic meningitis, encephalitis, transverse myelitis    -CT and MRI head no sig findings  -AM cortisol 5.8. Slight low. Unclear significance   Repeat AM Cortisol low x2. <6 on two tests  3rd test w cortisol 11 on 11/06/2  -Sjorgen /Scleroderma studies abn.      +MICHELLE and +Ro52  Seen by Neuro and Psych post medication changes, pt refused LP  Seen by Endocrine-on prednisone 5mg qD for low Cortisol level, could not tolerate hydrocortisone  Seen by GI post endoscopy no abnormality found    RECOMMEND:  -clinically same, continue c/o unable to swallow, objects in throat, tight jaws(since first dose Keytruda as per wife)  -mild anemia and thrombocytopenia-stable as of last CBC, no intervention at these levels  -cont supportive care from Heme/Onc standpoint  pain meds PRN     Declined morphine       Adjustment of psychiatric meds per psychiatry  mgmt steroids per endocrine.     continue physical therapy evaluation  evaluation for rehab

## 2023-11-30 NOTE — PROGRESS NOTE ADULT - ASSESSMENT
73 M pmh of HTN, ITP, APL syndrome, generalized anxiety disorder, OCD, newly dx aggressive Merkel Cell cancer of forehead- pt's oncologist  Dr. Eng, at [facility] on Keytruda infusions with good response per wife  was brought to ED for worsening psychosis for the past few days, pt lately with difficulty swallowing, decreased po intake    Psychosis  monitor  psych fu  MRI noted  neuro following  cont luvox   continue to titrate haldol  ?clozapine starting per psych fu  GI fu noted- sp EGD - normal -sp bx fu path  trial of dilaudid without success     #HTN/Orthostatic hypotension  cont atenolol w parameters  ? psych med induced  adrenal insufficiency  continue prednisone and  florinef    #unresponsive episode  -?orthostatic hypotension vs other neuro causes  ekg- sinus, cards fu  d/w neuro will obtain EEG    UTI  sp abx    #Merkel cell ca  onc c/s noted  r/o paraneoplastic causes- unlikely      #DVT ppx-  hep sq    [facility]   [phone]

## 2023-11-30 NOTE — PROGRESS NOTE ADULT - ASSESSMENT
73 M pmh of HTN, ITP, APL syndrome, generalized anxiety disorder, OCD, newly dx aggressive Merkel Cell cancer of forehead- pt's oncologist  Dr. Eng, at [facility] on Keytruda (PD-1 blocker) infusions with good response per wife  was brought to ED for worsening psychosis for the past few days, pt believes there are sharp things inside his body trying to come out of his mouth, stuck in throat, pt lately with difficulty swallowing, decreased po intake, pt was evaluated by psych in ED. Wife reports sudden cognitive decline and behavioral changes immediately after COVID infection.    1-PASC  noted serology for EBV consistent with reactivation as described post PASC  Serotonin, Blood: <30ng/mL   am cortisol-low x 2 then nl    Shawn has been improving and more conversant willing to entertain that there are no fungi or metal clips in mouth and this may be just in his head, continue to work with medicine and psych on adjustment of medications, 11/21-upper gastrointestinal endoscopy; normal esophagus, mid esophageal biopsy performed; mild gastritis  Psych involved    2-PYELONEPHRITIS   -ceftriaxone (started 11/25) plan for 12/1 as last day   with urinary symps, pyuria, fever and   urine culture w >100,000 CFU/ml Klebsiella variicola,  -  Ceftriaxone: S <=1  blood cultures-NGTD    Thank you for consulting us and involving us in the management of this most interesting and challenging case.  We will follow along in the care of this patient. Please call us at [phone] or text me directly on my cell# at [phone] with any concerns.

## 2023-11-30 NOTE — PROGRESS NOTE ADULT - SUBJECTIVE AND OBJECTIVE BOX
[facility] DIVISION of INFECTIOUS DISEASE  Eric Reyes MD PhD, Milena Underwood MD, Jillian Mckoy MD, Enid Everett MD, Jay Krishna MD  and providing coverage with Arthur Pepper MD  Providing Infectious Disease Consultations at [facility], [facility], [place], [facility], [facility]'s    Office# [phone] to schedule follow up appointments  Answering Service for urgent calls or New Consults [phone]  Cell# to text for urgent issues Eric Reyes [phone]     infectious diseases progress note:    JANICE RENDON is a 73y y. o. Male patient    Overnight and events of the last 24hrs reviewed    Allergies    No Known Allergies    Intolerances        ANTIBIOTICS/RELEVANT:  antimicrobials  cefTRIAXone   IVPB      cefTRIAXone   IVPB 1000 milliGRAM(s) IV Intermittent every 24 hours    immunologic:    OTHER:  acetaminophen     Tablet .. 650 milliGRAM(s) Oral every 6 hours PRN  atenolol  Tablet 50 milliGRAM(s) Oral two times a day  benzocaine 20% Spray 1 Spray(s) Topical four times a day  cholecalciferol 2000 Unit(s) Oral daily  FIRST- Mouthwash  BLM 5 milliLiter(s) Swish and Spit three times a day  fludroCORTISONE 0.1 milliGRAM(s) Oral daily  fluvoxaMINE 150 milliGRAM(s) Oral two times a day  gabapentin 100 milliGRAM(s) Oral three times a day  haloperidol    Injectable 1 milliGRAM(s) IntraMuscular every 12 hours PRN  heparin   Injectable 5000 Unit(s) SubCutaneous every 12 hours  lactulose Syrup 15 Gram(s) Oral two times a day PRN  losartan 25 milliGRAM(s) Oral daily  melatonin 3 milliGRAM(s) Oral at bedtime  mirtazapine 15 milliGRAM(s) Oral at bedtime  OLANZapine Disintegrating Tablet 10 milliGRAM(s) Oral at bedtime  predniSONE   Tablet 5 milliGRAM(s) Oral daily  senna 2 Tablet(s) Oral at bedtime      Objective:  Vital Signs Last 24 Hrs  T(C): 36.8 (30 Nov 2023 05:14), Max: 36.8 (29 Nov 2023 23:01)  T(F): 98.2 (30 Nov 2023 05:14), Max: 98.2 (29 Nov 2023 23:01)  HR: 86 (30 Nov 2023 06:20) (77 - 86)  BP: 152/74 (30 Nov 2023 09:30) (122/70 - 208/82)  BP(mean): --  RR: 18 (30 Nov 2023 05:14) (18 - 20)  SpO2: 91% (30 Nov 2023 05:14) (91% - 98%)    Parameters below as of 30 Nov 2023 05:14  Patient On (Oxygen Delivery Method): room air        T(C): 36.8 (11-30-23 @ 05:14), Max: 36.8 (11-29-23 @ 23:01)  T(C): 36.8 (11-30-23 @ 05:14), Max: 37 (11-27-23 @ 20:14)  T(C): 36.8 (11-30-23 @ 05:14), Max: 37.1 (11-26-23 @ 17:01)    PHYSICAL EXAM:  HEENT: NC atraumatic  Neck: supple  Respiratory: no accessory muscle use, breathing comfortably  Cardiovascular: distant  Gastrointestinal: normal appearing, nondistended  Extremities: no clubbing, no cyanosis,        LABS:                          8.6    4.99  )-----------( 78       ( 29 Nov 2023 08:47 )             25.3       WBC  4.99 11-29 @ 08:47      11-29    144  |  106  |  10  ----------------------------<  85  3.6   |  31  |  0.61    Ca    8.2<L>      29 Nov 2023 08:47        Creatinine: 0.61 mg/dL (11-29-23 @ 08:47)        Urinalysis Basic - ( 29 Nov 2023 08:47 )    Color: x / Appearance: x / SG: x / pH: x  Gluc: 85 mg/dL / Ketone: x  / Bili: x / Urobili: x   Blood: x / Protein: x / Nitrite: x   Leuk Esterase: x / RBC: x / WBC x   Sq Epi: x / Non Sq Epi: x / Bacteria: x            INFLAMMATORY MARKERS      MICROBIOLOGY:              RADIOLOGY & ADDITIONAL STUDIES:

## 2023-11-30 NOTE — CHART NOTE - NSCHARTNOTEFT_GEN_A_CORE
73 M pmh of HTN, ITP, APL syndrome, generalized anxiety disorder, OCD, newly dx aggressive Merkel Cell cancer of forehead- pt's oncologist  Dr. Eng, at [facility] on Keytruda infusions with good response per wife  was brought to ED for worsening psychosis for the past few days, pt lately with difficulty swallowing, decreased po intake.     Rapid response was called at  0922 for unresponsiveness.   Events leading up to Rapid Response:   Patient was having orthostatics checked when suddenly he stopped answering questions. This lasted for about 30s. By the time RRT arrived patient answering questions and following commands appropriately.   Of note, last night patient was hypertensive 190/86 after receiving his atenolol prior, was given hydralazine 25mg po STAT. Received another dose atenolol 0523 this AM     Rapid Response Vital Signs:  BP: 122/74  HR: 71  RR: 18  SpO2: 93 % on RA  Temp: 97.7F  FS: 122    Vital Signs Last 24 Hrs  T(C): 36.8 (30 Nov 2023 05:14), Max: 36.8 (29 Nov 2023 23:01)  T(F): 98.2 (30 Nov 2023 05:14), Max: 98.2 (29 Nov 2023 23:01)  HR: 86 (30 Nov 2023 06:20) (77 - 86)  BP: 166/78 (30 Nov 2023 06:20) (150/74 - 208/82)  BP(mean): --  RR: 18 (30 Nov 2023 05:14) (18 - 20)  SpO2: 91% (30 Nov 2023 05:14) (91% - 98%)    Parameters below as of 30 Nov 2023 05:14  Patient On (Oxygen Delivery Method): room air        Physical Exam:  General:  NAD  HEENT: NCAT  Neurology: answering questions and following commands appropriately. NFD   Respiratory: CTA B/L, No wheezing, rales, or rhonchi  CV: RRR, S1/S2   Abdominal: Soft, nontender, non-distended, normoactive bowel sounds  Extremities: No C/C/E, peripheral pulses present  MSK: Normal ROM, no joint erythema or warmth, no joint swelling   Skin: warm, dry, normal color, no obvious rash or abnormal lesions      Assessment/Plan:  73 M pmh of HTN, ITP, APL syndrome, generalized anxiety disorder, OCD, newly dx aggressive Merkel Cell cancer of forehead- pt's oncologist  Dr. Eng, at [facility] on Keytruda infusions with good response per wife  was brought to ED for worsening psychosis for the past few days, pt lately with difficulty swallowing, decreased po intake.    1) orthostatic hypotension     Plan     During eval for orthostatics, patient pressure dropped from 180 to 120 systolic causing a brief episode of syncope   NFD or head trauma. No CT indicated   EKG without ischemia   Auscultation without presence of murmur for AS  H/H drop yesterday. Stable on repeat STAT order  Cards following    Case discussed with residents. No Indication for ICU admission. BP improved in Trendelenburg position.     Time spent on this patient encounter, which includes documenting this note in the electronic medical record, was 58 minutes including assessing the presenting problems with associated risks, reviewing the medical record to prepare for the encounter, and meeting face to face with patient to obtain additional history. I have also performed an appropriate physical exam, made interventions listed and ordered and interpreted appropriate diagnostic studies as documented. To improve communication and patient safety, I have coordinated care with the multidisciplinary team including the bedside nurse, appropriate attending of record and consultants as needed.    Date of note is equal to date of services rendered.

## 2023-11-30 NOTE — PROGRESS NOTE ADULT - SUBJECTIVE AND OBJECTIVE BOX
Patient is a 73y old  Male who presents with a chief complaint of AMS (30 Nov 2023 10:55)      INTERVAL HPI/OVERNIGHT EVENTS: noted  pt seen and examined this am   events noted  RRT called for brief unresponsiveness lasting less than a min      Vital Signs Last 24 Hrs  T(C): 36.8 (30 Nov 2023 05:14), Max: 36.8 (29 Nov 2023 23:01)  T(F): 98.2 (30 Nov 2023 05:14), Max: 98.2 (29 Nov 2023 23:01)  HR: 86 (30 Nov 2023 06:20) (80 - 86)  BP: 158/85 (30 Nov 2023 11:47) (122/70 - 208/82)  BP(mean): --  RR: 18 (30 Nov 2023 05:14) (18 - 20)  SpO2: 91% (30 Nov 2023 05:14) (91% - 95%)    Parameters below as of 30 Nov 2023 05:14  Patient On (Oxygen Delivery Method): room air        acetaminophen     Tablet .. 650 milliGRAM(s) Oral every 6 hours PRN  atenolol  Tablet 50 milliGRAM(s) Oral two times a day  benzocaine 20% Spray 1 Spray(s) Topical four times a day  cefTRIAXone   IVPB      cefTRIAXone   IVPB 1000 milliGRAM(s) IV Intermittent every 24 hours  cholecalciferol 2000 Unit(s) Oral daily  FIRST- Mouthwash  BLM 5 milliLiter(s) Swish and Spit three times a day  fludroCORTISONE 0.1 milliGRAM(s) Oral daily  fluvoxaMINE 150 milliGRAM(s) Oral two times a day  gabapentin 100 milliGRAM(s) Oral three times a day  haloperidol    Injectable 1 milliGRAM(s) IntraMuscular every 12 hours PRN  heparin   Injectable 5000 Unit(s) SubCutaneous every 12 hours  lactulose Syrup 15 Gram(s) Oral two times a day PRN  losartan 25 milliGRAM(s) Oral daily  melatonin 3 milliGRAM(s) Oral at bedtime  mirtazapine 15 milliGRAM(s) Oral at bedtime  OLANZapine Disintegrating Tablet 10 milliGRAM(s) Oral at bedtime  predniSONE   Tablet 5 milliGRAM(s) Oral daily  senna 2 Tablet(s) Oral at bedtime      PHYSICAL EXAM:  GENERAL: NAD,   EYES: conjunctiva and sclera clear  ENMT: Moist mucous membranes  NECK: Supple, No JVD, Normal thyroid  CHEST/LUNG: non labored, cta b/l  HEART: Regular rate and rhythm; No murmurs, rubs, or gallops  ABDOMEN: Soft, Nontender, Nondistended; Bowel sounds present  EXTREMITIES:  2+ Peripheral Pulses, No clubbing, cyanosis, or edema  LYMPH: No lymphadenopathy noted  SKIN: No rashes or lesions    Consultant(s) Notes Reviewed:  [x ] YES  [ ] NO  Care Discussed with Consultants/Other Providers [ x] YES  [ ] NO    LABS:                        9.2    4.89  )-----------( 113      ( 30 Nov 2023 10:00 )             26.2     11-29    144  |  106  |  10  ----------------------------<  85  3.6   |  31  |  0.61    Ca    8.2<L>      29 Nov 2023 08:47        Urinalysis Basic - ( 29 Nov 2023 08:47 )    Color: x / Appearance: x / SG: x / pH: x  Gluc: 85 mg/dL / Ketone: x  / Bili: x / Urobili: x   Blood: x / Protein: x / Nitrite: x   Leuk Esterase: x / RBC: x / WBC x   Sq Epi: x / Non Sq Epi: x / Bacteria: x      CAPILLARY BLOOD GLUCOSE      POCT Blood Glucose.: 122 mg/dL (30 Nov 2023 09:25)        Urinalysis Basic - ( 29 Nov 2023 08:47 )    Color: x / Appearance: x / SG: x / pH: x  Gluc: 85 mg/dL / Ketone: x  / Bili: x / Urobili: x   Blood: x / Protein: x / Nitrite: x   Leuk Esterase: x / RBC: x / WBC x   Sq Epi: x / Non Sq Epi: x / Bacteria: x          RADIOLOGY & ADDITIONAL TESTS:    Imaging Personally Reviewed:  [x ] YES  [ ] NO

## 2023-11-30 NOTE — CHART NOTE - NSCHARTNOTEFT_GEN_A_CORE
Resident Rapid Response Note    Rapid response was called at _____ for unresponsiveness.    Events leading up to Rapid Response:    Patient was seen and examined at the bedside by the rapid response team and resident medicine team.  ___ () / ICU PA at bedside.    Rapid Response Vital Signs:  BP:  HR:  RR:  SpO2: % on   Temp:  FS:    Vital Signs Last 24 Hrs  T(C): 36.8 (30 Nov 2023 05:14), Max: 36.8 (29 Nov 2023 23:01)  T(F): 98.2 (30 Nov 2023 05:14), Max: 98.2 (29 Nov 2023 23:01)  HR: 86 (30 Nov 2023 06:20) (77 - 86)  BP: 166/78 (30 Nov 2023 06:20) (150/74 - 208/82)  BP(mean): --  RR: 18 (30 Nov 2023 05:14) (18 - 20)  SpO2: 91% (30 Nov 2023 05:14) (91% - 98%)    Parameters below as of 30 Nov 2023 05:14  Patient On (Oxygen Delivery Method): room air        Physical Exam:  General: Well developed, well nourished, in no acute distress  HEENT: NCAT, PERRLA, EOMI bl, moist mucous membranes   Neck: Supple, nontender, no mass  Neurology: A&Ox3, nonfocal, CN II-XII grossly intact, sensation intact, no gait abnormalities   Respiratory: CTA B/L, No wheezing, rales, or rhonchi  CV: RRR, S1/S2 present, no murmurs, rubs, or gallops  Abdominal: Soft, nontender, non-distended, normoactive bowel sounds  Extremities: No C/C/E, peripheral pulses present  MSK: Normal ROM, no joint erythema or warmth, no joint swelling   Skin: warm, dry, normal color, no obvious rash or abnormal lesions      Assessment/Plan:    -Discussed with  , who agreed with above plan.  -Will continue to follow, RN to call if any changes. Resident Rapid Response Note    Rapid response was called at  0922 for unresponsiveness.   Events leading up to Rapid Response:   Patient was having orthostatics checked when suddenly he stopped answering questions. This lasted for about 30s. By the time RRT arrived patient answering questions and following commands appropriately.   Of note, last night patient was hypertensive 190/86 after receiving his atenolol prior, was given hydralazine 25mg po STAT. Received another dose atenolol 0523 this AM     Patient was seen and examined at the bedside by the rapid response team and resident medicine team. ICU PA at bedside.    Rapid Response Vital Signs:  BP: 122/74  HR: 71  RR: 18  SpO2: 93 % on RA  Temp: 97.7F  FS: 122    Vital Signs Last 24 Hrs  T(C): 36.8 (30 Nov 2023 05:14), Max: 36.8 (29 Nov 2023 23:01)  T(F): 98.2 (30 Nov 2023 05:14), Max: 98.2 (29 Nov 2023 23:01)  HR: 86 (30 Nov 2023 06:20) (77 - 86)  BP: 166/78 (30 Nov 2023 06:20) (150/74 - 208/82)  BP(mean): --  RR: 18 (30 Nov 2023 05:14) (18 - 20)  SpO2: 91% (30 Nov 2023 05:14) (91% - 98%)    Parameters below as of 30 Nov 2023 05:14  Patient On (Oxygen Delivery Method): room air        Physical Exam:  General: Well developed, NAD  HEENT: NCAT, EOMI bl, moist mucous membranes   Neck: Supple, nontender, no mass  Neurology: answering questions and following commands appropriately   Respiratory: CTA B/L, No wheezing, rales, or rhonchi  CV: RRR, S1/S2 present, no murmurs, rubs, or gallops  Abdominal: Soft, nontender, non-distended, normoactive bowel sounds  Extremities: No C/C/E, peripheral pulses present  MSK: Normal ROM, no joint erythema or warmth, no joint swelling   Skin: warm, dry, normal color, no obvious rash or abnormal lesions      Assessment/Plan:  73 M pmh of HTN, ITP, APL syndrome, generalized anxiety disorder, OCD, newly dx aggressive Merkel Cell cancer of forehead- pt's oncologist  Dr. Eng, at [facility] on Keytruda infusions with good response per wife  was brought to ED for worsening psychosis for the past few days, pt lately with difficulty swallowing, decreased po intake.    Unresponsiveness likely 2/2 orthostatics vs arrhythmia vs hypoglycemia    - STAT EKG -> NSR HR 77  - Hgb drop 8.6 from 10.8 yesterday , check STAT CBC  - Discussed with Dr. Edwards , who agreed with above plan.  - Family updated by Dr. Leyva (pgy2) at bedside  -Will continue to follow, RN to call if any changes. Resident Rapid Response Note    Rapid response was called at  0922 for unresponsiveness.   Events leading up to Rapid Response:   Patient was having orthostatics checked when suddenly he stopped answering questions. This lasted for about 30s. By the time RRT arrived patient answering questions and following commands appropriately.   Of note, last night patient was hypertensive 190/86 after receiving his atenolol prior, was given hydralazine 25mg po STAT. Received another dose atenolol 0523 this AM     Patient was seen and examined at the bedside by the rapid response team and resident medicine team. ICU PA at bedside.    Rapid Response Vital Signs:  BP: 122/74  HR: 71  RR: 18  SpO2: 93 % on RA  Temp: 97.7F  FS: 122    Vital Signs Last 24 Hrs  T(C): 36.8 (30 Nov 2023 05:14), Max: 36.8 (29 Nov 2023 23:01)  T(F): 98.2 (30 Nov 2023 05:14), Max: 98.2 (29 Nov 2023 23:01)  HR: 86 (30 Nov 2023 06:20) (77 - 86)  BP: 166/78 (30 Nov 2023 06:20) (150/74 - 208/82)  BP(mean): --  RR: 18 (30 Nov 2023 05:14) (18 - 20)  SpO2: 91% (30 Nov 2023 05:14) (91% - 98%)    Parameters below as of 30 Nov 2023 05:14  Patient On (Oxygen Delivery Method): room air        Physical Exam:  General: Well developed, NAD  HEENT: NCAT, EOMI bl, moist mucous membranes   Neck: Supple, nontender, no mass  Neurology: answering questions and following commands appropriately   Respiratory: CTA B/L, No wheezing, rales, or rhonchi  CV: RRR, S1/S2 present, no murmurs, rubs, or gallops  Abdominal: Soft, nontender, non-distended, normoactive bowel sounds  Extremities: No C/C/E, peripheral pulses present  MSK: Normal ROM, no joint erythema or warmth, no joint swelling   Skin: warm, dry, normal color, no obvious rash or abnormal lesions      Assessment/Plan:  73 M pmh of HTN, ITP, APL syndrome, generalized anxiety disorder, OCD, newly dx aggressive Merkel Cell cancer of forehead- pt's oncologist  Dr. Eng, at [facility] on Keytruda infusions with good response per wife  was brought to ED for worsening psychosis for the past few days, pt lately with difficulty swallowing, decreased po intake.    Unresponsiveness likely 2/2 orthostatics vs arrhythmia vs hypoglycemia    - STAT EKG -> NSR HR 77  - Hgb drop 8.6 from 10.8 yesterday , check STAT CBC  - Discussed with Dr. Edwards , who agreed with above plan.  - Family updated by Dr. Leyva (pgy2) at bedside  -Will continue to follow, RN to call if any changes.    ADDENDUM 1221: Patient seen and examined at bedside. Repeat Hgb stable 9.2. Patient states he feels "spacey" and drowsy. Patient's wife at bedside states it's probably from the zyprexa he received overnight.   Otherwise has no acute complaints.  Discussed with Dr. Edwards, will have neurology consult placed for patient as well.

## 2023-11-30 NOTE — PROGRESS NOTE ADULT - ASSESSMENT
The patient is a 73 year old male with a history of HTN, ITP, APLS, anxiety, OCD, Merkel cell cancer who presented with psychosis.    Plan:  - ECG with no evidence of ischemia or infarction  - Orthostatic hypotension may be multifactorial - dehydration, medication related (antipsychotics), and low cortisol levels  - Allow for supine hypertension - BP labile due to orthostatic hypotension - would not treat supine hypertension with additional antihypertensives unless SBP>200  - Continue atenolol 50 mg bid  - Continue losartan 25 mg daily  - EGD done with no significant findings  - Psych follow-up  - UTI - on IV antibiotics  - Monitor hemoglobin

## 2023-11-30 NOTE — PROGRESS NOTE ADULT - SUBJECTIVE AND OBJECTIVE BOX
Chief Complaint: Psychosis    Interval Events: No events overnight.    Review of Systems:  General: No fevers, chills, weight gain  Skin: No rashes, color changes  Cardiovascular: No chest pain, orthopnea  Respiratory: No shortness of breath, cough  Gastrointestinal: No nausea, abdominal pain  Genitourinary: No incontinence, pain with urination  Musculoskeletal: No pain, swelling, decreased range of motion  Neurological: No headache, weakness  Psychiatric: No depression, anxiety  Endocrine: No weight gain, increased thirst  All other systems are comprehensively negative.    Physical Exam:  Vital Signs Last 24 Hrs  T(C): 36.8 (30 Nov 2023 05:14), Max: 36.8 (29 Nov 2023 23:01)  T(F): 98.2 (30 Nov 2023 05:14), Max: 98.2 (29 Nov 2023 23:01)  HR: 86 (30 Nov 2023 06:20) (77 - 86)  BP: 152/74 (30 Nov 2023 09:30) (122/70 - 208/82)  BP(mean): --  RR: 18 (30 Nov 2023 05:14) (18 - 20)  SpO2: 91% (30 Nov 2023 05:14) (91% - 98%)  Parameters below as of 30 Nov 2023 05:14  Patient On (Oxygen Delivery Method): room air  General: NAD  HEENT: MMM  Neck: No JVD, no carotid bruit  Lungs: CTAB  CV: RRR, nl S1/S2, no M/R/G  Abdomen: S/NT/ND, +BS  Extremities: No LE edema, no cyanosis  Neuro: AAOx3, non-focal  Skin: No rash    Labs:    11-29    144  |  106  |  10  ----------------------------<  85  3.6   |  31  |  0.61    Ca    8.2<L>      29 Nov 2023 08:47                          8.6    4.99  )-----------( 78       ( 29 Nov 2023 08:47 )             25.3

## 2023-12-01 LAB
CULTURE RESULTS: SIGNIFICANT CHANGE UP
SPECIMEN SOURCE: SIGNIFICANT CHANGE UP

## 2023-12-01 PROCEDURE — 99231 SBSQ HOSP IP/OBS SF/LOW 25: CPT

## 2023-12-01 RX ORDER — OLANZAPINE 15 MG/1
20 TABLET, FILM COATED ORAL AT BEDTIME
Refills: 0 | Status: DISCONTINUED | OUTPATIENT
Start: 2023-12-01 | End: 2023-12-02

## 2023-12-01 RX ADMIN — FLUVOXAMINE MALEATE 150 MILLIGRAM(S): 25 TABLET ORAL at 17:18

## 2023-12-01 RX ADMIN — FLUDROCORTISONE ACETATE 0.1 MILLIGRAM(S): 0.1 TABLET ORAL at 06:38

## 2023-12-01 RX ADMIN — SENNA PLUS 2 TABLET(S): 8.6 TABLET ORAL at 21:22

## 2023-12-01 RX ADMIN — GABAPENTIN 100 MILLIGRAM(S): 400 CAPSULE ORAL at 06:38

## 2023-12-01 RX ADMIN — LOSARTAN POTASSIUM 25 MILLIGRAM(S): 100 TABLET, FILM COATED ORAL at 06:38

## 2023-12-01 RX ADMIN — DIPHENHYDRAMINE HYDROCHLORIDE AND LIDOCAINE HYDROCHLORIDE AND ALUMINUM HYDROXIDE AND MAGNESIUM HYDRO 5 MILLILITER(S): KIT at 21:28

## 2023-12-01 RX ADMIN — CEFTRIAXONE 100 MILLIGRAM(S): 500 INJECTION, POWDER, FOR SOLUTION INTRAMUSCULAR; INTRAVENOUS at 17:17

## 2023-12-01 RX ADMIN — OLANZAPINE 20 MILLIGRAM(S): 15 TABLET, FILM COATED ORAL at 21:23

## 2023-12-01 RX ADMIN — Medication 5 MILLIGRAM(S): at 06:38

## 2023-12-01 RX ADMIN — HEPARIN SODIUM 5000 UNIT(S): 5000 INJECTION INTRAVENOUS; SUBCUTANEOUS at 06:38

## 2023-12-01 RX ADMIN — Medication 2000 UNIT(S): at 11:08

## 2023-12-01 RX ADMIN — GABAPENTIN 100 MILLIGRAM(S): 400 CAPSULE ORAL at 13:47

## 2023-12-01 RX ADMIN — ATENOLOL 50 MILLIGRAM(S): 25 TABLET ORAL at 17:18

## 2023-12-01 RX ADMIN — GABAPENTIN 100 MILLIGRAM(S): 400 CAPSULE ORAL at 21:22

## 2023-12-01 RX ADMIN — Medication 3 MILLIGRAM(S): at 21:22

## 2023-12-01 RX ADMIN — HEPARIN SODIUM 5000 UNIT(S): 5000 INJECTION INTRAVENOUS; SUBCUTANEOUS at 17:18

## 2023-12-01 RX ADMIN — FLUVOXAMINE MALEATE 150 MILLIGRAM(S): 25 TABLET ORAL at 06:38

## 2023-12-01 RX ADMIN — MIRTAZAPINE 15 MILLIGRAM(S): 45 TABLET, ORALLY DISINTEGRATING ORAL at 21:22

## 2023-12-01 RX ADMIN — ATENOLOL 50 MILLIGRAM(S): 25 TABLET ORAL at 06:38

## 2023-12-01 NOTE — PROGRESS NOTE ADULT - SUBJECTIVE AND OBJECTIVE BOX
[INTERVAL HX: ]  Patient seen and examined;  Chart reviewed and events noted;     sleeping comfortable    [MEDICATIONS]  MEDICATIONS  (STANDING):  atenolol  Tablet 50 milliGRAM(s) Oral two times a day  benzocaine 20% Spray 1 Spray(s) Topical four times a day  cefTRIAXone   IVPB      cefTRIAXone   IVPB 1000 milliGRAM(s) IV Intermittent every 24 hours  cholecalciferol 2000 Unit(s) Oral daily  FIRST- Mouthwash  BLM 5 milliLiter(s) Swish and Spit three times a day  fludroCORTISONE 0.1 milliGRAM(s) Oral daily  fluvoxaMINE 150 milliGRAM(s) Oral two times a day  gabapentin 100 milliGRAM(s) Oral three times a day  heparin   Injectable 5000 Unit(s) SubCutaneous every 12 hours  losartan 25 milliGRAM(s) Oral daily  melatonin 3 milliGRAM(s) Oral at bedtime  mirtazapine 15 milliGRAM(s) Oral at bedtime  OLANZapine Disintegrating Tablet 20 milliGRAM(s) Oral at bedtime  predniSONE   Tablet 5 milliGRAM(s) Oral daily  senna 2 Tablet(s) Oral at bedtime    MEDICATIONS  (PRN):  acetaminophen     Tablet .. 650 milliGRAM(s) Oral every 6 hours PRN Temp greater or equal to 38.5C (101.3F), Moderate Pain (4 - 6)  haloperidol    Injectable 1 milliGRAM(s) IntraMuscular every 12 hours PRN Agitation  lactulose Syrup 15 Gram(s) Oral two times a day PRN constipation      [VITALS]  Vital Signs Last 24 Hrs  T(C): 36.7 (01 Dec 2023 14:16), Max: 36.7 (01 Dec 2023 04:42)  T(F): 98 (01 Dec 2023 14:16), Max: 98.1 (01 Dec 2023 04:42)  HR: 80 (01 Dec 2023 14:16) (80 - 81)  BP: 150/77 (01 Dec 2023 14:16) (150/77 - 160/80)  BP(mean): --  RR: 18 (01 Dec 2023 14:16) (18 - 18)  SpO2: 92% (01 Dec 2023 14:16) (92% - 92%)    Parameters below as of 01 Dec 2023 14:16  Patient On (Oxygen Delivery Method): room air      [WT/HT]  Daily     Daily   [VENT]      [PHYSICAL EXAM]  GEN: NAD, cachetic  HEENT: normocephalic and atraumatic. EOMI. .    NECK: Supple.  No lymphadenopathy   LUNGS: Clear to auscultation.  HEART: Regular rate and rhythm,  no MRG  ABDOMEN: Soft, nontender, and nondistended.  Positive bowel sounds.    : No CVA tenderness  EXTREMITIES: Without edema.  SKIN: No rash     [LABS:]                        9.2    4.89  )-----------( 113      ( 30 Nov 2023 10:00 )             26.2     Vitamin B12, Serum: 1109 pg/mL [232 - 1245] (10-29-23 @ 18:30)  Folate, Serum: >20.0 ng/mL (10-29-23 @ 18:30)  Sedimentation Rate, Erythrocyte: 57 mm/hr *H* [0 - 20] (10-27-23 @ 18:30)    SARS-CoV-2: NotDetec (24 Nov 2023 11:50)      [RADIOLOGY STUDIES:]

## 2023-12-01 NOTE — PROGRESS NOTE ADULT - ASSESSMENT
IMPRESSION:     pt w ITP, Lupus Anticoagulant, HTN, anxiety on Seraquel, OCD, Merkel cell ca of right Forehead, under care Dr Elliot Eng, post 4 doses q3wks IV Keytruda, last dose 10/17/23.  Pt brought in w c/o feeling "claws, daggers" inside him, since C#3 Keytruda, with sensation initially started w various parts of face now also progressed down to sides of body to rectum(does not see actual daggers or claws,)    Wife reports after first cycle started w jaw tightness and progressive worsened w each cycle, to point of hard to swallow/can't swallow  Had tired taken off Seroquel to see if adverse effect but no improvement, now back on x 5-6days  Merkel lesion has essentially resolved  -sensation described by pt ?psychosis vs neuropathy  -psychosis not noted to be associated w Keytruda. Neuro tox of Keytruda described includes cerebral hemorrhage, confusion, myasthenia gravis, reversible posterior leukoencephalopathy syndrome, syed neuropathy, Guillain [place], aseptic meningitis, encephalitis, transverse myelitis    -CT and MRI head no sig findings  -AM cortisol 5.8. Slight low. Unclear significance   Repeat AM Cortisol low x2. <6 on two tests  3rd test w cortisol 11 on 11/06/2  -Sjorgen /Scleroderma studies abn.      +MICHELLE and +Ro52  Seen by Neuro and Psych post medication changes, pt refused LP  Seen by Endocrine-on prednisone 5mg qD for low Cortisol level, could not tolerate hydrocortisone  Seen by GI post endoscopy no abnormality found    RECOMMEND:  -clinically same, continue c/o unable to swallow, objects in throat, tight jaws(since first dose Keytruda as per wife)    -mild anemia and thrombocytopenia-stable as of last CBC, no intervention at these levels    -cont supportive care from Heme/Onc standpoint  pain meds PRN     Declined morphine       Adjustment of psychiatric meds per psychiatry  Fluvoxamine  Olanzapine  Haldol PRN    mgmt steroids per endocrine.   Prednisone  Fludrocortisone    continue physical therapy evaluation  evaluation for rehab

## 2023-12-01 NOTE — PROGRESS NOTE ADULT - SUBJECTIVE AND OBJECTIVE BOX
[facility], Division of Infectious Diseases  AMAN Ramos Y. Patel, S. Shah, G. [facility]  [phone]    Name: JANICE RENDON  Age: 73y  Gender: Male  MRN: [id number]    Interval History:  Patient seen at bedside  Wife there  No acute overnight events. Afebrile  Sleeping  Notes reviewed    Antibiotics:  cefTRIAXone   IVPB 1000 milliGRAM(s) IV Intermittent every 24 hours  cefTRIAXone   IVPB          Medications:  acetaminophen     Tablet .. 650 milliGRAM(s) Oral every 6 hours PRN  atenolol  Tablet 50 milliGRAM(s) Oral two times a day  benzocaine 20% Spray 1 Spray(s) Topical four times a day  cefTRIAXone   IVPB      cefTRIAXone   IVPB 1000 milliGRAM(s) IV Intermittent every 24 hours  cholecalciferol 2000 Unit(s) Oral daily  FIRST- Mouthwash  BLM 5 milliLiter(s) Swish and Spit three times a day  fludroCORTISONE 0.1 milliGRAM(s) Oral daily  fluvoxaMINE 150 milliGRAM(s) Oral two times a day  gabapentin 100 milliGRAM(s) Oral three times a day  haloperidol    Injectable 1 milliGRAM(s) IntraMuscular every 12 hours PRN  heparin   Injectable 5000 Unit(s) SubCutaneous every 12 hours  lactulose Syrup 15 Gram(s) Oral two times a day PRN  losartan 25 milliGRAM(s) Oral daily  melatonin 3 milliGRAM(s) Oral at bedtime  mirtazapine 15 milliGRAM(s) Oral at bedtime  OLANZapine Disintegrating Tablet 20 milliGRAM(s) Oral at bedtime  predniSONE   Tablet 5 milliGRAM(s) Oral daily  senna 2 Tablet(s) Oral at bedtime      Review of Systems:  Review of systems otherwise negative except as previously noted.    Allergies: No Known Allergies    For details regarding the patient's past medical history, social history, family history, and other miscellaneous elements, please refer the initial infectious diseases consultation and/or the admitting history and physical examination for this admission.    Objective:  Vitals:   T(C): 36.7 (12-01-23 @ 04:42), Max: 36.7 (12-01-23 @ 04:42)  HR: 81 (12-01-23 @ 04:42) (81 - 81)  BP: 156/76 (12-01-23 @ 04:42) (154/62 - 160/80)  RR: --  SpO2: --    Physical Examination:  General: no acute distress  HEENT: NC/AT,  Cardio: RRR  Resp: symmetric chest rise  Abd: soft, NT, ND  Ext: no edema or cyanosis  Skin: warm, dry, no visible rash      Laboratory Studies:  CBC:                       9.2    4.89  )-----------( 113      ( 30 Nov 2023 10:00 )             26.2     CMP:             Microbiology: reviewed    Culture - Blood (collected 11-25-23 @ 20:00)  Source: .Blood Blood-Peripheral  Final Report (12-01-23 @ 03:00):    No growth at 5 days    Culture - Blood (collected 11-25-23 @ 19:30)  Source: .Blood Blood-Peripheral  Final Report (12-01-23 @ 03:00):    No growth at 5 days    Culture - Urine (collected 11-25-23 @ 14:00)  Source: Clean Catch Clean Catch (Midstream)  Final Report (11-28-23 @ 11:54):    >100,000 CFU/ml Klebsiella variicola  Organism: Klebsiella variicola (11-28-23 @ 11:54)  Organism: Klebsiella variicola (11-28-23 @ 11:54)      Method Type: MEAGHAN      -  Amoxicillin/Clavulanic Acid: S <=8/4      -  Ampicillin: R <=8 These ampicillin results predict results for amoxicillin      -  Ampicillin/Sulbactam: S <=4/2      -  Aztreonam: S <=4      -  Cefazolin: S <=2      -  Cefepime: S <=2      -  Cefoxitin: S <=8      -  Ceftriaxone: S <=1      -  Ciprofloxacin: S <=0.25      -  Ertapenem: S <=0.5      -  Gentamicin: S <=2      -  Imipenem: S <=1      -  Levofloxacin: S <=0.5      -  Meropenem: S <=1      -  Nitrofurantoin: S <=32 Should not be used to treat pyelonephritis      -  Piperacillin/Tazobactam: S <=8      -  Tobramycin: S <=2      -  Trimethoprim/Sulfamethoxazole: S <=0.5/9.5          Radiology: reviewed       99

## 2023-12-01 NOTE — BH CONSULTATION LIAISON PROGRESS NOTE - NSBHASSESSMENTFT_PSY_ALL_CORE
Pt is a 72 y/o m with HTN, h/o severe anxiety around medical illness and constantly on the look for illnesses brought in by wife after he started to become more and more psychotic with each round of Keytruda for his Merkel Cell Ca. Pt is delusional thinking he has metals, objects in his throat till his anus and penis, unable to tolerate PO. There are case reports of rare psychosis with monoclonal antibodies. Keytruda seems like the possible suspect of psychosis, combined with wife's reports of timeline, although can't be sure, time will show.     Recommendation: Increase Zyprexa Zydis to 20 mg po at HS.

## 2023-12-01 NOTE — PROGRESS NOTE ADULT - SUBJECTIVE AND OBJECTIVE BOX
Patient is a 73y old  Male who presents with a chief complaint of psychosis (01 Dec 2023 15:35)      INTERVAL HPI/OVERNIGHT EVENTS: noted  pt seen and examined this am   events noted  feels well    Vital Signs Last 24 Hrs  T(C): 36.7 (01 Dec 2023 14:16), Max: 36.7 (01 Dec 2023 04:42)  T(F): 98 (01 Dec 2023 14:16), Max: 98.1 (01 Dec 2023 04:42)  HR: 80 (01 Dec 2023 14:16) (80 - 81)  BP: 160/78 (01 Dec 2023 17:27) (150/77 - 160/78)  BP(mean): --  RR: 18 (01 Dec 2023 14:16) (18 - 18)  SpO2: 92% (01 Dec 2023 14:16) (92% - 92%)    Parameters below as of 01 Dec 2023 14:16  Patient On (Oxygen Delivery Method): room air        acetaminophen     Tablet .. 650 milliGRAM(s) Oral every 6 hours PRN  atenolol  Tablet 50 milliGRAM(s) Oral two times a day  benzocaine 20% Spray 1 Spray(s) Topical four times a day  cefTRIAXone   IVPB 1000 milliGRAM(s) IV Intermittent every 24 hours  cefTRIAXone   IVPB      cholecalciferol 2000 Unit(s) Oral daily  FIRST- Mouthwash  BLM 5 milliLiter(s) Swish and Spit three times a day  fludroCORTISONE 0.1 milliGRAM(s) Oral daily  fluvoxaMINE 150 milliGRAM(s) Oral two times a day  gabapentin 100 milliGRAM(s) Oral three times a day  haloperidol    Injectable 1 milliGRAM(s) IntraMuscular every 12 hours PRN  heparin   Injectable 5000 Unit(s) SubCutaneous every 12 hours  lactulose Syrup 15 Gram(s) Oral two times a day PRN  losartan 25 milliGRAM(s) Oral daily  melatonin 3 milliGRAM(s) Oral at bedtime  mirtazapine 15 milliGRAM(s) Oral at bedtime  OLANZapine Disintegrating Tablet 20 milliGRAM(s) Oral at bedtime  predniSONE   Tablet 5 milliGRAM(s) Oral daily  senna 2 Tablet(s) Oral at bedtime      PHYSICAL EXAM:  GENERAL: NAD,   EYES: conjunctiva and sclera clear  ENMT: Moist mucous membranes  NECK: Supple, No JVD, Normal thyroid  CHEST/LUNG: non labored, cta b/l  HEART: Regular rate and rhythm; No murmurs, rubs, or gallops  ABDOMEN: Soft, Nontender, Nondistended; Bowel sounds present  EXTREMITIES:  2+ Peripheral Pulses, No clubbing, cyanosis, or edema  LYMPH: No lymphadenopathy noted  SKIN: No rashes or lesions    Consultant(s) Notes Reviewed:  [x ] YES  [ ] NO  Care Discussed with Consultants/Other Providers [ x] YES  [ ] NO    LABS:                        9.2    4.89  )-----------( 113      ( 30 Nov 2023 10:00 )             26.2               CAPILLARY BLOOD GLUCOSE                  RADIOLOGY & ADDITIONAL TESTS:    Imaging Personally Reviewed:  [x ] YES  [ ] NO

## 2023-12-01 NOTE — PROGRESS NOTE ADULT - ASSESSMENT
73 M pmh of HTN, ITP, APL syndrome, generalized anxiety disorder, OCD, newly dx aggressive Merkel Cell cancer of forehead- pt's oncologist  Dr. Eng, at [facility] on Keytruda infusions with good response per wife  was brought to ED for worsening psychosis for the past few days, pt lately with difficulty swallowing, decreased po intake    Psychosis  monitor  psych fu  MRI noted  neuro following  cont luvox   continue to titrate haldol  ?clozapine starting per psych fu  GI fu noted- sp EGD - normal -sp bx fu path  trial of dilaudid without success     #HTN/Orthostatic hypotension  cont atenolol w parameters  ? psych med induced  adrenal insufficiency  continue prednisone and  florinef    #unresponsive episode  -?orthostatic hypotension vs other neuro causes  ekg- sinus, cards fu  d/w neuro will obtain EEG    UTI  sp abx    #Merkel cell ca  onc c/s noted  r/o paraneoplastic causes- unlikely      #DVT ppx-  hep sq    [facility]   [phone]

## 2023-12-01 NOTE — PROGRESS NOTE ADULT - SUBJECTIVE AND OBJECTIVE BOX
Chief Complaint: Psychosis    Interval Events: No events overnight.    Review of Systems:  General: No fevers, chills, weight gain  Skin: No rashes, color changes  Cardiovascular: No chest pain, orthopnea  Respiratory: No shortness of breath, cough  Gastrointestinal: No nausea, abdominal pain  Genitourinary: No incontinence, pain with urination  Musculoskeletal: No pain, swelling, decreased range of motion  Neurological: No headache, weakness  Psychiatric: No depression, anxiety  Endocrine: No weight gain, increased thirst  All other systems are comprehensively negative.    Physical Exam:  Vital Signs Last 24 Hrs  T(C): 36.7 (01 Dec 2023 04:42), Max: 36.7 (01 Dec 2023 04:42)  T(F): 98.1 (01 Dec 2023 04:42), Max: 98.1 (01 Dec 2023 04:42)  HR: 81 (01 Dec 2023 04:42) (81 - 81)  BP: 156/76 (01 Dec 2023 04:42) (154/62 - 160/80)  BP(mean): --  RR: --  SpO2: --  General: NAD  HEENT: MMM  Neck: No JVD, no carotid bruit  Lungs: CTAB  CV: RRR, nl S1/S2, no M/R/G  Abdomen: S/NT/ND, +BS  Extremities: No LE edema, no cyanosis  Neuro: AAOx3, non-focal  Skin: No rash    Labs:                          9.2    4.89  )-----------( 113      ( 30 Nov 2023 10:00 )             26.2

## 2023-12-01 NOTE — PROGRESS NOTE ADULT - ASSESSMENT
73 M pmh of HTN, ITP, APL syndrome, generalized anxiety disorder, OCD, newly dx aggressive Merkel Cell cancer of forehead- pt's oncologist  Dr. Eng, at [facility] on Keytruda (PD-1 blocker) infusions with good response per wife  was brought to ED for worsening psychosis for the past few days, pt believes there are sharp things inside his body trying to come out of his mouth, stuck in throat, pt lately with difficulty swallowing, decreased po intake, pt was evaluated by psych in ED. Wife reports sudden cognitive decline and behavioral changes immediately after COVID infection.    1-PASC  noted serology for EBV consistent with reactivation as described post PASC  Serotonin, Blood: <30ng/mL   am cortisol-low x 2 then nl  Psych involved    2-PYELONEPHRITIS   -ceftriaxone (started 11/25) plan for 12/1 as last day   with urinary symps, pyuria, fever and urine culture w >100,000 CFU/ml Klebsiella variicola,  -  Ceftriaxone: S <=1  blood cultures-NGTD    Dr Pepper covering 12/2  Dr Underwood covering 12/3  I will resume care 12/4  Infectious Diseases will continue to follow. Please call with any questions.   Jillian Mckoy M.D.  [facility] Division of Infectious Diseases [phone]  For after 5 P.M. and weekends, please call [phone]

## 2023-12-01 NOTE — PROGRESS NOTE ADULT - ASSESSMENT
Discussion/Summary   the calcium and globulin are elevated this time. the triglyceride and sugar are elevated also. these are related not taking medications. the urine protein elevated also.  see Dr. Aguila as scheduled  for further  discussion.           Verified Results  COMP METABOLIC PANEL WITH CBCA, LIPID PANEL AND TSH (CMP,CBCA,LIPFA,TSH) 17Oct2017 10:00AM IMTIAZ AGUILA     Test Name Result Flag Reference   WHITE BLOOD COUNT 7.4 K/mcL  4.2-11.0   RED CELL COUNT 5.78 mil/mcL  4.50-5.90   HEMOGLOBIN 18.3 g/dl H 13.0-17.0   HEMATOCRIT 52.2 % H 39.0-51.0   MEAN CORPUSCULAR VOLUME 90.3 fL  78.0-100.0   MEAN CORPUSCULAR HEMOGLOBIN 31.7 pg  26.0-34.0   MEAN CORPUSCULAR HGB CONC 35.1 g/dl  32.0-36.5   RDW-CV 13.5 %  11.0-15.0   PLATELET COUNT 219 K/mcL  140-450   DIFF TYPE      AUTOMATED DIFFERENTIAL   MASTER% 67 %     LYM% 23 %     MON% 9 %     EOS% 1 %     BASO% 0 %     MASTER ABS 5.0 K/mcL  1.8-7.7   LYM ABS 1.7 K/mcL  1.0-4.0   MON ABS 0.7 K/mcL  0.3-0.9   EOS ABS 0.1 K/mcL  0.1-0.5   BASO ABS 0.0 K/mcL  0.0-0.3   FASTING STATUS FASTING hrs     SODIUM 140 mmol/L  135-145   POTASSIUM 4.4 mmol/L  3.4-5.1   CHLORIDE 104 mmol/L     CARBON DIOXIDE 24 mmol/L  21-32   ANION GAP 16 mmol/L  10-20   GLUCOSE 146 mg/dl H 65-99   BUN 17 mg/dl  6-20   CREATININE 0.75 mg/dl  0.67-1.17   GFR EST.AFRICAN AMER >90     eGFR results = or >90 mL/min/1.73m2 = Normal kidney function.   GFR EST.NONAFRI AMER >90     eGFR results = or >90 mL/min/1.73m2 = Normal kidney function.   BUN/CREATININE RATIO 23  7-25   CALCIUM 10.8 mg/dl H 8.4-10.2   BILIRUBIN TOTAL 0.8 mg/dl  0.2-1.0   GOT/AST 13 Units/L  <38   GPT/ALT 29 Units/L  <79   ALKALINE PHOSPHATASE 81 Units/L     TOTAL PROTEIN 7.6 g/dl  6.4-8.2   ALBUMIN 3.9 g/dl  3.6-5.1   GLOBULIN (CALCULATED) 3.7 g/dl  2.0-4.0   A/G RATIO 1.1  1.0-2.4   TSH 0.663 mcUnits/mL  0.350-5.000   FASTING STATUS FASTING hrs     CHOLESTEROL 187 mg/dl  <200   Desirable            <200  Borderline High      200  The patient is a 73 year old male with a history of HTN, ITP, APLS, anxiety, OCD, Merkel cell cancer who presented with psychosis.    Plan:  - ECG with no evidence of ischemia or infarction  - Orthostatic hypotension may be multifactorial - dehydration, medication related (antipsychotics), and low cortisol levels  - Allow for supine hypertension - BP labile due to orthostatic hypotension - would not treat supine hypertension with additional antihypertensives unless SBP>200  - Syncope - due to orthostatic hypotension  - Continue atenolol 50 mg bid  - Continue losartan 25 mg daily  - EGD done with no significant findings  - Psych follow-up  - UTI - on IV antibiotics  - Monitor hemoglobin to 239  High                 >=240   LDL CHOLESTEROL (CALCULATED)   <130   UNABLE TO CALCULATE LDL. mg/dl   HDL CHOLESTEROL 34 mg/dl L >39   Low            <40  Borderline Low 40 to 49  Near Optimal   50 to 59  Optimal        >=60   TRIGLYCERIDES 472 mg/dl H <150   Normal                   <150  Borderline High          150 to 199  High                     200 to 499  Very High                >=500   NON-HDL CHOLESTEROL 153 mg/dl     Therapeutic Target:  CHD and risk equivalents <130  Multiple risk factors    <160  0 to 1 risk factors      <190   CHOLESTEROL/HDL RATIO 5.5 H <4.5     PSA - PROSTATE SPECIFIC AG 17Oct2017 10:00AM IMTIAZ AGUILA     Test Name Result Flag Reference   PROSTATE SPECIFIC AG 0.85 ng/ml  <4.01   SUGGESTED AGE SPECIFIC REFERENCE RANGES     AGE                NG/ML  40-49              0.01-2.50  50-59              0.01-3.50  60-69              0.01-4.50  70-79              0.01-6.50     REFERENCE: JOURNAL OF UROLOGY [id number]     [employer] Chemiluminescence     URINALYSIS WITH MICROSCOPIC EXAM W/O C/S 17Oct2017 10:00AM IMTIAZ AGUILA     Test Name Result Flag Reference   URINE COLOR YELLOW  YELLOW   APPEARANCE, URINE CLEAR     URINE GLUCOSE >500 mg/dl A NEGATIVE   URINE BILIRUBIN NEGATIVE  NEGATIVE   KETONES NEGATIVE mg/dl  NEGATIVE   URINE SPECIFIC GRAVITY 1.029  1.005-1.030   RBC-URINE SMALL A NEGATIVE   PH-URINE 6.0 Units  5.0-7.0   URINE PROTEIN 100 mg/dl A NEGATIVE   UROBILINOGEN-URINE 0.2 mg/dl  0.0-1.0   NITRITE NEGATIVE  NEGATIVE   WBC-URINE NEGATIVE  NEGATIVE   SQUAMOUS EPITHELIAL CELLS 1 to 5 /HPF  0-5   ERYTHROCYTES 1 to 3 /HPF  0-3   LEUKOCYTES 1 to 5 /HPF  0-5   BACTERIA NONE SEEN /HPF  NONE SEEN   HYALINE CASTS NONE SEEN /LPF  0-5   SPECIMEN TYPE      URINE, CLEAN CATCH/MIDSTREAM   MUCUS PRESENT       MICROALBUMIN, URINE 17Oct2017 10:00AM IMTIAZ AGUILA     Test Name Result Flag Reference   MICROALBUMIN 27.00 mg/dl     CREATININE RANDOM URINE 140.00 mg/dl     MICROALB/CREAT RATIO  192.9 mcg/mg H <30   Short-term hyperglycemia, exercise, urinary tract infections, marked hypertension, heart failure, and acute febrile illness can cause transient elevations in urinary albumin excretion.  Due to marked day-to-day variability in albumin excretion, at least two of three collections done in a 3 to 6 month period should show elevated levels before initially designating a patient as having microalbuminuria.     HEMOGLOBIN A1C GLYCOSYLATED 17Oct2017 10:00AM IMTIAZ AGUILA     Test Name Result Flag Reference   HEMOGLOBIN A1C GLYH 8.7 % H 4.5-5.6   ----DIABETIC SCREENING---  NON DIABETIC                 <5.7%  INCREASED RISK                5.7-6.4%  DIAGNOSTIC FOR DIABETES      >6.4%     ----DIABETIC CONTROL---     A1C%           eAG mg/dL  6.0            126  6.5            140  7.0            154  7.5            169  8.0            183  8.5            197  9.0            212  9.5            226  10.0           240     HEPATITIS A IGG AND IGM 17Oct2017 10:00AM IMTIAZ AGUILA     Test Name Result Flag Reference   HEP A IGG AND IGM POSITIVE A NEGATIVE     HEPATITIS B SURFACE AB 17Oct2017 10:00AM IMTIAZ AGUILA     Test Name Result Flag Reference   HEP B SURFACE AB NEGATIVE  NEGATIVE     HEPATITIS B SURFACE AG 17Oct2017 10:00AM IMTIAZ AGUILA     Test Name Result Flag Reference   Hepatitis B Surface Ag NEGATIVE  NEGATIVE     HEPATITIS C AB 17Oct2017 10:00AM IMTIAZ AGUILA     Test Name Result Flag Reference   HEPATITIS C ANTIBODY NEGATIVE  NEGATIVE     HEP A AB IGM 17Oct2017 10:00AM IMTIAZ AGUILA   *ORDERED BY LABORATORY     Test Name Result Flag Reference   HEP A AB IGM NEGATIVE  NEGATIVE

## 2023-12-01 NOTE — PROGRESS NOTE ADULT - SUBJECTIVE AND OBJECTIVE BOX
Neurology Follow up note    JANICE [id number]    HPI:  hx obtained from wife  73 M pmh of HTN, ITP, APL syndrome, generalized anxiety disorder, OCD, newly dx aggressive Merkel Cell cancer of forehead- pt's oncologist  Dr. Eng, at [facility] on Keytruda infusions with good response per wife  was brought o ED for worsening psychosis for the past few days, pt believes there are sharp things inside his body trying to come out of his mouth, stuck in throat  pt lately with difficulty swallowing, decreased po intake  pt was evaluated by psych in ED (24 Oct 2023 13:19)          Interval History - reported metal in throat    Patient is seen, chart was reviewed and case was discussed with the treatment team.  Pt is not in any distress.   Lying on bed comfortably.   .    Vital Signs Last 24 Hrs  T(C): 36.7 (01 Dec 2023 14:16), Max: 36.7 (01 Dec 2023 04:42)  T(F): 98 (01 Dec 2023 14:16), Max: 98.1 (01 Dec 2023 04:42)  HR: 80 (01 Dec 2023 14:16) (80 - 81)  BP: 150/77 (01 Dec 2023 14:16) (150/77 - 160/80)  BP(mean): --  RR: 18 (01 Dec 2023 14:16) (18 - 18)  SpO2: 92% (01 Dec 2023 14:16) (92% - 92%)    Parameters below as of 01 Dec 2023 14:16  Patient On (Oxygen Delivery Method): room air                            REVIEW OF SYSTEMS:    Constitutional: No fever,  Eyes: No eye pain, visual disturbances, or discharge  ENT:  No difficulty hearing, tinnitus, vertigo; No sinus or throat pain  Neck: No pain or stiffness  Respiratory: No cough, wheezing, chills or hemoptysis  Cardiovascular: No chest pain, palpitations, shortness of breath, dizziness or leg swelling  Gastrointestinal: No abdominal or epigastric pain. No nausea, vomiting or hematemesis;   Genitourinary: No dysuria, frequency,   Neurological: No headaches, memory loss, loss of strength,  Psychiatric: No depression, anxiety, mood swings or difficulty sleeping  Musculoskeletal: No joint pain or swelling;   Skin: No itching, burning, rashes or lesions   Lymph Nodes: No enlarged glands  Endocrine: No heat or cold intolerance; No hair loss,  Allergy and Immunologic: No hives or eczema    On Neurological Examination:    Mental Status - Pt is alert, awake, oriented X3   Follows commands well and able to answer questions appropriately.Mood and affect  normal    Speech -  Pt has dysarthria.    Cranial Nerves - Pupils 3 mm equal and reactive to light, extraocular eye movements intact. Pt has no visual field deficit.  Pt has no  facial asymmetry. Facial sensation is intact.Tongue - is in midline.    Muscle tone - is normal      Motor Exam - 4/5 all over, No drift. No shaking or tremors.    Sensory Exam - . Pt withdraws all extremities equally on stimulation. No asymmetry seen. No complaints of tingling, numbness.           coordination:    Finger to nose: normal      Deep tendon Reflexes - 2 plus all over.          Neck Supple -  Yes.    MEDICATIONS  (STANDING):  atenolol  Tablet 50 milliGRAM(s) Oral two times a day  benzocaine 20% Spray 1 Spray(s) Topical four times a day  cefTRIAXone   IVPB      cefTRIAXone   IVPB 1000 milliGRAM(s) IV Intermittent every 24 hours  cholecalciferol 2000 Unit(s) Oral daily  FIRST- Mouthwash  BLM 5 milliLiter(s) Swish and Spit three times a day  fludroCORTISONE 0.1 milliGRAM(s) Oral daily  fluvoxaMINE 150 milliGRAM(s) Oral two times a day  gabapentin 100 milliGRAM(s) Oral three times a day  heparin   Injectable 5000 Unit(s) SubCutaneous every 12 hours  losartan 25 milliGRAM(s) Oral daily  melatonin 3 milliGRAM(s) Oral at bedtime  mirtazapine 15 milliGRAM(s) Oral at bedtime  OLANZapine Disintegrating Tablet 20 milliGRAM(s) Oral at bedtime  predniSONE   Tablet 5 milliGRAM(s) Oral daily  senna 2 Tablet(s) Oral at bedtime    MEDICATIONS  (PRN):  acetaminophen     Tablet .. 650 milliGRAM(s) Oral every 6 hours PRN Temp greater or equal to 38.5C (101.3F), Moderate Pain (4 - 6)  haloperidol    Injectable 1 milliGRAM(s) IntraMuscular every 12 hours PRN Agitation  lactulose Syrup 15 Gram(s) Oral two times a day PRN constipation                Allergies    No Known Allergies    Intolerances                          9.2    4.89  )-----------( 113      ( 30 Nov 2023 10:00 )             26.2     Hemoglobin A1C:     Vitamin B12     RADIOLOGY    ASSESSMENT AND PLAN:      seen for ams  Depression with psychosis  doubt paraneoplastic   brain mri- no acute cva/mets  brief bout of loc upon rising related -  vasovagal syncope  orthostatic  doubt tia/sz       no need for any neuro w/u  psych follow up  Physical therapy evaluation.  Pain is accessed and addressed.  Plan of care was discussed with family(wife) Questions answered.

## 2023-12-02 LAB
ANION GAP SERPL CALC-SCNC: 6 MMOL/L — SIGNIFICANT CHANGE UP (ref 5–17)
ANION GAP SERPL CALC-SCNC: 6 MMOL/L — SIGNIFICANT CHANGE UP (ref 5–17)
BUN SERPL-MCNC: 13 MG/DL — SIGNIFICANT CHANGE UP (ref 7–23)
BUN SERPL-MCNC: 13 MG/DL — SIGNIFICANT CHANGE UP (ref 7–23)
CALCIUM SERPL-MCNC: 8.8 MG/DL — SIGNIFICANT CHANGE UP (ref 8.5–10.1)
CALCIUM SERPL-MCNC: 8.8 MG/DL — SIGNIFICANT CHANGE UP (ref 8.5–10.1)
CHLORIDE SERPL-SCNC: 108 MMOL/L — SIGNIFICANT CHANGE UP (ref 96–108)
CHLORIDE SERPL-SCNC: 108 MMOL/L — SIGNIFICANT CHANGE UP (ref 96–108)
CO2 SERPL-SCNC: 33 MMOL/L — HIGH (ref 22–31)
CO2 SERPL-SCNC: 33 MMOL/L — HIGH (ref 22–31)
CREAT SERPL-MCNC: 0.74 MG/DL — SIGNIFICANT CHANGE UP (ref 0.5–1.3)
CREAT SERPL-MCNC: 0.74 MG/DL — SIGNIFICANT CHANGE UP (ref 0.5–1.3)
EGFR: 94 ML/MIN/1.73M2 — SIGNIFICANT CHANGE UP
EGFR: 94 ML/MIN/1.73M2 — SIGNIFICANT CHANGE UP
GLUCOSE SERPL-MCNC: 106 MG/DL — HIGH (ref 70–99)
GLUCOSE SERPL-MCNC: 106 MG/DL — HIGH (ref 70–99)
HCT VFR BLD CALC: 26.6 % — LOW (ref 39–50)
HCT VFR BLD CALC: 26.6 % — LOW (ref 39–50)
HGB BLD-MCNC: 9.1 G/DL — LOW (ref 13–17)
HGB BLD-MCNC: 9.1 G/DL — LOW (ref 13–17)
MCHC RBC-ENTMCNC: 30.4 PG — SIGNIFICANT CHANGE UP (ref 27–34)
MCHC RBC-ENTMCNC: 30.4 PG — SIGNIFICANT CHANGE UP (ref 27–34)
MCHC RBC-ENTMCNC: 34.2 GM/DL — SIGNIFICANT CHANGE UP (ref 32–36)
MCHC RBC-ENTMCNC: 34.2 GM/DL — SIGNIFICANT CHANGE UP (ref 32–36)
MCV RBC AUTO: 89 FL — SIGNIFICANT CHANGE UP (ref 80–100)
MCV RBC AUTO: 89 FL — SIGNIFICANT CHANGE UP (ref 80–100)
NRBC # BLD: 0 /100 WBCS — SIGNIFICANT CHANGE UP (ref 0–0)
NRBC # BLD: 0 /100 WBCS — SIGNIFICANT CHANGE UP (ref 0–0)
PLATELET # BLD AUTO: 132 K/UL — LOW (ref 150–400)
PLATELET # BLD AUTO: 132 K/UL — LOW (ref 150–400)
POTASSIUM SERPL-MCNC: 3.7 MMOL/L — SIGNIFICANT CHANGE UP (ref 3.5–5.3)
POTASSIUM SERPL-MCNC: 3.7 MMOL/L — SIGNIFICANT CHANGE UP (ref 3.5–5.3)
POTASSIUM SERPL-SCNC: 3.7 MMOL/L — SIGNIFICANT CHANGE UP (ref 3.5–5.3)
POTASSIUM SERPL-SCNC: 3.7 MMOL/L — SIGNIFICANT CHANGE UP (ref 3.5–5.3)
RBC # BLD: 2.99 M/UL — LOW (ref 4.2–5.8)
RBC # BLD: 2.99 M/UL — LOW (ref 4.2–5.8)
RBC # FLD: 14.9 % — HIGH (ref 10.3–14.5)
RBC # FLD: 14.9 % — HIGH (ref 10.3–14.5)
SODIUM SERPL-SCNC: 147 MMOL/L — HIGH (ref 135–145)
SODIUM SERPL-SCNC: 147 MMOL/L — HIGH (ref 135–145)
WBC # BLD: 7.18 K/UL — SIGNIFICANT CHANGE UP (ref 3.8–10.5)
WBC # BLD: 7.18 K/UL — SIGNIFICANT CHANGE UP (ref 3.8–10.5)
WBC # FLD AUTO: 7.18 K/UL — SIGNIFICANT CHANGE UP (ref 3.8–10.5)
WBC # FLD AUTO: 7.18 K/UL — SIGNIFICANT CHANGE UP (ref 3.8–10.5)

## 2023-12-02 PROCEDURE — 71045 X-RAY EXAM CHEST 1 VIEW: CPT | Mod: 26

## 2023-12-02 RX ORDER — OLANZAPINE 15 MG/1
10 TABLET, FILM COATED ORAL AT BEDTIME
Refills: 0 | Status: DISCONTINUED | OUTPATIENT
Start: 2023-12-02 | End: 2023-12-19

## 2023-12-02 RX ORDER — GABAPENTIN 400 MG/1
100 CAPSULE ORAL DAILY
Refills: 0 | Status: COMPLETED | OUTPATIENT
Start: 2023-12-02 | End: 2023-12-05

## 2023-12-02 RX ORDER — DEXTROSE MONOHYDRATE, SODIUM CHLORIDE, AND POTASSIUM CHLORIDE 50; .745; 4.5 G/1000ML; G/1000ML; G/1000ML
1000 INJECTION, SOLUTION INTRAVENOUS
Refills: 0 | Status: DISCONTINUED | OUTPATIENT
Start: 2023-12-02 | End: 2023-12-05

## 2023-12-02 RX ORDER — SODIUM CHLORIDE 9 MG/ML
4 INJECTION INTRAMUSCULAR; INTRAVENOUS; SUBCUTANEOUS ONCE
Refills: 0 | Status: COMPLETED | OUTPATIENT
Start: 2023-12-02 | End: 2023-12-03

## 2023-12-02 RX ADMIN — Medication 1 SPRAY(S): at 06:22

## 2023-12-02 RX ADMIN — HEPARIN SODIUM 5000 UNIT(S): 5000 INJECTION INTRAVENOUS; SUBCUTANEOUS at 17:25

## 2023-12-02 RX ADMIN — Medication 2000 UNIT(S): at 11:49

## 2023-12-02 RX ADMIN — GABAPENTIN 100 MILLIGRAM(S): 400 CAPSULE ORAL at 06:22

## 2023-12-02 RX ADMIN — SENNA PLUS 2 TABLET(S): 8.6 TABLET ORAL at 21:51

## 2023-12-02 RX ADMIN — FLUVOXAMINE MALEATE 150 MILLIGRAM(S): 25 TABLET ORAL at 06:23

## 2023-12-02 RX ADMIN — Medication 5 MILLIGRAM(S): at 06:21

## 2023-12-02 RX ADMIN — HEPARIN SODIUM 5000 UNIT(S): 5000 INJECTION INTRAVENOUS; SUBCUTANEOUS at 06:21

## 2023-12-02 RX ADMIN — LOSARTAN POTASSIUM 25 MILLIGRAM(S): 100 TABLET, FILM COATED ORAL at 06:21

## 2023-12-02 RX ADMIN — ATENOLOL 50 MILLIGRAM(S): 25 TABLET ORAL at 06:21

## 2023-12-02 RX ADMIN — ATENOLOL 50 MILLIGRAM(S): 25 TABLET ORAL at 17:25

## 2023-12-02 RX ADMIN — FLUVOXAMINE MALEATE 150 MILLIGRAM(S): 25 TABLET ORAL at 17:26

## 2023-12-02 RX ADMIN — MIRTAZAPINE 15 MILLIGRAM(S): 45 TABLET, ORALLY DISINTEGRATING ORAL at 21:50

## 2023-12-02 RX ADMIN — DEXTROSE MONOHYDRATE, SODIUM CHLORIDE, AND POTASSIUM CHLORIDE 60 MILLILITER(S): 50; .745; 4.5 INJECTION, SOLUTION INTRAVENOUS at 16:28

## 2023-12-02 RX ADMIN — FLUDROCORTISONE ACETATE 0.1 MILLIGRAM(S): 0.1 TABLET ORAL at 06:21

## 2023-12-02 RX ADMIN — CEFTRIAXONE 100 MILLIGRAM(S): 500 INJECTION, POWDER, FOR SOLUTION INTRAMUSCULAR; INTRAVENOUS at 17:25

## 2023-12-02 RX ADMIN — OLANZAPINE 10 MILLIGRAM(S): 15 TABLET, FILM COATED ORAL at 21:50

## 2023-12-02 RX ADMIN — DIPHENHYDRAMINE HYDROCHLORIDE AND LIDOCAINE HYDROCHLORIDE AND ALUMINUM HYDROXIDE AND MAGNESIUM HYDRO 5 MILLILITER(S): KIT at 06:23

## 2023-12-02 RX ADMIN — Medication 3 MILLIGRAM(S): at 21:51

## 2023-12-02 NOTE — PROGRESS NOTE ADULT - ASSESSMENT
IMPRESSION:     pt w ITP, Lupus Anticoagulant, HTN, anxiety on Seraquel, OCD, Merkel cell ca of right Forehead, under care Dr Elliot Eng, post 4 doses q3wks IV Keytruda, last dose 10/17/23.  Pt brought in w c/o feeling "claws, daggers" inside him, since C#3 Keytruda, with sensation initially started w various parts of face now also progressed down to sides of body to rectum(does not see actual daggers or claws,)    Wife reports after first cycle started w jaw tightness and progressive worsened w each cycle, to point of hard to swallow/can't swallow  Had tired taken off Seroquel to see if adverse effect but no improvement, now back on x 5-6days  Merkel lesion has essentially resolved  -sensation described by pt ?psychosis vs neuropathy  -psychosis not noted to be associated w Keytruda. Neuro tox of Keytruda described includes cerebral hemorrhage, confusion, myasthenia gravis, reversible posterior leukoencephalopathy syndrome, syed neuropathy, Guillain Forbes, aseptic meningitis, encephalitis, transverse myelitis    -CT and MRI head no sig findings  -AM cortisol 5.8. Slight low. Unclear significance   Repeat AM Cortisol low x2. <6 on two tests  3rd test w cortisol 11 on 11/06/2  -Sjorgen /Scleroderma studies abn.      +MICHELLE and +Ro52  Seen by Neuro and Psych post medication changes, pt refused LP  Seen by Endocrine-on prednisone 5mg qD for low Cortisol level, could not tolerate hydrocortisone  Seen by GI post endoscopy no abnormality found    RECOMMEND:  -clinically same, continue c/o unable to swallow, objects in throat, tight jaws(since first dose Keytruda as per wife)    -mild anemia and thrombocytopenia-stable as of last CBC, no intervention at these levels    -cont supportive care from Heme/Onc standpoint  pain meds PRN     Declined morphine       Adjustment of psychiatric meds per psychiatry  Fluvoxamine  Olanzapine  Haldol PRN    mgmt steroids per endocrine.   Prednisone  Fludrocortisone    continue physical therapy evaluation  evaluation for rehab    Followup with usual outpt onc post DC    Thank you for consulting us.   No additional recommendations at current time.   Will sign off on case for now.   Please call, or re-consult if needed.

## 2023-12-02 NOTE — CHART NOTE - NSCHARTNOTEFT_GEN_A_CORE
Called by RN, patient with wet cough and desaturating on RA. Patient was seen and examined at bedside. NAD. Patient is confused and states that he continues to have foreign objects stuck in his throat and mouth. Reports that he has a cough but cannot expectorate mucus and feels congested. Suction with green phlegm. On examination, heart RRR, no M/R/G, lungs with B/L mild scattered crackles in middle lung fields, non labored breathing. Patient saturating well on 2L NC. Ordered CXR and hypertonic saline nebs.

## 2023-12-02 NOTE — PROGRESS NOTE ADULT - ASSESSMENT
73 M pmh of HTN, ITP, APL syndrome, generalized anxiety disorder, OCD, newly dx aggressive Merkel Cell cancer of forehead- pt's oncologist  Dr. Eng, at [facility] on Keytruda infusions with good response per wife  was brought to ED for worsening psychosis for the past few days, pt lately with difficulty swallowing, decreased po intake    Psychosis  monitor  psych fu  MRI noted  neuro following  cont luvox   continue to titrate haldol  zyprexa titrate as per psych  GI fu noted- sp EGD - normal -sp bx fu path  trial of dilaudid without success     #HTN/Orthostatic hypotension  cont atenolol w parameters  ? psych med induced  adrenal insufficiency  continue prednisone and  florinef    #unresponsive episode  -?orthostatic hypotension vs other neuro causes  ekg- sinus, cards fu  d/w neuro will obtain EEG    UTI  sp abx    #Merkel cell ca  onc c/s noted  r/o paraneoplastic causes- unlikely      #DVT ppx-  hep sq    [facility]   [phone]

## 2023-12-02 NOTE — PROGRESS NOTE ADULT - SUBJECTIVE AND OBJECTIVE BOX
[INTERVAL HX: ]  Patient seen and examined;  Chart reviewed and events noted;     no CP, no SOB    c/o mouth pain  c/o chilly; refuses additional blankets,   has blanket over legs, declines placement over trunk/chest/shoulder.     [MEDICATIONS]  MEDICATIONS  (STANDING):  atenolol  Tablet 50 milliGRAM(s) Oral two times a day  benzocaine 20% Spray 1 Spray(s) Topical four times a day  cefTRIAXone   IVPB 1000 milliGRAM(s) IV Intermittent every 24 hours  cefTRIAXone   IVPB      cholecalciferol 2000 Unit(s) Oral daily  FIRST- Mouthwash  BLM 5 milliLiter(s) Swish and Spit three times a day  fludroCORTISONE 0.1 milliGRAM(s) Oral daily  fluvoxaMINE 150 milliGRAM(s) Oral two times a day  gabapentin 100 milliGRAM(s) Oral daily  heparin   Injectable 5000 Unit(s) SubCutaneous every 12 hours  losartan 25 milliGRAM(s) Oral daily  melatonin 3 milliGRAM(s) Oral at bedtime  mirtazapine 15 milliGRAM(s) Oral at bedtime  OLANZapine Disintegrating Tablet 10 milliGRAM(s) Oral at bedtime  predniSONE   Tablet 5 milliGRAM(s) Oral daily  senna 2 Tablet(s) Oral at bedtime  sodium chloride 0.45% with potassium chloride 20 mEq/L 1000 milliLiter(s) (60 mL/Hr) IV Continuous <Continuous>    MEDICATIONS  (PRN):  acetaminophen     Tablet .. 650 milliGRAM(s) Oral every 6 hours PRN Temp greater or equal to 38.5C (101.3F), Moderate Pain (4 - 6)  haloperidol    Injectable 1 milliGRAM(s) IntraMuscular every 12 hours PRN Agitation  lactulose Syrup 15 Gram(s) Oral two times a day PRN constipation      [VITALS]  Vital Signs Last 24 Hrs  T(C): 36.6 (02 Dec 2023 16:22), Max: 36.8 (02 Dec 2023 05:03)  T(F): 97.9 (02 Dec 2023 16:22), Max: 98.3 (02 Dec 2023 05:03)  HR: 80 (02 Dec 2023 12:09) (78 - 80)  BP: 129/68 (02 Dec 2023 12:09) (129/68 - 178/76)  BP(mean): --  RR: 16 (02 Dec 2023 12:09) (16 - 18)  SpO2: 90% (02 Dec 2023 12:09) (90% - 91%)    Parameters below as of 02 Dec 2023 12:09  Patient On (Oxygen Delivery Method): room air      [WT/HT]  Daily     Daily   [VENT]      [PHYSICAL EXAM]  GEN: cachexia  HEENT: normocephalic and atraumatic. EOMI. .    NECK: Supple.  No lymphadenopathy   LUNGS: Clear to auscultation.  HEART: Regular rate and rhythm,  no MRG  ABDOMEN: Soft, nontender, and nondistended.  Positive bowel sounds.    : No CVA tenderness  EXTREMITIES: Without edema.  NEUROLOGIC: grossly intact.  PSYCHIATRIC: Appropriate affect .  SKIN: No rash     [LABS:]                        9.1    7.18  )-----------( 132      ( 02 Dec 2023 06:50 )             26.6     12-02    147<H>  |  108  |  13  ----------------------------<  106<H>  3.7   |  33<H>  |  0.74    Ca    8.8      02 Dec 2023 06:50            Vitamin B12, Serum: 1109 pg/mL [232 - 1245] (10-29-23 @ 18:30)    Folate, Serum: >20.0 ng/mL (10-29-23 @ 18:30)    Sedimentation Rate, Erythrocyte: 57 mm/hr *H* [0 - 20] (10-27-23 @ 18:30)      Urinalysis Basic - ( 02 Dec 2023 06:50 )    Color: x / Appearance: x / SG: x / pH: x  Gluc: 106 mg/dL / Ketone: x  / Bili: x / Urobili: x   Blood: x / Protein: x / Nitrite: x   Leuk Esterase: x / RBC: x / WBC x   Sq Epi: x / Non Sq Epi: x / Bacteria: x        SARS-CoV-2: NotDetec (24 Nov 2023 11:50)          [RADIOLOGY STUDIES:]

## 2023-12-02 NOTE — PROGRESS NOTE ADULT - SUBJECTIVE AND OBJECTIVE BOX
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 Eng, at [facility] on Keytruda infusions with good response per wife  was brought o ED for worsening psychosis for the past few days, pt believes there are sharp things inside his body trying to come out of his mouth, stuck in throat  pt lately with difficulty swallowing, decreased po intake  pt was evaluated by psych in ED (24 Oct 2023 13:19)      REVIEW OF SYSTEMS:    CONSTITUTIONAL: No fever, weight loss, or fatigue  EYES: No eye pain, visual disturbances, or discharge  ENMT:  No difficulty hearing, tinnitus, vertigo; No sinus or throat pain  NECK: No pain or stiffness  BREASTS: No pain, masses, or nipple discharge  RESPIRATORY: No cough, wheezing, chills or hemoptysis; No shortness of breath  CARDIOVASCULAR: No chest pain, palpitations, dizziness, or leg swelling  GASTROINTESTINAL: No abdominal or epigastric pain. No nausea, vomiting, or hematemesis; No diarrhea or constipation. No melena or hematochezia.  GENITOURINARY: No dysuria, frequency, hematuria, or incontinence  NEUROLOGICAL: No headaches, memory loss, loss of strength, numbness, or tremors  SKIN: No itching, burning, rashes, or lesions   LYMPH NODES: No enlarged glands  ENDOCRINE: No heat or cold intolerance; No hair loss  MUSCULOSKELETAL: No joint pain or swelling; No muscle, back, or extremity pain  PSYCHIATRIC: No depression, anxiety, mood swings, or difficulty sleeping  HEME/LYMPH: No easy bruising, or bleeding gums  ALLERGY AND IMMUNOLOGIC: No hives or eczema    MEDICATIONS  (STANDING):  atenolol  Tablet 50 milliGRAM(s) Oral two times a day  benzocaine 20% Spray 1 Spray(s) Topical four times a day  cefTRIAXone   IVPB      cefTRIAXone   IVPB 1000 milliGRAM(s) IV Intermittent every 24 hours  cholecalciferol 2000 Unit(s) Oral daily  FIRST- Mouthwash  BLM 5 milliLiter(s) Swish and Spit three times a day  fludroCORTISONE 0.1 milliGRAM(s) Oral daily  fluvoxaMINE 150 milliGRAM(s) Oral two times a day  gabapentin 100 milliGRAM(s) Oral three times a day  heparin   Injectable 5000 Unit(s) SubCutaneous every 12 hours  losartan 25 milliGRAM(s) Oral daily  melatonin 3 milliGRAM(s) Oral at bedtime  mirtazapine 15 milliGRAM(s) Oral at bedtime  OLANZapine Disintegrating Tablet 20 milliGRAM(s) Oral at bedtime  predniSONE   Tablet 5 milliGRAM(s) Oral daily  senna 2 Tablet(s) Oral at bedtime    MEDICATIONS  (PRN):  acetaminophen     Tablet .. 650 milliGRAM(s) Oral every 6 hours PRN Temp greater or equal to 38.5C (101.3F), Moderate Pain (4 - 6)  haloperidol    Injectable 1 milliGRAM(s) IntraMuscular every 12 hours PRN Agitation  lactulose Syrup 15 Gram(s) Oral two times a day PRN constipation      ALLERGIES: No Known Allergies      FAMILY HISTORY:      Social history:  Alochol:   Smoking:   Drug Use:   Marital Status:     PHYSICAL EXAMINATION:  -----------------------------  T(C): 36.8 (12-02-23 @ 05:03), Max: 36.8 (12-02-23 @ 05:03)  HR: 78 (12-02-23 @ 05:03) (78 - 80)  BP: 165/78 (12-02-23 @ 06:31) (134/69 - 178/76)  RR: 17 (12-02-23 @ 05:03) (17 - 18)  SpO2: 90% (12-02-23 @ 05:03) (90% - 92%)  Wt(kg): --        Constitutional: well developed, normal appearance, well groomed, well nourished, no deformities and no acute distress.   Eyes: the conjunctiva exhibited no abnormalities and the eyelids demonstrated no xanthelasmas.   HEENT: normal oral mucosa, no oral pallor and no oral cyanosis.   Neck: normal jugular venous A waves present, normal jugular venous V waves present and no jugular venous [place] A waves.   Pulmonary: no respiratory distress, normal respiratory rhythm and effort, no accessory muscle use and lungs were clear to auscultation bilaterally. Anteriorly  Cardiovascular: heart rate and rhythm were normal, normal S1 and S2 and no murmur, gallop, rub, heave or thrill are present.   Musculoskeletal: the gait could not be assessed.   Extremities: no clubbing of the fingernails, no localized cyanosis, no petechial hemorrhages and no ischemic changes.   Skin: normal skin color and pigmentation, no rash, no venous stasis, no skin lesions, no skin ulcer and no xanthoma was observed.       LABS:   --------                             9.2    4.89  )-----------( 113      ( 30 Nov 2023 10:00 )             26.2                   Radiology:

## 2023-12-02 NOTE — PROVIDER CONTACT NOTE (OTHER) - RECOMMENDATIONS
Notify MD, assess for DVT, elevate on pillows and provide hot pack
Notify MD
cool compress and IV tylenol to be ordered,
Stop IV fluids, administer medication to lower BP.
Notify provider, make primary care team aware of patient's pain/unwillingness to accept intervention to remediate
notify MD
Additional BP medication?

## 2023-12-02 NOTE — PROGRESS NOTE ADULT - SUBJECTIVE AND OBJECTIVE BOX
Patient is a 73y old  Male who presents with a chief complaint of psychosis (02 Dec 2023 16:24)      INTERVAL HPI/OVERNIGHT EVENTS: noted  pt seen and examined this am   events noted  lethargic this am an dsleepy  wife upset      Vital Signs Last 24 Hrs  T(C): 36.9 (02 Dec 2023 19:33), Max: 36.9 (02 Dec 2023 19:33)  T(F): 98.4 (02 Dec 2023 19:33), Max: 98.4 (02 Dec 2023 19:33)  HR: 99 (02 Dec 2023 19:33) (78 - 99)  BP: 171/81 (02 Dec 2023 19:33) (129/68 - 178/76)  BP(mean): --  RR: 18 (02 Dec 2023 19:33) (16 - 18)  SpO2: 90% (02 Dec 2023 19:33) (90% - 91%)    Parameters below as of 02 Dec 2023 19:33  Patient On (Oxygen Delivery Method): room air        acetaminophen     Tablet .. 650 milliGRAM(s) Oral every 6 hours PRN  atenolol  Tablet 50 milliGRAM(s) Oral two times a day  benzocaine 20% Spray 1 Spray(s) Topical four times a day  cefTRIAXone   IVPB      cefTRIAXone   IVPB 1000 milliGRAM(s) IV Intermittent every 24 hours  cholecalciferol 2000 Unit(s) Oral daily  FIRST- Mouthwash  BLM 5 milliLiter(s) Swish and Spit three times a day  fludroCORTISONE 0.1 milliGRAM(s) Oral daily  fluvoxaMINE 150 milliGRAM(s) Oral two times a day  gabapentin 100 milliGRAM(s) Oral daily  haloperidol    Injectable 1 milliGRAM(s) IntraMuscular every 12 hours PRN  heparin   Injectable 5000 Unit(s) SubCutaneous every 12 hours  lactulose Syrup 15 Gram(s) Oral two times a day PRN  losartan 25 milliGRAM(s) Oral daily  melatonin 3 milliGRAM(s) Oral at bedtime  mirtazapine 15 milliGRAM(s) Oral at bedtime  OLANZapine Disintegrating Tablet 10 milliGRAM(s) Oral at bedtime  predniSONE   Tablet 5 milliGRAM(s) Oral daily  senna 2 Tablet(s) Oral at bedtime  sodium chloride 0.45% with potassium chloride 20 mEq/L 1000 milliLiter(s) IV Continuous <Continuous>      PHYSICAL EXAM:  GENERAL: NAD,   EYES: conjunctiva and sclera clear  ENMT: Moist mucous membranes  NECK: Supple, No JVD, Normal thyroid  CHEST/LUNG: non labored, cta b/l  HEART: Regular rate and rhythm; No murmurs, rubs, or gallops  ABDOMEN: Soft, Nontender, Nondistended; Bowel sounds present  EXTREMITIES:  2+ Peripheral Pulses, No clubbing, cyanosis, or edema  LYMPH: No lymphadenopathy noted  SKIN: No rashes or lesions    Consultant(s) Notes Reviewed:  [x ] YES  [ ] NO  Care Discussed with Consultants/Other Providers [ x] YES  [ ] NO    LABS:                        9.1    7.18  )-----------( 132      ( 02 Dec 2023 06:50 )             26.6     12-02    147<H>  |  108  |  13  ----------------------------<  106<H>  3.7   |  33<H>  |  0.74    Ca    8.8      02 Dec 2023 06:50        Urinalysis Basic - ( 02 Dec 2023 06:50 )    Color: x / Appearance: x / SG: x / pH: x  Gluc: 106 mg/dL / Ketone: x  / Bili: x / Urobili: x   Blood: x / Protein: x / Nitrite: x   Leuk Esterase: x / RBC: x / WBC x   Sq Epi: x / Non Sq Epi: x / Bacteria: x      CAPILLARY BLOOD GLUCOSE            Urinalysis Basic - ( 02 Dec 2023 06:50 )    Color: x / Appearance: x / SG: x / pH: x  Gluc: 106 mg/dL / Ketone: x  / Bili: x / Urobili: x   Blood: x / Protein: x / Nitrite: x   Leuk Esterase: x / RBC: x / WBC x   Sq Epi: x / Non Sq Epi: x / Bacteria: x          RADIOLOGY & ADDITIONAL TESTS:    Imaging Personally Reviewed:  [x ] YES  [ ] NO

## 2023-12-02 NOTE — PROGRESS NOTE ADULT - ASSESSMENT
The patient is a 73 year old male with a history of HTN, ITP, APLS, anxiety, OCD, Merkel cell cancer who presented with psychosis.    12/2/23  Seen at [facility]  Lying flat, comfortably  Awake and alert    Plan:  - ECG with no evidence of ischemia or infarction  - Orthostatic hypotension may be multifactorial - dehydration, medication related (antipsychotics), and low cortisol levels  - Allow for supine hypertension - BP labile due to orthostatic hypotension - would not treat supine hypertension with additional antihypertensives unless SBP>200  - Syncope - due to orthostatic hypotension  - Continue atenolol 50 mg bid  - Continue losartan 25 mg daily  - EGD done with no significant findings  - Psych follow-up  - UTI - on IV antibiotics-rocephin  - Monitor hemoglobin

## 2023-12-03 LAB
ANION GAP SERPL CALC-SCNC: 5 MMOL/L — SIGNIFICANT CHANGE UP (ref 5–17)
ANION GAP SERPL CALC-SCNC: 5 MMOL/L — SIGNIFICANT CHANGE UP (ref 5–17)
BUN SERPL-MCNC: 17 MG/DL — SIGNIFICANT CHANGE UP (ref 7–23)
BUN SERPL-MCNC: 17 MG/DL — SIGNIFICANT CHANGE UP (ref 7–23)
CALCIUM SERPL-MCNC: 8.4 MG/DL — LOW (ref 8.5–10.1)
CALCIUM SERPL-MCNC: 8.4 MG/DL — LOW (ref 8.5–10.1)
CHLORIDE SERPL-SCNC: 109 MMOL/L — HIGH (ref 96–108)
CHLORIDE SERPL-SCNC: 109 MMOL/L — HIGH (ref 96–108)
CO2 SERPL-SCNC: 33 MMOL/L — HIGH (ref 22–31)
CO2 SERPL-SCNC: 33 MMOL/L — HIGH (ref 22–31)
CREAT SERPL-MCNC: 0.71 MG/DL — SIGNIFICANT CHANGE UP (ref 0.5–1.3)
CREAT SERPL-MCNC: 0.71 MG/DL — SIGNIFICANT CHANGE UP (ref 0.5–1.3)
EGFR: 96 ML/MIN/1.73M2 — SIGNIFICANT CHANGE UP
EGFR: 96 ML/MIN/1.73M2 — SIGNIFICANT CHANGE UP
GLUCOSE SERPL-MCNC: 124 MG/DL — HIGH (ref 70–99)
GLUCOSE SERPL-MCNC: 124 MG/DL — HIGH (ref 70–99)
HCT VFR BLD CALC: 25.1 % — LOW (ref 39–50)
HCT VFR BLD CALC: 25.1 % — LOW (ref 39–50)
HGB BLD-MCNC: 8.4 G/DL — LOW (ref 13–17)
HGB BLD-MCNC: 8.4 G/DL — LOW (ref 13–17)
MCHC RBC-ENTMCNC: 29.5 PG — SIGNIFICANT CHANGE UP (ref 27–34)
MCHC RBC-ENTMCNC: 29.5 PG — SIGNIFICANT CHANGE UP (ref 27–34)
MCHC RBC-ENTMCNC: 33.5 GM/DL — SIGNIFICANT CHANGE UP (ref 32–36)
MCHC RBC-ENTMCNC: 33.5 GM/DL — SIGNIFICANT CHANGE UP (ref 32–36)
MCV RBC AUTO: 88.1 FL — SIGNIFICANT CHANGE UP (ref 80–100)
MCV RBC AUTO: 88.1 FL — SIGNIFICANT CHANGE UP (ref 80–100)
NRBC # BLD: 0 /100 WBCS — SIGNIFICANT CHANGE UP (ref 0–0)
NRBC # BLD: 0 /100 WBCS — SIGNIFICANT CHANGE UP (ref 0–0)
PLATELET # BLD AUTO: 132 K/UL — LOW (ref 150–400)
PLATELET # BLD AUTO: 132 K/UL — LOW (ref 150–400)
POTASSIUM SERPL-MCNC: 4 MMOL/L — SIGNIFICANT CHANGE UP (ref 3.5–5.3)
POTASSIUM SERPL-MCNC: 4 MMOL/L — SIGNIFICANT CHANGE UP (ref 3.5–5.3)
POTASSIUM SERPL-SCNC: 4 MMOL/L — SIGNIFICANT CHANGE UP (ref 3.5–5.3)
POTASSIUM SERPL-SCNC: 4 MMOL/L — SIGNIFICANT CHANGE UP (ref 3.5–5.3)
RBC # BLD: 2.85 M/UL — LOW (ref 4.2–5.8)
RBC # BLD: 2.85 M/UL — LOW (ref 4.2–5.8)
RBC # FLD: 14.8 % — HIGH (ref 10.3–14.5)
RBC # FLD: 14.8 % — HIGH (ref 10.3–14.5)
SODIUM SERPL-SCNC: 147 MMOL/L — HIGH (ref 135–145)
SODIUM SERPL-SCNC: 147 MMOL/L — HIGH (ref 135–145)
WBC # BLD: 7.3 K/UL — SIGNIFICANT CHANGE UP (ref 3.8–10.5)
WBC # BLD: 7.3 K/UL — SIGNIFICANT CHANGE UP (ref 3.8–10.5)
WBC # FLD AUTO: 7.3 K/UL — SIGNIFICANT CHANGE UP (ref 3.8–10.5)
WBC # FLD AUTO: 7.3 K/UL — SIGNIFICANT CHANGE UP (ref 3.8–10.5)

## 2023-12-03 RX ADMIN — HEPARIN SODIUM 5000 UNIT(S): 5000 INJECTION INTRAVENOUS; SUBCUTANEOUS at 17:17

## 2023-12-03 RX ADMIN — FLUVOXAMINE MALEATE 150 MILLIGRAM(S): 25 TABLET ORAL at 17:17

## 2023-12-03 RX ADMIN — HEPARIN SODIUM 5000 UNIT(S): 5000 INJECTION INTRAVENOUS; SUBCUTANEOUS at 05:42

## 2023-12-03 RX ADMIN — OLANZAPINE 10 MILLIGRAM(S): 15 TABLET, FILM COATED ORAL at 21:46

## 2023-12-03 RX ADMIN — Medication 5 MILLIGRAM(S): at 05:41

## 2023-12-03 RX ADMIN — SENNA PLUS 2 TABLET(S): 8.6 TABLET ORAL at 21:46

## 2023-12-03 RX ADMIN — DEXTROSE MONOHYDRATE, SODIUM CHLORIDE, AND POTASSIUM CHLORIDE 60 MILLILITER(S): 50; .745; 4.5 INJECTION, SOLUTION INTRAVENOUS at 11:51

## 2023-12-03 RX ADMIN — FLUDROCORTISONE ACETATE 0.1 MILLIGRAM(S): 0.1 TABLET ORAL at 05:41

## 2023-12-03 RX ADMIN — CEFTRIAXONE 100 MILLIGRAM(S): 500 INJECTION, POWDER, FOR SOLUTION INTRAMUSCULAR; INTRAVENOUS at 17:17

## 2023-12-03 RX ADMIN — Medication 3 MILLIGRAM(S): at 21:46

## 2023-12-03 RX ADMIN — SODIUM CHLORIDE 4 MILLILITER(S): 9 INJECTION INTRAMUSCULAR; INTRAVENOUS; SUBCUTANEOUS at 08:39

## 2023-12-03 RX ADMIN — FLUVOXAMINE MALEATE 150 MILLIGRAM(S): 25 TABLET ORAL at 05:41

## 2023-12-03 RX ADMIN — GABAPENTIN 100 MILLIGRAM(S): 400 CAPSULE ORAL at 11:37

## 2023-12-03 RX ADMIN — LOSARTAN POTASSIUM 25 MILLIGRAM(S): 100 TABLET, FILM COATED ORAL at 05:41

## 2023-12-03 RX ADMIN — ATENOLOL 50 MILLIGRAM(S): 25 TABLET ORAL at 05:41

## 2023-12-03 RX ADMIN — ATENOLOL 50 MILLIGRAM(S): 25 TABLET ORAL at 17:18

## 2023-12-03 RX ADMIN — Medication 2000 UNIT(S): at 11:37

## 2023-12-03 RX ADMIN — MIRTAZAPINE 15 MILLIGRAM(S): 45 TABLET, ORALLY DISINTEGRATING ORAL at 21:46

## 2023-12-03 NOTE — PROGRESS NOTE ADULT - ASSESSMENT
73 M pmh of HTN, ITP, APL syndrome, generalized anxiety disorder, OCD, newly dx aggressive Merkel Cell cancer of forehead- pt's oncologist  Dr. Eng, at [facility] on Keytruda infusions with good response per wife  was brought to ED for worsening psychosis for the past few days, pt lately with difficulty swallowing, decreased po intake    Psychosis  monitor  psych fu  MRI noted  neuro following  cont luvox   continue to titrate haldol  zyprexa titrate as per psych  GI fu noted- sp EGD - normal -sp bx fu path  trial of dilaudid without success     #HTN/Orthostatic hypotension  cont atenolol w parameters  ? psych med induced  adrenal insufficiency  continue prednisone and  florinef    #unresponsive episode  -?orthostatic hypotension vs other neuro causes  ekg- sinus, cards fu  d/w neuro will obtain EEG    UTI  sp abx    #Merkel cell ca  onc c/s noted  r/o paraneoplastic causes- unlikely      #DVT ppx-  hep sq    [facility]   [phone] 73 M pmh of HTN, ITP, APL syndrome, generalized anxiety disorder, OCD, newly dx aggressive Merkel Cell cancer of forehead- pt's oncologist  Dr. Eng, at [facility] on Keytruda infusions with good response per wife  was brought to ED for worsening psychosis for the past few days, pt lately with difficulty swallowing, decreased po intake    Psychosis  monitor  psych fu  MRI noted  neuro following  cont luvox   continue to titrate haldol  zyprexa titrate as per psych  GI fu noted- sp EGD - normal -sp bx fu path  trial of dilaudid without success     #HTN/Orthostatic hypotension  cont atenolol w parameters  ? psych med induced  adrenal insufficiency  continue prednisone and  florinef    #unresponsive episode  -?orthostatic hypotension vs other neuro causes  ekg- sinus, cards fu  d/w neuro will obtain EEG    UTI  sp abx    #Merkel cell ca  onc c/s noted  r/o paraneoplastic causes- unlikely      #DVT ppx-  hep sq    [facility]   [phone]

## 2023-12-03 NOTE — PROGRESS NOTE ADULT - SUBJECTIVE AND OBJECTIVE BOX
Patient is a 73y old  Male who presents with a chief complaint of psychosis (02 Dec 2023 16:24)      INTERVAL HPI/OVERNIGHT EVENTS: noted  pt seen and examined this am   events noted  feels well      Vital Signs Last 24 Hrs  T(C): 36.7 (03 Dec 2023 04:30), Max: 36.9 (02 Dec 2023 19:33)  T(F): 98.1 (03 Dec 2023 04:30), Max: 98.4 (02 Dec 2023 19:33)  HR: 70 (03 Dec 2023 08:39) (70 - 99)  BP: 175/84 (03 Dec 2023 17:29) (158/81 - 180/80)  BP(mean): --  RR: 17 (03 Dec 2023 04:30) (17 - 18)  SpO2: 95% (03 Dec 2023 08:39) (90% - 95%)    Parameters below as of 03 Dec 2023 08:39  Patient On (Oxygen Delivery Method): nasal cannula        acetaminophen     Tablet .. 650 milliGRAM(s) Oral every 6 hours PRN  atenolol  Tablet 50 milliGRAM(s) Oral two times a day  benzocaine 20% Spray 1 Spray(s) Topical four times a day  cefTRIAXone   IVPB 1000 milliGRAM(s) IV Intermittent every 24 hours  cefTRIAXone   IVPB      cholecalciferol 2000 Unit(s) Oral daily  FIRST- Mouthwash  BLM 5 milliLiter(s) Swish and Spit three times a day  fludroCORTISONE 0.1 milliGRAM(s) Oral daily  fluvoxaMINE 150 milliGRAM(s) Oral two times a day  gabapentin 100 milliGRAM(s) Oral daily  haloperidol    Injectable 1 milliGRAM(s) IntraMuscular every 12 hours PRN  heparin   Injectable 5000 Unit(s) SubCutaneous every 12 hours  lactulose Syrup 15 Gram(s) Oral two times a day PRN  losartan 25 milliGRAM(s) Oral daily  melatonin 3 milliGRAM(s) Oral at bedtime  mirtazapine 15 milliGRAM(s) Oral at bedtime  OLANZapine Disintegrating Tablet 10 milliGRAM(s) Oral at bedtime  predniSONE   Tablet 5 milliGRAM(s) Oral daily  senna 2 Tablet(s) Oral at bedtime  sodium chloride 0.45% with potassium chloride 20 mEq/L 1000 milliLiter(s) IV Continuous <Continuous>      PHYSICAL EXAM:  GENERAL: NAD,   EYES: conjunctiva and sclera clear  ENMT: Moist mucous membranes  NECK: Supple, No JVD, Normal thyroid  CHEST/LUNG: non labored, cta b/l  HEART: Regular rate and rhythm; No murmurs, rubs, or gallops  ABDOMEN: Soft, Nontender, Nondistended; Bowel sounds present  EXTREMITIES:  2+ Peripheral Pulses, No clubbing, cyanosis, or edema  LYMPH: No lymphadenopathy noted  SKIN: No rashes or lesions    Consultant(s) Notes Reviewed:  [x ] YES  [ ] NO  Care Discussed with Consultants/Other Providers [ x] YES  [ ] NO    LABS:                        8.4    7.30  )-----------( 132      ( 03 Dec 2023 08:30 )             25.1     12-03    147<H>  |  109<H>  |  17  ----------------------------<  124<H>  4.0   |  33<H>  |  0.71    Ca    8.4<L>      03 Dec 2023 08:30        Urinalysis Basic - ( 03 Dec 2023 08:30 )    Color: x / Appearance: x / SG: x / pH: x  Gluc: 124 mg/dL / Ketone: x  / Bili: x / Urobili: x   Blood: x / Protein: x / Nitrite: x   Leuk Esterase: x / RBC: x / WBC x   Sq Epi: x / Non Sq Epi: x / Bacteria: x      CAPILLARY BLOOD GLUCOSE            Urinalysis Basic - ( 03 Dec 2023 08:30 )    Color: x / Appearance: x / SG: x / pH: x  Gluc: 124 mg/dL / Ketone: x  / Bili: x / Urobili: x   Blood: x / Protein: x / Nitrite: x   Leuk Esterase: x / RBC: x / WBC x   Sq Epi: x / Non Sq Epi: x / Bacteria: x          RADIOLOGY & ADDITIONAL TESTS:    Imaging Personally Reviewed:  [x ] YES  [ ] NO

## 2023-12-03 NOTE — PROGRESS NOTE ADULT - ASSESSMENT
The patient is a 73 year old male with a history of HTN, ITP, APLS, anxiety, OCD, Merkel cell cancer who presented with psychosis.    12/3/23  Seen at [facility]  Lying flat, comfortably  Awake and alert    Plan:  - ECG with no evidence of ischemia or infarction  - Orthostatic hypotension may be multifactorial - dehydration, medication related (antipsychotics), and low cortisol levels  - Allow for supine hypertension - BP labile due to orthostatic hypotension - would not treat supine hypertension with additional antihypertensives unless SBP>200  - Syncope - due to orthostatic hypotension  - Continue atenolol 50 mg bid  - Continue losartan 25 mg daily  - EGD done with no significant findings  - Psych follow-up  - UTI - on IV antibiotics-rocephin  - Monitor hemoglobin The patient is a 73 year old male with a history of HTN, ITP, APLS, anxiety, OCD, Merkel cell cancer who presented with psychosis.    12/3/23  Seen at [facility]  Lying flat, comfortably  Awake and alert    Plan:  - ECG with no evidence of ischemia or infarction  - Orthostatic hypotension may be multifactorial - dehydration, medication related (antipsychotics), and low cortisol levels  - Allow for supine hypertension - BP labile due to orthostatic hypotension - would not treat supine hypertension with additional antihypertensives unless SBP>200  - Syncope - due to orthostatic hypotension  - Continue atenolol 50 mg bid  - Continue losartan 25 mg daily  - EGD done with no significant findings  - Psych follow-up  - UTI - on IV antibiotics-rocephin  - Monitor hemoglobin The patient is a 73 year old male with a history of HTN, ITP, APLS, anxiety, OCD, Merkel cell cancer who presented with psychosis.    12/3/23  Seen at [facility]  Lying flat, comfortably  Awake and alert    Plan:  - ECG with no evidence of ischemia or infarction  - Orthostatic hypotension may be multifactorial - dehydration, medication related (antipsychotics), and low cortisol levels  - Allow for supine hypertension - BP labile due to orthostatic hypotension - would not treat supine hypertension with additional antihypertensives unless SBP>200  - Syncope - due to orthostatic hypotension  - Continue atenolol 50 mg bid  - Continue losartan 25 mg daily  - EGD done with no significant findings  - Psych follow-up  - UTI - on IV antibiotics-rocephin  - Monitor hemoglobin

## 2023-12-03 NOTE — PROGRESS NOTE ADULT - SUBJECTIVE AND OBJECTIVE BOX
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 Eng, at [facility] on Keytruda infusions with good response per wife  was brought o ED for worsening psychosis for the past few days, pt believes there are sharp things inside his body trying to come out of his mouth, stuck in throat  pt lately with difficulty swallowing, decreased po intake  pt was evaluated by psych in ED (24 Oct 2023 13:19)      REVIEW OF SYSTEMS:    CONSTITUTIONAL: No fever, weight loss, or fatigue  EYES: No eye pain, visual disturbances, or discharge  ENMT:  No difficulty hearing, tinnitus, vertigo; No sinus or throat pain  NECK: No pain or stiffness  BREASTS: No pain, masses, or nipple discharge  RESPIRATORY: No cough, wheezing, chills or hemoptysis; No shortness of breath  CARDIOVASCULAR: No chest pain, palpitations, dizziness, or leg swelling  GASTROINTESTINAL: No abdominal or epigastric pain. No nausea, vomiting, or hematemesis; No diarrhea or constipation. No melena or hematochezia.  GENITOURINARY: No dysuria, frequency, hematuria, or incontinence  NEUROLOGICAL: No headaches, memory loss, loss of strength, numbness, or tremors  SKIN: No itching, burning, rashes, or lesions   LYMPH NODES: No enlarged glands  ENDOCRINE: No heat or cold intolerance; No hair loss  MUSCULOSKELETAL: No joint pain or swelling; No muscle, back, or extremity pain  PSYCHIATRIC: No depression, anxiety, mood swings, or difficulty sleeping  HEME/LYMPH: No easy bruising, or bleeding gums  ALLERGY AND IMMUNOLOGIC: No hives or eczema    MEDICATIONS  (STANDING):  atenolol  Tablet 50 milliGRAM(s) Oral two times a day  benzocaine 20% Spray 1 Spray(s) Topical four times a day  cefTRIAXone   IVPB      cefTRIAXone   IVPB 1000 milliGRAM(s) IV Intermittent every 24 hours  cholecalciferol 2000 Unit(s) Oral daily  FIRST- Mouthwash  BLM 5 milliLiter(s) Swish and Spit three times a day  fludroCORTISONE 0.1 milliGRAM(s) Oral daily  fluvoxaMINE 150 milliGRAM(s) Oral two times a day  gabapentin 100 milliGRAM(s) Oral daily  heparin   Injectable 5000 Unit(s) SubCutaneous every 12 hours  losartan 25 milliGRAM(s) Oral daily  melatonin 3 milliGRAM(s) Oral at bedtime  mirtazapine 15 milliGRAM(s) Oral at bedtime  OLANZapine Disintegrating Tablet 10 milliGRAM(s) Oral at bedtime  predniSONE   Tablet 5 milliGRAM(s) Oral daily  senna 2 Tablet(s) Oral at bedtime  sodium chloride 0.45% with potassium chloride 20 mEq/L 1000 milliLiter(s) (60 mL/Hr) IV Continuous <Continuous>  sodium chloride 3%  Inhalation 4 milliLiter(s) Inhalation once    MEDICATIONS  (PRN):  acetaminophen     Tablet .. 650 milliGRAM(s) Oral every 6 hours PRN Temp greater or equal to 38.5C (101.3F), Moderate Pain (4 - 6)  haloperidol    Injectable 1 milliGRAM(s) IntraMuscular every 12 hours PRN Agitation  lactulose Syrup 15 Gram(s) Oral two times a day PRN constipation      ALLERGIES: No Known Allergies      FAMILY HISTORY:      Social history:  Alochol:   Smoking:   Drug Use:   Marital Status:     PHYSICAL EXAMINATION:  -----------------------------  T(C): 36.7 (12-03-23 @ 04:30), Max: 36.9 (12-02-23 @ 19:33)  HR: 82 (12-03-23 @ 04:30) (80 - 99)  BP: 158/81 (12-03-23 @ 04:30) (129/68 - 171/81)  RR: 17 (12-03-23 @ 04:30) (16 - 18)  SpO2: 94% (12-03-23 @ 04:30) (90% - 94%)  Wt(kg): --        Constitutional: well developed, normal appearance, well groomed, well nourished, no deformities and no acute distress.   Eyes: the conjunctiva exhibited no abnormalities and the eyelids demonstrated no xanthelasmas.   HEENT: normal oral mucosa, no oral pallor and no oral cyanosis.   Neck: normal jugular venous A waves present, normal jugular venous V waves present and no jugular venous [place] A waves.   Pulmonary: no respiratory distress, normal respiratory rhythm and effort, no accessory muscle use and lungs were clear to auscultation bilaterally. Anteriorly  Cardiovascular: heart rate and rhythm were normal, normal S1 and S2 and no murmur, gallop, rub, heave or thrill are present.   Musculoskeletal: the gait could not be assessed.  Extremities: no clubbing of the fingernails, no localized cyanosis, no petechial hemorrhages and no ischemic changes.   Skin: normal skin color and pigmentation, no rash, no venous stasis, no skin lesions, no skin ulcer and no xanthoma was observed.       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 Eng, at [facility] on Keytruda infusions with good response per wife  was brought o ED for worsening psychosis for the past few days, pt believes there are sharp things inside his body trying to come out of his mouth, stuck in throat  pt lately with difficulty swallowing, decreased po intake  pt was evaluated by psych in ED (24 Oct 2023 13:19)      REVIEW OF SYSTEMS:    CONSTITUTIONAL: No fever, weight loss, or fatigue  EYES: No eye pain, visual disturbances, or discharge  ENMT:  No difficulty hearing, tinnitus, vertigo; No sinus or throat pain  NECK: No pain or stiffness  BREASTS: No pain, masses, or nipple discharge  RESPIRATORY: No cough, wheezing, chills or hemoptysis; No shortness of breath  CARDIOVASCULAR: No chest pain, palpitations, dizziness, or leg swelling  GASTROINTESTINAL: No abdominal or epigastric pain. No nausea, vomiting, or hematemesis; No diarrhea or constipation. No melena or hematochezia.  GENITOURINARY: No dysuria, frequency, hematuria, or incontinence  NEUROLOGICAL: No headaches, memory loss, loss of strength, numbness, or tremors  SKIN: No itching, burning, rashes, or lesions   LYMPH NODES: No enlarged glands  ENDOCRINE: No heat or cold intolerance; No hair loss  MUSCULOSKELETAL: No joint pain or swelling; No muscle, back, or extremity pain  PSYCHIATRIC: No depression, anxiety, mood swings, or difficulty sleeping  HEME/LYMPH: No easy bruising, or bleeding gums  ALLERGY AND IMMUNOLOGIC: No hives or eczema    MEDICATIONS  (STANDING):  atenolol  Tablet 50 milliGRAM(s) Oral two times a day  benzocaine 20% Spray 1 Spray(s) Topical four times a day  cefTRIAXone   IVPB      cefTRIAXone   IVPB 1000 milliGRAM(s) IV Intermittent every 24 hours  cholecalciferol 2000 Unit(s) Oral daily  FIRST- Mouthwash  BLM 5 milliLiter(s) Swish and Spit three times a day  fludroCORTISONE 0.1 milliGRAM(s) Oral daily  fluvoxaMINE 150 milliGRAM(s) Oral two times a day  gabapentin 100 milliGRAM(s) Oral daily  heparin   Injectable 5000 Unit(s) SubCutaneous every 12 hours  losartan 25 milliGRAM(s) Oral daily  melatonin 3 milliGRAM(s) Oral at bedtime  mirtazapine 15 milliGRAM(s) Oral at bedtime  OLANZapine Disintegrating Tablet 10 milliGRAM(s) Oral at bedtime  predniSONE   Tablet 5 milliGRAM(s) Oral daily  senna 2 Tablet(s) Oral at bedtime  sodium chloride 0.45% with potassium chloride 20 mEq/L 1000 milliLiter(s) (60 mL/Hr) IV Continuous <Continuous>  sodium chloride 3%  Inhalation 4 milliLiter(s) Inhalation once    MEDICATIONS  (PRN):  acetaminophen     Tablet .. 650 milliGRAM(s) Oral every 6 hours PRN Temp greater or equal to 38.5C (101.3F), Moderate Pain (4 - 6)  haloperidol    Injectable 1 milliGRAM(s) IntraMuscular every 12 hours PRN Agitation  lactulose Syrup 15 Gram(s) Oral two times a day PRN constipation      ALLERGIES: No Known Allergies      FAMILY HISTORY:      Social history:  Alochol:   Smoking:   Drug Use:   Marital Status:     PHYSICAL EXAMINATION:  -----------------------------  T(C): 36.7 (12-03-23 @ 04:30), Max: 36.9 (12-02-23 @ 19:33)  HR: 82 (12-03-23 @ 04:30) (80 - 99)  BP: 158/81 (12-03-23 @ 04:30) (129/68 - 171/81)  RR: 17 (12-03-23 @ 04:30) (16 - 18)  SpO2: 94% (12-03-23 @ 04:30) (90% - 94%)  Wt(kg): --        Constitutional: well developed, normal appearance, well groomed, well nourished, no deformities and no acute distress.   Eyes: the conjunctiva exhibited no abnormalities and the eyelids demonstrated no xanthelasmas.   HEENT: normal oral mucosa, no oral pallor and no oral cyanosis.   Neck: normal jugular venous A waves present, normal jugular venous V waves present and no jugular venous [place] A waves.   Pulmonary: no respiratory distress, normal respiratory rhythm and effort, no accessory muscle use and lungs were clear to auscultation bilaterally. Anteriorly  Cardiovascular: heart rate and rhythm were normal, normal S1 and S2 and no murmur, gallop, rub, heave or thrill are present.   Musculoskeletal: the gait could not be assessed.  Extremities: no clubbing of the fingernails, no localized cyanosis, no petechial hemorrhages and no ischemic changes.   Skin: normal skin color and pigmentation, no rash, no venous stasis, no skin lesions, no skin ulcer and no xanthoma was observed.       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 Eng, at [facility] on Keytruda infusions with good response per wife  was brought o ED for worsening psychosis for the past few days, pt believes there are sharp things inside his body trying to come out of his mouth, stuck in throat  pt lately with difficulty swallowing, decreased po intake  pt was evaluated by psych in ED (24 Oct 2023 13:19)      REVIEW OF SYSTEMS:    CONSTITUTIONAL: No fever, weight loss, or fatigue  EYES: No eye pain, visual disturbances, or discharge  ENMT:  No difficulty hearing, tinnitus, vertigo; No sinus or throat pain  NECK: No pain or stiffness  BREASTS: No pain, masses, or nipple discharge  RESPIRATORY: No cough, wheezing, chills or hemoptysis; No shortness of breath  CARDIOVASCULAR: No chest pain, palpitations, dizziness, or leg swelling  GASTROINTESTINAL: No abdominal or epigastric pain. No nausea, vomiting, or hematemesis; No diarrhea or constipation. No melena or hematochezia.  GENITOURINARY: No dysuria, frequency, hematuria, or incontinence  NEUROLOGICAL: No headaches, memory loss, loss of strength, numbness, or tremors  SKIN: No itching, burning, rashes, or lesions   LYMPH NODES: No enlarged glands  ENDOCRINE: No heat or cold intolerance; No hair loss  MUSCULOSKELETAL: No joint pain or swelling; No muscle, back, or extremity pain  PSYCHIATRIC: No depression, anxiety, mood swings, or difficulty sleeping  HEME/LYMPH: No easy bruising, or bleeding gums  ALLERGY AND IMMUNOLOGIC: No hives or eczema    MEDICATIONS  (STANDING):  atenolol  Tablet 50 milliGRAM(s) Oral two times a day  benzocaine 20% Spray 1 Spray(s) Topical four times a day  cefTRIAXone   IVPB      cefTRIAXone   IVPB 1000 milliGRAM(s) IV Intermittent every 24 hours  cholecalciferol 2000 Unit(s) Oral daily  FIRST- Mouthwash  BLM 5 milliLiter(s) Swish and Spit three times a day  fludroCORTISONE 0.1 milliGRAM(s) Oral daily  fluvoxaMINE 150 milliGRAM(s) Oral two times a day  gabapentin 100 milliGRAM(s) Oral daily  heparin   Injectable 5000 Unit(s) SubCutaneous every 12 hours  losartan 25 milliGRAM(s) Oral daily  melatonin 3 milliGRAM(s) Oral at bedtime  mirtazapine 15 milliGRAM(s) Oral at bedtime  OLANZapine Disintegrating Tablet 10 milliGRAM(s) Oral at bedtime  predniSONE   Tablet 5 milliGRAM(s) Oral daily  senna 2 Tablet(s) Oral at bedtime  sodium chloride 0.45% with potassium chloride 20 mEq/L 1000 milliLiter(s) (60 mL/Hr) IV Continuous <Continuous>  sodium chloride 3%  Inhalation 4 milliLiter(s) Inhalation once    MEDICATIONS  (PRN):  acetaminophen     Tablet .. 650 milliGRAM(s) Oral every 6 hours PRN Temp greater or equal to 38.5C (101.3F), Moderate Pain (4 - 6)  haloperidol    Injectable 1 milliGRAM(s) IntraMuscular every 12 hours PRN Agitation  lactulose Syrup 15 Gram(s) Oral two times a day PRN constipation      ALLERGIES: No Known Allergies      FAMILY HISTORY:      Social history:  Alochol:   Smoking:   Drug Use:   Marital Status:     PHYSICAL EXAMINATION:  -----------------------------  T(C): 36.7 (12-03-23 @ 04:30), Max: 36.9 (12-02-23 @ 19:33)  HR: 82 (12-03-23 @ 04:30) (80 - 99)  BP: 158/81 (12-03-23 @ 04:30) (129/68 - 171/81)  RR: 17 (12-03-23 @ 04:30) (16 - 18)  SpO2: 94% (12-03-23 @ 04:30) (90% - 94%)  Wt(kg): --        Constitutional: well developed, normal appearance, well groomed, well nourished, no deformities and no acute distress.   Eyes: the conjunctiva exhibited no abnormalities and the eyelids demonstrated no xanthelasmas.   HEENT: normal oral mucosa, no oral pallor and no oral cyanosis.   Neck: normal jugular venous A waves present, normal jugular venous V waves present and no jugular venous [place] A waves.   Pulmonary: no respiratory distress, normal respiratory rhythm and effort, no accessory muscle use and lungs were clear to auscultation bilaterally. Anteriorly  Cardiovascular: heart rate and rhythm were normal, normal S1 and S2 and no murmur, gallop, rub, heave or thrill are present.   Musculoskeletal: the gait could not be assessed.  Extremities: no clubbing of the fingernails, no localized cyanosis, no petechial hemorrhages and no ischemic changes.   Skin: normal skin color and pigmentation, no rash, no venous stasis, no skin lesions, no skin ulcer and no xanthoma was observed.       LABS:   --------  12-02    147<H>  |  108  |  13  ----------------------------<  106<H>  3.7   |  33<H>  |  0.74    Ca    8.8      02 Dec 2023 06:50                           9.1    7.18  )-----------( 132      ( 02 Dec 2023 06:50 )             26.6                   Radiology:

## 2023-12-04 LAB
ANION GAP SERPL CALC-SCNC: 3 MMOL/L — LOW (ref 5–17)
ANION GAP SERPL CALC-SCNC: 3 MMOL/L — LOW (ref 5–17)
BUN SERPL-MCNC: 15 MG/DL — SIGNIFICANT CHANGE UP (ref 7–23)
BUN SERPL-MCNC: 15 MG/DL — SIGNIFICANT CHANGE UP (ref 7–23)
CALCIUM SERPL-MCNC: 8.5 MG/DL — SIGNIFICANT CHANGE UP (ref 8.5–10.1)
CALCIUM SERPL-MCNC: 8.5 MG/DL — SIGNIFICANT CHANGE UP (ref 8.5–10.1)
CHLORIDE SERPL-SCNC: 107 MMOL/L — SIGNIFICANT CHANGE UP (ref 96–108)
CHLORIDE SERPL-SCNC: 107 MMOL/L — SIGNIFICANT CHANGE UP (ref 96–108)
CO2 SERPL-SCNC: 34 MMOL/L — HIGH (ref 22–31)
CO2 SERPL-SCNC: 34 MMOL/L — HIGH (ref 22–31)
CREAT SERPL-MCNC: 0.71 MG/DL — SIGNIFICANT CHANGE UP (ref 0.5–1.3)
CREAT SERPL-MCNC: 0.71 MG/DL — SIGNIFICANT CHANGE UP (ref 0.5–1.3)
EGFR: 96 ML/MIN/1.73M2 — SIGNIFICANT CHANGE UP
EGFR: 96 ML/MIN/1.73M2 — SIGNIFICANT CHANGE UP
GLUCOSE SERPL-MCNC: 122 MG/DL — HIGH (ref 70–99)
GLUCOSE SERPL-MCNC: 122 MG/DL — HIGH (ref 70–99)
HCT VFR BLD CALC: 26.8 % — LOW (ref 39–50)
HCT VFR BLD CALC: 26.8 % — LOW (ref 39–50)
HGB BLD-MCNC: 9.2 G/DL — LOW (ref 13–17)
HGB BLD-MCNC: 9.2 G/DL — LOW (ref 13–17)
MCHC RBC-ENTMCNC: 30.2 PG — SIGNIFICANT CHANGE UP (ref 27–34)
MCHC RBC-ENTMCNC: 30.2 PG — SIGNIFICANT CHANGE UP (ref 27–34)
MCHC RBC-ENTMCNC: 34.3 GM/DL — SIGNIFICANT CHANGE UP (ref 32–36)
MCHC RBC-ENTMCNC: 34.3 GM/DL — SIGNIFICANT CHANGE UP (ref 32–36)
MCV RBC AUTO: 87.9 FL — SIGNIFICANT CHANGE UP (ref 80–100)
MCV RBC AUTO: 87.9 FL — SIGNIFICANT CHANGE UP (ref 80–100)
NRBC # BLD: 0 /100 WBCS — SIGNIFICANT CHANGE UP (ref 0–0)
NRBC # BLD: 0 /100 WBCS — SIGNIFICANT CHANGE UP (ref 0–0)
PLATELET # BLD AUTO: 145 K/UL — LOW (ref 150–400)
PLATELET # BLD AUTO: 145 K/UL — LOW (ref 150–400)
POTASSIUM SERPL-MCNC: 4.2 MMOL/L — SIGNIFICANT CHANGE UP (ref 3.5–5.3)
POTASSIUM SERPL-MCNC: 4.2 MMOL/L — SIGNIFICANT CHANGE UP (ref 3.5–5.3)
POTASSIUM SERPL-SCNC: 4.2 MMOL/L — SIGNIFICANT CHANGE UP (ref 3.5–5.3)
POTASSIUM SERPL-SCNC: 4.2 MMOL/L — SIGNIFICANT CHANGE UP (ref 3.5–5.3)
RBC # BLD: 3.05 M/UL — LOW (ref 4.2–5.8)
RBC # BLD: 3.05 M/UL — LOW (ref 4.2–5.8)
RBC # FLD: 14.7 % — HIGH (ref 10.3–14.5)
RBC # FLD: 14.7 % — HIGH (ref 10.3–14.5)
SEROTONIN SER-MCNC: <30 NG/ML — SIGNIFICANT CHANGE UP
SEROTONIN SER-MCNC: <30 NG/ML — SIGNIFICANT CHANGE UP
SODIUM SERPL-SCNC: 144 MMOL/L — SIGNIFICANT CHANGE UP (ref 135–145)
SODIUM SERPL-SCNC: 144 MMOL/L — SIGNIFICANT CHANGE UP (ref 135–145)
WBC # BLD: 6.53 K/UL — SIGNIFICANT CHANGE UP (ref 3.8–10.5)
WBC # BLD: 6.53 K/UL — SIGNIFICANT CHANGE UP (ref 3.8–10.5)
WBC # FLD AUTO: 6.53 K/UL — SIGNIFICANT CHANGE UP (ref 3.8–10.5)
WBC # FLD AUTO: 6.53 K/UL — SIGNIFICANT CHANGE UP (ref 3.8–10.5)

## 2023-12-04 PROCEDURE — 99231 SBSQ HOSP IP/OBS SF/LOW 25: CPT

## 2023-12-04 RX ORDER — DIAZEPAM 5 MG
2 TABLET ORAL
Refills: 0 | Status: DISCONTINUED | OUTPATIENT
Start: 2023-12-04 | End: 2023-12-05

## 2023-12-04 RX ADMIN — FLUDROCORTISONE ACETATE 0.1 MILLIGRAM(S): 0.1 TABLET ORAL at 05:20

## 2023-12-04 RX ADMIN — HEPARIN SODIUM 5000 UNIT(S): 5000 INJECTION INTRAVENOUS; SUBCUTANEOUS at 18:35

## 2023-12-04 RX ADMIN — DIPHENHYDRAMINE HYDROCHLORIDE AND LIDOCAINE HYDROCHLORIDE AND ALUMINUM HYDROXIDE AND MAGNESIUM HYDRO 5 MILLILITER(S): KIT at 13:44

## 2023-12-04 RX ADMIN — FLUVOXAMINE MALEATE 150 MILLIGRAM(S): 25 TABLET ORAL at 18:34

## 2023-12-04 RX ADMIN — MIRTAZAPINE 15 MILLIGRAM(S): 45 TABLET, ORALLY DISINTEGRATING ORAL at 21:38

## 2023-12-04 RX ADMIN — FLUVOXAMINE MALEATE 150 MILLIGRAM(S): 25 TABLET ORAL at 05:20

## 2023-12-04 RX ADMIN — Medication 650 MILLIGRAM(S): at 18:35

## 2023-12-04 RX ADMIN — Medication 5 MILLIGRAM(S): at 05:20

## 2023-12-04 RX ADMIN — Medication 3 MILLIGRAM(S): at 21:37

## 2023-12-04 RX ADMIN — DEXTROSE MONOHYDRATE, SODIUM CHLORIDE, AND POTASSIUM CHLORIDE 60 MILLILITER(S): 50; .745; 4.5 INJECTION, SOLUTION INTRAVENOUS at 05:20

## 2023-12-04 RX ADMIN — DEXTROSE MONOHYDRATE, SODIUM CHLORIDE, AND POTASSIUM CHLORIDE 60 MILLILITER(S): 50; .745; 4.5 INJECTION, SOLUTION INTRAVENOUS at 19:16

## 2023-12-04 RX ADMIN — Medication 2 MILLIGRAM(S): at 18:34

## 2023-12-04 RX ADMIN — ATENOLOL 50 MILLIGRAM(S): 25 TABLET ORAL at 18:34

## 2023-12-04 RX ADMIN — Medication 2000 UNIT(S): at 12:02

## 2023-12-04 RX ADMIN — SENNA PLUS 2 TABLET(S): 8.6 TABLET ORAL at 21:37

## 2023-12-04 RX ADMIN — ATENOLOL 50 MILLIGRAM(S): 25 TABLET ORAL at 05:20

## 2023-12-04 RX ADMIN — GABAPENTIN 100 MILLIGRAM(S): 400 CAPSULE ORAL at 12:03

## 2023-12-04 RX ADMIN — OLANZAPINE 10 MILLIGRAM(S): 15 TABLET, FILM COATED ORAL at 21:38

## 2023-12-04 RX ADMIN — LOSARTAN POTASSIUM 25 MILLIGRAM(S): 100 TABLET, FILM COATED ORAL at 05:20

## 2023-12-04 RX ADMIN — HEPARIN SODIUM 5000 UNIT(S): 5000 INJECTION INTRAVENOUS; SUBCUTANEOUS at 05:20

## 2023-12-04 NOTE — PROGRESS NOTE ADULT - SUBJECTIVE AND OBJECTIVE BOX
Chief Complaint: Psychosis    Interval Events: No events overnight.    Review of Systems:  General: No fevers, chills, weight gain  Skin: No rashes, color changes  Cardiovascular: No chest pain, orthopnea  Respiratory: No shortness of breath, cough  Gastrointestinal: No nausea, abdominal pain  Genitourinary: No incontinence, pain with urination  Musculoskeletal: No pain, swelling, decreased range of motion  Neurological: No headache, weakness  Psychiatric: No depression, anxiety  Endocrine: No weight gain, increased thirst  All other systems are comprehensively negative.    Physical Exam:  Vital Signs Last 24 Hrs  T(C): 37 (04 Dec 2023 04:34), Max: 37 (04 Dec 2023 04:34)  T(F): 98.6 (04 Dec 2023 04:34), Max: 98.6 (04 Dec 2023 04:34)  HR: 76 (04 Dec 2023 04:34) (76 - 80)  BP: 136/79 (04 Dec 2023 04:34) (130/75 - 180/80)  BP(mean): --  RR: 17 (04 Dec 2023 04:34) (17 - 17)  SpO2: 96% (04 Dec 2023 04:34) (96% - 98%)  Parameters below as of 04 Dec 2023 04:34  Patient On (Oxygen Delivery Method): nasal cannula  O2 Flow (L/min): 2  General: NAD  HEENT: MMM  Neck: No JVD, no carotid bruit  Lungs: CTAB  CV: RRR, nl S1/S2, no M/R/G  Abdomen: S/NT/ND, +BS  Extremities: No LE edema, no cyanosis  Neuro: AAOx3, non-focal  Skin: No rash    Labs:    12-03    147<H>  |  109<H>  |  17  ----------------------------<  124<H>  4.0   |  33<H>  |  0.71    Ca    8.4<L>      03 Dec 2023 08:30                          8.4    7.30  )-----------( 132      ( 03 Dec 2023 08:30 )             25.1

## 2023-12-04 NOTE — CASE MANAGEMENT PROGRESS NOTE - NSCMPROGRESSNOTE_GEN_ALL_CORE
Discussed on rounds this am, pt remains on IV abx through 12/6, pt on O2.  d/c planning TBD, SW following for post acute planning, a CM will remain available through hospitalization.

## 2023-12-04 NOTE — BH CONSULTATION LIAISON PROGRESS NOTE - NSBHATTESTBILLING_PSY_A_CORE
[id number] OBS or IP - low complexity OR 25-34 mins [id number] OBS or IP - low complexity OR 25-34 mins

## 2023-12-04 NOTE — PROGRESS NOTE ADULT - ASSESSMENT
73 M pmh of HTN, ITP, APL syndrome, generalized anxiety disorder, OCD, newly dx aggressive Merkel Cell cancer of forehead- pt's oncologist  Dr. Eng, at [facility] on Keytruda (PD-1 blocker) infusions with good response per wife  was brought to ED for worsening psychosis for the past few days, pt believes there are sharp things inside his body trying to come out of his mouth, stuck in throat, pt lately with difficulty swallowing, decreased po intake, pt was evaluated by psych in ED. Wife reports sudden cognitive decline and behavioral changes immediately after COVID infection.    1-PASC  noted serology for EBV consistent with reactivation as described post PASC  Serotonin, Blood: <30ng/mL   am cortisol-low x 2 then nl  Psych involved    2-PYELONEPHRITIS   -ceftriaxone (started 11/25), completed  urine culture w >100,000 CFU/ml Klebsiella variicola,  -  Ceftriaxone: S <=1  blood cultures-NGTD  Monitor off Abx    Infectious Diseases will sign off   Please call with any questions.   Jillian Mckoy M.D.  [facility] Infectious [facility] [phone]  For after 5 P.M. and weekends, please call [phone]   73 M pmh of HTN, ITP, APL syndrome, generalized anxiety disorder, OCD, newly dx aggressive Merkel Cell cancer of forehead- pt's oncologist  Dr. Eng, at [facility] on Keytruda (PD-1 blocker) infusions with good response per wife  was brought to ED for worsening psychosis for the past few days, pt believes there are sharp things inside his body trying to come out of his mouth, stuck in throat, pt lately with difficulty swallowing, decreased po intake, pt was evaluated by psych in ED. Wife reports sudden cognitive decline and behavioral changes immediately after COVID infection.    1-PASC  noted serology for EBV consistent with reactivation as described post PASC  Serotonin, Blood: <30ng/mL   am cortisol-low x 2 then nl  Psych involved    2-PYELONEPHRITIS   -ceftriaxone (started 11/25), completed  urine culture w >100,000 CFU/ml Klebsiella variicola,  -  Ceftriaxone: S <=1  blood cultures-NGTD  Monitor off Abx    Infectious Diseases will sign off   Please call with any questions.   Jillian Mckoy M.D.  [facility] Infectious [facility] [phone]  For after 5 P.M. and weekends, please call [phone]

## 2023-12-04 NOTE — PROGRESS NOTE ADULT - SUBJECTIVE AND OBJECTIVE BOX
[facility], Division of Infectious Diseases  AMAN Ramos Y. Patel, S. Shah, G. [facility]  [phone]    Name: JANICE RENDON  Age: 73y  Gender: Male  MRN: [id number]    Interval History:  No acute overnight events. Afebrile  C/o dry mouth  Notes reviewed    Antibiotics:  cefTRIAXone   IVPB 1000 milliGRAM(s) IV Intermittent every 24 hours  cefTRIAXone   IVPB          Medications:  acetaminophen     Tablet .. 650 milliGRAM(s) Oral every 6 hours PRN  atenolol  Tablet 50 milliGRAM(s) Oral two times a day  benzocaine 20% Spray 1 Spray(s) Topical four times a day  cefTRIAXone   IVPB      cefTRIAXone   IVPB 1000 milliGRAM(s) IV Intermittent every 24 hours  cholecalciferol 2000 Unit(s) Oral daily  diazepam    Tablet 2 milliGRAM(s) Oral two times a day  FIRST- Mouthwash  BLM 5 milliLiter(s) Swish and Spit three times a day  fludroCORTISONE 0.1 milliGRAM(s) Oral daily  fluvoxaMINE 150 milliGRAM(s) Oral two times a day  gabapentin 100 milliGRAM(s) Oral daily  haloperidol    Injectable 1 milliGRAM(s) IntraMuscular every 12 hours PRN  heparin   Injectable 5000 Unit(s) SubCutaneous every 12 hours  lactulose Syrup 15 Gram(s) Oral two times a day PRN  losartan 25 milliGRAM(s) Oral daily  melatonin 3 milliGRAM(s) Oral at bedtime  mirtazapine 15 milliGRAM(s) Oral at bedtime  OLANZapine Disintegrating Tablet 10 milliGRAM(s) Oral at bedtime  predniSONE   Tablet 5 milliGRAM(s) Oral daily  senna 2 Tablet(s) Oral at bedtime  sodium chloride 0.45% with potassium chloride 20 mEq/L 1000 milliLiter(s) IV Continuous <Continuous>      Review of Systems:  Review of systems otherwise negative except as previously noted.    Allergies: No Known Allergies    For details regarding the patient's past medical history, social history, family history, and other miscellaneous elements, please refer the initial infectious diseases consultation and/or the admitting history and physical examination for this admission.    Objective:  Vitals:   T(C): 36.7 (12-04-23 @ 11:33), Max: 37 (12-04-23 @ 04:34)  HR: 89 (12-04-23 @ 11:33) (76 - 89)  BP: 165/83 (12-04-23 @ 11:33) (130/75 - 175/84)  RR: 18 (12-04-23 @ 11:33) (17 - 18)  SpO2: 95% (12-04-23 @ 15:06) (95% - 98%)    Physical Examination:  General: no acute distress  HEENT: NC/AT, EOMI,  Cardio: S1, S2 heard, RRR, no murmurs  Resp: symmetric chest rise  Abd: soft, NT, ND  Ext: no edema or cyanosis  Skin: warm, dry, no visible rash      Laboratory Studies:  CBC:                       9.2    6.53  )-----------( 145      ( 04 Dec 2023 09:04 )             26.8     CMP: 12-04    144  |  107  |  15  ----------------------------<  122<H>  4.2   |  34<H>  |  0.71    Ca    8.5      04 Dec 2023 09:04        Urinalysis Basic - ( 04 Dec 2023 09:04 )    Color: x / Appearance: x / SG: x / pH: x  Gluc: 122 mg/dL / Ketone: x  / Bili: x / Urobili: x   Blood: x / Protein: x / Nitrite: x   Leuk Esterase: x / RBC: x / WBC x   Sq Epi: x / Non Sq Epi: x / Bacteria: x        Microbiology: reviewed    Culture - Blood (collected 11-25-23 @ 20:00)  Source: .Blood Blood-Peripheral  Final Report (12-01-23 @ 03:00):    No growth at 5 days    Culture - Blood (collected 11-25-23 @ 19:30)  Source: .Blood Blood-Peripheral  Final Report (12-01-23 @ 03:00):    No growth at 5 days    Culture - Urine (collected 11-25-23 @ 14:00)  Source: Clean Catch Clean Catch (Midstream)  Final Report (11-28-23 @ 11:54):    >100,000 CFU/ml Klebsiella variicola  Organism: Klebsiella variicola (11-28-23 @ 11:54)  Organism: Klebsiella variicola (11-28-23 @ 11:54)      -  Levofloxacin: S <=0.5      -  Tobramycin: S <=2      -  Nitrofurantoin: S <=32 Should not be used to treat pyelonephritis      -  Aztreonam: S <=4      -  Gentamicin: S <=2      -  Cefazolin: S <=2      -  Cefepime: S <=2      -  Piperacillin/Tazobactam: S <=8      -  Ciprofloxacin: S <=0.25      -  Imipenem: S <=1      -  Ceftriaxone: S <=1      -  Ampicillin: R <=8 These ampicillin results predict results for amoxicillin      Method Type: MEAGHAN      -  Meropenem: S <=1      -  Ampicillin/Sulbactam: S <=4/2      -  Cefoxitin: S <=8      -  Amoxicillin/Clavulanic Acid: S <=8/4      -  Trimethoprim/Sulfamethoxazole: S <=0.5/9.5      -  Ertapenem: S <=0.5          Radiology: reviewed       [facility], Division of Infectious Diseases  AMAN Ramos Y. Patel, S. Shah, G. [facility]  [phone]    Name: JANICE RENDON  Age: 73y  Gender: Male  MRN: [id number]    Interval History:  No acute overnight events. Afebrile  C/o dry mouth  Notes reviewed    Antibiotics:  cefTRIAXone   IVPB 1000 milliGRAM(s) IV Intermittent every 24 hours  cefTRIAXone   IVPB          Medications:  acetaminophen     Tablet .. 650 milliGRAM(s) Oral every 6 hours PRN  atenolol  Tablet 50 milliGRAM(s) Oral two times a day  benzocaine 20% Spray 1 Spray(s) Topical four times a day  cefTRIAXone   IVPB      cefTRIAXone   IVPB 1000 milliGRAM(s) IV Intermittent every 24 hours  cholecalciferol 2000 Unit(s) Oral daily  diazepam    Tablet 2 milliGRAM(s) Oral two times a day  FIRST- Mouthwash  BLM 5 milliLiter(s) Swish and Spit three times a day  fludroCORTISONE 0.1 milliGRAM(s) Oral daily  fluvoxaMINE 150 milliGRAM(s) Oral two times a day  gabapentin 100 milliGRAM(s) Oral daily  haloperidol    Injectable 1 milliGRAM(s) IntraMuscular every 12 hours PRN  heparin   Injectable 5000 Unit(s) SubCutaneous every 12 hours  lactulose Syrup 15 Gram(s) Oral two times a day PRN  losartan 25 milliGRAM(s) Oral daily  melatonin 3 milliGRAM(s) Oral at bedtime  mirtazapine 15 milliGRAM(s) Oral at bedtime  OLANZapine Disintegrating Tablet 10 milliGRAM(s) Oral at bedtime  predniSONE   Tablet 5 milliGRAM(s) Oral daily  senna 2 Tablet(s) Oral at bedtime  sodium chloride 0.45% with potassium chloride 20 mEq/L 1000 milliLiter(s) IV Continuous <Continuous>      Review of Systems:  Review of systems otherwise negative except as previously noted.    Allergies: No Known Allergies    For details regarding the patient's past medical history, social history, family history, and other miscellaneous elements, please refer the initial infectious diseases consultation and/or the admitting history and physical examination for this admission.    Objective:  Vitals:   T(C): 36.7 (12-04-23 @ 11:33), Max: 37 (12-04-23 @ 04:34)  HR: 89 (12-04-23 @ 11:33) (76 - 89)  BP: 165/83 (12-04-23 @ 11:33) (130/75 - 175/84)  RR: 18 (12-04-23 @ 11:33) (17 - 18)  SpO2: 95% (12-04-23 @ 15:06) (95% - 98%)    Physical Examination:  General: no acute distress  HEENT: NC/AT, EOMI,  Cardio: S1, S2 heard, RRR, no murmurs  Resp: symmetric chest rise  Abd: soft, NT, ND  Ext: no edema or cyanosis  Skin: warm, dry, no visible rash      Laboratory Studies:  CBC:                       9.2    6.53  )-----------( 145      ( 04 Dec 2023 09:04 )             26.8     CMP: 12-04    144  |  107  |  15  ----------------------------<  122<H>  4.2   |  34<H>  |  0.71    Ca    8.5      04 Dec 2023 09:04        Urinalysis Basic - ( 04 Dec 2023 09:04 )    Color: x / Appearance: x / SG: x / pH: x  Gluc: 122 mg/dL / Ketone: x  / Bili: x / Urobili: x   Blood: x / Protein: x / Nitrite: x   Leuk Esterase: x / RBC: x / WBC x   Sq Epi: x / Non Sq Epi: x / Bacteria: x        Microbiology: reviewed    Culture - Blood (collected 11-25-23 @ 20:00)  Source: .Blood Blood-Peripheral  Final Report (12-01-23 @ 03:00):    No growth at 5 days    Culture - Blood (collected 11-25-23 @ 19:30)  Source: .Blood Blood-Peripheral  Final Report (12-01-23 @ 03:00):    No growth at 5 days    Culture - Urine (collected 11-25-23 @ 14:00)  Source: Clean Catch Clean Catch (Midstream)  Final Report (11-28-23 @ 11:54):    >100,000 CFU/ml Klebsiella variicola  Organism: Klebsiella variicola (11-28-23 @ 11:54)  Organism: Klebsiella variicola (11-28-23 @ 11:54)      -  Levofloxacin: S <=0.5      -  Tobramycin: S <=2      -  Nitrofurantoin: S <=32 Should not be used to treat pyelonephritis      -  Aztreonam: S <=4      -  Gentamicin: S <=2      -  Cefazolin: S <=2      -  Cefepime: S <=2      -  Piperacillin/Tazobactam: S <=8      -  Ciprofloxacin: S <=0.25      -  Imipenem: S <=1      -  Ceftriaxone: S <=1      -  Ampicillin: R <=8 These ampicillin results predict results for amoxicillin      Method Type: MEAGHAN      -  Meropenem: S <=1      -  Ampicillin/Sulbactam: S <=4/2      -  Cefoxitin: S <=8      -  Amoxicillin/Clavulanic Acid: S <=8/4      -  Trimethoprim/Sulfamethoxazole: S <=0.5/9.5      -  Ertapenem: S <=0.5          Radiology: reviewed

## 2023-12-04 NOTE — PROGRESS NOTE ADULT - ASSESSMENT
The patient is a 73 year old male with a history of HTN, ITP, APLS, anxiety, OCD, Merkel cell cancer who presented with psychosis.    Plan:  - ECG with no evidence of ischemia or infarction  - Orthostatic hypotension may be multifactorial - dehydration, medication related (antipsychotics), and low cortisol levels  - Allow for supine hypertension - BP labile due to orthostatic hypotension - would not treat supine hypertension with additional antihypertensives unless SBP>200  - Syncope - due to orthostatic hypotension  - Continue atenolol 50 mg bid  - Continue losartan 25 mg daily  - EGD done with no significant findings  - Psych follow-up  - Cough - pulm follow-up  - Monitor hemoglobin

## 2023-12-04 NOTE — BH CONSULTATION LIAISON PROGRESS NOTE - NSBHASSESSMENTFT_PSY_ALL_CORE
Pt is a 74 y/o m with HTN, h/o severe anxiety around medical illness and constantly on the look for illnesses brought in by wife after he started to become more and more psychotic with each round of Keytruda for his Merkel Cell Ca. Pt is delusional thinking he has metals, objects in his throat till his anus and penis, unable to tolerate PO. There are case reports of rare psychosis with monoclonal antibodies. Keytruda seems like the possible suspect of psychosis, combined with wife's reports of timeline, although can't be sure, time will show.     Recommendation: Increase Zyprexa Zydis to 20 mg po at HS.

## 2023-12-04 NOTE — PROGRESS NOTE ADULT - SUBJECTIVE AND OBJECTIVE BOX
Patient is a 73y old  Male who presents with a chief complaint of psychosis (02 Dec 2023 16:24)      INTERVAL HPI/OVERNIGHT EVENTS: noted  pt seen and examined this am   events noted  po intake moderate      Vital Signs Last 24 Hrs  T(C): 36.7 (04 Dec 2023 11:33), Max: 37 (04 Dec 2023 04:34)  T(F): 98 (04 Dec 2023 11:33), Max: 98.6 (04 Dec 2023 04:34)  HR: 89 (04 Dec 2023 11:33) (76 - 89)  BP: 165/83 (04 Dec 2023 11:33) (130/75 - 165/83)  BP(mean): --  RR: 18 (04 Dec 2023 11:33) (17 - 18)  SpO2: 95% (04 Dec 2023 15:06) (95% - 98%)    Parameters below as of 04 Dec 2023 15:06  Patient On (Oxygen Delivery Method): nasal cannula  O2 Flow (L/min): 2      acetaminophen     Tablet .. 650 milliGRAM(s) Oral every 6 hours PRN  atenolol  Tablet 50 milliGRAM(s) Oral two times a day  benzocaine 20% Spray 1 Spray(s) Topical four times a day  cholecalciferol 2000 Unit(s) Oral daily  diazepam    Tablet 2 milliGRAM(s) Oral two times a day  FIRST- Mouthwash  BLM 5 milliLiter(s) Swish and Spit three times a day  fludroCORTISONE 0.1 milliGRAM(s) Oral daily  fluvoxaMINE 150 milliGRAM(s) Oral two times a day  gabapentin 100 milliGRAM(s) Oral daily  haloperidol    Injectable 1 milliGRAM(s) IntraMuscular every 12 hours PRN  heparin   Injectable 5000 Unit(s) SubCutaneous every 12 hours  lactulose Syrup 15 Gram(s) Oral two times a day PRN  losartan 25 milliGRAM(s) Oral daily  melatonin 3 milliGRAM(s) Oral at bedtime  mirtazapine 15 milliGRAM(s) Oral at bedtime  OLANZapine Disintegrating Tablet 10 milliGRAM(s) Oral at bedtime  predniSONE   Tablet 5 milliGRAM(s) Oral daily  senna 2 Tablet(s) Oral at bedtime  sodium chloride 0.45% with potassium chloride 20 mEq/L 1000 milliLiter(s) IV Continuous <Continuous>      PHYSICAL EXAM:  GENERAL: NAD,   EYES: conjunctiva and sclera clear  ENMT: Moist mucous membranes  NECK: Supple, No JVD, Normal thyroid  CHEST/LUNG: non labored, cta b/l  HEART: Regular rate and rhythm; No murmurs, rubs, or gallops  ABDOMEN: Soft, Nontender, Nondistended; Bowel sounds present  EXTREMITIES:  2+ Peripheral Pulses, No clubbing, cyanosis, or edema  LYMPH: No lymphadenopathy noted  SKIN: No rashes or lesions    Consultant(s) Notes Reviewed:  [x ] YES  [ ] NO  Care Discussed with Consultants/Other Providers [ x] YES  [ ] NO    LABS:                        9.2    6.53  )-----------( 145      ( 04 Dec 2023 09:04 )             26.8     12-04    144  |  107  |  15  ----------------------------<  122<H>  4.2   |  34<H>  |  0.71    Ca    8.5      04 Dec 2023 09:04        Urinalysis Basic - ( 04 Dec 2023 09:04 )    Color: x / Appearance: x / SG: x / pH: x  Gluc: 122 mg/dL / Ketone: x  / Bili: x / Urobili: x   Blood: x / Protein: x / Nitrite: x   Leuk Esterase: x / RBC: x / WBC x   Sq Epi: x / Non Sq Epi: x / Bacteria: x      CAPILLARY BLOOD GLUCOSE            Urinalysis Basic - ( 04 Dec 2023 09:04 )    Color: x / Appearance: x / SG: x / pH: x  Gluc: 122 mg/dL / Ketone: x  / Bili: x / Urobili: x   Blood: x / Protein: x / Nitrite: x   Leuk Esterase: x / RBC: x / WBC x   Sq Epi: x / Non Sq Epi: x / Bacteria: x          RADIOLOGY & ADDITIONAL TESTS:    Imaging Personally Reviewed:  [x ] YES  [ ] NO

## 2023-12-04 NOTE — PROGRESS NOTE ADULT - ASSESSMENT
73 M pmh of HTN, ITP, APL syndrome, generalized anxiety disorder, OCD, newly dx aggressive Merkel Cell cancer of forehead- pt's oncologist  Dr. Eng, at [facility] on Keytruda infusions with good response per wife  was brought to ED for worsening psychosis for the past few days, pt lately with difficulty swallowing, decreased po intake    Psychosis  monitor  psych fu  MRI noted  neuro following  cont luvox   continue to titrate haldol  zyprexa titrate as per psych  GI fu noted- sp EGD - normal -sp bx fu path  trial of dilaudid without success     #HTN/Orthostatic hypotension  cont atenolol w parameters  ? psych med induced  adrenal insufficiency  continue prednisone and  florinef    #unresponsive episode  -?orthostatic hypotension vs other neuro causes  ekg- sinus, cards fu  d/w neuro will obtain EEG    UTI  sp abx    #Merkel cell ca  onc c/s noted  r/o paraneoplastic causes- unlikely      #DVT ppx-  hep sq    [facility]   [phone] 73 M pmh of HTN, ITP, APL syndrome, generalized anxiety disorder, OCD, newly dx aggressive Merkel Cell cancer of forehead- pt's oncologist  Dr. Eng, at [facility] on Keytruda infusions with good response per wife  was brought to ED for worsening psychosis for the past few days, pt lately with difficulty swallowing, decreased po intake    Psychosis  monitor  psych fu  MRI noted  neuro following  cont luvox   continue to titrate haldol  zyprexa titrate as per psych  GI fu noted- sp EGD - normal -sp bx fu path  trial of dilaudid without success     #HTN/Orthostatic hypotension  cont atenolol w parameters  ? psych med induced  adrenal insufficiency  continue prednisone and  florinef    #unresponsive episode  -?orthostatic hypotension vs other neuro causes  ekg- sinus, cards fu  d/w neuro will obtain EEG    UTI  sp abx    #Merkel cell ca  onc c/s noted  r/o paraneoplastic causes- unlikely      #DVT ppx-  hep sq    [facility]   [phone]

## 2023-12-05 LAB
ANION GAP SERPL CALC-SCNC: 4 MMOL/L — LOW (ref 5–17)
ANION GAP SERPL CALC-SCNC: 4 MMOL/L — LOW (ref 5–17)
BUN SERPL-MCNC: 12 MG/DL — SIGNIFICANT CHANGE UP (ref 7–23)
BUN SERPL-MCNC: 12 MG/DL — SIGNIFICANT CHANGE UP (ref 7–23)
CALCIUM SERPL-MCNC: 8.7 MG/DL — SIGNIFICANT CHANGE UP (ref 8.5–10.1)
CALCIUM SERPL-MCNC: 8.7 MG/DL — SIGNIFICANT CHANGE UP (ref 8.5–10.1)
CHLORIDE SERPL-SCNC: 106 MMOL/L — SIGNIFICANT CHANGE UP (ref 96–108)
CHLORIDE SERPL-SCNC: 106 MMOL/L — SIGNIFICANT CHANGE UP (ref 96–108)
CO2 SERPL-SCNC: 35 MMOL/L — HIGH (ref 22–31)
CO2 SERPL-SCNC: 35 MMOL/L — HIGH (ref 22–31)
CREAT SERPL-MCNC: 0.68 MG/DL — SIGNIFICANT CHANGE UP (ref 0.5–1.3)
CREAT SERPL-MCNC: 0.68 MG/DL — SIGNIFICANT CHANGE UP (ref 0.5–1.3)
EGFR: 97 ML/MIN/1.73M2 — SIGNIFICANT CHANGE UP
EGFR: 97 ML/MIN/1.73M2 — SIGNIFICANT CHANGE UP
GLUCOSE SERPL-MCNC: 119 MG/DL — HIGH (ref 70–99)
GLUCOSE SERPL-MCNC: 119 MG/DL — HIGH (ref 70–99)
HCT VFR BLD CALC: 28.1 % — LOW (ref 39–50)
HCT VFR BLD CALC: 28.1 % — LOW (ref 39–50)
HGB BLD-MCNC: 9.4 G/DL — LOW (ref 13–17)
HGB BLD-MCNC: 9.4 G/DL — LOW (ref 13–17)
MCHC RBC-ENTMCNC: 29.8 PG — SIGNIFICANT CHANGE UP (ref 27–34)
MCHC RBC-ENTMCNC: 29.8 PG — SIGNIFICANT CHANGE UP (ref 27–34)
MCHC RBC-ENTMCNC: 33.5 GM/DL — SIGNIFICANT CHANGE UP (ref 32–36)
MCHC RBC-ENTMCNC: 33.5 GM/DL — SIGNIFICANT CHANGE UP (ref 32–36)
MCV RBC AUTO: 89.2 FL — SIGNIFICANT CHANGE UP (ref 80–100)
MCV RBC AUTO: 89.2 FL — SIGNIFICANT CHANGE UP (ref 80–100)
NRBC # BLD: 0 /100 WBCS — SIGNIFICANT CHANGE UP (ref 0–0)
NRBC # BLD: 0 /100 WBCS — SIGNIFICANT CHANGE UP (ref 0–0)
PLATELET # BLD AUTO: 132 K/UL — LOW (ref 150–400)
PLATELET # BLD AUTO: 132 K/UL — LOW (ref 150–400)
POTASSIUM SERPL-MCNC: 4.4 MMOL/L — SIGNIFICANT CHANGE UP (ref 3.5–5.3)
POTASSIUM SERPL-MCNC: 4.4 MMOL/L — SIGNIFICANT CHANGE UP (ref 3.5–5.3)
POTASSIUM SERPL-SCNC: 4.4 MMOL/L — SIGNIFICANT CHANGE UP (ref 3.5–5.3)
POTASSIUM SERPL-SCNC: 4.4 MMOL/L — SIGNIFICANT CHANGE UP (ref 3.5–5.3)
RBC # BLD: 3.15 M/UL — LOW (ref 4.2–5.8)
RBC # BLD: 3.15 M/UL — LOW (ref 4.2–5.8)
RBC # FLD: 15 % — HIGH (ref 10.3–14.5)
RBC # FLD: 15 % — HIGH (ref 10.3–14.5)
SODIUM SERPL-SCNC: 145 MMOL/L — SIGNIFICANT CHANGE UP (ref 135–145)
SODIUM SERPL-SCNC: 145 MMOL/L — SIGNIFICANT CHANGE UP (ref 135–145)
WBC # BLD: 6.83 K/UL — SIGNIFICANT CHANGE UP (ref 3.8–10.5)
WBC # BLD: 6.83 K/UL — SIGNIFICANT CHANGE UP (ref 3.8–10.5)
WBC # FLD AUTO: 6.83 K/UL — SIGNIFICANT CHANGE UP (ref 3.8–10.5)
WBC # FLD AUTO: 6.83 K/UL — SIGNIFICANT CHANGE UP (ref 3.8–10.5)

## 2023-12-05 RX ORDER — DIAZEPAM 5 MG
2 TABLET ORAL AT BEDTIME
Refills: 0 | Status: DISCONTINUED | OUTPATIENT
Start: 2023-12-05 | End: 2023-12-08

## 2023-12-05 RX ADMIN — DEXTROSE MONOHYDRATE, SODIUM CHLORIDE, AND POTASSIUM CHLORIDE 60 MILLILITER(S): 50; .745; 4.5 INJECTION, SOLUTION INTRAVENOUS at 08:40

## 2023-12-05 RX ADMIN — DEXTROSE MONOHYDRATE, SODIUM CHLORIDE, AND POTASSIUM CHLORIDE 60 MILLILITER(S): 50; .745; 4.5 INJECTION, SOLUTION INTRAVENOUS at 07:24

## 2023-12-05 RX ADMIN — SENNA PLUS 2 TABLET(S): 8.6 TABLET ORAL at 21:44

## 2023-12-05 RX ADMIN — DIPHENHYDRAMINE HYDROCHLORIDE AND LIDOCAINE HYDROCHLORIDE AND ALUMINUM HYDROXIDE AND MAGNESIUM HYDRO 5 MILLILITER(S): KIT at 13:57

## 2023-12-05 RX ADMIN — HEPARIN SODIUM 5000 UNIT(S): 5000 INJECTION INTRAVENOUS; SUBCUTANEOUS at 18:08

## 2023-12-05 RX ADMIN — Medication 3 MILLIGRAM(S): at 21:44

## 2023-12-05 RX ADMIN — Medication 1 SPRAY(S): at 18:16

## 2023-12-05 RX ADMIN — Medication 2000 UNIT(S): at 11:19

## 2023-12-05 RX ADMIN — OLANZAPINE 10 MILLIGRAM(S): 15 TABLET, FILM COATED ORAL at 21:44

## 2023-12-05 RX ADMIN — FLUDROCORTISONE ACETATE 0.1 MILLIGRAM(S): 0.1 TABLET ORAL at 06:04

## 2023-12-05 RX ADMIN — Medication 2 MILLIGRAM(S): at 21:44

## 2023-12-05 RX ADMIN — LOSARTAN POTASSIUM 25 MILLIGRAM(S): 100 TABLET, FILM COATED ORAL at 06:04

## 2023-12-05 RX ADMIN — Medication 1 SPRAY(S): at 13:57

## 2023-12-05 RX ADMIN — GABAPENTIN 100 MILLIGRAM(S): 400 CAPSULE ORAL at 11:19

## 2023-12-05 RX ADMIN — FLUVOXAMINE MALEATE 150 MILLIGRAM(S): 25 TABLET ORAL at 18:05

## 2023-12-05 RX ADMIN — ATENOLOL 50 MILLIGRAM(S): 25 TABLET ORAL at 18:08

## 2023-12-05 RX ADMIN — DIPHENHYDRAMINE HYDROCHLORIDE AND LIDOCAINE HYDROCHLORIDE AND ALUMINUM HYDROXIDE AND MAGNESIUM HYDRO 5 MILLILITER(S): KIT at 06:08

## 2023-12-05 RX ADMIN — HEPARIN SODIUM 5000 UNIT(S): 5000 INJECTION INTRAVENOUS; SUBCUTANEOUS at 06:05

## 2023-12-05 RX ADMIN — Medication 5 MILLIGRAM(S): at 06:04

## 2023-12-05 RX ADMIN — FLUVOXAMINE MALEATE 150 MILLIGRAM(S): 25 TABLET ORAL at 08:39

## 2023-12-05 RX ADMIN — ATENOLOL 50 MILLIGRAM(S): 25 TABLET ORAL at 06:05

## 2023-12-05 RX ADMIN — MIRTAZAPINE 15 MILLIGRAM(S): 45 TABLET, ORALLY DISINTEGRATING ORAL at 21:44

## 2023-12-05 RX ADMIN — Medication 2 MILLIGRAM(S): at 06:04

## 2023-12-05 NOTE — PROGRESS NOTE ADULT - ASSESSMENT
73 M pmh of HTN, ITP, APL syndrome, generalized anxiety disorder, OCD, newly dx aggressive Merkel Cell cancer of forehead- pt's oncologist  Dr. Eng, at [facility] on Keytruda infusions with good response per wife  was brought to ED for worsening psychosis for the past few days, pt lately with difficulty swallowing, decreased po intake    Psychosis  monitor  psych fu  MRI noted  neuro following  cont luvox   continue to titrate haldol  zyprexa titrate as per psych  GI fu noted- sp EGD - normal -sp bx fu path  trial of dilaudid without success     #HTN/Orthostatic hypotension  cont atenolol w parameters  ? psych med induced  adrenal insufficiency  continue prednisone and  florinef    UTI  sp abx    #Merkel cell ca  onc c/s noted  r/o paraneoplastic causes- unlikely      #DVT ppx-  hep sq    dc planning to rehab likely am    [facility]   [phone] 73 M pmh of HTN, ITP, APL syndrome, generalized anxiety disorder, OCD, newly dx aggressive Merkel Cell cancer of forehead- pt's oncologist  Dr. Eng, at [facility] on Keytruda infusions with good response per wife  was brought to ED for worsening psychosis for the past few days, pt lately with difficulty swallowing, decreased po intake    Psychosis  monitor  psych fu  MRI noted  neuro following  cont luvox   continue to titrate haldol  zyprexa titrate as per psych  GI fu noted- sp EGD - normal -sp bx fu path  trial of dilaudid without success     #HTN/Orthostatic hypotension  cont atenolol w parameters  ? psych med induced  adrenal insufficiency  continue prednisone and  florinef    UTI  sp abx    #Merkel cell ca  onc c/s noted  r/o paraneoplastic causes- unlikely      #DVT ppx-  hep sq    dc planning to rehab likely am    [facility]   [phone]

## 2023-12-05 NOTE — CHART NOTE - NSCHARTNOTEFT_GEN_A_CORE
Assessment: 72 y/o male adm with dementia. PMH anxiety, HTN. MBS completed on 10/26 which rx pureed diet with mildly thickened liquids.   Pt visited at bedside this morning. Pt's wife present. Pt is eating ok. Tolerating pureed foods. Food preferences have been obtained in the past. At today's visit, food preferences have been updated. Pt requesting only "vanilla" flavored items. Pt is eating the magic cup. Fecal incontinence.     Factors impacting intake: [ ] none [ ] nausea  [ ] vomiting [ ] diarrhea [ ] constipation  [ ]chewing problems [x ] swallowing issues (? 2/2 psychosis)  [ ] other:     Diet Presciption: pureed diet with mildly thickened liquids; ensure enlive TID  Intake: poor to fair, as per pt's wife     Current Weight: 11/29 149.4# 11/1 152.1# 10/25 174.3#  1+ edema     % Weight Change    Pertinent Medications: MEDICATIONS  (STANDING):  atenolol  Tablet 50 milliGRAM(s) Oral two times a day  benzocaine 20% Spray 1 Spray(s) Topical four times a day  cholecalciferol 2000 Unit(s) Oral daily  diazepam    Tablet 2 milliGRAM(s) Oral at bedtime  FIRST- Mouthwash  BLM 5 milliLiter(s) Swish and Spit three times a day  fludroCORTISONE 0.1 milliGRAM(s) Oral daily  fluvoxaMINE 150 milliGRAM(s) Oral two times a day  heparin   Injectable 5000 Unit(s) SubCutaneous every 12 hours  losartan 25 milliGRAM(s) Oral daily  melatonin 3 milliGRAM(s) Oral at bedtime  mirtazapine 15 milliGRAM(s) Oral at bedtime  OLANZapine Disintegrating Tablet 10 milliGRAM(s) Oral at bedtime  predniSONE   Tablet 5 milliGRAM(s) Oral daily  senna 2 Tablet(s) Oral at bedtime    MEDICATIONS  (PRN):  acetaminophen     Tablet .. 650 milliGRAM(s) Oral every 6 hours PRN Temp greater or equal to 38.5C (101.3F), Moderate Pain (4 - 6)  lactulose Syrup 15 Gram(s) Oral two times a day PRN constipation    Pertinent Labs: 12-04 Na144 mmol/L Glu 122 mg/dL<H> K+ 4.2 mmol/L Cr  0.71 mg/dL BUN 15 mg/dL     CAPILLARY BLOOD GLUCOSE        Skin: no pressure ulcers noted.     Estimated Needs:   [x ] no change since previous assessment  [ ] recalculated:     Previous Nutrition Diagnosis:   [ ] Inadequate Energy Intake [ ]Inadequate Oral Intake [ ] Excessive Energy Intake   [ ] Underweight [ ] Increased Nutrient Needs [ ] Overweight/Obesity   [ ] Altered GI Function [ ] Unintended Weight Loss [ ] Food & Nutrition Related Knowledge Deficit [x ] Malnutrition   [x] swallowing difficulty    Nutrition Diagnosis is [x ] ongoing  [ ] resolved [ ] not applicable     New Nutrition Diagnosis: [x ] not applicable       Interventions:   Recommend  [ ] Change Diet To:  [ ] Nutrition Supplement  [ ] Nutrition Support  [ x] Other: continue to honor pt's food preferences/tolerances. encourage po intake; provide van Ensure and magic cup to provide energy and protein intake.     Monitoring and Evaluation:   [ x] PO intake [ x ] Tolerance to diet prescription [ x ] weights [ x ] labs[ x ] follow up per protocol  [ ] other: Assessment: 74 y/o male adm with dementia. PMH anxiety, HTN. MBS completed on 10/26 which rx pureed diet with mildly thickened liquids.   Pt visited at bedside this morning. Pt's wife present. Pt is eating ok. Tolerating pureed foods. Food preferences have been obtained in the past. At today's visit, food preferences have been updated. Pt requesting only "vanilla" flavored items. Pt is eating the magic cup. Fecal incontinence.     Factors impacting intake: [ ] none [ ] nausea  [ ] vomiting [ ] diarrhea [ ] constipation  [ ]chewing problems [x ] swallowing issues (? 2/2 psychosis)  [ ] other:     Diet Presciption: pureed diet with mildly thickened liquids; ensure enlive TID  Intake: poor to fair, as per pt's wife     Current Weight: 11/29 149.4# 11/1 152.1# 10/25 174.3#  1+ edema     % Weight Change    Pertinent Medications: MEDICATIONS  (STANDING):  atenolol  Tablet 50 milliGRAM(s) Oral two times a day  benzocaine 20% Spray 1 Spray(s) Topical four times a day  cholecalciferol 2000 Unit(s) Oral daily  diazepam    Tablet 2 milliGRAM(s) Oral at bedtime  FIRST- Mouthwash  BLM 5 milliLiter(s) Swish and Spit three times a day  fludroCORTISONE 0.1 milliGRAM(s) Oral daily  fluvoxaMINE 150 milliGRAM(s) Oral two times a day  heparin   Injectable 5000 Unit(s) SubCutaneous every 12 hours  losartan 25 milliGRAM(s) Oral daily  melatonin 3 milliGRAM(s) Oral at bedtime  mirtazapine 15 milliGRAM(s) Oral at bedtime  OLANZapine Disintegrating Tablet 10 milliGRAM(s) Oral at bedtime  predniSONE   Tablet 5 milliGRAM(s) Oral daily  senna 2 Tablet(s) Oral at bedtime    MEDICATIONS  (PRN):  acetaminophen     Tablet .. 650 milliGRAM(s) Oral every 6 hours PRN Temp greater or equal to 38.5C (101.3F), Moderate Pain (4 - 6)  lactulose Syrup 15 Gram(s) Oral two times a day PRN constipation    Pertinent Labs: 12-04 Na144 mmol/L Glu 122 mg/dL<H> K+ 4.2 mmol/L Cr  0.71 mg/dL BUN 15 mg/dL     CAPILLARY BLOOD GLUCOSE        Skin: no pressure ulcers noted.     Estimated Needs:   [x ] no change since previous assessment  [ ] recalculated:     Previous Nutrition Diagnosis:   [ ] Inadequate Energy Intake [ ]Inadequate Oral Intake [ ] Excessive Energy Intake   [ ] Underweight [ ] Increased Nutrient Needs [ ] Overweight/Obesity   [ ] Altered GI Function [ ] Unintended Weight Loss [ ] Food & Nutrition Related Knowledge Deficit [x ] Malnutrition   [x] swallowing difficulty    Nutrition Diagnosis is [x ] ongoing  [ ] resolved [ ] not applicable     New Nutrition Diagnosis: [x ] not applicable       Interventions:   Recommend  [ ] Change Diet To:  [ ] Nutrition Supplement  [ ] Nutrition Support  [ x] Other: continue to honor pt's food preferences/tolerances. encourage po intake; provide van Ensure and magic cup to provide energy and protein intake.     Monitoring and Evaluation:   [ x] PO intake [ x ] Tolerance to diet prescription [ x ] weights [ x ] labs[ x ] follow up per protocol  [ ] other:

## 2023-12-05 NOTE — SOCIAL WORK PROGRESS NOTE - NSSWPROGRESSNOTE_GEN_ALL_CORE
GIANCARLO spoke with this pts spouse- discussed KASHIF planning. Choices reviewed, referral sent to [facility] and they are accepting. SW discussed KASHIF and dc planning with pt and her two sons on speaker, they are asking to speak with MD in the morning prior to deciding on DC. SW to follow.

## 2023-12-05 NOTE — PROGRESS NOTE ADULT - SUBJECTIVE AND OBJECTIVE BOX
Patient is a 73y old  Male who presents with a chief complaint of psychosis (02 Dec 2023 16:24)      INTERVAL HPI/OVERNIGHT EVENTS: noted  pt seen and examined this am   events noted      Vital Signs Last 24 Hrs  T(C): 36.8 (05 Dec 2023 18:02), Max: 36.9 (04 Dec 2023 20:34)  T(F): 98.2 (05 Dec 2023 18:02), Max: 98.5 (04 Dec 2023 20:34)  HR: 82 (05 Dec 2023 18:02) (70 - 82)  BP: 160/74 (05 Dec 2023 18:02) (156/82 - 181/82)  BP(mean): --  RR: 18 (05 Dec 2023 18:02) (17 - 18)  SpO2: 98% (05 Dec 2023 18:02) (95% - 98%)    Parameters below as of 05 Dec 2023 18:02  Patient On (Oxygen Delivery Method): nasal cannula  O2 Flow (L/min): 2      acetaminophen     Tablet .. 650 milliGRAM(s) Oral every 6 hours PRN  atenolol  Tablet 50 milliGRAM(s) Oral two times a day  benzocaine 20% Spray 1 Spray(s) Topical four times a day  cholecalciferol 2000 Unit(s) Oral daily  diazepam    Tablet 2 milliGRAM(s) Oral at bedtime  FIRST- Mouthwash  BLM 5 milliLiter(s) Swish and Spit three times a day  fludroCORTISONE 0.1 milliGRAM(s) Oral daily  fluvoxaMINE 150 milliGRAM(s) Oral two times a day  heparin   Injectable 5000 Unit(s) SubCutaneous every 12 hours  lactulose Syrup 15 Gram(s) Oral two times a day PRN  losartan 25 milliGRAM(s) Oral daily  melatonin 3 milliGRAM(s) Oral at bedtime  mirtazapine 15 milliGRAM(s) Oral at bedtime  OLANZapine Disintegrating Tablet 10 milliGRAM(s) Oral at bedtime  predniSONE   Tablet 5 milliGRAM(s) Oral daily  senna 2 Tablet(s) Oral at bedtime      PHYSICAL EXAM:  GENERAL: NAD,   EYES: conjunctiva and sclera clear  ENMT: Moist mucous membranes  NECK: Supple, No JVD, Normal thyroid  CHEST/LUNG: non labored, cta b/l  HEART: Regular rate and rhythm; No murmurs, rubs, or gallops  ABDOMEN: Soft, Nontender, Nondistended; Bowel sounds present  EXTREMITIES:  2+ Peripheral Pulses, No clubbing, cyanosis, or edema  LYMPH: No lymphadenopathy noted  SKIN: No rashes or lesions    Consultant(s) Notes Reviewed:  [x ] YES  [ ] NO  Care Discussed with Consultants/Other Providers [ x] YES  [ ] NO    LABS:                        9.4    6.83  )-----------( 132      ( 05 Dec 2023 12:25 )             28.1     12-05    145  |  106  |  12  ----------------------------<  119<H>  4.4   |  35<H>  |  0.68    Ca    8.7      05 Dec 2023 12:25        Urinalysis Basic - ( 05 Dec 2023 12:25 )    Color: x / Appearance: x / SG: x / pH: x  Gluc: 119 mg/dL / Ketone: x  / Bili: x / Urobili: x   Blood: x / Protein: x / Nitrite: x   Leuk Esterase: x / RBC: x / WBC x   Sq Epi: x / Non Sq Epi: x / Bacteria: x      CAPILLARY BLOOD GLUCOSE            Urinalysis Basic - ( 05 Dec 2023 12:25 )    Color: x / Appearance: x / SG: x / pH: x  Gluc: 119 mg/dL / Ketone: x  / Bili: x / Urobili: x   Blood: x / Protein: x / Nitrite: x   Leuk Esterase: x / RBC: x / WBC x   Sq Epi: x / Non Sq Epi: x / Bacteria: x          RADIOLOGY & ADDITIONAL TESTS:    Imaging Personally Reviewed:  [x ] YES  [ ] NO

## 2023-12-06 LAB
ANION GAP SERPL CALC-SCNC: 7 MMOL/L — SIGNIFICANT CHANGE UP (ref 5–17)
ANION GAP SERPL CALC-SCNC: 7 MMOL/L — SIGNIFICANT CHANGE UP (ref 5–17)
BUN SERPL-MCNC: 14 MG/DL — SIGNIFICANT CHANGE UP (ref 7–23)
BUN SERPL-MCNC: 14 MG/DL — SIGNIFICANT CHANGE UP (ref 7–23)
CALCIUM SERPL-MCNC: 8.7 MG/DL — SIGNIFICANT CHANGE UP (ref 8.5–10.1)
CALCIUM SERPL-MCNC: 8.7 MG/DL — SIGNIFICANT CHANGE UP (ref 8.5–10.1)
CHLORIDE SERPL-SCNC: 104 MMOL/L — SIGNIFICANT CHANGE UP (ref 96–108)
CHLORIDE SERPL-SCNC: 104 MMOL/L — SIGNIFICANT CHANGE UP (ref 96–108)
CO2 SERPL-SCNC: 34 MMOL/L — HIGH (ref 22–31)
CO2 SERPL-SCNC: 34 MMOL/L — HIGH (ref 22–31)
CREAT SERPL-MCNC: 0.69 MG/DL — SIGNIFICANT CHANGE UP (ref 0.5–1.3)
CREAT SERPL-MCNC: 0.69 MG/DL — SIGNIFICANT CHANGE UP (ref 0.5–1.3)
EGFR: 97 ML/MIN/1.73M2 — SIGNIFICANT CHANGE UP
EGFR: 97 ML/MIN/1.73M2 — SIGNIFICANT CHANGE UP
GLUCOSE SERPL-MCNC: 79 MG/DL — SIGNIFICANT CHANGE UP (ref 70–99)
GLUCOSE SERPL-MCNC: 79 MG/DL — SIGNIFICANT CHANGE UP (ref 70–99)
HCT VFR BLD CALC: 28.6 % — LOW (ref 39–50)
HCT VFR BLD CALC: 28.6 % — LOW (ref 39–50)
HGB BLD-MCNC: 9.8 G/DL — LOW (ref 13–17)
HGB BLD-MCNC: 9.8 G/DL — LOW (ref 13–17)
MCHC RBC-ENTMCNC: 31 PG — SIGNIFICANT CHANGE UP (ref 27–34)
MCHC RBC-ENTMCNC: 31 PG — SIGNIFICANT CHANGE UP (ref 27–34)
MCHC RBC-ENTMCNC: 34.3 GM/DL — SIGNIFICANT CHANGE UP (ref 32–36)
MCHC RBC-ENTMCNC: 34.3 GM/DL — SIGNIFICANT CHANGE UP (ref 32–36)
MCV RBC AUTO: 90.5 FL — SIGNIFICANT CHANGE UP (ref 80–100)
MCV RBC AUTO: 90.5 FL — SIGNIFICANT CHANGE UP (ref 80–100)
NRBC # BLD: 0 /100 WBCS — SIGNIFICANT CHANGE UP (ref 0–0)
NRBC # BLD: 0 /100 WBCS — SIGNIFICANT CHANGE UP (ref 0–0)
PLATELET # BLD AUTO: 164 K/UL — SIGNIFICANT CHANGE UP (ref 150–400)
PLATELET # BLD AUTO: 164 K/UL — SIGNIFICANT CHANGE UP (ref 150–400)
POTASSIUM SERPL-MCNC: 4 MMOL/L — SIGNIFICANT CHANGE UP (ref 3.5–5.3)
POTASSIUM SERPL-MCNC: 4 MMOL/L — SIGNIFICANT CHANGE UP (ref 3.5–5.3)
POTASSIUM SERPL-SCNC: 4 MMOL/L — SIGNIFICANT CHANGE UP (ref 3.5–5.3)
POTASSIUM SERPL-SCNC: 4 MMOL/L — SIGNIFICANT CHANGE UP (ref 3.5–5.3)
RBC # BLD: 3.16 M/UL — LOW (ref 4.2–5.8)
RBC # BLD: 3.16 M/UL — LOW (ref 4.2–5.8)
RBC # FLD: 14.8 % — HIGH (ref 10.3–14.5)
RBC # FLD: 14.8 % — HIGH (ref 10.3–14.5)
SODIUM SERPL-SCNC: 145 MMOL/L — SIGNIFICANT CHANGE UP (ref 135–145)
SODIUM SERPL-SCNC: 145 MMOL/L — SIGNIFICANT CHANGE UP (ref 135–145)
WBC # BLD: 7.26 K/UL — SIGNIFICANT CHANGE UP (ref 3.8–10.5)
WBC # BLD: 7.26 K/UL — SIGNIFICANT CHANGE UP (ref 3.8–10.5)
WBC # FLD AUTO: 7.26 K/UL — SIGNIFICANT CHANGE UP (ref 3.8–10.5)
WBC # FLD AUTO: 7.26 K/UL — SIGNIFICANT CHANGE UP (ref 3.8–10.5)

## 2023-12-06 PROCEDURE — 99231 SBSQ HOSP IP/OBS SF/LOW 25: CPT

## 2023-12-06 RX ORDER — SODIUM CHLORIDE 9 MG/ML
500 INJECTION, SOLUTION INTRAVENOUS ONCE
Refills: 0 | Status: COMPLETED | OUTPATIENT
Start: 2023-12-06 | End: 2023-12-06

## 2023-12-06 RX ORDER — FLUDROCORTISONE ACETATE 0.1 MG/1
0.2 TABLET ORAL DAILY
Refills: 0 | Status: DISCONTINUED | OUTPATIENT
Start: 2023-12-06 | End: 2023-12-11

## 2023-12-06 RX ADMIN — Medication 2 MILLIGRAM(S): at 21:45

## 2023-12-06 RX ADMIN — Medication 1 SPRAY(S): at 00:10

## 2023-12-06 RX ADMIN — MIRTAZAPINE 15 MILLIGRAM(S): 45 TABLET, ORALLY DISINTEGRATING ORAL at 21:45

## 2023-12-06 RX ADMIN — ATENOLOL 50 MILLIGRAM(S): 25 TABLET ORAL at 05:51

## 2023-12-06 RX ADMIN — FLUVOXAMINE MALEATE 150 MILLIGRAM(S): 25 TABLET ORAL at 19:21

## 2023-12-06 RX ADMIN — DIPHENHYDRAMINE HYDROCHLORIDE AND LIDOCAINE HYDROCHLORIDE AND ALUMINUM HYDROXIDE AND MAGNESIUM HYDRO 5 MILLILITER(S): KIT at 21:46

## 2023-12-06 RX ADMIN — Medication 1 SPRAY(S): at 05:54

## 2023-12-06 RX ADMIN — FLUVOXAMINE MALEATE 150 MILLIGRAM(S): 25 TABLET ORAL at 08:28

## 2023-12-06 RX ADMIN — Medication 3 MILLIGRAM(S): at 21:45

## 2023-12-06 RX ADMIN — SENNA PLUS 2 TABLET(S): 8.6 TABLET ORAL at 21:45

## 2023-12-06 RX ADMIN — HEPARIN SODIUM 5000 UNIT(S): 5000 INJECTION INTRAVENOUS; SUBCUTANEOUS at 19:20

## 2023-12-06 RX ADMIN — OLANZAPINE 10 MILLIGRAM(S): 15 TABLET, FILM COATED ORAL at 21:45

## 2023-12-06 RX ADMIN — FLUDROCORTISONE ACETATE 0.1 MILLIGRAM(S): 0.1 TABLET ORAL at 05:52

## 2023-12-06 RX ADMIN — Medication 2000 UNIT(S): at 12:07

## 2023-12-06 RX ADMIN — HEPARIN SODIUM 5000 UNIT(S): 5000 INJECTION INTRAVENOUS; SUBCUTANEOUS at 05:51

## 2023-12-06 RX ADMIN — LOSARTAN POTASSIUM 25 MILLIGRAM(S): 100 TABLET, FILM COATED ORAL at 05:51

## 2023-12-06 RX ADMIN — Medication 5 MILLIGRAM(S): at 05:52

## 2023-12-06 RX ADMIN — SODIUM CHLORIDE 500 MILLILITER(S): 9 INJECTION, SOLUTION INTRAVENOUS at 17:02

## 2023-12-06 RX ADMIN — DIPHENHYDRAMINE HYDROCHLORIDE AND LIDOCAINE HYDROCHLORIDE AND ALUMINUM HYDROXIDE AND MAGNESIUM HYDRO 5 MILLILITER(S): KIT at 05:53

## 2023-12-06 RX ADMIN — ATENOLOL 50 MILLIGRAM(S): 25 TABLET ORAL at 19:21

## 2023-12-06 NOTE — SOCIAL WORK PROGRESS NOTE - NSSWPROGRESSNOTE_GEN_ALL_CORE
GIANCARLO spoke with this pts spouse at bedside, plan for DC tomorrow to [place] [facility], pending MD clearance. GIANCARLO to follow.  GIANCARLO spoke with this pts spouse at bedside, plan for DC tomorrow to [place] [facility], pending MD clearance. GIANCARLO to follow.

## 2023-12-06 NOTE — PROGRESS NOTE ADULT - ASSESSMENT
73 M pmh of HTN, ITP, APL syndrome, generalized anxiety disorder, OCD, newly dx aggressive Merkel Cell cancer of forehead- pt's oncologist  Dr. Eng, at [facility] on Keytruda infusions with good response per wife  was brought to ED for worsening psychosis for the past few days, pt lately with difficulty swallowing, decreased po intake    Psychosis  monitor  psych fu  MRI noted  neuro following  cont luvox   continue to titrate haldol  zyprexa titrate as per psych  GI fu noted- sp EGD - normal -sp bx fu path  trial of dilaudid without success     #HTN/Orthostatic hypotension  cont atenolol w parameters  ? psych med induced  adrenal insufficiency  continue prednisone   increase florinef  check orthostatics daily    UTI  sp abx    #Merkel cell ca  onc c/s noted  r/o paraneoplastic causes- unlikely      #DVT ppx-  hep sq    [facility]   [phone] 73 M pmh of HTN, ITP, APL syndrome, generalized anxiety disorder, OCD, newly dx aggressive Merkel Cell cancer of forehead- pt's oncologist  Dr. Eng, at [facility] on Keytruda infusions with good response per wife  was brought to ED for worsening psychosis for the past few days, pt lately with difficulty swallowing, decreased po intake    Psychosis  monitor  psych fu  MRI noted  neuro following  cont luvox   continue to titrate haldol  zyprexa titrate as per psych  GI fu noted- sp EGD - normal -sp bx fu path  trial of dilaudid without success     #HTN/Orthostatic hypotension  cont atenolol w parameters  ? psych med induced  adrenal insufficiency  continue prednisone   increase florinef  check orthostatics daily    UTI  sp abx    #Merkel cell ca  onc c/s noted  r/o paraneoplastic causes- unlikely      #DVT ppx-  hep sq    [facility]   [phone]

## 2023-12-06 NOTE — PROGRESS NOTE ADULT - SUBJECTIVE AND OBJECTIVE BOX
Patient is a 73y old  Male who presents with a chief complaint of psychosis (02 Dec 2023 16:24)        INTERVAL HPI/OVERNIGHT EVENTS:   symptomatic orthostatic hypotension  pt seen and examined         Vital Signs Last 24 Hrs  T(C): 36.7 (06 Dec 2023 11:43), Max: 37.1 (06 Dec 2023 05:12)  T(F): 98 (06 Dec 2023 11:43), Max: 98.7 (06 Dec 2023 05:12)  HR: 81 (06 Dec 2023 11:43) (76 - 84)  BP: 124/77 (06 Dec 2023 13:18) (102/40 - 156/93)  BP(mean): --  RR: 18 (06 Dec 2023 11:43) (18 - 18)  SpO2: 96% (06 Dec 2023 11:43) (95% - 98%)    Parameters below as of 06 Dec 2023 11:43  Patient On (Oxygen Delivery Method): nasal cannula        acetaminophen     Tablet .. 650 milliGRAM(s) Oral every 6 hours PRN  atenolol  Tablet 50 milliGRAM(s) Oral two times a day  benzocaine 20% Spray 1 Spray(s) Topical four times a day  cholecalciferol 2000 Unit(s) Oral daily  diazepam    Tablet 2 milliGRAM(s) Oral at bedtime  FIRST- Mouthwash  BLM 5 milliLiter(s) Swish and Spit three times a day  fludroCORTISONE 0.2 milliGRAM(s) Oral daily  fluvoxaMINE 150 milliGRAM(s) Oral two times a day  heparin   Injectable 5000 Unit(s) SubCutaneous every 12 hours  lactulose Syrup 15 Gram(s) Oral two times a day PRN  losartan 25 milliGRAM(s) Oral daily  melatonin 3 milliGRAM(s) Oral at bedtime  mirtazapine 15 milliGRAM(s) Oral at bedtime  OLANZapine Disintegrating Tablet 10 milliGRAM(s) Oral at bedtime  predniSONE   Tablet 5 milliGRAM(s) Oral daily  senna 2 Tablet(s) Oral at bedtime      PHYSICAL EXAM:  GENERAL: NAD   EYES: conjunctiva and sclera clear  ENMT: Moist mucous membranes  NECK: Supple, No JVD, Normal thyroid  CHEST/LUNG: non labored, cta b/l  HEART: Regular rate and rhythm; No murmurs, rubs, or gallops  ABDOMEN: Soft, Nontender, Nondistended; Bowel sounds present  EXTREMITIES:  2+ Peripheral Pulses, No clubbing, no cyanosis, no edema  LYMPH: No lymphadenopathy noted  SKIN: No rashes or lesions  NEURO: no focal deficits    Consultant(s) Notes Reviewed:  [x ] YES  [ ] NO  Care Discussed with Consultants/Other Providers [ x] YES  [ ] NO    LABS:                        9.8    7.26  )-----------( 164      ( 06 Dec 2023 06:45 )             28.6     12-06    145  |  104  |  14  ----------------------------<  79  4.0   |  34<H>  |  0.69    Ca    8.7      06 Dec 2023 06:45        Urinalysis Basic - ( 06 Dec 2023 06:45 )    Color: x / Appearance: x / SG: x / pH: x  Gluc: 79 mg/dL / Ketone: x  / Bili: x / Urobili: x   Blood: x / Protein: x / Nitrite: x   Leuk Esterase: x / RBC: x / WBC x   Sq Epi: x / Non Sq Epi: x / Bacteria: x      CAPILLARY BLOOD GLUCOSE            Urinalysis Basic - ( 06 Dec 2023 06:45 )    Color: x / Appearance: x / SG: x / pH: x  Gluc: 79 mg/dL / Ketone: x  / Bili: x / Urobili: x   Blood: x / Protein: x / Nitrite: x   Leuk Esterase: x / RBC: x / WBC x   Sq Epi: x / Non Sq Epi: x / Bacteria: x          RADIOLOGY & ADDITIONAL TESTS:    Imaging Personally Reviewed  Reviewed consultants input

## 2023-12-07 RX ADMIN — FLUDROCORTISONE ACETATE 0.2 MILLIGRAM(S): 0.1 TABLET ORAL at 05:31

## 2023-12-07 RX ADMIN — Medication 2000 UNIT(S): at 12:03

## 2023-12-07 RX ADMIN — MIRTAZAPINE 15 MILLIGRAM(S): 45 TABLET, ORALLY DISINTEGRATING ORAL at 21:53

## 2023-12-07 RX ADMIN — FLUVOXAMINE MALEATE 150 MILLIGRAM(S): 25 TABLET ORAL at 05:32

## 2023-12-07 RX ADMIN — OLANZAPINE 10 MILLIGRAM(S): 15 TABLET, FILM COATED ORAL at 21:53

## 2023-12-07 RX ADMIN — ATENOLOL 50 MILLIGRAM(S): 25 TABLET ORAL at 18:00

## 2023-12-07 RX ADMIN — HEPARIN SODIUM 5000 UNIT(S): 5000 INJECTION INTRAVENOUS; SUBCUTANEOUS at 05:31

## 2023-12-07 RX ADMIN — Medication 5 MILLIGRAM(S): at 05:31

## 2023-12-07 RX ADMIN — FLUVOXAMINE MALEATE 150 MILLIGRAM(S): 25 TABLET ORAL at 18:00

## 2023-12-07 RX ADMIN — HEPARIN SODIUM 5000 UNIT(S): 5000 INJECTION INTRAVENOUS; SUBCUTANEOUS at 18:00

## 2023-12-07 RX ADMIN — LOSARTAN POTASSIUM 25 MILLIGRAM(S): 100 TABLET, FILM COATED ORAL at 05:33

## 2023-12-07 RX ADMIN — Medication 2 MILLIGRAM(S): at 21:54

## 2023-12-07 RX ADMIN — DIPHENHYDRAMINE HYDROCHLORIDE AND LIDOCAINE HYDROCHLORIDE AND ALUMINUM HYDROXIDE AND MAGNESIUM HYDRO 5 MILLILITER(S): KIT at 21:54

## 2023-12-07 RX ADMIN — SENNA PLUS 2 TABLET(S): 8.6 TABLET ORAL at 21:53

## 2023-12-07 RX ADMIN — Medication 3 MILLIGRAM(S): at 21:54

## 2023-12-07 RX ADMIN — Medication 1 SPRAY(S): at 05:32

## 2023-12-07 RX ADMIN — ATENOLOL 50 MILLIGRAM(S): 25 TABLET ORAL at 05:31

## 2023-12-07 RX ADMIN — DIPHENHYDRAMINE HYDROCHLORIDE AND LIDOCAINE HYDROCHLORIDE AND ALUMINUM HYDROXIDE AND MAGNESIUM HYDRO 5 MILLILITER(S): KIT at 05:34

## 2023-12-07 NOTE — PROGRESS NOTE ADULT - SUBJECTIVE AND OBJECTIVE BOX
Patient is a 73y old  Male who presents with a chief complaint of psychosis (02 Dec 2023 16:24)        INTERVAL HPI/OVERNIGHT EVENTS:   no complaints  pt seen and examined         Vital Signs Last 24 Hrs  T(C): 36.5 (07 Dec 2023 14:01), Max: 36.9 (07 Dec 2023 05:17)  T(F): 97.7 (07 Dec 2023 14:01), Max: 98.5 (07 Dec 2023 05:17)  HR: 76 (07 Dec 2023 05:17) (76 - 84)  BP: 153/70 (07 Dec 2023 05:17) (153/70 - 158/73)  BP(mean): --  RR: 17 (07 Dec 2023 14:01) (17 - 18)  SpO2: 98% (07 Dec 2023 14:01) (91% - 98%)    Parameters below as of 07 Dec 2023 14:01  Patient On (Oxygen Delivery Method): nasal cannula  O2 Flow (L/min): 2      acetaminophen     Tablet .. 650 milliGRAM(s) Oral every 6 hours PRN  atenolol  Tablet 50 milliGRAM(s) Oral two times a day  benzocaine 20% Spray 1 Spray(s) Topical four times a day  cholecalciferol 2000 Unit(s) Oral daily  diazepam    Tablet 2 milliGRAM(s) Oral at bedtime  FIRST- Mouthwash  BLM 5 milliLiter(s) Swish and Spit three times a day  fludroCORTISONE 0.2 milliGRAM(s) Oral daily  fluvoxaMINE 150 milliGRAM(s) Oral two times a day  heparin   Injectable 5000 Unit(s) SubCutaneous every 12 hours  lactulose Syrup 15 Gram(s) Oral two times a day PRN  losartan 25 milliGRAM(s) Oral daily  melatonin 3 milliGRAM(s) Oral at bedtime  mirtazapine 15 milliGRAM(s) Oral at bedtime  OLANZapine Disintegrating Tablet 10 milliGRAM(s) Oral at bedtime  predniSONE   Tablet 5 milliGRAM(s) Oral daily  senna 2 Tablet(s) Oral at bedtime      PHYSICAL EXAM:  GENERAL: NAD   EYES: conjunctiva and sclera clear  ENMT: Moist mucous membranes  NECK: Supple, No JVD, Normal thyroid  CHEST/LUNG: non labored, cta b/l  HEART: Regular rate and rhythm; No murmurs, rubs, or gallops  ABDOMEN: Soft, Nontender, Nondistended; Bowel sounds present  EXTREMITIES:  2+ Peripheral Pulses, No clubbing, no cyanosis, no edema  LYMPH: No lymphadenopathy noted  SKIN: No rashes or lesions  NEURO: no focal deficits    Consultant(s) Notes Reviewed:  [x ] YES  [ ] NO  Care Discussed with Consultants/Other Providers [ x] YES  [ ] NO    LABS:                        9.8    7.26  )-----------( 164      ( 06 Dec 2023 06:45 )             28.6     12-06    145  |  104  |  14  ----------------------------<  79  4.0   |  34<H>  |  0.69    Ca    8.7      06 Dec 2023 06:45        Urinalysis Basic - ( 06 Dec 2023 06:45 )    Color: x / Appearance: x / SG: x / pH: x  Gluc: 79 mg/dL / Ketone: x  / Bili: x / Urobili: x   Blood: x / Protein: x / Nitrite: x   Leuk Esterase: x / RBC: x / WBC x   Sq Epi: x / Non Sq Epi: x / Bacteria: x      CAPILLARY BLOOD GLUCOSE            Urinalysis Basic - ( 06 Dec 2023 06:45 )    Color: x / Appearance: x / SG: x / pH: x  Gluc: 79 mg/dL / Ketone: x  / Bili: x / Urobili: x   Blood: x / Protein: x / Nitrite: x   Leuk Esterase: x / RBC: x / WBC x   Sq Epi: x / Non Sq Epi: x / Bacteria: x          RADIOLOGY & ADDITIONAL TESTS:    Imaging Personally Reviewed  Reviewed consultants input

## 2023-12-07 NOTE — PROGRESS NOTE ADULT - ASSESSMENT
73 M pmh of HTN, ITP, APL syndrome, generalized anxiety disorder, OCD, newly dx aggressive Merkel Cell cancer of forehead- pt's oncologist  Dr. Eng, at [facility] on Keytruda infusions with good response per wife  was brought to ED for worsening psychosis for the past few days, pt lately with difficulty swallowing, decreased po intake    Psychosis  psych and neuro following  cont luvox   zyprexa      #HTN/Orthostatic hypotension: improved  cont atenolol w parameters  ? psych med induced  adrenal insufficiency  continue prednisone   increase florinef  check orthostatics daily    UTI  sp abx    #DVT ppx-  hep sq    [facility]   [phone] 73 M pmh of HTN, ITP, APL syndrome, generalized anxiety disorder, OCD, newly dx aggressive Merkel Cell cancer of forehead- pt's oncologist  Dr. Eng, at [facility] on Keytruda infusions with good response per wife  was brought to ED for worsening psychosis for the past few days, pt lately with difficulty swallowing, decreased po intake    Psychosis  psych and neuro following  cont luvox   zyprexa      #HTN/Orthostatic hypotension: improved  cont atenolol w parameters  ? psych med induced  adrenal insufficiency  continue prednisone   increase florinef  check orthostatics daily    UTI  sp abx    #DVT ppx-  hep sq    [facility]   [phone]

## 2023-12-08 PROCEDURE — 99232 SBSQ HOSP IP/OBS MODERATE 35: CPT

## 2023-12-08 RX ORDER — SODIUM CHLORIDE 9 MG/ML
1000 INJECTION, SOLUTION INTRAVENOUS
Refills: 0 | Status: DISCONTINUED | OUTPATIENT
Start: 2023-12-08 | End: 2023-12-09

## 2023-12-08 RX ADMIN — SENNA PLUS 2 TABLET(S): 8.6 TABLET ORAL at 21:48

## 2023-12-08 RX ADMIN — SODIUM CHLORIDE 100 MILLILITER(S): 9 INJECTION, SOLUTION INTRAVENOUS at 17:39

## 2023-12-08 RX ADMIN — FLUVOXAMINE MALEATE 150 MILLIGRAM(S): 25 TABLET ORAL at 05:38

## 2023-12-08 RX ADMIN — Medication 2000 UNIT(S): at 14:16

## 2023-12-08 RX ADMIN — DIPHENHYDRAMINE HYDROCHLORIDE AND LIDOCAINE HYDROCHLORIDE AND ALUMINUM HYDROXIDE AND MAGNESIUM HYDRO 5 MILLILITER(S): KIT at 05:39

## 2023-12-08 RX ADMIN — FLUVOXAMINE MALEATE 150 MILLIGRAM(S): 25 TABLET ORAL at 17:47

## 2023-12-08 RX ADMIN — OLANZAPINE 10 MILLIGRAM(S): 15 TABLET, FILM COATED ORAL at 21:48

## 2023-12-08 RX ADMIN — Medication 1 SPRAY(S): at 14:16

## 2023-12-08 RX ADMIN — HEPARIN SODIUM 5000 UNIT(S): 5000 INJECTION INTRAVENOUS; SUBCUTANEOUS at 17:30

## 2023-12-08 RX ADMIN — LOSARTAN POTASSIUM 25 MILLIGRAM(S): 100 TABLET, FILM COATED ORAL at 05:38

## 2023-12-08 RX ADMIN — Medication 5 MILLIGRAM(S): at 05:38

## 2023-12-08 RX ADMIN — DIPHENHYDRAMINE HYDROCHLORIDE AND LIDOCAINE HYDROCHLORIDE AND ALUMINUM HYDROXIDE AND MAGNESIUM HYDRO 5 MILLILITER(S): KIT at 14:03

## 2023-12-08 RX ADMIN — HEPARIN SODIUM 5000 UNIT(S): 5000 INJECTION INTRAVENOUS; SUBCUTANEOUS at 05:37

## 2023-12-08 RX ADMIN — MIRTAZAPINE 15 MILLIGRAM(S): 45 TABLET, ORALLY DISINTEGRATING ORAL at 21:48

## 2023-12-08 RX ADMIN — ATENOLOL 50 MILLIGRAM(S): 25 TABLET ORAL at 05:38

## 2023-12-08 RX ADMIN — Medication 3 MILLIGRAM(S): at 21:48

## 2023-12-08 RX ADMIN — FLUDROCORTISONE ACETATE 0.2 MILLIGRAM(S): 0.1 TABLET ORAL at 05:38

## 2023-12-08 NOTE — PROGRESS NOTE ADULT - ASSESSMENT
73 M pmh of HTN, ITP, APL syndrome, generalized anxiety disorder, OCD, newly dx aggressive Merkel Cell cancer of forehead- pt's oncologist  Dr. Eng, at [facility] on Keytruda infusions with good response per wife  was brought to ED for worsening psychosis for the past few days, pt lately with difficulty swallowing, decreased po intake    Psychosis  psych and neuro following  cont luvox   zyprexa      #HTN/Orthostatic hypotension  cont atenolol w parameters  ? psych med induced  adrenal insufficiency  continue prednisone   increased florinef  compressions stockings  ivf  check orthostatics daily    UTI  sp abx    #DVT ppx-  hep sq    [facility]    73 M pmh of HTN, ITP, APL syndrome, generalized anxiety disorder, OCD, newly dx aggressive Merkel Cell cancer of forehead- pt's oncologist  Dr. Eng, at [facility] on Keytruda infusions with good response per wife  was brought to ED for worsening psychosis for the past few days, pt lately with difficulty swallowing, decreased po intake    Psychosis  psych and neuro following  cont luvox   zyprexa      #HTN/Orthostatic hypotension  cont atenolol w parameters  ? psych med induced  adrenal insufficiency  continue prednisone   increased florinef  compressions stockings  ivf  check orthostatics daily    UTI  sp abx    #DVT ppx-  hep sq    [facility]

## 2023-12-08 NOTE — PROGRESS NOTE ADULT - SUBJECTIVE AND OBJECTIVE BOX
Patient is a 73y old  Male who presents with a chief complaint of psychosis (02 Dec 2023 16:24)        INTERVAL HPI/OVERNIGHT EVENTS:   no complaints  pt seen and examined         Vital Signs Last 24 Hrs  T(C): 37.1 (08 Dec 2023 11:51), Max: 37.1 (08 Dec 2023 11:51)  T(F): 98.7 (08 Dec 2023 11:51), Max: 98.7 (08 Dec 2023 11:51)  HR: 81 (08 Dec 2023 04:52) (71 - 81)  BP: 170/79 (08 Dec 2023 04:52) (153/76 - 170/79)  BP(mean): --  RR: 18 (08 Dec 2023 11:51) (17 - 18)  SpO2: 99% (08 Dec 2023 11:51) (99% - 99%)    Parameters below as of 08 Dec 2023 11:51  Patient On (Oxygen Delivery Method): nasal cannula        acetaminophen     Tablet .. 650 milliGRAM(s) Oral every 6 hours PRN  atenolol  Tablet 50 milliGRAM(s) Oral two times a day  benzocaine 20% Spray 1 Spray(s) Topical four times a day  cholecalciferol 2000 Unit(s) Oral daily  dextrose 5% + lactated ringers. 1000 milliLiter(s) IV Continuous <Continuous>  FIRST- Mouthwash  BLM 5 milliLiter(s) Swish and Spit three times a day  fludroCORTISONE 0.2 milliGRAM(s) Oral daily  fluvoxaMINE 150 milliGRAM(s) Oral two times a day  heparin   Injectable 5000 Unit(s) SubCutaneous every 12 hours  lactulose Syrup 15 Gram(s) Oral two times a day PRN  losartan 25 milliGRAM(s) Oral daily  melatonin 3 milliGRAM(s) Oral at bedtime  mirtazapine 15 milliGRAM(s) Oral at bedtime  OLANZapine Disintegrating Tablet 10 milliGRAM(s) Oral at bedtime  predniSONE   Tablet 5 milliGRAM(s) Oral daily  senna 2 Tablet(s) Oral at bedtime      PHYSICAL EXAM:  GENERAL: NAD   EYES: conjunctiva and sclera clear  ENMT: Moist mucous membranes  NECK: Supple, No JVD, Normal thyroid  CHEST/LUNG: non labored, cta b/l  HEART: Regular rate and rhythm; No murmurs, rubs, or gallops  ABDOMEN: Soft, Nontender, Nondistended; Bowel sounds present  EXTREMITIES:  2+ Peripheral Pulses, No clubbing, no cyanosis, no edema  LYMPH: No lymphadenopathy noted  SKIN: No rashes or lesions  NEURO: no focal deficits    Consultant(s) Notes Reviewed:  [x ] YES  [ ] NO  Care Discussed with Consultants/Other Providers [ x] YES  [ ] NO    LABS:              CAPILLARY BLOOD GLUCOSE                  RADIOLOGY & ADDITIONAL TESTS:    Imaging Personally Reviewed  Reviewed consultants input

## 2023-12-08 NOTE — BH CONSULTATION LIAISON PROGRESS NOTE - NSBHASSESSMENTFT_PSY_ALL_CORE
Pt is a 72 y/o m with HTN, h/o severe anxiety around medical illness and constantly on the look for illnesses brought in by wife after he started to become more and more psychotic with each round of Keytruda for his Merkel Cell Ca. Pt is delusional thinking he has metals, objects in his throat till his anus and penis, unable to tolerate PO. There are case reports of rare psychosis with monoclonal antibodies. Keytruda seems like the possible suspect of psychosis, combined with wife's reports of timeline, although can't be sure, time will show.     Recommendation: Increase Zyprexa Zydis to 20 mg po at HS. Pt is a 74 y/o m with HTN, h/o severe anxiety around medical illness and constantly on the look for illnesses brought in by wife after he started to become more and more psychotic with each round of Keytruda for his Merkel Cell Ca. Pt is delusional thinking he has metals, objects in his throat till his anus and penis, unable to tolerate PO. There are case reports of rare psychosis with monoclonal antibodies. Keytruda seems like the possible suspect of psychosis, combined with wife's reports of timeline, although can't be sure, time will show.     Recommendation: Increase Zyprexa Zydis to 20 mg po at HS.

## 2023-12-08 NOTE — BH CONSULTATION LIAISON PROGRESS NOTE - NSBHATTESTBILLING_PSY_A_CORE
59242-Ervayiusmg OBS or IP - low complexity OR 25-34 mins [id number]-Fppsxroumo OBS or IP - low complexity OR 25-34 mins

## 2023-12-08 NOTE — SOCIAL WORK PROGRESS NOTE - NSSWPROGRESSNOTE_GEN_ALL_CORE
GIANCARLO continues to follow for safe dc planning. Plan remains for [place] [facility] when medically cleared. SW to follow.

## 2023-12-09 LAB
ALBUMIN SERPL ELPH-MCNC: 2.5 G/DL — LOW (ref 3.3–5)
ALBUMIN SERPL ELPH-MCNC: 2.5 G/DL — LOW (ref 3.3–5)
ALP SERPL-CCNC: 64 U/L — SIGNIFICANT CHANGE UP (ref 40–120)
ALP SERPL-CCNC: 64 U/L — SIGNIFICANT CHANGE UP (ref 40–120)
ALT FLD-CCNC: 16 U/L — SIGNIFICANT CHANGE UP (ref 12–78)
ALT FLD-CCNC: 16 U/L — SIGNIFICANT CHANGE UP (ref 12–78)
ANION GAP SERPL CALC-SCNC: 4 MMOL/L — LOW (ref 5–17)
ANION GAP SERPL CALC-SCNC: 4 MMOL/L — LOW (ref 5–17)
AST SERPL-CCNC: 19 U/L — SIGNIFICANT CHANGE UP (ref 15–37)
AST SERPL-CCNC: 19 U/L — SIGNIFICANT CHANGE UP (ref 15–37)
BILIRUB SERPL-MCNC: 0.5 MG/DL — SIGNIFICANT CHANGE UP (ref 0.2–1.2)
BILIRUB SERPL-MCNC: 0.5 MG/DL — SIGNIFICANT CHANGE UP (ref 0.2–1.2)
BUN SERPL-MCNC: 16 MG/DL — SIGNIFICANT CHANGE UP (ref 7–23)
BUN SERPL-MCNC: 16 MG/DL — SIGNIFICANT CHANGE UP (ref 7–23)
CALCIUM SERPL-MCNC: 8.7 MG/DL — SIGNIFICANT CHANGE UP (ref 8.5–10.1)
CALCIUM SERPL-MCNC: 8.7 MG/DL — SIGNIFICANT CHANGE UP (ref 8.5–10.1)
CHLORIDE SERPL-SCNC: 109 MMOL/L — HIGH (ref 96–108)
CHLORIDE SERPL-SCNC: 109 MMOL/L — HIGH (ref 96–108)
CO2 SERPL-SCNC: 35 MMOL/L — HIGH (ref 22–31)
CO2 SERPL-SCNC: 35 MMOL/L — HIGH (ref 22–31)
CREAT SERPL-MCNC: 0.67 MG/DL — SIGNIFICANT CHANGE UP (ref 0.5–1.3)
CREAT SERPL-MCNC: 0.67 MG/DL — SIGNIFICANT CHANGE UP (ref 0.5–1.3)
EGFR: 97 ML/MIN/1.73M2 — SIGNIFICANT CHANGE UP
EGFR: 97 ML/MIN/1.73M2 — SIGNIFICANT CHANGE UP
GLUCOSE SERPL-MCNC: 169 MG/DL — HIGH (ref 70–99)
GLUCOSE SERPL-MCNC: 169 MG/DL — HIGH (ref 70–99)
HCT VFR BLD CALC: 26.1 % — LOW (ref 39–50)
HCT VFR BLD CALC: 26.1 % — LOW (ref 39–50)
HGB BLD-MCNC: 8.7 G/DL — LOW (ref 13–17)
HGB BLD-MCNC: 8.7 G/DL — LOW (ref 13–17)
MCHC RBC-ENTMCNC: 29.7 PG — SIGNIFICANT CHANGE UP (ref 27–34)
MCHC RBC-ENTMCNC: 29.7 PG — SIGNIFICANT CHANGE UP (ref 27–34)
MCHC RBC-ENTMCNC: 33.3 GM/DL — SIGNIFICANT CHANGE UP (ref 32–36)
MCHC RBC-ENTMCNC: 33.3 GM/DL — SIGNIFICANT CHANGE UP (ref 32–36)
MCV RBC AUTO: 89.1 FL — SIGNIFICANT CHANGE UP (ref 80–100)
MCV RBC AUTO: 89.1 FL — SIGNIFICANT CHANGE UP (ref 80–100)
NRBC # BLD: 0 /100 WBCS — SIGNIFICANT CHANGE UP (ref 0–0)
NRBC # BLD: 0 /100 WBCS — SIGNIFICANT CHANGE UP (ref 0–0)
PLATELET # BLD AUTO: 116 K/UL — LOW (ref 150–400)
PLATELET # BLD AUTO: 116 K/UL — LOW (ref 150–400)
POTASSIUM SERPL-MCNC: 3.8 MMOL/L — SIGNIFICANT CHANGE UP (ref 3.5–5.3)
POTASSIUM SERPL-MCNC: 3.8 MMOL/L — SIGNIFICANT CHANGE UP (ref 3.5–5.3)
POTASSIUM SERPL-SCNC: 3.8 MMOL/L — SIGNIFICANT CHANGE UP (ref 3.5–5.3)
POTASSIUM SERPL-SCNC: 3.8 MMOL/L — SIGNIFICANT CHANGE UP (ref 3.5–5.3)
PROT SERPL-MCNC: 5.5 G/DL — LOW (ref 6–8.3)
PROT SERPL-MCNC: 5.5 G/DL — LOW (ref 6–8.3)
RBC # BLD: 2.93 M/UL — LOW (ref 4.2–5.8)
RBC # BLD: 2.93 M/UL — LOW (ref 4.2–5.8)
RBC # FLD: 14.8 % — HIGH (ref 10.3–14.5)
RBC # FLD: 14.8 % — HIGH (ref 10.3–14.5)
SODIUM SERPL-SCNC: 148 MMOL/L — HIGH (ref 135–145)
SODIUM SERPL-SCNC: 148 MMOL/L — HIGH (ref 135–145)
WBC # BLD: 7.27 K/UL — SIGNIFICANT CHANGE UP (ref 3.8–10.5)
WBC # BLD: 7.27 K/UL — SIGNIFICANT CHANGE UP (ref 3.8–10.5)
WBC # FLD AUTO: 7.27 K/UL — SIGNIFICANT CHANGE UP (ref 3.8–10.5)
WBC # FLD AUTO: 7.27 K/UL — SIGNIFICANT CHANGE UP (ref 3.8–10.5)

## 2023-12-09 PROCEDURE — 93010 ELECTROCARDIOGRAM REPORT: CPT

## 2023-12-09 RX ORDER — SODIUM CHLORIDE 9 MG/ML
1000 INJECTION, SOLUTION INTRAVENOUS
Refills: 0 | Status: DISCONTINUED | OUTPATIENT
Start: 2023-12-09 | End: 2023-12-12

## 2023-12-09 RX ADMIN — ATENOLOL 50 MILLIGRAM(S): 25 TABLET ORAL at 18:38

## 2023-12-09 RX ADMIN — HEPARIN SODIUM 5000 UNIT(S): 5000 INJECTION INTRAVENOUS; SUBCUTANEOUS at 05:39

## 2023-12-09 RX ADMIN — MIRTAZAPINE 15 MILLIGRAM(S): 45 TABLET, ORALLY DISINTEGRATING ORAL at 21:43

## 2023-12-09 RX ADMIN — FLUVOXAMINE MALEATE 150 MILLIGRAM(S): 25 TABLET ORAL at 05:37

## 2023-12-09 RX ADMIN — LOSARTAN POTASSIUM 25 MILLIGRAM(S): 100 TABLET, FILM COATED ORAL at 05:38

## 2023-12-09 RX ADMIN — Medication 3 MILLIGRAM(S): at 21:43

## 2023-12-09 RX ADMIN — Medication 5 MILLIGRAM(S): at 05:39

## 2023-12-09 RX ADMIN — SODIUM CHLORIDE 100 MILLILITER(S): 9 INJECTION, SOLUTION INTRAVENOUS at 05:41

## 2023-12-09 RX ADMIN — OLANZAPINE 10 MILLIGRAM(S): 15 TABLET, FILM COATED ORAL at 21:43

## 2023-12-09 RX ADMIN — FLUVOXAMINE MALEATE 150 MILLIGRAM(S): 25 TABLET ORAL at 18:38

## 2023-12-09 RX ADMIN — FLUDROCORTISONE ACETATE 0.2 MILLIGRAM(S): 0.1 TABLET ORAL at 05:37

## 2023-12-09 RX ADMIN — HEPARIN SODIUM 5000 UNIT(S): 5000 INJECTION INTRAVENOUS; SUBCUTANEOUS at 18:34

## 2023-12-09 RX ADMIN — DIPHENHYDRAMINE HYDROCHLORIDE AND LIDOCAINE HYDROCHLORIDE AND ALUMINUM HYDROXIDE AND MAGNESIUM HYDRO 5 MILLILITER(S): KIT at 21:44

## 2023-12-09 RX ADMIN — ATENOLOL 50 MILLIGRAM(S): 25 TABLET ORAL at 05:37

## 2023-12-09 RX ADMIN — SENNA PLUS 2 TABLET(S): 8.6 TABLET ORAL at 21:43

## 2023-12-09 RX ADMIN — Medication 2000 UNIT(S): at 11:39

## 2023-12-09 RX ADMIN — SODIUM CHLORIDE 75 MILLILITER(S): 9 INJECTION, SOLUTION INTRAVENOUS at 18:45

## 2023-12-09 NOTE — PROGRESS NOTE ADULT - SUBJECTIVE AND OBJECTIVE BOX
Patient is a 73y old  Male who presents with a chief complaint of psychosis (02 Dec 2023 16:24)        INTERVAL HPI/OVERNIGHT EVENTS:   no complaints  pt seen and examined         Vital Signs Last 24 Hrs  T(C): 36.4 (09 Dec 2023 11:34), Max: 36.7 (08 Dec 2023 20:43)  T(F): 97.6 (09 Dec 2023 11:34), Max: 98.1 (08 Dec 2023 20:43)  HR: 73 (09 Dec 2023 11:34) (73 - 87)  BP: 133/69 (09 Dec 2023 11:34) (133/69 - 148/72)  BP(mean): --  RR: 17 (09 Dec 2023 11:34) (17 - 18)  SpO2: 94% (09 Dec 2023 11:34) (94% - 98%)    Parameters below as of 09 Dec 2023 11:34  Patient On (Oxygen Delivery Method): room air        acetaminophen     Tablet .. 650 milliGRAM(s) Oral every 6 hours PRN  atenolol  Tablet 50 milliGRAM(s) Oral two times a day  benzocaine 20% Spray 1 Spray(s) Topical four times a day  cholecalciferol 2000 Unit(s) Oral daily  dextrose 5% + lactated ringers. 1000 milliLiter(s) IV Continuous <Continuous>  FIRST- Mouthwash  BLM 5 milliLiter(s) Swish and Spit three times a day  fludroCORTISONE 0.2 milliGRAM(s) Oral daily  fluvoxaMINE 150 milliGRAM(s) Oral two times a day  heparin   Injectable 5000 Unit(s) SubCutaneous every 12 hours  lactulose Syrup 15 Gram(s) Oral two times a day PRN  losartan 25 milliGRAM(s) Oral daily  melatonin 3 milliGRAM(s) Oral at bedtime  mirtazapine 15 milliGRAM(s) Oral at bedtime  OLANZapine Disintegrating Tablet 10 milliGRAM(s) Oral at bedtime  predniSONE   Tablet 5 milliGRAM(s) Oral daily  senna 2 Tablet(s) Oral at bedtime      PHYSICAL EXAM:  GENERAL: NAD   EYES: conjunctiva and sclera clear  ENMT: Moist mucous membranes  NECK: Supple, No JVD, Normal thyroid  CHEST/LUNG: non labored, cta b/l  HEART: Regular rate and rhythm; No murmurs, rubs, or gallops  ABDOMEN: Soft, Nontender, Nondistended; Bowel sounds present  EXTREMITIES:  2+ Peripheral Pulses, No clubbing, no cyanosis, no edema  LYMPH: No lymphadenopathy noted  SKIN: No rashes or lesions  NEURO: no focal deficits    Consultant(s) Notes Reviewed:  [x ] YES  [ ] NO  Care Discussed with Consultants/Other Providers [ x] YES  [ ] NO    LABS:                        8.7    7.27  )-----------( 116      ( 09 Dec 2023 10:23 )             26.1     12-09    148<H>  |  109<H>  |  16  ----------------------------<  169<H>  3.8   |  35<H>  |  0.67    Ca    8.7      09 Dec 2023 10:23    TPro  5.5<L>  /  Alb  2.5<L>  /  TBili  0.5  /  DBili  x   /  AST  19  /  ALT  16  /  AlkPhos  64  12-09      Urinalysis Basic - ( 09 Dec 2023 10:23 )    Color: x / Appearance: x / SG: x / pH: x  Gluc: 169 mg/dL / Ketone: x  / Bili: x / Urobili: x   Blood: x / Protein: x / Nitrite: x   Leuk Esterase: x / RBC: x / WBC x   Sq Epi: x / Non Sq Epi: x / Bacteria: x      CAPILLARY BLOOD GLUCOSE            Urinalysis Basic - ( 09 Dec 2023 10:23 )    Color: x / Appearance: x / SG: x / pH: x  Gluc: 169 mg/dL / Ketone: x  / Bili: x / Urobili: x   Blood: x / Protein: x / Nitrite: x   Leuk Esterase: x / RBC: x / WBC x   Sq Epi: x / Non Sq Epi: x / Bacteria: x          RADIOLOGY & ADDITIONAL TESTS:    Imaging Personally Reviewed  Reviewed consultants input

## 2023-12-09 NOTE — PROGRESS NOTE ADULT - ASSESSMENT
The patient is a 73 year old male with a history of HTN, ITP, APLS, anxiety, OCD, Merkel cell cancer who presented with psychosis.    Plan:  - ECG with no evidence of ischemia or infarction  - Orthostatic hypotension may be multifactorial - dehydration, medication related (antipsychotics), and low cortisol levels  - Allow for supine hypertension - BP labile due to orthostatic hypotension - would not treat supine hypertension with additional antihypertensives unless SBP>200  - Syncope - due to orthostatic hypotension  - Continue atenolol 50 mg bid  - Continue losartan 25 mg daily  - EGD done with no significant findings  - Psych follow-up  - Discharge planning

## 2023-12-09 NOTE — PROGRESS NOTE ADULT - ASSESSMENT
73 M pmh of HTN, ITP, APL syndrome, generalized anxiety disorder, OCD, newly dx aggressive Merkel Cell cancer of forehead- pt's oncologist  Dr. Eng, at [facility] on Keytruda infusions with good response per wife  was brought to ED for worsening psychosis for the past few days, pt lately with difficulty swallowing, decreased po intake    Psychosis  psych and neuro following  cont luvox   zyprexa      #HTN/Orthostatic hypotension  cont atenolol w parameters  ? psych med induced  adrenal insufficiency  continue prednisone   increased florinef  compressions stockings  ivf  check orthostatics daily  improving    UTI  sp abx    #DVT ppx-  hep sq    [facility]    73 M pmh of HTN, ITP, APL syndrome, generalized anxiety disorder, OCD, newly dx aggressive Merkel Cell cancer of forehead- pt's oncologist  Dr. Eng, at [facility] on Keytruda infusions with good response per wife  was brought to ED for worsening psychosis for the past few days, pt lately with difficulty swallowing, decreased po intake    Psychosis  psych and neuro following  cont luvox   zyprexa      #HTN/Orthostatic hypotension  cont atenolol w parameters  ? psych med induced  adrenal insufficiency  continue prednisone   increased florinef  compressions stockings  ivf  check orthostatics daily  improving    UTI  sp abx    #DVT ppx-  hep sq    [facility]

## 2023-12-09 NOTE — PROGRESS NOTE ADULT - SUBJECTIVE AND OBJECTIVE BOX
Chief Complaint: Psychosis    Interval Events: No events overnight.    Review of Systems:  General: No fevers, chills, weight gain  Skin: No rashes, color changes  Cardiovascular: No chest pain, orthopnea  Respiratory: No shortness of breath, cough  Gastrointestinal: No nausea, abdominal pain  Genitourinary: No incontinence, pain with urination  Musculoskeletal: No pain, swelling, decreased range of motion  Neurological: No headache, weakness  Psychiatric: No depression, anxiety  Endocrine: No weight gain, increased thirst  All other systems are comprehensively negative.    Physical Exam:  Vital Signs Last 24 Hrs  T(C): 36.6 (09 Dec 2023 04:56), Max: 37.1 (08 Dec 2023 11:51)  T(F): 97.8 (09 Dec 2023 04:56), Max: 98.7 (08 Dec 2023 11:51)  HR: 87 (09 Dec 2023 04:56) (79 - 87)  BP: 148/72 (09 Dec 2023 04:56) (133/73 - 148/72)  BP(mean): --  RR: 17 (09 Dec 2023 04:56) (17 - 18)  SpO2: 98% (09 Dec 2023 09:24) (97% - 99%)  Parameters below as of 09 Dec 2023 09:24  Patient On (Oxygen Delivery Method): room air  General: NAD  HEENT: MMM  Neck: No JVD, no carotid bruit  Lungs: CTAB  CV: RRR, nl S1/S2, no M/R/G  Abdomen: S/NT/ND, +BS  Extremities: No LE edema, no cyanosis  Neuro: AAOx3, non-focal  Skin: No rash    Labs:

## 2023-12-10 LAB
ALBUMIN SERPL ELPH-MCNC: 2.3 G/DL — LOW (ref 3.3–5)
ALBUMIN SERPL ELPH-MCNC: 2.3 G/DL — LOW (ref 3.3–5)
ALP SERPL-CCNC: 55 U/L — SIGNIFICANT CHANGE UP (ref 40–120)
ALP SERPL-CCNC: 55 U/L — SIGNIFICANT CHANGE UP (ref 40–120)
ALT FLD-CCNC: 17 U/L — SIGNIFICANT CHANGE UP (ref 12–78)
ALT FLD-CCNC: 17 U/L — SIGNIFICANT CHANGE UP (ref 12–78)
ANION GAP SERPL CALC-SCNC: 4 MMOL/L — LOW (ref 5–17)
ANION GAP SERPL CALC-SCNC: 4 MMOL/L — LOW (ref 5–17)
AST SERPL-CCNC: 18 U/L — SIGNIFICANT CHANGE UP (ref 15–37)
AST SERPL-CCNC: 18 U/L — SIGNIFICANT CHANGE UP (ref 15–37)
BILIRUB SERPL-MCNC: 0.5 MG/DL — SIGNIFICANT CHANGE UP (ref 0.2–1.2)
BILIRUB SERPL-MCNC: 0.5 MG/DL — SIGNIFICANT CHANGE UP (ref 0.2–1.2)
BUN SERPL-MCNC: 14 MG/DL — SIGNIFICANT CHANGE UP (ref 7–23)
BUN SERPL-MCNC: 14 MG/DL — SIGNIFICANT CHANGE UP (ref 7–23)
CALCIUM SERPL-MCNC: 8.5 MG/DL — SIGNIFICANT CHANGE UP (ref 8.5–10.1)
CALCIUM SERPL-MCNC: 8.5 MG/DL — SIGNIFICANT CHANGE UP (ref 8.5–10.1)
CHLORIDE SERPL-SCNC: 109 MMOL/L — HIGH (ref 96–108)
CHLORIDE SERPL-SCNC: 109 MMOL/L — HIGH (ref 96–108)
CO2 SERPL-SCNC: 33 MMOL/L — HIGH (ref 22–31)
CO2 SERPL-SCNC: 33 MMOL/L — HIGH (ref 22–31)
CREAT SERPL-MCNC: 0.69 MG/DL — SIGNIFICANT CHANGE UP (ref 0.5–1.3)
CREAT SERPL-MCNC: 0.69 MG/DL — SIGNIFICANT CHANGE UP (ref 0.5–1.3)
EGFR: 97 ML/MIN/1.73M2 — SIGNIFICANT CHANGE UP
EGFR: 97 ML/MIN/1.73M2 — SIGNIFICANT CHANGE UP
GLUCOSE SERPL-MCNC: 183 MG/DL — HIGH (ref 70–99)
GLUCOSE SERPL-MCNC: 183 MG/DL — HIGH (ref 70–99)
HCT VFR BLD CALC: 24 % — LOW (ref 39–50)
HCT VFR BLD CALC: 24 % — LOW (ref 39–50)
HGB BLD-MCNC: 8 G/DL — LOW (ref 13–17)
HGB BLD-MCNC: 8 G/DL — LOW (ref 13–17)
MCHC RBC-ENTMCNC: 29.7 PG — SIGNIFICANT CHANGE UP (ref 27–34)
MCHC RBC-ENTMCNC: 29.7 PG — SIGNIFICANT CHANGE UP (ref 27–34)
MCHC RBC-ENTMCNC: 33.3 GM/DL — SIGNIFICANT CHANGE UP (ref 32–36)
MCHC RBC-ENTMCNC: 33.3 GM/DL — SIGNIFICANT CHANGE UP (ref 32–36)
MCV RBC AUTO: 89.2 FL — SIGNIFICANT CHANGE UP (ref 80–100)
MCV RBC AUTO: 89.2 FL — SIGNIFICANT CHANGE UP (ref 80–100)
NRBC # BLD: 0 /100 WBCS — SIGNIFICANT CHANGE UP (ref 0–0)
NRBC # BLD: 0 /100 WBCS — SIGNIFICANT CHANGE UP (ref 0–0)
PLATELET # BLD AUTO: 102 K/UL — LOW (ref 150–400)
PLATELET # BLD AUTO: 102 K/UL — LOW (ref 150–400)
POTASSIUM SERPL-MCNC: 3.4 MMOL/L — LOW (ref 3.5–5.3)
POTASSIUM SERPL-MCNC: 3.4 MMOL/L — LOW (ref 3.5–5.3)
POTASSIUM SERPL-SCNC: 3.4 MMOL/L — LOW (ref 3.5–5.3)
POTASSIUM SERPL-SCNC: 3.4 MMOL/L — LOW (ref 3.5–5.3)
PREALB SERPL-MCNC: 9 MG/DL — LOW (ref 20–40)
PREALB SERPL-MCNC: 9 MG/DL — LOW (ref 20–40)
PROT SERPL-MCNC: 5.1 G/DL — LOW (ref 6–8.3)
PROT SERPL-MCNC: 5.1 G/DL — LOW (ref 6–8.3)
RBC # BLD: 2.69 M/UL — LOW (ref 4.2–5.8)
RBC # BLD: 2.69 M/UL — LOW (ref 4.2–5.8)
RBC # FLD: 14.5 % — SIGNIFICANT CHANGE UP (ref 10.3–14.5)
RBC # FLD: 14.5 % — SIGNIFICANT CHANGE UP (ref 10.3–14.5)
SODIUM SERPL-SCNC: 146 MMOL/L — HIGH (ref 135–145)
SODIUM SERPL-SCNC: 146 MMOL/L — HIGH (ref 135–145)
WBC # BLD: 6.36 K/UL — SIGNIFICANT CHANGE UP (ref 3.8–10.5)
WBC # BLD: 6.36 K/UL — SIGNIFICANT CHANGE UP (ref 3.8–10.5)
WBC # FLD AUTO: 6.36 K/UL — SIGNIFICANT CHANGE UP (ref 3.8–10.5)
WBC # FLD AUTO: 6.36 K/UL — SIGNIFICANT CHANGE UP (ref 3.8–10.5)

## 2023-12-10 RX ADMIN — Medication 3 MILLIGRAM(S): at 22:08

## 2023-12-10 RX ADMIN — HEPARIN SODIUM 5000 UNIT(S): 5000 INJECTION INTRAVENOUS; SUBCUTANEOUS at 05:28

## 2023-12-10 RX ADMIN — FLUDROCORTISONE ACETATE 0.2 MILLIGRAM(S): 0.1 TABLET ORAL at 05:28

## 2023-12-10 RX ADMIN — OLANZAPINE 10 MILLIGRAM(S): 15 TABLET, FILM COATED ORAL at 22:08

## 2023-12-10 RX ADMIN — LOSARTAN POTASSIUM 25 MILLIGRAM(S): 100 TABLET, FILM COATED ORAL at 05:28

## 2023-12-10 RX ADMIN — MIRTAZAPINE 15 MILLIGRAM(S): 45 TABLET, ORALLY DISINTEGRATING ORAL at 22:08

## 2023-12-10 RX ADMIN — HEPARIN SODIUM 5000 UNIT(S): 5000 INJECTION INTRAVENOUS; SUBCUTANEOUS at 17:57

## 2023-12-10 RX ADMIN — FLUVOXAMINE MALEATE 150 MILLIGRAM(S): 25 TABLET ORAL at 05:30

## 2023-12-10 RX ADMIN — Medication 2000 UNIT(S): at 11:22

## 2023-12-10 RX ADMIN — FLUVOXAMINE MALEATE 150 MILLIGRAM(S): 25 TABLET ORAL at 17:58

## 2023-12-10 RX ADMIN — SENNA PLUS 2 TABLET(S): 8.6 TABLET ORAL at 22:07

## 2023-12-10 RX ADMIN — Medication 5 MILLIGRAM(S): at 05:28

## 2023-12-10 RX ADMIN — SODIUM CHLORIDE 75 MILLILITER(S): 9 INJECTION, SOLUTION INTRAVENOUS at 05:36

## 2023-12-10 RX ADMIN — ATENOLOL 50 MILLIGRAM(S): 25 TABLET ORAL at 05:28

## 2023-12-10 RX ADMIN — ATENOLOL 50 MILLIGRAM(S): 25 TABLET ORAL at 17:57

## 2023-12-10 RX ADMIN — SODIUM CHLORIDE 75 MILLILITER(S): 9 INJECTION, SOLUTION INTRAVENOUS at 22:09

## 2023-12-10 NOTE — PROGRESS NOTE ADULT - SUBJECTIVE AND OBJECTIVE BOX
Patient is a 73y old  Male who presents with a chief complaint of psychosis (02 Dec 2023 16:24)        INTERVAL HPI/OVERNIGHT EVENTS:   no complaints  pt seen and examined         Vital Signs Last 24 Hrs  T(C): 36.8 (10 Dec 2023 11:56), Max: 37.1 (10 Dec 2023 05:13)  T(F): 98.3 (10 Dec 2023 11:56), Max: 98.7 (10 Dec 2023 05:13)  HR: 69 (10 Dec 2023 11:56) (69 - 92)  BP: 124/66 (10 Dec 2023 11:56) (110/53 - 124/66)  BP(mean): --  RR: 17 (10 Dec 2023 11:56) (17 - 17)  SpO2: 98% (10 Dec 2023 11:56) (92% - 98%)    Parameters below as of 10 Dec 2023 11:56  Patient On (Oxygen Delivery Method): nasal cannula        acetaminophen     Tablet .. 650 milliGRAM(s) Oral every 6 hours PRN  atenolol  Tablet 50 milliGRAM(s) Oral two times a day  benzocaine 20% Spray 1 Spray(s) Topical four times a day  cholecalciferol 2000 Unit(s) Oral daily  dextrose 5% + sodium chloride 0.45%. 1000 milliLiter(s) IV Continuous <Continuous>  FIRST- Mouthwash  BLM 5 milliLiter(s) Swish and Spit three times a day  fludroCORTISONE 0.2 milliGRAM(s) Oral daily  fluvoxaMINE 150 milliGRAM(s) Oral two times a day  heparin   Injectable 5000 Unit(s) SubCutaneous every 12 hours  lactulose Syrup 15 Gram(s) Oral two times a day PRN  losartan 25 milliGRAM(s) Oral daily  melatonin 3 milliGRAM(s) Oral at bedtime  mirtazapine 15 milliGRAM(s) Oral at bedtime  OLANZapine Disintegrating Tablet 10 milliGRAM(s) Oral at bedtime  predniSONE   Tablet 5 milliGRAM(s) Oral daily  senna 2 Tablet(s) Oral at bedtime      PHYSICAL EXAM:  GENERAL: NAD   EYES: conjunctiva and sclera clear  ENMT: Moist mucous membranes  NECK: Supple, No JVD, Normal thyroid  CHEST/LUNG: non labored, cta b/l  HEART: Regular rate and rhythm; No murmurs, rubs, or gallops  ABDOMEN: Soft, Nontender, Nondistended; Bowel sounds present  EXTREMITIES:  2+ Peripheral Pulses, No clubbing, no cyanosis, no edema  LYMPH: No lymphadenopathy noted  SKIN: No rashes or lesions  NEURO: no focal deficits    Consultant(s) Notes Reviewed:  [x ] YES  [ ] NO  Care Discussed with Consultants/Other Providers [ x] YES  [ ] NO    LABS:                        8.0    6.36  )-----------( 102      ( 10 Dec 2023 07:57 )             24.0     12-10    146<H>  |  109<H>  |  14  ----------------------------<  183<H>  3.4<L>   |  33<H>  |  0.69    Ca    8.5      10 Dec 2023 07:57    TPro  5.1<L>  /  Alb  2.3<L>  /  TBili  0.5  /  DBili  x   /  AST  18  /  ALT  17  /  AlkPhos  55  12-10      Urinalysis Basic - ( 10 Dec 2023 07:57 )    Color: x / Appearance: x / SG: x / pH: x  Gluc: 183 mg/dL / Ketone: x  / Bili: x / Urobili: x   Blood: x / Protein: x / Nitrite: x   Leuk Esterase: x / RBC: x / WBC x   Sq Epi: x / Non Sq Epi: x / Bacteria: x      CAPILLARY BLOOD GLUCOSE            Urinalysis Basic - ( 10 Dec 2023 07:57 )    Color: x / Appearance: x / SG: x / pH: x  Gluc: 183 mg/dL / Ketone: x  / Bili: x / Urobili: x   Blood: x / Protein: x / Nitrite: x   Leuk Esterase: x / RBC: x / WBC x   Sq Epi: x / Non Sq Epi: x / Bacteria: x          RADIOLOGY & ADDITIONAL TESTS:    Imaging Personally Reviewed  Reviewed consultants input

## 2023-12-10 NOTE — PROGRESS NOTE ADULT - SUBJECTIVE AND OBJECTIVE BOX
Chief Complaint: Psychosis    Interval Events: No events overnight.    Review of Systems:  General: No fevers, chills, weight gain  Skin: No rashes, color changes  Cardiovascular: No chest pain, orthopnea  Respiratory: No shortness of breath, cough  Gastrointestinal: No nausea, abdominal pain  Genitourinary: No incontinence, pain with urination  Musculoskeletal: No pain, swelling, decreased range of motion  Neurological: No headache, weakness  Psychiatric: No depression, anxiety  Endocrine: No weight gain, increased thirst  All other systems are comprehensively negative.    Physical Exam:  Vital Signs Last 24 Hrs  T(C): 37.1 (10 Dec 2023 05:13), Max: 37.1 (10 Dec 2023 05:13)  T(F): 98.7 (10 Dec 2023 05:13), Max: 98.7 (10 Dec 2023 05:13)  HR: 92 (10 Dec 2023 05:13) (71 - 92)  BP: 110/53 (10 Dec 2023 05:13) (110/53 - 133/69)  BP(mean): --  RR: 17 (10 Dec 2023 05:13) (17 - 17)  SpO2: 92% (10 Dec 2023 05:13) (92% - 98%)  Parameters below as of 10 Dec 2023 05:13  Patient On (Oxygen Delivery Method): room air  General: NAD  HEENT: MMM  Neck: No JVD, no carotid bruit  Lungs: CTAB  CV: RRR, nl S1/S2, no M/R/G  Abdomen: S/NT/ND, +BS  Extremities: No LE edema, no cyanosis  Neuro: AAOx3, non-focal  Skin: No rash    Labs:    12-10    146<H>  |  109<H>  |  14  ----------------------------<  183<H>  3.4<L>   |  33<H>  |  0.69    Ca    8.5      10 Dec 2023 07:57    TPro  5.1<L>  /  Alb  2.3<L>  /  TBili  0.5  /  DBili  x   /  AST  18  /  ALT  17  /  AlkPhos  55  12-10                        8.0    6.36  )-----------( 102      ( 10 Dec 2023 07:57 )             24.0

## 2023-12-10 NOTE — CHART NOTE - NSCHARTNOTEFT_GEN_A_CORE
Assessment:   Brief hx-"72 y/o male adm with dementia. PMH anxiety, HTN. MBS completed on 10/26 which rx pureed diet with mildly thickened liquids.   Pt visited at bedside this morning. Pt's wife present. Pt is eating ok. Tolerating pureed foods. Food preferences have been obtained in the past. At today's visit, food preferences have been updated. Pt requesting only "vanilla" flavored items. Pt is eating the magic cup. Fecal incontinence"-previous RD f/u        Update-Pt seen at bedside, appetite still ok, tolerating current diet order. Taking ensure well wo issues. Pt states he finishes 100% supp. TID. Takes magic cups well. No c/o N/V/D/C, po intake encouraged. Last BM-12/10 fecal incont. Monitor labs for concern, Na- 146 slightly elevated, encouage fluids. [id number] infusing at 75cc/hr.     Factors impacting intake: [ ] none [ ] nausea  [ ] vomiting [ ] diarrhea [ ] constipation  [x ]chewing problems [x ] swallowing issues  [ ] other:     Diet Presciption: Regular Diet, puree consistency, mildly thick liquids  Intake: ~25-50%    Current Weight: 1/6-141lbs 11/29 149.4# 11/1 152.1# 10/25 174.3#      Pertinent Medications: MEDICATIONS  (STANDING):  atenolol  Tablet 50 milliGRAM(s) Oral two times a day  benzocaine 20% Spray 1 Spray(s) Topical four times a day  cholecalciferol 2000 Unit(s) Oral daily  dextrose 5% + sodium chloride 0.45%. 1000 milliLiter(s) (75 mL/Hr) IV Continuous <Continuous>  FIRST- Mouthwash  BLM 5 milliLiter(s) Swish and Spit three times a day  fludroCORTISONE 0.2 milliGRAM(s) Oral daily  fluvoxaMINE 150 milliGRAM(s) Oral two times a day  heparin   Injectable 5000 Unit(s) SubCutaneous every 12 hours  losartan 25 milliGRAM(s) Oral daily  melatonin 3 milliGRAM(s) Oral at bedtime  mirtazapine 15 milliGRAM(s) Oral at bedtime  OLANZapine Disintegrating Tablet 10 milliGRAM(s) Oral at bedtime  predniSONE   Tablet 5 milliGRAM(s) Oral daily  senna 2 Tablet(s) Oral at bedtime    MEDICATIONS  (PRN):  acetaminophen     Tablet .. 650 milliGRAM(s) Oral every 6 hours PRN Temp greater or equal to 38.5C (101.3F), Moderate Pain (4 - 6)  lactulose Syrup 15 Gram(s) Oral two times a day PRN constipation    Pertinent Labs: Na-146 H, K-3.4 L, CO2-33 H, BG-185 H-on prednisone, no hx DM, monitor BG and provide insulin coverage as warranted if BGS remain elevated.   Skin: intact    Estimated Needs:   [x ] no change since previous assessment  [ ] recalculated:     Previous Nutrition Diagnosis:   [ ] Inadequate Energy Intake [ ]Inadequate Oral Intake [ ] Excessive Energy Intake   [ ] Underweight [ ] Increased Nutrient Needs [ ] Overweight/Obesity   [ ] Altered GI Function [ ] Unintended Weight Loss [ ] Food & Nutrition Related Knowledge Deficit  [x ] Malnutrition   [x] swallowing difficulty    Nutrition Diagnosis is [x ] ongoing  [ ] resolved [ ] not applicable     New Nutrition Diagnosis: [ ] not applicable       Interventions:   Recommend  [ ] Change Diet To:  [ ] Nutrition Supplement  [ ] Nutrition Support  [ x] Other: continue to honor pt's food preferences/tolerances. encourage po intake; provide van Ensure and magic cup to provide energy and protein intake.     Monitoring and Evaluation:   [x ] PO intake [ x ] Tolerance to diet prescription [ x ] weights [ x ] labs[ x ] follow up per protocol  [ ] other: skin, BM, s/sx GI distress Assessment:   Brief hx-"74 y/o male adm with dementia. PMH anxiety, HTN. MBS completed on 10/26 which rx pureed diet with mildly thickened liquids.   Pt visited at bedside this morning. Pt's wife present. Pt is eating ok. Tolerating pureed foods. Food preferences have been obtained in the past. At today's visit, food preferences have been updated. Pt requesting only "vanilla" flavored items. Pt is eating the magic cup. Fecal incontinence"-previous RD f/u        Update-Pt seen at bedside, appetite still ok, tolerating current diet order. Taking ensure well wo issues. Pt states he finishes 100% supp. TID. Takes magic cups well. No c/o N/V/D/C, po intake encouraged. Last BM-12/10 fecal incont. Monitor labs for concern, Na- 146 slightly elevated, encouage fluids. [id number] infusing at 75cc/hr.     Factors impacting intake: [ ] none [ ] nausea  [ ] vomiting [ ] diarrhea [ ] constipation  [x ]chewing problems [x ] swallowing issues  [ ] other:     Diet Presciption: Regular Diet, puree consistency, mildly thick liquids  Intake: ~25-50%    Current Weight: 1/6-141lbs 11/29 149.4# 11/1 152.1# 10/25 174.3#      Pertinent Medications: MEDICATIONS  (STANDING):  atenolol  Tablet 50 milliGRAM(s) Oral two times a day  benzocaine 20% Spray 1 Spray(s) Topical four times a day  cholecalciferol 2000 Unit(s) Oral daily  dextrose 5% + sodium chloride 0.45%. 1000 milliLiter(s) (75 mL/Hr) IV Continuous <Continuous>  FIRST- Mouthwash  BLM 5 milliLiter(s) Swish and Spit three times a day  fludroCORTISONE 0.2 milliGRAM(s) Oral daily  fluvoxaMINE 150 milliGRAM(s) Oral two times a day  heparin   Injectable 5000 Unit(s) SubCutaneous every 12 hours  losartan 25 milliGRAM(s) Oral daily  melatonin 3 milliGRAM(s) Oral at bedtime  mirtazapine 15 milliGRAM(s) Oral at bedtime  OLANZapine Disintegrating Tablet 10 milliGRAM(s) Oral at bedtime  predniSONE   Tablet 5 milliGRAM(s) Oral daily  senna 2 Tablet(s) Oral at bedtime    MEDICATIONS  (PRN):  acetaminophen     Tablet .. 650 milliGRAM(s) Oral every 6 hours PRN Temp greater or equal to 38.5C (101.3F), Moderate Pain (4 - 6)  lactulose Syrup 15 Gram(s) Oral two times a day PRN constipation    Pertinent Labs: Na-146 H, K-3.4 L, CO2-33 H, BG-185 H-on prednisone, no hx DM, monitor BG and provide insulin coverage as warranted if BGS remain elevated.   Skin: intact    Estimated Needs:   [x ] no change since previous assessment  [ ] recalculated:     Previous Nutrition Diagnosis:   [ ] Inadequate Energy Intake [ ]Inadequate Oral Intake [ ] Excessive Energy Intake   [ ] Underweight [ ] Increased Nutrient Needs [ ] Overweight/Obesity   [ ] Altered GI Function [ ] Unintended Weight Loss [ ] Food & Nutrition Related Knowledge Deficit  [x ] Malnutrition   [x] swallowing difficulty    Nutrition Diagnosis is [x ] ongoing  [ ] resolved [ ] not applicable     New Nutrition Diagnosis: [ ] not applicable       Interventions:   Recommend  [ ] Change Diet To:  [ ] Nutrition Supplement  [ ] Nutrition Support  [ x] Other: continue to honor pt's food preferences/tolerances. encourage po intake; provide van Ensure and magic cup to provide energy and protein intake.     Monitoring and Evaluation:   [x ] PO intake [ x ] Tolerance to diet prescription [ x ] weights [ x ] labs[ x ] follow up per protocol  [ ] other: skin, BM, s/sx GI distress

## 2023-12-10 NOTE — PROGRESS NOTE ADULT - ASSESSMENT
73 M pmh of HTN, ITP, APL syndrome, generalized anxiety disorder, OCD, newly dx aggressive Merkel Cell cancer of forehead- pt's oncologist  Dr. Eng, at [facility] on [place] infusions with good response per wife  was brought to ED for worsening psychosis for the past few days, pt lately with difficulty swallowing, decreased po intake    Psychosis  psych and neuro following  cont luvox   zyprexa      #HTN/Orthostatic hypotension  cont atenolol w parameters  ? psych med induced  adrenal insufficiency  continue prednisone   increased florinef  compressions stockings  ivf  check orthostatics daily  improving    UTI  sp abx    severe protein malnutrition  continue supplements  discussed prognosis with pt  declines feeding tube    #DVT ppx-  hep sq    [facility]   [phone] 73 M pmh of HTN, ITP, APL syndrome, generalized anxiety disorder, OCD, newly dx aggressive Merkel Cell cancer of forehead- pt's oncologist  Dr. Eng, at [facility] on [place] infusions with good response per wife  was brought to ED for worsening psychosis for the past few days, pt lately with difficulty swallowing, decreased po intake    Psychosis  psych and neuro following  cont luvox   zyprexa      #HTN/Orthostatic hypotension  cont atenolol w parameters  ? psych med induced  adrenal insufficiency  continue prednisone   increased florinef  compressions stockings  ivf  check orthostatics daily  improving    UTI  sp abx    severe protein malnutrition  continue supplements  discussed prognosis with pt  declines feeding tube    #DVT ppx-  hep sq    [facility]   [phone]

## 2023-12-10 NOTE — PROGRESS NOTE ADULT - ASSESSMENT
The patient is a 73 year old male with a history of HTN, ITP, APLS, anxiety, OCD, Merkel cell cancer who presented with psychosis.    Plan:  - ECG with no evidence of ischemia or infarction  - Orthostatic hypotension may be multifactorial - dehydration, medication related (antipsychotics), and low cortisol levels  - Allow for supine hypertension - BP labile due to orthostatic hypotension - would not treat supine hypertension with additional antihypertensives unless SBP>200  - Syncope - due to orthostatic hypotension  - Last orthostatics negative  - Continue atenolol 50 mg bid  - Continue losartan 25 mg daily  - EGD done with no significant findings  - Psych follow-up  - Discharge planning

## 2023-12-11 PROCEDURE — 99232 SBSQ HOSP IP/OBS MODERATE 35: CPT

## 2023-12-11 RX ORDER — FLUDROCORTISONE ACETATE 0.1 MG/1
0.3 TABLET ORAL DAILY
Refills: 0 | Status: DISCONTINUED | OUTPATIENT
Start: 2023-12-11 | End: 2023-12-19

## 2023-12-11 RX ADMIN — Medication 2000 UNIT(S): at 11:07

## 2023-12-11 RX ADMIN — SODIUM CHLORIDE 75 MILLILITER(S): 9 INJECTION, SOLUTION INTRAVENOUS at 17:37

## 2023-12-11 RX ADMIN — ATENOLOL 50 MILLIGRAM(S): 25 TABLET ORAL at 05:58

## 2023-12-11 RX ADMIN — MIRTAZAPINE 15 MILLIGRAM(S): 45 TABLET, ORALLY DISINTEGRATING ORAL at 22:00

## 2023-12-11 RX ADMIN — Medication 3 MILLIGRAM(S): at 22:00

## 2023-12-11 RX ADMIN — FLUDROCORTISONE ACETATE 0.2 MILLIGRAM(S): 0.1 TABLET ORAL at 05:58

## 2023-12-11 RX ADMIN — SENNA PLUS 2 TABLET(S): 8.6 TABLET ORAL at 22:00

## 2023-12-11 RX ADMIN — HEPARIN SODIUM 5000 UNIT(S): 5000 INJECTION INTRAVENOUS; SUBCUTANEOUS at 17:31

## 2023-12-11 RX ADMIN — HEPARIN SODIUM 5000 UNIT(S): 5000 INJECTION INTRAVENOUS; SUBCUTANEOUS at 05:58

## 2023-12-11 RX ADMIN — FLUVOXAMINE MALEATE 150 MILLIGRAM(S): 25 TABLET ORAL at 17:31

## 2023-12-11 RX ADMIN — Medication 5 MILLIGRAM(S): at 05:58

## 2023-12-11 RX ADMIN — FLUVOXAMINE MALEATE 150 MILLIGRAM(S): 25 TABLET ORAL at 06:05

## 2023-12-11 RX ADMIN — LOSARTAN POTASSIUM 25 MILLIGRAM(S): 100 TABLET, FILM COATED ORAL at 05:58

## 2023-12-11 RX ADMIN — OLANZAPINE 10 MILLIGRAM(S): 15 TABLET, FILM COATED ORAL at 22:00

## 2023-12-11 NOTE — PROGRESS NOTE ADULT - ASSESSMENT
73 M pmh of HTN, ITP, APL syndrome, generalized anxiety disorder, OCD, newly dx aggressive Merkel Cell cancer of forehead- pt's oncologist  Dr. Eng, at [facility] on [place] infusions with good response per wife  was brought to ED for worsening psychosis for the past few days, pt lately with difficulty swallowing, decreased po intake    Psychosis  psych and neuro following  cont luvox   zyprexa      #HTN/Orthostatic hypotension  cont atenolol w parameters  ? psych med induced  adrenal insufficiency  continue prednisone   increased florinef to .3mg  hold bp meds  compressions stockings  check orthostatics daily      UTI  sp abx    severe protein malnutrition  continue supplements  discussed prognosis with pt  declines feeding tube    #DVT ppx-  hep sq    [facility]    73 M pmh of HTN, ITP, APL syndrome, generalized anxiety disorder, OCD, newly dx aggressive Merkel Cell cancer of forehead- pt's oncologist  Dr. Eng, at [facility] on [place] infusions with good response per wife  was brought to ED for worsening psychosis for the past few days, pt lately with difficulty swallowing, decreased po intake    Psychosis  psych and neuro following  cont luvox   zyprexa      #HTN/Orthostatic hypotension  cont atenolol w parameters  ? psych med induced  adrenal insufficiency  continue prednisone   increased florinef to .3mg  hold bp meds  compressions stockings  check orthostatics daily      UTI  sp abx    severe protein malnutrition  continue supplements  discussed prognosis with pt  declines feeding tube    #DVT ppx-  hep sq    [facility]

## 2023-12-11 NOTE — BH CONSULTATION LIAISON PROGRESS NOTE - NSBHATTESTBILLING_PSY_A_CORE
[id number] OBS or IP - low complexity OR 25-34 mins [id number] OBS or IP - low complexity OR 25-34 mins

## 2023-12-11 NOTE — PROGRESS NOTE ADULT - ASSESSMENT
The patient is a 73 year old male with a history of HTN, ITP, APLS, anxiety, OCD, Merkel cell cancer who presented with psychosis.    Plan:  - ECG with no evidence of ischemia or infarction  - Orthostatic hypotension may be multifactorial - dehydration, medication related (antipsychotics), and low cortisol levels  - Allow for supine hypertension - BP labile due to orthostatic hypotension - would not treat supine hypertension with additional antihypertensives unless SBP>200  - Syncope - due to orthostatic hypotension  - Last orthostatics negative  - Continue atenolol 50 mg bid  - Continue losartan 25 mg daily  - EGD done with no significant findings  - Psych follow-up  - Discharge planning The patient is a 73 year old male with a history of HTN, ITP, APLS, anxiety, OCD, Merkel cell cancer who presented with psychosis.    Plan:  - ECG with no evidence of ischemia or infarction  - Orthostatic hypotension may be multifactorial - dehydration, medication related (antipsychotics), and low cortisol levels  - Allow for supine hypertension - BP labile due to orthostatic hypotension - would not treat supine hypertension with additional antihypertensives unless SBP>200  - Syncope - due to orthostatic hypotension  - Last orthostatics negative  - Continue atenolol 50 mg bid  - Continue losartan 25 mg daily  - EGD done with no significant findings  - Psych follow-up  - Discharge planning    ADDENDUM:  - Orthostatics again positive  - Discontinue all antihypertensives  - Allow supine SBP up to 200  - Orthostatic hypotension will likely persist despite additional measures . Stand or ambulation with assistance. Ok for discharge as episodes will persist.

## 2023-12-11 NOTE — PROGRESS NOTE ADULT - SUBJECTIVE AND OBJECTIVE BOX
Chief Complaint: Psychosis    Interval Events: No events overnight.    Review of Systems:  General: No fevers, chills, weight gain  Skin: No rashes, color changes  Cardiovascular: No chest pain, orthopnea  Respiratory: No shortness of breath, cough  Gastrointestinal: No nausea, abdominal pain  Genitourinary: No incontinence, pain with urination  Musculoskeletal: No pain, swelling, decreased range of motion  Neurological: No headache, weakness  Psychiatric: No depression, anxiety  Endocrine: No weight gain, increased thirst  All other systems are comprehensively negative.    Physical Exam:  Vital Signs Last 24 Hrs  T(C): 36.7 (11 Dec 2023 05:11), Max: 36.9 (10 Dec 2023 22:03)  T(F): 98 (11 Dec 2023 05:11), Max: 98.5 (10 Dec 2023 22:03)  HR: 95 (11 Dec 2023 05:11) (69 - 95)  BP: 100/60 (11 Dec 2023 05:11) (100/60 - 158/73)  BP(mean): --  RR: 17 (11 Dec 2023 05:11) (17 - 17)  SpO2: 94% (11 Dec 2023 05:11) (92% - 98%)  Parameters below as of 11 Dec 2023 05:11  Patient On (Oxygen Delivery Method): nasal cannula  General: NAD  HEENT: MMM  Neck: No JVD, no carotid bruit  Lungs: CTAB  CV: RRR, nl S1/S2, no M/R/G  Abdomen: S/NT/ND, +BS  Extremities: No LE edema, no cyanosis  Neuro: AAOx3, non-focal  Skin: No rash    Labs:    12-10    146<H>  |  109<H>  |  14  ----------------------------<  183<H>  3.4<L>   |  33<H>  |  0.69    Ca    8.5      10 Dec 2023 07:57    TPro  5.1<L>  /  Alb  2.3<L>  /  TBili  0.5  /  DBili  x   /  AST  18  /  ALT  17  /  AlkPhos  55  12-10                        8.0    6.36  )-----------( 102      ( 10 Dec 2023 07:57 )             24.0

## 2023-12-11 NOTE — PROGRESS NOTE ADULT - SUBJECTIVE AND OBJECTIVE BOX
Patient is a 73y old  Male who presents with a chief complaint of psychosis (02 Dec 2023 16:24)        INTERVAL HPI/OVERNIGHT EVENTS:   no complaints  pt seen and examined         Vital Signs Last 24 Hrs  T(C): 36.7 (11 Dec 2023 12:07), Max: 36.9 (10 Dec 2023 22:03)  T(F): 98.1 (11 Dec 2023 12:07), Max: 98.5 (10 Dec 2023 22:03)  HR: 81 (11 Dec 2023 12:07) (81 - 95)  BP: 153/71 (11 Dec 2023 12:07) (100/60 - 158/73)  BP(mean): --  RR: 17 (11 Dec 2023 12:07) (17 - 17)  SpO2: 95% (11 Dec 2023 12:07) (92% - 95%)    Parameters below as of 11 Dec 2023 12:07  Patient On (Oxygen Delivery Method): room air        acetaminophen     Tablet .. 650 milliGRAM(s) Oral every 6 hours PRN  benzocaine 20% Spray 1 Spray(s) Topical four times a day  cholecalciferol 2000 Unit(s) Oral daily  dextrose 5% + sodium chloride 0.45%. 1000 milliLiter(s) IV Continuous <Continuous>  FIRST- Mouthwash  BLM 5 milliLiter(s) Swish and Spit three times a day  fludroCORTISONE 0.3 milliGRAM(s) Oral daily  fluvoxaMINE 150 milliGRAM(s) Oral two times a day  heparin   Injectable 5000 Unit(s) SubCutaneous every 12 hours  lactulose Syrup 15 Gram(s) Oral two times a day PRN  melatonin 3 milliGRAM(s) Oral at bedtime  mirtazapine 15 milliGRAM(s) Oral at bedtime  OLANZapine Disintegrating Tablet 10 milliGRAM(s) Oral at bedtime  predniSONE   Tablet 5 milliGRAM(s) Oral daily  senna 2 Tablet(s) Oral at bedtime      PHYSICAL EXAM:  GENERAL: NAD   EYES: conjunctiva and sclera clear  ENMT: Moist mucous membranes  NECK: Supple, No JVD, Normal thyroid  CHEST/LUNG: non labored, cta b/l  HEART: Regular rate and rhythm; No murmurs, rubs, or gallops  ABDOMEN: Soft, Nontender, Nondistended; Bowel sounds present  EXTREMITIES:  2+ Peripheral Pulses, No clubbing, no cyanosis, no edema  LYMPH: No lymphadenopathy noted  SKIN: No rashes or lesions  NEURO: no focal deficits    Consultant(s) Notes Reviewed:  [x ] YES  [ ] NO  Care Discussed with Consultants/Other Providers [ x] YES  [ ] NO    LABS:                        8.0    6.36  )-----------( 102      ( 10 Dec 2023 07:57 )             24.0     12-10    146<H>  |  109<H>  |  14  ----------------------------<  183<H>  3.4<L>   |  33<H>  |  0.69    Ca    8.5      10 Dec 2023 07:57    TPro  5.1<L>  /  Alb  2.3<L>  /  TBili  0.5  /  DBili  x   /  AST  18  /  ALT  17  /  AlkPhos  55  12-10      Urinalysis Basic - ( 10 Dec 2023 07:57 )    Color: x / Appearance: x / SG: x / pH: x  Gluc: 183 mg/dL / Ketone: x  / Bili: x / Urobili: x   Blood: x / Protein: x / Nitrite: x   Leuk Esterase: x / RBC: x / WBC x   Sq Epi: x / Non Sq Epi: x / Bacteria: x      CAPILLARY BLOOD GLUCOSE            Urinalysis Basic - ( 10 Dec 2023 07:57 )    Color: x / Appearance: x / SG: x / pH: x  Gluc: 183 mg/dL / Ketone: x  / Bili: x / Urobili: x   Blood: x / Protein: x / Nitrite: x   Leuk Esterase: x / RBC: x / WBC x   Sq Epi: x / Non Sq Epi: x / Bacteria: x          RADIOLOGY & ADDITIONAL TESTS:    Imaging Personally Reviewed  Reviewed consultants input

## 2023-12-12 RX ADMIN — FLUVOXAMINE MALEATE 150 MILLIGRAM(S): 25 TABLET ORAL at 17:24

## 2023-12-12 RX ADMIN — Medication 2000 UNIT(S): at 11:31

## 2023-12-12 RX ADMIN — Medication 3 MILLIGRAM(S): at 21:26

## 2023-12-12 RX ADMIN — OLANZAPINE 10 MILLIGRAM(S): 15 TABLET, FILM COATED ORAL at 21:26

## 2023-12-12 RX ADMIN — Medication 5 MILLIGRAM(S): at 06:38

## 2023-12-12 RX ADMIN — FLUVOXAMINE MALEATE 150 MILLIGRAM(S): 25 TABLET ORAL at 06:50

## 2023-12-12 RX ADMIN — HEPARIN SODIUM 5000 UNIT(S): 5000 INJECTION INTRAVENOUS; SUBCUTANEOUS at 06:37

## 2023-12-12 RX ADMIN — HEPARIN SODIUM 5000 UNIT(S): 5000 INJECTION INTRAVENOUS; SUBCUTANEOUS at 17:24

## 2023-12-12 RX ADMIN — MIRTAZAPINE 15 MILLIGRAM(S): 45 TABLET, ORALLY DISINTEGRATING ORAL at 21:26

## 2023-12-12 RX ADMIN — SENNA PLUS 2 TABLET(S): 8.6 TABLET ORAL at 21:26

## 2023-12-12 RX ADMIN — FLUDROCORTISONE ACETATE 0.3 MILLIGRAM(S): 0.1 TABLET ORAL at 06:37

## 2023-12-12 NOTE — PROGRESS NOTE ADULT - SUBJECTIVE AND OBJECTIVE BOX
Patient is a 73y old  Male who presents with a chief complaint of psychosis (02 Dec 2023 16:24)        INTERVAL HPI/OVERNIGHT EVENTS:   no complaints  pt seen and examined         Vital Signs Last 24 Hrs  T(C): 36.8 (12 Dec 2023 14:29), Max: 37.6 (11 Dec 2023 20:30)  T(F): 98.3 (12 Dec 2023 14:29), Max: 99.7 (11 Dec 2023 20:30)  HR: 80 (12 Dec 2023 05:00) (80 - 95)  BP: 127/70 (12 Dec 2023 05:00) (127/70 - 172/69)  BP(mean): --  RR: 18 (12 Dec 2023 14:29) (17 - 18)  SpO2: 95% (12 Dec 2023 14:29) (92% - 95%)    Parameters below as of 12 Dec 2023 14:29  Patient On (Oxygen Delivery Method): room air        acetaminophen     Tablet .. 650 milliGRAM(s) Oral every 6 hours PRN  benzocaine 20% Spray 1 Spray(s) Topical four times a day  cholecalciferol 2000 Unit(s) Oral daily  FIRST- Mouthwash  BLM 5 milliLiter(s) Swish and Spit three times a day  fludroCORTISONE 0.3 milliGRAM(s) Oral daily  fluvoxaMINE 150 milliGRAM(s) Oral two times a day  heparin   Injectable 5000 Unit(s) SubCutaneous every 12 hours  lactulose Syrup 15 Gram(s) Oral two times a day PRN  melatonin 3 milliGRAM(s) Oral at bedtime  mirtazapine 15 milliGRAM(s) Oral at bedtime  OLANZapine Disintegrating Tablet 10 milliGRAM(s) Oral at bedtime  predniSONE   Tablet 5 milliGRAM(s) Oral daily  senna 2 Tablet(s) Oral at bedtime      PHYSICAL EXAM:  GENERAL: NAD   EYES: conjunctiva and sclera clear  ENMT: Moist mucous membranes  NECK: Supple, No JVD, Normal thyroid  CHEST/LUNG: non labored, cta b/l  HEART: Regular rate and rhythm; No murmurs, rubs, or gallops  ABDOMEN: Soft, Nontender, Nondistended; Bowel sounds present  EXTREMITIES:  2+ Peripheral Pulses, No clubbing, no cyanosis, no edema  LYMPH: No lymphadenopathy noted  SKIN: No rashes or lesions  NEURO: no focal deficits    Consultant(s) Notes Reviewed:  [x ] YES  [ ] NO  Care Discussed with Consultants/Other Providers [ x] YES  [ ] NO    LABS:              CAPILLARY BLOOD GLUCOSE                  RADIOLOGY & ADDITIONAL TESTS:    Imaging Personally Reviewed  Reviewed consultants input

## 2023-12-12 NOTE — PROGRESS NOTE ADULT - SUBJECTIVE AND OBJECTIVE BOX
Chief Complaint: Psychosis    Interval Events: No events overnight.    Review of Systems:  General: No fevers, chills, weight gain  Skin: No rashes, color changes  Cardiovascular: No chest pain, orthopnea  Respiratory: No shortness of breath, cough  Gastrointestinal: No nausea, abdominal pain  Genitourinary: No incontinence, pain with urination  Musculoskeletal: No pain, swelling, decreased range of motion  Neurological: No headache, weakness  Psychiatric: No depression, anxiety  Endocrine: No weight gain, increased thirst  All other systems are comprehensively negative.    Physical Exam:  Vital Signs Last 24 Hrs  T(C): 36.9 (12 Dec 2023 05:00), Max: 37.6 (11 Dec 2023 20:30)  T(F): 98.5 (12 Dec 2023 05:00), Max: 99.7 (11 Dec 2023 20:30)  HR: 80 (12 Dec 2023 05:00) (80 - 95)  BP: 127/70 (12 Dec 2023 05:00) (127/70 - 172/69)  BP(mean): --  RR: 17 (12 Dec 2023 05:00) (17 - 17)  SpO2: 94% (12 Dec 2023 05:00) (92% - 95%)  Parameters below as of 12 Dec 2023 05:00  Patient On (Oxygen Delivery Method): room air  General: NAD  HEENT: MMM  Neck: No JVD, no carotid bruit  Lungs: CTAB  CV: RRR, nl S1/S2, no M/R/G  Abdomen: S/NT/ND, +BS  Extremities: No LE edema, no cyanosis  Neuro: AAOx3, non-focal  Skin: No rash    Labs:

## 2023-12-12 NOTE — PROGRESS NOTE ADULT - ASSESSMENT
73 M pmh of HTN, ITP, APL syndrome, generalized anxiety disorder, OCD, newly dx aggressive Merkel Cell cancer of forehead- pt's oncologist  Dr. Eng, at [facility] on [facility] infusions with good response per wife  was brought to ED for worsening psychosis for the past few days, pt lately with difficulty swallowing, decreased po intake    Psychosis  psych and neuro following  cont luvox   zyprexa      #HTN/Orthostatic hypotension  ? psych med induced  adrenal insufficiency  continue prednisone   increased florinef to .3mg  hold bp meds  compressions stockings  check orthostatics daily  improved today  if ok tomorrow would dc to rehab  permissive hypertension       UTI  sp abx    severe protein malnutrition  continue supplements  discussed prognosis with pt  declines feeding tube    #DVT ppx-  hep sq    [facility]    73 M pmh of HTN, ITP, APL syndrome, generalized anxiety disorder, OCD, newly dx aggressive Merkel Cell cancer of forehead- pt's oncologist  Dr. Eng, at [facility] on [employer] infusions with good response per wife  was brought to ED for worsening psychosis for the past few days, pt lately with difficulty swallowing, decreased po intake    Psychosis  psych and neuro following  cont luvox   zyprexa      #HTN/Orthostatic hypotension  ? psych med induced  adrenal insufficiency  continue prednisone   increased florinef to .3mg  hold bp meds  compressions stockings  check orthostatics daily  improved today  if ok tomorrow would dc to rehab  permissive hypertension       UTI  sp abx    severe protein malnutrition  continue supplements  discussed prognosis with pt  declines feeding tube    #DVT ppx-  hep sq    [facility]

## 2023-12-12 NOTE — PROGRESS NOTE ADULT - ASSESSMENT
The patient is a 73 year old male with a history of HTN, ITP, APLS, anxiety, OCD, Merkel cell cancer who presented with psychosis.    Plan:  - ECG with no evidence of ischemia or infarction  - Orthostatic hypotension may be multifactorial - dehydration, medication related (antipsychotics), and low cortisol levels  - Allow for supine hypertension - BP labile due to orthostatic hypotension - would not treat supine hypertension with additional antihypertensives unless SBP>200  - Syncope - due to orthostatic hypotension  - Discontinue all antihypertensives  - Allow supine SBP up to 200  - EGD done with no significant findings  - Psych follow-up  - Orthostatic hypotension will likely persist despite additional measures . Stand or ambulation with assistance. Ok for discharge as episodes will persist.

## 2023-12-13 ENCOUNTER — TRANSCRIPTION ENCOUNTER (OUTPATIENT)
Age: 73
End: 2023-12-13

## 2023-12-13 LAB
ANION GAP SERPL CALC-SCNC: 4 MMOL/L — LOW (ref 5–17)
ANION GAP SERPL CALC-SCNC: 4 MMOL/L — LOW (ref 5–17)
BUN SERPL-MCNC: 14 MG/DL — SIGNIFICANT CHANGE UP (ref 7–23)
BUN SERPL-MCNC: 14 MG/DL — SIGNIFICANT CHANGE UP (ref 7–23)
CALCIUM SERPL-MCNC: 8.6 MG/DL — SIGNIFICANT CHANGE UP (ref 8.5–10.1)
CALCIUM SERPL-MCNC: 8.6 MG/DL — SIGNIFICANT CHANGE UP (ref 8.5–10.1)
CHLORIDE SERPL-SCNC: 108 MMOL/L — SIGNIFICANT CHANGE UP (ref 96–108)
CHLORIDE SERPL-SCNC: 108 MMOL/L — SIGNIFICANT CHANGE UP (ref 96–108)
CO2 SERPL-SCNC: 33 MMOL/L — HIGH (ref 22–31)
CO2 SERPL-SCNC: 33 MMOL/L — HIGH (ref 22–31)
CREAT SERPL-MCNC: 0.66 MG/DL — SIGNIFICANT CHANGE UP (ref 0.5–1.3)
CREAT SERPL-MCNC: 0.66 MG/DL — SIGNIFICANT CHANGE UP (ref 0.5–1.3)
EGFR: 98 ML/MIN/1.73M2 — SIGNIFICANT CHANGE UP
EGFR: 98 ML/MIN/1.73M2 — SIGNIFICANT CHANGE UP
GLUCOSE SERPL-MCNC: 122 MG/DL — HIGH (ref 70–99)
GLUCOSE SERPL-MCNC: 122 MG/DL — HIGH (ref 70–99)
HCT VFR BLD CALC: 24.9 % — LOW (ref 39–50)
HCT VFR BLD CALC: 24.9 % — LOW (ref 39–50)
HGB BLD-MCNC: 8.6 G/DL — LOW (ref 13–17)
HGB BLD-MCNC: 8.6 G/DL — LOW (ref 13–17)
MCHC RBC-ENTMCNC: 30.5 PG — SIGNIFICANT CHANGE UP (ref 27–34)
MCHC RBC-ENTMCNC: 30.5 PG — SIGNIFICANT CHANGE UP (ref 27–34)
MCHC RBC-ENTMCNC: 34.5 GM/DL — SIGNIFICANT CHANGE UP (ref 32–36)
MCHC RBC-ENTMCNC: 34.5 GM/DL — SIGNIFICANT CHANGE UP (ref 32–36)
MCV RBC AUTO: 88.3 FL — SIGNIFICANT CHANGE UP (ref 80–100)
MCV RBC AUTO: 88.3 FL — SIGNIFICANT CHANGE UP (ref 80–100)
NRBC # BLD: 0 /100 WBCS — SIGNIFICANT CHANGE UP (ref 0–0)
NRBC # BLD: 0 /100 WBCS — SIGNIFICANT CHANGE UP (ref 0–0)
PLATELET # BLD AUTO: 117 K/UL — LOW (ref 150–400)
PLATELET # BLD AUTO: 117 K/UL — LOW (ref 150–400)
POTASSIUM SERPL-MCNC: 3.7 MMOL/L — SIGNIFICANT CHANGE UP (ref 3.5–5.3)
POTASSIUM SERPL-MCNC: 3.7 MMOL/L — SIGNIFICANT CHANGE UP (ref 3.5–5.3)
POTASSIUM SERPL-SCNC: 3.7 MMOL/L — SIGNIFICANT CHANGE UP (ref 3.5–5.3)
POTASSIUM SERPL-SCNC: 3.7 MMOL/L — SIGNIFICANT CHANGE UP (ref 3.5–5.3)
RBC # BLD: 2.82 M/UL — LOW (ref 4.2–5.8)
RBC # BLD: 2.82 M/UL — LOW (ref 4.2–5.8)
RBC # FLD: 14.3 % — SIGNIFICANT CHANGE UP (ref 10.3–14.5)
RBC # FLD: 14.3 % — SIGNIFICANT CHANGE UP (ref 10.3–14.5)
SODIUM SERPL-SCNC: 145 MMOL/L — SIGNIFICANT CHANGE UP (ref 135–145)
SODIUM SERPL-SCNC: 145 MMOL/L — SIGNIFICANT CHANGE UP (ref 135–145)
WBC # BLD: 6.33 K/UL — SIGNIFICANT CHANGE UP (ref 3.8–10.5)
WBC # BLD: 6.33 K/UL — SIGNIFICANT CHANGE UP (ref 3.8–10.5)
WBC # FLD AUTO: 6.33 K/UL — SIGNIFICANT CHANGE UP (ref 3.8–10.5)
WBC # FLD AUTO: 6.33 K/UL — SIGNIFICANT CHANGE UP (ref 3.8–10.5)

## 2023-12-13 PROCEDURE — 86077 PHYS BLOOD BANK SERV XMATCH: CPT

## 2023-12-13 PROCEDURE — 93971 EXTREMITY STUDY: CPT | Mod: 26,LT

## 2023-12-13 PROCEDURE — 99232 SBSQ HOSP IP/OBS MODERATE 35: CPT

## 2023-12-13 PROCEDURE — 93971 EXTREMITY STUDY: CPT | Mod: 26,RT

## 2023-12-13 RX ADMIN — FLUVOXAMINE MALEATE 150 MILLIGRAM(S): 25 TABLET ORAL at 18:19

## 2023-12-13 RX ADMIN — HEPARIN SODIUM 5000 UNIT(S): 5000 INJECTION INTRAVENOUS; SUBCUTANEOUS at 18:18

## 2023-12-13 RX ADMIN — HEPARIN SODIUM 5000 UNIT(S): 5000 INJECTION INTRAVENOUS; SUBCUTANEOUS at 05:23

## 2023-12-13 RX ADMIN — Medication 2000 UNIT(S): at 11:43

## 2023-12-13 RX ADMIN — SENNA PLUS 2 TABLET(S): 8.6 TABLET ORAL at 21:39

## 2023-12-13 RX ADMIN — FLUVOXAMINE MALEATE 150 MILLIGRAM(S): 25 TABLET ORAL at 05:23

## 2023-12-13 RX ADMIN — Medication 3 MILLIGRAM(S): at 21:39

## 2023-12-13 RX ADMIN — Medication 5 MILLIGRAM(S): at 05:23

## 2023-12-13 RX ADMIN — FLUDROCORTISONE ACETATE 0.3 MILLIGRAM(S): 0.1 TABLET ORAL at 05:23

## 2023-12-13 RX ADMIN — OLANZAPINE 10 MILLIGRAM(S): 15 TABLET, FILM COATED ORAL at 21:39

## 2023-12-13 RX ADMIN — MIRTAZAPINE 15 MILLIGRAM(S): 45 TABLET, ORALLY DISINTEGRATING ORAL at 21:39

## 2023-12-13 NOTE — PROGRESS NOTE ADULT - ASSESSMENT
odynophagia    s/p  upper gastrointestinal endoscopy; normal esophagus, mid esophageal biopsy performed; mild gastritis  path shows minimal chronic inflammation  now with poor po intake  not candidate for peg  start marinol 2.5mg po bid  repeat s/s eval  d/w wife at bedside who agrees with plan

## 2023-12-13 NOTE — PROGRESS NOTE ADULT - SUBJECTIVE AND OBJECTIVE BOX
[INTERVAL HX: ]  Patient seen and examined;  Chart reviewed and events noted;     possible DC today.   Reviewed recent course with pt wife and Hospitalist.     Ad Hoc family meeting had with son Beti via tele    [MEDICATIONS]  MEDICATIONS  (STANDING):  benzocaine 20% Spray 1 Spray(s) Topical four times a day  cholecalciferol 2000 Unit(s) Oral daily  FIRST- Mouthwash  BLM 5 milliLiter(s) Swish and Spit three times a day  fludroCORTISONE 0.3 milliGRAM(s) Oral daily  fluvoxaMINE 150 milliGRAM(s) Oral two times a day  heparin   Injectable 5000 Unit(s) SubCutaneous every 12 hours  melatonin 3 milliGRAM(s) Oral at bedtime  mirtazapine 15 milliGRAM(s) Oral at bedtime  OLANZapine Disintegrating Tablet 10 milliGRAM(s) Oral at bedtime  predniSONE   Tablet 5 milliGRAM(s) Oral daily  senna 2 Tablet(s) Oral at bedtime    MEDICATIONS  (PRN):  acetaminophen     Tablet .. 650 milliGRAM(s) Oral every 6 hours PRN Temp greater or equal to 38.5C (101.3F), Moderate Pain (4 - 6)  lactulose Syrup 15 Gram(s) Oral two times a day PRN constipation      [VITALS]  Vital Signs Last 24 Hrs  T(C): 36.3 (13 Dec 2023 04:51), Max: 37.4 (12 Dec 2023 20:25)  T(F): 97.4 (13 Dec 2023 04:51), Max: 99.4 (12 Dec 2023 20:25)  HR: 96 (13 Dec 2023 04:51) (90 - 96)  BP: 178/82 (13 Dec 2023 04:51) (161/72 - 178/82)  BP(mean): --  RR: 18 (13 Dec 2023 04:51) (18 - 18)  SpO2: 98% (13 Dec 2023 04:51) (91% - 98%)    Parameters below as of 13 Dec 2023 04:51  Patient On (Oxygen Delivery Method): room air      [WT/HT]  Daily     Daily Weight in k (13 Dec 2023 04:51)  [VENT]      [PHYSICAL EXAM]  GEN: cachetic, anicteric  HEENT: normocephalic and atraumatic. EOMI. PERRL.    NECK: Supple.  No lymphadenopathy   LUNGS: Clear to auscultation.  HEART: Regular rate and rhythm,  no MRG  ABDOMEN: Soft, nontender, and nondistended.  Positive bowel sounds.    : No CVA tenderness  EXTREMITIES: Without edema.  NEUROLOGIC: grossly intact.  PSYCHIATRIC: Appropriate affect .  SKIN: No rash     [LABS:]                        8.6    6.33  )-----------( 117      ( 13 Dec 2023 08:05 )             24.9     12-13    145  |  108  |  14  ----------------------------<  122<H>  3.7   |  33<H>  |  0.66    Ca    8.6      13 Dec 2023 08:05    Vitamin B12, Serum: 1109 pg/mL [232 - 1245] (10-29-23 @ 18:30)  Folate, Serum: >20.0 ng/mL (10-29-23 @ 18:30)  Sedimentation Rate, Erythrocyte: 57 mm/hr *H* [0 - 20] (10-27-23 @ 18:30)      Urinalysis Basic - ( 13 Dec 2023 08:05 )  Color: x / Appearance: x / SG: x / pH: x  Gluc: 122 mg/dL / Ketone: x  / Bili: x / Urobili: x   Blood: x / Protein: x / Nitrite: x   Leuk Esterase: x / RBC: x / WBC x   Sq Epi: x / Non Sq Epi: x / Bacteria: x      SARS-CoV-2: NotDetec (2023 11:50)        [RADIOLOGY STUDIES:]

## 2023-12-13 NOTE — DISCHARGE NOTE PROVIDER - HOSPITAL COURSE
73 M pmh of HTN, ITP, APL syndrome, generalized anxiety disorder, OCD, newly dx aggressive Merkel Cell cancer of forehead- pt's oncologist  Dr. Eng, at [facility] on [employer] infusions with good response per wife  was brought to ED for worsening psychosis for the past few days, pt lately with difficulty swallowing, decreased po intake    Psychosis  psych and neuro following  cont luvox   zyprexa      #HTN/Orthostatic hypotension  ? psych med induced  adrenal insufficiency  continue prednisone   increased florinef to .3mg  hold bp meds  compressions stockings  check orthostatics daily  improved today  if ok tomorrow would dc to rehab  permissive hypertension       UTI  sp abx    severe protein malnutrition  continue supplements  discussed prognosis with pt  declines feeding tube    #DVT ppx-  hep sq     73 M pmh of HTN, ITP, APL syndrome, generalized anxiety disorder, OCD, newly dx aggressive Merkel Cell cancer of forehead- pt's oncologist  Dr. Eng, at [facility] on [employer] infusions with good response per wife  was brought to ED for worsening psychosis for the past few days, pt lately with difficulty swallowing, decreased po intake    Psychosis  psych and neuro following  cont luvox   zyprexa      #HTN/Orthostatic hypotension  ? psych med induced  adrenal insufficiency  continue prednisone   increased florinef to .3mg  hold bp meds  compressions stockings  check orthostatics daily  improved today  if ok tomorrow would dc to rehab  permissive hypertension       UTI  sp abx    severe protein malnutrition  continue supplements  discussed prognosis with pt  declines feeding tube    #DVT ppx-  hep sq     73 M pmh of HTN, ITP, APL syndrome, generalized anxiety disorder, OCD, newly dx aggressive Merkel Cell cancer of forehead- pt's oncologist  Dr. Eng, at [facility] on [employer] infusions with good response per wife  was brought to ED for worsening psychosis for the past few days, pt lately with difficulty swallowing, decreased po intake    Psychosis  psych and neuro following  cont luvox   zyprexa      #HTN/Orthostatic hypotension  ? psych med induced  adrenal insufficiency  continue prednisone   increased florinef to .3mg  hold bp meds  compressions stockings  check orthostatics daily  improved today  if ok tomorrow would dc to rehab  permissive hypertension       UTI  sp abx    severe protein malnutrition  continue supplements  discussed prognosis with pt  declines feeding tube    #DVT ppx-  hep sq     73 M pmh of HTN, ITP, APL syndrome, generalized anxiety disorder, OCD, newly dx aggressive Merkel Cell cancer of forehead- pt's oncologist  Dr. Eng, at [facility] on [employer] infusions with good response per wife  was brought to ED for worsening psychosis for the past few days, pt lately with difficulty swallowing, decreased po intake. Pt was admitted and seen by neurology, psychiatry, ID, endocrinology, cardiology, on oncology. He had very poor oral intake due to a delusion that he had metal objects in his mouth and throat. He underwent a neurological workup, was trialed on Luvox, remeron, seroquel, haldol, and zyprexa without improvement. Eventually he consented to a PEG. course was complicated by adrenal insufficiency and orthostatic hypotension. His cortisol levels were low and he was started on prednisone and florinef with some improvement. Course was also complicated by sepsis due to UTI, thrombophlebitis.  LABS:                      10.4   7.10  )-----------( 126      ( 25 Dec 2023 16:20 )             30.2   12-25  140  |  103  |  16  ----------------------------<  183<H>  4.1   |  35<H>  |  0.59  Ca    8.4<L>      25 Dec 2023 16:20  Mg     1.7     12-25  TPro  5.7<L>  /  Alb  2.3<L>  /  TBili  0.6  /  DBili  x   /  AST  16  /  ALT  23  /  AlkPhos  80  12-25  RADIOLOGY & ADDITIONAL TESTS:  < from: CT Chest w/ IV Cont (10.29.23 @ 19:05) >    AIRWAYS, LUNGS and PLEURA: Patent central airways. No endobronchial   lesion. Small bilateral pleural effusions and mild bibasilar   atelectasis.. No pleural effusion.    MEDIASTINUM AND WESTON: No lymphadenopathy. The esophagus is poorly   evaluated without oral contrast. However is unremarkable.    HEART AND VESSELS: Heart size is normal. No pericardial effusion.   Thoracic aorta and pulmonary artery normal in diameter. Mild coronary   calcification.    VISUALIZED UPPER ABDOMEN: Splenomegaly is unchanged. Cholecystectomy.   Bilateral renal cysts.    CHEST WALL AND BONES: No aggressive osseous lesion.    LOWER NECK: Within normal limits.    IMPRESSION:    The esophagus is poorly evaluated without oral contrast. However is   unremarkable.    < end of copied text >    < from: MR Head w/wo IV Cont (10.25.23 @ 19:33) >    There is no evidence of acute infarct. There are no foci of   susceptibility artifact to suggest hemorrhage. The ventricles are normal   in size for patient's age. No contrast enhancing lesion seen.    Flow voids of the major intracranial vessels at the skull base follow   expected course and contour.    The paranasal sinuses demonstrate no signal abnormality. The mastoids   demonstrate no signal abnormality. Small polypoid lesion in the right   nasal cavity. Bilateral orbits are within normal limits.      IMPRESSION:  No acute infarct, hemorrhage, or mass effect. No abnormal intracranial   enhancement.    < end of copied text >           73 M pmh of HTN, ITP, APL syndrome, generalized anxiety disorder, OCD, newly dx aggressive Merkel Cell cancer of forehead- pt's oncologist  Dr. Eng, at [facility] on [place] infusions with good response per wife  was brought to ED for worsening psychosis for the past few days, pt lately with difficulty swallowing, decreased po intake. Pt was admitted and seen by neurology, psychiatry, ID, endocrinology, cardiology, on oncology. He had very poor oral intake due to a delusion that he had metal objects in his mouth and throat. He underwent a neurological workup, was trialed on Luvox, remeron, seroquel, haldol, and zyprexa without improvement. Eventually he consented to a PEG. course was complicated by adrenal insufficiency and orthostatic hypotension. His cortisol levels were low and he was started on prednisone and florinef with some improvement. Course was also complicated by sepsis due to UTI, thrombophlebitis.  LABS:                      10.4   7.10  )-----------( 126      ( 25 Dec 2023 16:20 )             30.2   12-25  140  |  103  |  16  ----------------------------<  183<H>  4.1   |  35<H>  |  0.59  Ca    8.4<L>      25 Dec 2023 16:20  Mg     1.7     12-25  TPro  5.7<L>  /  Alb  2.3<L>  /  TBili  0.6  /  DBili  x   /  AST  16  /  ALT  23  /  AlkPhos  80  12-25  RADIOLOGY & ADDITIONAL TESTS:  < from: CT Chest w/ IV Cont (10.29.23 @ 19:05) >    AIRWAYS, LUNGS and PLEURA: Patent central airways. No endobronchial   lesion. Small bilateral pleural effusions and mild bibasilar   atelectasis.. No pleural effusion.    MEDIASTINUM AND WESTON: No lymphadenopathy. The esophagus is poorly   evaluated without oral contrast. However is unremarkable.    HEART AND VESSELS: Heart size is normal. No pericardial effusion.   Thoracic aorta and pulmonary artery normal in diameter. Mild coronary   calcification.    VISUALIZED UPPER ABDOMEN: Splenomegaly is unchanged. Cholecystectomy.   Bilateral renal cysts.    CHEST WALL AND BONES: No aggressive osseous lesion.    LOWER NECK: Within normal limits.    IMPRESSION:    The esophagus is poorly evaluated without oral contrast. However is   unremarkable.    < end of copied text >    < from: MR Head w/wo IV Cont (10.25.23 @ 19:33) >    There is no evidence of acute infarct. There are no foci of   susceptibility artifact to suggest hemorrhage. The ventricles are normal   in size for patient's age. No contrast enhancing lesion seen.    Flow voids of the major intracranial vessels at the skull base follow   expected course and contour.    The paranasal sinuses demonstrate no signal abnormality. The mastoids   demonstrate no signal abnormality. Small polypoid lesion in the right   nasal cavity. Bilateral orbits are within normal limits.      IMPRESSION:  No acute infarct, hemorrhage, or mass effect. No abnormal intracranial   enhancement.    < end of copied text >           73 M pmh of HTN, ITP, APL syndrome, generalized anxiety disorder, OCD, newly dx aggressive Merkel Cell cancer of forehead- pt's oncologist  Dr. Eng, at [facility] on [employer] infusions with good response per wife  was brought to ED for worsening psychosis for the past few days, pt lately with difficulty swallowing, decreased po intake. Pt was admitted and seen by neurology, psychiatry, ID, endocrinology, cardiology, on oncology. He had very poor oral intake due to a delusion that he had metal objects in his mouth and throat. He underwent a neurological workup, was trialed on Luvox, remeron, seroquel, haldol, and zyprexa without improvement. Eventually he consented to a PEG. course was complicated by adrenal insufficiency and orthostatic hypotension. His cortisol levels were low and he was started on prednisone and florinef with some improvement. Course was also complicated by sepsis due to UTI, thrombophlebitis.  LABS:                      10.4   7.10  )-----------( 126      ( 25 Dec 2023 16:20 )             30.2   12-25  140  |  103  |  16  ----------------------------<  183<H>  4.1   |  35<H>  |  0.59  Ca    8.4<L>      25 Dec 2023 16:20  Mg     1.7     12-25  TPro  5.7<L>  /  Alb  2.3<L>  /  TBili  0.6  /  DBili  x   /  AST  16  /  ALT  23  /  AlkPhos  80  12-25  RADIOLOGY & ADDITIONAL TESTS:  < from: CT Chest w/ IV Cont (10.29.23 @ 19:05) >    AIRWAYS, LUNGS and PLEURA: Patent central airways. No endobronchial   lesion. Small bilateral pleural effusions and mild bibasilar   atelectasis.. No pleural effusion.    MEDIASTINUM AND WESTON: No lymphadenopathy. The esophagus is poorly   evaluated without oral contrast. However is unremarkable.    HEART AND VESSELS: Heart size is normal. No pericardial effusion.   Thoracic aorta and pulmonary artery normal in diameter. Mild coronary   calcification.    VISUALIZED UPPER ABDOMEN: Splenomegaly is unchanged. Cholecystectomy.   Bilateral renal cysts.    CHEST WALL AND BONES: No aggressive osseous lesion.    LOWER NECK: Within normal limits.    IMPRESSION:    The esophagus is poorly evaluated without oral contrast. However is   unremarkable.    < end of copied text >    < from: MR Head w/wo IV Cont (10.25.23 @ 19:33) >    There is no evidence of acute infarct. There are no foci of   susceptibility artifact to suggest hemorrhage. The ventricles are normal   in size for patient's age. No contrast enhancing lesion seen.    Flow voids of the major intracranial vessels at the skull base follow   expected course and contour.    The paranasal sinuses demonstrate no signal abnormality. The mastoids   demonstrate no signal abnormality. Small polypoid lesion in the right   nasal cavity. Bilateral orbits are within normal limits.      IMPRESSION:  No acute infarct, hemorrhage, or mass effect. No abnormal intracranial   enhancement.    < end of copied text >

## 2023-12-13 NOTE — PROGRESS NOTE ADULT - ASSESSMENT
73 M pmh of HTN, ITP, APL syndrome, generalized anxiety disorder, OCD, newly dx aggressive Merkel Cell cancer of forehead- pt's oncologist  Dr. Eng, at [facility] on [place] infusions with good response per wife  was brought to ED for worsening psychosis for the past few days, pt lately with difficulty swallowing, decreased po intake    Psychosis  psych and neuro following  cont luvox   zyprexa      #HTN/Orthostatic hypotension  ? psych med induced  adrenal insufficiency  continue prednisone   increased florinef to .3mg  hold bp meds  compressions stockings  check orthostatics daily- pt remains orthostatic when standing  improved today  permissive hypertension       UTI  sp abx    #Anemia-  d/w dr villareal  transfuse 1u prbc    severe protein malnutrition  continue supplements  discussed prognosis with pt  had family mtg with wife, sons on phone and sw   pt and family now agreeable for feeding tube- indication- FTT, severe malnutrition, psychosis  GI cs    #DVT ppx-  hep sq    [facility]   [phone] 73 M pmh of HTN, ITP, APL syndrome, generalized anxiety disorder, OCD, newly dx aggressive Merkel Cell cancer of forehead- pt's oncologist  Dr. Eng, at [facility] on [place] infusions with good response per wife  was brought to ED for worsening psychosis for the past few days, pt lately with difficulty swallowing, decreased po intake    Psychosis  psych and neuro following  cont luvox   zyprexa      #HTN/Orthostatic hypotension  ? psych med induced  adrenal insufficiency  continue prednisone   increased florinef to .3mg  hold bp meds  compressions stockings  check orthostatics daily- pt remains orthostatic when standing  improved today  permissive hypertension       UTI  sp abx    #Anemia-  d/w dr villareal  transfuse 1u prbc    severe protein malnutrition  continue supplements  discussed prognosis with pt  had family mtg with wife, sons on phone and sw   pt and family now agreeable for feeding tube- indication- FTT, severe malnutrition, psychosis  GI cs    #DVT ppx-  hep sq    [facility]   [phone]

## 2023-12-13 NOTE — PROGRESS NOTE ADULT - REASON FOR ADMISSION
AMS
AMS
delusions
psychosis
Diff swallowing
PASC
malaise
psychosis
AMS
pain
abn sensation , swallowing
anxietty
anxiety  subjective sensation of "needles inside me"
confusion
dagger like sensation to face and throat
sensation of dysphagia

## 2023-12-13 NOTE — DISCHARGE NOTE PROVIDER - NSDCCPCAREPLAN_GEN_ALL_CORE_FT
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 Continue current management.      SECONDARY DISCHARGE DIAGNOSES  Diagnosis: Anxiety  Assessment and Plan of Treatment: Continue current medical management.    Diagnosis: Merkel cell cancer  Assessment and Plan of Treatment: You had chemotherapy treatments while admitted. Please continue with chemotherapy. Follow with your oncologist on discharge. Elliot Mejia - [phone]     PRINCIPAL DISCHARGE DIAGNOSIS  Diagnosis: Psychosis in elderly  Assessment and Plan of Treatment: Your symptoms have improved. Continue current management.      SECONDARY DISCHARGE DIAGNOSES  Diagnosis: Anxiety  Assessment and Plan of Treatment: Continue current medical management.    Diagnosis: Merkel cell cancer  Assessment and Plan of Treatment: You had chemotherapy treatments while admitted. Please continue with chemotherapy. Follow with your oncologist on discharge. Elliot Mejia - [phone]     PRINCIPAL DISCHARGE DIAGNOSIS  Diagnosis: Psychosis in elderly  Assessment and Plan of Treatment: Your symptoms have improved. Continue current management.      SECONDARY DISCHARGE DIAGNOSES  Diagnosis: Anxiety  Assessment and Plan of Treatment: Continue current medical management.    Diagnosis: Merkel cell cancer  Assessment and Plan of Treatment: You had chemotherapy treatments while admitted. Please continue with chemotherapy. Follow with your oncologist on discharge. Elliot Mejia - [phone]     PRINCIPAL DISCHARGE DIAGNOSIS  Diagnosis: Psychosis in elderly  Assessment and Plan of Treatment: off psych meds for now, to be re-evaluated by psych outpatient      SECONDARY DISCHARGE DIAGNOSES  Diagnosis: Anxiety  Assessment and Plan of Treatment:     Diagnosis: Merkel cell cancer  Assessment and Plan of Treatment: Follow with your oncologist on discharge. Elliot Mejia - [phone]    Diagnosis: OCD (obsessive compulsive disorder)  Assessment and Plan of Treatment:     Diagnosis: Severe protein-calorie malnutrition  Assessment and Plan of Treatment: continue tube feeding, jevity 1.5 @ 85 ml/hr    Diagnosis: Adult failure to thrive syndrome  Assessment and Plan of Treatment:     Diagnosis: Acute cystitis  Assessment and Plan of Treatment:     Diagnosis: Thrombophlebitis arm  Assessment and Plan of Treatment: complete course of antibiotics and warm compresses    Diagnosis: Other gram-negative sepsis  Assessment and Plan of Treatment:     Diagnosis: Adrenal insufficiency  Assessment and Plan of Treatment: continue florinef and prednisone, may taper down as tolerated, follow up with endocrine    Diagnosis: Orthostatic hypotension  Assessment and Plan of Treatment: f/u with cardiology, may need to allow for higher BPs     PRINCIPAL DISCHARGE DIAGNOSIS  Diagnosis: Psychosis in elderly  Assessment and Plan of Treatment: off psych meds for now, to be re-evaluated by psych outpatient      SECONDARY DISCHARGE DIAGNOSES  Diagnosis: Anxiety  Assessment and Plan of Treatment:     Diagnosis: Merkel cell cancer  Assessment and Plan of Treatment: Follow with your oncologist on discharge. Elliot Mejia - [phone]    Diagnosis: OCD (obsessive compulsive disorder)  Assessment and Plan of Treatment:     Diagnosis: Severe protein-calorie malnutrition  Assessment and Plan of Treatment: continue tube feeding, jevity 1.5 @ 85 ml/hr    Diagnosis: Adult failure to thrive syndrome  Assessment and Plan of Treatment:     Diagnosis: Acute cystitis  Assessment and Plan of Treatment:     Diagnosis: Thrombophlebitis arm  Assessment and Plan of Treatment: complete course of antibiotics and warm compresses    Diagnosis: Other gram-negative sepsis  Assessment and Plan of Treatment:     Diagnosis: Adrenal insufficiency  Assessment and Plan of Treatment: continue florinef and prednisone, may taper down as tolerated, follow up with endocrine    Diagnosis: Orthostatic hypotension  Assessment and Plan of Treatment: f/u with cardiology, may need to allow for higher BPs     PRINCIPAL DISCHARGE DIAGNOSIS  Diagnosis: Psychosis in elderly  Assessment and Plan of Treatment: off psych meds for now, to be re-evaluated by psych outpatient      SECONDARY DISCHARGE DIAGNOSES  Diagnosis: Anxiety  Assessment and Plan of Treatment:     Diagnosis: Merkel cell cancer  Assessment and Plan of Treatment: Follow with your oncologist on discharge. Elliot Mejia - [phone]    Diagnosis: OCD (obsessive compulsive disorder)  Assessment and Plan of Treatment:     Diagnosis: Severe protein-calorie malnutrition  Assessment and Plan of Treatment: continue tube feeding, jevity 1.5 @ 85 ml/hr    Diagnosis: Adult failure to thrive syndrome  Assessment and Plan of Treatment:     Diagnosis: Acute cystitis  Assessment and Plan of Treatment:     Diagnosis: Thrombophlebitis arm  Assessment and Plan of Treatment: complete course of antibiotics and warm compresses    Diagnosis: Other gram-negative sepsis  Assessment and Plan of Treatment:     Diagnosis: Adrenal insufficiency  Assessment and Plan of Treatment: continue florinef and prednisone, may taper down as tolerated, follow up with endocrine    Diagnosis: Orthostatic hypotension  Assessment and Plan of Treatment: f/u with cardiology, may need to allow for higher BPs

## 2023-12-13 NOTE — DISCHARGE NOTE PROVIDER - CARE PROVIDER_API CALL
Elliot Eng  Medical Oncology  [address] [place] [address]  Phone: [phone]  Fax: [phone]  Follow Up Time: 1 week   Elliot Eng  Medical Oncology  [address] [place] [address]  Phone: [phone]  Fax: [phone]  Follow Up Time: 1 week   Elliot Eng  Medical Oncology  [address] [place] [address]  Phone: [phone]  Fax: [phone]  Follow Up Time: 1 week   Elliot Eng  Medical Oncology  [address] [place] [address]  Phone: [phone]  Fax: [phone]  Follow Up Time: 1 week    Rusty Calderon  Internal Medicine  [address] [place] [address]  Phone: [phone]  Fax: [phone]  Follow Up Time:     Avni Lau  Cardiology  [address] [facility], [facility] [address]  [place] [address]  Phone: [phone]  Fax: [phone]  Follow Up Time:     Perlman, Craig Douglas  Internal Medicine  [address] [facility], [facility] [address]  [place] [address]  Phone: [phone]  Fax: [phone]  Follow Up Time:     Heidi Can  Neurology  [address] [place] [address]  Phone: [phone]  Fax: [phone]  Follow Up Time:     Tor Schmid  Gastroenterology  [address] [place] [address]  Phone: [phone]  Fax: [phone]  Follow Up Time:    Elliot Eng  Medical Oncology  [address] [place] [address]  Phone: [phone]  Fax: [phone]  Follow Up Time: 1 week    Rusty Calderon  Internal Medicine  [address] [place] [address]  Phone: [phone]  Fax: [phone]  Follow Up Time:     Avni Lau  Cardiology  [address] [facility], [facility] [address]  [place] [address]  Phone: [phone]  Fax: [phone]  Follow Up Time:     Perlman, Craig Douglas  Internal Medicine  [address] [facility], [facility] [address]  [place] [address]  Phone: [phone]  Fax: [phone]  Follow Up Time:     Heidi Can  Neurology  [address] [place] [address]  Phone: [phone]  Fax: [phone]  Follow Up Time:     Tor Schmid  Gastroenterology  [address] [place] [address]  Phone: [phone]  Fax: [phone]  Follow Up Time:    Elliot Eng  Medical Oncology  [address] [place] [address]  Phone: [phone]  Fax: [phone]  Follow Up Time: 1 week    Rusty Calderon  Internal Medicine  [address] [place] [address]  Phone: [phone]  Fax: [phone]  Follow Up Time:     Avni Lau  Cardiology  [address] [facility], [facility] [address]  [place] [address]  Phone: [phone]  Fax: [phone]  Follow Up Time:     Perlman, Craig Douglas  Internal Medicine  [address] [facility], [facility] [address]  [place] [address]  Phone: [phone]  Fax: [phone]  Follow Up Time:     Heidi Can  Neurology  [address] [place] [address]  Phone: [phone]  Fax: [phone]  Follow Up Time:     Tor Schmid  Gastroenterology  [address] [place] [address]  Phone: [phone]  Fax: [phone]  Follow Up Time:

## 2023-12-13 NOTE — BH CONSULTATION LIAISON PROGRESS NOTE - NSBHATTESTBILLING_PSY_A_CORE
[id number]-Kkytpcmrds OBS or IP - low complexity OR 25-34 mins [id number]-Kogzmjqvpc OBS or IP - low complexity OR 25-34 mins

## 2023-12-13 NOTE — DISCHARGE NOTE PROVIDER - NPI NUMBER (FOR SYSADMIN USE ONLY) :
[[id number]] [[id number]] [[id number]] [[id number]],[[id number]],[[id number]],[[id number]],[[id number]] [[id number]],[[id number]],[[id number]],[[id number]],[[id number]],[[id number]] [[id number]],[[id number]],[[id number]],[[id number]],[[id number]],[[id number]]

## 2023-12-13 NOTE — PROGRESS NOTE ADULT - ASSESSMENT
IMPRESSION:     pt w ITP, Lupus Anticoagulant, HTN, anxiety on Seraquel, OCD, Merkel cell ca of right Forehead, under care Dr Elliot Eng, post 4 doses q3wks IV Keytruda, last dose 10/17/23.  Pt brought in w c/o feeling "claws, daggers" inside him, since C#3 Keytruda, with sensation initially started w various parts of face now also progressed down to sides of body to rectum(does not see actual daggers or claws,)    Wife reports after first cycle started w jaw tightness and progressive worsened w each cycle, to point of hard to swallow/can't swallow  Had tired taken off Seroquel to see if adverse effect but no improvement, now back on x 5-6days  Merkel lesion has essentially resolved  -sensation described by pt ?psychosis vs neuropathy  -psychosis not noted to be associated w Keytruda. Neuro tox of Keytruda described includes cerebral hemorrhage, confusion, myasthenia gravis, reversible posterior leukoencephalopathy syndrome, syed neuropathy, Guillain [place], aseptic meningitis, encephalitis, transverse myelitis    -CT and MRI head no sig findings  -AM cortisol 5.8. Slight low. Unclear significance   Repeat AM Cortisol low x2. <6 on two tests  3rd test w cortisol 11 on 11/06/2  -Sjorgen /Scleroderma studies abn.      +MICHELLE and +Ro52  Seen by Neuro and Psych post medication changes, pt refused LP  Seen by Endocrine-on prednisone 5mg qD for low Cortisol level, could not tolerate hydrocortisone  Seen by GI post endoscopy no abnormality found    RECOMMEND:  -clinically same, continue c/o unable to swallow, objects in throat, tight jaws(since first dose Keytruda as per wife)  -mild anemia and thrombocytopenia-stable as of last CBC, no intervention at these levels  -cont supportive care from Heme/Onc standpoint  pain meds PRN     Declined morphine       Adjustment of psychiatric meds per psychiatry  Fluvoxamine  Olanzapine  mirtazapine  OFF Haldol PRN    mgmt steroids per endocrine.   Prednisone  Fludrocortisone    continue physical therapy evaluation  evaluation for rehab    Followup with usual outpt onc, Dr Lion post DC    Ad hoc family meeting had today.   Discussed with hospitalist, Social work, sons via tele To and Terry?, and wife in person.  Discussed orthostasis and lo PO intake.     Physically able to do activity but markedly deconditioned.     Multiple questions and concerns addressed.     Family to make decision re PEG tube.   Pt appeared to be more amenable to procedure post discussion.   Await family decision.      Also given orthostasis and Low Hgb, transfuse 1U PRBC.       Thank you for consulting us.   No additional recommendations at current time.   Will sign off on case for now.   Please call, or re-consult if needed.  IMPRESSION:     pt w ITP, Lupus Anticoagulant, HTN, anxiety on Seraquel, OCD, Merkel cell ca of right Forehead, under care Dr Elliot Eng, post 4 doses q3wks IV Keytruda, last dose 10/17/23.  Pt brought in w c/o feeling "claws, daggers" inside him, since C#3 Keytruda, with sensation initially started w various parts of face now also progressed down to sides of body to rectum(does not see actual daggers or claws,)    Wife reports after first cycle started w jaw tightness and progressive worsened w each cycle, to point of hard to swallow/can't swallow  Had tired taken off Seroquel to see if adverse effect but no improvement, now back on x 5-6days  Merkel lesion has essentially resolved  -sensation described by pt ?psychosis vs neuropathy  -psychosis not noted to be associated w Keytruda. Neuro tox of Keytruda described includes cerebral hemorrhage, confusion, myasthenia gravis, reversible posterior leukoencephalopathy syndrome, syed neuropathy, Guillain [place], aseptic meningitis, encephalitis, transverse myelitis    -CT and MRI head no sig findings  -AM cortisol 5.8. Slight low. Unclear significance   Repeat AM Cortisol low x2. <6 on two tests  3rd test w cortisol 11 on 11/06/2  -Sjorgen /Scleroderma studies abn.      +MICHELLE and +Ro52  Seen by Neuro and Psych post medication changes, pt refused LP  Seen by Endocrine-on prednisone 5mg qD for low Cortisol level, could not tolerate hydrocortisone  Seen by GI post endoscopy no abnormality found    RECOMMEND:  -clinically same, continue c/o unable to swallow, objects in throat, tight jaws(since first dose Keytruda as per wife)  -mild anemia and thrombocytopenia-stable as of last CBC, no intervention at these levels  -cont supportive care from Heme/Onc standpoint  pain meds PRN     Declined morphine       Adjustment of psychiatric meds per psychiatry  Fluvoxamine  Olanzapine  mirtazapine  OFF Haldol PRN    mgmt steroids per endocrine.   Prednisone  Fludrocortisone    continue physical therapy evaluation  evaluation for rehab    Followup with usual outpt onc, Dr Lion post DC    Ad hoc family meeting had today.   Discussed with hospitalist, Social work, sons via tele To and Terry?, and wife in person.  Discussed orthostasis and lo PO intake.     Physically able to do activity but markedly deconditioned.     Multiple questions and concerns addressed.     Family to make decision re PEG tube.   Pt appeared to be more amenable to procedure post discussion.   Await family decision.      Also given orthostasis and Low Hgb, transfuse 1U PRBC.       Thank you for consulting us.   No additional recommendations at current time.   Will sign off on case for now.   Please call, or re-consult if needed.

## 2023-12-13 NOTE — DISCHARGE NOTE PROVIDER - NSDCMRMEDTOKEN_GEN_ALL_CORE_FT
atenolol 50 mg oral tablet: 1 tab(s) orally 2 times a day  cholecalciferol 50 mcg (2000 intl units) oral tablet: 1 tab(s) orally once a day  diazePAM 5 mg oral tablet: 5 milligram(s) orally 2 times a day  fluvoxaMINE 100 mg oral tablet: 1 tab(s) orally 2 times a day  losartan 100 mg oral tablet: 1 tab(s) orally once a day  QUEtiapine 25 mg oral tablet: 0.5 tab(s) orally in the evening and 1 tab(s) orally at bedtime; spaced at least 6 hours apart   atenolol 50 mg oral tablet: 1 tab(s) orally 2 times a day  cholecalciferol 50 mcg (2000 intl units) oral tablet: 1 tab(s) orally once a day  fludrocortisone 0.1 mg oral tablet: 2 tab(s) orally once a day  Keflex 500 mg oral capsule: 1 cap(s) orally 2 times a day thru 12/28  predniSONE 5 mg oral tablet: 1 tab(s) orally once a day

## 2023-12-13 NOTE — DISCHARGE NOTE PROVIDER - CARE PROVIDERS DIRECT ADDRESSES
,[email or web address] ,[email or web address] ,[email or web address],DirectAddress_Unknown,DirectAddress_Unknown,DirectAddress_Unknown,DirectAddress_Unknown ,[email or web address],DirectAddress_Unknown,DirectAddress_Unknown,DirectAddress_Unknown,DirectAddress_Unknown ,[email or web address],DirectAddress_Unknown,DirectAddress_Unknown,DirectAddress_Unknown,DirectAddress_Unknown

## 2023-12-13 NOTE — SOCIAL WORK PROGRESS NOTE - NSSWPROGRESSNOTE_GEN_ALL_CORE
Family meeting held with MD/pt/family/SW to discuss transitional care plan,  Plan is now for PEG, pt remains acute at this time, [facility] notified and SW will follow for transition to KASHIF when medically stable.

## 2023-12-13 NOTE — PROGRESS NOTE ADULT - SUBJECTIVE AND OBJECTIVE BOX
[facility]  Walter Camarena PA-C  [address] [place] [address]  [phone]      INTERVAL HPI/OVERNIGHT EVENTS:  Pt s/e  asked to re-eval for poor po intake    MEDICATIONS  (STANDING):  atenolol  Tablet 50 milliGRAM(s) Oral two times a day  benzocaine 20% Spray 1 Spray(s) Topical four times a day  cefTRIAXone   IVPB 1000 milliGRAM(s) IV Intermittent every 24 hours  cefTRIAXone   IVPB      cholecalciferol 2000 Unit(s) Oral daily  FIRST- Mouthwash  BLM 5 milliLiter(s) Swish and Spit three times a day  fludroCORTISONE 0.1 milliGRAM(s) Oral daily  fluvoxaMINE 150 milliGRAM(s) Oral two times a day  gabapentin 100 milliGRAM(s) Oral three times a day  haloperidol     Tablet 2 milliGRAM(s) Oral at bedtime  haloperidol     Tablet 1 milliGRAM(s) Oral <User Schedule>  heparin   Injectable 5000 Unit(s) SubCutaneous every 12 hours  losartan 25 milliGRAM(s) Oral daily  melatonin 3 milliGRAM(s) Oral at bedtime  mirtazapine 15 milliGRAM(s) Oral at bedtime  predniSONE   Tablet 5 milliGRAM(s) Oral daily  senna 2 Tablet(s) Oral at bedtime  sodium chloride 0.45%. 1000 milliLiter(s) (75 mL/Hr) IV Continuous <Continuous>    MEDICATIONS  (PRN):  acetaminophen     Tablet .. 650 milliGRAM(s) Oral every 6 hours PRN Temp greater or equal to 38.5C (101.3F), Moderate Pain (4 - 6)  haloperidol    Injectable 1 milliGRAM(s) IntraMuscular every 12 hours PRN Agitation  lactulose Syrup 15 Gram(s) Oral two times a day PRN constipation      Allergies  No Known Allergies      PHYSICAL EXAM:   Vital Signs:  Vital Signs Last 24 Hrs  T(C): 36.4 (28 Nov 2023 12:23), Max: 37 (27 Nov 2023 20:14)  T(F): 97.6 (28 Nov 2023 12:23), Max: 98.6 (27 Nov 2023 20:14)  HR: 75 (28 Nov 2023 12:23) (75 - 84)  BP: 164/72 (28 Nov 2023 12:23) (156/78 - 174/80)  BP(mean): --  RR: 17 (28 Nov 2023 12:23) (17 - 17)  SpO2: 93% (28 Nov 2023 12:23) (91% - 95%)    Parameters below as of 28 Nov 2023 12:23  Patient On (Oxygen Delivery Method): room air    GENERAL:  Appears stated age  HEENT:  NC/AT  CHEST:  Full & symmetric excursion  HEART:  Regular rhythm  ABDOMEN:  Soft, non-tender, non-distended  EXTEREMITIES:  no cyanosis  SKIN:  No rash  NEURO:  Alert   [facility]  Walter Camarena PA-C  [address] [place] [address]  [phone]      INTERVAL HPI/OVERNIGHT EVENTS:  Pt s/e  asked to re-eval for poor po intake    MEDICATIONS  (STANDING):  atenolol  Tablet 50 milliGRAM(s) Oral two times a day  benzocaine 20% Spray 1 Spray(s) Topical four times a day  cefTRIAXone   IVPB 1000 milliGRAM(s) IV Intermittent every 24 hours  cefTRIAXone   IVPB      cholecalciferol 2000 Unit(s) Oral daily  FIRST- Mouthwash  BLM 5 milliLiter(s) Swish and Spit three times a day  fludroCORTISONE 0.1 milliGRAM(s) Oral daily  fluvoxaMINE 150 milliGRAM(s) Oral two times a day  gabapentin 100 milliGRAM(s) Oral three times a day  haloperidol     Tablet 2 milliGRAM(s) Oral at bedtime  haloperidol     Tablet 1 milliGRAM(s) Oral <User Schedule>  heparin   Injectable 5000 Unit(s) SubCutaneous every 12 hours  losartan 25 milliGRAM(s) Oral daily  melatonin 3 milliGRAM(s) Oral at bedtime  mirtazapine 15 milliGRAM(s) Oral at bedtime  predniSONE   Tablet 5 milliGRAM(s) Oral daily  senna 2 Tablet(s) Oral at bedtime  sodium chloride 0.45%. 1000 milliLiter(s) (75 mL/Hr) IV Continuous <Continuous>    MEDICATIONS  (PRN):  acetaminophen     Tablet .. 650 milliGRAM(s) Oral every 6 hours PRN Temp greater or equal to 38.5C (101.3F), Moderate Pain (4 - 6)  haloperidol    Injectable 1 milliGRAM(s) IntraMuscular every 12 hours PRN Agitation  lactulose Syrup 15 Gram(s) Oral two times a day PRN constipation      Allergies  No Known Allergies      PHYSICAL EXAM:   Vital Signs:  Vital Signs Last 24 Hrs  T(C): 36.4 (28 Nov 2023 12:23), Max: 37 (27 Nov 2023 20:14)  T(F): 97.6 (28 Nov 2023 12:23), Max: 98.6 (27 Nov 2023 20:14)  HR: 75 (28 Nov 2023 12:23) (75 - 84)  BP: 164/72 (28 Nov 2023 12:23) (156/78 - 174/80)  BP(mean): --  RR: 17 (28 Nov 2023 12:23) (17 - 17)  SpO2: 93% (28 Nov 2023 12:23) (91% - 95%)    Parameters below as of 28 Nov 2023 12:23  Patient On (Oxygen Delivery Method): room air    GENERAL:  Appears stated age  HEENT:  NC/AT  CHEST:  Full & symmetric excursion  HEART:  Regular rhythm  ABDOMEN:  Soft, non-tender, non-distended  EXTEREMITIES:  no cyanosis  SKIN:  No rash  NEURO:  Alert

## 2023-12-13 NOTE — PROGRESS NOTE ADULT - SUBJECTIVE AND OBJECTIVE BOX
Patient is a 73y old  Male who presents with a chief complaint of psychosis (13 Dec 2023 13:03)      INTERVAL HPI/OVERNIGHT EVENTS: noted  pt seen and examined this am   events noted  minimal po intake  +orthostasis      Vital Signs Last 24 Hrs  T(C): 36.8 (13 Dec 2023 13:05), Max: 37.4 (12 Dec 2023 20:25)  T(F): 98.2 (13 Dec 2023 13:05), Max: 99.4 (12 Dec 2023 20:25)  HR: 82 (13 Dec 2023 13:05) (82 - 96)  BP: 178/78 (13 Dec 2023 13:05) (108/71 - 178/82)  BP(mean): --  RR: 17 (13 Dec 2023 13:05) (17 - 18)  SpO2: 94% (13 Dec 2023 13:05) (91% - 98%)    Parameters below as of 13 Dec 2023 13:05  Patient On (Oxygen Delivery Method): room air        acetaminophen     Tablet .. 650 milliGRAM(s) Oral every 6 hours PRN  benzocaine 20% Spray 1 Spray(s) Topical four times a day  cholecalciferol 2000 Unit(s) Oral daily  FIRST- Mouthwash  BLM 5 milliLiter(s) Swish and Spit three times a day  fludroCORTISONE 0.3 milliGRAM(s) Oral daily  fluvoxaMINE 150 milliGRAM(s) Oral two times a day  heparin   Injectable 5000 Unit(s) SubCutaneous every 12 hours  lactulose Syrup 15 Gram(s) Oral two times a day PRN  melatonin 3 milliGRAM(s) Oral at bedtime  mirtazapine 15 milliGRAM(s) Oral at bedtime  OLANZapine Disintegrating Tablet 10 milliGRAM(s) Oral at bedtime  predniSONE   Tablet 5 milliGRAM(s) Oral daily  senna 2 Tablet(s) Oral at bedtime      PHYSICAL EXAM:  GENERAL: NAD,   EYES: conjunctiva and sclera clear  ENMT: Moist mucous membranes  NECK: Supple, No JVD, Normal thyroid  CHEST/LUNG: non labored, cta b/l  HEART: Regular rate and rhythm; No murmurs, rubs, or gallops  ABDOMEN: Soft, Nontender, Nondistended; Bowel sounds present  EXTREMITIES:  2+ Peripheral Pulses, No clubbing, cyanosis, or edema  LYMPH: No lymphadenopathy noted  SKIN: No rashes or lesions    Consultant(s) Notes Reviewed:  [x ] YES  [ ] NO  Care Discussed with Consultants/Other Providers [ x] YES  [ ] NO    LABS:                        8.6    6.33  )-----------( 117      ( 13 Dec 2023 08:05 )             24.9     12-13    145  |  108  |  14  ----------------------------<  122<H>  3.7   |  33<H>  |  0.66    Ca    8.6      13 Dec 2023 08:05        Urinalysis Basic - ( 13 Dec 2023 08:05 )    Color: x / Appearance: x / SG: x / pH: x  Gluc: 122 mg/dL / Ketone: x  / Bili: x / Urobili: x   Blood: x / Protein: x / Nitrite: x   Leuk Esterase: x / RBC: x / WBC x   Sq Epi: x / Non Sq Epi: x / Bacteria: x      CAPILLARY BLOOD GLUCOSE            Urinalysis Basic - ( 13 Dec 2023 08:05 )    Color: x / Appearance: x / SG: x / pH: x  Gluc: 122 mg/dL / Ketone: x  / Bili: x / Urobili: x   Blood: x / Protein: x / Nitrite: x   Leuk Esterase: x / RBC: x / WBC x   Sq Epi: x / Non Sq Epi: x / Bacteria: x          RADIOLOGY & ADDITIONAL TESTS:    Imaging Personally Reviewed:  [x ] YES  [ ] NO

## 2023-12-13 NOTE — PROGRESS NOTE ADULT - SUBJECTIVE AND OBJECTIVE BOX
Chief Complaint: Psychosis    Interval Events: No events overnight.    Review of Systems:  General: No fevers, chills, weight gain  Skin: No rashes, color changes  Cardiovascular: No chest pain, orthopnea  Respiratory: No shortness of breath, cough  Gastrointestinal: No nausea, abdominal pain  Genitourinary: No incontinence, pain with urination  Musculoskeletal: No pain, swelling, decreased range of motion  Neurological: No headache, weakness  Psychiatric: No depression, anxiety  Endocrine: No weight gain, increased thirst  All other systems are comprehensively negative.    Physical Exam:  Vital Signs Last 24 Hrs  T(C): 36.3 (13 Dec 2023 04:51), Max: 37.4 (12 Dec 2023 20:25)  T(F): 97.4 (13 Dec 2023 04:51), Max: 99.4 (12 Dec 2023 20:25)  HR: 96 (13 Dec 2023 04:51) (90 - 96)  BP: 178/82 (13 Dec 2023 04:51) (161/72 - 178/82)  BP(mean): --  RR: 18 (13 Dec 2023 04:51) (18 - 18)  SpO2: 98% (13 Dec 2023 04:51) (91% - 98%)  Parameters below as of 13 Dec 2023 04:51  Patient On (Oxygen Delivery Method): room air  General: NAD  HEENT: MMM  Neck: No JVD, no carotid bruit  Lungs: CTAB  CV: RRR, nl S1/S2, no M/R/G  Abdomen: S/NT/ND, +BS  Extremities: No LE edema, no cyanosis  Neuro: AAOx3, non-focal  Skin: No rash    Labs:

## 2023-12-13 NOTE — DISCHARGE NOTE PROVIDER - NSDCCAREPROVSEEN_GEN_ALL_CORE_FT
Carl, Jocelyn Castillo, Jamia Edwards, Jolene Mckoy, Jillian Schmid, Tor  [facility], Yeison Aldridge, Bruce I Weil, Sara Perez, Nazario Camarena, Katarzyna Carl, Jocelyn Castillo, Jamia Edwards, Jolene Mckoy, Jillian Schmid, Tor  [facility], Yeison Aldridge, Bruce I Weil, Sara Perez, Nazario Camarena, Katarzyna Carl, Jocelyn Castillo, Jamia Edwards, Jolene Mckoy, Jillian Schmid, Tor  [facility], Yeison Aldridge, Bruce I Weil, Sara Perez, Nazario Camarena, Katarzyna

## 2023-12-13 NOTE — DISCHARGE NOTE PROVIDER - PROVIDER TOKENS
PROVIDER:[TOKEN:[[id number]],FOLLOWUP:[1 week]] PROVIDER:[TOKEN:[[id number]],FOLLOWUP:[1 week]] PROVIDER:[TOKEN:[[id number]],FOLLOWUP:[1 week]] PROVIDER:[TOKEN:[[id number]],FOLLOWUP:[1 week]],PROVIDER:[TOKEN:[[id number]]],PROVIDER:[TOKEN:[[id number]]],PROVIDER:[TOKEN:[[id number]]],PROVIDER:[TOKEN:[[id number]]],PROVIDER:[TOKEN:[[id number]]] PROVIDER:[TOKEN:[[id number]],FOLLOWUP:[1 week]],PROVIDER:[TOKEN:[[id number]]],PROVIDER:[TOKEN:[[id number]]],PROVIDER:[TOKEN:[[id number]]],PROVIDER:[TOKEN:[[id number]]],PROVIDER:[TOKEN:[[id number]]] PROVIDER:[TOKEN:[[id number]],FOLLOWUP:[1 week]],PROVIDER:[TOKEN:[[id number]]],PROVIDER:[TOKEN:[[id number]]],PROVIDER:[TOKEN:[[id number]]],PROVIDER:[TOKEN:[[id number]]],PROVIDER:[TOKEN:[[id number]]]

## 2023-12-14 LAB
ANION GAP SERPL CALC-SCNC: 6 MMOL/L — SIGNIFICANT CHANGE UP (ref 5–17)
ANION GAP SERPL CALC-SCNC: 6 MMOL/L — SIGNIFICANT CHANGE UP (ref 5–17)
BUN SERPL-MCNC: 20 MG/DL — SIGNIFICANT CHANGE UP (ref 7–23)
BUN SERPL-MCNC: 20 MG/DL — SIGNIFICANT CHANGE UP (ref 7–23)
CALCIUM SERPL-MCNC: 8.8 MG/DL — SIGNIFICANT CHANGE UP (ref 8.5–10.1)
CALCIUM SERPL-MCNC: 8.8 MG/DL — SIGNIFICANT CHANGE UP (ref 8.5–10.1)
CHLORIDE SERPL-SCNC: 107 MMOL/L — SIGNIFICANT CHANGE UP (ref 96–108)
CHLORIDE SERPL-SCNC: 107 MMOL/L — SIGNIFICANT CHANGE UP (ref 96–108)
CO2 SERPL-SCNC: 32 MMOL/L — HIGH (ref 22–31)
CO2 SERPL-SCNC: 32 MMOL/L — HIGH (ref 22–31)
CREAT SERPL-MCNC: 0.89 MG/DL — SIGNIFICANT CHANGE UP (ref 0.5–1.3)
CREAT SERPL-MCNC: 0.89 MG/DL — SIGNIFICANT CHANGE UP (ref 0.5–1.3)
EGFR: 89 ML/MIN/1.73M2 — SIGNIFICANT CHANGE UP
EGFR: 89 ML/MIN/1.73M2 — SIGNIFICANT CHANGE UP
GLUCOSE SERPL-MCNC: 165 MG/DL — HIGH (ref 70–99)
GLUCOSE SERPL-MCNC: 165 MG/DL — HIGH (ref 70–99)
HCT VFR BLD CALC: 26.3 % — LOW (ref 39–50)
HCT VFR BLD CALC: 26.3 % — LOW (ref 39–50)
HGB BLD-MCNC: 9.5 G/DL — LOW (ref 13–17)
HGB BLD-MCNC: 9.5 G/DL — LOW (ref 13–17)
MCHC RBC-ENTMCNC: 32.4 PG — SIGNIFICANT CHANGE UP (ref 27–34)
MCHC RBC-ENTMCNC: 32.4 PG — SIGNIFICANT CHANGE UP (ref 27–34)
MCHC RBC-ENTMCNC: 36.1 GM/DL — HIGH (ref 32–36)
MCHC RBC-ENTMCNC: 36.1 GM/DL — HIGH (ref 32–36)
MCV RBC AUTO: 89.8 FL — SIGNIFICANT CHANGE UP (ref 80–100)
MCV RBC AUTO: 89.8 FL — SIGNIFICANT CHANGE UP (ref 80–100)
NRBC # BLD: 0 /100 WBCS — SIGNIFICANT CHANGE UP (ref 0–0)
NRBC # BLD: 0 /100 WBCS — SIGNIFICANT CHANGE UP (ref 0–0)
PLATELET # BLD AUTO: 133 K/UL — LOW (ref 150–400)
PLATELET # BLD AUTO: 133 K/UL — LOW (ref 150–400)
POTASSIUM SERPL-MCNC: 3.8 MMOL/L — SIGNIFICANT CHANGE UP (ref 3.5–5.3)
POTASSIUM SERPL-MCNC: 3.8 MMOL/L — SIGNIFICANT CHANGE UP (ref 3.5–5.3)
POTASSIUM SERPL-SCNC: 3.8 MMOL/L — SIGNIFICANT CHANGE UP (ref 3.5–5.3)
POTASSIUM SERPL-SCNC: 3.8 MMOL/L — SIGNIFICANT CHANGE UP (ref 3.5–5.3)
RBC # BLD: 2.93 M/UL — LOW (ref 4.2–5.8)
RBC # BLD: 2.93 M/UL — LOW (ref 4.2–5.8)
RBC # FLD: 14.5 % — SIGNIFICANT CHANGE UP (ref 10.3–14.5)
RBC # FLD: 14.5 % — SIGNIFICANT CHANGE UP (ref 10.3–14.5)
SODIUM SERPL-SCNC: 145 MMOL/L — SIGNIFICANT CHANGE UP (ref 135–145)
SODIUM SERPL-SCNC: 145 MMOL/L — SIGNIFICANT CHANGE UP (ref 135–145)
WBC # BLD: 8.43 K/UL — SIGNIFICANT CHANGE UP (ref 3.8–10.5)
WBC # BLD: 8.43 K/UL — SIGNIFICANT CHANGE UP (ref 3.8–10.5)
WBC # FLD AUTO: 8.43 K/UL — SIGNIFICANT CHANGE UP (ref 3.8–10.5)
WBC # FLD AUTO: 8.43 K/UL — SIGNIFICANT CHANGE UP (ref 3.8–10.5)

## 2023-12-14 RX ORDER — DRONABINOL 2.5 MG
2.5 CAPSULE ORAL
Refills: 0 | Status: DISCONTINUED | OUTPATIENT
Start: 2023-12-14 | End: 2023-12-18

## 2023-12-14 RX ADMIN — MIRTAZAPINE 15 MILLIGRAM(S): 45 TABLET, ORALLY DISINTEGRATING ORAL at 21:55

## 2023-12-14 RX ADMIN — SENNA PLUS 2 TABLET(S): 8.6 TABLET ORAL at 21:55

## 2023-12-14 RX ADMIN — Medication 3 MILLIGRAM(S): at 21:54

## 2023-12-14 RX ADMIN — FLUVOXAMINE MALEATE 150 MILLIGRAM(S): 25 TABLET ORAL at 16:39

## 2023-12-14 RX ADMIN — HEPARIN SODIUM 5000 UNIT(S): 5000 INJECTION INTRAVENOUS; SUBCUTANEOUS at 05:55

## 2023-12-14 RX ADMIN — Medication 5 MILLIGRAM(S): at 05:55

## 2023-12-14 RX ADMIN — OLANZAPINE 10 MILLIGRAM(S): 15 TABLET, FILM COATED ORAL at 21:55

## 2023-12-14 RX ADMIN — HEPARIN SODIUM 5000 UNIT(S): 5000 INJECTION INTRAVENOUS; SUBCUTANEOUS at 16:39

## 2023-12-14 RX ADMIN — FLUVOXAMINE MALEATE 150 MILLIGRAM(S): 25 TABLET ORAL at 05:55

## 2023-12-14 RX ADMIN — Medication 2.5 MILLIGRAM(S): at 16:38

## 2023-12-14 RX ADMIN — Medication 2000 UNIT(S): at 12:13

## 2023-12-14 RX ADMIN — FLUDROCORTISONE ACETATE 0.3 MILLIGRAM(S): 0.1 TABLET ORAL at 05:56

## 2023-12-14 NOTE — SWALLOW BEDSIDE ASSESSMENT ADULT - COMMENTS
H&P: 73 M pmh of HTN, ITP, APL syndrome, generalized anxiety disorder, OCD, newly dx aggressive Merkel Cell cancer of forehead- pt's oncologist  Dr. Eng, at [facility] on [place] infusions with good response per wife  was brought to ED for worsening psychosis for the past few days, pt lately with difficulty swallowing, decreased po intake    Pt is known to this department and seen for a MBS on 10/26 at which time he was rx puree and mildly thick liquids. Please see full report in chart for details    Pt was received semi-upright in bed. He was awake, alert, in NAD, he was able to follow commands and communicated verbally with SLP. Pt noted with hypophonia and wet upper airway sounds. Pt's wife at bedside who assisted in providing hx. Per her report, the pt has periods where he will eat/drink normally without concerns. However there are periods where he is "paranoid" to swallow, stating his "throat is closing up" which results in him not eating or drinking anything for perhaps days at a time. Upon probing, pt's wife stated she believes he is anxious to swallow. H&P: 73 M pmh of HTN, ITP, APL syndrome, generalized anxiety disorder, OCD, newly dx aggressive Merkel Cell cancer of forehead- pt's oncologist  Dr. Eng, at [facility] on [facility] infusions with good response per wife  was brought to ED for worsening psychosis for the past few days, pt lately with difficulty swallowing, decreased po intake    Pt is known to this department and seen for a MBS on 10/26 at which time he was rx puree and mildly thick liquids. Please see full report in chart for details    Pt was received semi-upright in bed. He was awake, alert, in NAD, he was able to follow commands and communicated verbally with SLP. Pt noted with hypophonia and wet upper airway sounds. Pt's wife at bedside who assisted in providing hx. Per her report, the pt has periods where he will eat/drink normally without concerns. However there are periods where he is "paranoid" to swallow, stating his "throat is closing up" which results in him not eating or drinking anything for perhaps days at a time. Upon probing, pt's wife stated she believes he is anxious to swallow.

## 2023-12-14 NOTE — SWALLOW BEDSIDE ASSESSMENT ADULT - PHARYNGEAL PHASE
Within functional limits
Increase in congestion and cough from baseline , increase in secretions, yellow-tinged secretions suctioned from oropharynx/Delayed pharyngeal swallow/Decreased laryngeal elevation/Wet vocal quality post oral intake/Cough post oral intake/Complaints of pharyngeal stasis/Multiple swallows

## 2023-12-14 NOTE — SWALLOW BEDSIDE ASSESSMENT ADULT - ADDITIONAL RECOMMENDATIONS
1. SLP will continue to follow while patient is in house as schedule permits for objective testing and will provide recommendations thereafter.

## 2023-12-14 NOTE — SWALLOW BEDSIDE ASSESSMENT ADULT - ORAL PHASE
Within functional limits
Pt required encouragement to swallow/Decreased anterior-posterior movement of the bolus/Delayed oral transit time

## 2023-12-14 NOTE — SWALLOW BEDSIDE ASSESSMENT ADULT - SWALLOW EVAL: STRUCTURAL ABNORMALITIES
none present
No pertinent past medical history
none present
<<----- Click to add NO pertinent Past Medical History

## 2023-12-14 NOTE — PROGRESS NOTE ADULT - ASSESSMENT
The patient is a 73 year old male with a history of HTN, ITP, APLS, anxiety, OCD, Merkel cell cancer who presented with psychosis.    Plan:  - ECG with no evidence of ischemia or infarction  - Orthostatic hypotension may be multifactorial - dehydration, medication related (antipsychotics), and low cortisol levels  - Allow for supine hypertension - BP labile due to orthostatic hypotension - would not treat supine hypertension with additional antihypertensives unless SBP>200  - Syncope - due to orthostatic hypotension  - Discontinue all antihypertensives  - Allow supine SBP up to 200  - EGD done with no significant findings  - Psych follow-up  - Orthostatic hypotension will likely persist despite additional measures . Stand or ambulation with assistance.   - If plan is for PEG, the patient is optimized from a cardiac standpoint to proceed

## 2023-12-14 NOTE — PROGRESS NOTE ADULT - ASSESSMENT
73 M pmh of HTN, ITP, APL syndrome, generalized anxiety disorder, OCD, newly dx aggressive Merkel Cell cancer of forehead- pt's oncologist  Dr. Eng, at [facility] on [place] infusions with good response per wife  was brought to ED for worsening psychosis for the past few days, pt lately with difficulty swallowing, decreased po intake    Psychosis  psych and neuro following  cont luvox   zyprexa      #HTN/Orthostatic hypotension  ? psych med induced  adrenal insufficiency  continue prednisone   increased florinef to .3mg  hold bp meds  compressions stockings  check orthostatics daily- pt remains orthostatic when standing  improved today  permissive hypertension   endo fu      UTI  sp abx    #Anemia-  d/w dr villareal  ?transfuse 1u prbc    severe protein malnutrition  continue supplements  discussed prognosis with pt  had family mtg with wife, sons on phone and sw   pt and family now agreeable for feeding tube- indication- FTT, severe malnutrition, psychosis  GI cs    #DVT ppx-  hep sq    [facility]   [phone] 73 M pmh of HTN, ITP, APL syndrome, generalized anxiety disorder, OCD, newly dx aggressive Merkel Cell cancer of forehead- pt's oncologist  Dr. Eng, at [facility] on [employer] infusions with good response per wife  was brought to ED for worsening psychosis for the past few days, pt lately with difficulty swallowing, decreased po intake    Psychosis  psych and neuro following  cont luvox   zyprexa      #HTN/Orthostatic hypotension  ? psych med induced  adrenal insufficiency  continue prednisone   increased florinef to .3mg  hold bp meds  compressions stockings  check orthostatics daily- pt remains orthostatic when standing  improved today  permissive hypertension   endo fu      UTI  sp abx    #Anemia-  d/w dr villareal  ?transfuse 1u prbc    severe protein malnutrition  continue supplements  discussed prognosis with pt  had family mtg with wife, sons on phone and sw   pt and family now agreeable for feeding tube- indication- FTT, severe malnutrition, psychosis  GI cs    #DVT ppx-  hep sq    [facility]   [phone]

## 2023-12-14 NOTE — PROGRESS NOTE ADULT - SUBJECTIVE AND OBJECTIVE BOX
Chief Complaint: Psychosis    Interval Events: No events overnight.    Review of Systems:  General: No fevers, chills, weight gain  Skin: No rashes, color changes  Cardiovascular: No chest pain, orthopnea  Respiratory: No shortness of breath, cough  Gastrointestinal: No nausea, abdominal pain  Genitourinary: No incontinence, pain with urination  Musculoskeletal: No pain, swelling, decreased range of motion  Neurological: No headache, weakness  Psychiatric: No depression, anxiety  Endocrine: No weight gain, increased thirst  All other systems are comprehensively negative.    Physical Exam:  Vital Signs Last 24 Hrs  T(C): 36.8 (14 Dec 2023 05:11), Max: 36.8 (13 Dec 2023 13:05)  T(F): 98.3 (14 Dec 2023 05:11), Max: 98.3 (14 Dec 2023 05:11)  HR: 100 (14 Dec 2023 05:11) (82 - 101)  BP: 184/99 (14 Dec 2023 05:11) (108/71 - 184/99)  BP(mean): --  RR: 18 (14 Dec 2023 05:11) (17 - 18)  SpO2: 96% (14 Dec 2023 05:11) (93% - 96%)  Parameters below as of 14 Dec 2023 05:11  Patient On (Oxygen Delivery Method): room air  General: NAD  HEENT: MMM  Neck: No JVD, no carotid bruit  Lungs: CTAB  CV: RRR, nl S1/S2, no M/R/G  Abdomen: S/NT/ND, +BS  Extremities: No LE edema, no cyanosis  Neuro: AAOx3, non-focal  Skin: No rash    Labs:    12-13    145  |  108  |  14  ----------------------------<  122<H>  3.7   |  33<H>  |  0.66    Ca    8.6      13 Dec 2023 08:05                          8.6    6.33  )-----------( 117      ( 13 Dec 2023 08:05 )             24.9

## 2023-12-14 NOTE — SOCIAL WORK PROGRESS NOTE - NSSWPROGRESSNOTE_GEN_ALL_CORE
as per GI, pt is not an appropriate candidate for PEG. sw following for transition to [facility] when pt cleared for dc.

## 2023-12-14 NOTE — SWALLOW BEDSIDE ASSESSMENT ADULT - ASR SWALLOW LINGUAL MOBILITY
within functional limits
impaired protrusion/impaired anterior elevation/impaired left lateral movement/impaired right lateral movement

## 2023-12-14 NOTE — SWALLOW BEDSIDE ASSESSMENT ADULT - DIET PRIOR TO ADMI
Regular solids and thin fluids
regular solids and thin liquids prior to ~6 weeks ago, now limited intake

## 2023-12-14 NOTE — SWALLOW BEDSIDE ASSESSMENT ADULT - ASR SWALLOW RECOMMEND DIAG
to objectively assess swallow function due to aforementioned pt complaints/VFSS/MBS
D/w MD plan for repeat MBS - unable to r/o aspiration versus baseline congestion/VFSS/MBS

## 2023-12-14 NOTE — SWALLOW BEDSIDE ASSESSMENT ADULT - SWALLOW EVAL: DIAGNOSIS
Patient demonstrates functional oral and pharyngeal stages of swallow to safely resume a soft & bite-sized diet and thin liquids. There is mildly prolonged mastication and bolus transport for regular solids in setting of +xerostomia. For soft solids and thin liquids, there is functional oral prep and transport, as well as timely pharyngeal trigger (suspected) and palpable hyolaryngeal elevation/excursion. No overt, clinical s/s of laryngeal penetration/aspiration noted across all textures. There are no changes in vocal quality nor in respiratory pattern. Pt does not offer any immediate complaints of dysphagia, however eventually describes a globus sensation at mid-neck level repeatedly stating "it's still swallowing". Upon discussion regarding pt's dysphagia presentation, spouse requesting soft solid textures.
Severe oropharyngeal dysphagia for puree marked by reduced acceptance, reduced AP transport, significantly delayed swallow, coughing with increase in congestion and expectoration of significant secretions from oral cavity. SLP suctioned remaining from oropharynx. Pt stated he felt nauseous throughout the evaluation with particles in his throat. Given presentation, consider NPO. Would suggest repeat MBS given increase in secretions, difficult to r/o aspiration versus baseline congestion/secretions.

## 2023-12-14 NOTE — PROGRESS NOTE ADULT - SUBJECTIVE AND OBJECTIVE BOX
[facility]  Walter Camarena PA-C  [address] [place] [address]  [phone]      INTERVAL HPI/OVERNIGHT EVENTS:  Pt s/e with wife at bedside  Started marinol  Awaiting repeat swallow eval    MEDICATIONS  (STANDING):  benzocaine 20% Spray 1 Spray(s) Topical four times a day  cholecalciferol 2000 Unit(s) Oral daily  dronabinol 2.5 milliGRAM(s) Oral two times a day before meals  FIRST- Mouthwash  BLM 5 milliLiter(s) Swish and Spit three times a day  fludroCORTISONE 0.3 milliGRAM(s) Oral daily  fluvoxaMINE 150 milliGRAM(s) Oral two times a day  heparin   Injectable 5000 Unit(s) SubCutaneous every 12 hours  melatonin 3 milliGRAM(s) Oral at bedtime  mirtazapine 15 milliGRAM(s) Oral at bedtime  OLANZapine Disintegrating Tablet 10 milliGRAM(s) Oral at bedtime  predniSONE   Tablet 5 milliGRAM(s) Oral daily  senna 2 Tablet(s) Oral at bedtime    MEDICATIONS  (PRN):  acetaminophen     Tablet .. 650 milliGRAM(s) Oral every 6 hours PRN Temp greater or equal to 38.5C (101.3F), Moderate Pain (4 - 6)  lactulose Syrup 15 Gram(s) Oral two times a day PRN constipation      Allergies    No Known Allergies      PHYSICAL EXAM:   Vital Signs:  Vital Signs Last 24 Hrs  T(C): 36.7 (14 Dec 2023 11:30), Max: 36.8 (13 Dec 2023 13:05)  T(F): 98 (14 Dec 2023 11:30), Max: 98.3 (14 Dec 2023 05:11)  HR: 104 (14 Dec 2023 11:30) (82 - 104)  BP: 135/76 (14 Dec 2023 11:30) (135/76 - 184/99)  BP(mean): --  RR: 18 (14 Dec 2023 11:30) (17 - 18)  SpO2: 92% (14 Dec 2023 11:30) (92% - 96%)    Parameters below as of 14 Dec 2023 11:30  Patient On (Oxygen Delivery Method): room air    GENERAL:  Appears stated age  HEENT:  NC/AT  CHEST:  Full & symmetric excursion  HEART:  Regular rhythm  ABDOMEN:  Soft, non-tender, non-distended  EXTEREMITIES:  no cyanosis  SKIN:  No rash      LABS:                        8.6    6.33  )-----------( 117      ( 13 Dec 2023 08:05 )             24.9     12-13    145  |  108  |  14  ----------------------------<  122<H>  3.7   |  33<H>  |  0.66    Ca    8.6      13 Dec 2023 08:05        Urinalysis Basic - ( 13 Dec 2023 08:05 )    Color: x / Appearance: x / SG: x / pH: x  Gluc: 122 mg/dL / Ketone: x  / Bili: x / Urobili: x   Blood: x / Protein: x / Nitrite: x   Leuk Esterase: x / RBC: x / WBC x   Sq Epi: x / Non Sq Epi: x / Bacteria: x     [facility]  Walter Camarena PA-C  [address] [place] [address]  [phone]      INTERVAL HPI/OVERNIGHT EVENTS:  Pt s/e with wife at bedside  Started marinol  Awaiting repeat swallow eval    MEDICATIONS  (STANDING):  benzocaine 20% Spray 1 Spray(s) Topical four times a day  cholecalciferol 2000 Unit(s) Oral daily  dronabinol 2.5 milliGRAM(s) Oral two times a day before meals  FIRST- Mouthwash  BLM 5 milliLiter(s) Swish and Spit three times a day  fludroCORTISONE 0.3 milliGRAM(s) Oral daily  fluvoxaMINE 150 milliGRAM(s) Oral two times a day  heparin   Injectable 5000 Unit(s) SubCutaneous every 12 hours  melatonin 3 milliGRAM(s) Oral at bedtime  mirtazapine 15 milliGRAM(s) Oral at bedtime  OLANZapine Disintegrating Tablet 10 milliGRAM(s) Oral at bedtime  predniSONE   Tablet 5 milliGRAM(s) Oral daily  senna 2 Tablet(s) Oral at bedtime    MEDICATIONS  (PRN):  acetaminophen     Tablet .. 650 milliGRAM(s) Oral every 6 hours PRN Temp greater or equal to 38.5C (101.3F), Moderate Pain (4 - 6)  lactulose Syrup 15 Gram(s) Oral two times a day PRN constipation      Allergies    No Known Allergies      PHYSICAL EXAM:   Vital Signs:  Vital Signs Last 24 Hrs  T(C): 36.7 (14 Dec 2023 11:30), Max: 36.8 (13 Dec 2023 13:05)  T(F): 98 (14 Dec 2023 11:30), Max: 98.3 (14 Dec 2023 05:11)  HR: 104 (14 Dec 2023 11:30) (82 - 104)  BP: 135/76 (14 Dec 2023 11:30) (135/76 - 184/99)  BP(mean): --  RR: 18 (14 Dec 2023 11:30) (17 - 18)  SpO2: 92% (14 Dec 2023 11:30) (92% - 96%)    Parameters below as of 14 Dec 2023 11:30  Patient On (Oxygen Delivery Method): room air    GENERAL:  Appears stated age  HEENT:  NC/AT  CHEST:  Full & symmetric excursion  HEART:  Regular rhythm  ABDOMEN:  Soft, non-tender, non-distended  EXTEREMITIES:  no cyanosis  SKIN:  No rash      LABS:                        8.6    6.33  )-----------( 117      ( 13 Dec 2023 08:05 )             24.9     12-13    145  |  108  |  14  ----------------------------<  122<H>  3.7   |  33<H>  |  0.66    Ca    8.6      13 Dec 2023 08:05        Urinalysis Basic - ( 13 Dec 2023 08:05 )    Color: x / Appearance: x / SG: x / pH: x  Gluc: 122 mg/dL / Ketone: x  / Bili: x / Urobili: x   Blood: x / Protein: x / Nitrite: x   Leuk Esterase: x / RBC: x / WBC x   Sq Epi: x / Non Sq Epi: x / Bacteria: x

## 2023-12-14 NOTE — PROGRESS NOTE ADULT - SUBJECTIVE AND OBJECTIVE BOX
Patient is a 73y old  Male who presents with a chief complaint of psychosis (13 Dec 2023 13:03)      INTERVAL HPI/OVERNIGHT EVENTS: noted  pt seen and examined this am   events noted  feels well      Vital Signs Last 24 Hrs  T(C): 36.7 (14 Dec 2023 11:30), Max: 36.8 (13 Dec 2023 20:21)  T(F): 98 (14 Dec 2023 11:30), Max: 98.3 (14 Dec 2023 05:11)  HR: 104 (14 Dec 2023 11:30) (100 - 104)  BP: 135/76 (14 Dec 2023 11:30) (135/76 - 184/99)  BP(mean): --  RR: 18 (14 Dec 2023 11:30) (18 - 18)  SpO2: 92% (14 Dec 2023 11:30) (92% - 96%)    Parameters below as of 14 Dec 2023 11:30  Patient On (Oxygen Delivery Method): room air        acetaminophen     Tablet .. 650 milliGRAM(s) Oral every 6 hours PRN  benzocaine 20% Spray 1 Spray(s) Topical four times a day  cholecalciferol 2000 Unit(s) Oral daily  dronabinol 2.5 milliGRAM(s) Oral two times a day before meals  FIRST- Mouthwash  BLM 5 milliLiter(s) Swish and Spit three times a day  fludroCORTISONE 0.3 milliGRAM(s) Oral daily  fluvoxaMINE 150 milliGRAM(s) Oral two times a day  heparin   Injectable 5000 Unit(s) SubCutaneous every 12 hours  lactulose Syrup 15 Gram(s) Oral two times a day PRN  melatonin 3 milliGRAM(s) Oral at bedtime  mirtazapine 15 milliGRAM(s) Oral at bedtime  OLANZapine Disintegrating Tablet 10 milliGRAM(s) Oral at bedtime  predniSONE   Tablet 5 milliGRAM(s) Oral daily  senna 2 Tablet(s) Oral at bedtime      PHYSICAL EXAM:  GENERAL: NAD,   EYES: conjunctiva and sclera clear  ENMT: Moist mucous membranes  NECK: Supple, No JVD, Normal thyroid  CHEST/LUNG: non labored, cta b/l  HEART: Regular rate and rhythm; No murmurs, rubs, or gallops  ABDOMEN: Soft, Nontender, Nondistended; Bowel sounds present  EXTREMITIES:  2+ Peripheral Pulses, No clubbing, cyanosis, or edema  LYMPH: No lymphadenopathy noted  SKIN: No rashes or lesions    Consultant(s) Notes Reviewed:  [x ] YES  [ ] NO  Care Discussed with Consultants/Other Providers [ x] YES  [ ] NO    LABS:                        9.5    8.43  )-----------( 133      ( 14 Dec 2023 12:15 )             26.3     12-14    145  |  107  |  20  ----------------------------<  165<H>  3.8   |  32<H>  |  0.89    Ca    8.8      14 Dec 2023 12:15        Urinalysis Basic - ( 14 Dec 2023 12:15 )    Color: x / Appearance: x / SG: x / pH: x  Gluc: 165 mg/dL / Ketone: x  / Bili: x / Urobili: x   Blood: x / Protein: x / Nitrite: x   Leuk Esterase: x / RBC: x / WBC x   Sq Epi: x / Non Sq Epi: x / Bacteria: x      CAPILLARY BLOOD GLUCOSE            Urinalysis Basic - ( 14 Dec 2023 12:15 )    Color: x / Appearance: x / SG: x / pH: x  Gluc: 165 mg/dL / Ketone: x  / Bili: x / Urobili: x   Blood: x / Protein: x / Nitrite: x   Leuk Esterase: x / RBC: x / WBC x   Sq Epi: x / Non Sq Epi: x / Bacteria: x          RADIOLOGY & ADDITIONAL TESTS:    Imaging Personally Reviewed:  [x ] YES  [ ] NO

## 2023-12-15 LAB
ANION GAP SERPL CALC-SCNC: 4 MMOL/L — LOW (ref 5–17)
ANION GAP SERPL CALC-SCNC: 4 MMOL/L — LOW (ref 5–17)
APTT BLD: 84.8 SEC — HIGH (ref 24.5–35.6)
APTT BLD: 84.8 SEC — HIGH (ref 24.5–35.6)
BUN SERPL-MCNC: 24 MG/DL — HIGH (ref 7–23)
BUN SERPL-MCNC: 24 MG/DL — HIGH (ref 7–23)
CALCIUM SERPL-MCNC: 8.7 MG/DL — SIGNIFICANT CHANGE UP (ref 8.5–10.1)
CALCIUM SERPL-MCNC: 8.7 MG/DL — SIGNIFICANT CHANGE UP (ref 8.5–10.1)
CHLORIDE SERPL-SCNC: 108 MMOL/L — SIGNIFICANT CHANGE UP (ref 96–108)
CHLORIDE SERPL-SCNC: 108 MMOL/L — SIGNIFICANT CHANGE UP (ref 96–108)
CO2 SERPL-SCNC: 33 MMOL/L — HIGH (ref 22–31)
CO2 SERPL-SCNC: 33 MMOL/L — HIGH (ref 22–31)
CREAT SERPL-MCNC: 0.91 MG/DL — SIGNIFICANT CHANGE UP (ref 0.5–1.3)
CREAT SERPL-MCNC: 0.91 MG/DL — SIGNIFICANT CHANGE UP (ref 0.5–1.3)
EGFR: 88 ML/MIN/1.73M2 — SIGNIFICANT CHANGE UP
EGFR: 88 ML/MIN/1.73M2 — SIGNIFICANT CHANGE UP
GLUCOSE SERPL-MCNC: 176 MG/DL — HIGH (ref 70–99)
GLUCOSE SERPL-MCNC: 176 MG/DL — HIGH (ref 70–99)
HCT VFR BLD CALC: 28 % — LOW (ref 39–50)
HCT VFR BLD CALC: 28 % — LOW (ref 39–50)
HGB BLD-MCNC: 9.6 G/DL — LOW (ref 13–17)
HGB BLD-MCNC: 9.6 G/DL — LOW (ref 13–17)
INR BLD: 1.51 RATIO — HIGH (ref 0.85–1.18)
INR BLD: 1.51 RATIO — HIGH (ref 0.85–1.18)
MCHC RBC-ENTMCNC: 30.3 PG — SIGNIFICANT CHANGE UP (ref 27–34)
MCHC RBC-ENTMCNC: 30.3 PG — SIGNIFICANT CHANGE UP (ref 27–34)
MCHC RBC-ENTMCNC: 34.3 GM/DL — SIGNIFICANT CHANGE UP (ref 32–36)
MCHC RBC-ENTMCNC: 34.3 GM/DL — SIGNIFICANT CHANGE UP (ref 32–36)
MCV RBC AUTO: 88.3 FL — SIGNIFICANT CHANGE UP (ref 80–100)
MCV RBC AUTO: 88.3 FL — SIGNIFICANT CHANGE UP (ref 80–100)
NRBC # BLD: 0 /100 WBCS — SIGNIFICANT CHANGE UP (ref 0–0)
NRBC # BLD: 0 /100 WBCS — SIGNIFICANT CHANGE UP (ref 0–0)
PLATELET # BLD AUTO: 153 K/UL — SIGNIFICANT CHANGE UP (ref 150–400)
PLATELET # BLD AUTO: 153 K/UL — SIGNIFICANT CHANGE UP (ref 150–400)
POTASSIUM SERPL-MCNC: 3.6 MMOL/L — SIGNIFICANT CHANGE UP (ref 3.5–5.3)
POTASSIUM SERPL-MCNC: 3.6 MMOL/L — SIGNIFICANT CHANGE UP (ref 3.5–5.3)
POTASSIUM SERPL-SCNC: 3.6 MMOL/L — SIGNIFICANT CHANGE UP (ref 3.5–5.3)
POTASSIUM SERPL-SCNC: 3.6 MMOL/L — SIGNIFICANT CHANGE UP (ref 3.5–5.3)
PROTHROM AB SERPL-ACNC: 17.5 SEC — HIGH (ref 9.5–13)
PROTHROM AB SERPL-ACNC: 17.5 SEC — HIGH (ref 9.5–13)
RBC # BLD: 3.17 M/UL — LOW (ref 4.2–5.8)
RBC # BLD: 3.17 M/UL — LOW (ref 4.2–5.8)
RBC # FLD: 14.4 % — SIGNIFICANT CHANGE UP (ref 10.3–14.5)
RBC # FLD: 14.4 % — SIGNIFICANT CHANGE UP (ref 10.3–14.5)
SODIUM SERPL-SCNC: 145 MMOL/L — SIGNIFICANT CHANGE UP (ref 135–145)
SODIUM SERPL-SCNC: 145 MMOL/L — SIGNIFICANT CHANGE UP (ref 135–145)
WBC # BLD: 15.05 K/UL — HIGH (ref 3.8–10.5)
WBC # BLD: 15.05 K/UL — HIGH (ref 3.8–10.5)
WBC # FLD AUTO: 15.05 K/UL — HIGH (ref 3.8–10.5)
WBC # FLD AUTO: 15.05 K/UL — HIGH (ref 3.8–10.5)

## 2023-12-15 PROCEDURE — 74230 X-RAY XM SWLNG FUNCJ C+: CPT | Mod: 26

## 2023-12-15 RX ORDER — SODIUM CHLORIDE 9 MG/ML
1000 INJECTION, SOLUTION INTRAVENOUS
Refills: 0 | Status: DISCONTINUED | OUTPATIENT
Start: 2023-12-15 | End: 2023-12-17

## 2023-12-15 RX ADMIN — SODIUM CHLORIDE 60 MILLILITER(S): 9 INJECTION, SOLUTION INTRAVENOUS at 14:14

## 2023-12-15 RX ADMIN — OLANZAPINE 10 MILLIGRAM(S): 15 TABLET, FILM COATED ORAL at 22:54

## 2023-12-15 RX ADMIN — SENNA PLUS 2 TABLET(S): 8.6 TABLET ORAL at 22:54

## 2023-12-15 RX ADMIN — MIRTAZAPINE 15 MILLIGRAM(S): 45 TABLET, ORALLY DISINTEGRATING ORAL at 22:54

## 2023-12-15 RX ADMIN — FLUVOXAMINE MALEATE 150 MILLIGRAM(S): 25 TABLET ORAL at 06:10

## 2023-12-15 RX ADMIN — Medication 5 MILLIGRAM(S): at 06:11

## 2023-12-15 RX ADMIN — FLUDROCORTISONE ACETATE 0.3 MILLIGRAM(S): 0.1 TABLET ORAL at 06:10

## 2023-12-15 RX ADMIN — Medication 3 MILLIGRAM(S): at 22:54

## 2023-12-15 RX ADMIN — HEPARIN SODIUM 5000 UNIT(S): 5000 INJECTION INTRAVENOUS; SUBCUTANEOUS at 06:11

## 2023-12-15 RX ADMIN — Medication 2000 UNIT(S): at 12:32

## 2023-12-15 RX ADMIN — Medication 2.5 MILLIGRAM(S): at 06:10

## 2023-12-15 RX ADMIN — HEPARIN SODIUM 5000 UNIT(S): 5000 INJECTION INTRAVENOUS; SUBCUTANEOUS at 17:56

## 2023-12-15 NOTE — PROGRESS NOTE ADULT - ASSESSMENT
73 M pmh of HTN, ITP, APL syndrome, generalized anxiety disorder, OCD, newly dx aggressive Merkel Cell cancer of forehead- pt's oncologist  Dr. Eng, at [facility] on [employer] infusions with good response per wife  was brought to ED for worsening psychosis for the past few days, pt lately with difficulty swallowing, decreased po intake    Psychosis  psych and neuro following  cont current med regimen     #HTN/Orthostatic hypotension  ? psych med induced  adrenal insufficiency  continue prednisone   increased florinef to .3mg  hold bp meds  compressions stockings  check orthostatics daily- pt remains orthostatic when standing  improved today  permissive hypertension   endo fu      UTI  sp abx    #Anemia-  d/w dr villareal    #severe protein malnutrition  continue supplements  d/w GI   failed swallow eval sp MBS-  plan PEG next week  npo and ivf meanwhile    #DVT ppx-  hep sq    [facility]   [phone] 73 M pmh of HTN, ITP, APL syndrome, generalized anxiety disorder, OCD, newly dx aggressive Merkel Cell cancer of forehead- pt's oncologist  Dr. Eng, at [facility] on [employer] infusions with good response per wife  was brought to ED for worsening psychosis for the past few days, pt lately with difficulty swallowing, decreased po intake    Psychosis  psych and neuro following  cont current med regimen     #HTN/Orthostatic hypotension  ? psych med induced  adrenal insufficiency  continue prednisone   increased florinef to .3mg  hold bp meds  compressions stockings  check orthostatics daily- pt remains orthostatic when standing  improved today  permissive hypertension   endo fu      UTI  sp abx    #Anemia-  d/w dr villareal    #severe protein malnutrition  continue supplements  d/w GI   failed swallow eval sp MBS-  plan PEG next week  npo and ivf meanwhile    #DVT ppx-  hep sq    [facility]   [phone]

## 2023-12-15 NOTE — SWALLOW VFSS/MBS ASSESSMENT ADULT - DEMONSTRATES NEED FOR REFERRAL TO ANOTHER SERVICE
Consider Neurology consult/follow up given unknown etiology of dysphagia with sensory deficit component (silent aspiration) at MD's discretion/neurology
Registered Dietitian

## 2023-12-15 NOTE — SWALLOW VFSS/MBS ASSESSMENT ADULT - COMMENTS
Internal medicine charting 12/15 "73 M pmh of HTN, ITP, APL syndrome, generalized anxiety disorder, OCD, newly dx aggressive Merkel Cell cancer of forehead- pt's oncologist  Dr. Eng, at [facility] on [employer] infusions with good response per wife  was brought to ED for worsening psychosis for the past few days, pt lately with difficulty swallowing, decreased po intake  Psychosis  psych and neuro following  cont luvox   zyprexa".     CXR 12/2 "Retrocardiac interstitial infiltrate in the correct clinical context, improved"    Pt is known to this service and seen multiple times and an MBS. Pt recently seen on 12/14 for bedside swallow evaluation with recommendations for "1. PO is contraindicated at this time given presentation described above. 2. MD to consider a ST non-oral method of nutrition/hydration/medication" (see all reports for further details).   ?  Patient seen seated upright at Radiology suite for MBS this AM, at which time patient was awake/alert. However, patient was agitated and required maximum verbal encouragement to accept PO trials. Patient with difficulty following directions and answering questions. Patient noted with open mouth posture and congestion/upper airway sounds at baseline. Internal medicine charting 12/15 "73 M pmh of HTN, ITP, APL syndrome, generalized anxiety disorder, OCD, newly dx aggressive Merkel Cell cancer of forehead- pt's oncologist  Dr. Eng, at [facility] on [employer] infusions with good response per wife  was brought to ED for worsening psychosis for the past few days, pt lately with difficulty swallowing, decreased po intake  Psychosis  psych and neuro following  cont luvox   zyprexa".     CXR 12/2 "Retrocardiac interstitial infiltrate in the correct clinical context, improved"    Pt is known to this service and seen multiple times and an MBS. Pt recently seen on 12/14 for bedside swallow evaluation with recommendations for "1. PO is contraindicated at this time given presentation described above. 2. MD to consider a ST non-oral method of nutrition/hydration/medication" (see all reports for further details).   ?  Patient seen seated upright at Radiology suite for MBS this AM, at which time patient was awake/alert. However, patient was agitated and required maximum verbal encouragement to accept PO trials. Patient with difficulty following directions and answering questions. Patient noted with open mouth posture and congestion/upper airway sounds at baseline.

## 2023-12-15 NOTE — PROGRESS NOTE ADULT - SUBJECTIVE AND OBJECTIVE BOX
Chief Complaint: Psychosis    Interval Events: No events overnight.    Review of Systems:  General: No fevers, chills, weight gain  Skin: No rashes, color changes  Cardiovascular: No chest pain, orthopnea  Respiratory: No shortness of breath, cough  Gastrointestinal: No nausea, abdominal pain  Genitourinary: No incontinence, pain with urination  Musculoskeletal: No pain, swelling, decreased range of motion  Neurological: No headache, weakness  Psychiatric: No depression, anxiety  Endocrine: No weight gain, increased thirst  All other systems are comprehensively negative.    Physical Exam:  Vital Signs Last 24 Hrs  T(C): 37.7 (15 Dec 2023 05:13), Max: 37.7 (15 Dec 2023 05:13)  T(F): 99.9 (15 Dec 2023 05:13), Max: 99.9 (15 Dec 2023 05:13)  HR: 109 (15 Dec 2023 05:13) (104 - 113)  BP: 143/74 (15 Dec 2023 05:13) (135/76 - 160/82)  BP(mean): --  RR: 18 (15 Dec 2023 05:13) (18 - 18)  SpO2: 91% (15 Dec 2023 05:13) (90% - 92%)  Parameters below as of 15 Dec 2023 05:13  Patient On (Oxygen Delivery Method): room air  General: NAD  HEENT: MMM  Neck: No JVD, no carotid bruit  Lungs: CTAB  CV: RRR, nl S1/S2, no M/R/G  Abdomen: S/NT/ND, +BS  Extremities: No LE edema, no cyanosis  Neuro: AAOx3, non-focal  Skin: No rash    Labs:    12-14    145  |  107  |  20  ----------------------------<  165<H>  3.8   |  32<H>  |  0.89    Ca    8.8      14 Dec 2023 12:15                          9.5    8.43  )-----------( 133      ( 14 Dec 2023 12:15 )             26.3

## 2023-12-15 NOTE — SWALLOW VFSS/MBS ASSESSMENT ADULT - ADDITIONAL RECOMMENDATIONS
1) This department to continue to follow up as schedule permits for trial swallow therapy given patient with difficulty following directions. 2) Medical team further advised to reconsult if there is a change in medical status and/or observed change in patient's tolerance of recommended PO diet. 3) Consider continued trial swallow therapy pending discharge plans (i.e., homecare vs rehab vs [facility] [phone]). 1) This department to continue to follow up as schedule permits for trial swallow therapy given patient with difficulty following directions. 2) Medical team further advised to reconsult if there is a change in medical status and/or observed change in patient's tolerance of recommended PO diet. 3) Consider continued trial swallow therapy pending discharge plans (i.e., homecare vs rehab vs [facility] [phone]). 1) This department to continue to follow up as schedule permits for trial swallow therapy given patient with difficulty following directions. 2) Medical team further advised to reconsult if there is a change in medical status. 3) Consider continued trial swallow therapy pending discharge plans (i.e., homecare vs rehab vs [facility] [phone]). 1) This department to continue to follow up as schedule permits for trial swallow therapy given patient with difficulty following directions. 2) Medical team further advised to reconsult if there is a change in medical status. 3) Consider continued trial swallow therapy pending discharge plans (i.e., homecare vs rehab vs [facility] [phone]).

## 2023-12-15 NOTE — CHART NOTE - NSCHARTNOTEFT_GEN_A_CORE
Nutrition follow up ( chart reviewed, events noted) Brief hx:      Factors impacting intake: [ ] none [ ] nausea  [ ] vomiting [ ] diarrhea [ ] constipation  [ ]chewing problems [ ] swallowing issues  [ ] other:     Diet Prescription:   Intake:     Current Weight:   % Weight Change    Pertinent Medications: MEDICATIONS  (STANDING):  benzocaine 20% Spray 1 Spray(s) Topical four times a day  cholecalciferol 2000 Unit(s) Oral daily  dronabinol 2.5 milliGRAM(s) Oral two times a day before meals  FIRST- Mouthwash  BLM 5 milliLiter(s) Swish and Spit three times a day  fludroCORTISONE 0.3 milliGRAM(s) Oral daily  fluvoxaMINE 150 milliGRAM(s) Oral two times a day  heparin   Injectable 5000 Unit(s) SubCutaneous every 12 hours  melatonin 3 milliGRAM(s) Oral at bedtime  mirtazapine 15 milliGRAM(s) Oral at bedtime  OLANZapine Disintegrating Tablet 10 milliGRAM(s) Oral at bedtime  predniSONE   Tablet 5 milliGRAM(s) Oral daily  senna 2 Tablet(s) Oral at bedtime    MEDICATIONS  (PRN):  acetaminophen     Tablet .. 650 milliGRAM(s) Oral every 6 hours PRN Temp greater or equal to 38.5C (101.3F), Moderate Pain (4 - 6)  lactulose Syrup 15 Gram(s) Oral two times a day PRN constipation    Pertinent Labs: 12-14 Na145 mmol/L Glu 165 mg/dL<H> K+ 3.8 mmol/L Cr  0.89 mg/dL BUN 20 mg/dL 12-10 Alb 2.3 g/dL<L> 12-10 PAB 9 mg/dL<L>     CAPILLARY BLOOD GLUCOSE        Skin:     Estimated Needs:   [ ] no change since previous assessment  [ ] recalculated:     Previous Nutrition Diagnosis:   [ ] Inadequate Energy Intake [ ]Inadequate Oral Intake [ ] Excessive Energy Intake   [ ] Underweight [ ] Increased Nutrient Needs [ ] Overweight/Obesity   [ ] Altered GI Function [ ] Unintended Weight Loss [ ] Food & Nutrition Related Knowledge Deficit [ ] Malnutrition     Nutrition Diagnosis is [ ] ongoing  [ ] resolved [ ] not applicable     New Nutrition Diagnosis: [ ] not applicable       Interventions:   Recommend  [ ] Change Diet To:  [ ] Nutrition Supplement  [ ] Nutrition Support  [ ] Other:     Monitoring and Evaluation:   [ ] PO intake [ x ] Tolerance to diet prescription [ x ] weights [ x ] labs[ x ] follow up per protocol  [ ] other:

## 2023-12-15 NOTE — PROGRESS NOTE ADULT - SUBJECTIVE AND OBJECTIVE BOX
[facility]  Walter Camarena PA-C  [address] [place] [address]  [phone]      INTERVAL HPI/OVERNIGHT EVENTS:  MBS noted, failed    MEDICATIONS  (STANDING):  benzocaine 20% Spray 1 Spray(s) Topical four times a day  cholecalciferol 2000 Unit(s) Oral daily  dextrose 5% + sodium chloride 0.45%. 1000 milliLiter(s) (60 mL/Hr) IV Continuous <Continuous>  dronabinol 2.5 milliGRAM(s) Oral two times a day before meals  FIRST- Mouthwash  BLM 5 milliLiter(s) Swish and Spit three times a day  fludroCORTISONE 0.3 milliGRAM(s) Oral daily  fluvoxaMINE 150 milliGRAM(s) Oral two times a day  heparin   Injectable 5000 Unit(s) SubCutaneous every 12 hours  melatonin 3 milliGRAM(s) Oral at bedtime  mirtazapine 15 milliGRAM(s) Oral at bedtime  OLANZapine Disintegrating Tablet 10 milliGRAM(s) Oral at bedtime  predniSONE   Tablet 5 milliGRAM(s) Oral daily  senna 2 Tablet(s) Oral at bedtime    MEDICATIONS  (PRN):  acetaminophen     Tablet .. 650 milliGRAM(s) Oral every 6 hours PRN Temp greater or equal to 38.5C (101.3F), Moderate Pain (4 - 6)  lactulose Syrup 15 Gram(s) Oral two times a day PRN constipation      Allergies    No Known Allergies      PHYSICAL EXAM:   Vital Signs:  Vital Signs Last 24 Hrs  T(C): 37.7 (15 Dec 2023 05:13), Max: 37.7 (15 Dec 2023 05:13)  T(F): 99.9 (15 Dec 2023 05:13), Max: 99.9 (15 Dec 2023 05:13)  HR: 109 (15 Dec 2023 05:13) (109 - 113)  BP: 143/74 (15 Dec 2023 05:13) (143/74 - 160/82)  BP(mean): --  RR: 18 (15 Dec 2023 05:13) (18 - 18)  SpO2: 91% (15 Dec 2023 05:13) (90% - 91%)    Parameters below as of 15 Dec 2023 05:13  Patient On (Oxygen Delivery Method): room air      GENERAL:  Appears stated age  HEENT:  NC/AT  CHEST:  Full & symmetric excursion  HEART:  Regular rhythm  ABDOMEN:  Soft, non-tender, non-distended  EXTEREMITIES:  no cyanosis  SKIN:  No rash  NEURO:  Alert      LABS:                        9.5    8.43  )-----------( 133      ( 14 Dec 2023 12:15 )             26.3     12-14    145  |  107  |  20  ----------------------------<  165<H>  3.8   |  32<H>  |  0.89    Ca    8.8      14 Dec 2023 12:15        Urinalysis Basic - ( 14 Dec 2023 12:15 )    Color: x / Appearance: x / SG: x / pH: x  Gluc: 165 mg/dL / Ketone: x  / Bili: x / Urobili: x   Blood: x / Protein: x / Nitrite: x   Leuk Esterase: x / RBC: x / WBC x   Sq Epi: x / Non Sq Epi: x / Bacteria: x   [facility]  Walter Camarena PA-C  [address] [place] [address]  [phone]      INTERVAL HPI/OVERNIGHT EVENTS:  MBS noted, failed    MEDICATIONS  (STANDING):  benzocaine 20% Spray 1 Spray(s) Topical four times a day  cholecalciferol 2000 Unit(s) Oral daily  dextrose 5% + sodium chloride 0.45%. 1000 milliLiter(s) (60 mL/Hr) IV Continuous <Continuous>  dronabinol 2.5 milliGRAM(s) Oral two times a day before meals  FIRST- Mouthwash  BLM 5 milliLiter(s) Swish and Spit three times a day  fludroCORTISONE 0.3 milliGRAM(s) Oral daily  fluvoxaMINE 150 milliGRAM(s) Oral two times a day  heparin   Injectable 5000 Unit(s) SubCutaneous every 12 hours  melatonin 3 milliGRAM(s) Oral at bedtime  mirtazapine 15 milliGRAM(s) Oral at bedtime  OLANZapine Disintegrating Tablet 10 milliGRAM(s) Oral at bedtime  predniSONE   Tablet 5 milliGRAM(s) Oral daily  senna 2 Tablet(s) Oral at bedtime    MEDICATIONS  (PRN):  acetaminophen     Tablet .. 650 milliGRAM(s) Oral every 6 hours PRN Temp greater or equal to 38.5C (101.3F), Moderate Pain (4 - 6)  lactulose Syrup 15 Gram(s) Oral two times a day PRN constipation      Allergies    No Known Allergies      PHYSICAL EXAM:   Vital Signs:  Vital Signs Last 24 Hrs  T(C): 37.7 (15 Dec 2023 05:13), Max: 37.7 (15 Dec 2023 05:13)  T(F): 99.9 (15 Dec 2023 05:13), Max: 99.9 (15 Dec 2023 05:13)  HR: 109 (15 Dec 2023 05:13) (109 - 113)  BP: 143/74 (15 Dec 2023 05:13) (143/74 - 160/82)  BP(mean): --  RR: 18 (15 Dec 2023 05:13) (18 - 18)  SpO2: 91% (15 Dec 2023 05:13) (90% - 91%)    Parameters below as of 15 Dec 2023 05:13  Patient On (Oxygen Delivery Method): room air      GENERAL:  Appears stated age  HEENT:  NC/AT  CHEST:  Full & symmetric excursion  HEART:  Regular rhythm  ABDOMEN:  Soft, non-tender, non-distended  EXTEREMITIES:  no cyanosis  SKIN:  No rash  NEURO:  Alert      LABS:                        9.5    8.43  )-----------( 133      ( 14 Dec 2023 12:15 )             26.3     12-14    145  |  107  |  20  ----------------------------<  165<H>  3.8   |  32<H>  |  0.89    Ca    8.8      14 Dec 2023 12:15        Urinalysis Basic - ( 14 Dec 2023 12:15 )    Color: x / Appearance: x / SG: x / pH: x  Gluc: 165 mg/dL / Ketone: x  / Bili: x / Urobili: x   Blood: x / Protein: x / Nitrite: x   Leuk Esterase: x / RBC: x / WBC x   Sq Epi: x / Non Sq Epi: x / Bacteria: x

## 2023-12-15 NOTE — SWALLOW VFSS/MBS ASSESSMENT ADULT - RECOMMENDED CONSISTENCY
1. Puree with Mildly Thick Liquids  2. Swallowing Guidelines: Seated Upright during Mealtimes; Slow Pacing; Puree VIA TEASPOON; Single Sips for Mildly Thick Liquids; Allow for Swallow Prior to Next Presentation; Alternate Between Puree and Mildly Thick Liquids; Maintain Oral Hygiene   3. Aspiration and Reflux Precautions
1) Oral nutrition/hydration/medication is contraindicated at this time. 2) Consider short term vs long term non-oral means of nutrition. 3) Consider nutritional goals of care discussion with patient/family (i.e., non-oral means vs pleasure feeds).

## 2023-12-15 NOTE — PROGRESS NOTE ADULT - ASSESSMENT
odynophagia    s/p  upper gastrointestinal endoscopy; normal esophagus, mid esophageal biopsy performed; mild gastritis  path shows minimal chronic inflammation  now with poor po intake  cont marinol  failed MBS  will d/w family, likely will plan for peg early next week

## 2023-12-15 NOTE — SWALLOW VFSS/MBS ASSESSMENT ADULT - DIAGNOSTIC IMPRESSIONS
1) Mild Oral Stage for Puree, Mildly Thick Liquids, and Thin Liquids characterized by adequate oral containment, slow bolus manipulation, slow anterior to posterior transfer, premature spillage to the hypopharynx (vallecular/pyriforms) due to reduced tongue to palate seal greater for Thin Liquids, adequate oral clearance.   2) Mild Pharyngeal Stage for Puree and Mildly Thick Liquids characterized by delayed initiation of the pharyngeal swallow (Bolus head at the vallecular for Mildly Thick Liquids), adequate laryngeal elevation, adequate laryngeal vestibule closure, reduced base of tongue retraction, adequate epiglottic deflection, reduced pharyngeal contractility. There is Mild/Moderate Pharyngeal Clearance deficits at the level of the vallecular and pyriforms post primary swallow greater for Puree. Spontaneous reswallow and liquid wash partially assists pharyngeal clearance. There is Deep Laryngeal Penetration (Trace) before the swallow to the level of the vocal folds leading to subsequent Aspiration (Silent) during the swallow. Patient is not sensate given no cough response. Patient given verbal cue to cough however patient unable to clear contrast from upper airway/trachea.     Of note, patient noted to grow increasingly agitated with adamant refusal to continue with PO trials and evaluation.
1) Severe oral dysphagia for puree, moderately thick, and mildly thick liquids via teaspoon marked by reduced utensil stripping (accepting only small bolus sizes), anterior loss/spillage for liquids secondary to reduced labial seal and reduced coordination. Patient noted with lingual pumping, severe prolonged manipulation, slow lingual movements, and bolus holding. There is minimal anterior to posterior transport/transit of the bolus. Premature spillage into the level of the oropharynx (valleculae) noted secondary to reduced tongue to palate seal. Following swallow, majority of bolus still remaining in oral cavity. Given bolus holding and severe oral clearance deficits located on lingual surface, SLP provided oral suctioning via Yankaur suction.   2) Moderate to Severe Pharyngeal dysphagia given puree, moderately thick, and mildly thick liquids marked by delayed initiation of pharyngeal sallow trigger, reduced laryngeal elevation, reduced tongue base propulsion, minimal epiglottic deflection, reduced laryngeal elevation, reduced pharyngeal constriction. There is mild to moderate pharyngeal clearance deficits located at the base of tongue, valleculae, pyriforms, and posterior pharyngeal wall. Patient unable to initiate secondary volitional swallow to clear residue. There is Laryngeal Penetration during the swallow, with contrast migrating to the level of the vocal folds secondary to pharyngeal clearance deficits. No aspiration observed, however given severity of pharyngeal swallow and stasis noted, patient is at an increased risk for aspiration.

## 2023-12-15 NOTE — CHART NOTE - NSCHARTNOTEFT_GEN_A_CORE
Nutrition follow up ( chart reviewed, events noted) Brief hx     Factors impacting intake: [ ] none [ ] nausea  [ ] vomiting [ ] diarrhea [ ] constipation  [ ]chewing problems [ ] swallowing issues  [ ] other:     Diet Prescription:   Intake:     Current Weight:   % Weight Change    Pertinent Medications: MEDICATIONS  (STANDING):  benzocaine 20% Spray 1 Spray(s) Topical four times a day  cholecalciferol 2000 Unit(s) Oral daily  dronabinol 2.5 milliGRAM(s) Oral two times a day before meals  FIRST- Mouthwash  BLM 5 milliLiter(s) Swish and Spit three times a day  fludroCORTISONE 0.3 milliGRAM(s) Oral daily  fluvoxaMINE 150 milliGRAM(s) Oral two times a day  heparin   Injectable 5000 Unit(s) SubCutaneous every 12 hours  melatonin 3 milliGRAM(s) Oral at bedtime  mirtazapine 15 milliGRAM(s) Oral at bedtime  OLANZapine Disintegrating Tablet 10 milliGRAM(s) Oral at bedtime  predniSONE   Tablet 5 milliGRAM(s) Oral daily  senna 2 Tablet(s) Oral at bedtime    MEDICATIONS  (PRN):  acetaminophen     Tablet .. 650 milliGRAM(s) Oral every 6 hours PRN Temp greater or equal to 38.5C (101.3F), Moderate Pain (4 - 6)  lactulose Syrup 15 Gram(s) Oral two times a day PRN constipation    Pertinent Labs: 12-14 Na145 mmol/L Glu 165 mg/dL<H> K+ 3.8 mmol/L Cr  0.89 mg/dL BUN 20 mg/dL 12-10 Alb 2.3 g/dL<L> 12-10 PAB 9 mg/dL<L>     CAPILLARY BLOOD GLUCOSE        Skin:     Estimated Needs:   [ ] no change since previous assessment  [ ] recalculated:     Previous Nutrition Diagnosis:   [ ] Inadequate Energy Intake [ ]Inadequate Oral Intake [ ] Excessive Energy Intake   [ ] Underweight [ ] Increased Nutrient Needs [ ] Overweight/Obesity   [ ] Altered GI Function [ ] Unintended Weight Loss [ ] Food & Nutrition Related Knowledge Deficit [ ] Malnutrition     Nutrition Diagnosis is [ ] ongoing  [ ] resolved [ ] not applicable     New Nutrition Diagnosis: [ ] not applicable       Interventions:   Recommend  [ ] Change Diet To:  [ ] Nutrition Supplement  [ ] Nutrition Support  [ ] Other:     Monitoring and Evaluation:   [ ] PO intake [ x ] Tolerance to diet prescription [ x ] weights [ x ] labs[ x ] follow up per protocol  [ ] other: Nutrition follow up ( chart reviewed, events noted) Brief hx : 74 y/o male adm with dementia. PMH anxiety, HTN. MBS completed on 10/26 which rx pureed diet with mildly thickened liquids.   Pt visited at bedside this morning. Pt's wife present. Pt is eating ok. Tolerating pureed foods. Food preferences have been obtained in the past. At today's visit, food preferences have been updated. Pt requesting only "vanilla" flavored items. Pt is eating the magic cup. Fecal incontinence"-previous RD f/u        Update-Pt seen at bedside, appetite still ok, tolerating current diet order. Taking ensure well wo issues. Pt states he finishes 100% supp. TID. Takes magic cups well. No c/o N/V/D/C, po intake encouraged. Last BM-12/10 fecal incont. Monitor labs for concern, Na- 146 slightly elevated, encouage fluids. [id number] infusing at 75cc/hr.               Factors impacting intake: [ ] none [ ] nausea  [ ] vomiting [ ] diarrhea [ ] constipation  [ ]chewing problems [ ] swallowing issues  [ ] other:     Diet Prescription:   Intake:     Current Weight:   % Weight Change    Pertinent Medications: MEDICATIONS  (STANDING):  benzocaine 20% Spray 1 Spray(s) Topical four times a day  cholecalciferol 2000 Unit(s) Oral daily  dronabinol 2.5 milliGRAM(s) Oral two times a day before meals  FIRST- Mouthwash  BLM 5 milliLiter(s) Swish and Spit three times a day  fludroCORTISONE 0.3 milliGRAM(s) Oral daily  fluvoxaMINE 150 milliGRAM(s) Oral two times a day  heparin   Injectable 5000 Unit(s) SubCutaneous every 12 hours  melatonin 3 milliGRAM(s) Oral at bedtime  mirtazapine 15 milliGRAM(s) Oral at bedtime  OLANZapine Disintegrating Tablet 10 milliGRAM(s) Oral at bedtime  predniSONE   Tablet 5 milliGRAM(s) Oral daily  senna 2 Tablet(s) Oral at bedtime    MEDICATIONS  (PRN):  acetaminophen     Tablet .. 650 milliGRAM(s) Oral every 6 hours PRN Temp greater or equal to 38.5C (101.3F), Moderate Pain (4 - 6)  lactulose Syrup 15 Gram(s) Oral two times a day PRN constipation    Pertinent Labs: 12-14 Na145 mmol/L Glu 165 mg/dL<H> K+ 3.8 mmol/L Cr  0.89 mg/dL BUN 20 mg/dL 12-10 Alb 2.3 g/dL<L> 12-10 PAB 9 mg/dL<L>     CAPILLARY BLOOD GLUCOSE        Skin:     Estimated Needs:   [ ] no change since previous assessment  [ ] recalculated:     Previous Nutrition Diagnosis:   [ ] Inadequate Energy Intake [ ]Inadequate Oral Intake [ ] Excessive Energy Intake   [ ] Underweight [ ] Increased Nutrient Needs [ ] Overweight/Obesity   [ ] Altered GI Function [ ] Unintended Weight Loss [ ] Food & Nutrition Related Knowledge Deficit [ ] Malnutrition     Nutrition Diagnosis is [ ] ongoing  [ ] resolved [ ] not applicable     New Nutrition Diagnosis: [ ] not applicable       Interventions:   Recommend  [ ] Change Diet To:  [ ] Nutrition Supplement  [ ] Nutrition Support  [ ] Other:     Monitoring and Evaluation:   [ ] PO intake [ x ] Tolerance to diet prescription [ x ] weights [ x ] labs[ x ] follow up per protocol  [ ] other: Nutrition follow up ( chart reviewed, events noted) Brief hx : 74 y/o male adm with dementia. PMH anxiety, HTN. MBS completed on 10/26 which rx pureed diet with mildly thickened liquids.   Pt visited at bedside this morning. Pt's wife present. Pt is eating ok. Tolerating pureed foods. Food preferences have been obtained in the past. At today's visit, food preferences have been updated. Pt requesting only "vanilla" flavored items. Pt is eating the magic cup. Fecal incontinence"-previous RD f/u        Update-Pt seen at bedside, appetite still ok, tolerating current diet order. Taking ensure well wo issues. Pt states he finishes 100% supp. TID. Takes magic cups well. No c/o N/V/D/C, po intake encouraged. Last BM-12/10 fecal incont. Monitor labs for concern, Na- 146 slightly elevated, encouage fluids. [id number] infusing at 75cc/hr.               Factors impacting intake: [ ] none [ ] nausea  [ ] vomiting [ ] diarrhea [ ] constipation  [ ]chewing problems [ ] swallowing issues  [ ] other:     Diet Prescription:   Intake:     Current Weight:   % Weight Change    Pertinent Medications: MEDICATIONS  (STANDING):  benzocaine 20% Spray 1 Spray(s) Topical four times a day  cholecalciferol 2000 Unit(s) Oral daily  dronabinol 2.5 milliGRAM(s) Oral two times a day before meals  FIRST- Mouthwash  BLM 5 milliLiter(s) Swish and Spit three times a day  fludroCORTISONE 0.3 milliGRAM(s) Oral daily  fluvoxaMINE 150 milliGRAM(s) Oral two times a day  heparin   Injectable 5000 Unit(s) SubCutaneous every 12 hours  melatonin 3 milliGRAM(s) Oral at bedtime  mirtazapine 15 milliGRAM(s) Oral at bedtime  OLANZapine Disintegrating Tablet 10 milliGRAM(s) Oral at bedtime  predniSONE   Tablet 5 milliGRAM(s) Oral daily  senna 2 Tablet(s) Oral at bedtime    MEDICATIONS  (PRN):  acetaminophen     Tablet .. 650 milliGRAM(s) Oral every 6 hours PRN Temp greater or equal to 38.5C (101.3F), Moderate Pain (4 - 6)  lactulose Syrup 15 Gram(s) Oral two times a day PRN constipation    Pertinent Labs: 12-14 Na145 mmol/L Glu 165 mg/dL<H> K+ 3.8 mmol/L Cr  0.89 mg/dL BUN 20 mg/dL 12-10 Alb 2.3 g/dL<L> 12-10 PAB 9 mg/dL<L>     CAPILLARY BLOOD GLUCOSE        Skin:     Estimated Needs:   [ ] no change since previous assessment  [ ] recalculated:     Previous Nutrition Diagnosis:   [ ] Inadequate Energy Intake [ ]Inadequate Oral Intake [ ] Excessive Energy Intake   [ ] Underweight [ ] Increased Nutrient Needs [ ] Overweight/Obesity   [ ] Altered GI Function [ ] Unintended Weight Loss [ ] Food & Nutrition Related Knowledge Deficit [ ] Malnutrition     Nutrition Diagnosis is [ ] ongoing  [ ] resolved [ ] not applicable     New Nutrition Diagnosis: [ ] not applicable       Interventions:   Recommend  [ ] Change Diet To:  [ ] Nutrition Supplement  [ ] Nutrition Support  [ ] Other:     Monitoring and Evaluation:   [ ] PO intake [ x ] Tolerance to diet prescription [ x ] weights [ x ] labs[ x ] follow up per protocol  [ ] other: Nutrition follow up ( chart reviewed, events noted) Brief hx : 73 M pmh of HTN, ITP, APL syndrome, generalized anxiety disorder, OCD, newly dx aggressive Merkel Cell cancer of forehead- pt's oncologist  Dr. Eng, at [facility] on [place] infusions with good response per wife  was brought to ED for worsening psychosis for the past few days, pt lately with difficulty swallowing, decreased po intake    Psychosis  psych and neuro following  cont luvox   zyprexa      #HTN/Orthostatic hypotension  ? psych med induced  adrenal insufficiency  continue prednisone   increased florinef to .3mg  hold bp meds  compressions stockings  check orthostatics daily- pt remains orthostatic when standing  improved today  permissive hypertension       UTI  sp abx    #Anemia-  d/w dr villareal  transfuse 1u prbc    severe protein malnutrition  continue supplements  discussed prognosis with pt  had family mtg with wife, sons on phone and sw   pt and family now agreeable for feeding tube- indication- FTT, severe malnutrition, psychosis  GI cs             74 y/o male adm with dementia. PMH anxiety, HTN. MBS completed on 10/26 which rx pureed diet with mildly thickened liquids.   Pt visited at bedside this morning. Pt's wife present. Pt is eating ok. Tolerating pureed foods. Food preferences have been obtained in the past. At today's visit, food preferences have been updated. Pt requesting only "vanilla" flavored items. Pt is eating the magic cup. Fecal incontinence"-previous RD f/u        Update-Pt seen at bedside, appetite still ok, tolerating current diet order. Taking ensure well wo issues. Pt states he finishes 100% supp. TID. Takes magic cups well. No c/o N/V/D/C, po intake encouraged. Last BM-12/10 fecal incont. Monitor labs for concern, Na- 146 slightly elevated, encouage fluids. [id number] infusing at 75cc/hr.               Factors impacting intake: [ ] none [ ] nausea  [ ] vomiting [ ] diarrhea [ ] constipation  [ ]chewing problems [ ] swallowing issues  [ ] other:     Diet Prescription:   Intake:     Current Weight:   % Weight Change    Pertinent Medications: MEDICATIONS  (STANDING):  benzocaine 20% Spray 1 Spray(s) Topical four times a day  cholecalciferol 2000 Unit(s) Oral daily  dronabinol 2.5 milliGRAM(s) Oral two times a day before meals  FIRST- Mouthwash  BLM 5 milliLiter(s) Swish and Spit three times a day  fludroCORTISONE 0.3 milliGRAM(s) Oral daily  fluvoxaMINE 150 milliGRAM(s) Oral two times a day  heparin   Injectable 5000 Unit(s) SubCutaneous every 12 hours  melatonin 3 milliGRAM(s) Oral at bedtime  mirtazapine 15 milliGRAM(s) Oral at bedtime  OLANZapine Disintegrating Tablet 10 milliGRAM(s) Oral at bedtime  predniSONE   Tablet 5 milliGRAM(s) Oral daily  senna 2 Tablet(s) Oral at bedtime    MEDICATIONS  (PRN):  acetaminophen     Tablet .. 650 milliGRAM(s) Oral every 6 hours PRN Temp greater or equal to 38.5C (101.3F), Moderate Pain (4 - 6)  lactulose Syrup 15 Gram(s) Oral two times a day PRN constipation    Pertinent Labs: 12-14 Na145 mmol/L Glu 165 mg/dL<H> K+ 3.8 mmol/L Cr  0.89 mg/dL BUN 20 mg/dL 12-10 Alb 2.3 g/dL<L> 12-10 PAB 9 mg/dL<L>     CAPILLARY BLOOD GLUCOSE        Skin:     Estimated Needs:   [ ] no change since previous assessment  [ ] recalculated:     Previous Nutrition Diagnosis:   [ ] Inadequate Energy Intake [ ]Inadequate Oral Intake [ ] Excessive Energy Intake   [ ] Underweight [ ] Increased Nutrient Needs [ ] Overweight/Obesity   [ ] Altered GI Function [ ] Unintended Weight Loss [ ] Food & Nutrition Related Knowledge Deficit [ ] Malnutrition     Nutrition Diagnosis is [ ] ongoing  [ ] resolved [ ] not applicable     New Nutrition Diagnosis: [ ] not applicable       Interventions:   Recommend  [ ] Change Diet To:  [ ] Nutrition Supplement  [ ] Nutrition Support  [ ] Other:     Monitoring and Evaluation:   [ ] PO intake [ x ] Tolerance to diet prescription [ x ] weights [ x ] labs[ x ] follow up per protocol  [ ] other: Nutrition follow up ( chart reviewed, events noted) Brief hx : 73 M pmh of HTN, ITP, APL syndrome, generalized anxiety disorder, OCD, newly dx aggressive Merkel Cell cancer of forehead- pt's oncologist  Dr. Eng, at [facility] on [employer] infusions with good response per wife  was brought to ED for worsening psychosis for the past few days, pt lately with difficulty swallowing, decreased po intake    Psychosis  psych and neuro following  cont luvox   zyprexa      #HTN/Orthostatic hypotension  ? psych med induced  adrenal insufficiency  continue prednisone   increased florinef to .3mg  hold bp meds  compressions stockings  check orthostatics daily- pt remains orthostatic when standing  improved today  permissive hypertension       UTI  sp abx    #Anemia-  d/w dr villareal  transfuse 1u prbc    severe protein malnutrition  continue supplements  discussed prognosis with pt  had family mtg with wife, sons on phone and sw   pt and family now agreeable for feeding tube- indication- FTT, severe malnutrition, psychosis  GI cs             74 y/o male adm with dementia. PMH anxiety, HTN. MBS completed on 10/26 which rx pureed diet with mildly thickened liquids.   Pt visited at bedside this morning. Pt's wife present. Pt is eating ok. Tolerating pureed foods. Food preferences have been obtained in the past. At today's visit, food preferences have been updated. Pt requesting only "vanilla" flavored items. Pt is eating the magic cup. Fecal incontinence"-previous RD f/u        Update-Pt seen at bedside, appetite still ok, tolerating current diet order. Taking ensure well wo issues. Pt states he finishes 100% supp. TID. Takes magic cups well. No c/o N/V/D/C, po intake encouraged. Last BM-12/10 fecal incont. Monitor labs for concern, Na- 146 slightly elevated, encouage fluids. [id number] infusing at 75cc/hr.               Factors impacting intake: [ ] none [ ] nausea  [ ] vomiting [ ] diarrhea [ ] constipation  [ ]chewing problems [ ] swallowing issues  [ ] other:     Diet Prescription:   Intake:     Current Weight:   % Weight Change    Pertinent Medications: MEDICATIONS  (STANDING):  benzocaine 20% Spray 1 Spray(s) Topical four times a day  cholecalciferol 2000 Unit(s) Oral daily  dronabinol 2.5 milliGRAM(s) Oral two times a day before meals  FIRST- Mouthwash  BLM 5 milliLiter(s) Swish and Spit three times a day  fludroCORTISONE 0.3 milliGRAM(s) Oral daily  fluvoxaMINE 150 milliGRAM(s) Oral two times a day  heparin   Injectable 5000 Unit(s) SubCutaneous every 12 hours  melatonin 3 milliGRAM(s) Oral at bedtime  mirtazapine 15 milliGRAM(s) Oral at bedtime  OLANZapine Disintegrating Tablet 10 milliGRAM(s) Oral at bedtime  predniSONE   Tablet 5 milliGRAM(s) Oral daily  senna 2 Tablet(s) Oral at bedtime    MEDICATIONS  (PRN):  acetaminophen     Tablet .. 650 milliGRAM(s) Oral every 6 hours PRN Temp greater or equal to 38.5C (101.3F), Moderate Pain (4 - 6)  lactulose Syrup 15 Gram(s) Oral two times a day PRN constipation    Pertinent Labs: 12-14 Na145 mmol/L Glu 165 mg/dL<H> K+ 3.8 mmol/L Cr  0.89 mg/dL BUN 20 mg/dL 12-10 Alb 2.3 g/dL<L> 12-10 PAB 9 mg/dL<L>     CAPILLARY BLOOD GLUCOSE        Skin:     Estimated Needs:   [ ] no change since previous assessment  [ ] recalculated:     Previous Nutrition Diagnosis:   [ ] Inadequate Energy Intake [ ]Inadequate Oral Intake [ ] Excessive Energy Intake   [ ] Underweight [ ] Increased Nutrient Needs [ ] Overweight/Obesity   [ ] Altered GI Function [ ] Unintended Weight Loss [ ] Food & Nutrition Related Knowledge Deficit [ ] Malnutrition     Nutrition Diagnosis is [ ] ongoing  [ ] resolved [ ] not applicable     New Nutrition Diagnosis: [ ] not applicable       Interventions:   Recommend  [ ] Change Diet To:  [ ] Nutrition Supplement  [ ] Nutrition Support  [ ] Other:     Monitoring and Evaluation:   [ ] PO intake [ x ] Tolerance to diet prescription [ x ] weights [ x ] labs[ x ] follow up per protocol  [ ] other: Nutrition follow up ( chart reviewed, events noted) Brief hx : 73 M pmh of HTN, ITP, APL syndrome, generalized anxiety disorder, OCD, newly dx aggressive Merkel Cell cancer of forehead admitted w/ worsening psychosis for few days pta , pt lately with difficulty swallowing, decreased po intake. Pt also received treatment for UTI. Pt had a MBS today which he failed. Pt and family now agreeable for feeding tube- indication- FTT, severe malnutrition, psychosis    At time of RD visit to pts bedside, wife in attendance. Pt/family initially opposed PEG but now amenable. Pt states he knew he would fail MBS    Factors impacting intake: [ ] none [ ] nausea  [ ] vomiting [ ] diarrhea [ ] constipation  [ ]chewing problems [X ] swallowing issues  [ ] other:     Diet Prescription: NPO   Intake: nil     Current Weight:   % Weight Change    Pertinent Medications: MEDICATIONS  (STANDING):  benzocaine 20% Spray 1 Spray(s) Topical four times a day  cholecalciferol 2000 Unit(s) Oral daily  dronabinol 2.5 milliGRAM(s) Oral two times a day before meals  FIRST- Mouthwash  BLM 5 milliLiter(s) Swish and Spit three times a day  fludroCORTISONE 0.3 milliGRAM(s) Oral daily  fluvoxaMINE 150 milliGRAM(s) Oral two times a day  heparin   Injectable 5000 Unit(s) SubCutaneous every 12 hours  melatonin 3 milliGRAM(s) Oral at bedtime  mirtazapine 15 milliGRAM(s) Oral at bedtime  OLANZapine Disintegrating Tablet 10 milliGRAM(s) Oral at bedtime  predniSONE   Tablet 5 milliGRAM(s) Oral daily  senna 2 Tablet(s) Oral at bedtime    MEDICATIONS  (PRN):  acetaminophen     Tablet .. 650 milliGRAM(s) Oral every 6 hours PRN Temp greater or equal to 38.5C (101.3F), Moderate Pain (4 - 6)  lactulose Syrup 15 Gram(s) Oral two times a day PRN constipation    Pertinent Labs: 12-14 Na145 mmol/L Glu 165 mg/dL<H> K+ 3.8 mmol/L Cr  0.89 mg/dL BUN 20 mg/dL 12-10 Alb 2.3 g/dL<L> 12-10 PAB 9 mg/dL<L>     CAPILLARY BLOOD GLUCOSE        Skin:     Estimated Needs:   [ ] no change since previous assessment  [ ] recalculated:     Previous Nutrition Diagnosis:   [ ] Inadequate Energy Intake [ ]Inadequate Oral Intake [ ] Excessive Energy Intake   [ ] Underweight [ ] Increased Nutrient Needs [ ] Overweight/Obesity   [ ] Altered GI Function [ ] Unintended Weight Loss [ ] Food & Nutrition Related Knowledge Deficit [ ] Malnutrition     Nutrition Diagnosis is [ ] ongoing  [ ] resolved [ ] not applicable     New Nutrition Diagnosis: [ ] not applicable       Interventions:   Recommend  [ ] Change Diet To:  [ ] Nutrition Supplement  [ ] Nutrition Support  [ ] Other:     Monitoring and Evaluation:   [ ] PO intake [ x ] Tolerance to diet prescription [ x ] weights [ x ] labs[ x ] follow up per protocol  [ ] other: Nutrition follow up ( chart reviewed, events noted) Brief hx : 73 M pmh of HTN, ITP, APL syndrome, generalized anxiety disorder, OCD, newly dx aggressive Merkel Cell cancer of forehead admitted w/ worsening psychosis for few days pta , pt lately with difficulty swallowing, decreased po intake. Pt also received treatment for UTI. Pt had a MBS today which he failed. Pt and family now agreeable for feeding tube- indication- FTT, severe malnutrition, psychosis    At time of RD visit to pts bedside, wife in attendance. Pt/family initially opposed PEG but now amenable. Pt states he knew he would fail MBS    Factors impacting intake: [ ] none [ ] nausea  [ ] vomiting [ ] diarrhea [ ] constipation  [ ]chewing problems [X ] swallowing issues  [ ] other:     Diet Prescription: NPO   Intake: nil     Current Weight: 67 kg ( 12/13/23) 68.6 kg ( 11/15/23)   % Weight Change : - 2.3% over the last mo    Pertinent Medications: MEDICATIONS  (STANDING):  benzocaine 20% Spray 1 Spray(s) Topical four times a day  cholecalciferol 2000 Unit(s) Oral daily  dronabinol 2.5 milliGRAM(s) Oral two times a day before meals  FIRST- Mouthwash  BLM 5 milliLiter(s) Swish and Spit three times a day  fludroCORTISONE 0.3 milliGRAM(s) Oral daily  fluvoxaMINE 150 milliGRAM(s) Oral two times a day  heparin   Injectable 5000 Unit(s) SubCutaneous every 12 hours  melatonin 3 milliGRAM(s) Oral at bedtime  mirtazapine 15 milliGRAM(s) Oral at bedtime  OLANZapine Disintegrating Tablet 10 milliGRAM(s) Oral at bedtime  predniSONE   Tablet 5 milliGRAM(s) Oral daily  senna 2 Tablet(s) Oral at bedtime    MEDICATIONS  (PRN):  acetaminophen     Tablet .. 650 milliGRAM(s) Oral every 6 hours PRN Temp greater or equal to 38.5C (101.3F), Moderate Pain (4 - 6)  lactulose Syrup 15 Gram(s) Oral two times a day PRN constipation    Pertinent Labs: 12-14 Na145 mmol/L Glu 165 mg/dL<H> K+ 3.8 mmol/L Cr  0.89 mg/dL BUN 20 mg/dL 12-10 Alb 2.3 g/dL<L> 12-10 PAB 9 mg/dL<L>     CAPILLARY BLOOD GLUCOSE    Skin: intact     Estimated Needs:   [ ] no change since previous assessment  [ ] recalculated:     Previous Nutrition Diagnosis:   [ ] Inadequate Energy Intake [ ]Inadequate Oral Intake [ ] Excessive Energy Intake   [ ] Underweight [ ] Increased Nutrient Needs [ ] Overweight/Obesity   [ ] Altered GI Function [ ] Unintended Weight Loss [ ] Food & Nutrition Related Knowledge Deficit [ ] Malnutrition     Nutrition Diagnosis is [ ] ongoing  [ ] resolved [ ] not applicable     New Nutrition Diagnosis: [ ] not applicable       Interventions:   Recommend  [ ] Change Diet To:  [ ] Nutrition Supplement  [ ] Nutrition Support  [ ] Other:     Monitoring and Evaluation:   [ ] PO intake [ x ] Tolerance to diet prescription [ x ] weights [ x ] labs[ x ] follow up per protocol  [ ] other: Nutrition follow up ( chart reviewed, events noted) Brief hx : 73 M pmh of HTN, ITP, APL syndrome, generalized anxiety disorder, OCD, newly dx aggressive Merkel Cell cancer of forehead admitted w/ worsening psychosis for few days pta , pt lately with difficulty swallowing, decreased po intake. Pt also received treatment for UTI. Pt had a MBS today which he failed. Pt and family now agreeable for feeding tube- indication- FTT, severe malnutrition, psychosis    At time of RD visit to pts bedside, wife in attendance. Pt/family initially opposed PEG but now amenable. Pt states he knew he would fail MBS    Factors impacting intake: [ ] none [ ] nausea  [ ] vomiting [ ] diarrhea [ ] constipation  [ ]chewing problems [X ] swallowing issues  [ ] other:     Diet Prescription: NPO   Intake: nil     Current Weight: 67 kg ( 12/13/23) 68.6 kg ( 11/15/23) vs 73 kg used for initial nutr assessment   % Weight Change : - 2.3%  to 8.2% over the last mo    Pertinent Medications: MEDICATIONS  (STANDING):  benzocaine 20% Spray 1 Spray(s) Topical four times a day  cholecalciferol 2000 Unit(s) Oral daily  dronabinol 2.5 milliGRAM(s) Oral two times a day before meals  FIRST- Mouthwash  BLM 5 milliLiter(s) Swish and Spit three times a day  fludroCORTISONE 0.3 milliGRAM(s) Oral daily  fluvoxaMINE 150 milliGRAM(s) Oral two times a day  heparin   Injectable 5000 Unit(s) SubCutaneous every 12 hours  melatonin 3 milliGRAM(s) Oral at bedtime  mirtazapine 15 milliGRAM(s) Oral at bedtime  OLANZapine Disintegrating Tablet 10 milliGRAM(s) Oral at bedtime  predniSONE   Tablet 5 milliGRAM(s) Oral daily  senna 2 Tablet(s) Oral at bedtime    MEDICATIONS  (PRN):  acetaminophen     Tablet .. 650 milliGRAM(s) Oral every 6 hours PRN Temp greater or equal to 38.5C (101.3F), Moderate Pain (4 - 6)  lactulose Syrup 15 Gram(s) Oral two times a day PRN constipation    Pertinent Labs: 12-14 Na145 mmol/L Glu 165 mg/dL<H> K+ 3.8 mmol/L Cr  0.89 mg/dL BUN 20 mg/dL 12-10 Alb 2.3 g/dL<L> 12-10 PAB 9 mg/dL<L>     CAPILLARY BLOOD GLUCOSE    Skin: intact     Estimated Needs:   [X ] no change since previous assessment, based on IBW of 80.7 kg ( 25-30 kcals/kg) 7261-5192 kcals and ( 1.1-1.3 gm pro/kg)  gm pro  [ ] recalculated:     Previous Nutrition Diagnosis:   [ ] Inadequate Energy Intake [ ]Inadequate Oral Intake [ ] Excessive Energy Intake   [ ] Underweight [ ] Increased Nutrient Needs [ ] Overweight/Obesity   [ ] Altered GI Function [ ] Unintended Weight Loss [ ] Food & Nutrition Related Knowledge Deficit [X ] Malnutrition     Nutrition Diagnosis is [X ] ongoing  [ ] resolved [ ] not applicable     New Nutrition Diagnosis: [X ] inadequate energy intake     Interventions:   Recommend  [ ] Change Diet To:  [ ] Nutrition Supplement  [X ] Nutrition Support : Nasogastric feeding tube placement, start Jevity 1.5 at 25 ml/hr and ^ by 20 ml increments every 8 hours to goal rate of 85 ml/hr x 18 hours/day (2295 kcals and 98 gm pro, 1162 ml) plus free water at 30 ml/hr x 18 hr ( 600 ml water) + 150 ml water ac/pc ( 300ml) . In total , pt will receive 2062 ml water  remaining water needs can be met with water provided for medication administration   [X ] Other: this regimen can also be followed s/p PEG placement. At some juncture family might opt for bolus feeds at home therefore will recommend :  six to seven 237 ml containers ( alternate days) jevity 1.5 daily, schedule at family convenience and pt comfort .     Monitoring and Evaluation:    [ x ] Tolerance to diet prescription [ x ] weights [ x ] labs[ x ] follow up per protocol  [ X] other: skin integrity Nutrition follow up ( chart reviewed, events noted) Brief hx : 73 M pmh of HTN, ITP, APL syndrome, generalized anxiety disorder, OCD, newly dx aggressive Merkel Cell cancer of forehead admitted w/ worsening psychosis for few days pta , pt lately with difficulty swallowing, decreased po intake. Pt also received treatment for UTI. Pt had a MBS today which he failed. Pt and family now agreeable for feeding tube- indication- FTT, severe malnutrition, psychosis    At time of RD visit to pts bedside, wife in attendance. Pt/family initially opposed PEG but now amenable. Pt states he knew he would fail MBS    Factors impacting intake: [ ] none [ ] nausea  [ ] vomiting [ ] diarrhea [ ] constipation  [ ]chewing problems [X ] swallowing issues  [ ] other:     Diet Prescription: NPO   Intake: nil     Current Weight: 67 kg ( 12/13/23) 68.6 kg ( 11/15/23) vs 73 kg used for initial nutr assessment   % Weight Change : - 2.3%  to 8.2% over the last mo    Pertinent Medications: MEDICATIONS  (STANDING):  benzocaine 20% Spray 1 Spray(s) Topical four times a day  cholecalciferol 2000 Unit(s) Oral daily  dronabinol 2.5 milliGRAM(s) Oral two times a day before meals  FIRST- Mouthwash  BLM 5 milliLiter(s) Swish and Spit three times a day  fludroCORTISONE 0.3 milliGRAM(s) Oral daily  fluvoxaMINE 150 milliGRAM(s) Oral two times a day  heparin   Injectable 5000 Unit(s) SubCutaneous every 12 hours  melatonin 3 milliGRAM(s) Oral at bedtime  mirtazapine 15 milliGRAM(s) Oral at bedtime  OLANZapine Disintegrating Tablet 10 milliGRAM(s) Oral at bedtime  predniSONE   Tablet 5 milliGRAM(s) Oral daily  senna 2 Tablet(s) Oral at bedtime    MEDICATIONS  (PRN):  acetaminophen     Tablet .. 650 milliGRAM(s) Oral every 6 hours PRN Temp greater or equal to 38.5C (101.3F), Moderate Pain (4 - 6)  lactulose Syrup 15 Gram(s) Oral two times a day PRN constipation    Pertinent Labs: 12-14 Na145 mmol/L Glu 165 mg/dL<H> K+ 3.8 mmol/L Cr  0.89 mg/dL BUN 20 mg/dL 12-10 Alb 2.3 g/dL<L> 12-10 PAB 9 mg/dL<L>     CAPILLARY BLOOD GLUCOSE    Skin: intact     Estimated Needs:   [X ] no change since previous assessment, based on IBW of 80.7 kg ( 25-30 kcals/kg) 0927-8095 kcals and ( 1.1-1.3 gm pro/kg)  gm pro  [ ] recalculated:     Previous Nutrition Diagnosis:   [ ] Inadequate Energy Intake [ ]Inadequate Oral Intake [ ] Excessive Energy Intake   [ ] Underweight [ ] Increased Nutrient Needs [ ] Overweight/Obesity   [ ] Altered GI Function [ ] Unintended Weight Loss [ ] Food & Nutrition Related Knowledge Deficit [X ] Malnutrition     Nutrition Diagnosis is [X ] ongoing  [ ] resolved [ ] not applicable     New Nutrition Diagnosis: [X ] inadequate energy intake     Interventions:   Recommend  [ ] Change Diet To:  [ ] Nutrition Supplement  [X ] Nutrition Support : Nasogastric feeding tube placement, start Jevity 1.5 at 25 ml/hr and ^ by 20 ml increments every 8 hours to goal rate of 85 ml/hr x 18 hours/day (2295 kcals and 98 gm pro, 1162 ml) plus free water at 30 ml/hr x 18 hr ( 600 ml water) + 150 ml water ac/pc ( 300ml) . In total , pt will receive 2062 ml water  remaining water needs can be met with water provided for medication administration   [X ] Other: this regimen can also be followed s/p PEG placement. At some juncture family might opt for bolus feeds at home therefore will recommend :  six to seven 237 ml containers ( alternate days) jevity 1.5 daily, schedule at family convenience and pt comfort .     Monitoring and Evaluation:    [ x ] Tolerance to diet prescription [ x ] weights [ x ] labs[ x ] follow up per protocol  [ X] other: skin integrity

## 2023-12-15 NOTE — PROGRESS NOTE ADULT - SUBJECTIVE AND OBJECTIVE BOX
Patient is a 73y old  Male who presents with a chief complaint of psychosis (13 Dec 2023 13:03)      INTERVAL HPI/OVERNIGHT EVENTS: noted  pt seen and examined this am   events noted  feels well      Vital Signs Last 24 Hrs  T(C): 37.7 (15 Dec 2023 05:13), Max: 37.7 (15 Dec 2023 05:13)  T(F): 99.9 (15 Dec 2023 05:13), Max: 99.9 (15 Dec 2023 05:13)  HR: 109 (15 Dec 2023 05:13) (109 - 113)  BP: 143/74 (15 Dec 2023 05:13) (143/74 - 160/82)  BP(mean): --  RR: 18 (15 Dec 2023 05:13) (18 - 18)  SpO2: 91% (15 Dec 2023 05:13) (90% - 91%)    Parameters below as of 15 Dec 2023 05:13  Patient On (Oxygen Delivery Method): room air        acetaminophen     Tablet .. 650 milliGRAM(s) Oral every 6 hours PRN  benzocaine 20% Spray 1 Spray(s) Topical four times a day  cholecalciferol 2000 Unit(s) Oral daily  dronabinol 2.5 milliGRAM(s) Oral two times a day before meals  FIRST- Mouthwash  BLM 5 milliLiter(s) Swish and Spit three times a day  fludroCORTISONE 0.3 milliGRAM(s) Oral daily  fluvoxaMINE 150 milliGRAM(s) Oral two times a day  heparin   Injectable 5000 Unit(s) SubCutaneous every 12 hours  lactulose Syrup 15 Gram(s) Oral two times a day PRN  melatonin 3 milliGRAM(s) Oral at bedtime  mirtazapine 15 milliGRAM(s) Oral at bedtime  OLANZapine Disintegrating Tablet 10 milliGRAM(s) Oral at bedtime  predniSONE   Tablet 5 milliGRAM(s) Oral daily  senna 2 Tablet(s) Oral at bedtime      PHYSICAL EXAM:  GENERAL: NAD,   EYES: conjunctiva and sclera clear  ENMT: Moist mucous membranes  NECK: Supple, No JVD, Normal thyroid  CHEST/LUNG: non labored, cta b/l  HEART: Regular rate and rhythm; No murmurs, rubs, or gallops  ABDOMEN: Soft, Nontender, Nondistended; Bowel sounds present  EXTREMITIES:  2+ Peripheral Pulses, No clubbing, cyanosis, or edema  LYMPH: No lymphadenopathy noted  SKIN: No rashes or lesions    Consultant(s) Notes Reviewed:  [x ] YES  [ ] NO  Care Discussed with Consultants/Other Providers [ x] YES  [ ] NO    LABS:                        9.5    8.43  )-----------( 133      ( 14 Dec 2023 12:15 )             26.3     12-14    145  |  107  |  20  ----------------------------<  165<H>  3.8   |  32<H>  |  0.89    Ca    8.8      14 Dec 2023 12:15        Urinalysis Basic - ( 14 Dec 2023 12:15 )    Color: x / Appearance: x / SG: x / pH: x  Gluc: 165 mg/dL / Ketone: x  / Bili: x / Urobili: x   Blood: x / Protein: x / Nitrite: x   Leuk Esterase: x / RBC: x / WBC x   Sq Epi: x / Non Sq Epi: x / Bacteria: x      CAPILLARY BLOOD GLUCOSE            Urinalysis Basic - ( 14 Dec 2023 12:15 )    Color: x / Appearance: x / SG: x / pH: x  Gluc: 165 mg/dL / Ketone: x  / Bili: x / Urobili: x   Blood: x / Protein: x / Nitrite: x   Leuk Esterase: x / RBC: x / WBC x   Sq Epi: x / Non Sq Epi: x / Bacteria: x          RADIOLOGY & ADDITIONAL TESTS:    Imaging Personally Reviewed:  [x ] YES  [ ] NO

## 2023-12-15 NOTE — SWALLOW VFSS/MBS ASSESSMENT ADULT - ORAL PHASE
Delayed oral transit time/Reduced anterior - posterior transport/Residue in oral cavity/Incomplete tongue to palate contact/Uncontrolled bolus / spillover in vivek-pharynx

## 2023-12-16 LAB
ANION GAP SERPL CALC-SCNC: 4 MMOL/L — LOW (ref 5–17)
ANION GAP SERPL CALC-SCNC: 4 MMOL/L — LOW (ref 5–17)
BUN SERPL-MCNC: 20 MG/DL — SIGNIFICANT CHANGE UP (ref 7–23)
BUN SERPL-MCNC: 20 MG/DL — SIGNIFICANT CHANGE UP (ref 7–23)
CALCIUM SERPL-MCNC: 8.4 MG/DL — LOW (ref 8.5–10.1)
CALCIUM SERPL-MCNC: 8.4 MG/DL — LOW (ref 8.5–10.1)
CHLORIDE SERPL-SCNC: 108 MMOL/L — SIGNIFICANT CHANGE UP (ref 96–108)
CHLORIDE SERPL-SCNC: 108 MMOL/L — SIGNIFICANT CHANGE UP (ref 96–108)
CO2 SERPL-SCNC: 33 MMOL/L — HIGH (ref 22–31)
CO2 SERPL-SCNC: 33 MMOL/L — HIGH (ref 22–31)
CREAT SERPL-MCNC: 0.7 MG/DL — SIGNIFICANT CHANGE UP (ref 0.5–1.3)
CREAT SERPL-MCNC: 0.7 MG/DL — SIGNIFICANT CHANGE UP (ref 0.5–1.3)
EGFR: 96 ML/MIN/1.73M2 — SIGNIFICANT CHANGE UP
EGFR: 96 ML/MIN/1.73M2 — SIGNIFICANT CHANGE UP
GLUCOSE SERPL-MCNC: 135 MG/DL — HIGH (ref 70–99)
GLUCOSE SERPL-MCNC: 135 MG/DL — HIGH (ref 70–99)
HCT VFR BLD CALC: 23.7 % — LOW (ref 39–50)
HCT VFR BLD CALC: 23.7 % — LOW (ref 39–50)
HGB BLD-MCNC: 8.1 G/DL — LOW (ref 13–17)
HGB BLD-MCNC: 8.1 G/DL — LOW (ref 13–17)
MCHC RBC-ENTMCNC: 30.6 PG — SIGNIFICANT CHANGE UP (ref 27–34)
MCHC RBC-ENTMCNC: 30.6 PG — SIGNIFICANT CHANGE UP (ref 27–34)
MCHC RBC-ENTMCNC: 34.2 GM/DL — SIGNIFICANT CHANGE UP (ref 32–36)
MCHC RBC-ENTMCNC: 34.2 GM/DL — SIGNIFICANT CHANGE UP (ref 32–36)
MCV RBC AUTO: 89.4 FL — SIGNIFICANT CHANGE UP (ref 80–100)
MCV RBC AUTO: 89.4 FL — SIGNIFICANT CHANGE UP (ref 80–100)
NRBC # BLD: 0 /100 WBCS — SIGNIFICANT CHANGE UP (ref 0–0)
NRBC # BLD: 0 /100 WBCS — SIGNIFICANT CHANGE UP (ref 0–0)
PLATELET # BLD AUTO: 117 K/UL — LOW (ref 150–400)
PLATELET # BLD AUTO: 117 K/UL — LOW (ref 150–400)
POTASSIUM SERPL-MCNC: 3.1 MMOL/L — LOW (ref 3.5–5.3)
POTASSIUM SERPL-MCNC: 3.1 MMOL/L — LOW (ref 3.5–5.3)
POTASSIUM SERPL-SCNC: 3.1 MMOL/L — LOW (ref 3.5–5.3)
POTASSIUM SERPL-SCNC: 3.1 MMOL/L — LOW (ref 3.5–5.3)
RBC # BLD: 2.65 M/UL — LOW (ref 4.2–5.8)
RBC # BLD: 2.65 M/UL — LOW (ref 4.2–5.8)
RBC # FLD: 14.5 % — SIGNIFICANT CHANGE UP (ref 10.3–14.5)
RBC # FLD: 14.5 % — SIGNIFICANT CHANGE UP (ref 10.3–14.5)
SODIUM SERPL-SCNC: 145 MMOL/L — SIGNIFICANT CHANGE UP (ref 135–145)
SODIUM SERPL-SCNC: 145 MMOL/L — SIGNIFICANT CHANGE UP (ref 135–145)
WBC # BLD: 7.85 K/UL — SIGNIFICANT CHANGE UP (ref 3.8–10.5)
WBC # BLD: 7.85 K/UL — SIGNIFICANT CHANGE UP (ref 3.8–10.5)
WBC # FLD AUTO: 7.85 K/UL — SIGNIFICANT CHANGE UP (ref 3.8–10.5)
WBC # FLD AUTO: 7.85 K/UL — SIGNIFICANT CHANGE UP (ref 3.8–10.5)

## 2023-12-16 RX ORDER — POTASSIUM CHLORIDE 20 MEQ
10 PACKET (EA) ORAL
Refills: 0 | Status: COMPLETED | OUTPATIENT
Start: 2023-12-16 | End: 2023-12-16

## 2023-12-16 RX ORDER — ATENOLOL 25 MG/1
25 TABLET ORAL DAILY
Refills: 0 | Status: DISCONTINUED | OUTPATIENT
Start: 2023-12-16 | End: 2023-12-19

## 2023-12-16 RX ORDER — HYDRALAZINE HCL 50 MG
10 TABLET ORAL EVERY 8 HOURS
Refills: 0 | Status: DISCONTINUED | OUTPATIENT
Start: 2023-12-16 | End: 2023-12-16

## 2023-12-16 RX ORDER — HYDRALAZINE HCL 50 MG
10 TABLET ORAL EVERY 8 HOURS
Refills: 0 | Status: DISCONTINUED | OUTPATIENT
Start: 2023-12-16 | End: 2023-12-24

## 2023-12-16 RX ADMIN — Medication 2.5 MILLIGRAM(S): at 05:45

## 2023-12-16 RX ADMIN — SODIUM CHLORIDE 60 MILLILITER(S): 9 INJECTION, SOLUTION INTRAVENOUS at 13:55

## 2023-12-16 RX ADMIN — Medication 5 MILLIGRAM(S): at 05:41

## 2023-12-16 RX ADMIN — FLUVOXAMINE MALEATE 150 MILLIGRAM(S): 25 TABLET ORAL at 05:41

## 2023-12-16 RX ADMIN — Medication 100 MILLIEQUIVALENT(S): at 13:55

## 2023-12-16 RX ADMIN — HEPARIN SODIUM 5000 UNIT(S): 5000 INJECTION INTRAVENOUS; SUBCUTANEOUS at 05:41

## 2023-12-16 RX ADMIN — SODIUM CHLORIDE 60 MILLILITER(S): 9 INJECTION, SOLUTION INTRAVENOUS at 05:41

## 2023-12-16 RX ADMIN — Medication 10 MILLIGRAM(S): at 15:23

## 2023-12-16 RX ADMIN — HEPARIN SODIUM 5000 UNIT(S): 5000 INJECTION INTRAVENOUS; SUBCUTANEOUS at 17:31

## 2023-12-16 RX ADMIN — Medication 100 MILLIEQUIVALENT(S): at 12:27

## 2023-12-16 RX ADMIN — FLUDROCORTISONE ACETATE 0.3 MILLIGRAM(S): 0.1 TABLET ORAL at 05:41

## 2023-12-16 NOTE — PROVIDER CONTACT NOTE (OTHER) - DATE AND TIME:
12-Nov-2023 06:35
30-Oct-2023 17:40
07-Nov-2023 06:27
21-Nov-2023 19:45
25-Nov-2023 17:42
23-Nov-2023 05:10
29-Oct-2023 06:51
07-Dec-2023 05:16
16-Dec-2023 15:20
19-Nov-2023 19:32
24-Nov-2023 20:30
02-Dec-2023 22:04
16-Dec-2023 08:30

## 2023-12-16 NOTE — PROVIDER CONTACT NOTE (OTHER) - ASSESSMENT
Manual /80 HR 80, IVF in progress.
Pt very anxious, has wet cough,
Pt is a&ox2, confused, anxious, agitated at times.
Patient does not appear to be in any distress.
Pt with history of anxiety and currently feeling anxious
Site appears red, warm to touch, light swelling, and pt reports pain
No s/s of discomfort or distress, pt denies HA/ vision changes at this time
PT awake and alert, c/o waking up w/ mouth pain
Pt denies HA/ vision changes. Pt anxious at baseline
Patient alert & confused, agitated when interacting

## 2023-12-16 NOTE — PROGRESS NOTE ADULT - ASSESSMENT
odynophagia    s/p  upper gastrointestinal endoscopy; normal esophagus, mid esophageal biopsy performed; mild gastritis  path shows minimal chronic inflammation  now with poor po intake  cont marinol  failed MBS  will d/w family, likely will plan for peg early next week  Patient and family would like to discuss this further and does not want Gtube for feeding at this time. Requesting for palliative consult primary team aware

## 2023-12-16 NOTE — PROVIDER CONTACT NOTE (OTHER) - REASON
Pt hypertensive, /83
elevated temp 103
LUE prior IV infiltration, extremity now appears red. warm to touch, swollen and pt reports pain
Manual /80 HR 80
unable to obtain orthostatic BP on the pt due to weakness
Patient complaining of severe generalized pain, refusing further intervention
Pt desatting to 87% on RA
Pt's blood pressure is elevated
PT c/o mouth pain
Patient continues to be hypertensive after BP medication
Pt hypertensive
Hypertension
No diet ordered

## 2023-12-16 NOTE — PROVIDER CONTACT NOTE (OTHER) - SITUATION
Pt has no active diet order
Pt with BP of 195/95 and 
Pt hypertensive, /83
Pt hypertensive. First electronic BP showing 200/74, repeat electronic showing 193/85. Manual BP taken by RN showing 188/87
Pt desatting to 87% on RA
oral temp=103.2 rectal temp=105.1,u/a and c/s sent earlier  and chest x-ray done earlier today
Patient this AM was hypertensive. BP was 178/71. AM Atenolol was given. BP rechecked and is 167/68
LUE prior IV infiltration, extremity now appears red. warm to touch, swollen and pt reports pain
Pt's blood pressure was 196/83. Pt received Atenelol and Losartan. Repeat blood pressure was 182/80. Pt is anxious and agitated. Pt did receive Valium. Pt's bp was elevated in am yesterday
PT c/o mouth pain
Patient complaining of severe generalized pain, refusing further intervention

## 2023-12-16 NOTE — PROGRESS NOTE ADULT - SUBJECTIVE AND OBJECTIVE BOX
[facility]  Walter Camarena PA-C  [address] [place] [address]  [phone]      INTERVAL HPI/OVERNIGHT EVENTS:  pt seen, wife, son and one other family member at bedside  MBS noted, failed      MEDICATIONS  (STANDING):  benzocaine 20% Spray 1 Spray(s) Topical four times a day  cholecalciferol 2000 Unit(s) Oral daily  dextrose 5% + sodium chloride 0.45%. 1000 milliLiter(s) (60 mL/Hr) IV Continuous <Continuous>  dronabinol 2.5 milliGRAM(s) Oral two times a day before meals  FIRST- Mouthwash  BLM 5 milliLiter(s) Swish and Spit three times a day  fludroCORTISONE 0.3 milliGRAM(s) Oral daily  fluvoxaMINE 150 milliGRAM(s) Oral two times a day  heparin   Injectable 5000 Unit(s) SubCutaneous every 12 hours  melatonin 3 milliGRAM(s) Oral at bedtime  mirtazapine 15 milliGRAM(s) Oral at bedtime  OLANZapine Disintegrating Tablet 10 milliGRAM(s) Oral at bedtime  predniSONE   Tablet 5 milliGRAM(s) Oral daily  senna 2 Tablet(s) Oral at bedtime    MEDICATIONS  (PRN):  acetaminophen     Tablet .. 650 milliGRAM(s) Oral every 6 hours PRN Temp greater or equal to 38.5C (101.3F), Moderate Pain (4 - 6)  lactulose Syrup 15 Gram(s) Oral two times a day PRN constipation      Allergies    No Known Allergies      PHYSICAL EXAM:   Vital Signs Last 24 Hrs  T(C): 36.8 (16 Dec 2023 11:32), Max: 37 (16 Dec 2023 05:25)  T(F): 98.3 (16 Dec 2023 11:32), Max: 98.6 (16 Dec 2023 05:25)  HR: 105 (16 Dec 2023 11:32) (101 - 113)  BP: 179/82 (16 Dec 2023 11:32) (152/75 - 187/83)  BP(mean): --  RR: 18 (16 Dec 2023 11:32) (18 - 18)  SpO2: 92% (16 Dec 2023 11:32) (90% - 92%)    Parameters below as of 16 Dec 2023 11:32  Patient On (Oxygen Delivery Method): room air        GENERAL:  Appears stated age  HEENT:  NC/AT  CHEST:  Full & symmetric excursion  HEART:  Regular rhythm  ABDOMEN:  Soft, non-tender, non-distended  EXTEREMITIES:  no cyanosis  SKIN:  No rash  NEURO:  Alert      LABS:                        8.1    7.85  )-----------( 117      ( 16 Dec 2023 07:40 )             23.7                   12-16    145  |  108  |  20  ----------------------------<  135<H>  3.1<L>   |  33<H>  |  0.70    Ca    8.4<L>      16 Dec 2023 07:40        Urinalysis Basic - ( 14 Dec 2023 12:15 )    Color: x / Appearance: x / SG: x / pH: x  Gluc: 165 mg/dL / Ketone: x  / Bili: x / Urobili: x   Blood: x / Protein: x / Nitrite: x   Leuk Esterase: x / RBC: x / WBC x   Sq Epi: x / Non Sq Epi: x / Bacteria: x   [facility]  Walter Camarena PA-C  [address] [place] [address]  [phone]      INTERVAL HPI/OVERNIGHT EVENTS:  pt seen, wife, son and one other family member at bedside  MBS noted, failed      MEDICATIONS  (STANDING):  benzocaine 20% Spray 1 Spray(s) Topical four times a day  cholecalciferol 2000 Unit(s) Oral daily  dextrose 5% + sodium chloride 0.45%. 1000 milliLiter(s) (60 mL/Hr) IV Continuous <Continuous>  dronabinol 2.5 milliGRAM(s) Oral two times a day before meals  FIRST- Mouthwash  BLM 5 milliLiter(s) Swish and Spit three times a day  fludroCORTISONE 0.3 milliGRAM(s) Oral daily  fluvoxaMINE 150 milliGRAM(s) Oral two times a day  heparin   Injectable 5000 Unit(s) SubCutaneous every 12 hours  melatonin 3 milliGRAM(s) Oral at bedtime  mirtazapine 15 milliGRAM(s) Oral at bedtime  OLANZapine Disintegrating Tablet 10 milliGRAM(s) Oral at bedtime  predniSONE   Tablet 5 milliGRAM(s) Oral daily  senna 2 Tablet(s) Oral at bedtime    MEDICATIONS  (PRN):  acetaminophen     Tablet .. 650 milliGRAM(s) Oral every 6 hours PRN Temp greater or equal to 38.5C (101.3F), Moderate Pain (4 - 6)  lactulose Syrup 15 Gram(s) Oral two times a day PRN constipation      Allergies    No Known Allergies      PHYSICAL EXAM:   Vital Signs Last 24 Hrs  T(C): 36.8 (16 Dec 2023 11:32), Max: 37 (16 Dec 2023 05:25)  T(F): 98.3 (16 Dec 2023 11:32), Max: 98.6 (16 Dec 2023 05:25)  HR: 105 (16 Dec 2023 11:32) (101 - 113)  BP: 179/82 (16 Dec 2023 11:32) (152/75 - 187/83)  BP(mean): --  RR: 18 (16 Dec 2023 11:32) (18 - 18)  SpO2: 92% (16 Dec 2023 11:32) (90% - 92%)    Parameters below as of 16 Dec 2023 11:32  Patient On (Oxygen Delivery Method): room air        GENERAL:  Appears stated age  HEENT:  NC/AT  CHEST:  Full & symmetric excursion  HEART:  Regular rhythm  ABDOMEN:  Soft, non-tender, non-distended  EXTEREMITIES:  no cyanosis  SKIN:  No rash  NEURO:  Alert      LABS:                        8.1    7.85  )-----------( 117      ( 16 Dec 2023 07:40 )             23.7                   12-16    145  |  108  |  20  ----------------------------<  135<H>  3.1<L>   |  33<H>  |  0.70    Ca    8.4<L>      16 Dec 2023 07:40        Urinalysis Basic - ( 14 Dec 2023 12:15 )    Color: x / Appearance: x / SG: x / pH: x  Gluc: 165 mg/dL / Ketone: x  / Bili: x / Urobili: x   Blood: x / Protein: x / Nitrite: x   Leuk Esterase: x / RBC: x / WBC x   Sq Epi: x / Non Sq Epi: x / Bacteria: x

## 2023-12-16 NOTE — PROGRESS NOTE ADULT - SUBJECTIVE AND OBJECTIVE BOX
Chief Complaint: Psychosis    Interval Events: No events overnight.    Review of Systems:  General: No fevers, chills, weight gain  Skin: No rashes, color changes  Cardiovascular: No chest pain, orthopnea  Respiratory: No shortness of breath, cough  Gastrointestinal: No nausea, abdominal pain  Genitourinary: No incontinence, pain with urination  Musculoskeletal: No pain, swelling, decreased range of motion  Neurological: No headache, weakness  Psychiatric: No depression, anxiety  Endocrine: No weight gain, increased thirst  All other systems are comprehensively negative.    Physical Exam:  Vital Signs Last 24 Hrs  T(C): 36.8 (16 Dec 2023 08:11), Max: 37 (16 Dec 2023 05:25)  T(F): 98.2 (16 Dec 2023 08:11), Max: 98.6 (16 Dec 2023 05:25)  HR: 101 (16 Dec 2023 08:11) (101 - 113)  BP: 152/75 (16 Dec 2023 08:11) (152/75 - 187/83)  BP(mean): --  RR: 18 (16 Dec 2023 08:11) (18 - 18)  SpO2: 91% (16 Dec 2023 08:11) (88% - 91%)  Parameters below as of 16 Dec 2023 08:11  Patient On (Oxygen Delivery Method): room air  General: NAD  HEENT: MMM  Neck: No JVD, no carotid bruit  Lungs: CTAB  CV: RRR, nl S1/S2, no M/R/G  Abdomen: S/NT/ND, +BS  Extremities: No LE edema, no cyanosis  Neuro: AAOx3, non-focal  Skin: No rash    Labs:    12-15    145  |  108  |  24<H>  ----------------------------<  176<H>  3.6   |  33<H>  |  0.91    Ca    8.7      15 Dec 2023 13:47                          9.6    15.05 )-----------( 153      ( 15 Dec 2023 13:47 )             28.0

## 2023-12-16 NOTE — CONSULT NOTE ADULT - CONSULT REQUESTED DATE/TIME
06-Nov-2023 10:09
08-Nov-2023 13:13
15-Nov-2023 16:24
24-Oct-2023 15:36
30-Oct-2023 14:46
16-Dec-2023 14:13

## 2023-12-16 NOTE — CONSULT NOTE ADULT - SUBJECTIVE AND OBJECTIVE BOX
Patient is a 73y old  Male who presents with a chief complaint of psychosis (13 Dec 2023 13:03)      Reason For Consult: adrenal insufficiency    HPI:  hx obtained from wife  73 M pmh of HTN, ITP, APL syndrome, generalized anxiety disorder, OCD, newly dx aggressive Merkel Cell cancer of forehead- pt's oncologist  Dr. Eng, at [facility] on Keytruda infusions with good response per wife  was brought o ED for worsening psychosis for the past few days, pt believes there are sharp things inside his body trying to come out of his mouth, stuck in throat  pt lately with difficulty swallowing, decreased po intake  pt was evaluated by psych in ED (24 Oct 2023 13:19)      PAST MEDICAL & SURGICAL HISTORY:  Hypertension, unspecified type      Anxiety      Anxiety      Hypertension      Idiopathic thrombocytopenic purpura (ITP)      History of antiphospholipid antibody syndrome      H/O Mohs micrographic surgery for skin cancer      Enlarged tonsils  s/p tonsillectomy          FAMILY HISTORY:        Social History:    MEDICATIONS  (STANDING):  atenolol  Tablet 25 milliGRAM(s) Oral daily  benzocaine 20% Spray 1 Spray(s) Topical four times a day  cholecalciferol 2000 Unit(s) Oral daily  dextrose 5% + sodium chloride 0.45%. 1000 milliLiter(s) (60 mL/Hr) IV Continuous <Continuous>  dronabinol 2.5 milliGRAM(s) Oral two times a day before meals  FIRST- Mouthwash  BLM 5 milliLiter(s) Swish and Spit three times a day  fludroCORTISONE 0.3 milliGRAM(s) Oral daily  fluvoxaMINE 150 milliGRAM(s) Oral two times a day  heparin   Injectable 5000 Unit(s) SubCutaneous every 12 hours  melatonin 3 milliGRAM(s) Oral at bedtime  mirtazapine 15 milliGRAM(s) Oral at bedtime  OLANZapine Disintegrating Tablet 10 milliGRAM(s) Oral at bedtime  predniSONE   Tablet 5 milliGRAM(s) Oral daily  senna 2 Tablet(s) Oral at bedtime    MEDICATIONS  (PRN):  acetaminophen     Tablet .. 650 milliGRAM(s) Oral every 6 hours PRN Temp greater or equal to 38.5C (101.3F), Moderate Pain (4 - 6)  hydrALAZINE Injectable 10 milliGRAM(s) IV Push every 8 hours PRN SBP >160  lactulose Syrup 15 Gram(s) Oral two times a day PRN constipation        T(C): 36.8 (12-16-23 @ 11:32), Max: 37 (12-16-23 @ 05:25)  HR: 105 (12-16-23 @ 11:32) (101 - 113)  BP: 179/82 (12-16-23 @ 11:32) (152/75 - 187/83)  RR: 18 (12-16-23 @ 11:32) (18 - 18)  SpO2: 92% (12-16-23 @ 11:32) (90% - 92%)  Wt(kg): --    PHYSICAL EXAM:  CHEST/LUNG: Clear to percussion bilaterally; No rales, rhonchi, wheezing, or rubs  HEART: Regular rate and rhythm; No murmurs, rubs, or gallops  ABDOMEN: Soft, Nontender, Nondistended; Bowel sounds present  EXTREMITIES:  2+ Peripheral Pulses, No clubbing, cyanosis, or edema  SKIN: No rashes or lesions    CAPILLARY BLOOD GLUCOSE                                8.1    7.85  )-----------( 117      ( 16 Dec 2023 07:40 )             23.7       CMP:  12-16 @ 07:40  SGPT --  Albumin --   Alk Phos --   Anion Gap 4   SGOT --   Total Bili --   BUN 20   Calcium Total 8.4   CO2 33   Chloride 108   Creatinine 0.70   eGFR if AA --   eGFR if non AA --   Glucose 135   Potassium 3.1   Protein --   Sodium 145      Thyroid Function Tests:      Diabetes Tests:       Radiology:                  Patient is a 73y old  Male who presents with a chief complaint of psychosis (13 Dec 2023 13:03)      Reason For Consult: adrenal insufficiency    HPI:  hx obtained from wife  73 M pmh of HTN, ITP, APL syndrome, generalized anxiety disorder, OCD, newly dx aggressive Merkel Cell cancer of forehead- pt's oncologist  Dr. Eng, at [facility] on Keytruda infusions with good response per wife  was brought o ED for worsening psychosis for the past few days, pt believes there are sharp things inside his body trying to come out of his mouth, stuck in throat  pt lately with difficulty swallowing, decreased po intake  pt was evaluated by psych in ED (24 Oct 2023 13:19)      PAST MEDICAL & SURGICAL HISTORY:  Hypertension, unspecified type      Anxiety      Anxiety      Hypertension      Idiopathic thrombocytopenic purpura (ITP)      History of antiphospholipid antibody syndrome      H/O Mohs micrographic surgery for skin cancer      Enlarged tonsils  s/p tonsillectomy          FAMILY HISTORY:        Social History:    MEDICATIONS  (STANDING):  atenolol  Tablet 25 milliGRAM(s) Oral daily  benzocaine 20% Spray 1 Spray(s) Topical four times a day  cholecalciferol 2000 Unit(s) Oral daily  dextrose 5% + sodium chloride 0.45%. 1000 milliLiter(s) (60 mL/Hr) IV Continuous <Continuous>  dronabinol 2.5 milliGRAM(s) Oral two times a day before meals  FIRST- Mouthwash  BLM 5 milliLiter(s) Swish and Spit three times a day  fludroCORTISONE 0.3 milliGRAM(s) Oral daily  fluvoxaMINE 150 milliGRAM(s) Oral two times a day  heparin   Injectable 5000 Unit(s) SubCutaneous every 12 hours  melatonin 3 milliGRAM(s) Oral at bedtime  mirtazapine 15 milliGRAM(s) Oral at bedtime  OLANZapine Disintegrating Tablet 10 milliGRAM(s) Oral at bedtime  predniSONE   Tablet 5 milliGRAM(s) Oral daily  senna 2 Tablet(s) Oral at bedtime    MEDICATIONS  (PRN):  acetaminophen     Tablet .. 650 milliGRAM(s) Oral every 6 hours PRN Temp greater or equal to 38.5C (101.3F), Moderate Pain (4 - 6)  hydrALAZINE Injectable 10 milliGRAM(s) IV Push every 8 hours PRN SBP >160  lactulose Syrup 15 Gram(s) Oral two times a day PRN constipation        T(C): 36.8 (12-16-23 @ 11:32), Max: 37 (12-16-23 @ 05:25)  HR: 105 (12-16-23 @ 11:32) (101 - 113)  BP: 179/82 (12-16-23 @ 11:32) (152/75 - 187/83)  RR: 18 (12-16-23 @ 11:32) (18 - 18)  SpO2: 92% (12-16-23 @ 11:32) (90% - 92%)  Wt(kg): --    PHYSICAL EXAM:  CHEST/LUNG: Clear to percussion bilaterally; No rales, rhonchi, wheezing, or rubs  HEART: Regular rate and rhythm; No murmurs, rubs, or gallops  ABDOMEN: Soft, Nontender, Nondistended; Bowel sounds present  EXTREMITIES:  2+ Peripheral Pulses, No clubbing, cyanosis, or edema  SKIN: No rashes or lesions    CAPILLARY BLOOD GLUCOSE                                8.1    7.85  )-----------( 117      ( 16 Dec 2023 07:40 )             23.7       CMP:  12-16 @ 07:40  SGPT --  Albumin --   Alk Phos --   Anion Gap 4   SGOT --   Total Bili --   BUN 20   Calcium Total 8.4   CO2 33   Chloride 108   Creatinine 0.70   eGFR if AA --   eGFR if non AA --   Glucose 135   Potassium 3.1   Protein --   Sodium 145      Thyroid Function Tests:      Diabetes Tests:       Radiology:

## 2023-12-16 NOTE — PROGRESS NOTE ADULT - ASSESSMENT
The patient is a 73 year old male with a history of HTN, ITP, APLS, anxiety, OCD, Merkel cell cancer who presented with psychosis.    Plan:  - ECG with no evidence of ischemia or infarction  - Orthostatic hypotension may be multifactorial - dehydration, medication related (antipsychotics), and low cortisol levels  - Allow for supine hypertension - BP labile due to orthostatic hypotension - would not treat supine hypertension with additional antihypertensives unless SBP>200  - Syncope - due to orthostatic hypotension  - Allow supine SBP up to 200  - EGD done with no significant findings  - Psych follow-up  - Orthostatic hypotension will likely persist despite additional measures . Stand or ambulation with assistance.   - Mild sinus tachycardia - likely rebound from holding atenolol. Will resume atenolol at 25 mg daily.  - If plan is for PEG, the patient is optimized from a cardiac standpoint to proceed

## 2023-12-16 NOTE — PROGRESS NOTE ADULT - SUBJECTIVE AND OBJECTIVE BOX
Patient is a 73y old  Male who presents with a chief complaint of psychosis (13 Dec 2023 13:03)      INTERVAL HPI/OVERNIGHT EVENTS: noted  pt seen and examined this am  events noted  no new events    Vital Signs Last 24 Hrs  T(C): 36.8 (16 Dec 2023 20:21), Max: 37 (16 Dec 2023 05:25)  T(F): 98.2 (16 Dec 2023 20:21), Max: 98.6 (16 Dec 2023 05:25)  HR: 110 (16 Dec 2023 20:21) (101 - 115)  BP: 170/68 (16 Dec 2023 20:25) (152/75 - 195/95)  BP(mean): --  RR: 18 (16 Dec 2023 20:21) (18 - 18)  SpO2: 93% (16 Dec 2023 20:21) (91% - 93%)    Parameters below as of 16 Dec 2023 20:21  Patient On (Oxygen Delivery Method): room air        acetaminophen     Tablet .. 650 milliGRAM(s) Oral every 6 hours PRN  atenolol  Tablet 25 milliGRAM(s) Oral daily  benzocaine 20% Spray 1 Spray(s) Topical four times a day  cholecalciferol 2000 Unit(s) Oral daily  dextrose 5% + sodium chloride 0.45%. 1000 milliLiter(s) IV Continuous <Continuous>  dronabinol 2.5 milliGRAM(s) Oral two times a day before meals  FIRST- Mouthwash  BLM 5 milliLiter(s) Swish and Spit three times a day  fludroCORTISONE 0.3 milliGRAM(s) Oral daily  fluvoxaMINE 150 milliGRAM(s) Oral two times a day  heparin   Injectable 5000 Unit(s) SubCutaneous every 12 hours  hydrALAZINE Injectable 10 milliGRAM(s) IV Push every 8 hours PRN  lactulose Syrup 15 Gram(s) Oral two times a day PRN  melatonin 3 milliGRAM(s) Oral at bedtime  mirtazapine 15 milliGRAM(s) Oral at bedtime  OLANZapine Disintegrating Tablet 10 milliGRAM(s) Oral at bedtime  predniSONE   Tablet 5 milliGRAM(s) Oral daily  senna 2 Tablet(s) Oral at bedtime      PHYSICAL EXAM:  GENERAL: NAD,   EYES: conjunctiva and sclera clear  ENMT: Moist mucous membranes  NECK: Supple, No JVD, Normal thyroid  CHEST/LUNG: non labored, cta b/l  HEART: Regular rate and rhythm; No murmurs, rubs, or gallops  ABDOMEN: Soft, Nontender, Nondistended; Bowel sounds present  EXTREMITIES:  2+ Peripheral Pulses, No clubbing, cyanosis, or edema  LYMPH: No lymphadenopathy noted  SKIN: No rashes or lesions    Consultant(s) Notes Reviewed:  [x ] YES  [ ] NO  Care Discussed with Consultants/Other Providers [ x] YES  [ ] NO    LABS:                        8.1    7.85  )-----------( 117      ( 16 Dec 2023 07:40 )             23.7     12-16    145  |  108  |  20  ----------------------------<  135<H>  3.1<L>   |  33<H>  |  0.70    Ca    8.4<L>      16 Dec 2023 07:40      PT/INR - ( 15 Dec 2023 13:47 )   PT: 17.5 sec;   INR: 1.51 ratio         PTT - ( 15 Dec 2023 13:47 )  PTT:84.8 sec  Urinalysis Basic - ( 16 Dec 2023 07:40 )    Color: x / Appearance: x / SG: x / pH: x  Gluc: 135 mg/dL / Ketone: x  / Bili: x / Urobili: x   Blood: x / Protein: x / Nitrite: x   Leuk Esterase: x / RBC: x / WBC x   Sq Epi: x / Non Sq Epi: x / Bacteria: x      CAPILLARY BLOOD GLUCOSE            Urinalysis Basic - ( 16 Dec 2023 07:40 )    Color: x / Appearance: x / SG: x / pH: x  Gluc: 135 mg/dL / Ketone: x  / Bili: x / Urobili: x   Blood: x / Protein: x / Nitrite: x   Leuk Esterase: x / RBC: x / WBC x   Sq Epi: x / Non Sq Epi: x / Bacteria: x          RADIOLOGY & ADDITIONAL TESTS:    Imaging Personally Reviewed:  [x ] YES  [ ] NO

## 2023-12-16 NOTE — PROVIDER CONTACT NOTE (OTHER) - NAME OF MD/NP/PA/DO NOTIFIED:
MD Hopper
Dr. Olivares [phone]
Dr. Ortega
Kaden Latham
Resident James
Dr. Acuna
Dr. Burden
dr. ya
Dr. Ramirez
Jamia Toro NP
Jamia Toro NP

## 2023-12-16 NOTE — PROVIDER CONTACT NOTE (OTHER) - BACKGROUND
Admit w/ dementia/AMS
Patient admitted for dementia
Pt adm with psychosis
Pt adm with psychosis, pt had prior PIV in this site that infiltrated
Pt previous diet was pureed  Pt failed s/s 12/14 and unable to participate in MBS 12/15
Admitting dx: dementia  PMH: anxiety, ITP, HTN
Patient admitted for AMS, decreased PO intake
Pt with high BPs overnight that were not addressed
Pt adm with psychosis
Pt adm with psychosis, hx hypertension

## 2023-12-16 NOTE — PROVIDER CONTACT NOTE (OTHER) - ACTION/TREATMENT ORDERED:
MD aware. No new orders at this time
MD notified and made aware, MD to review chart and order BP med
MD notified and made aware, per MD, pt not displaying s/s of end organ damage at this time. Will reassess BP manually in 1 hr and continue to monitor for HA/ vision changes
No interventions at this time. Recheck BP later in AM. Day team to reassess BP regimen?
Provider notified & aware. No new orders at this time
day team will reattempt per Resident
order iv tylenol
MD notified and made aware, pt to be put on nasal cannula
Per NP pt to be NPO  NP aware pt has PO meds - PO meds to be held
Repeat Manual BP at 1841: /76, HR 76- Dr. Nava notified of above. Per MD no new orders at this time. Report given to incoming shift RN.
MD Hopper notified and made aware. Md to assess pt at bedside.
MD made aware, to come to floor to assess pt
NP to order and push IV hydralazine 10 mg  Monitoring per policy to be performed

## 2023-12-16 NOTE — CAREGIVER ENGAGEMENT NOTE - CAREGIVER OUTREACH NOTES - FREE TEXT
GIANCARLO met with pt spouse and family members on unit to discuss discharge planning. Per spouse, pt is refusing PEG and they are not interested in pursuing PEG placement. Per spouse, she would like pt to dc to [place] on comfort measures. SW alerted NP regarding family's request. SW to follow and remain available for any needs. GIANCARLO met with pt spouse and family members on unit to discuss discharge planning. Per spouse, pt is refusing PEG and they are not interested in pursuing PEG placement. Per spouse, she would like pt to dc to [place] on comfort measures. SW alerted NP regarding family's request. SW to follow and remain available for any needs.

## 2023-12-16 NOTE — PROGRESS NOTE ADULT - ASSESSMENT
73 M pmh of HTN, ITP, APL syndrome, generalized anxiety disorder, OCD, newly dx aggressive Merkel Cell cancer of forehead- pt's oncologist  Dr. Eng, at [facility] on Keytruda infusions with good response per wife  was brought to ED for worsening psychosis for the past few days, pt lately with difficulty swallowing, decreased po intake    Psychosis  psych and neuro following  cont current med regimen     #HTN/Orthostatic hypotension  ? psych med induced  adrenal insufficiency  continue prednisone   increased florinef to .3mg  hold bp meds  compressions stockings  check orthostatics daily- pt remains orthostatic when standing  improved today  permissive hypertension   endo fu    UTI-sp abx    #Anemia- stable hb  d/w dr villareal    #severe protein malnutrition  continue supplements  d/w GI   failed swallow eval sp MBS-  plan PEG early next week  npo and ivf meanwhile    #DVT ppx-  hep sq    GOC/adv directives-palliative care     [facility]   [phone] 73 M pmh of HTN, ITP, APL syndrome, generalized anxiety disorder, OCD, newly dx aggressive Merkel Cell cancer of forehead- pt's oncologist  Dr. Eng, at [facility] on Keytruda infusions with good response per wife  was brought to ED for worsening psychosis for the past few days, pt lately with difficulty swallowing, decreased po intake    Psychosis  psych and neuro following  cont current med regimen     #HTN/Orthostatic hypotension  ? psych med induced  adrenal insufficiency  continue prednisone   increased florinef to .3mg  hold bp meds  compressions stockings  check orthostatics daily- pt remains orthostatic when standing  improved today  permissive hypertension   endo fu    UTI-sp abx    #Anemia- stable hb  d/w dr villareal    #severe protein malnutrition  continue supplements  d/w GI   failed swallow eval sp MBS-  plan PEG early next week  npo and ivf meanwhile    #DVT ppx-  hep sq    GOC/adv directives-palliative care     [facility]   [phone]

## 2023-12-17 LAB
ANION GAP SERPL CALC-SCNC: 6 MMOL/L — SIGNIFICANT CHANGE UP (ref 5–17)
ANION GAP SERPL CALC-SCNC: 6 MMOL/L — SIGNIFICANT CHANGE UP (ref 5–17)
BUN SERPL-MCNC: 16 MG/DL — SIGNIFICANT CHANGE UP (ref 7–23)
BUN SERPL-MCNC: 16 MG/DL — SIGNIFICANT CHANGE UP (ref 7–23)
CALCIUM SERPL-MCNC: 8.7 MG/DL — SIGNIFICANT CHANGE UP (ref 8.5–10.1)
CALCIUM SERPL-MCNC: 8.7 MG/DL — SIGNIFICANT CHANGE UP (ref 8.5–10.1)
CHLORIDE SERPL-SCNC: 107 MMOL/L — SIGNIFICANT CHANGE UP (ref 96–108)
CHLORIDE SERPL-SCNC: 107 MMOL/L — SIGNIFICANT CHANGE UP (ref 96–108)
CO2 SERPL-SCNC: 32 MMOL/L — HIGH (ref 22–31)
CO2 SERPL-SCNC: 32 MMOL/L — HIGH (ref 22–31)
CREAT SERPL-MCNC: 0.65 MG/DL — SIGNIFICANT CHANGE UP (ref 0.5–1.3)
CREAT SERPL-MCNC: 0.65 MG/DL — SIGNIFICANT CHANGE UP (ref 0.5–1.3)
EGFR: 98 ML/MIN/1.73M2 — SIGNIFICANT CHANGE UP
EGFR: 98 ML/MIN/1.73M2 — SIGNIFICANT CHANGE UP
GLUCOSE SERPL-MCNC: 129 MG/DL — HIGH (ref 70–99)
GLUCOSE SERPL-MCNC: 129 MG/DL — HIGH (ref 70–99)
HCT VFR BLD CALC: 27.8 % — LOW (ref 39–50)
HCT VFR BLD CALC: 27.8 % — LOW (ref 39–50)
HGB BLD-MCNC: 9.4 G/DL — LOW (ref 13–17)
HGB BLD-MCNC: 9.4 G/DL — LOW (ref 13–17)
MCHC RBC-ENTMCNC: 29.7 PG — SIGNIFICANT CHANGE UP (ref 27–34)
MCHC RBC-ENTMCNC: 29.7 PG — SIGNIFICANT CHANGE UP (ref 27–34)
MCHC RBC-ENTMCNC: 33.8 GM/DL — SIGNIFICANT CHANGE UP (ref 32–36)
MCHC RBC-ENTMCNC: 33.8 GM/DL — SIGNIFICANT CHANGE UP (ref 32–36)
MCV RBC AUTO: 88 FL — SIGNIFICANT CHANGE UP (ref 80–100)
MCV RBC AUTO: 88 FL — SIGNIFICANT CHANGE UP (ref 80–100)
NRBC # BLD: 0 /100 WBCS — SIGNIFICANT CHANGE UP (ref 0–0)
NRBC # BLD: 0 /100 WBCS — SIGNIFICANT CHANGE UP (ref 0–0)
PLATELET # BLD AUTO: 134 K/UL — LOW (ref 150–400)
PLATELET # BLD AUTO: 134 K/UL — LOW (ref 150–400)
POTASSIUM SERPL-MCNC: 3.6 MMOL/L — SIGNIFICANT CHANGE UP (ref 3.5–5.3)
POTASSIUM SERPL-MCNC: 3.6 MMOL/L — SIGNIFICANT CHANGE UP (ref 3.5–5.3)
POTASSIUM SERPL-SCNC: 3.6 MMOL/L — SIGNIFICANT CHANGE UP (ref 3.5–5.3)
POTASSIUM SERPL-SCNC: 3.6 MMOL/L — SIGNIFICANT CHANGE UP (ref 3.5–5.3)
RBC # BLD: 3.16 M/UL — LOW (ref 4.2–5.8)
RBC # BLD: 3.16 M/UL — LOW (ref 4.2–5.8)
RBC # FLD: 14.2 % — SIGNIFICANT CHANGE UP (ref 10.3–14.5)
RBC # FLD: 14.2 % — SIGNIFICANT CHANGE UP (ref 10.3–14.5)
SODIUM SERPL-SCNC: 145 MMOL/L — SIGNIFICANT CHANGE UP (ref 135–145)
SODIUM SERPL-SCNC: 145 MMOL/L — SIGNIFICANT CHANGE UP (ref 135–145)
WBC # BLD: 7.08 K/UL — SIGNIFICANT CHANGE UP (ref 3.8–10.5)
WBC # BLD: 7.08 K/UL — SIGNIFICANT CHANGE UP (ref 3.8–10.5)
WBC # FLD AUTO: 7.08 K/UL — SIGNIFICANT CHANGE UP (ref 3.8–10.5)
WBC # FLD AUTO: 7.08 K/UL — SIGNIFICANT CHANGE UP (ref 3.8–10.5)

## 2023-12-17 RX ORDER — ONDANSETRON 8 MG/1
4 TABLET, FILM COATED ORAL ONCE
Refills: 0 | Status: COMPLETED | OUTPATIENT
Start: 2023-12-17 | End: 2023-12-17

## 2023-12-17 RX ORDER — POTASSIUM CHLORIDE 20 MEQ
10 PACKET (EA) ORAL
Refills: 0 | Status: DISCONTINUED | OUTPATIENT
Start: 2023-12-17 | End: 2023-12-17

## 2023-12-17 RX ORDER — DEXTROSE MONOHYDRATE, SODIUM CHLORIDE, AND POTASSIUM CHLORIDE 50; .745; 4.5 G/1000ML; G/1000ML; G/1000ML
1000 INJECTION, SOLUTION INTRAVENOUS
Refills: 0 | Status: DISCONTINUED | OUTPATIENT
Start: 2023-12-17 | End: 2023-12-24

## 2023-12-17 RX ADMIN — ONDANSETRON 4 MILLIGRAM(S): 8 TABLET, FILM COATED ORAL at 11:19

## 2023-12-17 RX ADMIN — HEPARIN SODIUM 5000 UNIT(S): 5000 INJECTION INTRAVENOUS; SUBCUTANEOUS at 17:03

## 2023-12-17 RX ADMIN — HEPARIN SODIUM 5000 UNIT(S): 5000 INJECTION INTRAVENOUS; SUBCUTANEOUS at 05:51

## 2023-12-17 RX ADMIN — SODIUM CHLORIDE 60 MILLILITER(S): 9 INJECTION, SOLUTION INTRAVENOUS at 05:51

## 2023-12-17 RX ADMIN — DEXTROSE MONOHYDRATE, SODIUM CHLORIDE, AND POTASSIUM CHLORIDE 60 MILLILITER(S): 50; .745; 4.5 INJECTION, SOLUTION INTRAVENOUS at 11:19

## 2023-12-17 NOTE — PROGRESS NOTE ADULT - SUBJECTIVE AND OBJECTIVE BOX
Patient is a 73y old  Male who presents with a chief complaint of psychosis (13 Dec 2023 13:03)      INTERVAL HPI/OVERNIGHT EVENTS: noted  pt seen and examined this am   events noted  no new events      Vital Signs Last 24 Hrs  T(C): 36.4 (17 Dec 2023 12:36), Max: 36.9 (17 Dec 2023 05:29)  T(F): 97.6 (17 Dec 2023 12:36), Max: 98.5 (17 Dec 2023 05:29)  HR: 104 (17 Dec 2023 12:36) (104 - 116)  BP: 158/80 (17 Dec 2023 14:48) (158/80 - 190/88)  BP(mean): --  RR: 18 (17 Dec 2023 05:29) (18 - 18)  SpO2: 92% (17 Dec 2023 12:36) (92% - 93%)    Parameters below as of 17 Dec 2023 12:36  Patient On (Oxygen Delivery Method): room air        acetaminophen     Tablet .. 650 milliGRAM(s) Oral every 6 hours PRN  atenolol  Tablet 25 milliGRAM(s) Oral daily  benzocaine 20% Spray 1 Spray(s) Topical four times a day  cholecalciferol 2000 Unit(s) Oral daily  dextrose 5% + sodium chloride 0.9% with potassium chloride 20 mEq/L 1000 milliLiter(s) IV Continuous <Continuous>  dronabinol 2.5 milliGRAM(s) Oral two times a day before meals  FIRST- Mouthwash  BLM 5 milliLiter(s) Swish and Spit three times a day  fludroCORTISONE 0.3 milliGRAM(s) Oral daily  fluvoxaMINE 150 milliGRAM(s) Oral two times a day  heparin   Injectable 5000 Unit(s) SubCutaneous every 12 hours  hydrALAZINE Injectable 10 milliGRAM(s) IV Push every 8 hours PRN  lactulose Syrup 15 Gram(s) Oral two times a day PRN  melatonin 3 milliGRAM(s) Oral at bedtime  mirtazapine 15 milliGRAM(s) Oral at bedtime  OLANZapine Disintegrating Tablet 10 milliGRAM(s) Oral at bedtime  predniSONE   Tablet 5 milliGRAM(s) Oral daily  senna 2 Tablet(s) Oral at bedtime      PHYSICAL EXAM:  GENERAL: NAD,   EYES: conjunctiva and sclera clear  ENMT: Moist mucous membranes  NECK: Supple, No JVD, Normal thyroid  CHEST/LUNG: non labored, cta b/l  HEART: Regular rate and rhythm; No murmurs, rubs, or gallops  ABDOMEN: Soft, Nontender, Nondistended; Bowel sounds present  EXTREMITIES:  2+ Peripheral Pulses, No clubbing, cyanosis, or edema  LYMPH: No lymphadenopathy noted  SKIN: No rashes or lesions    Consultant(s) Notes Reviewed:  [x ] YES  [ ] NO  Care Discussed with Consultants/Other Providers [ x] YES  [ ] NO    LABS:                        9.4    7.08  )-----------( 134      ( 17 Dec 2023 09:10 )             27.8     12-17    145  |  107  |  16  ----------------------------<  129<H>  3.6   |  32<H>  |  0.65    Ca    8.7      17 Dec 2023 09:10        Urinalysis Basic - ( 17 Dec 2023 09:10 )    Color: x / Appearance: x / SG: x / pH: x  Gluc: 129 mg/dL / Ketone: x  / Bili: x / Urobili: x   Blood: x / Protein: x / Nitrite: x   Leuk Esterase: x / RBC: x / WBC x   Sq Epi: x / Non Sq Epi: x / Bacteria: x      CAPILLARY BLOOD GLUCOSE            Urinalysis Basic - ( 17 Dec 2023 09:10 )    Color: x / Appearance: x / SG: x / pH: x  Gluc: 129 mg/dL / Ketone: x  / Bili: x / Urobili: x   Blood: x / Protein: x / Nitrite: x   Leuk Esterase: x / RBC: x / WBC x   Sq Epi: x / Non Sq Epi: x / Bacteria: x          RADIOLOGY & ADDITIONAL TESTS:    Imaging Personally Reviewed:  [x ] YES  [ ] NO

## 2023-12-17 NOTE — PROGRESS NOTE ADULT - ASSESSMENT
odynophagia    s/p  upper gastrointestinal endoscopy; normal esophagus, mid esophageal biopsy performed; mild gastritis  path shows minimal chronic inflammation  now with poor po intake  cont marinol  failed MBS  will d/w family, likely will plan for peg early next week  spoke with pt's wife over the phone. Patient and family would like to  reconsider Gtube placement and discuss it further  will follow up

## 2023-12-17 NOTE — PROGRESS NOTE ADULT - ASSESSMENT
73 M pmh of HTN, ITP, APL syndrome, generalized anxiety disorder, OCD, newly dx aggressive Merkel Cell cancer of forehead- pt's oncologist  Dr. Eng, at [facility] on Keytruda infusions with good response per wife  was brought to ED for worsening psychosis for the past few days, pt lately with difficulty swallowing, decreased po intake    Psychosis  psych and neuro following  cont current med regimen     #HTN/Orthostatic hypotension  ? psych med induced  adrenal insufficiency  continue prednisone   increased florinef to .3mg  hold bp meds  compressions stockings  check orthostatics daily- pt remains orthostatic when standing  improved today  permissive hypertension   endo fu    UTI-sp abx    #Anemia- stable hb  d/w dr villareal    #severe protein malnutrition  continue supplements  d/w GI   failed swallow eval sp MBS-  plan PEG early next week  npo and ivf meanwhile  lytes- replete and monitor    #DVT ppx-  hep sq    GOC/adv directives-palliative care cs- ?nutrition goals    [facility]   [phone] 73 M pmh of HTN, ITP, APL syndrome, generalized anxiety disorder, OCD, newly dx aggressive Merkel Cell cancer of forehead- pt's oncologist  Dr. Eng, at [facility] on Keytruda infusions with good response per wife  was brought to ED for worsening psychosis for the past few days, pt lately with difficulty swallowing, decreased po intake    Psychosis  psych and neuro following  cont current med regimen     #HTN/Orthostatic hypotension  ? psych med induced  adrenal insufficiency  continue prednisone   increased florinef to .3mg  hold bp meds  compressions stockings  check orthostatics daily- pt remains orthostatic when standing  improved today  permissive hypertension   endo fu    UTI-sp abx    #Anemia- stable hb  d/w dr villareal    #severe protein malnutrition  continue supplements  d/w GI   failed swallow eval sp MBS-  plan PEG early next week  npo and ivf meanwhile  lytes- replete and monitor    #DVT ppx-  hep sq    GOC/adv directives-palliative care cs- ?nutrition goals    [facility]   [phone]

## 2023-12-17 NOTE — PROGRESS NOTE ADULT - ASSESSMENT
The patient is a 73 year old male with a history of HTN, ITP, APLS, anxiety, OCD, Merkel cell cancer who presented with psychosis.    Plan:  - ECG with no evidence of ischemia or infarction  - Orthostatic hypotension may be multifactorial - dehydration, medication related (antipsychotics), and low cortisol levels  - Allow for supine hypertension - BP labile due to orthostatic hypotension - would not treat supine hypertension with additional antihypertensives unless SBP>200  - Syncope - due to orthostatic hypotension  - Allow supine SBP up to 200 - no need to push IV medications if SBP below this. If SBP >200, repeat vitals with patient sitting upright before giving additional antihypertensives.  - Continue atenolol 25 mg daily  - Psych follow-up  - Orthostatic hypotension will likely persist despite additional measures . Stand or ambulation with assistance.   - If plan is for PEG, the patient is optimized from a cardiac standpoint to proceed

## 2023-12-17 NOTE — PROGRESS NOTE ADULT - SUBJECTIVE AND OBJECTIVE BOX
Chief Complaint: Psychosis    Interval Events: No events overnight.    Review of Systems:  General: No fevers, chills, weight gain  Skin: No rashes, color changes  Cardiovascular: No chest pain, orthopnea  Respiratory: No shortness of breath, cough  Gastrointestinal: No nausea, abdominal pain  Genitourinary: No incontinence, pain with urination  Musculoskeletal: No pain, swelling, decreased range of motion  Neurological: No headache, weakness  Psychiatric: No depression, anxiety  Endocrine: No weight gain, increased thirst  All other systems are comprehensively negative.    Physical Exam:  Vital Signs Last 24 Hrs  T(C): 36.9 (17 Dec 2023 05:29), Max: 36.9 (17 Dec 2023 05:29)  T(F): 98.5 (17 Dec 2023 05:29), Max: 98.5 (17 Dec 2023 05:29)  HR: 116 (17 Dec 2023 05:29) (101 - 116)  BP: 190/88 (17 Dec 2023 05:37) (153/76 - 195/95)  BP(mean): --  RR: 18 (17 Dec 2023 05:29) (18 - 18)  SpO2: 92% (17 Dec 2023 05:29) (91% - 93%)  Parameters below as of 17 Dec 2023 05:29  Patient On (Oxygen Delivery Method): room air  General: NAD  HEENT: MMM  Neck: No JVD, no carotid bruit  Lungs: CTAB  CV: RRR, nl S1/S2, no M/R/G  Abdomen: S/NT/ND, +BS  Extremities: No LE edema, no cyanosis  Neuro: AAOx3, non-focal  Skin: No rash    Labs:    12-16    145  |  108  |  20  ----------------------------<  135<H>  3.1<L>   |  33<H>  |  0.70    Ca    8.4<L>      16 Dec 2023 07:40                          8.1    7.85  )-----------( 117      ( 16 Dec 2023 07:40 )             23.7

## 2023-12-17 NOTE — PROGRESS NOTE ADULT - SUBJECTIVE AND OBJECTIVE BOX
[facility]  Walter Camarena PA-C  [address] [place] [address]  [phone]      INTERVAL HPI/OVERNIGHT EVENTS:  pt seen no family at bedside  spoke with wife over the phone    MEDICATIONS  (STANDING):  benzocaine 20% Spray 1 Spray(s) Topical four times a day  cholecalciferol 2000 Unit(s) Oral daily  dextrose 5% + sodium chloride 0.45%. 1000 milliLiter(s) (60 mL/Hr) IV Continuous <Continuous>  dronabinol 2.5 milliGRAM(s) Oral two times a day before meals  FIRST- Mouthwash  BLM 5 milliLiter(s) Swish and Spit three times a day  fludroCORTISONE 0.3 milliGRAM(s) Oral daily  fluvoxaMINE 150 milliGRAM(s) Oral two times a day  heparin   Injectable 5000 Unit(s) SubCutaneous every 12 hours  melatonin 3 milliGRAM(s) Oral at bedtime  mirtazapine 15 milliGRAM(s) Oral at bedtime  OLANZapine Disintegrating Tablet 10 milliGRAM(s) Oral at bedtime  predniSONE   Tablet 5 milliGRAM(s) Oral daily  senna 2 Tablet(s) Oral at bedtime    MEDICATIONS  (PRN):  acetaminophen     Tablet .. 650 milliGRAM(s) Oral every 6 hours PRN Temp greater or equal to 38.5C (101.3F), Moderate Pain (4 - 6)  lactulose Syrup 15 Gram(s) Oral two times a day PRN constipation      Allergies    No Known Allergies      PHYSICAL EXAM:   Vital Signs Last 24 Hrs  T(C): 36.4 (17 Dec 2023 12:36), Max: 36.9 (17 Dec 2023 05:29)  T(F): 97.6 (17 Dec 2023 12:36), Max: 98.5 (17 Dec 2023 05:29)  HR: 104 (17 Dec 2023 12:36) (101 - 116)  BP: 158/80 (17 Dec 2023 14:48) (153/76 - 195/95)  BP(mean): --  RR: 18 (17 Dec 2023 05:29) (18 - 18)  SpO2: 92% (17 Dec 2023 12:36) (91% - 93%)    Parameters below as of 17 Dec 2023 12:36  Patient On (Oxygen Delivery Method): room air      GENERAL:  Appears stated age  HEENT:  NC/AT  CHEST:  Full & symmetric excursion  HEART:  Regular rhythm  ABDOMEN:  Soft, non-tender, non-distended  EXTEREMITIES:  no cyanosis  SKIN:  No rash  NEURO:  Alert      LABS:                                 9.4    7.08  )-----------( 134      ( 17 Dec 2023 09:10 )             27.8     12-17    145  |  107  |  16  ----------------------------<  129<H>  3.6   |  32<H>  |  0.65    Ca    8.7      17 Dec 2023 09:10          Urinalysis Basic - ( 14 Dec 2023 12:15 )    Color: x / Appearance: x / SG: x / pH: x  Gluc: 165 mg/dL / Ketone: x  / Bili: x / Urobili: x   Blood: x / Protein: x / Nitrite: x   Leuk Esterase: x / RBC: x / WBC x   Sq Epi: x / Non Sq Epi: x / Bacteria: x   [facility]  Walter Camarena PA-C  [address] [place] [address]  [phone]      INTERVAL HPI/OVERNIGHT EVENTS:  pt seen no family at bedside  spoke with wife over the phone    MEDICATIONS  (STANDING):  benzocaine 20% Spray 1 Spray(s) Topical four times a day  cholecalciferol 2000 Unit(s) Oral daily  dextrose 5% + sodium chloride 0.45%. 1000 milliLiter(s) (60 mL/Hr) IV Continuous <Continuous>  dronabinol 2.5 milliGRAM(s) Oral two times a day before meals  FIRST- Mouthwash  BLM 5 milliLiter(s) Swish and Spit three times a day  fludroCORTISONE 0.3 milliGRAM(s) Oral daily  fluvoxaMINE 150 milliGRAM(s) Oral two times a day  heparin   Injectable 5000 Unit(s) SubCutaneous every 12 hours  melatonin 3 milliGRAM(s) Oral at bedtime  mirtazapine 15 milliGRAM(s) Oral at bedtime  OLANZapine Disintegrating Tablet 10 milliGRAM(s) Oral at bedtime  predniSONE   Tablet 5 milliGRAM(s) Oral daily  senna 2 Tablet(s) Oral at bedtime    MEDICATIONS  (PRN):  acetaminophen     Tablet .. 650 milliGRAM(s) Oral every 6 hours PRN Temp greater or equal to 38.5C (101.3F), Moderate Pain (4 - 6)  lactulose Syrup 15 Gram(s) Oral two times a day PRN constipation      Allergies    No Known Allergies      PHYSICAL EXAM:   Vital Signs Last 24 Hrs  T(C): 36.4 (17 Dec 2023 12:36), Max: 36.9 (17 Dec 2023 05:29)  T(F): 97.6 (17 Dec 2023 12:36), Max: 98.5 (17 Dec 2023 05:29)  HR: 104 (17 Dec 2023 12:36) (101 - 116)  BP: 158/80 (17 Dec 2023 14:48) (153/76 - 195/95)  BP(mean): --  RR: 18 (17 Dec 2023 05:29) (18 - 18)  SpO2: 92% (17 Dec 2023 12:36) (91% - 93%)    Parameters below as of 17 Dec 2023 12:36  Patient On (Oxygen Delivery Method): room air      GENERAL:  Appears stated age  HEENT:  NC/AT  CHEST:  Full & symmetric excursion  HEART:  Regular rhythm  ABDOMEN:  Soft, non-tender, non-distended  EXTEREMITIES:  no cyanosis  SKIN:  No rash  NEURO:  Alert      LABS:                                 9.4    7.08  )-----------( 134      ( 17 Dec 2023 09:10 )             27.8     12-17    145  |  107  |  16  ----------------------------<  129<H>  3.6   |  32<H>  |  0.65    Ca    8.7      17 Dec 2023 09:10          Urinalysis Basic - ( 14 Dec 2023 12:15 )    Color: x / Appearance: x / SG: x / pH: x  Gluc: 165 mg/dL / Ketone: x  / Bili: x / Urobili: x   Blood: x / Protein: x / Nitrite: x   Leuk Esterase: x / RBC: x / WBC x   Sq Epi: x / Non Sq Epi: x / Bacteria: x

## 2023-12-18 ENCOUNTER — TRANSCRIPTION ENCOUNTER (OUTPATIENT)
Age: 73
End: 2023-12-18

## 2023-12-18 RX ORDER — METOPROLOL TARTRATE 50 MG
5 TABLET ORAL EVERY 6 HOURS
Refills: 0 | Status: DISCONTINUED | OUTPATIENT
Start: 2023-12-18 | End: 2023-12-19

## 2023-12-18 RX ADMIN — HEPARIN SODIUM 5000 UNIT(S): 5000 INJECTION INTRAVENOUS; SUBCUTANEOUS at 05:51

## 2023-12-18 RX ADMIN — HEPARIN SODIUM 5000 UNIT(S): 5000 INJECTION INTRAVENOUS; SUBCUTANEOUS at 18:21

## 2023-12-18 NOTE — PROGRESS NOTE ADULT - ASSESSMENT
73 M pmh of HTN, ITP, APL syndrome, generalized anxiety disorder, OCD, newly dx aggressive Merkel Cell cancer of forehead- pt's oncologist  Dr. Eng, at [facility] on Keytruda infusions with good response per wife  was brought to ED for worsening psychosis for the past few days, pt lately with difficulty swallowing, decreased po intake    Psychosis  psych and neuro following  cont current med regimen - pt on remeron, luvox, zyprexa-on hold dt npo status    #HTN/Orthostatic hypotension  ? psych med induced  adrenal insufficiency  continue prednisone -hold dt npo  increased florinef to .3mg- hold dt npo  hold bp meds- hydralazine and lopressor prn  compressions stockings  check orthostatics daily-  permissive hypertension   endo fu    UTI-sp abx    #Anemia- stable hb  d/w dr villareal    #severe protein malnutrition  continue supplements  d/w GI   failed swallow eval sp MBS-  plan PEG tomorrow- pt, wife and sons made the decision to proceed with PEG placement  npo and ivf meanwhile  lytes- replete and monitor    #elev coags- likely factors and vit k deffi dt malnutrition  check lfts    #DVT ppx-  hep sq      [facility]   [phone] 73 M pmh of HTN, ITP, APL syndrome, generalized anxiety disorder, OCD, newly dx aggressive Merkel Cell cancer of forehead- pt's oncologist  Dr. Eng, at [facility] on Keytruda infusions with good response per wife  was brought to ED for worsening psychosis for the past few days, pt lately with difficulty swallowing, decreased po intake    Psychosis  psych and neuro following  cont current med regimen - pt on remeron, luvox, zyprexa-on hold dt npo status    #HTN/Orthostatic hypotension  ? psych med induced  adrenal insufficiency  continue prednisone -hold dt npo  increased florinef to .3mg- hold dt npo  hold bp meds- hydralazine and lopressor prn  compressions stockings  check orthostatics daily-  permissive hypertension   endo fu    UTI-sp abx    #Anemia- stable hb  d/w dr villareal    #severe protein malnutrition  continue supplements  d/w GI   failed swallow eval sp MBS-  plan PEG tomorrow- pt, wife and sons made the decision to proceed with PEG placement  npo and ivf meanwhile  lytes- replete and monitor    #elev coags- likely factors and vit k deffi dt malnutrition  check lfts    #DVT ppx-  hep sq      [facility]   [phone]

## 2023-12-18 NOTE — PROGRESS NOTE ADULT - SUBJECTIVE AND OBJECTIVE BOX
Chief Complaint: Psychosis    Interval Events: No events overnight.    Review of Systems:  General: No fevers, chills, weight gain  Skin: No rashes, color changes  Cardiovascular: No chest pain, orthopnea  Respiratory: No shortness of breath, cough  Gastrointestinal: No nausea, abdominal pain  Genitourinary: No incontinence, pain with urination  Musculoskeletal: No pain, swelling, decreased range of motion  Neurological: No headache, weakness  Psychiatric: No depression, anxiety  Endocrine: No weight gain, increased thirst  All other systems are comprehensively negative.    Physical Exam:  Vital Signs Last 24 Hrs  T(C): 37.1 (18 Dec 2023 04:51), Max: 37.1 (18 Dec 2023 04:51)  T(F): 98.7 (18 Dec 2023 04:51), Max: 98.7 (18 Dec 2023 04:51)  HR: 101 (18 Dec 2023 04:51) (101 - 115)  BP: 178/91 (18 Dec 2023 04:51) (158/80 - 194/92)  BP(mean): --  RR: 17 (18 Dec 2023 04:51) (17 - 18)  SpO2: 92% (18 Dec 2023 04:51) (92% - 94%)  Parameters below as of 18 Dec 2023 04:51  Patient On (Oxygen Delivery Method): room air  General: NAD  HEENT: MMM  Neck: No JVD, no carotid bruit  Lungs: CTAB  CV: RRR, nl S1/S2, no M/R/G  Abdomen: S/NT/ND, +BS  Extremities: No LE edema, no cyanosis  Neuro: AAOx3, non-focal  Skin: No rash    Labs:    12-17    145  |  107  |  16  ----------------------------<  129<H>  3.6   |  32<H>  |  0.65    Ca    8.7      17 Dec 2023 09:10                          9.4    7.08  )-----------( 134      ( 17 Dec 2023 09:10 )             27.8

## 2023-12-18 NOTE — PROGRESS NOTE ADULT - SUBJECTIVE AND OBJECTIVE BOX
[facility]  Walter Camarena PA-C  [address] [place] [address]  [phone]      INTERVAL HPI/OVERNIGHT EVENTS:  Pt s/e with wife at bedside, discussed dysphagia and peg with pt and wife    MEDICATIONS  (STANDING):  atenolol  Tablet 25 milliGRAM(s) Oral daily  benzocaine 20% Spray 1 Spray(s) Topical four times a day  cholecalciferol 2000 Unit(s) Oral daily  dextrose 5% + sodium chloride 0.9% with potassium chloride 20 mEq/L 1000 milliLiter(s) (60 mL/Hr) IV Continuous <Continuous>  dronabinol 2.5 milliGRAM(s) Oral two times a day before meals  FIRST- Mouthwash  BLM 5 milliLiter(s) Swish and Spit three times a day  fludroCORTISONE 0.3 milliGRAM(s) Oral daily  fluvoxaMINE 150 milliGRAM(s) Oral two times a day  heparin   Injectable 5000 Unit(s) SubCutaneous every 12 hours  melatonin 3 milliGRAM(s) Oral at bedtime  mirtazapine 15 milliGRAM(s) Oral at bedtime  OLANZapine Disintegrating Tablet 10 milliGRAM(s) Oral at bedtime  predniSONE   Tablet 5 milliGRAM(s) Oral daily  senna 2 Tablet(s) Oral at bedtime    MEDICATIONS  (PRN):  acetaminophen     Tablet .. 650 milliGRAM(s) Oral every 6 hours PRN Temp greater or equal to 38.5C (101.3F), Moderate Pain (4 - 6)  hydrALAZINE Injectable 10 milliGRAM(s) IV Push every 8 hours PRN SBP >160  lactulose Syrup 15 Gram(s) Oral two times a day PRN constipation      Allergies    No Known Allergies        PHYSICAL EXAM:   Vital Signs:  Vital Signs Last 24 Hrs  T(C): 37.3 (18 Dec 2023 11:38), Max: 37.3 (18 Dec 2023 11:38)  T(F): 99.2 (18 Dec 2023 11:38), Max: 99.2 (18 Dec 2023 11:38)  HR: 107 (18 Dec 2023 11:38) (101 - 115)  BP: 175/91 (18 Dec 2023 11:38) (158/80 - 194/92)  BP(mean): --  RR: 17 (18 Dec 2023 11:38) (17 - 18)  SpO2: 93% (18 Dec 2023 11:38) (92% - 94%)    Parameters below as of 18 Dec 2023 11:38  Patient On (Oxygen Delivery Method): room air      GENERAL:  Appears stated age  HEENT:  NC/AT  CHEST:  Full & symmetric excursion  HEART:  Regular rhythm  ABDOMEN:  Soft, non-tender, non-distended  EXTEREMITIES:  no cyanosis  SKIN:  No rash  NEURO:  Alert      LABS:                        9.4    7.08  )-----------( 134      ( 17 Dec 2023 09:10 )             27.8     12-17    145  |  107  |  16  ----------------------------<  129<H>  3.6   |  32<H>  |  0.65    Ca    8.7      17 Dec 2023 09:10        Urinalysis Basic - ( 17 Dec 2023 09:10 )    Color: x / Appearance: x / SG: x / pH: x  Gluc: 129 mg/dL / Ketone: x  / Bili: x / Urobili: x   Blood: x / Protein: x / Nitrite: x   Leuk Esterase: x / RBC: x / WBC x   Sq Epi: x / Non Sq Epi: x / Bacteria: x   [facility]  Walter Camarena PA-C  [address] [place] [address]  [phone]      INTERVAL HPI/OVERNIGHT EVENTS:  Pt s/e with wife at bedside, discussed dysphagia and peg with pt and wife    MEDICATIONS  (STANDING):  atenolol  Tablet 25 milliGRAM(s) Oral daily  benzocaine 20% Spray 1 Spray(s) Topical four times a day  cholecalciferol 2000 Unit(s) Oral daily  dextrose 5% + sodium chloride 0.9% with potassium chloride 20 mEq/L 1000 milliLiter(s) (60 mL/Hr) IV Continuous <Continuous>  dronabinol 2.5 milliGRAM(s) Oral two times a day before meals  FIRST- Mouthwash  BLM 5 milliLiter(s) Swish and Spit three times a day  fludroCORTISONE 0.3 milliGRAM(s) Oral daily  fluvoxaMINE 150 milliGRAM(s) Oral two times a day  heparin   Injectable 5000 Unit(s) SubCutaneous every 12 hours  melatonin 3 milliGRAM(s) Oral at bedtime  mirtazapine 15 milliGRAM(s) Oral at bedtime  OLANZapine Disintegrating Tablet 10 milliGRAM(s) Oral at bedtime  predniSONE   Tablet 5 milliGRAM(s) Oral daily  senna 2 Tablet(s) Oral at bedtime    MEDICATIONS  (PRN):  acetaminophen     Tablet .. 650 milliGRAM(s) Oral every 6 hours PRN Temp greater or equal to 38.5C (101.3F), Moderate Pain (4 - 6)  hydrALAZINE Injectable 10 milliGRAM(s) IV Push every 8 hours PRN SBP >160  lactulose Syrup 15 Gram(s) Oral two times a day PRN constipation      Allergies    No Known Allergies        PHYSICAL EXAM:   Vital Signs:  Vital Signs Last 24 Hrs  T(C): 37.3 (18 Dec 2023 11:38), Max: 37.3 (18 Dec 2023 11:38)  T(F): 99.2 (18 Dec 2023 11:38), Max: 99.2 (18 Dec 2023 11:38)  HR: 107 (18 Dec 2023 11:38) (101 - 115)  BP: 175/91 (18 Dec 2023 11:38) (158/80 - 194/92)  BP(mean): --  RR: 17 (18 Dec 2023 11:38) (17 - 18)  SpO2: 93% (18 Dec 2023 11:38) (92% - 94%)    Parameters below as of 18 Dec 2023 11:38  Patient On (Oxygen Delivery Method): room air      GENERAL:  Appears stated age  HEENT:  NC/AT  CHEST:  Full & symmetric excursion  HEART:  Regular rhythm  ABDOMEN:  Soft, non-tender, non-distended  EXTEREMITIES:  no cyanosis  SKIN:  No rash  NEURO:  Alert      LABS:                        9.4    7.08  )-----------( 134      ( 17 Dec 2023 09:10 )             27.8     12-17    145  |  107  |  16  ----------------------------<  129<H>  3.6   |  32<H>  |  0.65    Ca    8.7      17 Dec 2023 09:10        Urinalysis Basic - ( 17 Dec 2023 09:10 )    Color: x / Appearance: x / SG: x / pH: x  Gluc: 129 mg/dL / Ketone: x  / Bili: x / Urobili: x   Blood: x / Protein: x / Nitrite: x   Leuk Esterase: x / RBC: x / WBC x   Sq Epi: x / Non Sq Epi: x / Bacteria: x

## 2023-12-18 NOTE — PROGRESS NOTE ADULT - SUBJECTIVE AND OBJECTIVE BOX
Patient is a 73y old  Male who presents with a chief complaint of psychosis (13 Dec 2023 13:03)      INTERVAL HPI/OVERNIGHT EVENTS: noted  pt seen and examined this am   events noted  feels well      Vital Signs Last 24 Hrs  T(C): 37.3 (18 Dec 2023 11:38), Max: 37.3 (18 Dec 2023 11:38)  T(F): 99.2 (18 Dec 2023 11:38), Max: 99.2 (18 Dec 2023 11:38)  HR: 107 (18 Dec 2023 11:38) (101 - 115)  BP: 175/91 (18 Dec 2023 11:38) (158/80 - 194/92)  BP(mean): --  RR: 17 (18 Dec 2023 11:38) (17 - 18)  SpO2: 93% (18 Dec 2023 11:38) (92% - 94%)    Parameters below as of 18 Dec 2023 11:38  Patient On (Oxygen Delivery Method): room air        acetaminophen     Tablet .. 650 milliGRAM(s) Oral every 6 hours PRN  atenolol  Tablet 25 milliGRAM(s) Oral daily  benzocaine 20% Spray 1 Spray(s) Topical four times a day  cholecalciferol 2000 Unit(s) Oral daily  dextrose 5% + sodium chloride 0.9% with potassium chloride 20 mEq/L 1000 milliLiter(s) IV Continuous <Continuous>  dronabinol 2.5 milliGRAM(s) Oral two times a day before meals  FIRST- Mouthwash  BLM 5 milliLiter(s) Swish and Spit three times a day  fludroCORTISONE 0.3 milliGRAM(s) Oral daily  fluvoxaMINE 150 milliGRAM(s) Oral two times a day  heparin   Injectable 5000 Unit(s) SubCutaneous every 12 hours  hydrALAZINE Injectable 10 milliGRAM(s) IV Push every 8 hours PRN  lactulose Syrup 15 Gram(s) Oral two times a day PRN  melatonin 3 milliGRAM(s) Oral at bedtime  mirtazapine 15 milliGRAM(s) Oral at bedtime  OLANZapine Disintegrating Tablet 10 milliGRAM(s) Oral at bedtime  predniSONE   Tablet 5 milliGRAM(s) Oral daily  senna 2 Tablet(s) Oral at bedtime      PHYSICAL EXAM:  GENERAL: NAD,   EYES: conjunctiva and sclera clear  ENMT: Moist mucous membranes  NECK: Supple, No JVD, Normal thyroid  CHEST/LUNG: non labored, cta b/l  HEART: Regular rate and rhythm; No murmurs, rubs, or gallops  ABDOMEN: Soft, Nontender, Nondistended; Bowel sounds present  EXTREMITIES:  2+ Peripheral Pulses, No clubbing, cyanosis, or edema  LYMPH: No lymphadenopathy noted  SKIN: No rashes or lesions    Consultant(s) Notes Reviewed:  [x ] YES  [ ] NO  Care Discussed with Consultants/Other Providers [ x] YES  [ ] NO    LABS:                        9.4    7.08  )-----------( 134      ( 17 Dec 2023 09:10 )             27.8     12-17    145  |  107  |  16  ----------------------------<  129<H>  3.6   |  32<H>  |  0.65    Ca    8.7      17 Dec 2023 09:10        Urinalysis Basic - ( 17 Dec 2023 09:10 )    Color: x / Appearance: x / SG: x / pH: x  Gluc: 129 mg/dL / Ketone: x  / Bili: x / Urobili: x   Blood: x / Protein: x / Nitrite: x   Leuk Esterase: x / RBC: x / WBC x   Sq Epi: x / Non Sq Epi: x / Bacteria: x      CAPILLARY BLOOD GLUCOSE            Urinalysis Basic - ( 17 Dec 2023 09:10 )    Color: x / Appearance: x / SG: x / pH: x  Gluc: 129 mg/dL / Ketone: x  / Bili: x / Urobili: x   Blood: x / Protein: x / Nitrite: x   Leuk Esterase: x / RBC: x / WBC x   Sq Epi: x / Non Sq Epi: x / Bacteria: x          RADIOLOGY & ADDITIONAL TESTS:    Imaging Personally Reviewed:  [x ] YES  [ ] NO

## 2023-12-18 NOTE — PROGRESS NOTE ADULT - ASSESSMENT
odynophagia    s/p  upper gastrointestinal endoscopy; normal esophagus, mid esophageal biopsy performed; mild gastritis  path shows minimal chronic inflammation  now with poor po intake  cont marinol  failed MBS  d/w pt and wife at length, discussed risks/benefits/alternatives they decided they want peg  scheduled egd/peg for tomorrow, cont NPO  will follow up

## 2023-12-19 LAB
ALBUMIN SERPL ELPH-MCNC: 2.4 G/DL — LOW (ref 3.3–5)
ALBUMIN SERPL ELPH-MCNC: 2.4 G/DL — LOW (ref 3.3–5)
ALP SERPL-CCNC: 88 U/L — SIGNIFICANT CHANGE UP (ref 40–120)
ALP SERPL-CCNC: 88 U/L — SIGNIFICANT CHANGE UP (ref 40–120)
ALT FLD-CCNC: 29 U/L — SIGNIFICANT CHANGE UP (ref 12–78)
ALT FLD-CCNC: 29 U/L — SIGNIFICANT CHANGE UP (ref 12–78)
ANION GAP SERPL CALC-SCNC: 6 MMOL/L — SIGNIFICANT CHANGE UP (ref 5–17)
ANION GAP SERPL CALC-SCNC: 6 MMOL/L — SIGNIFICANT CHANGE UP (ref 5–17)
APTT BLD: 70.6 SEC — HIGH (ref 24.5–35.6)
APTT BLD: 70.6 SEC — HIGH (ref 24.5–35.6)
AST SERPL-CCNC: 28 U/L — SIGNIFICANT CHANGE UP (ref 15–37)
AST SERPL-CCNC: 28 U/L — SIGNIFICANT CHANGE UP (ref 15–37)
BILIRUB SERPL-MCNC: 0.8 MG/DL — SIGNIFICANT CHANGE UP (ref 0.2–1.2)
BILIRUB SERPL-MCNC: 0.8 MG/DL — SIGNIFICANT CHANGE UP (ref 0.2–1.2)
BUN SERPL-MCNC: 11 MG/DL — SIGNIFICANT CHANGE UP (ref 7–23)
BUN SERPL-MCNC: 11 MG/DL — SIGNIFICANT CHANGE UP (ref 7–23)
CALCIUM SERPL-MCNC: 8.3 MG/DL — LOW (ref 8.5–10.1)
CALCIUM SERPL-MCNC: 8.3 MG/DL — LOW (ref 8.5–10.1)
CHLORIDE SERPL-SCNC: 110 MMOL/L — HIGH (ref 96–108)
CHLORIDE SERPL-SCNC: 110 MMOL/L — HIGH (ref 96–108)
CO2 SERPL-SCNC: 30 MMOL/L — SIGNIFICANT CHANGE UP (ref 22–31)
CO2 SERPL-SCNC: 30 MMOL/L — SIGNIFICANT CHANGE UP (ref 22–31)
CREAT SERPL-MCNC: 0.64 MG/DL — SIGNIFICANT CHANGE UP (ref 0.5–1.3)
CREAT SERPL-MCNC: 0.64 MG/DL — SIGNIFICANT CHANGE UP (ref 0.5–1.3)
EGFR: 99 ML/MIN/1.73M2 — SIGNIFICANT CHANGE UP
EGFR: 99 ML/MIN/1.73M2 — SIGNIFICANT CHANGE UP
GLUCOSE SERPL-MCNC: 110 MG/DL — HIGH (ref 70–99)
GLUCOSE SERPL-MCNC: 110 MG/DL — HIGH (ref 70–99)
HCT VFR BLD CALC: 26.2 % — LOW (ref 39–50)
HCT VFR BLD CALC: 26.2 % — LOW (ref 39–50)
HGB BLD-MCNC: 9 G/DL — LOW (ref 13–17)
HGB BLD-MCNC: 9 G/DL — LOW (ref 13–17)
INR BLD: 1.5 RATIO — HIGH (ref 0.85–1.18)
INR BLD: 1.5 RATIO — HIGH (ref 0.85–1.18)
MCHC RBC-ENTMCNC: 29.7 PG — SIGNIFICANT CHANGE UP (ref 27–34)
MCHC RBC-ENTMCNC: 29.7 PG — SIGNIFICANT CHANGE UP (ref 27–34)
MCHC RBC-ENTMCNC: 34.4 GM/DL — SIGNIFICANT CHANGE UP (ref 32–36)
MCHC RBC-ENTMCNC: 34.4 GM/DL — SIGNIFICANT CHANGE UP (ref 32–36)
MCV RBC AUTO: 86.5 FL — SIGNIFICANT CHANGE UP (ref 80–100)
MCV RBC AUTO: 86.5 FL — SIGNIFICANT CHANGE UP (ref 80–100)
NRBC # BLD: 0 /100 WBCS — SIGNIFICANT CHANGE UP (ref 0–0)
NRBC # BLD: 0 /100 WBCS — SIGNIFICANT CHANGE UP (ref 0–0)
PLATELET # BLD AUTO: 114 K/UL — LOW (ref 150–400)
PLATELET # BLD AUTO: 114 K/UL — LOW (ref 150–400)
POTASSIUM SERPL-MCNC: 3.9 MMOL/L — SIGNIFICANT CHANGE UP (ref 3.5–5.3)
POTASSIUM SERPL-MCNC: 3.9 MMOL/L — SIGNIFICANT CHANGE UP (ref 3.5–5.3)
POTASSIUM SERPL-SCNC: 3.9 MMOL/L — SIGNIFICANT CHANGE UP (ref 3.5–5.3)
POTASSIUM SERPL-SCNC: 3.9 MMOL/L — SIGNIFICANT CHANGE UP (ref 3.5–5.3)
PROT SERPL-MCNC: 5.8 G/DL — LOW (ref 6–8.3)
PROT SERPL-MCNC: 5.8 G/DL — LOW (ref 6–8.3)
PROTHROM AB SERPL-ACNC: 17.4 SEC — HIGH (ref 9.5–13)
PROTHROM AB SERPL-ACNC: 17.4 SEC — HIGH (ref 9.5–13)
RBC # BLD: 3.03 M/UL — LOW (ref 4.2–5.8)
RBC # BLD: 3.03 M/UL — LOW (ref 4.2–5.8)
RBC # FLD: 13.9 % — SIGNIFICANT CHANGE UP (ref 10.3–14.5)
RBC # FLD: 13.9 % — SIGNIFICANT CHANGE UP (ref 10.3–14.5)
SODIUM SERPL-SCNC: 146 MMOL/L — HIGH (ref 135–145)
SODIUM SERPL-SCNC: 146 MMOL/L — HIGH (ref 135–145)
WBC # BLD: 6.33 K/UL — SIGNIFICANT CHANGE UP (ref 3.8–10.5)
WBC # BLD: 6.33 K/UL — SIGNIFICANT CHANGE UP (ref 3.8–10.5)
WBC # FLD AUTO: 6.33 K/UL — SIGNIFICANT CHANGE UP (ref 3.8–10.5)
WBC # FLD AUTO: 6.33 K/UL — SIGNIFICANT CHANGE UP (ref 3.8–10.5)

## 2023-12-19 DEVICE — KIT ENDO SAFTEY PEG PULL STD 20 FR ENDOVIVE: Type: IMPLANTABLE DEVICE | Status: FUNCTIONAL

## 2023-12-19 DEVICE — PEG KT SAFETY 20FR: Type: IMPLANTABLE DEVICE | Status: FUNCTIONAL

## 2023-12-19 DEVICE — PEG SAFETY 20FR PUSH WITH AMP: Type: IMPLANTABLE DEVICE | Status: FUNCTIONAL

## 2023-12-19 DEVICE — KIT PEG ENDOVIVE ENFIT 20FR PUSH 2/BX: Type: IMPLANTABLE DEVICE | Status: FUNCTIONAL

## 2023-12-19 RX ORDER — FLUDROCORTISONE ACETATE 0.1 MG/1
0.3 TABLET ORAL DAILY
Refills: 0 | Status: DISCONTINUED | OUTPATIENT
Start: 2023-12-19 | End: 2023-12-21

## 2023-12-19 RX ORDER — ATENOLOL 25 MG/1
25 TABLET ORAL DAILY
Refills: 0 | Status: DISCONTINUED | OUTPATIENT
Start: 2023-12-19 | End: 2023-12-21

## 2023-12-19 RX ORDER — LACTULOSE 10 G/15ML
15 SOLUTION ORAL
Refills: 0 | Status: DISCONTINUED | OUTPATIENT
Start: 2023-12-19 | End: 2023-12-26

## 2023-12-19 RX ORDER — CEFAZOLIN SODIUM 1 G
2000 VIAL (EA) INJECTION ONCE
Refills: 0 | Status: COMPLETED | OUTPATIENT
Start: 2023-12-19 | End: 2023-12-19

## 2023-12-19 RX ORDER — LANOLIN ALCOHOL/MO/W.PET/CERES
3 CREAM (GRAM) TOPICAL AT BEDTIME
Refills: 0 | Status: DISCONTINUED | OUTPATIENT
Start: 2023-12-19 | End: 2023-12-26

## 2023-12-19 RX ORDER — CHOLECALCIFEROL (VITAMIN D3) 125 MCG
2000 CAPSULE ORAL DAILY
Refills: 0 | Status: DISCONTINUED | OUTPATIENT
Start: 2023-12-19 | End: 2023-12-26

## 2023-12-19 RX ORDER — ACETAMINOPHEN 500 MG
650 TABLET ORAL EVERY 6 HOURS
Refills: 0 | Status: DISCONTINUED | OUTPATIENT
Start: 2023-12-19 | End: 2023-12-26

## 2023-12-19 RX ORDER — SENNA PLUS 8.6 MG/1
2 TABLET ORAL AT BEDTIME
Refills: 0 | Status: DISCONTINUED | OUTPATIENT
Start: 2023-12-19 | End: 2023-12-26

## 2023-12-19 RX ADMIN — DEXTROSE MONOHYDRATE, SODIUM CHLORIDE, AND POTASSIUM CHLORIDE 60 MILLILITER(S): 50; .745; 4.5 INJECTION, SOLUTION INTRAVENOUS at 22:18

## 2023-12-19 RX ADMIN — Medication 2000 UNIT(S): at 13:27

## 2023-12-19 RX ADMIN — DEXTROSE MONOHYDRATE, SODIUM CHLORIDE, AND POTASSIUM CHLORIDE 60 MILLILITER(S): 50; .745; 4.5 INJECTION, SOLUTION INTRAVENOUS at 01:29

## 2023-12-19 RX ADMIN — HEPARIN SODIUM 5000 UNIT(S): 5000 INJECTION INTRAVENOUS; SUBCUTANEOUS at 22:15

## 2023-12-19 RX ADMIN — DIPHENHYDRAMINE HYDROCHLORIDE AND LIDOCAINE HYDROCHLORIDE AND ALUMINUM HYDROXIDE AND MAGNESIUM HYDRO 5 MILLILITER(S): KIT at 22:17

## 2023-12-19 RX ADMIN — Medication 100 MILLIGRAM(S): at 10:32

## 2023-12-19 RX ADMIN — SENNA PLUS 2 TABLET(S): 8.6 TABLET ORAL at 22:15

## 2023-12-19 RX ADMIN — Medication 3 MILLIGRAM(S): at 22:15

## 2023-12-19 NOTE — PROGRESS NOTE ADULT - ASSESSMENT
The patient is a 73 year old male with a history of HTN, ITP, APLS, anxiety, OCD, Merkel cell cancer who presented with psychosis.    Plan:  - ECG with no evidence of ischemia or infarction  - Orthostatic hypotension may be multifactorial - dehydration, medication related (antipsychotics), and low cortisol levels  - Allow for supine hypertension - BP labile due to orthostatic hypotension - would not treat supine hypertension with additional antihypertensives unless SBP>200  - Syncope - due to orthostatic hypotension  - Allow supine SBP up to 200 - no need to push IV medications if SBP below this. If SBP >200, repeat vitals with patient sitting upright before giving additional antihypertensives.  - Continue atenolol 25 mg daily  - Psych follow-up  - Orthostatic hypotension will likely persist despite additional measures. Stand or ambulation with assistance.   - If plan is for PEG, the patient is optimized from a cardiac standpoint to proceed

## 2023-12-19 NOTE — PROGRESS NOTE ADULT - ASSESSMENT
73 M pmh of HTN, ITP, APL syndrome, generalized anxiety disorder, OCD, newly dx aggressive Merkel Cell cancer of forehead- pt's oncologist  Dr. Eng, at [facility] on Keytruda infusions with good response per wife  was brought to ED for worsening psychosis for the past few days, pt lately with difficulty swallowing, decreased po intake    Psychosis  psych and neuro following  cont current med regimen - pt on remeron, luvox, zyprexa-on hold dt npo status  d/w psych- to hold off on psych meds for now as pt remained stable without meds    #HTN/Orthostatic hypotension  ? psych med induced  adrenal insufficiency  continue prednisone -hold dt npo- to restart  increased florinef to .3mg- hold dt npo- to restart later tonight  hold bp meds- hydralazine and lopressor prn  compressions stockings  check orthostatics daily-  permissive hypertension   endo fu    UTI-sp abx    #Anemia- stable hb  d/w heme    #severe protein malnutrition  continue supplements  d/w GI   failed swallow eval sp MBS-  sp PEG placement  water and meds via peg tonight, feeds likely am  lytes- replete and monitor    #elev coags- likely factors and vit k deffi dt malnutrition  check lfts    #DVT ppx-  hep sq      [facility]    73 M pmh of HTN, ITP, APL syndrome, generalized anxiety disorder, OCD, newly dx aggressive Merkel Cell cancer of forehead- pt's oncologist  Dr. Eng, at [facility] on Keytruda infusions with good response per wife  was brought to ED for worsening psychosis for the past few days, pt lately with difficulty swallowing, decreased po intake    Psychosis  psych and neuro following  cont current med regimen - pt on remeron, luvox, zyprexa-on hold dt npo status  d/w psych- to hold off on psych meds for now as pt remained stable without meds    #HTN/Orthostatic hypotension  ? psych med induced  adrenal insufficiency  continue prednisone -hold dt npo- to restart  increased florinef to .3mg- hold dt npo- to restart later tonight  hold bp meds- hydralazine and lopressor prn  compressions stockings  check orthostatics daily-  permissive hypertension   endo fu    UTI-sp abx    #Anemia- stable hb  d/w heme    #severe protein malnutrition  continue supplements  d/w GI   failed swallow eval sp MBS-  sp PEG placement  water and meds via peg tonight, feeds likely am  lytes- replete and monitor    #elev coags- likely factors and vit k deffi dt malnutrition  check lfts    #DVT ppx-  hep sq      [facility]

## 2023-12-19 NOTE — PROGRESS NOTE ADULT - SUBJECTIVE AND OBJECTIVE BOX
Chief Complaint: Psychosis    Interval Events: No events overnight.    Review of Systems:  General: No fevers, chills, weight gain  Skin: No rashes, color changes  Cardiovascular: No chest pain, orthopnea  Respiratory: No shortness of breath, cough  Gastrointestinal: No nausea, abdominal pain  Genitourinary: No incontinence, pain with urination  Musculoskeletal: No pain, swelling, decreased range of motion  Neurological: No headache, weakness  Psychiatric: No depression, anxiety  Endocrine: No weight gain, increased thirst  All other systems are comprehensively negative.    Physical Exam:  Vital Signs Last 24 Hrs  T(C): 36.9 (19 Dec 2023 05:07), Max: 37.3 (18 Dec 2023 11:38)  T(F): 98.4 (19 Dec 2023 05:07), Max: 99.2 (18 Dec 2023 11:38)  HR: 112 (19 Dec 2023 05:07) (107 - 113)  BP: 177/95 (19 Dec 2023 05:07) (175/91 - 177/95)  BP(mean): --  RR: 17 (19 Dec 2023 05:07) (17 - 17)  SpO2: 94% (19 Dec 2023 05:07) (92% - 94%)  Parameters below as of 19 Dec 2023 05:07  Patient On (Oxygen Delivery Method): room air  General: NAD  HEENT: MMM  Neck: No JVD, no carotid bruit  Lungs: CTAB  CV: RRR, nl S1/S2, no M/R/G  Abdomen: S/NT/ND, +BS  Extremities: No LE edema, no cyanosis  Neuro: AAOx3, non-focal  Skin: No rash    Labs:    12-17    145  |  107  |  16  ----------------------------<  129<H>  3.6   |  32<H>  |  0.65    Ca    8.7      17 Dec 2023 09:10                          9.4    7.08  )-----------( 134      ( 17 Dec 2023 09:10 )             27.8

## 2023-12-19 NOTE — CHART NOTE - NSCHARTNOTEFT_GEN_A_CORE
Assessment: patient seen for malnutrition follow up   73y old  Male who presents with a chief complaint of psychosis   patient for PEG placement today. RD spoke with MITCHELL barcenas order for TF placed  EGD mild gastritis per GI  12/18 BM      Factors impacting intake: [ ] none [ ] nausea  [ ] vomiting [ ] diarrhea [ ] constipation  [ ]chewing problems [x ] swallowing issues  [ ] other: speech recommends NPO for PEG placement 12/19     Diet Prescription: Diet, NPO (12-16-23 @ 10:24)        Current Weight: 12/19 wt 67kg 12/13 67kg no edema previously noted weight loss this admit      Pertinent Medications: MEDICATIONS  (STANDING):  atenolol  Tablet 25 milliGRAM(s) Oral daily  benzocaine 20% Spray 1 Spray(s) Topical four times a day  cholecalciferol 2000 Unit(s) Oral daily  dextrose 5% + sodium chloride 0.9% with potassium chloride 20 mEq/L 1000 milliLiter(s) (60 mL/Hr) IV Continuous <Continuous>  FIRST- Mouthwash  BLM 5 milliLiter(s) Swish and Spit three times a day  fludroCORTISONE 0.3 milliGRAM(s) Oral daily  fluvoxaMINE 150 milliGRAM(s) Oral two times a day  heparin   Injectable 5000 Unit(s) SubCutaneous every 12 hours  melatonin 3 milliGRAM(s) Oral at bedtime  mirtazapine 15 milliGRAM(s) Oral at bedtime  OLANZapine Disintegrating Tablet 10 milliGRAM(s) Oral at bedtime  predniSONE   Tablet 5 milliGRAM(s) Oral daily  senna 2 Tablet(s) Oral at bedtime    MEDICATIONS  (PRN):  acetaminophen     Tablet .. 650 milliGRAM(s) Oral every 6 hours PRN Temp greater or equal to 38.5C (101.3F), Moderate Pain (4 - 6)  hydrALAZINE Injectable 10 milliGRAM(s) IV Push every 8 hours PRN SBP >160  lactulose Syrup 15 Gram(s) Oral two times a day PRN constipation  metoprolol tartrate Injectable 5 milliGRAM(s) IV Push every 6 hours PRN bp >140    Pertinent Labs: glucose 129 A1c 4.5%   Skin:   no pressure injury   Estimated Needs:   [X ] no change since previous assessment, based on IBW of 80.7 kg ( 25-30 kcals/kg) 0700-3053 kcals and ( 1.1-1.3 gm pro/kg)  gm pro  [ ] recalculated:     Previous Nutrition Diagnosis:   [ x] Inadequate Energy Intake   [x ] Malnutrition   (X) swallow difficulty   Nutrition Diagnosis is [x ] ongoing  [ ] resolved [ ] not applicable     New Nutrition Diagnosis: [x ] not applicable       Interventions:   Recommend  [ ] Change Diet To:  [ ] Nutrition Supplement  [ x] Nutrition Support recommend jevity 1.5 at 25 ml hr increase by 10ml every 8 hours to goal 85ml hrX18 hr to provide 1530 ml  2295kcals and 98 gms protein and 1162 ml H2O with additional  free water at [id number] (540ml) plus 150 ml water ac/pc (300ml) total water 2002ml.  [x ] Other: consider add MVI, will follow feeding tolerance as initiated .    Monitoring and Evaluation:   [ ] PO intake [ x ] Tolerance to diet prescription [ x ] weights [ x ] labs[ x ] follow up per protocol  [ ] other: Assessment: patient seen for malnutrition follow up   73y old  Male who presents with a chief complaint of psychosis   patient for PEG placement today. RD spoke with MITCHELL barcenas order for TF placed  EGD mild gastritis per GI  12/18 BM      Factors impacting intake: [ ] none [ ] nausea  [ ] vomiting [ ] diarrhea [ ] constipation  [ ]chewing problems [x ] swallowing issues  [ ] other: speech recommends NPO for PEG placement 12/19     Diet Prescription: Diet, NPO (12-16-23 @ 10:24)        Current Weight: 12/19 wt 67kg 12/13 67kg no edema previously noted weight loss this admit      Pertinent Medications: MEDICATIONS  (STANDING):  atenolol  Tablet 25 milliGRAM(s) Oral daily  benzocaine 20% Spray 1 Spray(s) Topical four times a day  cholecalciferol 2000 Unit(s) Oral daily  dextrose 5% + sodium chloride 0.9% with potassium chloride 20 mEq/L 1000 milliLiter(s) (60 mL/Hr) IV Continuous <Continuous>  FIRST- Mouthwash  BLM 5 milliLiter(s) Swish and Spit three times a day  fludroCORTISONE 0.3 milliGRAM(s) Oral daily  fluvoxaMINE 150 milliGRAM(s) Oral two times a day  heparin   Injectable 5000 Unit(s) SubCutaneous every 12 hours  melatonin 3 milliGRAM(s) Oral at bedtime  mirtazapine 15 milliGRAM(s) Oral at bedtime  OLANZapine Disintegrating Tablet 10 milliGRAM(s) Oral at bedtime  predniSONE   Tablet 5 milliGRAM(s) Oral daily  senna 2 Tablet(s) Oral at bedtime    MEDICATIONS  (PRN):  acetaminophen     Tablet .. 650 milliGRAM(s) Oral every 6 hours PRN Temp greater or equal to 38.5C (101.3F), Moderate Pain (4 - 6)  hydrALAZINE Injectable 10 milliGRAM(s) IV Push every 8 hours PRN SBP >160  lactulose Syrup 15 Gram(s) Oral two times a day PRN constipation  metoprolol tartrate Injectable 5 milliGRAM(s) IV Push every 6 hours PRN bp >140    Pertinent Labs: glucose 129 A1c 4.5%   Skin:   no pressure injury   Estimated Needs:   [X ] no change since previous assessment, based on IBW of 80.7 kg ( 25-30 kcals/kg) 4871-5666 kcals and ( 1.1-1.3 gm pro/kg)  gm pro  [ ] recalculated:     Previous Nutrition Diagnosis:   [ x] Inadequate Energy Intake   [x ] Malnutrition   (X) swallow difficulty   Nutrition Diagnosis is [x ] ongoing  [ ] resolved [ ] not applicable     New Nutrition Diagnosis: [x ] not applicable       Interventions:   Recommend  [ ] Change Diet To:  [ ] Nutrition Supplement  [ x] Nutrition Support recommend jevity 1.5 at 25 ml hr increase by 10ml every 8 hours to goal 85ml hrX18 hr to provide 1530 ml  2295kcals and 98 gms protein and 1162 ml H2O with additional  free water at [id number] (540ml) plus 150 ml water ac/pc (300ml) total water 2002ml.  [x ] Other: consider add MVI, will follow feeding tolerance as initiated .    Monitoring and Evaluation:   [ ] PO intake [ x ] Tolerance to diet prescription [ x ] weights [ x ] labs[ x ] follow up per protocol  [ ] other:

## 2023-12-19 NOTE — PROGRESS NOTE ADULT - SUBJECTIVE AND OBJECTIVE BOX
Patient is a 73y old  Male who presents with a chief complaint of psychosis (13 Dec 2023 13:03)      INTERVAL HPI/OVERNIGHT EVENTS: noted  pt seen and examined this am   events noted  sp peg placed -uneventful      Vital Signs Last 24 Hrs  T(C): 37.1 (19 Dec 2023 11:54), Max: 37.2 (19 Dec 2023 09:51)  T(F): 98.7 (19 Dec 2023 11:54), Max: 99 (19 Dec 2023 09:51)  HR: 91 (19 Dec 2023 11:54) (91 - 113)  BP: 171/91 (19 Dec 2023 11:54) (171/91 - 190/82)  BP(mean): --  RR: 18 (19 Dec 2023 11:54) (17 - 18)  SpO2: 97% (19 Dec 2023 11:54) (92% - 99%)    Parameters below as of 19 Dec 2023 11:54  Patient On (Oxygen Delivery Method): nasal cannula        acetaminophen     Tablet .. 650 milliGRAM(s) Oral every 6 hours PRN  atenolol  Tablet 25 milliGRAM(s) Oral daily  benzocaine 20% Spray 1 Spray(s) Topical four times a day  cholecalciferol 2000 Unit(s) Oral daily  dextrose 5% + sodium chloride 0.9% with potassium chloride 20 mEq/L 1000 milliLiter(s) IV Continuous <Continuous>  FIRST- Mouthwash  BLM 5 milliLiter(s) Swish and Spit three times a day  fludroCORTISONE 0.3 milliGRAM(s) Oral daily  heparin   Injectable 5000 Unit(s) SubCutaneous every 12 hours  hydrALAZINE Injectable 10 milliGRAM(s) IV Push every 8 hours PRN  lactulose Syrup 15 Gram(s) Oral two times a day PRN  melatonin 3 milliGRAM(s) Oral at bedtime  predniSONE   Tablet 5 milliGRAM(s) Oral daily  senna 2 Tablet(s) Oral at bedtime      PHYSICAL EXAM:  GENERAL: NAD,   EYES: conjunctiva and sclera clear  ENMT: Moist mucous membranes  NECK: Supple, No JVD, Normal thyroid  CHEST/LUNG: non labored, cta b/l  HEART: Regular rate and rhythm; No murmurs, rubs, or gallops  ABDOMEN: Soft, Nontender, Nondistended; Bowel sounds present  EXTREMITIES:  2+ Peripheral Pulses, No clubbing, cyanosis, or edema  LYMPH: No lymphadenopathy noted  SKIN: No rashes or lesions    Consultant(s) Notes Reviewed:  [x ] YES  [ ] NO  Care Discussed with Consultants/Other Providers [ x] YES  [ ] NO    LABS:                        9.0    6.33  )-----------( 114      ( 19 Dec 2023 12:50 )             26.2     12-19    146<H>  |  110<H>  |  11  ----------------------------<  110<H>  3.9   |  30  |  0.64    Ca    8.3<L>      19 Dec 2023 12:50    TPro  5.8<L>  /  Alb  2.4<L>  /  TBili  0.8  /  DBili  x   /  AST  28  /  ALT  29  /  AlkPhos  88  12-19    PT/INR - ( 19 Dec 2023 12:50 )   PT: 17.4 sec;   INR: 1.50 ratio         PTT - ( 19 Dec 2023 12:50 )  PTT:70.6 sec  Urinalysis Basic - ( 19 Dec 2023 12:50 )    Color: x / Appearance: x / SG: x / pH: x  Gluc: 110 mg/dL / Ketone: x  / Bili: x / Urobili: x   Blood: x / Protein: x / Nitrite: x   Leuk Esterase: x / RBC: x / WBC x   Sq Epi: x / Non Sq Epi: x / Bacteria: x      CAPILLARY BLOOD GLUCOSE            Urinalysis Basic - ( 19 Dec 2023 12:50 )    Color: x / Appearance: x / SG: x / pH: x  Gluc: 110 mg/dL / Ketone: x  / Bili: x / Urobili: x   Blood: x / Protein: x / Nitrite: x   Leuk Esterase: x / RBC: x / WBC x   Sq Epi: x / Non Sq Epi: x / Bacteria: x          RADIOLOGY & ADDITIONAL TESTS:    Imaging Personally Reviewed:  [x ] YES  [ ] NO

## 2023-12-20 LAB
ANION GAP SERPL CALC-SCNC: 4 MMOL/L — LOW (ref 5–17)
ANION GAP SERPL CALC-SCNC: 4 MMOL/L — LOW (ref 5–17)
BUN SERPL-MCNC: 12 MG/DL — SIGNIFICANT CHANGE UP (ref 7–23)
BUN SERPL-MCNC: 12 MG/DL — SIGNIFICANT CHANGE UP (ref 7–23)
CALCIUM SERPL-MCNC: 8.4 MG/DL — LOW (ref 8.5–10.1)
CALCIUM SERPL-MCNC: 8.4 MG/DL — LOW (ref 8.5–10.1)
CHLORIDE SERPL-SCNC: 111 MMOL/L — HIGH (ref 96–108)
CHLORIDE SERPL-SCNC: 111 MMOL/L — HIGH (ref 96–108)
CO2 SERPL-SCNC: 30 MMOL/L — SIGNIFICANT CHANGE UP (ref 22–31)
CO2 SERPL-SCNC: 30 MMOL/L — SIGNIFICANT CHANGE UP (ref 22–31)
CREAT SERPL-MCNC: 0.56 MG/DL — SIGNIFICANT CHANGE UP (ref 0.5–1.3)
CREAT SERPL-MCNC: 0.56 MG/DL — SIGNIFICANT CHANGE UP (ref 0.5–1.3)
EGFR: 103 ML/MIN/1.73M2 — SIGNIFICANT CHANGE UP
EGFR: 103 ML/MIN/1.73M2 — SIGNIFICANT CHANGE UP
GLUCOSE SERPL-MCNC: 124 MG/DL — HIGH (ref 70–99)
GLUCOSE SERPL-MCNC: 124 MG/DL — HIGH (ref 70–99)
HCT VFR BLD CALC: 26.5 % — LOW (ref 39–50)
HCT VFR BLD CALC: 26.5 % — LOW (ref 39–50)
HGB BLD-MCNC: 9.6 G/DL — LOW (ref 13–17)
HGB BLD-MCNC: 9.6 G/DL — LOW (ref 13–17)
MCHC RBC-ENTMCNC: 33 PG — SIGNIFICANT CHANGE UP (ref 27–34)
MCHC RBC-ENTMCNC: 33 PG — SIGNIFICANT CHANGE UP (ref 27–34)
MCHC RBC-ENTMCNC: 36.2 GM/DL — HIGH (ref 32–36)
MCHC RBC-ENTMCNC: 36.2 GM/DL — HIGH (ref 32–36)
MCV RBC AUTO: 91.1 FL — SIGNIFICANT CHANGE UP (ref 80–100)
MCV RBC AUTO: 91.1 FL — SIGNIFICANT CHANGE UP (ref 80–100)
NRBC # BLD: 0 /100 WBCS — SIGNIFICANT CHANGE UP (ref 0–0)
NRBC # BLD: 0 /100 WBCS — SIGNIFICANT CHANGE UP (ref 0–0)
PLATELET # BLD AUTO: 132 K/UL — LOW (ref 150–400)
PLATELET # BLD AUTO: 132 K/UL — LOW (ref 150–400)
POTASSIUM SERPL-MCNC: 4 MMOL/L — SIGNIFICANT CHANGE UP (ref 3.5–5.3)
POTASSIUM SERPL-MCNC: 4 MMOL/L — SIGNIFICANT CHANGE UP (ref 3.5–5.3)
POTASSIUM SERPL-SCNC: 4 MMOL/L — SIGNIFICANT CHANGE UP (ref 3.5–5.3)
POTASSIUM SERPL-SCNC: 4 MMOL/L — SIGNIFICANT CHANGE UP (ref 3.5–5.3)
RBC # BLD: 2.91 M/UL — LOW (ref 4.2–5.8)
RBC # BLD: 2.91 M/UL — LOW (ref 4.2–5.8)
RBC # FLD: 13.9 % — SIGNIFICANT CHANGE UP (ref 10.3–14.5)
RBC # FLD: 13.9 % — SIGNIFICANT CHANGE UP (ref 10.3–14.5)
SODIUM SERPL-SCNC: 145 MMOL/L — SIGNIFICANT CHANGE UP (ref 135–145)
SODIUM SERPL-SCNC: 145 MMOL/L — SIGNIFICANT CHANGE UP (ref 135–145)
WBC # BLD: 6.47 K/UL — SIGNIFICANT CHANGE UP (ref 3.8–10.5)
WBC # BLD: 6.47 K/UL — SIGNIFICANT CHANGE UP (ref 3.8–10.5)
WBC # FLD AUTO: 6.47 K/UL — SIGNIFICANT CHANGE UP (ref 3.8–10.5)
WBC # FLD AUTO: 6.47 K/UL — SIGNIFICANT CHANGE UP (ref 3.8–10.5)

## 2023-12-20 RX ADMIN — Medication 3 MILLIGRAM(S): at 21:33

## 2023-12-20 RX ADMIN — DEXTROSE MONOHYDRATE, SODIUM CHLORIDE, AND POTASSIUM CHLORIDE 60 MILLILITER(S): 50; .745; 4.5 INJECTION, SOLUTION INTRAVENOUS at 18:05

## 2023-12-20 RX ADMIN — DIPHENHYDRAMINE HYDROCHLORIDE AND LIDOCAINE HYDROCHLORIDE AND ALUMINUM HYDROXIDE AND MAGNESIUM HYDRO 5 MILLILITER(S): KIT at 06:01

## 2023-12-20 RX ADMIN — Medication 5 MILLIGRAM(S): at 06:02

## 2023-12-20 RX ADMIN — HEPARIN SODIUM 5000 UNIT(S): 5000 INJECTION INTRAVENOUS; SUBCUTANEOUS at 06:01

## 2023-12-20 RX ADMIN — FLUDROCORTISONE ACETATE 0.3 MILLIGRAM(S): 0.1 TABLET ORAL at 06:02

## 2023-12-20 RX ADMIN — HEPARIN SODIUM 5000 UNIT(S): 5000 INJECTION INTRAVENOUS; SUBCUTANEOUS at 18:06

## 2023-12-20 RX ADMIN — ATENOLOL 25 MILLIGRAM(S): 25 TABLET ORAL at 06:02

## 2023-12-20 RX ADMIN — Medication 2000 UNIT(S): at 13:18

## 2023-12-20 RX ADMIN — SENNA PLUS 2 TABLET(S): 8.6 TABLET ORAL at 21:33

## 2023-12-20 NOTE — PROGRESS NOTE ADULT - ASSESSMENT
73 M pmh of HTN, ITP, APL syndrome, generalized anxiety disorder, OCD, newly dx aggressive Merkel Cell cancer of forehead- pt's oncologist  Dr. Eng, at [facility] on [employer] infusions with good response per wife  was brought to ED for worsening psychosis for the past few days, pt lately with difficulty swallowing, decreased po intake    Psychosis  psych and neuro following  cont current med regimen - pt on remeron, luvox, zyprexa-on hold dt npo status  d/w psych- to hold off on psych meds for now as pt remained stable without meds    #HTN/Orthostatic hypotension  ? psych med induced  adrenal insufficiency  continue prednisone and florinef   hold bp meds- hydralazine and lopressor prn  compressions stockings  permissive hypertension   endo fu    UTI-sp abx    #Anemia- stable hb  d/w heme    #severe protein malnutrition  sp PEG placement  increase feeds to goal as tolerated  lytes- replete and monitor    #elev coags- likely factors and vit k deffi dt malnutrition  check lfts    #DVT ppx-  hep sq      [facility]   [phone] 73 M pmh of HTN, ITP, APL syndrome, generalized anxiety disorder, OCD, newly dx aggressive Merkel Cell cancer of forehead- pt's oncologist  Dr. Eng, at [facility] on [employer] infusions with good response per wife  was brought to ED for worsening psychosis for the past few days, pt lately with difficulty swallowing, decreased po intake    Psychosis  psych and neuro following  cont current med regimen - pt on remeron, luvox, zyprexa-on hold dt npo status  d/w psych- to hold off on psych meds for now as pt remained stable without meds    #HTN/Orthostatic hypotension  ? psych med induced  adrenal insufficiency  continue prednisone and florinef   hold bp meds- hydralazine and lopressor prn  compressions stockings  permissive hypertension   endo fu    UTI-sp abx    #Anemia- stable hb  d/w heme    #severe protein malnutrition  sp PEG placement  increase feeds to goal as tolerated  lytes- replete and monitor    #elev coags- likely factors and vit k deffi dt malnutrition  check lfts    #DVT ppx-  hep sq      [facility]   [phone]

## 2023-12-20 NOTE — PROGRESS NOTE ADULT - SUBJECTIVE AND OBJECTIVE BOX
Patient is a 73y old  Male who presents with a chief complaint of psychosis (13 Dec 2023 13:03)        INTERVAL HPI/OVERNIGHT EVENTS:   no complaints  pt seen and examined         Vital Signs Last 24 Hrs  T(C): 36.4 (20 Dec 2023 12:01), Max: 36.9 (20 Dec 2023 05:15)  T(F): 97.6 (20 Dec 2023 12:01), Max: 98.4 (20 Dec 2023 05:15)  HR: 89 (20 Dec 2023 12:01) (89 - 93)  BP: 181/84 (20 Dec 2023 12:01) (170/80 - 183/88)  BP(mean): --  RR: 19 (20 Dec 2023 12:01) (18 - 19)  SpO2: 94% (20 Dec 2023 12:01) (94% - 100%)    Parameters below as of 20 Dec 2023 12:01  Patient On (Oxygen Delivery Method): room air        acetaminophen     Tablet .. 650 milliGRAM(s) Oral every 6 hours PRN  atenolol  Tablet 25 milliGRAM(s) Oral daily  benzocaine 20% Spray 1 Spray(s) Topical four times a day  cholecalciferol 2000 Unit(s) Oral daily  dextrose 5% + sodium chloride 0.9% with potassium chloride 20 mEq/L 1000 milliLiter(s) IV Continuous <Continuous>  FIRST- Mouthwash  BLM 5 milliLiter(s) Swish and Spit three times a day  fludroCORTISONE 0.3 milliGRAM(s) Oral daily  heparin   Injectable 5000 Unit(s) SubCutaneous every 12 hours  hydrALAZINE Injectable 10 milliGRAM(s) IV Push every 8 hours PRN  lactulose Syrup 15 Gram(s) Oral two times a day PRN  melatonin 3 milliGRAM(s) Oral at bedtime  predniSONE   Tablet 5 milliGRAM(s) Oral daily  senna 2 Tablet(s) Oral at bedtime      PHYSICAL EXAM:  GENERAL: NAD   EYES: conjunctiva and sclera clear  ENMT: Moist mucous membranes  NECK: Supple, No JVD, Normal thyroid  CHEST/LUNG: non labored, cta b/l  HEART: Regular rate and rhythm; No murmurs, rubs, or gallops  ABDOMEN: Soft, Nontender, Nondistended; Bowel sounds present  EXTREMITIES:  2+ Peripheral Pulses, No clubbing, no cyanosis, no edema  LYMPH: No lymphadenopathy noted  SKIN: No rashes or lesions  NEURO: no focal deficits    Consultant(s) Notes Reviewed:  [x ] YES  [ ] NO  Care Discussed with Consultants/Other Providers [ x] YES  [ ] NO    LABS:                        9.6    6.47  )-----------( 132      ( 20 Dec 2023 08:42 )             26.5     12-20    145  |  111<H>  |  12  ----------------------------<  124<H>  4.0   |  30  |  0.56    Ca    8.4<L>      20 Dec 2023 08:42    TPro  5.8<L>  /  Alb  2.4<L>  /  TBili  0.8  /  DBili  x   /  AST  28  /  ALT  29  /  AlkPhos  88  12-19    PT/INR - ( 19 Dec 2023 12:50 )   PT: 17.4 sec;   INR: 1.50 ratio         PTT - ( 19 Dec 2023 12:50 )  PTT:70.6 sec  Urinalysis Basic - ( 20 Dec 2023 08:42 )    Color: x / Appearance: x / SG: x / pH: x  Gluc: 124 mg/dL / Ketone: x  / Bili: x / Urobili: x   Blood: x / Protein: x / Nitrite: x   Leuk Esterase: x / RBC: x / WBC x   Sq Epi: x / Non Sq Epi: x / Bacteria: x      CAPILLARY BLOOD GLUCOSE            Urinalysis Basic - ( 20 Dec 2023 08:42 )    Color: x / Appearance: x / SG: x / pH: x  Gluc: 124 mg/dL / Ketone: x  / Bili: x / Urobili: x   Blood: x / Protein: x / Nitrite: x   Leuk Esterase: x / RBC: x / WBC x   Sq Epi: x / Non Sq Epi: x / Bacteria: x          RADIOLOGY & ADDITIONAL TESTS:    Imaging Personally Reviewed  Reviewed consultants input

## 2023-12-20 NOTE — PROGRESS NOTE ADULT - ASSESSMENT
The patient is a 73 year old male with a history of HTN, ITP, APLS, anxiety, OCD, Merkel cell cancer who presented with psychosis.    Plan:  - ECG with no evidence of ischemia or infarction  - Orthostatic hypotension may be multifactorial - dehydration, medication related (antipsychotics), and low cortisol levels  - Allow for supine hypertension - BP labile due to orthostatic hypotension - would not treat supine hypertension with additional antihypertensives unless SBP>200  - Syncope - due to orthostatic hypotension  - Allow supine SBP up to 200 - no need to push IV medications if SBP below this. If SBP >200, repeat vitals with patient sitting upright before giving additional antihypertensives.  - Continue atenolol 25 mg daily  - Psych follow-up  - Orthostatic hypotension will likely persist despite additional measures. Stand or ambulation with assistance.   - Underwent PEG  - GI follow-up

## 2023-12-20 NOTE — PROGRESS NOTE ADULT - SUBJECTIVE AND OBJECTIVE BOX
Chief Complaint: Psychosis    Interval Events: No events overnight.    Review of Systems:  General: No fevers, chills, weight gain  Skin: No rashes, color changes  Cardiovascular: No chest pain, orthopnea  Respiratory: No shortness of breath, cough  Gastrointestinal: No nausea, abdominal pain  Genitourinary: No incontinence, pain with urination  Musculoskeletal: No pain, swelling, decreased range of motion  Neurological: No headache, weakness  Psychiatric: No depression, anxiety  Endocrine: No weight gain, increased thirst  All other systems are comprehensively negative.    Physical Exam:  Vital Signs Last 24 Hrs  T(C): 36.9 (20 Dec 2023 05:15), Max: 37.2 (19 Dec 2023 09:51)  T(F): 98.4 (20 Dec 2023 05:15), Max: 99 (19 Dec 2023 09:51)  HR: 90 (20 Dec 2023 05:15) (90 - 109)  BP: 170/80 (20 Dec 2023 05:15) (170/80 - 190/82)  BP(mean): --  RR: 18 (20 Dec 2023 05:15) (17 - 18)  SpO2: 100% (20 Dec 2023 05:15) (97% - 100%)  Parameters below as of 20 Dec 2023 05:15  Patient On (Oxygen Delivery Method): nasal cannula  O2 Flow (L/min): 2  General: NAD  HEENT: MMM  Neck: No JVD, no carotid bruit  Lungs: CTAB  CV: RRR, nl S1/S2, no M/R/G  Abdomen: S/NT/ND, +BS  Extremities: No LE edema, no cyanosis  Neuro: AAOx3, non-focal  Skin: No rash    Labs:    12-19    146<H>  |  110<H>  |  11  ----------------------------<  110<H>  3.9   |  30  |  0.64    Ca    8.3<L>      19 Dec 2023 12:50    TPro  5.8<L>  /  Alb  2.4<L>  /  TBili  0.8  /  DBili  x   /  AST  28  /  ALT  29  /  AlkPhos  88  12-19                        9.0    6.33  )-----------( 114      ( 19 Dec 2023 12:50 )             26.2

## 2023-12-20 NOTE — PROGRESS NOTE ADULT - SUBJECTIVE AND OBJECTIVE BOX
[facility]  Walter Camarena PA-C  [address] [place] [address]  [phone]      INTERVAL HPI/OVERNIGHT EVENTS:  Pt s/e with wife at bedside  +peg  Pt reports pain in mouth and mild pain around peg site  Tolerating feeds so far    MEDICATIONS  (STANDING):  atenolol  Tablet 25 milliGRAM(s) Oral daily  benzocaine 20% Spray 1 Spray(s) Topical four times a day  cholecalciferol 2000 Unit(s) Oral daily  dextrose 5% + sodium chloride 0.9% with potassium chloride 20 mEq/L 1000 milliLiter(s) (60 mL/Hr) IV Continuous <Continuous>  FIRST- Mouthwash  BLM 5 milliLiter(s) Swish and Spit three times a day  fludroCORTISONE 0.3 milliGRAM(s) Oral daily  heparin   Injectable 5000 Unit(s) SubCutaneous every 12 hours  melatonin 3 milliGRAM(s) Oral at bedtime  predniSONE   Tablet 5 milliGRAM(s) Oral daily  senna 2 Tablet(s) Oral at bedtime    MEDICATIONS  (PRN):  acetaminophen     Tablet .. 650 milliGRAM(s) Oral every 6 hours PRN Temp greater or equal to 38.5C (101.3F), Moderate Pain (4 - 6)  hydrALAZINE Injectable 10 milliGRAM(s) IV Push every 8 hours PRN SBP >160  lactulose Syrup 15 Gram(s) Oral two times a day PRN constipation      Allergies    No Known Allergies      PHYSICAL EXAM:   Vital Signs:  Vital Signs Last 24 Hrs  T(C): 36.4 (20 Dec 2023 12:01), Max: 36.9 (20 Dec 2023 05:15)  T(F): 97.6 (20 Dec 2023 12:01), Max: 98.4 (20 Dec 2023 05:15)  HR: 89 (20 Dec 2023 12:01) (89 - 93)  BP: 181/84 (20 Dec 2023 12:01) (170/80 - 183/88)  BP(mean): --  RR: 19 (20 Dec 2023 12:01) (18 - 19)  SpO2: 94% (20 Dec 2023 12:01) (94% - 100%)    Parameters below as of 20 Dec 2023 12:01  Patient On (Oxygen Delivery Method): room air      Daily     Daily Weight in k.3 (20 Dec 2023 05:15)    GENERAL:  Appears stated age  HEENT:  NC/AT  CHEST:  Full & symmetric excursion  HEART:  Regular rhythm  ABDOMEN:  Soft, non-tender, non-distended, +peg  EXTEREMITIES:  no cyanosis  SKIN:  No rash  NEURO:  Alert      LABS:                        9.6    6.47  )-----------( 132      ( 20 Dec 2023 08:42 )             26.5     12    145  |  111<H>  |  12  ----------------------------<  124<H>  4.0   |  30  |  0.56    Ca    8.4<L>      20 Dec 2023 08:42    TPro  5.8<L>  /  Alb  2.4<L>  /  TBili  0.8  /  DBili  x   /  AST  28  /  ALT  29  /  AlkPhos  88  12-    PT/INR - ( 19 Dec 2023 12:50 )   PT: 17.4 sec;   INR: 1.50 ratio         PTT - ( 19 Dec 2023 12:50 )  PTT:70.6 sec  Urinalysis Basic - ( 20 Dec 2023 08:42 )    Color: x / Appearance: x / SG: x / pH: x  Gluc: 124 mg/dL / Ketone: x  / Bili: x / Urobili: x   Blood: x / Protein: x / Nitrite: x   Leuk Esterase: x / RBC: x / WBC x   Sq Epi: x / Non Sq Epi: x / Bacteria: x   [facility]  Walter Camarena PA-C  [address] [place] [address]  [phone]      INTERVAL HPI/OVERNIGHT EVENTS:  Pt s/e with wife at bedside  +peg  Pt reports pain in mouth and mild pain around peg site  Tolerating feeds so far    MEDICATIONS  (STANDING):  atenolol  Tablet 25 milliGRAM(s) Oral daily  benzocaine 20% Spray 1 Spray(s) Topical four times a day  cholecalciferol 2000 Unit(s) Oral daily  dextrose 5% + sodium chloride 0.9% with potassium chloride 20 mEq/L 1000 milliLiter(s) (60 mL/Hr) IV Continuous <Continuous>  FIRST- Mouthwash  BLM 5 milliLiter(s) Swish and Spit three times a day  fludroCORTISONE 0.3 milliGRAM(s) Oral daily  heparin   Injectable 5000 Unit(s) SubCutaneous every 12 hours  melatonin 3 milliGRAM(s) Oral at bedtime  predniSONE   Tablet 5 milliGRAM(s) Oral daily  senna 2 Tablet(s) Oral at bedtime    MEDICATIONS  (PRN):  acetaminophen     Tablet .. 650 milliGRAM(s) Oral every 6 hours PRN Temp greater or equal to 38.5C (101.3F), Moderate Pain (4 - 6)  hydrALAZINE Injectable 10 milliGRAM(s) IV Push every 8 hours PRN SBP >160  lactulose Syrup 15 Gram(s) Oral two times a day PRN constipation      Allergies    No Known Allergies      PHYSICAL EXAM:   Vital Signs:  Vital Signs Last 24 Hrs  T(C): 36.4 (20 Dec 2023 12:01), Max: 36.9 (20 Dec 2023 05:15)  T(F): 97.6 (20 Dec 2023 12:01), Max: 98.4 (20 Dec 2023 05:15)  HR: 89 (20 Dec 2023 12:01) (89 - 93)  BP: 181/84 (20 Dec 2023 12:01) (170/80 - 183/88)  BP(mean): --  RR: 19 (20 Dec 2023 12:01) (18 - 19)  SpO2: 94% (20 Dec 2023 12:01) (94% - 100%)    Parameters below as of 20 Dec 2023 12:01  Patient On (Oxygen Delivery Method): room air      Daily     Daily Weight in k.3 (20 Dec 2023 05:15)    GENERAL:  Appears stated age  HEENT:  NC/AT  CHEST:  Full & symmetric excursion  HEART:  Regular rhythm  ABDOMEN:  Soft, non-tender, non-distended, +peg  EXTEREMITIES:  no cyanosis  SKIN:  No rash  NEURO:  Alert      LABS:                        9.6    6.47  )-----------( 132      ( 20 Dec 2023 08:42 )             26.5     12    145  |  111<H>  |  12  ----------------------------<  124<H>  4.0   |  30  |  0.56    Ca    8.4<L>      20 Dec 2023 08:42    TPro  5.8<L>  /  Alb  2.4<L>  /  TBili  0.8  /  DBili  x   /  AST  28  /  ALT  29  /  AlkPhos  88  12-    PT/INR - ( 19 Dec 2023 12:50 )   PT: 17.4 sec;   INR: 1.50 ratio         PTT - ( 19 Dec 2023 12:50 )  PTT:70.6 sec  Urinalysis Basic - ( 20 Dec 2023 08:42 )    Color: x / Appearance: x / SG: x / pH: x  Gluc: 124 mg/dL / Ketone: x  / Bili: x / Urobili: x   Blood: x / Protein: x / Nitrite: x   Leuk Esterase: x / RBC: x / WBC x   Sq Epi: x / Non Sq Epi: x / Bacteria: x

## 2023-12-20 NOTE — PROGRESS NOTE ADULT - ASSESSMENT
odynophagia    s/p egd/peg  ok to use peg for feeds, water, meds  monitor tolerance to TF  flush peg after use  can begin d/c planning from GI standpoint  d/w pt and wife

## 2023-12-21 LAB
ALBUMIN SERPL ELPH-MCNC: 2.4 G/DL — LOW (ref 3.3–5)
ALBUMIN SERPL ELPH-MCNC: 2.4 G/DL — LOW (ref 3.3–5)
ALP SERPL-CCNC: 101 U/L — SIGNIFICANT CHANGE UP (ref 40–120)
ALP SERPL-CCNC: 101 U/L — SIGNIFICANT CHANGE UP (ref 40–120)
ALT FLD-CCNC: 35 U/L — SIGNIFICANT CHANGE UP (ref 12–78)
ALT FLD-CCNC: 35 U/L — SIGNIFICANT CHANGE UP (ref 12–78)
ANION GAP SERPL CALC-SCNC: 2 MMOL/L — LOW (ref 5–17)
ANION GAP SERPL CALC-SCNC: 2 MMOL/L — LOW (ref 5–17)
AST SERPL-CCNC: 30 U/L — SIGNIFICANT CHANGE UP (ref 15–37)
AST SERPL-CCNC: 30 U/L — SIGNIFICANT CHANGE UP (ref 15–37)
BILIRUB SERPL-MCNC: 1 MG/DL — SIGNIFICANT CHANGE UP (ref 0.2–1.2)
BILIRUB SERPL-MCNC: 1 MG/DL — SIGNIFICANT CHANGE UP (ref 0.2–1.2)
BUN SERPL-MCNC: 12 MG/DL — SIGNIFICANT CHANGE UP (ref 7–23)
BUN SERPL-MCNC: 12 MG/DL — SIGNIFICANT CHANGE UP (ref 7–23)
CALCIUM SERPL-MCNC: 8.4 MG/DL — LOW (ref 8.5–10.1)
CALCIUM SERPL-MCNC: 8.4 MG/DL — LOW (ref 8.5–10.1)
CHLORIDE SERPL-SCNC: 111 MMOL/L — HIGH (ref 96–108)
CHLORIDE SERPL-SCNC: 111 MMOL/L — HIGH (ref 96–108)
CO2 SERPL-SCNC: 31 MMOL/L — SIGNIFICANT CHANGE UP (ref 22–31)
CO2 SERPL-SCNC: 31 MMOL/L — SIGNIFICANT CHANGE UP (ref 22–31)
CREAT SERPL-MCNC: 0.6 MG/DL — SIGNIFICANT CHANGE UP (ref 0.5–1.3)
CREAT SERPL-MCNC: 0.6 MG/DL — SIGNIFICANT CHANGE UP (ref 0.5–1.3)
EGFR: 101 ML/MIN/1.73M2 — SIGNIFICANT CHANGE UP
EGFR: 101 ML/MIN/1.73M2 — SIGNIFICANT CHANGE UP
GLUCOSE SERPL-MCNC: 203 MG/DL — HIGH (ref 70–99)
GLUCOSE SERPL-MCNC: 203 MG/DL — HIGH (ref 70–99)
HCT VFR BLD CALC: 27.8 % — LOW (ref 39–50)
HCT VFR BLD CALC: 27.8 % — LOW (ref 39–50)
HGB BLD-MCNC: 9.7 G/DL — LOW (ref 13–17)
HGB BLD-MCNC: 9.7 G/DL — LOW (ref 13–17)
MAGNESIUM SERPL-MCNC: 1.6 MG/DL — SIGNIFICANT CHANGE UP (ref 1.6–2.6)
MAGNESIUM SERPL-MCNC: 1.6 MG/DL — SIGNIFICANT CHANGE UP (ref 1.6–2.6)
MCHC RBC-ENTMCNC: 30.4 PG — SIGNIFICANT CHANGE UP (ref 27–34)
MCHC RBC-ENTMCNC: 30.4 PG — SIGNIFICANT CHANGE UP (ref 27–34)
MCHC RBC-ENTMCNC: 34.9 GM/DL — SIGNIFICANT CHANGE UP (ref 32–36)
MCHC RBC-ENTMCNC: 34.9 GM/DL — SIGNIFICANT CHANGE UP (ref 32–36)
MCV RBC AUTO: 87.1 FL — SIGNIFICANT CHANGE UP (ref 80–100)
MCV RBC AUTO: 87.1 FL — SIGNIFICANT CHANGE UP (ref 80–100)
NRBC # BLD: 0 /100 WBCS — SIGNIFICANT CHANGE UP (ref 0–0)
NRBC # BLD: 0 /100 WBCS — SIGNIFICANT CHANGE UP (ref 0–0)
PHOSPHATE SERPL-MCNC: 2.4 MG/DL — LOW (ref 2.5–4.5)
PHOSPHATE SERPL-MCNC: 2.4 MG/DL — LOW (ref 2.5–4.5)
PLATELET # BLD AUTO: 146 K/UL — LOW (ref 150–400)
PLATELET # BLD AUTO: 146 K/UL — LOW (ref 150–400)
POTASSIUM SERPL-MCNC: 3.6 MMOL/L — SIGNIFICANT CHANGE UP (ref 3.5–5.3)
POTASSIUM SERPL-MCNC: 3.6 MMOL/L — SIGNIFICANT CHANGE UP (ref 3.5–5.3)
POTASSIUM SERPL-SCNC: 3.6 MMOL/L — SIGNIFICANT CHANGE UP (ref 3.5–5.3)
POTASSIUM SERPL-SCNC: 3.6 MMOL/L — SIGNIFICANT CHANGE UP (ref 3.5–5.3)
PREALB SERPL-MCNC: 10 MG/DL — LOW (ref 20–40)
PREALB SERPL-MCNC: 10 MG/DL — LOW (ref 20–40)
PROT SERPL-MCNC: 5.9 G/DL — LOW (ref 6–8.3)
PROT SERPL-MCNC: 5.9 G/DL — LOW (ref 6–8.3)
RBC # BLD: 3.19 M/UL — LOW (ref 4.2–5.8)
RBC # BLD: 3.19 M/UL — LOW (ref 4.2–5.8)
RBC # FLD: 13.9 % — SIGNIFICANT CHANGE UP (ref 10.3–14.5)
RBC # FLD: 13.9 % — SIGNIFICANT CHANGE UP (ref 10.3–14.5)
SODIUM SERPL-SCNC: 144 MMOL/L — SIGNIFICANT CHANGE UP (ref 135–145)
SODIUM SERPL-SCNC: 144 MMOL/L — SIGNIFICANT CHANGE UP (ref 135–145)
WBC # BLD: 6.43 K/UL — SIGNIFICANT CHANGE UP (ref 3.8–10.5)
WBC # BLD: 6.43 K/UL — SIGNIFICANT CHANGE UP (ref 3.8–10.5)
WBC # FLD AUTO: 6.43 K/UL — SIGNIFICANT CHANGE UP (ref 3.8–10.5)
WBC # FLD AUTO: 6.43 K/UL — SIGNIFICANT CHANGE UP (ref 3.8–10.5)

## 2023-12-21 RX ORDER — FLUDROCORTISONE ACETATE 0.1 MG/1
0.2 TABLET ORAL DAILY
Refills: 0 | Status: DISCONTINUED | OUTPATIENT
Start: 2023-12-21 | End: 2023-12-26

## 2023-12-21 RX ORDER — ATENOLOL 25 MG/1
25 TABLET ORAL
Refills: 0 | Status: DISCONTINUED | OUTPATIENT
Start: 2023-12-21 | End: 2023-12-24

## 2023-12-21 RX ORDER — SODIUM,POTASSIUM PHOSPHATES 278-250MG
1 POWDER IN PACKET (EA) ORAL ONCE
Refills: 0 | Status: COMPLETED | OUTPATIENT
Start: 2023-12-21 | End: 2023-12-21

## 2023-12-21 RX ADMIN — Medication 3 MILLIGRAM(S): at 21:39

## 2023-12-21 RX ADMIN — Medication 2000 UNIT(S): at 12:00

## 2023-12-21 RX ADMIN — HEPARIN SODIUM 5000 UNIT(S): 5000 INJECTION INTRAVENOUS; SUBCUTANEOUS at 17:55

## 2023-12-21 RX ADMIN — ATENOLOL 25 MILLIGRAM(S): 25 TABLET ORAL at 17:53

## 2023-12-21 RX ADMIN — DEXTROSE MONOHYDRATE, SODIUM CHLORIDE, AND POTASSIUM CHLORIDE 60 MILLILITER(S): 50; .745; 4.5 INJECTION, SOLUTION INTRAVENOUS at 08:27

## 2023-12-21 RX ADMIN — Medication 5 MILLIGRAM(S): at 05:31

## 2023-12-21 RX ADMIN — FLUDROCORTISONE ACETATE 0.3 MILLIGRAM(S): 0.1 TABLET ORAL at 05:31

## 2023-12-21 RX ADMIN — HEPARIN SODIUM 5000 UNIT(S): 5000 INJECTION INTRAVENOUS; SUBCUTANEOUS at 05:31

## 2023-12-21 RX ADMIN — FLUDROCORTISONE ACETATE 0.2 MILLIGRAM(S): 0.1 TABLET ORAL at 17:52

## 2023-12-21 RX ADMIN — ATENOLOL 25 MILLIGRAM(S): 25 TABLET ORAL at 05:31

## 2023-12-21 RX ADMIN — Medication 1 PACKET(S): at 12:00

## 2023-12-21 RX ADMIN — SENNA PLUS 2 TABLET(S): 8.6 TABLET ORAL at 21:39

## 2023-12-21 NOTE — PROGRESS NOTE ADULT - SUBJECTIVE AND OBJECTIVE BOX
Chief Complaint: Psychosis    Interval Events: No events overnight.    Review of Systems:  General: No fevers, chills, weight gain  Skin: No rashes, color changes  Cardiovascular: No chest pain, orthopnea  Respiratory: No shortness of breath, cough  Gastrointestinal: No nausea, abdominal pain  Genitourinary: No incontinence, pain with urination  Musculoskeletal: No pain, swelling, decreased range of motion  Neurological: No headache, weakness  Psychiatric: No depression, anxiety  Endocrine: No weight gain, increased thirst  All other systems are comprehensively negative.    Physical Exam:  Vital Signs Last 24 Hrs  T(C): 36.7 (21 Dec 2023 05:01), Max: 36.7 (20 Dec 2023 20:00)  T(F): 98 (21 Dec 2023 05:01), Max: 98 (20 Dec 2023 20:00)  HR: 99 (21 Dec 2023 05:01) (88 - 99)  BP: 180/78 (21 Dec 2023 05:01) (180/78 - 187/79)  BP(mean): --  RR: 18 (21 Dec 2023 05:01) (18 - 19)  SpO2: 96% (21 Dec 2023 05:01) (94% - 96%)  Parameters below as of 21 Dec 2023 05:01  Patient On (Oxygen Delivery Method): room air  General: NAD  HEENT: MMM  Neck: No JVD, no carotid bruit  Lungs: CTAB  CV: RRR, nl S1/S2, no M/R/G  Abdomen: S/NT/ND, +BS  Extremities: No LE edema, no cyanosis  Neuro: AAOx3, non-focal  Skin: No rash    Labs:    12-20    145  |  111<H>  |  12  ----------------------------<  124<H>  4.0   |  30  |  0.56    Ca    8.4<L>      20 Dec 2023 08:42    TPro  5.8<L>  /  Alb  2.4<L>  /  TBili  0.8  /  DBili  x   /  AST  28  /  ALT  29  /  AlkPhos  88  12-19                        9.6    6.47  )-----------( 132      ( 20 Dec 2023 08:42 )             26.5

## 2023-12-21 NOTE — PROGRESS NOTE ADULT - SUBJECTIVE AND OBJECTIVE BOX
Patient is a 73y old  Male who presents with a chief complaint of psychosis (13 Dec 2023 13:03)        INTERVAL HPI/OVERNIGHT EVENTS:   no complaints  pt seen and examined         Vital Signs Last 24 Hrs  T(C): 36.7 (21 Dec 2023 11:53), Max: 36.7 (20 Dec 2023 20:00)  T(F): 98 (21 Dec 2023 11:53), Max: 98 (20 Dec 2023 20:00)  HR: 91 (21 Dec 2023 11:53) (88 - 99)  BP: 164/79 (21 Dec 2023 11:53) (164/79 - 187/79)  BP(mean): --  RR: 18 (21 Dec 2023 11:53) (18 - 18)  SpO2: 95% (21 Dec 2023 11:53) (95% - 96%)    Parameters below as of 21 Dec 2023 11:53  Patient On (Oxygen Delivery Method): room air        acetaminophen     Tablet .. 650 milliGRAM(s) Oral every 6 hours PRN  atenolol  Tablet 25 milliGRAM(s) Oral two times a day  cholecalciferol 2000 Unit(s) Oral daily  dextrose 5% + sodium chloride 0.9% with potassium chloride 20 mEq/L 1000 milliLiter(s) IV Continuous <Continuous>  FIRST- Mouthwash  BLM 5 milliLiter(s) Swish and Spit three times a day  fludroCORTISONE 0.2 milliGRAM(s) Oral daily  heparin   Injectable 5000 Unit(s) SubCutaneous every 12 hours  hydrALAZINE Injectable 10 milliGRAM(s) IV Push every 8 hours PRN  lactulose Syrup 15 Gram(s) Oral two times a day PRN  melatonin 3 milliGRAM(s) Oral at bedtime  predniSONE   Tablet 5 milliGRAM(s) Oral daily  senna 2 Tablet(s) Oral at bedtime      PHYSICAL EXAM:  GENERAL: NAD   EYES: conjunctiva and sclera clear  ENMT: Moist mucous membranes  NECK: Supple, No JVD, Normal thyroid  CHEST/LUNG: non labored, cta b/l  HEART: Regular rate and rhythm; No murmurs, rubs, or gallops  ABDOMEN: Soft, Nontender, Nondistended; Bowel sounds present  EXTREMITIES:  2+ Peripheral Pulses, No clubbing, no cyanosis, no edema  LYMPH: No lymphadenopathy noted  SKIN: No rashes or lesions  NEURO: no focal deficits    Consultant(s) Notes Reviewed:  [x ] YES  [ ] NO  Care Discussed with Consultants/Other Providers [ x] YES  [ ] NO    LABS:                        9.7    6.43  )-----------( 146      ( 21 Dec 2023 09:22 )             27.8     12-21    144  |  111<H>  |  12  ----------------------------<  203<H>  3.6   |  31  |  0.60    Ca    8.4<L>      21 Dec 2023 09:22  Phos  2.4     12-21  Mg     1.6     12-21    TPro  5.9<L>  /  Alb  2.4<L>  /  TBili  1.0  /  DBili  x   /  AST  30  /  ALT  35  /  AlkPhos  101  12-21      Urinalysis Basic - ( 21 Dec 2023 09:22 )    Color: x / Appearance: x / SG: x / pH: x  Gluc: 203 mg/dL / Ketone: x  / Bili: x / Urobili: x   Blood: x / Protein: x / Nitrite: x   Leuk Esterase: x / RBC: x / WBC x   Sq Epi: x / Non Sq Epi: x / Bacteria: x      CAPILLARY BLOOD GLUCOSE            Urinalysis Basic - ( 21 Dec 2023 09:22 )    Color: x / Appearance: x / SG: x / pH: x  Gluc: 203 mg/dL / Ketone: x  / Bili: x / Urobili: x   Blood: x / Protein: x / Nitrite: x   Leuk Esterase: x / RBC: x / WBC x   Sq Epi: x / Non Sq Epi: x / Bacteria: x          RADIOLOGY & ADDITIONAL TESTS:    Imaging Personally Reviewed  Reviewed consultants input

## 2023-12-21 NOTE — PROGRESS NOTE ADULT - SUBJECTIVE AND OBJECTIVE BOX
[facility]  Walter Camarena PA-C  [address] [place] [address]  [phone]      INTERVAL HPI/OVERNIGHT EVENTS:  Pt s/e with wife at bedside  Tolerating TF    MEDICATIONS  (STANDING):  atenolol  Tablet 25 milliGRAM(s) Oral daily  cholecalciferol 2000 Unit(s) Oral daily  dextrose 5% + sodium chloride 0.9% with potassium chloride 20 mEq/L 1000 milliLiter(s) (60 mL/Hr) IV Continuous <Continuous>  FIRST- Mouthwash  BLM 5 milliLiter(s) Swish and Spit three times a day  fludroCORTISONE 0.3 milliGRAM(s) Oral daily  heparin   Injectable 5000 Unit(s) SubCutaneous every 12 hours  melatonin 3 milliGRAM(s) Oral at bedtime  predniSONE   Tablet 5 milliGRAM(s) Oral daily  senna 2 Tablet(s) Oral at bedtime    MEDICATIONS  (PRN):  acetaminophen     Tablet .. 650 milliGRAM(s) Oral every 6 hours PRN Temp greater or equal to 38.5C (101.3F), Moderate Pain (4 - 6)  hydrALAZINE Injectable 10 milliGRAM(s) IV Push every 8 hours PRN SBP >160  lactulose Syrup 15 Gram(s) Oral two times a day PRN constipation      Allergies  No Known Allergies      PHYSICAL EXAM:   Vital Signs:  Vital Signs Last 24 Hrs  T(C): 36.7 (21 Dec 2023 11:53), Max: 36.7 (20 Dec 2023 20:00)  T(F): 98 (21 Dec 2023 11:53), Max: 98 (20 Dec 2023 20:00)  HR: 91 (21 Dec 2023 11:53) (88 - 99)  BP: 164/79 (21 Dec 2023 11:53) (164/79 - 187/79)  BP(mean): --  RR: 18 (21 Dec 2023 11:53) (18 - 18)  SpO2: 95% (21 Dec 2023 11:53) (95% - 96%)    Parameters below as of 21 Dec 2023 11:53  Patient On (Oxygen Delivery Method): room air      GENERAL:  Appears stated age  HEENT:  NC/AT  CHEST:  Full & symmetric excursion  HEART:  Regular rhythm  ABDOMEN:  Soft, non-tender, non-distended  EXTEREMITIES:  no cyanosis  SKIN:  No rash  NEURO:  Alert      LABS:                        9.7    6.43  )-----------( 146      ( 21 Dec 2023 09:22 )             27.8     12-21    144  |  111<H>  |  12  ----------------------------<  203<H>  3.6   |  31  |  0.60    Ca    8.4<L>      21 Dec 2023 09:22  Phos  2.4     12-21  Mg     1.6     12-21    TPro  5.9<L>  /  Alb  2.4<L>  /  TBili  1.0  /  DBili  x   /  AST  30  /  ALT  35  /  AlkPhos  101  12-21    PT/INR - ( 19 Dec 2023 12:50 )   PT: 17.4 sec;   INR: 1.50 ratio         PTT - ( 19 Dec 2023 12:50 )  PTT:70.6 sec  Urinalysis Basic - ( 21 Dec 2023 09:22 )    Color: x / Appearance: x / SG: x / pH: x  Gluc: 203 mg/dL / Ketone: x  / Bili: x / Urobili: x   Blood: x / Protein: x / Nitrite: x   Leuk Esterase: x / RBC: x / WBC x   Sq Epi: x / Non Sq Epi: x / Bacteria: x     [facility]  Walter Camarena PA-C  [address] [place] [address]  [phone]      INTERVAL HPI/OVERNIGHT EVENTS:  Pt s/e with wife at bedside  Tolerating TF    MEDICATIONS  (STANDING):  atenolol  Tablet 25 milliGRAM(s) Oral daily  cholecalciferol 2000 Unit(s) Oral daily  dextrose 5% + sodium chloride 0.9% with potassium chloride 20 mEq/L 1000 milliLiter(s) (60 mL/Hr) IV Continuous <Continuous>  FIRST- Mouthwash  BLM 5 milliLiter(s) Swish and Spit three times a day  fludroCORTISONE 0.3 milliGRAM(s) Oral daily  heparin   Injectable 5000 Unit(s) SubCutaneous every 12 hours  melatonin 3 milliGRAM(s) Oral at bedtime  predniSONE   Tablet 5 milliGRAM(s) Oral daily  senna 2 Tablet(s) Oral at bedtime    MEDICATIONS  (PRN):  acetaminophen     Tablet .. 650 milliGRAM(s) Oral every 6 hours PRN Temp greater or equal to 38.5C (101.3F), Moderate Pain (4 - 6)  hydrALAZINE Injectable 10 milliGRAM(s) IV Push every 8 hours PRN SBP >160  lactulose Syrup 15 Gram(s) Oral two times a day PRN constipation      Allergies  No Known Allergies      PHYSICAL EXAM:   Vital Signs:  Vital Signs Last 24 Hrs  T(C): 36.7 (21 Dec 2023 11:53), Max: 36.7 (20 Dec 2023 20:00)  T(F): 98 (21 Dec 2023 11:53), Max: 98 (20 Dec 2023 20:00)  HR: 91 (21 Dec 2023 11:53) (88 - 99)  BP: 164/79 (21 Dec 2023 11:53) (164/79 - 187/79)  BP(mean): --  RR: 18 (21 Dec 2023 11:53) (18 - 18)  SpO2: 95% (21 Dec 2023 11:53) (95% - 96%)    Parameters below as of 21 Dec 2023 11:53  Patient On (Oxygen Delivery Method): room air      GENERAL:  Appears stated age  HEENT:  NC/AT  CHEST:  Full & symmetric excursion  HEART:  Regular rhythm  ABDOMEN:  Soft, non-tender, non-distended  EXTEREMITIES:  no cyanosis  SKIN:  No rash  NEURO:  Alert      LABS:                        9.7    6.43  )-----------( 146      ( 21 Dec 2023 09:22 )             27.8     12-21    144  |  111<H>  |  12  ----------------------------<  203<H>  3.6   |  31  |  0.60    Ca    8.4<L>      21 Dec 2023 09:22  Phos  2.4     12-21  Mg     1.6     12-21    TPro  5.9<L>  /  Alb  2.4<L>  /  TBili  1.0  /  DBili  x   /  AST  30  /  ALT  35  /  AlkPhos  101  12-21    PT/INR - ( 19 Dec 2023 12:50 )   PT: 17.4 sec;   INR: 1.50 ratio         PTT - ( 19 Dec 2023 12:50 )  PTT:70.6 sec  Urinalysis Basic - ( 21 Dec 2023 09:22 )    Color: x / Appearance: x / SG: x / pH: x  Gluc: 203 mg/dL / Ketone: x  / Bili: x / Urobili: x   Blood: x / Protein: x / Nitrite: x   Leuk Esterase: x / RBC: x / WBC x   Sq Epi: x / Non Sq Epi: x / Bacteria: x

## 2023-12-21 NOTE — PROGRESS NOTE ADULT - ASSESSMENT
73 M pmh of HTN, ITP, APL syndrome, generalized anxiety disorder, OCD, newly dx aggressive Merkel Cell cancer of forehead- pt's oncologist  Dr. Eng, at [facility] on Keytruda infusions with good response per wife  was brought to ED for worsening psychosis for the past few days, pt lately with difficulty swallowing, decreased po intake    Psychosis  psych and neuro following  cont current med regimen - pt on remeron, luvox, zyprexa-on hold dt npo status  d/w psych- to hold off on psych meds for now as pt remained stable without meds    #HTN/Orthostatic hypotension  ? psych med induced  adrenal insufficiency  continue prednisone and florinef   compressions stockings  permissive hypertension   endo fu    UTI-sp abx    #Anemia- stable hb  d/w heme    #severe protein malnutrition  sp PEG placement  increase feeds to goal as tolerated  lytes- replete and monitor    #elev coags- likely factors and vit k deffi dt malnutrition  check lfts    #DVT ppx-  hep sq      [facility]   [phone] patient understands and agrees with plan. Questions/concerns addressed 73 M pmh of HTN, ITP, APL syndrome, generalized anxiety disorder, OCD, newly dx aggressive Merkel Cell cancer of forehead- pt's oncologist  Dr. Eng, at [facility] on Keytruda infusions with good response per wife  was brought to ED for worsening psychosis for the past few days, pt lately with difficulty swallowing, decreased po intake    Psychosis  psych and neuro following  cont current med regimen - pt on remeron, luvox, zyprexa-on hold dt npo status  d/w psych- to hold off on psych meds for now as pt remained stable without meds    #HTN/Orthostatic hypotension  ? psych med induced  adrenal insufficiency  continue prednisone and florinef   compressions stockings  permissive hypertension   endo fu    UTI-sp abx    #Anemia- stable hb  d/w heme    #severe protein malnutrition  sp PEG placement  increase feeds to goal as tolerated  lytes- replete and monitor    #elev coags- likely factors and vit k deffi dt malnutrition  check lfts    #DVT ppx-  hep sq      [facility]   [phone]

## 2023-12-22 LAB
ALBUMIN SERPL ELPH-MCNC: 2.3 G/DL — LOW (ref 3.3–5)
ALBUMIN SERPL ELPH-MCNC: 2.3 G/DL — LOW (ref 3.3–5)
ALP SERPL-CCNC: 86 U/L — SIGNIFICANT CHANGE UP (ref 40–120)
ALP SERPL-CCNC: 86 U/L — SIGNIFICANT CHANGE UP (ref 40–120)
ALT FLD-CCNC: 31 U/L — SIGNIFICANT CHANGE UP (ref 12–78)
ALT FLD-CCNC: 31 U/L — SIGNIFICANT CHANGE UP (ref 12–78)
ANION GAP SERPL CALC-SCNC: 3 MMOL/L — LOW (ref 5–17)
ANION GAP SERPL CALC-SCNC: 3 MMOL/L — LOW (ref 5–17)
AST SERPL-CCNC: 21 U/L — SIGNIFICANT CHANGE UP (ref 15–37)
AST SERPL-CCNC: 21 U/L — SIGNIFICANT CHANGE UP (ref 15–37)
BILIRUB SERPL-MCNC: 0.7 MG/DL — SIGNIFICANT CHANGE UP (ref 0.2–1.2)
BILIRUB SERPL-MCNC: 0.7 MG/DL — SIGNIFICANT CHANGE UP (ref 0.2–1.2)
BUN SERPL-MCNC: 10 MG/DL — SIGNIFICANT CHANGE UP (ref 7–23)
BUN SERPL-MCNC: 10 MG/DL — SIGNIFICANT CHANGE UP (ref 7–23)
CALCIUM SERPL-MCNC: 8 MG/DL — LOW (ref 8.5–10.1)
CALCIUM SERPL-MCNC: 8 MG/DL — LOW (ref 8.5–10.1)
CHLORIDE SERPL-SCNC: 109 MMOL/L — HIGH (ref 96–108)
CHLORIDE SERPL-SCNC: 109 MMOL/L — HIGH (ref 96–108)
CO2 SERPL-SCNC: 31 MMOL/L — SIGNIFICANT CHANGE UP (ref 22–31)
CO2 SERPL-SCNC: 31 MMOL/L — SIGNIFICANT CHANGE UP (ref 22–31)
CREAT SERPL-MCNC: 0.55 MG/DL — SIGNIFICANT CHANGE UP (ref 0.5–1.3)
CREAT SERPL-MCNC: 0.55 MG/DL — SIGNIFICANT CHANGE UP (ref 0.5–1.3)
EGFR: 103 ML/MIN/1.73M2 — SIGNIFICANT CHANGE UP
EGFR: 103 ML/MIN/1.73M2 — SIGNIFICANT CHANGE UP
GLUCOSE SERPL-MCNC: 188 MG/DL — HIGH (ref 70–99)
GLUCOSE SERPL-MCNC: 188 MG/DL — HIGH (ref 70–99)
HCT VFR BLD CALC: 27.3 % — LOW (ref 39–50)
HCT VFR BLD CALC: 27.3 % — LOW (ref 39–50)
HGB BLD-MCNC: 9.5 G/DL — LOW (ref 13–17)
HGB BLD-MCNC: 9.5 G/DL — LOW (ref 13–17)
MAGNESIUM SERPL-MCNC: 1.4 MG/DL — LOW (ref 1.6–2.6)
MAGNESIUM SERPL-MCNC: 1.4 MG/DL — LOW (ref 1.6–2.6)
MCHC RBC-ENTMCNC: 30.7 PG — SIGNIFICANT CHANGE UP (ref 27–34)
MCHC RBC-ENTMCNC: 30.7 PG — SIGNIFICANT CHANGE UP (ref 27–34)
MCHC RBC-ENTMCNC: 34.8 GM/DL — SIGNIFICANT CHANGE UP (ref 32–36)
MCHC RBC-ENTMCNC: 34.8 GM/DL — SIGNIFICANT CHANGE UP (ref 32–36)
MCV RBC AUTO: 88.3 FL — SIGNIFICANT CHANGE UP (ref 80–100)
MCV RBC AUTO: 88.3 FL — SIGNIFICANT CHANGE UP (ref 80–100)
NRBC # BLD: 0 /100 WBCS — SIGNIFICANT CHANGE UP (ref 0–0)
NRBC # BLD: 0 /100 WBCS — SIGNIFICANT CHANGE UP (ref 0–0)
PHOSPHATE SERPL-MCNC: 2.4 MG/DL — LOW (ref 2.5–4.5)
PHOSPHATE SERPL-MCNC: 2.4 MG/DL — LOW (ref 2.5–4.5)
PLATELET # BLD AUTO: 120 K/UL — LOW (ref 150–400)
PLATELET # BLD AUTO: 120 K/UL — LOW (ref 150–400)
POTASSIUM SERPL-MCNC: 3.7 MMOL/L — SIGNIFICANT CHANGE UP (ref 3.5–5.3)
POTASSIUM SERPL-MCNC: 3.7 MMOL/L — SIGNIFICANT CHANGE UP (ref 3.5–5.3)
POTASSIUM SERPL-SCNC: 3.7 MMOL/L — SIGNIFICANT CHANGE UP (ref 3.5–5.3)
POTASSIUM SERPL-SCNC: 3.7 MMOL/L — SIGNIFICANT CHANGE UP (ref 3.5–5.3)
PROT SERPL-MCNC: 5.5 G/DL — LOW (ref 6–8.3)
PROT SERPL-MCNC: 5.5 G/DL — LOW (ref 6–8.3)
RBC # BLD: 3.09 M/UL — LOW (ref 4.2–5.8)
RBC # BLD: 3.09 M/UL — LOW (ref 4.2–5.8)
RBC # FLD: 14.1 % — SIGNIFICANT CHANGE UP (ref 10.3–14.5)
RBC # FLD: 14.1 % — SIGNIFICANT CHANGE UP (ref 10.3–14.5)
SODIUM SERPL-SCNC: 143 MMOL/L — SIGNIFICANT CHANGE UP (ref 135–145)
SODIUM SERPL-SCNC: 143 MMOL/L — SIGNIFICANT CHANGE UP (ref 135–145)
WBC # BLD: 6.56 K/UL — SIGNIFICANT CHANGE UP (ref 3.8–10.5)
WBC # BLD: 6.56 K/UL — SIGNIFICANT CHANGE UP (ref 3.8–10.5)
WBC # FLD AUTO: 6.56 K/UL — SIGNIFICANT CHANGE UP (ref 3.8–10.5)
WBC # FLD AUTO: 6.56 K/UL — SIGNIFICANT CHANGE UP (ref 3.8–10.5)

## 2023-12-22 RX ORDER — MAGNESIUM SULFATE 500 MG/ML
2 VIAL (ML) INJECTION ONCE
Refills: 0 | Status: COMPLETED | OUTPATIENT
Start: 2023-12-22 | End: 2023-12-23

## 2023-12-22 RX ORDER — SODIUM,POTASSIUM PHOSPHATES 278-250MG
2 POWDER IN PACKET (EA) ORAL ONCE
Refills: 0 | Status: COMPLETED | OUTPATIENT
Start: 2023-12-22 | End: 2023-12-23

## 2023-12-22 RX ORDER — PHENAZOPYRIDINE HCL 100 MG
100 TABLET ORAL ONCE
Refills: 0 | Status: COMPLETED | OUTPATIENT
Start: 2023-12-22 | End: 2023-12-23

## 2023-12-22 RX ADMIN — Medication 5 MILLIGRAM(S): at 05:19

## 2023-12-22 RX ADMIN — HEPARIN SODIUM 5000 UNIT(S): 5000 INJECTION INTRAVENOUS; SUBCUTANEOUS at 05:20

## 2023-12-22 RX ADMIN — Medication 2000 UNIT(S): at 13:09

## 2023-12-22 RX ADMIN — FLUDROCORTISONE ACETATE 0.2 MILLIGRAM(S): 0.1 TABLET ORAL at 05:20

## 2023-12-22 RX ADMIN — ATENOLOL 25 MILLIGRAM(S): 25 TABLET ORAL at 18:57

## 2023-12-22 RX ADMIN — HEPARIN SODIUM 5000 UNIT(S): 5000 INJECTION INTRAVENOUS; SUBCUTANEOUS at 18:49

## 2023-12-22 RX ADMIN — ATENOLOL 25 MILLIGRAM(S): 25 TABLET ORAL at 05:19

## 2023-12-22 RX ADMIN — Medication 3 MILLIGRAM(S): at 21:41

## 2023-12-22 NOTE — CHART NOTE - [id number]_A_CORE
nutrition follow up ( chart reviewed, events noted) Brief hx:      Factors impacting intake: [ ] none [ ] nausea  [ ] vomiting [ ] diarrhea [ ] constipation  [ ]chewing problems [ ] swallowing issues  [ ] other:     Diet Prescription: Diet, NPO with Tube Feed:   Tube Feeding Modality: Gastrostomy  Jevity 1.5  Total Volume for 24 Hours (mL): 1530  Continuous  Starting Tube Feed Rate {mL per Hour}: 25  Increase Tube Feed Rate by (mL): 10     Every 8 hours  Until Goal Tube Feed Rate (mL per Hour): 85  Tube Feed Duration (in Hours): 18  Tube Feed Start Time: 06:00  Pump   Rate (mL per Hour): 30   Frequency: Every Hour    Duration (Hours): 18  Free Water Flush Instructions:  150ml water ac/pc (12-19-23 @ 12:11)    Intake:     Current Weight:   % Weight Change    Pertinent Medications: MEDICATIONS  (STANDING):  atenolol  Tablet 25 milliGRAM(s) Oral two times a day  cholecalciferol 2000 Unit(s) Oral daily  dextrose 5% + sodium chloride 0.9% with potassium chloride 20 mEq/L 1000 milliLiter(s) (60 mL/Hr) IV Continuous <Continuous>  FIRST- Mouthwash  BLM 5 milliLiter(s) Swish and Spit three times a day  fludroCORTISONE 0.2 milliGRAM(s) Oral daily  heparin   Injectable 5000 Unit(s) SubCutaneous every 12 hours  melatonin 3 milliGRAM(s) Oral at bedtime  predniSONE   Tablet 5 milliGRAM(s) Oral daily  senna 2 Tablet(s) Oral at bedtime    MEDICATIONS  (PRN):  acetaminophen     Tablet .. 650 milliGRAM(s) Oral every 6 hours PRN Temp greater or equal to 38.5C (101.3F), Moderate Pain (4 - 6)  hydrALAZINE Injectable 10 milliGRAM(s) IV Push every 8 hours PRN SBP >160  lactulose Syrup 15 Gram(s) Oral two times a day PRN constipation    Pertinent Labs: 12-21 Na144 mmol/L Glu 203 mg/dL<H> K+ 3.6 mmol/L Cr  0.60 mg/dL BUN 12 mg/dL 12-21 Phos 2.4 mg/dL<L> 12-21 Alb 2.4 g/dL<L> 12-21 PAB 10 mg/dL<L>     CAPILLARY BLOOD GLUCOSE        Skin:     Estimated Needs:   [ ] no change since previous assessment  [ ] recalculated:     Previous Nutrition Diagnosis:   [ ] Inadequate Energy Intake [ ]Inadequate Oral Intake [ ] Excessive Energy Intake   [ ] Underweight [ ] Increased Nutrient Needs [ ] Overweight/Obesity   [ ] Altered GI Function [ ] Unintended Weight Loss [ ] Food & Nutrition Related Knowledge Deficit [ ] Malnutrition     Nutrition Diagnosis is [ ] ongoing  [ ] resolved [ ] not applicable     New Nutrition Diagnosis: [ ] not applicable       Interventions:   Recommend  [ ] Change Diet To:  [ ] Nutrition Supplement  [ ] Nutrition Support  [ ] Other:     Monitoring and Evaluation:   [ ] PO intake [ x ] Tolerance to diet prescription [ x ] weights [ x ] labs[ x ] follow up per protocol  [ ] other: nutrition follow up ( chart reviewed, events noted) Brief hx:  73 M pmh of HTN, ITP, APL syndrome, generalized anxiety disorder, OCD, newly dx aggressive Merkel Cell cancer of forehead- pt's oncologist  Dr. Eng, at [facility] on Keytruda infusions with good response per wife  was brought to ED for worsening psychosis for the past few days, pt lately with difficulty swallowing, decreased po intake    Psychosis  psych and neuro following  cont current med regimen - pt on remeron, luvox, zyprexa-on hold dt npo status  d/w psych- to hold off on psych meds for now as pt remained stable without meds    #HTN/Orthostatic hypotension  ? psych med induced  adrenal insufficiency  continue prednisone and florinef   compressions stockings  permissive hypertension   endo fu    UTI-sp abx    #Anemia- stable hb  d/w heme    #severe protein malnutrition  sp PEG placement  increase feeds to goal as tolerated  lytes- replete and monitor    #elev coags- likely factors and vit k deffi dt malnutrition  check lfts    Factors impacting intake: [ ] none [ ] nausea  [ ] vomiting [ ] diarrhea [ ] constipation  [ ]chewing problems [ ] swallowing issues  [ ] other:     Diet Prescription: Diet, NPO with Tube Feed:   Tube Feeding Modality: Gastrostomy  Jevity 1.5  Total Volume for 24 Hours (mL): 1530  Continuous  Starting Tube Feed Rate {mL per Hour}: 25  Increase Tube Feed Rate by (mL): 10     Every 8 hours  Until Goal Tube Feed Rate (mL per Hour): 85  Tube Feed Duration (in Hours): 18  Tube Feed Start Time: 06:00  Pump   Rate (mL per Hour): 30   Frequency: Every Hour    Duration (Hours): 18  Free Water Flush Instructions:  150ml water ac/pc (12-19-23 @ 12:11)    Intake:     Current Weight:   % Weight Change    Pertinent Medications: MEDICATIONS  (STANDING):  atenolol  Tablet 25 milliGRAM(s) Oral two times a day  cholecalciferol 2000 Unit(s) Oral daily  dextrose 5% + sodium chloride 0.9% with potassium chloride 20 mEq/L 1000 milliLiter(s) (60 mL/Hr) IV Continuous <Continuous>  FIRST- Mouthwash  BLM 5 milliLiter(s) Swish and Spit three times a day  fludroCORTISONE 0.2 milliGRAM(s) Oral daily  heparin   Injectable 5000 Unit(s) SubCutaneous every 12 hours  melatonin 3 milliGRAM(s) Oral at bedtime  predniSONE   Tablet 5 milliGRAM(s) Oral daily  senna 2 Tablet(s) Oral at bedtime    MEDICATIONS  (PRN):  acetaminophen     Tablet .. 650 milliGRAM(s) Oral every 6 hours PRN Temp greater or equal to 38.5C (101.3F), Moderate Pain (4 - 6)  hydrALAZINE Injectable 10 milliGRAM(s) IV Push every 8 hours PRN SBP >160  lactulose Syrup 15 Gram(s) Oral two times a day PRN constipation    Pertinent Labs: 12-21 Na144 mmol/L Glu 203 mg/dL<H> K+ 3.6 mmol/L Cr  0.60 mg/dL BUN 12 mg/dL 12-21 Phos 2.4 mg/dL<L> 12-21 Alb 2.4 g/dL<L> 12-21 PAB 10 mg/dL<L>     CAPILLARY BLOOD GLUCOSE        Skin:     Estimated Needs:   [ ] no change since previous assessment  [ ] recalculated:     Previous Nutrition Diagnosis:   [ ] Inadequate Energy Intake [ ]Inadequate Oral Intake [ ] Excessive Energy Intake   [ ] Underweight [ ] Increased Nutrient Needs [ ] Overweight/Obesity   [ ] Altered GI Function [ ] Unintended Weight Loss [ ] Food & Nutrition Related Knowledge Deficit [ ] Malnutrition     Nutrition Diagnosis is [ ] ongoing  [ ] resolved [ ] not applicable     New Nutrition Diagnosis: [ ] not applicable       Interventions:   Recommend  [ ] Change Diet To:  [ ] Nutrition Supplement  [ ] Nutrition Support  [ ] Other:     Monitoring and Evaluation:   [ ] PO intake [ x ] Tolerance to diet prescription [ x ] weights [ x ] labs[ x ] follow up per protocol  [ ] other: nutrition follow up ( chart reviewed, events noted) Brief hx:  73 M pmh of HTN, ITP, APL syndrome, generalized anxiety disorder, OCD, newly dx aggressive Merkel Cell cancer of forehead- pt's oncologist  Dr. Eng, at [facility] on Keytruda infusions with good response per wife  was brought to ED for worsening psychosis for the past few days, pt lately with difficulty swallowing, decreased po intake    Psychosis  psych and neuro following  cont current med regimen - pt on remeron, luvox, zyprexa-on hold dt npo status  d/w psych- to hold off on psych meds for now as pt remained stable without meds    #HTN/Orthostatic hypotension  ? psych med induced  adrenal insufficiency  continue prednisone and florinef   compressions stockings  permissive hypertension   endo fu    UTI-sp abx    #Anemia- stable hb  d/w heme    #severe protein malnutrition  sp PEG placement  increase feeds to goal as tolerated  lytes- replete and monitor    #elev coags- likely factors and vit k deffi dt malnutrition  check lfts    Factors impacting intake: [ ] none [ ] nausea  [ ] vomiting [ ] diarrhea [ ] constipation  [ ]chewing problems [ ] swallowing issues  [ ] other:     Diet Prescription: Diet, NPO with Tube Feed:   Tube Feeding Modality: Gastrostomy  Jevity 1.5  Total Volume for 24 Hours (mL): 1530  Continuous  Starting Tube Feed Rate {mL per Hour}: 25  Increase Tube Feed Rate by (mL): 10     Every 8 hours  Until Goal Tube Feed Rate (mL per Hour): 85  Tube Feed Duration (in Hours): 18  Tube Feed Start Time: 06:00  Pump   Rate (mL per Hour): 30   Frequency: Every Hour    Duration (Hours): 18  Free Water Flush Instructions:  150ml water ac/pc (12-19-23 @ 12:11)    Intake:     Current Weight:   % Weight Change    Pertinent Medications: MEDICATIONS  (STANDING):  atenolol  Tablet 25 milliGRAM(s) Oral two times a day  cholecalciferol 2000 Unit(s) Oral daily  dextrose 5% + sodium chloride 0.9% with potassium chloride 20 mEq/L 1000 milliLiter(s) (60 mL/Hr) IV Continuous <Continuous>  FIRST- Mouthwash  BLM 5 milliLiter(s) Swish and Spit three times a day  fludroCORTISONE 0.2 milliGRAM(s) Oral daily  heparin   Injectable 5000 Unit(s) SubCutaneous every 12 hours  melatonin 3 milliGRAM(s) Oral at bedtime  predniSONE   Tablet 5 milliGRAM(s) Oral daily  senna 2 Tablet(s) Oral at bedtime    MEDICATIONS  (PRN):  acetaminophen     Tablet .. 650 milliGRAM(s) Oral every 6 hours PRN Temp greater or equal to 38.5C (101.3F), Moderate Pain (4 - 6)  hydrALAZINE Injectable 10 milliGRAM(s) IV Push every 8 hours PRN SBP >160  lactulose Syrup 15 Gram(s) Oral two times a day PRN constipation    Pertinent Labs: 12-21 Na144 mmol/L Glu 203 mg/dL<H> K+ 3.6 mmol/L Cr  0.60 mg/dL BUN 12 mg/dL 12-21 Phos 2.4 mg/dL<L> 12-21 Alb 2.4 g/dL<L> 12-21 PAB 10 mg/dL<L>     CAPILLARY BLOOD GLUCOSE        Skin:     Estimated Needs:   [ ] no change since previous assessment  [ ] recalculated:     Previous Nutrition Diagnosis:   [ ] Inadequate Energy Intake [ ]Inadequate Oral Intake [ ] Excessive Energy Intake   [ ] Underweight [ ] Increased Nutrient Needs [ ] Overweight/Obesity   [ ] Altered GI Function [ ] Unintended Weight Loss [ ] Food & Nutrition Related Knowledge Deficit [ ] Malnutrition     Nutrition Diagnosis is [ ] ongoing  [ ] resolved [ ] not applicable     New Nutrition Diagnosis: [ ] not applicable       Interventions:   Recommend  [ ] Change Diet To:  [ ] Nutrition Supplement  [ ] Nutrition Support  [ ] Other:     Monitoring and Evaluation:   [ ] PO intake [ x ] Tolerance to diet prescription [ x ] weights [ x ] labs[ x ] follow up per protocol  [ ] other: nutrition follow up ( chart reviewed, events noted) Brief hx:  73 M pmh of HTN, ITP, APL syndrome, generalized anxiety disorder, OCD, newly dx aggressive Merkel Cell cancer of forehead keytruda infusions with good response admitted w/ worsening psychosis Pt with severe protein malnutrition, failed MBS and now sp PEG placement    At time of RD visit to pts bedside with wife in attendance, TF noted to be running at 75 ml/hr. Pt reports his stomach feels fine      Factors impacting intake: [ ] none [ ] nausea  [ ] vomiting [ ] diarrhea [ ] constipation  [ ]chewing problems [ ] swallowing issues  [ ] other:     Diet Prescription: Diet, NPO with Tube Feed:   Tube Feeding Modality: Gastrostomy  Jevity 1.5  Total Volume for 24 Hours (mL): 1530  Continuous  Starting Tube Feed Rate {mL per Hour}: 25  Increase Tube Feed Rate by (mL): 10     Every 8 hours  Until Goal Tube Feed Rate (mL per Hour): 85  Tube Feed Duration (in Hours): 18  Tube Feed Start Time: 06:00  Pump   Rate (mL per Hour): 30   Frequency: Every Hour    Duration (Hours): 18  Free Water Flush Instructions:  150ml water ac/pc (12-19-23 @ 12:11)    Intake:     Current Weight:   % Weight Change    Pertinent Medications: MEDICATIONS  (STANDING):  atenolol  Tablet 25 milliGRAM(s) Oral two times a day  cholecalciferol 2000 Unit(s) Oral daily  dextrose 5% + sodium chloride 0.9% with potassium chloride 20 mEq/L 1000 milliLiter(s) (60 mL/Hr) IV Continuous <Continuous>  FIRST- Mouthwash  BLM 5 milliLiter(s) Swish and Spit three times a day  fludroCORTISONE 0.2 milliGRAM(s) Oral daily  heparin   Injectable 5000 Unit(s) SubCutaneous every 12 hours  melatonin 3 milliGRAM(s) Oral at bedtime  predniSONE   Tablet 5 milliGRAM(s) Oral daily  senna 2 Tablet(s) Oral at bedtime    MEDICATIONS  (PRN):  acetaminophen     Tablet .. 650 milliGRAM(s) Oral every 6 hours PRN Temp greater or equal to 38.5C (101.3F), Moderate Pain (4 - 6)  hydrALAZINE Injectable 10 milliGRAM(s) IV Push every 8 hours PRN SBP >160  lactulose Syrup 15 Gram(s) Oral two times a day PRN constipation    Pertinent Labs: 12-21 Na144 mmol/L Glu 203 mg/dL<H> K+ 3.6 mmol/L Cr  0.60 mg/dL BUN 12 mg/dL 12-21 Phos 2.4 mg/dL<L> 12-21 Alb 2.4 g/dL<L> 12-21 PAB 10 mg/dL<L>     CAPILLARY BLOOD GLUCOSE        Skin:     Estimated Needs:   [ ] no change since previous assessment  [ ] recalculated:     Previous Nutrition Diagnosis:   [ ] Inadequate Energy Intake [ ]Inadequate Oral Intake [ ] Excessive Energy Intake   [ ] Underweight [ ] Increased Nutrient Needs [ ] Overweight/Obesity   [ ] Altered GI Function [ ] Unintended Weight Loss [ ] Food & Nutrition Related Knowledge Deficit [ ] Malnutrition     Nutrition Diagnosis is [ ] ongoing  [ ] resolved [ ] not applicable     New Nutrition Diagnosis: [ ] not applicable       Interventions:   Recommend  [ ] Change Diet To:  [ ] Nutrition Supplement  [ ] Nutrition Support  [ ] Other:     Monitoring and Evaluation:   [ ] PO intake [ x ] Tolerance to diet prescription [ x ] weights [ x ] labs[ x ] follow up per protocol  [ ] other: nutrition follow up ( chart reviewed, events noted) Brief hx:  73 M pmh of HTN, ITP, APL syndrome, generalized anxiety disorder, OCD, newly dx aggressive Merkel Cell cancer of forehead keytruda infusions with good response admitted w/ worsening psychosis Pt with severe protein malnutrition, failed MBS and now sp PEG placement    At time of RD visit to pts bedside with wife in attendance, TF noted to be running at 75 ml/hr. Pt reports his stomach feels fine but is having loose BM's      Factors impacting intake: [ ] none [ ] nausea  [ ] vomiting [ ] diarrhea [ ] constipation  [ ]chewing problems [ ] swallowing issues  [X ] other: dysphagia    Diet Prescription: Diet, NPO with Tube Feed:   Tube Feeding Modality: Gastrostomy  Jevity 1.5  Total Volume for 24 Hours (mL): 1530  Continuous  Starting Tube Feed Rate {mL per Hour}: 25  Increase Tube Feed Rate by (mL): 10     Every 8 hours  Until Goal Tube Feed Rate (mL per Hour): 85  Tube Feed Duration (in Hours): 18  Tube Feed Start Time: 06:00  Pump   Rate (mL per Hour): 30   Frequency: Every Hour    Duration (Hours): 18  Free Water Flush Instructions:  150ml water ac/pc (12-19-23 @ 12:11)    Intake: nil po . over the last 24 hours pt received 1100 ml jevity 1.5, goal rate not yet achieved     Current Weight: 64.3 kg ( 12/20/23) 68.6 kg ( admission )   -4.3 kg over the month r/t npo or negligible oral intake      Pertinent Medications: MEDICATIONS  (STANDING):  atenolol  Tablet 25 milliGRAM(s) Oral two times a day  cholecalciferol 2000 Unit(s) Oral daily  dextrose 5% + sodium chloride 0.9% with potassium chloride 20 mEq/L 1000 milliLiter(s) (60 mL/Hr) IV Continuous <Continuous>  FIRST- Mouthwash  BLM 5 milliLiter(s) Swish and Spit three times a day  fludroCORTISONE 0.2 milliGRAM(s) Oral daily  heparin   Injectable 5000 Unit(s) SubCutaneous every 12 hours  melatonin 3 milliGRAM(s) Oral at bedtime  predniSONE   Tablet 5 milliGRAM(s) Oral daily  senna 2 Tablet(s) Oral at bedtime    MEDICATIONS  (PRN):  acetaminophen     Tablet .. 650 milliGRAM(s) Oral every 6 hours PRN Temp greater or equal to 38.5C (101.3F), Moderate Pain (4 - 6)  hydrALAZINE Injectable 10 milliGRAM(s) IV Push every 8 hours PRN SBP >160  lactulose Syrup 15 Gram(s) Oral two times a day PRN constipation    Pertinent Labs: 12-21 Na144 mmol/L Glu 203 mg/dL<H> K+ 3.6 mmol/L Cr  0.60 mg/dL BUN 12 mg/dL 12-21 Phos 2.4 mg/dL<L> 12-21 Alb 2.4 g/dL<L> 12-21 PAB 10 mg/dL<L>     CAPILLARY BLOOD GLUCOSE    Skin: intact  BM: 12/21 mod amount loose    Estimated Needs:   [X ] no change since previous assessment: based on IBW of 80.7 kg ( 25-30 kcals/kg) [id number] kcals and ( 1.1-1.3 gm pro/kg)  gm pro    Previous Nutrition Diagnosis:   [ ] Inadequate Energy Intake [ ]Inadequate Oral Intake [ ] Excessive Energy Intake   [ ] Underweight [ ] Increased Nutrient Needs [ ] Overweight/Obesity   [ ] Altered GI Function [ ] Unintended Weight Loss [ ] Food & Nutrition Related Knowledge Deficit [x ] Malnutrition     Nutrition Diagnosis is [X ] ongoing  [ ] resolved [ ] not applicable     New Nutrition Diagnosis: [ X] altered GI function likely r/t senna      Interventions:   Recommend  [ ] Change Diet To:  [ ] Nutrition Supplement  [X ] Nutrition Support: today, advance TF to goal rate of 85 ml/hr ( Pt requires 1530 ml/day ) continue 18 hr/day delivery   [X ] Other: d/c senna. If still with loose stools, add Bantarol to tube feed order    Monitoring and Evaluation:    [ x ] Tolerance to diet prescription [ x ] weights [ x ] labs[ x ] follow up per protocol  [ X] other: skin integrity nutrition follow up ( chart reviewed, events noted) Brief hx:  73 M pmh of HTN, ITP, APL syndrome, generalized anxiety disorder, OCD, newly dx aggressive Merkel Cell cancer of forehead keytruda infusions with good response admitted w/ worsening psychosis Pt with severe protein malnutrition, failed MBS and now sp PEG placement    At time of RD visit to pts bedside with wife in attendance, TF noted to be running at 75 ml/hr. Pt reports his stomach feels fine but is having loose BM's      Factors impacting intake: [ ] none [ ] nausea  [ ] vomiting [ ] diarrhea [ ] constipation  [ ]chewing problems [ ] swallowing issues  [X ] other: dysphagia    Diet Prescription: Diet, NPO with Tube Feed:   Tube Feeding Modality: Gastrostomy  Jevity 1.5  Total Volume for 24 Hours (mL): 1530  Continuous  Starting Tube Feed Rate {mL per Hour}: 25  Increase Tube Feed Rate by (mL): 10     Every 8 hours  Until Goal Tube Feed Rate (mL per Hour): 85  Tube Feed Duration (in Hours): 18  Tube Feed Start Time: 06:00  Pump   Rate (mL per Hour): 30   Frequency: Every Hour    Duration (Hours): 18  Free Water Flush Instructions:  150ml water ac/pc (12-19-23 @ 12:11)    Intake: nil po . over the last 24 hours pt received 1100 ml jevity 1.5, goal rate not yet achieved     Current Weight: 64.3 kg ( 12/20/23) 68.6 kg ( admission )   -4.3 kg over the month r/t npo or negligible oral intake      Pertinent Medications: MEDICATIONS  (STANDING):  atenolol  Tablet 25 milliGRAM(s) Oral two times a day  cholecalciferol 2000 Unit(s) Oral daily  dextrose 5% + sodium chloride 0.9% with potassium chloride 20 mEq/L 1000 milliLiter(s) (60 mL/Hr) IV Continuous <Continuous>  FIRST- Mouthwash  BLM 5 milliLiter(s) Swish and Spit three times a day  fludroCORTISONE 0.2 milliGRAM(s) Oral daily  heparin   Injectable 5000 Unit(s) SubCutaneous every 12 hours  melatonin 3 milliGRAM(s) Oral at bedtime  predniSONE   Tablet 5 milliGRAM(s) Oral daily  senna 2 Tablet(s) Oral at bedtime    MEDICATIONS  (PRN):  acetaminophen     Tablet .. 650 milliGRAM(s) Oral every 6 hours PRN Temp greater or equal to 38.5C (101.3F), Moderate Pain (4 - 6)  hydrALAZINE Injectable 10 milliGRAM(s) IV Push every 8 hours PRN SBP >160  lactulose Syrup 15 Gram(s) Oral two times a day PRN constipation    Pertinent Labs: 12-21 Na144 mmol/L Glu 203 mg/dL<H> K+ 3.6 mmol/L Cr  0.60 mg/dL BUN 12 mg/dL 12-21 Phos 2.4 mg/dL<L> 12-21 Alb 2.4 g/dL<L> 12-21 PAB 10 mg/dL<L>     CAPILLARY BLOOD GLUCOSE    Skin: intact  BM: 12/21 mod amount loose    Estimated Needs:   [X ] no change since previous assessment: based on IBW of 80.7 kg ( 25-30 kcals/kg) 7767-6897 kcals and ( 1.1-1.3 gm pro/kg)  gm pro    Previous Nutrition Diagnosis:   [ ] Inadequate Energy Intake [ ]Inadequate Oral Intake [ ] Excessive Energy Intake   [ ] Underweight [ ] Increased Nutrient Needs [ ] Overweight/Obesity   [ ] Altered GI Function [ ] Unintended Weight Loss [ ] Food & Nutrition Related Knowledge Deficit [x ] Malnutrition     Nutrition Diagnosis is [X ] ongoing  [ ] resolved [ ] not applicable     New Nutrition Diagnosis: [ X] altered GI function likely r/t senna      Interventions:   Recommend  [ ] Change Diet To:  [ ] Nutrition Supplement  [X ] Nutrition Support: today, advance TF to goal rate of 85 ml/hr ( Pt requires 1530 ml/day ) continue 18 hr/day delivery   [X ] Other: d/c senna. If still with loose stools, add Bantarol to tube feed order    Monitoring and Evaluation:    [ x ] Tolerance to diet prescription [ x ] weights [ x ] labs[ x ] follow up per protocol  [ X] other: skin integrity

## 2023-12-22 NOTE — PROGRESS NOTE ADULT - SUBJECTIVE AND OBJECTIVE BOX
[facility]  Walter Camarena PA-C  [address] [place] [address]  [phone]      INTERVAL HPI/OVERNIGHT EVENTS:  Pt s/e  No new GI events  Tolerating TF    MEDICATIONS  (STANDING):  atenolol  Tablet 25 milliGRAM(s) Oral two times a day  cholecalciferol 2000 Unit(s) Oral daily  dextrose 5% + sodium chloride 0.9% with potassium chloride 20 mEq/L 1000 milliLiter(s) (60 mL/Hr) IV Continuous <Continuous>  FIRST- Mouthwash  BLM 5 milliLiter(s) Swish and Spit three times a day  fludroCORTISONE 0.2 milliGRAM(s) Oral daily  heparin   Injectable 5000 Unit(s) SubCutaneous every 12 hours  melatonin 3 milliGRAM(s) Oral at bedtime  predniSONE   Tablet 5 milliGRAM(s) Oral daily  senna 2 Tablet(s) Oral at bedtime    MEDICATIONS  (PRN):  acetaminophen     Tablet .. 650 milliGRAM(s) Oral every 6 hours PRN Temp greater or equal to 38.5C (101.3F), Moderate Pain (4 - 6)  hydrALAZINE Injectable 10 milliGRAM(s) IV Push every 8 hours PRN SBP >160  lactulose Syrup 15 Gram(s) Oral two times a day PRN constipation      Allergies    No Known Allergies      PHYSICAL EXAM:   Vital Signs:  Vital Signs Last 24 Hrs  T(C): 36.5 (22 Dec 2023 09:49), Max: 36.7 (22 Dec 2023 05:11)  T(F): 97.7 (22 Dec 2023 09:49), Max: 98 (22 Dec 2023 05:11)  HR: 86 (22 Dec 2023 09:49) (86 - 95)  BP: 193/80 (22 Dec 2023 05:11) (177/72 - 196/82)  BP(mean): --  RR: 18 (22 Dec 2023 05:11) (18 - 18)  SpO2: 95% (22 Dec 2023 09:49) (94% - 95%)    Parameters below as of 22 Dec 2023 09:49  Patient On (Oxygen Delivery Method): room air    GENERAL:  Appears stated age  HEENT:  NC/AT  CHEST:  Full & symmetric excursion  HEART:  Regular rhythm  ABDOMEN:  Soft, non-tender, non-distended, +peg  EXTEREMITIES:  no cyanosis  SKIN:  No rash  NEURO:  Alert      LABS:                        9.7    6.43  )-----------( 146      ( 21 Dec 2023 09:22 )             27.8     12-21    144  |  111<H>  |  12  ----------------------------<  203<H>  3.6   |  31  |  0.60    Ca    8.4<L>      21 Dec 2023 09:22  Phos  2.4     12-21  Mg     1.6     12-21    TPro  5.9<L>  /  Alb  2.4<L>  /  TBili  1.0  /  DBili  x   /  AST  30  /  ALT  35  /  AlkPhos  101  12-21      Urinalysis Basic - ( 21 Dec 2023 09:22 )    Color: x / Appearance: x / SG: x / pH: x  Gluc: 203 mg/dL / Ketone: x  / Bili: x / Urobili: x   Blood: x / Protein: x / Nitrite: x   Leuk Esterase: x / RBC: x / WBC x   Sq Epi: x / Non Sq Epi: x / Bacteria: x     [facility]  Walter Camarena PA-C  [address] [place] [address]  [phone]      INTERVAL HPI/OVERNIGHT EVENTS:  Pt s/e  No new GI events  Tolerating TF    MEDICATIONS  (STANDING):  atenolol  Tablet 25 milliGRAM(s) Oral two times a day  cholecalciferol 2000 Unit(s) Oral daily  dextrose 5% + sodium chloride 0.9% with potassium chloride 20 mEq/L 1000 milliLiter(s) (60 mL/Hr) IV Continuous <Continuous>  FIRST- Mouthwash  BLM 5 milliLiter(s) Swish and Spit three times a day  fludroCORTISONE 0.2 milliGRAM(s) Oral daily  heparin   Injectable 5000 Unit(s) SubCutaneous every 12 hours  melatonin 3 milliGRAM(s) Oral at bedtime  predniSONE   Tablet 5 milliGRAM(s) Oral daily  senna 2 Tablet(s) Oral at bedtime    MEDICATIONS  (PRN):  acetaminophen     Tablet .. 650 milliGRAM(s) Oral every 6 hours PRN Temp greater or equal to 38.5C (101.3F), Moderate Pain (4 - 6)  hydrALAZINE Injectable 10 milliGRAM(s) IV Push every 8 hours PRN SBP >160  lactulose Syrup 15 Gram(s) Oral two times a day PRN constipation      Allergies    No Known Allergies      PHYSICAL EXAM:   Vital Signs:  Vital Signs Last 24 Hrs  T(C): 36.5 (22 Dec 2023 09:49), Max: 36.7 (22 Dec 2023 05:11)  T(F): 97.7 (22 Dec 2023 09:49), Max: 98 (22 Dec 2023 05:11)  HR: 86 (22 Dec 2023 09:49) (86 - 95)  BP: 193/80 (22 Dec 2023 05:11) (177/72 - 196/82)  BP(mean): --  RR: 18 (22 Dec 2023 05:11) (18 - 18)  SpO2: 95% (22 Dec 2023 09:49) (94% - 95%)    Parameters below as of 22 Dec 2023 09:49  Patient On (Oxygen Delivery Method): room air    GENERAL:  Appears stated age  HEENT:  NC/AT  CHEST:  Full & symmetric excursion  HEART:  Regular rhythm  ABDOMEN:  Soft, non-tender, non-distended, +peg  EXTEREMITIES:  no cyanosis  SKIN:  No rash  NEURO:  Alert      LABS:                        9.7    6.43  )-----------( 146      ( 21 Dec 2023 09:22 )             27.8     12-21    144  |  111<H>  |  12  ----------------------------<  203<H>  3.6   |  31  |  0.60    Ca    8.4<L>      21 Dec 2023 09:22  Phos  2.4     12-21  Mg     1.6     12-21    TPro  5.9<L>  /  Alb  2.4<L>  /  TBili  1.0  /  DBili  x   /  AST  30  /  ALT  35  /  AlkPhos  101  12-21      Urinalysis Basic - ( 21 Dec 2023 09:22 )    Color: x / Appearance: x / SG: x / pH: x  Gluc: 203 mg/dL / Ketone: x  / Bili: x / Urobili: x   Blood: x / Protein: x / Nitrite: x   Leuk Esterase: x / RBC: x / WBC x   Sq Epi: x / Non Sq Epi: x / Bacteria: x

## 2023-12-22 NOTE — PROGRESS NOTE ADULT - SUBJECTIVE AND OBJECTIVE BOX
Chief Complaint: Psychosis    Interval Events: No events overnight.    Review of Systems:  General: No fevers, chills, weight gain  Skin: No rashes, color changes  Cardiovascular: No chest pain, orthopnea  Respiratory: No shortness of breath, cough  Gastrointestinal: No nausea, abdominal pain  Genitourinary: No incontinence, pain with urination  Musculoskeletal: No pain, swelling, decreased range of motion  Neurological: No headache, weakness  Psychiatric: No depression, anxiety  Endocrine: No weight gain, increased thirst  All other systems are comprehensively negative.    Physical Exam:  Vital Signs Last 24 Hrs  T(C): 36.7 (22 Dec 2023 05:11), Max: 36.7 (21 Dec 2023 11:53)  T(F): 98 (22 Dec 2023 05:11), Max: 98 (21 Dec 2023 11:53)  HR: 87 (22 Dec 2023 05:11) (87 - 95)  BP: 193/80 (22 Dec 2023 05:11) (164/79 - 196/82)  BP(mean): --  RR: 18 (22 Dec 2023 05:11) (18 - 18)  SpO2: 94% (22 Dec 2023 05:11) (94% - 95%)  Parameters below as of 22 Dec 2023 05:11  Patient On (Oxygen Delivery Method): room air  General: NAD  HEENT: MMM  Neck: No JVD, no carotid bruit  Lungs: CTAB  CV: RRR, nl S1/S2, no M/R/G  Abdomen: S/NT/ND, +BS  Extremities: No LE edema, no cyanosis  Neuro: AAOx3, non-focal  Skin: No rash    Labs:    12-21    144  |  111<H>  |  12  ----------------------------<  203<H>  3.6   |  31  |  0.60    Ca    8.4<L>      21 Dec 2023 09:22  Phos  2.4     12-21  Mg     1.6     12-21    TPro  5.9<L>  /  Alb  2.4<L>  /  TBili  1.0  /  DBili  x   /  AST  30  /  ALT  35  /  AlkPhos  101  12-21                        9.7    6.43  )-----------( 146      ( 21 Dec 2023 09:22 )             27.8

## 2023-12-22 NOTE — BH CONSULTATION LIAISON PROGRESS NOTE - NSBHATTESTBILLING_PSY_A_CORE
[id number] OBS or IP - low complexity OR 25-34 mins [id number] OBS or IP - low complexity OR 25-34 mins

## 2023-12-22 NOTE — PROGRESS NOTE ADULT - ASSESSMENT
73 M pmh of HTN, ITP, APL syndrome, generalized anxiety disorder, OCD, newly dx aggressive Merkel Cell cancer of forehead- pt's oncologist  Dr. Eng, at [facility] on Keytruda infusions with good response per wife  was brought to ED for worsening psychosis for the past few days, pt lately with difficulty swallowing, decreased po intake    Psychosis  psych and neuro following      #HTN/Orthostatic hypotension  ? psych med induced  adrenal insufficiency  continue prednisone and florinef   compressions stockings  permissive hypertension   endo fu    UTI-sp abx    #Anemia- stable hb  d/w heme    #severe protein malnutrition  sp PEG placement  increase feeds to goal as tolerated  lytes- replete and monitor    #DVT ppx-  hep sq      [facility]   [phone] 73 M pmh of HTN, ITP, APL syndrome, generalized anxiety disorder, OCD, newly dx aggressive Merkel Cell cancer of forehead- pt's oncologist  Dr. Eng, at [facility] on Keytruda infusions with good response per wife  was brought to ED for worsening psychosis for the past few days, pt lately with difficulty swallowing, decreased po intake    Psychosis  psych and neuro following      #HTN/Orthostatic hypotension  ? psych med induced  adrenal insufficiency  continue prednisone and florinef   compressions stockings  permissive hypertension   endo fu    UTI-sp abx    #Anemia- stable hb  d/w heme    #severe protein malnutrition  sp PEG placement  increase feeds to goal as tolerated  lytes- replete and monitor    #DVT ppx-  hep sq      [facility]   [phone]

## 2023-12-22 NOTE — PROGRESS NOTE ADULT - SUBJECTIVE AND OBJECTIVE BOX
Patient is a 73y old  Male who presents with a chief complaint of psychosis (13 Dec 2023 13:03)        INTERVAL HPI/OVERNIGHT EVENTS:   no complaints  pt seen and examined         Vital Signs Last 24 Hrs  T(C): 36.9 (22 Dec 2023 12:54), Max: 36.9 (22 Dec 2023 12:54)  T(F): 98.5 (22 Dec 2023 12:54), Max: 98.5 (22 Dec 2023 12:54)  HR: 93 (22 Dec 2023 12:54) (86 - 95)  BP: 159/75 (22 Dec 2023 12:54) (159/75 - 196/82)  BP(mean): --  RR: 18 (22 Dec 2023 12:54) (18 - 18)  SpO2: 96% (22 Dec 2023 12:54) (94% - 96%)    Parameters below as of 22 Dec 2023 12:54  Patient On (Oxygen Delivery Method): room air        acetaminophen     Tablet .. 650 milliGRAM(s) Oral every 6 hours PRN  atenolol  Tablet 25 milliGRAM(s) Oral two times a day  cholecalciferol 2000 Unit(s) Oral daily  dextrose 5% + sodium chloride 0.9% with potassium chloride 20 mEq/L 1000 milliLiter(s) IV Continuous <Continuous>  FIRST- Mouthwash  BLM 5 milliLiter(s) Swish and Spit three times a day  fludroCORTISONE 0.2 milliGRAM(s) Oral daily  heparin   Injectable 5000 Unit(s) SubCutaneous every 12 hours  hydrALAZINE Injectable 10 milliGRAM(s) IV Push every 8 hours PRN  lactulose Syrup 15 Gram(s) Oral two times a day PRN  melatonin 3 milliGRAM(s) Oral at bedtime  phenazopyridine 100 milliGRAM(s) Oral once  predniSONE   Tablet 5 milliGRAM(s) Oral daily  senna 2 Tablet(s) Oral at bedtime      PHYSICAL EXAM:  GENERAL: NAD   EYES: conjunctiva and sclera clear  ENMT: Moist mucous membranes  NECK: Supple, No JVD, Normal thyroid  CHEST/LUNG: non labored, cta b/l  HEART: Regular rate and rhythm; No murmurs, rubs, or gallops  ABDOMEN: Soft, Nontender, Nondistended; Bowel sounds present  EXTREMITIES:  2+ Peripheral Pulses, No clubbing, no cyanosis, no edema  LYMPH: No lymphadenopathy noted  SKIN: No rashes or lesions  NEURO: no focal deficits    Consultant(s) Notes Reviewed:  [x ] YES  [ ] NO  Care Discussed with Consultants/Other Providers [ x] YES  [ ] NO    LABS:                        9.5    6.56  )-----------( 120      ( 22 Dec 2023 11:35 )             27.3     12-22    143  |  109<H>  |  10  ----------------------------<  188<H>  3.7   |  31  |  0.55    Ca    8.0<L>      22 Dec 2023 11:35  Phos  2.4     12-22  Mg     1.4     12-22    TPro  5.5<L>  /  Alb  2.3<L>  /  TBili  0.7  /  DBili  x   /  AST  21  /  ALT  31  /  AlkPhos  86  12-22      Urinalysis Basic - ( 22 Dec 2023 11:35 )    Color: x / Appearance: x / SG: x / pH: x  Gluc: 188 mg/dL / Ketone: x  / Bili: x / Urobili: x   Blood: x / Protein: x / Nitrite: x   Leuk Esterase: x / RBC: x / WBC x   Sq Epi: x / Non Sq Epi: x / Bacteria: x      CAPILLARY BLOOD GLUCOSE            Urinalysis Basic - ( 22 Dec 2023 11:35 )    Color: x / Appearance: x / SG: x / pH: x  Gluc: 188 mg/dL / Ketone: x  / Bili: x / Urobili: x   Blood: x / Protein: x / Nitrite: x   Leuk Esterase: x / RBC: x / WBC x   Sq Epi: x / Non Sq Epi: x / Bacteria: x          RADIOLOGY & ADDITIONAL TESTS:    Imaging Personally Reviewed  Reviewed consultants input

## 2023-12-22 NOTE — SOCIAL WORK PROGRESS NOTE - NSSWPROGRESSNOTE_GEN_ALL_CORE
SW spoke with this pts spouse at bedside- discussed DC planning for early next week, spouse agreeable plan for DC to [place] [facility] when ready. SW to follow.  SW spoke with this pts spouse at bedside- discussed DC planning for early next week, spouse agreeable plan for DC to [place] [facility] when ready. SW to follow.

## 2023-12-22 NOTE — BH CONSULTATION LIAISON PROGRESS NOTE - NSBHASSESSMENTFT_PSY_ALL_CORE
Pt is a 74 y/o m with HTN, h/o severe anxiety around medical illness and constantly on the look for illnesses brought in by wife after he started to become more and more psychotic with each round of Keytruda for his Merkel Cell Ca. Pt is delusional thinking he has metals, objects in his throat till his anus and penis, unable to tolerate PO. There are case reports of rare psychosis with monoclonal antibodies. Keytruda seems like the possible suspect of psychosis, combined with wife's reports of timeline, although can't be sure, time will show.     Recommendation: Increase Zyprexa Zydis to 20 mg po at HS. Pt is a 72 y/o m with HTN, h/o severe anxiety around medical illness and constantly on the look for illnesses brought in by wife after he started to become more and more psychotic with each round of Keytruda for his Merkel Cell Ca. Pt is delusional thinking he has metals, objects in his throat till his anus and penis, unable to tolerate PO. There are case reports of rare psychosis with monoclonal antibodies. Keytruda seems like the possible suspect of psychosis, combined with wife's reports of timeline, although can't be sure, time will show.     Recommendation: Increase Zyprexa Zydis to 20 mg po at HS.

## 2023-12-22 NOTE — PROGRESS NOTE ADULT - ASSESSMENT
odynophagia    s/p egd/peg  ok to use peg for feeds, water, meds  monitor tolerance to TF  flush peg after use  can begin d/c planning from GI standpoint  d/w pt  d/w wife on phone

## 2023-12-23 LAB
ALBUMIN SERPL ELPH-MCNC: 2.3 G/DL — LOW (ref 3.3–5)
ALBUMIN SERPL ELPH-MCNC: 2.3 G/DL — LOW (ref 3.3–5)
ALP SERPL-CCNC: 76 U/L — SIGNIFICANT CHANGE UP (ref 40–120)
ALP SERPL-CCNC: 76 U/L — SIGNIFICANT CHANGE UP (ref 40–120)
ALT FLD-CCNC: 29 U/L — SIGNIFICANT CHANGE UP (ref 12–78)
ALT FLD-CCNC: 29 U/L — SIGNIFICANT CHANGE UP (ref 12–78)
ANION GAP SERPL CALC-SCNC: 3 MMOL/L — LOW (ref 5–17)
ANION GAP SERPL CALC-SCNC: 3 MMOL/L — LOW (ref 5–17)
APPEARANCE UR: CLEAR — SIGNIFICANT CHANGE UP
APPEARANCE UR: CLEAR — SIGNIFICANT CHANGE UP
AST SERPL-CCNC: 21 U/L — SIGNIFICANT CHANGE UP (ref 15–37)
AST SERPL-CCNC: 21 U/L — SIGNIFICANT CHANGE UP (ref 15–37)
BILIRUB SERPL-MCNC: 0.7 MG/DL — SIGNIFICANT CHANGE UP (ref 0.2–1.2)
BILIRUB SERPL-MCNC: 0.7 MG/DL — SIGNIFICANT CHANGE UP (ref 0.2–1.2)
BILIRUB UR-MCNC: NEGATIVE — SIGNIFICANT CHANGE UP
BILIRUB UR-MCNC: NEGATIVE — SIGNIFICANT CHANGE UP
BUN SERPL-MCNC: 11 MG/DL — SIGNIFICANT CHANGE UP (ref 7–23)
BUN SERPL-MCNC: 11 MG/DL — SIGNIFICANT CHANGE UP (ref 7–23)
CALCIUM SERPL-MCNC: 7.9 MG/DL — LOW (ref 8.5–10.1)
CALCIUM SERPL-MCNC: 7.9 MG/DL — LOW (ref 8.5–10.1)
CHLORIDE SERPL-SCNC: 105 MMOL/L — SIGNIFICANT CHANGE UP (ref 96–108)
CHLORIDE SERPL-SCNC: 105 MMOL/L — SIGNIFICANT CHANGE UP (ref 96–108)
CO2 SERPL-SCNC: 34 MMOL/L — HIGH (ref 22–31)
CO2 SERPL-SCNC: 34 MMOL/L — HIGH (ref 22–31)
COLOR SPEC: YELLOW — SIGNIFICANT CHANGE UP
COLOR SPEC: YELLOW — SIGNIFICANT CHANGE UP
CREAT SERPL-MCNC: 0.5 MG/DL — SIGNIFICANT CHANGE UP (ref 0.5–1.3)
CREAT SERPL-MCNC: 0.5 MG/DL — SIGNIFICANT CHANGE UP (ref 0.5–1.3)
DIFF PNL FLD: NEGATIVE — SIGNIFICANT CHANGE UP
DIFF PNL FLD: NEGATIVE — SIGNIFICANT CHANGE UP
EGFR: 106 ML/MIN/1.73M2 — SIGNIFICANT CHANGE UP
EGFR: 106 ML/MIN/1.73M2 — SIGNIFICANT CHANGE UP
GLUCOSE SERPL-MCNC: 150 MG/DL — HIGH (ref 70–99)
GLUCOSE SERPL-MCNC: 150 MG/DL — HIGH (ref 70–99)
GLUCOSE UR QL: NEGATIVE MG/DL — SIGNIFICANT CHANGE UP
GLUCOSE UR QL: NEGATIVE MG/DL — SIGNIFICANT CHANGE UP
HCT VFR BLD CALC: 27.6 % — LOW (ref 39–50)
HCT VFR BLD CALC: 27.6 % — LOW (ref 39–50)
HGB BLD-MCNC: 9.6 G/DL — LOW (ref 13–17)
HGB BLD-MCNC: 9.6 G/DL — LOW (ref 13–17)
KETONES UR-MCNC: NEGATIVE MG/DL — SIGNIFICANT CHANGE UP
KETONES UR-MCNC: NEGATIVE MG/DL — SIGNIFICANT CHANGE UP
LEUKOCYTE ESTERASE UR-ACNC: NEGATIVE — SIGNIFICANT CHANGE UP
LEUKOCYTE ESTERASE UR-ACNC: NEGATIVE — SIGNIFICANT CHANGE UP
MAGNESIUM SERPL-MCNC: 1.4 MG/DL — LOW (ref 1.6–2.6)
MAGNESIUM SERPL-MCNC: 1.4 MG/DL — LOW (ref 1.6–2.6)
MCHC RBC-ENTMCNC: 30.4 PG — SIGNIFICANT CHANGE UP (ref 27–34)
MCHC RBC-ENTMCNC: 30.4 PG — SIGNIFICANT CHANGE UP (ref 27–34)
MCHC RBC-ENTMCNC: 34.8 GM/DL — SIGNIFICANT CHANGE UP (ref 32–36)
MCHC RBC-ENTMCNC: 34.8 GM/DL — SIGNIFICANT CHANGE UP (ref 32–36)
MCV RBC AUTO: 87.3 FL — SIGNIFICANT CHANGE UP (ref 80–100)
MCV RBC AUTO: 87.3 FL — SIGNIFICANT CHANGE UP (ref 80–100)
NITRITE UR-MCNC: NEGATIVE — SIGNIFICANT CHANGE UP
NITRITE UR-MCNC: NEGATIVE — SIGNIFICANT CHANGE UP
NRBC # BLD: 0 /100 WBCS — SIGNIFICANT CHANGE UP (ref 0–0)
NRBC # BLD: 0 /100 WBCS — SIGNIFICANT CHANGE UP (ref 0–0)
PH UR: 7.5 — SIGNIFICANT CHANGE UP (ref 5–8)
PH UR: 7.5 — SIGNIFICANT CHANGE UP (ref 5–8)
PHOSPHATE SERPL-MCNC: 2.7 MG/DL — SIGNIFICANT CHANGE UP (ref 2.5–4.5)
PHOSPHATE SERPL-MCNC: 2.7 MG/DL — SIGNIFICANT CHANGE UP (ref 2.5–4.5)
PLATELET # BLD AUTO: 146 K/UL — LOW (ref 150–400)
PLATELET # BLD AUTO: 146 K/UL — LOW (ref 150–400)
POTASSIUM SERPL-MCNC: 4 MMOL/L — SIGNIFICANT CHANGE UP (ref 3.5–5.3)
POTASSIUM SERPL-MCNC: 4 MMOL/L — SIGNIFICANT CHANGE UP (ref 3.5–5.3)
POTASSIUM SERPL-SCNC: 4 MMOL/L — SIGNIFICANT CHANGE UP (ref 3.5–5.3)
POTASSIUM SERPL-SCNC: 4 MMOL/L — SIGNIFICANT CHANGE UP (ref 3.5–5.3)
PREALB SERPL-MCNC: 11 MG/DL — LOW (ref 20–40)
PREALB SERPL-MCNC: 11 MG/DL — LOW (ref 20–40)
PROT SERPL-MCNC: 5.5 G/DL — LOW (ref 6–8.3)
PROT SERPL-MCNC: 5.5 G/DL — LOW (ref 6–8.3)
PROT UR-MCNC: 100 MG/DL
PROT UR-MCNC: 100 MG/DL
RBC # BLD: 3.16 M/UL — LOW (ref 4.2–5.8)
RBC # BLD: 3.16 M/UL — LOW (ref 4.2–5.8)
RBC # FLD: 14.2 % — SIGNIFICANT CHANGE UP (ref 10.3–14.5)
RBC # FLD: 14.2 % — SIGNIFICANT CHANGE UP (ref 10.3–14.5)
SODIUM SERPL-SCNC: 142 MMOL/L — SIGNIFICANT CHANGE UP (ref 135–145)
SODIUM SERPL-SCNC: 142 MMOL/L — SIGNIFICANT CHANGE UP (ref 135–145)
SP GR SPEC: 1.01 — SIGNIFICANT CHANGE UP (ref 1–1.03)
SP GR SPEC: 1.01 — SIGNIFICANT CHANGE UP (ref 1–1.03)
UROBILINOGEN FLD QL: 1 MG/DL — SIGNIFICANT CHANGE UP (ref 0.2–1)
UROBILINOGEN FLD QL: 1 MG/DL — SIGNIFICANT CHANGE UP (ref 0.2–1)
WBC # BLD: 7.1 K/UL — SIGNIFICANT CHANGE UP (ref 3.8–10.5)
WBC # BLD: 7.1 K/UL — SIGNIFICANT CHANGE UP (ref 3.8–10.5)
WBC # FLD AUTO: 7.1 K/UL — SIGNIFICANT CHANGE UP (ref 3.8–10.5)
WBC # FLD AUTO: 7.1 K/UL — SIGNIFICANT CHANGE UP (ref 3.8–10.5)

## 2023-12-23 RX ORDER — MAGNESIUM SULFATE 500 MG/ML
2 VIAL (ML) INJECTION ONCE
Refills: 0 | Status: COMPLETED | OUTPATIENT
Start: 2023-12-23 | End: 2023-12-23

## 2023-12-23 RX ADMIN — Medication 3 MILLIGRAM(S): at 22:26

## 2023-12-23 RX ADMIN — HEPARIN SODIUM 5000 UNIT(S): 5000 INJECTION INTRAVENOUS; SUBCUTANEOUS at 05:51

## 2023-12-23 RX ADMIN — ATENOLOL 25 MILLIGRAM(S): 25 TABLET ORAL at 17:39

## 2023-12-23 RX ADMIN — Medication 2 PACKET(S): at 09:08

## 2023-12-23 RX ADMIN — Medication 25 GRAM(S): at 09:08

## 2023-12-23 RX ADMIN — ATENOLOL 25 MILLIGRAM(S): 25 TABLET ORAL at 05:51

## 2023-12-23 RX ADMIN — Medication 5 MILLIGRAM(S): at 05:51

## 2023-12-23 RX ADMIN — Medication 100 MILLIGRAM(S): at 09:08

## 2023-12-23 RX ADMIN — Medication 2000 UNIT(S): at 12:04

## 2023-12-23 RX ADMIN — FLUDROCORTISONE ACETATE 0.2 MILLIGRAM(S): 0.1 TABLET ORAL at 05:51

## 2023-12-23 RX ADMIN — DEXTROSE MONOHYDRATE, SODIUM CHLORIDE, AND POTASSIUM CHLORIDE 60 MILLILITER(S): 50; .745; 4.5 INJECTION, SOLUTION INTRAVENOUS at 08:07

## 2023-12-23 RX ADMIN — HEPARIN SODIUM 5000 UNIT(S): 5000 INJECTION INTRAVENOUS; SUBCUTANEOUS at 17:39

## 2023-12-23 NOTE — PROGRESS NOTE ADULT - SUBJECTIVE AND OBJECTIVE BOX
Patient is a 73y old  Male who presents with a chief complaint of psychosis (13 Dec 2023 13:03)        INTERVAL HPI/OVERNIGHT EVENTS:   no complaints  pt seen and examined         Vital Signs Last 24 Hrs  T(C): 36.6 (23 Dec 2023 13:10), Max: 36.8 (23 Dec 2023 04:44)  T(F): 97.8 (23 Dec 2023 13:10), Max: 98.3 (23 Dec 2023 04:44)  HR: 78 (23 Dec 2023 13:10) (78 - 88)  BP: 178/78 (23 Dec 2023 13:10) (178/78 - 192/85)  BP(mean): --  RR: 17 (23 Dec 2023 13:10) (17 - 18)  SpO2: 94% (23 Dec 2023 13:10) (94% - 95%)    Parameters below as of 23 Dec 2023 13:10  Patient On (Oxygen Delivery Method): room air        acetaminophen     Tablet .. 650 milliGRAM(s) Oral every 6 hours PRN  atenolol  Tablet 25 milliGRAM(s) Oral two times a day  cholecalciferol 2000 Unit(s) Oral daily  dextrose 5% + sodium chloride 0.9% with potassium chloride 20 mEq/L 1000 milliLiter(s) IV Continuous <Continuous>  FIRST- Mouthwash  BLM 5 milliLiter(s) Swish and Spit three times a day  fludroCORTISONE 0.2 milliGRAM(s) Oral daily  heparin   Injectable 5000 Unit(s) SubCutaneous every 12 hours  hydrALAZINE Injectable 10 milliGRAM(s) IV Push every 8 hours PRN  lactulose Syrup 15 Gram(s) Oral two times a day PRN  melatonin 3 milliGRAM(s) Oral at bedtime  predniSONE   Tablet 5 milliGRAM(s) Oral daily  senna 2 Tablet(s) Oral at bedtime      PHYSICAL EXAM:  GENERAL: NAD   EYES: conjunctiva and sclera clear  ENMT: Moist mucous membranes  NECK: Supple, No JVD, Normal thyroid  CHEST/LUNG: non labored, cta b/l  HEART: Regular rate and rhythm; No murmurs, rubs, or gallops  ABDOMEN: Soft, Nontender, Nondistended; Bowel sounds present  EXTREMITIES:  2+ Peripheral Pulses, No clubbing, no cyanosis, no edema  LYMPH: No lymphadenopathy noted  SKIN: No rashes or lesions  NEURO: no focal deficits    Consultant(s) Notes Reviewed:  [x ] YES  [ ] NO  Care Discussed with Consultants/Other Providers [ x] YES  [ ] NO    LABS:                        9.6    7.10  )-----------( 146      ( 23 Dec 2023 07:00 )             27.6     12-    142  |  105  |  11  ----------------------------<  150<H>  4.0   |  34<H>  |  0.50    Ca    7.9<L>      23 Dec 2023 07:00  Phos  2.7     12-  Mg     1.4         TPro  5.5<L>  /  Alb  2.3<L>  /  TBili  0.7  /  DBili  x   /  AST  21  /  ALT  29  /  AlkPhos  76  12-      Urinalysis Basic - ( 23 Dec 2023 10:30 )    Color: Yellow / Appearance: Clear / S.011 / pH: x  Gluc: x / Ketone: Negative mg/dL  / Bili: Negative / Urobili: 1.0 mg/dL   Blood: x / Protein: 100 mg/dL / Nitrite: Negative   Leuk Esterase: Negative / RBC: 2 /HPF / WBC 0 /HPF   Sq Epi: x / Non Sq Epi: x / Bacteria: Occasional /HPF      CAPILLARY BLOOD GLUCOSE            Urinalysis Basic - ( 23 Dec 2023 10:30 )    Color: Yellow / Appearance: Clear / S.011 / pH: x  Gluc: x / Ketone: Negative mg/dL  / Bili: Negative / Urobili: 1.0 mg/dL   Blood: x / Protein: 100 mg/dL / Nitrite: Negative   Leuk Esterase: Negative / RBC: 2 /HPF / WBC 0 /HPF   Sq Epi: x / Non Sq Epi: x / Bacteria: Occasional /HPF          RADIOLOGY & ADDITIONAL TESTS:    Imaging Personally Reviewed  Reviewed consultants input

## 2023-12-23 NOTE — PROGRESS NOTE ADULT - ASSESSMENT
73 M pmh of HTN, ITP, APL syndrome, generalized anxiety disorder, OCD, newly dx aggressive Merkel Cell cancer of forehead- pt's oncologist  Dr. Eng, at [facility] on Keytruda infusions with good response per wife  was brought to ED for worsening psychosis for the past few days, pt lately with difficulty swallowing, decreased po intake    Psychosis  psych and neuro following      #HTN/Orthostatic hypotension  ? psych med induced  adrenal insufficiency  continue prednisone and florinef   compressions stockings  permissive hypertension   endo fu    UTI-sp abx    #Anemia- stable hb  d/w heme    #severe protein malnutrition  sp PEG placement  increase feeds to goal as tolerated  lytes- replete and monitor    #DVT ppx-  hep sq      [facility]   [phone] 73 M pmh of HTN, ITP, APL syndrome, generalized anxiety disorder, OCD, newly dx aggressive Merkel Cell cancer of forehead- pt's oncologist  Dr. Eng, at [facility] on Keytruda infusions with good response per wife  was brought to ED for worsening psychosis for the past few days, pt lately with difficulty swallowing, decreased po intake    Psychosis  psych and neuro following      #HTN/Orthostatic hypotension  ? psych med induced  adrenal insufficiency  continue prednisone and florinef   compressions stockings  permissive hypertension   endo fu    UTI-sp abx    #Anemia- stable hb  d/w heme    #severe protein malnutrition  sp PEG placement  increase feeds to goal as tolerated  lytes- replete and monitor    #DVT ppx-  hep sq      [facility]   [phone]

## 2023-12-23 NOTE — PROGRESS NOTE ADULT - ASSESSMENT
The patient is a 73 year old male with a history of HTN, ITP, APLS, anxiety, OCD, Merkel cell cancer who presented with psychosis.    12/23/23  Seen at [facility]  Lying flat, comfortably  Awake and alert    Plan:  - ECG with no evidence of ischemia or infarction  - Orthostatic hypotension may be multifactorial - dehydration, medication related (antipsychotics), and low cortisol levels  - Allow for supine hypertension - BP labile due to orthostatic hypotension - would not treat supine hypertension with additional antihypertensives unless SBP>200  - Syncope - due to orthostatic hypotension  - Allow supine SBP up to 200 - no need to push IV medications if SBP below this. If SBP >200, repeat vitals with patient sitting upright before giving additional antihypertensives.  - Continue atenolol 25 mg daily  - Psych follow-up  - Orthostatic hypotension will likely persist despite additional measures. Stand or ambulation with assistance.   - Underwent PEG  - GI follow-up  - Discharge planning The patient is a 73 year old male with a history of HTN, ITP, APLS, anxiety, OCD, Merkel cell cancer who presented with psychosis.    12/23/23  Seen at [facility]  Lying flat, comfortably  Awake and alert    Plan:  - ECG with no evidence of ischemia or infarction  - Orthostatic hypotension may be multifactorial - dehydration, medication related (antipsychotics), and low cortisol levels  - Allow for supine hypertension - BP labile due to orthostatic hypotension - would not treat supine hypertension with additional antihypertensives unless SBP>200  - Syncope - due to orthostatic hypotension  - Allow supine SBP up to 200 - no need to push IV medications if SBP below this. If SBP >200, repeat vitals with patient sitting upright before giving additional antihypertensives.  - Continue atenolol 25 mg daily  - Psych follow-up  - Orthostatic hypotension will likely persist despite additional measures. Stand or ambulation with assistance.   - Underwent PEG  - GI follow-up  - Discharge planning

## 2023-12-23 NOTE — PROGRESS NOTE ADULT - SUBJECTIVE AND OBJECTIVE BOX
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 Eng, at [facility] on Keytruda infusions with good response per wife  was brought o ED for worsening psychosis for the past few days, pt believes there are sharp things inside his body trying to come out of his mouth, stuck in throat  pt lately with difficulty swallowing, decreased po intake  pt was evaluated by psych in ED (24 Oct 2023 13:19)      REVIEW OF SYSTEMS:    CONSTITUTIONAL: No fever, weight loss, or fatigue  EYES: No eye pain, visual disturbances, or discharge  ENMT:  No difficulty hearing, tinnitus, vertigo; No sinus or throat pain  NECK: No pain or stiffness  BREASTS: No pain, masses, or nipple discharge  RESPIRATORY: No cough, wheezing, chills or hemoptysis; No shortness of breath  CARDIOVASCULAR: No chest pain, palpitations, dizziness, or leg swelling  GASTROINTESTINAL: No abdominal or epigastric pain. No nausea, vomiting, or hematemesis; No diarrhea or constipation. No melena or hematochezia.  GENITOURINARY: No dysuria, frequency, hematuria, or incontinence  NEUROLOGICAL: No headaches, memory loss, loss of strength, numbness, or tremors  SKIN: No itching, burning, rashes, or lesions   LYMPH NODES: No enlarged glands  ENDOCRINE: No heat or cold intolerance; No hair loss  MUSCULOSKELETAL: No joint pain or swelling; No muscle, back, or extremity pain  PSYCHIATRIC: No depression, anxiety, mood swings, or difficulty sleeping  HEME/LYMPH: No easy bruising, or bleeding gums  ALLERGY AND IMMUNOLOGIC: No hives or eczema    MEDICATIONS  (STANDING):  atenolol  Tablet 25 milliGRAM(s) Oral two times a day  cholecalciferol 2000 Unit(s) Oral daily  dextrose 5% + sodium chloride 0.9% with potassium chloride 20 mEq/L 1000 milliLiter(s) (60 mL/Hr) IV Continuous <Continuous>  FIRST- Mouthwash  BLM 5 milliLiter(s) Swish and Spit three times a day  fludroCORTISONE 0.2 milliGRAM(s) Oral daily  heparin   Injectable 5000 Unit(s) SubCutaneous every 12 hours  magnesium sulfate  IVPB 2 Gram(s) IV Intermittent once  melatonin 3 milliGRAM(s) Oral at bedtime  phenazopyridine 100 milliGRAM(s) Oral once  potassium phosphate / sodium phosphate Powder (PHOS-NaK) 2 Packet(s) Oral once  predniSONE   Tablet 5 milliGRAM(s) Oral daily  senna 2 Tablet(s) Oral at bedtime    MEDICATIONS  (PRN):  acetaminophen     Tablet .. 650 milliGRAM(s) Oral every 6 hours PRN Temp greater or equal to 38.5C (101.3F), Moderate Pain (4 - 6)  hydrALAZINE Injectable 10 milliGRAM(s) IV Push every 8 hours PRN SBP >160  lactulose Syrup 15 Gram(s) Oral two times a day PRN constipation      ALLERGIES: No Known Allergies      FAMILY HISTORY:      Social history:  Alochol:   Smoking:   Drug Use:   Marital Status:     PHYSICAL EXAMINATION:  -----------------------------  T(C): 36.8 (12-23-23 @ 04:44), Max: 36.9 (12-22-23 @ 12:54)  HR: 88 (12-23-23 @ 04:44) (84 - 93)  BP: 192/85 (12-23-23 @ 04:44) (159/75 - 192/85)  RR: 17 (12-23-23 @ 04:44) (17 - 18)  SpO2: 95% (12-23-23 @ 04:44) (95% - 96%)  Wt(kg): --    12-22 @ 07:01  -  12-23 @ 07:00  --------------------------------------------------------  IN:    dextrose 5% + sodium chloride 0.9% w/ Additives: 780 mL    Enteral Tube Flush: 50 mL    Free Water: 160 mL    Jevity 1.5: 680 mL  Total IN: 1670 mL    OUT:    Voided (mL): 1000 mL  Total OUT: 1000 mL    Total NET: 670 mL            Constitutional: well developed, normal appearance, well groomed, well nourished, no deformities and no acute distress.   Eyes: the conjunctiva exhibited no abnormalities and the eyelids demonstrated no xanthelasmas.   HEENT: normal oral mucosa, no oral pallor and no oral cyanosis.   Neck: normal jugular venous A waves present, normal jugular venous V waves present and no jugular venous [place] A waves.   Pulmonary: no respiratory distress, normal respiratory rhythm and effort, no accessory muscle use and lungs were clear to auscultation bilaterally. Anteriorly  Cardiovascular: heart rate and rhythm were normal, normal S1 and S2 and no murmur, gallop, rub, heave or thrill are present.   Musculoskeletal: the gait could not be assessed.   Extremities: no clubbing of the fingernails, no localized cyanosis, no petechial hemorrhages and no ischemic changes.   Skin: normal skin color and pigmentation, no rash, no venous stasis, no skin lesions, no skin ulcer and no xanthoma was observed.       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 Eng, at [facility] on Keytruda infusions with good response per wife  was brought o ED for worsening psychosis for the past few days, pt believes there are sharp things inside his body trying to come out of his mouth, stuck in throat  pt lately with difficulty swallowing, decreased po intake  pt was evaluated by psych in ED (24 Oct 2023 13:19)      REVIEW OF SYSTEMS:    CONSTITUTIONAL: No fever, weight loss, or fatigue  EYES: No eye pain, visual disturbances, or discharge  ENMT:  No difficulty hearing, tinnitus, vertigo; No sinus or throat pain  NECK: No pain or stiffness  BREASTS: No pain, masses, or nipple discharge  RESPIRATORY: No cough, wheezing, chills or hemoptysis; No shortness of breath  CARDIOVASCULAR: No chest pain, palpitations, dizziness, or leg swelling  GASTROINTESTINAL: No abdominal or epigastric pain. No nausea, vomiting, or hematemesis; No diarrhea or constipation. No melena or hematochezia.  GENITOURINARY: No dysuria, frequency, hematuria, or incontinence  NEUROLOGICAL: No headaches, memory loss, loss of strength, numbness, or tremors  SKIN: No itching, burning, rashes, or lesions   LYMPH NODES: No enlarged glands  ENDOCRINE: No heat or cold intolerance; No hair loss  MUSCULOSKELETAL: No joint pain or swelling; No muscle, back, or extremity pain  PSYCHIATRIC: No depression, anxiety, mood swings, or difficulty sleeping  HEME/LYMPH: No easy bruising, or bleeding gums  ALLERGY AND IMMUNOLOGIC: No hives or eczema    MEDICATIONS  (STANDING):  atenolol  Tablet 25 milliGRAM(s) Oral two times a day  cholecalciferol 2000 Unit(s) Oral daily  dextrose 5% + sodium chloride 0.9% with potassium chloride 20 mEq/L 1000 milliLiter(s) (60 mL/Hr) IV Continuous <Continuous>  FIRST- Mouthwash  BLM 5 milliLiter(s) Swish and Spit three times a day  fludroCORTISONE 0.2 milliGRAM(s) Oral daily  heparin   Injectable 5000 Unit(s) SubCutaneous every 12 hours  magnesium sulfate  IVPB 2 Gram(s) IV Intermittent once  melatonin 3 milliGRAM(s) Oral at bedtime  phenazopyridine 100 milliGRAM(s) Oral once  potassium phosphate / sodium phosphate Powder (PHOS-NaK) 2 Packet(s) Oral once  predniSONE   Tablet 5 milliGRAM(s) Oral daily  senna 2 Tablet(s) Oral at bedtime    MEDICATIONS  (PRN):  acetaminophen     Tablet .. 650 milliGRAM(s) Oral every 6 hours PRN Temp greater or equal to 38.5C (101.3F), Moderate Pain (4 - 6)  hydrALAZINE Injectable 10 milliGRAM(s) IV Push every 8 hours PRN SBP >160  lactulose Syrup 15 Gram(s) Oral two times a day PRN constipation      ALLERGIES: No Known Allergies      FAMILY HISTORY:      Social history:  Alochol:   Smoking:   Drug Use:   Marital Status:     PHYSICAL EXAMINATION:  -----------------------------  T(C): 36.8 (12-23-23 @ 04:44), Max: 36.9 (12-22-23 @ 12:54)  HR: 88 (12-23-23 @ 04:44) (84 - 93)  BP: 192/85 (12-23-23 @ 04:44) (159/75 - 192/85)  RR: 17 (12-23-23 @ 04:44) (17 - 18)  SpO2: 95% (12-23-23 @ 04:44) (95% - 96%)  Wt(kg): --    12-22 @ 07:01  -  12-23 @ 07:00  --------------------------------------------------------  IN:    dextrose 5% + sodium chloride 0.9% w/ Additives: 780 mL    Enteral Tube Flush: 50 mL    Free Water: 160 mL    Jevity 1.5: 680 mL  Total IN: 1670 mL    OUT:    Voided (mL): 1000 mL  Total OUT: 1000 mL    Total NET: 670 mL            Constitutional: well developed, normal appearance, well groomed, well nourished, no deformities and no acute distress.   Eyes: the conjunctiva exhibited no abnormalities and the eyelids demonstrated no xanthelasmas.   HEENT: normal oral mucosa, no oral pallor and no oral cyanosis.   Neck: normal jugular venous A waves present, normal jugular venous V waves present and no jugular venous [place] A waves.   Pulmonary: no respiratory distress, normal respiratory rhythm and effort, no accessory muscle use and lungs were clear to auscultation bilaterally. Anteriorly  Cardiovascular: heart rate and rhythm were normal, normal S1 and S2 and no murmur, gallop, rub, heave or thrill are present.   Musculoskeletal: the gait could not be assessed.   Extremities: no clubbing of the fingernails, no localized cyanosis, no petechial hemorrhages and no ischemic changes.   Skin: normal skin color and pigmentation, no rash, no venous stasis, no skin lesions, no skin ulcer and no xanthoma was observed.       LABS:   --------  12-23    142  |  105  |  11  ----------------------------<  150<H>  4.0   |  34<H>  |  0.50    Ca    7.9<L>      23 Dec 2023 07:00  Phos  2.7     12-23  Mg     1.4     12-23    TPro  5.5<L>  /  Alb  2.3<L>  /  TBili  0.7  /  DBili  x   /  AST  21  /  ALT  29  /  AlkPhos  76  12-23                         9.6    7.10  )-----------( 146      ( 23 Dec 2023 07:00 )             27.6                   Radiology:

## 2023-12-24 LAB
SARS-COV-2 RNA SPEC QL NAA+PROBE: SIGNIFICANT CHANGE UP
SARS-COV-2 RNA SPEC QL NAA+PROBE: SIGNIFICANT CHANGE UP

## 2023-12-24 RX ORDER — BUPROPION HYDROCHLORIDE 150 MG/1
150 TABLET, EXTENDED RELEASE ORAL DAILY
Refills: 0 | Status: DISCONTINUED | OUTPATIENT
Start: 2023-12-24 | End: 2023-12-24

## 2023-12-24 RX ORDER — CEFAZOLIN SODIUM 1 G
2000 VIAL (EA) INJECTION ONCE
Refills: 0 | Status: COMPLETED | OUTPATIENT
Start: 2023-12-24 | End: 2023-12-24

## 2023-12-24 RX ORDER — CEFAZOLIN SODIUM 1 G
VIAL (EA) INJECTION
Refills: 0 | Status: DISCONTINUED | OUTPATIENT
Start: 2023-12-24 | End: 2023-12-26

## 2023-12-24 RX ORDER — CEFAZOLIN SODIUM 1 G
2000 VIAL (EA) INJECTION EVERY 8 HOURS
Refills: 0 | Status: DISCONTINUED | OUTPATIENT
Start: 2023-12-25 | End: 2023-12-26

## 2023-12-24 RX ORDER — ATENOLOL 25 MG/1
50 TABLET ORAL
Refills: 0 | Status: DISCONTINUED | OUTPATIENT
Start: 2023-12-24 | End: 2023-12-26

## 2023-12-24 RX ADMIN — ATENOLOL 50 MILLIGRAM(S): 25 TABLET ORAL at 19:08

## 2023-12-24 RX ADMIN — SENNA PLUS 2 TABLET(S): 8.6 TABLET ORAL at 22:54

## 2023-12-24 RX ADMIN — DEXTROSE MONOHYDRATE, SODIUM CHLORIDE, AND POTASSIUM CHLORIDE 60 MILLILITER(S): 50; .745; 4.5 INJECTION, SOLUTION INTRAVENOUS at 01:47

## 2023-12-24 RX ADMIN — FLUDROCORTISONE ACETATE 0.2 MILLIGRAM(S): 0.1 TABLET ORAL at 06:36

## 2023-12-24 RX ADMIN — Medication 3 MILLIGRAM(S): at 22:54

## 2023-12-24 RX ADMIN — HEPARIN SODIUM 5000 UNIT(S): 5000 INJECTION INTRAVENOUS; SUBCUTANEOUS at 19:08

## 2023-12-24 RX ADMIN — ATENOLOL 25 MILLIGRAM(S): 25 TABLET ORAL at 06:36

## 2023-12-24 RX ADMIN — DIPHENHYDRAMINE HYDROCHLORIDE AND LIDOCAINE HYDROCHLORIDE AND ALUMINUM HYDROXIDE AND MAGNESIUM HYDRO 5 MILLILITER(S): KIT at 22:54

## 2023-12-24 RX ADMIN — Medication 5 MILLIGRAM(S): at 06:36

## 2023-12-24 RX ADMIN — Medication 100 MILLIGRAM(S): at 19:08

## 2023-12-24 RX ADMIN — Medication 2000 UNIT(S): at 13:45

## 2023-12-24 RX ADMIN — DIPHENHYDRAMINE HYDROCHLORIDE AND LIDOCAINE HYDROCHLORIDE AND ALUMINUM HYDROXIDE AND MAGNESIUM HYDRO 5 MILLILITER(S): KIT at 13:45

## 2023-12-24 RX ADMIN — HEPARIN SODIUM 5000 UNIT(S): 5000 INJECTION INTRAVENOUS; SUBCUTANEOUS at 06:36

## 2023-12-24 NOTE — PROGRESS NOTE ADULT - SUBJECTIVE AND OBJECTIVE BOX
Patient is a 73y old  Male who presents with a chief complaint of psychosis (13 Dec 2023 13:03)        INTERVAL HPI/OVERNIGHT EVENTS:   covid exposure, complains of rt arm pain, complains of perianal pain  pt seen and examined         Vital Signs Last 24 Hrs  T(C): 36.7 (24 Dec 2023 13:07), Max: 37.1 (24 Dec 2023 06:26)  T(F): 98.1 (24 Dec 2023 13:07), Max: 98.7 (24 Dec 2023 06:26)  HR: 83 (24 Dec 2023 13:07) (82 - 93)  BP: 170/76 (24 Dec 2023 13:07) (170/76 - 186/73)  BP(mean): --  RR: 17 (24 Dec 2023 13:07) (17 - 17)  SpO2: 96% (24 Dec 2023 13:07) (93% - 96%)    Parameters below as of 24 Dec 2023 13:07  Patient On (Oxygen Delivery Method): room air        acetaminophen     Tablet .. 650 milliGRAM(s) Oral every 6 hours PRN  atenolol  Tablet 50 milliGRAM(s) Oral two times a day  ceFAZolin   IVPB      ceFAZolin   IVPB 2000 milliGRAM(s) IV Intermittent once  cholecalciferol 2000 Unit(s) Oral daily  FIRST- Mouthwash  BLM 5 milliLiter(s) Swish and Spit three times a day  fludroCORTISONE 0.2 milliGRAM(s) Oral daily  heparin   Injectable 5000 Unit(s) SubCutaneous every 12 hours  lactulose Syrup 15 Gram(s) Oral two times a day PRN  melatonin 3 milliGRAM(s) Oral at bedtime  predniSONE   Tablet 5 milliGRAM(s) Oral daily  senna 2 Tablet(s) Oral at bedtime      PHYSICAL EXAM:  GENERAL: NAD   EYES: conjunctiva and sclera clear  ENMT: Moist mucous membranes  NECK: Supple, No JVD, Normal thyroid  CHEST/LUNG: non labored, cta b/l  HEART: Regular rate and rhythm; No murmurs, rubs, or gallops  ABDOMEN: Soft, Nontender, Nondistended; Bowel sounds present  EXTREMITIES:  2+ Peripheral Pulses, No clubbing, no cyanosis, no edema  LYMPH: No lymphadenopathy noted  SKIN: right arm with thrombophlebitis, apparent pustules  NEURO: no focal deficits    Consultant(s) Notes Reviewed:  [x ] YES  [ ] NO  Care Discussed with Consultants/Other Providers [ x] YES  [ ] NO    LABS:                        9.6    7.10  )-----------( 146      ( 23 Dec 2023 07:00 )             27.6     12    142  |  105  |  11  ----------------------------<  150<H>  4.0   |  34<H>  |  0.50    Ca    7.9<L>      23 Dec 2023 07:00  Phos  2.7     12  Mg     1.4         TPro  5.5<L>  /  Alb  2.3<L>  /  TBili  0.7  /  DBili  x   /  AST  21  /  ALT  29  /  AlkPhos  76  12-      Urinalysis Basic - ( 23 Dec 2023 10:30 )    Color: Yellow / Appearance: Clear / S.011 / pH: x  Gluc: x / Ketone: Negative mg/dL  / Bili: Negative / Urobili: 1.0 mg/dL   Blood: x / Protein: 100 mg/dL / Nitrite: Negative   Leuk Esterase: Negative / RBC: 2 /HPF / WBC 0 /HPF   Sq Epi: x / Non Sq Epi: x / Bacteria: Occasional /HPF      CAPILLARY BLOOD GLUCOSE            Urinalysis Basic - ( 23 Dec 2023 10:30 )    Color: Yellow / Appearance: Clear / S.011 / pH: x  Gluc: x / Ketone: Negative mg/dL  / Bili: Negative / Urobili: 1.0 mg/dL   Blood: x / Protein: 100 mg/dL / Nitrite: Negative   Leuk Esterase: Negative / RBC: 2 /HPF / WBC 0 /HPF   Sq Epi: x / Non Sq Epi: x / Bacteria: Occasional /HPF          RADIOLOGY & ADDITIONAL TESTS:    Imaging Personally Reviewed  Reviewed consultants input

## 2023-12-24 NOTE — PROGRESS NOTE ADULT - NUTRITIONAL ASSESSMENT
This patient has been assessed with a concern for Malnutrition and has been determined to have a diagnosis/diagnoses of Severe protein-calorie malnutrition.    This patient is being managed with:   Diet NPO-  Entered: Dec 16 2023 10:24AM  
This patient has been assessed with a concern for Malnutrition and has been determined to have a diagnosis/diagnoses of Severe protein-calorie malnutrition.    This patient is being managed with:   Diet NPO-  Entered: Dec 16 2023 10:24AM  
This patient has been assessed with a concern for Malnutrition and has been determined to have a diagnosis/diagnoses of Severe protein-calorie malnutrition.    This patient is being managed with:   Diet Pureed-  Mildly Thick Liquids (MILDTHICKLIQS)  Supplement Feeding Modality:  Oral  Ensure Enlive Cans or Servings Per Day:  1       Frequency:  Three Times a day  Entered: Oct 28 2023  9:41AM  
severe protein malnutrition
This patient has been assessed with a concern for Malnutrition and has been determined to have a diagnosis/diagnoses of Severe protein-calorie malnutrition.    This patient is being managed with:   Diet NPO-  Entered: Dec 16 2023 10:24AM  
This patient has been assessed with a concern for Malnutrition and has been determined to have a diagnosis/diagnoses of Severe protein-calorie malnutrition.    This patient is being managed with:   Diet Pureed-  Mildly Thick Liquids (MILDTHICKLIQS)  Supplement Feeding Modality:  Oral  Ensure Enlive Cans or Servings Per Day:  1       Frequency:  Three Times a day  Entered: Oct 28 2023  9:41AM  
This patient has been assessed with a concern for Malnutrition and has been determined to have a diagnosis/diagnoses of Severe protein-calorie malnutrition.    This patient is being managed with:   Diet NPO with Tube Feed-  Tube Feeding Modality: Gastrostomy  Jevity 1.5  Total Volume for 24 Hours (mL): 1530  Continuous  Starting Tube Feed Rate {mL per Hour}: 25  Increase Tube Feed Rate by (mL): 10     Every 8 hours  Until Goal Tube Feed Rate (mL per Hour): 85  Tube Feed Duration (in Hours): 18  Tube Feed Start Time: 06:00  Pump   Rate (mL per Hour): 30   Frequency: Every Hour    Duration (Hours): 18  Free Water Flush Instructions:  150ml water ac/pc  Entered: Dec 19 2023 12:11PM    The following pending diet order is being considered for treatment of Severe protein-calorie malnutrition:  Diet Soft and Bite Sized-  Mildly Thick Liquids (MILDTHICKLIQS)  Free Water Flush Instructions:  NUTR . ASSISTANTS: please send broth and gravy with lunch and dinner; syrup with breakfast. chocolate ensure all meals  thickener pkts !!! thanks  Supplement Feeding Modality:  Oral  Ensure Plus High Protein Cans or Servings Per Day:  1       Frequency:  Three Times a day  Entered: Dec 22 2023 12:17PM  
This patient has been assessed with a concern for Malnutrition and has been determined to have a diagnosis/diagnoses of Severe protein-calorie malnutrition.    This patient is being managed with:   Diet Pureed-  Mildly Thick Liquids (MILDTHICKLIQS)  Supplement Feeding Modality:  Oral  Ensure Enlive Cans or Servings Per Day:  1       Frequency:  Three Times a day  Entered: Oct 28 2023  9:41AM  
This patient has been assessed with a concern for Malnutrition and has been determined to have a diagnosis/diagnoses of Severe protein-calorie malnutrition.    This patient is being managed with:   Diet Pureed-  Mildly Thick Liquids (MILDTHICKLIQS)  Supplement Feeding Modality:  Oral  Ensure Enlive Cans or Servings Per Day:  1       Frequency:  Three Times a day  Entered: Oct 28 2023  9:41AM    Diet Pureed-  Mildly Thick Liquids (MILDTHICKLIQS)  Entered: Oct 26 2023  1:04PM    The following pending diet order is being considered for treatment of Severe protein-calorie malnutrition:null
This patient has been assessed with a concern for Malnutrition and has been determined to have a diagnosis/diagnoses of Severe protein-calorie malnutrition.    This patient is being managed with:   Diet Pureed-  Mildly Thick Liquids (MILDTHICKLIQS)  Supplement Feeding Modality:  Oral  Ensure Enlive Cans or Servings Per Day:  1       Frequency:  Three Times a day  Entered: Oct 28 2023  9:41AM    Diet Pureed-  Mildly Thick Liquids (MILDTHICKLIQS)  Entered: Oct 26 2023  1:04PM    The following pending diet order is being considered for treatment of Severe protein-calorie malnutrition:null
This patient has been assessed with a concern for Malnutrition and has been determined to have a diagnosis/diagnoses of Severe protein-calorie malnutrition.    This patient is being managed with:   Diet NPO with Tube Feed-  Tube Feeding Modality: Gastrostomy  Jevity 1.5  Total Volume for 24 Hours (mL): 1530  Continuous  Starting Tube Feed Rate {mL per Hour}: 25  Increase Tube Feed Rate by (mL): 10     Every 8 hours  Until Goal Tube Feed Rate (mL per Hour): 85  Tube Feed Duration (in Hours): 18  Tube Feed Start Time: 06:00  Pump   Rate (mL per Hour): 30   Frequency: Every Hour    Duration (Hours): 18  Free Water Flush Instructions:  150ml water ac/pc  Entered: Dec 19 2023 12:11PM  
This patient has been assessed with a concern for Malnutrition and has been determined to have a diagnosis/diagnoses of Severe protein-calorie malnutrition.    This patient is being managed with:   Diet Pureed-  Mildly Thick Liquids (MILDTHICKLIQS)  Supplement Feeding Modality:  Oral  Ensure Enlive Cans or Servings Per Day:  1       Frequency:  Three Times a day  Entered: Oct 28 2023  9:41AM  
This patient has been assessed with a concern for Malnutrition and has been determined to have a diagnosis/diagnoses of Severe protein-calorie malnutrition.    This patient is being managed with:   Diet NPO with Tube Feed-  Tube Feeding Modality: Gastrostomy  Jevity 1.5  Total Volume for 24 Hours (mL): 1530  Continuous  Starting Tube Feed Rate {mL per Hour}: 25  Increase Tube Feed Rate by (mL): 10     Every 8 hours  Until Goal Tube Feed Rate (mL per Hour): 85  Tube Feed Duration (in Hours): 18  Tube Feed Start Time: 06:00  Pump   Rate (mL per Hour): 30   Frequency: Every Hour    Duration (Hours): 18  Free Water Flush Instructions:  150ml water ac/pc  Entered: Dec 19 2023 12:11PM    The following pending diet order is being considered for treatment of Severe protein-calorie malnutrition:  Diet Soft and Bite Sized-  Mildly Thick Liquids (MILDTHICKLIQS)  Free Water Flush Instructions:  NUTR . ASSISTANTS: please send broth and gravy with lunch and dinner; syrup with breakfast. chocolate ensure all meals  thickener pkts !!! thanks  Supplement Feeding Modality:  Oral  Ensure Plus High Protein Cans or Servings Per Day:  1       Frequency:  Three Times a day  Entered: Dec 22 2023 12:17PM  
This patient has been assessed with a concern for Malnutrition and has been determined to have a diagnosis/diagnoses of Severe protein-calorie malnutrition.    This patient is being managed with:   Diet Pureed-  Mildly Thick Liquids (MILDTHICKLIQS)  Supplement Feeding Modality:  Oral  Ensure Enlive Cans or Servings Per Day:  1       Frequency:  Three Times a day  Entered: Oct 28 2023  9:41AM

## 2023-12-24 NOTE — PROGRESS NOTE ADULT - ASSESSMENT
The patient is a 73 year old male with a history of HTN, ITP, APLS, anxiety, OCD, Merkel cell cancer who presented with psychosis.    12/23/23  Seen at [facility]  Lying flat, sleeping comfortably    Plan:  - ECG with no evidence of ischemia or infarction  - Orthostatic hypotension may be multifactorial - dehydration, medication related (antipsychotics), and low cortisol levels  - Allow for supine hypertension - BP labile due to orthostatic hypotension - would not treat supine hypertension with additional antihypertensives unless SBP>200  - Syncope - due to orthostatic hypotension  - Allow supine SBP up to 200 - no need to push IV medications if SBP below this. If SBP >200, repeat vitals with patient sitting upright before giving additional antihypertensives.  - Continue atenolol 25 mg daily  - Psych follow-up  - Orthostatic hypotension will likely persist despite additional measures. Stand or ambulation with assistance.   - Underwent PEG  - GI follow-up  - Discharge planning The patient is a 73 year old male with a history of HTN, ITP, APLS, anxiety, OCD, Merkel cell cancer who presented with psychosis.    12/23/23  Seen at [facility]  Lying flat, sleeping comfortably    Plan:  - ECG with no evidence of ischemia or infarction  - Orthostatic hypotension may be multifactorial - dehydration, medication related (antipsychotics), and low cortisol levels  - Allow for supine hypertension - BP labile due to orthostatic hypotension - would not treat supine hypertension with additional antihypertensives unless SBP>200  - Syncope - due to orthostatic hypotension  - Allow supine SBP up to 200 - no need to push IV medications if SBP below this. If SBP >200, repeat vitals with patient sitting upright before giving additional antihypertensives.  - Continue atenolol 25 mg daily  - Psych follow-up  - Orthostatic hypotension will likely persist despite additional measures. Stand or ambulation with assistance.   - Underwent PEG  - GI follow-up  - Discharge planning

## 2023-12-24 NOTE — PROGRESS NOTE ADULT - SUBJECTIVE AND OBJECTIVE BOX
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 Eng, at [facility] on Keytruda infusions with good response per wife  was brought o ED for worsening psychosis for the past few days, pt believes there are sharp things inside his body trying to come out of his mouth, stuck in throat  pt lately with difficulty swallowing, decreased po intake  pt was evaluated by psych in ED (24 Oct 2023 13:19)      REVIEW OF SYSTEMS:    CONSTITUTIONAL: No fever, weight loss, or fatigue  EYES: No eye pain, visual disturbances, or discharge  ENMT:  No difficulty hearing, tinnitus, vertigo; No sinus or throat pain  NECK: No pain or stiffness  BREASTS: No pain, masses, or nipple discharge  RESPIRATORY: No cough, wheezing, chills or hemoptysis; No shortness of breath  CARDIOVASCULAR: No chest pain, palpitations, dizziness, or leg swelling  GASTROINTESTINAL: No abdominal or epigastric pain. No nausea, vomiting, or hematemesis; No diarrhea or constipation. No melena or hematochezia.  GENITOURINARY: No dysuria, frequency, hematuria, or incontinence  NEUROLOGICAL: No headaches, memory loss, loss of strength, numbness, or tremors  SKIN: No itching, burning, rashes, or lesions   LYMPH NODES: No enlarged glands  ENDOCRINE: No heat or cold intolerance; No hair loss  MUSCULOSKELETAL: No joint pain or swelling; No muscle, back, or extremity pain  PSYCHIATRIC: No depression, anxiety, mood swings, or difficulty sleeping  HEME/LYMPH: No easy bruising, or bleeding gums  ALLERGY AND IMMUNOLOGIC: No hives or eczema    MEDICATIONS  (STANDING):  atenolol  Tablet 25 milliGRAM(s) Oral two times a day  cholecalciferol 2000 Unit(s) Oral daily  dextrose 5% + sodium chloride 0.9% with potassium chloride 20 mEq/L 1000 milliLiter(s) (60 mL/Hr) IV Continuous <Continuous>  FIRST- Mouthwash  BLM 5 milliLiter(s) Swish and Spit three times a day  fludroCORTISONE 0.2 milliGRAM(s) Oral daily  heparin   Injectable 5000 Unit(s) SubCutaneous every 12 hours  melatonin 3 milliGRAM(s) Oral at bedtime  predniSONE   Tablet 5 milliGRAM(s) Oral daily  senna 2 Tablet(s) Oral at bedtime    MEDICATIONS  (PRN):  acetaminophen     Tablet .. 650 milliGRAM(s) Oral every 6 hours PRN Temp greater or equal to 38.5C (101.3F), Moderate Pain (4 - 6)  hydrALAZINE Injectable 10 milliGRAM(s) IV Push every 8 hours PRN SBP >160  lactulose Syrup 15 Gram(s) Oral two times a day PRN constipation      ALLERGIES: No Known Allergies      FAMILY HISTORY:      Social history:  Alochol:   Smoking:   Drug Use:   Marital Status:     PHYSICAL EXAMINATION:  -----------------------------  T(C): 37.1 (12-24-23 @ 06:26), Max: 37.1 (12-24-23 @ 06:26)  HR: 93 (12-24-23 @ 06:26) (78 - 93)  BP: 182/66 (12-24-23 @ 06:26) (178/78 - 186/73)  RR: 17 (12-24-23 @ 06:26) (17 - 17)  SpO2: 96% (12-24-23 @ 06:26) (93% - 96%)  Wt(kg): --    12-23 @ 07:01  -  12-24 @ 07:00  --------------------------------------------------------  IN:    dextrose 5% + sodium chloride 0.9% w/ Additives: 1440 mL    Free Water: 100 mL    Jevity 1.5: 1615 mL  Total IN: 3155 mL    OUT:  Total OUT: 0 mL    Total NET: 3155 mL            Constitutional: well developed, normal appearance, well groomed, well nourished, no deformities and no acute distress.   Eyes: the conjunctiva exhibited no abnormalities and the eyelids demonstrated no xanthelasmas.   HEENT: normal oral mucosa, no oral pallor and no oral cyanosis.   Neck: normal jugular venous A waves present, normal jugular venous V waves present and no jugular venous [place] A waves.   Pulmonary: no respiratory distress, normal respiratory rhythm and effort, no accessory muscle use and lungs were clear to auscultation bilaterally. Anteriorly  Cardiovascular: heart rate and rhythm were normal, normal S1 and S2 and no murmur, gallop, rub, heave or thrill are present.   Musculoskeletal: the gait could not be assessed.  Extremities: no clubbing of the fingernails, no localized cyanosis, no petechial hemorrhages and no ischemic changes.   Skin: normal skin color and pigmentation, no rash, no venous stasis, no skin lesions, no skin ulcer and no xanthoma was observed.       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 Eng, at [facility] on Keytruda infusions with good response per wife  was brought o ED for worsening psychosis for the past few days, pt believes there are sharp things inside his body trying to come out of his mouth, stuck in throat  pt lately with difficulty swallowing, decreased po intake  pt was evaluated by psych in ED (24 Oct 2023 13:19)      REVIEW OF SYSTEMS:    CONSTITUTIONAL: No fever, weight loss, or fatigue  EYES: No eye pain, visual disturbances, or discharge  ENMT:  No difficulty hearing, tinnitus, vertigo; No sinus or throat pain  NECK: No pain or stiffness  BREASTS: No pain, masses, or nipple discharge  RESPIRATORY: No cough, wheezing, chills or hemoptysis; No shortness of breath  CARDIOVASCULAR: No chest pain, palpitations, dizziness, or leg swelling  GASTROINTESTINAL: No abdominal or epigastric pain. No nausea, vomiting, or hematemesis; No diarrhea or constipation. No melena or hematochezia.  GENITOURINARY: No dysuria, frequency, hematuria, or incontinence  NEUROLOGICAL: No headaches, memory loss, loss of strength, numbness, or tremors  SKIN: No itching, burning, rashes, or lesions   LYMPH NODES: No enlarged glands  ENDOCRINE: No heat or cold intolerance; No hair loss  MUSCULOSKELETAL: No joint pain or swelling; No muscle, back, or extremity pain  PSYCHIATRIC: No depression, anxiety, mood swings, or difficulty sleeping  HEME/LYMPH: No easy bruising, or bleeding gums  ALLERGY AND IMMUNOLOGIC: No hives or eczema    MEDICATIONS  (STANDING):  atenolol  Tablet 25 milliGRAM(s) Oral two times a day  cholecalciferol 2000 Unit(s) Oral daily  dextrose 5% + sodium chloride 0.9% with potassium chloride 20 mEq/L 1000 milliLiter(s) (60 mL/Hr) IV Continuous <Continuous>  FIRST- Mouthwash  BLM 5 milliLiter(s) Swish and Spit three times a day  fludroCORTISONE 0.2 milliGRAM(s) Oral daily  heparin   Injectable 5000 Unit(s) SubCutaneous every 12 hours  melatonin 3 milliGRAM(s) Oral at bedtime  predniSONE   Tablet 5 milliGRAM(s) Oral daily  senna 2 Tablet(s) Oral at bedtime    MEDICATIONS  (PRN):  acetaminophen     Tablet .. 650 milliGRAM(s) Oral every 6 hours PRN Temp greater or equal to 38.5C (101.3F), Moderate Pain (4 - 6)  hydrALAZINE Injectable 10 milliGRAM(s) IV Push every 8 hours PRN SBP >160  lactulose Syrup 15 Gram(s) Oral two times a day PRN constipation      ALLERGIES: No Known Allergies      FAMILY HISTORY:      Social history:  Alochol:   Smoking:   Drug Use:   Marital Status:     PHYSICAL EXAMINATION:  -----------------------------  T(C): 37.1 (12-24-23 @ 06:26), Max: 37.1 (12-24-23 @ 06:26)  HR: 93 (12-24-23 @ 06:26) (78 - 93)  BP: 182/66 (12-24-23 @ 06:26) (178/78 - 186/73)  RR: 17 (12-24-23 @ 06:26) (17 - 17)  SpO2: 96% (12-24-23 @ 06:26) (93% - 96%)  Wt(kg): --    12-23 @ 07:01  -  12-24 @ 07:00  --------------------------------------------------------  IN:    dextrose 5% + sodium chloride 0.9% w/ Additives: 1440 mL    Free Water: 100 mL    Jevity 1.5: 1615 mL  Total IN: 3155 mL    OUT:  Total OUT: 0 mL    Total NET: 3155 mL            Constitutional: well developed, normal appearance, well groomed, well nourished, no deformities and no acute distress.   Eyes: the conjunctiva exhibited no abnormalities and the eyelids demonstrated no xanthelasmas.   HEENT: normal oral mucosa, no oral pallor and no oral cyanosis.   Neck: normal jugular venous A waves present, normal jugular venous V waves present and no jugular venous [place] A waves.   Pulmonary: no respiratory distress, normal respiratory rhythm and effort, no accessory muscle use and lungs were clear to auscultation bilaterally. Anteriorly  Cardiovascular: heart rate and rhythm were normal, normal S1 and S2 and no murmur, gallop, rub, heave or thrill are present.   Musculoskeletal: the gait could not be assessed.  Extremities: no clubbing of the fingernails, no localized cyanosis, no petechial hemorrhages and no ischemic changes.   Skin: normal skin color and pigmentation, no rash, no venous stasis, no skin lesions, no skin ulcer and no xanthoma was observed.       LABS:   --------  12-23    142  |  105  |  11  ----------------------------<  150<H>  4.0   |  34<H>  |  0.50    Ca    7.9<L>      23 Dec 2023 07:00  Phos  2.7     12-23  Mg     1.4     12-23    TPro  5.5<L>  /  Alb  2.3<L>  /  TBili  0.7  /  DBili  x   /  AST  21  /  ALT  29  /  AlkPhos  76  12-23                         9.6    7.10  )-----------( 146      ( 23 Dec 2023 07:00 )             27.6                   Radiology:

## 2023-12-24 NOTE — PROGRESS NOTE ADULT - ASSESSMENT
73 M pmh of HTN, ITP, APL syndrome, generalized anxiety disorder, OCD, newly dx aggressive Merkel Cell cancer of forehead- pt's oncologist  Dr. Eng, at [facility] on [place] infusions with good response per wife  was brought to ED for worsening psychosis for the past few days, pt lately with difficulty swallowing, decreased po intake    #HTN/Orthostatic hypotension  ? psych med induced  adrenal insufficiency  continue prednisone and florinef   compressions stockings  permissive hypertension   endo following    #severe protein malnutrition  sp PEG placement  increase feeds to goal as tolerated  lytes- replete and monitor    #DVT ppx-  hep sq      [facility]   [phone] 73 M pmh of HTN, ITP, APL syndrome, generalized anxiety disorder, OCD, newly dx aggressive Merkel Cell cancer of forehead- pt's oncologist  Dr. Eng, at [facility] on [place]tr[place] infusions with good response per wife  was brought to ED for worsening psychosis for the past few days, pt lately with difficulty swallowing, decreased po intake    #HTN/Orthostatic hypotension  ? psych med induced  adrenal insufficiency  continue prednisone and florinef   compressions stockings  permissive hypertension   endo following    #severe protein malnutrition  sp PEG placement  increase feeds to goal as tolerated  lytes- replete and monitor    #DVT ppx-  hep sq      [facility]   [phone] 73 M pmh of HTN, ITP, APL syndrome, generalized anxiety disorder, OCD, newly dx aggressive Merkel Cell cancer of forehead- pt's oncologist  Dr. Eng, at [facility] on [place]tr[place] infusions with good response per wife  was brought to ED for worsening psychosis for the past few days, pt lately with difficulty swallowing, decreased po intake    thrombophlebitis  has not been improving with heat packs  will start ancef  ID to follow up    covid exposure  negative today  ID to follow up  continue isolation in interim    #HTN/Orthostatic hypotension  ? psych med induced  adrenal insufficiency  continue prednisone and florinef   compressions stockings  permissive hypertension   endo following    #severe protein malnutrition  sp PEG placement  increase feeds to goal as tolerated  lytes- replete and monitor    #DVT ppx-  hep sq      [facility]   [phone] 73 M pmh of HTN, ITP, APL syndrome, generalized anxiety disorder, OCD, newly dx aggressive Merkel Cell cancer of forehead- pt's oncologist  Dr. Eng, at [facility] on [place]tr[place] infusions with good response per wife  was brought to ED for worsening psychosis for the past few days, pt lately with difficulty swallowing, decreased po intake    thrombophlebitis  has not been improving with heat packs  will start ancef  ID to follow up    covid exposure  negative today  ID to follow up  continue isolation in interim    #HTN/Orthostatic hypotension  ? psych med induced  adrenal insufficiency  continue prednisone and florinef   compressions stockings  permissive hypertension   endo following    #severe protein malnutrition  sp PEG placement  increase feeds to goal as tolerated  lytes- replete and monitor    #DVT ppx-  hep sq      [facility]   [phone]

## 2023-12-25 LAB
ALBUMIN SERPL ELPH-MCNC: 2.3 G/DL — LOW (ref 3.3–5)
ALBUMIN SERPL ELPH-MCNC: 2.3 G/DL — LOW (ref 3.3–5)
ALP SERPL-CCNC: 80 U/L — SIGNIFICANT CHANGE UP (ref 40–120)
ALP SERPL-CCNC: 80 U/L — SIGNIFICANT CHANGE UP (ref 40–120)
ALT FLD-CCNC: 23 U/L — SIGNIFICANT CHANGE UP (ref 12–78)
ALT FLD-CCNC: 23 U/L — SIGNIFICANT CHANGE UP (ref 12–78)
ANION GAP SERPL CALC-SCNC: 2 MMOL/L — LOW (ref 5–17)
ANION GAP SERPL CALC-SCNC: 2 MMOL/L — LOW (ref 5–17)
AST SERPL-CCNC: 16 U/L — SIGNIFICANT CHANGE UP (ref 15–37)
AST SERPL-CCNC: 16 U/L — SIGNIFICANT CHANGE UP (ref 15–37)
BASOPHILS # BLD AUTO: 0.02 K/UL — SIGNIFICANT CHANGE UP (ref 0–0.2)
BASOPHILS # BLD AUTO: 0.02 K/UL — SIGNIFICANT CHANGE UP (ref 0–0.2)
BASOPHILS NFR BLD AUTO: 0.3 % — SIGNIFICANT CHANGE UP (ref 0–2)
BASOPHILS NFR BLD AUTO: 0.3 % — SIGNIFICANT CHANGE UP (ref 0–2)
BILIRUB SERPL-MCNC: 0.6 MG/DL — SIGNIFICANT CHANGE UP (ref 0.2–1.2)
BILIRUB SERPL-MCNC: 0.6 MG/DL — SIGNIFICANT CHANGE UP (ref 0.2–1.2)
BUN SERPL-MCNC: 16 MG/DL — SIGNIFICANT CHANGE UP (ref 7–23)
BUN SERPL-MCNC: 16 MG/DL — SIGNIFICANT CHANGE UP (ref 7–23)
CALCIUM SERPL-MCNC: 8.4 MG/DL — LOW (ref 8.5–10.1)
CALCIUM SERPL-MCNC: 8.4 MG/DL — LOW (ref 8.5–10.1)
CHLORIDE SERPL-SCNC: 103 MMOL/L — SIGNIFICANT CHANGE UP (ref 96–108)
CHLORIDE SERPL-SCNC: 103 MMOL/L — SIGNIFICANT CHANGE UP (ref 96–108)
CO2 SERPL-SCNC: 35 MMOL/L — HIGH (ref 22–31)
CO2 SERPL-SCNC: 35 MMOL/L — HIGH (ref 22–31)
CREAT SERPL-MCNC: 0.59 MG/DL — SIGNIFICANT CHANGE UP (ref 0.5–1.3)
CREAT SERPL-MCNC: 0.59 MG/DL — SIGNIFICANT CHANGE UP (ref 0.5–1.3)
CRP SERPL-MCNC: 9 MG/L — HIGH
CRP SERPL-MCNC: 9 MG/L — HIGH
EGFR: 101 ML/MIN/1.73M2 — SIGNIFICANT CHANGE UP
EGFR: 101 ML/MIN/1.73M2 — SIGNIFICANT CHANGE UP
EOSINOPHIL # BLD AUTO: 0.09 K/UL — SIGNIFICANT CHANGE UP (ref 0–0.5)
EOSINOPHIL # BLD AUTO: 0.09 K/UL — SIGNIFICANT CHANGE UP (ref 0–0.5)
EOSINOPHIL NFR BLD AUTO: 1.3 % — SIGNIFICANT CHANGE UP (ref 0–6)
EOSINOPHIL NFR BLD AUTO: 1.3 % — SIGNIFICANT CHANGE UP (ref 0–6)
GLUCOSE SERPL-MCNC: 183 MG/DL — HIGH (ref 70–99)
GLUCOSE SERPL-MCNC: 183 MG/DL — HIGH (ref 70–99)
HCT VFR BLD CALC: 30.2 % — LOW (ref 39–50)
HCT VFR BLD CALC: 30.2 % — LOW (ref 39–50)
HGB BLD-MCNC: 10.4 G/DL — LOW (ref 13–17)
HGB BLD-MCNC: 10.4 G/DL — LOW (ref 13–17)
IMM GRANULOCYTES NFR BLD AUTO: 0.7 % — SIGNIFICANT CHANGE UP (ref 0–0.9)
IMM GRANULOCYTES NFR BLD AUTO: 0.7 % — SIGNIFICANT CHANGE UP (ref 0–0.9)
LYMPHOCYTES # BLD AUTO: 0.84 K/UL — LOW (ref 1–3.3)
LYMPHOCYTES # BLD AUTO: 0.84 K/UL — LOW (ref 1–3.3)
LYMPHOCYTES # BLD AUTO: 11.8 % — LOW (ref 13–44)
LYMPHOCYTES # BLD AUTO: 11.8 % — LOW (ref 13–44)
MAGNESIUM SERPL-MCNC: 1.7 MG/DL — SIGNIFICANT CHANGE UP (ref 1.6–2.6)
MAGNESIUM SERPL-MCNC: 1.7 MG/DL — SIGNIFICANT CHANGE UP (ref 1.6–2.6)
MCHC RBC-ENTMCNC: 30.3 PG — SIGNIFICANT CHANGE UP (ref 27–34)
MCHC RBC-ENTMCNC: 30.3 PG — SIGNIFICANT CHANGE UP (ref 27–34)
MCHC RBC-ENTMCNC: 34.4 GM/DL — SIGNIFICANT CHANGE UP (ref 32–36)
MCHC RBC-ENTMCNC: 34.4 GM/DL — SIGNIFICANT CHANGE UP (ref 32–36)
MCV RBC AUTO: 88 FL — SIGNIFICANT CHANGE UP (ref 80–100)
MCV RBC AUTO: 88 FL — SIGNIFICANT CHANGE UP (ref 80–100)
MONOCYTES # BLD AUTO: 0.53 K/UL — SIGNIFICANT CHANGE UP (ref 0–0.9)
MONOCYTES # BLD AUTO: 0.53 K/UL — SIGNIFICANT CHANGE UP (ref 0–0.9)
MONOCYTES NFR BLD AUTO: 7.5 % — SIGNIFICANT CHANGE UP (ref 2–14)
MONOCYTES NFR BLD AUTO: 7.5 % — SIGNIFICANT CHANGE UP (ref 2–14)
NEUTROPHILS # BLD AUTO: 5.57 K/UL — SIGNIFICANT CHANGE UP (ref 1.8–7.4)
NEUTROPHILS # BLD AUTO: 5.57 K/UL — SIGNIFICANT CHANGE UP (ref 1.8–7.4)
NEUTROPHILS NFR BLD AUTO: 78.4 % — HIGH (ref 43–77)
NEUTROPHILS NFR BLD AUTO: 78.4 % — HIGH (ref 43–77)
NRBC # BLD: 0 /100 WBCS — SIGNIFICANT CHANGE UP (ref 0–0)
NRBC # BLD: 0 /100 WBCS — SIGNIFICANT CHANGE UP (ref 0–0)
PLATELET # BLD AUTO: 126 K/UL — LOW (ref 150–400)
PLATELET # BLD AUTO: 126 K/UL — LOW (ref 150–400)
POTASSIUM SERPL-MCNC: 4.1 MMOL/L — SIGNIFICANT CHANGE UP (ref 3.5–5.3)
POTASSIUM SERPL-MCNC: 4.1 MMOL/L — SIGNIFICANT CHANGE UP (ref 3.5–5.3)
POTASSIUM SERPL-SCNC: 4.1 MMOL/L — SIGNIFICANT CHANGE UP (ref 3.5–5.3)
POTASSIUM SERPL-SCNC: 4.1 MMOL/L — SIGNIFICANT CHANGE UP (ref 3.5–5.3)
PREALB SERPL-MCNC: 15 MG/DL — LOW (ref 20–40)
PREALB SERPL-MCNC: 15 MG/DL — LOW (ref 20–40)
PROT SERPL-MCNC: 5.7 G/DL — LOW (ref 6–8.3)
PROT SERPL-MCNC: 5.7 G/DL — LOW (ref 6–8.3)
RBC # BLD: 3.43 M/UL — LOW (ref 4.2–5.8)
RBC # BLD: 3.43 M/UL — LOW (ref 4.2–5.8)
RBC # FLD: 17.9 % — HIGH (ref 10.3–14.5)
RBC # FLD: 17.9 % — HIGH (ref 10.3–14.5)
SODIUM SERPL-SCNC: 140 MMOL/L — SIGNIFICANT CHANGE UP (ref 135–145)
SODIUM SERPL-SCNC: 140 MMOL/L — SIGNIFICANT CHANGE UP (ref 135–145)
WBC # BLD: 7.1 K/UL — SIGNIFICANT CHANGE UP (ref 3.8–10.5)
WBC # BLD: 7.1 K/UL — SIGNIFICANT CHANGE UP (ref 3.8–10.5)
WBC # FLD AUTO: 7.1 K/UL — SIGNIFICANT CHANGE UP (ref 3.8–10.5)
WBC # FLD AUTO: 7.1 K/UL — SIGNIFICANT CHANGE UP (ref 3.8–10.5)

## 2023-12-25 RX ORDER — BUPROPION HYDROCHLORIDE 150 MG/1
150 TABLET, EXTENDED RELEASE ORAL DAILY
Refills: 0 | Status: DISCONTINUED | OUTPATIENT
Start: 2023-12-25 | End: 2023-12-26

## 2023-12-25 RX ADMIN — Medication 650 MILLIGRAM(S): at 05:36

## 2023-12-25 RX ADMIN — DIPHENHYDRAMINE HYDROCHLORIDE AND LIDOCAINE HYDROCHLORIDE AND ALUMINUM HYDROXIDE AND MAGNESIUM HYDRO 5 MILLILITER(S): KIT at 05:39

## 2023-12-25 RX ADMIN — Medication 100 MILLIGRAM(S): at 21:25

## 2023-12-25 RX ADMIN — Medication 5 MILLIGRAM(S): at 05:36

## 2023-12-25 RX ADMIN — HEPARIN SODIUM 5000 UNIT(S): 5000 INJECTION INTRAVENOUS; SUBCUTANEOUS at 05:37

## 2023-12-25 RX ADMIN — ATENOLOL 50 MILLIGRAM(S): 25 TABLET ORAL at 05:36

## 2023-12-25 RX ADMIN — ATENOLOL 50 MILLIGRAM(S): 25 TABLET ORAL at 17:08

## 2023-12-25 RX ADMIN — Medication 3 MILLIGRAM(S): at 21:25

## 2023-12-25 RX ADMIN — HEPARIN SODIUM 5000 UNIT(S): 5000 INJECTION INTRAVENOUS; SUBCUTANEOUS at 17:07

## 2023-12-25 RX ADMIN — Medication 2000 UNIT(S): at 11:12

## 2023-12-25 RX ADMIN — Medication 100 MILLIGRAM(S): at 05:37

## 2023-12-25 RX ADMIN — Medication 100 MILLIGRAM(S): at 14:59

## 2023-12-25 RX ADMIN — SENNA PLUS 2 TABLET(S): 8.6 TABLET ORAL at 21:25

## 2023-12-25 RX ADMIN — FLUDROCORTISONE ACETATE 0.2 MILLIGRAM(S): 0.1 TABLET ORAL at 05:36

## 2023-12-25 NOTE — PROGRESS NOTE ADULT - SUBJECTIVE AND OBJECTIVE BOX
Chief Complaint: Psychosis    Interval Events: No events overnight.    Review of Systems:  General: No fevers, chills, weight gain  Skin: No rashes, color changes  Cardiovascular: No chest pain, orthopnea  Respiratory: No shortness of breath, cough  Gastrointestinal: No nausea, abdominal pain  Genitourinary: No incontinence, pain with urination  Musculoskeletal: No pain, swelling, decreased range of motion  Neurological: No headache, weakness  Psychiatric: No depression, anxiety  Endocrine: No weight gain, increased thirst  All other systems are comprehensively negative.    Physical Exam:  Vital Signs Last 24 Hrs  T(C): 36.4 (25 Dec 2023 05:00), Max: 37 (24 Dec 2023 20:57)  T(F): 97.6 (25 Dec 2023 05:00), Max: 98.6 (24 Dec 2023 20:57)  HR: 100 (25 Dec 2023 05:00) (83 - 100)  BP: 167/73 (25 Dec 2023 05:00) (167/73 - 170/76)  BP(mean): --  RR: 18 (25 Dec 2023 05:00) (17 - 18)  SpO2: 100% (25 Dec 2023 05:00) (95% - 100%)  Parameters below as of 25 Dec 2023 05:00  Patient On (Oxygen Delivery Method): room air  General: NAD  HEENT: MMM  Neck: No JVD, no carotid bruit  Lungs: CTAB  CV: RRR, nl S1/S2, no M/R/G  Abdomen: S/NT/ND, +BS  Extremities: No LE edema, no cyanosis  Neuro: AAOx3, non-focal  Skin: No rash    Labs:

## 2023-12-25 NOTE — PROGRESS NOTE ADULT - ASSESSMENT
73 M pmh of HTN, ITP, APL syndrome, generalized anxiety disorder, OCD, newly dx aggressive Merkel Cell cancer of forehead- pt's oncologist  Dr. Eng, at [facility] on [place]tr[place] infusions with good response per wife  was brought to ED for worsening psychosis for the past few days, pt lately with difficulty swallowing, decreased po intake    thrombophlebitis  abx thru 12/12/2028    covid exposure  no symptoms  ID following    #HTN/Orthostatic hypotension  ? psych med induced  adrenal insufficiency  continue prednisone and florinef   compressions stockings  permissive hypertension   endo following    #severe protein malnutrition  sp PEG placement  continue feeds  lytes- replete and monitor    #DVT ppx-  hep sq      [facility]   [phone] 73 M pmh of HTN, ITP, APL syndrome, generalized anxiety disorder, OCD, newly dx aggressive Merkel Cell cancer of forehead- pt's oncologist  Dr. Eng, at [facility] on [place] infusions with good response per wife  was brought to ED for worsening psychosis for the past few days, pt lately with difficulty swallowing, decreased po intake    thrombophlebitis  abx thru 12/12/2028    covid exposure  no symptoms  ID following    #HTN/Orthostatic hypotension  ? psych med induced  adrenal insufficiency  continue prednisone and florinef   compressions stockings  permissive hypertension   endo following    #severe protein malnutrition  sp PEG placement  continue feeds  lytes- replete and monitor    #DVT ppx-  hep sq      [facility]   [phone]

## 2023-12-25 NOTE — PROGRESS NOTE ADULT - ASSESSMENT
The patient is a 73 year old male with a history of HTN, ITP, APLS, anxiety, OCD, Merkel cell cancer who presented with psychosis.    Plan:  - ECG with no evidence of ischemia or infarction  - Orthostatic hypotension may be multifactorial - dehydration, medication related (antipsychotics), and low cortisol levels  - Allow for supine hypertension - BP labile due to orthostatic hypotension - would not treat supine hypertension with additional antihypertensives unless SBP>200  - Syncope - due to orthostatic hypotension  - Allow supine SBP up to 200 - no need to push IV medications if SBP below this. If SBP >200, repeat vitals with patient sitting upright before giving additional antihypertensives.  - Continue atenolol 25 mg daily  - Psych follow-up  - Orthostatic hypotension will likely persist despite additional measures. Stand or ambulation with assistance.   - Underwent PEG  - GI follow-up  - Discharge planning

## 2023-12-25 NOTE — PROGRESS NOTE ADULT - ASSESSMENT
73 M pmh of HTN, ITP, APL syndrome, generalized anxiety disorder, OCD, newly dx aggressive Merkel Cell cancer of forehead- pt's oncologist  Dr. Eng, at [facility] on Keytruda (PD-1 blocker) infusions with good response per wife  was brought to ED for worsening psychosis for the past few days, pt believes there are sharp things inside his body trying to come out of his mouth, stuck in throat, pt lately with difficulty swallowing, decreased po intake, pt was evaluated by psych in ED. Wife reports sudden cognitive decline and behavioral changes immediately after COVID infection.    1-PASC  noted serology for EBV consistent with reactivation as described post PASC  Serotonin, Blood: <30ng/mL   am cortisol-low x 2 then nl  Psych involved    2-PYELONEPHRITIS   -ceftriaxone (started 11/25), completed  urine culture w >100,000 CFU/ml Klebsiella variicola,  -  Ceftriaxone: S <=1  blood cultures-NGTD  Monitor off Abx    3-COVID Exposure  -noted exposure, no specific symptoms so with negative test current recommendations only testing if symptoms develop or per facility protocols    4-Thrombophlebitis started on cefazolin and can complete with Keflex 500mg POP QID last day 12/12/28    Thank you for consulting us and involving us in the management of this most interesting and challenging case.  We will follow along in the care of this patient. Please call us at [phone] or text me directly on my cell# at [phone] with any concerns.     73 M pmh of HTN, ITP, APL syndrome, generalized anxiety disorder, OCD, newly dx aggressive Merkel Cell cancer of forehead- pt's oncologist  Dr. Eng, at [facility] on Keytruda (PD-1 blocker) infusions with good response per wife  was brought to ED for worsening psychosis for the past few days, pt believes there are sharp things inside his body trying to come out of his mouth, stuck in throat, pt lately with difficulty swallowing, decreased po intake, pt was evaluated by psych in ED. Wife reports sudden cognitive decline and behavioral changes immediately after COVID infection.    1-PASC  noted serology for EBV consistent with reactivation as described post PASC  Serotonin, Blood: <30ng/mL   am cortisol-low x 2 then nl  Psych involved    2-PYELONEPHRITIS   -ceftriaxone (started 11/25), completed  urine culture w >100,000 CFU/ml Klebsiella variicola,  -  Ceftriaxone: S <=1  blood cultures-NGTD  Monitor off Abx    3-COVID Exposure  -noted exposure, no specific symptoms so with negative test current recommendations only testing if symptoms develop or per facility protocols    4-Thrombophlebitis started on cefazolin and can complete with Keflex 500mg POP QID last day 12/12/28    Thank you for consulting us and involving us in the management of this most interesting and challenging case.  We will follow along in the care of this patient. Please call us at [phone] or text me directly on my cell# at [phone] with any concerns.     73 M pmh of HTN, ITP, APL syndrome, generalized anxiety disorder, OCD, newly dx aggressive Merkel Cell cancer of forehead- pt's oncologist  Dr. Eng, at [facility] on Keytruda (PD-1 blocker) infusions with good response per wife  was brought to ED for worsening psychosis for the past few days, pt believes there are sharp things inside his body trying to come out of his mouth, stuck in throat, pt lately with difficulty swallowing, decreased po intake, pt was evaluated by psych in ED. Wife reports sudden cognitive decline and behavioral changes immediately after COVID infection.    1-PASC  noted serology for EBV consistent with reactivation as described post PASC  Serotonin, Blood: <30ng/mL   am cortisol-low x 2 then nl  Psych involved    2-PYELONEPHRITIS   -ceftriaxone (started 11/25), completed  urine culture w >100,000 CFU/ml Klebsiella variicola,  -  Ceftriaxone: S <=1  blood cultures-NGTD  Monitor off Abx    3-COVID Exposure  -noted exposure, no specific symptoms so with negative test current recommendations only testing if symptoms develop or per facility protocols    4-Thrombophlebitis started on cefazolin and can complete with Keflex 500mg POP QID last day 12/28    Thank you for consulting us and involving us in the management of this most interesting and challenging case.  We will follow along in the care of this patient. Please call us at [phone] or text me directly on my cell# at [phone] with any concerns.     73 M pmh of HTN, ITP, APL syndrome, generalized anxiety disorder, OCD, newly dx aggressive Merkel Cell cancer of forehead- pt's oncologist  Dr. Eng, at [facility] on Keytruda (PD-1 blocker) infusions with good response per wife  was brought to ED for worsening psychosis for the past few days, pt believes there are sharp things inside his body trying to come out of his mouth, stuck in throat, pt lately with difficulty swallowing, decreased po intake, pt was evaluated by psych in ED. Wife reports sudden cognitive decline and behavioral changes immediately after COVID infection.    1-PASC  noted serology for EBV consistent with reactivation as described post PASC  Serotonin, Blood: <30ng/mL   am cortisol-low x 2 then nl  Psych involved    2-PYELONEPHRITIS   -ceftriaxone (started 11/25), completed  urine culture w >100,000 CFU/ml Klebsiella variicola,  -  Ceftriaxone: S <=1  blood cultures-NGTD  Monitor off Abx    3-COVID Exposure  -noted exposure, no specific symptoms so with negative test current recommendations only testing if symptoms develop or per facility protocols    4-Thrombophlebitis started on cefazolin and can complete with Keflex 500mg POP QID last day 12/28    Thank you for consulting us and involving us in the management of this most interesting and challenging case.  We will follow along in the care of this patient. Please call us at [phone] or text me directly on my cell# at [phone] with any concerns.

## 2023-12-25 NOTE — PROGRESS NOTE ADULT - SUBJECTIVE AND OBJECTIVE BOX
Patient is a 73y old  Male who presents with a chief complaint of psychosis (13 Dec 2023 13:03)        INTERVAL HPI/OVERNIGHT EVENTS:   no complaints  pt seen and examined         Vital Signs Last 24 Hrs  T(C): 36.7 (25 Dec 2023 12:00), Max: 37 (24 Dec 2023 20:57)  T(F): 98.1 (25 Dec 2023 12:00), Max: 98.6 (24 Dec 2023 20:57)  HR: 80 (25 Dec 2023 12:00) (80 - 100)  BP: 181/70 (25 Dec 2023 12:00) (167/73 - 181/70)  BP(mean): --  RR: 18 (25 Dec 2023 12:00) (17 - 18)  SpO2: 95% (25 Dec 2023 12:00) (95% - 100%)    Parameters below as of 25 Dec 2023 12:00  Patient On (Oxygen Delivery Method): room air        acetaminophen     Tablet .. 650 milliGRAM(s) Oral every 6 hours PRN  atenolol  Tablet 50 milliGRAM(s) Oral two times a day  buPROPion XL (24-Hour) . 150 milliGRAM(s) Oral daily  ceFAZolin   IVPB 2000 milliGRAM(s) IV Intermittent every 8 hours  ceFAZolin   IVPB      cholecalciferol 2000 Unit(s) Oral daily  FIRST- Mouthwash  BLM 5 milliLiter(s) Swish and Spit three times a day  fludroCORTISONE 0.2 milliGRAM(s) Oral daily  heparin   Injectable 5000 Unit(s) SubCutaneous every 12 hours  lactulose Syrup 15 Gram(s) Oral two times a day PRN  melatonin 3 milliGRAM(s) Oral at bedtime  predniSONE   Tablet 5 milliGRAM(s) Oral daily  senna 2 Tablet(s) Oral at bedtime      PHYSICAL EXAM:  GENERAL: NAD   EYES: conjunctiva and sclera clear  ENMT: Moist mucous membranes  NECK: Supple, No JVD, Normal thyroid  CHEST/LUNG: non labored, cta b/l  HEART: Regular rate and rhythm; No murmurs, rubs, or gallops  ABDOMEN: Soft, Nontender, Nondistended; Bowel sounds present  EXTREMITIES:  2+ Peripheral Pulses, No clubbing, no cyanosis, no edema  LYMPH: No lymphadenopathy noted  SKIN: No rashes or lesions  NEURO: no focal deficits    Consultant(s) Notes Reviewed:  [x ] YES  [ ] NO  Care Discussed with Consultants/Other Providers [ x] YES  [ ] NO    LABS:                        10.4   7.10  )-----------( 126      ( 25 Dec 2023 16:20 )             30.2       Mg     1.7     12-25          CAPILLARY BLOOD GLUCOSE                Culture - Urine (collected 23 Dec 2023 10:30)  Source: Clean Catch Clean Catch (Midstream)  Preliminary Report (25 Dec 2023 13:59):    >100,000 CFU/ml Gram Negative Rods    50,000 - 99,000 CFU/mL Gram Positive Cocci in Pairs and Chains        RADIOLOGY & ADDITIONAL TESTS:    Imaging Personally Reviewed  Reviewed consultants input

## 2023-12-25 NOTE — PROGRESS NOTE ADULT - SUBJECTIVE AND OBJECTIVE BOX
[facility]  Eric Reyes MD PhD, Milena Underwood MD, Jillian Mckoy MD, Enid Everett MD, Jay Krishna MD  and providing coverage with Arthur Pepper MD  Providing Infectious Disease Consultations at [facility], [facility], [facility], [facility]'s    Office# [phone] to schedule follow up appointments  Answering Service for urgent calls or New Consults [phone]  Cell# to text for urgent issues Eric Reyes [phone]     infectious diseases progress note:    JANICE RENDON is a 73y y. o. Male patient    Overnight and events of the last 24hrs reviewed    Allergies    No Known Allergies    Intolerances        ANTIBIOTICS/RELEVANT:  antimicrobials  ceFAZolin   IVPB 2000 milliGRAM(s) IV Intermittent every 8 hours  ceFAZolin   IVPB        immunologic:    OTHER:  acetaminophen     Tablet .. 650 milliGRAM(s) Oral every 6 hours PRN  atenolol  Tablet 50 milliGRAM(s) Oral two times a day  cholecalciferol 2000 Unit(s) Oral daily  FIRST- Mouthwash  BLM 5 milliLiter(s) Swish and Spit three times a day  fludroCORTISONE 0.2 milliGRAM(s) Oral daily  heparin   Injectable 5000 Unit(s) SubCutaneous every 12 hours  lactulose Syrup 15 Gram(s) Oral two times a day PRN  melatonin 3 milliGRAM(s) Oral at bedtime  predniSONE   Tablet 5 milliGRAM(s) Oral daily  senna 2 Tablet(s) Oral at bedtime      Objective:  Vital Signs Last 24 Hrs  T(C): 36.7 (25 Dec 2023 12:00), Max: 37 (24 Dec 2023 20:57)  T(F): 98.1 (25 Dec 2023 12:00), Max: 98.6 (24 Dec 2023 20:57)  HR: 80 (25 Dec 2023 12:00) (80 - 100)  BP: 181/70 (25 Dec 2023 12:00) (167/73 - 181/70)  BP(mean): --  RR: 18 (25 Dec 2023 12:00) (17 - 18)  SpO2: 95% (25 Dec 2023 12:00) (95% - 100%)    Parameters below as of 25 Dec 2023 12:00  Patient On (Oxygen Delivery Method): room air        T(C): 36.7 (12-25-23 @ 12:00), Max: 37.1 (12-24-23 @ 06:26)  T(C): 36.7 (12-25-23 @ 12:00), Max: 37.1 (12-24-23 @ 06:26)  T(C): 36.7 (12-25-23 @ 12:00), Max: 37.1 (12-24-23 @ 06:26)    PHYSICAL EXAM:  HEENT: NC atraumatic  Neck: supple  Respiratory: no accessory muscle use, breathing comfortably  Cardiovascular: distant  Gastrointestinal: normal appearing, nondistended  Extremities: no clubbing, no cyanosis,        LABS:        WBC  7.10 12-23 @ 07:00  6.56 12-22 @ 11:35  6.43 12-21 @ 09:22  6.47 12-20 @ 08:42  6.33 12-19 @ 12:50              Creatinine: 0.50 mg/dL (12-23-23 @ 07:00)  Creatinine: 0.55 mg/dL (12-22-23 @ 11:35)  Creatinine: 0.60 mg/dL (12-21-23 @ 09:22)  Creatinine: 0.56 mg/dL (12-20-23 @ 08:42)  Creatinine: 0.64 mg/dL (12-19-23 @ 12:50)                INFLAMMATORY MARKERS      MICROBIOLOGY:    COVID-19 PCR . (12.24.23 @ 14:00)    COVID-19 PCR: NotDetec:    RADIOLOGY & ADDITIONAL STUDIES:   [facility]  Eric Reyes MD PhD, Milena Underwood MD, Jillian Mckoy MD, Enid Everett MD, Jay Krishna MD  and providing coverage with Arthur Pepper MD  Providing Infectious Disease Consultations at [facility], [facility], [facility], [facility]'s    Office# [phone] to schedule follow up appointments  Answering Service for urgent calls or New Consults [phone]  Cell# to text for urgent issues Eric Reyes [phone]     infectious diseases progress note:    JANICE RENDON is a 73y y. o. Male patient    Overnight and events of the last 24hrs reviewed    Allergies    No Known Allergies    Intolerances        ANTIBIOTICS/RELEVANT:  antimicrobials  ceFAZolin   IVPB 2000 milliGRAM(s) IV Intermittent every 8 hours  ceFAZolin   IVPB        immunologic:    OTHER:  acetaminophen     Tablet .. 650 milliGRAM(s) Oral every 6 hours PRN  atenolol  Tablet 50 milliGRAM(s) Oral two times a day  cholecalciferol 2000 Unit(s) Oral daily  FIRST- Mouthwash  BLM 5 milliLiter(s) Swish and Spit three times a day  fludroCORTISONE 0.2 milliGRAM(s) Oral daily  heparin   Injectable 5000 Unit(s) SubCutaneous every 12 hours  lactulose Syrup 15 Gram(s) Oral two times a day PRN  melatonin 3 milliGRAM(s) Oral at bedtime  predniSONE   Tablet 5 milliGRAM(s) Oral daily  senna 2 Tablet(s) Oral at bedtime      Objective:  Vital Signs Last 24 Hrs  T(C): 36.7 (25 Dec 2023 12:00), Max: 37 (24 Dec 2023 20:57)  T(F): 98.1 (25 Dec 2023 12:00), Max: 98.6 (24 Dec 2023 20:57)  HR: 80 (25 Dec 2023 12:00) (80 - 100)  BP: 181/70 (25 Dec 2023 12:00) (167/73 - 181/70)  BP(mean): --  RR: 18 (25 Dec 2023 12:00) (17 - 18)  SpO2: 95% (25 Dec 2023 12:00) (95% - 100%)    Parameters below as of 25 Dec 2023 12:00  Patient On (Oxygen Delivery Method): room air        T(C): 36.7 (12-25-23 @ 12:00), Max: 37.1 (12-24-23 @ 06:26)  T(C): 36.7 (12-25-23 @ 12:00), Max: 37.1 (12-24-23 @ 06:26)  T(C): 36.7 (12-25-23 @ 12:00), Max: 37.1 (12-24-23 @ 06:26)    PHYSICAL EXAM:  HEENT: NC atraumatic  Neck: supple  Respiratory: no accessory muscle use, breathing comfortably  Cardiovascular: distant  Gastrointestinal: normal appearing, nondistended  Extremities: no clubbing, no cyanosis,        LABS:        WBC  7.10 12-23 @ 07:00  6.56 12-22 @ 11:35  6.43 12-21 @ 09:22  6.47 12-20 @ 08:42  6.33 12-19 @ 12:50              Creatinine: 0.50 mg/dL (12-23-23 @ 07:00)  Creatinine: 0.55 mg/dL (12-22-23 @ 11:35)  Creatinine: 0.60 mg/dL (12-21-23 @ 09:22)  Creatinine: 0.56 mg/dL (12-20-23 @ 08:42)  Creatinine: 0.64 mg/dL (12-19-23 @ 12:50)                INFLAMMATORY MARKERS      MICROBIOLOGY:    COVID-19 PCR . (12.24.23 @ 14:00)    COVID-19 PCR: NotDetec:    RADIOLOGY & ADDITIONAL STUDIES:

## 2023-12-26 ENCOUNTER — TRANSCRIPTION ENCOUNTER (OUTPATIENT)
Age: 73
End: 2023-12-26

## 2023-12-26 VITALS
DIASTOLIC BLOOD PRESSURE: 78 MMHG | RESPIRATION RATE: 18 BRPM | TEMPERATURE: 98 F | SYSTOLIC BLOOD PRESSURE: 159 MMHG | OXYGEN SATURATION: 94 % | HEART RATE: 85 BPM

## 2023-12-26 LAB
-  AMIKACIN: SIGNIFICANT CHANGE UP
-  AMIKACIN: SIGNIFICANT CHANGE UP
-  AZTREONAM: SIGNIFICANT CHANGE UP
-  AZTREONAM: SIGNIFICANT CHANGE UP
-  CEFEPIME: SIGNIFICANT CHANGE UP
-  CEFEPIME: SIGNIFICANT CHANGE UP
-  CEFTAZIDIME: SIGNIFICANT CHANGE UP
-  CEFTAZIDIME: SIGNIFICANT CHANGE UP
-  CIPROFLOXACIN: SIGNIFICANT CHANGE UP
-  CIPROFLOXACIN: SIGNIFICANT CHANGE UP
-  IMIPENEM: SIGNIFICANT CHANGE UP
-  IMIPENEM: SIGNIFICANT CHANGE UP
-  LEVOFLOXACIN: SIGNIFICANT CHANGE UP
-  LEVOFLOXACIN: SIGNIFICANT CHANGE UP
-  MEROPENEM: SIGNIFICANT CHANGE UP
-  MEROPENEM: SIGNIFICANT CHANGE UP
-  PIPERACILLIN/TAZOBACTAM: SIGNIFICANT CHANGE UP
-  PIPERACILLIN/TAZOBACTAM: SIGNIFICANT CHANGE UP
METHOD TYPE: SIGNIFICANT CHANGE UP
METHOD TYPE: SIGNIFICANT CHANGE UP
SARS-COV-2 RNA SPEC QL NAA+PROBE: SIGNIFICANT CHANGE UP
SARS-COV-2 RNA SPEC QL NAA+PROBE: SIGNIFICANT CHANGE UP

## 2023-12-26 PROCEDURE — 86905 BLOOD TYPING RBC ANTIGENS: CPT

## 2023-12-26 PROCEDURE — 86901 BLOOD TYPING SEROLOGIC RH(D): CPT

## 2023-12-26 PROCEDURE — 82607 VITAMIN B-12: CPT

## 2023-12-26 PROCEDURE — 86664 EPSTEIN-BARR NUCLEAR ANTIGEN: CPT

## 2023-12-26 PROCEDURE — 86235 NUCLEAR ANTIGEN ANTIBODY: CPT

## 2023-12-26 PROCEDURE — 82247 BILIRUBIN TOTAL: CPT

## 2023-12-26 PROCEDURE — 0225U NFCT DS DNA&RNA 21 SARSCOV2: CPT

## 2023-12-26 PROCEDURE — 87077 CULTURE AEROBIC IDENTIFY: CPT

## 2023-12-26 PROCEDURE — 88313 SPECIAL STAINS GROUP 2: CPT

## 2023-12-26 PROCEDURE — 83605 ASSAY OF LACTIC ACID: CPT

## 2023-12-26 PROCEDURE — 71260 CT THORAX DX C+: CPT

## 2023-12-26 PROCEDURE — 88305 TISSUE EXAM BY PATHOLOGIST: CPT

## 2023-12-26 PROCEDURE — 96375 TX/PRO/DX INJ NEW DRUG ADDON: CPT

## 2023-12-26 PROCEDURE — 70553 MRI BRAIN STEM W/O & W/DYE: CPT

## 2023-12-26 PROCEDURE — 84443 ASSAY THYROID STIM HORMONE: CPT

## 2023-12-26 PROCEDURE — 70491 CT SOFT TISSUE NECK W/DYE: CPT

## 2023-12-26 PROCEDURE — 87086 URINE CULTURE/COLONY COUNT: CPT

## 2023-12-26 PROCEDURE — A9698: CPT

## 2023-12-26 PROCEDURE — 86900 BLOOD TYPING SEROLOGIC ABO: CPT

## 2023-12-26 PROCEDURE — 83036 HEMOGLOBIN GLYCOSYLATED A1C: CPT

## 2023-12-26 PROCEDURE — 93971 EXTREMITY STUDY: CPT

## 2023-12-26 PROCEDURE — 71045 X-RAY EXAM CHEST 1 VIEW: CPT

## 2023-12-26 PROCEDURE — 86665 EPSTEIN-BARR CAPSID VCA: CPT

## 2023-12-26 PROCEDURE — 86870 RBC ANTIBODY IDENTIFICATION: CPT

## 2023-12-26 PROCEDURE — 97162 PT EVAL MOD COMPLEX 30 MIN: CPT

## 2023-12-26 PROCEDURE — 97112 NEUROMUSCULAR REEDUCATION: CPT

## 2023-12-26 PROCEDURE — 87040 BLOOD CULTURE FOR BACTERIA: CPT

## 2023-12-26 PROCEDURE — 84145 PROCALCITONIN (PCT): CPT

## 2023-12-26 PROCEDURE — 82746 ASSAY OF FOLIC ACID SERUM: CPT

## 2023-12-26 PROCEDURE — 94640 AIRWAY INHALATION TREATMENT: CPT

## 2023-12-26 PROCEDURE — 80053 COMPREHEN METABOLIC PANEL: CPT

## 2023-12-26 PROCEDURE — 84100 ASSAY OF PHOSPHORUS: CPT

## 2023-12-26 PROCEDURE — 86850 RBC ANTIBODY SCREEN: CPT

## 2023-12-26 PROCEDURE — 97530 THERAPEUTIC ACTIVITIES: CPT

## 2023-12-26 PROCEDURE — 86644 CMV ANTIBODY: CPT

## 2023-12-26 PROCEDURE — 85652 RBC SED RATE AUTOMATED: CPT

## 2023-12-26 PROCEDURE — 84479 ASSAY OF THYROID (T3 OR T4): CPT

## 2023-12-26 PROCEDURE — 83615 LACTATE (LD) (LDH) ENZYME: CPT

## 2023-12-26 PROCEDURE — A9579: CPT

## 2023-12-26 PROCEDURE — 84260 ASSAY OF SEROTONIN: CPT

## 2023-12-26 PROCEDURE — 86880 COOMBS TEST DIRECT: CPT

## 2023-12-26 PROCEDURE — 92611 MOTION FLUOROSCOPY/SWALLOW: CPT

## 2023-12-26 PROCEDURE — 97110 THERAPEUTIC EXERCISES: CPT

## 2023-12-26 PROCEDURE — 82306 VITAMIN D 25 HYDROXY: CPT

## 2023-12-26 PROCEDURE — 85027 COMPLETE CBC AUTOMATED: CPT

## 2023-12-26 PROCEDURE — 84439 ASSAY OF FREE THYROXINE: CPT

## 2023-12-26 PROCEDURE — 93005 ELECTROCARDIOGRAM TRACING: CPT

## 2023-12-26 PROCEDURE — 87186 SC STD MICRODIL/AGAR DIL: CPT

## 2023-12-26 PROCEDURE — 87635 SARS-COV-2 COVID-19 AMP PRB: CPT

## 2023-12-26 PROCEDURE — 82550 ASSAY OF CK (CPK): CPT

## 2023-12-26 PROCEDURE — 85730 THROMBOPLASTIN TIME PARTIAL: CPT

## 2023-12-26 PROCEDURE — 84134 ASSAY OF PREALBUMIN: CPT

## 2023-12-26 PROCEDURE — 93970 EXTREMITY STUDY: CPT

## 2023-12-26 PROCEDURE — 36415 COLL VENOUS BLD VENIPUNCTURE: CPT

## 2023-12-26 PROCEDURE — 86140 C-REACTIVE PROTEIN: CPT

## 2023-12-26 PROCEDURE — 97116 GAIT TRAINING THERAPY: CPT

## 2023-12-26 PROCEDURE — 70450 CT HEAD/BRAIN W/O DYE: CPT | Mod: MA

## 2023-12-26 PROCEDURE — 80048 BASIC METABOLIC PNL TOTAL CA: CPT

## 2023-12-26 PROCEDURE — 86663 EPSTEIN-BARR ANTIBODY: CPT

## 2023-12-26 PROCEDURE — 83735 ASSAY OF MAGNESIUM: CPT

## 2023-12-26 PROCEDURE — 80307 DRUG TEST PRSMV CHEM ANLYZR: CPT

## 2023-12-26 PROCEDURE — 96374 THER/PROPH/DIAG INJ IV PUSH: CPT

## 2023-12-26 PROCEDURE — 82533 TOTAL CORTISOL: CPT

## 2023-12-26 PROCEDURE — 92610 EVALUATE SWALLOWING FUNCTION: CPT

## 2023-12-26 PROCEDURE — 85025 COMPLETE CBC W/AUTO DIFF WBC: CPT

## 2023-12-26 PROCEDURE — 82085 ASSAY OF ALDOLASE: CPT

## 2023-12-26 PROCEDURE — 96376 TX/PRO/DX INJ SAME DRUG ADON: CPT

## 2023-12-26 PROCEDURE — 85610 PROTHROMBIN TIME: CPT

## 2023-12-26 PROCEDURE — L8699: CPT

## 2023-12-26 PROCEDURE — 74230 X-RAY XM SWLNG FUNCJ C+: CPT

## 2023-12-26 PROCEDURE — 82962 GLUCOSE BLOOD TEST: CPT

## 2023-12-26 PROCEDURE — 99285 EMERGENCY DEPT VISIT HI MDM: CPT

## 2023-12-26 PROCEDURE — 84311 SPECTROPHOTOMETRY: CPT

## 2023-12-26 PROCEDURE — 81001 URINALYSIS AUTO W/SCOPE: CPT

## 2023-12-26 PROCEDURE — 92526 ORAL FUNCTION THERAPY: CPT

## 2023-12-26 PROCEDURE — 82248 BILIRUBIN DIRECT: CPT

## 2023-12-26 PROCEDURE — 87799 DETECT AGENT NOS DNA QUANT: CPT

## 2023-12-26 RX ORDER — CEPHALEXIN 500 MG
500 CAPSULE ORAL EVERY 12 HOURS
Refills: 0 | Status: DISCONTINUED | OUTPATIENT
Start: 2023-12-26 | End: 2023-12-26

## 2023-12-26 RX ORDER — FLUVOXAMINE MALEATE 25 MG/1
1 TABLET ORAL
Qty: 0 | Refills: 0 | DISCHARGE

## 2023-12-26 RX ORDER — FLUDROCORTISONE ACETATE 0.1 MG/1
1 TABLET ORAL
Qty: 0 | Refills: 0 | DISCHARGE
Start: 2023-12-26

## 2023-12-26 RX ORDER — DIAZEPAM 5 MG
5 TABLET ORAL
Refills: 0 | DISCHARGE

## 2023-12-26 RX ADMIN — Medication 2000 UNIT(S): at 11:25

## 2023-12-26 RX ADMIN — Medication 100 MILLIGRAM(S): at 05:52

## 2023-12-26 RX ADMIN — ATENOLOL 50 MILLIGRAM(S): 25 TABLET ORAL at 05:53

## 2023-12-26 RX ADMIN — FLUDROCORTISONE ACETATE 0.2 MILLIGRAM(S): 0.1 TABLET ORAL at 05:53

## 2023-12-26 RX ADMIN — DIPHENHYDRAMINE HYDROCHLORIDE AND LIDOCAINE HYDROCHLORIDE AND ALUMINUM HYDROXIDE AND MAGNESIUM HYDRO 5 MILLILITER(S): KIT at 05:58

## 2023-12-26 RX ADMIN — Medication 5 MILLIGRAM(S): at 05:53

## 2023-12-26 RX ADMIN — HEPARIN SODIUM 5000 UNIT(S): 5000 INJECTION INTRAVENOUS; SUBCUTANEOUS at 05:52

## 2023-12-26 NOTE — BH CONSULTATION LIAISON PROGRESS NOTE - [id number]_PSY_ALL_CORE
R/O OCD (obsessive compulsive disorder)   F42.9  

## 2023-12-26 NOTE — BH CONSULTATION LIAISON PROGRESS NOTE - CURRENT MEDICATION
MEDICATIONS  (STANDING):  benzocaine 20% Spray 1 Spray(s) Topical four times a day  cholecalciferol 2000 Unit(s) Oral daily  FIRST- Mouthwash  BLM 5 milliLiter(s) Swish and Spit three times a day  fludroCORTISONE 0.3 milliGRAM(s) Oral daily  fluvoxaMINE 150 milliGRAM(s) Oral two times a day  heparin   Injectable 5000 Unit(s) SubCutaneous every 12 hours  melatonin 3 milliGRAM(s) Oral at bedtime  mirtazapine 15 milliGRAM(s) Oral at bedtime  OLANZapine Disintegrating Tablet 10 milliGRAM(s) Oral at bedtime  predniSONE   Tablet 5 milliGRAM(s) Oral daily  senna 2 Tablet(s) Oral at bedtime    MEDICATIONS  (PRN):  acetaminophen     Tablet .. 650 milliGRAM(s) Oral every 6 hours PRN Temp greater or equal to 38.5C (101.3F), Moderate Pain (4 - 6)  lactulose Syrup 15 Gram(s) Oral two times a day PRN constipation  
MEDICATIONS  (STANDING):  atenolol  Tablet 50 milliGRAM(s) Oral daily  cholecalciferol 2000 Unit(s) Oral daily  diazepam    Tablet 5 milliGRAM(s) Oral two times a day  fluvoxaMINE 150 milliGRAM(s) Oral two times a day  heparin   Injectable 5000 Unit(s) SubCutaneous every 12 hours  melatonin 3 milliGRAM(s) Oral at bedtime  mirtazapine 30 milliGRAM(s) Oral at bedtime  QUEtiapine 150 milliGRAM(s) Oral at bedtime  senna 2 Tablet(s) Oral at bedtime  sodium chloride 0.45%. 1000 milliLiter(s) (75 mL/Hr) IV Continuous <Continuous>    MEDICATIONS  (PRN):  acetaminophen     Tablet .. 650 milliGRAM(s) Oral every 6 hours PRN Temp greater or equal to 38.5C (101.3F), Moderate Pain (4 - 6)  lactulose Syrup 15 Gram(s) Oral two times a day PRN constipation  
MEDICATIONS  (STANDING):  atenolol  Tablet 50 milliGRAM(s) Oral two times a day  benzocaine 20% Spray 1 Spray(s) Topical four times a day  cholecalciferol 2000 Unit(s) Oral daily  diazepam    Tablet 5 milliGRAM(s) Oral two times a day  fludroCORTISONE 0.1 milliGRAM(s) Oral daily  haloperidol     Tablet 0.5 milliGRAM(s) Oral at bedtime  heparin   Injectable 5000 Unit(s) SubCutaneous every 12 hours  melatonin 3 milliGRAM(s) Oral at bedtime  mirtazapine 15 milliGRAM(s) Oral at bedtime  predniSONE   Tablet 5 milliGRAM(s) Oral daily  QUEtiapine 50 milliGRAM(s) Oral at bedtime  senna 2 Tablet(s) Oral at bedtime  sodium chloride 0.45%. 1000 milliLiter(s) (75 mL/Hr) IV Continuous <Continuous>  thiamine IVPB 500 milliGRAM(s) IV Intermittent every 8 hours    MEDICATIONS  (PRN):  acetaminophen     Tablet .. 650 milliGRAM(s) Oral every 6 hours PRN Temp greater or equal to 38.5C (101.3F), Moderate Pain (4 - 6)  haloperidol    Injectable 0.5 milliGRAM(s) IntraMuscular at bedtime PRN if not taking po  lactulose Syrup 15 Gram(s) Oral two times a day PRN constipation  
MEDICATIONS  (STANDING):  atenolol  Tablet 50 milliGRAM(s) Oral daily  cholecalciferol 2000 Unit(s) Oral daily  diazepam    Tablet 5 milliGRAM(s) Oral two times a day  dronabinol 5 milliGRAM(s) Oral three times a day before meals  fluvoxaMINE 150 milliGRAM(s) Oral two times a day  heparin   Injectable 5000 Unit(s) SubCutaneous every 12 hours  melatonin 3 milliGRAM(s) Oral at bedtime  mirtazapine 30 milliGRAM(s) Oral at bedtime  predniSONE   Tablet 5 milliGRAM(s) Oral daily  QUEtiapine 150 milliGRAM(s) Oral at bedtime  senna 2 Tablet(s) Oral at bedtime  sodium chloride 0.45%. 1000 milliLiter(s) (75 mL/Hr) IV Continuous <Continuous>    MEDICATIONS  (PRN):  acetaminophen     Tablet .. 650 milliGRAM(s) Oral every 6 hours PRN Temp greater or equal to 38.5C (101.3F), Moderate Pain (4 - 6)  lactulose Syrup 15 Gram(s) Oral two times a day PRN constipation  
MEDICATIONS  (STANDING):  atenolol  Tablet 50 milliGRAM(s) Oral two times a day  benzocaine 20% Spray 1 Spray(s) Topical four times a day  cholecalciferol 2000 Unit(s) Oral daily  diazepam    Tablet 5 milliGRAM(s) Oral two times a day  fludroCORTISONE 0.1 milliGRAM(s) Oral daily  fluvoxaMINE 150 milliGRAM(s) Oral two times a day  haloperidol     Tablet 0.5 milliGRAM(s) Oral daily  haloperidol     Tablet 1 milliGRAM(s) Oral at bedtime  heparin   Injectable 5000 Unit(s) SubCutaneous every 12 hours  losartan 25 milliGRAM(s) Oral daily  melatonin 3 milliGRAM(s) Oral at bedtime  mirtazapine 15 milliGRAM(s) Oral at bedtime  pantoprazole  Injectable 40 milliGRAM(s) IV Push every 12 hours  predniSONE   Tablet 5 milliGRAM(s) Oral daily  QUEtiapine 50 milliGRAM(s) Oral at bedtime  senna 2 Tablet(s) Oral at bedtime  sodium chloride 0.45%. 1000 milliLiter(s) (75 mL/Hr) IV Continuous <Continuous>  thiamine IVPB 500 milliGRAM(s) IV Intermittent every 8 hours    MEDICATIONS  (PRN):  acetaminophen     Tablet .. 650 milliGRAM(s) Oral every 6 hours PRN Temp greater or equal to 38.5C (101.3F), Moderate Pain (4 - 6)  haloperidol    Injectable 0.5 milliGRAM(s) IntraMuscular at bedtime PRN if not taking po  lactulose Syrup 15 Gram(s) Oral two times a day PRN constipation  
MEDICATIONS  (STANDING):  atenolol  Tablet 50 milliGRAM(s) Oral two times a day  benzocaine 20% Spray 1 Spray(s) Topical four times a day  cefTRIAXone   IVPB 1000 milliGRAM(s) IV Intermittent every 24 hours  cefTRIAXone   IVPB      cholecalciferol 2000 Unit(s) Oral daily  FIRST- Mouthwash  BLM 5 milliLiter(s) Swish and Spit three times a day  fludroCORTISONE 0.1 milliGRAM(s) Oral daily  fluvoxaMINE 150 milliGRAM(s) Oral two times a day  gabapentin 100 milliGRAM(s) Oral three times a day  haloperidol     Tablet 2 milliGRAM(s) Oral at bedtime  haloperidol     Tablet 1 milliGRAM(s) Oral <User Schedule>  heparin   Injectable 5000 Unit(s) SubCutaneous every 12 hours  losartan 25 milliGRAM(s) Oral daily  melatonin 3 milliGRAM(s) Oral at bedtime  mirtazapine 15 milliGRAM(s) Oral at bedtime  predniSONE   Tablet 5 milliGRAM(s) Oral daily  senna 2 Tablet(s) Oral at bedtime  sodium chloride 0.45%. 1000 milliLiter(s) (75 mL/Hr) IV Continuous <Continuous>    MEDICATIONS  (PRN):  acetaminophen     Tablet .. 650 milliGRAM(s) Oral every 6 hours PRN Temp greater or equal to 38.5C (101.3F), Moderate Pain (4 - 6)  haloperidol    Injectable 1 milliGRAM(s) IntraMuscular every 12 hours PRN Agitation  lactulose Syrup 15 Gram(s) Oral two times a day PRN constipation  
MEDICATIONS  (STANDING):  atenolol  Tablet 50 milliGRAM(s) Oral two times a day  benzocaine 20% Spray 1 Spray(s) Topical four times a day  cefTRIAXone   IVPB      cefTRIAXone   IVPB 1000 milliGRAM(s) IV Intermittent every 24 hours  cholecalciferol 2000 Unit(s) Oral daily  diazepam    Tablet 2 milliGRAM(s) Oral two times a day  FIRST- Mouthwash  BLM 5 milliLiter(s) Swish and Spit three times a day  fludroCORTISONE 0.1 milliGRAM(s) Oral daily  fluvoxaMINE 150 milliGRAM(s) Oral two times a day  gabapentin 100 milliGRAM(s) Oral daily  heparin   Injectable 5000 Unit(s) SubCutaneous every 12 hours  losartan 25 milliGRAM(s) Oral daily  melatonin 3 milliGRAM(s) Oral at bedtime  mirtazapine 15 milliGRAM(s) Oral at bedtime  OLANZapine Disintegrating Tablet 10 milliGRAM(s) Oral at bedtime  predniSONE   Tablet 5 milliGRAM(s) Oral daily  senna 2 Tablet(s) Oral at bedtime  sodium chloride 0.45% with potassium chloride 20 mEq/L 1000 milliLiter(s) (60 mL/Hr) IV Continuous <Continuous>    MEDICATIONS  (PRN):  acetaminophen     Tablet .. 650 milliGRAM(s) Oral every 6 hours PRN Temp greater or equal to 38.5C (101.3F), Moderate Pain (4 - 6)  haloperidol    Injectable 1 milliGRAM(s) IntraMuscular every 12 hours PRN Agitation  lactulose Syrup 15 Gram(s) Oral two times a day PRN constipation  
MEDICATIONS  (STANDING):  benzocaine 20% Spray 1 Spray(s) Topical four times a day  cholecalciferol 2000 Unit(s) Oral daily  dextrose 5% + sodium chloride 0.45%. 1000 milliLiter(s) (75 mL/Hr) IV Continuous <Continuous>  FIRST- Mouthwash  BLM 5 milliLiter(s) Swish and Spit three times a day  fludroCORTISONE 0.3 milliGRAM(s) Oral daily  fluvoxaMINE 150 milliGRAM(s) Oral two times a day  heparin   Injectable 5000 Unit(s) SubCutaneous every 12 hours  melatonin 3 milliGRAM(s) Oral at bedtime  mirtazapine 15 milliGRAM(s) Oral at bedtime  OLANZapine Disintegrating Tablet 10 milliGRAM(s) Oral at bedtime  predniSONE   Tablet 5 milliGRAM(s) Oral daily  senna 2 Tablet(s) Oral at bedtime    MEDICATIONS  (PRN):  acetaminophen     Tablet .. 650 milliGRAM(s) Oral every 6 hours PRN Temp greater or equal to 38.5C (101.3F), Moderate Pain (4 - 6)  lactulose Syrup 15 Gram(s) Oral two times a day PRN constipation  
MEDICATIONS  (STANDING):  atenolol  Tablet 50 milliGRAM(s) Oral two times a day  benzocaine 20% Spray 1 Spray(s) Topical four times a day  cholecalciferol 2000 Unit(s) Oral daily  diazepam    Tablet 5 milliGRAM(s) Oral every 12 hours  fludroCORTISONE 0.1 milliGRAM(s) Oral daily  fluvoxaMINE 150 milliGRAM(s) Oral two times a day  haloperidol     Tablet 1 milliGRAM(s) Oral every 12 hours  heparin   Injectable 5000 Unit(s) SubCutaneous every 12 hours  losartan 25 milliGRAM(s) Oral daily  melatonin 3 milliGRAM(s) Oral at bedtime  mirtazapine 15 milliGRAM(s) Oral at bedtime  pantoprazole  Injectable 40 milliGRAM(s) IV Push every 12 hours  predniSONE   Tablet 5 milliGRAM(s) Oral daily  senna 2 Tablet(s) Oral at bedtime  sodium chloride 0.45%. 1000 milliLiter(s) (75 mL/Hr) IV Continuous <Continuous>  thiamine IVPB 500 milliGRAM(s) IV Intermittent every 8 hours    MEDICATIONS  (PRN):  acetaminophen     Tablet .. 650 milliGRAM(s) Oral every 6 hours PRN Temp greater or equal to 38.5C (101.3F), Moderate Pain (4 - 6)  haloperidol    Injectable 1 milliGRAM(s) IntraMuscular every 12 hours PRN Agitation  lactulose Syrup 15 Gram(s) Oral two times a day PRN constipation  
MEDICATIONS  (STANDING):  atenolol  Tablet 50 milliGRAM(s) Oral two times a day  benzocaine 20% Spray 1 Spray(s) Topical four times a day  cholecalciferol 2000 Unit(s) Oral daily  diazepam    Tablet 5 milliGRAM(s) Oral every 12 hours  FIRST- Mouthwash  BLM 5 milliLiter(s) Swish and Spit three times a day  fludroCORTISONE 0.1 milliGRAM(s) Oral daily  fluvoxaMINE 150 milliGRAM(s) Oral two times a day  haloperidol     Tablet 1 milliGRAM(s) Oral daily  haloperidol     Tablet 2 milliGRAM(s) Oral at bedtime  heparin   Injectable 5000 Unit(s) SubCutaneous every 12 hours  losartan 25 milliGRAM(s) Oral daily  melatonin 3 milliGRAM(s) Oral at bedtime  mirtazapine 15 milliGRAM(s) Oral at bedtime  pantoprazole  Injectable 40 milliGRAM(s) IV Push daily  predniSONE   Tablet 5 milliGRAM(s) Oral daily  senna 2 Tablet(s) Oral at bedtime  sodium chloride 0.45%. 1000 milliLiter(s) (75 mL/Hr) IV Continuous <Continuous>    MEDICATIONS  (PRN):  acetaminophen     Tablet .. 650 milliGRAM(s) Oral every 6 hours PRN Temp greater or equal to 38.5C (101.3F), Moderate Pain (4 - 6)  haloperidol    Injectable 1 milliGRAM(s) IntraMuscular every 12 hours PRN Agitation  lactulose Syrup 15 Gram(s) Oral two times a day PRN constipation  
MEDICATIONS  (STANDING):  atenolol  Tablet 25 milliGRAM(s) Oral two times a day  cholecalciferol 2000 Unit(s) Oral daily  dextrose 5% + sodium chloride 0.9% with potassium chloride 20 mEq/L 1000 milliLiter(s) (60 mL/Hr) IV Continuous <Continuous>  FIRST- Mouthwash  BLM 5 milliLiter(s) Swish and Spit three times a day  fludroCORTISONE 0.2 milliGRAM(s) Oral daily  heparin   Injectable 5000 Unit(s) SubCutaneous every 12 hours  magnesium sulfate  IVPB 2 Gram(s) IV Intermittent once  melatonin 3 milliGRAM(s) Oral at bedtime  phenazopyridine 100 milliGRAM(s) Oral once  potassium phosphate / sodium phosphate Powder (PHOS-NaK) 2 Packet(s) Oral once  predniSONE   Tablet 5 milliGRAM(s) Oral daily  senna 2 Tablet(s) Oral at bedtime    MEDICATIONS  (PRN):  acetaminophen     Tablet .. 650 milliGRAM(s) Oral every 6 hours PRN Temp greater or equal to 38.5C (101.3F), Moderate Pain (4 - 6)  hydrALAZINE Injectable 10 milliGRAM(s) IV Push every 8 hours PRN SBP >160  lactulose Syrup 15 Gram(s) Oral two times a day PRN constipation  
MEDICATIONS  (STANDING):  atenolol  Tablet 50 milliGRAM(s) Oral two times a day  cephalexin 500 milliGRAM(s) Oral every 12 hours  cholecalciferol 2000 Unit(s) Oral daily  FIRST- Mouthwash  BLM 5 milliLiter(s) Swish and Spit three times a day  fludroCORTISONE 0.2 milliGRAM(s) Oral daily  heparin   Injectable 5000 Unit(s) SubCutaneous every 12 hours  melatonin 3 milliGRAM(s) Oral at bedtime  predniSONE   Tablet 5 milliGRAM(s) Oral daily  senna 2 Tablet(s) Oral at bedtime    MEDICATIONS  (PRN):  acetaminophen     Tablet .. 650 milliGRAM(s) Oral every 6 hours PRN Temp greater or equal to 38.5C (101.3F), Moderate Pain (4 - 6)  lactulose Syrup 15 Gram(s) Oral two times a day PRN constipation  
MEDICATIONS  (STANDING):  atenolol  Tablet 50 milliGRAM(s) Oral two times a day  benzocaine 20% Spray 1 Spray(s) Topical four times a day  cefTRIAXone   IVPB 1000 milliGRAM(s) IV Intermittent every 24 hours  cefTRIAXone   IVPB      cholecalciferol 2000 Unit(s) Oral daily  FIRST- Mouthwash  BLM 5 milliLiter(s) Swish and Spit three times a day  fludroCORTISONE 0.1 milliGRAM(s) Oral daily  fluvoxaMINE 150 milliGRAM(s) Oral two times a day  gabapentin 100 milliGRAM(s) Oral three times a day  heparin   Injectable 5000 Unit(s) SubCutaneous every 12 hours  losartan 25 milliGRAM(s) Oral daily  melatonin 3 milliGRAM(s) Oral at bedtime  mirtazapine 15 milliGRAM(s) Oral at bedtime  OLANZapine Disintegrating Tablet 20 milliGRAM(s) Oral at bedtime  predniSONE   Tablet 5 milliGRAM(s) Oral daily  senna 2 Tablet(s) Oral at bedtime    MEDICATIONS  (PRN):  acetaminophen     Tablet .. 650 milliGRAM(s) Oral every 6 hours PRN Temp greater or equal to 38.5C (101.3F), Moderate Pain (4 - 6)  haloperidol    Injectable 1 milliGRAM(s) IntraMuscular every 12 hours PRN Agitation  lactulose Syrup 15 Gram(s) Oral two times a day PRN constipation  
MEDICATIONS  (STANDING):  atenolol  Tablet 50 milliGRAM(s) Oral two times a day  benzocaine 20% Spray 1 Spray(s) Topical four times a day  cholecalciferol 2000 Unit(s) Oral daily  diazepam    Tablet 5 milliGRAM(s) Oral two times a day  fludroCORTISONE 0.1 milliGRAM(s) Oral daily  haloperidol     Tablet 0.5 milliGRAM(s) Oral at bedtime  heparin   Injectable 5000 Unit(s) SubCutaneous every 12 hours  melatonin 3 milliGRAM(s) Oral at bedtime  mirtazapine 15 milliGRAM(s) Oral at bedtime  predniSONE   Tablet 5 milliGRAM(s) Oral daily  QUEtiapine 50 milliGRAM(s) Oral at bedtime  senna 2 Tablet(s) Oral at bedtime  sodium chloride 0.45%. 1000 milliLiter(s) (75 mL/Hr) IV Continuous <Continuous>  thiamine IVPB 500 milliGRAM(s) IV Intermittent every 8 hours    MEDICATIONS  (PRN):  acetaminophen     Tablet .. 650 milliGRAM(s) Oral every 6 hours PRN Temp greater or equal to 38.5C (101.3F), Moderate Pain (4 - 6)  haloperidol    Injectable 0.5 milliGRAM(s) IntraMuscular at bedtime PRN if not taking po  lactulose Syrup 15 Gram(s) Oral two times a day PRN constipation  
MEDICATIONS  (STANDING):  atenolol  Tablet 50 milliGRAM(s) Oral daily  cholecalciferol 2000 Unit(s) Oral daily  diazepam    Tablet 5 milliGRAM(s) Oral two times a day  fluvoxaMINE 150 milliGRAM(s) Oral two times a day  heparin   Injectable 5000 Unit(s) SubCutaneous every 12 hours  melatonin 3 milliGRAM(s) Oral at bedtime  mirtazapine 30 milliGRAM(s) Oral at bedtime  predniSONE   Tablet 5 milliGRAM(s) Oral daily  QUEtiapine 150 milliGRAM(s) Oral at bedtime  senna 2 Tablet(s) Oral at bedtime  sodium chloride 0.45%. 1000 milliLiter(s) (75 mL/Hr) IV Continuous <Continuous>    MEDICATIONS  (PRN):  acetaminophen     Tablet .. 650 milliGRAM(s) Oral every 6 hours PRN Temp greater or equal to 38.5C (101.3F), Moderate Pain (4 - 6)  lactulose Syrup 15 Gram(s) Oral two times a day PRN constipation  
MEDICATIONS  (STANDING):  atenolol  Tablet 50 milliGRAM(s) Oral two times a day  benzocaine 20% Spray 1 Spray(s) Topical four times a day  cholecalciferol 2000 Unit(s) Oral daily  diazepam    Tablet 2 milliGRAM(s) Oral at bedtime  FIRST- Mouthwash  BLM 5 milliLiter(s) Swish and Spit three times a day  fludroCORTISONE 0.1 milliGRAM(s) Oral daily  fluvoxaMINE 150 milliGRAM(s) Oral two times a day  heparin   Injectable 5000 Unit(s) SubCutaneous every 12 hours  losartan 25 milliGRAM(s) Oral daily  melatonin 3 milliGRAM(s) Oral at bedtime  mirtazapine 15 milliGRAM(s) Oral at bedtime  OLANZapine Disintegrating Tablet 10 milliGRAM(s) Oral at bedtime  predniSONE   Tablet 5 milliGRAM(s) Oral daily  senna 2 Tablet(s) Oral at bedtime    MEDICATIONS  (PRN):  acetaminophen     Tablet .. 650 milliGRAM(s) Oral every 6 hours PRN Temp greater or equal to 38.5C (101.3F), Moderate Pain (4 - 6)  lactulose Syrup 15 Gram(s) Oral two times a day PRN constipation  
MEDICATIONS  (STANDING):  cholecalciferol 2000 Unit(s) Oral daily  diazepam    Tablet 5 milliGRAM(s) Oral two times a day  fluvoxaMINE 100 milliGRAM(s) Oral two times a day  heparin   Injectable 5000 Unit(s) SubCutaneous every 12 hours  hydrocortisone sodium succinate Injectable 50 milliGRAM(s) IV Push every 6 hours  melatonin 3 milliGRAM(s) Oral at bedtime  mirtazapine 30 milliGRAM(s) Oral at bedtime  QUEtiapine 150 milliGRAM(s) Oral at bedtime  senna 2 Tablet(s) Oral at bedtime  sodium chloride 0.45%. 1000 milliLiter(s) (75 mL/Hr) IV Continuous <Continuous>    MEDICATIONS  (PRN):  acetaminophen     Tablet .. 650 milliGRAM(s) Oral every 6 hours PRN Temp greater or equal to 38.5C (101.3F), Moderate Pain (4 - 6)  acetaminophen  300 mG/codeine 30 mG 1 Tablet(s) Oral every 6 hours PRN Severe Pain (7 - 10)  lactulose Syrup 15 Gram(s) Oral two times a day PRN constipation  
MEDICATIONS  (STANDING):  atenolol  Tablet 50 milliGRAM(s) Oral two times a day  cholecalciferol 2000 Unit(s) Oral daily  diazepam    Tablet 5 milliGRAM(s) Oral two times a day  fluvoxaMINE 100 milliGRAM(s) Oral two times a day  heparin   Injectable 5000 Unit(s) SubCutaneous every 12 hours  losartan 100 milliGRAM(s) Oral daily  mirtazapine 15 milliGRAM(s) Oral at bedtime  QUEtiapine 150 milliGRAM(s) Oral at bedtime  senna 2 Tablet(s) Oral at bedtime    MEDICATIONS  (PRN):  acetaminophen     Tablet .. 650 milliGRAM(s) Oral every 6 hours PRN Temp greater or equal to 38.5C (101.3F), Moderate Pain (4 - 6)  acetaminophen  300 mG/codeine 30 mG 1 Tablet(s) Oral every 6 hours PRN Severe Pain (7 - 10)  lactulose Syrup 15 Gram(s) Oral two times a day PRN constipation  
MEDICATIONS  (STANDING):  atenolol  Tablet 50 milliGRAM(s) Oral two times a day  benzocaine 20% Spray 1 Spray(s) Topical four times a day  cholecalciferol 2000 Unit(s) Oral daily  FIRST- Mouthwash  BLM 5 milliLiter(s) Swish and Spit three times a day  fludroCORTISONE 0.2 milliGRAM(s) Oral daily  fluvoxaMINE 150 milliGRAM(s) Oral two times a day  heparin   Injectable 5000 Unit(s) SubCutaneous every 12 hours  losartan 25 milliGRAM(s) Oral daily  melatonin 3 milliGRAM(s) Oral at bedtime  mirtazapine 15 milliGRAM(s) Oral at bedtime  OLANZapine Disintegrating Tablet 10 milliGRAM(s) Oral at bedtime  predniSONE   Tablet 5 milliGRAM(s) Oral daily  senna 2 Tablet(s) Oral at bedtime    MEDICATIONS  (PRN):  acetaminophen     Tablet .. 650 milliGRAM(s) Oral every 6 hours PRN Temp greater or equal to 38.5C (101.3F), Moderate Pain (4 - 6)  lactulose Syrup 15 Gram(s) Oral two times a day PRN constipation

## 2023-12-26 NOTE — BH CONSULTATION LIAISON PROGRESS NOTE - NSBHFUPREASONCONS_PSY_A_CORE
psychosis
psychosis
delirium/agitation/psychosis
psychosis
psychosis
delirium/agitation/psychosis
psychosis
delirium/agitation/psychosis
psychosis
delirium/agitation/psychosis
psychosis
delirium/agitation/psychosis
psychosis

## 2023-12-26 NOTE — PROGRESS NOTE ADULT - PROVIDER SPECIALTY LIST ADULT
Cardiology
Gastroenterology
Heme/Onc
Hospitalist
Infectious Disease
Internal Medicine
Neurology
Cardiology
Gastroenterology
Heme/Onc
Hospitalist
Infectious Disease
Internal Medicine
Neurology
Cardiology
Gastroenterology
Heme/Onc
Hospitalist
Infectious Disease
Internal Medicine
Cardiology
Heme/Onc
Hospitalist
Internal Medicine
Cardiology
Gastroenterology
Hospitalist
Infectious Disease
Internal Medicine
Cardiology
Heme/Onc
Internal Medicine

## 2023-12-26 NOTE — BH CONSULTATION LIAISON PROGRESS NOTE - NSBHMSEMOOD_PSY_A_CORE
Anxious/Irritable
Anxious
Anxious/Irritable
Irritable
Anxious/Irritable
Anxious/Irritable
Irritable
Anxious/Irritable
Irritable
Anxious/Irritable

## 2023-12-26 NOTE — BH CONSULTATION LIAISON PROGRESS NOTE - NSBHATTESTBILLING_PSY_A_CORE
47043-[id number] OBS or IP - low complexity OR 25-34 mins [id number] OBS or IP - low complexity OR 25-34 mins

## 2023-12-26 NOTE — BH CONSULTATION LIAISON PROGRESS NOTE - NSBHMSEIMPULSE_PSY_A_CORE
Impaired
Impaired
Normal
Impaired
Normal
Impaired
Normal
Impaired

## 2023-12-26 NOTE — BH CONSULTATION LIAISON PROGRESS NOTE - NSBHMSETHTPROC_PSY_A_CORE
Linear
Disorganized
Linear
Linear
Disorganized
Linear
Linear
Disorganized
Disorganized
Linear
Disorganized
Linear

## 2023-12-26 NOTE — BH CONSULTATION LIAISON PROGRESS NOTE - ADDITIONAL PSYCHIATRIC MEDICATIONS
Will start Valium 2 mg po bid at the request of wife.

## 2023-12-26 NOTE — DISCHARGE NOTE NURSING/CASE MANAGEMENT/SOCIAL WORK - NSDCPEFALRISK_GEN_ALL_CORE
For information on Fall & Injury Prevention, visit: [email or web address] OR  [email or web address] OR  https://www.cdc.gov/steadi/patient.html For information on Fall & Injury Prevention, visit: [email or web address] OR  [email or web address] OR  https://www.cdc.gov/steadi/patient.html

## 2023-12-26 NOTE — BH CONSULTATION LIAISON PROGRESS NOTE - NSBHMSEATTEN_PSY_A_CORE
Normal
Impaired
Normal
Impaired
Normal
Impaired
Normal
Other
Normal

## 2023-12-26 NOTE — BH CONSULTATION LIAISON PROGRESS NOTE - NSBHMSEMUSCLE_PSY_A_CORE
Normal muscle tone/strength
Unable to assess
Normal muscle tone/strength
Unable to assess
Unable to assess
Normal muscle tone/strength
Unable to assess
Normal muscle tone/strength

## 2023-12-26 NOTE — BH CONSULTATION LIAISON PROGRESS NOTE - NSBHCHARTREVIEWVS_PSY_A_CORE FT
Vital Signs Last 24 Hrs  T(C): 36.6 (15 Nov 2023 12:13), Max: 36.6 (15 Nov 2023 12:13)  T(F): 97.9 (15 Nov 2023 12:13), Max: 97.9 (15 Nov 2023 12:13)  HR: 73 (15 Nov 2023 12:13) (68 - 73)  BP: 159/81 (15 Nov 2023 12:13) (159/81 - 171/80)  BP(mean): --  RR: 18 (15 Nov 2023 12:13) (18 - 18)  SpO2: 95% (15 Nov 2023 12:13) (92% - 95%)    Parameters below as of 15 Nov 2023 12:13  Patient On (Oxygen Delivery Method): room air    
Vital Signs Last 24 Hrs  T(C): 36.6 (23 Nov 2023 05:02), Max: 36.6 (23 Nov 2023 05:02)  T(F): 97.8 (23 Nov 2023 05:02), Max: 97.8 (23 Nov 2023 05:02)  HR: 70 (23 Nov 2023 05:02) (70 - 77)  BP: 163/85 (23 Nov 2023 05:02) (145/72 - 172/80)  BP(mean): --  RR: 17 (23 Nov 2023 05:02) (17 - 17)  SpO2: 94% (23 Nov 2023 05:02) (94% - 94%)    Parameters below as of 23 Nov 2023 05:02  Patient On (Oxygen Delivery Method): room air    
Vital Signs Last 24 Hrs  T(C): 36.8 (26 Dec 2023 11:42), Max: 36.8 (26 Dec 2023 11:42)  T(F): 98.2 (26 Dec 2023 11:42), Max: 98.2 (26 Dec 2023 11:42)  HR: 85 (26 Dec 2023 11:42) (76 - 85)  BP: 159/78 (26 Dec 2023 11:42) (159/78 - 197/84)  BP(mean): --  RR: 18 (26 Dec 2023 11:42) (18 - 18)  SpO2: 94% (26 Dec 2023 11:42) (92% - 95%)    Parameters below as of 26 Dec 2023 11:42  Patient On (Oxygen Delivery Method): room air    
Vital Signs Last 24 Hrs  T(C): 36.3 (14 Nov 2023 11:27), Max: 36.8 (14 Nov 2023 04:36)  T(F): 97.4 (14 Nov 2023 11:27), Max: 98.2 (14 Nov 2023 04:36)  HR: 77 (14 Nov 2023 11:27) (66 - 77)  BP: 114/64 (14 Nov 2023 11:27) (114/64 - 188/83)  BP(mean): --  RR: 18 (14 Nov 2023 11:27) (18 - 18)  SpO2: 94% (14 Nov 2023 11:27) (93% - 94%)    Parameters below as of 14 Nov 2023 11:27  Patient On (Oxygen Delivery Method): room air    
Vital Signs Last 24 Hrs  T(C): 36.4 (06 Nov 2023 12:10), Max: 37.3 (06 Nov 2023 04:49)  T(F): 97.5 (06 Nov 2023 12:10), Max: 99.2 (06 Nov 2023 04:49)  HR: 77 (06 Nov 2023 12:10) (73 - 82)  BP: 125/70 (06 Nov 2023 12:10) (117/67 - 157/78)  BP(mean): --  RR: 17 (06 Nov 2023 12:10) (17 - 17)  SpO2: 93% (06 Nov 2023 12:10) (93% - 94%)    Parameters below as of 06 Nov 2023 12:10  Patient On (Oxygen Delivery Method): room air    
Vital Signs Last 24 Hrs  T(C): 36.7 (04 Dec 2023 11:33), Max: 37 (04 Dec 2023 04:34)  T(F): 98 (04 Dec 2023 11:33), Max: 98.6 (04 Dec 2023 04:34)  HR: 89 (04 Dec 2023 11:33) (76 - 89)  BP: 165/83 (04 Dec 2023 11:33) (130/75 - 175/84)  BP(mean): --  RR: 18 (04 Dec 2023 11:33) (17 - 18)  SpO2: 95% (04 Dec 2023 11:33) (95% - 98%)    Parameters below as of 04 Dec 2023 11:33  Patient On (Oxygen Delivery Method): nasal cannula  O2 Flow (L/min): 2  
Vital Signs Last 24 Hrs  T(C): 36.7 (10 Nov 2023 12:33), Max: 36.8 (10 Nov 2023 04:54)  T(F): 98.1 (10 Nov 2023 12:33), Max: 98.3 (10 Nov 2023 04:54)  HR: 70 (10 Nov 2023 12:33) (70 - 90)  BP: 176/80 (10 Nov 2023 12:33) (162/70 - 176/80)  BP(mean): --  RR: 17 (10 Nov 2023 12:33) (17 - 18)  SpO2: 95% (10 Nov 2023 12:33) (92% - 95%)    Parameters below as of 10 Nov 2023 12:33  Patient On (Oxygen Delivery Method): room air    
Vital Signs Last 24 Hrs  T(C): 36.7 (08 Nov 2023 12:01), Max: 36.8 (07 Nov 2023 21:56)  T(F): 98 (08 Nov 2023 12:01), Max: 98.3 (07 Nov 2023 21:56)  HR: 72 (08 Nov 2023 12:01) (67 - 72)  BP: 162/70 (08 Nov 2023 12:01) (144/76 - 162/70)  BP(mean): --  RR: 18 (08 Nov 2023 12:01) (17 - 18)  SpO2: 92% (08 Nov 2023 12:01) (92% - 95%)    Parameters below as of 08 Nov 2023 12:01  Patient On (Oxygen Delivery Method): room air    
Vital Signs Last 24 Hrs  T(C): 36.7 (03 Nov 2023 10:12), Max: 37.2 (02 Nov 2023 13:14)  T(F): 98.1 (03 Nov 2023 10:12), Max: 99 (02 Nov 2023 13:14)  HR: 85 (03 Nov 2023 10:12) (75 - 87)  BP: 104/63 (03 Nov 2023 10:12) (104/60 - 126/72)  BP(mean): --  RR: 18 (03 Nov 2023 10:12) (17 - 18)  SpO2: 93% (03 Nov 2023 10:12) (92% - 94%)    Parameters below as of 03 Nov 2023 10:12  Patient On (Oxygen Delivery Method): room air    
Vital Signs Last 24 Hrs  T(C): 36.6 (16 Nov 2023 12:32), Max: 36.7 (15 Nov 2023 20:39)  T(F): 97.9 (16 Nov 2023 12:32), Max: 98.1 (15 Nov 2023 20:39)  HR: 79 (16 Nov 2023 12:32) (69 - 79)  BP: 159/82 (16 Nov 2023 12:32) (150/72 - 188/78)  BP(mean): --  RR: 19 (16 Nov 2023 12:32) (18 - 19)  SpO2: 91% (16 Nov 2023 12:32) (91% - 95%)    Parameters below as of 16 Nov 2023 12:32  Patient On (Oxygen Delivery Method): room air    
Vital Signs Last 24 Hrs  T(C): 36.9 (22 Dec 2023 12:54), Max: 36.9 (22 Dec 2023 12:54)  T(F): 98.5 (22 Dec 2023 12:54), Max: 98.5 (22 Dec 2023 12:54)  HR: 93 (22 Dec 2023 12:54) (86 - 93)  BP: 159/75 (22 Dec 2023 12:54) (159/75 - 193/80)  BP(mean): --  RR: 18 (22 Dec 2023 12:54) (18 - 18)  SpO2: 96% (22 Dec 2023 12:54) (94% - 96%)    Parameters below as of 22 Dec 2023 16:51  Patient On (Oxygen Delivery Method): room air    
Vital Signs Last 24 Hrs  T(C): 36.8 (27 Nov 2023 12:01), Max: 37.1 (26 Nov 2023 17:01)  T(F): 98.2 (27 Nov 2023 12:01), Max: 98.8 (27 Nov 2023 05:29)  HR: 81 (27 Nov 2023 05:29) (75 - 81)  BP: 113/71 (27 Nov 2023 12:30) (113/71 - 170/67)  BP(mean): --  RR: 18 (27 Nov 2023 12:01) (18 - 18)  SpO2: 92% (27 Nov 2023 12:30) (91% - 93%)    Parameters below as of 27 Nov 2023 12:30  Patient On (Oxygen Delivery Method): room air    
Vital Signs Last 24 Hrs  T(C): 36.7 (09 Nov 2023 12:24), Max: 37.1 (08 Nov 2023 20:38)  T(F): 98 (09 Nov 2023 12:24), Max: 98.7 (08 Nov 2023 20:38)  HR: 75 (09 Nov 2023 12:24) (59 - 78)  BP: 167/82 (09 Nov 2023 12:24) (165/80 - 169/76)  BP(mean): --  RR: 17 (09 Nov 2023 12:24) (17 - 18)  SpO2: 93% (09 Nov 2023 12:24) (91% - 94%)    Parameters below as of 09 Nov 2023 12:24  Patient On (Oxygen Delivery Method): room air    
Vital Signs Last 24 Hrs  T(C): 36.7 (01 Dec 2023 04:42), Max: 36.7 (01 Dec 2023 04:42)  T(F): 98.1 (01 Dec 2023 04:42), Max: 98.1 (01 Dec 2023 04:42)  HR: 81 (01 Dec 2023 04:42) (81 - 81)  BP: 156/76 (01 Dec 2023 04:42) (154/62 - 160/80)  BP(mean): --  RR: --  SpO2: --    
Vital Signs Last 24 Hrs  T(C): 36.7 (17 Nov 2023 11:46), Max: 36.9 (16 Nov 2023 21:23)  T(F): 98 (17 Nov 2023 11:46), Max: 98.5 (16 Nov 2023 21:23)  HR: 74 (17 Nov 2023 11:46) (69 - 74)  BP: 150/71 (17 Nov 2023 11:46) (150/71 - 176/79)  BP(mean): --  RR: 18 (17 Nov 2023 11:46) (18 - 18)  SpO2: 93% (17 Nov 2023 11:46) (93% - 93%)    Parameters below as of 17 Nov 2023 11:46  Patient On (Oxygen Delivery Method): room air    
Vital Signs Last 24 Hrs  T(C): 36.7 (08 Dec 2023 04:52), Max: 36.7 (08 Dec 2023 04:52)  T(F): 98 (08 Dec 2023 04:52), Max: 98 (08 Dec 2023 04:52)  HR: 81 (08 Dec 2023 04:52) (71 - 81)  BP: 170/79 (08 Dec 2023 04:52) (153/76 - 170/79)  BP(mean): --  RR: 17 (08 Dec 2023 04:52) (17 - 18)  SpO2: 99% (08 Dec 2023 04:52) (98% - 99%)    Parameters below as of 08 Dec 2023 04:52  Patient On (Oxygen Delivery Method): nasal cannula  O2 Flow (L/min): 3  
Vital Signs Last 24 Hrs  T(C): 36.8 (13 Dec 2023 13:05), Max: 37.4 (12 Dec 2023 20:25)  T(F): 98.2 (13 Dec 2023 13:05), Max: 99.4 (12 Dec 2023 20:25)  HR: 82 (13 Dec 2023 13:05) (82 - 96)  BP: 178/78 (13 Dec 2023 13:05) (108/71 - 178/82)  BP(mean): --  RR: 17 (13 Dec 2023 13:05) (17 - 18)  SpO2: 94% (13 Dec 2023 13:05) (91% - 98%)    Parameters below as of 13 Dec 2023 13:05  Patient On (Oxygen Delivery Method): room air    
Vital Signs Last 24 Hrs  T(C): 36.7 (11 Dec 2023 12:07), Max: 36.9 (10 Dec 2023 22:03)  T(F): 98.1 (11 Dec 2023 12:07), Max: 98.5 (10 Dec 2023 22:03)  HR: 81 (11 Dec 2023 12:07) (81 - 95)  BP: 153/71 (11 Dec 2023 12:07) (100/60 - 158/73)  BP(mean): --  RR: 17 (11 Dec 2023 12:07) (17 - 17)  SpO2: 95% (11 Dec 2023 12:07) (92% - 95%)    Parameters below as of 11 Dec 2023 12:07  Patient On (Oxygen Delivery Method): room air    
Vital Signs Last 24 Hrs  T(C): 36.7 (06 Dec 2023 11:43), Max: 37.1 (06 Dec 2023 05:12)  T(F): 98 (06 Dec 2023 11:43), Max: 98.7 (06 Dec 2023 05:12)  HR: 81 (06 Dec 2023 11:43) (76 - 84)  BP: 124/77 (06 Dec 2023 13:18) (102/40 - 160/74)  BP(mean): --  RR: 18 (06 Dec 2023 11:43) (18 - 18)  SpO2: 96% (06 Dec 2023 11:43) (95% - 98%)    Parameters below as of 06 Dec 2023 11:43  Patient On (Oxygen Delivery Method): nasal cannula

## 2023-12-26 NOTE — DISCHARGE NOTE NURSING/CASE MANAGEMENT/SOCIAL WORK - SOCIAL WORKER'S NAME
----- Message from Pamela Morales sent at 7/21/2018 10:24 AM CDT -----  Contact: Self  Type:  Patient Returning Call    Who Called:  Patient   Who Left Message for Patient:  Iliana   Does the patient know what this is regarding?:    Best Call Back Number:  [phone] (home)     Additional Information:  Returning Call      Lali Manuel LMSW

## 2023-12-26 NOTE — BH CONSULTATION LIAISON PROGRESS NOTE - NSBHMSETHTCONTENT_PSY_A_CORE
Delusions

## 2023-12-26 NOTE — BH CONSULTATION LIAISON PROGRESS NOTE - GENERAL APPEARANCE
No deformities present
Other
Other
No deformities present
Other
No deformities present
Other
No deformities present

## 2023-12-26 NOTE — BH CONSULTATION LIAISON PROGRESS NOTE - NSBHMSESPEECH_PSY_A_CORE
Abnormal as indicated, otherwise normal...
Normal volume, rate, productivity, spontaneity and articulation
Abnormal as indicated, otherwise normal...
Normal volume, rate, productivity, spontaneity and articulation
Normal volume, rate, productivity, spontaneity and articulation
Abnormal as indicated, otherwise normal...
Abnormal as indicated, otherwise normal...
Normal volume, rate, productivity, spontaneity and articulation
Abnormal as indicated, otherwise normal...
Abnormal as indicated, otherwise normal...
Normal volume, rate, productivity, spontaneity and articulation
Abnormal as indicated, otherwise normal...

## 2023-12-26 NOTE — PROGRESS NOTE ADULT - SUBJECTIVE AND OBJECTIVE BOX
Chief Complaint: Psychosis    Interval Events: No events overnight.    Review of Systems:  General: No fevers, chills, weight gain  Skin: No rashes, color changes  Cardiovascular: No chest pain, orthopnea  Respiratory: No shortness of breath, cough  Gastrointestinal: No nausea, abdominal pain  Genitourinary: No incontinence, pain with urination  Musculoskeletal: No pain, swelling, decreased range of motion  Neurological: No headache, weakness  Psychiatric: No depression, anxiety  Endocrine: No weight gain, increased thirst  All other systems are comprehensively negative.    Physical Exam:  Vital Signs Last 24 Hrs  T(C): 36.6 (26 Dec 2023 05:26), Max: 36.7 (25 Dec 2023 12:00)  T(F): 97.9 (26 Dec 2023 05:26), Max: 98.1 (25 Dec 2023 12:00)  HR: 83 (26 Dec 2023 05:26) (76 - 83)  BP: 197/84 (26 Dec 2023 05:26) (178/72 - 197/84)  BP(mean): --  RR: 18 (26 Dec 2023 05:26) (18 - 18)  SpO2: 92% (26 Dec 2023 05:26) (92% - 95%)  Parameters below as of 26 Dec 2023 05:26  Patient On (Oxygen Delivery Method): room air  General: NAD  HEENT: MMM  Neck: No JVD, no carotid bruit  Lungs: CTAB  CV: RRR, nl S1/S2, no M/R/G  Abdomen: S/NT/ND, +BS  Extremities: No LE edema, no cyanosis  Neuro: AAOx3, non-focal  Skin: No rash    Labs:    12-25    140  |  103  |  16  ----------------------------<  183<H>  4.1   |  35<H>  |  0.59    Ca    8.4<L>      25 Dec 2023 16:20  Mg     1.7     12-25    TPro  5.7<L>  /  Alb  2.3<L>  /  TBili  0.6  /  DBili  x   /  AST  16  /  ALT  23  /  AlkPhos  80  12-25                        10.4   7.10  )-----------( 126      ( 25 Dec 2023 16:20 )             30.2

## 2023-12-26 NOTE — BH CONSULTATION LIAISON PROGRESS NOTE - NSBHCHARTREVIEWLAB_PSY_A_CORE FT
8.6    6.33  )-----------( 117      ( 13 Dec 2023 08:05 )             24.9   12-13    145  |  108  |  14  ----------------------------<  122<H>  3.7   |  33<H>  |  0.66    Ca    8.6      13 Dec 2023 08:05    
                      9.7    5.04  )-----------( 158      ( 10 Nov 2023 09:52 )             27.0   11-10    146<H>  |  110<H>  |  14  ----------------------------<  125<H>  4.0   |  33<H>  |  0.92    Ca    8.5      10 Nov 2023 09:52    
                      11.1   8.32  )-----------( 100      ( 03 Nov 2023 07:18 )             32.3   11-03    144  |  106  |  31<H>  ----------------------------<  110<H>  4.2   |  31  |  1.60<H>    Ca    8.9      03 Nov 2023 07:18    
                      9.5    6.56  )-----------( 120      ( 22 Dec 2023 11:35 )             27.3   12-22    143  |  109<H>  |  10  ----------------------------<  188<H>  3.7   |  31  |  0.55    Ca    8.0<L>      22 Dec 2023 11:35  Phos  2.4     12-22  Mg     1.4     12-22    TPro  5.5<L>  /  Alb  2.3<L>  /  TBili  0.7  /  DBili  x   /  AST  21  /  ALT  31  /  AlkPhos  86  12-22  
                      8.7    4.10  )-----------( 106      ( 07 Nov 2023 06:33 )             25.8   11-07    144  |  110<H>  |  30<H>  ----------------------------<  171<H>  4.8   |  28  |  0.97    Ca    8.2<L>      07 Nov 2023 06:33    
                      10.4   7.10  )-----------( 126      ( 25 Dec 2023 16:20 )             30.2   12-25    140  |  103  |  16  ----------------------------<  183<H>  4.1   |  35<H>  |  0.59    Ca    8.4<L>      25 Dec 2023 16:20  Mg     1.7     12-25    TPro  5.7<L>  /  Alb  2.3<L>  /  TBili  0.6  /  DBili  x   /  AST  16  /  ALT  23  /  AlkPhos  80  12-25  
                      9.3    6.29  )-----------( 96       ( 06 Nov 2023 07:35 )             28.1   11-06    148<H>  |  113<H>  |  34<H>  ----------------------------<  112<H>  3.9   |  29  |  1.10    Ca    8.5      06 Nov 2023 07:35

## 2023-12-26 NOTE — BH CONSULTATION LIAISON PROGRESS NOTE - NSBHCONSULTFOLLOWAFTERCARE_PSY_A_CORE FT
f/u with outpatient psychiatrist
F-up outpatient.
f/u with outpatient psychiatrist
F-up outpatient.
f/u with outpatient psychiatrist
f/u with outpatient psychiatrist
F-up outpatient.
f/u with outpatient psychiatrist
F-up outpatient.
F-up outpatient.
f/u with outpatient psychiatrist

## 2023-12-26 NOTE — BH CONSULTATION LIAISON PROGRESS NOTE - NSICDXBHSECONDARYDX_PSY_ALL_CORE
Illness anxiety disorder   F45.21  MORENO (generalized anxiety disorder)   F41.1  

## 2023-12-26 NOTE — DISCHARGE NOTE NURSING/CASE MANAGEMENT/SOCIAL WORK - NSDPDISTO_GEN_ALL_CORE
Pt to be DC to [facility] for subacute rehab today at 2pm via [facility], pt/family/md/facility all aware and in agreement with plan for DC. SW to follow to ensure safe transition to subacute rehab today./Sub-Acute rehab Pt to be DC to [facility] for subacute rehab today at 2pm via [facility], pt/family/md/facility all aware and in agreement with plan for DC. SW to follow to ensure safe transition to subacute rehab today./Sub-Acute rehab

## 2023-12-26 NOTE — PROGRESS NOTE ADULT - ASSESSMENT
odynophagia    s/p egd/peg  ok to use peg for feeds, water, meds  monitor tolerance to TF  flush peg after use  can begin d/c planning from GI standpoint  d/w pt

## 2023-12-26 NOTE — BH CONSULTATION LIAISON PROGRESS NOTE - NSBHPTASSESSDT_PSY_A_CORE
23-Nov-2023 12:10
27-Nov-2023 14:28
09-Nov-2023 16:26
26-Dec-2023 15:11
15-Nov-2023 14:29
16-Nov-2023 16:55
08-Nov-2023 13:22
01-Dec-2023 12:15
04-Dec-2023 14:30
11-Dec-2023 15:02
06-Nov-2023 14:20
10-Nov-2023 13:49
08-Dec-2023 10:02
03-Nov-2023 11:26
06-Dec-2023 13:26
14-Nov-2023 17:55
17-Nov-2023 16:43
22-Dec-2023 18:53
13-Dec-2023 13:25

## 2023-12-26 NOTE — BH CONSULTATION LIAISON PROGRESS NOTE - NSBHMSEKNOWHOW_PSY_ALL_CORE
Current Events

## 2023-12-26 NOTE — BH CONSULTATION LIAISON PROGRESS NOTE - NSBHMSEGAIT_PSY_A_CORE
Normal gait / station
Unable to assess
Normal gait / station
Unable to assess
Normal gait / station

## 2023-12-26 NOTE — BH CONSULTATION LIAISON PROGRESS NOTE - NSBHMSERECMEM_PSY_A_CORE
Impaired
Unable to assess
Impaired
Unable to assess
Impaired

## 2023-12-26 NOTE — BH CONSULTATION LIAISON PROGRESS NOTE - NSICDXBHPRIMARYDX_PSY_ALL_CORE
Psychosis, unspecified psychosis type   F29  
Obsessive compulsive disorder   F42.9  
Psychosis, unspecified psychosis type   F29  
Obsessive compulsive disorder   F42.9  
Psychosis, unspecified psychosis type   F29  
Obsessive compulsive disorder   F42.9  
Psychosis, unspecified psychosis type   F29  

## 2023-12-26 NOTE — BH CONSULTATION LIAISON PROGRESS NOTE - NSBHFUPINTERVALHXFT_PSY_A_CORE
Patient seen, evaluated and chart reviewed. Patient presents again with less lethargy today and he appears to be engaging. He reports that he slept better.
Patient seen, evaluated and chart reviewed. Patient presents with less lethargy today and he appears to be engaging, he reportedly was doing better yesterday and today. Wife present at bedside.
Patient seen, evaluated and chart reviewed. Patient presents as less lethargic, no evidence of acute psychosis, pérez or acute depression. He has had peg tube placed, the medications are held for now, no change in clinical status.
Patient seen, evaluated and chart reviewed. Patient presents as more lethargic, no evidence of acute psychosis, pérez or acute depression. worsening in clinical picture at this time. 
Patient seen, evaluated and chart reviewed. Patient presents again with less lethargy today and he appears to be engaging. He reports that he slept better. The main issue that remains is his poor oral intake. 
Pt is distraught with anxiety about the pain due to his delusions, unable to participate in interview. Most of the info obtained from wife Joelle at bedside and nurse. He became overwhelmed after a while, wife had to step out and continue the conversation in another private area.     Nurse reported that he changed a lot in the past 3 weeks. He is getting worse. He thinks there is a tube going from his anus to his esophagus. He wouldn't eat, get up, walk. He had a condom cath, constantly pressing the bell having complaints about it, when nurses try to take a look at it, he was trying to kick, hit, asking them to kill him.     Joelle reported that last Feb he had COVID and started to have nerve pain on his sides. With his first Ketruda he reported tightness in his jaw. On second tightness increased. Third and fourth he started to become delusional that there are things inside his mouth and body. They gave him Seroquel to take the edge off. It helped a bit. They removed 7 moles between 3-5/2023. 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 He was going to resume Ketruda but due to his mental state, they had to pause. She says he was very sharp at baseline, no dementia, he was shopping, going out, driving, socializing, using his computer, etc. 
Patient seen, evaluated and chart reviewed. Patient presents with more lucidity, he is compliant with treatment, no side-effects are reported. No significant improvement in clinical picture at this time.
Patient seen, evaluated and chart reviewed. Patient presents with more lucidity, and as per wife he has been doing better, although he is still not eating. Patient's wife agrees to tapering up of the Haldol.
Patient seen, evaluated and chart reviewed. Patient presents with more lucidity, he is compliant with treatment, no side-effects are reported. No significant improvement in clinical picture at this time. As per wife she feels there is no improvement and feels the change made the symptoms worse.
Patient seen, evaluated and chart reviewed. Patient presents with less lethargy today and he appears to be engaging. Wife present at bedside, she reports that he continues to be irritable and complains of general pains.
Pt seen and evaluated. wife present at bedside. No psych PRNs.   he is more alert, better related, better engaged.   wife feels he is doing much better today. "he smiled, he thanked the staff."   reports sleeping well.   denies s/e from haldol  continues to be somatically preoccupied and anxious.  psychoeducation and support provided  Per previous note"  Joelle reported that last Feb he had COVID and started to have nerve pain on his sides. With his first Ketruda he reported tightness in his jaw. On second tightness increased. Third and fourth he started to become delusional that there are things inside his mouth and body. They gave him Seroquel to take the edge off. It helped a bit. They removed 7 moles between 3-5/2023. 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 He was going to resume Ketruda but due to his mental state, they had to pause. She says he was very sharp at baseline, no dementia, he was shopping, going out, driving, socializing, using his computer, etc.   "
Patient seen, evaluated and chart reviewed. Patient presents with less lethargy today and he appears to be engaging, but is irritable and delusional. Wife present at bedside.
Patient seen, evaluated and chart reviewed. Patient presents with more lucidity, he is compliant with treatment, no side-effects are reported. No significant improvement in clinical picture at this time. As per wife he could not tolerate the 20 mg of Zyprexa, he was lowered to 10 mg po at HS. He appears to be eating better.
Pt seen and evaluated. wife present at bedside. No psych PRNs.     On assessment, pt in acute distress, noting jaw discomfort, throat pain, repeatedly stating he cannot talk, defers most qs to his wife Joelle present at bedside. reports pain "all over" and his mood is "terrible"   tightnessin throat, burning in mouth  wife feels pt is more lethargic than usual. he did sleep last night.     Remains paranoid, states the team has placed metal objects in his mouth, does not know why they would do this. Endorses worsening anxiety. Denies si/hi.    Denies side effects of haldol medication. Denies benefit    Per previous note"  Joelle reported that last Feb he had COVID and started to have nerve pain on his sides. With his first Ketruda he reported tightness in his jaw. On second tightness increased. Third and fourth he started to become delusional that there are things inside his mouth and body. They gave him Seroquel to take the edge off. It helped a bit. They removed 7 moles between 3-5/2023. 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 He was going to resume Ketruda but due to his mental state, they had to pause. She says he was very sharp at baseline, no dementia, he was shopping, going out, driving, socializing, using his computer, etc.   "
Pt seen and evaluated. wife present at bedside. No psych PRNs. VS notable for elevated BP    On assessment, pt in acute distress, noting jaw discomfort, throat pain, repeatedly stating he cannot talk, defers most qs to his wife Joelle present at bedside. Remains paranoid, states the team has placed metal objects in his mouth, does not know why they would do this. Endorses worsening anxiety. Denies si/hi.    Denies side effects of haldol medication. Denies benefit    Per previous note"  Joelle reported that last Feb he had COVID and started to have nerve pain on his sides. With his first Ketruda he reported tightness in his jaw. On second tightness increased. Third and fourth he started to become delusional that there are things inside his mouth and body. They gave him Seroquel to take the edge off. It helped a bit. They removed 7 moles between 3-5/2023. 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 He was going to resume Ketruda but due to his mental state, they had to pause. She says he was very sharp at baseline, no dementia, he was shopping, going out, driving, socializing, using his computer, etc.   "
Patient seen, evaluated and chart reviewed. Patient presents as less lethargic, no evidence of acute psychosis, pérez or acute depression. He has had peg tube placed, the medications are held for now, no change in clinical status.
Patient seen, evaluated and chart reviewed. Patient presents as less lethargic, no evidence of acute psychosis, pérez or acute depression. Patient continues to experience orthostatic hypotension which is the reason for his continued stay in the hospital.
Patient seen, evaluated and chart reviewed. Patient presents as lucid and engageable, no evidence of acute psychosis, pérez or acute depression. worsening in clinical picture at this time. 
Patient seen, evaluated and chart reviewed. Patient presents as less lethargic, no evidence of acute psychosis, pérez or acute depression. Patient has reportedly been eating better and reports no acute interval complaints.

## 2023-12-26 NOTE — BH CONSULTATION LIAISON PROGRESS NOTE - NSBHMSEBEHAV_PSY_A_CORE
Cooperative
Other
Cooperative
Other
Cooperative
Other
Cooperative
Other
Cooperative
Other

## 2023-12-26 NOTE — SOCIAL WORK PROGRESS NOTE - NSSWPROGRESSNOTE_GEN_ALL_CORE
Pt to be DC to [place] for subacute rehab today at 2pm via [facility], pt/family/md/facility all aware and in agreement with plan for DC. SW to follow to ensure safe transition to subacute rehab today.  Pt to be DC to [place] for subacute rehab today at 2pm via [facility], pt/family/md/facility all aware and in agreement with plan for DC. SW to follow to ensure safe transition to subacute rehab today.

## 2023-12-26 NOTE — CHART NOTE - NSCHARTNOTEFT_GEN_A_CORE
Assessment: patient seen for malnutrition follow up  73y old  Male who presents with a chief complaint of psychosis status post PEG  for discharge today to rehab.  spoke to wife about feeding for rehab  12/26 fecal incontinence   tolerating feeds per RN   + BM now on banatrol       Factors impacting intake: [ ] none [ ] nausea  [ ] vomiting [ ] diarrhea [ ] constipation  [ ]chewing problems [x ] swallowing issues  [ ] other: PEG feeds NPO per speech     Diet Prescription: Diet, NPO with Tube Feed:   Tube Feeding Modality: Gastrostomy  Jevity 1.5  Total Volume for 24 Hours (mL): 1530  Continuous  Starting Tube Feed Rate {mL per Hour}: 25  Increase Tube Feed Rate by (mL): 10     Every 8 hours  Until Goal Tube Feed Rate (mL per Hour): 85  Tube Feed Duration (in Hours): 18  Tube Feed Start Time: 06:00  Pump   Rate (mL per Hour): 30   Frequency: Every Hour    Duration (Hours): 18  Free Water Flush Instructions:  150ml water ac/pc  Banatrol TF     Qty per Day:  BID (12-24-23 @ 10:23)        Current Weight:  12/20 141.7# 12/13 147.7#       Pertinent Medications: MEDICATIONS  (STANDING):  atenolol  Tablet 50 milliGRAM(s) Oral two times a day  cephalexin 500 milliGRAM(s) Oral every 12 hours  cholecalciferol 2000 Unit(s) Oral daily  FIRST- Mouthwash  BLM 5 milliLiter(s) Swish and Spit three times a day  fludroCORTISONE 0.2 milliGRAM(s) Oral daily  heparin   Injectable 5000 Unit(s) SubCutaneous every 12 hours  melatonin 3 milliGRAM(s) Oral at bedtime  predniSONE   Tablet 5 milliGRAM(s) Oral daily  senna 2 Tablet(s) Oral at bedtime    MEDICATIONS  (PRN):  acetaminophen     Tablet .. 650 milliGRAM(s) Oral every 6 hours PRN Temp greater or equal to 38.5C (101.3F), Moderate Pain (4 - 6)  lactulose Syrup 15 Gram(s) Oral two times a day PRN constipation    Pertinent Labs: glucose 183  Skin: ecchymosis    Estimated Needs:      [X ] no change since previous assessment: based on IBW of 80.7 kg ( 25-30 kcals/kg) 5833-7742 kcals and ( 1.1-1.3 gm pro/kg)  gm pro  [ ] recalculated:     Previous Nutrition Diagnosis:   [ ] Inadequate Energy Intake [ ]Inadequate Oral Intake [ ] Excessive Energy Intake   [ ] Underweight [ ] Increased Nutrient Needs [ ] Overweight/Obesity   [x ] Altered GI Function [ ] Unintended Weight Loss [ ] Food & Nutrition Related Knowledge Deficit [x ] Malnutrition     Nutrition Diagnosis is [ x] ongoing  [ ] resolved [ ] not applicable     New Nutrition Diagnosis: [x ] not applicable       Interventions:   Recommend  [ ] Change Diet To:  [ ] Nutrition Supplement  [ ] Nutrition Support  [x ] Other: continue feeding at goal rate for discharge jevity 1.5 85mlhrX 18 hr + banatrol    Monitoring and Evaluation:   [ ] PO intake [ x ] Tolerance to diet prescription [ x ] weights [ x ] labs[ x ] follow up per protocol  [ ] other: Assessment: patient seen for malnutrition follow up  73y old  Male who presents with a chief complaint of psychosis status post PEG  for discharge today to rehab.  spoke to wife about feeding for rehab  12/26 fecal incontinence   tolerating feeds per RN   + BM now on banatrol       Factors impacting intake: [ ] none [ ] nausea  [ ] vomiting [ ] diarrhea [ ] constipation  [ ]chewing problems [x ] swallowing issues  [ ] other: PEG feeds NPO per speech     Diet Prescription: Diet, NPO with Tube Feed:   Tube Feeding Modality: Gastrostomy  Jevity 1.5  Total Volume for 24 Hours (mL): 1530  Continuous  Starting Tube Feed Rate {mL per Hour}: 25  Increase Tube Feed Rate by (mL): 10     Every 8 hours  Until Goal Tube Feed Rate (mL per Hour): 85  Tube Feed Duration (in Hours): 18  Tube Feed Start Time: 06:00  Pump   Rate (mL per Hour): 30   Frequency: Every Hour    Duration (Hours): 18  Free Water Flush Instructions:  150ml water ac/pc  Banatrol TF     Qty per Day:  BID (12-24-23 @ 10:23)        Current Weight:  12/20 141.7# 12/13 147.7#       Pertinent Medications: MEDICATIONS  (STANDING):  atenolol  Tablet 50 milliGRAM(s) Oral two times a day  cephalexin 500 milliGRAM(s) Oral every 12 hours  cholecalciferol 2000 Unit(s) Oral daily  FIRST- Mouthwash  BLM 5 milliLiter(s) Swish and Spit three times a day  fludroCORTISONE 0.2 milliGRAM(s) Oral daily  heparin   Injectable 5000 Unit(s) SubCutaneous every 12 hours  melatonin 3 milliGRAM(s) Oral at bedtime  predniSONE   Tablet 5 milliGRAM(s) Oral daily  senna 2 Tablet(s) Oral at bedtime    MEDICATIONS  (PRN):  acetaminophen     Tablet .. 650 milliGRAM(s) Oral every 6 hours PRN Temp greater or equal to 38.5C (101.3F), Moderate Pain (4 - 6)  lactulose Syrup 15 Gram(s) Oral two times a day PRN constipation    Pertinent Labs: glucose 183  Skin: ecchymosis    Estimated Needs:      [X ] no change since previous assessment: based on IBW of 80.7 kg ( 25-30 kcals/kg) 2015-5971 kcals and ( 1.1-1.3 gm pro/kg)  gm pro  [ ] recalculated:     Previous Nutrition Diagnosis:   [ ] Inadequate Energy Intake [ ]Inadequate Oral Intake [ ] Excessive Energy Intake   [ ] Underweight [ ] Increased Nutrient Needs [ ] Overweight/Obesity   [x ] Altered GI Function [ ] Unintended Weight Loss [ ] Food & Nutrition Related Knowledge Deficit [x ] Malnutrition     Nutrition Diagnosis is [ x] ongoing  [ ] resolved [ ] not applicable     New Nutrition Diagnosis: [x ] not applicable       Interventions:   Recommend  [ ] Change Diet To:  [ ] Nutrition Supplement  [ ] Nutrition Support  [x ] Other: continue feeding at goal rate for discharge jevity 1.5 85mlhrX 18 hr + banatrol    Monitoring and Evaluation:   [ ] PO intake [ x ] Tolerance to diet prescription [ x ] weights [ x ] labs[ x ] follow up per protocol  [ ] other:

## 2023-12-26 NOTE — BH CONSULTATION LIAISON PROGRESS NOTE - NSBHMSEBODY_PSY_A_CORE
Malnourished
Malnourished
Average build
Malnourished
Average build
Malnourished
Malnourished
Average build
Malnourished
Average build
Malnourished
Average build
Malnourished

## 2023-12-26 NOTE — DISCHARGE NOTE NURSING/CASE MANAGEMENT/SOCIAL WORK - PATIENT PORTAL LINK FT
You can access the FollowMyHealth Patient Portal offered by [facility] by registering at the following website: [email or web address]. By joining [employer]’s FollowMyHealth portal, you will also be able to view your health information using other applications (apps) compatible with our system. You can access the FollowMyHealth Patient Portal offered by [facility] by registering at the following website: [email or web address]. By joining [employer]’s FollowMyHealth portal, you will also be able to view your health information using other applications (apps) compatible with our system.

## 2023-12-26 NOTE — PROGRESS NOTE ADULT - SUBJECTIVE AND OBJECTIVE BOX
[facility] DIVISION of INFECTIOUS DISEASE  Eric Reyes MD PhD, Milena Underwood MD, Jillian Mckoy MD, Enid Everett MD, Jay Krishna MD  and providing coverage with Arthur Pepper MD  Providing Infectious Disease Consultations at [facility], [facility], [facility], [facility]'s    Office# [phone] to schedule follow up appointments  Answering Service for urgent calls or New Consults [phone]  Cell# to text for urgent issues Eric Reyes [phone]     infectious diseases progress note:    JANICE RENDON is a 73y y. o. Male patient    Overnight and events of the last 24hrs reviewed    Allergies    No Known Allergies    Intolerances        ANTIBIOTICS/RELEVANT:  antimicrobials  ceFAZolin   IVPB      ceFAZolin   IVPB 2000 milliGRAM(s) IV Intermittent every 8 hours    immunologic:    OTHER:  acetaminophen     Tablet .. 650 milliGRAM(s) Oral every 6 hours PRN  atenolol  Tablet 50 milliGRAM(s) Oral two times a day  cholecalciferol 2000 Unit(s) Oral daily  FIRST- Mouthwash  BLM 5 milliLiter(s) Swish and Spit three times a day  fludroCORTISONE 0.2 milliGRAM(s) Oral daily  heparin   Injectable 5000 Unit(s) SubCutaneous every 12 hours  lactulose Syrup 15 Gram(s) Oral two times a day PRN  melatonin 3 milliGRAM(s) Oral at bedtime  predniSONE   Tablet 5 milliGRAM(s) Oral daily  senna 2 Tablet(s) Oral at bedtime      Objective:  Vital Signs Last 24 Hrs  T(C): 36.8 (26 Dec 2023 11:42), Max: 36.8 (26 Dec 2023 11:42)  T(F): 98.2 (26 Dec 2023 11:42), Max: 98.2 (26 Dec 2023 11:42)  HR: 85 (26 Dec 2023 11:42) (76 - 85)  BP: 159/78 (26 Dec 2023 11:42) (159/78 - 197/84)  BP(mean): --  RR: 18 (26 Dec 2023 11:42) (18 - 18)  SpO2: 94% (26 Dec 2023 11:42) (92% - 95%)    Parameters below as of 26 Dec 2023 11:42  Patient On (Oxygen Delivery Method): room air        T(C): 36.8 (12-26-23 @ 11:42), Max: 37 (12-24-23 @ 20:57)  T(C): 36.8 (12-26-23 @ 11:42), Max: 37.1 (12-24-23 @ 06:26)  T(C): 36.8 (12-26-23 @ 11:42), Max: 37.1 (12-24-23 @ 06:26)    PHYSICAL EXAM:  HEENT: NC atraumatic  Neck: supple  Respiratory: no accessory muscle use, breathing comfortably  Cardiovascular: distant  Gastrointestinal: normal appearing, nondistended  Extremities: no clubbing, no cyanosis,        LABS:                          10.4   7.10  )-----------( 126      ( 25 Dec 2023 16:20 )             30.2       WBC  7.10 12-25 @ 16:20  7.10 12-23 @ 07:00  6.56 12-22 @ 11:35  6.43 12-21 @ 09:22  6.47 12-20 @ 08:42  6.33 12-19 @ 12:50      12-25    140  |  103  |  16  ----------------------------<  183<H>  4.1   |  35<H>  |  0.59    Ca    8.4<L>      25 Dec 2023 16:20  Mg     1.7     12-25    TPro  5.7<L>  /  Alb  2.3<L>  /  TBili  0.6  /  DBili  x   /  AST  16  /  ALT  23  /  AlkPhos  80  12-25      Creatinine: 0.59 mg/dL (12-25-23 @ 16:20)  Creatinine: 0.50 mg/dL (12-23-23 @ 07:00)  Creatinine: 0.55 mg/dL (12-22-23 @ 11:35)  Creatinine: 0.60 mg/dL (12-21-23 @ 09:22)  Creatinine: 0.56 mg/dL (12-20-23 @ 08:42)  Creatinine: 0.64 mg/dL (12-19-23 @ 12:50)        Urinalysis Basic - ( 25 Dec 2023 16:20 )    Color: x / Appearance: x / SG: x / pH: x  Gluc: 183 mg/dL / Ketone: x  / Bili: x / Urobili: x   Blood: x / Protein: x / Nitrite: x   Leuk Esterase: x / RBC: x / WBC x   Sq Epi: x / Non Sq Epi: x / Bacteria: x            INFLAMMATORY MARKERS      MICROBIOLOGY:              RADIOLOGY & ADDITIONAL STUDIES:   [facility] DIVISION of INFECTIOUS DISEASE  Eric Reyes MD PhD, Milena Underowod MD, Jillian Mckoy MD, Enid Everett MD, Jay Krishna MD  and providing coverage with Arthur Pepper MD  Providing Infectious Disease Consultations at [facility], [facility], [facility], [facility]'s    Office# [phone] to schedule follow up appointments  Answering Service for urgent calls or New Consults [phone]  Cell# to text for urgent issues Eric Reyes [phone]     infectious diseases progress note:    JANICE RENDON is a 73y y. o. Male patient    Overnight and events of the last 24hrs reviewed    Allergies    No Known Allergies    Intolerances        ANTIBIOTICS/RELEVANT:  antimicrobials  ceFAZolin   IVPB      ceFAZolin   IVPB 2000 milliGRAM(s) IV Intermittent every 8 hours    immunologic:    OTHER:  acetaminophen     Tablet .. 650 milliGRAM(s) Oral every 6 hours PRN  atenolol  Tablet 50 milliGRAM(s) Oral two times a day  cholecalciferol 2000 Unit(s) Oral daily  FIRST- Mouthwash  BLM 5 milliLiter(s) Swish and Spit three times a day  fludroCORTISONE 0.2 milliGRAM(s) Oral daily  heparin   Injectable 5000 Unit(s) SubCutaneous every 12 hours  lactulose Syrup 15 Gram(s) Oral two times a day PRN  melatonin 3 milliGRAM(s) Oral at bedtime  predniSONE   Tablet 5 milliGRAM(s) Oral daily  senna 2 Tablet(s) Oral at bedtime      Objective:  Vital Signs Last 24 Hrs  T(C): 36.8 (26 Dec 2023 11:42), Max: 36.8 (26 Dec 2023 11:42)  T(F): 98.2 (26 Dec 2023 11:42), Max: 98.2 (26 Dec 2023 11:42)  HR: 85 (26 Dec 2023 11:42) (76 - 85)  BP: 159/78 (26 Dec 2023 11:42) (159/78 - 197/84)  BP(mean): --  RR: 18 (26 Dec 2023 11:42) (18 - 18)  SpO2: 94% (26 Dec 2023 11:42) (92% - 95%)    Parameters below as of 26 Dec 2023 11:42  Patient On (Oxygen Delivery Method): room air        T(C): 36.8 (12-26-23 @ 11:42), Max: 37 (12-24-23 @ 20:57)  T(C): 36.8 (12-26-23 @ 11:42), Max: 37.1 (12-24-23 @ 06:26)  T(C): 36.8 (12-26-23 @ 11:42), Max: 37.1 (12-24-23 @ 06:26)    PHYSICAL EXAM:  HEENT: NC atraumatic  Neck: supple  Respiratory: no accessory muscle use, breathing comfortably  Cardiovascular: distant  Gastrointestinal: normal appearing, nondistended  Extremities: no clubbing, no cyanosis,        LABS:                          10.4   7.10  )-----------( 126      ( 25 Dec 2023 16:20 )             30.2       WBC  7.10 12-25 @ 16:20  7.10 12-23 @ 07:00  6.56 12-22 @ 11:35  6.43 12-21 @ 09:22  6.47 12-20 @ 08:42  6.33 12-19 @ 12:50      12-25    140  |  103  |  16  ----------------------------<  183<H>  4.1   |  35<H>  |  0.59    Ca    8.4<L>      25 Dec 2023 16:20  Mg     1.7     12-25    TPro  5.7<L>  /  Alb  2.3<L>  /  TBili  0.6  /  DBili  x   /  AST  16  /  ALT  23  /  AlkPhos  80  12-25      Creatinine: 0.59 mg/dL (12-25-23 @ 16:20)  Creatinine: 0.50 mg/dL (12-23-23 @ 07:00)  Creatinine: 0.55 mg/dL (12-22-23 @ 11:35)  Creatinine: 0.60 mg/dL (12-21-23 @ 09:22)  Creatinine: 0.56 mg/dL (12-20-23 @ 08:42)  Creatinine: 0.64 mg/dL (12-19-23 @ 12:50)        Urinalysis Basic - ( 25 Dec 2023 16:20 )    Color: x / Appearance: x / SG: x / pH: x  Gluc: 183 mg/dL / Ketone: x  / Bili: x / Urobili: x   Blood: x / Protein: x / Nitrite: x   Leuk Esterase: x / RBC: x / WBC x   Sq Epi: x / Non Sq Epi: x / Bacteria: x            INFLAMMATORY MARKERS      MICROBIOLOGY:              RADIOLOGY & ADDITIONAL STUDIES:

## 2023-12-26 NOTE — BH CONSULTATION LIAISON PROGRESS NOTE - NSBHMSEMOVE_PSY_A_CORE
No abnormal movements
Unable to assess
Unable to assess
No abnormal movements
No abnormal movements
Unable to assess
No abnormal movements
Unable to assess
No abnormal movements
No abnormal movements

## 2023-12-26 NOTE — PROGRESS NOTE ADULT - SUBJECTIVE AND OBJECTIVE BOX
[facility]  Walter Camarena PA-C  [address] [place] [address]  [phone]      INTERVAL HPI/OVERNIGHT EVENTS:  Pt s/e  No new GI events  +peg    MEDICATIONS  (STANDING):  atenolol  Tablet 50 milliGRAM(s) Oral two times a day  cephalexin 500 milliGRAM(s) Oral every 12 hours  cholecalciferol 2000 Unit(s) Oral daily  FIRST- Mouthwash  BLM 5 milliLiter(s) Swish and Spit three times a day  fludroCORTISONE 0.2 milliGRAM(s) Oral daily  heparin   Injectable 5000 Unit(s) SubCutaneous every 12 hours  melatonin 3 milliGRAM(s) Oral at bedtime  predniSONE   Tablet 5 milliGRAM(s) Oral daily  senna 2 Tablet(s) Oral at bedtime    MEDICATIONS  (PRN):  acetaminophen     Tablet .. 650 milliGRAM(s) Oral every 6 hours PRN Temp greater or equal to 38.5C (101.3F), Moderate Pain (4 - 6)  lactulose Syrup 15 Gram(s) Oral two times a day PRN constipation      Allergies    No Known Allergies        PHYSICAL EXAM:   Vital Signs:  Vital Signs Last 24 Hrs  T(C): 36.8 (26 Dec 2023 11:42), Max: 36.8 (26 Dec 2023 11:42)  T(F): 98.2 (26 Dec 2023 11:42), Max: 98.2 (26 Dec 2023 11:42)  HR: 85 (26 Dec 2023 11:42) (76 - 85)  BP: 159/78 (26 Dec 2023 11:42) (159/78 - 197/84)  BP(mean): --  RR: 18 (26 Dec 2023 11:42) (18 - 18)  SpO2: 94% (26 Dec 2023 11:42) (92% - 95%)    Parameters below as of 26 Dec 2023 11:42  Patient On (Oxygen Delivery Method): room air      GENERAL:  Appears stated age  HEENT:  NC/AT  CHEST:  Full & symmetric excursion  HEART:  Regular rhythm  ABDOMEN:  Soft, non-tender, non-distended, +peg  EXTEREMITIES:  no cyanosis  SKIN:  No rash  NEURO:  Alert      LABS:                        10.4   7.10  )-----------( 126      ( 25 Dec 2023 16:20 )             30.2     12-25    140  |  103  |  16  ----------------------------<  183<H>  4.1   |  35<H>  |  0.59    Ca    8.4<L>      25 Dec 2023 16:20  Mg     1.7     12-25    TPro  5.7<L>  /  Alb  2.3<L>  /  TBili  0.6  /  DBili  x   /  AST  16  /  ALT  23  /  AlkPhos  80  12-25      Urinalysis Basic - ( 25 Dec 2023 16:20 )    Color: x / Appearance: x / SG: x / pH: x  Gluc: 183 mg/dL / Ketone: x  / Bili: x / Urobili: x   Blood: x / Protein: x / Nitrite: x   Leuk Esterase: x / RBC: x / WBC x   Sq Epi: x / Non Sq Epi: x / Bacteria: x   [facility]  Walter Camarena PA-C  [address] [place] [address]  [phone]      INTERVAL HPI/OVERNIGHT EVENTS:  Pt s/e  No new GI events  +peg    MEDICATIONS  (STANDING):  atenolol  Tablet 50 milliGRAM(s) Oral two times a day  cephalexin 500 milliGRAM(s) Oral every 12 hours  cholecalciferol 2000 Unit(s) Oral daily  FIRST- Mouthwash  BLM 5 milliLiter(s) Swish and Spit three times a day  fludroCORTISONE 0.2 milliGRAM(s) Oral daily  heparin   Injectable 5000 Unit(s) SubCutaneous every 12 hours  melatonin 3 milliGRAM(s) Oral at bedtime  predniSONE   Tablet 5 milliGRAM(s) Oral daily  senna 2 Tablet(s) Oral at bedtime    MEDICATIONS  (PRN):  acetaminophen     Tablet .. 650 milliGRAM(s) Oral every 6 hours PRN Temp greater or equal to 38.5C (101.3F), Moderate Pain (4 - 6)  lactulose Syrup 15 Gram(s) Oral two times a day PRN constipation      Allergies    No Known Allergies        PHYSICAL EXAM:   Vital Signs:  Vital Signs Last 24 Hrs  T(C): 36.8 (26 Dec 2023 11:42), Max: 36.8 (26 Dec 2023 11:42)  T(F): 98.2 (26 Dec 2023 11:42), Max: 98.2 (26 Dec 2023 11:42)  HR: 85 (26 Dec 2023 11:42) (76 - 85)  BP: 159/78 (26 Dec 2023 11:42) (159/78 - 197/84)  BP(mean): --  RR: 18 (26 Dec 2023 11:42) (18 - 18)  SpO2: 94% (26 Dec 2023 11:42) (92% - 95%)    Parameters below as of 26 Dec 2023 11:42  Patient On (Oxygen Delivery Method): room air      GENERAL:  Appears stated age  HEENT:  NC/AT  CHEST:  Full & symmetric excursion  HEART:  Regular rhythm  ABDOMEN:  Soft, non-tender, non-distended, +peg  EXTEREMITIES:  no cyanosis  SKIN:  No rash  NEURO:  Alert      LABS:                        10.4   7.10  )-----------( 126      ( 25 Dec 2023 16:20 )             30.2     12-25    140  |  103  |  16  ----------------------------<  183<H>  4.1   |  35<H>  |  0.59    Ca    8.4<L>      25 Dec 2023 16:20  Mg     1.7     12-25    TPro  5.7<L>  /  Alb  2.3<L>  /  TBili  0.6  /  DBili  x   /  AST  16  /  ALT  23  /  AlkPhos  80  12-25      Urinalysis Basic - ( 25 Dec 2023 16:20 )    Color: x / Appearance: x / SG: x / pH: x  Gluc: 183 mg/dL / Ketone: x  / Bili: x / Urobili: x   Blood: x / Protein: x / Nitrite: x   Leuk Esterase: x / RBC: x / WBC x   Sq Epi: x / Non Sq Epi: x / Bacteria: x

## 2023-12-26 NOTE — PROGRESS NOTE ADULT - ASSESSMENT
73 M pmh of HTN, ITP, APL syndrome, generalized anxiety disorder, OCD, newly dx aggressive Merkel Cell cancer of forehead- pt's oncologist  Dr. Eng, at [facility] on Keytruda (PD-1 blocker) infusions with good response per wife  was brought to ED for worsening psychosis for the past few days, pt believes there are sharp things inside his body trying to come out of his mouth, stuck in throat, pt lately with difficulty swallowing, decreased po intake, pt was evaluated by psych in ED. Wife reports sudden cognitive decline and behavioral changes immediately after COVID infection.    1-PASC  noted serology for EBV consistent with reactivation as described post PASC  Serotonin, Blood: <30ng/mL   am cortisol-low x 2 then nl  Psych involved    2-PYELONEPHRITIS   -ceftriaxone (started 11/25), completed  urine culture w >100,000 CFU/ml Klebsiella variicola,  -  Ceftriaxone: S <=1  blood cultures-NGTD  Monitor off Abx    3-COVID Exposure  -noted exposure, no specific symptoms so with negative test current recommendations only testing if symptoms develop or per facility protocols    4-Thrombophlebitis started on cefazolin and   will change to Keflex 500mg PO BID last day 12/28    From an ID standpoint no further requirement for inpatient status for the management of ID issues. Fine with discharge from ID standpoint when other medical issues no longer require inpatient care and social issues allow for a safe discharge plan. To schedule an outpatient ID follow up appointment please call our office at [phone]    Thank you for consulting us and involving us in the management of this most interesting and challenging case.  Please call us at [phone] or text me directly on my cell#[phone] with any concerns or further questions.       73 M pmh of HTN, ITP, APL syndrome, generalized anxiety disorder, OCD, newly dx aggressive Merkel Cell cancer of forehead- pt's oncologist  Dr. Eng, at [facility] on Keytruda (PD-1 blocker) infusions with good response per wife  was brought to ED for worsening psychosis for the past few days, pt believes there are sharp things inside his body trying to come out of his mouth, stuck in throat, pt lately with difficulty swallowing, decreased po intake, pt was evaluated by psych in ED. Wife reports sudden cognitive decline and behavioral changes immediately after COVID infection.    1-PASC  noted serology for EBV consistent with reactivation as described post PASC  Serotonin, Blood: <30ng/mL   am cortisol-low x 2 then nl  Psych involved    2-PYELONEPHRITIS   -ceftriaxone (started 11/25), completed  urine culture w >100,000 CFU/ml Klebsiella variicola,  -  Ceftriaxone: S <=1  blood cultures-NGTD  Monitor off Abx    3-COVID Exposure  -noted exposure, no specific symptoms so with negative test current recommendations only testing if symptoms develop or per facility protocols    4-Thrombophlebitis started on cefazolin and   will change to Keflex 500mg PO BID last day 12/28    From an ID standpoint no further requirement for inpatient status for the management of ID issues. Fine with discharge from ID standpoint when other medical issues no longer require inpatient care and social issues allow for a safe discharge plan. To schedule an outpatient ID follow up appointment please call our office at [phone]    Thank you for consulting us and involving us in the management of this most interesting and challenging case.  Please call us at [phone] or text me directly on my cell#[phone] with any concerns or further questions.

## 2023-12-26 NOTE — BH CONSULTATION LIAISON PROGRESS NOTE - NSBHMSEREMMEM_PSY_A_CORE
Normal
Unable to assess
Normal
Unable to assess
Normal
Unable to assess
Normal
Unable to assess
Normal

## 2023-12-26 NOTE — BH CONSULTATION LIAISON PROGRESS NOTE - NSBHMSESPABN_PSY_A_CORE
Soft volume/Slowed rate/Decreased productivity/Impaired articulation
Soft volume/Slowed rate/Decreased productivity/Increased latency
Soft volume/Slowed rate/Decreased productivity/Impaired articulation

## 2023-12-26 NOTE — BH CONSULTATION LIAISON PROGRESS NOTE - NS ED BHA REVIEW OF ED CHART AVAILABLE LABS REVIEWED
None available
Yes
None available
Yes
None available
Yes
Yes
None available
Yes
Yes
None available
Yes
None available
Yes

## 2023-12-26 NOTE — BH CONSULTATION LIAISON PROGRESS NOTE - NSBHMSEKNOW_PSY_A_CORE
Unable to assess
Normal
Unable to assess
Normal
Normal
Unable to assess
Normal
Unable to assess
Normal

## 2023-12-26 NOTE — CHART NOTE - NSCHARTNOTESELECT_GEN_ALL_CORE
Event Note
Event Note
Hypertension SBP >200/Event Note
Nutrition Services
Rapid Response
Rapid Response
Event Note
Nutrition Services
Event Note

## 2023-12-26 NOTE — BH CONSULTATION LIAISON PROGRESS NOTE - NSBHCONSFOLLOWNEEDS_PSY_ALL_CORE
No psychiatric contraindications to discharge

## 2023-12-26 NOTE — BH CONSULTATION LIAISON PROGRESS NOTE - NSBHMSEAFFQUAL_PSY_A_CORE
Anxious
Anxious
Irritable
Anxious
Irritable
Irritable
Anxious
Irritable
Anxious
Irritable
Anxious

## 2023-12-26 NOTE — BH CONSULTATION LIAISON PROGRESS NOTE - NSBHMSEAFFRANGE_PSY_A_CORE
Constricted

## 2023-12-26 NOTE — BH CONSULTATION LIAISON PROGRESS NOTE - NSBHFUPINTERVALCCFT_PSY_A_CORE
He is a little better today. But still not eating as well as he should.
I am in pain. I cannot swallow.
"when does this go away?" 
I am alright. He is still not eating.
"I cannot talk!"
I am alright. He is not eating too well. 
I am not bad today.
"I am in pain" (points to jaw/throat)
He is still n9ot eating.
I am not sure he is doing better. My throat still hurts.
He might be getting a little bit better.
He is still not eating too well. He was lethargic yesterday.
He is no different on no medications.
I am not doing well. My throat still hurts.
"feeling dead" 
I am not sure he is doing better. My throat still hurts.
I am the same
I am doing the same.
I am not sure if I had a good weekend.

## 2023-12-28 LAB
-  AMPICILLIN: SIGNIFICANT CHANGE UP
-  AMPICILLIN: SIGNIFICANT CHANGE UP
-  CIPROFLOXACIN: SIGNIFICANT CHANGE UP
-  CIPROFLOXACIN: SIGNIFICANT CHANGE UP
-  LEVOFLOXACIN: SIGNIFICANT CHANGE UP
-  LEVOFLOXACIN: SIGNIFICANT CHANGE UP
-  NITROFURANTOIN: SIGNIFICANT CHANGE UP
-  NITROFURANTOIN: SIGNIFICANT CHANGE UP
-  TETRACYCLINE: SIGNIFICANT CHANGE UP
-  TETRACYCLINE: SIGNIFICANT CHANGE UP
-  VANCOMYCIN: SIGNIFICANT CHANGE UP
-  VANCOMYCIN: SIGNIFICANT CHANGE UP
CULTURE RESULTS: ABNORMAL
CULTURE RESULTS: ABNORMAL
METHOD TYPE: SIGNIFICANT CHANGE UP
METHOD TYPE: SIGNIFICANT CHANGE UP
ORGANISM # SPEC MICROSCOPIC CNT: ABNORMAL
ORGANISM # SPEC MICROSCOPIC CNT: SIGNIFICANT CHANGE UP
ORGANISM # SPEC MICROSCOPIC CNT: SIGNIFICANT CHANGE UP
SPECIMEN SOURCE: SIGNIFICANT CHANGE UP
SPECIMEN SOURCE: SIGNIFICANT CHANGE UP

## 2024-01-13 NOTE — DIETITIAN INITIAL EVALUATION ADULT - IDEAL BODY WEIGHT (KG)
Even though you have tested negative for strep, will treat for bacterial throat infection based on physical exam  Amoxicillin as ordered, 2x daily for 10 days  Tylenol/ibuprofen for fevers, chills, aches and pains  Stay home from work/school for 24 hours after starting antibiotics  Switch out toothbrush after 3 days  Lots of fluid, plenty of rest  Warm salt water gargles, throat lozenges  Follow up with PCP if symptoms persist or worsen  Go to ED if you develop any shortness of breath, chest pain or if you are unable to tolerate food or fluids  
80.7

## 2024-01-24 NOTE — BH CONSULTATION LIAISON ASSESSMENT NOTE - DOMICILED WITH
01/18/24 has recd levaquin and remdesevir for left lower lobe pneumonia       Initiate standard COVID protocols; COVID-19 testing ,Infection Control notification  and isolation- respiratory, contact and droplet per protocol        Management: Inhaled bronchodilators as needed for shortness of breath.  Supplemental o2 as needed     Advance Care Planning  Current advance care plan has been discussed with patient/family/POA and patient currently wishes DNR (Do Not Resuscitate).       Daughter states pt first tested positive for covid on 01/04  She is out of isolation window  O2 sat on room air is 95%    01/22/24 sat on room air is 95%- chest is clear    Family

## 2024-01-26 NOTE — BH CONSULTATION LIAISON PROGRESS NOTE - LEVEL OF CONSCIOUSNESS
No
Alert

## 2024-03-24 ENCOUNTER — EMERGENCY (EMERGENCY)
Facility: HOSPITAL | Age: 74
LOS: 1 days | Discharge: HOME | End: 2024-03-24
Attending: EMERGENCY MEDICINE | Admitting: EMERGENCY MEDICINE
Payer: MEDICARE

## 2024-03-24 VITALS
RESPIRATION RATE: 18 BRPM | OXYGEN SATURATION: 95 % | TEMPERATURE: 98 F | SYSTOLIC BLOOD PRESSURE: 168 MMHG | DIASTOLIC BLOOD PRESSURE: 72 MMHG | WEIGHT: 134.04 LBS | HEART RATE: 77 BPM

## 2024-03-24 VITALS
HEART RATE: 67 BPM | RESPIRATION RATE: 18 BRPM | TEMPERATURE: 98 F | DIASTOLIC BLOOD PRESSURE: 80 MMHG | SYSTOLIC BLOOD PRESSURE: 199 MMHG | OXYGEN SATURATION: 99 %

## 2024-03-24 DIAGNOSIS — J35.1 HYPERTROPHY OF TONSILS: Chronic | ICD-10-CM

## 2024-03-24 DIAGNOSIS — Z85.828 PERSONAL HISTORY OF OTHER MALIGNANT NEOPLASM OF SKIN: Chronic | ICD-10-CM

## 2024-03-24 LAB
ALBUMIN SERPL ELPH-MCNC: 3 G/DL — LOW (ref 3.3–5)
ALP SERPL-CCNC: 47 U/L — SIGNIFICANT CHANGE UP (ref 40–120)
ALT FLD-CCNC: 23 U/L — SIGNIFICANT CHANGE UP (ref 12–78)
ANION GAP SERPL CALC-SCNC: 5 MMOL/L — SIGNIFICANT CHANGE UP (ref 5–17)
APPEARANCE UR: CLEAR — SIGNIFICANT CHANGE UP
AST SERPL-CCNC: 19 U/L — SIGNIFICANT CHANGE UP (ref 15–37)
BACTERIA # UR AUTO: ABNORMAL /HPF
BASOPHILS # BLD AUTO: 0.02 K/UL — SIGNIFICANT CHANGE UP (ref 0–0.2)
BASOPHILS NFR BLD AUTO: 0.2 % — SIGNIFICANT CHANGE UP (ref 0–2)
BILIRUB SERPL-MCNC: 0.6 MG/DL — SIGNIFICANT CHANGE UP (ref 0.2–1.2)
BILIRUB UR-MCNC: NEGATIVE — SIGNIFICANT CHANGE UP
BUN SERPL-MCNC: 28 MG/DL — HIGH (ref 7–23)
CALCIUM SERPL-MCNC: 9.1 MG/DL — SIGNIFICANT CHANGE UP (ref 8.5–10.1)
CHLORIDE SERPL-SCNC: 107 MMOL/L — SIGNIFICANT CHANGE UP (ref 96–108)
CO2 SERPL-SCNC: 33 MMOL/L — HIGH (ref 22–31)
COLOR SPEC: YELLOW — SIGNIFICANT CHANGE UP
CREAT SERPL-MCNC: 0.61 MG/DL — SIGNIFICANT CHANGE UP (ref 0.5–1.3)
DIFF PNL FLD: NEGATIVE — SIGNIFICANT CHANGE UP
EGFR: 100 ML/MIN/1.73M2 — SIGNIFICANT CHANGE UP
EOSINOPHIL # BLD AUTO: 0.06 K/UL — SIGNIFICANT CHANGE UP (ref 0–0.5)
EOSINOPHIL NFR BLD AUTO: 0.7 % — SIGNIFICANT CHANGE UP (ref 0–6)
EPI CELLS # UR: PRESENT
GLUCOSE SERPL-MCNC: 129 MG/DL — HIGH (ref 70–99)
GLUCOSE UR QL: NEGATIVE MG/DL — SIGNIFICANT CHANGE UP
HCT VFR BLD CALC: 31.3 % — LOW (ref 39–50)
HGB BLD-MCNC: 11.2 G/DL — LOW (ref 13–17)
IMM GRANULOCYTES NFR BLD AUTO: 0.2 % — SIGNIFICANT CHANGE UP (ref 0–0.9)
KETONES UR-MCNC: NEGATIVE MG/DL — SIGNIFICANT CHANGE UP
LEUKOCYTE ESTERASE UR-ACNC: NEGATIVE — SIGNIFICANT CHANGE UP
LIDOCAIN IGE QN: 38 U/L — SIGNIFICANT CHANGE UP (ref 13–75)
LYMPHOCYTES # BLD AUTO: 0.86 K/UL — LOW (ref 1–3.3)
LYMPHOCYTES # BLD AUTO: 10.5 % — LOW (ref 13–44)
MCHC RBC-ENTMCNC: 30.9 PG — SIGNIFICANT CHANGE UP (ref 27–34)
MCHC RBC-ENTMCNC: 35.8 GM/DL — SIGNIFICANT CHANGE UP (ref 32–36)
MCV RBC AUTO: 86.5 FL — SIGNIFICANT CHANGE UP (ref 80–100)
MONOCYTES # BLD AUTO: 0.54 K/UL — SIGNIFICANT CHANGE UP (ref 0–0.9)
MONOCYTES NFR BLD AUTO: 6.6 % — SIGNIFICANT CHANGE UP (ref 2–14)
NEUTROPHILS # BLD AUTO: 6.66 K/UL — SIGNIFICANT CHANGE UP (ref 1.8–7.4)
NEUTROPHILS NFR BLD AUTO: 81.8 % — HIGH (ref 43–77)
NITRITE UR-MCNC: NEGATIVE — SIGNIFICANT CHANGE UP
NRBC # BLD: 0 /100 WBCS — SIGNIFICANT CHANGE UP (ref 0–0)
PH UR: 7.5 — SIGNIFICANT CHANGE UP (ref 5–8)
PLATELET # BLD AUTO: 104 K/UL — LOW (ref 150–400)
POTASSIUM SERPL-MCNC: 3.7 MMOL/L — SIGNIFICANT CHANGE UP (ref 3.5–5.3)
POTASSIUM SERPL-SCNC: 3.7 MMOL/L — SIGNIFICANT CHANGE UP (ref 3.5–5.3)
PROT SERPL-MCNC: 6 G/DL — SIGNIFICANT CHANGE UP (ref 6–8.3)
PROT UR-MCNC: 30 MG/DL
RBC # BLD: 3.62 M/UL — LOW (ref 4.2–5.8)
RBC # FLD: 14.3 % — SIGNIFICANT CHANGE UP (ref 10.3–14.5)
RBC CASTS # UR COMP ASSIST: 2 /HPF — SIGNIFICANT CHANGE UP (ref 0–4)
SODIUM SERPL-SCNC: 145 MMOL/L — SIGNIFICANT CHANGE UP (ref 135–145)
SP GR SPEC: 1.02 — SIGNIFICANT CHANGE UP (ref 1–1.03)
TROPONIN I, HIGH SENSITIVITY RESULT: 6.5 NG/L — SIGNIFICANT CHANGE UP
UROBILINOGEN FLD QL: 1 MG/DL — SIGNIFICANT CHANGE UP (ref 0.2–1)
WBC # BLD: 8.16 K/UL — SIGNIFICANT CHANGE UP (ref 3.8–10.5)
WBC # FLD AUTO: 8.16 K/UL — SIGNIFICANT CHANGE UP (ref 3.8–10.5)
WBC UR QL: 1 /HPF — SIGNIFICANT CHANGE UP (ref 0–5)

## 2024-03-24 PROCEDURE — 93010 ELECTROCARDIOGRAM REPORT: CPT

## 2024-03-24 PROCEDURE — 85025 COMPLETE CBC W/AUTO DIFF WBC: CPT

## 2024-03-24 PROCEDURE — 84484 ASSAY OF TROPONIN QUANT: CPT

## 2024-03-24 PROCEDURE — 96374 THER/PROPH/DIAG INJ IV PUSH: CPT | Mod: XU

## 2024-03-24 PROCEDURE — 80053 COMPREHEN METABOLIC PANEL: CPT

## 2024-03-24 PROCEDURE — 81001 URINALYSIS AUTO W/SCOPE: CPT

## 2024-03-24 PROCEDURE — 74177 CT ABD & PELVIS W/CONTRAST: CPT | Mod: MC

## 2024-03-24 PROCEDURE — 74177 CT ABD & PELVIS W/CONTRAST: CPT | Mod: 26,MC

## 2024-03-24 PROCEDURE — 36415 COLL VENOUS BLD VENIPUNCTURE: CPT

## 2024-03-24 PROCEDURE — 99285 EMERGENCY DEPT VISIT HI MDM: CPT | Mod: 25

## 2024-03-24 PROCEDURE — 71275 CT ANGIOGRAPHY CHEST: CPT | Mod: 26,MC

## 2024-03-24 PROCEDURE — 83690 ASSAY OF LIPASE: CPT

## 2024-03-24 PROCEDURE — 93005 ELECTROCARDIOGRAM TRACING: CPT

## 2024-03-24 PROCEDURE — 71275 CT ANGIOGRAPHY CHEST: CPT | Mod: MC

## 2024-03-24 PROCEDURE — 99285 EMERGENCY DEPT VISIT HI MDM: CPT

## 2024-03-24 RX ORDER — ACETAMINOPHEN 500 MG
1000 TABLET ORAL ONCE
Refills: 0 | Status: COMPLETED | OUTPATIENT
Start: 2024-03-24 | End: 2024-03-24

## 2024-03-24 RX ORDER — SODIUM CHLORIDE 9 MG/ML
1000 INJECTION INTRAMUSCULAR; INTRAVENOUS; SUBCUTANEOUS ONCE
Refills: 0 | Status: COMPLETED | OUTPATIENT
Start: 2024-03-24 | End: 2024-03-24

## 2024-03-24 RX ADMIN — SODIUM CHLORIDE 1000 MILLILITER(S): 9 INJECTION INTRAMUSCULAR; INTRAVENOUS; SUBCUTANEOUS at 13:54

## 2024-03-24 RX ADMIN — Medication 400 MILLIGRAM(S): at 13:54

## 2024-03-24 NOTE — ED PROVIDER NOTE - DIFFERENTIAL DIAGNOSIS
Differential including but not limited to MI ACS PE pneumonia gastritis esophagitis enteritis colitis diverticulitis appendicitis pancreatitis electrode abnormality dehydration Differential Diagnosis

## 2024-03-24 NOTE — ED ADULT NURSE NOTE - CHIEF COMPLAINT QUOTE
Pt BIBA from [facility] for lower left rib pain radiating around to back starting 2 days ago. Wife reports this is chronic pain and pt has severe anxiety, causing facility to send pt here.  Hx carcinoma getting treated with chemo causing range of different symptoms daily

## 2024-03-24 NOTE — CONSULT NOTE ADULT - SUBJECTIVE AND OBJECTIVE BOX
History of Present Illness: The patient is a 73 year old male with a history of HTN, ITP, APLS, anxiety, OCD, Merkel cell cancer who presents with total body pain. The patient is a poor historian; history obtained from family members. He was recently moved to a new NH. His new roommate has multiple cardiac issues. Since then, he has had bilateral chest pain, abdominal pain, leg pain, shortness of breath.    Past Medical/Surgical History:  HTN, ITP, APLS, anxiety, OCD, Merkel cell cancer    Medications:  Home Medications:  atenolol 50 mg oral tablet: 1 tab(s) orally 2 times a day (24 Oct 2023 12:23)  cholecalciferol 50 mcg (2000 intl units) oral tablet: 1 tab(s) orally once a day (24 Oct 2023 12:23)  fludrocortisone 0.1 mg oral tablet: 2 tab(s) orally once a day (26 Dec 2023 12:05)  Keflex 500 mg oral capsule: 1 cap(s) orally 2 times a day thru 12/28 (26 Dec 2023 12:12)  predniSONE 5 mg oral tablet: 1 tab(s) orally once a day (26 Dec 2023 12:05)      Family History: Non-contributory family history of premature cardiovascular atherosclerotic disease    Social History: No tobacco, alcohol or drug use    Review of Systems:  General: No fevers, chills, weight gain  Skin: No rashes, color changes  Cardiovascular: No chest pain, orthopnea  Respiratory: No shortness of breath, cough  Gastrointestinal: No nausea, abdominal pain  Genitourinary: No incontinence, pain with urination  Musculoskeletal: No pain, swelling, decreased range of motion  Neurological: No headache, weakness  Psychiatric: No depression, anxiety  Endocrine: No weight gain, increased thirst  All other systems are comprehensively negative.    Physical Exam:  Vitals:        Vital Signs Last 24 Hrs  T(C): 36.6 (24 Mar 2024 12:38), Max: 36.6 (24 Mar 2024 12:38)  T(F): 97.8 (24 Mar 2024 12:38), Max: 97.8 (24 Mar 2024 12:38)  HR: 77 (24 Mar 2024 12:38) (77 - 77)  BP: 168/72 (24 Mar 2024 12:38) (168/72 - 168/72)  BP(mean): --  RR: 18 (24 Mar 2024 12:38) (18 - 18)  SpO2: 95% (24 Mar 2024 12:38) (95% - 95%)  Parameters below as of 24 Mar 2024 12:38  Patient On (Oxygen Delivery Method): room air  General: NAD  HEENT: MMM  Neck: No JVD, no carotid bruit  Lungs: CTAB  CV: RRR, nl S1/S2, no M/R/G  Abdomen: S/NT/ND, +BS  Extremities: No LE edema, no cyanosis  Neuro: AAOx3, non-focal  Skin: No rash    Labs:    03-24    145  |  107  |  28<H>  ----------------------------<  129<H>  3.7   |  33<H>  |  0.61    Ca    9.1      24 Mar 2024 14:10    TPro  6.0  /  Alb  3.0<L>  /  TBili  0.6  /  DBili  x   /  AST  19  /  ALT  23  /  AlkPhos  47  03-24            ECG/Telemetry: NSR, normal axis, no ST abnormality

## 2024-03-24 NOTE — ED ADULT NURSE REASSESSMENT NOTE - NS ED NURSE REASSESS COMMENT FT1
Erika Donahue made aware of patient's BP, per MD patient is assymptomatic, Charge Nurse made aware. Patient's facility accepted patient stating that his BP usually runs high, patient ready for transport.

## 2024-03-24 NOTE — ED PROVIDER NOTE - CARE PROVIDER_API CALL
Sravan Colunga  [facility]  [address], [place] [address]  Phone: [phone]  Fax: [phone]  Follow Up Time: 1-3 Days    Avni Lau  Cardiology  [address], [address]  [place] [address]  Phone: [phone]  Fax: [phone]  Follow Up Time: 1-3 Days

## 2024-03-24 NOTE — ED ADULT NURSE NOTE - NSFALLUNIVINTERV_ED_ALL_ED
Bed/Stretcher in lowest position, wheels locked, appropriate side rails in place/Call bell, personal items and telephone in reach/Instruct patient to call for assistance before getting out of bed/chair/stretcher/Non-slip footwear applied when patient is off stretcher/Equality to call system/Physically safe environment - no spills, clutter or unnecessary equipment/Purposeful proactive rounding/Room/bathroom lighting operational, light cord in reach

## 2024-03-24 NOTE — ED ADULT NURSE NOTE - OBJECTIVE STATEMENT
Pt BIBA from [facility] for lower left rib pain radiating around to back starting 2 days ago. Wife reports he has chronic pain and pt has severe anxiety.

## 2024-03-24 NOTE — CONSULT NOTE ADULT - ASSESSMENT
The patient is a 73 year old male with a history of HTN, ITP, APLS, anxiety, OCD, Merkel cell cancer who presents with total body pain.    Plan:  - Total body pain with a chest pain component is less likely cardiac in nature  - ECG with no evidence of ischemia or infarction  - Given duration of symptoms, an acute MI ruled out with one set of cardiac enzymes  - CTA chest negative for acute PE; small pleural effusions  - No further inpatient cardiac testing indicated at this time The patient is a 73 year old male with a history of HTN, ITP, APLS, anxiety, OCD, Merkel cell cancer who presents with total body pain.    Plan:  - Total body pain with a chest pain component is less likely cardiac in nature  - ECG with no evidence of ischemia or infarction  - Given duration of symptoms, an acute MI ruled out with one set of cardiac enzymes  - CTA chest negative for acute PE; small pleural effusions  - Orthostatic hypotension - continue fludrocortisone. Would not treat supine HTN. If SBP>200, would sit/stand patient and re-assess SBP first.  - No further inpatient cardiac testing indicated at this time

## 2024-03-24 NOTE — ED ADULT TRIAGE NOTE - CHIEF COMPLAINT QUOTE
Pt BIBA from [facility] for lower left rib pain radiating around to back starting 2 days ago. Wife reports this is chronic pain and pt has severe anxiety, causing facility to send pt here.  Hx carcinoma getting treated with chemo causing range of different symptoms daily

## 2024-03-24 NOTE — ED PROVIDER NOTE - CARE PROVIDERS DIRECT ADDRESSES
[email or web address]

## 2024-03-24 NOTE — ED PROVIDER NOTE - PROGRESS NOTE DETAILS
Discussed with Dr. Lau (attending cardiologist) he saw evaluated patient cleared for discharge outpatient follow-up.  Reevaluated patient at bedside with family.  Patient feeling well.  Discussed the results of all diagnostic testing in ED and copies of all reports given.   An opportunity to ask questions was given.  Discussed the importance of prompt, close medical follow-up.  Patient will return with any changes, concerns or persistent / worsening symptoms.  Understanding of all instructions verbalized. Discussed with Dr. Mckoy (attending radiologist) he relates no evidence of appendicitis on CT

## 2024-03-24 NOTE — ED PROVIDER NOTE - PATIENT PORTAL LINK FT
You can access the FollowMyHealth Patient Portal offered by [facility] by registering at the following website: [email or web address]. By joining [facility]’s FollowMyHealth portal, you will also be able to view your health information using other applications (apps) compatible with our system.

## 2024-03-24 NOTE — ED PROVIDER NOTE - CLINICAL SUMMARY MEDICAL DECISION MAKING FREE TEXT BOX
Patient brought in by EMS from [facility] for chest pain shortness of breath lower abdominal pain.  Per family patient has had similar complaints for many months however he just transferred from [facility] to [facility] has a new roommate who has cardiac issues he has overheard this which has made him nervous.  No fever cough vomiting.  PMD Cristine cardiologist Judi    Plan EKG CTA chest CT abdomen pelvis labs IV fluid Ofirmev    Differential including but not limited to MI ACS PE pneumonia gastritis esophagitis enteritis colitis diverticulitis appendicitis pancreatitis electrode abnormality dehydration

## 2024-03-24 NOTE — ED ADULT NURSE NOTE - NSFALLRISKFACTORS_ED_ALL_ED
From: Kenney Lambert  To: Sami Baxter MD  Sent: 5/2/2020 7:55 AM CDT  Subject: Non-Urgent Medical Question    Ramírez Ludwig can you send order to [facility] for my CT scan and blood work I have appt with you June 11 thanks   Other

## 2024-03-24 NOTE — ED ADULT TRIAGE NOTE - ARRIVAL FROM
Problem: Patient Care Overview  Goal: Plan of Care Review  Outcome: Ongoing (interventions implemented as appropriate)      Problem: Fluid Volume Excess (Adult)  Goal: Optimal Fluid Balance  Outcome: Ongoing (interventions implemented as appropriate)        
Problem: Patient Care Overview  Goal: Plan of Care Review  Outcome: Ongoing (interventions implemented as appropriate)    Goal: Individualization and Mutuality  Outcome: Ongoing (interventions implemented as appropriate)    Goal: Discharge Needs Assessment  Outcome: Ongoing (interventions implemented as appropriate)    Goal: Interprofessional Rounds/Family Conf  Outcome: Ongoing (interventions implemented as appropriate)      Problem: Fluid Volume Excess (Adult)  Goal: Identify Related Risk Factors and Signs and Symptoms  Outcome: Ongoing (interventions implemented as appropriate)    Goal: Optimal Fluid Balance  Outcome: Ongoing (interventions implemented as appropriate)        
Nursing home

## 2024-03-24 NOTE — ED PROVIDER NOTE - OBJECTIVE STATEMENT
Patient brought in by EMS from [facility] for chest pain shortness of breath lower abdominal pain.  Per family patient has had similar complaints for many months however he just transferred from [facility] to [facility] has a new roommate who has cardiac issues he has overheard this which has made him nervous.  No fever cough vomiting.  PMD Cristine cardiologist Judi

## 2024-03-24 NOTE — ED PROVIDER NOTE - PROVIDER TOKENS
Call to parmjit/pharmacist/debbie-advised of info from my call to [facility] 10/7/22. he confirms spoke w pt over the wkend, they were able to dispense 5 pills of farxiga over the wkend and remainder should go through tomorrow. Voices thanks and no other needs from our ofc at this time. Call to pt-advised I just spoke w [facility]/pharmacist/debbie. Pt confirms she spoke w him over the wkend is aware remainder of prescription should go through tomorrow. She voices thanks and no other questions. PROVIDER:[TOKEN:[[id number]],FOLLOWUP:[1-3 Days]],PROVIDER:[TOKEN:[[id number]],FOLLOWUP:[1-3 Days]]

## 2024-03-24 NOTE — ED PROVIDER NOTE - NSFOLLOWUPINSTRUCTIONS_ED_ALL_ED_FT
CHEST PAIN - AfterCare(R) Instructions(ER/ED)    Chest Pain    WHAT YOU NEED TO KNOW:    Chest pain can be caused by a range of conditions, from not serious to life-threatening. Chest pain can be a symptom of a digestive problem, such as acid reflux or a stomach ulcer. An anxiety attack or a strong emotion, such as anger, can also cause chest pain. Infection, inflammation, or a fracture in the bones or cartilage in your chest can cause pain or discomfort. Sometimes chest pain or pressure is caused by poor blood flow to your heart (angina). Chest pain may also be caused by life-threatening conditions such as a heart attack or blood clot in your lungs.    DISCHARGE INSTRUCTIONS:    Call your local emergency number (911 in the [place]) or have someone call if:    You have any of the following signs of a heart attack:  Squeezing, pressure, or pain in your chest    You may also have any of the following:  Discomfort or pain in your back, neck, jaw, stomach, or arm    Shortness of breath    Nausea or vomiting    Lightheadedness or a sudden cold sweat    Return to the emergency department if:    You have chest discomfort that gets worse, even with medicine.    You cough or vomit blood.    Your bowel movements are black or bloody.    You cannot stop vomiting, or it hurts to swallow.  Call your doctor if:    You have questions or concerns about your condition or care.    Medicines:    Medicines may be given to treat the cause of your chest pain. Examples include pain medicine, anxiety medicine, or medicines to increase blood flow to your heart.    Do not take certain medicines without asking your healthcare provider first. These include NSAIDs, herbal or vitamin supplements, and hormones, such as estrogen or progestin.    Take your medicine as directed. Contact your healthcare provider if you think your medicine is not helping or if you have side effects. Tell your provider if you are allergic to any medicine. Keep a list of the medicines, vitamins, and herbs you take. Include the amounts, and when and why you take them. Bring the list or the pill bottles to follow-up visits. Carry your medicine list with you in case of an emergency.  Healthy living tips: If the cause of your chest pain is known, your healthcare provider will give you specific guidelines to follow. The following are general healthy guidelines:    Do not smoke. Nicotine and other chemicals in cigarettes and cigars can cause lung and heart damage. Ask your healthcare provider for information if you currently smoke and need help to quit. E-cigarettes or smokeless tobacco still contain nicotine. Talk to your healthcare provider before you use these products.    Choose a variety of healthy foods as often as possible. Include fresh, frozen, or canned fruits and vegetables. Also include low-fat dairy products, fish, chicken (without skin), and lean meats. Your healthcare provider or a dietitian can help you create meal plans. You may need to avoid certain foods or drinks if your pain is caused by a digestion problem.  Healthy Foods      Lower your sodium (salt) intake. Limit foods that are high in sodium, such as canned foods, salty snacks, and cold cuts. If you add salt when you cook food, do not add more at the table. Choose low-sodium canned foods as much as possible.        Drink plenty of water every day. Water helps your body to control your temperature and blood pressure. Ask your healthcare provider how much water you should drink every day.    Ask about activity. Your healthcare provider will tell you which activities to limit or avoid. Ask when you can drive, return to work, and have sex. Ask about the best exercise plan for you.    Maintain a healthy weight. Ask your healthcare provider what a healthy weight is for you. Ask him or her to help you create a safe weight loss plan if you are overweight.    Ask about vaccines you may need. Your healthcare provider can tell you which vaccines you need, and when to get them. The following vaccines help prevent diseases that can become serious for a person with a heart condition:  The influenza (flu) vaccine is given each year. Get a flu vaccine as soon as recommended, usually in September or October.    The pneumonia vaccine is usually given every 5 years. Your healthcare provider may recommend the pneumonia vaccine if you are 65 or older.    COVID-19 vaccines are given to adults as a shot in 1 or 2 doses. Vaccination is recommended for all adults. A booster (additional) dose is also recommended for all adults. A second booster is recommended for all adults 50 or older and for immunocompromised adults 18 or older. The second booster is also recommended for adults who received the 1-dose vaccine for the first and booster doses. Your healthcare provider can tell you when to get one or both boosters.  Prevent Heart Disease   Follow up with your doctor within 72 hours, or as directed: You may need to return for more tests to find the cause of your chest pain. You may be referred to a specialist, such as a cardiologist or gastroenterologist. Write down your questions so you remember to ask them during your visits.      ABDOMINAL PAIN - AfterCare(R) Instructions(ER/ED)    Abdominal Pain    WHAT YOU NEED TO KNOW:    Abdominal pain can be dull, achy, or sharp. You may have pain in one area of your abdomen, or in your entire abdomen. Your pain may be caused by a condition such as constipation, food sensitivity or poisoning, infection, or a blockage. Abdominal pain can also be from a hernia, appendicitis, or an ulcer. Liver, gallbladder, or kidney conditions can also cause abdominal pain. The cause of your abdominal pain may not be known.  Abdominal Organs    DISCHARGE INSTRUCTIONS:    Call your local emergency number (911 in the [place]) if:    You have chest pain or shortness of breath.    Return to the emergency department if:    You have pulsing pain in your upper abdomen or lower back that suddenly becomes constant.    Your pain is in the right lower abdominal area and worsens with movement.    You have a fever over 100.4°F (38°C) or shaking chills.    You are vomiting and cannot keep food or liquids down.    Your pain does not improve or gets worse over the next 8 to 12 hours.    You see blood in your vomit or bowel movements, or they look black and tarry.    Your skin or the whites of your eyes turn yellow.    You are a woman and have a large amount of vaginal bleeding that is not your monthly period.  Call your doctor if:    You have pain in your lower back.    You are a man and have pain in your testicles.    You have pain when you urinate.    You have questions or concerns about your condition or care.  Medicines: You may need any of the following:    Medicines may be given to calm your stomach or prevent vomiting.    Prescription pain medicine may be given. Ask your healthcare provider how to take this medicine safely. Some prescription pain medicines contain acetaminophen. Do not take other medicines that contain acetaminophen without talking to your healthcare provider. Too much acetaminophen may cause liver damage. Prescription pain medicine may cause constipation. Ask your healthcare provider how to prevent or treat constipation.    Take your medicine as directed. Contact your healthcare provider if you think your medicine is not helping or if you have side effects. Tell your provider if you are allergic to any medicine. Keep a list of the medicines, vitamins, and herbs you take. Include the amounts, and when and why you take them. Bring the list or the pill bottles to follow-up visits. Carry your medicine list with you in case of an emergency.  Manage or prevent abdominal pain:    Apply heat on your abdomen for 20 to 30 minutes every 2 hours for as many days as directed. Heat helps decrease pain and muscle spasms.    Make changes to the foods you eat, if needed. Do not eat foods that cause abdominal pain or other symptoms. Eat small meals more often. The following changes may also help:  Eat more high-fiber foods if you are constipated. High-fiber foods include fruits, vegetables, whole-grain foods, and legumes such as capellan beans.        Do not eat foods that cause gas if you have bloating. Examples include broccoli, cabbage, beans, and carbonated drinks.    Do not eat foods or drinks that contain sorbitol or fructose if you have diarrhea and bloating. Some examples are fruit juices, candy, jelly, and sugar-free gum.    Do not eat high-fat foods. Examples include fried foods, cheeseburgers, hot dogs, and desserts.    Make changes to the liquids you drink, if needed. Do not drink liquids that cause pain or make it worse, such as orange juice. Drink liquids throughout the day to stay hydrated. The following changes may also help:  Drink more liquids to prevent dehydration from diarrhea or vomiting. Ask your healthcare provider how much liquid to drink each day and which liquids are best for you.    Limit or do not have caffeine. Caffeine may make symptoms such as heartburn or nausea worse.    Limit or do not drink alcohol. Alcohol can make your abdominal pain worse. Ask your healthcare provider if it is okay for you to drink alcohol. Also ask how much is okay for you to drink. A drink of alcohol is 12 ounces of beer, ½ ounce of liquor, or 5 ounces of wine.    Keep a diary of your abdominal pain. A diary may help your healthcare provider learn what is causing your pain. Include when the pain happens, how long it lasts, and what the pain feels like. Write down any other symptoms you have with abdominal pain. Also write down what you eat, and any symptoms you have after you eat.    Manage stress. Stress may cause abdominal pain. Your healthcare provider may recommend relaxation techniques and deep breathing exercises to help decrease your stress. Your healthcare provider may recommend you talk to someone about your stress or anxiety, such as a counselor or a friend. Get plenty of sleep. Exercise regularly.    Do not smoke. Nicotine and other chemicals in cigarettes can damage your esophagus and stomach. Ask your healthcare provider for information if you currently smoke and need help to quit. E-cigarettes or smokeless tobacco still contain nicotine. Talk to your healthcare provider before you use these products.  Follow up with your doctor as directed: Write down your questions so you remember to ask them during your visits.

## 2024-03-30 ENCOUNTER — INPATIENT (INPATIENT)
Facility: HOSPITAL | Age: 74
LOS: 2 days | Discharge: HOME | DRG: 73 | End: 2024-04-02
Attending: STUDENT IN AN ORGANIZED HEALTH CARE EDUCATION/TRAINING PROGRAM | Admitting: STUDENT IN AN ORGANIZED HEALTH CARE EDUCATION/TRAINING PROGRAM
Payer: MEDICARE

## 2024-03-30 VITALS
DIASTOLIC BLOOD PRESSURE: 89 MMHG | TEMPERATURE: 98 F | WEIGHT: 134.92 LBS | OXYGEN SATURATION: 98 % | HEART RATE: 77 BPM | SYSTOLIC BLOOD PRESSURE: 208 MMHG | RESPIRATION RATE: 17 BRPM | HEIGHT: 71 IN

## 2024-03-30 DIAGNOSIS — Z85.828 PERSONAL HISTORY OF OTHER MALIGNANT NEOPLASM OF SKIN: Chronic | ICD-10-CM

## 2024-03-30 DIAGNOSIS — I10 ESSENTIAL (PRIMARY) HYPERTENSION: ICD-10-CM

## 2024-03-30 DIAGNOSIS — J35.1 HYPERTROPHY OF TONSILS: Chronic | ICD-10-CM

## 2024-03-30 LAB
ALBUMIN SERPL ELPH-MCNC: 3.3 G/DL — SIGNIFICANT CHANGE UP (ref 3.3–5)
ALP SERPL-CCNC: 56 U/L — SIGNIFICANT CHANGE UP (ref 30–120)
ALT FLD-CCNC: 29 U/L — SIGNIFICANT CHANGE UP (ref 10–60)
ANION GAP SERPL CALC-SCNC: 6 MMOL/L — SIGNIFICANT CHANGE UP (ref 5–17)
APTT BLD: 77.9 SEC — HIGH (ref 24.5–35.6)
AST SERPL-CCNC: 27 U/L — SIGNIFICANT CHANGE UP (ref 10–40)
BASOPHILS # BLD AUTO: 0.03 K/UL — SIGNIFICANT CHANGE UP (ref 0–0.2)
BASOPHILS NFR BLD AUTO: 0.5 % — SIGNIFICANT CHANGE UP (ref 0–2)
BILIRUB SERPL-MCNC: 0.6 MG/DL — SIGNIFICANT CHANGE UP (ref 0.2–1.2)
BUN SERPL-MCNC: 21 MG/DL — SIGNIFICANT CHANGE UP (ref 7–23)
CALCIUM SERPL-MCNC: 9.2 MG/DL — SIGNIFICANT CHANGE UP (ref 8.4–10.5)
CHLORIDE SERPL-SCNC: 104 MMOL/L — SIGNIFICANT CHANGE UP (ref 96–108)
CO2 SERPL-SCNC: 33 MMOL/L — HIGH (ref 22–31)
CREAT SERPL-MCNC: 0.83 MG/DL — SIGNIFICANT CHANGE UP (ref 0.5–1.3)
EGFR: 91 ML/MIN/1.73M2 — SIGNIFICANT CHANGE UP
EOSINOPHIL # BLD AUTO: 0.09 K/UL — SIGNIFICANT CHANGE UP (ref 0–0.5)
EOSINOPHIL NFR BLD AUTO: 1.4 % — SIGNIFICANT CHANGE UP (ref 0–6)
GLUCOSE SERPL-MCNC: 107 MG/DL — HIGH (ref 70–99)
HCT VFR BLD CALC: 32.6 % — LOW (ref 39–50)
HGB BLD-MCNC: 11.3 G/DL — LOW (ref 13–17)
IMM GRANULOCYTES NFR BLD AUTO: 0.5 % — SIGNIFICANT CHANGE UP (ref 0–0.9)
INR BLD: 1.38 RATIO — HIGH (ref 0.85–1.18)
LYMPHOCYTES # BLD AUTO: 0.88 K/UL — LOW (ref 1–3.3)
LYMPHOCYTES # BLD AUTO: 14.1 % — SIGNIFICANT CHANGE UP (ref 13–44)
MAGNESIUM SERPL-MCNC: 1.7 MG/DL — SIGNIFICANT CHANGE UP (ref 1.6–2.6)
MCHC RBC-ENTMCNC: 29.7 PG — SIGNIFICANT CHANGE UP (ref 27–34)
MCHC RBC-ENTMCNC: 34.7 GM/DL — SIGNIFICANT CHANGE UP (ref 32–36)
MCV RBC AUTO: 85.6 FL — SIGNIFICANT CHANGE UP (ref 80–100)
MONOCYTES # BLD AUTO: 0.39 K/UL — SIGNIFICANT CHANGE UP (ref 0–0.9)
MONOCYTES NFR BLD AUTO: 6.3 % — SIGNIFICANT CHANGE UP (ref 2–14)
NEUTROPHILS # BLD AUTO: 4.8 K/UL — SIGNIFICANT CHANGE UP (ref 1.8–7.4)
NEUTROPHILS NFR BLD AUTO: 77.2 % — HIGH (ref 43–77)
NRBC # BLD: 0 /100 WBCS — SIGNIFICANT CHANGE UP (ref 0–0)
PHOSPHATE SERPL-MCNC: 3.7 MG/DL — SIGNIFICANT CHANGE UP (ref 2.5–4.5)
PLATELET # BLD AUTO: 95 K/UL — LOW (ref 150–400)
POTASSIUM SERPL-MCNC: 3.7 MMOL/L — SIGNIFICANT CHANGE UP (ref 3.5–5.3)
POTASSIUM SERPL-SCNC: 3.7 MMOL/L — SIGNIFICANT CHANGE UP (ref 3.5–5.3)
PROT SERPL-MCNC: 6.6 G/DL — SIGNIFICANT CHANGE UP (ref 6–8.3)
PROTHROM AB SERPL-ACNC: 15.3 SEC — HIGH (ref 9.5–13)
RBC # BLD: 3.81 M/UL — LOW (ref 4.2–5.8)
RBC # FLD: 13.8 % — SIGNIFICANT CHANGE UP (ref 10.3–14.5)
SODIUM SERPL-SCNC: 143 MMOL/L — SIGNIFICANT CHANGE UP (ref 135–145)
TROPONIN I, HIGH SENSITIVITY RESULT: 7.9 NG/L — SIGNIFICANT CHANGE UP
WBC # BLD: 6.22 K/UL — SIGNIFICANT CHANGE UP (ref 3.8–10.5)
WBC # FLD AUTO: 6.22 K/UL — SIGNIFICANT CHANGE UP (ref 3.8–10.5)

## 2024-03-30 PROCEDURE — 99285 EMERGENCY DEPT VISIT HI MDM: CPT

## 2024-03-30 PROCEDURE — 71045 X-RAY EXAM CHEST 1 VIEW: CPT | Mod: 26

## 2024-03-30 PROCEDURE — 99223 1ST HOSP IP/OBS HIGH 75: CPT | Mod: AI

## 2024-03-30 PROCEDURE — 93010 ELECTROCARDIOGRAM REPORT: CPT

## 2024-03-30 PROCEDURE — 70450 CT HEAD/BRAIN W/O DYE: CPT | Mod: 26,MC,XU

## 2024-03-30 PROCEDURE — 70496 CT ANGIOGRAPHY HEAD: CPT | Mod: 26,MC

## 2024-03-30 PROCEDURE — 70498 CT ANGIOGRAPHY NECK: CPT | Mod: 26,MC

## 2024-03-30 RX ORDER — TRAMADOL HYDROCHLORIDE 50 MG/1
1 TABLET ORAL
Refills: 0 | DISCHARGE

## 2024-03-30 RX ORDER — SODIUM CHLORIDE 9 MG/ML
1000 INJECTION INTRAMUSCULAR; INTRAVENOUS; SUBCUTANEOUS ONCE
Refills: 0 | Status: COMPLETED | OUTPATIENT
Start: 2024-03-30 | End: 2024-03-30

## 2024-03-30 RX ORDER — ACETAMINOPHEN 500 MG
2 TABLET ORAL
Refills: 0 | DISCHARGE

## 2024-03-30 RX ORDER — HYDRALAZINE HCL 50 MG
10 TABLET ORAL ONCE
Refills: 0 | Status: COMPLETED | OUTPATIENT
Start: 2024-03-30 | End: 2024-03-30

## 2024-03-30 RX ORDER — FLUVOXAMINE MALEATE 25 MG/1
1 TABLET ORAL
Refills: 0 | DISCHARGE

## 2024-03-30 RX ORDER — MECLIZINE HCL 12.5 MG
25 TABLET ORAL ONCE
Refills: 0 | Status: COMPLETED | OUTPATIENT
Start: 2024-03-30 | End: 2024-03-30

## 2024-03-30 RX ORDER — ONDANSETRON 8 MG/1
4 TABLET, FILM COATED ORAL ONCE
Refills: 0 | Status: COMPLETED | OUTPATIENT
Start: 2024-03-30 | End: 2024-03-30

## 2024-03-30 RX ORDER — HYDROCORTISONE 1 %
0 OINTMENT (GRAM) TOPICAL
Refills: 0 | DISCHARGE

## 2024-03-30 RX ORDER — ONDANSETRON 8 MG/1
4 TABLET, FILM COATED ORAL EVERY 8 HOURS
Refills: 0 | Status: DISCONTINUED | OUTPATIENT
Start: 2024-03-30 | End: 2024-04-02

## 2024-03-30 RX ORDER — LANOLIN ALCOHOL/MO/W.PET/CERES
3 CREAM (GRAM) TOPICAL AT BEDTIME
Refills: 0 | Status: DISCONTINUED | OUTPATIENT
Start: 2024-03-30 | End: 2024-04-02

## 2024-03-30 RX ORDER — CEPHALEXIN 500 MG
1 CAPSULE ORAL
Qty: 0 | Refills: 0 | DISCHARGE

## 2024-03-30 RX ORDER — MIRTAZAPINE 45 MG/1
1 TABLET, ORALLY DISINTEGRATING ORAL
Refills: 0 | DISCHARGE

## 2024-03-30 RX ORDER — FAMOTIDINE 10 MG/ML
1 INJECTION INTRAVENOUS
Refills: 0 | DISCHARGE

## 2024-03-30 RX ORDER — CHOLECALCIFEROL (VITAMIN D3) 125 MCG
1 CAPSULE ORAL
Refills: 0 | DISCHARGE

## 2024-03-30 RX ORDER — ACETAMINOPHEN 500 MG
650 TABLET ORAL EVERY 6 HOURS
Refills: 0 | Status: DISCONTINUED | OUTPATIENT
Start: 2024-03-30 | End: 2024-04-02

## 2024-03-30 RX ADMIN — Medication 10 MILLIGRAM(S): at 21:53

## 2024-03-30 RX ADMIN — SODIUM CHLORIDE 1000 MILLILITER(S): 9 INJECTION INTRAMUSCULAR; INTRAVENOUS; SUBCUTANEOUS at 19:59

## 2024-03-30 RX ADMIN — ONDANSETRON 4 MILLIGRAM(S): 8 TABLET, FILM COATED ORAL at 19:59

## 2024-03-30 RX ADMIN — SODIUM CHLORIDE 1000 MILLILITER(S): 9 INJECTION INTRAMUSCULAR; INTRAVENOUS; SUBCUTANEOUS at 21:30

## 2024-03-30 RX ADMIN — Medication 25 MILLIGRAM(S): at 19:59

## 2024-03-30 NOTE — ED ADULT NURSE NOTE - NSFALLHARMRISKINTERV_ED_ALL_ED
Assistance OOB with selected safe patient handling equipment if applicable/Assistance with ambulation/Communicate risk of Fall with Harm to all staff, patient, and family/Monitor gait and stability/Monitor for mental status changes and reorient to person, place, and time, as needed/Provide visual cue: red socks, yellow wristband, yellow gown, etc/Reinforce activity limits and safety measures with patient and family/Toileting schedule using arm’s reach rule for commode and bathroom/Use of alarms - bed, stretcher, chair and/or video monitoring/Bed in lowest position, wheels locked, appropriate side rails in place/Call bell, personal items and telephone in reach/Instruct patient to call for assistance before getting out of bed/chair/stretcher/Non-slip footwear applied when patient is off stretcher/[place] to call system/Physically safe environment - no spills, clutter or unnecessary equipment/Purposeful Proactive Rounding/Room/bathroom lighting operational, light cord in reach

## 2024-03-30 NOTE — H&P ADULT - CONVERSATION DETAILS
Discussed GOC, as per wife patient has a DNR/DNI at home from 2007, however currently would like him to be full code. She would like daily updates.   Joelle Henley [phone]

## 2024-03-30 NOTE — H&P ADULT - HISTORY OF PRESENT ILLNESS
***Charting in Progress***  73y M w/ pmh of HTN, ITP, APL syndrome, generalized anxiety disorder, OCD, newly dx aggressive Merkel Cell cancer of forehead- pt's oncologist  Dr. Eng, at [facility] on [facility] presents with complaints of         In ED labs noted for WBC 6.22, H/H 11/32, Plt 95, with rouleaux formation on peripheral smear, PTT/PT/INR 77/15/15.3, CMP unremarkable, UA sterile. CTH and CTA head and neck negative for acute infarct. Admitted to medicine for uncontrolled HTN, Nausea and Vertigo.  73y M w/ pmh of HTN, ITP, APL syndrome, generalized anxiety disorder, OCD, newly dx aggressive Merkel Cell cancer of forehead- pt's oncologist  Dr. Eng, at [facility] on [facility] presents with complaints of Chest pain, anxiety and vertigo. Patient is a poor historian, history supplemented by wife. As per patient he has had dizziness all day, He is unable to describe whether he feels lightheadedness or vertigo, reports he feels like he is "moving when my eyes closed". The feeling has been persistent and constant, no worsening or alleviating factors. Patients wife at bedside (Joelle) reports he has had vertigo in the past, and she thinks its because he got water in his ear while showering. He also complaints of chest pain, that is constant, reports its from his anxiety, feels tightness in his chest. Pain in chest is non-radiating, constant, not positional or associated with meals.   Of note patient has had a recent complicated hospital course, was admitted from 10/24 - 12/26/23. Pt was admitted and seen by neurology, psychiatry, ID, endocrinology, cardiology, on oncology. He had very poor oral intake due to a delusion that he had metal objects in his mouth and throat. He underwent a neurological workup, was trialed on Luvox, remeron, seroquel, haldol, and zyprexa without improvement. Eventually he consented to a PEG. course was complicated by adrenal insufficiency and orthostatic hypotension. His cortisol levels were low and he was started on prednisone and florinef with some improvement. Course was also complicated by sepsis due to UTI, thrombophlebitis. As per wife he has been slowly getting worse, has been bedbound with no motivation to move.     In ED labs noted for WBC 6.22, H/H 11/32, Plt 95, with rouleaux formation on peripheral smear, PTT/PT/INR 77/15/15.3, CMP unremarkable, UA sterile. CTH and CTA head and neck negative for acute infarct. Admitted to medicine for uncontrolled HTN, Nausea and Vertigo.    73y M w/ pmh of HTN, ITP, APL syndrome, generalized anxiety disorder, OCD, newly dx aggressive Merkel Cell cancer of forehead- pt's oncologist  Dr. Eng, at [facility] on [facility] presents with complaints of Chest pain, anxiety and vertigo. Patient is a poor historian, history supplemented by wife. As per patient he has had dizziness all day, He is unable to describe whether he feels lightheadedness or vertigo, reports he feels like he is "moving when my eyes closed". The feeling has been persistent and constant, no worsening or alleviating factors. Patients wife at bedside (Joelle) reports he has had vertigo in the past, and she thinks its because he got water in his ear while showering. He also complaints of chest pain, that is constant, reports its from his anxiety, feels tightness in his chest. Pain in chest is non-radiating, constant, not positional or associated with meals.   Of note patient has had a recent complicated hospital course, was admitted from 10/24 - 12/26/23. Pt was admitted and seen by neurology, psychiatry, ID, endocrinology, cardiology, on oncology. He had very poor oral intake due to a delusion that he had metal objects in his mouth and throat. He underwent a neurological workup, was trialed on Luvox, remeron, seroquel, haldol, and zyprexa without improvement. Eventually he consented to a PEG. course was complicated by adrenal insufficiency and orthostatic hypotension. His cortisol levels were low and he was started on prednisone and florinef with some improvement. Course was also complicated by sepsis due to UTI, thrombophlebitis. As per wife he has been slowly getting worse, has been bedbound with no motivation to move.     In ED labs noted for WBC 6.22, H/H 11/32, Plt 95, with rouleaux formation on peripheral smear, PTT/PT/INR 77/15/15.3, CMP unremarkable, UA sterile. CTH and CTA head and neck negative for acute infarct. Admitted to medicine for uncontrolled HTN, Nausea and Vertigo.     Wife at bedside requests daily updates by MD Dee Henley [phone]

## 2024-03-30 NOTE — ED PROVIDER NOTE - OBJECTIVE STATEMENT
73y M c/o vertigo and cp - pt is a poor historian, wife at bedside providing history as well and pt had visit [facility] for [facility] last week and was admitted to [facility] for 2 months at the end of last year (notes in sunrise) - pt unable to say what time the cp started, possibly after lunch, no sob, has had this before, last week at [facility] for same, ED work up including trop and CTA chest were negative, also seen by cardiology in the past, per wife the CP may be anxiety related, today, however pt also having vertigo since after breakfast, mostly when moves head to look side to side 73y M c/o vertigo and cp - pt is a poor historian, wife at bedside providing history as well and pt had visit to [facility] for cp last week and was admitted to [facility] for 2 months at the end of last year (notes in sunrise) - pt unable to say what time the cp started, possibly after lunch, no sob, has had this before, last week at [facility] for same, ED work up including trop and CTA chest were negative, also seen by cardiology in the past, per wife the CP may be anxiety related, today, however pt also having vertigo since after breakfast, mostly when moves head to look side to side, associated with nausea throughout the day, no vomiting, no reflux/burning, no abd pain, has had vertigo before but it's been a long time, also notes his hands are shaking today, which isn't typical, wife reports that pt's long hospitalization last year at [facility] was for a combination of treatment for Mantle cell ca and adrenal insufficiency, reports he was taken off most of his antihypertensives due to orthostasis, but states he runs around 150-160 and sometimes is higher, pt is at [facility] right now, was at [facility] for a few months after the [facility] admission, but has been declining and needed more care, pt's wife states he is eating less and less so has a PEG and gets feedings through it, also pt rarely gets out of bed, was walking a little at [facility] but seems to fear even sitting in a chair, so rarely is out of bed anymore, states he has drop foot from the deconditioning

## 2024-03-30 NOTE — H&P ADULT - ASSESSMENT
73M admitted for vertigo and uncontrolled BP.     Vertigo   -Likely peripheral, vs anxiety related.   -Neurology and PT evaluation.  73M admitted for vertigo and uncontrolled BP.     Vertigo   -Likely peripheral, vs anxiety related.   -Neurology and PT evaluation.   -Started on meclizine TID prn  -Further imaging (MRI) as per neuro.  -Fall precautions.     Chest pain r/o ACS  -CP suspect anxiety related, patient was recently here with similar complaints on 3/24 and cardiac w/u negative at the time, CTA neg for PE and was seen by cardio who did not recommend further cardio w/u.   -C/w anxiolytics.   -Monitor on remote tele.  -Psych eval.   -Cardio Eval.    Uncontrolled HTN  -As per previous cardio notes, rec to allow for supine HTN up to .  -BP controlled while at bedside   -C/w home medications.     Hx of ITP, merkel cell CA  Rouleaux formation on CBC  -Can consider Heme/Onc eval in am.     Other chronic problems:  ITP, APL syndrome, generalized anxiety disorder, OCD, Merkel Cell cancer of forehead  -Confirmed medications with NH papers  -C/w home medications.   -Trend Plts for ITP   73M admitted for vertigo and uncontrolled BP.     Vertigo   -Likely peripheral, vs anxiety related.   -Neurology and PT evaluation.   -Started on meclizine TID prn  -Further imaging (MRI) as per neuro.  -Fall precautions.     Chest pain r/o ACS  -CP suspect anxiety related, patient was recently here with similar complaints on 3/24 and cardiac w/u negative at the time, CTA neg for PE and was seen by cardio who did not recommend further cardio w/u.   -C/w anxiolytics.   -Monitor on remote tele.  -Psych eval.   -Cardio Eval.    Uncontrolled HTN  -As per previous cardio notes, rec to allow for supine HTN up to .  -BP controlled while at bedside   -C/w home medications.     S/p PEG Tube  -Resume Glucerna tube feeds 85 cc/hr with goal of 1530 cc goal daily  -200cc free water flushes q8h  -Nutrition consult    Hx of ITP, merkel cell CA  Rouleaux formation on CBC  -Can consider Heme/Onc eval in am.     Other chronic problems:  ITP, APL syndrome, generalized anxiety disorder, OCD, Merkel Cell cancer of forehead  -Confirmed medications with NH papers  -C/w home medications.   -Trend Plts for ITP   73M admitted for vertigo and uncontrolled BP.     Vertigo   -Likely peripheral, vs anxiety related.   -Neurology and PT evaluation.   -Started on meclizine TID prn  -Further imaging (MRI) as per neuro.  -Fall precautions.     Chest pain r/o ACS  -CP suspect anxiety related, patient was recently here with similar complaints on 3/24 and cardiac w/u negative at the time, CTA neg for PE and was seen by cardio who did not recommend further cardio w/u.   -C/w anxiolytics.   -Monitor on remote tele.  -Psych eval.   -Cardio Eval.    Uncontrolled HTN  -As per previous cardio notes, rec to allow for supine HTN up to .  -BP controlled while at bedside   -C/w home medications.     S/p PEG Tube  -Resume Glucerna tube feeds 85 cc/hr with goal of 1530 cc goal daily  -200cc free water flushes q8h  -Nutrition consult    Hx of ITP, merkel cell CA  Rouleaux formation on CBC  -Can consider Heme/Onc eval in am.     Deconditioning and bed bound  -Turn and position q2h.  -Air mattress  -Preventive precautions.   -PT eval.     Other chronic problems:  ITP, APL syndrome, generalized anxiety disorder, OCD, Merkel Cell cancer of forehead  -Confirmed medications with NH papers  -C/w home medications.   -Trend Plts for ITP    DVT ppx: hep subQ  Fall and aspiration precautions.   Requires assistance with everything.  73M admitted for vertigo and uncontrolled BP.     Vertigo   -Likely peripheral, vs anxiety related.   -Neurology and PT evaluation.   -Started on meclizine TID prn  -Further imaging (MRI) as per neuro.  -Fall precautions.     Chest pain r/o ACS  -CP suspect anxiety related, patient was recently here with similar complaints on 3/24 and cardiac w/u negative at the time, CTA neg for PE and was seen by cardio who did not recommend further cardio w/u.   -C/w anxiolytics.   -Monitor on remote tele.  -Psych eval.   -Cardio Eval.    Uncontrolled HTN  -As per previous cardio notes, rec to allow for supine HTN up to .  -BP controlled while at bedside   -C/w home medications.     S/p PEG Tube  -Resume Glucerna tube feeds 85 cc/hr with goal of 1530 cc goal daily  -200cc free water flushes q8h  -Nutrition consult    Hx of ITP, merkel cell CA  Rouleaux formation on CBC  -Can consider Heme/Onc eval in am.     Deconditioning and bed bound  -Turn and position q2h.  -Air mattress  -Preventive precautions.   -PT eval.     Other chronic problems:  ITP, APL syndrome, generalized anxiety disorder, OCD, Merkel Cell cancer of forehead  -Confirmed medications with NH papers  -C/w home medications.   -Trend Plts for ITP    DVT ppx: heparin SubQ  Fall, bleeding and aspiration precautions.   Requires assistance with everything.

## 2024-03-30 NOTE — ED PROVIDER NOTE - TOBACCO USE
Unknown if ever smoked Hatchet Flap Text: The defect edges were debeveled with a #15 scalpel blade.  Given the location of the defect, shape of the defect and the proximity to free margins a hatchet flap was deemed most appropriate.  Using a sterile surgical marker, an appropriate hatchet flap was drawn incorporating the defect and placing the expected incisions within the relaxed skin tension lines where possible.    The area thus outlined was incised deep to adipose tissue with a #15 scalpel blade.  The skin margins were undermined to an appropriate distance in all directions utilizing iris scissors.

## 2024-03-30 NOTE — ED ADULT NURSE NOTE - CHIEF COMPLAINT QUOTE
72 y/o male presents axo3 via stretcher bibems from [facility] c/o chest pain and shortness of breath x3 hours today.

## 2024-03-30 NOTE — ED ADULT NURSE NOTE - OBJECTIVE STATEMENT
Received pt in assigned area a&Xo2, baseline Dementia, sent from [facility] c/o chest pain & dizziness, denies any sob, cough, or fever, resps even & non labored, pt placed on cardiac monitor, BP elevated, pt states " this was a mistake to come here I was just in [facility]", pt has peg tube in place, ivl placed labs drawl, ekg completed, siderails up, safety maintained, will continue to monitor pt

## 2024-03-30 NOTE — ED ADULT TRIAGE NOTE - CHIEF COMPLAINT QUOTE
74 y/o male presents axo3 via stretcher bibems from [facility] c/o chest pain and shortness of breath x3 hours today. 74 y/o male presents axo3 via stretcher bibems from [facility] c/o chest pain and shortness of breath x3 hours today.

## 2024-03-30 NOTE — H&P ADULT - NSHPLABSRESULTS_GEN_ALL_CORE
LABS:  cret                        11.3   6.22  )-----------( 95       ( 30 Mar 2024 19:12 )             32.6     03-30    143  |  104  |  21  ----------------------------<  107<H>  3.7   |  33<H>  |  0.83    Ca    9.2      30 Mar 2024 19:12  Phos  3.7     03-30  Mg     1.7     03-30    TPro  6.6  /  Alb  3.3  /  TBili  0.6  /  DBili  x   /  AST  27  /  ALT  29  /  AlkPhos  56  03-30    PT/INR - ( 30 Mar 2024 19:12 )   PT: 15.3 sec;   INR: 1.38 ratio         PTT - ( 30 Mar 2024 19:12 )  PTT:77.9 sec    < from: CT Angio Head w/ IV Cont (03.30.24 @ 19:59) >    IMPRESSION:  HEAD CT: No evidence of an acute intracranial hemorhage, midline shift or   hydrocephalus. Moderate atrophy with mild chronic white matter ischemic   changes  NECK CTA: No hemodynamic significant narowing within the neck.  BRAIN CTA: No proximal large vessel occlusion.    --- End of Report ---    < end of copied text >

## 2024-03-30 NOTE — ED PROVIDER NOTE - CLINICAL SUMMARY MEDICAL DECISION MAKING FREE TEXT BOX
1d vertigo/nausea, pt is mostly bedbound, no recent illness, worsened with head movement, more likely peripheral, will check labs, ct/cta to assess for central cause, treat with zofran/meclizine and reassess; cp has been recurrent, negative work up recently, will check 1 set enzymes; BP uncontrolled, h/o HTN, had multiple meds dcd last year when pt developed adrenal insufficiency and had orthostasis and was put on steroids, may need adjustment, per wife bp has been elevated more frequently recently

## 2024-03-30 NOTE — H&P ADULT - NSHPPHYSICALEXAM_GEN_ALL_CORE
T(C): 36.7 (03-30-24 @ 18:16), Max: 36.7 (03-30-24 @ 18:16)  HR: 61 (03-30-24 @ 20:58) (61 - 77)  BP: 190/80 (03-30-24 @ 20:58) (190/80 - 208/89)  RR: 16 (03-30-24 @ 20:58) (16 - 17)  SpO2: 99% (03-30-24 @ 20:58) (98% - 99%)    General: Well nourished, non-toxic  HEENT: non-traumatic, perrla, eomi  Cardio: s1s2 regular rate and rhythm  Lungs: comfortable breathing, clear to auscultation  Abdomen: Soft, non-tender, non-distended  Neuro: AOx4  Ext: Pulses +2 T(C): 36.7 (03-30-24 @ 18:16), Max: 36.7 (03-30-24 @ 18:16)  HR: 61 (03-30-24 @ 20:58) (61 - 77)  BP: 190/80 (03-30-24 @ 20:58) (190/80 - 208/89)  RR: 16 (03-30-24 @ 20:58) (16 - 17)  SpO2: 99% (03-30-24 @ 20:58) (98% - 99%)    General: Chronically ill  HEENT: non-traumatic, perrla, eomi  Cardio: s1s2  Lungs: comfortable breathing  Abdomen: Soft  Neuro: AOx3, but confused.   Ext: Pulses +2

## 2024-03-31 LAB
ALBUMIN SERPL ELPH-MCNC: 3.2 G/DL — LOW (ref 3.3–5)
ALP SERPL-CCNC: 55 U/L — SIGNIFICANT CHANGE UP (ref 30–120)
ALT FLD-CCNC: 27 U/L — SIGNIFICANT CHANGE UP (ref 10–60)
ANION GAP SERPL CALC-SCNC: 7 MMOL/L — SIGNIFICANT CHANGE UP (ref 5–17)
APPEARANCE UR: CLEAR — SIGNIFICANT CHANGE UP
AST SERPL-CCNC: 22 U/L — SIGNIFICANT CHANGE UP (ref 10–40)
BASOPHILS # BLD AUTO: 0.02 K/UL — SIGNIFICANT CHANGE UP (ref 0–0.2)
BASOPHILS NFR BLD AUTO: 0.3 % — SIGNIFICANT CHANGE UP (ref 0–2)
BILIRUB SERPL-MCNC: 0.7 MG/DL — SIGNIFICANT CHANGE UP (ref 0.2–1.2)
BILIRUB UR-MCNC: NEGATIVE — SIGNIFICANT CHANGE UP
BUN SERPL-MCNC: 14 MG/DL — SIGNIFICANT CHANGE UP (ref 7–23)
CALCIUM SERPL-MCNC: 8.9 MG/DL — SIGNIFICANT CHANGE UP (ref 8.4–10.5)
CHLORIDE SERPL-SCNC: 106 MMOL/L — SIGNIFICANT CHANGE UP (ref 96–108)
CO2 SERPL-SCNC: 32 MMOL/L — HIGH (ref 22–31)
COLOR SPEC: YELLOW — SIGNIFICANT CHANGE UP
CREAT SERPL-MCNC: 0.56 MG/DL — SIGNIFICANT CHANGE UP (ref 0.5–1.3)
DIFF PNL FLD: NEGATIVE — SIGNIFICANT CHANGE UP
EGFR: 103 ML/MIN/1.73M2 — SIGNIFICANT CHANGE UP
EOSINOPHIL # BLD AUTO: 0.14 K/UL — SIGNIFICANT CHANGE UP (ref 0–0.5)
EOSINOPHIL NFR BLD AUTO: 2.3 % — SIGNIFICANT CHANGE UP (ref 0–6)
GLUCOSE SERPL-MCNC: 111 MG/DL — HIGH (ref 70–99)
GLUCOSE UR QL: NEGATIVE MG/DL — SIGNIFICANT CHANGE UP
HCT VFR BLD CALC: 32.5 % — LOW (ref 39–50)
HGB BLD-MCNC: 11.6 G/DL — LOW (ref 13–17)
IMM GRANULOCYTES NFR BLD AUTO: 0.2 % — SIGNIFICANT CHANGE UP (ref 0–0.9)
KETONES UR-MCNC: NEGATIVE MG/DL — SIGNIFICANT CHANGE UP
LEUKOCYTE ESTERASE UR-ACNC: NEGATIVE — SIGNIFICANT CHANGE UP
LYMPHOCYTES # BLD AUTO: 1.14 K/UL — SIGNIFICANT CHANGE UP (ref 1–3.3)
LYMPHOCYTES # BLD AUTO: 18.4 % — SIGNIFICANT CHANGE UP (ref 13–44)
MCHC RBC-ENTMCNC: 31.8 PG — SIGNIFICANT CHANGE UP (ref 27–34)
MCHC RBC-ENTMCNC: 35.7 GM/DL — SIGNIFICANT CHANGE UP (ref 32–36)
MCV RBC AUTO: 89 FL — SIGNIFICANT CHANGE UP (ref 80–100)
MONOCYTES # BLD AUTO: 0.5 K/UL — SIGNIFICANT CHANGE UP (ref 0–0.9)
MONOCYTES NFR BLD AUTO: 8.1 % — SIGNIFICANT CHANGE UP (ref 2–14)
MRSA PCR RESULT.: DETECTED
NEUTROPHILS # BLD AUTO: 4.39 K/UL — SIGNIFICANT CHANGE UP (ref 1.8–7.4)
NEUTROPHILS NFR BLD AUTO: 70.7 % — SIGNIFICANT CHANGE UP (ref 43–77)
NITRITE UR-MCNC: NEGATIVE — SIGNIFICANT CHANGE UP
NRBC # BLD: 0 /100 WBCS — SIGNIFICANT CHANGE UP (ref 0–0)
PH UR: 8 — SIGNIFICANT CHANGE UP (ref 5–8)
PLATELET # BLD AUTO: 89 K/UL — LOW (ref 150–400)
POTASSIUM SERPL-MCNC: 3.4 MMOL/L — LOW (ref 3.5–5.3)
POTASSIUM SERPL-SCNC: 3.4 MMOL/L — LOW (ref 3.5–5.3)
PROT SERPL-MCNC: 5.9 G/DL — LOW (ref 6–8.3)
PROT UR-MCNC: 100 MG/DL
RBC # BLD: 3.65 M/UL — LOW (ref 4.2–5.8)
RBC # FLD: 13.8 % — SIGNIFICANT CHANGE UP (ref 10.3–14.5)
S AUREUS DNA NOSE QL NAA+PROBE: DETECTED
SODIUM SERPL-SCNC: 145 MMOL/L — SIGNIFICANT CHANGE UP (ref 135–145)
SP GR SPEC: 1.03 — SIGNIFICANT CHANGE UP (ref 1–1.03)
TROPONIN I, HIGH SENSITIVITY RESULT: 10.4 NG/L — SIGNIFICANT CHANGE UP
UROBILINOGEN FLD QL: 1 MG/DL — SIGNIFICANT CHANGE UP (ref 0.2–1)
WBC # BLD: 6.2 K/UL — SIGNIFICANT CHANGE UP (ref 3.8–10.5)
WBC # FLD AUTO: 6.2 K/UL — SIGNIFICANT CHANGE UP (ref 3.8–10.5)

## 2024-03-31 PROCEDURE — 99232 SBSQ HOSP IP/OBS MODERATE 35: CPT

## 2024-03-31 RX ORDER — MECLIZINE HCL 12.5 MG
12.5 TABLET ORAL THREE TIMES A DAY
Refills: 0 | Status: DISCONTINUED | OUTPATIENT
Start: 2024-03-31 | End: 2024-04-02

## 2024-03-31 RX ORDER — MIRTAZAPINE 45 MG/1
7.5 TABLET, ORALLY DISINTEGRATING ORAL AT BEDTIME
Refills: 0 | Status: DISCONTINUED | OUTPATIENT
Start: 2024-03-31 | End: 2024-04-02

## 2024-03-31 RX ORDER — HYDROCORTISONE 1 %
1 OINTMENT (GRAM) TOPICAL THREE TIMES A DAY
Refills: 0 | Status: DISCONTINUED | OUTPATIENT
Start: 2024-03-31 | End: 2024-04-02

## 2024-03-31 RX ORDER — CHOLECALCIFEROL (VITAMIN D3) 125 MCG
2000 CAPSULE ORAL DAILY
Refills: 0 | Status: DISCONTINUED | OUTPATIENT
Start: 2024-03-31 | End: 2024-04-02

## 2024-03-31 RX ORDER — FLUDROCORTISONE ACETATE 0.1 MG/1
0.2 TABLET ORAL DAILY
Refills: 0 | Status: DISCONTINUED | OUTPATIENT
Start: 2024-03-31 | End: 2024-04-01

## 2024-03-31 RX ORDER — POLYETHYLENE GLYCOL 3350 17 G/17G
17 POWDER, FOR SOLUTION ORAL DAILY
Refills: 0 | Status: DISCONTINUED | OUTPATIENT
Start: 2024-03-31 | End: 2024-04-02

## 2024-03-31 RX ORDER — TRAMADOL HYDROCHLORIDE 50 MG/1
50 TABLET ORAL
Refills: 0 | Status: DISCONTINUED | OUTPATIENT
Start: 2024-03-31 | End: 2024-04-02

## 2024-03-31 RX ORDER — FLUVOXAMINE MALEATE 25 MG/1
50 TABLET ORAL DAILY
Refills: 0 | Status: DISCONTINUED | OUTPATIENT
Start: 2024-03-31 | End: 2024-04-02

## 2024-03-31 RX ORDER — ATENOLOL 25 MG/1
50 TABLET ORAL DAILY
Refills: 0 | Status: DISCONTINUED | OUTPATIENT
Start: 2024-03-31 | End: 2024-04-02

## 2024-03-31 RX ORDER — HEPARIN SODIUM 5000 [USP'U]/ML
5000 INJECTION INTRAVENOUS; SUBCUTANEOUS EVERY 12 HOURS
Refills: 0 | Status: DISCONTINUED | OUTPATIENT
Start: 2024-03-31 | End: 2024-04-02

## 2024-03-31 RX ORDER — HYDRALAZINE HCL 50 MG
10 TABLET ORAL EVERY 6 HOURS
Refills: 0 | Status: DISCONTINUED | OUTPATIENT
Start: 2024-03-31 | End: 2024-04-02

## 2024-03-31 RX ORDER — FAMOTIDINE 10 MG/ML
20 INJECTION INTRAVENOUS DAILY
Refills: 0 | Status: DISCONTINUED | OUTPATIENT
Start: 2024-03-31 | End: 2024-04-02

## 2024-03-31 RX ADMIN — HEPARIN SODIUM 5000 UNIT(S): 5000 INJECTION INTRAVENOUS; SUBCUTANEOUS at 17:54

## 2024-03-31 RX ADMIN — Medication 1 APPLICATION(S): at 08:36

## 2024-03-31 RX ADMIN — Medication 5 MILLIGRAM(S): at 06:54

## 2024-03-31 RX ADMIN — Medication 10 MILLIGRAM(S): at 17:53

## 2024-03-31 RX ADMIN — FAMOTIDINE 20 MILLIGRAM(S): 10 INJECTION INTRAVENOUS at 12:34

## 2024-03-31 RX ADMIN — FLUVOXAMINE MALEATE 50 MILLIGRAM(S): 25 TABLET ORAL at 12:35

## 2024-03-31 RX ADMIN — Medication 0.5 MILLIGRAM(S): at 22:49

## 2024-03-31 RX ADMIN — Medication 1 APPLICATION(S): at 22:49

## 2024-03-31 RX ADMIN — Medication 10 MILLIGRAM(S): at 08:35

## 2024-03-31 RX ADMIN — Medication 0.5 MILLIGRAM(S): at 14:20

## 2024-03-31 RX ADMIN — Medication 0.5 MILLIGRAM(S): at 05:38

## 2024-03-31 RX ADMIN — FLUDROCORTISONE ACETATE 0.2 MILLIGRAM(S): 0.1 TABLET ORAL at 06:54

## 2024-03-31 RX ADMIN — Medication 2000 UNIT(S): at 12:34

## 2024-03-31 RX ADMIN — ATENOLOL 50 MILLIGRAM(S): 25 TABLET ORAL at 05:38

## 2024-03-31 RX ADMIN — MIRTAZAPINE 7.5 MILLIGRAM(S): 45 TABLET, ORALLY DISINTEGRATING ORAL at 22:48

## 2024-03-31 NOTE — DIETITIAN INITIAL EVALUATION ADULT - WEIGHT (LBS)
[facility]  [place] INPATIENT ENCOUNTER  PHYSICAL MEDICINE AND REHABILITATION  INITIAL EVALUATION      Inpatient consult to Physical Medicine Rehab  Consult performed by: Gayla Pettit MD  Consult ordered by: Octaviano Elizondo MD    Inpatient consult to Physical Medicine Rehab  Consult performed by: Gayla Pettit MD  Consult ordered by: Octaviano Elizondo MD         Referring Physician: Garth Molina, *  Other Physicians: Patient Care Team:  Ronna Horton MD as PCP - General (Family Practice)    Reason for Consultation: To assess further rehabilitation needs    Chief Complaint: impaired mobility, self-care, transfers, and ADLs  History was obtained from chart review and from patient.    History Of Present Illness  Patient is a 66 year old female presenting with a history of atypical CP who was admitted to [facility] for elective coronary angiogram.  She has a pmhx of HTN, HL, type 2 DM.  She was found to have severe 3V disease and underwent 5V CABG on 12/21/20. Postop course complicated by mild acute kidney injury and atrial fib.  She required ICU care until 12/27/20.  This hospitalization notable for issues with orthostatic hypotension, electrolyte abnormalities, and has been followed by nephrology and cardiology.      Functionally in OT she is at a min A for most mobility and transfers , but is total A for toileting.  In PT she is CGA for mobility, transfers and ambulates 200 feet at CGA/ Supervision.      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    • Coronary artery bypass graft  12/21/2020    CABGx5: LIMA to LAD, RSVG to diag, OM, PDA and PL; epicardial ligation of SHERRI, endoscopic harvest of left greater saphenous vein, titanium plate closure of sternum   • Hb lithotripsy     • Pr eswl for gallstones     • Total abdominal hysterectomy w/ bilateral salpingoophorectomy  10/23/2018   • Tubal ligation         Family History:  Family History   Problem Relation Age of Onset   • Diabetes Other    • Diabetes Mother    • Diabetes Father    • Stroke/TIA Father    • Myocardial Infarction Brother    • Myocardial Infarction Sister          Social History  Patient  reports that she quit smoking about 21 years ago. She has never used smokeless tobacco. She reports that she does not drink alcohol or use drugs.    Prior Living Situation:  Prior Living Situation  Information Provided By:: patient, daughter  Type of Home: House  Home Layout: One level  # Steps to Enter: 7  Lives With: Spouse, Daughter  Receives Help From: Family    Prior Communication and Cognition:  Prior Communication/Cognition  Prior Cognition: Intact  Prior Auditory Comprehension: Intact  Prior Verbal Expression: Intact    Prior Level of Function:  Prior Function  Prior ADL: Independent  Bed Mobility: Independent  Transfers: Independent  Toilet Transfers: Independent  Ambulation in the Home: Independent  Ambulation in the Community: Modified Independent  Steps into the Home: Modified Independent  History of Falls in past year: No  Prior Homemaking/IADLs: Needs assistance(family assists with shopping, cooking and cleaning)  Additional Comments: per daughter, pt uses [place] as needed outside  mostly    Allergies  ALLERGIES:  Liraglutide    Medications  Current Facility-Administered Medications   Medication Dose Route Frequency Provider Last Rate Last Admin   • bumetanide (BUMEX) tablet 2 mg  2 mg Oral BID Jaswant Mulligan MD       • midodrine (PROAMATINE) tablet 5 mg  5 mg Oral TID AC Jaswant Mulligan MD       • aspirin (ECOTRIN) enteric coated tablet 81 mg  81 mg Oral Daily Octaviano Elizondo MD   81 mg at 12/30/20 0858   • apixaBAN (ELIQUIS) tablet 5 mg  5 mg Oral 2 times per day Octaviano Elizondo MD   5 mg at 12/30/20 0858   • docusate sodium-sennosides (SENOKOT S) 50-8.6 MG 2 tablet  2 tablet Oral Daily PRN Octaviano Elizondo MD       • metoPROLOL succinate (TOPROL-XL) ER tablet 12.5 mg  12.5 mg Oral Daily Alvino Mcleod MD   12.5 mg at 12/30/20 0901   • AMIODarone (PACERONE) tablet 200 mg  200 mg Oral 2 times per day Daniel Tuttle MD   200 mg at 12/30/20 0859   • insulin glargine (LANTUS) injection 32 Units  32 Units Subcutaneous Nightly Mora Aguilar MD   32 Units at 12/28/20 2105   • dextrose 50 % injection 25 g  25 g Intravenous PRN Keesha Ayala DMD       • dextrose 50 % injection 12.5 g  12.5 g Intravenous PRN Keesha Ayala DMD       • glucagon (GLUCAGEN) injection 1 mg  1 mg Intramuscular PRN Keesha Ayala DMD       • dextrose (GLUTOSE) 40 % gel 15 g  15 g Oral PRN Keesha Ayala DMD       • dextrose (GLUTOSE) 40 % gel 30 g  30 g Oral PRN Keesha Ayala DMD       • insulin lispro (HumaLOG) scheduled dose AND correction dose   Subcutaneous TID WC Mora Aguilar MD   7 Units at 12/30/20 1200   • atorvastatin (LIPITOR) tablet 80 mg  80 mg Oral QHS Dipti N Bailey, PA-C   80 mg at 12/29/20 2051   • mirtazapine (REMERON) tablet 7.5 mg  7.5 mg Oral Nightly Dipti N Bailey, PA-C   7.5 mg at 12/29/20 2053   • acetaminophen (TYLENOL) tablet 1,000 mg  1,000 mg Oral 4 times per day Dipti Bailey PA-C   1,000 mg at 12/30/20 1525   • traMADol (ULTRAM) tablet 25 mg  25 mg Oral Q6H PRN Dipti VELAZCO  JUHI Bailey-C   25 mg at 12/22/20 1028   • morphine injection 2 mg  2 mg Intravenous Q2H PRN Dipti Bailey, PA-C   2 mg at 12/21/20 1828   • ondansetron (ZOFRAN ODT) disintegrating tablet 4 mg  4 mg Oral Q12H PRN Dipti Bailey PA-C        Or   • ondansetron (ZOFRAN) injection 4 mg  4 mg Intravenous Q12H PRN JUHI Machuca-C   4 mg at 12/21/20 2129   • vitamin - therapeutic multivitamins w/minerals tablet 1 tablet  1 tablet Oral Daily Dipti Bailey PA-C   1 tablet at 12/30/20 0859   • chlorhexidine gluconate (PERIDEX) 0.12 % solution 15 mL  15 mL Swish & Spit PRN Dipti Bailey PA-C       • polyethylene glycol (MIRALAX) packet 17 g  17 g Oral Daily PRN NIKI MachucaC       • bisacodyl (DULCOLAX) suppository 10 mg  10 mg Rectal Once Dipti Bailey PA-C       • sodium chloride 0.9% infusion   Intravenous Continuous PRN Dipti Bailey PA-C       • sodium chloride 0.9% infusion   Intravenous Continuous PRN Dipti Bailey PA-C       • sodium chloride 0.9% infusion   Intravenous Continuous PRN Dipti Bailey, PA-C       • sodium chloride 0.9% infusion   Intravenous Continuous PRN Dipti N Lynn PA-C       • famotidine (PEPCID) tablet 20 mg  20 mg Oral Daily JUHI Machuca-C   20 mg at 12/30/20 0859   • sodium bicarbonate 8.4 % injection 50 mEq  50 mEq Intravenous PRN Dipti Bailey, PA-C   50 mEq at 12/23/20 1015   • albumin human 5 % injection 12.5 g  12.5 g Intravenous PRN Dipti Bailey, PA-C   Stopped at 12/22/20 0929   • calcium gluconate 1 g in dextrose 5 % 50 mL total volume IVPB  1 g Intravenous PRN Dipti Bailey PA-C            Review of Systems  Review of Systems   Constitutional: Negative for fever.   Respiratory: Negative for wheezing.    Cardiovascular:        Chest incision site pain   Gastrointestinal: Negative.    Skin: Negative for pallor and rash.   Neurological: Negative for headaches.        PHYSICAL EXAM    Last Recorded Vitals    Vital Last Value 24 Hour Range    Temperature 98.4 °F (36.9 °C) (12/30/20 1437) Temp  Min: 97.9 °F (36.6 °C)  Max: 99 °F (37.2 °C)   Pulse 65 (12/30/20 1437) Pulse  Min: 65  Max: 74   Respiratory 18 (12/30/20 1030) Resp  Min: 16  Max: 18   Non-Invasive  Blood Pressure 128/60 (12/30/20 1437) BP  Min: 114/74  Max: 135/75   Pulse Oximetry 94 % (12/30/20 1437) SpO2  Min: 94 %  Max: 96 %   Arterial   Blood Pressure 92/59 (12/22/20 1600) No data recorded      Physical Exam  Constitutional:       General: She is not in acute distress.     Appearance: Normal appearance.   HENT:      Head: Normocephalic.   Eyes:      General: No scleral icterus.  Cardiovascular:      Rate and Rhythm: Normal rate.   Pulmonary:      Effort: Pulmonary effort is normal.      Breath sounds: Normal breath sounds.   Abdominal:      Palpations: Abdomen is soft.   Musculoskeletal:         General: No swelling.   Skin:     Coloration: Skin is not pale.      Findings: No rash.   Neurological:      General: No focal deficit present.      Mental Status: Mental status is at baseline.   Psychiatric:         Behavior: Behavior normal.           Diet:  Consistent Carb Moderate (45-75 Gm/meal), Sodium 2 Gm (low Sodium) Diet  3 Times/day W Meals; Ensure High Protein/high Protein Low Carbohydrate Supplement, Vanilla Oral Nutrition Supplement    Labs:   Recent Labs   Lab 12/30/20  0519 12/29/20  0513 12/28/20  0518   WBC 8.4 10.9 10.6   RBC 3.11* 3.18* 3.33*   HGB 9.5* 9.7* 10.2*   HCT 29.3* 29.9* 31.3*   MCV 94.2 94.0 94.0   MCH 30.5 30.5 30.6   MCHC 32.4 32.4 32.6   RDWCV 13.4 13.2 13.2    337 279   TLYMPH 16  --   --        Recent Labs   Lab 12/30/20  0519 12/29/20  0513 12/28/20  1656   SODIUM 143 143 138   CHLORIDE 108* 107 106   BUN 26* 23* 26*   BCRAT 25 21 23   POTASSIUM 3.8 3.8 3.4   GLUCOSE 95 92 146*   CREATININE 1.03* 1.08* 1.12*   CALCIUM 9.5 9.5 9.1       Recent Labs   Lab 12/30/20  1127 12/30/20  0711 12/29/20  2107 12/29/20  1610 12/29/20  1108 12/29/20  0713  12/28/20 2318 12/28/20 2036   GLUB 169* 83 83 151* 118* 71 106* 124*       No results found      Imaging:   XR CHEST AP OR PA 1 VIEW   Final Result   FINDINGS AND IMPRESSION:       Evidence of median sternotomy and CABG repair.  Cardiomegaly.  Blunting of left costophrenic angle secondary to atelectasis and probable small left-sided pleural effusion.  No pneumothorax.         Electronically Signed by: MANSOOR EASLEY M.D.    Signed on: 12/28/2020 6:30 AM          XR CHEST AP OR PA 1 VIEW   Final Result   Improved appearance.      Increased aeration right lung base. Persistent left basilar opacity consistent with underlying atelectasis, small left effusion suspected.   Right IJ Cordis sheath, no pneumothorax.      Electronically Signed by: MART TORO MD    Signed on: 12/27/2020 9:40 AM          XR CHEST AP OR PA 1 VIEW   Final Result   FINDINGS/IMPRESSION:   Median sternotomy wires and sternal plates.  Right-sided central venous catheter with catheter tip projected over the expected location of the superior vena cava.  Overlying EKG leads.      Enlargement of the cardiac silhouette is unchanged.  Left basilar opacity likely reflects a combination of layering pleural fluid with adjacent atelectasis or consolidation and is similar in appearance to the prior examination.  Elevation of the right    hemidiaphragm with blunting of the right costophrenic angle, also likely related to pleural effusion with adjacent atelectasis or consolidation.  Increased interstitial opacities may be related to low lung volumes/suboptimal inspiration or may reflect    mild developing edema.  No detectable pneumothorax.      Dictated by: ROBYN MORE DO   Dictated on: 12/26/2020 8:14 AM       KAITLYNN WOODALL M.D., have reviewed the images and report and concur with these findings interpreted by ROBYN MORE DO.      Electronically Signed by: KAITLYNN SEYMOUR M.D.    Signed on: 12/26/2020 11:00 AM          XR CHEST AP OR PA 1 VIEW   Final  Result   FINDINGS AND IMPRESSION:      Interval removal of mediastinal drain and left chest tube. Stable sternotomy wires and plates.  Overlying ECG leads.  Right internal jugular sheath remains in place.      Cardiomediastinal silhouette is stable.  Stable left lung base opacity is present with silhouetting of the left cardiac border, left hemidiaphragm and costophrenic angle representing a combination of pleural fluid/atelectasis.  Improvement in haziness at    the right lung base laterally located bandlike opacity likely reflecting pleural fluid.  No visible pneumothorax.                        I, JOSE JUAN SEYMOUR MD, have reviewed the images and report and concur with these findings interpreted by ROMY BARBA MD.      Electronically Signed by: JOSE JUAN SEYMOUR MD    Signed on: 12/24/2020 1:29 PM          XR CHEST AP OR PA 1 VIEW   Final Result   FINDINGS AND IMPRESSION:      Poor inspiratory effort.  Evidence of median sternotomy and CABG repair.  Mediastinal drain and left-sided chest tubes in place.  Heart size prominent.  Right IJ cordis unchanged.  Elevation of the left hemidiaphragm likely secondary to compressive    atelectasis.  Pneumonitis not excluded.  Probable small right-sided pleural effusion.  No pneumothorax.         Electronically Signed by: MANSOOR EASLEY M.D.    Signed on: 12/24/2020 6:13 AM          XR CHEST PA OR AP 1 VIEW   Final Result   FINDINGS AND IMPRESSION:       Evidence of median sternotomy and CABG repair.  External drain and left-sided chest tubes unchanged.  Right IJ cordis catheter.  Prominent cardiac silhouette.  Patchy atelectasis left lung base.  No pneumothorax.         Electronically Signed by: MANSOOR EASLEY M.D.    Signed on: 12/23/2020 6:33 AM          XR CHEST PA OR AP 1 VIEW   Final Result   FINDINGS AND IMPRESSION:    Interval extubation and removal of enteric tube.  Stable positioning of right IJ Schurz-Leonardo catheter, with tip termination projecting over the main pulmonary  trunk.  Stable positioning of mediastinal drain and left-sided chest tube.  Median sternotomy    wires and plates.  Epicardial pacer leads.  Overlying EKG wires and leads.      Stable enlarged cardiomediastinal silhouette.  Low lung volumes, with crowding of bronchovascular markings.  Stable small left pleural effusion with associated atelectasis.  No large focal consolidations or detectable pneumothorax.      MANSOOR WOODALL M.D., have reviewed the images and report and concur with these findings interpreted by CAROLE SELBY MD.      Electronically Signed by: MANSOOR EASLEY M.D.    Signed on: 12/22/2020 6:27 AM          XR CHEST PA OR AP 1 VIEW   Final Result   FINDINGS AND IMPRESSION:   Interval placement of endotracheal tube, which terminates above the level of the clavicles.  Recommend advancement past the clavicles for optimal placement.  Enteric tube, which courses below the level of the diaphragm, with tip termination beyond margin    of the radiograph.  Right IJ Nelson-Leonardo catheter, with tip termination projecting over the main pulmonary trunk.  Left-sided chest tube and mediastinal drain.  Epicardial pacer leads.  Median sternotomy wires and plates, with surgical changes compatible    with CABG.  Overlying EKG wires and leads.      Stable cardiomediastinal silhouette.  Low lung volumes with associated prominence of the bronchovascular markings.  Probable small left-sided pleural effusion with associated hazy atelectasis at the left lung base.  No large focal consolidations.  No    detectable pneumothorax.  Surgical clips in the right upper abdominal quadrant.      MANSOOR WOODALL M.D., have reviewed the images and report and concur with these findings interpreted by CAROLE SELBY MD.      Electronically Signed by: MANSOOR EASLEY M.D.    Signed on: 12/21/2020 1:17 PM           VASC CAROTID DUPLEX BILATERAL   Final Result   No evidence of hemodynamically significant stenosis. No plaques are demonstrated.        Electronically Signed by: CYNTHIA LUIS M.D.    Signed on: 12/19/2020 4:21 PM          XR CHEST PA OR AP 1 VIEW   Final Result       Normal heart size.  No mediastinal widening.  No lung consolidation or effusions.  No cephalization of vessels.         I, RONALD ROMANO M.D., have reviewed the images and report and concur with these findings interpreted by ROMY BARBA MD.      Electronically Signed by: RONALD ROMANO M.D.    Signed on: 12/19/2020 11:32 AM          Cath/PV Case   Final Result            ASSESSMENT/PLAN:     Active Problems:    Ischemic cardiomyopathy    HLD (hyperlipidemia)    Essential hypertension    Uncontrolled type 2 diabetes mellitus with hyperglycemia (CMS/HCC)    Coronary artery disease involving native coronary artery of native heart    CAD (coronary artery disease)    S/P CABG x 5    Paroxysmal atrial fibrillation (CMS/[facility])      Code Status    Code Status: Full Resuscitation    Rehabilitation recommendations:      Impaired mobility and ADLs.:    The patient is doing relatively well postop.  If patient is agreeable, could consider short course of acute inpatinet rehab, pending bed availability.      She would benefit from PT/OT/rehab nursing and daily physiatry management. The patient requires PM&R physician visits daily and requires 24 hour nursing care.  Pt will work on transfers, bed mobility, ambulation and stairs with physical therapy. Pt will work on bathing, grooming, dressing and toileting with occupational therapy. The patient is able to tolerate 3 hours of interdisciplinary therapy, 5 days a week, and requires interdisciplinary conferences in order to maximize potential.   Goal at d/c is independent-mod I for ADLs and functional mobility. Rehab nursing to monitor VS, bowel/bladder programs, fall prevention and incision care. ELOS is 7-10 days. The patient has a good prognosis for functional recovery and return home. The medical issues that require management are  as follows:    CV- CAD s/p CABG- cardiology following- ordered postop TTE.  On amiodarone for postop afib. Anticoagulation with apixaban.  Bumex now po. On statin, ASA.  Orthostatic hypotension- monitor.     S[facility]- nephrology following    DMII    FEN- hypokalemia and magnesemia    Anemia    Thank you very much for this consultation     Gayla Pettit MD     134.9

## 2024-03-31 NOTE — DIETITIAN INITIAL EVALUATION ADULT - REASON FOR ADMISSION
Per H&P "73y M w/ pmh of HTN, ITP, APL syndrome, generalized anxiety disorder, OCD, newly dx aggressive Merkel Cell cancer of forehead- pt's oncologist  Dr. Eng, at [facility] presents with complaints of Chest pain, anxiety and vertigo. Patient is a poor historian, history supplemented by wife. As per patient he has had dizziness all day, He is unable to describe whether he feels lightheadedness or vertigo, reports he feels like he is "moving when my eyes closed". The feeling has been persistent and constant, no worsening or alleviating factors. Patients wife at bedside (Joelle) reports he has had vertigo in the past, and she thinks its because he got water in his ear while showering. He also complaints of chest pain, that is constant, reports its from his anxiety, feels tightness in his chest. Pain in chest is non-radiating, constant, not positional or associated with meals."

## 2024-03-31 NOTE — CONSULT NOTE ADULT - SUBJECTIVE AND OBJECTIVE BOX
Patient is a 73y old  Male who presents with a chief complaint of dizziness.    HPI: 73y M w/ pmh of HTN, ITP, APL syndrome, generalized anxiety disorder, OCD, newly dx aggressive Merkel Cell cancer of forehead- pt's oncologist  Dr. Eng, at [facility] on [facility] presents with complaints of Chest pain, anxiety and vertigo. Patient is a poor historian, history supplemented by wife. As per patient he has had dizziness all day, He is unable to describe whether he feels lightheadedness or vertigo, reports he feels like he is "moving when my eyes closed". The feeling has been persistent and constant, no worsening or alleviating factors. Patients wife at bedside (Joelle) reports he has had vertigo in the past, and she thinks its because he got water in his ear while showering. He also complaints of chest pain, that is constant, reports its from his anxiety, feels tightness in his chest. Pain in chest is non-radiating, constant, not positional or associated with meals.   Of note patient has had a recent complicated hospital course, was admitted from 10/24 - 12/26/23. Pt was admitted and seen by neurology, psychiatry, ID, endocrinology, cardiology, on oncology. He had very poor oral intake due to a delusion that he had metal objects in his mouth and throat. He underwent a neurological workup, was trialed on Luvox, remeron, seroquel, haldol, and zyprexa without improvement. Eventually he consented to a PEG. course was complicated by adrenal insufficiency and orthostatic hypotension. His cortisol levels were low and he was started on prednisone and florinef with some improvement. Course was also complicated by sepsis due to UTI, thrombophlebitis. As per wife he has been slowly getting worse, has been bedbound with no motivation to move.     In ED labs noted for WBC 6.22, H/H 11/32, Plt 95, with rouleaux formation on peripheral smear, PTT/PT/INR 77/15/15.3, CMP unremarkable, UA sterile. CTH and CTA head and neck negative for acute infarct. Admitted to medicine for uncontrolled HTN, Nausea and Vertigo.     Wife at bedside requests daily updates by MD Dee Henley [phone]   (30 Mar 2024 22:27)    PAST MEDICAL & SURGICAL HISTORY:    Hypertension, unspecified type    Anxiety    Anxiety    Hypertension    Idiopathic thrombocytopenic purpura (ITP)    History of antiphospholipid antibody syndrome    H/O Mohs micrographic surgery for skin cancer    Enlarged tonsils  s/p tonsillectomy    MEDICATIONS  (STANDING):    atenolol  Tablet 50 milliGRAM(s) Oral daily  cholecalciferol 2000 Unit(s) Oral daily  famotidine    Tablet 20 milliGRAM(s) Oral daily  fludroCORTISONE 0.2 milliGRAM(s) Oral daily  fluvoxaMINE 50 milliGRAM(s) Oral daily  heparin   Injectable 5000 Unit(s) SubCutaneous every 12 hours  hydrALAZINE Injectable 10 milliGRAM(s) IV Push every 6 hours  hydrocortisone 2.5% Rectal Cream 1 Application(s) Rectal three times a day  LORazepam     Tablet 0.5 milliGRAM(s) Oral three times a day  mirtazapine 7.5 milliGRAM(s) Oral at bedtime  predniSONE   Tablet 5 milliGRAM(s) Oral daily    MEDICATIONS  (PRN):    acetaminophen     Tablet .. 650 milliGRAM(s) Oral every 6 hours PRN Temp greater or equal to 38C (100.4F), Mild Pain (1 - 3)  aluminum hydroxide/magnesium hydroxide/simethicone Suspension 30 milliLiter(s) Oral every 4 hours PRN Dyspepsia  bisacodyl Suppository 10 milliGRAM(s) Rectal daily PRN Constipation  meclizine 12.5 milliGRAM(s) Oral three times a day PRN Dizziness  melatonin 3 milliGRAM(s) Oral at bedtime PRN Insomnia  ondansetron Injectable 4 milliGRAM(s) IV Push every 8 hours PRN Nausea and/or Vomiting  polyethylene glycol 3350 17 Gram(s) Oral daily PRN Constipation  traMADol 50 milliGRAM(s) Oral four times a day PRN Severe Pain (7 - 10)    Allergies    No Known Allergies    SOCIAL HISTORY:    No h/o Smoking.   No h/o alcohol use.  Ex Smoker. Quite in past.  Pt smokes.  Pt does drink socially.  Pt drinks alcohol heavily.    FAMILY HISTORY: N/C as per chart    REVIEW OF SYSTEMS:    CONSTITUTIONAL: No fever  EYES: No eye pain,   ENMT:  No sinus or throat pain  NECK: No pain or stiffness  RESPIRATORY: No cough, No hemoptysis; No shortness of breath  CARDIOVASCULAR: No acute chest pain, palpitations,  or leg swelling  GASTROINTESTINAL: No abdominal pain. No nausea, vomiting, or hematemesis;  No melena or hematochezia.  GENITOURINARY: No  hematuria, or incontinence  MUSCULOSKELETAL: No joint swelling; No extremity pain  SKIN: No itching, rashes, or lesions   LYMPH NODES: No enlarged glands  NEUROLOGICAL: No headaches, memory loss,   PSYCHIATRIC: No depression, anxiety, mood swings, or difficulty sleeping  ENDOCRINE: No heat or cold intolerance;   HEME/LYMPH: No easy bruising, or bleeding gums  Allergy/Immunology. No medication allergy. No seasonal allergies.    PHYSICAL EXAM:  Vital Signs Last 24 Hrs  T(F): 98.4 (03-31-24 @ 10:53)  HR: 72 (03-31-24 @ 10:53)  BP: 152/67 (03-31-24 @ 10:53)  RR: 18 (03-31-24 @ 10:53)    GENERAL: NAD, well-groomed, well-developed  HEAD:  Atraumatic, Normocephalic  EYES: EOMI, PERRLA, conjunctiva and sclera clear  NECK: Supple, No JVD    On Neurological Examination:    Mental Status - Pt is alert, awake, oriented X3. Higher functions are intact. Pt. does have mild poor cognition. Follows commands well and able to answer questions appropriately.    Speech -  Normal. Slurred. Pt has no aphasia.    Cranial Nerves - Pupils 3 mm equal and reactive to light, extraocular eye movements intact. Pt has no visual field deficit.  Pt has no right left facial asymmetry. Tongue - is in midline.    Motor Exam - 4 plus/5 all over, No drift. No shaking or tremors.  Muscle tone - is normal all over. Moves all extremities equally. No asymmetry is seen.      Sensory Exam - Pin prick, temperature, joint position and vibration are intact on either side. Pt withdraws all extremities equally on stimulation. No asymmetry seen. No complaints of tingling, numbness.    Gait - Able to stand and walk unassisted. Pt is able to stand up with holding my hands and is able to walk for few feet around the bed. Not falling to either side.    Deep tendon Reflexes - 2 plus all over.    Coordination - Fine finger movements are normal on both sides. Finger to nose is also normal on both sides.       Romberg - Negative.    Neck Supple -  Yes.    LABS:                        11.6   6.20  )-----------( 89       ( 31 Mar 2024 07:17 )             32.5     03-31    145  |  106  |  14  ----------------------------<  111<H>  3.4<L>   |  32<H>  |  0.56    Ca    8.9      31 Mar 2024 07:17  Phos  3.7     03-30  Mg     1.7     03-30    TPro  5.9<L>  /  Alb  3.2<L>  /  TBili  0.7  /  DBili  x   /  AST  22  /  ALT  27  /  AlkPhos  55  03-31    PT/INR - ( 30 Mar 2024 19:12 )   PT: 15.3 sec;   INR: 1.38 ratio         PTT - ( 30 Mar 2024 19:12 )  PTT:77.9 sec  Urinalysis Basic - ( 31 Mar 2024 07:17 )    Color: x / Appearance: x / SG: x / pH: x  Gluc: 111 mg/dL / Ketone: x  / Bili: x / Urobili: x   Blood: x / Protein: x / Nitrite: x   Leuk Esterase: x / RBC: x / WBC x   Sq Epi: x / Non Sq Epi: x / Bacteria: x    RADIOLOGY & ADDITIONAL STUDIES:    < from: CT Head No Cont (03.30.24 @ 19:57) >    HEAD CT: No evidence of an acute intracranial hemorrhage midline shift or hydrocephalus. Moderate atrophy with mild chronic white matter ischemic canges  NECK CTA: No hemodynamic significant narrowing within the neck.  BRAIN CTA: No proximal large vessel occlusion.    < end of copied text >     Patient is a 73y old  Male who presents with a chief complaint of dizziness.    HPI: 73y M w/ pmh of HTN, ITP, APL syndrome, generalized anxiety disorder, OCD, newly dx aggressive Merkel Cell cancer of forehead- pt's oncologist  Dr. Eng, at [facility] on [facility] presents with complaints of Chest pain, anxiety and vertigo. Patient is a poor historian, history supplemented by wife. As per patient he has had dizziness all day, He is unable to describe whether he feels lightheadedness or vertigo, reports he feels like he is "moving when my eyes closed". The feeling has been persistent and constant, no worsening or alleviating factors. Patients wife at bedside (Joelle) reports he has had vertigo in the past, and she thinks its because he got water in his ear while showering. He also complaints of chest pain, that is constant, reports its from his anxiety, feels tightness in his chest. Pain in chest is non-radiating, constant, not positional or associated with meals.   Of note patient has had a recent complicated hospital course, was admitted from 10/24 - 12/26/23. Pt was admitted and seen by neurology, psychiatry, ID, endocrinology, cardiology, on oncology. He had very poor oral intake due to a delusion that he had metal objects in his mouth and throat. He underwent a neurological workup, was trialed on Luvox, remeron, seroquel, haldol, and zyprexa without improvement. Eventually he consented to a PEG. course was complicated by adrenal insufficiency and orthostatic hypotension. His cortisol levels were low and he was started on prednisone and florinef with some improvement. Course was also complicated by sepsis due to UTI, thrombophlebitis. As per wife he has been slowly getting worse, has been bedbound with no motivation to move.     In ED labs noted for WBC 6.22, H/H 11/32, Plt 95, with rouleaux formation on peripheral smear, PTT/PT/INR 77/15/15.3, CMP unremarkable, UA sterile. CTH and CTA head and neck negative for acute infarct. Admitted to medicine for uncontrolled HTN, Nausea and Vertigo.     Wife at bedside requests daily updates by MD - Joelle Henley [phone]  (30 Mar 2024 22:27)    NIHSS  -0  CT head  -No acute pathology  CTA Head and Neck  No LVOs    PAST MEDICAL & SURGICAL HISTORY:    Hypertension, unspecified type    Anxiety    Anxiety    Hypertension    Idiopathic thrombocytopenic purpura (ITP)    History of antiphospholipid antibody syndrome    H/O Mohs micrographic surgery for skin cancer    Enlarged tonsils  s/p tonsillectomy    MEDICATIONS  (STANDING):    atenolol  Tablet 50 milliGRAM(s) Oral daily  cholecalciferol 2000 Unit(s) Oral daily  famotidine    Tablet 20 milliGRAM(s) Oral daily  fludroCORTISONE 0.2 milliGRAM(s) Oral daily  fluvoxaMINE 50 milliGRAM(s) Oral daily  heparin   Injectable 5000 Unit(s) SubCutaneous every 12 hours  hydrALAZINE Injectable 10 milliGRAM(s) IV Push every 6 hours  hydrocortisone 2.5% Rectal Cream 1 Application(s) Rectal three times a day  LORazepam     Tablet 0.5 milliGRAM(s) Oral three times a day  mirtazapine 7.5 milliGRAM(s) Oral at bedtime  predniSONE   Tablet 5 milliGRAM(s) Oral daily    MEDICATIONS  (PRN):    acetaminophen     Tablet .. 650 milliGRAM(s) Oral every 6 hours PRN Temp greater or equal to 38C (100.4F), Mild Pain (1 - 3)  aluminum hydroxide/magnesium hydroxide/simethicone Suspension 30 milliLiter(s) Oral every 4 hours PRN Dyspepsia  bisacodyl Suppository 10 milliGRAM(s) Rectal daily PRN Constipation  meclizine 12.5 milliGRAM(s) Oral three times a day PRN Dizziness  melatonin 3 milliGRAM(s) Oral at bedtime PRN Insomnia  ondansetron Injectable 4 milliGRAM(s) IV Push every 8 hours PRN Nausea and/or Vomiting  polyethylene glycol 3350 17 Gram(s) Oral daily PRN Constipation  traMADol 50 milliGRAM(s) Oral four times a day PRN Severe Pain (7 - 10)    Allergies    No Known Allergies    SOCIAL HISTORY:    No h/o Smoking.   No h/o alcohol use.    FAMILY HISTORY: N/C as per chart    REVIEW OF SYSTEMS:    CONSTITUTIONAL: No fever  EYES: No eye pain,   ENMT:  No sinus or throat pain  NECK: No pain or stiffness  RESPIRATORY: No cough, No hemoptysis; No shortness of breath  CARDIOVASCULAR: No acute chest pain, palpitations,  or leg swelling  GASTROINTESTINAL: PEG in place  GENITOURINARY: No  hematuria, or incontinence  MUSCULOSKELETAL: No joint swelling; No extremity pain  SKIN: No itching, rashes, or lesions   LYMPH NODES: No enlarged glands  NEUROLOGICAL: No headaches, memory loss,   PSYCHIATRIC: No depression, anxiety, mood swings, or difficulty sleeping  ENDOCRINE: No heat or cold intolerance;   HEME/LYMPH: No easy bruising, or bleeding gums  Allergy/Immunology. No medication allergy. No seasonal allergies.    PHYSICAL EXAM:  Vital Signs Last 24 Hrs  T(F): 98.4 (03-31-24 @ 10:53)  HR: 72 (03-31-24 @ 10:53)  BP: 152/67 (03-31-24 @ 10:53)  RR: 18 (03-31-24 @ 10:53)    GENERAL: NAD, well-groomed, well-developed  HEAD:  Atraumatic, Normocephalic  EYES: EOMI, PERRLA, conjunctiva and sclera clear  NECK: Supple, No JVD    On Neurological Examination:    Mental Status - Pt is alert, awake, oriented X3. Higher functions are intact. Follows commands well and able to answer questions appropriately.    Speech -  Normal.  Pt has no aphasia.    Cranial Nerves - Pupils 3 mm equal and reactive to light, extraocular eye movements intact. Pt has no visual field deficit.  No facial asymmetry. Tongue - is in midline.    Motor Exam - 3+/5 all over, No drift. No shaking or tremors.    Sensory Exam -  Pt withdraws all extremities equally on stimulation.     Gait - Bed bound.     Deep tendon Reflexes - 2 plus all over.    Coordination - Fine finger movements are normal on both sides. Finger to nose is also normal on both sides.         Neck Supple -  Yes.    LABS:                        11.6   6.20  )-----------( 89       ( 31 Mar 2024 07:17 )             32.5     03-31    145  |  106  |  14  ----------------------------<  111<H>  3.4<L>   |  32<H>  |  0.56    Ca    8.9      31 Mar 2024 07:17  Phos  3.7     03-30  Mg     1.7     03-30    TPro  5.9<L>  /  Alb  3.2<L>  /  TBili  0.7  /  DBili  x   /  AST  22  /  ALT  27  /  AlkPhos  55  03-31    PT/INR - ( 30 Mar 2024 19:12 )   PT: 15.3 sec;   INR: 1.38 ratio      PTT - ( 30 Mar 2024 19:12 )  PTT:77.9 sec  Urinalysis Basic - ( 31 Mar 2024 07:17 )    Color: x / Appearance: x / SG: x / pH: x  Gluc: 111 mg/dL / Ketone: x  / Bili: x / Urobili: x   Blood: x / Protein: x / Nitrite: x   Leuk Esterase: x / RBC: x / WBC x   Sq Epi: x / Non Sq Epi: x / Bacteria: x    RADIOLOGY & ADDITIONAL STUDIES:    < from: CT Head No Cont (03.30.24 @ 19:57) >    HEAD CT: No evidence of an acute intracranial hemorrhage midline shift or hydrocephalus. Moderate atrophy with mild chronic white matter ischemic changes  NECK CTA: No hemodynamic significant narrowing within the neck.  BRAIN CTA: No proximal large vessel occlusion.    < end of copied text >

## 2024-03-31 NOTE — DIETITIAN INITIAL EVALUATION ADULT - ORAL INTAKE PTA/DIET HISTORY
Per transfer document, pt from [facility] was on TF and supplement with oral nutrition (small portion, no added salt, fine chopped diet). TF regimen: glucerna 1.5 @85 ml/hr x18hr, TV over 24hr 1530 ml, 2295 kcal, 126g protein, free water 1163 ml +fludh 200 ml q shift =1761 ml per day.

## 2024-03-31 NOTE — DIETITIAN INITIAL EVALUATION ADULT - PERTINENT MEDS FT
MEDICATIONS  (STANDING):  atenolol  Tablet 50 milliGRAM(s) Oral daily  cholecalciferol 2000 Unit(s) Oral daily  famotidine    Tablet 20 milliGRAM(s) Oral daily  fludroCORTISONE 0.2 milliGRAM(s) Oral daily  fluvoxaMINE 50 milliGRAM(s) Oral daily  heparin   Injectable 5000 Unit(s) SubCutaneous every 12 hours  hydrALAZINE Injectable 10 milliGRAM(s) IV Push every 6 hours  hydrocortisone 2.5% Rectal Cream 1 Application(s) Rectal three times a day  LORazepam     Tablet 0.5 milliGRAM(s) Oral three times a day  mirtazapine 7.5 milliGRAM(s) Oral at bedtime  predniSONE   Tablet 5 milliGRAM(s) Oral daily    MEDICATIONS  (PRN):  acetaminophen     Tablet .. 650 milliGRAM(s) Oral every 6 hours PRN Temp greater or equal to 38C (100.4F), Mild Pain (1 - 3)  aluminum hydroxide/magnesium hydroxide/simethicone Suspension 30 milliLiter(s) Oral every 4 hours PRN Dyspepsia  bisacodyl Suppository 10 milliGRAM(s) Rectal daily PRN Constipation  meclizine 12.5 milliGRAM(s) Oral three times a day PRN Dizziness  melatonin 3 milliGRAM(s) Oral at bedtime PRN Insomnia  ondansetron Injectable 4 milliGRAM(s) IV Push every 8 hours PRN Nausea and/or Vomiting  polyethylene glycol 3350 17 Gram(s) Oral daily PRN Constipation  traMADol 50 milliGRAM(s) Oral four times a day PRN Severe Pain (7 - 10)

## 2024-03-31 NOTE — PATIENT PROFILE ADULT - FUNCTIONAL ASSESSMENT - BASIC MOBILITY 6.
2-calculated by average/Not able to assess (calculate score using [facility] averaging method)

## 2024-03-31 NOTE — PROGRESS NOTE ADULT - SUBJECTIVE AND OBJECTIVE BOX
Patient is a 73y old  Male who presents with a chief complaint of     INTERVAL HPI/OVERNIGHT EVENTS:  Patient seen and examined in am, stating he's going to die because he hasn't had food yet, denies current chest pain, dizziness    ROS reviewed and is otherwise negative        Vital Signs Last 24 Hrs  T(C): 36.9 (31 Mar 2024 10:53), Max: 36.9 (31 Mar 2024 10:53)  T(F): 98.4 (31 Mar 2024 10:53), Max: 98.4 (31 Mar 2024 10:53)  HR: 72 (31 Mar 2024 10:53) (61 - 77)  BP: 152/67 (31 Mar 2024 10:53) (140/75 - 215/99)  BP(mean): 142 (31 Mar 2024 08:30) (142 - 142)  RR: 18 (31 Mar 2024 10:53) (16 - 23)  SpO2: 99% (31 Mar 2024 10:53) (98% - 100%)    Parameters below as of 31 Mar 2024 10:53  Patient On (Oxygen Delivery Method): nasal cannula        PHYSICAL EXAM:  GENERAL: NAD, older male, thin  HEAD:  Atraumatic, Normocephalic  EYES: EOMI, PERRLA  ENMT: Moist mucous membranes, No lesions;   NECK: Supple, No JVD,   NERVOUS SYSTEM:  Alert & Oriented X3, CN 2-12 intact, no cerebellar signs  CHEST/LUNG: Clear to auscultation bilaterally; No rales, rhonchi, wheezing, or rubs  HEART: Regular rate and rhythm; No murmurs, rubs, or gallops  ABDOMEN: Soft, Nontender, Nondistended; Bowel sounds present  EXTREMITIES:  + Pulses, trace edema in le  SKIN: No rashes or lesions    MEDICATIONS  (STANDING):  atenolol  Tablet 50 milliGRAM(s) Oral daily  cholecalciferol 2000 Unit(s) Oral daily  famotidine    Tablet 20 milliGRAM(s) Oral daily  fludroCORTISONE 0.2 milliGRAM(s) Oral daily  fluvoxaMINE 50 milliGRAM(s) Oral daily  heparin   Injectable 5000 Unit(s) SubCutaneous every 12 hours  hydrALAZINE Injectable 10 milliGRAM(s) IV Push every 6 hours  hydrocortisone 2.5% Rectal Cream 1 Application(s) Rectal three times a day  LORazepam     Tablet 0.5 milliGRAM(s) Oral three times a day  mirtazapine 7.5 milliGRAM(s) Oral at bedtime  predniSONE   Tablet 5 milliGRAM(s) Oral daily    MEDICATIONS  (PRN):  acetaminophen     Tablet .. 650 milliGRAM(s) Oral every 6 hours PRN Temp greater or equal to 38C (100.4F), Mild Pain (1 - 3)  aluminum hydroxide/magnesium hydroxide/simethicone Suspension 30 milliLiter(s) Oral every 4 hours PRN Dyspepsia  bisacodyl Suppository 10 milliGRAM(s) Rectal daily PRN Constipation  meclizine 12.5 milliGRAM(s) Oral three times a day PRN Dizziness  melatonin 3 milliGRAM(s) Oral at bedtime PRN Insomnia  ondansetron Injectable 4 milliGRAM(s) IV Push every 8 hours PRN Nausea and/or Vomiting  polyethylene glycol 3350 17 Gram(s) Oral daily PRN Constipation  traMADol 50 milliGRAM(s) Oral four times a day PRN Severe Pain (7 - 10)      Allergies    No Known Allergies    Intolerances          LABS:                        11.6   6.20  )-----------( 89       ( 31 Mar 2024 07:17 )             32.5     31 Mar 2024 07:17    145    |  106    |  14     ----------------------------<  111    3.4     |  32     |  0.56     Ca    8.9        31 Mar 2024 07:17  Phos  3.7       30 Mar 2024 19:12  Mg     1.7       30 Mar 2024 19:12    TPro  5.9    /  Alb  3.2    /  TBili  0.7    /  DBili  x      /  AST  22     /  ALT  27     /  AlkPhos  55     31 Mar 2024 07:17    PT/INR - ( 30 Mar 2024 19:12 )   PT: 15.3 sec;   INR: 1.38 ratio         PTT - ( 30 Mar 2024 19:12 )  PTT:77.9 sec  Urinalysis Basic - ( 31 Mar 2024 07:17 )    Color: x / Appearance: x / SG: x / pH: x  Gluc: 111 mg/dL / Ketone: x  / Bili: x / Urobili: x   Blood: x / Protein: x / Nitrite: x   Leuk Esterase: x / RBC: x / WBC x   Sq Epi: x / Non Sq Epi: x / Bacteria: x      CAPILLARY BLOOD GLUCOSE          RADIOLOGY & ADDITIONAL TESTS:        Care Discussed with Consultants/Other Providers:    Advanced Directives: [ ] DNR  [ ] No feeding tube  [ ] MOLST in chart  [ ] MOLST completed today  [ ] Unknown

## 2024-03-31 NOTE — DIETITIAN INITIAL EVALUATION ADULT - OTHER INFO
Visited patient in room, confused and agitated. Presently NPO with TF via PEG, regimen same as pta. No reported n/v/d/c, no BM noted. NKFA. Per chart review pt wt 161# Oct2023, 151# Dec2023; Per transfer paper wt 137#, current adm weight 135#, suggesting 10.6% wt loss in 3 months, 16% wt loss in 6 months, will continue to monitor weight trends as able.     Pertinent medications/nutrition labs reviewed; receiving vitamin D, prednisone.     Education is not appropriate at this time. C/w current TF ordered. RD to continue to monitor nutrition status per protocol.

## 2024-03-31 NOTE — DIETITIAN INITIAL EVALUATION ADULT - ADD RECOMMEND
1. Continue with current TF ordered  2. If Po is allowed, encourage fluid, or need to add free water q4hr

## 2024-03-31 NOTE — DIETITIAN NUTRITION RISK NOTIFICATION - TREATMENT: THE FOLLOWING DIET HAS BEEN RECOMMENDED
Diet, NPO with Tube Feed:   Tube Feeding Modality: Gastrostomy  Glucerna 1.5 Ian  Total Volume for 24 Hours (mL): 1530  Continuous  Starting Tube Feed Rate {mL per Hour}: 85  Until Goal Tube Feed Rate (mL per Hour): 85  Tube Feed Duration (in Hours): 18  Tube Feed Start Time: 18:00  Volume Based Feeding Titration:  If the patient has achieved and tolerated the prescribed goal rate and tube feeding has been held within a 24hr period, titrate tube feeding rate based on guidelines, up to a maximum rate of 120mL/hr  Free Water Flush  Bolus   Total Volume per Flush (mL): 200   Frequency: Every 8 Hours (03-31-24 @ 02:10) [Active]

## 2024-04-01 PROCEDURE — 99232 SBSQ HOSP IP/OBS MODERATE 35: CPT

## 2024-04-01 RX ORDER — FLUDROCORTISONE ACETATE 0.1 MG/1
0.1 TABLET ORAL DAILY
Refills: 0 | Status: DISCONTINUED | OUTPATIENT
Start: 2024-04-01 | End: 2024-04-02

## 2024-04-01 RX ORDER — HYDROXYZINE HCL 10 MG
25 TABLET ORAL
Refills: 0 | Status: DISCONTINUED | OUTPATIENT
Start: 2024-04-01 | End: 2024-04-02

## 2024-04-01 RX ADMIN — Medication 1 APPLICATION(S): at 21:37

## 2024-04-01 RX ADMIN — FLUDROCORTISONE ACETATE 0.2 MILLIGRAM(S): 0.1 TABLET ORAL at 06:52

## 2024-04-01 RX ADMIN — FLUVOXAMINE MALEATE 50 MILLIGRAM(S): 25 TABLET ORAL at 12:00

## 2024-04-01 RX ADMIN — HEPARIN SODIUM 5000 UNIT(S): 5000 INJECTION INTRAVENOUS; SUBCUTANEOUS at 06:52

## 2024-04-01 RX ADMIN — FLUDROCORTISONE ACETATE 0.1 MILLIGRAM(S): 0.1 TABLET ORAL at 12:00

## 2024-04-01 RX ADMIN — Medication 0.5 MILLIGRAM(S): at 13:57

## 2024-04-01 RX ADMIN — Medication 5 MILLIGRAM(S): at 06:52

## 2024-04-01 RX ADMIN — Medication 0.5 MILLIGRAM(S): at 21:36

## 2024-04-01 RX ADMIN — MIRTAZAPINE 7.5 MILLIGRAM(S): 45 TABLET, ORALLY DISINTEGRATING ORAL at 21:37

## 2024-04-01 RX ADMIN — HEPARIN SODIUM 5000 UNIT(S): 5000 INJECTION INTRAVENOUS; SUBCUTANEOUS at 18:01

## 2024-04-01 RX ADMIN — Medication 2000 UNIT(S): at 12:00

## 2024-04-01 RX ADMIN — FAMOTIDINE 20 MILLIGRAM(S): 10 INJECTION INTRAVENOUS at 12:13

## 2024-04-01 RX ADMIN — Medication 1 APPLICATION(S): at 06:53

## 2024-04-01 RX ADMIN — ATENOLOL 50 MILLIGRAM(S): 25 TABLET ORAL at 06:55

## 2024-04-01 RX ADMIN — Medication 0.5 MILLIGRAM(S): at 06:52

## 2024-04-01 RX ADMIN — Medication 1 APPLICATION(S): at 13:57

## 2024-04-01 NOTE — CONSULT NOTE ADULT - SUBJECTIVE AND OBJECTIVE BOX
History of Present Illness: The patient is a 73 year old male with a history of HTN, ITP, APLS, anxiety, OCD, Merkel cell cancer who presents with dizziness and chest pain. The patient is a poor historian. He was seen by me last week at [facility] for total body and chest pain. As per notes, he had room spinning dizziness. Family stated that with new roommates at NH he has had increased symptoms, similar to what the roommates had.    Past Medical/Surgical History:  HTN, ITP, APLS, anxiety, OCD, Merkel cell cancer    Medications:  Home Medications:  acetaminophen 325 mg oral tablet: 2 tab(s) orally 4 times a day (30 Mar 2024 22:55)  atenolol 50 mg oral tablet: 1 tab(s) orally 2 times a day (30 Mar 2024 22:49)  cholecalciferol 50 mcg (2000 intl units) oral tablet: 1 tab(s) orally once a day (30 Mar 2024 22:49)  fludrocortisone 0.1 mg oral tablet: 2 tab(s) orally once a day (30 Mar 2024 22:49)  hydrocortisone 2.5% rectal cream with applicator: rectal 3 times a day (30 Mar 2024 22:55)  LORazepam 0.5 mg oral tablet: 1 tab(s) orally 3 times a day (30 Mar 2024 22:55)  Luvox 50 mg oral tablet: 1 tab(s) orally once a day (30 Mar 2024 22:55)  mirtazapine 15 mg oral tablet: 1 tab(s) orally once a day (30 Mar 2024 22:55)  Pepcid 20 mg oral tablet: 1 tab(s) orally once a day (30 Mar 2024 22:55)  predniSONE 5 mg oral tablet: 1 tab(s) orally once a day (30 Mar 2024 22:49)  traMADol 50 mg oral tablet: 1 tab(s) orally 4 times a day (30 Mar 2024 22:55)      Family History: Non-contributory family history of premature cardiovascular atherosclerotic disease    Social History: No tobacco, alcohol or drug use    Review of Systems:  General: No fevers, chills, weight gain  Skin: No rashes, color changes  Cardiovascular: No chest pain, orthopnea  Respiratory: No shortness of breath, cough  Gastrointestinal: No nausea, abdominal pain  Genitourinary: No incontinence, pain with urination  Musculoskeletal: No pain, swelling, decreased range of motion  Neurological: No headache, weakness  Psychiatric: No depression, anxiety  Endocrine: No weight gain, increased thirst  All other systems are comprehensively negative.    Physical Exam:  Vitals:        Vital Signs Last 24 Hrs  T(C): 36.6 (01 Apr 2024 07:49), Max: 36.9 (31 Mar 2024 10:53)  T(F): 97.8 (01 Apr 2024 07:49), Max: 98.5 (01 Apr 2024 05:53)  HR: 70 (01 Apr 2024 07:49) (70 - 74)  BP: 122/70 (01 Apr 2024 07:49) (110/54 - 197/88)  BP(mean): 98 (01 Apr 2024 05:53) (75 - 98)  RR: 18 (01 Apr 2024 07:49) (14 - 18)  SpO2: 97% (01 Apr 2024 07:49) (96% - 99%)  Parameters below as of 01 Apr 2024 07:49  Patient On (Oxygen Delivery Method): room air  General: NAD  HEENT: MMM  Neck: No JVD, no carotid bruit  Lungs: CTAB  CV: RRR, nl S1/S2, no M/R/G  Abdomen: S/NT/ND, +BS  Extremities: No LE edema, no cyanosis  Neuro: AAOx3, non-focal  Skin: No rash    Labs:                        11.6   6.20  )-----------( 89       ( 31 Mar 2024 07:17 )             32.5     03-31    145  |  106  |  14  ----------------------------<  111<H>  3.4<L>   |  32<H>  |  0.56    Ca    8.9      31 Mar 2024 07:17  Phos  3.7     03-30  Mg     1.7     03-30    TPro  5.9<L>  /  Alb  3.2<L>  /  TBili  0.7  /  DBili  x   /  AST  22  /  ALT  27  /  AlkPhos  55  03-31        PT/INR - ( 30 Mar 2024 19:12 )   PT: 15.3 sec;   INR: 1.38 ratio         PTT - ( 30 Mar 2024 19:12 )  PTT:77.9 sec    ECG/Telemetry: NSR, LAD, no ST abnormality

## 2024-04-01 NOTE — PATIENT CHOICE NOTE. - NSPTCHOICESTATE_GEN_ALL_CORE

## 2024-04-01 NOTE — CARE COORDINATION ASSESSMENT. - NSCAREPROVIDERS_GEN_ALL_CORE_FT
CARE PROVIDERS:  Accepting Physician: Haider Montesinos  Administration: Bud Membreno  Administration: Pito Luna  Administration: Rubi Rizo  Admitting: Haider Montesinos  Attending: Haider Montesinos  Consultant: Avni Lau  Consultant: Rehana Lau  Consultant: Sohan Cho  Covering Team: Haider Montesinos  ED Attending: Nancy Reece  ED Nurse: Harley Ho  Infection Control: Nandini Ibrahim  Nurse: Quan Longo  Nurse: Laron Shelley  Nurse: Harley Ho  Nurse: Lorena Adamson  Nurse: Bre Camp  Oncology: Joan, Harlan  Oncology: Elsa Westbrook  Oncology: Elliot Eng  Ordered: Doctor, Unknown  Outpatient Provider: Jolene Edwards  Outpatient Provider: Jostin Medley  Outpatient Provider: Tanmay Nava  PCA/Nursing Assistant: Margie Giang  PCA/Nursing Assistant: Venice Roberson  Physical Therapy: Sara Bethea  Primary Team: Titus Blanchard  Primary Team: Roby Hsieh  Respiratory Therapy: Cholo Celestin  : Donta Rush  Team: SY  Hospitalists, Team

## 2024-04-01 NOTE — CARE COORDINATION ASSESSMENT. - ASSESSMENT CONCERNS TO BE ADDRESSED
Pt is a 73 year old  male who resides at [facility].  Admitted for HTN.  SW spoke to patients wife as patient is poor historian.  She reports that patient was at [facility] for [facility] from December until March and then went to [facility] because he needed LTC.  Pt is bedbound abd has a PEG.  Spouse is HCP and POA and they have 2 sons who are involved.  Pt has psych history of depression anxiety and OCD.  Spouse is requesting a psych eval as she feels patients medications need to be adjusted.   Spouse in agreement with Patient returning to [facility] when ready.  SW to remain available for needs and support./care coordination

## 2024-04-01 NOTE — PROGRESS NOTE ADULT - SUBJECTIVE AND OBJECTIVE BOX
Patient is a 73y old  Male who presents with a chief complaint of Per H&P "73y M w/ pmh of HTN, ITP, APL syndrome, generalized anxiety disorder, OCD, newly dx aggressive Merkel Cell cancer of forehead- pt's oncologist  Dr. Eng, at [facility] presents with complaints of Chest pain, anxiety and vertigo. Patient is a poor historian, history supplemented by wife. As per patient he has had dizziness all day, He is unable to describe whether he feels lightheadedness or vertigo, reports he feels like he is "moving when my eyes closed". The feeling has been persistent and constant, no worsening or alleviating factors. Patients wife at bedside (Joelle) reports he has had vertigo in the past, and she thinks its because he got water in his ear while showering. He also complaints of chest pain, that is constant, reports its from his anxiety, feels tightness in his chest. Pain in chest is non-radiating, constant, not positional or associated with meals."  (31 Mar 2024 15:33)      INTERVAL HPI/OVERNIGHT EVENTS:      ROS reviewed and is otherwise negative        Vital Signs Last 24 Hrs  T(C): 36.6 (01 Apr 2024 07:49), Max: 36.9 (01 Apr 2024 05:53)  T(F): 97.8 (01 Apr 2024 07:49), Max: 98.5 (01 Apr 2024 05:53)  HR: 70 (01 Apr 2024 07:49) (70 - 74)  BP: 122/70 (01 Apr 2024 07:49) (110/54 - 197/88)  BP(mean): 98 (01 Apr 2024 05:53) (75 - 98)  RR: 18 (01 Apr 2024 07:49) (14 - 18)  SpO2: 97% (01 Apr 2024 07:49) (96% - 98%)    Parameters below as of 01 Apr 2024 07:49  Patient On (Oxygen Delivery Method): room air        PHYSICAL EXAM:  GENERAL: NAD, well-groomed, well-developed  HEAD:  Atraumatic, Normocephalic  EYES: EOMI, PERRLA  ENMT: Moist mucous membranes, No lesions; No tonsillar erythema  NECK: Supple, No JVD, Normal thyroid  NERVOUS SYSTEM:  Alert & Oriented X3, Good concentration; All 4 extremities mobile, no gross sensory deficits.   CHEST/LUNG: Clear to auscultation bilaterally; No rales, rhonchi, wheezing, or rubs  HEART: Regular rate and rhythm; No murmurs, rubs, or gallops  ABDOMEN: Soft, Nontender, Nondistended; Bowel sounds present  EXTREMITIES:  + Pulses, No clubbing, cyanosis, or edema  SKIN: No rashes or lesions    MEDICATIONS  (STANDING):  atenolol  Tablet 50 milliGRAM(s) Oral daily  cholecalciferol 2000 Unit(s) Oral daily  famotidine    Tablet 20 milliGRAM(s) Oral daily  fludroCORTISONE 0.1 milliGRAM(s) Oral daily  fluvoxaMINE 50 milliGRAM(s) Oral daily  heparin   Injectable 5000 Unit(s) SubCutaneous every 12 hours  hydrALAZINE Injectable 10 milliGRAM(s) IV Push every 6 hours  hydrocortisone 2.5% Rectal Cream 1 Application(s) Rectal three times a day  LORazepam     Tablet 0.5 milliGRAM(s) Oral three times a day  mirtazapine 7.5 milliGRAM(s) Oral at bedtime  predniSONE   Tablet 5 milliGRAM(s) Oral daily    MEDICATIONS  (PRN):  acetaminophen     Tablet .. 650 milliGRAM(s) Oral every 6 hours PRN Temp greater or equal to 38C (100.4F), Mild Pain (1 - 3)  aluminum hydroxide/magnesium hydroxide/simethicone Suspension 30 milliLiter(s) Oral every 4 hours PRN Dyspepsia  bisacodyl Suppository 10 milliGRAM(s) Rectal daily PRN Constipation  hydrOXYzine hydrochloride 25 milliGRAM(s) Oral four times a day PRN Anxiety  meclizine 12.5 milliGRAM(s) Oral three times a day PRN Dizziness  melatonin 3 milliGRAM(s) Oral at bedtime PRN Insomnia  ondansetron Injectable 4 milliGRAM(s) IV Push every 8 hours PRN Nausea and/or Vomiting  polyethylene glycol 3350 17 Gram(s) Oral daily PRN Constipation  traMADol 50 milliGRAM(s) Oral four times a day PRN Severe Pain (7 - 10)      Allergies    No Known Allergies    Intolerances          LABS:      Ca    8.9        31 Mar 2024 07:17      PT/INR - ( 30 Mar 2024 19:12 )   PT: 15.3 sec;   INR: 1.38 ratio         PTT - ( 30 Mar 2024 19:12 )  PTT:77.9 sec  Urinalysis Basic - ( 31 Mar 2024 07:17 )    Color: x / Appearance: x / SG: x / pH: x  Gluc: 111 mg/dL / Ketone: x  / Bili: x / Urobili: x   Blood: x / Protein: x / Nitrite: x   Leuk Esterase: x / RBC: x / WBC x   Sq Epi: x / Non Sq Epi: x / Bacteria: x      CAPILLARY BLOOD GLUCOSE          RADIOLOGY & ADDITIONAL TESTS:        Care Discussed with Consultants/Other Providers:    Advanced Directives: [ ] DNR  [ ] No feeding tube  [ ] MOLST in chart  [ ] MOLST completed today  [ ] Unknown   Patient is a 73y old  Male who presents with a chief complaint of Per H&P "73y M w/ pmh of HTN, ITP, APL syndrome, generalized anxiety disorder, OCD, newly dx aggressive Merkel Cell cancer of forehead- pt's oncologist  Dr. Eng, at [facility] presents with complaints of Chest pain, anxiety and vertigo. Patient is a poor historian, history supplemented by wife. As per patient he has had dizziness all day, He is unable to describe whether he feels lightheadedness or vertigo, reports he feels like he is "moving when my eyes closed". The feeling has been persistent and constant, no worsening or alleviating factors. Patients wife at bedside (Joelle) reports he has had vertigo in the past, and she thinks its because he got water in his ear while showering. He also complaints of chest pain, that is constant, reports its from his anxiety, feels tightness in his chest. Pain in chest is non-radiating, constant, not positional or associated with meals."  (31 Mar 2024 15:33)      INTERVAL HPI/OVERNIGHT EVENTS:  PAtients een and examined in am, denied chest pain, vertigo, stated he was going to die of starvation    ROS reviewed and is otherwise negative        Vital Signs Last 24 Hrs  T(C): 36.6 (01 Apr 2024 07:49), Max: 36.9 (01 Apr 2024 05:53)  T(F): 97.8 (01 Apr 2024 07:49), Max: 98.5 (01 Apr 2024 05:53)  HR: 70 (01 Apr 2024 07:49) (70 - 74)  BP: 122/70 (01 Apr 2024 07:49) (110/54 - 197/88)  BP(mean): 98 (01 Apr 2024 05:53) (75 - 98)  RR: 18 (01 Apr 2024 07:49) (14 - 18)  SpO2: 97% (01 Apr 2024 07:49) (96% - 98%)    Parameters below as of 01 Apr 2024 07:49  Patient On (Oxygen Delivery Method): room air        PHYSICAL EXAM:  GENERAL: NAD, older male, thin  HEAD:  Atraumatic, Normocephalic  EYES: EOMI, PERRLA  ENMT: Moist mucous membranes, No lesions;   NECK: Supple, No JVD,   NERVOUS SYSTEM:  Alert & Oriented X3, CN 2-12 intact, no cerebellar signs  CHEST/LUNG: Clear to auscultation bilaterally; No rales, rhonchi, wheezing, or rubs  HEART: Regular rate and rhythm; No murmurs, rubs, or gallops  ABDOMEN: Soft, Nontender, Nondistended; Bowel sounds present  EXTREMITIES:  + Pulses, trace edema in le  SKIN: No rashes or lesions    MEDICATIONS  (STANDING):  atenolol  Tablet 50 milliGRAM(s) Oral daily  cholecalciferol 2000 Unit(s) Oral daily  famotidine    Tablet 20 milliGRAM(s) Oral daily  fludroCORTISONE 0.1 milliGRAM(s) Oral daily  fluvoxaMINE 50 milliGRAM(s) Oral daily  heparin   Injectable 5000 Unit(s) SubCutaneous every 12 hours  hydrALAZINE Injectable 10 milliGRAM(s) IV Push every 6 hours  hydrocortisone 2.5% Rectal Cream 1 Application(s) Rectal three times a day  LORazepam     Tablet 0.5 milliGRAM(s) Oral three times a day  mirtazapine 7.5 milliGRAM(s) Oral at bedtime  predniSONE   Tablet 5 milliGRAM(s) Oral daily    MEDICATIONS  (PRN):  acetaminophen     Tablet .. 650 milliGRAM(s) Oral every 6 hours PRN Temp greater or equal to 38C (100.4F), Mild Pain (1 - 3)  aluminum hydroxide/magnesium hydroxide/simethicone Suspension 30 milliLiter(s) Oral every 4 hours PRN Dyspepsia  bisacodyl Suppository 10 milliGRAM(s) Rectal daily PRN Constipation  hydrOXYzine hydrochloride 25 milliGRAM(s) Oral four times a day PRN Anxiety  meclizine 12.5 milliGRAM(s) Oral three times a day PRN Dizziness  melatonin 3 milliGRAM(s) Oral at bedtime PRN Insomnia  ondansetron Injectable 4 milliGRAM(s) IV Push every 8 hours PRN Nausea and/or Vomiting  polyethylene glycol 3350 17 Gram(s) Oral daily PRN Constipation  traMADol 50 milliGRAM(s) Oral four times a day PRN Severe Pain (7 - 10)      Allergies    No Known Allergies    Intolerances          LABS:      Ca    8.9        31 Mar 2024 07:17      PT/INR - ( 30 Mar 2024 19:12 )   PT: 15.3 sec;   INR: 1.38 ratio         PTT - ( 30 Mar 2024 19:12 )  PTT:77.9 sec  Urinalysis Basic - ( 31 Mar 2024 07:17 )    Color: x / Appearance: x / SG: x / pH: x  Gluc: 111 mg/dL / Ketone: x  / Bili: x / Urobili: x   Blood: x / Protein: x / Nitrite: x   Leuk Esterase: x / RBC: x / WBC x   Sq Epi: x / Non Sq Epi: x / Bacteria: x      CAPILLARY BLOOD GLUCOSE          RADIOLOGY & ADDITIONAL TESTS:        Care Discussed with Consultants/Other Providers:    Advanced Directives: [ ] DNR  [ ] No feeding tube  [ ] MOLST in chart  [ ] MOLST completed today  [ ] Unknown

## 2024-04-01 NOTE — CAREGIVER ENGAGEMENT NOTE - CAREGIVER NAME
Physical Therapy Daily Treatment      Visit Count: 3 of 16 (visits) to 4/17/17 (date)  Authorization Status: medical necessity  Next Referring Provider Visit: 4/12/17    Initial Evaluation Date: 3/20/17  Referred by: SHWETA EscobarC  Medical Diagnosis (from order):  M25.372 Ankle instability, left  Z98.890 Status post left foot surgery   Primary Insurance: [employer]  Secondary Insurance: N/A    Date of Surgery: 1/25/17; surgery performed: left ankle arthroscopy with Brostrom repair; rehabilitation guidelines: no  Diagnosis Precautions: WBAT  Relevant co-morbidities and medications: Taking tylenol.    Relevant Tests: Relevant diagnostic tests: plain film radiograph     SUBJECTIVE   Saw Dr. Miller this afternoon. Continue therapy. Unclear if he should progress to shoe and when..  Current Pain: 0/10. Pain at worst 1/10 with exercise occasionally  Functional Change: Compliant with HEP. Removing the boot when not WB. Hip muscles are sore from working them.    OBJECTIVE   Posture/Observation:  Swelling lateral left ankle; more achilles definition    Range of Motion (degrees): Ankle Active Range of Motion    Norm Left Right Involved   Date   Initial Initial 3/1/17    Dorsiflexion 20 Lacks 13* 3 2    Plantar Flexion 50 22* 55  37   Inversion 35   29     Eversion 15   20 8    First MTP Dorsiflexion 60 15 15     First MTP Plantar Flexion         Palpation:  Tender anterior and inferior to lateral malleolus    Treatment   Therapeutic Exercise:   LE strengthening:  - side lying hip circles  - side lying leg kicks  - bridging x 8 reps with ankles DF  Ankle Exercises  - supine hook lying ankle PF isometrics with towel support under forefoot  - isometric DF, PF x 10 reps  - alternating DF/PF isometrics with resistance from therapist  - active eversion in supine  - stationary bike, seat height 13 with AA by therapist to complete revolution initially - 5\" total. Donned shoe for cycle  - sitting stool closed  Joelle Henley chain work using UE's for DF/PF with shoe on    Manual Therapy:   STM achilles tendon in supine; followed with active DF/PF  Metatarsal glides  talor glides for DF    Therapeutic Activity:  Ice for treatment soreness and after exercise     Current Home Program (not performed this date except as noted above):   Stretching:  Passive toe extension with plastic wrap  Passive great toe extension   Calf stretch with towel with 30 second holds  Sitting ankle slide into DF/PF; use rolling stool with UE assist    Strengthening:  Hip control - clam shells, prone hip extension, knee extension on home gym  Hip control - hip circles in side lying  Sitting heel raises  Sitting toe flexion over book  Ankle isometrics - DF/PF, progress to eversion     ASSESSMENT   Improved plantarflexion. Challenged with ankle motion on stationary bicycle.     Pain after treatment: 0/10  Result of above outlined education: Verbalizes understanding and Demonstrates understanding    Goals:       To be obtained by end of this plan of care:  1. Patient independent with modified and progressed home exercise program.  2. Patient will decrease involved ankle pain to 3/10 to aid in age appropriate activities.   3. Patient will increase involved ankle active range of motion to 10° Dorsiflexion to aid in normalization of gait for independent living.   4. Patient will ambulate community distances on even and uneven terrain with normal gait pattern without assistive device and without loss of balance.  5. Patient will be able to ascend and descend 1 flight of stairs reciprocal with minimal pain/difficulty.  6. Lower Extremity Functional Scale: Patient will complete form to reflect an improved score from initial score of 38 to greater than or equal to 50 (0=extreme difficulty; 80=no difficulty) to indicate pt reported improvement in function/disability/impairment (minimal detectable change: 9 points).      PLAN   Continue stationary bike working towards  independence at proper rpm. Continue ROM work in DF, PF, eversion. Try sitting foam roll or tilt boards.  If ready, start isotonics with resistive bands.  possible isotonics with band. Manual therapy to soft tissue. Trial of standing calf stretch when feasible     THERAPY DAILY BILLING   Primary Insurance: [employer]  Secondary Insurance: N/A    Evaluation Procedures:  No evaluation codes were used on this date of service    Timed Procedures:  Manual Therapy, 15 minutes  Therapeutic Exercise, 40 minutes    Untimed Procedures:  No untimed codes were used on this date of service    Total Treatment Time: 55 minutes

## 2024-04-01 NOTE — CARE COORDINATION ASSESSMENT. - OTHER PERTINENT DISCHARGE PLANNING INFORMATION:
pt followed by psychiatrist at [facility].  HX of depression anxiety and OCD

## 2024-04-01 NOTE — PROGRESS NOTE ADULT - NUTRITIONAL ASSESSMENT
This patient has been assessed with a concern for Malnutrition and has been determined to have a diagnosis/diagnoses of Severe protein-calorie malnutrition and Underweight (BMI < 19).    This patient is being managed with:   Diet NPO with Tube Feed-  Tube Feeding Modality: Gastrostomy  Glucerna 1.5 Ian  Total Volume for 24 Hours (mL): 1530  Continuous  Starting Tube Feed Rate {mL per Hour}: 85  Until Goal Tube Feed Rate (mL per Hour): 85  Tube Feed Duration (in Hours): 18  Tube Feed Start Time: 18:00  Volume Based Feeding Titration:  If the patient has achieved and tolerated the prescribed goal rate and tube feeding has been held within a 24hr period titrate tube feeding rate based on guidelines up to a maximum rate of 120mL/hr  Free Water Flush  Bolus   Total Volume per Flush (mL): 200   Frequency: Every 8 Hours  Entered: Mar 31 2024  2:10AM

## 2024-04-01 NOTE — CONSULT NOTE ADULT - ASSESSMENT
The patient is a 73 year old male with a history of HTN, ITP, APLS, anxiety, OCD, Merkel cell cancer who presents with dizziness and chest pain.    Plan:  - ECG with no evidence of ischemia or infarction  - An acute MI is ruled out with two sets of cardiac enzymes  - CTA chest last week negative for acute PE; small pleural effusions present  - Orthostatic hypotension - continue fludrocortisone. Would not treat supine HTN. If SBP>200, would sit/stand patient and re-assess SBP first.  - Dizziness - CTA head and neck with no acute pathology. Neurologic work-up as indicated.
Pt is seen for Dizziness.  C/o Lightheadedness and Vertigo.  No lateralized weakness..  Limited but non focal exam.  CT Head - No acute pathology.  CVA is much less likely.  Likely has peripheral vertigo  Meclizine 25 mg PO TID.  MRI Brain is refused by the pt.   Would get Repeat CT head in am of 4/2  D/w Pt at bedside. Questions answered.  Spoke to Wife - Joelle Henley [phone]. Questions answered.   Would continue to follow.

## 2024-04-01 NOTE — PROGRESS NOTE ADULT - SUBJECTIVE AND OBJECTIVE BOX
Patient is a 73y old  Male who presents with a chief complaint of dizziness.    HPI: 73y M w/ pmh of HTN, ITP, APL syndrome, generalized anxiety disorder, OCD, newly dx aggressive Merkel Cell cancer of forehead- pt's oncologist  Dr. Eng, at [facility] on [facility] presents with complaints of Chest pain, anxiety and vertigo. Patient is a poor historian, history supplemented by wife. As per patient he has had dizziness all day, He is unable to describe whether he feels lightheadedness or vertigo, reports he feels like he is "moving when my eyes closed". The feeling has been persistent and constant, no worsening or alleviating factors. Patients wife at bedside (Joelle) reports he has had vertigo in the past, and she thinks its because he got water in his ear while showering. He also complaints of chest pain, that is constant, reports its from his anxiety, feels tightness in his chest. Pain in chest is non-radiating, constant, not positional or associated with meals.   Of note patient has had a recent complicated hospital course, was admitted from 10/24 - 12/26/23. Pt was admitted and seen by neurology, psychiatry, ID, endocrinology, cardiology, on oncology. He had very poor oral intake due to a delusion that he had metal objects in his mouth and throat. He underwent a neurological workup, was trialed on Luvox, remeron, seroquel, haldol, and zyprexa without improvement. Eventually he consented to a PEG. course was complicated by adrenal insufficiency and orthostatic hypotension. His cortisol levels were low and he was started on prednisone and florinef with some improvement. Course was also complicated by sepsis due to UTI, thrombophlebitis. As per wife he has been slowly getting worse, has been bedbound with no motivation to move.     In ED labs noted for WBC 6.22, H/H 11/32, Plt 95, with rouleaux formation on peripheral smear, PTT/PT/INR 77/15/15.3, CMP unremarkable, UA sterile. CTH and CTA head and neck negative for acute infarct. Admitted to medicine for uncontrolled HTN, Nausea and Vertigo.     Wife at bedside requests daily updates by MD - Joelle Henley [phone]  (30 Mar 2024 22:27)    NIHSS  -0  CT head  -No acute pathology  CTA Head and Neck  No LVOs    Interval hisotry  -    No distress    MEDICATIONS  (STANDING):    atenolol  Tablet 50 milliGRAM(s) Oral daily  cholecalciferol 2000 Unit(s) Oral daily  famotidine    Tablet 20 milliGRAM(s) Oral daily  fludroCORTISONE 0.2 milliGRAM(s) Oral daily  fluvoxaMINE 50 milliGRAM(s) Oral daily  heparin   Injectable 5000 Unit(s) SubCutaneous every 12 hours  hydrALAZINE Injectable 10 milliGRAM(s) IV Push every 6 hours  hydrocortisone 2.5% Rectal Cream 1 Application(s) Rectal three times a day  LORazepam     Tablet 0.5 milliGRAM(s) Oral three times a day  mirtazapine 7.5 milliGRAM(s) Oral at bedtime  predniSONE   Tablet 5 milliGRAM(s) Oral daily    MEDICATIONS  (PRN):    acetaminophen     Tablet .. 650 milliGRAM(s) Oral every 6 hours PRN Temp greater or equal to 38C (100.4F), Mild Pain (1 - 3)  aluminum hydroxide/magnesium hydroxide/simethicone Suspension 30 milliLiter(s) Oral every 4 hours PRN Dyspepsia  bisacodyl Suppository 10 milliGRAM(s) Rectal daily PRN Constipation  meclizine 12.5 milliGRAM(s) Oral three times a day PRN Dizziness  melatonin 3 milliGRAM(s) Oral at bedtime PRN Insomnia  ondansetron Injectable 4 milliGRAM(s) IV Push every 8 hours PRN Nausea and/or Vomiting  polyethylene glycol 3350 17 Gram(s) Oral daily PRN Constipation  traMADol 50 milliGRAM(s) Oral four times a day PRN Severe Pain (7 - 10)    Allergies    No Known Allergies    REVIEW OF SYSTEMS:    CONSTITUTIONAL: No fever  EYES: No eye pain,   ENMT:  No sinus or throat pain  NECK: No pain or stiffness  RESPIRATORY: No cough, No hemoptysis; No shortness of breath  CARDIOVASCULAR: No acute chest pain, palpitations,  or leg swelling  GASTROINTESTINAL: PEG in place  GENITOURINARY: No  hematuria, or incontinence  MUSCULOSKELETAL: No joint swelling; No extremity pain  SKIN: No itching, rashes, or lesions   LYMPH NODES: No enlarged glands  NEUROLOGICAL: No headaches, memory loss,   PSYCHIATRIC: No depression, anxiety, mood swings, or difficulty sleeping  ENDOCRINE: No heat or cold intolerance;   HEME/LYMPH: No easy bruising, or bleeding gums  Allergy/Immunology. No medication allergy. No seasonal allergies.    PHYSICAL EXAM:    Vital Signs Last 24 Hrs    T(C): 36.9 (01 Apr 2024 14:36), Max: 36.9 (01 Apr 2024 05:53)  T(F): 98.4 (01 Apr 2024 14:36), Max: 98.5 (01 Apr 2024 05:53)  HR: 66 (01 Apr 2024 14:36) (66 - 74)  BP: 144/75 (01 Apr 2024 14:36) (110/54 - 159/79)  BP(mean): 98 (01 Apr 2024 05:53) (75 - 98)  RR: 18 (01 Apr 2024 14:36) (16 - 18)  SpO2: 97% (01 Apr 2024 14:36) (96% - 97%)    Parameters below as of 01 Apr 2024 14:36  Patient On (Oxygen Delivery Method): room air    GENERAL: NAD, well-groomed, well-developed  HEAD:  Atraumatic, Normocephalic  EYES: EOMI, PERRLA, conjunctiva and sclera clear  NECK: Supple, No JVD    On Neurological Examination:    Mental Status - Pt is alert, awake, oriented X3. Higher functions are intact. Follows commands well and able to answer questions appropriately.    Speech -  Normal.  Pt has no aphasia.    Cranial Nerves - Pupils 3 mm equal and reactive to light, extraocular eye movements intact. Pt has no visual field deficit.  No facial asymmetry. Tongue - is in midline.    Motor Exam - 3+/5 all over, No drift. No shaking or tremors.    Sensory Exam -  Pt withdraws all extremities equally on stimulation.     Gait - Bed bound.     Deep tendon Reflexes - 2 plus all over.    Coordination - Fine finger movements are normal on both sides. Finger to nose is also normal on both sides.         Neck Supple -  Yes.    LABS:             CBC Full  -  ( 31 Mar 2024 07:17 )  WBC Count : 6.20 K/uL  RBC Count : 3.65 M/uL  Hemoglobin : 11.6 g/dL  Hematocrit : 32.5 %  Platelet Count - Automated : 89 K/uL  Mean Cell Volume : 89.0 fl  Mean Cell Hemoglobin : 31.8 pg  Mean Cell Hemoglobin Concentration : 35.7 gm/dL  Auto Neutrophil # : 4.39 K/uL  Auto Lymphocyte # : 1.14 K/uL  Auto Monocyte # : 0.50 K/uL  Auto Eosinophil # : 0.14 K/uL  Auto Basophil # : 0.02 K/uL  Auto Neutrophil % : 70.7 %  Auto Lymphocyte % : 18.4 %  Auto Monocyte % : 8.1 %  Auto Eosinophil % : 2.3 %  Auto Basophil % : 0.3 %    03-31    145  |  106  |  14  ----------------------------<  111<H>  3.4<L>   |  32<H>  |  0.56    Ca    8.9      31 Mar 2024 07:17  Phos  3.7     03-30  Mg     1.7     03-30    TPro  5.9<L>  /  Alb  3.2<L>  /  TBili  0.7  /  DBili  x   /  AST  22  /  ALT  27  /  AlkPhos  55  03-31    RADIOLOGY & ADDITIONAL STUDIES:    < from: CT Head No Cont (03.30.24 @ 19:57) >    HEAD CT: No evidence of an acute intracranial hemorrhage midline shift or hydrocephalus. Moderate atrophy with mild chronic white matter ischemic changes  NECK CTA: No hemodynamic significant narrowing within the neck.  BRAIN CTA: No proximal large vessel occlusion.    < end of copied text >

## 2024-04-02 ENCOUNTER — TRANSCRIPTION ENCOUNTER (OUTPATIENT)
Age: 74
End: 2024-04-02

## 2024-04-02 VITALS
OXYGEN SATURATION: 95 % | RESPIRATION RATE: 17 BRPM | DIASTOLIC BLOOD PRESSURE: 69 MMHG | TEMPERATURE: 98 F | HEART RATE: 75 BPM | SYSTOLIC BLOOD PRESSURE: 164 MMHG

## 2024-04-02 LAB
ANION GAP SERPL CALC-SCNC: 8 MMOL/L — SIGNIFICANT CHANGE UP (ref 5–17)
BUN SERPL-MCNC: 27 MG/DL — HIGH (ref 7–23)
CALCIUM SERPL-MCNC: 9.3 MG/DL — SIGNIFICANT CHANGE UP (ref 8.4–10.5)
CHLORIDE SERPL-SCNC: 105 MMOL/L — SIGNIFICANT CHANGE UP (ref 96–108)
CO2 SERPL-SCNC: 31 MMOL/L — SIGNIFICANT CHANGE UP (ref 22–31)
CREAT SERPL-MCNC: 0.71 MG/DL — SIGNIFICANT CHANGE UP (ref 0.5–1.3)
EGFR: 96 ML/MIN/1.73M2 — SIGNIFICANT CHANGE UP
GLUCOSE SERPL-MCNC: 139 MG/DL — HIGH (ref 70–99)
HCT VFR BLD CALC: 34.3 % — LOW (ref 39–50)
HGB BLD-MCNC: 11.9 G/DL — LOW (ref 13–17)
MCHC RBC-ENTMCNC: 30.7 PG — SIGNIFICANT CHANGE UP (ref 27–34)
MCHC RBC-ENTMCNC: 34.7 GM/DL — SIGNIFICANT CHANGE UP (ref 32–36)
MCV RBC AUTO: 88.4 FL — SIGNIFICANT CHANGE UP (ref 80–100)
NRBC # BLD: 0 /100 WBCS — SIGNIFICANT CHANGE UP (ref 0–0)
PLATELET # BLD AUTO: 117 K/UL — LOW (ref 150–400)
POTASSIUM SERPL-MCNC: 3.5 MMOL/L — SIGNIFICANT CHANGE UP (ref 3.5–5.3)
POTASSIUM SERPL-SCNC: 3.5 MMOL/L — SIGNIFICANT CHANGE UP (ref 3.5–5.3)
RBC # BLD: 3.88 M/UL — LOW (ref 4.2–5.8)
RBC # FLD: 14.2 % — SIGNIFICANT CHANGE UP (ref 10.3–14.5)
SODIUM SERPL-SCNC: 144 MMOL/L — SIGNIFICANT CHANGE UP (ref 135–145)
WBC # BLD: 7.18 K/UL — SIGNIFICANT CHANGE UP (ref 3.8–10.5)
WBC # FLD AUTO: 7.18 K/UL — SIGNIFICANT CHANGE UP (ref 3.8–10.5)

## 2024-04-02 PROCEDURE — 70498 CT ANGIOGRAPHY NECK: CPT | Mod: MC

## 2024-04-02 PROCEDURE — 96361 HYDRATE IV INFUSION ADD-ON: CPT

## 2024-04-02 PROCEDURE — 85730 THROMBOPLASTIN TIME PARTIAL: CPT

## 2024-04-02 PROCEDURE — 70450 CT HEAD/BRAIN W/O DYE: CPT | Mod: MC

## 2024-04-02 PROCEDURE — 85610 PROTHROMBIN TIME: CPT

## 2024-04-02 PROCEDURE — 70496 CT ANGIOGRAPHY HEAD: CPT | Mod: MC

## 2024-04-02 PROCEDURE — 96375 TX/PRO/DX INJ NEW DRUG ADDON: CPT

## 2024-04-02 PROCEDURE — 87640 STAPH A DNA AMP PROBE: CPT

## 2024-04-02 PROCEDURE — 70450 CT HEAD/BRAIN W/O DYE: CPT | Mod: 26

## 2024-04-02 PROCEDURE — 84100 ASSAY OF PHOSPHORUS: CPT

## 2024-04-02 PROCEDURE — 80053 COMPREHEN METABOLIC PANEL: CPT

## 2024-04-02 PROCEDURE — 83735 ASSAY OF MAGNESIUM: CPT

## 2024-04-02 PROCEDURE — 97162 PT EVAL MOD COMPLEX 30 MIN: CPT

## 2024-04-02 PROCEDURE — 36415 COLL VENOUS BLD VENIPUNCTURE: CPT

## 2024-04-02 PROCEDURE — 93005 ELECTROCARDIOGRAM TRACING: CPT

## 2024-04-02 PROCEDURE — 96374 THER/PROPH/DIAG INJ IV PUSH: CPT

## 2024-04-02 PROCEDURE — 80048 BASIC METABOLIC PNL TOTAL CA: CPT

## 2024-04-02 PROCEDURE — 84484 ASSAY OF TROPONIN QUANT: CPT

## 2024-04-02 PROCEDURE — 71045 X-RAY EXAM CHEST 1 VIEW: CPT

## 2024-04-02 PROCEDURE — 85025 COMPLETE CBC W/AUTO DIFF WBC: CPT

## 2024-04-02 PROCEDURE — 87641 MR-STAPH DNA AMP PROBE: CPT

## 2024-04-02 PROCEDURE — 99239 HOSP IP/OBS DSCHRG MGMT >30: CPT

## 2024-04-02 PROCEDURE — 99285 EMERGENCY DEPT VISIT HI MDM: CPT | Mod: 25

## 2024-04-02 PROCEDURE — 81001 URINALYSIS AUTO W/SCOPE: CPT

## 2024-04-02 PROCEDURE — 85027 COMPLETE CBC AUTOMATED: CPT

## 2024-04-02 RX ORDER — POLYETHYLENE GLYCOL 3350 17 G/17G
17 POWDER, FOR SOLUTION ORAL
Qty: 0 | Refills: 0 | DISCHARGE
Start: 2024-04-02

## 2024-04-02 RX ORDER — MECLIZINE HCL 12.5 MG
1 TABLET ORAL
Qty: 0 | Refills: 0 | DISCHARGE
Start: 2024-04-02

## 2024-04-02 RX ORDER — LANOLIN ALCOHOL/MO/W.PET/CERES
1 CREAM (GRAM) TOPICAL
Qty: 0 | Refills: 0 | DISCHARGE
Start: 2024-04-02

## 2024-04-02 RX ORDER — ATENOLOL 25 MG/1
50 TABLET ORAL AT BEDTIME
Refills: 0 | Status: DISCONTINUED | OUTPATIENT
Start: 2024-04-02 | End: 2024-04-02

## 2024-04-02 RX ADMIN — Medication 30 MILLILITER(S): at 12:08

## 2024-04-02 RX ADMIN — Medication 0.5 MILLIGRAM(S): at 14:13

## 2024-04-02 RX ADMIN — TRAMADOL HYDROCHLORIDE 50 MILLIGRAM(S): 50 TABLET ORAL at 12:30

## 2024-04-02 RX ADMIN — Medication 1 APPLICATION(S): at 14:13

## 2024-04-02 RX ADMIN — Medication 650 MILLIGRAM(S): at 14:13

## 2024-04-02 RX ADMIN — Medication 1 APPLICATION(S): at 05:31

## 2024-04-02 RX ADMIN — Medication 10 MILLIGRAM(S): at 05:35

## 2024-04-02 RX ADMIN — Medication 10 MILLIGRAM(S): at 00:43

## 2024-04-02 RX ADMIN — FLUVOXAMINE MALEATE 50 MILLIGRAM(S): 25 TABLET ORAL at 12:04

## 2024-04-02 RX ADMIN — ATENOLOL 50 MILLIGRAM(S): 25 TABLET ORAL at 05:31

## 2024-04-02 RX ADMIN — Medication 2000 UNIT(S): at 12:04

## 2024-04-02 RX ADMIN — Medication 650 MILLIGRAM(S): at 14:40

## 2024-04-02 RX ADMIN — Medication 5 MILLIGRAM(S): at 05:31

## 2024-04-02 RX ADMIN — Medication 12.5 MILLIGRAM(S): at 14:14

## 2024-04-02 RX ADMIN — FAMOTIDINE 20 MILLIGRAM(S): 10 INJECTION INTRAVENOUS at 12:04

## 2024-04-02 RX ADMIN — HEPARIN SODIUM 5000 UNIT(S): 5000 INJECTION INTRAVENOUS; SUBCUTANEOUS at 05:31

## 2024-04-02 RX ADMIN — FLUDROCORTISONE ACETATE 0.1 MILLIGRAM(S): 0.1 TABLET ORAL at 05:31

## 2024-04-02 RX ADMIN — Medication 0.5 MILLIGRAM(S): at 05:34

## 2024-04-02 RX ADMIN — Medication 25 MILLIGRAM(S): at 12:04

## 2024-04-02 RX ADMIN — TRAMADOL HYDROCHLORIDE 50 MILLIGRAM(S): 50 TABLET ORAL at 12:07

## 2024-04-02 NOTE — PROGRESS NOTE ADULT - ASSESSMENT
73M admitted for vertigo and uncontrolled BP.     Vertigo - resolved  -Likely peripheral, vs anxiety related.   -Started on meclizine TID prn  - neuro eval noted. repeat CT head in am      Chest pain acs ruled out  -CP suspect anxiety related, patient was recently here with similar complaints on 3/24 and cardiac w/u negative at the time, CTA neg for PE   -C/w anxiolytics.   -Psych eval. - Carl aware  - no further cardiac workup needed at this time    Uncontrolled HTN - with orthostatic hypotension. likely autonomic dysfunction  -As per previous cardio notes, rec to allow for supine HTN up to .  -BP controlled while at bedside   -C/w atenolol  - decreasing florinef to 0.1 mg daily  S/p PEG Tube  -Resume Glucerna tube feeds 85 cc/hr with goal of 1530 cc goal daily  -200cc free water flushes q8h  -Nutrition consult      Deconditioning and bed bound  -Turn and position q2h.  -Air mattress  -Preventive precautions.   -PT eval.     Other chronic problems:  ITP, APL syndrome, generalized anxiety disorder, OCD, Merkel Cell cancer of forehead  -Confirmed medications with NH papers  -C/w home medications.   -Trend Plts     DVT ppx: heparin SubQ - hold if platelets <50k  Fall, bleeding and aspiration precautions.   Requires assistance with everything. 
Pt is seen for Dizziness.  C/o Lightheadedness and Vertigo.  No lateralized weakness..  Limited but non focal exam.  H/o severe anxiety  CT Head - No acute pathology.  CVA is much less likely.  Likely has peripheral vertigo  Meclizine 25 mg PO TID.  MRI Brain is refused by the pt.   Repeat CT head in am  D/w Pt at bedside. Questions answered.  Yesterday I spoke to Wife - Joelle Henley [phone]. Questions answered.   Would continue to follow.    
73M admitted for vertigo and uncontrolled BP.     Vertigo - resolved  -Likely peripheral, vs anxiety related.   -Started on meclizine TID prn  - neuro eval  - PT eval  -     Chest pain acs ruled out  -CP suspect anxiety related, patient was recently here with similar complaints on 3/24 and cardiac w/u negative at the time, CTA neg for PE   -C/w anxiolytics.   -Psych eval.     Uncontrolled HTN  -As per previous cardio notes, rec to allow for supine HTN up to .  -BP controlled while at bedside   -C/w home medications.     S/p PEG Tube  -Resume Glucerna tube feeds 85 cc/hr with goal of 1530 cc goal daily  -200cc free water flushes q8h  -Nutrition consult      Deconditioning and bed bound  -Turn and position q2h.  -Air mattress  -Preventive precautions.   -PT eval.     Other chronic problems:  ITP, APL syndrome, generalized anxiety disorder, OCD, Merkel Cell cancer of forehead  -Confirmed medications with NH papers  -C/w home medications.   -Trend Plts     DVT ppx: heparin SubQ - hold if platelets <50k  Fall, bleeding and aspiration precautions.   Requires assistance with everything. 
The patient is a 73 year old male with a history of HTN, ITP, APLS, anxiety, OCD, Merkel cell cancer who presents with dizziness and chest pain.    Plan:  - ECG with no evidence of ischemia or infarction  - An acute MI is ruled out with two sets of cardiac enzymes  - CTA chest last week negative for acute PE; small pleural effusions present  - Orthostatic hypotension - continue fludrocortisone. Would not treat supine HTN. If SBP>200, would sit/stand patient and re-assess SBP first.  - Dizziness - CTA head and neck with no acute pathology. Neurologic work-up as indicated.
Pt is seen for Dizziness.  C/o Lightheadedness and Vertigo.  No lateralized weakness..  Limited but non focal exam.  H/o severe anxiety  CT Head - No acute pathology.  MRI Brain is refused by the pt.   Repeat CT head is done  Unchanged.  CVA is ruled out.   Symptoms are Likely sec to peripheral vertigo and Anxiety  Meclizine 25 mg PO TID PRN  D/w Pt at bedside. Questions answered.  Spoke to Wife - Joelle Henley [phone]. Questions answered.   Stable neurologically for discharge.

## 2024-04-02 NOTE — DISCHARGE NOTE PROVIDER - NSDCCPCAREPLAN_GEN_ALL_CORE_FT
PRINCIPAL DISCHARGE DIAGNOSIS  Diagnosis: Uncontrolled hypertension  Assessment and Plan of Treatment: 2/2 autonomic dysfunction with concomitant orthostatic hypotension, allow for SBP up to 200, changed atenolol to bedtime, decreased florinef to 0.1 mg daily.      SECONDARY DISCHARGE DIAGNOSES  Diagnosis: Vertigo  Assessment and Plan of Treatment: CVA ruled out, follow up with neurology as needed, psychiatry

## 2024-04-02 NOTE — DISCHARGE NOTE PROVIDER - NSDCMRMEDTOKEN_GEN_ALL_CORE_FT
acetaminophen 325 mg oral tablet: 2 tab(s) orally 4 times a day  aluminum hydroxide-magnesium hydroxide 200 mg-200 mg/5 mL oral suspension: 30 milliliter(s) orally every 4 hours As needed Dyspepsia  atenolol 50 mg oral tablet: 1 tab(s) orally once a day (at bedtime)  cholecalciferol 50 mcg (2000 intl units) oral tablet: 1 tab(s) orally once a day  fludrocortisone 0.1 mg oral tablet: 1 tab(s) orally once a day  hydrocortisone 2.5% rectal cream with applicator: rectal 3 times a day  LORazepam 0.5 mg oral tablet: 1 tab(s) orally 3 times a day  Luvox 50 mg oral tablet: 1 tab(s) orally once a day  meclizine 12.5 mg oral tablet: 1 tab(s) orally 3 times a day As needed Dizziness  melatonin 3 mg oral tablet: 1 tab(s) orally once a day (at bedtime) As needed Insomnia  mirtazapine 15 mg oral tablet: 1 tab(s) orally once a day  Pepcid 20 mg oral tablet: 1 tab(s) orally once a day  polyethylene glycol 3350 oral powder for reconstitution: 17 gram(s) orally once a day As needed Constipation  predniSONE 5 mg oral tablet: 1 tab(s) orally once a day  traMADol 50 mg oral tablet: 1 tab(s) orally 4 times a day

## 2024-04-02 NOTE — DISCHARGE NOTE NURSING/CASE MANAGEMENT/SOCIAL WORK - PATIENT PORTAL LINK FT
You can access the FollowMyHealth Patient Portal offered by [facility] by registering at the following website: [email or web address]. By joining [facility]’s FollowMyHealth portal, you will also be able to view your health information using other applications (apps) compatible with our system.

## 2024-04-02 NOTE — SOCIAL WORK PROGRESS NOTE - NSSWPROGRESSNOTE_GEN_ALL_CORE
Per discussion w/ inpatient interdisciplinary treatment team this AM, patient is medically cleared for transition to next level of care today, 04/02/2024.  sent updated clinical documentation to patient's long-term care skilled nursing facility, [facility] in [place], and then spoke w/ Alex VACA in administration @ p [phone] who confirmed that patient has been accepted back w/ a resumption of care date/time for today, 04/02/2024, @ 14:30. Afterward,  left voicemail for patient's wife @ p [phone] to notify her of the above, though  witnessed call b/w wife & attending MD Hsieh earlier this AM confirming that she was understanding of and agreeable to the above discharge plan. Next,  requested non-emergent ambulance for discharge transport via sending electronic referral to [facility] -- trip assigned to transport provider ERIN @ [phone], and no pre-authorization required for non-emergent ambulance as patient's primary health insurance is Medicare.    Attending MD Hsieh & unit RN Nazario both aware. Packet of clinical information, including non-emergent ambulance transport certification form, left in RN station on [address] to accompany patient to receiving facility.  to remain available for any continued needs.

## 2024-04-02 NOTE — DISCHARGE NOTE PROVIDER - HOSPITAL COURSE
73M,  pmh of HTN, ITP, APL syndrome, generalized anxiety disorder, OCD, newly dx aggressive Merkel Cell cancer of forehead- pt's oncologist  [facility] [place] and uncontrolled BP. Treated as followed    Vertigo - resolved  -Likely peripheral, vs anxiety related.   -Started on meclizine TID prn  - seen by neurology, has PPM, had repeat CT x2 which were negative for CVA. no cerebellar signs, CVA ruled ou.       Chest pain acs ruled out  -CP suspect anxiety related, patient was recently here with similar complaints on 3/24 and cardiac w/u negative at the time, CTA neg for PE   -C/w anxiolytics.   - was seen by psychiatry, cardiology. No further workup necessary at this time.     Uncontrolled HTN - with orthostatic hypotension. likely autonomic dysfunction  -As per previous cardio notes, rec to allow for supine HTN up to . Changed atenolol to at bedside time improve elevations overnight while supine.   - decreased florinef to 0.1 mg daily    Failure to thrive  S/p PEG Tube  -Resume Glucerna tube feeds 85 cc/hr with goal of 1530 cc goal daily  -200cc free water flushes q8h  -patient is able to eat, continue home diet        Other chronic problems:  ITP, APL syndrome, generalized anxiety disorder, OCD, Merkel Cell cancer of forehead  -C/w home medications.   -platelets were stable  - follow up with psychiatry as outpatient

## 2024-04-02 NOTE — CAREGIVER ENGAGEMENT NOTE - CAREGIVER EDUCATION NOTES - FREE TEXT
Per discussion w/ inpatient interdisciplinary treatment team this AM, patient is medically cleared for transition to next level of care today, 04/02/2024.  sent updated clinical documentation to patient's long-term care skilled nursing facility, [facility] in [place], and then spoke w/ Alex VACA in administration @ p [phone] who confirmed that patient has been accepted back w/ a resumption of care date/time for today, 04/02/2024, @ 14:30. Afterward,  left voicemail for patient's wife @ p [phone] to notify her of the above, though  witnessed call b/w wife & attending MD Hsieh earlier this AM confirming that she was understanding of and agreeable to the above discharge plan. Next,  requested non-emergent ambulance for discharge transport via sending electronic referral to [facility] -- trip assigned to transport provider ERIN @ [phone], and no pre-authorization required for non-emergent ambulance as patient's primary health insurance is Medicare.    Attending MD Hsieh & unit RN Nazario both aware. Packet of clinical information, including non-emergent ambulance transport certification form, left in RN station on [address] to accompany patient to receiving facility.  to remain available for any continued needs.

## 2024-04-02 NOTE — DISCHARGE NOTE PROVIDER - ATTENDING DISCHARGE PHYSICAL EXAMINATION:
GENERAL: NAD, older male, thin  HEAD:  Atraumatic, Normocephalic  EYES: EOMI, PERRLA  ENMT: Moist mucous membranes, No lesions;   NECK: Supple, No JVD,   NERVOUS SYSTEM:  Alert & Oriented X3, CN 2-12 intact, no cerebellar signs  CHEST/LUNG: Clear to auscultation bilaterally; No rales, rhonchi, wheezing, or rubs  HEART: Regular rate and rhythm; No murmurs, rubs, or gallops  ABDOMEN: Soft, Nontender, Nondistended; Bowel sounds present  EXTREMITIES:  + Pulses, trace edema in le  SKIN: No rashes or lesions

## 2024-04-02 NOTE — PROGRESS NOTE ADULT - SUBJECTIVE AND OBJECTIVE BOX
Patient is a 73y old  Male who presents with a chief complaint of dizziness.    HPI: 73y M w/ pmh of HTN, ITP, APL syndrome, generalized anxiety disorder, OCD, newly dx aggressive Merkel Cell cancer of forehead- pt's oncologist  Dr. Eng, at [facility] on [facility] presents with complaints of Chest pain, anxiety and vertigo. Patient is a poor historian, history supplemented by wife. As per patient he has had dizziness all day, He is unable to describe whether he feels lightheadedness or vertigo, reports he feels like he is "moving when my eyes closed". The feeling has been persistent and constant, no worsening or alleviating factors. Patients wife at bedside (Joelle) reports he has had vertigo in the past, and she thinks its because he got water in his ear while showering. He also complaints of chest pain, that is constant, reports its from his anxiety, feels tightness in his chest. Pain in chest is non-radiating, constant, not positional or associated with meals.   Of note patient has had a recent complicated hospital course, was admitted from 10/24 - 12/26/23. Pt was admitted and seen by neurology, psychiatry, ID, endocrinology, cardiology, on oncology. He had very poor oral intake due to a delusion that he had metal objects in his mouth and throat. He underwent a neurological workup, was trialed on Luvox, remeron, seroquel, haldol, and zyprexa without improvement. Eventually he consented to a PEG. course was complicated by adrenal insufficiency and orthostatic hypotension. His cortisol levels were low and he was started on prednisone and florinef with some improvement. Course was also complicated by sepsis due to UTI, thrombophlebitis. As per wife he has been slowly getting worse, has been bedbound with no motivation to move.     In ED labs noted for WBC 6.22, H/H 11/32, Plt 95, with rouleaux formation on peripheral smear, PTT/PT/INR 77/15/15.3, CMP unremarkable, UA sterile. CTH and CTA head and neck negative for acute infarct. Admitted to medicine for uncontrolled HTN, Nausea and Vertigo.     Wife at bedside requests daily updates by MD - Joelle Henley [phone]  (30 Mar 2024 22:27)    NIHSS  -0  CT head  -No acute pathology  CTA Head and Neck  No LVOs    Interval hisotry  -    No distress    MEDICATIONS  (STANDING):    atenolol  Tablet 50 milliGRAM(s) Oral daily  cholecalciferol 2000 Unit(s) Oral daily  famotidine    Tablet 20 milliGRAM(s) Oral daily  fludroCORTISONE 0.2 milliGRAM(s) Oral daily  fluvoxaMINE 50 milliGRAM(s) Oral daily  heparin   Injectable 5000 Unit(s) SubCutaneous every 12 hours  hydrALAZINE Injectable 10 milliGRAM(s) IV Push every 6 hours  hydrocortisone 2.5% Rectal Cream 1 Application(s) Rectal three times a day  LORazepam     Tablet 0.5 milliGRAM(s) Oral three times a day  mirtazapine 7.5 milliGRAM(s) Oral at bedtime  predniSONE   Tablet 5 milliGRAM(s) Oral daily    MEDICATIONS  (PRN):    acetaminophen     Tablet .. 650 milliGRAM(s) Oral every 6 hours PRN Temp greater or equal to 38C (100.4F), Mild Pain (1 - 3)  aluminum hydroxide/magnesium hydroxide/simethicone Suspension 30 milliLiter(s) Oral every 4 hours PRN Dyspepsia  bisacodyl Suppository 10 milliGRAM(s) Rectal daily PRN Constipation  meclizine 12.5 milliGRAM(s) Oral three times a day PRN Dizziness  melatonin 3 milliGRAM(s) Oral at bedtime PRN Insomnia  ondansetron Injectable 4 milliGRAM(s) IV Push every 8 hours PRN Nausea and/or Vomiting  polyethylene glycol 3350 17 Gram(s) Oral daily PRN Constipation  traMADol 50 milliGRAM(s) Oral four times a day PRN Severe Pain (7 - 10)    Allergies    No Known Allergies    REVIEW OF SYSTEMS:    CONSTITUTIONAL: No fever  EYES: No eye pain,   ENMT:  No sinus or throat pain  NECK: No pain or stiffness  RESPIRATORY: No cough, No hemoptysis; No shortness of breath  CARDIOVASCULAR: No acute chest pain, palpitations,  or leg swelling  GASTROINTESTINAL: PEG in place  GENITOURINARY: No  hematuria, or incontinence  MUSCULOSKELETAL: No joint swelling; No extremity pain  SKIN: No itching, rashes, or lesions   LYMPH NODES: No enlarged glands  NEUROLOGICAL: No headaches, memory loss,   PSYCHIATRIC: No depression, anxiety, mood swings, or difficulty sleeping  ENDOCRINE: No heat or cold intolerance;   HEME/LYMPH: No easy bruising, or bleeding gums  Allergy/Immunology. No medication allergy. No seasonal allergies.    PHYSICAL EXAM:    Vital Signs Last 24 Hrs  T(C): 37.1 (02 Apr 2024 08:43), Max: 37.1 (02 Apr 2024 08:43)  T(F): 98.8 (02 Apr 2024 08:43), Max: 98.8 (02 Apr 2024 08:43)  HR: 80 (02 Apr 2024 08:43) (66 - 98)  BP: 136/73 (02 Apr 2024 08:43) (136/73 - 185/77)  BP(mean): --  RR: 17 (02 Apr 2024 08:43) (17 - 18)  SpO2: 95% (02 Apr 2024 08:43) (95% - 97%)    Parameters below as of 02 Apr 2024 08:43  Patient On (Oxygen Delivery Method): room air    GENERAL: NAD, well-groomed, well-developed  HEAD:  Atraumatic, Normocephalic  EYES: EOMI, PERRLA, conjunctiva and sclera clear  NECK: Supple, No JVD    On Neurological Examination:    Mental Status - Pt is alert, awake, oriented X3. Higher functions are intact. Follows commands well and able to answer questions appropriately.    Speech -  Normal.  Pt has no aphasia.    Cranial Nerves - Pupils 3 mm equal and reactive to light, extraocular eye movements intact. Pt has no visual field deficit.  No facial asymmetry. Tongue - is in midline.    Motor Exam - 3+/5 all over, No drift. No shaking or tremors.    Sensory Exam -  Pt withdraws all extremities equally on stimulation.     Gait - Bed bound.     Deep tendon Reflexes - 2 plus all over.    Coordination - Fine finger movements are normal on both sides. Finger to nose is also normal on both sides.         Neck Supple -  Yes.    LABS:    CBC Full  -  ( 02 Apr 2024 08:05 )  WBC Count : 7.18 K/uL  RBC Count : 3.88 M/uL  Hemoglobin : 11.9 g/dL  Hematocrit : 34.3 %  Platelet Count - Automated : 117 K/uL  Mean Cell Volume : 88.4 fl  Mean Cell Hemoglobin : 30.7 pg  Mean Cell Hemoglobin Concentration : 34.7 gm/dL    04-02    144  |  105  |  27<H>  ----------------------------<  139<H>  3.5   |  31  |  0.71    Ca    9.3      02 Apr 2024 08:05    RADIOLOGY & ADDITIONAL STUDIES:    < from: CT Head No Cont (04.02.24 @ 09:08) >    IMPRESSION:    No evidence of acute intracranial hemorrhage or hydrocephalus. Atrophy with white matter ischemic changes.    < end of copied text >      < from: CT Head No Cont (03.30.24 @ 19:57) >    HEAD CT: No evidence of an acute intracranial hemorrhage midline shift or hydrocephalus. Moderate atrophy with mild chronic white matter ischemic changes  NECK CTA: No hemodynamic significant narrowing within the neck.  BRAIN CTA: No proximal large vessel occlusion.    < end of copied text >

## 2024-04-02 NOTE — DISCHARGE NOTE PROVIDER - NSDCCAREPROVSEEN_GEN_ALL_CORE_FT
Judi, Avni Cho, Roby Davila Carac Counseling:  I discussed with the patient the risks of Carac including but not limited to erythema, scaling, itching, weeping, crusting, and pain.

## 2024-04-02 NOTE — DISCHARGE NOTE PROVIDER - CARE PROVIDER_API CALL
Rusty Calderon  Internal Medicine  [address] [place] [id number]  Phone: [phone]  Fax: [phone]  Established Patient  Follow Up Time: 2 weeks    Sohan Cho  Neurology  [address] [place] [id number]  Phone: [phone]  Fax: [phone]  Follow Up Time: Routine

## 2024-04-02 NOTE — DISCHARGE NOTE PROVIDER - PROVIDER TOKENS
PROVIDER:[TOKEN:[[id number]],FOLLOWUP:[2 weeks],ESTABLISHEDPATIENT:[T]],PROVIDER:[TOKEN:[[id number]],FOLLOWUP:[Routine]]

## 2024-04-02 NOTE — PROGRESS NOTE ADULT - SUBJECTIVE AND OBJECTIVE BOX
Chief Complaint: Dizziness, chest pain    Interval Events: No events overnight. Sleeping.    Review of Systems:  General: No fevers, chills, weight gain  Skin: No rashes, color changes  Cardiovascular: No chest pain, orthopnea  Respiratory: No shortness of breath, cough  Gastrointestinal: No nausea, abdominal pain  Genitourinary: No incontinence, pain with urination  Musculoskeletal: No pain, swelling, decreased range of motion  Neurological: No headache, weakness  Psychiatric: No depression, anxiety  Endocrine: No weight gain, increased thirst  All other systems are comprehensively negative.    Physical Exam:  Vitals:        Vital Signs Last 24 Hrs  T(C): 37.1 (02 Apr 2024 08:43), Max: 37.1 (02 Apr 2024 08:43)  T(F): 98.8 (02 Apr 2024 08:43), Max: 98.8 (02 Apr 2024 08:43)  HR: 80 (02 Apr 2024 08:43) (66 - 98)  BP: 136/73 (02 Apr 2024 08:43) (136/73 - 185/77)  BP(mean): --  RR: 17 (02 Apr 2024 08:43) (17 - 18)  SpO2: 95% (02 Apr 2024 08:43) (95% - 97%)  Parameters below as of 02 Apr 2024 08:43  Patient On (Oxygen Delivery Method): room air  General: NAD  HEENT: MMM  Neck: No JVD, no carotid bruit  Lungs: CTAB  CV: RRR, nl S1/S2, no M/R/G  Abdomen: S/NT/ND, +BS  Extremities: No LE edema, no cyanosis  Neuro: AAOx3, non-focal  Skin: No rash    Labs:                        11.9   7.18  )-----------( 117      ( 02 Apr 2024 08:05 )             34.3     04-02    144  |  105  |  27<H>  ----------------------------<  139<H>  3.5   |  31  |  0.71    Ca    9.3      02 Apr 2024 08:05              ECG/Telemetry: Sinus rhythm

## 2024-04-02 NOTE — DISCHARGE NOTE PROVIDER - DETAILS OF MALNUTRITION DIAGNOSIS/DIAGNOSES
This patient has been assessed with a concern for Malnutrition and was treated during this hospitalization for the following Nutrition diagnosis/diagnoses:     -  03/31/2024: Severe protein-calorie malnutrition   -  03/31/2024: Underweight (BMI < 19)

## 2024-04-02 NOTE — DISCHARGE NOTE PROVIDER - INSTRUCTIONS
Diet, Minced and Moist:   Tube Feeding Modality: Gastrostomy  Glucerna 1.5 Ian  Total Volume for 24 Hours (mL): 1530  Continuous  Until Goal Tube Feed Rate (mL per Hour): 85  Tube Feed Duration (in Hours): 18  Tube Feed Start Time: 12:00  Tube Feed Stop Time: 06:00  Free Water Flush  Bolus   Total Volume per Flush (mL): 200   Frequency: Every 8 Hours (04-01-24 @ 15:31) [Active]

## 2024-04-16 NOTE — DISCHARGE NOTE NURSING/CASE MANAGEMENT/SOCIAL WORK - MODE OF TRANSPORTATION
4/16/2024      Kike Montoya  [address] [place] [address]        Dear Kike,    Your health is very important to us. Our records show you are due for an appointment.    If you have another Primary Care Provider, please contact our office so we can update your records. Please contact our office at Dept: [phone] to schedule an appointment, or you may schedule using the [employer] juan.     Sincerely,       Allison Carranza PA-C   [facility] [address]  [address]  [place] [address]  Dept: [phone]  Dept Fax: [phone]         [facility] offers online access to your health information via [email or web address]  
Ambulatory

## 2024-04-26 NOTE — CONSULT NOTE ADULT - ASSESSMENT
The patient is a 73 year old male with a history of HTN, ITP, APLS, anxiety, OCD, Merkel cell cancer who presented with psychosis.    Plan:  - ECG with no evidence of ischemia or infarction  - Orthostatic hypotension may be multifactorial - dehydration, medication related (antipsychotics), and low cortisol levels  - Losartan on hold  - Lower atenolol to 50 mg daily  - Endocrine follow-up - plan for steroids  - Continue IV fluids  - If orthostatics remain positive despite above, will consider starting midodrine No

## 2024-05-10 NOTE — DIETITIAN INITIAL EVALUATION ADULT - PERTINENT LABORATORY DATA
03-31    145  |  106  |  14  ----------------------------<  111<H>  3.4<L>   |  32<H>  |  0.56    Ca    8.9      31 Mar 2024 07:17  Phos  3.7     03-30  Mg     1.7     03-30    TPro  5.9<L>  /  Alb  3.2<L>  /  TBili  0.7  /  DBili  x   /  AST  22  /  ALT  27  /  AlkPhos  55  03-31  A1C with Estimated Average Glucose Result: 4.9 % (11-04-23 @ 08:50)   - - -

## 2024-06-17 NOTE — PROGRESS NOTE ADULT - SUBJECTIVE AND OBJECTIVE BOX
LV:6/11/24  LF:5/17/24 #60      Forwarding to on call Dr Rodriguez.    Patient is a 73y old  Male who presents with a chief complaint of anxietty (01 Nov 2023 13:08)      INTERVAL HPI/OVERNIGHT EVENTS: noted  pt seen and examined this am   events noted  pt orthostatic hypotension worsening      Vital Signs Last 24 Hrs  T(C): 36.4 (04 Nov 2023 13:02), Max: 37.3 (03 Nov 2023 20:41)  T(F): 97.6 (04 Nov 2023 13:02), Max: 99.1 (03 Nov 2023 20:41)  HR: 86 (04 Nov 2023 13:02) (86 - 96)  BP: 103/61 (04 Nov 2023 13:02) (103/61 - 136/72)  BP(mean): --  RR: 17 (04 Nov 2023 13:02) (17 - 18)  SpO2: 92% (04 Nov 2023 13:02) (91% - 92%)    Parameters below as of 04 Nov 2023 13:02  Patient On (Oxygen Delivery Method): room air        acetaminophen     Tablet .. 650 milliGRAM(s) Oral every 6 hours PRN  acetaminophen  300 mG/codeine 30 mG 1 Tablet(s) Oral every 6 hours PRN  atenolol  Tablet 50 milliGRAM(s) Oral two times a day  cholecalciferol 2000 Unit(s) Oral daily  diazepam    Tablet 5 milliGRAM(s) Oral two times a day  fluvoxaMINE 100 milliGRAM(s) Oral two times a day  heparin   Injectable 5000 Unit(s) SubCutaneous every 12 hours  lactulose Syrup 15 Gram(s) Oral two times a day PRN  mirtazapine 15 milliGRAM(s) Oral at bedtime  QUEtiapine 150 milliGRAM(s) Oral at bedtime  senna 2 Tablet(s) Oral at bedtime  sodium chloride 0.9%. 1000 milliLiter(s) IV Continuous <Continuous>      PHYSICAL EXAM:  GENERAL: NAD,   EYES: conjunctiva and sclera clear  ENMT: Moist mucous membranes  NECK: Supple, No JVD, Normal thyroid  CHEST/LUNG: non labored, cta b/l  HEART: Regular rate and rhythm; No murmurs, rubs, or gallops  ABDOMEN: Soft, Nontender, Nondistended; Bowel sounds present  EXTREMITIES:  2+ Peripheral Pulses, No clubbing, cyanosis, or edema  LYMPH: No lymphadenopathy noted  SKIN: No rashes or lesions    Consultant(s) Notes Reviewed:  [x ] YES  [ ] NO  Care Discussed with Consultants/Other Providers [ x] YES  [ ] NO    LABS:                        10.3   11.81 )-----------( 62       ( 04 Nov 2023 08:50 )             30.1     11-04    145  |  107  |  34<H>  ----------------------------<  142<H>  4.3   |  31  |  1.70<H>    Ca    8.7      04 Nov 2023 08:50        Urinalysis Basic - ( 04 Nov 2023 08:50 )    Color: x / Appearance: x / SG: x / pH: x  Gluc: 142 mg/dL / Ketone: x  / Bili: x / Urobili: x   Blood: x / Protein: x / Nitrite: x   Leuk Esterase: x / RBC: x / WBC x   Sq Epi: x / Non Sq Epi: x / Bacteria: x      CAPILLARY BLOOD GLUCOSE            Urinalysis Basic - ( 04 Nov 2023 08:50 )    Color: x / Appearance: x / SG: x / pH: x  Gluc: 142 mg/dL / Ketone: x  / Bili: x / Urobili: x   Blood: x / Protein: x / Nitrite: x   Leuk Esterase: x / RBC: x / WBC x   Sq Epi: x / Non Sq Epi: x / Bacteria: x          RADIOLOGY & ADDITIONAL TESTS:    Imaging Personally Reviewed:  [x ] YES  [ ] NO Patient is a 73y old  Male who presents with a chief complaint of anxietty (01 Nov 2023 13:08)      INTERVAL HPI/OVERNIGHT EVENTS: noted  pt seen and examined this am   events noted  pt orthostatic hypotension worsening  po intake moderate  continues to have abn sensations      Vital Signs Last 24 Hrs  T(C): 36.4 (04 Nov 2023 13:02), Max: 37.3 (03 Nov 2023 20:41)  T(F): 97.6 (04 Nov 2023 13:02), Max: 99.1 (03 Nov 2023 20:41)  HR: 86 (04 Nov 2023 13:02) (86 - 96)  BP: 103/61 (04 Nov 2023 13:02) (103/61 - 136/72)  BP(mean): --  RR: 17 (04 Nov 2023 13:02) (17 - 18)  SpO2: 92% (04 Nov 2023 13:02) (91% - 92%)    Parameters below as of 04 Nov 2023 13:02  Patient On (Oxygen Delivery Method): room air        acetaminophen     Tablet .. 650 milliGRAM(s) Oral every 6 hours PRN  acetaminophen  300 mG/codeine 30 mG 1 Tablet(s) Oral every 6 hours PRN  atenolol  Tablet 50 milliGRAM(s) Oral two times a day  cholecalciferol 2000 Unit(s) Oral daily  diazepam    Tablet 5 milliGRAM(s) Oral two times a day  fluvoxaMINE 100 milliGRAM(s) Oral two times a day  heparin   Injectable 5000 Unit(s) SubCutaneous every 12 hours  lactulose Syrup 15 Gram(s) Oral two times a day PRN  mirtazapine 15 milliGRAM(s) Oral at bedtime  QUEtiapine 150 milliGRAM(s) Oral at bedtime  senna 2 Tablet(s) Oral at bedtime  sodium chloride 0.9%. 1000 milliLiter(s) IV Continuous <Continuous>      PHYSICAL EXAM:  GENERAL: NAD,   EYES: conjunctiva and sclera clear  ENMT: Moist mucous membranes  NECK: Supple, No JVD, Normal thyroid  CHEST/LUNG: non labored, cta b/l  HEART: Regular rate and rhythm; No murmurs, rubs, or gallops  ABDOMEN: Soft, Nontender, Nondistended; Bowel sounds present  EXTREMITIES:  2+ Peripheral Pulses, No clubbing, cyanosis, or edema  LYMPH: No lymphadenopathy noted  SKIN: No rashes or lesions    Consultant(s) Notes Reviewed:  [x ] YES  [ ] NO  Care Discussed with Consultants/Other Providers [ x] YES  [ ] NO    LABS:                        10.3   11.81 )-----------( 62       ( 04 Nov 2023 08:50 )             30.1     11-04    145  |  107  |  34<H>  ----------------------------<  142<H>  4.3   |  31  |  1.70<H>    Ca    8.7      04 Nov 2023 08:50        Urinalysis Basic - ( 04 Nov 2023 08:50 )    Color: x / Appearance: x / SG: x / pH: x  Gluc: 142 mg/dL / Ketone: x  / Bili: x / Urobili: x   Blood: x / Protein: x / Nitrite: x   Leuk Esterase: x / RBC: x / WBC x   Sq Epi: x / Non Sq Epi: x / Bacteria: x      CAPILLARY BLOOD GLUCOSE            Urinalysis Basic - ( 04 Nov 2023 08:50 )    Color: x / Appearance: x / SG: x / pH: x  Gluc: 142 mg/dL / Ketone: x  / Bili: x / Urobili: x   Blood: x / Protein: x / Nitrite: x   Leuk Esterase: x / RBC: x / WBC x   Sq Epi: x / Non Sq Epi: x / Bacteria: x          RADIOLOGY & ADDITIONAL TESTS:    Imaging Personally Reviewed:  [x ] YES  [ ] NO

## 2024-08-20 NOTE — SWALLOW BEDSIDE ASSESSMENT ADULT - SWALLOW EVAL: RECOMMENDED DIET
Patient interviewed and examined.       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 No fevers or chills. No sob. New RLE edema today     Review of Systems  Constitutional symptoms:  No fever, chills.    Skin symptoms:  No jaundice, or rash    HEENT: no sore throat, no vision change  Respiratory symptoms:  No shortness of breath or cough  Cardiovascular symptoms:  No chest pain or palpitations  Gastrointestinal symptoms:  no vomiting, no diarrhea.    Genitourinary symptoms:  No dysuria or blood in urine  Musculoskeletal symptoms:  No back pain or joint swelling    Neurologic symptoms:  No headache or new weakness  Hematologic: no bleeding or new bruising    PAST MEDICAL HX:    Hyperlipoproteinemia                                          Essential (primary) hypertension                              Polio (CMD)                                                   High cholesterol                                              Gastroesophageal reflux disease                               Perforated gastric ulcer  (CMD)                               CKD (chronic kidney disease)                                  COPD (chronic obstructive pulmonary disease)  *               Osteoporosis                                                  Peripheral artery disease (CMD)                               Prediabetes                                                   Enlarged uterus                                               Abnormal finding on breast imaging              06/03/2024      Comment: via chest CT upper outer 
left breast, workup in               progress    Stage B ACC/AHA asymptomatic heart failure  (C*               Mild asthma (CMD)                                             Malignant spindle cell neoplasm  (CMD)          7/19/2024       PAST SURGICAL HX:    FOOT SURGERY                                    2017          EXPLORATORY LAPAROTOMY W/ BOWEL RESECTION       08/2018         Comment: for Repair of Perforated Ulcer    HERNIA REPAIR                                                 BREAST AUGMENTATION WITH IMPLANT                              MINI-LAPAROTOMY                                 06/24/2024      Comment: Diagnostic laparoscopy, Exploratory laparotomy,               Total abdominal hysterectomy with bilateral                salpingo-oophorectomy, Radical resection of                right pelvic mass, Complete omentectomy, Lymph                node debulking, Appendectomy, Repair of ventral               hernias, DIRECT REPAIR OF RIGHT COMMON ILIAC                VEIN AND RIGHT EXTERNAL ILIAC VEIN    TOTAL ABDOMINAL HYSTERECTOMY W/ BILATERAL SALP* 06/24/2024    APPENDECTOMY                                    06/24/2024    VENTRAL HERNIA REPAIR                           06/24/2024    LEG/ANKLE SURGERY PROC UNLISTED                                 Comment: RE: POLIO AS A CHILD PER DAUGHTER     Family History   Problem Relation Age of Onset    Diabetes Mother     Heart disease Mother     Myocardial Infarction Mother     Hypertension Mother     Anxiety disorder Mother     Dementia/Alzheimers Father     Seizure Disorder Father     Gastrointestinal Father         Ulcers    Dementia/Alzheimers Sister     Stroke Sister     Patient is unaware of any medical problems Sister     Other Brother     Injuries Brother         Fell down flight of stair    Myocardial Infarction Brother     Dementia/Alzheimers Brother     Peripheral Vascular Disease Brother         Amputation    Kidney disease Brother         Hemodialysis    
Cancer, Breast Daughter         In remission    Patient is unaware of any medical problems Daughter     Other Son         Born w/ solitary kidney    Patient is unaware of any medical problems Son        Social History     Tobacco Use    Smoking status: Never    Smokeless tobacco: Never   Vaping Use    Vaping status: never used   Substance Use Topics    Alcohol use: No    Drug use: Not Currently     Types: Marijuana     Comment: rarely          PE    Vitals:    08/20/24 1212 08/20/24 1437 08/20/24 1600 08/20/24 1748   BP: (!) 167/90 126/88 (!) 140/83    BP Location:  RUE - Right upper extremity     Patient Position:  Sitting/High-Magaña's     Pulse: (!) 110 95 80    Resp: (!) 22 16 18 18   Temp: 98.6 °F (37 °C) 98.6 °F (37 °C)     TempSrc:  Oral     SpO2: 99% 97% 100%    Weight: (!) 40 kg (88 lb 2.9 oz)          General: lying comfortably, no acute distress  HEENT: NC/AT, EOMI, oral mucosa pink and moist  Neck: supple, no sp tenderness, no nuchal rigidity  Heart: regular rate and rhythm  Lungs: Nonlabored, clear to auscultation, no wheezing noted  ABD: soft, no guarding, rebound, or peritoneal signs or symptoms.   nontender  Musculo: Normal ROM, strength and sensation, no swelling or tenderness  Skin: No rash, warm and well perfused  Neuro: Alert and oriented, no focal neuro deficits  Psych: Normal affect            MDM:    Attempted to get old records including labs, imaging and ekg.   The case was reviewed with the patient. Nursing notes reviewed.  DDx includes gastritis, biliary pathology, kidney stone, abdominal infection, obstruction  Ordered and reviewed labs-electrolyte derangements but no leukocytosis.  fluids for dehydration.  pain medication and zofran for symptom control. Patient feeling slightly better on reassessment.    CT abd/pelvis reviewed by me without any distended loops of small bowel pseudoobstruction.  Worsening ascites and abdominal masses.  Some rim enhancing fluid collection.  Will discuss 
with gyne onc.  Infectious markers are reassuring.  No recommendation for antibiotics at this time.  Plan for admission to the medicine service to get her symptoms better controlled and they can discuss with palliative care to change her pain medicine regimen at home      I have performed an independent history and physical examination and discussed the patient's management with the resident Dr Aguilar. I agree with the findings, assessment and plan of care as documented by the resident except as noted above. Any EKG or XRAY studies performed during the patient's Emergency Department stay were ordered and reviewed directly under my supervision and noted by the resident.      Results for orders placed or performed during the hospital encounter of 08/20/24   Comprehensive Metabolic Panel    Specimen: Blood, Venous   Result Value    Fasting Status     Sodium 138    Potassium 3.5    Chloride 103    Carbon Dioxide 24    Anion Gap 15    Glucose 99    BUN 24 (H)    Creatinine 0.66    Glomerular Filtration Rate >90     Comment: eGFR results = or >60 mL/min/1.73m2 = Normal kidney function. Estimated GFR calculated using the CKD-EPI-R (2021) equation that does not include race in the creatinine calculation.    BUN/Cr 36 (H)    Calcium 9.5    Bilirubin, Total 0.2    GOT/AST 21    GPT/ALT 9    Alkaline Phosphatase 93    Albumin 2.6 (L)    Protein, Total 7.3    Globulin 4.7 (H)    A/G Ratio 0.6 (L)   Lipase    Specimen: Blood, Venous   Result Value    Lipase 25   Urinalysis & Reflex Microscopy With Culture If Indicated    Specimen: Urine clean catch   Result Value    COLOR, URINALYSIS Yellow    APPEARANCE, URINALYSIS Clear    GLUCOSE, URINALYSIS Negative    BILIRUBIN, URINALYSIS Negative    KETONES, URINALYSIS Negative    SPECIFIC GRAVITY, URINALYSIS >1.030 (H)     Comment: Measured by refractometry  Specific Gravity results may be affected by elevated protein, glucose, or contrast media.    OCCULT BLOOD, URINALYSIS Trace (A)    
PH, URINALYSIS 5.0    PROTEIN, URINALYSIS Negative    UROBILINOGEN, URINALYSIS 0.2    NITRITE, URINALYSIS Negative    LEUKOCYTE ESTERASE, URINALYSIS Negative    SQUAMOUS EPITHELIAL, URINALYSIS 1 to 5    ERYTHROCYTES, URINALYSIS 1 to 2    LEUKOCYTES, URINALYSIS 1 to 5    BACTERIA, URINALYSIS None Seen    HYALINE CASTS, URINALYSIS None Seen    MUCUS Present   CBC with Automated Differential (performable only)    Specimen: Blood, Venous   Result Value    WBC 10.3    RBC 3.83 (L)    HGB 10.9 (L)    HCT 34.8 (L)    MCV 90.9    MCH 28.5    MCHC 31.3 (L)    RDW-CV 16.0 (H)    RDW-SD 53.6 (H)     (H)    NRBC 0    Neutrophil, Percent 73    Lymphocytes, Percent 20    Mono, Percent 5    Eosinophils, Percent 1    Basophils, Percent 1    Immature Granulocytes 0    Absolute Neutrophils 7.5    Absolute Lymphocytes 2.0    Absolute Monocytes 0.5    Absolute Eosinophils  0.1    Absolute Basophils 0.1    Absolute Immature Granulocytes 0.0   Magnesium    Specimen: Blood, Venous   Result Value    Magnesium 1.2 (L)   Procalcitonin    Specimen: Blood, Venous   Result Value    Procalcitonin 0.21 (H)     Comment:   Bacterial Sepsis:  <0.5 ng/mL:    Sepsis not likely. Localized bacterial infection possible.  0.5 - 2 ng/mL: Sepsis or other conditions possible  >2.0 ng/mL:    High risk of sepsis/septic shock    NOTE: If bacterial sepsis is of high clinical suspicion but PCT<2 ng/mL,  consider recheck PCT 6-24 hours after initial test.     Lower Respiratory Tract Infection (LRTI):  <0.1 ng/mL:       Bacterial infection highly unlikely  0.1 - 0.25 ng/mL: Bacterial infection unlikely  0.26 - 0.5 ng/mL: Bacterial infection likely  > 0.5 ng/mL:      Bacterial infection highly likely    NOTE: Patients with advanced CKD or medical/surgical trauma may have  elevated baseline PCT (>0.5 ng/mL) in the absence of bacterial infection. If  bacterial infection is suspected, consider repeat PCT in 2 to 4 days to guide de-escalation or discontinuation 
of antibiotic therapy.  Higher reference range cutoffs may be appropriate for patients <3 days old.     C Reactive Protein    Specimen: Blood, Venous   Result Value    C-Reactive Protein 93.9 (H)   COVID/Flu/RSV panel    Specimen: Nasal, Mid-turbinate; Swab   Result Value    Rapid SARS-COV-2 by PCR Not Detected    Influenza A by PCR Not Detected    Influenza B by PCR Not Detected    RSV BY PCR Not Detected    Isolation Guidelines      Comment: Do not use this test result as the sole decision-maker for discontinuation of isolation.   Clinical evaluation should be considered for other respiratory illness requiring transmission-based isolation.    -    No fever (<99.0 F/37.2 C) for at least 24 hours without the use of fever-reducing medications    AND  -    Respiratory symptoms have improved or resolved (e.g. cough, shortness of breath)     AND  -    COVID-19 negative test    See COVID-19 Deisolation Resource Guide    Procedural Comment      Comment: SARS-COV-2 nucleic acid has not been detected indicating the absence of COVID-19.    This test was performed using the [employer] Xpert Xpress SARS-CoV-2/Flu/RSV RT-PCR test that has been given Emergency Use Authorization (EUA) by the [place] Food and Drug Administration (FDA). These tests are considered definitive and do not need to be confirmed by another method.        Imaging Results              XR CHEST PA OR AP 1 VIEW (Final result)  Result time 08/20/24 17:06:11      Final result                   Impression:    FINDINGS/IMPRESSION:    Normal heart size. No mediastinal widening.    No lung consolidation.    No effusions or cephalization of vessels.    No pneumothorax.    Electronically Signed by: SUSANA CHO M.D.   Signed on: 8/20/2024 5:06 PM   Workstation ID: [id number]               Narrative:      EXAM:  TEMPORARY      CLINICAL INDICATION: Malaise    COMPARISON:  None                                       CT ABDOMEN PELVIS W CONTRAST - IV 
contrast only (Final result)  Result time 08/20/24 16:59:54      Final result                   Impression:      1.   Multiple large heterogeneous masses with areas of hyperattenuation in  the pelvis and mesentery measuring up to 12.8 cm in the right pelvis. These  are increased in size in comparison to recent MR pelvis examination dated  8/1/2024, and likely reflect progressive metastatic disease.  2.   Rim-enhancing fluid collections in the posterior right pelvis and  anterior left pelvis may represent small postoperative seromas or more  loculated ascites.  3.   Large volume ascites. Mild enhancement of the peritoneal reflections.  This likely reflects malignant ascites.  4.   Mild prominence of the right renal collecting system, likely secondary  to mass effect on the right ureter from large pelvic mass.      Electronically Signed by: JES CRENSHAW M.D.   Signed on: 8/20/2024 4:59 PM   Workstation ID: [id number]               Narrative:    EXAM: CT ABDOMEN PELVIS W CONTRAST    CLINICAL INDICATION: ovarian cancer, recent surgery, vomiting and pain    COMPARISON: CT abdomen pelvis dated 7/1/2024, MRI pelvis 8/1/2024, PET/CT  7/30/2024.    TECHNIQUE: Helical CT was performed of the abdomen and pelvis, with axial,  coronal, and sagittal reconstructions performed and provided for review. A  total of 80 ml Omnipaque 350 was administered intravenously during the  study. Automated exposure control was utilized.    FINDINGS:    LOWER CHEST: Coronary artery calcifications. Small right pleural effusion.  Dilation of the distal esophagus with small hiatal hernia. Calcified  bilateral breast implants.    LIVER: Unremarkable.    BILIARY TREE AND GALLBLADDER: No radiodense gallstones. No biliary ductal  dilation.    SPLEEN: Unremarkable.    ADRENAL GLANDS: Thickening of bilateral adrenal glands is unchanged.    PANCREAS: Mild prominence of the main pancreatic duct up to 3.4 mm, without  focal lesion.    KIDNEYS AND 
URETERS: No suspicious renal lesions. Mild prominence of the  right renal collecting system and right ureter is likely related to mass  effect on the right ureter.    BOWEL: Nonspecific thickening of the rectum. Colonic diverticulosis. No  evidence for bowel obstruction. Mild to moderate stool burden within the  colon. Nonspecific mild thickening of the gastric antrum.    BLADDER: Decompressed and grossly unremarkable.    REPRODUCTIVE ORGANS: Hysterectomy.    PERITONEUM AND RETROPERITONEUM: No free intraperitoneal air. Moderate to  large volume ascites. Multiple heterogeneous masses are noted in the pelvis  and mesentery. Dominant mass in the left small bowel mesentery/anterior to  the aorta measures 7.7 x 5.1 cm (series 2, image 94). Dominant mass in the  right pelvis measures approximately 10.4 x 12.8 cm (series 2, image 129),  and previously measured up to 9 cm on examination dated 8/1/2024. Dominant  heterogeneous mass in the left pelvis measures approximately 7.5 x 5.2 cm  (series 2, image 117), and previously measured up to 4.8 cm on recent MR  pelvis examination. Rim-enhancing collection is noted within the posterior  pelvis along the right lateral aspect of the sigmoid colon measuring  approximately 2.7 x 2.7 cm (series 2, image 133) may represent an evolving  postoperative seroma or hematoma or possibly an abscess. Enhancement of the  peritoneal reflections. Additional fluid collection is noted along the  anterior aspect of aforementioned pelvic masses (series 2, image 128) and  measures approximately 1.8 x 2.9 cm.    VESSELS: Diffuse atherosclerotic plaque within the abdominal aorta and its  branches.    LYMPH NODES: No obvious lymphadenopathy within limitations of large pelvic  masses.    BODY WALL: Stranding/edema within the subcutaneous tissues.    BONES: Scattered Schmorl nodes along the endplates. Mild superior height  loss within the L1 vertebral body.                                         US VASC 
EXTREMITY LOWER VENOUS DUPLEX (Final result)  Result time 08/20/24 16:34:50      Final result                   Impression:      No evidence for right lower extremity DVT.      Dictated by: DARNELL PAT MD  Dictated on: 8/20/2024 1:19 PM       DARIELA JUDD M.D., have reviewed the images and report and concur  with these findings interpreted by DARNELL PAT MD.    Electronically Signed by: DARIELA RAY M.D.   Signed on: 8/20/2024 4:34 PM   Workstation ID: [id number]               Narrative:    EXAM:  US VASC EXTREMITY LOWER VENOUS DUPLEX RIGHT    CLINICAL INDICATION:  Lower extremity pain.    COMPARISON:  Vascular lower extremity duplex 6/22/2024, 6/25/2024.    TECHNIQUE: Real-time grayscale, color flow and duplex Doppler sonographic  evaluation of the right lower extremity deep venous system was performed,  including ipsilateral evaluation of the common femoral, femoral, and  popliteal veins, the origin of the greater saphenous vein, and the visible  portions of the calf veins. The contralateral common femoral vein was also  imaged.    FINDINGS:    The right common femoral vein, femoral vein, popliteal vein, and visualized  posterior tibial and peroneal veins demonstrate normal color Doppler flow  and augmentation without intraluminal echogenic material. Normal  compressibility.  Normal right saphenofemoral junction.  Partially visualized left common femoral vein is normal.                          Preliminary result                   Impression:    No evidence for right lower extremity DVT.    Dictated by: DARNELL PAT MD  Dictated on: 8/20/2024 1:19 PM       * * * * * * * * * * * * * * PRELIMINARY REPORT * * * * * * * * * * * * * *                    Narrative:        * * * * * * * * * * * * * * PRELIMINARY REPORT * * * * * * * * * * * * * *     EXAM: US VASC EXTREMITY LOWER VENOUS DUPLEX RIGHT    CLINICAL INDICATION: Lower extremity pain.    COMPARISON: Vascular lower extremity duplex 6/22/2024, 
6/25/2024.    TECHNIQUE: Real-time grayscale, color flow and duplex Doppler sonographic  evaluation of the right lower extremity deep venous system was performed,  including ipsilateral evaluation of the common femoral, femoral, and  popliteal veins, the origin of the greater saphenous vein, and the visible  portions of the calf veins. The contralateral common femoral vein was also  imaged.    FINDINGS:  The right common femoral vein, femoral vein, popliteal vein, and visualized  posterior tibial and peroneal veins demonstrate normal color Doppler flow  and augmentation without intraluminal echogenic material.  Normal right saphenofemoral junction.  Partially visualized left common femoral vein is normal.                                          Medications   magnesium oxide (MAG-OX) tablet 400 mg (400 mg Oral Not Given 8/20/24 1659)   sodium chloride (NORMAL SALINE) 0.9 % bolus 1,000 mL (1,000 mLs Intravenous New Bag 8/20/24 1550)   ondansetron (ZOFRAN) injection 4 mg (4 mg Intravenous Given 8/20/24 1550)   ketorolac (TORADOL) injection 15 mg (15 mg Intravenous Given 8/20/24 1550)   HYDROmorphone (DILAUDID) injection 0.5 mg (0.5 mg Intravenous Given 8/20/24 1550)   magnesium sulfate 2 g in 50 mL premix IVPB (2 g Intravenous New Bag 8/20/24 1653)   iohexol (OMNIPAQUE 350 INJECT) contrast solution 75 mL (75 mLs Intravenous Given 8/20/24 1613)   metoCLOPramide (REGLAN) injection 10 mg (10 mg Intravenous Given 8/20/24 1749)   famotidine (PEPCID) injection 20 mg (20 mg Intravenous Given 8/20/24 1750)   HYDROmorphone (DILAUDID) injection 0.5 mg (0.5 mg Intravenous Given 8/20/24 1748)       ED Course as of 08/20/24 1935   Tue Aug 20, 2024   1530 Ultrasound without DVT per my read [JR]   1609 Hypomagnesemia repleted with IV and oral supplement [JR]   1611 EKG is normal sinus rhythm 82 with no ST elevation per my read.  QTc is 450. [JR]   1637 On reevaluation, patient states her pain is improved. Appears more comfortable. 
Denies nausea [CL]   1724 CT scan with increased pelvic masses and increased ascites.  There are some rim-enhancing fluid collections so we will discuss with gynecology to see if the suspicion for infection after surgery.  No leukocytosis but elevated inflammatory markers.  They are down from prior. [JR]      ED Course User Index  [CL] Deepak Miner  [JR] Maria Esther Esquivel MD        ED Diagnoses       Diagnosis Comment Associated Orders       Final diagnoses    Leg edema, right -- --    Malignant neoplasm of right ovary  (CMD) -- --    Hypomagnesemia -- --    Abdominal pain, unspecified abdominal location -- --           MD Sierra Mcfarland Jacqueline T, MD  08/20/24 1935    
Soft & Bite-Sized diet and Thin Liquids
1. PO is contraindicated at this time given presentation described above. 2. MD to consider a ST non-oral method of nutrition/hydration/medication

## 2024-08-21 NOTE — PATIENT PROFILE ADULT - LAST ORAL INTAKE
[FreeTextEntry1] : ct lumbar spine shows l1 compression fracture with mild retropulsion which is communited but posterior elements are all intact  13-Mar-2020

## 2024-10-26 NOTE — ED PROVIDER NOTE - NSICDXPASTSURGICALHX_GEN_ALL_CORE_FT
PAST SURGICAL HISTORY:  Enlarged tonsils s/p tonsillectomy    H/O Mohs micrographic surgery for skin cancer     
No

## 2025-03-21 NOTE — PATIENT PROFILE ADULT - NSPROGENBLOODRESTRICT_GEN_A_NUR
67-year-old male patient past medical history of NICM since 2011, HFrEF LVEF 25-30% s/p MDT CRT-D with baseline CHB, VT/VF, AF S/p GIANFRANCO ligation/MAZE, MV/TV repair, PVC ablation 3/2024  intolerant to AADs in the past. He presents to the emergency department by sent by HF specialist for another VT/VF shock: Successful ATP for VT 3/17 at 6:52pm followed by one ATP and 40J shock at 7:30pm, patient who reports feeling dizzy and SOB during events. He was getting up to go to the couch right before. Pt is c/w all medications. Pt states he feels ok at this time just tired.  Follows with Dr paula abreu for HF, Dr John for CRTD and VT/VF,  for genetic counseling.  Was admitted to [facility] for similar x2 symptomatic ICD firing, he also has a high PVC burden contrubiting to his worsening HFrEF     Status 6 for cardiac transplant  was planned for OP  MEMs placement.    GDMT: Losartan 25mg, metoprolol XL 50mg and Farxiga 10mg    TTE: EF 30% global left ventricular hypokinesis. 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  Mitral Valve:An annuloplasty ring is noted in the mitral position. Transvalvular mitral gradients are elevated. Trace intravalvular mitral regurgitation. The transmitral peak gradient is 15.7 mmHg and mean gradient is 8.00 mmHg at a heart rate of 86 bpm.    CO: 5.43 l/min, CI: 2.78 l/min/m2    PVR: 147.41 dyn*s/cm5      .10 dyn*s/cm5  PVRI: 288.11 dyn*s*m2/cm5      TPRI: 1210.05 dyn*s*m2/cm5  PVR: 1.84 STEPHENSON    PVRI: 3.60 STEPHENSON*m2          
none

## 2025-06-04 NOTE — ED PROVIDER NOTE - CPE EDP GASTRO NORM
Pharmacist Admission Medication History    Admission medication history is complete. The information provided in this note is only as accurate as the sources available at the time of the update.    Information Source(s): Patient, Clinic records, and CareEverywhere/[facility] via in-person    Changes made to PTA medication list:  Added: None  Deleted: None  Changed: terazosin 20 mg every day + 10 mg prn > 30 mg every day     Allergies reviewed with patient and updates made in EHR: no    Medication History Completed By: Yaquelin Navarrete RPH 6/3/2025 8:17 PM    PTA Med List   Medication Sig Last Dose/Taking    acetaminophen (TYLENOL) 500 MG tablet Take 500 mg by mouth every 6 hours as needed for mild pain. Taking As Needed    amoxicillin (AMOXIL) 500 MG capsule Take 2,000 mg by mouth as needed (dental appt). Taking As Needed    apixaban ANTICOAGULANT (ELIQUIS) 2.5 MG tablet Take 1 tablet (2.5 mg) by mouth 2 times daily. 6/3/2025 Morning    carvedilol (COREG) 12.5 MG tablet Take 1 tablet (12.5 mg) by mouth 2 times daily. 6/3/2025 Morning    cholecalciferol (VITAMIN D3) 25 mcg (1000 units) capsule Take 1 capsule by mouth daily 6/3/2025 Morning    Magnesium Oxide 250 MG TABS Take 1 tablet (250 mg) by mouth daily 6/3/2025 Morning    omeprazole (PRILOSEC) 40 MG DR capsule Take 40 mg by mouth daily. Taking    potassium chloride ER (K-TAB/KLOR-CON) 10 MEQ CR tablet Take 1 tablet (10 mEq) by mouth 2 times daily. And as needed as directed. 6/3/2025 Morning    sacubitril-valsartan (ENTRESTO) 49-51 MG per tablet Take 1 tablet by mouth 2 times daily. 6/3/2025 Morning    simvastatin (ZOCOR) 10 MG tablet Take 1 tablet (10 mg) by mouth at bedtime 6/2/2025 Bedtime    terazosin (HYTRIN) 5 MG capsule Take 1 capsule (5 mg) by mouth at bedtime 6/2/2025 Bedtime    torsemide (DEMADEX) 10 MG tablet Take 20 mg daily and an additional 10 mg PRN weight gain or swelling (Patient taking differently: Take 30 mg by mouth daily.) 6/3/2025 Morning 
- - -

## 2025-06-23 NOTE — SWALLOW VFSS/MBS ASSESSMENT ADULT - NS SWALLOW VFSS REC ASPIR MON
Bladder Scan performed. 64 ml maximum residual urine detected after 3 scans. MD informed   Elizabeth Méndez MA on 6/23/2025 at 11:59 AM'     change of breathing pattern/cough/gurgly voice/fever/pneumonia/upper respiratory infection

## 2025-07-23 ENCOUNTER — INPATIENT (INPATIENT)
Facility: HOSPITAL | Age: 75
LOS: 6 days | Discharge: NURSING, REHAB OR LONG-TERM CARE | DRG: 871 | End: 2025-07-30
Attending: INTERNAL MEDICINE | Admitting: INTERNAL MEDICINE
Payer: MEDICARE

## 2025-07-23 VITALS
TEMPERATURE: 100 F | WEIGHT: 132.06 LBS | SYSTOLIC BLOOD PRESSURE: 117 MMHG | OXYGEN SATURATION: 98 % | DIASTOLIC BLOOD PRESSURE: 80 MMHG | HEART RATE: 89 BPM | HEIGHT: 72 IN | RESPIRATION RATE: 20 BRPM

## 2025-07-23 DIAGNOSIS — J35.1 HYPERTROPHY OF TONSILS: Chronic | ICD-10-CM

## 2025-07-23 DIAGNOSIS — Z85.828 PERSONAL HISTORY OF OTHER MALIGNANT NEOPLASM OF SKIN: Chronic | ICD-10-CM

## 2025-07-23 LAB
ALBUMIN SERPL ELPH-MCNC: 2.9 G/DL — LOW (ref 3.3–5)
ALP SERPL-CCNC: 71 U/L — SIGNIFICANT CHANGE UP (ref 40–120)
ALT FLD-CCNC: 25 U/L — SIGNIFICANT CHANGE UP (ref 12–78)
ANION GAP SERPL CALC-SCNC: 10 MMOL/L — SIGNIFICANT CHANGE UP (ref 5–17)
ANION GAP SERPL CALC-SCNC: 11 MMOL/L — SIGNIFICANT CHANGE UP (ref 5–17)
APPEARANCE UR: ABNORMAL
APTT BLD: 81.2 SEC — HIGH (ref 26.1–36.8)
AST SERPL-CCNC: 31 U/L — SIGNIFICANT CHANGE UP (ref 15–37)
BACTERIA # UR AUTO: ABNORMAL /HPF
BASOPHILS # BLD AUTO: 0.05 K/UL — SIGNIFICANT CHANGE UP (ref 0–0.2)
BASOPHILS # BLD MANUAL: 0 K/UL — SIGNIFICANT CHANGE UP (ref 0–0.2)
BASOPHILS NFR BLD AUTO: 0.2 % — SIGNIFICANT CHANGE UP (ref 0–2)
BASOPHILS NFR BLD MANUAL: 0 % — SIGNIFICANT CHANGE UP (ref 0–2)
BILIRUB SERPL-MCNC: 0.6 MG/DL — SIGNIFICANT CHANGE UP (ref 0.2–1.2)
BILIRUB UR-MCNC: ABNORMAL
BUN SERPL-MCNC: 51 MG/DL — HIGH (ref 7–23)
BUN SERPL-MCNC: 53 MG/DL — HIGH (ref 7–23)
CALCIUM SERPL-MCNC: 7.5 MG/DL — LOW (ref 8.5–10.1)
CALCIUM SERPL-MCNC: 8.7 MG/DL — SIGNIFICANT CHANGE UP (ref 8.5–10.1)
CHLORIDE SERPL-SCNC: 101 MMOL/L — SIGNIFICANT CHANGE UP (ref 96–108)
CHLORIDE SERPL-SCNC: 99 MMOL/L — SIGNIFICANT CHANGE UP (ref 96–108)
CO2 SERPL-SCNC: 27 MMOL/L — SIGNIFICANT CHANGE UP (ref 22–31)
CO2 SERPL-SCNC: 29 MMOL/L — SIGNIFICANT CHANGE UP (ref 22–31)
COLOR SPEC: SIGNIFICANT CHANGE UP
COMMENT - URINE 2: SIGNIFICANT CHANGE UP
COMMENT - URINE: SIGNIFICANT CHANGE UP
CREAT SERPL-MCNC: 1.6 MG/DL — HIGH (ref 0.5–1.3)
CREAT SERPL-MCNC: 1.8 MG/DL — HIGH (ref 0.5–1.3)
DIFF PNL FLD: NEGATIVE — SIGNIFICANT CHANGE UP
EGFR: 38 ML/MIN/1.73M2 — LOW
EGFR: 38 ML/MIN/1.73M2 — LOW
EGFR: 44 ML/MIN/1.73M2 — LOW
EGFR: 44 ML/MIN/1.73M2 — LOW
EOSINOPHIL # BLD AUTO: 0.02 K/UL — SIGNIFICANT CHANGE UP (ref 0–0.5)
EOSINOPHIL # BLD MANUAL: 0 K/UL — SIGNIFICANT CHANGE UP (ref 0–0.5)
EOSINOPHIL NFR BLD AUTO: 0.1 % — SIGNIFICANT CHANGE UP (ref 0–6)
EOSINOPHIL NFR BLD MANUAL: 0 % — SIGNIFICANT CHANGE UP (ref 0–6)
EPI CELLS # UR: PRESENT
FLUAV AG NPH QL: SIGNIFICANT CHANGE UP
FLUBV AG NPH QL: SIGNIFICANT CHANGE UP
GLUCOSE SERPL-MCNC: 266 MG/DL — HIGH (ref 70–99)
GLUCOSE SERPL-MCNC: 324 MG/DL — HIGH (ref 70–99)
GLUCOSE UR QL: NEGATIVE MG/DL — SIGNIFICANT CHANGE UP
HCT VFR BLD CALC: 35.2 % — LOW (ref 39–50)
HGB BLD-MCNC: 11.8 G/DL — LOW (ref 13–17)
HYALINE CASTS # UR AUTO: PRESENT
IMM GRANULOCYTES # BLD AUTO: 0.37 K/UL — HIGH (ref 0–0.07)
IMM GRANULOCYTES NFR BLD AUTO: 1.7 % — HIGH (ref 0–0.9)
INR BLD: 1.31 RATIO — HIGH (ref 0.85–1.16)
KETONES UR QL: ABNORMAL MG/DL
LACTATE SERPL-SCNC: 4.5 MMOL/L — CRITICAL HIGH (ref 0.7–2)
LACTATE SERPL-SCNC: 4.7 MMOL/L — CRITICAL HIGH (ref 0.7–2)
LACTATE SERPL-SCNC: 5.5 MMOL/L — CRITICAL HIGH (ref 0.7–2)
LEUKOCYTE ESTERASE UR-ACNC: ABNORMAL
LIDOCAIN IGE QN: 505 U/L — HIGH (ref 13–75)
LYMPHOCYTES # BLD AUTO: 0.6 K/UL — LOW (ref 1–3.3)
LYMPHOCYTES # BLD MANUAL: 0.6 K/UL — LOW (ref 1–3.3)
LYMPHOCYTES NFR BLD AUTO: 2.7 % — LOW (ref 13–44)
LYMPHOCYTES NFR BLD MANUAL: 2.7 % — LOW (ref 13–44)
MACROCYTES BLD QL: SLIGHT — SIGNIFICANT CHANGE UP
MANUAL NEUTROPHIL BANDS #: 0.4 K/UL — SIGNIFICANT CHANGE UP (ref 0–0.84)
MCHC RBC-ENTMCNC: 30.8 PG — SIGNIFICANT CHANGE UP (ref 27–34)
MCHC RBC-ENTMCNC: 33.5 G/DL — SIGNIFICANT CHANGE UP (ref 32–36)
MCV RBC AUTO: 91.9 FL — SIGNIFICANT CHANGE UP (ref 80–100)
MICROCYTES BLD QL: SLIGHT — SIGNIFICANT CHANGE UP
MONOCYTES # BLD AUTO: 1.01 K/UL — HIGH (ref 0–0.9)
MONOCYTES # BLD MANUAL: 0.6 K/UL — SIGNIFICANT CHANGE UP (ref 0–0.9)
MONOCYTES NFR BLD AUTO: 4.5 % — SIGNIFICANT CHANGE UP (ref 2–14)
MONOCYTES NFR BLD MANUAL: 2.7 % — SIGNIFICANT CHANGE UP (ref 2–14)
NEUTROPHILS # BLD AUTO: 20.16 K/UL — HIGH (ref 1.8–7.4)
NEUTROPHILS # BLD MANUAL: 20.61 K/UL — HIGH (ref 1.8–7.4)
NEUTROPHILS NFR BLD AUTO: 90.8 % — HIGH (ref 43–77)
NEUTROPHILS NFR BLD MANUAL: 92.8 % — HIGH (ref 43–77)
NEUTS BAND # BLD: 1.8 % — SIGNIFICANT CHANGE UP (ref 0–8)
NEUTS BAND NFR BLD: 1.8 % — SIGNIFICANT CHANGE UP (ref 0–8)
NITRITE UR-MCNC: NEGATIVE — SIGNIFICANT CHANGE UP
NRBC # BLD AUTO: 0 K/UL — SIGNIFICANT CHANGE UP (ref 0–0)
NRBC # FLD: 0 K/UL — SIGNIFICANT CHANGE UP (ref 0–0)
NRBC BLD AUTO-RTO: 0 /100 WBCS — SIGNIFICANT CHANGE UP (ref 0–0)
PH UR: 5 — SIGNIFICANT CHANGE UP (ref 5–8)
PLAT MORPH BLD: NORMAL — SIGNIFICANT CHANGE UP
PLATELET # BLD AUTO: 189 K/UL — SIGNIFICANT CHANGE UP (ref 150–400)
PLATELET CLUMP BLD QL SMEAR: SLIGHT
PMV BLD: 12.5 FL — SIGNIFICANT CHANGE UP (ref 7–13)
POLYCHROMASIA BLD QL SMEAR: SLIGHT — SIGNIFICANT CHANGE UP
POTASSIUM SERPL-MCNC: 4.4 MMOL/L — SIGNIFICANT CHANGE UP (ref 3.5–5.3)
POTASSIUM SERPL-MCNC: 4.9 MMOL/L — SIGNIFICANT CHANGE UP (ref 3.5–5.3)
POTASSIUM SERPL-SCNC: 4.4 MMOL/L — SIGNIFICANT CHANGE UP (ref 3.5–5.3)
POTASSIUM SERPL-SCNC: 4.9 MMOL/L — SIGNIFICANT CHANGE UP (ref 3.5–5.3)
PROT SERPL-MCNC: 6.7 G/DL — SIGNIFICANT CHANGE UP (ref 6–8.3)
PROT UR-MCNC: >=1000 MG/DL
PROTHROM AB SERPL-ACNC: 15.3 SEC — HIGH (ref 9.9–13.4)
RAPID RVP RESULT: SIGNIFICANT CHANGE UP
RBC # BLD: 3.83 M/UL — LOW (ref 4.2–5.8)
RBC # FLD: 17.8 % — HIGH (ref 10.3–14.5)
RBC BLD AUTO: SIGNIFICANT CHANGE UP
RBC CASTS # UR COMP ASSIST: 0 /HPF — SIGNIFICANT CHANGE UP (ref 0–4)
RSV RNA NPH QL NAA+NON-PROBE: SIGNIFICANT CHANGE UP
SARS-COV-2 RNA SPEC QL NAA+PROBE: SIGNIFICANT CHANGE UP
SARS-COV-2 RNA SPEC QL NAA+PROBE: SIGNIFICANT CHANGE UP
SODIUM SERPL-SCNC: 138 MMOL/L — SIGNIFICANT CHANGE UP (ref 135–145)
SODIUM SERPL-SCNC: 139 MMOL/L — SIGNIFICANT CHANGE UP (ref 135–145)
SOURCE RESPIRATORY: SIGNIFICANT CHANGE UP
SP GR SPEC: 1.02 — SIGNIFICANT CHANGE UP (ref 1–1.03)
UROBILINOGEN FLD QL: 1 MG/DL — SIGNIFICANT CHANGE UP (ref 0.2–1)
WBC # BLD: 22.21 K/UL — HIGH (ref 3.8–10.5)
WBC # FLD AUTO: 22.21 K/UL — HIGH (ref 3.8–10.5)
WBC UR QL: 6 /HPF — HIGH (ref 0–5)

## 2025-07-23 PROCEDURE — 93005 ELECTROCARDIOGRAM TRACING: CPT

## 2025-07-23 PROCEDURE — 36415 COLL VENOUS BLD VENIPUNCTURE: CPT

## 2025-07-23 PROCEDURE — 81001 URINALYSIS AUTO W/SCOPE: CPT

## 2025-07-23 PROCEDURE — 83690 ASSAY OF LIPASE: CPT

## 2025-07-23 PROCEDURE — 71045 X-RAY EXAM CHEST 1 VIEW: CPT

## 2025-07-23 PROCEDURE — 74177 CT ABD & PELVIS W/CONTRAST: CPT

## 2025-07-23 PROCEDURE — 87040 BLOOD CULTURE FOR BACTERIA: CPT

## 2025-07-23 PROCEDURE — 0225U NFCT DS DNA&RNA 21 SARSCOV2: CPT

## 2025-07-23 PROCEDURE — 99285 EMERGENCY DEPT VISIT HI MDM: CPT

## 2025-07-23 PROCEDURE — 83605 ASSAY OF LACTIC ACID: CPT

## 2025-07-23 PROCEDURE — 85730 THROMBOPLASTIN TIME PARTIAL: CPT

## 2025-07-23 PROCEDURE — 82962 GLUCOSE BLOOD TEST: CPT

## 2025-07-23 PROCEDURE — 87637 SARSCOV2&INF A&B&RSV AMP PRB: CPT

## 2025-07-23 PROCEDURE — 74177 CT ABD & PELVIS W/CONTRAST: CPT | Mod: 26

## 2025-07-23 PROCEDURE — 71045 X-RAY EXAM CHEST 1 VIEW: CPT | Mod: 26

## 2025-07-23 PROCEDURE — 85025 COMPLETE CBC W/AUTO DIFF WBC: CPT

## 2025-07-23 PROCEDURE — 80053 COMPREHEN METABOLIC PANEL: CPT

## 2025-07-23 PROCEDURE — 85610 PROTHROMBIN TIME: CPT

## 2025-07-23 PROCEDURE — 80048 BASIC METABOLIC PNL TOTAL CA: CPT

## 2025-07-23 RX ORDER — ONDANSETRON HCL/PF 4 MG/2 ML
4 VIAL (ML) INJECTION ONCE
Refills: 0 | Status: COMPLETED | OUTPATIENT
Start: 2025-07-23 | End: 2025-07-23

## 2025-07-23 RX ORDER — PREDNISONE 20 MG/1
40 TABLET ORAL DAILY
Refills: 0 | Status: DISCONTINUED | OUTPATIENT
Start: 2025-07-23 | End: 2025-07-25

## 2025-07-23 RX ORDER — ARIPIPRAZOLE 2 MG/1
1 TABLET ORAL
Refills: 0 | DISCHARGE

## 2025-07-23 RX ORDER — SERTRALINE 100 MG/1
50 TABLET, FILM COATED ORAL DAILY
Refills: 0 | Status: DISCONTINUED | OUTPATIENT
Start: 2025-07-23 | End: 2025-07-30

## 2025-07-23 RX ORDER — LACTOBACILLUS ACIDOPHILUS/PECT 75 MM-100
1 CAPSULE ORAL
Refills: 0 | DISCHARGE

## 2025-07-23 RX ORDER — ARIPIPRAZOLE 2 MG/1
2 TABLET ORAL DAILY
Refills: 0 | Status: DISCONTINUED | OUTPATIENT
Start: 2025-07-23 | End: 2025-07-30

## 2025-07-23 RX ORDER — HYDROCHLOROTHIAZIDE 50 MG/1
1 TABLET ORAL
Refills: 0 | DISCHARGE

## 2025-07-23 RX ORDER — MIRTAZAPINE 30 MG/1
15 TABLET, FILM COATED ORAL AT BEDTIME
Refills: 0 | Status: DISCONTINUED | OUTPATIENT
Start: 2025-07-23 | End: 2025-07-30

## 2025-07-23 RX ORDER — ACETAMINOPHEN 500 MG/5ML
650 LIQUID (ML) ORAL EVERY 6 HOURS
Refills: 0 | Status: DISCONTINUED | OUTPATIENT
Start: 2025-07-23 | End: 2025-07-23

## 2025-07-23 RX ORDER — SERTRALINE 100 MG/1
1 TABLET, FILM COATED ORAL
Refills: 0 | DISCHARGE

## 2025-07-23 RX ORDER — VANCOMYCIN HCL IN 5 % DEXTROSE 1.5G/250ML
1000 PLASTIC BAG, INJECTION (ML) INTRAVENOUS ONCE
Refills: 0 | Status: COMPLETED | OUTPATIENT
Start: 2025-07-23 | End: 2025-07-23

## 2025-07-23 RX ORDER — PIPERACILLIN-TAZO-DEXTROSE,ISO 3.375G/5
3.38 IV SOLUTION, PIGGYBACK PREMIX FROZEN(ML) INTRAVENOUS EVERY 8 HOURS
Refills: 0 | Status: DISCONTINUED | OUTPATIENT
Start: 2025-07-23 | End: 2025-07-30

## 2025-07-23 RX ORDER — PIPERACILLIN-TAZO-DEXTROSE,ISO 3.375G/5
3.38 IV SOLUTION, PIGGYBACK PREMIX FROZEN(ML) INTRAVENOUS ONCE
Refills: 0 | Status: COMPLETED | OUTPATIENT
Start: 2025-07-23 | End: 2025-07-23

## 2025-07-23 RX ORDER — ACETAMINOPHEN 500 MG/5ML
650 LIQUID (ML) ORAL EVERY 6 HOURS
Refills: 0 | Status: DISCONTINUED | OUTPATIENT
Start: 2025-07-23 | End: 2025-07-30

## 2025-07-23 RX ORDER — ACETAMINOPHEN 500 MG/5ML
1000 LIQUID (ML) ORAL ONCE
Refills: 0 | Status: COMPLETED | OUTPATIENT
Start: 2025-07-23 | End: 2025-07-23

## 2025-07-23 RX ORDER — KETOCONAZOLE 20 MG/G
1 CREAM TOPICAL
Refills: 0 | DISCHARGE

## 2025-07-23 RX ORDER — ONDANSETRON HCL/PF 4 MG/2 ML
4 VIAL (ML) INJECTION EVERY 8 HOURS
Refills: 0 | Status: DISCONTINUED | OUTPATIENT
Start: 2025-07-23 | End: 2025-07-30

## 2025-07-23 RX ORDER — HEPARIN SODIUM 1000 [USP'U]/ML
5000 INJECTION INTRAVENOUS; SUBCUTANEOUS EVERY 8 HOURS
Refills: 0 | Status: DISCONTINUED | OUTPATIENT
Start: 2025-07-23 | End: 2025-07-30

## 2025-07-23 RX ORDER — MUPIROCIN CALCIUM 20 MG/G
1 CREAM TOPICAL
Refills: 0 | DISCHARGE

## 2025-07-23 RX ORDER — BUTYROSPERMUM PARKII(SHEA BUTTER), SIMMONDSIA CHINENSIS (JOJOBA) SEED OIL, ALOE BARBADENSIS LEAF EXTRACT .01; 1; 3.5 G/100G; G/100G; G/100G
250 LIQUID TOPICAL
Refills: 0 | Status: DISCONTINUED | OUTPATIENT
Start: 2025-07-23 | End: 2025-07-30

## 2025-07-23 RX ORDER — LISINOPRIL 5 MG/1
1 TABLET ORAL
Refills: 0 | DISCHARGE

## 2025-07-23 RX ORDER — AMLODIPINE BESYLATE 10 MG/1
1 TABLET ORAL
Refills: 0 | DISCHARGE

## 2025-07-23 RX ORDER — MUPIROCIN CALCIUM 20 MG/G
1 CREAM TOPICAL
Refills: 0 | Status: DISCONTINUED | OUTPATIENT
Start: 2025-07-23 | End: 2025-07-30

## 2025-07-23 RX ORDER — LORAZEPAM 4 MG/ML
0.5 VIAL (ML) INJECTION EVERY 8 HOURS
Refills: 0 | Status: COMPLETED | OUTPATIENT
Start: 2025-07-23 | End: 2025-07-30

## 2025-07-23 RX ADMIN — Medication 1000 MILLIGRAM(S): at 13:04

## 2025-07-23 RX ADMIN — Medication 1000 MILLILITER(S): at 14:28

## 2025-07-23 RX ADMIN — Medication 25 GRAM(S): at 21:33

## 2025-07-23 RX ADMIN — Medication 4 MILLIGRAM(S): at 12:03

## 2025-07-23 RX ADMIN — Medication 1000 MILLILITER(S): at 21:33

## 2025-07-23 RX ADMIN — Medication 1000 MILLILITER(S): at 12:03

## 2025-07-23 RX ADMIN — Medication 100 MILLILITER(S): at 17:34

## 2025-07-23 RX ADMIN — Medication 200 GRAM(S): at 13:14

## 2025-07-23 RX ADMIN — Medication 400 MILLIGRAM(S): at 12:04

## 2025-07-23 RX ADMIN — Medication 250 MILLIGRAM(S): at 13:49

## 2025-07-24 LAB
ANION GAP SERPL CALC-SCNC: 7 MMOL/L — SIGNIFICANT CHANGE UP (ref 5–17)
APTT BLD: 66.8 SEC — HIGH (ref 26.1–36.8)
BUN SERPL-MCNC: 56 MG/DL — HIGH (ref 7–23)
CALCIUM SERPL-MCNC: 7.7 MG/DL — LOW (ref 8.5–10.1)
CHLORIDE SERPL-SCNC: 103 MMOL/L — SIGNIFICANT CHANGE UP (ref 96–108)
CO2 SERPL-SCNC: 30 MMOL/L — SIGNIFICANT CHANGE UP (ref 22–31)
CREAT SERPL-MCNC: 1.7 MG/DL — HIGH (ref 0.5–1.3)
EGFR: 41 ML/MIN/1.73M2 — LOW
EGFR: 41 ML/MIN/1.73M2 — LOW
GLUCOSE SERPL-MCNC: 149 MG/DL — HIGH (ref 70–99)
HCT VFR BLD CALC: 24.5 % — LOW (ref 39–50)
HGB BLD-MCNC: 8.2 G/DL — LOW (ref 13–17)
IMMATURE PLATELET FRACTION #: 6.6 K/UL — SIGNIFICANT CHANGE UP (ref 3.9–12.5)
IMMATURE PLATELET FRACTION %: 7.8 % — HIGH (ref 1.6–7.1)
INR BLD: 1.44 RATIO — HIGH (ref 0.85–1.16)
LACTATE SERPL-SCNC: 1.5 MMOL/L — SIGNIFICANT CHANGE UP (ref 0.7–2)
MCHC RBC-ENTMCNC: 30.3 PG — SIGNIFICANT CHANGE UP (ref 27–34)
MCHC RBC-ENTMCNC: 33.5 G/DL — SIGNIFICANT CHANGE UP (ref 32–36)
MCV RBC AUTO: 90.4 FL — SIGNIFICANT CHANGE UP (ref 80–100)
MRSA PCR RESULT.: DETECTED
NRBC # BLD AUTO: 0 K/UL — SIGNIFICANT CHANGE UP (ref 0–0)
NRBC # FLD: 0 K/UL — SIGNIFICANT CHANGE UP (ref 0–0)
PLATELET # BLD AUTO: 85 K/UL — LOW (ref 150–400)
PMV BLD: 12.9 FL — SIGNIFICANT CHANGE UP (ref 7–13)
POTASSIUM SERPL-MCNC: 4 MMOL/L — SIGNIFICANT CHANGE UP (ref 3.5–5.3)
POTASSIUM SERPL-SCNC: 4 MMOL/L — SIGNIFICANT CHANGE UP (ref 3.5–5.3)
PROTHROM AB SERPL-ACNC: 16.9 SEC — HIGH (ref 9.9–13.4)
RBC # BLD: 2.71 M/UL — LOW (ref 4.2–5.8)
RBC # FLD: SIGNIFICANT CHANGE UP % (ref 10.3–14.5)
S AUREUS DNA NOSE QL NAA+PROBE: DETECTED
SODIUM SERPL-SCNC: 140 MMOL/L — SIGNIFICANT CHANGE UP (ref 135–145)
WBC # BLD: 8.88 K/UL — SIGNIFICANT CHANGE UP (ref 3.8–10.5)
WBC # FLD AUTO: 8.88 K/UL — SIGNIFICANT CHANGE UP (ref 3.8–10.5)

## 2025-07-24 PROCEDURE — 85730 THROMBOPLASTIN TIME PARTIAL: CPT

## 2025-07-24 PROCEDURE — 36415 COLL VENOUS BLD VENIPUNCTURE: CPT

## 2025-07-24 PROCEDURE — 87040 BLOOD CULTURE FOR BACTERIA: CPT

## 2025-07-24 PROCEDURE — 81001 URINALYSIS AUTO W/SCOPE: CPT

## 2025-07-24 PROCEDURE — 85027 COMPLETE CBC AUTOMATED: CPT

## 2025-07-24 PROCEDURE — 74177 CT ABD & PELVIS W/CONTRAST: CPT

## 2025-07-24 PROCEDURE — 87641 MR-STAPH DNA AMP PROBE: CPT

## 2025-07-24 PROCEDURE — 92610 EVALUATE SWALLOWING FUNCTION: CPT

## 2025-07-24 PROCEDURE — 80053 COMPREHEN METABOLIC PANEL: CPT

## 2025-07-24 PROCEDURE — 87640 STAPH A DNA AMP PROBE: CPT

## 2025-07-24 PROCEDURE — 85025 COMPLETE CBC W/AUTO DIFF WBC: CPT

## 2025-07-24 PROCEDURE — 83605 ASSAY OF LACTIC ACID: CPT

## 2025-07-24 PROCEDURE — 83690 ASSAY OF LIPASE: CPT

## 2025-07-24 PROCEDURE — 93005 ELECTROCARDIOGRAM TRACING: CPT

## 2025-07-24 PROCEDURE — 80048 BASIC METABOLIC PNL TOTAL CA: CPT

## 2025-07-24 PROCEDURE — 87637 SARSCOV2&INF A&B&RSV AMP PRB: CPT

## 2025-07-24 PROCEDURE — 82962 GLUCOSE BLOOD TEST: CPT

## 2025-07-24 PROCEDURE — 71045 X-RAY EXAM CHEST 1 VIEW: CPT

## 2025-07-24 PROCEDURE — 0225U NFCT DS DNA&RNA 21 SARSCOV2: CPT

## 2025-07-24 PROCEDURE — 85610 PROTHROMBIN TIME: CPT

## 2025-07-24 RX ADMIN — Medication 100 MILLILITER(S): at 05:09

## 2025-07-24 RX ADMIN — ARIPIPRAZOLE 2 MILLIGRAM(S): 2 TABLET ORAL at 14:01

## 2025-07-24 RX ADMIN — HEPARIN SODIUM 5000 UNIT(S): 1000 INJECTION INTRAVENOUS; SUBCUTANEOUS at 21:13

## 2025-07-24 RX ADMIN — HEPARIN SODIUM 5000 UNIT(S): 1000 INJECTION INTRAVENOUS; SUBCUTANEOUS at 14:02

## 2025-07-24 RX ADMIN — SERTRALINE 50 MILLIGRAM(S): 100 TABLET, FILM COATED ORAL at 14:01

## 2025-07-24 RX ADMIN — Medication 0.5 MILLIGRAM(S): at 00:11

## 2025-07-24 RX ADMIN — PREDNISONE 40 MILLIGRAM(S): 20 TABLET ORAL at 05:09

## 2025-07-24 RX ADMIN — Medication 0.5 MILLIGRAM(S): at 21:12

## 2025-07-24 RX ADMIN — Medication 25 GRAM(S): at 05:08

## 2025-07-24 RX ADMIN — Medication 25 GRAM(S): at 21:12

## 2025-07-24 RX ADMIN — HEPARIN SODIUM 5000 UNIT(S): 1000 INJECTION INTRAVENOUS; SUBCUTANEOUS at 00:11

## 2025-07-24 RX ADMIN — Medication 20 MILLIGRAM(S): at 14:02

## 2025-07-24 RX ADMIN — Medication 25 GRAM(S): at 14:02

## 2025-07-24 RX ADMIN — Medication 0.5 MILLIGRAM(S): at 08:10

## 2025-07-24 RX ADMIN — MUPIROCIN CALCIUM 1 APPLICATION(S): 20 CREAM TOPICAL at 05:11

## 2025-07-24 RX ADMIN — MIRTAZAPINE 15 MILLIGRAM(S): 30 TABLET, FILM COATED ORAL at 00:11

## 2025-07-24 RX ADMIN — BUTYROSPERMUM PARKII(SHEA BUTTER), SIMMONDSIA CHINENSIS (JOJOBA) SEED OIL, ALOE BARBADENSIS LEAF EXTRACT 250 MILLIGRAM(S): .01; 1; 3.5 LIQUID TOPICAL at 05:09

## 2025-07-24 RX ADMIN — MIRTAZAPINE 15 MILLIGRAM(S): 30 TABLET, FILM COATED ORAL at 21:13

## 2025-07-24 RX ADMIN — Medication 100 MILLILITER(S): at 14:04

## 2025-07-24 RX ADMIN — BUTYROSPERMUM PARKII(SHEA BUTTER), SIMMONDSIA CHINENSIS (JOJOBA) SEED OIL, ALOE BARBADENSIS LEAF EXTRACT 250 MILLIGRAM(S): .01; 1; 3.5 LIQUID TOPICAL at 17:09

## 2025-07-24 RX ADMIN — HEPARIN SODIUM 5000 UNIT(S): 1000 INJECTION INTRAVENOUS; SUBCUTANEOUS at 08:10

## 2025-07-24 RX ADMIN — MUPIROCIN CALCIUM 1 APPLICATION(S): 20 CREAM TOPICAL at 17:09

## 2025-07-24 RX ADMIN — Medication 0.5 MILLIGRAM(S): at 14:01

## 2025-07-25 DIAGNOSIS — R76.0 RAISED ANTIBODY TITER: ICD-10-CM

## 2025-07-25 DIAGNOSIS — J18.9 PNEUMONIA, UNSPECIFIED ORGANISM: ICD-10-CM

## 2025-07-25 DIAGNOSIS — D64.9 ANEMIA, UNSPECIFIED: ICD-10-CM

## 2025-07-25 DIAGNOSIS — N18.9 CHRONIC KIDNEY DISEASE, UNSPECIFIED: ICD-10-CM

## 2025-07-25 LAB
ANION GAP SERPL CALC-SCNC: 3 MMOL/L — LOW (ref 5–17)
BUN SERPL-MCNC: 51 MG/DL — HIGH (ref 7–23)
CALCIUM SERPL-MCNC: 7.6 MG/DL — LOW (ref 8.5–10.1)
CHLORIDE SERPL-SCNC: 109 MMOL/L — HIGH (ref 96–108)
CO2 SERPL-SCNC: 30 MMOL/L — SIGNIFICANT CHANGE UP (ref 22–31)
CREAT SERPL-MCNC: 1.5 MG/DL — HIGH (ref 0.5–1.3)
EGFR: 48 ML/MIN/1.73M2 — LOW
EGFR: 48 ML/MIN/1.73M2 — LOW
GLUCOSE SERPL-MCNC: 173 MG/DL — HIGH (ref 70–99)
HCT VFR BLD CALC: 23.4 % — LOW (ref 39–50)
HGB BLD-MCNC: 7.9 G/DL — LOW (ref 13–17)
IMMATURE PLATELET FRACTION #: 4.6 K/UL — SIGNIFICANT CHANGE UP (ref 3.9–12.5)
IMMATURE PLATELET FRACTION %: 4.8 % — SIGNIFICANT CHANGE UP (ref 1.6–7.1)
MCHC RBC-ENTMCNC: 30.6 PG — SIGNIFICANT CHANGE UP (ref 27–34)
MCHC RBC-ENTMCNC: 33.8 G/DL — SIGNIFICANT CHANGE UP (ref 32–36)
MCV RBC AUTO: 90.7 FL — SIGNIFICANT CHANGE UP (ref 80–100)
NRBC # BLD AUTO: 0 K/UL — SIGNIFICANT CHANGE UP (ref 0–0)
NRBC # FLD: 0 K/UL — SIGNIFICANT CHANGE UP (ref 0–0)
PLATELET # BLD AUTO: 96 K/UL — LOW (ref 150–400)
PMV BLD: 12.3 FL — SIGNIFICANT CHANGE UP (ref 7–13)
POTASSIUM SERPL-MCNC: 3.9 MMOL/L — SIGNIFICANT CHANGE UP (ref 3.5–5.3)
POTASSIUM SERPL-SCNC: 3.9 MMOL/L — SIGNIFICANT CHANGE UP (ref 3.5–5.3)
RBC # BLD: 2.58 M/UL — LOW (ref 4.2–5.8)
RBC # FLD: SIGNIFICANT CHANGE UP % (ref 10.3–14.5)
SODIUM SERPL-SCNC: 142 MMOL/L — SIGNIFICANT CHANGE UP (ref 135–145)
WBC # BLD: 6.89 K/UL — SIGNIFICANT CHANGE UP (ref 3.8–10.5)
WBC # FLD AUTO: 6.89 K/UL — SIGNIFICANT CHANGE UP (ref 3.8–10.5)

## 2025-07-25 RX ORDER — PREDNISONE 20 MG/1
5 TABLET ORAL DAILY
Refills: 0 | Status: DISCONTINUED | OUTPATIENT
Start: 2025-07-25 | End: 2025-07-30

## 2025-07-25 RX ORDER — SODIUM CHLORIDE 9 G/1000ML
1000 INJECTION, SOLUTION INTRAVENOUS
Refills: 0 | Status: DISCONTINUED | OUTPATIENT
Start: 2025-07-25 | End: 2025-07-26

## 2025-07-25 RX ADMIN — MIRTAZAPINE 15 MILLIGRAM(S): 30 TABLET, FILM COATED ORAL at 22:13

## 2025-07-25 RX ADMIN — Medication 0.5 MILLIGRAM(S): at 13:10

## 2025-07-25 RX ADMIN — Medication 25 GRAM(S): at 05:52

## 2025-07-25 RX ADMIN — PREDNISONE 40 MILLIGRAM(S): 20 TABLET ORAL at 05:51

## 2025-07-25 RX ADMIN — PREDNISONE 5 MILLIGRAM(S): 20 TABLET ORAL at 15:02

## 2025-07-25 RX ADMIN — Medication 0.5 MILLIGRAM(S): at 05:51

## 2025-07-25 RX ADMIN — Medication 20 MILLIGRAM(S): at 13:10

## 2025-07-25 RX ADMIN — BUTYROSPERMUM PARKII(SHEA BUTTER), SIMMONDSIA CHINENSIS (JOJOBA) SEED OIL, ALOE BARBADENSIS LEAF EXTRACT 250 MILLIGRAM(S): .01; 1; 3.5 LIQUID TOPICAL at 05:51

## 2025-07-25 RX ADMIN — BUTYROSPERMUM PARKII(SHEA BUTTER), SIMMONDSIA CHINENSIS (JOJOBA) SEED OIL, ALOE BARBADENSIS LEAF EXTRACT 250 MILLIGRAM(S): .01; 1; 3.5 LIQUID TOPICAL at 18:17

## 2025-07-25 RX ADMIN — MUPIROCIN CALCIUM 1 APPLICATION(S): 20 CREAM TOPICAL at 22:13

## 2025-07-25 RX ADMIN — SODIUM CHLORIDE 75 MILLILITER(S): 9 INJECTION, SOLUTION INTRAVENOUS at 13:10

## 2025-07-25 RX ADMIN — SERTRALINE 50 MILLIGRAM(S): 100 TABLET, FILM COATED ORAL at 13:10

## 2025-07-25 RX ADMIN — Medication 0.5 MILLIGRAM(S): at 22:13

## 2025-07-25 RX ADMIN — ARIPIPRAZOLE 2 MILLIGRAM(S): 2 TABLET ORAL at 13:10

## 2025-07-25 RX ADMIN — HEPARIN SODIUM 5000 UNIT(S): 1000 INJECTION INTRAVENOUS; SUBCUTANEOUS at 13:10

## 2025-07-25 RX ADMIN — MUPIROCIN CALCIUM 1 APPLICATION(S): 20 CREAM TOPICAL at 05:52

## 2025-07-25 RX ADMIN — Medication 25 GRAM(S): at 22:14

## 2025-07-25 RX ADMIN — Medication 25 GRAM(S): at 13:09

## 2025-07-26 LAB
ALLERGY+IMMUNOLOGY DIAG STUDY NOTE: SIGNIFICANT CHANGE UP
ANION GAP SERPL CALC-SCNC: 5 MMOL/L — SIGNIFICANT CHANGE UP (ref 5–17)
BUN SERPL-MCNC: 50 MG/DL — HIGH (ref 7–23)
CALCIUM SERPL-MCNC: 7.5 MG/DL — LOW (ref 8.5–10.1)
CHLORIDE SERPL-SCNC: 109 MMOL/L — HIGH (ref 96–108)
CO2 SERPL-SCNC: 28 MMOL/L — SIGNIFICANT CHANGE UP (ref 22–31)
CREAT SERPL-MCNC: 1.2 MG/DL — SIGNIFICANT CHANGE UP (ref 0.5–1.3)
DAT C3-SP REAG RBC QL: ABNORMAL
DAT IGG-SP REAG RBC-IMP: SIGNIFICANT CHANGE UP
DIR ANTIGLOB POLYSPECIFIC INTERPRETATION: ABNORMAL
EGFR: 62 ML/MIN/1.73M2 — SIGNIFICANT CHANGE UP
EGFR: 62 ML/MIN/1.73M2 — SIGNIFICANT CHANGE UP
GLUCOSE SERPL-MCNC: 253 MG/DL — HIGH (ref 70–99)
HCT VFR BLD CALC: 20.6 % — CRITICAL LOW (ref 39–50)
HGB BLD-MCNC: 7.2 G/DL — LOW (ref 13–17)
IMMATURE PLATELET FRACTION #: 2.8 K/UL — LOW (ref 3.9–12.5)
IMMATURE PLATELET FRACTION %: 2.9 % — SIGNIFICANT CHANGE UP (ref 1.6–7.1)
LEGIONELLA AG UR QL: NEGATIVE — SIGNIFICANT CHANGE UP
MCHC RBC-ENTMCNC: 31.3 PG — SIGNIFICANT CHANGE UP (ref 27–34)
MCHC RBC-ENTMCNC: 35 G/DL — SIGNIFICANT CHANGE UP (ref 32–36)
MCV RBC AUTO: 89.6 FL — SIGNIFICANT CHANGE UP (ref 80–100)
NRBC # BLD AUTO: 0 K/UL — SIGNIFICANT CHANGE UP (ref 0–0)
NRBC # FLD: 0 K/UL — SIGNIFICANT CHANGE UP (ref 0–0)
NRBC BLD AUTO-RTO: 0 /100 WBCS — SIGNIFICANT CHANGE UP (ref 0–0)
OB PNL STL: NEGATIVE — SIGNIFICANT CHANGE UP
PLATELET # BLD AUTO: 98 K/UL — LOW (ref 150–400)
PMV BLD: 11.7 FL — SIGNIFICANT CHANGE UP (ref 7–13)
POTASSIUM SERPL-MCNC: 3.9 MMOL/L — SIGNIFICANT CHANGE UP (ref 3.5–5.3)
POTASSIUM SERPL-SCNC: 3.9 MMOL/L — SIGNIFICANT CHANGE UP (ref 3.5–5.3)
RBC # BLD: 2.3 M/UL — LOW (ref 4.2–5.8)
RBC # FLD: SIGNIFICANT CHANGE UP % (ref 10.3–14.5)
S PNEUM AG UR QL: NEGATIVE — SIGNIFICANT CHANGE UP
SODIUM SERPL-SCNC: 142 MMOL/L — SIGNIFICANT CHANGE UP (ref 135–145)
WBC # BLD: 6.04 K/UL — SIGNIFICANT CHANGE UP (ref 3.8–10.5)
WBC # FLD AUTO: 6.04 K/UL — SIGNIFICANT CHANGE UP (ref 3.8–10.5)

## 2025-07-26 RX ADMIN — Medication 0.5 MILLIGRAM(S): at 21:24

## 2025-07-26 RX ADMIN — BUTYROSPERMUM PARKII(SHEA BUTTER), SIMMONDSIA CHINENSIS (JOJOBA) SEED OIL, ALOE BARBADENSIS LEAF EXTRACT 250 MILLIGRAM(S): .01; 1; 3.5 LIQUID TOPICAL at 05:55

## 2025-07-26 RX ADMIN — MIRTAZAPINE 15 MILLIGRAM(S): 30 TABLET, FILM COATED ORAL at 21:25

## 2025-07-26 RX ADMIN — BUTYROSPERMUM PARKII(SHEA BUTTER), SIMMONDSIA CHINENSIS (JOJOBA) SEED OIL, ALOE BARBADENSIS LEAF EXTRACT 250 MILLIGRAM(S): .01; 1; 3.5 LIQUID TOPICAL at 17:21

## 2025-07-26 RX ADMIN — HEPARIN SODIUM 5000 UNIT(S): 1000 INJECTION INTRAVENOUS; SUBCUTANEOUS at 05:55

## 2025-07-26 RX ADMIN — SERTRALINE 50 MILLIGRAM(S): 100 TABLET, FILM COATED ORAL at 14:01

## 2025-07-26 RX ADMIN — Medication 25 GRAM(S): at 05:54

## 2025-07-26 RX ADMIN — Medication 0.5 MILLIGRAM(S): at 14:01

## 2025-07-26 RX ADMIN — Medication 20 MILLIGRAM(S): at 14:01

## 2025-07-26 RX ADMIN — HEPARIN SODIUM 5000 UNIT(S): 1000 INJECTION INTRAVENOUS; SUBCUTANEOUS at 14:01

## 2025-07-26 RX ADMIN — MUPIROCIN CALCIUM 1 APPLICATION(S): 20 CREAM TOPICAL at 17:22

## 2025-07-26 RX ADMIN — ARIPIPRAZOLE 2 MILLIGRAM(S): 2 TABLET ORAL at 14:01

## 2025-07-26 RX ADMIN — Medication 25 GRAM(S): at 11:48

## 2025-07-26 RX ADMIN — MUPIROCIN CALCIUM 1 APPLICATION(S): 20 CREAM TOPICAL at 05:55

## 2025-07-26 RX ADMIN — PREDNISONE 5 MILLIGRAM(S): 20 TABLET ORAL at 05:55

## 2025-07-26 RX ADMIN — Medication 25 GRAM(S): at 20:16

## 2025-07-26 RX ADMIN — HEPARIN SODIUM 5000 UNIT(S): 1000 INJECTION INTRAVENOUS; SUBCUTANEOUS at 21:25

## 2025-07-26 RX ADMIN — Medication 0.5 MILLIGRAM(S): at 05:54

## 2025-07-27 LAB
ADV 40+41 DNA STL QL NAA+NON-PROBE: SIGNIFICANT CHANGE UP
ANION GAP SERPL CALC-SCNC: 5 MMOL/L — SIGNIFICANT CHANGE UP (ref 5–17)
ASTROVIRUS: SIGNIFICANT CHANGE UP
BUN SERPL-MCNC: 38 MG/DL — HIGH (ref 7–23)
C CAYETANENSIS DNA STL QL NAA+NON-PROBE: SIGNIFICANT CHANGE UP
CALCIUM SERPL-MCNC: 8.4 MG/DL — LOW (ref 8.5–10.1)
CAMPYLOBACTER DNA SPEC NAA+PROBE: SIGNIFICANT CHANGE UP
CHLORIDE SERPL-SCNC: 110 MMOL/L — HIGH (ref 96–108)
CO2 SERPL-SCNC: 29 MMOL/L — SIGNIFICANT CHANGE UP (ref 22–31)
CREAT SERPL-MCNC: 1.3 MG/DL — SIGNIFICANT CHANGE UP (ref 0.5–1.3)
CRYPTOSP DNA STL QL NAA+PROBE: SIGNIFICANT CHANGE UP
E COLI SXT SPEC: SIGNIFICANT CHANGE UP
E HISTOLYT DNA STL QL NAA+NON-PROBE: SIGNIFICANT CHANGE UP
EC EAEA GENE STL QL NAA+PROBE: SIGNIFICANT CHANGE UP
EGFR: 57 ML/MIN/1.73M2 — LOW
EGFR: 57 ML/MIN/1.73M2 — LOW
EPEC DNA STL QL NAA+PROBE: SIGNIFICANT CHANGE UP
ETEC DNA STL QL NAA+PROBE: SIGNIFICANT CHANGE UP
G LAMBLIA DNA STL QL NAA+NON-PROBE: SIGNIFICANT CHANGE UP
GI PCR PANEL: DETECTED
GLUCOSE SERPL-MCNC: 245 MG/DL — HIGH (ref 70–99)
HCT VFR BLD CALC: 24.7 % — LOW (ref 39–50)
HGB BLD-MCNC: 8.5 G/DL — LOW (ref 13–17)
MCHC RBC-ENTMCNC: 31.3 PG — SIGNIFICANT CHANGE UP (ref 27–34)
MCHC RBC-ENTMCNC: 34.4 G/DL — SIGNIFICANT CHANGE UP (ref 32–36)
MCV RBC AUTO: 90.8 FL — SIGNIFICANT CHANGE UP (ref 80–100)
NOROVIRUS GI+II RNA STL QL NAA+NON-PROBE: DETECTED
NRBC # BLD AUTO: 0 K/UL — SIGNIFICANT CHANGE UP (ref 0–0)
NRBC # FLD: 0 K/UL — SIGNIFICANT CHANGE UP (ref 0–0)
NRBC BLD AUTO-RTO: 0 /100 WBCS — SIGNIFICANT CHANGE UP (ref 0–0)
P SHIGELLOIDES DNA STL QL NAA+NON-PROBE: SIGNIFICANT CHANGE UP
PLATELET # BLD AUTO: 114 K/UL — LOW (ref 150–400)
PMV BLD: 12.2 FL — SIGNIFICANT CHANGE UP (ref 7–13)
POTASSIUM SERPL-MCNC: 3.8 MMOL/L — SIGNIFICANT CHANGE UP (ref 3.5–5.3)
POTASSIUM SERPL-SCNC: 3.8 MMOL/L — SIGNIFICANT CHANGE UP (ref 3.5–5.3)
RBC # BLD: 2.72 M/UL — LOW (ref 4.2–5.8)
RBC # FLD: SIGNIFICANT CHANGE UP % (ref 10.3–14.5)
RV RNA STL NAA+PROBE: SIGNIFICANT CHANGE UP
SALMONELLA DNA STL QL NAA+PROBE: SIGNIFICANT CHANGE UP
SAPOVIRUS (GENOGROUPS I, II, IV, AND V): SIGNIFICANT CHANGE UP
SHIGELLA DNA SPEC QL NAA+PROBE: SIGNIFICANT CHANGE UP
SODIUM SERPL-SCNC: 144 MMOL/L — SIGNIFICANT CHANGE UP (ref 135–145)
VIBRIO CHOL TOXIN CTXA STL QL NAA+PROBE: SIGNIFICANT CHANGE UP
VIBRIO CHOL TOXIN CTXA STL QL NAA+PROBE: SIGNIFICANT CHANGE UP
WBC # BLD: 6.51 K/UL — SIGNIFICANT CHANGE UP (ref 3.8–10.5)
WBC # FLD AUTO: 6.51 K/UL — SIGNIFICANT CHANGE UP (ref 3.8–10.5)
Y ENTEROCOL DNA STL QL NAA+NON-PROBE: SIGNIFICANT CHANGE UP

## 2025-07-27 RX ORDER — LISINOPRIL 30 MG/1
50 TABLET ORAL
Refills: 0 | Status: DISCONTINUED | OUTPATIENT
Start: 2025-07-27 | End: 2025-07-30

## 2025-07-27 RX ORDER — AMLODIPINE BESYLATE 10 MG/1
10 TABLET ORAL DAILY
Refills: 0 | Status: DISCONTINUED | OUTPATIENT
Start: 2025-07-27 | End: 2025-07-30

## 2025-07-27 RX ADMIN — MIRTAZAPINE 15 MILLIGRAM(S): 30 TABLET, FILM COATED ORAL at 22:49

## 2025-07-27 RX ADMIN — HEPARIN SODIUM 5000 UNIT(S): 1000 INJECTION INTRAVENOUS; SUBCUTANEOUS at 12:42

## 2025-07-27 RX ADMIN — Medication 0.5 MILLIGRAM(S): at 06:29

## 2025-07-27 RX ADMIN — MUPIROCIN CALCIUM 1 APPLICATION(S): 20 CREAM TOPICAL at 17:58

## 2025-07-27 RX ADMIN — LISINOPRIL 50 MILLIGRAM(S): 30 TABLET ORAL at 17:58

## 2025-07-27 RX ADMIN — ARIPIPRAZOLE 2 MILLIGRAM(S): 2 TABLET ORAL at 12:42

## 2025-07-27 RX ADMIN — Medication 20 MILLIGRAM(S): at 12:41

## 2025-07-27 RX ADMIN — MUPIROCIN CALCIUM 1 APPLICATION(S): 20 CREAM TOPICAL at 06:30

## 2025-07-27 RX ADMIN — Medication 25 GRAM(S): at 03:32

## 2025-07-27 RX ADMIN — Medication 0.5 MILLIGRAM(S): at 12:42

## 2025-07-27 RX ADMIN — SERTRALINE 50 MILLIGRAM(S): 100 TABLET, FILM COATED ORAL at 12:42

## 2025-07-27 RX ADMIN — BUTYROSPERMUM PARKII(SHEA BUTTER), SIMMONDSIA CHINENSIS (JOJOBA) SEED OIL, ALOE BARBADENSIS LEAF EXTRACT 250 MILLIGRAM(S): .01; 1; 3.5 LIQUID TOPICAL at 17:58

## 2025-07-27 RX ADMIN — Medication 0.5 MILLIGRAM(S): at 22:49

## 2025-07-27 RX ADMIN — BUTYROSPERMUM PARKII(SHEA BUTTER), SIMMONDSIA CHINENSIS (JOJOBA) SEED OIL, ALOE BARBADENSIS LEAF EXTRACT 250 MILLIGRAM(S): .01; 1; 3.5 LIQUID TOPICAL at 06:29

## 2025-07-27 RX ADMIN — Medication 25 GRAM(S): at 12:38

## 2025-07-27 RX ADMIN — AMLODIPINE BESYLATE 10 MILLIGRAM(S): 10 TABLET ORAL at 15:58

## 2025-07-27 RX ADMIN — HEPARIN SODIUM 5000 UNIT(S): 1000 INJECTION INTRAVENOUS; SUBCUTANEOUS at 06:29

## 2025-07-27 RX ADMIN — Medication 25 GRAM(S): at 22:49

## 2025-07-27 RX ADMIN — PREDNISONE 5 MILLIGRAM(S): 20 TABLET ORAL at 06:29

## 2025-07-28 LAB
ANION GAP SERPL CALC-SCNC: 3 MMOL/L — LOW (ref 5–17)
BUN SERPL-MCNC: 31 MG/DL — HIGH (ref 7–23)
CALCIUM SERPL-MCNC: 8.4 MG/DL — LOW (ref 8.5–10.1)
CHLORIDE SERPL-SCNC: 109 MMOL/L — HIGH (ref 96–108)
CO2 SERPL-SCNC: 32 MMOL/L — HIGH (ref 22–31)
CREAT SERPL-MCNC: 1.1 MG/DL — SIGNIFICANT CHANGE UP (ref 0.5–1.3)
CULTURE RESULTS: SIGNIFICANT CHANGE UP
CULTURE RESULTS: SIGNIFICANT CHANGE UP
EGFR: 69 ML/MIN/1.73M2 — SIGNIFICANT CHANGE UP
EGFR: 69 ML/MIN/1.73M2 — SIGNIFICANT CHANGE UP
GLUCOSE SERPL-MCNC: 230 MG/DL — HIGH (ref 70–99)
HCT VFR BLD CALC: 21.9 % — LOW (ref 39–50)
HGB BLD-MCNC: 7.9 G/DL — LOW (ref 13–17)
MCHC RBC-ENTMCNC: 33.9 PG — SIGNIFICANT CHANGE UP (ref 27–34)
MCHC RBC-ENTMCNC: 36.1 G/DL — HIGH (ref 32–36)
MCV RBC AUTO: 94 FL — SIGNIFICANT CHANGE UP (ref 80–100)
NRBC # BLD AUTO: 0 K/UL — SIGNIFICANT CHANGE UP (ref 0–0)
NRBC # FLD: 0 K/UL — SIGNIFICANT CHANGE UP (ref 0–0)
PLATELET # BLD AUTO: 101 K/UL — LOW (ref 150–400)
PMV BLD: 11.6 FL — SIGNIFICANT CHANGE UP (ref 7–13)
POTASSIUM SERPL-MCNC: 4.3 MMOL/L — SIGNIFICANT CHANGE UP (ref 3.5–5.3)
POTASSIUM SERPL-SCNC: 4.3 MMOL/L — SIGNIFICANT CHANGE UP (ref 3.5–5.3)
RBC # BLD: 2.33 M/UL — LOW (ref 4.2–5.8)
RBC # FLD: SIGNIFICANT CHANGE UP % (ref 10.3–14.5)
SODIUM SERPL-SCNC: 144 MMOL/L — SIGNIFICANT CHANGE UP (ref 135–145)
SPECIMEN SOURCE: SIGNIFICANT CHANGE UP
SPECIMEN SOURCE: SIGNIFICANT CHANGE UP
WBC # BLD: 5.69 K/UL — SIGNIFICANT CHANGE UP (ref 3.8–10.5)
WBC # FLD AUTO: 5.69 K/UL — SIGNIFICANT CHANGE UP (ref 3.8–10.5)

## 2025-07-28 PROCEDURE — 86901 BLOOD TYPING SEROLOGIC RH(D): CPT

## 2025-07-28 PROCEDURE — 87040 BLOOD CULTURE FOR BACTERIA: CPT

## 2025-07-28 PROCEDURE — 85610 PROTHROMBIN TIME: CPT

## 2025-07-28 PROCEDURE — 85025 COMPLETE CBC W/AUTO DIFF WBC: CPT

## 2025-07-28 PROCEDURE — 85027 COMPLETE CBC AUTOMATED: CPT

## 2025-07-28 PROCEDURE — 93005 ELECTROCARDIOGRAM TRACING: CPT

## 2025-07-28 PROCEDURE — 74177 CT ABD & PELVIS W/CONTRAST: CPT

## 2025-07-28 PROCEDURE — 71045 X-RAY EXAM CHEST 1 VIEW: CPT

## 2025-07-28 PROCEDURE — 87507 IADNA-DNA/RNA PROBE TQ 12-25: CPT

## 2025-07-28 PROCEDURE — 87640 STAPH A DNA AMP PROBE: CPT

## 2025-07-28 PROCEDURE — 80053 COMPREHEN METABOLIC PANEL: CPT

## 2025-07-28 PROCEDURE — 80048 BASIC METABOLIC PNL TOTAL CA: CPT

## 2025-07-28 PROCEDURE — 86880 COOMBS TEST DIRECT: CPT

## 2025-07-28 PROCEDURE — 36415 COLL VENOUS BLD VENIPUNCTURE: CPT

## 2025-07-28 PROCEDURE — 81001 URINALYSIS AUTO W/SCOPE: CPT

## 2025-07-28 PROCEDURE — 86850 RBC ANTIBODY SCREEN: CPT

## 2025-07-28 PROCEDURE — 87899 AGENT NOS ASSAY W/OPTIC: CPT

## 2025-07-28 PROCEDURE — 86870 RBC ANTIBODY IDENTIFICATION: CPT

## 2025-07-28 PROCEDURE — 87641 MR-STAPH DNA AMP PROBE: CPT

## 2025-07-28 PROCEDURE — 82962 GLUCOSE BLOOD TEST: CPT

## 2025-07-28 PROCEDURE — 0225U NFCT DS DNA&RNA 21 SARSCOV2: CPT

## 2025-07-28 PROCEDURE — 83605 ASSAY OF LACTIC ACID: CPT

## 2025-07-28 PROCEDURE — 86900 BLOOD TYPING SEROLOGIC ABO: CPT

## 2025-07-28 PROCEDURE — 87637 SARSCOV2&INF A&B&RSV AMP PRB: CPT

## 2025-07-28 PROCEDURE — 82272 OCCULT BLD FECES 1-3 TESTS: CPT

## 2025-07-28 PROCEDURE — 92610 EVALUATE SWALLOWING FUNCTION: CPT

## 2025-07-28 PROCEDURE — 86922 COMPATIBILITY TEST ANTIGLOB: CPT

## 2025-07-28 PROCEDURE — 83690 ASSAY OF LIPASE: CPT

## 2025-07-28 PROCEDURE — 85730 THROMBOPLASTIN TIME PARTIAL: CPT

## 2025-07-28 RX ORDER — LISINOPRIL 5 MG/1
40 TABLET ORAL DAILY
Refills: 0 | Status: DISCONTINUED | OUTPATIENT
Start: 2025-07-28 | End: 2025-07-30

## 2025-07-28 RX ORDER — MUPIROCIN CALCIUM 20 MG/G
1 CREAM TOPICAL
Refills: 0 | Status: DISCONTINUED | OUTPATIENT
Start: 2025-07-28 | End: 2025-07-30

## 2025-07-28 RX ADMIN — HEPARIN SODIUM 5000 UNIT(S): 1000 INJECTION INTRAVENOUS; SUBCUTANEOUS at 14:57

## 2025-07-28 RX ADMIN — Medication 25 GRAM(S): at 04:48

## 2025-07-28 RX ADMIN — BUTYROSPERMUM PARKII(SHEA BUTTER), SIMMONDSIA CHINENSIS (JOJOBA) SEED OIL, ALOE BARBADENSIS LEAF EXTRACT 250 MILLIGRAM(S): .01; 1; 3.5 LIQUID TOPICAL at 05:27

## 2025-07-28 RX ADMIN — Medication 25 GRAM(S): at 12:31

## 2025-07-28 RX ADMIN — HEPARIN SODIUM 5000 UNIT(S): 1000 INJECTION INTRAVENOUS; SUBCUTANEOUS at 22:08

## 2025-07-28 RX ADMIN — Medication 20 MILLIGRAM(S): at 12:16

## 2025-07-28 RX ADMIN — LISINOPRIL 40 MILLIGRAM(S): 5 TABLET ORAL at 08:15

## 2025-07-28 RX ADMIN — HEPARIN SODIUM 5000 UNIT(S): 1000 INJECTION INTRAVENOUS; SUBCUTANEOUS at 05:29

## 2025-07-28 RX ADMIN — Medication 0.5 MILLIGRAM(S): at 14:57

## 2025-07-28 RX ADMIN — MUPIROCIN CALCIUM 1 APPLICATION(S): 20 CREAM TOPICAL at 17:39

## 2025-07-28 RX ADMIN — MUPIROCIN CALCIUM 1 APPLICATION(S): 20 CREAM TOPICAL at 05:27

## 2025-07-28 RX ADMIN — ARIPIPRAZOLE 2 MILLIGRAM(S): 2 TABLET ORAL at 12:16

## 2025-07-28 RX ADMIN — Medication 25 GRAM(S): at 22:08

## 2025-07-28 RX ADMIN — Medication 0.5 MILLIGRAM(S): at 05:27

## 2025-07-28 RX ADMIN — AMLODIPINE BESYLATE 10 MILLIGRAM(S): 10 TABLET ORAL at 05:26

## 2025-07-28 RX ADMIN — MUPIROCIN CALCIUM 1 APPLICATION(S): 20 CREAM TOPICAL at 17:38

## 2025-07-28 RX ADMIN — LISINOPRIL 50 MILLIGRAM(S): 30 TABLET ORAL at 17:39

## 2025-07-28 RX ADMIN — LISINOPRIL 50 MILLIGRAM(S): 30 TABLET ORAL at 05:27

## 2025-07-28 RX ADMIN — PREDNISONE 5 MILLIGRAM(S): 20 TABLET ORAL at 05:27

## 2025-07-28 RX ADMIN — MIRTAZAPINE 15 MILLIGRAM(S): 30 TABLET, FILM COATED ORAL at 22:08

## 2025-07-28 RX ADMIN — SERTRALINE 50 MILLIGRAM(S): 100 TABLET, FILM COATED ORAL at 12:16

## 2025-07-28 RX ADMIN — Medication 0.5 MILLIGRAM(S): at 22:08

## 2025-07-28 RX ADMIN — BUTYROSPERMUM PARKII(SHEA BUTTER), SIMMONDSIA CHINENSIS (JOJOBA) SEED OIL, ALOE BARBADENSIS LEAF EXTRACT 250 MILLIGRAM(S): .01; 1; 3.5 LIQUID TOPICAL at 17:39

## 2025-07-29 ENCOUNTER — TRANSCRIPTION ENCOUNTER (OUTPATIENT)
Age: 75
End: 2025-07-29

## 2025-07-29 LAB
ANION GAP SERPL CALC-SCNC: 5 MMOL/L — SIGNIFICANT CHANGE UP (ref 5–17)
BUN SERPL-MCNC: 27 MG/DL — HIGH (ref 7–23)
CALCIUM SERPL-MCNC: 8.5 MG/DL — SIGNIFICANT CHANGE UP (ref 8.5–10.1)
CHLORIDE SERPL-SCNC: 105 MMOL/L — SIGNIFICANT CHANGE UP (ref 96–108)
CO2 SERPL-SCNC: 32 MMOL/L — HIGH (ref 22–31)
CREAT SERPL-MCNC: 1 MG/DL — SIGNIFICANT CHANGE UP (ref 0.5–1.3)
EGFR: 78 ML/MIN/1.73M2 — SIGNIFICANT CHANGE UP
EGFR: 78 ML/MIN/1.73M2 — SIGNIFICANT CHANGE UP
GLUCOSE SERPL-MCNC: 228 MG/DL — HIGH (ref 70–99)
HCT VFR BLD CALC: 25.7 % — LOW (ref 39–50)
HGB BLD-MCNC: 8.5 G/DL — LOW (ref 13–17)
MCHC RBC-ENTMCNC: 30 PG — SIGNIFICANT CHANGE UP (ref 27–34)
MCHC RBC-ENTMCNC: 33.1 G/DL — SIGNIFICANT CHANGE UP (ref 32–36)
MCV RBC AUTO: 90.8 FL — SIGNIFICANT CHANGE UP (ref 80–100)
NRBC # BLD AUTO: 0 K/UL — SIGNIFICANT CHANGE UP (ref 0–0)
NRBC # FLD: 0 K/UL — SIGNIFICANT CHANGE UP (ref 0–0)
NRBC BLD AUTO-RTO: 0 /100 WBCS — SIGNIFICANT CHANGE UP (ref 0–0)
PLATELET # BLD AUTO: 127 K/UL — LOW (ref 150–400)
PMV BLD: 11.5 FL — SIGNIFICANT CHANGE UP (ref 7–13)
POTASSIUM SERPL-MCNC: 4.4 MMOL/L — SIGNIFICANT CHANGE UP (ref 3.5–5.3)
POTASSIUM SERPL-SCNC: 4.4 MMOL/L — SIGNIFICANT CHANGE UP (ref 3.5–5.3)
RBC # BLD: 2.83 M/UL — LOW (ref 4.2–5.8)
RBC # FLD: 14.6 % — HIGH (ref 10.3–14.5)
SODIUM SERPL-SCNC: 142 MMOL/L — SIGNIFICANT CHANGE UP (ref 135–145)
WBC # BLD: 7.08 K/UL — SIGNIFICANT CHANGE UP (ref 3.8–10.5)
WBC # FLD AUTO: 7.08 K/UL — SIGNIFICANT CHANGE UP (ref 3.8–10.5)

## 2025-07-29 RX ADMIN — Medication 25 GRAM(S): at 06:11

## 2025-07-29 RX ADMIN — MUPIROCIN CALCIUM 1 APPLICATION(S): 20 CREAM TOPICAL at 06:26

## 2025-07-29 RX ADMIN — Medication 25 GRAM(S): at 13:09

## 2025-07-29 RX ADMIN — ARIPIPRAZOLE 2 MILLIGRAM(S): 2 TABLET ORAL at 13:10

## 2025-07-29 RX ADMIN — MIRTAZAPINE 15 MILLIGRAM(S): 30 TABLET, FILM COATED ORAL at 22:09

## 2025-07-29 RX ADMIN — HEPARIN SODIUM 5000 UNIT(S): 1000 INJECTION INTRAVENOUS; SUBCUTANEOUS at 13:09

## 2025-07-29 RX ADMIN — Medication 20 MILLIGRAM(S): at 13:10

## 2025-07-29 RX ADMIN — HEPARIN SODIUM 5000 UNIT(S): 1000 INJECTION INTRAVENOUS; SUBCUTANEOUS at 22:09

## 2025-07-29 RX ADMIN — Medication 0.5 MILLIGRAM(S): at 13:09

## 2025-07-29 RX ADMIN — AMLODIPINE BESYLATE 10 MILLIGRAM(S): 10 TABLET ORAL at 06:14

## 2025-07-29 RX ADMIN — BUTYROSPERMUM PARKII(SHEA BUTTER), SIMMONDSIA CHINENSIS (JOJOBA) SEED OIL, ALOE BARBADENSIS LEAF EXTRACT 250 MILLIGRAM(S): .01; 1; 3.5 LIQUID TOPICAL at 06:15

## 2025-07-29 RX ADMIN — LISINOPRIL 50 MILLIGRAM(S): 30 TABLET ORAL at 06:13

## 2025-07-29 RX ADMIN — Medication 0.5 MILLIGRAM(S): at 22:09

## 2025-07-29 RX ADMIN — LISINOPRIL 40 MILLIGRAM(S): 5 TABLET ORAL at 06:14

## 2025-07-29 RX ADMIN — Medication 25 GRAM(S): at 22:08

## 2025-07-29 RX ADMIN — SERTRALINE 50 MILLIGRAM(S): 100 TABLET, FILM COATED ORAL at 13:09

## 2025-07-29 RX ADMIN — LISINOPRIL 50 MILLIGRAM(S): 30 TABLET ORAL at 17:33

## 2025-07-29 RX ADMIN — HEPARIN SODIUM 5000 UNIT(S): 1000 INJECTION INTRAVENOUS; SUBCUTANEOUS at 06:13

## 2025-07-29 RX ADMIN — MUPIROCIN CALCIUM 1 APPLICATION(S): 20 CREAM TOPICAL at 17:32

## 2025-07-29 RX ADMIN — Medication 0.5 MILLIGRAM(S): at 06:14

## 2025-07-29 RX ADMIN — BUTYROSPERMUM PARKII(SHEA BUTTER), SIMMONDSIA CHINENSIS (JOJOBA) SEED OIL, ALOE BARBADENSIS LEAF EXTRACT 250 MILLIGRAM(S): .01; 1; 3.5 LIQUID TOPICAL at 17:33

## 2025-07-29 RX ADMIN — PREDNISONE 5 MILLIGRAM(S): 20 TABLET ORAL at 06:15

## 2025-07-30 ENCOUNTER — TRANSCRIPTION ENCOUNTER (OUTPATIENT)
Age: 75
End: 2025-07-30

## 2025-07-30 VITALS
OXYGEN SATURATION: 97 % | RESPIRATION RATE: 26 BRPM | SYSTOLIC BLOOD PRESSURE: 164 MMHG | HEART RATE: 70 BPM | DIASTOLIC BLOOD PRESSURE: 63 MMHG | TEMPERATURE: 98 F | WEIGHT: 132.06 LBS

## 2025-07-30 LAB
ANION GAP SERPL CALC-SCNC: 5 MMOL/L — SIGNIFICANT CHANGE UP (ref 5–17)
BUN SERPL-MCNC: 26 MG/DL — HIGH (ref 7–23)
CALCIUM SERPL-MCNC: 8.6 MG/DL — SIGNIFICANT CHANGE UP (ref 8.5–10.1)
CHLORIDE SERPL-SCNC: 105 MMOL/L — SIGNIFICANT CHANGE UP (ref 96–108)
CO2 SERPL-SCNC: 31 MMOL/L — SIGNIFICANT CHANGE UP (ref 22–31)
CREAT SERPL-MCNC: 1.1 MG/DL — SIGNIFICANT CHANGE UP (ref 0.5–1.3)
EGFR: 69 ML/MIN/1.73M2 — SIGNIFICANT CHANGE UP
EGFR: 69 ML/MIN/1.73M2 — SIGNIFICANT CHANGE UP
GLUCOSE SERPL-MCNC: 195 MG/DL — HIGH (ref 70–99)
HCT VFR BLD CALC: SIGNIFICANT CHANGE UP (ref 39–50)
HGB BLD-MCNC: 9.2 G/DL — LOW (ref 13–17)
MCHC RBC-ENTMCNC: SIGNIFICANT CHANGE UP (ref 27–34)
MCHC RBC-ENTMCNC: SIGNIFICANT CHANGE UP (ref 32–36)
MCV RBC AUTO: SIGNIFICANT CHANGE UP (ref 80–100)
NRBC # BLD AUTO: 0.02 K/UL — HIGH (ref 0–0)
NRBC # FLD: 0.02 K/UL — HIGH (ref 0–0)
NRBC BLD AUTO-RTO: 0 /100 WBCS — SIGNIFICANT CHANGE UP (ref 0–0)
PLATELET # BLD AUTO: 139 K/UL — LOW (ref 150–400)
PMV BLD: SIGNIFICANT CHANGE UP FL (ref 7–13)
POTASSIUM SERPL-MCNC: 4.5 MMOL/L — SIGNIFICANT CHANGE UP (ref 3.5–5.3)
POTASSIUM SERPL-SCNC: 4.5 MMOL/L — SIGNIFICANT CHANGE UP (ref 3.5–5.3)
RBC # BLD: SIGNIFICANT CHANGE UP (ref 4.2–5.8)
RBC # FLD: SIGNIFICANT CHANGE UP % (ref 10.3–14.5)
SODIUM SERPL-SCNC: 141 MMOL/L — SIGNIFICANT CHANGE UP (ref 135–145)
WBC # BLD: 7.85 K/UL — SIGNIFICANT CHANGE UP (ref 3.8–10.5)
WBC # FLD AUTO: 7.85 K/UL — SIGNIFICANT CHANGE UP (ref 3.8–10.5)

## 2025-07-30 PROCEDURE — 96374 THER/PROPH/DIAG INJ IV PUSH: CPT

## 2025-07-30 PROCEDURE — 86850 RBC ANTIBODY SCREEN: CPT

## 2025-07-30 PROCEDURE — 86900 BLOOD TYPING SEROLOGIC ABO: CPT

## 2025-07-30 PROCEDURE — 86901 BLOOD TYPING SEROLOGIC RH(D): CPT

## 2025-07-30 PROCEDURE — 80048 BASIC METABOLIC PNL TOTAL CA: CPT

## 2025-07-30 PROCEDURE — 82272 OCCULT BLD FECES 1-3 TESTS: CPT

## 2025-07-30 PROCEDURE — 85027 COMPLETE CBC AUTOMATED: CPT

## 2025-07-30 PROCEDURE — 85610 PROTHROMBIN TIME: CPT

## 2025-07-30 PROCEDURE — 86870 RBC ANTIBODY IDENTIFICATION: CPT

## 2025-07-30 PROCEDURE — 86880 COOMBS TEST DIRECT: CPT

## 2025-07-30 PROCEDURE — 87641 MR-STAPH DNA AMP PROBE: CPT

## 2025-07-30 PROCEDURE — 87040 BLOOD CULTURE FOR BACTERIA: CPT

## 2025-07-30 PROCEDURE — 83605 ASSAY OF LACTIC ACID: CPT

## 2025-07-30 PROCEDURE — 83690 ASSAY OF LIPASE: CPT

## 2025-07-30 PROCEDURE — 96375 TX/PRO/DX INJ NEW DRUG ADDON: CPT

## 2025-07-30 PROCEDURE — 85025 COMPLETE CBC W/AUTO DIFF WBC: CPT

## 2025-07-30 PROCEDURE — 87640 STAPH A DNA AMP PROBE: CPT

## 2025-07-30 PROCEDURE — 92610 EVALUATE SWALLOWING FUNCTION: CPT

## 2025-07-30 PROCEDURE — 0225U NFCT DS DNA&RNA 21 SARSCOV2: CPT

## 2025-07-30 PROCEDURE — 85730 THROMBOPLASTIN TIME PARTIAL: CPT

## 2025-07-30 PROCEDURE — 93005 ELECTROCARDIOGRAM TRACING: CPT

## 2025-07-30 PROCEDURE — 82962 GLUCOSE BLOOD TEST: CPT

## 2025-07-30 PROCEDURE — 87507 IADNA-DNA/RNA PROBE TQ 12-25: CPT

## 2025-07-30 PROCEDURE — 80053 COMPREHEN METABOLIC PANEL: CPT

## 2025-07-30 PROCEDURE — 87637 SARSCOV2&INF A&B&RSV AMP PRB: CPT

## 2025-07-30 PROCEDURE — 81001 URINALYSIS AUTO W/SCOPE: CPT

## 2025-07-30 PROCEDURE — 74177 CT ABD & PELVIS W/CONTRAST: CPT

## 2025-07-30 PROCEDURE — 36415 COLL VENOUS BLD VENIPUNCTURE: CPT

## 2025-07-30 PROCEDURE — 99285 EMERGENCY DEPT VISIT HI MDM: CPT

## 2025-07-30 PROCEDURE — 71045 X-RAY EXAM CHEST 1 VIEW: CPT

## 2025-07-30 PROCEDURE — 86922 COMPATIBILITY TEST ANTIGLOB: CPT

## 2025-07-30 PROCEDURE — 87899 AGENT NOS ASSAY W/OPTIC: CPT

## 2025-07-30 RX ADMIN — PREDNISONE 5 MILLIGRAM(S): 20 TABLET ORAL at 06:20

## 2025-07-30 RX ADMIN — Medication 25 GRAM(S): at 03:09

## 2025-07-30 RX ADMIN — AMLODIPINE BESYLATE 10 MILLIGRAM(S): 10 TABLET ORAL at 06:21

## 2025-07-30 RX ADMIN — LISINOPRIL 40 MILLIGRAM(S): 5 TABLET ORAL at 06:21

## 2025-07-30 RX ADMIN — LISINOPRIL 50 MILLIGRAM(S): 30 TABLET ORAL at 06:21

## 2025-07-30 RX ADMIN — MUPIROCIN CALCIUM 1 APPLICATION(S): 20 CREAM TOPICAL at 06:20

## 2025-07-30 RX ADMIN — Medication 0.5 MILLIGRAM(S): at 06:20

## 2025-07-30 RX ADMIN — BUTYROSPERMUM PARKII(SHEA BUTTER), SIMMONDSIA CHINENSIS (JOJOBA) SEED OIL, ALOE BARBADENSIS LEAF EXTRACT 250 MILLIGRAM(S): .01; 1; 3.5 LIQUID TOPICAL at 06:20

## 2025-07-30 RX ADMIN — MUPIROCIN CALCIUM 1 APPLICATION(S): 20 CREAM TOPICAL at 06:21

## (undated) DEVICE — CATH IV SAFE BC 22G X 1" (BLUE)

## (undated) DEVICE — TUBING IV SET SECOND 34" W/O LOK-BLUNT

## (undated) DEVICE — TUBING CANNULA SALTER LABS NASAL ADULT 7FT

## (undated) DEVICE — DRSG COMBINE 5X9"

## (undated) DEVICE — TUBE O2 SUPL CRUSH RESIS CONN SOUTHSIDE ONLY

## (undated) DEVICE — SYR LUER SLIP TIP 30CC

## (undated) DEVICE — BRUSH CYTO ENDO

## (undated) DEVICE — CATH ELCTR GLIDE PRB 7FR

## (undated) DEVICE — SNARE LRG

## (undated) DEVICE — CATH ELECHMSTAT  INJ 7FR 210CM

## (undated) DEVICE — FORCEP RADIAL JAW 4 W NDL 2.2MM 2.8MM 160CM YELLOW DISP

## (undated) DEVICE — MARKER ENDO SPOT EX

## (undated) DEVICE — TUBING IV SET GRAVITY 3Y 100" MACRO

## (undated) DEVICE — MASK LRG MED AND HIGH O2 CONC M TO M 10FT

## (undated) DEVICE — BRUSH COLONOSCOPY CYTOLOGY

## (undated) DEVICE — DRSG CURITY GAUZE SPONGE 4 X 4" 12-PLY

## (undated) DEVICE — FORMALIN CUPS 10% BUFFERED

## (undated) DEVICE — SOL IRR POUR H2O 1000ML

## (undated) DEVICE — SOL IRR BAG H2O 1000ML

## (undated) DEVICE — Device

## (undated) DEVICE — SOL IRR NS 0.9% 250ML

## (undated) DEVICE — ELCTR GROUNDING PAD ADULT COVIDIEN

## (undated) DEVICE — SOL IRR POUR NS 0.9% 1000ML

## (undated) DEVICE — BITE BLOCK MAXI RUBBER STAMP

## (undated) DEVICE — STERIS DEFENDO 3-PIECE KIT (AIR/WATER, SUCTION & BIOPSY VALVES)

## (undated) DEVICE — GLV 8 PROTEXIS (WHITE)

## (undated) DEVICE — SET IV PUMP BLOOD 1VALVE 180FILTER NON-DEHP

## (undated) DEVICE — NDL INJ SCLERO INTERJECT 23G

## (undated) DEVICE — CATH IV SAFE BC 20G X 1.16" (PINK)

## (undated) DEVICE — SENSOR O2 FINGER XL ADULT 24/BX 6BX/CA

## (undated) DEVICE — CANISTER SUCTION 1200CC 10/SL

## (undated) DEVICE — SNARE POLYP SENS 27MM 240CM

## (undated) DEVICE — SOL IRR POUR H2O 500ML

## (undated) DEVICE — TUBING SUCTION CONN 6FT STERILE

## (undated) DEVICE — SUCTION YANKAUER TAPERED BULBOUS NO VENT

## (undated) DEVICE — SYR IV POSIFLUSH NS 3ML 30/TY

## (undated) DEVICE — SYR LUER LOK 30CC